# Patient Record
Sex: MALE | Race: WHITE | NOT HISPANIC OR LATINO | Employment: OTHER | URBAN - NONMETROPOLITAN AREA
[De-identification: names, ages, dates, MRNs, and addresses within clinical notes are randomized per-mention and may not be internally consistent; named-entity substitution may affect disease eponyms.]

---

## 2017-01-11 ENCOUNTER — TRANSFERRED RECORDS (OUTPATIENT)
Dept: HEALTH INFORMATION MANAGEMENT | Facility: HOSPITAL | Age: 82
End: 2017-01-11

## 2017-01-16 ENCOUNTER — OFFICE VISIT (OUTPATIENT)
Dept: FAMILY MEDICINE | Facility: OTHER | Age: 82
End: 2017-01-16
Attending: ORTHOPAEDIC SURGERY
Payer: MEDICARE

## 2017-01-16 VITALS
OXYGEN SATURATION: 98 % | TEMPERATURE: 96.8 F | SYSTOLIC BLOOD PRESSURE: 102 MMHG | HEIGHT: 69 IN | HEART RATE: 70 BPM | BODY MASS INDEX: 25.92 KG/M2 | DIASTOLIC BLOOD PRESSURE: 54 MMHG | WEIGHT: 175 LBS

## 2017-01-16 DIAGNOSIS — E78.00 HYPERCHOLESTEREMIA: ICD-10-CM

## 2017-01-16 DIAGNOSIS — I95.1 ORTHOSTATIC HYPOTENSION: Primary | ICD-10-CM

## 2017-01-16 LAB
ANION GAP SERPL CALCULATED.3IONS-SCNC: 7 MMOL/L (ref 3–14)
AST SERPL W P-5'-P-CCNC: 17 U/L (ref 0–45)
BUN SERPL-MCNC: 18 MG/DL (ref 7–30)
CALCIUM SERPL-MCNC: 8.5 MG/DL (ref 8.5–10.1)
CHLORIDE SERPL-SCNC: 109 MMOL/L (ref 94–109)
CHOLEST SERPL-MCNC: 156 MG/DL
CO2 SERPL-SCNC: 26 MMOL/L (ref 20–32)
CREAT SERPL-MCNC: 1.24 MG/DL (ref 0.66–1.25)
GFR SERPL CREATININE-BSD FRML MDRD: 56 ML/MIN/1.7M2
GLUCOSE SERPL-MCNC: 109 MG/DL (ref 70–99)
HDLC SERPL-MCNC: 70 MG/DL
LDLC SERPL CALC-MCNC: 75 MG/DL
NONHDLC SERPL-MCNC: 86 MG/DL
POTASSIUM SERPL-SCNC: 4.1 MMOL/L (ref 3.4–5.3)
SODIUM SERPL-SCNC: 142 MMOL/L (ref 133–144)
TRIGL SERPL-MCNC: 56 MG/DL

## 2017-01-16 PROCEDURE — 99214 OFFICE O/P EST MOD 30 MIN: CPT | Performed by: FAMILY MEDICINE

## 2017-01-16 PROCEDURE — 36415 COLL VENOUS BLD VENIPUNCTURE: CPT | Performed by: FAMILY MEDICINE

## 2017-01-16 PROCEDURE — 84450 TRANSFERASE (AST) (SGOT): CPT | Performed by: FAMILY MEDICINE

## 2017-01-16 PROCEDURE — 80061 LIPID PANEL: CPT | Performed by: FAMILY MEDICINE

## 2017-01-16 PROCEDURE — 99212 OFFICE O/P EST SF 10 MIN: CPT

## 2017-01-16 PROCEDURE — 80048 BASIC METABOLIC PNL TOTAL CA: CPT | Performed by: FAMILY MEDICINE

## 2017-01-16 RX ORDER — MIDODRINE HYDROCHLORIDE 5 MG/1
5 TABLET ORAL 3 TIMES DAILY
Qty: 90 TABLET | Refills: 5 | Status: SHIPPED | OUTPATIENT
Start: 2017-01-16 | End: 2017-04-03

## 2017-01-16 ASSESSMENT — ANXIETY QUESTIONNAIRES
2. NOT BEING ABLE TO STOP OR CONTROL WORRYING: NOT AT ALL
3. WORRYING TOO MUCH ABOUT DIFFERENT THINGS: NOT AT ALL
7. FEELING AFRAID AS IF SOMETHING AWFUL MIGHT HAPPEN: NOT AT ALL
5. BEING SO RESTLESS THAT IT IS HARD TO SIT STILL: NOT AT ALL
6. BECOMING EASILY ANNOYED OR IRRITABLE: NOT AT ALL
GAD7 TOTAL SCORE: 0
1. FEELING NERVOUS, ANXIOUS, OR ON EDGE: NOT AT ALL

## 2017-01-16 ASSESSMENT — PATIENT HEALTH QUESTIONNAIRE - PHQ9: 5. POOR APPETITE OR OVEREATING: NOT AT ALL

## 2017-01-16 ASSESSMENT — PAIN SCALES - GENERAL: PAINLEVEL: NO PAIN (0)

## 2017-01-16 NOTE — NURSING NOTE
"Chief Complaint   Patient presents with     Other     3 month follow up Hypotension. medication recheck/refill request        Initial /54 mmHg  Pulse 70  Temp(Src) 96.8  F (36  C) (Tympanic)  Ht 5' 9\" (1.753 m)  Wt 175 lb (79.379 kg)  BMI 25.83 kg/m2  SpO2 98% Estimated body mass index is 25.83 kg/(m^2) as calculated from the following:    Height as of this encounter: 5' 9\" (1.753 m).    Weight as of this encounter: 175 lb (79.379 kg).  BP completed using cuff size: zak ORDONEZ      "

## 2017-01-16 NOTE — PROGRESS NOTES
[facility] [place]    Jf Ortiz, 83 year old, male presents with   Chief Complaint   Patient presents with     Other     3 month follow up Hypotension. medication recheck/refill request. His friend was wondering about the dose change with the medication and controlling blood pressure.  He's had no chest pain or shortness of breath no abdominal pain       PAST MEDICAL HISTORY:  Past Medical History   Diagnosis Date     Diaphragmatic hernia without mention of obstruction or gangrene 1/1/2011     Other and unspecified hyperlipidemia 1/1/2011     Osteoarthrosis, unspecified whether generalized or localized, unspecified site 1/1/2011     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     Coronary artery disease      Pacemaker      Stented coronary artery      Seizure disorder (H)      Parkinson's disease (H)      Dementia      probable Lewy Body Disease       PAST SURGICAL HISTORY:  Past Surgical History   Procedure Laterality Date     ------------other-------------  1955     ulnar and radial fx - repair ulnar and radial fx x4     Colonoscopy with polypectomy  3/13/2009     history of polyps - repeat 3 yrs     Pacemaker placement  2000     heart block     Bypass graft artery coronary       coronary artery disease x 5     Cataract extraction and lens implantation  2011     cataracts     Cataract extraction and lens implantation       cataracts     Stent placement to lad  2008     Colonoscopy with polypectomy  2006     Pacemaker placement  2011     dual-chamber     Colonoscopy with polypectomy  2005     Ventilation tube  5/24/2012     right in office     ------------other-------------  6/14/2011     cataract extraction     Colonoscopy  2012     Esophagoscopy, gastroscopy, duodenoscopy (egd), combined  1/22/2014     Procedure: COMBINED ESOPHAGOSCOPY, GASTROSCOPY, DUODENOSCOPY (EGD);  UPPER ENDOSCOPY(JAYDEN) W/ BIOPSIES;  Surgeon: Patricia Pena MD;  Location: [facility]     Remove tube, myringotomy, combined  1/22/2014      Procedure: COMBINED REMOVE TUBE, MYRINGOTOMY;  MICRODIRECT LARYNGOSCOPY WITH BIOPSY AND FROZEN SECTIONS removal of right ear tube and myringoplasty;  Surgeon: Chayo Duke MD;  Location: [facility]     Laryngoscopy with microscope  1/22/2014     Procedure: LARYNGOSCOPY WITH MICROSCOPE;;  Surgeon: Chayo Duke MD;  Location[facility]     Blepharoplasty bilateral  5/6/2014     Procedure: BLEPHAROPLASTY BILATERAL;  Surgeon: Andrew Queen MD;  Location: [facility]     Endoscopy upper, colonoscopy, combined N/A 10/31/2014     Procedure: COMBINED ENDOSCOPY UPPER, COLONOSCOPY;  Surgeon: Bassam Aguilar MD;  Location[facility]     Combined colonoscopy with argon plasma coagulator (apc) N/A 10/31/2014     Procedure: COMBINED COLONOSCOPY WITH ARGON PLASMA COAGULATOR (APC);  Surgeon: Bassam Aguilar MD;  [facility]     Biopsy  08/2015     skin biopsy     Endoscopy upper, colonoscopy, combined N/A 11/13/2015     Procedure: COMBINED ENDOSCOPY UPPER, COLONOSCOPY;  Surgeon: Bassam Aguilar MD;  [facility]     Combined colonoscopy with argon plasma coagulator (apc) N/A 11/13/2015     Procedure: COMBINED COLONOSCOPY WITH ARGON PLASMA COAGULATOR (APC);  Surgeon: Bassam Aguilar MD;  Location[facility]       MEDICATIONS:  Prior to Admission medications    Medication Sig Start Date End Date Taking? Authorizing Provider   midodrine (PROAMATINE) 5 MG tablet Take 1 tablet (5 mg) by mouth 3 times daily 1/16/17  Yes FRANCES Ray MD   PANTOPRAZOLE SODIUM PO Take 40 mg by mouth every morning (before breakfast)   Yes Reported, Patient   atorvastatin (LIPITOR) 20 MG tablet Take 1 tablet (20 mg) by mouth daily 4/8/16  Yes FRANCES Ray MD   Omega-3 Fatty Acids (OMEGA-3 FISH OIL PO)    Yes Reported, Patient   vitamin  B complex with vitamin C (VITAMIN  B COMPLEX) TABS Take 1 tablet by mouth daily   Yes Reported, Patient   MELATONIN PO Take 5 mg by mouth   Yes Reported, Patient   UNABLE TO FIND MEDICATION NAME: prosta ida   Yes Reported,  "Patient   Multiple Vitamins-Minerals (ONE-A-DAY 50 PLUS PO) Take by mouth daily   Yes Reported, Patient   Cholecalciferol (VITAMIN D-3 PO) Take 1,000 Int'l Units by mouth daily    Yes Reported, Patient       ALLERGIES:     Allergies   Allergen Reactions     Cats Other (See Comments)     Lamotrigine      Skin lesions       ROS:  Constitutional, neuro, ENT, endocrine, pulmonary, cardiac, gastrointestinal, genitourinary, musculoskeletal, integument and psychiatric systems are negative, except as otherwise noted.      EXAM:  /54 mmHg  Pulse 70  Temp(Src) 96.8  F (36  C) (Tympanic)  Ht 5' 9\" (1.753 m)  Wt 175 lb (79.379 kg)  BMI 25.83 kg/m2  SpO2 98% Body mass index is 25.83 kg/(m^2).   GENERAL APPEARANCE: healthy, alert and no distress  EYES: Eyes grossly normal to inspection, PERRL and conjunctivae and sclerae normal  HENT:  nose and mouth without ulcers or lesions  NECK: no adenopathy, no asymmetry, masses, or scars and thyroid normal to palpation  RESP: lungs clear to auscultation - no rales, rhonchi or wheezes  CV: regular rates and rhythm, normal S1 S2, no S3 or S4 and no murmur, click or rub  ABDOMEN: soft, nontender, without hepatosplenomegaly or masses and bowel sounds normal  NEURO: Normal strength and tone, mentation intact and speech normal, it will get up out of the chair go to the door turn around and come back without any difficulty.  No cogwheeling.  PSYCH: mentation appears normal and affect normal  Lab/ X-ray  No results found for this or any previous visit (from the past 24 hour(s)).    ASSESSMENT/PLAN:    ICD-10-CM    1. Orthostatic hypotension I95.1 midodrine (PROAMATINE) 5 MG tablet. Change dose from 2.5 mg 3 times a day to 5 mg 3 times a day.  Will check BMP for follow up of hypertension.  Discussed with his friend that I think some of the neurological changes that were diagnosed as dementia and Parkinson's earlier are probably related more to the previous low blood pressure.  He seems " to be doing fine on this medicine  We'll see him in 3 months for follow up.     Basic metabolic panel   2. Hypercholesteremia E78.00 Lipid Profile, AST drawn and will call with results     AST         IRENE Ray MD  January 16, 2017

## 2017-01-16 NOTE — MR AVS SNAPSHOT
"              After Visit Summary   1/16/2017    Jf Ortiz    MRN: [id number]           Patient Information     Date Of Birth          10/5/1933        Visit Information        Provider Department      1/16/2017 10:15 AM FRANCES Ray MD [facility]        Today's Diagnoses     Orthostatic hypotension    -  1     Hypercholesteremia           Care Instructions    We will call with the labs.          Follow-ups after your visit        Your next 10 appointments already scheduled     Jan 20, 2017  1:15 PM   (Arrive by 1:00 PM)   Return Visit with Geeta Alegria PA-C   [facility] ([facility])    [address] [facility] [place] [address]   [phone]            Nov 15, 2017 11:00 AM   Hearing Eval with WENDY Meyers   [facility] ([facility])    [address] [facility] [place] [address]   [phone]              Who to contact     If you have questions or need follow up information about today's clinic visit or your schedule please contact [facility] directly at [phone].  Normal or non-critical lab and imaging results will be communicated to you by MyChart, letter or phone within 4 business days after the clinic has received the results. If you do not hear from us within 7 days, please contact the clinic through [employer] or phone. If you have a critical or abnormal lab result, we will notify you by phone as soon as possible.  Submit refill requests through [employer] or call your pharmacy and they will forward the refill request to us. Please allow 3 business days for your refill to be completed.          Additional Information About Your Visit        MyChart Information     [employer] lets you send messages to your doctor, view your test results, renew your prescriptions, schedule appointments and more. To sign up, go to [email or web address] . Click on \"Log in\" on the left side of the screen, which will take you to the Welcome page. Then click on " "\"Sign up Now\" on the right side of the page.     You will be asked to enter the access code listed below, as well as some personal information. Please follow the directions to create your username and password.     Your access code is: [email or web address]  Expires: 2017 10:17 AM     Your access code will  in 90 days. If you need help or a new code, please call your [facility] or [phone].        Care EveryWhere ID     This is your Care EveryWhere ID. This could be used by other organizations to access your [place] medical records  [id number]        Your Vitals Were     Pulse Temperature Height BMI (Body Mass Index) Pulse Oximetry       70 96.8  F (36  C) (Tympanic) 5' 9\" (1.753 m) 25.83 kg/m2 98%        Blood Pressure from Last 3 Encounters:   17 102/54   10/14/16 114/70   16 122/68    Weight from Last 3 Encounters:   17 175 lb (79.379 kg)   10/14/16 170 lb (77.111 kg)   16 172 lb (78.019 kg)              We Performed the Following     AST     Basic metabolic panel     Lipid Profile          Today's Medication Changes          These changes are accurate as of: 17 10:17 AM.  If you have any questions, ask your nurse or doctor.               These medicines have changed or have updated prescriptions.        Dose/Directions    midodrine 5 MG tablet   Commonly known as:  PROAMATINE   This may have changed:    - medication strength  - how much to take   Used for:  Orthostatic hypotension   Changed by:  FRANCES Ray MD        Dose:  5 mg   Take 1 tablet (5 mg) by mouth 3 times daily   Quantity:  90 tablet   Refills:  5            Where to get your medicines      These medications were sent to [id number] - [place] - [address]  [address] [place] [address]     Phone:  [phone]    - midodrine 5 MG tablet             Primary Care Provider Office Phone # Fax #    FRANCES Ray [phone]       [facility] [address] [facility] [address]        Thank you!     Thank you for choosing [facility]  for your care. Our goal is always to provide you with excellent care. Hearing back from our patients is one way we can continue to improve our services. Please take a few minutes to complete the written survey that you may receive in the mail after your visit with us. Thank you!             Your Updated Medication List - Protect others around you: Learn how to safely use, store and throw away your medicines at www.disposemymeds.org.          This list is accurate as of: 1/16/17 10:17 AM.  Always use your most recent med list.                   Brand Name Dispense Instructions for use    LIPITOR 20 MG tablet   Generic drug:  atorvastatin     90 tablet    Take 1 tablet (20 mg) by mouth daily       MELATONIN PO      Take 5 mg by mouth       midodrine 5 MG tablet    PROAMATINE    90 tablet    Take 1 tablet (5 mg) by mouth 3 times daily       OMEGA-3 FISH OIL PO          ONE-A-DAY 50 PLUS PO      Take by mouth daily       PANTOPRAZOLE SODIUM PO      Take 40 mg by mouth every morning (before breakfast)       UNABLE TO FIND      MEDICATION NAME: prosta ida       vitamin B complex with vitamin C Tabs tablet      Take 1 tablet by mouth daily       VITAMIN D-3 PO      Take 1,000 Int'l Units by mouth daily

## 2017-01-17 ASSESSMENT — PATIENT HEALTH QUESTIONNAIRE - PHQ9: SUM OF ALL RESPONSES TO PHQ QUESTIONS 1-9: 0

## 2017-01-17 ASSESSMENT — ANXIETY QUESTIONNAIRES: GAD7 TOTAL SCORE: 0

## 2017-02-08 ENCOUNTER — OFFICE VISIT (OUTPATIENT)
Dept: OTOLARYNGOLOGY | Facility: OTHER | Age: 82
End: 2017-02-08
Attending: PHYSICIAN ASSISTANT
Payer: MEDICARE

## 2017-02-08 VITALS
SYSTOLIC BLOOD PRESSURE: 104 MMHG | OXYGEN SATURATION: 97 % | DIASTOLIC BLOOD PRESSURE: 60 MMHG | TEMPERATURE: 96.9 F | WEIGHT: 172 LBS | HEART RATE: 100 BPM | BODY MASS INDEX: 25.48 KG/M2 | HEIGHT: 69 IN

## 2017-02-08 DIAGNOSIS — H90.3 ASNHL (ASYMMETRICAL SENSORINEURAL HEARING LOSS): Primary | ICD-10-CM

## 2017-02-08 DIAGNOSIS — H61.23 IMPACTED CERUMEN, BILATERAL: ICD-10-CM

## 2017-02-08 DIAGNOSIS — H69.91 DYSFUNCTION OF EUSTACHIAN TUBE, RIGHT: ICD-10-CM

## 2017-02-08 DIAGNOSIS — Z96.22 S/P MYRINGOTOMY WITH INSERTION OF TUBE: ICD-10-CM

## 2017-02-08 PROCEDURE — 92504 EAR MICROSCOPY EXAMINATION: CPT | Performed by: PHYSICIAN ASSISTANT

## 2017-02-08 PROCEDURE — 99213 OFFICE O/P EST LOW 20 MIN: CPT | Mod: 25 | Performed by: PHYSICIAN ASSISTANT

## 2017-02-08 PROCEDURE — 92504 EAR MICROSCOPY EXAMINATION: CPT

## 2017-02-08 PROCEDURE — 99214 OFFICE O/P EST MOD 30 MIN: CPT

## 2017-02-08 ASSESSMENT — PAIN SCALES - GENERAL: PAINLEVEL: NO PAIN (0)

## 2017-02-08 NOTE — MR AVS SNAPSHOT
After Visit Summary   2/8/2017    Jf Ortiz    MRN: [id number]           Patient Information     Date Of Birth          10/5/1933        Visit Information        Provider Department      2/8/2017 11:30 AM Geeta Alegria PA-C [facility]        Today's Diagnoses     ASNHL (asymmetrical sensorineural hearing loss)    -  1     Impacted cerumen, bilateral         Dysfunction of eustachian tube, right         S/P myringotomy with insertion of tube           Care Instructions    Ears were cleaned.   Follow up in 6 months  Tube is in good position and open      If there are concerns or questions, Call [phone] and ask for nurse        Follow-ups after your visit        Your next 10 appointments already scheduled     Apr 17, 2017 10:15 AM   (Arrive by 10:00 AM)   Office Visit with FRANCES Ray MD   [facility] ([facility] [place] [facility])    [address]  [place] [id number] [phone]           Bring a current list of meds and any records pertaining to this visit.  For Physicals, please bring immunization records and any forms needing to be filled out.    Please arrive 15 minutes early to complete paperwork and register.            Nov 15, 2017 11:00 AM   Hearing Eval with WENDY Meyers   [facility] [place] ([facility] [place] [facility])    [address]  [place] [id number] [phone]              Who to contact     If you have questions or need follow up information about today's clinic visit or your schedule please contact [facility] directly at [phone].  Normal or non-critical lab and imaging results will be communicated to you by MyChart, letter or phone within 4 business days after the clinic has received the results. If you do not hear from us within 7 days, please contact the clinic through [employer] or phone. If you have a critical or abnormal lab result, we will notify you by phone as soon as possible.  Submit refill requests through [employer] or  "call your pharmacy and they will forward the refill request to us. Please allow 3 business days for your refill to be completed.          Additional Information About Your Visit        MyChart Information     [employer] lets you send messages to your doctor, view your test results, renew your prescriptions, schedule appointments and more. To sign up, go to [email or web address] . Click on \"Log in\" on the left side of the screen, which will take you to the Welcome page. Then click on \"Sign up Now\" on the right side of the page.     You will be asked to enter the access code listed below, as well as some personal information. Please follow the directions to create your username and password.     Your access code is: [email or web address]  Expires: 2017 10:17 AM     Your access code will  in 90 days. If you need help or a new code, please call your [place] clinic or [phone].        Care EveryWhere ID     This is your [facility] EveryWhere ID. This could be used by other organizations to access your [place] medical records  [id number]        Your Vitals Were     Pulse Temperature Height BMI (Body Mass Index) Pulse Oximetry       100 96.9  F (36.1  C) (Tympanic) 5' 9\" (1.753 m) 25.39 kg/m2 97%        Blood Pressure from Last 3 Encounters:   17 104/60   17 102/54   10/14/16 114/70    Weight from Last 3 Encounters:   17 172 lb (78.019 kg)   17 175 lb (79.379 kg)   10/14/16 170 lb (77.111 kg)              Today, you had the following     No orders found for display       Primary Care Provider Office Phone # Fax #    R Venancio Ray [phone]       [place] [address]        Thank you!     Thank you for choosing [facility]  for your care. Our goal is always to provide you with excellent care. Hearing back from our patients is one way we can continue to improve our services. Please take a few minutes to complete the written " survey that you may receive in the mail after your visit with us. Thank you!             Your Updated Medication List - Protect others around you: Learn how to safely use, store and throw away your medicines at www.disposemymeds.org.          This list is accurate as of: 2/8/17 11:50 AM.  Always use your most recent med list.                   Brand Name Dispense Instructions for use    LIPITOR 20 MG tablet   Generic drug:  atorvastatin     90 tablet    Take 1 tablet (20 mg) by mouth daily       MELATONIN PO      Take 5 mg by mouth       midodrine 5 MG tablet    PROAMATINE    90 tablet    Take 1 tablet (5 mg) by mouth 3 times daily       OMEGA-3 FISH OIL PO          ONE-A-DAY 50 PLUS PO      Take by mouth daily       PANTOPRAZOLE SODIUM PO      Take 40 mg by mouth every morning (before breakfast)       UNABLE TO FIND      MEDICATION NAME: prosta ida       vitamin B complex with vitamin C Tabs tablet      Take 1 tablet by mouth daily       VITAMIN D-3 PO      Take 1,000 Int'l Units by mouth daily

## 2017-02-08 NOTE — PATIENT INSTRUCTIONS
Ears were cleaned.   Follow up in 6 months  Tube is in good position and open      If there are concerns or questions, Call [phone] and ask for nurse

## 2017-02-08 NOTE — PROGRESS NOTES
"Chief Complaint   Patient presents with     Cerumen Impaction     ear cleaning   Jf Ortiz is a 83 year old male who presents today for ear cleaning. Last had ears cleaned 5 months ago. Has concerns with hearing, both ears.  He has bilateral hearing aids that he wears.  Right aid is out for repair at this time. Has no tinnitus, vertigo.  He has a history of ASNHL, right>left with right PE tube.  He has declined MRI IAC, is following with serial Audiograms.      Past Medical History   Diagnosis Date     Diaphragmatic hernia without mention of obstruction or gangrene 1/1/2011     Other and unspecified hyperlipidemia 1/1/2011     Osteoarthrosis, unspecified whether generalized or localized, unspecified site 1/1/2011     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     Coronary artery disease      Pacemaker      Stented coronary artery      Seizure disorder (H)      Parkinson's disease (H)      Dementia      probable Lewy Body Disease        Allergies   Allergen Reactions     Cats Other (See Comments)     Lamotrigine      Skin lesions     Current Outpatient Prescriptions   Medication     midodrine (PROAMATINE) 5 MG tablet     PANTOPRAZOLE SODIUM PO     atorvastatin (LIPITOR) 20 MG tablet     Omega-3 Fatty Acids (OMEGA-3 FISH OIL PO)     vitamin  B complex with vitamin C (VITAMIN  B COMPLEX) TABS     MELATONIN PO     UNABLE TO FIND     Multiple Vitamins-Minerals (ONE-A-DAY 50 PLUS PO)     Cholecalciferol (VITAMIN D-3 PO)     No current facility-administered medications for this visit.      ROS: 10 point ROS neg other than the symptoms noted above in the HPI.  /60 mmHg  Pulse 100  Temp(Src) 96.9  F (36.1  C) (Tympanic)  Ht 5' 9\" (1.753 m)  Wt 172 lb (78.019 kg)  BMI 25.39 kg/m2  SpO2 97%  General - The patient is well nourished and well developed, and appears to have good nutritional status.  Alert and oriented to person and place, answers questions and cooperates with examination appropriately.   Head and Face - " Normocephalic and atraumatic, with no gross asymmetry noted.  The facial nerve is intact, with strong symmetric movements.  Voice and Breathing - The patient was breathing comfortably without the use of accessory muscles. There was no wheezing, stridor, or stertor.  The patients voice was clear and strong, and had appropriate pitch and quality.  Ears - Ears were examined with otoscope and under Microscopy.  The bilateral external auditory canals are cerumen impacted.  Right canal cleaned with cerumen loop and cupped forceps.  The tympanic membrane is intact without effusion, retraction or mass.  T-tube is patent and in good position.  Small amount of cerumen remains around the base of the tube.  Bony landmarks are intact.     Left canal cleaned with #7, #5 sucker. The tympanic membrane is intact without effusion, retraction or mass.  Bony landmarks are intact.    Eyes - Extraocular movements intact.  Sclera were not icteric or injected, conjunctiva are not injected.  Mouth - Examination of the oral cavity showed pink, healthy oral mucosa. No lesions or ulcerations noted.  The tongue was mobile and midline, and the dentition were in good condition.     Throat - The walls of the oropharynx were smooth, pink, moist, symmetric, and had no lesions or ulcerations.  The tonsillar pillars and soft palate were symmetric.  The uvula was midline on elevation.     Neck -  Palpation of the occipital, submental, submandibular, internal jugular chain, and supraclavicular nodes did not demonstrate any abnormal lymph nodes or masses.  Palpation of the thyroid was soft and smooth, with no nodules or goiter appreciated.  The trachea was mobile and midline.  Nose - External contour is symmetric, no gross deflection or scars.  Nasal mucosa is pink and moist with no abnormal mucus.  The septum was intact, turbinates of normal size and position.  No polyps, masses, or purulence noted on examination.      ASSESSMENT:    ICD-10-CM    1.  ASNHL (asymmetrical sensorineural hearing loss) H90.5    2. Impacted cerumen, bilateral H61.23    3. Dysfunction of eustachian tube, right H69.81    4. S/P myringotomy with insertion of tube Z96.22      Right tube is open and in good position  Ears are cleaned and both look good  Follow up in 4-6 months for ear cleaning and tube check  Follow up Audiogram is scheduled today    Geeta Alegria PA-C  ENT  [facility], [place]  [phone]

## 2017-02-08 NOTE — NURSING NOTE
"Chief Complaint   Patient presents with     Cerumen Impaction     ear cleaning       Initial /60 mmHg  Pulse 100  Temp(Src) 96.9  F (36.1  C) (Tympanic)  Ht 5' 9\" (1.753 m)  Wt 172 lb (78.019 kg)  BMI 25.39 kg/m2  SpO2 97% Estimated body mass index is 25.39 kg/(m^2) as calculated from the following:    Height as of this encounter: 5' 9\" (1.753 m).    Weight as of this encounter: 172 lb (78.019 kg).  Medication Reconciliation: complete       Екатерина Bae LPN      "

## 2017-03-10 ENCOUNTER — TRANSFERRED RECORDS (OUTPATIENT)
Dept: HEALTH INFORMATION MANAGEMENT | Facility: HOSPITAL | Age: 82
End: 2017-03-10

## 2017-04-03 DIAGNOSIS — I95.1 ORTHOSTATIC HYPOTENSION: ICD-10-CM

## 2017-04-05 NOTE — TELEPHONE ENCOUNTER
Midodrine      Last Written Prescription Date: 02/02/2017  Last Fill Quantity: 90, # refills: 0  Last Office Visit with G, P or [facility] prescribing provider: 01/16/2017  Next 5 appointments (look out 90 days)     Apr 17, 2017 10:15 AM CDT   (Arrive by 10:00 AM)   Office Visit with FRANCES Ray MD   [facility] [place] ([facility])    [address] [place] [address]   [phone]                   Potassium   Date Value Ref Range Status   01/16/2017 4.1 3.4 - 5.3 mmol/L Final     Creatinine   Date Value Ref Range Status   01/16/2017 1.24 0.66 - 1.25 mg/dL Final     BP Readings from Last 3 Encounters:   02/08/17 104/60   01/16/17 102/54   10/14/16 114/70

## 2017-04-06 RX ORDER — MIDODRINE HYDROCHLORIDE 5 MG/1
TABLET ORAL
Qty: 90 TABLET | Refills: 0 | Status: SHIPPED | OUTPATIENT
Start: 2017-04-06 | End: 2017-04-19

## 2017-04-19 DIAGNOSIS — I95.1 ORTHOSTATIC HYPOTENSION: ICD-10-CM

## 2017-04-19 NOTE — TELEPHONE ENCOUNTER
Kaitlynn        Last Written Prescription Date: 4/6/17  Last Fill Quantity: 90,  # refills: 0   Last Office Visit with G, P or [facility] prescribing provider: 1/16/17

## 2017-04-20 RX ORDER — MIDODRINE HYDROCHLORIDE 5 MG/1
TABLET ORAL
Qty: 90 TABLET | Refills: 3 | Status: SHIPPED | OUTPATIENT
Start: 2017-04-20 | End: 2018-02-02

## 2017-06-13 ENCOUNTER — TELEPHONE (OUTPATIENT)
Dept: FAMILY MEDICINE | Facility: OTHER | Age: 82
End: 2017-06-13

## 2017-06-13 ENCOUNTER — TRANSFERRED RECORDS (OUTPATIENT)
Dept: HEALTH INFORMATION MANAGEMENT | Facility: HOSPITAL | Age: 82
End: 2017-06-13

## 2017-06-13 DIAGNOSIS — I95.0 IDIOPATHIC HYPOTENSION: Primary | ICD-10-CM

## 2017-06-13 DIAGNOSIS — M25.60 DECREASED RANGE OF MOTION: ICD-10-CM

## 2017-06-13 DIAGNOSIS — M54.50 LUMBAGO: Primary | ICD-10-CM

## 2017-06-13 NOTE — TELEPHONE ENCOUNTER
10:13 AM    Reason for Call: OVERBOOK    Patient is having the following symptoms: Dizzy spells  For  1 month - thinks it might blood pressure   His blood pressure was 92/62 on medication    The patient is requesting an appointment for  Today  with  Venancio Ray    Was an appointment offered for this call?   No    Preferred method for responding to this message: [phone]    If we cannot reach you directly, may we leave a detailed response at the number you provided?   Yes        Magi Victoria

## 2017-06-13 NOTE — TELEPHONE ENCOUNTER
Spoke with patient denies falls or chest pain. Dr. Venancio Ray would like patient to see the cardiology, patient will be able to get in next Wednesday. Huc will call today about appointment   SHASHI ORDONEZ

## 2017-06-14 ENCOUNTER — HOSPITAL ENCOUNTER (OUTPATIENT)
Dept: CT IMAGING | Facility: HOSPITAL | Age: 82
Discharge: HOME | End: 2017-06-14
Attending: ORTHOPAEDIC SURGERY | Admitting: ORTHOPAEDIC SURGERY
Payer: MEDICARE

## 2017-06-14 PROCEDURE — 72131 CT LUMBAR SPINE W/O DYE: CPT | Mod: TC

## 2017-06-20 ENCOUNTER — OFFICE VISIT (OUTPATIENT)
Dept: AUDIOLOGY | Facility: OTHER | Age: 82
End: 2017-06-20
Attending: AUDIOLOGIST
Payer: MEDICARE

## 2017-06-20 DIAGNOSIS — H90.3 SENSORINEURAL HEARING LOSS, BILATERAL: Primary | ICD-10-CM

## 2017-06-20 PROCEDURE — V5299 HEARING SERVICE: HCPCS | Performed by: AUDIOLOGIST

## 2017-06-20 NOTE — PROGRESS NOTES
Background: Patient walk in for right hearing aid check reporting it is dead.  Results: No battery in aid and the tubing was removed and inserted in the wrong end of earmold. Once fixed no concerns. Concern with patient confusion and memory. Patient went to wrong door to leave office.   Recommendations: Follow up per scheduled or as needed. Provider contacted.  Kirstie Zaman M.S., [facility]  Audiologist #[id number]

## 2017-06-20 NOTE — MR AVS SNAPSHOT
After Visit Summary   6/20/2017    Jf Ortiz    MRN: [id number]           Patient Information     Date Of Birth          10/5/1933        Visit Information        Provider Department      6/20/2017 10:30 AM [facility]        Today's Diagnoses     Sensorineural hearing loss, bilateral    -  1       Follow-ups after your visit        Your next 10 appointments already scheduled     Jun 21, 2017 10:30 AM CDT   (Arrive by 10:15 AM)   New Visit with Herman Rivers DO   [facility] [place] ([facility] [place] )    [address]  [place] [address]   [phone]            Aug 04, 2017 10:45 AM CDT   (Arrive by 10:30 AM)   PROCEDURE with Geeta Alegria PA-C   [facility] [place] ([facility] [place] )    [address]  [place] [address]   [phone]            Nov 15, 2017 11:00 AM CST   Hearing Eval with Kenneth Meyers   [facility] [place] ([facility] [place] )    [address]  [place] [address]   [phone]              Who to contact     If you have questions or need follow up information about today's clinic visit or your schedule please contact [facility] directly at [phone].  Normal or non-critical lab and imaging results will be communicated to you by [employer], letter or phone within 4 business days after the clinic has received the results. If you do not hear from us within 7 days, please contact the clinic through [employer] or phone. If you have a critical or abnormal lab result, we will notify you by phone as soon as possible.  Submit refill requests through [employer] or call your pharmacy and they will forward the refill request to us. Please allow 3 business days for your refill to be completed.          Additional Information About Your Visit        [employer] Information     [employer] lets you send messages to your doctor, view your test results, renew your prescriptions,  "schedule appointments and more. To sign up, go to [email or web address] . Click on \"Log in\" on the left side of the screen, which will take you to the Welcome page. Then click on \"Sign up Now\" on the right side of the page.     You will be asked to enter the access code listed below, as well as some personal information. Please follow the directions to create your username and password.     Your access code is: [email or web address]  Expires: 2017 10:40 AM     Your access code will  in 90 days. If you need help or a new code, please call your [facility] clinic or [phone].        Care EveryWhere ID     This is your Care EveryWhere ID. This could be used by other organizations to access your [facility] medical records  [id number]         Blood Pressure from Last 3 Encounters:   17 104/60   17 102/54   10/14/16 114/70    Weight from Last 3 Encounters:   17 172 lb (78 kg)   17 175 lb (79.4 kg)   10/14/16 170 lb (77.1 kg)              Today, you had the following     No orders found for display       Primary Care Provider Office Phone # Fax #    R Venancio Ray [phone]       [facility] [place] [address] [place] [address]        Thank you!     Thank you for choosing [facility] [place]  for your care. Our goal is always to provide you with excellent care. Hearing back from our patients is one way we can continue to improve our services. Please take a few minutes to complete the written survey that you may receive in the mail after your visit with us. Thank you!             Your Updated Medication List - Protect others around you: Learn how to safely use, store and throw away your medicines at www.disposemymeds.org.          This list is accurate as of: 17 10:40 AM.  Always use your most recent med list.                   Brand Name Dispense Instructions for use    LIPITOR 20 MG tablet   Generic drug:  atorvastatin     90 tablet    Take 1 tablet " (20 mg) by mouth daily       MELATONIN PO      Take 5 mg by mouth       midodrine 5 MG tablet    PROAMATINE    90 tablet    TAKE ONE TABLET THREE TIMES A DAY BY MOUTH       OMEGA-3 FISH OIL PO          ONE-A-DAY 50 PLUS PO      Take by mouth daily       PANTOPRAZOLE SODIUM PO      Take 40 mg by mouth every morning (before breakfast)       UNABLE TO FIND      MEDICATION NAME: hosea ida       vitamin B complex with vitamin C Tabs tablet      Take 1 tablet by mouth daily       VITAMIN D-3 PO      Take 1,000 Int'l Units by mouth daily

## 2017-06-21 ENCOUNTER — TELEPHONE (OUTPATIENT)
Dept: CARDIOLOGY | Facility: OTHER | Age: 82
End: 2017-06-21

## 2017-06-21 ENCOUNTER — OFFICE VISIT (OUTPATIENT)
Dept: CARDIOLOGY | Facility: OTHER | Age: 82
End: 2017-06-21
Attending: INTERNAL MEDICINE
Payer: MEDICARE

## 2017-06-21 VITALS
TEMPERATURE: 97.3 F | SYSTOLIC BLOOD PRESSURE: 73 MMHG | HEIGHT: 69 IN | HEART RATE: 75 BPM | BODY MASS INDEX: 25.92 KG/M2 | WEIGHT: 175 LBS | RESPIRATION RATE: 20 BRPM | DIASTOLIC BLOOD PRESSURE: 55 MMHG | OXYGEN SATURATION: 97 %

## 2017-06-21 DIAGNOSIS — G20.A1 PARKINSON'S DISEASE (H): Primary | ICD-10-CM

## 2017-06-21 DIAGNOSIS — F02.818 LEWY BODY DEMENTIA WITH BEHAVIORAL DISTURBANCE (H): ICD-10-CM

## 2017-06-21 DIAGNOSIS — Z95.5 STENTED CORONARY ARTERY: ICD-10-CM

## 2017-06-21 DIAGNOSIS — R55 SYNCOPE AND COLLAPSE: ICD-10-CM

## 2017-06-21 DIAGNOSIS — G31.83 LEWY BODY DEMENTIA WITH BEHAVIORAL DISTURBANCE (H): ICD-10-CM

## 2017-06-21 DIAGNOSIS — Z95.0 PACEMAKER: ICD-10-CM

## 2017-06-21 DIAGNOSIS — E78.00 HYPERCHOLESTEREMIA: ICD-10-CM

## 2017-06-21 PROCEDURE — 99204 OFFICE O/P NEW MOD 45 MIN: CPT | Performed by: INTERNAL MEDICINE

## 2017-06-21 PROCEDURE — 93005 ELECTROCARDIOGRAM TRACING: CPT

## 2017-06-21 PROCEDURE — 93010 ELECTROCARDIOGRAM REPORT: CPT | Performed by: INTERNAL MEDICINE

## 2017-06-21 PROCEDURE — 99212 OFFICE O/P EST SF 10 MIN: CPT

## 2017-06-21 RX ORDER — MIDODRINE HYDROCHLORIDE 5 MG/1
10 TABLET ORAL 3 TIMES DAILY
Qty: 90 TABLET | Refills: 3 | Status: SHIPPED | OUTPATIENT
Start: 2017-06-21 | End: 2017-08-18

## 2017-06-21 RX ORDER — SODIUM CHLORIDE 1 G/1
1 TABLET ORAL 2 TIMES DAILY
Qty: 60 TABLET | Refills: 0 | Status: SHIPPED | OUTPATIENT
Start: 2017-06-21 | End: 2017-08-05

## 2017-06-21 RX ORDER — FLUDROCORTISONE ACETATE 0.1 MG/1
0.1 TABLET ORAL DAILY
Qty: 30 TABLET | Refills: 3 | Status: SHIPPED | OUTPATIENT
Start: 2017-06-21 | End: 2017-08-30

## 2017-06-21 ASSESSMENT — PAIN SCALES - GENERAL: PAINLEVEL: NO PAIN (0)

## 2017-06-21 NOTE — NURSING NOTE
"Chief Complaint   Patient presents with     Consult     Referral Dr. MALIA Ray for hypotension and dizziness, wife states that \"his symptoms are similar to before he started the Midodrine HCL but that symptoms are not as severe\".       Initial /65 (BP Location: Left arm, Cuff Size: Adult Large)  Pulse 70  Temp 97.3  F (36.3  C) (Tympanic)  Resp 20  Ht 1.753 m (5' 9\")  Wt 79.4 kg (175 lb)  SpO2 97%  BMI 25.84 kg/m2 Estimated body mass index is 25.84 kg/(m^2) as calculated from the following:    Height as of this encounter: 1.753 m (5' 9\").    Weight as of this encounter: 79.4 kg (175 lb).  Medication Reconciliation: complete   Rekha Quispe      "

## 2017-06-21 NOTE — PATIENT INSTRUCTIONS
You were seen by Dr. Rivers on 6/21/2017.  .   1. Increase the amount of water you drink each day to maintain hydration.  Try to drink 2 to 3 liters of water daily.  Do the best that you can.        2. Increase the amount of salt in the diet to 10 grams per day.  Start taking salt tablets.  We will check with pharmacy and send over an order for the tablets.          3. Increase Midodrine to 10 mg three times per day instead of 5 mg three times per day.        4. Start taking Florinef 0.1 mg once daily.  This medication helps the body retain fluids.      5. Consider wearing compression stockings to help constrict the vessels in the legs.  This helps decrease fluid from staying in he legs and with better circulation back to the heart.      6. Raise the head of the bed or use a wedge at an approximate angle of 30 degrees.  Make slow position changes, especially from a lying down position to a sitting position and from a sitting position to a standing position.  Before standing up, cross your legs to increase blood flow.      7.  It is recommended that you follow up with Neurology.  We will send a referral for Neurology at [facility].        You will follow up with Dr. Rivers in a couple of months.       Please call the cardiology office with problems, questions, or concerns at [phone].    If you experience chest pain, chest pressure, chest tightness, shortness of breath, fainting, lightheadedness, nausea, vomiting, or other concerning symptoms, please report to the Emergency Department or call 911. These symptoms may be emergent, and best treated in the Emergency Department.       Elaine LUTZ RN-BSN  Cardiology   [facility]  [phone]

## 2017-06-21 NOTE — MR AVS SNAPSHOT
After Visit Summary   6/21/2017    Jf Ortiz    MRN: [id number]           Patient Information     Date Of Birth          10/5/1933        Visit Information        Provider Department      6/21/2017 10:30 AM Herman Rivers, DO [facility]        Care Instructions    You were seen by Dr. Rivers on 6/21/2017.  .   1. Increase the amount of water you drink each day to maintain hydration.  Try to drink 2 to 3 liters of water daily.  Do the best that you can.        2. Increase the amount of salt in the diet to 10 grams per day.  Start taking salt tablets.  We will check with pharmacy and send over an order for the tablets.          3. Increase Midodrine to 10 mg three times per day instead of 5 mg three times per day.        4. Start taking Florinef 0.1 mg once daily.  This medication helps the body retain fluids.      5. Consider wearing compression stockings to help constrict the vessels in the legs.  This helps decrease fluid from staying in he legs and with better circulation back to the heart.      6. Raise the head of the bed or use a wedge at an approximate angle of 30 degrees.  Make slow position changes, especially from a lying down position to a sitting position and from a sitting position to a standing position.  Before standing up, cross your legs to increase blood flow.      7.  It is recommended that you follow up with Neurology.  We will send a referral for Neurology at [facility].        You will follow up with Dr. Rivers in a couple of months.       Please call the cardiology office with problems, questions, or concerns at [phone].    If you experience chest pain, chest pressure, chest tightness, shortness of breath, fainting, lightheadedness, nausea, vomiting, or other concerning symptoms, please report to the Emergency Department or call 911. These symptoms may be emergent, and best treated in the Emergency Department.       Elaine LUTZ RN-BSN  Cardiology  "  [facility]  [phone]            Follow-ups after your visit        Your next 10 appointments already scheduled     Aug 04, 2017 10:45 AM CDT   (Arrive by 10:30 AM)   PROCEDURE with Geeta Alegria PA-C   [facility] [place] ([facility] [place] )    [address]  [place] [address]   [phone]            Nov 15, 2017 11:00 AM CST   Hearing Eval with Kenneth Meyers   [facility] [place] ([facility] [place] )    [address]  [place] [address]   [phone]              Who to contact     If you have questions or need follow up information about today's clinic visit or your schedule please contact [facility] directly at [phone].  Normal or non-critical lab and imaging results will be communicated to you by MyChart, letter or phone within 4 business days after the clinic has received the results. If you do not hear from us within 7 days, please contact the clinic through MyChart or phone. If you have a critical or abnormal lab result, we will notify you by phone as soon as possible.  Submit refill requests through [employer] or call your pharmacy and they will forward the refill request to us. Please allow 3 business days for your refill to be completed.          Additional Information About Your Visit        [employer] Information     [employer] lets you send messages to your doctor, view your test results, renew your prescriptions, schedule appointments and more. To sign up, go to [email or web address] . Click on \"Log in\" on the left side of the screen, which will take you to the Welcome page. Then click on \"Sign up Now\" on the right side of the page.     You will be asked to enter the access code listed below, as well as some personal information. Please follow the directions to create your username and password.     Your access code is: [email or web address]  Expires: 2017 10:40 AM     Your access code will  in 90 days. If you need " "help or a new code, please call your [facility] clinic or [phone].        Care EveryWhere ID     This is your Care EveryWhere ID. This could be used by other organizations to access your [facility] medical records  [id number]        Your Vitals Were     Pulse Temperature Respirations Height Pulse Oximetry BMI (Body Mass Index)    70 97.3  F (36.3  C) (Tympanic) 20 1.753 m (5' 9\") 97% 25.84 kg/m2       Blood Pressure from Last 3 Encounters:   06/21/17 124/65   02/08/17 104/60   01/16/17 102/54    Weight from Last 3 Encounters:   06/21/17 79.4 kg (175 lb)   02/08/17 78 kg (172 lb)   01/16/17 79.4 kg (175 lb)              Today, you had the following     No orders found for display       Primary Care Provider Office Phone # Fax #    R Venancio Ray [phone]       [place] [id number]        Equal Access to Services     [facility]: Hadii job saucedo hadasho Soomaali, waaxda luqadaha, qaybta kaalmada adeegjimmy, hayley pollard . So [facility] [phone].    ATENCIÓN: Si habla español, tiene a tucker disposición servicios gratuitos de asistencia lingüística. Llame al [phone].    We comply with applicable federal civil rights laws and [place] laws. We do not discriminate on the basis of race, color, national origin, age, disability sex, sexual orientation or gender identity.            Thank you!     Thank you for choosing [facility]  for your care. Our goal is always to provide you with excellent care. Hearing back from our patients is one way we can continue to improve our services. Please take a few minutes to complete the written survey that you may receive in the mail after your visit with us. Thank you!             Your Updated Medication List - Protect others around you: Learn how to safely use, store and throw away your medicines at www.disposemymeds.org.          This list is accurate as of: 6/21/17 11:42 AM.  " Always use your most recent med list.                   Brand Name Dispense Instructions for use Diagnosis    LIPITOR 20 MG tablet   Generic drug:  atorvastatin     90 tablet    Take 1 tablet (20 mg) by mouth daily    Hypercholesterolemia       MELATONIN PO      Take 5 mg by mouth        midodrine 5 MG tablet    PROAMATINE    90 tablet    TAKE ONE TABLET THREE TIMES A DAY BY MOUTH    Orthostatic hypotension       OMEGA-3 FISH OIL PO           ONE-A-DAY 50 PLUS PO      Take by mouth daily        PANTOPRAZOLE SODIUM PO      Take 40 mg by mouth every morning (before breakfast)        * UNABLE TO FIND      MEDICATION NAME: amandeepa ida        * UNABLE TO FIND      MEDICATION NAME: OTC vitamin for bladder control one tablet twice daily        vitamin B complex with vitamin C Tabs tablet      Take 1 tablet by mouth daily        VITAMIN D-3 PO      Take 1,000 Int'l Units by mouth daily        * Notice:  This list has 2 medication(s) that are the same as other medications prescribed for you. Read the directions carefully, and ask your doctor or other care provider to review them with you.

## 2017-06-21 NOTE — PROGRESS NOTES
"[facility] HEART CARE   CARDIOLOGY CONSULT     Jf Ortiz     FRANCES Ray     Chief Complaint   Patient presents with     Consult     Referral Dr. MALIA Ray for hypotension and dizziness, wife states that \"his symptoms are similar to before he started the Midodrine HCL but that symptoms are not as severe\".  Patient's wife states he has a history of open heart surgery in 11/2006 and with Dr. Bella at [facility] [place] and patient has had a pacemaker x2.        HPI:   Mr. Ortiz is a 83-year-old gentleman who is being seen by cardiology for concerns of orthostatic hypotension, hypotension, history of Parkinson's disease, history of Lewy body dementia, coronary artery disease with history of pacemaker, and hyperlipidemia.    His female  in the room describes months of dizziness particularly with standing.  He has had at least 2 episodes if not more in which he has passed out.  He had presented to the ER with one these episodes and was started on Midodrine 5 mg 3 times a day with significant improvement of his symptoms. He has not passed out since being started on this medication. However, he continues to have dizziness/lightheadedness with standing.  He has chronically low blood pressure and he is not on any antihypertensives. She describes him being diagnosed with Lewy body dementia but was somewhat surprised when she was told that per his record he was given the diagnosis of Parkinson's disease. She feels that this diagnosis has not been officially diagnosed.  In asking her of the potential symptoms of Parkinson's disease she answered yes and is now with more understanding of this diagnosis.    She describes him having difficulties with swallowing in which he has seen GI with potential Botox injections.  He has increased frequency with urination particularly at night.  He has a shuffling gait with cogwheel rigidity, and flat affect.  They had seen neurology in the distant past but nothing more recent. He is not on " anything for Parkinson's.    His blood pressure today was assessed and found originally to be 124/65 with a heart rate of 70. Orthostatics were obtained and with laying down his blood pressure is 131/84, sitting 118/68, and standing 73/55.  The patient and his significant other were told of these findings.  He is severely positive for orthostatics likely secondary to Parkinson's disease.    She reports he drinks limited fluids.  She has been trying to get him to drink more water  But he has been refusing.  He is described as being rather sedentary.  She is trying to get him to exercise more.    He does have a history of coronary artery disease.  He denies symptoms of chest pain, chest tightness. He does not have lower extremity edema.      IMAGING RESULTS:   DUPLEX CAROTID ULTRASOUND 5/13/15     TECHNIQUE:  Two-dimensional ultrasound, pulse Doppler, and color  Doppler evaluation of the extracranial carotid arteries are completed,  as well as the vertebral arteries.     FINDINGS:  At 2-D imaging, atherosclerotic plaque is seen within both  carotid systems.  The Doppler evaluation on the right demonstrates  normal blood flow velocities in the common and internal carotid  arteries.  The peak systolic velocity in the right external carotid  artery is elevated to 1.5 meters per second.  The right internal  carotid/common carotid ratio is normal at 1.1.     The Doppler evaluation on the left side demonstrates normal blood flow  velocities throughout.  The left internal carotid/common carotid ratio  is normal at 0.5.     Incidentally noted is antegrade vertebral artery flow bilaterally.     IMPRESSION:  THERE IS FELT TO BE APPROXIMATELY 50% NARROWING OF THE  RIGHT EXTERNAL CAROTID ARTERY.  THE ATHEROSCLEROTIC PLAQUE IN EACH  INTERNAL CAROTID ARTERY CAUSES NARROWING LESS THAN 50%.   THE  VERTEBRAL ARTERY FLOW IS NORMAL BILATERALLY.    CARDIOLITE LEXISCAN STUDY-INTERNAL MEDICATION DICTATION  DICTATING PHYSICIAN:  Rashi  DO Giuseppe  ORDERING PHYSICIAN:  Kaya Coombs MD  INDICATIONS:   Coronary artery disease and dizziness.  The patient is an 80-year-old male who was evaluated with  Sestamibi/Lexiscan testing. 5 mL Lexiscan (0.4 mg regadenoson) was  administered by rapid intravenous injection; followed immediately by  saline flush and radiopharmaceutical.  The patient's resting heart  rate was 60 and blood pressure was 136/86. The maximum response in  heart rate and blood pressure after Lexiscan injection was increase in  heart rate to 79 and drop in blood pressure to 110/70.  Symptoms during the procedure included:  Abdominal pain.  If symptoms were present, did they resolve post-test?  Yes  Electrocardiogram monitoring demonstrates:  At baseline, borderline  sinus bradycardia with left bundle branch and first degree AV block  pattern.  Stress portion of this test showed continuation of left  bundle branch block pattern with no other obvious changes in ST  segments.  Arrhythmias:  None were noted.  Summary:  Patient did experience some abdominal pain during this  examination and it is unknown or inconclusive as to whether or not  this was related to the examination itself.  Nevertheless, abdominal  pain improved and resolved during the recovery phase.  Electrocardiogram was noted as above, with continued left bundle  branch block pattern throughout the stress portion.   Patient now  moves forward to imaging.  Continued on Page 2    CARDIOLITE IV LEXISCAN AND GATED SPECT-RADIOLOGIST DICTATION  DICTATING PHYSICIAN:  Eileen Cox MD  CLINICAL HISTORY:  Coronary artery disease, dizziness.  COMPARISON:  Today's study is compared to a prior examination which is  dated November 9, 2009.  LEXISCAN DOSE: 5 mL (0.4 mg regadenoson) by rapid intravenous  injection.  SESTAMIBI DOSE: 10 mCi 99mTc, 1 mL MIBI   Injection Time:  0740  SESTAMIBI DOSE: 30 mCi 99mTc, 1 mL MIBI   Injection Time:  0941  ANGIOCATH SIZE:  22  INJECTION SITE:  Right  AC  INITIALS:  SE  IV DISCONTINUE TIME:  0947  SITE CONDITION: No infiltrate  TIP INTACT:  Yes  The patient is an 80-year-old  male who was evaluated with  Sestamibi/Lexiscan testing. 5 mL Lexiscan (0.4 mg regadenoson) was  administered by rapid intravenous injection; followed immediately by  saline flush and radiopharmaceutical.  The patient's resting heart  rate was 60 and blood pressure was 136/86. The maximum response in  heart rate and blood pressure after Lexiscan injection was 79 and  110/70.  Symptoms during the procedure included:  Abdominal pain  If symptoms were present, did they resolve post-test?  Yes  Electrocardiogram monitoring demonstrates:  Baseline:  Bradycardia  with left bundle branch block and 1st degree AV block  Arrhythmias:  None.  Summary:  Above.  Continued on Page 3  VIEWS OBTAINED:  Short axis, horizontal long axis, vertical long axis  at rest and stress.  FINDINGS:  There is a normal distribution of radiotracer in the left  ventricle.  No reversible perfusion defects are seen.  No significant  wall motion abnormalities are seen.  Ejection fraction is 77%.  IMPRESSION:  No evidence of stress induced ischemia.    Exam Date: May 28, 2014 09:44:11 AM  Author: JENIFER LAMB  This report is final and signed      PAST MEDICAL HISTORY:   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     Seizure disorder (H)      Stented coronary artery           FAMILY HISTORY:   Family History   Problem Relation Age of Onset     DIABETES Mother           PAST SURGICAL HISTORY:   Past Surgical History:   Procedure Laterality Date      ------------OTHER-------------  1955    ulnar and radial fx - repair ulnar and radial fx x4     ------------OTHER-------------  6/14/2011    cataract extraction     BIOPSY  08/2015    skin biopsy     BLEPHAROPLASTY BILATERAL  5/6/2014    Procedure: BLEPHAROPLASTY BILATERAL;  Surgeon: Andrew Queen MD;  Location: [facility] OR     BYPASS GRAFT ARTERY CORONARY  11/2006    coronary artery disease x 5, [facility]     cataract extraction and lens implantation  2011    cataracts     cataract extraction and lens implantation      cataracts     COLONOSCOPY  2012     colonoscopy with polypectomy  3/13/2009    history of polyps - repeat 3 yrs     colonoscopy with polypectomy  2006     colonoscopy with polypectomy  2005     COMBINED COLONOSCOPY WITH ARGON PLASMA COAGULATOR (APC) N/A 10/31/2014    Procedure: COMBINED COLONOSCOPY WITH ARGON PLASMA COAGULATOR (APC);  Surgeon: Bassam Aguilar MD;  Location: [facility]     COMBINED COLONOSCOPY WITH ARGON PLASMA COAGULATOR (APC) N/A 11/13/2015    Procedure: COMBINED COLONOSCOPY WITH ARGON PLASMA COAGULATOR (APC);  Surgeon: Bassam Aguilar MD;  Location: [facility]     ENDOSCOPY UPPER, COLONOSCOPY, COMBINED N/A 10/31/2014    Procedure: COMBINED ENDOSCOPY UPPER, COLONOSCOPY;  Surgeon: Bassam Aguilar MD;  Location: [facility]     ENDOSCOPY UPPER, COLONOSCOPY, COMBINED N/A 11/13/2015    Procedure: COMBINED ENDOSCOPY UPPER, COLONOSCOPY;  Surgeon: Bassam Aguilar MD;  Location: [facility]     ESOPHAGOSCOPY, GASTROSCOPY, DUODENOSCOPY (EGD), COMBINED  1/22/2014    Procedure: COMBINED ESOPHAGOSCOPY, GASTROSCOPY, DUODENOSCOPY (EGD);  UPPER ENDOSCOPY(PENA) W/ BIOPSIES;  Surgeon: Patricia Pena MD;  Location: [facility]     LARYNGOSCOPY WITH MICROSCOPE  1/22/2014    Procedure: LARYNGOSCOPY WITH MICROSCOPE;;  Surgeon: Chayo Duke MD;  Location: [facility]     pacemaker placement  2000    heart block     pacemaker placement  2011    dual-chamber     REMOVE TUBE, MYRINGOTOMY, COMBINED  1/22/2014    Procedure: COMBINED  REMOVE TUBE, MYRINGOTOMY;  MICRODIRECT LARYNGOSCOPY WITH BIOPSY AND FROZEN SECTIONS removal of right ear tube and myringoplasty;  Surgeon: Chayo Duke MD;  Location: [facility]     stent placement to LAD  2008     ventilation tube  5/24/2012    right in office          SOCIAL HISTORY:   Social History     Social History     Marital status: Single     Spouse name: N/A     Number of children: N/A     Years of education: N/A     Occupational History     retired      Social History Main Topics     Smoking status: Former Smoker     Packs/day: 1.00     Years: 5.00     Types: Cigarettes     Smokeless tobacco: Never Used      Comment: quit in 1985     Alcohol use Yes      Comment: wine daily     Drug use: No     Sexual activity: Not Asked     Other Topics Concern     Blood Transfusions Yes     Caffeine Concern Yes     1 cup coffee daily     Parent/Sibling W/ Cabg, Mi Or Angioplasty Before 65f 55m? No     Social History Narrative          CURRENT MEDICATIONS:   Current Outpatient Prescriptions   Medication     UNABLE TO FIND     midodrine (PROAMATINE) 5 MG tablet     fludrocortisone (FLORINEF) 0.1 MG tablet     COMPRESSION STOCKINGS     sodium chloride 1 GM tablet     midodrine (PROAMATINE) 5 MG tablet     PANTOPRAZOLE SODIUM PO     atorvastatin (LIPITOR) 20 MG tablet     Omega-3 Fatty Acids (OMEGA-3 FISH OIL PO)     vitamin  B complex with vitamin C (VITAMIN  B COMPLEX) TABS     MELATONIN PO     UNABLE TO FIND     Multiple Vitamins-Minerals (ONE-A-DAY 50 PLUS PO)     Cholecalciferol (VITAMIN D-3 PO)     No current facility-administered medications for this visit.           ALLERGIES:   Allergies   Allergen Reactions     Cats Other (See Comments)     Lamotrigine      Skin lesions          ROS:   CONSTITUTIONAL: No weight loss, fever, chills, but he has weakness but no fatigue.   HEENT: Eyes: No visual changes. Ears, Nose, Throat: No hearing loss, congestion but admits to difficulty swallowing.   CARDIOVASCULAR: No chest  pain, chest pressure or chest discomfort. No palpitations or lower extremity edema.   RESPIRATORY: No shortness of breath, dyspnea upon exertion, cough or sputum production.   GASTROINTESTINAL: No abdominal pain. No anorexia, nausea, vomiting or diarrhea.   NEUROLOGICAL: No headache, lightheadedness, dizziness, syncope, ataxia or weakness.   HEMATOLOGIC: No anemia, bleeding or bruising.   PSYCHIATRIC: No history of depression or anxiety.   ENDOCRINOLOGIC: No reports of sweating, cold or heat intolerance. He has polyuria but no polydipsia.   SKIN: No abnormal rashes or itching.       PHYSICAL EXAM:     GENERAL: The patient is a well-developed, well-nourished, in no apparent distress. Alert and oriented x3. Flat affect.  HEENT: Head is normocephalic and atraumatic. Eyes are symmetrical with normal visual tracking. Nares appeared normal without nasal drainage. Mucous membranes are moist.   NECK: Supple.  HEART: Regular rate and rhythm, S1S2 present without murmur, rub or gallop.   LUNGS: Respirations regular and unlabored. Clear to auscultation.   GI: Abdomen is soft and nondistended. Normoactive bowel sounds throughout.   EXTREMITIES: No peripheral edema present. Dorsalis pedis and posterior tibialis pulses present.   MUSCULOSKELETAL: No joint swelling.   NEUROLOGIC: Alert and oriented X3. No focal neurologic deficits.   SKIN: No jaundice. No rashes or visible skin lesions present.         LAB RESULTS:   Office Visit on 01/16/2017   Component Date Value Ref Range Status     Sodium 01/16/2017 142  133 - 144 mmol/L Final     Potassium 01/16/2017 4.1  3.4 - 5.3 mmol/L Final     Chloride 01/16/2017 109  94 - 109 mmol/L Final     Carbon Dioxide 01/16/2017 26  20 - 32 mmol/L Final     Anion Gap 01/16/2017 7  3 - 14 mmol/L Final     Glucose 01/16/2017 109* 70 - 99 mg/dL Final     Urea Nitrogen 01/16/2017 18  7 - 30 mg/dL Final     Creatinine 01/16/2017 1.24  0.66 - 1.25 mg/dL Final     GFR Estimate 01/16/2017 56* >60  mL/min/1.7m2 Final     GFR Estimate If Black 01/16/2017 67  >60 mL/min/1.7m2 Final     Calcium 01/16/2017 8.5  8.5 - 10.1 mg/dL Final     Cholesterol 01/16/2017 156  <200 mg/dL Final     Triglycerides 01/16/2017 56  <150 mg/dL Final     HDL Cholesterol 01/16/2017 70  >39 mg/dL Final     LDL Cholesterol Calculated 01/16/2017 75  <100 mg/dL Final     Non HDL Cholesterol 01/16/2017 86  <130 mg/dL Final     AST 01/16/2017 17  0 - 45 U/L Final          ASSESSMENT:   Mr. Ortiz is a 83-year-old gentleman who is being seen by cardiology for concerns of orthostatic hypotension, hypotension, history of Parkinson's disease, history of Lewy body dementia, coronary artery disease with history of pacemaker, and hyperlipidemia.    Impression:  1.  Severely positive for orthostatic hypotension secondary to Parkinson.  2.  Parkinson's disease.  3.  Lewy body dementia.  4.  Coronary artery disease.  5.  Pacemaker placement.  6.  Hyperlipidemia.      PLAN:   1.  He will increase Midodrine from 5 mg 3 times a day to 10 mg 3 times a day.  2.  He'll be started on florinef 0.1 mg daily.  3.  He was encouraged to increase his fluid intake to 2-3 L a day.  4.  He is to increase his salt intake with a goal of 10 g a day.  5.  He will be prescribed salt tablets at 1 g to be taken twice a day with potential increase to 3 times a day.  6.  It is recommended he get compression stockings to reduce fluid shift to his lower extremities when standing, this is not for lower extremity edema.  7.  It is recommended to raise the head of the bed to 30  as they have a bed that can do this  in conjunction with pillows and a wedge that she has from past usage.  8.  He was encouraged to increase his exercise on a daily basis as he is rather sedentary.  9.  He is to follow-up with neurology for Parkinson's and Lewy body dementia.  He has requested that this be done through . [facility].  10.  He will be seen in 2 months time for follow up with these  changes.      Thank you for allowing me to participate in the care of your patient. Please do not hesitate to contact me if you have any questions.     Herman Rivers

## 2017-06-21 NOTE — TELEPHONE ENCOUNTER
Call placed to patient to inform him that Rx for Compression stockings were sent to [facility].  Patient states understanding and will  compression stockings there.

## 2017-06-27 ENCOUNTER — TRANSFERRED RECORDS (OUTPATIENT)
Dept: HEALTH INFORMATION MANAGEMENT | Facility: HOSPITAL | Age: 82
End: 2017-06-27

## 2017-06-28 DIAGNOSIS — M54.50 LUMBAGO: ICD-10-CM

## 2017-06-28 DIAGNOSIS — M51.26 LUMBAR HERNIATED DISC: Primary | ICD-10-CM

## 2017-07-10 ENCOUNTER — HOSPITAL ENCOUNTER (OUTPATIENT)
Dept: GENERAL RADIOLOGY | Facility: HOSPITAL | Age: 82
Discharge: HOME | End: 2017-07-10
Attending: ORTHOPAEDIC SURGERY | Admitting: ORTHOPAEDIC SURGERY
Payer: MEDICARE

## 2017-07-10 PROCEDURE — 99212 OFFICE O/P EST SF 10 MIN: CPT | Mod: TC

## 2017-07-10 NOTE — PROGRESS NOTES
"Pain Management Referral Consult    PATIENT HISTORICAL:    Patient Anatomy/region of concern:  Lumbar spine     When and how did your pain begin?   Date  1 year    Event  No event  Location of the pain?  To the right of the lower spine, does not go down leg    Describe what pain feels like: ache, sore muscles  Does it interfere with daily activities?  yes    What makes your pain better?   Resting, stretching     Focal tenderness at one point along the length of a taut band (\"knotted muscle\")?  Yes    Restricted range of motion of the involved muscle or joint?  No    Did you have a previous injection series where you found relief of at least 3 months?  No  Have you had surgery in the area of pain?   Yes  If yes, when was the surgery?    What treatments have you already had for your pain?   Add length of time in weeks for all that apply.    Treatment Tried it--helped Tried it-- nohelp Made it worse   Pain clinic      Physical therapy  Couple years ago    Chiropractic care      Spine injections       Other nerve blocks      Surgery      Exercise  X    Others (list):                Injection Documentation of Provider History    PER-PROVIDER HISTORY, Dr. OROZCO  Is this for treatment of acute herpes zoster, post herpetic neuralgia, post-decompressive radiculitis, or post-surgical scarring?   No  Does the patient have radiculopathy or sciatica?  No  How long has the patient had any physical therapy, home therapy or chiropractor care?  6+ weeks  A couple of years ago, did not help  How long has the patient taken NSaids or muscle relaxants?  6+ weeks.  Is there any history of systemic/local infection or unstable medical conditions?  No    "

## 2017-07-18 DIAGNOSIS — M79.10 MYALGIA: Primary | ICD-10-CM

## 2017-07-25 ENCOUNTER — OFFICE VISIT (OUTPATIENT)
Dept: OTOLARYNGOLOGY | Facility: OTHER | Age: 82
End: 2017-07-25
Attending: PHYSICIAN ASSISTANT
Payer: MEDICARE

## 2017-07-25 VITALS
DIASTOLIC BLOOD PRESSURE: 64 MMHG | TEMPERATURE: 97.6 F | WEIGHT: 175 LBS | BODY MASS INDEX: 25.92 KG/M2 | HEIGHT: 69 IN | SYSTOLIC BLOOD PRESSURE: 106 MMHG | HEART RATE: 70 BPM

## 2017-07-25 DIAGNOSIS — H61.23 IMPACTED CERUMEN, BILATERAL: ICD-10-CM

## 2017-07-25 DIAGNOSIS — H90.3 ASNHL (ASYMMETRICAL SENSORINEURAL HEARING LOSS): Primary | ICD-10-CM

## 2017-07-25 DIAGNOSIS — H69.91 DYSFUNCTION OF EUSTACHIAN TUBE, RIGHT: ICD-10-CM

## 2017-07-25 DIAGNOSIS — Z96.22 S/P MYRINGOTOMY WITH INSERTION OF TUBE: ICD-10-CM

## 2017-07-25 PROCEDURE — 69210 REMOVE IMPACTED EAR WAX UNI: CPT | Performed by: PHYSICIAN ASSISTANT

## 2017-07-25 PROCEDURE — 99213 OFFICE O/P EST LOW 20 MIN: CPT | Mod: 25 | Performed by: PHYSICIAN ASSISTANT

## 2017-07-25 PROCEDURE — 99212 OFFICE O/P EST SF 10 MIN: CPT | Performed by: PHYSICIAN ASSISTANT

## 2017-07-25 PROCEDURE — 92504 EAR MICROSCOPY EXAMINATION: CPT | Performed by: PHYSICIAN ASSISTANT

## 2017-07-25 ASSESSMENT — PAIN SCALES - GENERAL: PAINLEVEL: NO PAIN (0)

## 2017-07-25 NOTE — PATIENT INSTRUCTIONS
Ears were cleaned  Right tube is in good position and working  Follow up for hearing test in November    If there are concerns or questions, Call [phone] and ask for nurse

## 2017-07-25 NOTE — NURSING NOTE
"Chief Complaint   Patient presents with     Cerumen (impacted)     Ear Cleaning       Initial /64 (Cuff Size: Adult Regular)  Pulse 70  Temp 97.6  F (36.4  C) (Tympanic)  Ht 5' 9\" (1.753 m)  Wt 175 lb (79.4 kg)  BMI 25.84 kg/m2 Estimated body mass index is 25.84 kg/(m^2) as calculated from the following:    Height as of this encounter: 5' 9\" (1.753 m).    Weight as of this encounter: 175 lb (79.4 kg).  Medication Reconciliation: complete   Marilu Cha      "

## 2017-07-25 NOTE — PROGRESS NOTES
"Chief Complaint   Patient presents with     Cerumen (impacted)     Ear Cleaning     Jf presents for concerns of ear cleaning.   He has a decrease in his hearing, bilateral.   He has right t-tube, placed by Dr. 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     Seizure disorder (H)      Stented coronary artery         Allergies   Allergen Reactions     Cats Other (See Comments)     Lamotrigine      Skin lesions     Current Outpatient Prescriptions   Medication     UNABLE TO FIND     midodrine (PROAMATINE) 5 MG tablet     fludrocortisone (FLORINEF) 0.1 MG tablet     COMPRESSION STOCKINGS     sodium chloride 1 GM tablet     midodrine (PROAMATINE) 5 MG tablet     PANTOPRAZOLE SODIUM PO     atorvastatin (LIPITOR) 20 MG tablet     Omega-3 Fatty Acids (OMEGA-3 FISH OIL PO)     vitamin  B complex with vitamin C (VITAMIN  B COMPLEX) TABS     MELATONIN PO     UNABLE TO FIND     Multiple Vitamins-Minerals (ONE-A-DAY 50 PLUS PO)     Cholecalciferol (VITAMIN D-3 PO)     No current facility-administered medications for this visit.       ROS: 10 point ROS neg other than the symptoms noted above in the HPI.  /64 (Cuff Size: Adult Regular)  Pulse 70  Temp 97.6  F (36.4  C) (Tympanic)  Ht 5' 9\" (1.753 m)  Wt 175 lb (79.4 kg)  BMI 25.84 kg/m2  General - The patient is well nourished and well developed, and appears to have good nutritional status.  Alert and oriented to person and place, answers questions and cooperates with examination appropriately.   Head and Face - Normocephalic and atraumatic, with no gross " asymmetry noted.  The facial nerve is intact, with strong symmetric movements.  Voice and Breathing - The patient was breathing comfortably without the use of accessory muscles. There was no wheezing, stridor, or stertor.  The patients voice was clear and strong, and had appropriate pitch and quality.  Ears - Ears were examined with otoscope and under Microscopy.  The bilateral external auditory canals are cerumen impacted.  Right canal cleaned with cerumen loop and cupped forceps.  The tympanic membrane is intact without effusion, retraction or mass.  T-tube is patent and in good position.  Small amount of cerumen remains around the base of the tube.  Bony landmarks are intact.     Left canal cleaned with #7, #5 sucker. The tympanic membrane is intact without effusion, retraction or mass.  Bony landmarks are intact.    Eyes - Extraocular movements intact.  Sclera were not icteric or injected, conjunctiva are not injected.  Mouth - Examination of the oral cavity showed pink, healthy oral mucosa. No lesions or ulcerations noted.  The tongue was mobile and midline, and the dentition were in good condition.     Throat - The walls of the oropharynx were smooth, pink, moist, symmetric, and had no lesions or ulcerations.  The tonsillar pillars and soft palate were symmetric.  The uvula was midline on elevation.     Neck -  Palpation of the occipital, submental, submandibular, internal jugular chain, and supraclavicular nodes did not demonstrate any abnormal lymph nodes or masses.  Palpation of the thyroid was soft and smooth, with no nodules or goiter appreciated.  The trachea was mobile and midline.  Nose - External contour is symmetric, no gross deflection or scars.  Nasal mucosa is pink and moist with no abnormal mucus.  The septum was intact, turbinates of normal size and position.  No polyps, masses, or purulence noted on examination.       ASSESSMENT:    ICD-10-CM    1. ASNHL (asymmetrical sensorineural hearing  loss) H90.5    2. Impacted cerumen, bilateral H61.23    3. Dysfunction of eustachian tube, right H69.81    4. S/P myringotomy with insertion of tube Z96.22      He is happy following cleaning. Hearing improved.   Due for audiogram in November.   He may require further cleaning in 6 months  Tube is patent and in good position      Geeta Alegria PA-C  ENT  [facility], [place]  [phone]

## 2017-07-25 NOTE — MR AVS SNAPSHOT
After Visit Summary   7/25/2017    Jf Ortiz    MRN: [id number]           Patient Information     Date Of Birth          10/5/1933        Visit Information        Provider Department      7/25/2017 10:45 AM Geeta Alegria PA-C [facility]        Today's Diagnoses     ASNHL (asymmetrical sensorineural hearing loss)    -  1    Impacted cerumen, bilateral        Dysfunction of eustachian tube, right        S/P myringotomy with insertion of tube          Care Instructions    Ears were cleaned  Right tube is in good position and working  Follow up for hearing test in November    If there are concerns or questions, Call [phone] and ask for nurse          Follow-ups after your visit        Your next 10 appointments already scheduled     Jul 25, 2017 10:45 AM CDT   (Arrive by 10:30 AM)   PROCEDURE with Geeta Alegria PA-C   [facility] [place] ([facility] [place] )    [address]  [place] [address]   [phone]            Jul 26, 2017  8:30 AM CDT   Radiology with [facility] Interventional Radiologist, [facility] INTERVENTIONAL ROOM   [facility] Inter[facility]iology ([facility] )    [address] [place], [place] [address] [address]   [phone]            Aug 21, 2017 10:30 AM CDT   (Arrive by 10:15 AM)   Return Visit with Herman Rivers,    [facility] [place] ([facility] [place] )    [address]  [place] [address]   [phone]            Nov 15, 2017 11:00 AM CST   Hearing Eval with Kenneth Meyers   [facility] ([facility] [place] )    [address]  [place] [address]   [phone]              Who to contact     If you have questions or need follow up information about today's clinic visit or your schedule please contact [facility] directly at [phone].  Normal or non-critical lab and imaging results will be communicated to you by MyChart, letter or phone within 4 business days after the clinic has  "received the results. If you do not hear from us within 7 days, please contact the clinic through Virtualtwo or phone. If you have a critical or abnormal lab result, we will notify you by phone as soon as possible.  Submit refill requests through Virtualtwo or call your pharmacy and they will forward the refill request to us. Please allow 3 business days for your refill to be completed.          Additional Information About Your Visit        Virtualtwo Information     Virtualtwo lets you send messages to your doctor, view your test results, renew your prescriptions, schedule appointments and more. To sign up, go to [email or web address] . Click on \"Log in\" on the left side of the screen, which will take you to the Welcome page. Then click on \"Sign up Now\" on the right side of the page.     You will be asked to enter the access code listed below, as well as some personal information. Please follow the directions to create your username and password.     Your access code is: [email or web address]  Expires: 2017 10:40 AM     Your access code will  in 90 days. If you need help or a new code, please call your [place] clinic or [phone].        Care EveryWhere ID     This is your Care EveryWhere ID. This could be used by other organizations to access your [place] medical records  [id number]        Your Vitals Were     Pulse Temperature Height BMI (Body Mass Index)          70 97.6  F (36.4  C) (Tympanic) 5' 9\" (1.753 m) 25.84 kg/m2         Blood Pressure from Last 3 Encounters:   17 106/64   17 (!) 73/55   17 104/60    Weight from Last 3 Encounters:   17 175 lb (79.4 kg)   17 175 lb (79.4 kg)   17 172 lb (78 kg)              Today, you had the following     No orders found for display       Primary Care Provider Office Phone # Fax #    R Venancio Ray [phone]       [facility] [place] [address] [place] [address]        Equal Access to Services     " [facility]: Hadii aad ku ivana Arrington, waaxda luqadaha, qaybta kaalmada cyndi, hayley anil fabiolajean gamezdebbiemaryanne pollard . So [facility] [phone].    ATENCIÓN: Si habla osmany, tiene a tucker disposición servicios gratuitos de asistencia lingüística. Llame al [phone].    We comply with applicable federal civil rights laws and [place] laws. We do not discriminate on the basis of race, color, national origin, age, disability sex, sexual orientation or gender identity.            Thank you!     Thank you for choosing [facility]  for your [facility]. Our goal is always to provide you with excellent care. Hearing back from our patients is one way we can continue to improve our services. Please take a few minutes to complete the written survey that you may receive in the mail after your visit with us. Thank you!             Your Updated Medication List - Protect others around you: Learn how to safely use, store and throw away your medicines at www.disposemymeds.org.          This list is accurate as of: 7/25/17 10:25 AM.  Always use your most recent med list.                   Brand Name Dispense Instructions for use Diagnosis    COMPRESSION STOCKINGS     1 each    1 each daily    Parkinson's disease (H)       fludrocortisone 0.1 MG tablet    FLORINEF    30 tablet    Take 1 tablet (0.1 mg) by mouth daily    Parkinson's disease (H)       LIPITOR 20 MG tablet   Generic drug:  atorvastatin     90 tablet    Take 1 tablet (20 mg) by mouth daily    Hypercholesterolemia       MELATONIN PO      Take 5 mg by mouth        * midodrine 5 MG tablet    PROAMATINE    90 tablet    TAKE ONE TABLET THREE TIMES A DAY BY MOUTH    Orthostatic hypotension       * midodrine 5 MG tablet    PROAMATINE    90 tablet    Take 2 tablets (10 mg) by mouth 3 times daily    Parkinson's disease (H)       OMEGA-3 FISH OIL PO           ONE-A-DAY 50 PLUS PO      Take by mouth daily        PANTOPRAZOLE SODIUM PO      Take 40 mg by mouth  every morning (before breakfast)        sodium chloride 1 GM tablet     60 tablet    Take 1 tablet (1 g) by mouth 2 times daily    Parkinson's disease (H)       * UNABLE TO FIND      MEDICATION NAME: amandeepa ida        * UNABLE TO FIND      MEDICATION NAME: OTC vitamin for bladder control one tablet twice daily        vitamin B complex with vitamin C Tabs tablet      Take 1 tablet by mouth daily        VITAMIN D-3 PO      Take 1,000 Int'l Units by mouth daily        * Notice:  This list has 4 medication(s) that are the same as other medications prescribed for you. Read the directions carefully, and ask your doctor or other care provider to review them with you.

## 2017-07-26 ENCOUNTER — HOSPITAL ENCOUNTER (OUTPATIENT)
Dept: INTERVENTIONAL RADIOLOGY/VASCULAR | Facility: HOSPITAL | Age: 82
Discharge: HOME | End: 2017-07-26
Attending: ORTHOPAEDIC SURGERY | Admitting: ORTHOPAEDIC SURGERY
Payer: MEDICARE

## 2017-07-26 PROCEDURE — 25000125 ZZHC RX 250: Performed by: RADIOLOGY

## 2017-07-26 PROCEDURE — S0020 INJECTION, BUPIVICAINE HYDRO: HCPCS | Performed by: RADIOLOGY

## 2017-07-26 PROCEDURE — 77002 NEEDLE LOCALIZATION BY XRAY: CPT | Mod: TC

## 2017-07-26 PROCEDURE — 25000128 H RX IP 250 OP 636: Performed by: RADIOLOGY

## 2017-07-26 PROCEDURE — 20552 NJX 1/MLT TRIGGER POINT 1/2: CPT | Mod: TC

## 2017-07-26 PROCEDURE — 20552 NJX 1/MLT TRIGGER POINT 1/2: CPT | Mod: TC | Performed by: RADIOLOGY

## 2017-07-26 PROCEDURE — 77002 NEEDLE LOCALIZATION BY XRAY: CPT | Mod: TC | Performed by: RADIOLOGY

## 2017-07-26 RX ORDER — TRIAMCINOLONE ACETONIDE 40 MG/ML
80 INJECTION, SUSPENSION INTRA-ARTICULAR; INTRAMUSCULAR ONCE
Status: COMPLETED | OUTPATIENT
Start: 2017-07-26 | End: 2017-07-26

## 2017-07-26 RX ORDER — METHYLPREDNISOLONE ACETATE 80 MG/ML
INJECTION, SUSPENSION INTRA-ARTICULAR; INTRALESIONAL; INTRAMUSCULAR; SOFT TISSUE
Status: DISPENSED
Start: 2017-07-26 | End: 2017-07-26

## 2017-07-26 RX ORDER — BUPIVACAINE HYDROCHLORIDE 5 MG/ML
INJECTION, SOLUTION EPIDURAL; INTRACAUDAL
Status: DISPENSED
Start: 2017-07-26 | End: 2017-07-26

## 2017-07-26 RX ORDER — LIDOCAINE HYDROCHLORIDE 10 MG/ML
INJECTION, SOLUTION EPIDURAL; INFILTRATION; INTRACAUDAL; PERINEURAL
Status: DISPENSED
Start: 2017-07-26 | End: 2017-07-26

## 2017-07-26 RX ORDER — METHYLPREDNISOLONE ACETATE 80 MG/ML
80 INJECTION, SUSPENSION INTRA-ARTICULAR; INTRALESIONAL; INTRAMUSCULAR; SOFT TISSUE ONCE
Status: DISCONTINUED | OUTPATIENT
Start: 2017-07-26 | End: 2017-07-26 | Stop reason: CLARIF

## 2017-07-26 RX ORDER — TRIAMCINOLONE ACETONIDE 40 MG/ML
INJECTION, SUSPENSION INTRA-ARTICULAR; INTRAMUSCULAR
Status: DISPENSED
Start: 2017-07-26 | End: 2017-07-26

## 2017-07-26 RX ORDER — BUPIVACAINE HYDROCHLORIDE 5 MG/ML
10 INJECTION, SOLUTION EPIDURAL; INTRACAUDAL ONCE
Status: COMPLETED | OUTPATIENT
Start: 2017-07-26 | End: 2017-07-26

## 2017-07-26 RX ADMIN — BUPIVACAINE HYDROCHLORIDE 5 ML: 5 INJECTION, SOLUTION EPIDURAL; INTRACAUDAL at 09:17

## 2017-07-26 RX ADMIN — TRIAMCINOLONE ACETONIDE 80 MG: 40 INJECTION, SUSPENSION INTRA-ARTICULAR; INTRAMUSCULAR at 09:18

## 2017-07-26 RX ADMIN — LIDOCAINE HYDROCHLORIDE 10 ML: 10 INJECTION, SOLUTION EPIDURAL; INFILTRATION; INTRACAUDAL; PERINEURAL at 09:18

## 2017-07-26 NOTE — DISCHARGE INSTRUCTIONS
Home number on file [phone] (home)  Is it ok to leave a message at this number(s)? Yes    Dr Lim completed your right side trigger point injection procedure on 7/26/2017.    Current Pain Level (0-10 Scale): 5/10  Post Pain Level (0-10):  0/10    Radiology Discharge instructions for Steroid Injection    Activity Level:     Do not do any heavy activity or exercise for 24 hours.   Do not drive for 4 hours after your injection.  Diet:   Return to your normal diet.  Medications:   If you have stopped taking your Aspirin, Coumadin/Warfarin, Ibuprofen, or any   other blood thinner for this procedure you may resume in the morning unless   your primary care provider has given you other instructions.    Diabetics may see an increase in blood sugar after steroid injections. If you are concerned about your blood sugar, please contact your family doctor.    Site Care:  Remove the bandage and bathe or shower the morning after the procedure.      Please allow two weeks to experience improvement in your pain.  If you have any further issues, please contact your provider.    Call your Primary Care Provider if you have the following (if your primary care provider is not available please seek emergency care):   Nausea with vomiting   Severe headache   Drowsiness or confusion   Redness or drainage at the injection or puncture site   Temperature over 101 degrees F   Other concerns   Worsening back pain   Stiff neck

## 2017-07-26 NOTE — PROGRESS NOTES
Procedure: right side trigger point     Radiologist:Dr Lim    There were no complicationsand patient has no symptoms..      Sedation:None. Local Anesthestic used  No sedation    Complications: None    Condition: Stable    Post-procedure pain score as of today is: 0    See dictated procedure note for full details and results.    Yue Segal

## 2017-07-26 NOTE — IP AVS SNAPSHOT
[facility] Interventional Radiology    [address] [place] [address]    Phone:  [phone]    Fax:  [phone]                                       After Visit Summary   7/26/2017    Jf Ortiz    MRN: [id number]           After Visit Summary Signature Page     I have received my discharge instructions, and my questions have been answered. I have discussed any challenges I see with this plan with the nurse or doctor.    ..........................................................................................................................................  Patient/Patient Representative Signature      ..........................................................................................................................................  Patient Representative Print Name and Relationship to Patient    ..................................................               ................................................  Date                                            Time    ..........................................................................................................................................  Reviewed by Signature/Title    ...................................................              ..............................................  Date                                                            Time

## 2017-07-26 NOTE — PROGRESS NOTES
Fluoro time for this case was 01 seconds, less than 5 min  Was patient held? NO  If yes, by whom? NA

## 2017-07-26 NOTE — IP AVS SNAPSHOT
MRN:[id number]                      After Visit Summary   7/26/2017    Jf Ortiz    MRN: [id number]           Visit Information        Provider Department      7/26/2017  8:30 AM Radiologist, Need Interventional; [facility] INTERVENTIONAL ROOM [facility] Interventional Radiology           Review of your medicines      UNREVIEWED medicines. Ask your doctor about these medicines        Dose / Directions    fludrocortisone 0.1 MG tablet   Commonly known as:  FLORINEF   Used for:  Parkinson's disease (H)        Dose:  0.1 mg   Take 1 tablet (0.1 mg) by mouth daily   Quantity:  30 tablet   Refills:  3       LIPITOR 20 MG tablet   Used for:  Hypercholesterolemia   Generic drug:  atorvastatin        Dose:  20 mg   Take 1 tablet (20 mg) by mouth daily   Quantity:  90 tablet   Refills:  3       MELATONIN PO        Dose:  5 mg   Take 5 mg by mouth   Refills:  0       * midodrine 5 MG tablet   Commonly known as:  PROAMATINE   Used for:  Orthostatic hypotension        TAKE ONE TABLET THREE TIMES A DAY BY MOUTH   Quantity:  90 tablet   Refills:  3       * midodrine 5 MG tablet   Commonly known as:  PROAMATINE   Used for:  Parkinson's disease (H)        Dose:  10 mg   Take 2 tablets (10 mg) by mouth 3 times daily   Quantity:  90 tablet   Refills:  3       OMEGA-3 FISH OIL PO        Refills:  0       ONE-A-DAY 50 PLUS PO        Take by mouth daily   Refills:  0       PANTOPRAZOLE SODIUM PO        Dose:  40 mg   Take 40 mg by mouth every morning (before breakfast)   Refills:  0       sodium chloride 1 GM tablet   Used for:  Parkinson's disease (H)        Dose:  1 g   Take 1 tablet (1 g) by mouth 2 times daily   Quantity:  60 tablet   Refills:  0       * UNABLE TO FIND        MEDICATION NAME: prosta ida   Refills:  0       * UNABLE TO FIND        MEDICATION NAME: OTC vitamin for bladder control one tablet twice daily   Refills:  0       vitamin B complex with vitamin C Tabs tablet        Dose:  1 tablet   Take 1 tablet by  mouth daily   Refills:  0       VITAMIN D-3 PO        Dose:  1000 Int'l Units   Take 1,000 Int'l Units by mouth daily   Refills:  0       * Notice:  This list has 4 medication(s) that are the same as other medications prescribed for you. Read the directions carefully, and ask your doctor or other care provider to review them with you.      CONTINUE these medicines which have NOT CHANGED        Dose / Directions    COMPRESSION STOCKINGS   Used for:  Parkinson's disease (H)        Dose:  1 each   1 each daily   Quantity:  1 each   Refills:  0                Protect others around you: Learn how to safely use, store and throw away your medicines at www.disposemymeds.org.         Follow-ups after your visit        Your next 10 appointments already scheduled     Aug 21, 2017 10:30 AM CDT   (Arrive by 10:15 AM)   Return Visit with Herman Rivers, DO   [facility] [place] ([facility] [place] )    [address]  [place] [address]   [phone]            Nov 15, 2017 11:00 AM CST   Hearing Eval with Kenneth Meyers   [facility] [place] ([facility] [place] )    [address]  [place] [address]   [phone]               Care Instructions        Further instructions from your care team       Home number on file [phone] (home)  Is it ok to leave a message at this number(s)? Yes    Dr Lim completed your right side trigger point injection procedure on 7/26/2017.    Current Pain Level (0-10 Scale): 5/10  Post Pain Level (0-10):  0/10    Radiology Discharge instructions for Steroid Injection    Activity Level:     Do not do any heavy activity or exercise for 24 hours.   Do not drive for 4 hours after your injection.  Diet:   Return to your normal diet.  Medications:   If you have stopped taking your Aspirin, Coumadin/Warfarin, Ibuprofen, or any   other blood thinner for this procedure you may resume in the morning unless   your primary care provider has given you  "other instructions.    Diabetics may see an increase in blood sugar after steroid injections. If you are concerned about your blood sugar, please contact your family doctor.    Site Care:  Remove the bandage and bathe or shower the morning after the procedure.      Please allow two weeks to experience improvement in your pain.  If you have any further issues, please contact your provider.    Call your Primary Care Provider if you have the following (if your primary care provider is not available please seek emergency care):   Nausea with vomiting   Severe headache   Drowsiness or confusion   Redness or drainage at the injection or puncture site   Temperature over 101 degrees F   Other concerns   Worsening back pain   Stiff neck       Additional Information About Your Visit        [employer] Information     My[employer] lets you send messages to your doctor, view your test results, renew your prescriptions, schedule appointments and more. To sign up, go to [email or web address] . Click on \"Log in\" on the left side of the screen, which will take you to the Welcome page. Then click on \"Sign up Now\" on the right side of the page.     You will be asked to enter the access code listed below, as well as some personal information. Please follow the directions to create your username and password.     Your access code is: [email or web address]  Expires: 2017 10:40 AM     Your access code will  in 90 days. If you need help or a new code, please call your [place] clinic or [phone].        Care EveryWhere ID     This is your Care EveryWhere ID. This could be used by other organizations to access your [place] medical records  [id number]         Primary Care Provider Office Phone # Fax #    R Venancio Ray [phone]      Equal Access to Services     TONY VÁZQUEZ : Candice Arrington, samantha griffin, hayley judge. So [facility] " [phone].    ATENCIÓN: Si yash ceron, tiene a tucker disposición servicios gratuitos de asistencia lingüística. Fredy casarez [phone].    We comply with applicable federal civil rights laws and [place] laws. We do not discriminate on the basis of race, color, national origin, age, disability sex, sexual orientation or gender identity.            Thank you!     Thank you for choosing [facility] for your care. Our goal is always to provide you with excellent care. Hearing back from our patients is one way we can continue to improve our services. Please take a few minutes to complete the written survey that you may receive in the mail after you visit with us. Thank you!             Medication List: This is a list of all your medications and when to take them. Check marks below indicate your daily home schedule. Keep this list as a reference.      Medications           Morning Afternoon Evening Bedtime As Needed    COMPRESSION STOCKINGS   1 each daily                                fludrocortisone 0.1 MG tablet   Commonly known as:  FLORINEF   Take 1 tablet (0.1 mg) by mouth daily                                LIPITOR 20 MG tablet   Take 1 tablet (20 mg) by mouth daily   Generic drug:  atorvastatin                                MELATONIN PO   Take 5 mg by mouth                                * midodrine 5 MG tablet   Commonly known as:  PROAMATINE   TAKE ONE TABLET THREE TIMES A DAY BY MOUTH                                * midodrine 5 MG tablet   Commonly known as:  PROAMATINE   Take 2 tablets (10 mg) by mouth 3 times daily                                OMEGA-3 FISH OIL PO                                ONE-A-DAY 50 PLUS PO   Take by mouth daily                                PANTOPRAZOLE SODIUM PO   Take 40 mg by mouth every morning (before breakfast)                                sodium chloride 1 GM tablet   Take 1 tablet (1 g) by mouth 2 times daily                                * UNABLE TO FIND    MEDICATION NAME: hosea prieto                                * UNABLE TO FIND   MEDICATION NAME: OTC vitamin for bladder control one tablet twice daily                                vitamin B complex with vitamin C Tabs tablet   Take 1 tablet by mouth daily                                VITAMIN D-3 PO   Take 1,000 Int'l Units by mouth daily                                * Notice:  This list has 4 medication(s) that are the same as other medications prescribed for you. Read the directions carefully, and ask your doctor or other care provider to review them with you.

## 2017-08-02 ENCOUNTER — TELEPHONE (OUTPATIENT)
Dept: CARDIOLOGY | Facility: OTHER | Age: 82
End: 2017-08-02

## 2017-08-02 NOTE — TELEPHONE ENCOUNTER
Message received from Des Catherine that patient is unable to make appointment with Dr. Rivers on 8/21/2017 at 10am due to he has another appointment in [place] that day with neurology.  Des is requesting a call back at [phone] to reschedule appointment with Dr. Rivers.  Sent to SHERINE Garner to call to reschedule.

## 2017-08-05 DIAGNOSIS — G20.A1 PARKINSON'S DISEASE (H): ICD-10-CM

## 2017-08-08 RX ORDER — SODIUM CHLORIDE 1 G/1
TABLET ORAL
Qty: 60 TABLET | Refills: 0 | Status: SHIPPED | OUTPATIENT
Start: 2017-08-08 | End: 2017-10-06

## 2017-08-08 NOTE — TELEPHONE ENCOUNTER
Sodium Clorider      Last Written Prescription Date: 6/21/2017  Last Fill Quantity: 60,  # refills: 0   Last Office Visit with [facility], [facility] or [facility] prescribing provider: 1/16/2017                                         Next 5 appointments (look out 90 days)     Aug 30, 2017 11:00 AM CDT   (Arrive by 10:45 AM)   Return Visit with Herman Rivers DO   [facility] [place] ([facility] [place] [facility]    [facility] [place] [address]   [phone]

## 2017-08-10 ENCOUNTER — TELEPHONE (OUTPATIENT)
Dept: INTERVENTIONAL RADIOLOGY/VASCULAR | Facility: HOSPITAL | Age: 82
End: 2017-08-10

## 2017-08-10 NOTE — TELEPHONE ENCOUNTER
[unfilled]  POST CALL    Procedure: Trigger point  Radiologist(s): Dr. Harmeet Lim  Date of Procedure: 07/26/2017  I responded to the patient's questions/concerns.    Pre-procedure pain score was: 5 (See pre-procedure score)  Post-procedure pain score as of today is: 7(Ask in call)   Pain has increased. (Calculate from patients' post procedure response--do not expect the patient to calculate)     Where is the pain? Back pain comes and goes pending on activity level  Is the pain radiating? No  Is this new pain? No    Patient would like to pursue another injection if recommended.  Patient will contact provider, FRANCES Ray if there are any issues and for the results.  ANDREW Wagner

## 2017-08-18 ENCOUNTER — HOSPITAL ENCOUNTER (EMERGENCY)
Facility: HOSPITAL | Age: 82
Discharge: HOME | End: 2017-08-18
Payer: MEDICARE

## 2017-08-18 VITALS
TEMPERATURE: 98.2 F | DIASTOLIC BLOOD PRESSURE: 87 MMHG | SYSTOLIC BLOOD PRESSURE: 153 MMHG | OXYGEN SATURATION: 97 % | RESPIRATION RATE: 20 BRPM

## 2017-08-18 DIAGNOSIS — I95.1 ORTHOSTATIC HYPOTENSION: ICD-10-CM

## 2017-08-18 DIAGNOSIS — R55 POSTURAL DIZZINESS WITH NEAR SYNCOPE: ICD-10-CM

## 2017-08-18 DIAGNOSIS — R42 POSTURAL DIZZINESS WITH NEAR SYNCOPE: ICD-10-CM

## 2017-08-18 LAB
ALBUMIN SERPL-MCNC: 3.3 G/DL (ref 3.4–5)
ALP SERPL-CCNC: 35 U/L (ref 40–150)
ALT SERPL W P-5'-P-CCNC: 24 U/L (ref 0–70)
ANION GAP SERPL CALCULATED.3IONS-SCNC: 7 MMOL/L (ref 3–14)
AST SERPL W P-5'-P-CCNC: 11 U/L (ref 0–45)
BASOPHILS # BLD AUTO: 0 10E9/L (ref 0–0.2)
BASOPHILS NFR BLD AUTO: 0.4 %
BILIRUB SERPL-MCNC: 0.9 MG/DL (ref 0.2–1.3)
BUN SERPL-MCNC: 20 MG/DL (ref 7–30)
CALCIUM SERPL-MCNC: 7.9 MG/DL (ref 8.5–10.1)
CHLORIDE SERPL-SCNC: 110 MMOL/L (ref 94–109)
CO2 SERPL-SCNC: 26 MMOL/L (ref 20–32)
CREAT SERPL-MCNC: 1.13 MG/DL (ref 0.66–1.25)
DIFFERENTIAL METHOD BLD: ABNORMAL
EOSINOPHIL # BLD AUTO: 0.1 10E9/L (ref 0–0.7)
EOSINOPHIL NFR BLD AUTO: 1.3 %
ERYTHROCYTE [DISTWIDTH] IN BLOOD BY AUTOMATED COUNT: 12.5 % (ref 10–15)
GFR SERPL CREATININE-BSD FRML MDRD: 62 ML/MIN/1.7M2
GLUCOSE SERPL-MCNC: 114 MG/DL (ref 70–99)
HCT VFR BLD AUTO: 38.5 % (ref 40–53)
HGB BLD-MCNC: 13.3 G/DL (ref 13.3–17.7)
IMM GRANULOCYTES # BLD: 0 10E9/L (ref 0–0.4)
IMM GRANULOCYTES NFR BLD: 0.2 %
LYMPHOCYTES # BLD AUTO: 1.2 10E9/L (ref 0.8–5.3)
LYMPHOCYTES NFR BLD AUTO: 23.2 %
MAGNESIUM SERPL-MCNC: 2.3 MG/DL (ref 1.6–2.3)
MCH RBC QN AUTO: 32.8 PG (ref 26.5–33)
MCHC RBC AUTO-ENTMCNC: 34.5 G/DL (ref 31.5–36.5)
MCV RBC AUTO: 95 FL (ref 78–100)
MONOCYTES # BLD AUTO: 0.5 10E9/L (ref 0–1.3)
MONOCYTES NFR BLD AUTO: 9.6 %
NEUTROPHILS # BLD AUTO: 3.5 10E9/L (ref 1.6–8.3)
NEUTROPHILS NFR BLD AUTO: 65.3 %
NRBC # BLD AUTO: 0 10*3/UL
NRBC BLD AUTO-RTO: 0 /100
PLATELET # BLD AUTO: 167 10E9/L (ref 150–450)
POTASSIUM SERPL-SCNC: 3.6 MMOL/L (ref 3.4–5.3)
PROT SERPL-MCNC: 6.4 G/DL (ref 6.8–8.8)
RBC # BLD AUTO: 4.06 10E12/L (ref 4.4–5.9)
SODIUM SERPL-SCNC: 143 MMOL/L (ref 133–144)
TROPONIN I SERPL-MCNC: <0.015 UG/L (ref 0–0.04)
TROPONIN I SERPL-MCNC: <0.015 UG/L (ref 0–0.04)
TSH SERPL DL<=0.005 MIU/L-ACNC: 2.65 MU/L (ref 0.4–4)
WBC # BLD AUTO: 5.3 10E9/L (ref 4–11)

## 2017-08-18 PROCEDURE — 80053 COMPREHEN METABOLIC PANEL: CPT

## 2017-08-18 PROCEDURE — 84443 ASSAY THYROID STIM HORMONE: CPT

## 2017-08-18 PROCEDURE — 93005 ELECTROCARDIOGRAM TRACING: CPT

## 2017-08-18 PROCEDURE — 83735 ASSAY OF MAGNESIUM: CPT

## 2017-08-18 PROCEDURE — 25000128 H RX IP 250 OP 636

## 2017-08-18 PROCEDURE — 85025 COMPLETE CBC W/AUTO DIFF WBC: CPT

## 2017-08-18 PROCEDURE — 96361 HYDRATE IV INFUSION ADD-ON: CPT

## 2017-08-18 PROCEDURE — 93010 ELECTROCARDIOGRAM REPORT: CPT | Performed by: INTERNAL MEDICINE

## 2017-08-18 PROCEDURE — 84484 ASSAY OF TROPONIN QUANT: CPT | Mod: 91

## 2017-08-18 PROCEDURE — 99285 EMERGENCY DEPT VISIT HI MDM: CPT

## 2017-08-18 PROCEDURE — 99284 EMERGENCY DEPT VISIT MOD MDM: CPT | Mod: 25

## 2017-08-18 PROCEDURE — 96360 HYDRATION IV INFUSION INIT: CPT

## 2017-08-18 PROCEDURE — 36415 COLL VENOUS BLD VENIPUNCTURE: CPT

## 2017-08-18 RX ORDER — SODIUM CHLORIDE 9 MG/ML
INJECTION, SOLUTION INTRAVENOUS CONTINUOUS
Status: DISCONTINUED | OUTPATIENT
Start: 2017-08-18 | End: 2017-08-18 | Stop reason: HOSPADM

## 2017-08-18 RX ADMIN — SODIUM CHLORIDE 1000 ML: 9 INJECTION, SOLUTION INTRAVENOUS at 13:37

## 2017-08-18 RX ADMIN — SODIUM CHLORIDE: 9 INJECTION, SOLUTION INTRAVENOUS at 12:35

## 2017-08-18 ASSESSMENT — ENCOUNTER SYMPTOMS
DIFFICULTY URINATING: 0
FEVER: 0
SHORTNESS OF BREATH: 0
ABDOMINAL PAIN: 0
ARTHRALGIAS: 0
FATIGUE: 1
WEAKNESS: 1
EYE REDNESS: 0
HEADACHES: 0
CONFUSION: 0
APPETITE CHANGE: 1
COLOR CHANGE: 0
NECK STIFFNESS: 0
ACTIVITY CHANGE: 1
DIZZINESS: 1
LIGHT-HEADEDNESS: 1

## 2017-08-18 NOTE — ED NOTES
Pt advised on second troponin draw.  Pt remains alert and oriented, family with pt. No discomfort.

## 2017-08-18 NOTE — PROGRESS NOTES
Met with Jf and his significant other, Des for CM/SS assessment.  See flow sheet for completed assessment.      Jf lives independently at home with his significant other, Des and their eleven year old dog.  Jf is independent with ADL's, mobility, medications and driving.  He denies issues with falling.  Des does the cooking and cleaning.  They have a  to help with the yard work.  He is a [employer]  but is not [facility].  He and Des shared that they had difficulty in working with the [facility] when they attempted to get Jf's hearing aides.  Jf identifies Des as his support system.  He denies alcohol and tobacco use.  He has a health care directive completed which identifies Des and his daughter, Magi as his health care agents.      Jf sees Dr. Venancio Ray for primary care and was last seen about five months ago.  He sees Dr. Banks for dental care and was last seen a couple months ago.  He goes to [facility] for eye care and was seen recently.  He has glasses that he utilizes.  He also sees Dr. Rivers for cardiology.  Des shared that he also has an appointment with neurologist in [place] on Monday.  He utilizes [facility] for his medications.      Jf and Des deny questions or concerns.  Updated Maribeth Mtz NP.

## 2017-08-18 NOTE — ED AVS SNAPSHOT
[facility]    [address]    [facility] [place] [address]    Phone:  [phone]                                       Jf Ortiz   MRN: [id number]    Department:  [facility]   Date of Visit:  8/18/2017           Patient Information     Date Of Birth          10/5/1933        Your diagnoses for this visit were:     Postural dizziness with near syncope     Orthostatic hypotension        You were seen by Maribeth Mtz APRN FNP.      Follow-up Information     Follow up with Herman Rivers DO In 1 week.    Specialty:  Cardiology    Why:  recheck, as scheduled    Contact information:    [address]  [place] [address] [phone]          Follow up with FRANCES Ray MD In 1 week.    Specialty:  Family Practice    Why:  recheck    Contact information:    [facility] [place]  [address] [address] [phone]          Follow up with [facility].    Specialty:  EMERGENCY MEDICINE    Why:  As needed, If symptoms worsen    Contact information:    [address] [place] [address] [phone]    Additional information:    From [place]: Take [address]. Turn left at [address]. Turn left at the first stoplight on [address]. At the first stop sign, take a right onto [address]. Take a left into the parking lot and continue through until you reach the North enterance of the building.       From [place]: Take [address]. Take the [address] ramp towards [place]. Turn left onto [address]. Take a slight right onto [address]. Turn left at the first stoplight on [address]. At the first stop sign, take a right onto [address]. Take a left into the parking lot and continue through until you reach the North enterance of the building.       From [place]: Take [address]. Take a right at [address]. At the first stop sign, take a right onto [address]. Take a left into the parking lot and continue through until you reach the North enterance of the building.         Discharge Instructions           See attached for home care  Rest today, slowly return to normal activities as tolerated  Continue on current treatment plan  F/u with PCP, cardiologist, neurologist as scheduled  Return to ED with worsening sx   Dizziness (Vertigo) and Balance Problems: Staying Safe     Replace burned-out lightbulbs to keep your home safe and well lit.   Falls or accidents can lead to pain, broken bones, and fear of future falls. Protect yourself and others by preparing for episodes. Simple steps can help you stay safe at home and wherever you go.  Lighting  Keep all areas well lit. This helps your eyes send the right signals to the brain. It also makes you less likely to trip and fall. If bright lights make symptoms worse, dim the lights or lie in a dark room until the dizziness passes. Then turn the lights back to their normal level.  Tips:    Keep a flashlight by the bed.    Place nightlights in bathrooms and hallways.    Replace burned-out bulbs, or have someone replace them for you.  Preventing falls  To reduce your risk of falling:    Get out of bed or up from a chair slowly.    Wear low-heeled shoes that fit properly and have slip-resistant soles.    Remove throw rugs. Clear clutter from walkways.    Use handrails on stairs. Have handrails installed or adjusted if needed.    Install grab bars in the bathroom. Don't use towel racks for balance.    Use a shower stool. Also put adhesive strips in the shower or on the tub floor.  Going out  With a little time and preparation, you can get around safely.  Tips:    Bring a cane or walking aid if needed.    Give yourself plenty of time in case you start to get dizzy.    Ask your healthcare provider what type of exercise is safe for your condition.    Be patient. If an activity such as walking through a crowded shop causes you  stress, you may not be ready for it yet.  Driving  If you become dizzy or disoriented while driving, you could hurt yourself and others. That's why it's best to not drive until symptoms have gone away. In some cases, your license may be temporarily held until it's safe for you to drive again.  For safety:    Ask a friend to drive for you.    Take public transportation.    Walk to stores and other places when you can.  Asking for help  Don't be afraid to ask for help running errands, cooking meals, and doing exercise. Whether it's a friend, loved one, neighbor, or stranger on the street, a little help can make a world of difference.   Date Last Reviewed: 11/1/2016 2000-2017 [employer] [address] [place] [address], [place] [address]. All rights reserved. This information is not intended as a substitute for professional medical care. Always follow your healthcare professional's instructions.          Orthostatic Low Blood Pressure (Hypotension)  A blood pressure reading is made up of 2 numbers There is a top number over a bottom number. The top number is the systolic pressure. The bottom number is the diastolic pressure. A normal blood pressure is a systolic pressure less than 120 over a diastolic pressure less than 80. Low blood pressure (hypotension) is a blood pressure that is less than what is normal for you.  Orthostatic hypotension is a type of low blood pressure that occurs only when you change position from lying to standing. It can cause dizziness, lightheadedness, or fainting.  Some medicines can cause orthostatic hypotension. These include:    High blood pressure medicines    Water pills (diuretics)    Some heart medicines    Some antidepressants    Pain, anxiety, sedative, and sleeping medicines  Other causes include:    Dehydration from vomiting, diarrhea, or not getting enough fluids    Severe infection    High fever    Blood loss, such as bleeding from the stomach or  intestines    Neurological diseases that impair the autonomic nervous system  Treatment will depend on what is causing your low blood pressure.  Home care  Follow these guidelines when caring for yourself at home:    Rest until your symptoms get better.    Change positions slowly from lying to standing. When getting out of bed, sit on the side of the bed with your legs down for at least 30 seconds before standing. This gives your body time to adjust to the position change.    Follow the treatment plan described by your healthcare provider.  Follow-up care  Follow up with your healthcare provider, or as advised.  When to seek medical advice  Call your healthcare provider right away if any of these occur:    Dizziness, lightheadedness, or fainting    Black or red color in your stools or vomit    Diarrhea or vomiting that doesn t go away    You aren t able to eat or drink    Fever of 100.4 F (38 C) or higher, or as directed by your healthcare provider    Burning when you urinate    Foul-smelling urine  Date Last Reviewed: 12/1/2016 2000-2017 [employer]. [address] [place] [address]. All rights reserved. This information is not intended as a substitute for professional medical care. Always follow your healthcare professional's instructions.            Future Appointments        Provider Department Dept Phone Center    8/30/2017 11:00 AM Herman Rivers DO [facility] [phone] Elida Valdez    11/15/2017 11:00 AM Kenneth Morfin [facility] [phone] [place]         Review of your medicines      Our records show that you are taking the medicines listed below. If these are incorrect, please call your family doctor or clinic.        Dose / Directions Last dose taken    COMPRESSION STOCKINGS   Dose:  1 each   Quantity:  1 each        1 each daily   Refills:  0        fludrocortisone 0.1 MG tablet   Commonly known as:  FLORINEF   Dose:  0.1 mg    Quantity:  30 tablet        Take 1 tablet (0.1 mg) by mouth daily   Refills:  3        MELATONIN PO   Dose:  10 mg        Take 10 mg by mouth At Bedtime   Refills:  0        midodrine 5 MG tablet   Commonly known as:  PROAMATINE   Quantity:  90 tablet        TAKE ONE TABLET THREE TIMES A DAY BY MOUTH   Refills:  3        PANTOPRAZOLE SODIUM PO   Dose:  40 mg        Take 40 mg by mouth every morning (before breakfast)   Refills:  0        sodium chloride 1 GM tablet   Quantity:  60 tablet        TAKE 1 TABLET BY MOUTH TWICE A DAY   Refills:  0        * UNABLE TO FIND        MEDICATION NAME: OTC vitamin for bladder control one tablet twice daily   Refills:  0        * UNABLE TO FIND        MEDICATION NAME: keralex brain formula   Refills:  0        vitamin B complex with vitamin C Tabs tablet   Dose:  1 tablet        Take 1 tablet by mouth daily   Refills:  0        VITAMIN D-3 PO   Dose:  1000 Int'l Units        Take 1,000 Int'l Units by mouth daily   Refills:  0        * Notice:  This list has 2 medication(s) that are the same as other medications prescribed for you. Read the directions carefully, and ask your doctor or other care provider to review them with you.            Procedures and tests performed during your visit     Procedure/Test Number of Times Performed    CBC with platelets differential 1    Comprehensive metabolic panel 1    EKG 12-lead, tracing only 1    Magnesium 1    TSH 1    Troponin I 2      Orders Needing Specimen Collection     Ordered          08/18/17 1217  UA reflex to Microscopic and Culture - STAT, Prio: STAT, Needs to be Collected     Scheduled Task Status   08/18/17 1217 Collect UA reflex to Microscopic and Culture Open   Order Class:  PCU Collect                  Pending Results     No orders found from 8/16/2017 to 8/19/2017.            Pending Culture Results     No orders found from 8/16/2017 to 8/19/2017.            Thank you for choosing [facility]       Thank you for choosing  "[facility] for your care. Our goal is always to provide you with excellent care. Hearing back from our patients is one way we can continue to improve our services. Please take a few minutes to complete the written survey that you may receive in the mail after you visit with us. Thank you!        [employer] Information     [employer] lets you send messages to your doctor, view your test results, renew your prescriptions, schedule appointments and more. To sign up, go to [email or web address] . Click on \"Log in\" on the left side of the screen, which will take you to the Welcome page. Then click on \"Sign up Now\" on the right side of the page.     You will be asked to enter the access code listed below, as well as some personal information. Please follow the directions to create your username and password.     Your access code is: [email or web address]  Expires: 2017 10:40 AM     Your access code will  in 90 days. If you need help or a new code, please call your [facility] clinic or [phone].        Care EveryWhere ID     This is your Care EveryWhere ID. This could be used by other organizations to access your [facility] medical records  [id number]        Equal Access to Services     TONY VÁZQUEZ : Candice Arrington, samantha griffin, qahiwot hanna, hayley mauro. [facility] [phone].    ATENCIÓN: Si habla español, tiene a tucker disposición servicios gratuitos de asistencia lingüística. Llame al [phone].    We comply with applicable federal civil rights laws and [place] laws. We do not discriminate on the basis of race, color, national origin, age, disability sex, sexual orientation or gender identity.            After Visit Summary       This is your record. Keep this with you and show to your community pharmacist(s) and doctor(s) at your next visit.                  "

## 2017-08-18 NOTE — ED NOTES
Pt looks and feels better. Standing at bedside for last BP and to get dressed. No dizziness or weakness.   Pt able to walk out well.

## 2017-08-18 NOTE — ED PROVIDER NOTES
"  History     Chief Complaint   Patient presents with     Generalized Weakness     c/o weak and dizzy for \"years\", notes f/u scheduled with neurology on monday     The history is provided by the patient and a caregiver. No  was used.     Jf Ortiz is a 83 year old male who, lives at home with friend, hx of CAD, s/p CABG, pacemaker, orthostatic hypotension, presents to ED with friend via private car for evaluation on recurring, worsening dizziness, weakness. Onset of sx at least a couple years ago, seen here several times for similar sx in the past, last by Dr. Burgos 1 year ago, please refer to her notes. At that time he was placed of midodrine, increased to 3 x daily by cardiologist. Since then, he did have improved sx for a while. Now worse over the past week, almost fainted while at breakfast with friends. Sx worse when rising to standing position. Denies fever, no cough or cold sx, no cp or sob. No change in bowel or bladder.     Allergies   Allergen Reactions     Cats Other (See Comments)     Lamotrigine      Skin lesions       No current facility-administered medications on file prior to encounter.   Current Outpatient Prescriptions on File Prior to Encounter:  sodium chloride 1 GM tablet TAKE 1 TABLET BY MOUTH TWICE A DAY   UNABLE TO FIND MEDICATION NAME: OTC vitamin for bladder control one tablet twice daily   fludrocortisone (FLORINEF) 0.1 MG tablet Take 1 tablet (0.1 mg) by mouth daily   midodrine (PROAMATINE) 5 MG tablet TAKE ONE TABLET THREE TIMES A DAY BY MOUTH   PANTOPRAZOLE SODIUM PO Take 40 mg by mouth every morning (before breakfast)   vitamin  B complex with vitamin C (VITAMIN  B COMPLEX) TABS Take 1 tablet by mouth daily   MELATONIN PO Take 10 mg by mouth At Bedtime    Cholecalciferol (VITAMIN D-3 PO) Take 1,000 Int'l Units by mouth daily    COMPRESSION STOCKINGS 1 each daily       Patient Active Problem List   Diagnosis     Hypercholesteremia     REM sleep behavior disorder "     Advanced care planning/counseling discussion     Osteoarthritis     Diaphragmatic hernia     Stented coronary artery     Pacemaker     Coronary artery disease     Syncope and collapse     Volume depletion     Hypertrophy of prostate with urinary obstruction     Parkinson's disease (H)     Seizure disorder (H)     Dementia with behavioral disturbance     Hypocalcemia     Idiopathic hypotension       Past Surgical History:   Procedure Laterality Date     ------------OTHER-------------  1955    ulnar and radial fx - repair ulnar and radial fx x4     ------------OTHER-------------  6/14/2011    cataract extraction     BIOPSY  08/2015    skin biopsy     BLEPHAROPLASTY BILATERAL  5/6/2014    Procedure: BLEPHAROPLASTY BILATERAL;  Surgeon: Andrew Queen MD;  Location: [facility]     BYPASS GRAFT ARTERY CORONARY  11/2006    coronary artery disease x 5, [facility]     cataract extraction and lens implantation  2011    cataracts     cataract extraction and lens implantation      cataracts     COLONOSCOPY  2012     colonoscopy with polypectomy  3/13/2009    history of polyps - repeat 3 yrs     colonoscopy with polypectomy  2006     colonoscopy with polypectomy  2005     COMBINED COLONOSCOPY WITH ARGON PLASMA COAGULATOR (APC) N/A 10/31/2014    Procedure: COMBINED COLONOSCOPY WITH ARGON PLASMA COAGULATOR (APC);  Surgeon: Bassam Aguilar MD;  Location: [facility]     COMBINED COLONOSCOPY WITH ARGON PLASMA COAGULATOR (APC) N/A 11/13/2015    Procedure: COMBINED COLONOSCOPY WITH ARGON PLASMA COAGULATOR (APC);  Surgeon: Bassam Aguilar MD;  Location: [facility]     ENDOSCOPY UPPER, COLONOSCOPY, COMBINED N/A 10/31/2014    Procedure: COMBINED ENDOSCOPY UPPER, COLONOSCOPY;  Surgeon: Bassam Aguilar MD;  Location: [facility]     ENDOSCOPY UPPER, COLONOSCOPY, COMBINED N/A 11/13/2015    Procedure: COMBINED ENDOSCOPY UPPER, COLONOSCOPY;  Surgeon: Bassam Aguilar MD;  Location: [facility]     ESOPHAGOSCOPY, GASTROSCOPY, DUODENOSCOPY (EGD), COMBINED  1/22/2014     "Procedure: COMBINED ESOPHAGOSCOPY, GASTROSCOPY, DUODENOSCOPY (EGD);  UPPER ENDOSCOPY(JAYDEN) W/ BIOPSIES;  Surgeon: Patricia Pena MD;  Location: [facility]     LARYNGOSCOPY WITH MICROSCOPE  1/22/2014    Procedure: LARYNGOSCOPY WITH MICROSCOPE;;  Surgeon: Chayo Duke MD;  Location: [facility]     pacemaker placement  2000    heart block     pacemaker placement  2011    dual-chamber     REMOVE TUBE, MYRINGOTOMY, COMBINED  1/22/2014    Procedure: COMBINED REMOVE TUBE, MYRINGOTOMY;  MICRODIRECT LARYNGOSCOPY WITH BIOPSY AND FROZEN SECTIONS removal of right ear tube and myringoplasty;  Surgeon: Chayo Duke MD;  Location: [facility]     stent placement to LAD  2008     ventilation tube  5/24/2012    right in office       Social History   Substance Use Topics     Smoking status: Former Smoker     Packs/day: 1.00     Years: 5.00     Types: Cigarettes     Smokeless tobacco: Never Used      Comment: quit in 1985     Alcohol use Yes      Comment: wine daily       Most Recent Immunizations   Administered Date(s) Administered     Influenza (IIV3) 11/14/2006       BMI: Estimated body mass index is 25.84 kg/(m^2) as calculated from the following:    Height as of 7/25/17: 1.753 m (5' 9\").    Weight as of 7/25/17: 79.4 kg (175 lb).    Review of Systems   Constitutional: Positive for activity change, appetite change and fatigue. Negative for fever.   HENT: Negative for congestion.    Eyes: Negative for redness.   Respiratory: Negative for shortness of breath.    Cardiovascular: Negative for chest pain.   Gastrointestinal: Negative for abdominal pain.   Genitourinary: Negative for difficulty urinating.   Musculoskeletal: Negative for arthralgias and neck stiffness.   Skin: Negative for color change.   Neurological: Positive for dizziness, syncope, weakness and light-headedness. Negative for headaches.   Psychiatric/Behavioral: Negative for confusion.       Physical Exam      Physical Exam   Constitutional: He is oriented to " person, place, and time. No distress.   HENT:   Head: Atraumatic.   Mouth/Throat: Oropharynx is clear and moist.   Bilateral hearing aids   Eyes: Conjunctivae and EOM are normal. Pupils are equal, round, and reactive to light.   Neck: Normal range of motion. Neck supple.   Cardiovascular: Normal rate, regular rhythm and intact distal pulses.    No murmur heard.  Pulmonary/Chest: Effort normal and breath sounds normal. No respiratory distress.   Abdominal: Soft. Bowel sounds are normal. He exhibits no distension. There is no tenderness.   Musculoskeletal: Normal range of motion.   Neurological: He is alert and oriented to person, place, and time.   Skin: Skin is warm and dry. He is not diaphoretic.   Vitals reviewed.    ED Course     Procedures             EKG Interpretation:      Interpreted by Maribeth Mtz  Time reviewed: 1230  Symptoms at time of EKG: weakness, dizziness   Rhythm: paced  Rate: 72  Axis: Left Axis Deviation  Ectopy: bigeminy  Conduction: left bundle branch block (complete)  ST Segments/ T Waves: unable to evaluate  Comparison to prior: Unchanged    Clinical Impression: left bundle branch block and paced rhythm    Labs Ordered and Resulted from Time of ED Arrival Up to the Time of Departure from the ED   CBC WITH PLATELETS DIFFERENTIAL - Abnormal; Notable for the following:        Result Value    RBC Count 4.06 (*)     Hematocrit 38.5 (*)     All other components within normal limits   COMPREHENSIVE METABOLIC PANEL - Abnormal; Notable for the following:     Chloride 110 (*)     Glucose 114 (*)     Calcium 7.9 (*)     Albumin 3.3 (*)     Protein Total 6.4 (*)     Alkaline Phosphatase 35 (*)     All other components within normal limits   TROPONIN I   TSH   MAGNESIUM   TROPONIN I   ORTHOSTATIC BLOOD PRESSURE AND PULSE     Assessments & Plan (with Medical Decision Making)   Pt presents with acute on chronic recurring dizziness, weakness with near syncopal episode at breakfast this morning.  Orthostatic hypotension noted, see nn.   IV fluids administered, labs, ecg obtained and noted as above.   Findings consistent with recurring orthostasis, pt observed in the ED for 3+ hours, 2 l ns infused. Pt sx improved. Will d/c to home with recommendations to f/u with cardiologist as scheduled and return to ED with worsening sx. Pt verbalizes understanding and agrees with plan.  Epic discharge instructions given. Pt discharged in stable condition.     I have reviewed the nursing notes.    I have reviewed the findings, diagnosis, plan and need for follow up with the patient.    Discharge Medication List as of 8/18/2017  4:54 PM          Final diagnoses:   Postural dizziness with near syncope   Orthostatic hypotension     See attached for home care  Rest today, slowly return to normal activities as tolerated  Continue on current treatment plan  F/u with PCP, cardiologist, neurologist as scheduled  Return to ED with worsening sx     8/18/2017   [facility]     Maribeth Mtz APRN P  08/22/17 4770

## 2017-08-18 NOTE — ED AVS SNAPSHOT
[facility]    [address] [address]    Phone:  [phone]                                       Jf Ortiz   MRN: [id number]    Department:  [facility]   Date of Visit:  8/18/2017           After Visit Summary Signature Page     I have received my discharge instructions, and my questions have been answered. I have discussed any challenges I see with this plan with the nurse or doctor.    ..........................................................................................................................................  Patient/Patient Representative Signature      ..........................................................................................................................................  Patient Representative Print Name and Relationship to Patient    ..................................................               ................................................  Date                                            Time    ..........................................................................................................................................  Reviewed by Signature/Title    ...................................................              ..............................................  Date                                                            Time

## 2017-08-18 NOTE — ED NOTES
Pt with  Weakness on and off for years especially with position changes.  Pt alert and oriented with clear speech. Hx of Low BP.

## 2017-08-18 NOTE — DISCHARGE INSTRUCTIONS
See attached for home care  Rest today, slowly return to normal activities as tolerated  Continue on current treatment plan  F/u with PCP, cardiologist, neurologist as scheduled  Return to ED with worsening sx   Dizziness (Vertigo) and Balance Problems: Staying Safe     Replace burned-out lightbulbs to keep your home safe and well lit.   Falls or accidents can lead to pain, broken bones, and fear of future falls. Protect yourself and others by preparing for episodes. Simple steps can help you stay safe at home and wherever you go.  Lighting  Keep all areas well lit. This helps your eyes send the right signals to the brain. It also makes you less likely to trip and fall. If bright lights make symptoms worse, dim the lights or lie in a dark room until the dizziness passes. Then turn the lights back to their normal level.  Tips:    Keep a flashlight by the bed.    Place nightlights in bathrooms and hallways.    Replace burned-out bulbs, or have someone replace them for you.  Preventing falls  To reduce your risk of falling:    Get out of bed or up from a chair slowly.    Wear low-heeled shoes that fit properly and have slip-resistant soles.    Remove throw rugs. Clear clutter from walkways.    Use handrails on stairs. Have handrails installed or adjusted if needed.    Install grab bars in the bathroom. Don't use towel racks for balance.    Use a shower stool. Also put adhesive strips in the shower or on the tub floor.  Going out  With a little time and preparation, you can get around safely.  Tips:    Bring a cane or walking aid if needed.    Give yourself plenty of time in case you start to get dizzy.    Ask your healthcare provider what type of exercise is safe for your condition.    Be patient. If an activity such as walking through a crowded shop causes you stress, you may not be ready for it yet.  Driving  If you become dizzy or disoriented while driving, you could hurt yourself and others. That's why it's  best to not drive until symptoms have gone away. In some cases, your license may be temporarily held until it's safe for you to drive again.  For safety:    Ask a friend to drive for you.    Take public transportation.    Walk to stores and other places when you can.  Asking for help  Don't be afraid to ask for help running errands, cooking meals, and doing exercise. Whether it's a friend, loved one, neighbor, or stranger on the street, a little help can make a world of difference.   Date Last Reviewed: 11/1/2016 2000-2017 [employer]. [address] [place] [address]. All rights reserved. This information is not intended as a substitute for professional medical care. Always follow your healthcare professional's instructions.          Orthostatic Low Blood Pressure (Hypotension)  A blood pressure reading is made up of 2 numbers There is a top number over a bottom number. The top number is the systolic pressure. The bottom number is the diastolic pressure. A normal blood pressure is a systolic pressure less than 120 over a diastolic pressure less than 80. Low blood pressure (hypotension) is a blood pressure that is less than what is normal for you.  Orthostatic hypotension is a type of low blood pressure that occurs only when you change position from lying to standing. It can cause dizziness, lightheadedness, or fainting.  Some medicines can cause orthostatic hypotension. These include:    High blood pressure medicines    Water pills (diuretics)    Some heart medicines    Some antidepressants    Pain, anxiety, sedative, and sleeping medicines  Other causes include:    Dehydration from vomiting, diarrhea, or not getting enough fluids    Severe infection    High fever    Blood loss, such as bleeding from the stomach or intestines    Neurological diseases that impair the autonomic nervous system  Treatment will depend on what is causing your low blood pressure.  Home care  Follow these  guidelines when caring for yourself at home:    Rest until your symptoms get better.    Change positions slowly from lying to standing. When getting out of bed, sit on the side of the bed with your legs down for at least 30 seconds before standing. This gives your body time to adjust to the position change.    Follow the treatment plan described by your healthcare provider.  Follow-up care  Follow up with your healthcare provider, or as advised.  When to seek medical advice  Call your healthcare provider right away if any of these occur:    Dizziness, lightheadedness, or fainting    Black or red color in your stools or vomit    Diarrhea or vomiting that doesn t go away    You aren t able to eat or drink    Fever of 100.4 F (38 C) or higher, or as directed by your healthcare provider    Burning when you urinate    Foul-smelling urine  Date Last Reviewed: 12/1/2016 2000-2017 [employer]. [address] [place] [address], [place] [address]. All rights reserved. This information is not intended as a substitute for professional medical care. Always follow your healthcare professional's instructions.

## 2017-08-21 ENCOUNTER — TRANSFERRED RECORDS (OUTPATIENT)
Dept: HEALTH INFORMATION MANAGEMENT | Facility: HOSPITAL | Age: 82
End: 2017-08-21

## 2017-08-22 DIAGNOSIS — R10.9 RIGHT FLANK PAIN: ICD-10-CM

## 2017-08-22 DIAGNOSIS — M47.894 THORACIC FACET SYNDROME: ICD-10-CM

## 2017-08-22 DIAGNOSIS — M54.50 LOWER BACK PAIN: ICD-10-CM

## 2017-08-22 DIAGNOSIS — M79.10 MYALGIA: Primary | ICD-10-CM

## 2017-08-30 ENCOUNTER — OFFICE VISIT (OUTPATIENT)
Dept: CARDIOLOGY | Facility: OTHER | Age: 82
End: 2017-08-30
Attending: INTERNAL MEDICINE
Payer: MEDICARE

## 2017-08-30 VITALS
WEIGHT: 165 LBS | RESPIRATION RATE: 12 BRPM | BODY MASS INDEX: 24.44 KG/M2 | OXYGEN SATURATION: 97 % | DIASTOLIC BLOOD PRESSURE: 56 MMHG | TEMPERATURE: 98.5 F | HEIGHT: 69 IN | HEART RATE: 70 BPM | SYSTOLIC BLOOD PRESSURE: 103 MMHG

## 2017-08-30 DIAGNOSIS — Z95.0 PACEMAKER: ICD-10-CM

## 2017-08-30 DIAGNOSIS — Z95.5 STENTED CORONARY ARTERY: ICD-10-CM

## 2017-08-30 DIAGNOSIS — I95.1 ORTHOSTATIC HYPOTENSION: Primary | ICD-10-CM

## 2017-08-30 DIAGNOSIS — E78.00 HYPERCHOLESTEREMIA: ICD-10-CM

## 2017-08-30 DIAGNOSIS — F03.91 DEMENTIA WITH BEHAVIORAL DISTURBANCE, UNSPECIFIED DEMENTIA TYPE: ICD-10-CM

## 2017-08-30 DIAGNOSIS — G20.A1 PARKINSON'S DISEASE (H): ICD-10-CM

## 2017-08-30 PROCEDURE — 99211 OFF/OP EST MAY X REQ PHY/QHP: CPT

## 2017-08-30 PROCEDURE — 99214 OFFICE O/P EST MOD 30 MIN: CPT | Performed by: INTERNAL MEDICINE

## 2017-08-30 RX ORDER — FLUDROCORTISONE ACETATE 0.1 MG/1
0.2 TABLET ORAL DAILY
Qty: 30 TABLET | Refills: 3 | Status: SHIPPED | OUTPATIENT
Start: 2017-08-30 | End: 2017-10-06

## 2017-08-30 ASSESSMENT — PAIN SCALES - GENERAL: PAINLEVEL: NO PAIN (0)

## 2017-08-30 NOTE — PATIENT INSTRUCTIONS
You were seen by Dr. Rivers, 8/30/2017.     1.  Continue to increase water intake to maintain hydration status.      2. You should be taking 10 grams of salt per day, this will help in maintain your blood pressure.     3. Continue to take Midodrine 10 mg three times daily.      4. Increase the dose of Florinef 0.1 mg two tablets daily, you will take florinef 0.2 mg's daily. .     5. Wearing the support stockings will help to maintain the the blood pressure.     6. You may increase the sodium chloride 1 gram to three times daily if tolerated.     7. If you begin to have chest pain, racing heart, missing beats, or increased shortness of breath report these symptoms to your cardiologist.     8. Please use caution with position changes.  Changing positions slowly will help to decrease the symptoms of dizziness and light headedness.      You will follow up with Dr. Rivers in 2 months.       Please call the cardiology office with problems, questions, or concerns at [phone].    If you experience chest pain, chest pressure, chest tightness, shortness of breath, fainting, lightheadedness, nausea, vomiting, or other concerning symptoms, please report to the Emergency Department or call 911. These symptoms may be emergent, and best treated in the Emergency Department.       Elaine LUTZ RN-BSN  Cardiology   [facility]  [phone]

## 2017-08-30 NOTE — PROGRESS NOTES
Cardiology Progress Note     Assessment & Plan   Mr. Ortiz is a 83-year-old gentleman who is being seen by cardiology for concerns of orthostatic hypotension, hypotension, history of Parkinson's disease, history of Lewy body dementia, coronary artery disease with history of pacemaker, and hyperlipidemia.     Impression:  1.  Severely positive for orthostatic hypotension secondary to Parkinson.  2.  Parkinson's disease.  3.  Lewy body dementia.  4.  Coronary artery disease.  5.  Pacemaker placement.  6.  Hyperlipidemia.    PLAN:   1.  He will cont Midodrine 10 mg 3 times a day.  2.  He'll will increase the florinef from 0.1 mg daily to 0.2 mg daily.  3.  He was encouraged, again, to take in around 2-3 L a day.  4.  He was encouraged, again, to have a goal of 10 g of salt a day.  5.  He will cont salt tablets at 1 g to be taken twice a day with potential increase to 3 times a day. They did not want to increase the salt tabs frequency today.  6. She is going to get compression stockings with a zipper to reduce fluid shift to his lower extremities when standing as she is not able to put on traditional compression stockings.   7.  It is recommended to raise the head of the bed to 30  as they have a bed that can do this  in conjunction with pillows and a wedge that she has from past usage.  8.  He was encouraged to increase his exercise on a daily basis as he is rather sedentary.  9.  He will be seen in 2 months time for follow up with these changes.      Herman Rivers    Interval History   Mr. Ortiz is a 83-year-old gentleman who is being seen by cardiology for concerns of orthostatic hypotension, hypotension, history of Parkinson's disease, history of Lewy body dementia, coronary artery disease with history of pacemaker, and hyperlipidemia. He was last seen on 6/21/17.    His female  in the room describes months of dizziness particularly with standing.  He has had at least 2 episodes if not more in which  he has passed out.  He had presented to the ER with one these episodes and was started on Midodrine 5 mg 3 times a day with significant improvement of his symptoms. He has not passed out since being started on this medication. However, he continues to have dizziness/lightheadedness with standing.  He has chronically low blood pressure and he is not on any antihypertensives. She describes him being diagnosed with Lewy body dementia but was somewhat surprised when she was told that per his record he was given the diagnosis of Parkinson's disease.     Since last being seen, he was seen in the ER on 8/18 for chronic recurrent dizziness and near syncope.  He was given IV fluids which did make him feel considerably better.    He had seen neurology through [facility] with confirmation of Lewy body dementia and Parkinson's disease.  He has been recommended  a new medication but has not sure of the cost.    He has been started on midodrine 10 mg, 3 times a day, Florinef 0.1 mg daily, salt tablets twice a day, encouraged to increase his daily salt intake, they've attempted stockings but are too difficult to put on, and increase his activity. He continues to be lightheaded but is reported to be improved with the treatment but nowhere to were at a point that she is satisfied with his symptoms. They've canceled 2 trips, one being a cruise in October.  He has no cardiac symptoms.    Physical Exam   Temp: 98.5  F (36.9  C) Temp src: Tympanic BP: 103/56 Pulse: 70   Resp: 12 SpO2: 97 %      Vitals:    08/30/17 1050   Weight: 74.8 kg (165 lb)     Vital Signs with Ranges  Temp:  [98.5  F (36.9  C)] 98.5  F (36.9  C)  Pulse:  [70] 70  Resp:  [12] 12  BP: (103)/(56) 103/56  SpO2:  [97 %] 97 %  ROS negative except that which is noted in the HPI.         Constitutional: awake, alert, cooperative, no apparent distress, and appears stated age  Eyes: Lids and lashes normal, pupils equal, sclera clear, conjunctiva normal  ENT: Normocephalic,  without obvious abnormality, atraumatic.  Respiratory: No increased work of breathing, good air exchange, clear to auscultation bilaterally, no crackles or wheezing  Cardiovascular: Normal apical impulse, regular rate and rhythm, normal S1 and S2, no S3 or S4, and no murmur noted  GI: No scars, normal bowel sounds, soft, non-distended, non-tender  Musculoskeletal: no lower extremity pitting edema present  Neurologic: Awake, alert, oriented to name, place and time.  Cranial nerves II-XII are grossly intact.    Neuropsychiatric: General: normal, calm and normal eye contact    Medications         Data   No results found for this or any previous visit (from the past 24 hour(s)).

## 2017-08-30 NOTE — MR AVS SNAPSHOT
After Visit Summary   8/30/2017    Jf Ortiz    MRN: [id number]           Patient Information     Date Of Birth          10/5/1933        Visit Information        Provider Department      8/30/2017 11:00 AM Herman Rivers, DO [facility]        Today's Diagnoses     Parkinson's disease (H)          Care Instructions    You were seen by Dr. Rivers, 8/30/2017.     1.  Continue to increase water intake to maintain hydration status.      2. You should be taking 10 grams of salt per day, this will help in maintain your blood pressure.     3. Continue to take Midodrine 10 mg three times daily.      4. Increase the dose of Florinef 0.1 mg two tablets daily, you will take florinef 0.2 mg's daily. .     5. Wearing the support stockings will help to maintain the the blood pressure.     6. You may increase the sodium chloride 1 gram to three times daily if tolerated.     7. If you begin to have chest pain, racing heart, missing beats, or increased shortness of breath report these symptoms to your cardiologist.     8. Please use caution with position changes.  Changing positions slowly will help to decrease the symptoms of dizziness and light headedness.      You will follow up with Dr. Rivers in 2 months.       Please call the cardiology office with problems, questions, or concerns at [phone].    If you experience chest pain, chest pressure, chest tightness, shortness of breath, fainting, lightheadedness, nausea, vomiting, or other concerning symptoms, please report to the Emergency Department or call 911. These symptoms may be emergent, and best treated in the Emergency Department.       Elaine LUTZ RN-BSN  Cardiology   [facility]  [phone]              Follow-ups after your visit        Your next 10 appointments already scheduled     Aug 31, 2017 11:00 AM CDT   (Arrive by 10:45 AM)   Office Visit with FRANCES Ray MD   [facility] ([facility]  "[place] )    [address]  [place] [address]   [phone]           Bring a current list of meds and any records pertaining to this visit.  For Physicals, please bring immunization records and any forms needing to be filled out.  Please arrive 15 minutes early to complete paperwork and register.            Sep 20, 2017  2:30 PM CDT   Radiology with Need Interventional Radiologist, [place] INTERVENTIONAL ROOM   [facility] ([facility] )    [address] [address]   [phone]            Sep 21, 2017 10:30 AM CDT   Radiology with Need Interventional Radiologist, [place] INTERVENTIONAL ROOM   [place] Inter[facility] ([facility] )    [address] [place] [address]   [phone]            Nov 15, 2017 11:00 AM CST   Hearing Eval with Kenneth Meyers   [facility] ([facility] )    [address]  [place] [address]   [phone]              Who to contact     If you have questions or need follow up information about today's clinic visit or your schedule please contact [facility] directly at [phone].  Normal or non-critical lab and imaging results will be communicated to you by MyChart, letter or phone within 4 business days after the clinic has received the results. If you do not hear from us within 7 days, please contact the clinic through [employer] or phone. If you have a critical or abnormal lab result, we will notify you by phone as soon as possible.  Submit refill requests through [employer] or call your pharmacy and they will forward the refill request to us. Please allow 3 business days for your refill to be completed.          Additional Information About Your Visit        MyChart Information     [employer] lets you send messages to your doctor, view your test results, renew your prescriptions, schedule appointments and more. To sign up, go to [email or web address] . Click on \"Log in\" on the " "left side of the screen, which will take you to the Welcome page. Then click on \"Sign up Now\" on the right side of the page.     You will be asked to enter the access code listed below, as well as some personal information. Please follow the directions to create your username and password.     Your access code is: [email or web address]  Expires: 2017 10:40 AM     Your access code will  in 90 days. If you need help or a new code, please call your [facility] or [phone].        Care EveryWhere ID     This is your Care EveryWhere ID. This could be used by other organizations to access your [facility] medical records  [id number]        Your Vitals Were     Pulse Temperature Respirations Height Pulse Oximetry BMI (Body Mass Index)    70 98.5  F (36.9  C) (Tympanic) 12 1.753 m (5' 9\") 97% 24.37 kg/m2       Blood Pressure from Last 3 Encounters:   17 103/56   17 153/87   17 106/64    Weight from Last 3 Encounters:   17 74.8 kg (165 lb)   17 79.4 kg (175 lb)   17 79.4 kg (175 lb)              Today, you had the following     No orders found for display         Today's Medication Changes          These changes are accurate as of: 17 11:39 AM.  If you have any questions, ask your nurse or doctor.               These medicines have changed or have updated prescriptions.        Dose/Directions    midodrine 5 MG tablet   Commonly known as:  PROAMATINE   This may have changed:  See the new instructions.   Used for:  Orthostatic hypotension        TAKE ONE TABLET THREE TIMES A DAY BY MOUTH   Quantity:  90 tablet   Refills:  3                Primary Care Provider Office Phone # Fax #    R Venancio Ray [phone]       [address] [address]        Equal Access to Services     TONY VÁZQUEZ AH: Candice Arrington, waaxda luqadaha, qaybta kaalmahayley montoya. So [facility] " [phone].    ATENCIÓN: Si yash ceron, tiene a tucker disposición servicios gratuitos de asistencia lingüística. Fredy casarez [phone].    We comply with applicable federal civil rights laws and [place] laws. We do not discriminate on the basis of race, color, national origin, age, disability sex, sexual orientation or gender identity.            Thank you!     Thank you for choosing [facility]  for your care. Our goal is always to provide you with excellent care. Hearing back from our patients is one way we can continue to improve our services. Please take a few minutes to complete the written survey that you may receive in the mail after your visit with us. Thank you!             Your Updated Medication List - Protect others around you: Learn how to safely use, store and throw away your medicines at www.disposemymeds.org.          This list is accurate as of: 8/30/17 11:39 AM.  Always use your most recent med list.                   Brand Name Dispense Instructions for use Diagnosis    COMPRESSION STOCKINGS     1 each    1 each daily    Parkinson's disease (H)       fludrocortisone 0.1 MG tablet    FLORINEF    30 tablet    Take 1 tablet (0.1 mg) by mouth daily    Parkinson's disease (H)       MELATONIN PO      Take 10 mg by mouth At Bedtime        midodrine 5 MG tablet    PROAMATINE    90 tablet    TAKE ONE TABLET THREE TIMES A DAY BY MOUTH    Orthostatic hypotension       PANTOPRAZOLE SODIUM PO      Take 40 mg by mouth every morning (before breakfast)        sodium chloride 1 GM tablet     60 tablet    TAKE 1 TABLET BY MOUTH TWICE A DAY    Parkinson's disease (H)       * UNABLE TO FIND      MEDICATION NAME: OTC vitamin for bladder control one tablet twice daily        * UNABLE TO FIND      MEDICATION NAME: keralex brain formula        * UNABLE TO FIND      Take 500 mg by mouth daily MEDICATION NAME: Tumeric        vitamin B complex with vitamin C Tabs tablet      Take 1 tablet by mouth daily         VITAMIN D-3 PO      Take 1,000 Int'l Units by mouth daily        * Notice:  This list has 3 medication(s) that are the same as other medications prescribed for you. Read the directions carefully, and ask your doctor or other care provider to review them with you.

## 2017-08-31 ENCOUNTER — OFFICE VISIT (OUTPATIENT)
Dept: FAMILY MEDICINE | Facility: OTHER | Age: 82
End: 2017-08-31
Attending: FAMILY MEDICINE
Payer: MEDICARE

## 2017-08-31 VITALS
OXYGEN SATURATION: 96 % | DIASTOLIC BLOOD PRESSURE: 72 MMHG | SYSTOLIC BLOOD PRESSURE: 114 MMHG | HEART RATE: 70 BPM | WEIGHT: 168 LBS | TEMPERATURE: 97.5 F | HEIGHT: 69 IN | BODY MASS INDEX: 24.88 KG/M2

## 2017-08-31 DIAGNOSIS — G31.83 LEWY BODY DEMENTIA WITHOUT BEHAVIORAL DISTURBANCE (H): Primary | ICD-10-CM

## 2017-08-31 DIAGNOSIS — F02.80 LEWY BODY DEMENTIA WITHOUT BEHAVIORAL DISTURBANCE (H): Primary | ICD-10-CM

## 2017-08-31 PROBLEM — R53.83 FATIGUE: Status: ACTIVE | Noted: 2017-08-31

## 2017-08-31 PROBLEM — R32 URINARY INCONTINENCE: Status: ACTIVE | Noted: 2017-08-31

## 2017-08-31 PROCEDURE — 99212 OFFICE O/P EST SF 10 MIN: CPT

## 2017-08-31 PROCEDURE — 99213 OFFICE O/P EST LOW 20 MIN: CPT | Performed by: FAMILY MEDICINE

## 2017-08-31 ASSESSMENT — PAIN SCALES - GENERAL: PAINLEVEL: NO PAIN (0)

## 2017-08-31 NOTE — NURSING NOTE
"Chief Complaint   Patient presents with     Memory Loss       Initial /72 (BP Location: Left arm, Patient Position: Chair, Cuff Size: Adult Regular)  Pulse 70  Temp 97.5  F (36.4  C) (Tympanic)  Ht 5' 9\" (1.753 m)  Wt 168 lb (76.2 kg)  SpO2 96%  BMI 24.81 kg/m2 Estimated body mass index is 24.81 kg/(m^2) as calculated from the following:    Height as of this encounter: 5' 9\" (1.753 m).    Weight as of this encounter: 168 lb (76.2 kg).  Medication Reconciliation: complete     SHASHI ORDONEZ      "

## 2017-08-31 NOTE — PROGRESS NOTES
SUBJECTIVE:   Jf Ortiz is a 83 year old male who presents to clinic today for the following health issues:      Memory Loss      Duration: Follow up     Description (location/character/radiation): possible memory loss, patient med list has no prescribed medication    Intensity:  mild    Accompanying signs and symptoms: none    History (similar episodes/previous evaluation): yes    Precipitating or alleviating factors: None    Therapies tried and outcome: Karalex brain formula   Wife states that he just saw Dr. Bashir last week a neurologist at [facility].   They talked about Droxidopa but I told the wife that that's for hypotension and not for memory.          Problem list and histories reviewed & adjusted, as indicated.  Additional history: as documented    Patient Active Problem List   Diagnosis     Hypercholesterolemia     REM sleep behavior disorder     Advanced care planning/counseling discussion     Osteoarthritis     Diaphragmatic hernia     Stented coronary artery     Cardiac pacemaker in situ     Coronary artery disease     Syncope and collapse     Volume depletion     Hypertrophy of prostate with urinary obstruction     Parkinson disease (H)     Seizure disorder (H)     Dementia with behavioral disturbance     Hypocalcemia     Idiopathic hypotension     Lewy body dementia without behavioral disturbance     Dysphagia     Essential hypertension     Fatigue     Urinary incontinence     Postsurgical aortocoronary bypass status     Past Surgical History:   Procedure Laterality Date     ------------OTHER-------------  1955    ulnar and radial fx - repair ulnar and radial fx x4     ------------OTHER-------------  6/14/2011    cataract extraction     BIOPSY  08/2015    skin biopsy     BLEPHAROPLASTY BILATERAL  5/6/2014    Procedure: BLEPHAROPLASTY BILATERAL;  Surgeon: Andrew Queen MD;  Location: [facility]     BYPASS GRAFT ARTERY CORONARY  11/2006    coronary artery disease x 5, [facility]      cataract extraction and lens implantation  2011    cataracts     cataract extraction and lens implantation      cataracts     COLONOSCOPY  2012     colonoscopy with polypectomy  3/13/2009    history of polyps - repeat 3 yrs     colonoscopy with polypectomy  2006     colonoscopy with polypectomy  2005     COMBINED COLONOSCOPY WITH ARGON PLASMA COAGULATOR (APC) N/A 10/31/2014    Procedure: COMBINED COLONOSCOPY WITH ARGON PLASMA COAGULATOR (APC);  Surgeon: Bassam Aguilar MD;  Location: [facility]     COMBINED COLONOSCOPY WITH ARGON PLASMA COAGULATOR (APC) N/A 11/13/2015    Procedure: COMBINED COLONOSCOPY WITH ARGON PLASMA COAGULATOR (APC);  Surgeon: Bassam Aguilar MD;  Location: [facility]     ENDOSCOPY UPPER, COLONOSCOPY, COMBINED N/A 10/31/2014    Procedure: COMBINED ENDOSCOPY UPPER, COLONOSCOPY;  Surgeon: Bassam Aguilar MD;  [facility]: [facility]     ENDOSCOPY UPPER, COLONOSCOPY, COMBINED N/A 11/13/2015    Procedure: COMBINED ENDOSCOPY UPPER, COLONOSCOPY;  Surgeon: Bassam Aguilar MD;  [facility]: [facility]     ESOPHAGOSCOPY, GASTROSCOPY, DUODENOSCOPY (EGD), COMBINED  1/22/2014    Procedure: COMBINED ESOPHAGOSCOPY, GASTROSCOPY, DUODENOSCOPY (EGD);  UPPER ENDOSCOPY(CARPENTER) W/ BIOPSIES;  Surgeon: Patricia Carpenter MD;  Location: [facility]     LARYNGOSCOPY WITH MICROSCOPE  1/22/2014    Procedure: LARYNGOSCOPY WITH MICROSCOPE;;  Surgeon: Chayo Duke MD;  Location: [facility]     pacemaker placement  2000    heart block     pacemaker placement  2011    dual-chamber     REMOVE TUBE, MYRINGOTOMY, COMBINED  1/22/2014    Procedure: COMBINED REMOVE TUBE, MYRINGOTOMY;  MICRODIRECT LARYNGOSCOPY WITH BIOPSY AND FROZEN SECTIONS removal of right ear tube and myringoplasty;  Surgeon: Chayo Duke MD;  Location: [facility]     stent placement to LAD  2008     ventilation tube  5/24/2012    right in office       Social History   Substance Use Topics     Smoking status: Former Smoker     Packs/day: 1.00     Years: 5.00     Types: Cigarettes     Smokeless  "tobacco: Never Used      Comment: quit in 1985     Alcohol use Yes      Comment: wine daily     Family History   Problem Relation Age of Onset     DIABETES Mother          Current Outpatient Prescriptions   Medication Sig Dispense Refill     UNABLE TO FIND Take 500 mg by mouth daily MEDICATION NAME: Tumeric       fludrocortisone (FLORINEF) 0.1 MG tablet Take 2 tablets (0.2 mg) by mouth daily 30 tablet 3     UNABLE TO FIND MEDICATION NAME: keralex brain formula       sodium chloride 1 GM tablet TAKE 1 TABLET BY MOUTH TWICE A DAY 60 tablet 0     UNABLE TO FIND MEDICATION NAME: OTC vitamin for bladder control one tablet twice daily       COMPRESSION STOCKINGS 1 each daily 1 each 0     midodrine (PROAMATINE) 5 MG tablet TAKE ONE TABLET THREE TIMES A DAY BY MOUTH (Patient taking differently: TAKE two tables three times daily) 90 tablet 3     PANTOPRAZOLE SODIUM PO Take 40 mg by mouth every morning (before breakfast)       vitamin  B complex with vitamin C (VITAMIN  B COMPLEX) TABS Take 1 tablet by mouth daily       MELATONIN PO Take 10 mg by mouth At Bedtime        Cholecalciferol (VITAMIN D-3 PO) Take 1,000 Int'l Units by mouth daily        Allergies   Allergen Reactions     Cats Other (See Comments)     Lamotrigine      Skin lesions         Reviewed and updated as needed this visit by clinical staff     Reviewed and updated as needed this visit by Provider         ROS:  Constitutional, HEENT, cardiovascular, pulmonary, gi and gu systems are negative, except as otherwise noted.      OBJECTIVE:   /72 (BP Location: Left arm, Patient Position: Chair, Cuff Size: Adult Regular)  Pulse 70  Temp 97.5  F (36.4  C) (Tympanic)  Ht 5' 9\" (1.753 m)  Wt 168 lb (76.2 kg)  SpO2 96%  BMI 24.81 kg/m2  Body mass index is 24.81 kg/(m^2).  GENERAL: healthy, alert and no distress  NECK: no adenopathy, no asymmetry, masses, or scars and thyroid normal to palpation  RESP: breathing comfortably  MS: no gross musculoskeletal defects " noted, no edema    Diagnostic Test Results:  none     ASSESSMENT/PLAN:               ICD-10-CM    1. Lewy body dementia without behavioral disturbance G31.83 Patient's wife will contact their neurologist regarding his memory  issues and discuss with her starting a medication such as Aricept.  I also requested that when patient calls Dr. Bashir to have their office send the visit notes.    F02.80            FRANCES Ray MD  [facility]

## 2017-08-31 NOTE — MR AVS SNAPSHOT
After Visit Summary   8/31/2017    Jf Ortiz    MRN: [id number]           Patient Information     Date Of Birth          10/5/1933        Visit Information        Provider Department      8/31/2017 11:00 AM FRANCES Ray MD [facility]        Care Instructions    Call Dr. Bashir about the memory.          Follow-ups after your visit        Your next 10 appointments already scheduled     Sep 20, 2017  2:30 PM CDT   Radiology with Need Interventional Radiologist, [facility] INTERVENTIONAL ROOM   [facility] ([facility] )    [address] [place] [address]   [phone]            Sep 21, 2017 10:30 AM CDT   Radiology with Need Interventional Radiologist, [facility] INTERVENTIONAL RO[facility]   [facility] ([facility] )    [address] [place] [address]   [phone]            Oct 30, 2017 10:30 AM CDT   (Arrive by 10:15 AM)   Return Visit with Herman Rivers,    [facility] [place] [facility] [place] [facility]    [address]  [place] [address]   [phone]            Nov 15, 2017 11:00 AM CST   Hearing Eval with Kenneth Meyers   [facility] ([facility] [place] [facility]    [address]  [place] [address]   [phone]              Who to contact     If you have questions or need follow up information about today's clinic visit or your schedule please contact [facility] directly at [phone].  Normal or non-critical lab and imaging results will be communicated to you by MyChart, letter or phone within 4 business days after the clinic has received the results. If you do not hear from us within 7 days, please contact the clinic through MyChart or phone. If you have a critical or abnormal lab result, we will notify you by phone as soon as possible.  Submit refill requests through [employer] or call your pharmacy and they will forward the refill request to us. Please allow  "3 business days for your refill to be completed.          Additional Information About Your Visit        MyChart Information     "[employer]t lets you send messages to your doctor, view your test results, renew your prescriptions, schedule appointments and more. To sign up, go to [email or web address] . Click on \"Log in\" on the left side of the screen, which will take you to the Welcome page. Then click on \"Sign up Now\" on the right side of the page.     You will be asked to enter the access code listed below, as well as some personal information. Please follow the directions to create your username and password.     Your access code is: [email or web address]  Expires: 2017 10:40 AM     Your access code will  in 90 days. If you need help or a new code, please call your [facility] clinic or [phone].        Care EveryWhere ID     This is your Care EveryWhere ID. This could be used by other organizations to access your [facility] medical records  [id number]        Your Vitals Were     Pulse Temperature Height Pulse Oximetry BMI (Body Mass Index)       70 97.5  F (36.4  C) (Tympanic) 5' 9\" (1.753 m) 96% 24.81 kg/m2        Blood Pressure from Last 3 Encounters:   17 114/72   17 103/56   17 153/87    Weight from Last 3 Encounters:   17 168 lb (76.2 kg)   17 165 lb (74.8 kg)   17 175 lb (79.4 kg)              Today, you had the following     No orders found for display         Today's Medication Changes          These changes are accurate as of: 17 11:23 AM.  If you have any questions, ask your nurse or doctor.               These medicines have changed or have updated prescriptions.        Dose/Directions    midodrine 5 MG tablet   Commonly known as:  PROAMATINE   This may have changed:  See the new instructions.   Used for:  Orthostatic hypotension        TAKE ONE TABLET THREE TIMES A DAY BY MOUTH   Quantity:  90 tablet   Refills:  3                Primary Care Provider Office " Phone # Fax #    R Venancio Ray [phone]       [facility] [place] [address] [place] [id number]        Equal Access to Services     TONY VÁZQUEZ : Candice saucedo iriso Sotre, waaxda luqadaha, qaybta kaalmada adebrandy, hayley haddad jayjaneen higueramaryanne mauro. So [facility] [phone].    ATENCIÓN: Si habla español, tiene a tucker disposición servicios gratuitos de asistencia lingüística. Llame al [phone].    We comply with applicable federal civil rights laws and [place] laws. We do not discriminate on the basis of race, color, national origin, age, disability sex, sexual orientation or gender identity.            Thank you!     Thank you for choosing [facility]  for your care. Our goal is always to provide you with excellent care. Hearing back from our patients is one way we can continue to improve our services. Please take a few minutes to complete the written survey that you may receive in the mail after your visit with us. Thank you!             Your Updated Medication List - Protect others around you: Learn how to safely use, store and throw away your medicines at www.disposemymeds.org.          This list is accurate as of: 8/31/17 11:23 AM.  Always use your most recent med list.                   Brand Name Dispense Instructions for use Diagnosis    COMPRESSION STOCKINGS     1 each    1 each daily    Parkinson's disease (H)       fludrocortisone 0.1 MG tablet    FLORINEF    30 tablet    Take 2 tablets (0.2 mg) by mouth daily    Parkinson's disease (H), Orthostatic hypotension       MELATONIN PO      Take 10 mg by mouth At Bedtime        midodrine 5 MG tablet    PROAMATINE    90 tablet    TAKE ONE TABLET THREE TIMES A DAY BY MOUTH    Orthostatic hypotension       PANTOPRAZOLE SODIUM PO      Take 40 mg by mouth every morning (before breakfast)        sodium chloride 1 GM tablet     60 tablet    TAKE 1 TABLET BY MOUTH TWICE A DAY    Parkinson's disease (H)        * UNABLE TO FIND      MEDICATION NAME: OTC vitamin for bladder control one tablet twice daily        * UNABLE TO FIND      MEDICATION NAME: keralex brain formula        * UNABLE TO FIND      Take 500 mg by mouth daily MEDICATION NAME: Tumeric        vitamin B complex with vitamin C Tabs tablet      Take 1 tablet by mouth daily        VITAMIN D-3 PO      Take 1,000 Int'l Units by mouth daily        * Notice:  This list has 3 medication(s) that are the same as other medications prescribed for you. Read the directions carefully, and ask your doctor or other care provider to review them with you.

## 2017-09-20 ENCOUNTER — HOSPITAL ENCOUNTER (OUTPATIENT)
Dept: INTERVENTIONAL RADIOLOGY/VASCULAR | Facility: HOSPITAL | Age: 82
Discharge: HOME | End: 2017-09-20
Attending: ORTHOPAEDIC SURGERY | Admitting: ORTHOPAEDIC SURGERY
Payer: MEDICARE

## 2017-09-20 PROCEDURE — 25000125 ZZHC RX 250: Performed by: RADIOLOGY

## 2017-09-20 PROCEDURE — 64490 INJ PARAVERT F JNT C/T 1 LEV: CPT | Mod: TC,RT

## 2017-09-20 PROCEDURE — 64491 INJ PARAVERT F JNT C/T 2 LEV: CPT | Mod: TC,RT

## 2017-09-20 PROCEDURE — 64492 INJ PARAVERT F JNT C/T 3 LEV: CPT | Mod: TC,RT

## 2017-09-20 PROCEDURE — 25000128 H RX IP 250 OP 636: Performed by: RADIOLOGY

## 2017-09-20 RX ORDER — LIDOCAINE HYDROCHLORIDE 10 MG/ML
INJECTION, SOLUTION EPIDURAL; INFILTRATION; INTRACAUDAL; PERINEURAL
Status: DISCONTINUED
Start: 2017-09-20 | End: 2017-09-21 | Stop reason: HOSPADM

## 2017-09-20 RX ORDER — LIDOCAINE HYDROCHLORIDE 10 MG/ML
5 INJECTION, SOLUTION INFILTRATION; PERINEURAL ONCE
Status: COMPLETED | OUTPATIENT
Start: 2017-09-20 | End: 2017-09-20

## 2017-09-20 RX ORDER — ROPIVACAINE HYDROCHLORIDE 10 MG/ML
15 INJECTION EPIDURAL; INFILTRATION; PERINEURAL ONCE
Status: COMPLETED | OUTPATIENT
Start: 2017-09-20 | End: 2017-09-20

## 2017-09-20 RX ORDER — ROPIVACAINE HYDROCHLORIDE 10 MG/ML
INJECTION EPIDURAL; INFILTRATION; PERINEURAL
Status: DISCONTINUED
Start: 2017-09-20 | End: 2017-09-21 | Stop reason: HOSPADM

## 2017-09-20 RX ADMIN — ROPIVACAINE HYDROCHLORIDE 2 ML: 10 INJECTION, SOLUTION EPIDURAL at 13:10

## 2017-09-20 RX ADMIN — LIDOCAINE HYDROCHLORIDE 4 ML: 10 INJECTION, SOLUTION EPIDURAL; INFILTRATION; INTRACAUDAL; PERINEURAL at 13:09

## 2017-09-20 NOTE — PROGRESS NOTES
Procedure: Spine: thoracic     Radiologist:Dr. Molina    There were no complicationsand patient has no symptoms..      Sedation:None. Local Anesthestic used  No sedation    Complications: None    Condition: Stable    Post-procedure pain score as of today is: 0    See dictated procedure note for full details and results.    Teri Gaston

## 2017-09-20 NOTE — PROGRESS NOTES
Fluoro time for this case was 13 seconds, less than 5 min  Was patient held? NO  If yes, by whom?

## 2017-09-26 DIAGNOSIS — G20.A1 PARKINSON'S DISEASE (H): ICD-10-CM

## 2017-09-26 NOTE — TELEPHONE ENCOUNTER
Needs the prescription refill by tomorrow he has one pill for tomorrow and the pharmacy told Uda to call to make sure this gets refilled. Des would like a phone call after Noon today stating that this prescription is sent.

## 2017-09-27 RX ORDER — MIDODRINE HYDROCHLORIDE 5 MG/1
TABLET ORAL
Qty: 180 TABLET | Refills: 3 | Status: SHIPPED | OUTPATIENT
Start: 2017-09-27 | End: 2017-10-03

## 2017-09-27 NOTE — TELEPHONE ENCOUNTER
Midodrine for orthostatic hypotension  Last cardio visit: 8.30.17  Last refill: 4.20.17 #90 / 3 refills

## 2017-10-03 ENCOUNTER — HOSPITAL ENCOUNTER (EMERGENCY)
Facility: HOSPITAL | Age: 82
Discharge: HOME | End: 2017-10-03
Attending: FAMILY MEDICINE | Admitting: FAMILY MEDICINE
Payer: MEDICARE

## 2017-10-03 ENCOUNTER — TELEPHONE (OUTPATIENT)
Dept: CARDIOLOGY | Facility: OTHER | Age: 82
End: 2017-10-03

## 2017-10-03 VITALS
OXYGEN SATURATION: 99 % | BODY MASS INDEX: 25.1 KG/M2 | TEMPERATURE: 96.8 F | HEART RATE: 97 BPM | DIASTOLIC BLOOD PRESSURE: 96 MMHG | RESPIRATION RATE: 8 BRPM | WEIGHT: 170 LBS | SYSTOLIC BLOOD PRESSURE: 134 MMHG

## 2017-10-03 DIAGNOSIS — I95.1 ORTHOSTATIC HYPOTENSION: ICD-10-CM

## 2017-10-03 LAB
ALBUMIN SERPL-MCNC: 3.3 G/DL (ref 3.4–5)
ALBUMIN UR-MCNC: NEGATIVE MG/DL
ALP SERPL-CCNC: 42 U/L (ref 40–150)
ALT SERPL W P-5'-P-CCNC: 24 U/L (ref 0–70)
ANION GAP SERPL CALCULATED.3IONS-SCNC: 7 MMOL/L (ref 3–14)
APPEARANCE UR: CLEAR
AST SERPL W P-5'-P-CCNC: 17 U/L (ref 0–45)
BACTERIA #/AREA URNS HPF: ABNORMAL /HPF
BASOPHILS # BLD AUTO: 0 10E9/L (ref 0–0.2)
BASOPHILS NFR BLD AUTO: 0.7 %
BILIRUB SERPL-MCNC: 0.7 MG/DL (ref 0.2–1.3)
BILIRUB UR QL STRIP: NEGATIVE
BUN SERPL-MCNC: 16 MG/DL (ref 7–30)
CALCIUM SERPL-MCNC: 8.5 MG/DL (ref 8.5–10.1)
CHLORIDE SERPL-SCNC: 109 MMOL/L (ref 94–109)
CO2 SERPL-SCNC: 22 MMOL/L (ref 20–32)
COLOR UR AUTO: YELLOW
CREAT SERPL-MCNC: 1.28 MG/DL (ref 0.66–1.25)
CRP SERPL-MCNC: <2.9 MG/L (ref 0–8)
DIFFERENTIAL METHOD BLD: ABNORMAL
EOSINOPHIL # BLD AUTO: 0.2 10E9/L (ref 0–0.7)
EOSINOPHIL NFR BLD AUTO: 5 %
ERYTHROCYTE [DISTWIDTH] IN BLOOD BY AUTOMATED COUNT: 13.1 % (ref 10–15)
GFR SERPL CREATININE-BSD FRML MDRD: 54 ML/MIN/1.7M2
GLUCOSE SERPL-MCNC: 114 MG/DL (ref 70–99)
GLUCOSE UR STRIP-MCNC: NEGATIVE MG/DL
HCT VFR BLD AUTO: 43.6 % (ref 40–53)
HGB BLD-MCNC: 14.3 G/DL (ref 13.3–17.7)
HGB UR QL STRIP: NEGATIVE
IMM GRANULOCYTES # BLD: 0 10E9/L (ref 0–0.4)
IMM GRANULOCYTES NFR BLD: 0 %
KETONES UR STRIP-MCNC: NEGATIVE MG/DL
LEUKOCYTE ESTERASE UR QL STRIP: NEGATIVE
LYMPHOCYTES # BLD AUTO: 1.7 10E9/L (ref 0.8–5.3)
LYMPHOCYTES NFR BLD AUTO: 37.8 %
MCH RBC QN AUTO: 33 PG (ref 26.5–33)
MCHC RBC AUTO-ENTMCNC: 32.8 G/DL (ref 31.5–36.5)
MCV RBC AUTO: 101 FL (ref 78–100)
MONOCYTES # BLD AUTO: 0.5 10E9/L (ref 0–1.3)
MONOCYTES NFR BLD AUTO: 10.3 %
NEUTROPHILS # BLD AUTO: 2 10E9/L (ref 1.6–8.3)
NEUTROPHILS NFR BLD AUTO: 46.2 %
NITRATE UR QL: NEGATIVE
NRBC # BLD AUTO: 0 10*3/UL
NRBC BLD AUTO-RTO: 0 /100
PH UR STRIP: 7 PH (ref 4.7–8)
PLATELET # BLD AUTO: 132 10E9/L (ref 150–450)
POTASSIUM SERPL-SCNC: 3.8 MMOL/L (ref 3.4–5.3)
PROT SERPL-MCNC: 6.7 G/DL (ref 6.8–8.8)
RBC # BLD AUTO: 4.33 10E12/L (ref 4.4–5.9)
RBC #/AREA URNS AUTO: <1 /HPF (ref 0–2)
SODIUM SERPL-SCNC: 138 MMOL/L (ref 133–144)
SOURCE: ABNORMAL
SP GR UR STRIP: 1.01 (ref 1–1.03)
TROPONIN I SERPL-MCNC: <0.015 UG/L (ref 0–0.04)
UROBILINOGEN UR STRIP-MCNC: NORMAL MG/DL (ref 0–2)
WBC # BLD AUTO: 4.4 10E9/L (ref 4–11)
WBC #/AREA URNS AUTO: <1 /HPF (ref 0–2)

## 2017-10-03 PROCEDURE — 81001 URINALYSIS AUTO W/SCOPE: CPT | Performed by: FAMILY MEDICINE

## 2017-10-03 PROCEDURE — 80053 COMPREHEN METABOLIC PANEL: CPT | Performed by: FAMILY MEDICINE

## 2017-10-03 PROCEDURE — 25000128 H RX IP 250 OP 636: Performed by: FAMILY MEDICINE

## 2017-10-03 PROCEDURE — 99284 EMERGENCY DEPT VISIT MOD MDM: CPT | Mod: 25

## 2017-10-03 PROCEDURE — 93005 ELECTROCARDIOGRAM TRACING: CPT

## 2017-10-03 PROCEDURE — 93010 ELECTROCARDIOGRAM REPORT: CPT | Performed by: INTERNAL MEDICINE

## 2017-10-03 PROCEDURE — 85025 COMPLETE CBC W/AUTO DIFF WBC: CPT | Performed by: FAMILY MEDICINE

## 2017-10-03 PROCEDURE — 86140 C-REACTIVE PROTEIN: CPT | Performed by: FAMILY MEDICINE

## 2017-10-03 PROCEDURE — 36415 COLL VENOUS BLD VENIPUNCTURE: CPT | Performed by: FAMILY MEDICINE

## 2017-10-03 PROCEDURE — 96360 HYDRATION IV INFUSION INIT: CPT

## 2017-10-03 PROCEDURE — 99284 EMERGENCY DEPT VISIT MOD MDM: CPT | Performed by: FAMILY MEDICINE

## 2017-10-03 PROCEDURE — 96361 HYDRATE IV INFUSION ADD-ON: CPT

## 2017-10-03 PROCEDURE — 84484 ASSAY OF TROPONIN QUANT: CPT | Performed by: FAMILY MEDICINE

## 2017-10-03 PROCEDURE — A9270 NON-COVERED ITEM OR SERVICE: HCPCS | Mod: GY | Performed by: FAMILY MEDICINE

## 2017-10-03 PROCEDURE — 25000132 ZZH RX MED GY IP 250 OP 250 PS 637: Mod: GY | Performed by: FAMILY MEDICINE

## 2017-10-03 RX ORDER — FLUDROCORTISONE ACETATE 0.1 MG/1
100 TABLET ORAL DAILY
Status: DISCONTINUED | OUTPATIENT
Start: 2017-10-03 | End: 2017-10-03 | Stop reason: HOSPADM

## 2017-10-03 RX ORDER — MIDODRINE HYDROCHLORIDE 5 MG/1
5 TABLET ORAL
Status: DISCONTINUED | OUTPATIENT
Start: 2017-10-03 | End: 2017-10-03 | Stop reason: HOSPADM

## 2017-10-03 RX ORDER — LIDOCAINE 40 MG/G
CREAM TOPICAL
Status: DISCONTINUED | OUTPATIENT
Start: 2017-10-03 | End: 2017-10-03

## 2017-10-03 RX ORDER — SODIUM CHLORIDE 9 MG/ML
1000 INJECTION, SOLUTION INTRAVENOUS CONTINUOUS
Status: DISCONTINUED | OUTPATIENT
Start: 2017-10-03 | End: 2017-10-03 | Stop reason: HOSPADM

## 2017-10-03 RX ADMIN — SODIUM CHLORIDE 1000 ML: 9 INJECTION, SOLUTION INTRAVENOUS at 10:20

## 2017-10-03 RX ADMIN — SODIUM CHLORIDE 1000 ML: 9 INJECTION, SOLUTION INTRAVENOUS at 09:06

## 2017-10-03 RX ADMIN — MIDODRINE HYDROCHLORIDE 5 MG: 5 TABLET ORAL at 10:54

## 2017-10-03 RX ADMIN — FLUDROCORTISONE ACETATE 100 MCG: 0.1 TABLET ORAL at 10:54

## 2017-10-03 ASSESSMENT — ENCOUNTER SYMPTOMS
FATIGUE: 1
CONFUSION: 1
PALPITATIONS: 0
DIARRHEA: 0
SHORTNESS OF BREATH: 0
NAUSEA: 0
DIAPHORESIS: 0
MUSCULOSKELETAL NEGATIVE: 1
LIGHT-HEADEDNESS: 1
CONSTIPATION: 0
DIZZINESS: 1
ABDOMINAL PAIN: 0
ACTIVITY CHANGE: 1
VOMITING: 0

## 2017-10-03 NOTE — ED AVS SNAPSHOT
[facility]    [address] [address]    Phone:  [phone]                                       Jf Ortiz   MRN: [id number]    Department:  [facility]   Date of Visit:  10/3/2017           After Visit Summary Signature Page     I have received my discharge instructions, and my questions have been answered. I have discussed any challenges I see with this plan with the nurse or doctor.    ..........................................................................................................................................  Patient/Patient Representative Signature      ..........................................................................................................................................  Patient Representative Print Name and Relationship to Patient    ..................................................               ................................................  Date                                            Time    ..........................................................................................................................................  Reviewed by Signature/Title    ...................................................              ..............................................  Date                                                            Time

## 2017-10-03 NOTE — ED PROVIDER NOTES
History     Chief Complaint   Patient presents with     Dizziness     went to his knees after getting off the chair last night d/t dizzy. pt has raman body dementia     HPI  Jf Ortiz is a 83 year old male who went to his knees with near syncope last night when rising from a seated position.  He has Parkinson's disease as well as Lewy body dementia, has been here several times for syncope and orthostasis in the past.    I have reviewed the Medications, Allergies, Past Medical and Surgical History, and Social History in the Epic system.    Allergies:   Allergies   Allergen Reactions     Cats Other (See Comments)     Lamotrigine      Skin lesions         No current facility-administered medications on file prior to encounter.   Current Outpatient Prescriptions on File Prior to Encounter:  UNABLE TO FIND Take 500 mg by mouth daily MEDICATION NAME: Tumeric   fludrocortisone (FLORINEF) 0.1 MG tablet Take 2 tablets (0.2 mg) by mouth daily (Patient taking differently: Take 0.1 mg by mouth daily )   UNABLE TO FIND MEDICATION NAME: keralex brain formula   sodium chloride 1 GM tablet TAKE 1 TABLET BY MOUTH TWICE A DAY   UNABLE TO FIND MEDICATION NAME: OTC vitamin for bladder control one tablet twice daily   midodrine (PROAMATINE) 5 MG tablet TAKE ONE TABLET THREE TIMES A DAY BY MOUTH (Patient taking differently: TAKE two tables three times daily)   PANTOPRAZOLE SODIUM PO Take 40 mg by mouth every morning (before breakfast)   vitamin  B complex with vitamin C (VITAMIN  B COMPLEX) TABS Take 1 tablet by mouth daily   MELATONIN PO Take 10 mg by mouth At Bedtime    Cholecalciferol (VITAMIN D-3 PO) Take 1,000 Int'l Units by mouth daily    COMPRESSION STOCKINGS 1 each daily       Patient Active Problem List   Diagnosis     Hypercholesterolemia     REM sleep behavior disorder     Advanced care planning/counseling discussion     Osteoarthritis     Diaphragmatic hernia     Stented coronary artery     Cardiac pacemaker in situ      Coronary artery disease     Syncope and collapse     Volume depletion     Hypertrophy of prostate with urinary obstruction     Parkinson disease (H)     Seizure disorder (H)     Dementia with behavioral disturbance     Hypocalcemia     Idiopathic hypotension     Lewy body dementia without behavioral disturbance     Dysphagia     Essential hypertension     Fatigue     Urinary incontinence     Postsurgical aortocoronary bypass status       Past Surgical History:   Procedure Laterality Date     ------------OTHER-------------  1955    ulnar and radial fx - repair ulnar and radial fx x4     ------------OTHER-------------  6/14/2011    cataract extraction     BIOPSY  08/2015    skin biopsy     BLEPHAROPLASTY BILATERAL  5/6/2014    Procedure: BLEPHAROPLASTY BILATERAL;  Surgeon: Andrew Queen MD;  Location: [facility]     BYPASS GRAFT ARTERY CORONARY  11/2006    coronary artery disease x 5, [facility]     cataract extraction and lens implantation  2011    cataracts     cataract extraction and lens implantation      cataracts     COLONOSCOPY  2012     colonoscopy with polypectomy  3/13/2009    history of polyps - repeat 3 yrs     colonoscopy with polypectomy  2006     colonoscopy with polypectomy  2005     COMBINED COLONOSCOPY WITH ARGON PLASMA COAGULATOR (APC) N/A 10/31/2014    Procedure: COMBINED COLONOSCOPY WITH ARGON PLASMA COAGULATOR (APC);  Surgeon: Bassam Aguilar MD;  Location: [facility]     COMBINED COLONOSCOPY WITH ARGON PLASMA COAGULATOR (APC) N/A 11/13/2015    Procedure: COMBINED COLONOSCOPY WITH ARGON PLASMA COAGULATOR (APC);  Surgeon: Bassam Aguilar MD;  Location: [facility]     ENDOSCOPY UPPER, COLONOSCOPY, COMBINED N/A 10/31/2014    Procedure: COMBINED ENDOSCOPY UPPER, COLONOSCOPY;  Surgeon: Bassam Aguilar MD;  [facility]: [facility]     ENDOSCOPY UPPER, COLONOSCOPY, COMBINED N/A 11/13/2015    Procedure: COMBINED ENDOSCOPY UPPER, COLONOSCOPY;  Surgeon: Bassam Aguilar MD;  [facility]     ESOPHAGOSCOPY, GASTROSCOPY,  "DUODENOSCOPY (EGD), COMBINED  1/22/2014    Procedure: COMBINED ESOPHAGOSCOPY, GASTROSCOPY, DUODENOSCOPY (EGD);  UPPER ENDOSCOPY(JAYDEN) W/ BIOPSIES;  Surgeon: Patricia Pena MD;  Location: [facility]     LARYNGOSCOPY WITH MICROSCOPE  1/22/2014    Procedure: LARYNGOSCOPY WITH MICROSCOPE;;  Surgeon: Chayo Duke MD;  Location: [facility]     pacemaker placement  2000    heart block     pacemaker placement  2011    dual-chamber     REMOVE TUBE, MYRINGOTOMY, COMBINED  1/22/2014    Procedure: COMBINED REMOVE TUBE, MYRINGOTOMY;  MICRODIRECT LARYNGOSCOPY WITH BIOPSY AND FROZEN SECTIONS removal of right ear tube and myringoplasty;  Surgeon: Chayo Duke MD;  Location: [facility]     stent placement to LAD  2008     ventilation tube  5/24/2012    right in office       Social History   Substance Use Topics     Smoking status: Former Smoker     Packs/day: 1.00     Years: 5.00     Types: Cigarettes     Smokeless tobacco: Never Used      Comment: quit in 1985     Alcohol use Yes      Comment: every other day       Most Recent Immunizations   Administered Date(s) Administered     Influenza (IIV3) 11/14/2006       BMI: Estimated body mass index is 25.1 kg/(m^2) as calculated from the following:    Height as of 8/31/17: 1.753 m (5' 9\").    Weight as of this encounter: 77.1 kg (170 lb).      Review of Systems   Constitutional: Positive for activity change and fatigue. Negative for diaphoresis.   HENT: Negative for ear discharge and ear pain.    Eyes:        \"Eyes feel tired\"   Respiratory: Negative for shortness of breath.    Cardiovascular: Negative for chest pain, palpitations and leg swelling.   Gastrointestinal: Negative for abdominal pain, constipation, diarrhea, nausea and vomiting.   Genitourinary: Negative.    Musculoskeletal: Negative.    Skin: Negative.    Neurological: Positive for dizziness, syncope and light-headedness.        Near syncope last night, felt like he was going to fall forward. "   Psychiatric/Behavioral: Positive for confusion.       Physical Exam   BP: 156/80  Pulse: 97  Temp: 96.8  F (36  C)  Resp: 18  Weight: 77.1 kg (170 lb)  SpO2: 97 %  Physical Exam   Constitutional: He is oriented to person, place, and time. He appears well-developed and well-nourished. No distress.   HENT:   Head: Normocephalic and atraumatic.   Did not hit head or fall entirely to the floor.   Eyes: EOM are normal. Pupils are equal, round, and reactive to light.   Pupils 1.5 bilaterally and reactive   Neck: Normal range of motion. Neck supple.   Cardiovascular: Normal rate and regular rhythm.    Murmur heard.  Pulmonary/Chest: Effort normal and breath sounds normal. No respiratory distress.   Abdominal: Soft. Bowel sounds are normal. He exhibits no distension. There is no tenderness.   Musculoskeletal: Normal range of motion. He exhibits no edema.   Neurological: He is alert and oriented to person, place, and time. No cranial nerve deficit.   Orthostatic.   Skin: Skin is warm and dry.   Psychiatric: He has a normal mood and affect.   Nursing note and vitals reviewed.      ED Course     ED Course     Procedures         % paced.    Labs Ordered and Resulted from Time of ED Arrival Up to the Time of Departure from the ED   CBC WITH PLATELETS DIFFERENTIAL - Abnormal; Notable for the following:        Result Value    RBC Count 4.33 (*)      (*)     Platelet Count 132 (*)     All other components within normal limits   COMPREHENSIVE METABOLIC PANEL - Abnormal; Notable for the following:     Glucose 114 (*)     Creatinine 1.28 (*)     GFR Estimate 54 (*)     Albumin 3.3 (*)     Protein Total 6.7 (*)     All other components within normal limits   ROUTINE UA WITH MICROSCOPIC - Abnormal; Notable for the following:     Bacteria Urine None (*)     All other components within normal limits   TROPONIN I   CRP INFLAMMATION   PERIPHERAL IV CATHETER   CARDIAC CONTINUOUS MONITORING   ORTHOSTATIC BLOOD PRESSURE AND  PULSE   ORTHOSTATIC BLOOD PRESSURE AND PULSE       Assessments & Plan (with Medical Decision Making)   No evidence of infection, does have elevated creatinine, but BUN is normal.  No evidence of UTI. Definitely orthostatic, BP dropped into 90s from 120 from sitting to standing.  Consistent with previous visits.  SO states he is taking all his medications, but has not had them today yet, does not drink enough fluid despite her encouraging him to do so. Given 2 liters of NS, patient less orthostatic, able to get up without dizziness.  Spoke with Dr. Ray, he suggested appointment with Dr. Rivers (cards) on Friday, appointment made for Friday at 1PM.  Testing for adrenal insufficiency not done as these tests are timed tests that would not be possible in the ED.    I have reviewed the nursing notes.    I have reviewed the findings, diagnosis, plan and need for follow up with the patient.      New Prescriptions    No medications on file       Final diagnoses:   Orthostatic hypotension       10/3/2017   [facility]     Dee Gagnon MD  10/03/17 1120

## 2017-10-03 NOTE — ED NOTES
DC instructions reviewed with patient and caregiver; both verbalize understanding.  No further questions at this time.  Home to rest.

## 2017-10-03 NOTE — ED NOTES
"Patient presents to the emergency room with his wife.  Patient and wife concerned about some dizziness that started last night.  Patient states he still feels a little dizzy today but describes it as \"my eyes feel tired\"  Patient has a history of dementia but is answering most questions appropriately.  Patient denies any pain, no sob, no N/V/D and no other complaints.  Gown on, cardiac monitor on, EKG done, IV was placed with labs drawn, call light within reach and wife at bedside.    "

## 2017-10-03 NOTE — TELEPHONE ENCOUNTER
Please schedule patient to follow up with Dr Rivers per Dr. Ray from 10/3/2017 ED visit.  Patient given a date and time of 10/6/17 arrival of 12:45 for a 1:00 appointment. Patient has been made aware of this in the ED visit.   Thanks, Elaine.

## 2017-10-03 NOTE — DISCHARGE INSTRUCTIONS
Orthostatic Low Blood Pressure (Hypotension)  A blood pressure reading is made up of 2 numbers There is a top number over a bottom number. The top number is the systolic pressure. The bottom number is the diastolic pressure. A normal blood pressure is a systolic pressure less than 120 over a diastolic pressure less than 80. Low blood pressure (hypotension) is a blood pressure that is less than what is normal for you.  Orthostatic hypotension is a type of low blood pressure that occurs only when you change position from lying to standing. It can cause dizziness, lightheadedness, or fainting.  Some medicines can cause orthostatic hypotension. These include:    High blood pressure medicines    Water pills (diuretics)    Some heart medicines    Some antidepressants    Pain, anxiety, sedative, and sleeping medicines  Other causes include:    Dehydration from vomiting, diarrhea, or not getting enough fluids    Severe infection    High fever    Blood loss, such as bleeding from the stomach or intestines    Neurological diseases that impair the autonomic nervous system  Treatment will depend on what is causing your low blood pressure.  Home care  Follow these guidelines when caring for yourself at home:    Rest until your symptoms get better.    Change positions slowly from lying to standing. When getting out of bed, sit on the side of the bed with your legs down for at least 30 seconds before standing. This gives your body time to adjust to the position change.    Follow the treatment plan described by your healthcare provider.  Follow-up care  Follow up with your healthcare provider, or as advised.  When to seek medical advice  Call your healthcare provider right away if any of these occur:    Dizziness, lightheadedness, or fainting    Black or red color in your stools or vomit    Diarrhea or vomiting that doesn t go away    You aren t able to eat or drink    Fever of 100.4 F (38 C) or higher, or as directed by your  healthcare provider    Burning when you urinate    Foul-smelling urine  Date Last Reviewed: 12/1/2016 2000-2017 [employer]. [address] [place] [address], [place] [address]. All rights reserved. This information is not intended as a substitute for professional medical care. Always follow your healthcare professional's instructions.

## 2017-10-03 NOTE — ED AVS SNAPSHOT
[facility]    [address] [address]    Phone:  [phone]                                       Jf Ortiz   MRN: [id number]    Department:  [facility]   Date of Visit:  10/3/2017           Patient Information     Date Of Birth          10/5/1933        Your diagnoses for this visit were:     Orthostatic hypotension        You were seen by Dee Gagnon MD.      Follow-up Information     Follow up with Herman Rivers DO.    Specialty:  Cardiology    Why:  1PM 10/6/2017    Contact information:    [address] [place] [id number]  [phone]          Discharge Instructions         Orthostatic Low Blood Pressure (Hypotension)  A blood pressure reading is made up of 2 numbers There is a top number over a bottom number. The top number is the systolic pressure. The bottom number is the diastolic pressure. A normal blood pressure is a systolic pressure less than 120 over a diastolic pressure less than 80. Low blood pressure (hypotension) is a blood pressure that is less than what is normal for you.  Orthostatic hypotension is a type of low blood pressure that occurs only when you change position from lying to standing. It can cause dizziness, lightheadedness, or fainting.  Some medicines can cause orthostatic hypotension. These include:    High blood pressure medicines    Water pills (diuretics)    Some heart medicines    Some antidepressants    Pain, anxiety, sedative, and sleeping medicines  Other causes include:    Dehydration from vomiting, diarrhea, or not getting enough fluids    Severe infection    High fever    Blood loss, such as bleeding from the stomach or intestines    Neurological diseases that impair the autonomic nervous system  Treatment will depend on what is causing your low blood pressure.  Home care  Follow these guidelines when caring for yourself at home:    Rest until your symptoms get better.    Change positions slowly from  lying to standing. When getting out of bed, sit on the side of the bed with your legs down for at least 30 seconds before standing. This gives your body time to adjust to the position change.    Follow the treatment plan described by your healthcare provider.  Follow-up care  Follow up with your healthcare provider, or as advised.  When to seek medical advice  Call your healthcare provider right away if any of these occur:    Dizziness, lightheadedness, or fainting    Black or red color in your stools or vomit    Diarrhea or vomiting that doesn t go away    You aren t able to eat or drink    Fever of 100.4 F (38 C) or higher, or as directed by your healthcare provider    Burning when you urinate    Foul-smelling urine  Date Last Reviewed: 12/1/2016 2000-2017 [employer]. [address] [place] [address], [place] [address]. All rights reserved. This information is not intended as a substitute for professional medical care. Always follow your healthcare professional's instructions.          Future Appointments        Provider Department Dept Phone Center    10/6/2017 1:00 PM Herman Rivers DO [facility] [phone] [place]    10/30/2017 10:30 AM Herman Rivers DO [facility] [phone] [place]    11/15/2017 11:00 AM Kenneth Morfin [facility] [phone] [place]         Review of your medicines      Our records show that you are taking the medicines listed below. If these are incorrect, please call your family doctor or clinic.        Dose / Directions Last dose taken    COMPRESSION STOCKINGS   Dose:  1 each   Quantity:  1 each        1 each daily   Refills:  0        fludrocortisone 0.1 MG tablet   Commonly known as:  FLORINEF   Dose:  0.2 mg   Quantity:  30 tablet        Take 2 tablets (0.2 mg) by mouth daily   Refills:  3        MELATONIN PO   Dose:  10 mg        Take 10 mg by mouth At Bedtime   Refills:  0        midodrine 5 MG tablet    Commonly known as:  PROAMATINE   Quantity:  90 tablet        TAKE ONE TABLET THREE TIMES A DAY BY MOUTH   Refills:  3        PANTOPRAZOLE SODIUM PO   Dose:  40 mg        Take 40 mg by mouth every morning (before breakfast)   Refills:  0        sodium chloride 1 GM tablet   Quantity:  60 tablet        TAKE 1 TABLET BY MOUTH TWICE A DAY   Refills:  0        * UNABLE TO FIND        MEDICATION NAME: OTC vitamin for bladder control one tablet twice daily   Refills:  0        * UNABLE TO FIND        MEDICATION NAME: keralex brain formula   Refills:  0        * UNABLE TO FIND   Dose:  500 mg        Take 500 mg by mouth daily MEDICATION NAME: Tumeric   Refills:  0        vitamin B complex with vitamin C Tabs tablet   Dose:  1 tablet        Take 1 tablet by mouth daily   Refills:  0        VITAMIN D-3 PO   Dose:  1000 Int'l Units        Take 1,000 Int'l Units by mouth daily   Refills:  0        * Notice:  This list has 3 medication(s) that are the same as other medications prescribed for you. Read the directions carefully, and ask your doctor or other care provider to review them with you.            Procedures and tests performed during your visit     CBC with platelets differential    CRP inflammation    Cardiac Continuous Monitoring    Comprehensive metabolic panel    EKG 12 lead    Orthostatic blood pressure    Orthostatic blood pressure and pulse    Peripheral IV: Standard    Routine UA with microscopic    Troponin I      Orders Needing Specimen Collection     None      Pending Results     No orders found from 10/1/2017 to 10/4/2017.            Pending Culture Results     No orders found from 10/1/2017 to 10/4/2017.            Thank you for choosing [facility]       Thank you for choosing [facility] for your care. Our goal is always to provide you with excellent care. Hearing back from our patients is one way we can continue to improve our services. Please take a few minutes to complete the written survey that you may  "receive in the mail after you visit with us. Thank you!        [facility] Information     [facility] lets you send messages to your doctor, view your test results, renew your prescriptions, schedule appointments and more. To sign up, go to [email or web address] . Click on \"Log in\" on the left side of the screen, which will take you to the Welcome page. Then click on \"Sign up Now\" on the right side of the page.     You will be asked to enter the access code listed below, as well as some personal information. Please follow the directions to create your username and password.     Your access code is: [email or web address]  Expires: 2018 11:24 AM     Your access code will  in 90 days. If you need help or a new code, please call your [facility] clinic or [phone].        Care EveryWhere ID     This is your Care EveryWhere ID. This could be used by other organizations to access your [facility] medical records  [id number]        Equal Access to Services     MARCIAL VÁZQUEZ : Hadii job simmonso Sotre, waaxda luqadaha, qaybta kaalmada adebrandy, hayley pollard . So [facility] [phone].    ATENCIÓN: Si habla español, tiene a tucker disposición servicios gratuitos de asistencia lingüística. Llame al [phone].    We comply with applicable federal civil rights laws and [place] laws. We do not discriminate on the basis of race, color, national origin, age, disability, sex, sexual orientation, or gender identity.            After Visit Summary       This is your record. Keep this with you and show to your community pharmacist(s) and doctor(s) at your next visit.                  "

## 2017-10-03 NOTE — PROGRESS NOTES
Brief visit with patient.  He is residing with his lady friend.  He is a  and is receiving no support from this program.  He is still driving.  He sees Dr Rivers and has not seen Dr Ray much lately.  I will wait until Uda returns to re visit.

## 2017-10-03 NOTE — ED NOTES
Patient ready for DC.  Patient's caregiver not at bedside at this time.  Will wait to review DC instructions when she returns.

## 2017-10-06 ENCOUNTER — OFFICE VISIT (OUTPATIENT)
Dept: CARDIOLOGY | Facility: OTHER | Age: 82
End: 2017-10-06
Attending: FAMILY MEDICINE
Payer: MEDICARE

## 2017-10-06 VITALS
OXYGEN SATURATION: 96 % | BODY MASS INDEX: 26.07 KG/M2 | TEMPERATURE: 98 F | HEART RATE: 71 BPM | DIASTOLIC BLOOD PRESSURE: 63 MMHG | RESPIRATION RATE: 18 BRPM | SYSTOLIC BLOOD PRESSURE: 118 MMHG | HEIGHT: 69 IN | WEIGHT: 176 LBS

## 2017-10-06 DIAGNOSIS — F02.80 LEWY BODY DEMENTIA WITHOUT BEHAVIORAL DISTURBANCE (H): ICD-10-CM

## 2017-10-06 DIAGNOSIS — G20.A1 PARKINSON'S DISEASE (H): ICD-10-CM

## 2017-10-06 DIAGNOSIS — G31.83 LEWY BODY DEMENTIA WITHOUT BEHAVIORAL DISTURBANCE (H): ICD-10-CM

## 2017-10-06 DIAGNOSIS — E78.00 HYPERCHOLESTEROLEMIA: ICD-10-CM

## 2017-10-06 DIAGNOSIS — Z95.5 STENTED CORONARY ARTERY: ICD-10-CM

## 2017-10-06 DIAGNOSIS — Z95.1 POSTSURGICAL AORTOCORONARY BYPASS STATUS: ICD-10-CM

## 2017-10-06 DIAGNOSIS — I95.1 ORTHOSTATIC HYPOTENSION: Primary | ICD-10-CM

## 2017-10-06 DIAGNOSIS — Z95.0 PACEMAKER: ICD-10-CM

## 2017-10-06 DIAGNOSIS — E86.9 VOLUME DEPLETION: ICD-10-CM

## 2017-10-06 PROCEDURE — 99214 OFFICE O/P EST MOD 30 MIN: CPT | Performed by: INTERNAL MEDICINE

## 2017-10-06 PROCEDURE — 99212 OFFICE O/P EST SF 10 MIN: CPT | Performed by: INTERNAL MEDICINE

## 2017-10-06 RX ORDER — SODIUM CHLORIDE 1 G/1
TABLET ORAL
Qty: 60 TABLET | Refills: 11 | Status: SHIPPED | OUTPATIENT
Start: 2017-10-06 | End: 2018-10-30

## 2017-10-06 RX ORDER — FLUDROCORTISONE ACETATE 0.1 MG/1
0.3 TABLET ORAL DAILY
Qty: 90 TABLET | Refills: 11 | Status: SHIPPED | OUTPATIENT
Start: 2017-10-06 | End: 2018-10-07

## 2017-10-06 ASSESSMENT — PAIN SCALES - GENERAL: PAINLEVEL: NO PAIN (0)

## 2017-10-06 NOTE — NURSING NOTE
"Chief Complaint   Patient presents with     RECHECK     F/U.  Patient denies SOB, chest pain, and lightheadedness.  Patient states he does get dizzy spells periodically.       Initial /63 (BP Location: Left arm, Patient Position: Sitting, Cuff Size: Adult Regular)  Pulse 71  Temp 98  F (36.7  C) (Tympanic)  Resp 18  Ht 1.753 m (5' 9\")  Wt 79.8 kg (176 lb)  SpO2 96%  BMI 25.99 kg/m2 Estimated body mass index is 25.99 kg/(m^2) as calculated from the following:    Height as of this encounter: 1.753 m (5' 9\").    Weight as of this encounter: 79.8 kg (176 lb).  Medication Reconciliation: complete     Rachana Deleon      "

## 2017-10-06 NOTE — MR AVS SNAPSHOT
After Visit Summary   10/6/2017    Jf Ortiz    MRN: [id number]           Patient Information     Date Of Birth          10/5/1933        Visit Information        Provider Department      10/6/2017 1:00 PM Herman Rivers,  [facility] [place]        Today's Diagnoses     Parkinson's disease (H)        Orthostatic hypotension          Care Instructions    You were seen by Dr. Rivers, 10/6/2017.     1.  It is important to drink 1 to 2 liters of water daily.         2. Increase Florinef to 0.3 mg daily.  A new prescription will be sent to your pharmacy.    3. Continue with sodium tablets daily.  A refill has been sent to the pharmacy.    4. Stop by the clinic lab early Monday morning to have lab work done.  We have placed an order to check your Cortisol levels.  We will notify you of the results.     5. We will get you scheduled to be seen by our pacemaker nurse here at the clinic.      You will follow up with Dr. Rivers in 3 months .       Please call the cardiology office with problems, questions, or concerns at [phone].    If you experience chest pain, chest pressure, chest tightness, shortness of breath, fainting, lightheadedness, nausea, vomiting, or other concerning symptoms, please report to the Emergency Department or call 911. These symptoms may be emergent, and best treated in the Emergency Department.     Rachana Deleon RN  Cardiology   [facility]  [phone]            Follow-ups after your visit        Your next 10 appointments already scheduled     Oct 30, 2017 10:30 AM CDT   (Arrive by 10:15 AM)   Return Visit with Herman Rivers,    [facility] [place] ([facility] [place] )    [address] [place] [address]   [phone]            Nov 15, 2017 11:00 AM CST   Hearing Eval with Kenneth Meyers   [facility] [place] ([facility] [place] )    [address]  [place] [address]   [phone]             "  Who to contact     If you have questions or need follow up information about today's clinic visit or your schedule please contact [facility] directly at [phone].  Normal or non-critical lab and imaging results will be communicated to you by MyChart, letter or phone within 4 business days after the clinic has received the results. If you do not hear from us within 7 days, please contact the clinic through MyChart or phone. If you have a critical or abnormal lab result, we will notify you by phone as soon as possible.  Submit refill requests through PayPal or call your pharmacy and they will forward the refill request to us. Please allow 3 business days for your refill to be completed.          Additional Information About Your Visit        [employer] Information     [employer] lets you send messages to your doctor, view your test results, renew your prescriptions, schedule appointments and more. To sign up, go to [email or web address] . Click on \"Log in\" on the left side of the screen, which will take you to the Welcome page. Then click on \"Sign up Now\" on the right side of the page.     You will be asked to enter the access code listed below, as well as some personal information. Please follow the directions to create your username and password.     Your access code is: [email or web address]  Expires: 2018 11:24 AM     Your access code will  in 90 days. If you need help or a new code, please call your [place] clinic or [phone].        Care EveryWhere ID     This is your Care EveryWhere ID. This could be used by other organizations to access your [facility] [place] medical records  [id number]        Your Vitals Were     Pulse Temperature Respirations Height Pulse Oximetry BMI (Body Mass Index)    71 98  F (36.7  C) (Tympanic) 18 1.753 m (5' 9\") 96% 25.99 kg/m2       Blood Pressure from Last 3 Encounters:   10/06/17 118/63   10/03/17 134/96   17 114/72    Weight from Last 3 Encounters: "   10/06/17 79.8 kg (176 lb)   10/03/17 77.1 kg (170 lb)   08/31/17 76.2 kg (168 lb)              Today, you had the following     No orders found for display         Today's Medication Changes          These changes are accurate as of: 10/6/17  1:53 PM.  If you have any questions, ask your nurse or doctor.               These medicines have changed or have updated prescriptions.        Dose/Directions    fludrocortisone 0.1 MG tablet   Commonly known as:  FLORINEF   This may have changed:  how much to take   Used for:  Parkinson's disease (H), Orthostatic hypotension        Dose:  0.2 mg   Take 2 tablets (0.2 mg) by mouth daily   Quantity:  30 tablet   Refills:  3       midodrine 5 MG tablet   Commonly known as:  PROAMATINE   This may have changed:  See the new instructions.   Used for:  Orthostatic hypotension        TAKE ONE TABLET THREE TIMES A DAY BY MOUTH   Quantity:  90 tablet   Refills:  3                Primary Care Provider Office Phone # Fax #    R Venancio Ray [phone]       [address] [address]        Equal Access to Services     [facility] KATHIE AH: Hadii job heath Sotre, waaxda luqadaha, qaybta kaalmada cyndi, hayley pollard . So [facility] [phone].    ATENCIÓN: Si habla español, tiene a tucker disposición servicios gratuitos de asistencia lingüística. [facility] [phone].    We comply with applicable federal civil rights laws and [place] laws. We do not discriminate on the basis of race, color, national origin, age, disability, sex, sexual orientation, or gender identity.            Thank you!     Thank you for choosing [facility]  for your care. Our goal is always to provide you with excellent care. Hearing back from our patients is one way we can continue to improve our services. Please take a few minutes to complete the written survey that you may receive in the mail after your visit with  us. Thank you!             Your Updated Medication List - Protect others around you: Learn how to safely use, store and throw away your medicines at www.disposemymeds.org.          This list is accurate as of: 10/6/17  1:53 PM.  Always use your most recent med list.                   Brand Name Dispense Instructions for use Diagnosis    COMPRESSION STOCKINGS     1 each    1 each daily    Parkinson's disease (H)       fludrocortisone 0.1 MG tablet    FLORINEF    30 tablet    Take 2 tablets (0.2 mg) by mouth daily    Parkinson's disease (H), Orthostatic hypotension       MELATONIN PO      Take 10 mg by mouth At Bedtime        midodrine 5 MG tablet    PROAMATINE    90 tablet    TAKE ONE TABLET THREE TIMES A DAY BY MOUTH    Orthostatic hypotension       PANTOPRAZOLE SODIUM PO      Take 40 mg by mouth every morning (before breakfast)        * UNABLE TO FIND      MEDICATION NAME: OTC vitamin for bladder control one tablet twice daily        * UNABLE TO FIND      MEDICATION NAME: keralex brain formula        * UNABLE TO FIND      Take 500 mg by mouth daily MEDICATION NAME: Tumeric        vitamin B complex with vitamin C Tabs tablet      Take 1 tablet by mouth daily        VITAMIN D-3 PO      Take 1,000 Int'l Units by mouth daily        * Notice:  This list has 3 medication(s) that are the same as other medications prescribed for you. Read the directions carefully, and ask your doctor or other care provider to review them with you.

## 2017-10-06 NOTE — PROGRESS NOTES
Cardiology Progress Note     Assessment & Plan   Mr. Ortiz is a 83-year-old gentleman who is being seen by cardiology for concerns of orthostatic hypotension, hypotension, history of Parkinson's disease, history of Lewy body dementia, coronary artery disease with history of pacemaker, and hyperlipidemia.      Impression:  1.  Orthostatic hypotension.  2.  Parkinson's disease.  3.  Lewy body dementia.  4.  Coronary artery disease.  5.  Pacemaker placement.  6.  Hyperlipidemia.     PLAN:   1.  He will cont Midodrine 10 mg 3 times a day.  2.  He'll will increase the florinef from 0.2 mg daily to 0.3 mg daily. Little benefit is seen when increases greater than 0.3 mg daily.   3.  He was encouraged to take in around 1-2 L a day.  4.  He was encouraged to have a goal of 10 g of salt a day.  5.  He will cont salt tablets at 1 g to be taken twice a day with potential increase to 3 times a day. They did not want to increase the salt tabs frequency today. These will be refilled.   6.  He will have an AM cortisol level completed to look for adrenal insuffiencey.   7.  He wishes to have his pacer check here in the future. This was done previously through [facility] but they have not heard from them in a long time.   8. He will be seen in 3 months follow-up.     Herman Rivers    Interval History   He is here in follow up to an ER visit on 10/3/17. He was having vision changes, weakness, dizzy, and concerns for lightheadedness. They had rushed out that day to take there animal in. He had not taken his medication. He struggles with fluid intake. He is described as only taking in 25 ounces of fluid a day after being instructed to take in 2 liters a day. He struggles with increasing his salt as his wife is on a low salt diet, making it difficult when she cooks for both of them. They have not been successful with putting compression stockings on as they are very difficult to get in place and she has not been able to find  compression stockings with zippers. She was also concerned with salt tabs but after discussion has decided to continue taking these. He remains dizzy with standing but improved with treatment. His adrenal gland has not been checked. Overall, he is improved but has significant ongoing orthostasis with standing.      Physical Exam   Temp: 98  F (36.7  C) Temp src: Tympanic BP: 118/63 Pulse: 71   Resp: 18 SpO2: 96 %      Vitals:    10/06/17 1259   Weight: 79.8 kg (176 lb)     Vital Signs with Ranges  Temp:  [98  F (36.7  C)] 98  F (36.7  C)  Pulse:  [71] 71  Resp:  [18] 18  BP: (118)/(63) 118/63  SpO2:  [96 %] 96 %  ROS negative except that which is noted in the HPI.         Constitutional: awake, alert, cooperative, no apparent distress, and appears stated age  Eyes: Lids and lashes normal, pupils equal, sclera clear, conjunctiva normal  ENT: Normocephalic, without obvious abnormality, atraumatic.  Respiratory: No increased work of breathing, good air exchange, clear to auscultation bilaterally, no crackles or wheezing  Cardiovascular: Normal apical impulse, regular rate and rhythm, normal S1 and S2, no S3 or S4, and no murmur noted  GI: No scars, normal bowel sounds, soft, non-distended, non-tender  Musculoskeletal: no lower extremity pitting edema present  Neurologic: Awake, alert, oriented to name, place and time.  Cranial nerves II-XII are grossly intact.    Neuropsychiatric: General: normal, calm and normal eye contact    Medications         Data   No results found for this or any previous visit (from the past 24 hour(s)).

## 2017-10-06 NOTE — PATIENT INSTRUCTIONS
You were seen by Dr. Rivers, 10/6/2017.     1.  It is important to drink 1 to 2 liters of water daily.         2. Increase Florinef to 0.3 mg daily.  A new prescription will be sent to your pharmacy.    3. Continue with sodium tablets daily.  A refill has been sent to the pharmacy.    4. Stop by the clinic lab early Monday morning to have lab work done.  We have placed an order to check your Cortisol levels.  We will notify you of the results.     5. We will get you scheduled to be seen by our pacemaker nurse here at the clinic.      You will follow up with Dr. Rivers in 3 months .       Please call the cardiology office with problems, questions, or concerns at [phone].    If you experience chest pain, chest pressure, chest tightness, shortness of breath, fainting, lightheadedness, nausea, vomiting, or other concerning symptoms, please report to the Emergency Department or call 911. These symptoms may be emergent, and best treated in the Emergency Department.     Rachana Deleon RN  Cardiology   [facility]  [phone]

## 2017-10-13 DIAGNOSIS — H91.93 DECREASED HEARING OF BOTH EARS: Primary | ICD-10-CM

## 2017-10-19 ENCOUNTER — TELEPHONE (OUTPATIENT)
Dept: CARDIOLOGY | Facility: OTHER | Age: 82
End: 2017-10-19

## 2017-10-19 NOTE — TELEPHONE ENCOUNTER
Called patient to remind them to come to the clinic for labs that are pending. No answer or voicemail to leave message.   Elaine Mohan RN-BSN

## 2017-10-26 ENCOUNTER — TELEPHONE (OUTPATIENT)
Dept: CARDIOLOGY | Facility: OTHER | Age: 82
End: 2017-10-26

## 2017-10-27 DIAGNOSIS — I95.1 ORTHOSTATIC HYPOTENSION: ICD-10-CM

## 2017-10-27 LAB — CORTIS SERPL-MCNC: 17.8 UG/DL (ref 4–22)

## 2017-10-27 PROCEDURE — 82533 TOTAL CORTISOL: CPT | Mod: ZL | Performed by: INTERNAL MEDICINE

## 2017-10-27 PROCEDURE — 36415 COLL VENOUS BLD VENIPUNCTURE: CPT | Mod: ZL | Performed by: INTERNAL MEDICINE

## 2017-11-07 ENCOUNTER — TRANSFERRED RECORDS (OUTPATIENT)
Dept: HEALTH INFORMATION MANAGEMENT | Facility: HOSPITAL | Age: 82
End: 2017-11-07

## 2017-11-07 ENCOUNTER — MEDICAL CORRESPONDENCE (OUTPATIENT)
Dept: HEALTH INFORMATION MANAGEMENT | Facility: CLINIC | Age: 82
End: 2017-11-07

## 2017-11-15 ENCOUNTER — OFFICE VISIT (OUTPATIENT)
Dept: AUDIOLOGY | Facility: OTHER | Age: 82
End: 2017-11-15
Attending: AUDIOLOGIST
Payer: MEDICARE

## 2017-11-15 DIAGNOSIS — H90.3 SENSORINEURAL HEARING LOSS, BILATERAL: Primary | ICD-10-CM

## 2017-11-15 PROCEDURE — V5299 HEARING SERVICE: HCPCS | Performed by: AUDIOLOGIST

## 2017-11-15 PROCEDURE — 92552 PURE TONE AUDIOMETRY AIR: CPT | Performed by: AUDIOLOGIST

## 2017-11-15 PROCEDURE — 92556 SPEECH AUDIOMETRY COMPLETE: CPT | Performed by: AUDIOLOGIST

## 2017-11-15 NOTE — MR AVS SNAPSHOT
"              After Visit Summary   11/15/2017    Jf Ortiz    MRN: [id number]           Patient Information     Date Of Birth          10/5/1933        Visit Information        Provider Department      11/15/2017 11:00 AM Kirstie Zaman [facility]        Today's Diagnoses     Sensorineural hearing loss, bilateral    -  1       Follow-ups after your visit        Your next 10 appointments already scheduled     Nov 21, 2017 11:00 AM CST   Evaluation with Wisam Banda, SLP   Speech Therapy ([facility] )    [address] [address]   [phone]            Jan 05, 2018 11:00 AM CST   (Arrive by 10:45 AM)   Return Visit with Herman Rivers,    [facility] ([facility] )    [address] [place] [address] [facility] [address]   [phone]              Who to contact     If you have questions or need follow up information about today's clinic visit or your schedule please contact [facility] directly at [phone].  Normal or non-critical lab and imaging results will be communicated to you by MyChart, letter or phone within 4 business days after the clinic has received the results. If you do not hear from us within 7 days, please contact the clinic through MyChart or phone. If you have a critical or abnormal lab result, we will notify you by phone as soon as possible.  Submit refill requests through [employer] or call your pharmacy and they will forward the refill request to us. Please allow 3 business days for your refill to be completed.          Additional Information About Your Visit        MyChart Information     [employer] lets you send messages to your doctor, view your test results, renew your prescriptions, schedule appointments and more. To sign up, go to [email or web address] . Click on \"Log in\" on the left side of the screen, which will take you to the Welcome page. Then click on \"Sign up Now\" on the right side of the " page.     You will be asked to enter the access code listed below, as well as some personal information. Please follow the directions to create your username and password.     Your access code is: [email or web address]  Expires: 2018 10:24 AM     Your access code will  in 90 days. If you need help or a new code, please call your [place] clinic or [phone].        Care EveryWhere ID     This is your Care EveryWhere ID. This could be used by other organizations to access your [place] medical records  [id number]         Blood Pressure from Last 3 Encounters:   10/06/17 118/63   10/03/17 134/96   17 114/72    Weight from Last 3 Encounters:   10/06/17 176 lb (79.8 kg)   10/03/17 170 lb (77.1 kg)   17 168 lb (76.2 kg)              We Performed the Following     AUDIOGRAM/TYMPANOGRAM - INTERFACE          Today's Medication Changes          These changes are accurate as of: 11/15/17 12:04 PM.  If you have any questions, ask your nurse or doctor.               These medicines have changed or have updated prescriptions.        Dose/Directions    midodrine 5 MG tablet   Commonly known as:  PROAMATINE   This may have changed:  See the new instructions.   Used for:  Orthostatic hypotension        TAKE ONE TABLET THREE TIMES A DAY BY MOUTH   Quantity:  90 tablet   Refills:  3                Primary Care Provider Office Phone # Fax #    R Venancio Ray [phone]       [place] [id number]        Equal Access to Services     [facility]: Hadii aad ku hadasho Soomaali, waaxda luqadaha, qaybta kaalmada adeegyada, hayley mauro. So [facility] [phone].    ATENCIÓN: Si habla español, tiene a tucker disposición servicios gratuitos de asistencia lingüística. Llame al [phone].    We comply with applicable federal civil rights laws and [place] laws. We do not discriminate on the basis of race, color, national origin, age,  disability, sex, sexual orientation, or gender identity.            Thank you!     [facility]  [facility]. Our goal is always to provide you with excellent care. Hearing back from our patients is one way we can continue to improve our services. Please take a few minutes to complete the written survey that you may receive in the mail after your visit with us. Thank you!             Your Updated Medication List - Protect others around you: Learn how to safely use, store and throw away your medicines at www.disposemymeds.org.          This list is accurate as of: 11/15/17 12:04 PM.  Always use your most recent med list.                   Brand Name Dispense Instructions for use Diagnosis    COMPRESSION STOCKINGS     1 each    1 each daily    Parkinson's disease (H)       fludrocortisone 0.1 MG tablet    FLORINEF    90 tablet    Take 3 tablets (0.3 mg) by mouth daily    Parkinson's disease (H), Orthostatic hypotension       MELATONIN PO      Take 10 mg by mouth At Bedtime        midodrine 5 MG tablet    PROAMATINE    90 tablet    TAKE ONE TABLET THREE TIMES A DAY BY MOUTH    Orthostatic hypotension       PANTOPRAZOLE SODIUM PO      Take 40 mg by mouth every morning (before breakfast)        sodium chloride 1 GM tablet     60 tablet    TAKE 1 TABLET BY MOUTH TWICE A DAY    Parkinson's disease (H), Orthostatic hypotension       * UNABLE TO FIND      MEDICATION NAME: OTC vitamin for bladder control one tablet twice daily        * UNABLE TO FIND      MEDICATION NAME: keralex brain formula        * UNABLE TO FIND      Take 500 mg by mouth daily MEDICATION NAME: Tumeric        vitamin B complex with vitamin C Tabs tablet      Take 1 tablet by mouth daily        VITAMIN D-3 PO      Take 1,000 Int'l Units by mouth daily        * Notice:  This list has 3 medication(s) that are the same as other medications prescribed for you. Read the directions carefully, and ask your doctor or other care  provider to review them with you.

## 2017-11-15 NOTE — PROGRESS NOTES
Audiology Evaluation Completed. Please refer SCANNED AUDIOGRAM and/or TYMPANOGRAM for BACKGROUND, RESULTS, RECOMMENDATIONS.    Kirstie Zaman M.S., [facility]  Audiologist #[id number]

## 2017-11-21 ENCOUNTER — HOSPITAL ENCOUNTER (OUTPATIENT)
Dept: SPEECH THERAPY | Facility: HOSPITAL | Age: 82
Setting detail: THERAPIES SERIES
End: 2017-11-21
Attending: PSYCHIATRY & NEUROLOGY
Payer: MEDICARE

## 2017-11-21 PROCEDURE — G8997 SWALLOW GOAL STATUS: HCPCS | Mod: GN,CI

## 2017-11-21 PROCEDURE — 92610 EVALUATE SWALLOWING FUNCTION: CPT | Mod: GN

## 2017-11-21 PROCEDURE — 40000211 ZZHC STATISTIC SLP  DEPARTMENT VISIT

## 2017-11-21 PROCEDURE — G8996 SWALLOW CURRENT STATUS: HCPCS | Mod: GN,CJ

## 2017-11-21 NOTE — PROGRESS NOTES
11/21/17 1100   General Information   Type Of Visit Initial   Start Of Care Date 11/21/17   Referring Physician Dr. Mckay DANIELS   Orders Evaluate And Treat   Orders Comment Swallow Evaluation   Medical Diagnosis Lewy Body Dementia; Dysphagia   Onset Of Illness/injury Or Date Of Surgery 11/21/16   Precautions/limitations No Known Precautions/limitations   Hearing Hearing aids; pt reports they work well   Pertinent History of Current Problem/OT: Additional Occupational Profile Info Patient has Lewy Body Dementia; demonstrating difficulty with swallowing; pt reports he has most diffiuclty with stringy items, and 5% of the time food will get backed up and he will need to regurgitate it into the toilet. Patient reports most difficulty with beef and stringy material   Respiratory Status Room air   Patient Role/employment History Retired   Living Environment House/[facility]   General Observations Patient was pleasant, poor historian, pt reports memory impariments   Patient/family Goals Patient would like help with his swallowing   General Information Comments Patient had difficulty with memory and was unable to recall why he had been to the Nuerologist 2 weeks prior at [facility]; given a cue, pt agreed he had gone for memory issues for Lewy Body Parkisonism.   Fall Risk Screen   Fall screen completed by SLP   Have you fallen 2 or more times in the past year? No   Have you fallen and had an injury in the past year? No   Is patient a fall risk? No   Clinical Swallow Evaluation   Oral Musculature anomalies present   Structural Abnormalities none present   Dentition present and adequate   Mucosal Quality good   Mandibular Strength and Mobility intact   Oral Labial Strength and Mobility WFL   Lingual Strength and Mobility impaired anterior elevation   Velar Elevation intact   Buccal Strength and Mobility intact   Laryngeal Function Throat clear;Cough;Swallow;Voicing initiated;Dry swallow palpated   Oral Musculature Comments  Appropraite ROM and strength noted for swallowing   Additional Documentation Yes   Clinical Swallow Eval: Thin Liquid Texture Trial   Mode of Presentation, Thin Liquids cup;self-fed   Volume of Liquid or Food Presented 5 oz   Oral Phase of Swallow WFL   Pharyngeal Phase of Swallow repeated swallows;throat clearing   Successful Strategies Trialed During Procedure other (see comments)  (multiple swallows)   Diagnostic Statement Patient demonstrated delayed initiation of pharyngeal phase; adequate layngeal excursion; multiple swallows with throat clearing; clear vocal quality post swallow/throat clear   Clinical Swallow Eval: Puree Solid Texture Trial   Mode of Presentation, Puree spoon;self-fed   Volume of Puree Presented 2 tsp   Oral Phase, Puree WFL   Pharyngeal Phase, Puree reduction in laryngeal movement;other (see comments);throat clearing;repeated swallows   Clinical Swallow Eval: Semisolid Texture Trial   Mode of Presentation, Semisolid self-fed   Volume of Semisolid Food Presented 1 bite of bread, 1 chunk of melon   Oral Phase, Semisolid WFL   Pharyngeal Phase, Semisolid feeling of something stuck in throat;responsive to feedback;throat clearing   Successful Strategies Trialed During Procedure, Semisolid other (see comments)  (mutiple swallows)   Clinical Swallow Eval: Solid Food Texture Trial   Mode of Presentation, Solid self-fed   Volume of Solid Food Presented 1 grape, 1 bite of dry cookie   Pharyngeal Phase, Solid feeling of something stuck in throat;throat clearing   Successful Strategies Trialed During Procedure, Solid other (see comments)  (multiple swallows)   Swallow Compensations   Swallow Compensations Pacing;Reduce amounts;Multiple swallow   Results No difficulties noted   Educational Assessment   Barriers to Learning Cognitive;Other  (memory)   Esophageal Phase of Swallow   Patient reports or presents with symptoms of esophageal dysphagia Yes   Esophageal comments Patient has hx of esophageal  strictures; pt stated the most recent dilation was 6 months ago; pt reported he needs to regurgitate food, as food gets 'backed up'; this happens about 5% of time, most recent was a few weeks ago   General Therapy Interventions   Planned Therapy Interventions Dysphagia Treatment   Dysphagia treatment Oropharyngeal exercise training;Modified diet education;Instruction of safe swallow strategies;Compensatory strategies for swallowing   Swallow Eval: Clinical Impressions   Skilled Criteria for Therapy Intervention Skilled criteria met.  Treatment indicated.   Functional Assessment Scale (FAS) 5   Dysphagia Outcome Severity Scale (LON) Level 5 - LON   Treatment Diagnosis Oropharyngeal Dysphagia   Diet texture recommendations Dysphagia diet level 3;Thin liquids   Recommended Feeding/Eating Techniques alternate between small bites and sips of food/liquid;hard swallow w/ each bite or sip;maintain upright posture during/after eating for 30 mins;small sips/bites;tuck chin during every swallow   Rehab Potential good, to achieve stated therapy goals   Demonstrates Need for Referral to Another Service other (see comments)  (gastroentrologist)   Therapy Frequency other (see comments)  (1 time/wk)   Predicted Duration of Therapy Intervention (days/wks) 6 weeks   Anticipated Discharge Disposition home   Risks and Benefits of Treatment have been explained. Yes   Patient, family and/or staff in agreement with Plan of Care Yes   Clinical Impression Comments Patient demonstrated with delayed initiation of pharyngeal phase; pt complains of sticking in throat and observed to throat clear; pt educated on diet modifications, compensatory strategies, and pharyngeal exercises. Patient is also reporting s/sx of esophageal dysfunction(pt stated he will take 4-5 bites and food backs up, he needs to regurgitate bolus); pt has hx of esophageal strictures and dilations, per pt report pt has appt for upcoming dilation. Initial 30 minutes spent  on evlaluation; final 45 minutes spend on education; pt will need re-education. Patient also demonstrated with a quiet voice and memory impariments which would benefit from speech and voice therapy; this was not addressed with patient during today's session due to time constraindt; pt needed additional and re-iteration of educational material to ensure understanding of materials presented   Swallow Goals   SLP Swallow Goals 1;2;3   Swallow Goal 1   Goal Identifier LTG 1   Goal Description Patient will tolerate least restricted diet without overt s/sx of asp/penx and use of compensatory strategies   Target Date 01/02/18   Swallow Goal 2   Goal Identifier STG 1   Goal Description Patient will complete home program of oropharyngeal exercises with 90% compliance per pt report.   Target Date 01/02/18   Swallow Goal 3   Goal Identifier STG 2   Goal Description Patient will complete safe swallow strategies during trials with clinician independently and 90% success.   Target Date 12/19/17   Total Session Time   Total Session Time 85   Total Evaluation Time 85   Therapy Certification   Certification date from 11/21/17   Certification date to 01/02/18   Medical Diagnosis Oropharyngeal Dysphagia   Certification I certify the need for these services furnished under this plan of treatment and while under my care.  (Physician co-signature of this document indicates review and certification of the therapy plan).   SLP Medicare Only G-code   G-code Swallowing   Swallowing   Swallowing:  Current Status , Goal , Discharge -Iuxt Only-Modifier the same for all G-codes CJ: 20-39% impairment   Swallowing: Current  & Discharge Modifier Rationale-Eval Only Per RAYMOND guidelines and clinician swallow study         I certify the need for these services furnished under this plan of treatment and while under my care. (Physician co-signature of this document indicates review and certification of the therapy  plan).      _____________________________     __________________________    ____________  Physician's Signature                 Date               Time

## 2017-11-28 ENCOUNTER — OFFICE VISIT (OUTPATIENT)
Dept: UROLOGY | Facility: OTHER | Age: 82
End: 2017-11-28
Attending: NURSE PRACTITIONER
Payer: MEDICARE

## 2017-11-28 ENCOUNTER — HOSPITAL ENCOUNTER (OUTPATIENT)
Dept: SPEECH THERAPY | Facility: HOSPITAL | Age: 82
Setting detail: THERAPIES SERIES
End: 2017-11-28
Attending: PSYCHIATRY & NEUROLOGY
Payer: MEDICARE

## 2017-11-28 VITALS
TEMPERATURE: 98.1 F | SYSTOLIC BLOOD PRESSURE: 138 MMHG | DIASTOLIC BLOOD PRESSURE: 72 MMHG | HEIGHT: 69 IN | BODY MASS INDEX: 25.18 KG/M2 | WEIGHT: 170 LBS | OXYGEN SATURATION: 94 % | HEART RATE: 71 BPM

## 2017-11-28 DIAGNOSIS — R35.1 BENIGN PROSTATIC HYPERPLASIA WITH NOCTURIA: Primary | ICD-10-CM

## 2017-11-28 DIAGNOSIS — N40.1 BENIGN PROSTATIC HYPERPLASIA WITH NOCTURIA: Primary | ICD-10-CM

## 2017-11-28 LAB
ALBUMIN UR-MCNC: 10 MG/DL
APPEARANCE UR: CLEAR
BACTERIA #/AREA URNS HPF: ABNORMAL /HPF
BILIRUB UR QL STRIP: NEGATIVE
COLOR UR AUTO: YELLOW
GLUCOSE UR STRIP-MCNC: 30 MG/DL
HGB UR QL STRIP: NEGATIVE
KETONES UR STRIP-MCNC: NEGATIVE MG/DL
LEUKOCYTE ESTERASE UR QL STRIP: NEGATIVE
MUCOUS THREADS #/AREA URNS LPF: PRESENT /LPF
NITRATE UR QL: NEGATIVE
PH UR STRIP: 7 PH (ref 4.7–8)
RBC #/AREA URNS AUTO: 2 /HPF (ref 0–2)
SOURCE: ABNORMAL
SP GR UR STRIP: 1.02 (ref 1–1.03)
UROBILINOGEN UR STRIP-MCNC: 2 MG/DL (ref 0–2)
WBC #/AREA URNS AUTO: 1 /HPF (ref 0–2)

## 2017-11-28 PROCEDURE — 40000211 ZZHC STATISTIC SLP  DEPARTMENT VISIT

## 2017-11-28 PROCEDURE — 99213 OFFICE O/P EST LOW 20 MIN: CPT

## 2017-11-28 PROCEDURE — 92526 ORAL FUNCTION THERAPY: CPT | Mod: GN

## 2017-11-28 PROCEDURE — G8997 SWALLOW GOAL STATUS: HCPCS | Mod: GN,CI

## 2017-11-28 PROCEDURE — 51798 US URINE CAPACITY MEASURE: CPT

## 2017-11-28 PROCEDURE — G8996 SWALLOW CURRENT STATUS: HCPCS | Mod: GN,CI

## 2017-11-28 PROCEDURE — 99213 OFFICE O/P EST LOW 20 MIN: CPT | Performed by: NURSE PRACTITIONER

## 2017-11-28 PROCEDURE — 81001 URINALYSIS AUTO W/SCOPE: CPT | Mod: ZL | Performed by: NURSE PRACTITIONER

## 2017-11-28 PROCEDURE — G8998 SWALLOW D/C STATUS: HCPCS | Mod: GN,CI

## 2017-11-28 RX ORDER — TAMSULOSIN HYDROCHLORIDE 0.4 MG/1
0.4 CAPSULE ORAL DAILY
Qty: 30 CAPSULE | Refills: 2 | Status: SHIPPED | OUTPATIENT
Start: 2017-11-28 | End: 2018-01-30

## 2017-11-28 ASSESSMENT — PAIN SCALES - GENERAL: PAINLEVEL: NO PAIN (0)

## 2017-11-28 NOTE — PATIENT INSTRUCTIONS
I am starting you again on flomax (tamsulosin), take one capsule a day.  See me again in two months please.  If the flomax makes you dizzy, let me know.  I am running a urine test today, we will call you with the results.

## 2017-11-28 NOTE — Clinical Note
Eduar Rhoades has not seen me for quite some time and came back with urinary complaints.  I called his pharmacy and he has not taken flomax for one year and tells me he has had more urinary symptoms for the last 6 months to one year.  I restarted it, do you have any concerns about this?  He can't remember why he isn't on it anymore. Thanks, Jin

## 2017-11-28 NOTE — PROGRESS NOTES
CC: lower urinary tract symptoms .    HPI: Mr. Jf Ortiz is a very pleasant 84 year old male seen today in the urology clinic for lower urinary tract symptoms.  He has noticed that for the past 6 months his urinary symptoms have increased.   I last saw him on 4/10/15 for his problem.  He was taking flomax at that time.  He no longer has it on his med list and does not remember why he is not taking it.  He has a hand written med list in his wallet that includes Azo 2X a day.  I spoke to his pharmacy and the last time his tamsulosin was filled was in August of 2016.     Nocturia: 3-4 times per night.  Frequency: 8-9 times per day.  Urgency: yes.  Urge Incontinence: no.  Force of stream: Subjectively pretty good.  Gross Hematuria: no.  Dysuria: no.  Straining: no.    Crede maneuver: no.  Sensation of incomplete bladder emptying: no.  Post-void dribbling: no.  Hesitancy: no.  Intermittency: no.    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     Seizure disorder (H)      Stented coronary artery      Past Surgical History:   Procedure Laterality Date     ------------OTHER-------------  1955    ulnar and radial fx - repair ulnar and radial fx x4     ------------OTHER-------------  6/14/2011    cataract extraction     BIOPSY  08/2015    skin biopsy     BLEPHAROPLASTY BILATERAL  5/6/2014    Procedure: BLEPHAROPLASTY BILATERAL;  Surgeon: Andrew Queen MD;  Location: [facility]     BYPASS GRAFT ARTERY CORONARY  11/2006    coronary artery disease x 5, [facility]     cataract extraction and lens implantation  2011    cataracts     cataract extraction and  lens implantation      cataracts     COLONOSCOPY  2012     colonoscopy with polypectomy  3/13/2009    history of polyps - repeat 3 yrs     colonoscopy with polypectomy  2006     colonoscopy with polypectomy  2005     COMBINED COLONOSCOPY WITH ARGON PLASMA COAGULATOR (APC) N/A 10/31/2014    Procedure: COMBINED COLONOSCOPY WITH ARGON PLASMA COAGULATOR (APC);  Surgeon: Bassam Aguilar MD;  Location: [facility]     COMBINED COLONOSCOPY WITH ARGON PLASMA COAGULATOR (APC) N/A 11/13/2015    Procedure: COMBINED COLONOSCOPY WITH ARGON PLASMA COAGULATOR (APC);  Surgeon: Bassam Aguilar MD;  Location: [facility]     ENDOSCOPY UPPER, COLONOSCOPY, COMBINED N/A 10/31/2014    Procedure: COMBINED ENDOSCOPY UPPER, COLONOSCOPY;  Surgeon: Bassam Aguilar MD;  Location: [facility]     ENDOSCOPY UPPER, COLONOSCOPY, COMBINED N/A 11/13/2015    Procedure: COMBINED ENDOSCOPY UPPER, COLONOSCOPY;  Surgeon: Bassma Aguilar MD;  Location: [facility]     ESOPHAGOSCOPY, GASTROSCOPY, DUODENOSCOPY (EGD), COMBINED  1/22/2014    Procedure: COMBINED ESOPHAGOSCOPY, GASTROSCOPY, DUODENOSCOPY (EGD);  UPPER ENDOSCOPY(CARPENTER) W/ BIOPSIES;  Surgeon: Patricia Carpenter MD;  Location: [facility]     LARYNGOSCOPY WITH MICROSCOPE  1/22/2014    Procedure: LARYNGOSCOPY WITH MICROSCOPE;;  Surgeon: Chayo Duke MD;  Location: [facility]     pacemaker placement  2000    heart block     pacemaker placement  2011    dual-chamber     REMOVE TUBE, MYRINGOTOMY, COMBINED  1/22/2014    Procedure: COMBINED REMOVE TUBE, MYRINGOTOMY;  MICRODIRECT LARYNGOSCOPY WITH BIOPSY AND FROZEN SECTIONS removal of right ear tube and myringoplasty;  Surgeon: Chayo Duke MD;  Location: [facility]     stent placement to LAD  2008     ventilation tube  5/24/2012    right in office     Current Outpatient Prescriptions   Medication     fludrocortisone (FLORINEF) 0.1 MG tablet     sodium chloride 1 GM tablet     UNABLE TO FIND     UNABLE TO FIND     UNABLE TO FIND     COMPRESSION STOCKINGS     midodrine (PROAMATINE) 5  "MG tablet     PANTOPRAZOLE SODIUM PO     vitamin  B complex with vitamin C (VITAMIN  B COMPLEX) TABS     MELATONIN PO     Cholecalciferol (VITAMIN D-3 PO)     No current facility-administered medications for this visit.      Allergies   Allergen Reactions     Cats Other (See Comments)     Lamotrigine      Skin lesions     Family History: There is no family h/o  malignancy.  There is no family h/o urolithiasis.     Social History: The patient does not smoke cigarettes, rare ETOH and no illicit drug use.  He is .    Review Of Systems  Skin: negative  Eyes: glasses  Ears/Nose/Throat: hearing loss  Respiratory: No shortness of breath, dyspnea on exertion, cough, or hemoptysis  Cardiovascular: negative  Gastrointestinal: constipation  Genitourinary: as above  Musculoskeletal: negative  Neurologic: negative  Psychiatric: negative  Hematologic/Lymphatic/Immunologic: negative  Endocrine: negative    PHYSICAL EXAM:   Vitals:    11/28/17 1044   BP: 138/72   BP Location: Right arm   Cuff Size: Adult Regular   Pulse: 71   Temp: 98.1  F (36.7  C)   TempSrc: Tympanic   SpO2: 94%   Weight: 77.1 kg (170 lb)   Height: 1.753 m (5' 9\")     GENERAL: Well groomed, well developed, well nourished male in no acute distress.  SKIN: Warm to touch, dry.  No visible rashes or lesions on examined areas.  RESP: No increased respiratory effort. Lungs clear to ausculation bilateral.  CV: Rate and rhythm regular, murmur noted  LYMPH: No lower extremity edema.  ABD: Soft, non tender, non distended, no palpable masses.  Normoactive bowel sounds.  No CVA tenderness bilaterally.  MS: Full range of motion  in extremities.  JOSE:     Good rectal tone, no amount of stool in the rectal vault.     Enlarged sized prostate, no tenderness, mass or asymmetry.   NEURO: Alert and oriented x 3.  PSYCH: Normal mood and affect, pleasant and agreeable during interview and exam.    PVR: Postvoid residual urine by ultrasound was 0 ml.      Orders Only on " 10/27/2017   Component Date Value Ref Range Status     Cortisol Serum 10/27/2017 17.8  4 - 22 ug/dL Final    Comment: 8 AM Cortisol Reference Range = 4-22 ug/dL  4 PM Cortisol Reference Range = 3-17 ug/dL       ASSESSMENT/PLAN:  Mr. Jf Ortiz is a very pleasant 84 year old male with a history of nocturia and other lower urinary tract symptoms.  He is willing to try flomax again.  He will monitor for any s/sx of dizzyness and call me if this is a problem.  He should stop the Azo, otc medication.  He will see me again in two months for a repeat post void residual and evaluation.    I have enjoyed participating in the medical care of this very pleasant patient.  Using layterms, and diagrams when needed, I explained the pathophysiology, usual course, potential complications, recommended monitoring, preventive modalities, treatment rationale, risks, and benefits to Mr. Jf Ortiz. The patient appeared to understand and all questions were satisfactorily answered.      Jennifer Ling  CNP, CWOCN  Urology and Wound Care  November 28, 2017

## 2017-11-28 NOTE — PROGRESS NOTES
"Outpatient Speech Language Pathology Discharge Note     Patient: Jf Ortiz  : 10/5/1933    Beginning/End Dates of Reporting Period:  17 to 2017    Referring Provider: Dr. FRANCES Bashir    Therapy Diagnosis: Oropharyngeal dysphagia    Client Self Report: Patient was seen for a skilled [facility] language session from 0930-10:15     Objective Measurements:   Objective Measures: Objective Measure 1, Objective Measure 2, Objective Measure 3  Objective Measure: Oropharyngeal exercises  Details: indpendently 90% success  Objective Measure: Oral bolus trials  Details: No s/sx asp/penx noted; pt stated his biggest conern is his \"First bite syndrome\" and when he feels food sticking in his chest, he needs to expel, he stated he has a dilation scheduled in theneat future.  Objective Measure: Home program  Details: Clinician wrote exercises in an easy to read format to use as a reminder                      Goals:  Goal Identifier LTG 1   Goal Description Patient will tolerate least restricted diet without overt s/sx of asp/penx and use of compensatory strategies   Target Date 18   Date Met  17   Progress:     Goal Identifier STG 1   Goal Description Patient will complete home program of oropharyngeal exercises with 90% compliance per pt report.   Target Date 18   Date Met  17   Progress:     Goal Identifier STG 2   Goal Description Patient will complete safe swallow strategies during trials with clinician independently and 90% success.   Target Date 17   Date Met  17   Progress:       Progress Toward Goals:   Progress this reporting period: Patient has met goals; pt demonstrated appropriate completion of oropharyngeal exercises to complete a home program to maintain swallow function; education and completion of safe swallow strategie completed and reviewed with patient. Patient reported concern with bolus getting 'backed up' in esophagus, causing pt to expel bolus. Due to hx of " esophageal strictures per pt report, recommend referral to gastroenterologist.        Plan:  Discharge from therapy.    Discharge:    Reason for Discharge: Patient has met all goals.    Equipment Issued: none    Discharge Plan: Patient to continue home program.

## 2017-11-28 NOTE — MR AVS SNAPSHOT
After Visit Summary   11/28/2017    Jf Ortiz    MRN: [id number]           Patient Information     Date Of Birth          10/5/1933        Visit Information        Provider Department      11/28/2017 11:00 AM Jennifer Ling NP [facility]        Today's Diagnoses     Benign prostatic hyperplasia with nocturia    -  1      Care Instructions    I am starting you again on flomax (tamsulosin), take one capsule a day.  See me again in two months please.  If the flomax makes you dizzy, let me know.  I am running a urine test today, we will call you with the results.           Follow-ups after your visit        Your next 10 appointments already scheduled     Jan 05, 2018 11:00 AM CST   (Arrive by 10:45 AM)   Return Visit with Herman Rivers,    [facility] ([facility] [place] )    [address] [place] [address]   [phone]            Jan 09, 2018 10:30 AM CST   (Arrive by 10:15 AM)   Return Visit with  RERE   [facility] ([facility] [place] )    [address]  [place] [address]   [phone]            Nov 16, 2018 11:15 AM CST   (Arrive by 11:00 AM)   Hearing Eval with Kenneth Meyers   [facility] ([facility] [place] )    [address]  [place] [address]   [phone]              Who to contact     If you have questions or need follow up information about today's clinic visit or your schedule please contact [facility] directly at [phone].  Normal or non-critical lab and imaging results will be communicated to you by MyChart, letter or phone within 4 business days after the clinic has received the results. If you do not hear from us within 7 days, please contact the clinic through MyChart or phone. If you have a critical or abnormal lab result, we will notify you by phone as soon as possible.  Submit refill requests through [employer] or call your pharmacy and  "they will forward the refill request to us. Please allow 3 business days for your refill to be completed.          Additional Information About Your Visit        [employer] Information     [employer] lets you send messages to your doctor, view your test results, renew your prescriptions, schedule appointments and more. To sign up, go to www.[facility] HospitalRavel Law.[employer] . Click on \"Log in\" on the left side of the screen, which will take you to the Welcome page. Then click on \"Sign up Now\" on the right side of the page.     You will be asked to enter the access code listed below, as well as some personal information. Please follow the directions to create your username and password.     Your access code is: [email or web address]  Expires: 2018 10:24 AM     Your access code will  in 90 days. If you need help or a new code, please call your [place] clinic or [phone].        Care EveryWhere ID     This is your [facility] EveryWhere ID. This could be used by other organizations to access your [place] medical records  [id number]        Your Vitals Were     Pulse Temperature Height Pulse Oximetry BMI (Body Mass Index)       71 98.1  F (36.7  C) (Tympanic) 1.753 m (5' 9\") 94% 25.1 kg/m2        Blood Pressure from Last 3 Encounters:   17 138/72   10/06/17 118/63   10/03/17 134/96    Weight from Last 3 Encounters:   17 77.1 kg (170 lb)   10/06/17 79.8 kg (176 lb)   10/03/17 77.1 kg (170 lb)              We Performed the Following     UA reflex to Microscopic and Culture          Today's Medication Changes          These changes are accurate as of: 17 11:32 AM.  If you have any questions, ask your nurse or doctor.               Start taking these medicines.        Dose/Directions    tamsulosin 0.4 MG capsule   Commonly known as:  FLOMAX   Used for:  Benign prostatic hyperplasia with nocturia   Started by:  Jennifer Ling NP        Dose:  0.4 mg   Take 1 capsule (0.4 mg) by mouth daily   Quantity:  30 capsule   Refills: "  2         These medicines have changed or have updated prescriptions.        Dose/Directions    midodrine 5 MG tablet   Commonly known as:  PROAMATINE   This may have changed:  See the new instructions.   Used for:  Orthostatic hypotension        TAKE ONE TABLET THREE TIMES A DAY BY MOUTH   Quantity:  90 tablet   Refills:  3            Where to get your medicines      These medications were sent to [id number] - [place], [place] - [address]  [address], [place] [address]     Phone:  [phone]     tamsulosin 0.4 MG capsule                Primary Care Provider Office Phone # Fax #    R Venancio Ray [phone]       [facility] [address] [facility] [address]        Equal Access to Services     TONY VÁZQUEZ : Hadii aad ku hadasho Soomaali, waaxda luqadaha, qaybta kaalmada adeegyada, hayley pollard . So [facility] [phone].    ATENCIÓN: Si habla español, tiene a tucker disposición servicios gratuitos de asistencia lingüística. [facility] [phone].    We comply with applicable federal civil rights laws and [place] laws. We do not discriminate on the basis of race, color, national origin, age, disability, sex, sexual orientation, or gender identity.            Thank you!     Thank you for choosing [facility]  for your care. Our goal is always to provide you with excellent care. Hearing back from our patients is one way we can continue to improve our services. Please take a few minutes to complete the written survey that you may receive in the mail after your visit with us. Thank you!             Your Updated Medication List - Protect others around you: Learn how to safely use, store and throw away your medicines at www.disposemymeds.org.          This list is accurate as of: 11/28/17 11:32 AM.  Always use your most recent med list.                   Brand Name Dispense Instructions for use Diagnosis    COMPRESSION  STOCKINGS     1 each    1 each daily    Parkinson's disease (H)       fludrocortisone 0.1 MG tablet    FLORINEF    90 tablet    Take 3 tablets (0.3 mg) by mouth daily    Parkinson's disease (H), Orthostatic hypotension       MELATONIN PO      Take 10 mg by mouth At Bedtime        midodrine 5 MG tablet    PROAMATINE    90 tablet    TAKE ONE TABLET THREE TIMES A DAY BY MOUTH    Orthostatic hypotension       PANTOPRAZOLE SODIUM PO      Take 40 mg by mouth every morning (before breakfast)        sodium chloride 1 GM tablet     60 tablet    TAKE 1 TABLET BY MOUTH TWICE A DAY    Parkinson's disease (H), Orthostatic hypotension       tamsulosin 0.4 MG capsule    FLOMAX    30 capsule    Take 1 capsule (0.4 mg) by mouth daily    Benign prostatic hyperplasia with nocturia       * UNABLE TO FIND      MEDICATION NAME: OTC vitamin for bladder control one tablet twice daily        * UNABLE TO FIND      MEDICATION NAME: keralex brain formula        * UNABLE TO FIND      Take 500 mg by mouth daily MEDICATION NAME: Tumeric        vitamin B complex with vitamin C Tabs tablet      Take 1 tablet by mouth daily        VITAMIN D-3 PO      Take 1,000 Int'l Units by mouth daily        * Notice:  This list has 3 medication(s) that are the same as other medications prescribed for you. Read the directions carefully, and ask your doctor or other care provider to review them with you.

## 2017-11-28 NOTE — NURSING NOTE
"Chief Complaint   Patient presents with     Incontinence       Initial /72 (BP Location: Right arm, Cuff Size: Adult Regular)  Pulse 71  Temp 98.1  F (36.7  C) (Tympanic)  Ht 1.753 m (5' 9\")  Wt 77.1 kg (170 lb)  SpO2 94%  BMI 25.1 kg/m2 Estimated body mass index is 25.1 kg/(m^2) as calculated from the following:    Height as of this encounter: 1.753 m (5' 9\").    Weight as of this encounter: 77.1 kg (170 lb).  Medication Reconciliation: complete   Alessia Robledo LPN      "

## 2018-01-05 ENCOUNTER — OFFICE VISIT (OUTPATIENT)
Dept: CARDIOLOGY | Facility: OTHER | Age: 83
End: 2018-01-05
Attending: INTERNAL MEDICINE
Payer: MEDICARE

## 2018-01-05 VITALS
HEIGHT: 69 IN | DIASTOLIC BLOOD PRESSURE: 74 MMHG | OXYGEN SATURATION: 99 % | WEIGHT: 169 LBS | HEART RATE: 70 BPM | TEMPERATURE: 97.6 F | RESPIRATION RATE: 16 BRPM | SYSTOLIC BLOOD PRESSURE: 152 MMHG | BODY MASS INDEX: 25.03 KG/M2

## 2018-01-05 DIAGNOSIS — G20.A1 PARKINSON DISEASE (H): ICD-10-CM

## 2018-01-05 DIAGNOSIS — E78.00 HYPERCHOLESTEROLEMIA: ICD-10-CM

## 2018-01-05 DIAGNOSIS — I95.1 AUTONOMIC ORTHOSTATIC HYPOTENSION: Primary | ICD-10-CM

## 2018-01-05 DIAGNOSIS — R55 SYNCOPE AND COLLAPSE: ICD-10-CM

## 2018-01-05 DIAGNOSIS — F02.80 LEWY BODY DEMENTIA WITHOUT BEHAVIORAL DISTURBANCE (H): ICD-10-CM

## 2018-01-05 DIAGNOSIS — G31.83 LEWY BODY DEMENTIA WITHOUT BEHAVIORAL DISTURBANCE (H): ICD-10-CM

## 2018-01-05 DIAGNOSIS — Z95.5 STENTED CORONARY ARTERY: ICD-10-CM

## 2018-01-05 PROCEDURE — G0463 HOSPITAL OUTPT CLINIC VISIT: HCPCS

## 2018-01-05 PROCEDURE — 99214 OFFICE O/P EST MOD 30 MIN: CPT | Performed by: INTERNAL MEDICINE

## 2018-01-05 RX ORDER — ATORVASTATIN CALCIUM 20 MG/1
20 TABLET, FILM COATED ORAL EVERY EVENING
Qty: 90 TABLET | Refills: 3 | Status: SHIPPED | OUTPATIENT
Start: 2018-01-05 | End: 2019-01-24

## 2018-01-05 ASSESSMENT — PAIN SCALES - GENERAL: PAINLEVEL: NO PAIN (0)

## 2018-01-05 NOTE — MR AVS SNAPSHOT
After Visit Summary   1/5/2018    Jf Ortiz    MRN: [id number]           Patient Information     Date Of Birth          10/5/1933        Visit Information        Provider Department      1/5/2018 11:00 AM Herman Rivers[facility]        Care Instructions    You were seen by Dr. Rivers, 1/5/2018.     1.  Please increase your intake of fluids. You should drink 6-8, 8 OZ glasses of water per day.      2. Please continue all medications as they have been prescribed.     3. The prescription refill for Lipitor has been sent to your pharmacy.     4. You will have your pacemaker check on 1/9/18 at 10:15, please register at the .       You will follow up with Dr. Rivers in 6 months.       Please call the cardiology office with problems, questions, or concerns at [phone].    If you experience chest pain, chest pressure, chest tightness, shortness of breath, fainting, lightheadedness, nausea, vomiting, or other concerning symptoms, please report to the Emergency Department or call 911. These symptoms may be emergent, and best treated in the Emergency Department.       Elaine LUTZ RN-BSN  Cardiology   [facility]  [phone]              Follow-ups after your visit        Your next 10 appointments already scheduled     Jan 09, 2018 10:30 AM CST   (Arrive by 10:15 AM)   Return Visit with HC PACEMAKER   [facility] [place] [facility]    [address]  [place] [address]   [phone]            Jan 30, 2018 11:00 AM CST   (Arrive by 10:45 AM)   Return Visit with Jennifer Ling NP   [facility] [place] ([facility] [place] )    [address]  [place] [address]   [phone]            Nov 16, 2018 11:15 AM CST   (Arrive by 11:00 AM)   Hearing Eval with Kenneth Meyers   [facility] [place] [facility]    [address]  [place] [address]   [phone]              Who to  "contact     If you have questions or need follow up information about today's clinic visit or your schedule please contact [facility] directly at [phone].  Normal or non-critical lab and imaging results will be communicated to you by MyChart, letter or phone within 4 business days after the clinic has received the results. If you do not hear from us within 7 days, please contact the clinic through MyChart or phone. If you have a critical or abnormal lab result, we will notify you by phone as soon as possible.  Submit refill requests through [email or web address] or call your pharmacy and they will forward the refill request to us. Please allow 3 business days for your refill to be completed.          Additional Information About Your Visit        [employer] Information     [employer] lets you send messages to your doctor, view your test results, renew your prescriptions, schedule appointments and more. To sign up, go to [email or web address] . Click on \"Log in\" on the left side of the screen, which will take you to the Welcome page. Then click on \"Sign up Now\" on the right side of the page.     You will be asked to enter the access code listed below, as well as some personal information. Please follow the directions to create your username and password.     Your access code is: [email or web address]  Expires: 2018 11:39 AM     Your access code will  in 90 days. If you need help or a new code, please call your [place] clinic or [phone].        Care EveryWhere ID     This is your Care EveryWhere ID. This could be used by other organizations to access your [place] medical records  [id number]        Your Vitals Were     Pulse Temperature Respirations Height Pulse Oximetry BMI (Body Mass Index)    70 97.6  F (36.4  C) (Tympanic) 16 1.753 m (5' 9\") 99% 24.96 kg/m2       Blood Pressure from Last 3 Encounters:   18 154/77   17 138/72   10/06/17 118/63    Weight from Last 3 Encounters:   18 76.7 " kg (169 lb)   11/28/17 77.1 kg (170 lb)   10/06/17 79.8 kg (176 lb)              Today, you had the following     No orders found for display         Today's Medication Changes          These changes are accurate as of: 1/5/18 11:39 AM.  If you have any questions, ask your nurse or doctor.               These medicines have changed or have updated prescriptions.        Dose/Directions    fludrocortisone 0.1 MG tablet   Commonly known as:  FLORINEF   This may have changed:    - how much to take  - when to take this   Used for:  Parkinson's disease (H), Orthostatic hypotension        Dose:  0.3 mg   Take 3 tablets (0.3 mg) by mouth daily   Quantity:  90 tablet   Refills:  11       midodrine 5 MG tablet   Commonly known as:  PROAMATINE   This may have changed:  See the new instructions.   Used for:  Orthostatic hypotension        TAKE ONE TABLET THREE TIMES A DAY BY MOUTH   Quantity:  90 tablet   Refills:  3       * UNABLE TO FIND   This may have changed:  Another medication with the same name was removed. Continue taking this medication, and follow the directions you see here.        MEDICATION NAME: keralex brain formula   Refills:  0       * UNABLE TO FIND   This may have changed:  Another medication with the same name was removed. Continue taking this medication, and follow the directions you see here.   Changed by:  Herman Rivers,         Dose:  500 mg   Take 500 mg by mouth daily MEDICATION NAME: Tumeric   Refills:  0       * Notice:  This list has 2 medication(s) that are the same as other medications prescribed for you. Read the directions carefully, and ask your doctor or other care provider to review them with you.             Primary Care Provider Office Phone # Fax #    R Venancio Ray [phone]       [facility] [place] [address] [place] [address] [address]        Equal Access to Services     MARCIAL VÁZQUEZ AH: Hadii samantha Magallanes qaybta kaalmada  hayley hannajaneen francoesperanza lopez'aan ah. So [facility] [phone].    ATENCIÓN: Si habla osmany, tiene a tucker disposición servicios gratuitos de asistencia lingüística. Fredy al [phone].    We comply with applicable federal civil rights laws and [place] laws. We do not discriminate on the basis of race, color, national origin, age, disability, sex, sexual orientation, or gender identity.            Thank you!     [facility]  [facility]. Our goal is always to provide you with excellent care. Hearing back from our patients is one way we can continue to improve our services. Please take a few minutes to complete the written survey that you may receive in the mail after your visit with us. Thank you!             Your Updated Medication List - Protect others around you: Learn how to safely use, store and throw away your medicines at www.disposemymeds.org.          This list is accurate as of: 1/5/18 11:39 AM.  Always use your most recent med list.                   Brand Name Dispense Instructions for use Diagnosis    5-HTP PO      Take by mouth daily        COMPRESSION STOCKINGS     1 each    1 each daily    Parkinson's disease (H)       fludrocortisone 0.1 MG tablet    FLORINEF    90 tablet    Take 3 tablets (0.3 mg) by mouth daily    Parkinson's disease (H), Orthostatic hypotension       LIPITOR PO      Take 20 mg by mouth daily        MELATONIN PO      Take 5 mg by mouth At Bedtime        midodrine 5 MG tablet    PROAMATINE    90 tablet    TAKE ONE TABLET THREE TIMES A DAY BY MOUTH    Orthostatic hypotension       PANTOPRAZOLE SODIUM PO      Take 40 mg by mouth 2 times daily (before meals)        sodium chloride 1 GM tablet     60 tablet    TAKE 1 TABLET BY MOUTH TWICE A DAY    Parkinson's disease (H), Orthostatic hypotension       tamsulosin 0.4 MG capsule    FLOMAX    30 capsule    Take 1 capsule (0.4 mg) by mouth daily    Benign prostatic hyperplasia with  nocturia       * UNABLE TO FIND      MEDICATION NAME: keralex brain formula        * UNABLE TO FIND      Take 500 mg by mouth daily MEDICATION NAME: Tumeric        vitamin B complex with vitamin C Tabs tablet      Take 1 tablet by mouth daily        VITAMIN D-3 PO      Take 1,000 Int'l Units by mouth daily        * Notice:  This list has 2 medication(s) that are the same as other medications prescribed for you. Read the directions carefully, and ask your doctor or other care provider to review them with you.

## 2018-01-05 NOTE — PROGRESS NOTES
Cardiology Progress Note     Assessment & Plan   Mr. Ortiz is a 83-year-old gentleman who is being seen by cardiology for concerns of orthostatic hypotension, hypotension, history of Parkinson's disease, history of Lewy body dementia, coronary artery disease with history of pacemaker, and hyperlipidemia.      Impression:  1. Autonomic orthostatic hypotension.  2.  Parkinson's disease.  3.  Lewy body dementia.  4.  Coronary artery disease with H/o stent placement.  5.  Pacemaker placement.  6.  Hyperlipidemia.      PLAN:   No changes were made as he is stable. He is being treated as follows:  1.  He will cont Midodrine 10 mg 3 times a day.  2.  He'll will cont florinef 0.3 mg daily. Little benefit is seen when increases greater than 0.3 mg daily.   3.  He was encouraged to maintain a high fluid intake with a goal of 1-2 L a day.  4.  He was encouraged to have a goal of 10 g of salt a day.  5.  He will cont salt tablets at 1 g to be taken twice a day with potential increase to 3 times a day.   6.  AM cortisol level completed and was normal.   7.  He wishes to have his pacer check here in the future. [facility] but they have not heard from them in a long time. He is to have this done on 1/9/18  8. He will be seen in 6 months follow-up.       Herman Rivers    Interval History   Jf was seen today in follow up to a visit from 10/6/17. He has been diagnosed with autonomic orthostatic hypotension secondary to Parkinson's. He is currently on Florinef, Midodrine, salt tablets, recommended high daily salt intake, and recommended high fluid intake daily.    He is being treated by neurology for Parkinson's disease and the Lewy body dementia.  When he was initially seen, he was extremely orthostatic. Since initiation of treatment and when he was last seen, he has had no more problems with dizziness, lightheadedness, near syncope, or syncope. They are pleased as he has stabilized without  recurrent problems. He does struggle with taking in sufficient amounts of fluids. Gatorade was discussed today and was encouraged to maintain a high salt diet. He was recently seen by neurology who echoed these requests.    His blood pressure today was 159/95 with a heart rate of 67. They're pleased with his improvement.  The were also wondering about continuing the statin.  He is concerned with the amount of pills is on.  It was decided he would continue the statin with a history of coronary artery disease.  They have no additional concerns.    Physical Exam   Temp: 97.6  F (36.4  C) Temp src: Tympanic BP: 154/77 Pulse: 70   Resp: 16 SpO2: 99 %      Vitals:    01/05/18 1105   Weight: 76.7 kg (169 lb)     Vital Signs with Ranges  Temp:  [97.6  F (36.4  C)] 97.6  F (36.4  C)  Pulse:  [70] 70  Resp:  [16] 16  BP: (154)/(77) 154/77  SpO2:  [99 %] 99 %  ROS negative except that which is noted in the HPI.      Active LDA's             Constitutional: awake, alert, cooperative, no apparent distress, and appears stated age  Eyes: Lids and lashes normal, pupils equal, sclera clear, conjunctiva normal  ENT: Normocephalic, without obvious abnormality, atraumatic.  Respiratory: No increased work of breathing, good air exchange, clear to auscultation bilaterally, no crackles or wheezing  Cardiovascular: Normal apical impulse, regular rate and rhythm, normal S1 and S2, no S3 or S4, and no murmur noted  GI: No scars, normal bowel sounds, soft, non-distended, non-tender  Musculoskeletal: no lower extremity pitting edema present  Neurologic: Awake, alert.      Neuropsychiatric: General: normal, calm and normal eye contact      Medications         Data   No results found for this or any previous visit (from the past 24 hour(s)).

## 2018-01-05 NOTE — PATIENT INSTRUCTIONS
You were seen by Dr. Rivers, 1/5/2018.     1.  Please increase your intake of fluids. You should drink 6-8, 8 OZ glasses of water per day.      2. Please continue all medications as they have been prescribed.     3. The prescription refill for Lipitor has been sent to your pharmacy.     4. You will have your pacemaker check on 1/9/18 at 10:15, please register at the .       You will follow up with Dr. Rivers in 6 months.       Please call the cardiology office with problems, questions, or concerns at [phone].    If you experience chest pain, chest pressure, chest tightness, shortness of breath, fainting, lightheadedness, nausea, vomiting, or other concerning symptoms, please report to the Emergency Department or call 911. These symptoms may be emergent, and best treated in the Emergency Department.       Elaine LUTZ RN-BSN  Cardiology   [facility]  [phone]

## 2018-01-05 NOTE — NURSING NOTE
"Chief Complaint   Patient presents with     RECHECK     3 month follow-up       Initial /77 (BP Location: Left arm, Patient Position: Chair, Cuff Size: Adult Large)  Pulse 70  Temp 97.6  F (36.4  C) (Tympanic)  Resp 16  Ht 1.753 m (5' 9\")  Wt 76.7 kg (169 lb)  SpO2 99%  BMI 24.96 kg/m2 Estimated body mass index is 24.96 kg/(m^2) as calculated from the following:    Height as of this encounter: 1.753 m (5' 9\").    Weight as of this encounter: 76.7 kg (169 lb).  Medication Reconciliation: complete   Rekha Quispe      "

## 2018-01-09 ENCOUNTER — OFFICE VISIT (OUTPATIENT)
Dept: CARDIOLOGY | Facility: OTHER | Age: 83
End: 2018-01-09
Attending: INTERNAL MEDICINE
Payer: MEDICARE

## 2018-01-09 DIAGNOSIS — I44.2 ATRIOVENTRICULAR BLOCK, COMPLETE (H): Primary | ICD-10-CM

## 2018-01-09 PROCEDURE — 93280 PM DEVICE PROGR EVAL DUAL: CPT | Mod: 26 | Performed by: INTERNAL MEDICINE

## 2018-01-09 PROCEDURE — 93280 PM DEVICE PROGR EVAL DUAL: CPT | Mod: TC

## 2018-01-09 NOTE — MR AVS SNAPSHOT
After Visit Summary   1/9/2018    Jf Ortiz    MRN: [id number]           Patient Information     Date Of Birth          10/5/1933        Visit Information        Provider Department      1/9/2018 1:00 PM HC PACEMAKER [facility]        Today's Diagnoses     Atrioventricular block, complete (H)    -  1      Care Instructions    It was a pleasure to see you in clinic today.  Please do not hesitate to call with any questions or concerns.  We look forward to seeing you in clinic at your next device check in 3 months.    Brandi Hernandez, RN, MS, CCRN  Electrophysiology Nurse Clinician  [facility]    During Business Hours Please Call:  [phone]  After Hours Please Call:  [phone] - select option #4 and ask for job code [phone]                  Follow-ups after your visit        Follow-up notes from your care team     Return in about 3 months (around 4/9/2018) for Pacemaker Check.      Your next 10 appointments already scheduled     Jan 30, 2018 11:00 AM CST   (Arrive by 10:45 AM)   Return Visit with Jennifer Ling NP   [facility] ([facility]    [address]  [place] [address]   [phone]            Apr 10, 2018  1:30 PM CDT   (Arrive by 1:15 PM)   Return Visit with HC PACEMAKER   [facility] ([facility]    [address]  [place] [address]   [phone]            Jul 09, 2018  2:00 PM CDT   (Arrive by 1:45 PM)   Return Visit with Herman Rivers,    [facility] ([facility]    [address]  [place] [address]   [phone]            Nov 16, 2018 11:15 AM CST   (Arrive by 11:00 AM)   Hearing Eval with Kenneth Meyers   [facility] ([facility]    [address]  [place] [address]   [phone]              Who to contact     If you have questions or need follow up information about today's  "clinic visit or your schedule please contact [facility] directly at [phone].  Normal or non-critical lab and imaging results will be communicated to you by MyChart, letter or phone within 4 business days after the clinic has received the results. If you do not hear from us within 7 days, please contact the clinic through MyChart or phone. If you have a critical or abnormal lab result, we will notify you by phone as soon as possible.  Submit refill requests through [employer] or call your pharmacy and they will forward the refill request to us. Please allow 3 business days for your refill to be completed.          Additional Information About Your Visit        MyChart Information     [employer] lets you send messages to your doctor, view your test results, renew your prescriptions, schedule appointments and more. To sign up, go to [email or web address] . Click on \"Log in\" on the left side of the screen, which will take you to the Welcome page. Then click on \"Sign up Now\" on the right side of the page.     You will be asked to enter the access code listed below, as well as some personal information. Please follow the directions to create your username and password.     Your access code is: [email or web address]  Expires: 2018 11:39 AM     Your access code will  in 90 days. If you need help or a new code, please call your [place] clinic or [phone].        Care EveryWhere ID     This is your Care EveryWhere ID. This could be used by other organizations to access your [place] medical records  [id number]         Blood Pressure from Last 3 Encounters:   18 152/74   17 138/72   10/06/17 118/63    Weight from Last 3 Encounters:   18 76.7 kg (169 lb)   17 77.1 kg (170 lb)   10/06/17 79.8 kg (176 lb)              We Performed the Following     PM DEVICE PROGRAMMING EVAL, DUAL LEAD PACER          Today's Medication Changes          These changes are accurate as of: 18 11:59 " PM.  If you have any questions, ask your nurse or doctor.               These medicines have changed or have updated prescriptions.        Dose/Directions    fludrocortisone 0.1 MG tablet   Commonly known as:  FLORINEF   This may have changed:    - how much to take  - when to take this   Used for:  Parkinson's disease (H), Orthostatic hypotension        Dose:  0.3 mg   Take 3 tablets (0.3 mg) by mouth daily   Quantity:  90 tablet   Refills:  11       midodrine 5 MG tablet   Commonly known as:  PROAMATINE   This may have changed:  See the new instructions.   Used for:  Orthostatic hypotension        TAKE ONE TABLET THREE TIMES A DAY BY MOUTH   Quantity:  90 tablet   Refills:  3                Primary Care Provider Office Phone # Fax #    R Venancio Ray [phone]       [address] [address]        Equal Access to Services     TONY VÁZQUEZ : Candice heath Sotre, waaxda luqadaha, qaybta kaalmada cyndi, hayley pollard . So [facility] [phone].    ATENCIÓN: Si habla español, tiene a tucker disposición servicios gratuitos de asistencia lingüística. [facility] [phone].    We comply with applicable federal civil rights laws and [place] laws. We do not discriminate on the basis of race, color, national origin, age, disability, sex, sexual orientation, or gender identity.            Thank you!     Thank you for choosing [facility]  for your care. Our goal is always to provide you with excellent care. Hearing back from our patients is one way we can continue to improve our services. Please take a few minutes to complete the written survey that you may receive in the mail after your visit with us. Thank you!             Your Updated Medication List - Protect others around you: Learn how to safely use, store and throw away your medicines at www.disposemymeds.org.          This list is accurate as of: 1/9/18 11:59 PM.   Always use your most recent med list.                   Brand Name Dispense Instructions for use Diagnosis    5-HTP PO      Take by mouth daily        atorvastatin 20 MG tablet    LIPITOR    90 tablet    Take 1 tablet (20 mg) by mouth every evening    Hypercholesterolemia       COMPRESSION STOCKINGS     1 each    1 each daily    Parkinson's disease (H)       fludrocortisone 0.1 MG tablet    FLORINEF    90 tablet    Take 3 tablets (0.3 mg) by mouth daily    Parkinson's disease (H), Orthostatic hypotension       MELATONIN PO      Take 5 mg by mouth At Bedtime        midodrine 5 MG tablet    PROAMATINE    90 tablet    TAKE ONE TABLET THREE TIMES A DAY BY MOUTH    Orthostatic hypotension       PANTOPRAZOLE SODIUM PO      Take 40 mg by mouth 2 times daily (before meals)        sodium chloride 1 GM tablet     60 tablet    TAKE 1 TABLET BY MOUTH TWICE A DAY    Parkinson's disease (H), Orthostatic hypotension       tamsulosin 0.4 MG capsule    FLOMAX    30 capsule    Take 1 capsule (0.4 mg) by mouth daily    Benign prostatic hyperplasia with nocturia       * UNABLE TO FIND      MEDICATION NAME: keralex brain formula        * UNABLE TO FIND      Take 500 mg by mouth daily MEDICATION NAME: Tumeric        vitamin B complex with vitamin C Tabs tablet      Take 1 tablet by mouth daily        VITAMIN D-3 PO      Take 1,000 Int'l Units by mouth daily        * Notice:  This list has 2 medication(s) that are the same as other medications prescribed for you. Read the directions carefully, and ask your doctor or other care provider to review them with you.

## 2018-01-10 PROBLEM — Z95.0 PACEMAKER: Status: ACTIVE | Noted: 2017-10-06

## 2018-01-10 NOTE — PATIENT INSTRUCTIONS
It was a pleasure to see you in clinic today.  Please do not hesitate to call with any questions or concerns.  We look forward to seeing you in clinic at your next device check in 3 months.    Brandi Hernandez, RN, MS, CCRN  Electrophysiology Nurse Clinician  [facility]    During Business Hours Please Call:  [phone]  After Hours Please Call:  [phone] - select option #4 and ask for job code [phone]

## 2018-01-10 NOTE — PROGRESS NOTES
Patient seen in clinic for evaluation and iterative programming of his [employer] dual lead pacemaker per MD orders.  Patient is new to the [place] device clinic today.  Normal pacemaker function.  2500 ATR episodes recorded that appear to be intermittent noise on the atrial lead - 5-11 seconds in duration.  Unable to reproduce noise on the atrial lead with ROM, isometric exercise and pocket manipulation.  Intrinsic rhythm = NSR with  @ 30 bpm.  AP = 82%.   = 100%.  Estimated battery longevity to MAYA = 1 year.  Patient reports that he is feeling well and denies specific complaints.  Plan for patient to return to clinic in 3 months to monitor battery status.    Dual lead pacemaker

## 2018-01-11 DIAGNOSIS — G20.A1 PARKINSON'S DISEASE (H): ICD-10-CM

## 2018-01-15 RX ORDER — MIDODRINE HYDROCHLORIDE 5 MG/1
TABLET ORAL
Qty: 180 TABLET | Refills: 3 | Status: SHIPPED | OUTPATIENT
Start: 2018-01-15 | End: 2018-05-11

## 2018-01-30 ENCOUNTER — OFFICE VISIT (OUTPATIENT)
Dept: UROLOGY | Facility: OTHER | Age: 83
End: 2018-01-30
Attending: NURSE PRACTITIONER
Payer: MEDICARE

## 2018-01-30 VITALS
HEART RATE: 70 BPM | DIASTOLIC BLOOD PRESSURE: 82 MMHG | WEIGHT: 169 LBS | SYSTOLIC BLOOD PRESSURE: 134 MMHG | TEMPERATURE: 97.9 F | HEIGHT: 69 IN | OXYGEN SATURATION: 98 % | BODY MASS INDEX: 25.03 KG/M2

## 2018-01-30 DIAGNOSIS — N40.1 BENIGN PROSTATIC HYPERPLASIA WITH NOCTURIA: Primary | ICD-10-CM

## 2018-01-30 DIAGNOSIS — N32.81 OAB (OVERACTIVE BLADDER): ICD-10-CM

## 2018-01-30 DIAGNOSIS — R35.1 BENIGN PROSTATIC HYPERPLASIA WITH NOCTURIA: Primary | ICD-10-CM

## 2018-01-30 PROCEDURE — 99213 OFFICE O/P EST LOW 20 MIN: CPT | Performed by: NURSE PRACTITIONER

## 2018-01-30 PROCEDURE — G0463 HOSPITAL OUTPT CLINIC VISIT: HCPCS

## 2018-01-30 RX ORDER — OXYBUTYNIN CHLORIDE 10 MG/1
10 TABLET, EXTENDED RELEASE ORAL DAILY
Qty: 30 TABLET | Refills: 2 | Status: SHIPPED | OUTPATIENT
Start: 2018-01-30 | End: 2019-02-21

## 2018-01-30 RX ORDER — TAMSULOSIN HYDROCHLORIDE 0.4 MG/1
0.4 CAPSULE ORAL DAILY
Qty: 30 CAPSULE | Refills: 11 | Status: SHIPPED | OUTPATIENT
Start: 2018-01-30 | End: 2018-07-11

## 2018-01-30 ASSESSMENT — PAIN SCALES - GENERAL: PAINLEVEL: NO PAIN (0)

## 2018-01-30 NOTE — MR AVS SNAPSHOT
After Visit Summary   1/30/2018    Jf Ortiz    MRN: [id number]           Patient Information     Date Of Birth          10/5/1933        Visit Information        Provider Department      1/30/2018 11:00 AM Jennifer Ling NP [facility]        Today's Diagnoses     Benign prostatic hyperplasia with nocturia    -  1    OAB (overactive bladder)          Care Instructions    I am ordering you a new medication called oxybutynin.  Please take one tablet daily in the morning.    For now I would like you to continue the tamsulosin too.    I would like to see you again in two months.  If you are doing better, we will try to get you off the tamsulosin.          Follow-ups after your visit        Your next 10 appointments already scheduled     Apr 10, 2018  1:30 PM CDT   (Arrive by 1:15 PM)   Return Visit with  RERE   [facility] ([facility] [place] )    [address] [place] [address]   [phone]            Jul 09, 2018  2:00 PM CDT   (Arrive by 1:45 PM)   Return Visit with Herman Rivers,    [facility] [place] ([facility] [place] )    [address]  [place] [address]   [phone]            Nov 16, 2018 11:15 AM CST   (Arrive by 11:00 AM)   Hearing Eval with Kenneth Meyers   [facility] ([facility] [place] )    [address]  [place] [address]   [phone]              Who to contact     If you have questions or need follow up information about today's clinic visit or your schedule please contact [facility] directly at [phone].  Normal or non-critical lab and imaging results will be communicated to you by MyChart, letter or phone within 4 business days after the clinic has received the results. If you do not hear from us within 7 days, please contact the clinic through MyChart or phone. If you have a critical or abnormal lab result, we will notify you by phone as  "soon as possible.  Submit refill requests through [employer] or call your pharmacy and they will forward the refill request to us. Please allow 3 business days for your refill to be completed.          Additional Information About Your Visit        [employer] Information     [employer] lets you send messages to your doctor, view your test results, renew your prescriptions, schedule appointments and more. To sign up, go to [email or web address] . Click on \"Log in\" on the left side of the screen, which will take you to the Welcome page. Then click on \"Sign up Now\" on the right side of the page.     You will be asked to enter the access code listed below, as well as some personal information. Please follow the directions to create your username and password.     Your access code is: [email or web address]  Expires: 2018 11:39 AM     Your access code will  in 90 days. If you need help or a new code, please call your [place] clinic or [phone].        Care EveryWhere ID     This is your Care EveryWhere ID. This could be used by other organizations to access your [place] medical records  [id number]        Your Vitals Were     Pulse Temperature Height Pulse Oximetry BMI (Body Mass Index)       70 97.9  F (36.6  C) (Tympanic) 1.753 m (5' 9\") 98% 24.96 kg/m2        Blood Pressure from Last 3 Encounters:   18 134/82   18 152/74   17 138/72    Weight from Last 3 Encounters:   18 76.7 kg (169 lb)   18 76.7 kg (169 lb)   17 77.1 kg (170 lb)              Today, you had the following     No orders found for display         Today's Medication Changes          These changes are accurate as of 18 11:47 AM.  If you have any questions, ask your nurse or doctor.               Start taking these medicines.        Dose/Directions    oxybutynin 10 MG 24 hr tablet   Commonly known as:  DITROPAN-XL   Used for:  OAB (overactive bladder)   Started by:  Jennifer Ling NP        Dose:  10 mg   Take 1 " tablet (10 mg) by mouth daily   Quantity:  30 tablet   Refills:  2         These medicines have changed or have updated prescriptions.        Dose/Directions    fludrocortisone 0.1 MG tablet   Commonly known as:  FLORINEF   This may have changed:    - how much to take  - when to take this   Used for:  Parkinson's disease (H), Orthostatic hypotension        Dose:  0.3 mg   Take 3 tablets (0.3 mg) by mouth daily   Quantity:  90 tablet   Refills:  11       * midodrine 5 MG tablet   Commonly known as:  PROAMATINE   This may have changed:  See the new instructions.   Used for:  Orthostatic hypotension        TAKE ONE TABLET THREE TIMES A DAY BY MOUTH   Quantity:  90 tablet   Refills:  3       * midodrine 5 MG tablet   Commonly known as:  PROAMATINE   This may have changed:  Another medication with the same name was changed. Make sure you understand how and when to take each.   Used for:  Parkinson's disease (H)        TAKE 2 TABLETS (10MG) BY MOUTH THREE TIMES DAILY   Quantity:  180 tablet   Refills:  3       * Notice:  This list has 2 medication(s) that are the same as other medications prescribed for you. Read the directions carefully, and ask your doctor or other care provider to review them with you.         Where to get your medicines      These medications were sent to [id number] - [place], [place] - [address]  [facility] [address]     Phone:  [phone]     oxybutynin 10 MG 24 hr tablet                Primary Care Provider Office Phone # Fax #    R Venancio Ray [phone]       [facility] [place] [address] [place] [address]        Equal Access to Services     [facility] CenterFRANC[facility] AH: Hadii job ku hadasho Soomaali, waaxda luqadaha, qaybta kaalmada adeegyada, hayley mauro. So [facility] [phone].    ATENCIÓN: Si habla español, tiene a tucker disposición servicios gratuitos de asistencia lingüística. Llame al [phone].    We comply  with applicable federal civil rights laws and [place] laws. We do not discriminate on the basis of race, color, national origin, age, disability, sex, sexual orientation, or gender identity.            Thank you!     Thank you for choosing [facility]  for your care. Our goal is always to provide you with excellent care. Hearing back from our patients is one way we can continue to improve our services. Please take a few minutes to complete the written survey that you may receive in the mail after your visit with us. Thank you!             Your Updated Medication List - Protect others around you: Learn how to safely use, store and throw away your medicines at www.disposemymeds.org.          This list is accurate as of 1/30/18 11:47 AM.  Always use your most recent med list.                   Brand Name Dispense Instructions for use Diagnosis    5-HTP PO      Take by mouth daily        atorvastatin 20 MG tablet    LIPITOR    90 tablet    Take 1 tablet (20 mg) by mouth every evening    Hypercholesterolemia       COMPRESSION STOCKINGS     1 each    1 each daily    Parkinson's disease (H)       fludrocortisone 0.1 MG tablet    FLORINEF    90 tablet    Take 3 tablets (0.3 mg) by mouth daily    Parkinson's disease (H), Orthostatic hypotension       MELATONIN PO      Take 5 mg by mouth At Bedtime        * midodrine 5 MG tablet    PROAMATINE    90 tablet    TAKE ONE TABLET THREE TIMES A DAY BY MOUTH    Orthostatic hypotension       * midodrine 5 MG tablet    PROAMATINE    180 tablet    TAKE 2 TABLETS (10MG) BY MOUTH THREE TIMES DAILY    Parkinson's disease (H)       oxybutynin 10 MG 24 hr tablet    DITROPAN-XL    30 tablet    Take 1 tablet (10 mg) by mouth daily    OAB (overactive bladder)       PANTOPRAZOLE SODIUM PO      Take 40 mg by mouth 2 times daily (before meals)        sodium chloride 1 GM tablet     60 tablet    TAKE 1 TABLET BY MOUTH TWICE A DAY    Parkinson's disease (H), Orthostatic hypotension        tamsulosin 0.4 MG capsule    FLOMAX    30 capsule    Take 1 capsule (0.4 mg) by mouth daily    Benign prostatic hyperplasia with nocturia       * UNABLE TO FIND      MEDICATION NAME: keralex brain formula        * UNABLE TO FIND      Take 500 mg by mouth daily MEDICATION NAME: Tumeric        vitamin B complex with vitamin C Tabs tablet      Take 1 tablet by mouth daily        VITAMIN D-3 PO      Take 1,000 Int'l Units by mouth daily        * Notice:  This list has 4 medication(s) that are the same as other medications prescribed for you. Read the directions carefully, and ask your doctor or other care provider to review them with you.

## 2018-01-30 NOTE — NURSING NOTE
"Chief Complaint   Patient presents with     Follow Up For     2 month follow up BPH       Initial /82 (BP Location: Right arm, Patient Position: Sitting, Cuff Size: Adult Large)  Pulse 70  Temp 97.9  F (36.6  C) (Tympanic)  Ht 1.753 m (5' 9\")  Wt 76.7 kg (169 lb)  SpO2 98%  BMI 24.96 kg/m2 Estimated body mass index is 24.96 kg/(m^2) as calculated from the following:    Height as of this encounter: 1.753 m (5' 9\").    Weight as of this encounter: 76.7 kg (169 lb).  Medication Reconciliation: complete   Jackeline Oconnor LPN      "

## 2018-01-30 NOTE — PROGRESS NOTES
CC: Lower urinary tract symptoms.    HPI: Mr. Jf Ortiz is a very pleasant 84 year old male seen today in the urology clinic for lower urinary tract symptoms.  I last saw him on 11/28/17 for this problem.  He started taking flomax again at that time.  He is not sure if his symptoms are better.       Nocturia: 2-3 times per night.  Frequency: 12-15 times per day.  Urgency: yes.  Urge Incontinence: yes  Force of stream: Subjectively pretty good.  Gross Hematuria: no.  Dysuria: no.  Straining: no.    Crede maneuver: no.  Sensation of incomplete bladder emptying: no.  Post-void dribbling: no.  Hesitancy: no.  Intermittency: no.    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     Seizure disorder (H)      Stented coronary artery      Past Surgical History:   Procedure Laterality Date     ------------OTHER-------------  1955    ulnar and radial fx - repair ulnar and radial fx x4     ------------OTHER-------------  6/14/2011    cataract extraction     BIOPSY  08/2015    skin biopsy     BLEPHAROPLASTY BILATERAL  5/6/2014    Procedure: BLEPHAROPLASTY BILATERAL;  Surgeon: Andrew Queen MD;  Location: [facility]     BYPASS GRAFT ARTERY CORONARY  11/2006    coronary artery disease x 5, [facility]     cataract extraction and lens implantation  2011    cataracts     cataract extraction and lens implantation      cataracts     COLONOSCOPY  2012     colonoscopy with polypectomy  3/13/2009    history of polyps - repeat 3 yrs     colonoscopy with polypectomy  2006     colonoscopy with polypectomy  2005     COMBINED COLONOSCOPY WITH ARGON PLASMA COAGULATOR (APC) N/A 10/31/2014    Procedure: COMBINED COLONOSCOPY WITH ARGON PLASMA  COAGULATOR (APC);  Surgeon: Bassam Aguilar MD;  Location: [facility]     COMBINED COLONOSCOPY WITH ARGON PLASMA COAGULATOR (APC) N/A 11/13/2015    Procedure: COMBINED COLONOSCOPY WITH ARGON PLASMA COAGULATOR (APC);  Surgeon: Bassam Aguilar MD;  [facility]: [facility]     ENDOSCOPY UPPER, COLONOSCOPY, COMBINED N/A 10/31/2014    Procedure: COMBINED ENDOSCOPY UPPER, COLONOSCOPY;  Surgeon: Bassam Aguilar MD;  [facility]     ENDOSCOPY UPPER, COLONOSCOPY, COMBINED N/A 11/13/2015    Procedure: COMBINED ENDOSCOPY UPPER, COLONOSCOPY;  Surgeon: aBssam Aguilar MD;  [facility]: [facility]     ESOPHAGOSCOPY, GASTROSCOPY, DUODENOSCOPY (EGD), COMBINED  1/22/2014    Procedure: COMBINED ESOPHAGOSCOPY, GASTROSCOPY, DUODENOSCOPY (EGD);  UPPER ENDOSCOPY(CARPENTER) W/ BIOPSIES;  Surgeon: Patricia Carpenter MD;  [facility]     LARYNGOSCOPY WITH MICROSCOPE  1/22/2014    Procedure: LARYNGOSCOPY WITH MICROSCOPE;;  Surgeon: Chayo Duke MD;  Location: [facility]     pacemaker placement  2000    heart block     pacemaker placement  2011    dual-chamber     REMOVE TUBE, MYRINGOTOMY, COMBINED  1/22/2014    Procedure: COMBINED REMOVE TUBE, MYRINGOTOMY;  MICRODIRECT LARYNGOSCOPY WITH BIOPSY AND FROZEN SECTIONS removal of right ear tube and myringoplasty;  Surgeon: Chayo Duke MD;  Location: [facility]     stent placement to LAD  2008     ventilation tube  5/24/2012    right in office     Current Outpatient Prescriptions   Medication     midodrine (PROAMATINE) 5 MG tablet     5-Hydroxytryptophan (5-HTP PO)     atorvastatin (LIPITOR) 20 MG tablet     tamsulosin (FLOMAX) 0.4 MG capsule     fludrocortisone (FLORINEF) 0.1 MG tablet     sodium chloride 1 GM tablet     UNABLE TO FIND     UNABLE TO FIND     COMPRESSION STOCKINGS     midodrine (PROAMATINE) 5 MG tablet     PANTOPRAZOLE SODIUM PO     vitamin  B complex with vitamin C (VITAMIN  B COMPLEX) TABS     MELATONIN PO     Cholecalciferol (VITAMIN D-3 PO)     No current facility-administered medications for  "this visit.      Allergies   Allergen Reactions     Cats Other (See Comments)     Lamotrigine      Skin lesions     Family History: There is no family h/o  malignancy.  There is no family h/o urolithiasis.     Social History: The patient does not smoke cigarettes, rare ETOH and no illicit drug use.  He is .    Review Of Systems  Skin: negative  Eyes: glasses  Ears/Nose/Throat: hearing loss  Respiratory: No shortness of breath, dyspnea on exertion, cough, or hemoptysis  Cardiovascular: negative  Gastrointestinal: constipation  Genitourinary: as above  Musculoskeletal: negative  Neurologic: negative  Psychiatric: negative  Hematologic/Lymphatic/Immunologic: negative  Endocrine: negative    PHYSICAL EXAM:   Vitals:    01/30/18 1101   BP: 134/82   BP Location: Right arm   Patient Position: Sitting   Cuff Size: Adult Large   Pulse: 70   Temp: 97.9  F (36.6  C)   TempSrc: Tympanic   SpO2: 98%   Weight: 76.7 kg (169 lb)   Height: 1.753 m (5' 9\")     GENERAL: Well groomed, well developed, well nourished male in no acute distress.  SKIN: Warm to touch, dry.  No visible rashes or lesions on examined areas.  RESP: No increased respiratory effort. Lungs clear to ausculation bilateral.  CV: Rate and rhythm regular, murmur noted  LYMPH: No lower extremity edema.  ABD: Soft, non tender, non distended, no palpable masses.  Normoactive bowel sounds.  No CVA tenderness bilaterally.  MS: Full range of motion  in extremities.  JOSE: Deferred, done at last visit  NEURO: Alert and oriented x 3.  PSYCH: Normal mood and affect, pleasant and agreeable during interview and exam.    PVR: Postvoid residual urine by ultrasound was 0 ml.      Orders Only on 10/27/2017   Component Date Value Ref [place] Status     Cortisol Serum 10/27/2017 17.8  4 - 22 ug/dL Final    Comment: 8 AM Cortisol Reference [place] = 4-22 ug/dL  4 PM Cortisol Reference [place] = 3-17 ug/dL       ASSESSMENT/PLAN:  Mr. Jf Ortiz is a very pleasant 84 year old male " with a history of nocturia and other lower urinary tract symptoms.  He has no post void residual today and some of his symptoms are still consistent with benign prostatic hypertrophy, so we will continue the flomax for now.  He will start on oxbutynin ER and see if this helps his symptoms.  If it does perhaps he can d/c the flomax.  He will see me again in two months for a repeat post void residual and evaluation.    I have enjoyed participating in the medical care of this very pleasant patient.  Using layterms, and diagrams when needed, I explained the pathophysiology, usual course, potential complications, recommended monitoring, preventive modalities, treatment rationale, risks, and benefits to . Jf Ortiz. The patient appeared to understand and all questions were satisfactorily answered.      Jennifer Ling  CNP, CWOCN  Urology and Wound Care  January 30, 2018

## 2018-01-30 NOTE — PATIENT INSTRUCTIONS
I am ordering you a new medication called oxybutynin.  Please take one tablet daily in the morning.    For now I would like you to continue the tamsulosin too.    I would like to see you again in two months.  If you are doing better, we will try to get you off the tamsulosin.

## 2018-01-31 ENCOUNTER — HOSPITAL ENCOUNTER (EMERGENCY)
Facility: HOSPITAL | Age: 83
Discharge: HOME | End: 2018-01-31
Attending: PHYSICIAN ASSISTANT | Admitting: PHYSICIAN ASSISTANT
Payer: MEDICARE

## 2018-01-31 ENCOUNTER — APPOINTMENT (OUTPATIENT)
Dept: CT IMAGING | Facility: HOSPITAL | Age: 83
End: 2018-01-31
Attending: PHYSICIAN ASSISTANT
Payer: MEDICARE

## 2018-01-31 VITALS
DIASTOLIC BLOOD PRESSURE: 92 MMHG | RESPIRATION RATE: 16 BRPM | SYSTOLIC BLOOD PRESSURE: 170 MMHG | HEART RATE: 80 BPM | TEMPERATURE: 98 F | OXYGEN SATURATION: 97 % | HEIGHT: 69 IN

## 2018-01-31 DIAGNOSIS — E87.6 HYPOKALEMIA: ICD-10-CM

## 2018-01-31 DIAGNOSIS — R10.31 INGUINAL PAIN, RIGHT: ICD-10-CM

## 2018-01-31 LAB
ALBUMIN SERPL-MCNC: 3.4 G/DL (ref 3.4–5)
ALBUMIN UR-MCNC: NEGATIVE MG/DL
ALP SERPL-CCNC: 57 U/L (ref 40–150)
ALT SERPL W P-5'-P-CCNC: 24 U/L (ref 0–70)
ANION GAP SERPL CALCULATED.3IONS-SCNC: 9 MMOL/L (ref 3–14)
APPEARANCE UR: CLEAR
AST SERPL W P-5'-P-CCNC: 15 U/L (ref 0–45)
BASOPHILS # BLD AUTO: 0 10E9/L (ref 0–0.2)
BASOPHILS NFR BLD AUTO: 0.6 %
BILIRUB SERPL-MCNC: 0.5 MG/DL (ref 0.2–1.3)
BILIRUB UR QL STRIP: NEGATIVE
BUN SERPL-MCNC: 15 MG/DL (ref 7–30)
CALCIUM SERPL-MCNC: 8.6 MG/DL (ref 8.5–10.1)
CHLORIDE SERPL-SCNC: 105 MMOL/L (ref 94–109)
CO2 SERPL-SCNC: 31 MMOL/L (ref 20–32)
COLOR UR AUTO: NORMAL
CREAT SERPL-MCNC: 0.96 MG/DL (ref 0.66–1.25)
CRP SERPL-MCNC: <2.9 MG/L (ref 0–8)
DIFFERENTIAL METHOD BLD: NORMAL
EOSINOPHIL # BLD AUTO: 0.3 10E9/L (ref 0–0.7)
EOSINOPHIL NFR BLD AUTO: 5.2 %
ERYTHROCYTE [DISTWIDTH] IN BLOOD BY AUTOMATED COUNT: 12.9 % (ref 10–15)
GFR SERPL CREATININE-BSD FRML MDRD: 74 ML/MIN/1.7M2
GLUCOSE SERPL-MCNC: 118 MG/DL (ref 70–99)
GLUCOSE UR STRIP-MCNC: NEGATIVE MG/DL
HCT VFR BLD AUTO: 40.5 % (ref 40–53)
HGB BLD-MCNC: 13.8 G/DL (ref 13.3–17.7)
HGB UR QL STRIP: NEGATIVE
IMM GRANULOCYTES # BLD: 0 10E9/L (ref 0–0.4)
IMM GRANULOCYTES NFR BLD: 0.2 %
KETONES UR STRIP-MCNC: NEGATIVE MG/DL
LACTATE SERPL-SCNC: 1.8 MMOL/L (ref 0.4–2)
LEUKOCYTE ESTERASE UR QL STRIP: NEGATIVE
LYMPHOCYTES # BLD AUTO: 1.6 10E9/L (ref 0.8–5.3)
LYMPHOCYTES NFR BLD AUTO: 32.8 %
MCH RBC QN AUTO: 31 PG (ref 26.5–33)
MCHC RBC AUTO-ENTMCNC: 34.1 G/DL (ref 31.5–36.5)
MCV RBC AUTO: 91 FL (ref 78–100)
MONOCYTES # BLD AUTO: 0.5 10E9/L (ref 0–1.3)
MONOCYTES NFR BLD AUTO: 11.3 %
NEUTROPHILS # BLD AUTO: 2.4 10E9/L (ref 1.6–8.3)
NEUTROPHILS NFR BLD AUTO: 49.9 %
NITRATE UR QL: NEGATIVE
NRBC # BLD AUTO: 0 10*3/UL
NRBC BLD AUTO-RTO: 0 /100
PH UR STRIP: 7.5 PH (ref 4.7–8)
PLATELET # BLD AUTO: 178 10E9/L (ref 150–450)
POTASSIUM SERPL-SCNC: 2.8 MMOL/L (ref 3.4–5.3)
PROT SERPL-MCNC: 6.8 G/DL (ref 6.8–8.8)
RBC # BLD AUTO: 4.45 10E12/L (ref 4.4–5.9)
SODIUM SERPL-SCNC: 145 MMOL/L (ref 133–144)
SOURCE: NORMAL
SP GR UR STRIP: 1.02 (ref 1–1.03)
UROBILINOGEN UR STRIP-MCNC: NORMAL MG/DL (ref 0–2)
WBC # BLD AUTO: 4.8 10E9/L (ref 4–11)

## 2018-01-31 PROCEDURE — 83605 ASSAY OF LACTIC ACID: CPT | Performed by: PHYSICIAN ASSISTANT

## 2018-01-31 PROCEDURE — 85025 COMPLETE CBC W/AUTO DIFF WBC: CPT | Performed by: PHYSICIAN ASSISTANT

## 2018-01-31 PROCEDURE — A9270 NON-COVERED ITEM OR SERVICE: HCPCS | Mod: GY | Performed by: PHYSICIAN ASSISTANT

## 2018-01-31 PROCEDURE — 99285 EMERGENCY DEPT VISIT HI MDM: CPT | Mod: 25

## 2018-01-31 PROCEDURE — 99284 EMERGENCY DEPT VISIT MOD MDM: CPT | Performed by: PHYSICIAN ASSISTANT

## 2018-01-31 PROCEDURE — 81003 URINALYSIS AUTO W/O SCOPE: CPT | Performed by: PHYSICIAN ASSISTANT

## 2018-01-31 PROCEDURE — 80053 COMPREHEN METABOLIC PANEL: CPT | Performed by: PHYSICIAN ASSISTANT

## 2018-01-31 PROCEDURE — 36415 COLL VENOUS BLD VENIPUNCTURE: CPT | Performed by: PHYSICIAN ASSISTANT

## 2018-01-31 PROCEDURE — 25000132 ZZH RX MED GY IP 250 OP 250 PS 637: Mod: GY | Performed by: PHYSICIAN ASSISTANT

## 2018-01-31 PROCEDURE — 74177 CT ABD & PELVIS W/CONTRAST: CPT | Mod: TC

## 2018-01-31 PROCEDURE — 25000128 H RX IP 250 OP 636: Performed by: PHYSICIAN ASSISTANT

## 2018-01-31 PROCEDURE — 86140 C-REACTIVE PROTEIN: CPT | Performed by: PHYSICIAN ASSISTANT

## 2018-01-31 RX ORDER — POTASSIUM CHLORIDE 1.5 G/1.58G
20 POWDER, FOR SOLUTION ORAL 2 TIMES DAILY
Qty: 14 PACKET | Refills: 0 | Status: SHIPPED | OUTPATIENT
Start: 2018-01-31 | End: 2018-02-06

## 2018-01-31 RX ORDER — IOPAMIDOL 612 MG/ML
100 INJECTION, SOLUTION INTRAVASCULAR ONCE
Status: COMPLETED | OUTPATIENT
Start: 2018-01-31 | End: 2018-01-31

## 2018-01-31 RX ORDER — POTASSIUM CHLORIDE 1500 MG/1
40 TABLET, EXTENDED RELEASE ORAL ONCE
Status: COMPLETED | OUTPATIENT
Start: 2018-01-31 | End: 2018-01-31

## 2018-01-31 RX ADMIN — IOPAMIDOL 100 ML: 612 INJECTION, SOLUTION INTRAVENOUS at 20:06

## 2018-01-31 RX ADMIN — POTASSIUM CHLORIDE 40 MEQ: 20 TABLET, EXTENDED RELEASE ORAL at 19:11

## 2018-01-31 RX ADMIN — DIATRIZOATE MEGLUMINE AND DIATRIZOATE SODIUM 30 ML: 660; 100 SOLUTION ORAL; RECTAL at 20:07

## 2018-01-31 ASSESSMENT — ENCOUNTER SYMPTOMS
ABDOMINAL PAIN: 1
SHORTNESS OF BREATH: 0
FREQUENCY: 0
CHILLS: 0
NAUSEA: 0
FEVER: 0
VOMITING: 0
APPETITE CHANGE: 0
ACTIVITY CHANGE: 0

## 2018-01-31 NOTE — ED NOTES
Pt holding right lower quadrant when he walks. Denies any recent illness or nausea. Denies any heavy lifting or noticeable swelling in abdomen or testicles.

## 2018-01-31 NOTE — ED AVS SNAPSHOT
[facility]    [address]    [place] [address]    Phone:  [phone]                                       Jf Ortiz   MRN: [id number]    Department:  [facility]   Date of Visit:  1/31/2018           Patient Information     Date Of Birth          10/5/1933        Your diagnoses for this visit were:     Inguinal pain, right     Hypokalemia        You were seen by Mignon Wayne PA-C.        Discharge Instructions       What to expect when you have contrast    During your exam, we will inject  contrast  into your vein or artery. (Contrast is a clear liquid with iodine in it. It shows up on X-rays.)    You may feel warm or hot. You may have a metal taste in your mouth and a slight upset stomach. You may also feel pressure near the kidneys and bladder. These effects will last about 1 to 3 minutes.    Please tell us if you have:    Sneezing     Itching    Hives     Swelling in the face    A hoarse voice    Breathing problems    Other new symptoms    Serious problems are rare.  They may include:    Irregular heartbeat     Seizures    Kidney failure              Tissue damage    Shock      Death    If you have any problems during the exam, we  will treat them right away.    When you get home    Call your hospital if you have any new symptoms in the next 2 days, like hives or swelling. (Phone numbers are at the bottom of this page.) Or call your family doctor.     If you have wheezing or trouble breathing, call 911.    Self-care  -Drink at least 4 extra glasses of water today.   This reduces the stress on your kidneys.  -Keep taking your regular medicines.    The contrast will pass out of your body in your  Urine(pee). This will happen in the next 24 hours. You  will not feel this. Your urine will not  change color.    If you have kidney problems or take metformin    Drink 4 to 8 large glasses of water for the next  2 days, if you are not on a fluid restriction.    ?If you take  metformin (Glucophage or Glucovance) for diabetes, keep taking it.      ?Your kidney function tests are abnormal.  If you take Metformin, do not take it for 48 hours. Please go to your clinic for a blood test within 3 days after your exam before the restarting this medicine.     (Note to provider:please give patient prescription for lab tests.)    ?Special instructions:     I have read and understand the above information.    Patient Sign Here:______________________________________Date:________Time:______    Staff Sign Here:________________________________________Date:_______Time:______      Radiology Departments:     ?[facility]: [phone] ?[facility]: [phone]     ?[place]: [phone] ?[facility]:[phone]      ?[place]: [phone]  ?[facility]: [phone]  ?[facility]:[phone]    ?[facility]:[phone]  ?[facility]:[phone]    Your work-up today has been unrevealing for an emergent cause of your pain.     You have a small area of swelling along your inguinal canal.  This seems to be what is causing your pain.     Please follow-up in the clinic for recheck.      I will have you see our surgery department to see if there is something they can do for this.     Please return here for ANY worsening pain, fevers, or other concerns.     Please begin taking potassium.     Increase potassium rich foods.     Discharge References/Attachments     HYPOKALEMIA, DISCHARGE INSTRUCTIONS (ENGLISH)      Future Appointments        Provider Department Dept Phone Center    4/3/2018 10:00 AM Jennifer Santana NP [facility] [phone] [place]    4/10/2018 1:30 PM [facility] [place] [phone] [place]    7/9/2018 2:00 PM Herman Rivers, DO [facility] [phone] [place]    11/16/2018 11:15 AM Kenneth Morfin [facility] [phone] [place]      ED Discharge Orders     GENERAL SURG ADULT REFERRAL       Your provider has  referred you to: [facility]: [facility] [phone]   [email or web address]    Please be aware that coverage of these services is subject to the terms and limitations of your health insurance plan.  Call member services at your health plan with any benefit or coverage questions.      Please bring the following with you to your appointment:    (1) Any X-Rays, CTs or MRIs which have been performed.  Contact the facility where they were done to arrange for  prior to your scheduled appointment.   (2) List of current medications   (3) This referral request   (4) Any documents/labs given to you for this referral                     Review of your medicines      START taking        Dose / Directions Last dose taken    potassium chloride 20 MEQ Packet   Commonly known as:  KLOR-CON   Dose:  20 mEq   Quantity:  14 packet        Take 20 mEq by mouth 2 times daily for 7 days   Refills:  0          CONTINUE these medicines which may have CHANGED, or have new prescriptions. If we are uncertain of the size of tablets/capsules you have at home, strength may be listed as something that might have changed.        Dose / Directions Last dose taken    fludrocortisone 0.1 MG tablet   Commonly known as:  FLORINEF   Dose:  0.3 mg   What changed:    - how much to take  - when to take this   Quantity:  90 tablet        Take 3 tablets (0.3 mg) by mouth daily   Refills:  11        * midodrine 5 MG tablet   Commonly known as:  PROAMATINE   What changed:  See the new instructions.   Quantity:  90 tablet        TAKE ONE TABLET THREE TIMES A DAY BY MOUTH   Refills:  3        * midodrine 5 MG tablet   Commonly known as:  PROAMATINE   What changed:  Another medication with the same name was changed. Make sure you understand how and when to take each.   Quantity:  180 tablet        TAKE 2 TABLETS (10MG) BY MOUTH THREE TIMES DAILY   Refills:  3        * Notice:  This list has  2 medication(s) that are the same as other medications prescribed for you. Read the directions carefully, and ask your doctor or other care provider to review them with you.      Our records show that you are taking the medicines listed below. If these are incorrect, please call your family doctor or clinic.        Dose / Directions Last dose taken    5-HTP PO        Take by mouth daily   Refills:  0        atorvastatin 20 MG tablet   Commonly known as:  LIPITOR   Dose:  20 mg   Quantity:  90 tablet        Take 1 tablet (20 mg) by mouth every evening   Refills:  3        COMPRESSION STOCKINGS   Dose:  1 each   Quantity:  1 each        1 each daily   Refills:  0        MELATONIN PO   Dose:  5 mg        Take 5 mg by mouth At Bedtime   Refills:  0        oxybutynin 10 MG 24 hr tablet   Commonly known as:  DITROPAN-XL   Dose:  10 mg   Quantity:  30 tablet        Take 1 tablet (10 mg) by mouth daily   Refills:  2        PANTOPRAZOLE SODIUM PO   Dose:  40 mg        Take 40 mg by mouth 2 times daily (before meals)   Refills:  0        sodium chloride 1 GM tablet   Quantity:  60 tablet        TAKE 1 TABLET BY MOUTH TWICE A DAY   Refills:  11        tamsulosin 0.4 MG capsule   Commonly known as:  FLOMAX   Dose:  0.4 mg   Quantity:  30 capsule        Take 1 capsule (0.4 mg) by mouth daily   Refills:  11        * UNABLE TO FIND        MEDICATION NAME: keralex brain formula   Refills:  0        * UNABLE TO FIND   Dose:  500 mg        Take 500 mg by mouth daily MEDICATION NAME: Tumeric   Refills:  0        vitamin B complex with vitamin C Tabs tablet   Dose:  1 tablet        Take 1 tablet by mouth daily   Refills:  0        VITAMIN D-3 PO   Dose:  1000 Int'l Units        Take 1,000 Int'l Units by mouth daily   Refills:  0        * Notice:  This list has 2 medication(s) that are the same as other medications prescribed for you. Read the directions carefully, and ask your doctor or other care provider to review them with you.     "        Prescriptions were sent or printed at these locations (1 Prescription)                   [id number] - [place], [place] - [address]   [address] [place] [address]    Telephone:  [phone]   Fax:  [phone]   Hours:                  E-Prescribed (1 of 1)         potassium chloride (KLOR-CON) 20 MEQ Packet                Procedures and tests performed during your visit     CBC with platelets differential    CRP inflammation    CT Abdomen Pelvis w Contrast    Comprehensive metabolic panel    Lactic acid    Peripheral IV catheter      Orders Needing Specimen Collection     None      Pending Results     No orders found from 2018 to 2018.            Pending Culture Results     No orders found from 2018 to 2018.            Thank you for choosing [facility]       Thank you for choosing [facility] for your care. Our goal is always to provide you with excellent care. Hearing back from our patients is one way we can continue to improve our services. Please take a few minutes to complete the written survey that you may receive in the mail after you visit with us. Thank you!        fitmob Information     [employer]mob lets you send messages to your doctor, view your test results, renew your prescriptions, schedule appointments and more. To sign up, go to [email or web address] . Click on \"Log in\" on the left side of the screen, which will take you to the Welcome page. Then click on \"Sign up Now\" on the right side of the page.     You will be asked to enter the access code listed below, as well as some personal information. Please follow the directions to create your username and password.     Your access code is: [email or web address]  Expires: 2018 11:39 AM     Your access code will  in 90 days. If you need help or a new code, please call your [facility] clinic or [phone].        Care EveryWhere ID     This is your Care EveryWhere ID. This could be used by other organizations to " access your [facility] medical records  [id number]        Equal Access to Services     TONY VÁZQUEZ : Candice Arrington, samantha griffin, hayley judge. [facility] [phone].    ATENCIÓN: Si habla español, tiene a tucker disposición servicios gratuitos de asistencia lingüística. Llame al [phone].    We comply with applicable federal civil rights laws and [place] laws. We do not discriminate on the basis of race, color, national origin, age, disability, sex, sexual orientation, or gender identity.            After Visit Summary       This is your record. Keep this with you and show to your community pharmacist(s) and doctor(s) at your next visit.

## 2018-01-31 NOTE — ED AVS SNAPSHOT
[facility]    [address] [address]    Phone:  [phone]                                       Jf Ortiz   MRN: [id number]    Department:  [facility]   Date of Visit:  1/31/2018           After Visit Summary Signature Page     I have received my discharge instructions, and my questions have been answered. I have discussed any challenges I see with this plan with the nurse or doctor.    ..........................................................................................................................................  Patient/Patient Representative Signature      ..........................................................................................................................................  Patient Representative Print Name and Relationship to Patient    ..................................................               ................................................  Date                                            Time    ..........................................................................................................................................  Reviewed by Signature/Title    ...................................................              ..............................................  Date                                                            Time

## 2018-02-01 NOTE — ED NOTES
Patient arrives by self with complaint of abdominal/groin pain. On exam, patient is quite tender to right groin, reporting 8/10 pain. The pain started 2 days ago and is intermittent in nature. Denies any fevers. Denies nausea. Denies changes in bowel/bladder habits. Call light within reach.

## 2018-02-01 NOTE — ED PROVIDER NOTES
"  History     Chief Complaint   Patient presents with     Abdominal Pain     Started 2-3 days ago \"off and on\", \"today the pain won't go away\". .     The history is provided by the patient.     Jf Ortiz is a 84 year old male who presents to the emergency department ambulatory for evaluation of abdominal pain.  The pain is in the right lower quadrant.  Has been present for approximately 3 days.  Colicky in nature.  Sharp.  No vomiting or diarrhea.  Pain began abruptly again today and has been persistent.  No fevers.  Denies any lower urinary tract symptoms.    Problem List:    Patient Active Problem List    Diagnosis Date Noted     Pacemaker, [employer], Dual Chamber - Dependent 10/06/2017     Priority: Medium     Lewy body dementia without behavioral disturbance 08/31/2017     Priority: Medium     Fatigue 08/31/2017     Priority: Medium     Urinary incontinence 08/31/2017     Priority: Medium     Autonomic orthostatic hypotension 10/14/2016     Priority: Medium     Dementia without behavioral disturbance 07/28/2015     Priority: Medium     Diagnosis updated by automated process. Provider to review and confirm.       Hypocalcemia 07/28/2015     Priority: Medium     Parkinson disease (H)      Priority: Medium     Seizure disorder (H)      Priority: Medium     Hypertrophy of prostate with urinary obstruction 04/10/2015     Priority: Medium     Problem list name updated by automated process. Provider to review       Volume depletion 08/02/2014     Priority: Medium     Syncope and collapse 08/01/2014     Priority: Medium     Stented coronary artery      Priority: Medium     Cardiac pacemaker in situ      Priority: Medium     Overview:   [employer] Altrua [id number] DREL,  Serial #[id number]  5-13-11  Atrial lead [employer] [id number] Serial #[id number]  8-11-00  Ventriculer lead [employer] [id number]  Serial #[id number]  5-13-11  2nd Degree AV Block   Dr. Campbell  Intrinsic:  Pt. is Pacemaker Dependant, remains paced at " temp. rate of 30 bpm (6-13-14)       Coronary artery disease      Priority: Medium     Hypercholesterolemia 04/23/2013     Priority: Medium     Advanced care planning/counseling discussion 10/30/2012     Priority: Medium     REM sleep behavior disorder 01/01/2011     Priority: Medium     Osteoarthritis 01/01/2011     Priority: Medium     Problem list name updated by automated process. Provider to review       Diaphragmatic hernia 01/01/2011     Priority: Medium     Problem list name updated by automated process. 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     Priority: Medium     Overview:   IMO Update 10/11       Essential hypertension 09/05/2006     Priority: Medium     Overview:   IMO Update          Past Medical History:    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     Seizure disorder (H)      Stented coronary artery        Past Surgical History:    Past Surgical History:   Procedure Laterality Date     ------------OTHER-------------  1955    ulnar and radial fx - repair ulnar and radial fx x4     ------------OTHER-------------  6/14/2011    cataract extraction     BIOPSY  08/2015    skin biopsy     BLEPHAROPLASTY BILATERAL  5/6/2014    Procedure: BLEPHAROPLASTY BILATERAL;  Surgeon: Andrew Queen MD;  Location: [facility]     BYPASS GRAFT ARTERY CORONARY  11/2006    coronary artery disease x 5, [facility]     cataract extraction and lens implantation  2011    cataracts     cataract extraction and lens implantation      cataracts     COLONOSCOPY  2012     colonoscopy with polypectomy  3/13/2009    history  of polyps - repeat 3 yrs     colonoscopy with polypectomy  2006     colonoscopy with polypectomy  2005     COMBINED COLONOSCOPY WITH ARGON PLASMA COAGULATOR (APC) N/A 10/31/2014    Procedure: COMBINED COLONOSCOPY WITH ARGON PLASMA COAGULATOR (APC);  Surgeon: Bassam Aguilar MD;  [facility]: [facility]     COMBINED COLONOSCOPY WITH ARGON PLASMA COAGULATOR (APC) N/A 11/13/2015    Procedure: COMBINED COLONOSCOPY WITH ARGON PLASMA COAGULATOR (APC);  Surgeon: Bassam Aguilar MD;  [facility]: [facility]     ENDOSCOPY UPPER, COLONOSCOPY, COMBINED N/A 10/31/2014    Procedure: COMBINED ENDOSCOPY UPPER, COLONOSCOPY;  Surgeon: Bassam Aguilar MD;  [facility]: [facility]     ENDOSCOPY UPPER, COLONOSCOPY, COMBINED N/A 11/13/2015    Procedure: COMBINED ENDOSCOPY UPPER, COLONOSCOPY;  Surgeon: Bassam Aguilar MD;  [facility]: [facility]     ESOPHAGOSCOPY, GASTROSCOPY, DUODENOSCOPY (EGD), COMBINED  1/22/2014    Procedure: COMBINED ESOPHAGOSCOPY, GASTROSCOPY, DUODENOSCOPY (EGD);  UPPER ENDOSCOPY(CARPENTER) W/ BIOPSIES;  Surgeon: Patricia Carpenter MD;  [facility]: [facility]     LARYNGOSCOPY WITH MICROSCOPE  1/22/2014    Procedure: LARYNGOSCOPY WITH MICROSCOPE;;  Surgeon: Chayo Duke MD;  [facility]: [facility]     pacemaker placement  2000    heart block     pacemaker placement  2011    dual-chamber     REMOVE TUBE, MYRINGOTOMY, COMBINED  1/22/2014    Procedure: COMBINED REMOVE TUBE, MYRINGOTOMY;  MICRODIRECT LARYNGOSCOPY WITH BIOPSY AND FROZEN SECTIONS removal of right ear tube and myringoplasty;  Surgeon: Chayo Duke MD;  [facility]: [facility]     stent placement to LAD  2008     ventilation tube  5/24/2012    right in office       Family History:    Family History   Problem Relation Age of Onset     DIABETES Mother        Social History:  Marital Status:  Single [1]  Social History   Substance Use Topics     Smoking status: Former Smoker     Packs/day: 1.00     Years: 5.00     Types: Cigarettes     Smokeless tobacco: Never Used      Comment: quit in 1985     Alcohol  "use Yes      Comment: every other day        Medications:      potassium chloride (KLOR-CON) 20 MEQ Packet   midodrine (PROAMATINE) 5 MG tablet   5-Hydroxytryptophan (5-HTP PO)   atorvastatin (LIPITOR) 20 MG tablet   fludrocortisone (FLORINEF) 0.1 MG tablet   UNABLE TO FIND   midodrine (PROAMATINE) 5 MG tablet   PANTOPRAZOLE SODIUM PO   vitamin  B complex with vitamin C (VITAMIN  B COMPLEX) TABS   MELATONIN PO   Cholecalciferol (VITAMIN D-3 PO)   oxybutynin (DITROPAN-XL) 10 MG 24 hr tablet   tamsulosin (FLOMAX) 0.4 MG capsule   sodium chloride 1 GM tablet   UNABLE TO FIND   COMPRESSION STOCKINGS         Review of Systems   Constitutional: Negative for activity change, appetite change, chills and fever.   Respiratory: Negative for shortness of breath.    Gastrointestinal: Positive for abdominal pain. Negative for nausea and vomiting.   Genitourinary: Negative for frequency, penile pain, scrotal swelling and testicular pain.   Skin: Negative.        Physical Exam   BP: 172/77  Heart Rate: 71  Temp: 97.9  F (36.6  C)  Resp: 16  Height: 175.3 cm (5' 9\")  SpO2: 99 %      Physical Exam   Constitutional: He is oriented to person, place, and time. He appears well-developed and well-nourished. No distress.   Cardiovascular: Normal rate and regular rhythm.    Pulmonary/Chest: Effort normal.   Abdominal: Soft. There is no tenderness.   Mostly pain in the right pelvis and over the right inguinal area.  Small mass in the canal was palpated.  Could not be reduced    Neurological: He is alert and oriented to person, place, and time.   Skin: Skin is warm and dry.   Psychiatric: He has a normal mood and affect.   Nursing note and vitals reviewed.      ED Course     ED Course     Procedures          Medications   diatrizoate meglumine-sodium (GASTROGRAFIN; GASTROVIEW) 66-10 % solution 30 mL (not administered)   diatrizoate meglumine-sodium (GASTROGRAFIN; GASTROVIEW) 66-10 % solution 30 mL (30 mLs Oral Given 1/31/18 2007)   sodium " chloride (PF) 0.9% PF flush 60 mL (60 mLs Intravenous Given 1/31/18 2006)   iopamidol (ISOVUE-300) IV solution 61% 100 mL (100 mLs Intravenous Given 1/31/18 2006)   potassium chloride SA (K-DUR/KLOR-CON M) CR tablet 40 mEq (40 mEq Oral Given 1/31/18 1911)     Results for orders placed or performed during the hospital encounter of 01/31/18   CT Abdomen Pelvis w Contrast    Narrative    EXAMINATION: CT ABDOMEN PELVIS W CONTRAST, 1/31/2018 8:20 PM    TECHNIQUE:  Helical CT images from the lung bases through the  symphysis pubis were obtained  with IV contrast. Contrast dose: Isovue  300, 100ml    COMPARISON: none    HISTORY: right lower quadrant pain;     FINDINGS:    There is dependent atelectasis at the lung bases.    The liver is free of masses or biliary ductal enlargement. No  calcified gallstones are seen.    The the spleen and pancreas appear normal.    The adrenal glands are normal.    The right and left kidneys are free of masses or hydronephrosis.    The periaortic lymph nodes are normal in caliber.    No intraperitoneal masses or inflammatory changes are noted. The  appendix was not seen    In the pelvis the bladder and rectum appear normal.    The regional skeleton is intact      Impression    IMPRESSION: No intraperitoneal masses or inflammatory changes are seen      JACKY JAMES MD   CBC with platelets differential   Result Value Ref [place]    WBC 4.8 4.0 - 11.0 10e9/L    RBC Count 4.45 4.4 - 5.9 10e12/L    Hemoglobin 13.8 13.3 - 17.7 g/dL    Hematocrit 40.5 40.0 - 53.0 %    MCV 91 78 - 100 fl    MCH 31.0 26.5 - 33.0 pg    MCHC 34.1 31.5 - 36.5 g/dL    RDW 12.9 10.0 - 15.0 %    Platelet Count 178 150 - 450 10e9/L    Diff Method Automated Method     % Neutrophils 49.9 %    % Lymphocytes 32.8 %    % Monocytes 11.3 %    % Eosinophils 5.2 %    % Basophils 0.6 %    % Immature Granulocytes 0.2 %    Nucleated RBCs 0 0 /100    Absolute Neutrophil 2.4 1.6 - 8.3 10e9/L    Absolute Lymphocytes 1.6 0.8 - 5.3  10e9/L    Absolute Monocytes 0.5 0.0 - 1.3 10e9/L    Absolute Eosinophils 0.3 0.0 - 0.7 10e9/L    Absolute Basophils 0.0 0.0 - 0.2 10e9/L    Abs Immature Granulocytes 0.0 0 - 0.4 10e9/L    Absolute Nucleated RBC 0.0    Comprehensive metabolic panel   Result Value Ref [place]    Sodium 145 (H) 133 - 144 mmol/L    Potassium 2.8 (LL) 3.4 - 5.3 mmol/L    Chloride 105 94 - 109 mmol/L    Carbon Dioxide 31 20 - 32 mmol/L    Anion Gap 9 3 - 14 mmol/L    Glucose 118 (H) 70 - 99 mg/dL    Urea Nitrogen 15 7 - 30 mg/dL    Creatinine 0.96 0.66 - 1.25 mg/dL    GFR Estimate 74 >60 mL/min/1.7m2    GFR Estimate If Black >90 >60 mL/min/1.7m2    Calcium 8.6 8.5 - 10.1 mg/dL    Bilirubin Total 0.5 0.2 - 1.3 mg/dL    Albumin 3.4 3.4 - 5.0 g/dL    Protein Total 6.8 6.8 - 8.8 g/dL    Alkaline Phosphatase 57 40 - 150 U/L    ALT 24 0 - 70 U/L    AST 15 0 - 45 U/L   CRP inflammation   Result Value Ref [place]    CRP Inflammation <2.9 0.0 - 8.0 mg/L   Lactic acid   Result Value Ref [place]    Lactic Acid 1.8 0.4 - 2.0 mmol/L        Critical Care time:  none               Labs Ordered and Resulted from Time of ED Arrival Up to the Time of Departure from the ED   COMPREHENSIVE METABOLIC PANEL - Abnormal; Notable for the following:        Result Value    Sodium 145 (*)     Potassium 2.8 (*)     Glucose 118 (*)     All other components within normal limits   CBC WITH PLATELETS DIFFERENTIAL   CRP INFLAMMATION   LACTIC ACID   URINE MACROSCOPIC WITH REFLEX TO MICRO   PERIPHERAL IV CATHETER       Assessments & Plan (with Medical Decision Making)   Discussed with radiology.  No hernia.  Might be a small lipoma.  No indication for further work-up tonight.  States that he still has his appendix but this was not visualized on CT.  Not consistent with appendicitis.  No leukocytosis or CRP.  Pain over the inguinal canal. Abdomen is benign. 40meq of oral potassium provided here.  Home with one week.  Needs recheck of pain and potassium this week. Stressed  returning here for ANY worsening pain, fevers, pain that changes in quality, or location, or for ANY other concerns.       I have reviewed the nursing notes.    I have reviewed the findings, diagnosis, plan and need for follow up with the patient.       New Prescriptions    POTASSIUM CHLORIDE (KLOR-CON) 20 MEQ PACKET    Take 20 mEq by mouth 2 times daily for 7 days       Final diagnoses:   Inguinal pain, right   Hypokalemia       1/31/2018   [facility]     Mignon Wayne PA-C  01/31/18 [id number]

## 2018-02-01 NOTE — ED NOTES
Discharge instructions completed with patient and significant other with no questions or concerns. Vital signs stable. Aware of prescription to be picked up. Aware of surgery consult.

## 2018-02-01 NOTE — DISCHARGE INSTRUCTIONS
What to expect when you have contrast    During your exam, we will inject  contrast  into your vein or artery. (Contrast is a clear liquid with iodine in it. It shows up on X-rays.)    You may feel warm or hot. You may have a metal taste in your mouth and a slight upset stomach. You may also feel pressure near the kidneys and bladder. These effects will last about 1 to 3 minutes.    Please tell us if you have:    Sneezing     Itching    Hives     Swelling in the face    A hoarse voice    Breathing problems    Other new symptoms    Serious problems are rare.  They may include:    Irregular heartbeat     Seizures    Kidney failure              Tissue damage    Shock      Death    If you have any problems during the exam, we  will treat them right away.    When you get home    Call your hospital if you have any new symptoms in the next 2 days, like hives or swelling. (Phone numbers are at the bottom of this page.) Or call your family doctor.     If you have wheezing or trouble breathing, call 911.    Self-care  -Drink at least 4 extra glasses of water today.   This reduces the stress on your kidneys.  -Keep taking your regular medicines.    The contrast will pass out of your body in your  Urine(pee). This will happen in the next 24 hours. You  will not feel this. Your urine will not  change color.    If you have kidney problems or take metformin    Drink 4 to 8 large glasses of water for the next  2 days, if you are not on a fluid restriction.    ?If you take metformin (Glucophage or Glucovance) for diabetes, keep taking it.      ?Your kidney function tests are abnormal.  If you take Metformin, do not take it for 48 hours. Please go to your clinic for a blood test within 3 days after your exam before the restarting this medicine.     (Note to provider:please give patient prescription for lab tests.)    ?Special instructions:     I have read and understand the above information.    Patient Sign  Here:______________________________________Date:________Time:______    Staff Sign Here:________________________________________Date:_______Time:______      Radiology Departments:     ?[facility] ([facility]): [phone] ?[place]: [phone]     ?[place]: [phone] ?[facility]:[phone]      ?[place]: [phone]  ?[place]: [phone]  ?[place]:[phone]    ?[facility]:[phone]  ?[facility]:[phone]    Your work-up today has been unrevealing for an emergent cause of your pain.     You have a small area of swelling along your inguinal canal.  This seems to be what is causing your pain.     Please follow-up in the clinic for recheck of your pain and potassium this week.     I will have you see our surgery department to see if there is something they can do for this.     Please return here for ANY worsening pain, fevers, or other concerns.     Please begin taking potassium.     Increase potassium rich foods.

## 2018-02-02 ENCOUNTER — OFFICE VISIT (OUTPATIENT)
Dept: FAMILY MEDICINE | Facility: OTHER | Age: 83
End: 2018-02-02
Attending: FAMILY MEDICINE
Payer: MEDICARE

## 2018-02-02 ENCOUNTER — TELEPHONE (OUTPATIENT)
Dept: FAMILY MEDICINE | Facility: OTHER | Age: 83
End: 2018-02-02

## 2018-02-02 VITALS
HEIGHT: 65 IN | SYSTOLIC BLOOD PRESSURE: 96 MMHG | BODY MASS INDEX: 28.16 KG/M2 | DIASTOLIC BLOOD PRESSURE: 58 MMHG | OXYGEN SATURATION: 97 % | TEMPERATURE: 97.4 F | HEART RATE: 66 BPM | WEIGHT: 169 LBS

## 2018-02-02 DIAGNOSIS — L98.9 FACE LESION: ICD-10-CM

## 2018-02-02 DIAGNOSIS — I95.1 ORTHOSTATIC HYPOTENSION: ICD-10-CM

## 2018-02-02 DIAGNOSIS — E87.6 HYPOKALEMIA: Primary | ICD-10-CM

## 2018-02-02 DIAGNOSIS — G31.83 LEWY BODY DEMENTIA WITHOUT BEHAVIORAL DISTURBANCE (H): ICD-10-CM

## 2018-02-02 DIAGNOSIS — F02.80 LEWY BODY DEMENTIA WITHOUT BEHAVIORAL DISTURBANCE (H): ICD-10-CM

## 2018-02-02 LAB
ANION GAP SERPL CALCULATED.3IONS-SCNC: 11 MMOL/L (ref 3–14)
BUN SERPL-MCNC: 13 MG/DL (ref 7–30)
CALCIUM SERPL-MCNC: 8.6 MG/DL (ref 8.5–10.1)
CHLORIDE SERPL-SCNC: 105 MMOL/L (ref 94–109)
CO2 SERPL-SCNC: 28 MMOL/L (ref 20–32)
CREAT SERPL-MCNC: 1.16 MG/DL (ref 0.66–1.25)
GFR SERPL CREATININE-BSD FRML MDRD: 60 ML/MIN/1.7M2
GLUCOSE SERPL-MCNC: 124 MG/DL (ref 70–99)
POTASSIUM SERPL-SCNC: 3 MMOL/L (ref 3.4–5.3)
SODIUM SERPL-SCNC: 144 MMOL/L (ref 133–144)

## 2018-02-02 PROCEDURE — 99214 OFFICE O/P EST MOD 30 MIN: CPT | Performed by: FAMILY MEDICINE

## 2018-02-02 PROCEDURE — 80048 BASIC METABOLIC PNL TOTAL CA: CPT | Mod: ZL | Performed by: FAMILY MEDICINE

## 2018-02-02 PROCEDURE — 36415 COLL VENOUS BLD VENIPUNCTURE: CPT | Mod: ZL | Performed by: FAMILY MEDICINE

## 2018-02-02 PROCEDURE — G0463 HOSPITAL OUTPT CLINIC VISIT: HCPCS

## 2018-02-02 RX ORDER — MULTIPLE VITAMINS W/ MINERALS TAB 9MG-400MCG
1 TAB ORAL DAILY
Status: ON HOLD | COMMUNITY
End: 2020-12-18

## 2018-02-02 RX ORDER — POTASSIUM CHLORIDE 1500 MG/1
20 TABLET, EXTENDED RELEASE ORAL 2 TIMES DAILY
Qty: 14 TABLET | Refills: 0 | Status: SHIPPED | OUTPATIENT
Start: 2018-02-02 | End: 2018-02-09

## 2018-02-02 RX ORDER — GUAIFENESIN/EPHEDRINE HCL 200-12.5MG
1 TABLET ORAL AT BEDTIME
COMMUNITY
End: 2019-02-26

## 2018-02-02 RX ORDER — TURMERIC ROOT EXTRACT 500 MG
1000 TABLET ORAL DAILY
COMMUNITY
End: 2021-06-22

## 2018-02-02 ASSESSMENT — ANXIETY QUESTIONNAIRES
6. BECOMING EASILY ANNOYED OR IRRITABLE: NOT AT ALL
2. NOT BEING ABLE TO STOP OR CONTROL WORRYING: NOT AT ALL
GAD7 TOTAL SCORE: 0
7. FEELING AFRAID AS IF SOMETHING AWFUL MIGHT HAPPEN: NOT AT ALL
1. FEELING NERVOUS, ANXIOUS, OR ON EDGE: NOT AT ALL
3. WORRYING TOO MUCH ABOUT DIFFERENT THINGS: NOT AT ALL
5. BEING SO RESTLESS THAT IT IS HARD TO SIT STILL: NOT AT ALL

## 2018-02-02 ASSESSMENT — PAIN SCALES - GENERAL: PAINLEVEL: NO PAIN (0)

## 2018-02-02 ASSESSMENT — PATIENT HEALTH QUESTIONNAIRE - PHQ9: 5. POOR APPETITE OR OVEREATING: NOT AT ALL

## 2018-02-02 NOTE — NURSING NOTE
"Chief Complaint   Patient presents with     ER F/U       Initial BP 96/58 (BP Location: Left arm, Patient Position: Chair, Cuff Size: Adult Regular)  Pulse 66  Temp 97.4  F (36.3  C) (Tympanic)  Ht 5' 5\" (1.651 m)  Wt 169 lb (76.7 kg)  SpO2 97%  BMI 28.12 kg/m2 Estimated body mass index is 28.12 kg/(m^2) as calculated from the following:    Height as of this encounter: 5' 5\" (1.651 m).    Weight as of this encounter: 169 lb (76.7 kg).  Medication Reconciliation: complete   Ayanna Velazquez   Medical Assistant      "

## 2018-02-02 NOTE — PROGRESS NOTES
SUBJECTIVE:   Jf Ortiz is a 84 year old male who presents to clinic today for the following health issues:      ED/UC Followup:    [place] [facility] [place]  Date of visit: 1/31/18  Reason for visit: ingunial right pain,Hypokalemia  Current Status: [facility]    Jf Ortiz, 84 year old, male presents with   Chief Complaint   Patient presents with     ER F/U       PAST MEDICAL HISTORY:  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     Seizure disorder (H)      Stented coronary artery        PAST SURGICAL HISTORY:  Past Surgical History:   Procedure Laterality Date     ------------OTHER-------------  1955    ulnar and radial fx - repair ulnar and radial fx x4     ------------OTHER-------------  6/14/2011    cataract extraction     BIOPSY  08/2015    skin biopsy     BLEPHAROPLASTY BILATERAL  5/6/2014    Procedure: BLEPHAROPLASTY BILATERAL;  Surgeon: Andrew Queen MD;  Location: [facility]     BYPASS GRAFT ARTERY CORONARY  11/2006    coronary artery disease x 5, [facility]     cataract extraction and lens implantation  2011    cataracts     cataract extraction and lens implantation      cataracts     COLONOSCOPY  2012     colonoscopy with polypectomy  3/13/2009    history of polyps - repeat 3 yrs     colonoscopy with polypectomy  2006     colonoscopy with polypectomy  2005     COMBINED COLONOSCOPY WITH ARGON PLASMA COAGULATOR (APC) N/A 10/31/2014    Procedure: COMBINED COLONOSCOPY WITH ARGON PLASMA COAGULATOR (APC);  Surgeon: Bassam Aguilar MD;  [facility]: [facility]     COMBINED COLONOSCOPY WITH ARGON PLASMA COAGULATOR (APC) N/A 11/13/2015    Procedure: COMBINED COLONOSCOPY WITH ARGON PLASMA COAGULATOR  (APC);  Surgeon: Bassam Aguilar MD;  [facility]     ENDOSCOPY UPPER, COLONOSCOPY, COMBINED N/A 10/31/2014    Procedure: COMBINED ENDOSCOPY UPPER, COLONOSCOPY;  Surgeon: Bassam Aguilar MD;  [facility]     ENDOSCOPY UPPER, COLONOSCOPY, COMBINED N/A 11/13/2015    Procedure: COMBINED ENDOSCOPY UPPER, COLONOSCOPY;  Surgeon: Bassam Aguilar MD;  [facility]     ESOPHAGOSCOPY, GASTROSCOPY, DUODENOSCOPY (EGD), COMBINED  1/22/2014    Procedure: COMBINED ESOPHAGOSCOPY, GASTROSCOPY, DUODENOSCOPY (EGD);  UPPER ENDOSCOPY(PENA) W/ BIOPSIES;  Surgeon: Patricia Pena MD;  [facility]     LARYNGOSCOPY WITH MICROSCOPE  1/22/2014    Procedure: LARYNGOSCOPY WITH MICROSCOPE;;  Surgeon: Chayo Duke MD;  [facility]     pacemaker placement  2000    heart block     pacemaker placement  2011    dual-chamber     REMOVE TUBE, MYRINGOTOMY, COMBINED  1/22/2014    Procedure: COMBINED REMOVE TUBE, MYRINGOTOMY;  MICRODIRECT LARYNGOSCOPY WITH BIOPSY AND FROZEN SECTIONS removal of right ear tube and myringoplasty;  Surgeon: Chayo Duke MD;  [facility]     stent placement to LAD  2008     ventilation tube  5/24/2012    right in office       MEDICATIONS:  Prior to Admission medications    Medication Sig Start Date End Date Taking? Authorizing Provider   multivitamin, therapeutic with minerals (MULTI-VITAMIN) TABS tablet Take 1 tablet by mouth daily   Yes Reported, Patient   Misc Natural Products (MENS PROSTATE HEALTH FORMULA PO) Take 1 tablet by mouth 2 times daily   Yes Reported, Patient   Turmeric 500 MG TABS Take 1 tablet by mouth daily   Yes Reported, Patient   5-Hydroxytryptophan (5-HTP) 100 MG CAPS Take 1 tablet by mouth At Bedtime   Yes Reported, Patient   Cyanocobalamin (VITAMIN B-12 PO) Take 1 tablet by mouth daily   Yes Reported, Patient   potassium chloride (KLOR-CON) 20 MEQ Packet Take 20 mEq by mouth 2 times daily for 7 days 1/31/18 2/7/18 Yes Mignon Wayne PA-C   oxybutynin (DITROPAN-XL)  "10 MG 24 hr tablet Take 1 tablet (10 mg) by mouth daily 1/30/18 4/30/18 Yes Jennifer Ling NP   tamsulosin (FLOMAX) 0.4 MG capsule Take 1 capsule (0.4 mg) by mouth daily 1/30/18 1/30/19 Yes Jennifer Ling NP   midodrine (PROAMATINE) 5 MG tablet TAKE 2 TABLETS (10MG) BY MOUTH THREE TIMES DAILY 1/15/18  Yes FRANCES Ray MD   5-Hydroxytryptophan (5-HTP PO) Take by mouth daily   Yes Reported, Patient   atorvastatin (LIPITOR) 20 MG tablet Take 1 tablet (20 mg) by mouth every evening 1/5/18  Yes Herman Rivers, DO   fludrocortisone (FLORINEF) 0.1 MG tablet Take 3 tablets (0.3 mg) by mouth daily  Patient taking differently: Take 0.1 mg by mouth 3 times daily  10/6/17  Yes Herman Rivers, DO   sodium chloride 1 GM tablet TAKE 1 TABLET BY MOUTH TWICE A DAY 10/6/17  Yes Herman Rivers, DO   PANTOPRAZOLE SODIUM PO Take 40 mg by mouth 2 times daily (before meals)    Yes Reported, Patient   vitamin  B complex with vitamin C (VITAMIN  B COMPLEX) TABS Take 1 tablet by mouth daily   Yes Reported, Patient   MELATONIN PO Take 5 mg by mouth At Bedtime    Yes Reported, Patient   Cholecalciferol (VITAMIN D-3 PO) Take 1,000 Int'l Units by mouth daily    Yes Reported, Patient       ALLERGIES:     Allergies   Allergen Reactions     Cats Other (See Comments)     Lamotrigine      Skin lesions       ROS:  Constitutional, HEENT, cardiovascular, pulmonary, gi and gu systems are negative, except as otherwise noted.      EXAM:  BP 96/58 (BP Location: Left arm, Patient Position: Chair, Cuff Size: Adult Regular)  Pulse 66  Temp 97.4  F (36.3  C) (Tympanic)  Ht 5' 5\" (1.651 m)  Wt 169 lb (76.7 kg)  SpO2 97%  BMI 28.12 kg/m2 Body mass index is 28.12 kg/(m^2).   GENERAL APPEARANCE: alert and no distress  EYES: Eyes grossly normal to inspection, PERRL and conjunctivae and sclerae normal  RESP: lungs clear to auscultation - no rales, rhonchi or wheezes  CV: regular rates and rhythm, normal S1 S2, no S3 or S4 and no murmur, click " or rub  SKIN: no suspicious lesions or rashes, he has a couple faint red raised areas on his forehead and on the side of his face.  They would like to see a dermatologist they have seen one in [place]  NEURO: Gait is normal.  He does have his dementia where he is pleasant and talks few but obviously has some memory issues.  Lab/ X-ray  Pending    ASSESSMENT/PLAN:    ICD-10-CM    1. Hypokalemia E87.6 Basic metabolic panel will check.  He was in the ER and potassium was 2.8.  Was given potassium supplement they did not pick it up.  We will plan to see him in a week to recheck.   2. Face lesion L98.9 DERMATOLOGY REFERRAL Derm referral made    3. Orthostatic hypotension I95.1  continue with Midodrine and the Florinef   4. Lewy body dementia without behavioral disturbance G31.83  chronic and stable.  Here with his female friend    F02.80          IRENE Ray MD  February 2, 2018

## 2018-02-02 NOTE — MR AVS SNAPSHOT
After Visit Summary   2/2/2018    Jf Ortiz    MRN: [id number]           Patient Information     Date Of Birth          10/5/1933        Visit Information        Provider Department      2/2/2018 2:15 PM FRANCES Ray MD [facility]        Today's Diagnoses     Hypokalemia    -  1    Face lesion          Care Instructions     the potassium tablets          Follow-ups after your visit        Additional Services     DERMATOLOGY REFERRAL       Your provider has referred you to: Patricia at [facility]    Please be aware that coverage of these services is subject to the terms and limitations of your health insurance plan.  Call member services at your health plan with any benefit or coverage questions.      Please bring the following to your appointment:  Any x-rays, CTs or MRIs which have been performed.  Contact the facility where they were done to arrange for  prior to your scheduled appointment.  Any new CT, MRI or other procedures ordered by your specialist must be performed at a [facility] facility or coordinated by your clinic's referral office.    List of current medications   This referral request   Any documents/labs given to you for this referral                  Your next 10 appointments already scheduled     Feb 09, 2018 10:15 AM CST   (Arrive by 10:00 AM)   Office Visit with FRANCES Ray MD   [facility] ([facility] [place] ([facility] )    [address] [place] [address]   [phone]           Bring a current list of meds and any records pertaining to this visit.  For Physicals, please bring immunization records and any forms needing to be filled out.  Please arrive 15 minutes early to complete paperwork and register.            Apr 03, 2018 10:00 AM CDT   (Arrive by 9:45 AM)   Return Visit with Jennifer Ling NP   [facility] ([facility]    [address]  [place] [address]   [phone]     "        Apr 10, 2018  1:30 PM CDT   (Arrive by 1:15 PM)   Return Visit with HC PACEMAKER   [facility] [place] ([facility] [place] )    [address]  [place] [address]   [phone]            Jul 09, 2018  2:00 PM CDT   (Arrive by 1:45 PM)   Return Visit with Herman Rivers,    [facility] [place] ([facility] [place] )    [address]  [place] [address]   [phone]            Nov 16, 2018 11:15 AM CST   (Arrive by 11:00 AM)   Hearing Eval with Kenneth Meyers   [facility] [place] ([facility] [place] )    [address]  [place] [address]   [phone]              Who to contact     If you have questions or need follow up information about today's clinic visit or your schedule please contact [facility] directly at [phone].  Normal or non-critical lab and imaging results will be communicated to you by CYPHERhart, letter or phone within 4 business days after the clinic has received the results. If you do not hear from us within 7 days, please contact the clinic through CYPHERhart or phone. If you have a critical or abnormal lab result, we will notify you by phone as soon as possible.  Submit refill requests through CoderBuddy or call your pharmacy and they will forward the refill request to us. Please allow 3 business days for your refill to be completed.          Additional Information About Your Visit        CYPHERharDirecta Plus Information     CoderBuddy lets you send messages to your doctor, view your test results, renew your prescriptions, schedule appointments and more. To sign up, go to [email or web address] . Click on \"Log in\" on the left side of the screen, which will take you to the Welcome page. Then click on \"Sign up Now\" on the right side of the page.     You will be asked to enter the access code listed below, as well as some personal information. Please follow the directions to create your username and password.     Your " "access code is: [email or web address]  Expires: 2018 11:39 AM     Your access code will  in 90 days. If you need help or a new code, please call your [facility] or [phone].        Care EveryWhere ID     This is your Care EveryWhere ID. This could be used by other organizations to access your [facility] medical records  [id number]        Your Vitals Were     Pulse Temperature Height Pulse Oximetry BMI (Body Mass Index)       66 97.4  F (36.3  C) (Tympanic) 5' 5\" (1.651 m) 97% 28.12 kg/m2        Blood Pressure from Last 3 Encounters:   18 96/58   18 170/92   18 134/82    Weight from Last 3 Encounters:   18 169 lb (76.7 kg)   18 169 lb (76.7 kg)   18 169 lb (76.7 kg)              We Performed the Following     Basic metabolic panel     DERMATOLOGY REFERRAL          Today's Medication Changes          These changes are accurate as of 18  2:59 PM.  If you have any questions, ask your nurse or doctor.               These medicines have changed or have updated prescriptions.        Dose/Directions    fludrocortisone 0.1 MG tablet   Commonly known as:  FLORINEF   This may have changed:    - how much to take  - when to take this   Used for:  Parkinson's disease (H), Orthostatic hypotension        Dose:  0.3 mg   Take 3 tablets (0.3 mg) by mouth daily   Quantity:  90 tablet   Refills:  11         Stop taking these medicines if you haven't already. Please contact your care team if you have questions.     UNABLE TO FIND   Stopped by:  FRANCES Ray MD                    Primary Care Provider Office Phone # Fax #    FRANCES Ray [phone]       [facility] [place] [address] [place] [address]        Equal Access to Services     [facility] KATHIE AH: Candice heath Sotre, waaxda luqadaha, qaybta kaalmada fionayaradha, hayley mauro. So [facility] [place] [phone].    ATENCIÓN: Si yash ceron, tiene a tucker disposición " servicios gratuitos de asistencia lingüística. Fredy casarez [phone].    We comply with applicable federal civil rights laws and [place] laws. We do not discriminate on the basis of race, color, national origin, age, disability, sex, sexual orientation, or gender identity.            Thank you!     Thank you for choosing [facility]  for your care. Our goal is always to provide you with excellent care. Hearing back from our patients is one way we can continue to improve our services. Please take a few minutes to complete the written survey that you may receive in the mail after your visit with us. Thank you!             Your Updated Medication List - Protect others around you: Learn how to safely use, store and throw away your medicines at www.disposemymeds.org.          This list is accurate as of 2/2/18  2:59 PM.  Always use your most recent med list.                   Brand Name Dispense Instructions for use Diagnosis    5- MG Caps      Take 1 tablet by mouth At Bedtime        5-HTP PO      Take by mouth daily        atorvastatin 20 MG tablet    LIPITOR    90 tablet    Take 1 tablet (20 mg) by mouth every evening    Hypercholesterolemia       fludrocortisone 0.1 MG tablet    FLORINEF    90 tablet    Take 3 tablets (0.3 mg) by mouth daily    Parkinson's disease (H), Orthostatic hypotension       MELATONIN PO      Take 5 mg by mouth At Bedtime        MENS PROSTATE HEALTH FORMULA PO      Take 1 tablet by mouth 2 times daily        midodrine 5 MG tablet    PROAMATINE    180 tablet    TAKE 2 TABLETS (10MG) BY MOUTH THREE TIMES DAILY    Parkinson's disease (H)       Multi-vitamin Tabs tablet      Take 1 tablet by mouth daily        oxybutynin 10 MG 24 hr tablet    DITROPAN-XL    30 tablet    Take 1 tablet (10 mg) by mouth daily    OAB (overactive bladder)       PANTOPRAZOLE SODIUM PO      Take 40 mg by mouth 2 times daily (before meals)        potassium chloride 20 MEQ Packet    KLOR-CON    14  packet    Take 20 mEq by mouth 2 times daily for 7 days        sodium chloride 1 GM tablet     60 tablet    TAKE 1 TABLET BY MOUTH TWICE A DAY    Parkinson's disease (H), Orthostatic hypotension       tamsulosin 0.4 MG capsule    FLOMAX    30 capsule    Take 1 capsule (0.4 mg) by mouth daily    Benign prostatic hyperplasia with nocturia       Turmeric 500 MG Tabs      Take 1 tablet by mouth daily        vitamin B complex with vitamin C Tabs tablet      Take 1 tablet by mouth daily        VITAMIN B-12 PO      Take 1 tablet by mouth daily        VITAMIN D-3 PO      Take 1,000 Int'l Units by mouth daily

## 2018-02-03 ASSESSMENT — ANXIETY QUESTIONNAIRES: GAD7 TOTAL SCORE: 0

## 2018-02-03 ASSESSMENT — PATIENT HEALTH QUESTIONNAIRE - PHQ9: SUM OF ALL RESPONSES TO PHQ QUESTIONS 1-9: 0

## 2018-02-06 ENCOUNTER — OFFICE VISIT (OUTPATIENT)
Dept: SURGERY | Facility: OTHER | Age: 83
End: 2018-02-06
Attending: PHYSICIAN ASSISTANT
Payer: MEDICARE

## 2018-02-06 VITALS
DIASTOLIC BLOOD PRESSURE: 68 MMHG | SYSTOLIC BLOOD PRESSURE: 128 MMHG | TEMPERATURE: 97.3 F | HEIGHT: 65 IN | BODY MASS INDEX: 28.16 KG/M2 | OXYGEN SATURATION: 98 % | HEART RATE: 72 BPM | WEIGHT: 169 LBS

## 2018-02-06 DIAGNOSIS — Z95.0 CARDIAC PACEMAKER IN SITU: ICD-10-CM

## 2018-02-06 DIAGNOSIS — I25.10 CORONARY ARTERY DISEASE INVOLVING NATIVE CORONARY ARTERY OF NATIVE HEART WITHOUT ANGINA PECTORIS: ICD-10-CM

## 2018-02-06 DIAGNOSIS — K40.90 RIGHT INGUINAL HERNIA: Primary | ICD-10-CM

## 2018-02-06 DIAGNOSIS — R10.31 INGUINAL PAIN, RIGHT: ICD-10-CM

## 2018-02-06 PROCEDURE — 99203 OFFICE O/P NEW LOW 30 MIN: CPT | Performed by: SURGERY

## 2018-02-06 PROCEDURE — G0463 HOSPITAL OUTPT CLINIC VISIT: HCPCS

## 2018-02-06 ASSESSMENT — PAIN SCALES - GENERAL: PAINLEVEL: EXTREME PAIN (9)

## 2018-02-06 NOTE — NURSING NOTE
"Chief Complaint   Patient presents with     Consult     right inguinal hernia, referred by Karla       Initial /84  Pulse 72  Temp 97.3  F (36.3  C) (Tympanic)  Ht 5' 5\" (1.651 m)  Wt 169 lb (76.7 kg)  SpO2 98%  BMI 28.12 kg/m2 Estimated body mass index is 28.12 kg/(m^2) as calculated from the following:    Height as of this encounter: 5' 5\" (1.651 m).    Weight as of this encounter: 169 lb (76.7 kg).  Medication Reconciliation: complete     Mita Flores      "

## 2018-02-06 NOTE — PATIENT INSTRUCTIONS
Thank you for allowing Dr. Zaragoza and our surgical team to participate in your care.  If you have a scheduling or an appointment question please contact July our Health Unit Coordinator at her direct line [phone].   ALL nursing questions or concerns can be directed to Mita at: [phone]     You are scheduled for a:open right inguinal hernia repair  Your procedure date is: 2/14/18  Your post-op appointment is scheduled on: 2 weeks post operatively     HOW TO PREPARE-      You need to have a scheduled Pre-Op with your primary care physician within 30 days of your scheduled surgery. We will have this scheduled for you when you leave today.      You need a friend or family member available to drive you home AND stay with you for 24 hours after you leave the hospital. You will not be allowed to drive yourself. IF you need to take a taxi or the bus you MUST have a responsible person to ride with you. YOUR PROCEDURE WILL BE CANCELLED IF YOU DO NOT HAVE A RESPONSIBLE ADULT TO DRIVE YOU HOME.       You CANNOT have anything to eat or drink after midnight the night before your surgery, ncluding water and coffee. Your stomach needs to be completely empty. Do NOT chew gum, suck on hard candy, or smoke. You can brush your teeth the morning of surgery.       You need to call our Surgery Education Nurses 1-2 weeks prior to your surgery date at  [phone] or toll free [phone]. Please have you medication and allergy lists ready.      Stop your aspirin or other NSAIDs(Ibuprofen, Motrin, Aleve, Celebrex, Naproxen, etc...) 7 days before your surgery.      Hospital admitting will call you the day before your surgery with your arrival time. If you are scheduled on a Monday admitting will call you the Friday before.      Please call your primary care physician if you should become ill within 24 hours of scheduled surgery. (ex.vomiting, diarrhea, fever)          You will need to wash the night before AND the morning of  you procedure with the supplied Hibiclens. Wash your Surgical area with your bare hands, apply friction and rinse. KEEP IT AWAY FROM YOUR EYES, EARS, NOSE AND MOUTH.     Surgery Education will contact you the day before your procedure between the hours of noon and 5 pm with the time you need to register in admitting at the hospital. Call Mita with any questions [phone]    Hold all medications morning of surgery, once you arrive home you may resume them all like normal.

## 2018-02-06 NOTE — PROGRESS NOTES
Surgery Consult Clinic Note      RE: Jf Ortiz  : 10/5/1933    Chief Complaint:  Right groin pain    History of Present Illness:  Mr. Ortiz is a very pleasant 84 year old male who I am seeing at the request of Mignon Wayne PA-C for evaluation of groin pain and to make further recommendations.  Was seen in the ER a week ago, CT abdomen/pelvis with IV contrast was non diagnostic.  Intermittent 9/10 sharp daily pain that lasts from minutes to hours.  Can't say what brings it on or what makes better.  Usually betting in the morning and gets worse as the day progresses.  Denies nausea, vomiting, constipation or unexplained bloating.     Medical history:  Past Medical History:   Diagnosis Date     Coronary artery disease      Diaphragmatic hernia without mention of obstruction or gangrene 2011     Osteoarthrosis, unspecified whether generalized or localized, unspecified site 2011     Other and unspecified hyperlipidemia 2011     Pacemaker      REM sleep behavior disorder 2011     Seizure disorder (H)      Stented coronary artery        Surgical history:  Past Surgical History:   Procedure Laterality Date     ------------OTHER-------------      ulnar and radial fx - repair ulnar and radial fx x4     ------------OTHER-------------  2011    cataract extraction     BIOPSY  2015    skin biopsy     BLEPHAROPLASTY BILATERAL  2014    Procedure: BLEPHAROPLASTY BILATERAL;  Surgeon: Andrew Queen MD;  Location: [facility]     BYPASS GRAFT ARTERY CORONARY  2006    coronary artery disease x 5, [facility]     cataract extraction and lens implantation      cataracts     cataract extraction and lens implantation      cataracts     COLONOSCOPY  2012     colonoscopy with polypectomy  3/13/2009    history of polyps - repeat 3 yrs     colonoscopy with polypectomy       colonoscopy with polypectomy       COMBINED COLONOSCOPY WITH ARGON PLASMA COAGULATOR (APC) N/A 10/31/2014     Procedure: COMBINED COLONOSCOPY WITH ARGON PLASMA COAGULATOR (APC);  Surgeon: Bassam Aguilar MD;  Location: [facility]     COMBINED COLONOSCOPY WITH ARGON PLASMA COAGULATOR (APC) N/A 11/13/2015    Procedure: COMBINED COLONOSCOPY WITH ARGON PLASMA COAGULATOR (APC);  Surgeon: Bassam Aguilar MD;  Location: [facility]     ENDOSCOPY UPPER, COLONOSCOPY, COMBINED N/A 10/31/2014    Procedure: COMBINED ENDOSCOPY UPPER, COLONOSCOPY;  Surgeon: Bassam Aguilar MD;  Location: [facility]     ENDOSCOPY UPPER, COLONOSCOPY, COMBINED N/A 11/13/2015    Procedure: COMBINED ENDOSCOPY UPPER, COLONOSCOPY;  Surgeon: Bassam Aguilar MD;  Location: [facility]     ESOPHAGOSCOPY, GASTROSCOPY, DUODENOSCOPY (EGD), COMBINED  1/22/2014    Procedure: COMBINED ESOPHAGOSCOPY, GASTROSCOPY, DUODENOSCOPY (EGD);  UPPER ENDOSCOPY(CARPENTER) W/ BIOPSIES;  Surgeon: Patricia Carpenter MD;  Location: [facility]     LARYNGOSCOPY WITH MICROSCOPE  1/22/2014    Procedure: LARYNGOSCOPY WITH MICROSCOPE;;  Surgeon: Chayo Duke MD;  Location: [facility]     pacemaker placement  2000    heart block     pacemaker placement  2011    dual-chamber     REMOVE TUBE, MYRINGOTOMY, COMBINED  1/22/2014    Procedure: COMBINED REMOVE TUBE, MYRINGOTOMY;  MICRODIRECT LARYNGOSCOPY WITH BIOPSY AND FROZEN SECTIONS removal of right ear tube and myringoplasty;  Surgeon: Chayo Duke MD;  Location: [facility]     stent placement to LAD  2008     ventilation tube  5/24/2012    right in office       Family history:  Family History   Problem Relation Age of Onset     DIABETES Mother        Medications:  Current Outpatient Prescriptions   Medication Sig Dispense Refill     multivitamin, therapeutic with minerals (MULTI-VITAMIN) TABS tablet Take 1 tablet by mouth daily       Misc Natural Products (MENS PROSTATE HEALTH FORMULA PO) Take 1 tablet by mouth 2 times daily       Turmeric 500 MG TABS Take 1 tablet by mouth daily       5-Hydroxytryptophan (5-HTP) 100 MG CAPS Take 1 tablet by mouth At Bedtime        Cyanocobalamin (VITAMIN B-12 PO) Take 1 tablet by mouth daily       potassium chloride SA (KLOR-CON) 20 MEQ CR tablet Take 1 tablet (20 mEq) by mouth 2 times daily 14 tablet 0     oxybutynin (DITROPAN-XL) 10 MG 24 hr tablet Take 1 tablet (10 mg) by mouth daily 30 tablet 2     tamsulosin (FLOMAX) 0.4 MG capsule Take 1 capsule (0.4 mg) by mouth daily 30 capsule 11     midodrine (PROAMATINE) 5 MG tablet TAKE 2 TABLETS (10MG) BY MOUTH THREE TIMES DAILY 180 tablet 3     atorvastatin (LIPITOR) 20 MG tablet Take 1 tablet (20 mg) by mouth every evening 90 tablet 3     fludrocortisone (FLORINEF) 0.1 MG tablet Take 3 tablets (0.3 mg) by mouth daily (Patient taking differently: Take 0.1 mg by mouth 3 times daily ) 90 tablet 11     sodium chloride 1 GM tablet TAKE 1 TABLET BY MOUTH TWICE A DAY 60 tablet 11     PANTOPRAZOLE SODIUM PO Take 40 mg by mouth 2 times daily (before meals)        vitamin  B complex with vitamin C (VITAMIN  B COMPLEX) TABS Take 1 tablet by mouth daily       MELATONIN PO Take 5 mg by mouth At Bedtime        Cholecalciferol (VITAMIN D-3 PO) Take 1,000 Int'l Units by mouth daily          Allergies:  The patientis allergic to cats and lamotrigine.  .  Social history:  Social History   Substance Use Topics     Smoking status: Former Smoker     Packs/day: 1.00     Years: 5.00     Types: Cigarettes     Smokeless tobacco: Never Used      Comment: quit in 1985     Alcohol use Yes      Comment: every other day     Marital status: single.    Review of Systems:    Constitutional: Negative for fever, chills and weight loss.   HENT: Negative for ear pain, nosebleeds, congestion, sore throat, tinnitus and ear discharge.    Eyes: Negative for blurred vision, double vision, photophobia and pain.   Respiratory: Negative for cough, hemoptysis, shortness of breath, wheezing and stridor.    Cardiovascular: Negative for chest pain, palpitations and orthopnea.   Gastrointestinal: Per HPI  Genitourinary: Negative for urgency,  "frequency and hematuria.   Musculoskeletal: Negative for myalgias, back pain and joint pain.   Neurological: Negative for tingling, speech change and headaches.   Endo/Heme/Allergies: Does not bruise/bleed easily.   Psychiatric/Behavioral: Negative for depression, suicidal ideas and hallucinations. The patient is not nervous/anxious.    Physical Examination:  /84  Pulse 72  Temp 97.3  F (36.3  C) (Tympanic)  Ht 5' 5\" (1.651 m)  Wt 169 lb (76.7 kg)  SpO2 98%  BMI 28.12 kg/m2  General: AAOx4, NAD, WN/WD, ambulating without assistance  HEENT:NCAT, EOMI, PERRL Sclerae anicteric; Trachea mideline, no JVD  Chest:   Clear to auscultation bilaterally.  Cardiac: S1S2 , regular rate and rhythm without additional sounds  Abdomen: Soft, ND/NT no rebound, no guarding, easily reducible gurgling mass right groin, left groin free of masses, well healed left groin incision  Extremities: Cursory exam unremarkable.  Skin: Warm, dry, < 2 sec cap refill  Neuro: CN 2-12 grossly intact, no focal deficit, GCS 15  Psych: happy, calm, asks appropriate questions      Assessment/Plan:  #1 Symptomatic right inguinal hernia  #2 Pacemaker  #3 CAD    Thank you for the consult.  Mr. Ortiz, in the presence of his wife, and I had a long and grace discussion about the pathophysiology of hernias and their progression from asymptomatic, to symptomatic to incarceration and strangulation.  The patient has been educated on the signs and symptoms of incarceration and strangulation and instructed to seek medical attention immediately.  The risk, benefits and alternatives have been fully discussed.  These include, but aren't limited to, the risk of bleeding, infection, recurrence, injury to adjacent structures and chronic pain.  Despite all this, the patient wishes to proceed with surgery, which I think is reasonable give how symptomatic he is.      Dr Zaragoza  [facility]  [address], [address]  [place]    [address]    Referring Provider:  Mignon Wayne PA-C  [facility]  [address] [place] [address]     Primary Care Provider:  FRANCES Ray

## 2018-02-06 NOTE — MR AVS SNAPSHOT
After Visit Summary   2/6/2018    Jf Ortiz    MRN: [id number]           Patient Information     Date Of Birth          10/5/1933        Visit Information        Provider Department      2/6/2018 2:00 PM Virgilio Zaragoza, DO [facility] [place]        Today's Diagnoses     Right inguinal hernia    -  1    Inguinal pain, right        Cardiac pacemaker in situ        Coronary artery disease involving native coronary artery of native heart without angina pectoris          Care Instructions    Thank you for allowing Dr. Zaragoza and our surgical team to participate in your care.  If you have a scheduling or an appointment question please contact July [facility] Unit Coordinator at her direct line [phone].   ALL nursing questions or concerns can be directed to Mita at: [phone]     You are scheduled for a:open right inguinal hernia repair  Your procedure date is: 2/14/18  Your post-op appointment is scheduled on: 2 weeks post operatively     HOW TO PREPARE-      You need to have a scheduled Pre-Op with your primary care physician within 30 days of your scheduled surgery. We will have this scheduled for you when you leave today.      You need a friend or family member available to drive you home AND stay with you for 24 hours after you leave the hospital. You will not be allowed to drive yourself. IF you need to take a taxi or the bus you MUST have a responsible person to ride with you. YOUR PROCEDURE WILL BE CANCELLED IF YOU DO NOT HAVE A RESPONSIBLE ADULT TO DRIVE YOU HOME.       You CANNOT have anything to eat or drink after midnight the night before your surgery, ncluding water and coffee. Your stomach needs to be completely empty. Do NOT chew gum, suck on hard candy, or smoke. You can brush your teeth the morning of surgery.       You need to call our Surgery Education Nurses 1-2 weeks prior to your surgery date at  [phone] or toll free [phone]. Please have you  medication and allergy lists ready.      Stop your aspirin or other NSAIDs(Ibuprofen, Motrin, Aleve, Celebrex, Naproxen, etc...) 7 days before your surgery.      Hospital admitting will call you the day before your surgery with your arrival time. If you are scheduled on a Monday admitting will call you the Friday before.      Please call your primary care physician if you should become ill within 24 hours of scheduled surgery. (ex.vomiting, diarrhea, fever)          You will need to wash the night before AND the morning of you procedure with the supplied Hibiclens. Wash your Surgical area with your bare hands, apply friction and rinse. KEEP IT AWAY FROM YOUR EYES, EARS, NOSE AND MOUTH.     Surgery Education will contact you the day before your procedure between the hours of noon and 5 pm with the time you need to register in admitting at the hospital. Call Mita with any questions [phone]    Hold all medications morning of surgery, once you arrive home you may resume them all like normal.             Follow-ups after your visit        Your next 10 appointments already scheduled     Feb 09, 2018 10:15 AM CST   (Arrive by 10:00 AM)   Office Visit with FRANCES Ray MD   [facility] )    [address] [place] [address]   [phone]           Bring a current list of meds and any records pertaining to this visit.  For Physicals, please bring immunization records and any forms needing to be filled out.  Please arrive 15 minutes early to complete paperwork and register.            Apr 03, 2018 10:00 AM CDT   (Arrive by 9:45 AM)   Return Visit with Jennifer Ling NP   [facility] ([facility] )    [address] [place] [address]   [phone]            Apr 10, 2018  1:30 PM CDT   (Arrive by 1:15 PM)   Return Visit with HALLIE JERNIGAN   [facility] ([facility] )    [address]  "[place] [address]   [phone]            2018  2:00 PM CDT   (Arrive by 1:45 PM)   Return Visit with Herman Rivers,    [facility] [place] ([facility] [place] )    [address]  [place] [address]   [phone]            2018 11:15 AM CST   (Arrive by 11:00 AM)   Hearing Eval with Kenneth Meyers   [facility] [place] ([facility] [place] )    [address]  [place] [address]   [phone]              Who to contact     If you have questions or need follow up information about today's clinic visit or your schedule please contact [facility] directly at [phone].  Normal or non-critical lab and imaging results will be communicated to you by MyChart, letter or phone within 4 business days after the clinic has received the results. If you do not hear from us within 7 days, please contact the clinic through MyChart or phone. If you have a critical or abnormal lab result, we will notify you by phone as soon as possible.  Submit refill requests through [employer] or call your pharmacy and they will forward the refill request to us. Please allow 3 business days for your refill to be completed.          Additional Information About Your Visit        MyChart Information     [employer]ar[employer] lets you send messages to your doctor, view your test results, renew your prescriptions, schedule appointments and more. To sign up, go to [email or web address] . Click on \"Log in\" on the left side of the screen, which will take you to the Welcome page. Then click on \"Sign up Now\" on the right side of the page.     You will be asked to enter the access code listed below, as well as some personal information. Please follow the directions to create your username and password.     Your access code is: [email or web address]  Expires: 2018 11:39 AM     Your access code will  in 90 days. If you need help or a new code, please call your [facility] clinic or [phone].   " "     Care EveryWhere ID     This is your Care EveryWhere ID. This could be used by other organizations to access your [facility] medical records  [id number]        Your Vitals Were     Pulse Temperature Height Pulse Oximetry BMI (Body Mass Index)       72 97.3  F (36.3  C) (Tympanic) 5' 5\" (1.651 m) 98% 28.12 kg/m2        Blood Pressure from Last 3 Encounters:   02/06/18 158/84   02/02/18 96/58   01/31/18 170/92    Weight from Last 3 Encounters:   02/06/18 169 lb (76.7 kg)   02/02/18 169 lb (76.7 kg)   01/30/18 169 lb (76.7 kg)              Today, you had the following     No orders found for display         Today's Medication Changes          These changes are accurate as of 2/6/18  2:37 PM.  If you have any questions, ask your nurse or doctor.               These medicines have changed or have updated prescriptions.        Dose/Directions    fludrocortisone 0.1 MG tablet   Commonly known as:  FLORINEF   This may have changed:    - how much to take  - when to take this   Used for:  Parkinson's disease (H), Orthostatic hypotension        Dose:  0.3 mg   Take 3 tablets (0.3 mg) by mouth daily   Quantity:  90 tablet   Refills:  11         Stop taking these medicines if you haven't already. Please contact your care team if you have questions.     5-HTP PO           potassium chloride 20 MEQ Packet   Commonly known as:  KLOR-CON                    Primary Care Provider Office Phone # Fax #    R Venancio Ray [phone]       [address] [address]        Equal Access to Services     [facility] AH: Hadii job saucedo hadasho Soomaali, waaxda luqadaha, qaybta kaalmada jayegjimmy, hayley mauro. So [facility] [phone].    ATENCIÓN: Si habla español, tiene a tucker disposición servicios gratuitos de asistencia lingüística. Llame al [phone].    We comply with applicable federal civil rights laws and [place] laws. We do not discriminate on the " basis of race, color, national origin, age, disability, sex, sexual orientation, or gender identity.            Thank you!     Thank you for [facility]  [facility]. Our goal is always to provide you with excellent care. Hearing back from our patients is one way we can continue to improve our services. Please take a few minutes to complete the written survey that you may receive in the mail after your visit with us. Thank you!             Your Updated Medication List - Protect others around you: Learn how to safely use, store and throw away your medicines at www.disposemymeds.org.          This list is accurate as of 2/6/18  2:37 PM.  Always use your most recent med list.                   Brand Name Dispense Instructions for use Diagnosis    5- MG Caps      Take 1 tablet by mouth At Bedtime        atorvastatin 20 MG tablet    LIPITOR    90 tablet    Take 1 tablet (20 mg) by mouth every evening    Hypercholesterolemia       fludrocortisone 0.1 MG tablet    FLORINEF    90 tablet    Take 3 tablets (0.3 mg) by mouth daily    Parkinson's disease (H), Orthostatic hypotension       MELATONIN PO      Take 5 mg by mouth At Bedtime        MENS PROSTATE HEALTH FORMULA PO      Take 1 tablet by mouth 2 times daily        midodrine 5 MG tablet    PROAMATINE    180 tablet    TAKE 2 TABLETS (10MG) BY MOUTH THREE TIMES DAILY    Parkinson's disease (H)       Multi-vitamin Tabs tablet      Take 1 tablet by mouth daily        oxybutynin 10 MG 24 hr tablet    DITROPAN-XL    30 tablet    Take 1 tablet (10 mg) by mouth daily    OAB (overactive bladder)       PANTOPRAZOLE SODIUM PO      Take 40 mg by mouth 2 times daily (before meals)        potassium chloride SA 20 MEQ CR tablet    KLOR-CON    14 tablet    Take 1 tablet (20 mEq) by mouth 2 times daily    Hypokalemia       sodium chloride 1 GM tablet     60 tablet    TAKE 1 TABLET BY MOUTH TWICE A DAY    Parkinson's disease (H), Orthostatic hypotension        tamsulosin 0.4 MG capsule    FLOMAX    30 capsule    Take 1 capsule (0.4 mg) by mouth daily    Benign prostatic hyperplasia with nocturia       Turmeric 500 MG Tabs      Take 1 tablet by mouth daily        vitamin B complex with vitamin C Tabs tablet      Take 1 tablet by mouth daily        VITAMIN B-12 PO      Take 1 tablet by mouth daily        VITAMIN D-3 PO      Take 1,000 Int'l Units by mouth daily

## 2018-02-07 NOTE — H&P (VIEW-ONLY)
Surgery Consult Clinic Note      RE: Jf Ortiz  : 10/5/1933    Chief Complaint:  Right groin pain    History of Present Illness:  Mr. Ortiz is a very pleasant 84 year old male who I am seeing at the request of Mignon Wayne PA-C for evaluation of groin pain and to make further recommendations.  Was seen in the ER a week ago, CT abdomen/pelvis with IV contrast was non diagnostic.  Intermittent 9/10 sharp daily pain that lasts from minutes to hours.  Can't say what brings it on or what makes better.  Usually betting in the morning and gets worse as the day progresses.  Denies nausea, vomiting, constipation or unexplained bloating.     Medical history:  Past Medical History:   Diagnosis Date     Coronary artery disease      Diaphragmatic hernia without mention of obstruction or gangrene 2011     Osteoarthrosis, unspecified whether generalized or localized, unspecified site 2011     Other and unspecified hyperlipidemia 2011     Pacemaker      REM sleep behavior disorder 2011     Seizure disorder (H)      Stented coronary artery        Surgical history:  Past Surgical History:   Procedure Laterality Date     ------------OTHER-------------      ulnar and radial fx - repair ulnar and radial fx x4     ------------OTHER-------------  2011    cataract extraction     BIOPSY  2015    skin biopsy     BLEPHAROPLASTY BILATERAL  2014    Procedure: BLEPHAROPLASTY BILATERAL;  Surgeon: Andrew Queen MD;  Location: [facility]     BYPASS GRAFT ARTERY CORONARY  2006    coronary artery disease x 5, [facility]     cataract extraction and lens implantation      cataracts     cataract extraction and lens implantation      cataracts     COLONOSCOPY  2012     colonoscopy with polypectomy  3/13/2009    history of polyps - repeat 3 yrs     colonoscopy with polypectomy       colonoscopy with polypectomy       COMBINED COLONOSCOPY WITH ARGON PLASMA COAGULATOR (APC) N/A 10/31/2014     Procedure: COMBINED COLONOSCOPY WITH ARGON PLASMA COAGULATOR (APC);  Surgeon: Bassam Aguilar MD;  Location: [facility]     COMBINED COLONOSCOPY WITH ARGON PLASMA COAGULATOR (APC) N/A 11/13/2015    Procedure: COMBINED COLONOSCOPY WITH ARGON PLASMA COAGULATOR (APC);  Surgeon: Bassam Aguilar MD;  Location: [facility]     ENDOSCOPY UPPER, COLONOSCOPY, COMBINED N/A 10/31/2014    Procedure: COMBINED ENDOSCOPY UPPER, COLONOSCOPY;  Surgeon: Bassam Aguilar MD;  Location: [facility]     ENDOSCOPY UPPER, COLONOSCOPY, COMBINED N/A 11/13/2015    Procedure: COMBINED ENDOSCOPY UPPER, COLONOSCOPY;  Surgeon: Bassam Aguilar MD;  Location: [facility]     ESOPHAGOSCOPY, GASTROSCOPY, DUODENOSCOPY (EGD), COMBINED  1/22/2014    Procedure: COMBINED ESOPHAGOSCOPY, GASTROSCOPY, DUODENOSCOPY (EGD);  UPPER ENDOSCOPY(CARPENTER) W/ BIOPSIES;  Surgeon: Patricia Carpenter MD;  Location: [facility]     LARYNGOSCOPY WITH MICROSCOPE  1/22/2014    Procedure: LARYNGOSCOPY WITH MICROSCOPE;;  Surgeon: Chayo Duke MD;  Location: [facility]     pacemaker placement  2000    heart block     pacemaker placement  2011    dual-chamber     REMOVE TUBE, MYRINGOTOMY, COMBINED  1/22/2014    Procedure: COMBINED REMOVE TUBE, MYRINGOTOMY;  MICRODIRECT LARYNGOSCOPY WITH BIOPSY AND FROZEN SECTIONS removal of right ear tube and myringoplasty;  Surgeon: Chayo Duke MD;  Location: [facility]     stent placement to LAD  2008     ventilation tube  5/24/2012    right in office       Family history:  Family History   Problem Relation Age of Onset     DIABETES Mother        Medications:  Current Outpatient Prescriptions   Medication Sig Dispense Refill     multivitamin, therapeutic with minerals (MULTI-VITAMIN) TABS tablet Take 1 tablet by mouth daily       Misc Natural Products (MENS PROSTATE HEALTH FORMULA PO) Take 1 tablet by mouth 2 times daily       Turmeric 500 MG TABS Take 1 tablet by mouth daily       5-Hydroxytryptophan (5-HTP) 100 MG CAPS Take 1 tablet by mouth At Bedtime        Cyanocobalamin (VITAMIN B-12 PO) Take 1 tablet by mouth daily       potassium chloride SA (KLOR-CON) 20 MEQ CR tablet Take 1 tablet (20 mEq) by mouth 2 times daily 14 tablet 0     oxybutynin (DITROPAN-XL) 10 MG 24 hr tablet Take 1 tablet (10 mg) by mouth daily 30 tablet 2     tamsulosin (FLOMAX) 0.4 MG capsule Take 1 capsule (0.4 mg) by mouth daily 30 capsule 11     midodrine (PROAMATINE) 5 MG tablet TAKE 2 TABLETS (10MG) BY MOUTH THREE TIMES DAILY 180 tablet 3     atorvastatin (LIPITOR) 20 MG tablet Take 1 tablet (20 mg) by mouth every evening 90 tablet 3     fludrocortisone (FLORINEF) 0.1 MG tablet Take 3 tablets (0.3 mg) by mouth daily (Patient taking differently: Take 0.1 mg by mouth 3 times daily ) 90 tablet 11     sodium chloride 1 GM tablet TAKE 1 TABLET BY MOUTH TWICE A DAY 60 tablet 11     PANTOPRAZOLE SODIUM PO Take 40 mg by mouth 2 times daily (before meals)        vitamin  B complex with vitamin C (VITAMIN  B COMPLEX) TABS Take 1 tablet by mouth daily       MELATONIN PO Take 5 mg by mouth At Bedtime        Cholecalciferol (VITAMIN D-3 PO) Take 1,000 Int'l Units by mouth daily          Allergies:  The patientis allergic to cats and lamotrigine.  .  Social history:  Social History   Substance Use Topics     Smoking status: Former Smoker     Packs/day: 1.00     Years: 5.00     Types: Cigarettes     Smokeless tobacco: Never Used      Comment: quit in 1985     Alcohol use Yes      Comment: every other day     Marital status: single.    Review of Systems:    Constitutional: Negative for fever, chills and weight loss.   HENT: Negative for ear pain, nosebleeds, congestion, sore throat, tinnitus and ear discharge.    Eyes: Negative for blurred vision, double vision, photophobia and pain.   Respiratory: Negative for cough, hemoptysis, shortness of breath, wheezing and stridor.    Cardiovascular: Negative for chest pain, palpitations and orthopnea.   Gastrointestinal: Per HPI  Genitourinary: Negative for urgency,  "frequency and hematuria.   Musculoskeletal: Negative for myalgias, back pain and joint pain.   Neurological: Negative for tingling, speech change and headaches.   Endo/Heme/Allergies: Does not bruise/bleed easily.   Psychiatric/Behavioral: Negative for depression, suicidal ideas and hallucinations. The patient is not nervous/anxious.    Physical Examination:  /84  Pulse 72  Temp 97.3  F (36.3  C) (Tympanic)  Ht 5' 5\" (1.651 m)  Wt 169 lb (76.7 kg)  SpO2 98%  BMI 28.12 kg/m2  General: AAOx4, NAD, WN/WD, ambulating without assistance  HEENT:NCAT, EOMI, PERRL Sclerae anicteric; Trachea mideline, no JVD  Chest:   Clear to auscultation bilaterally.  Cardiac: S1S2 , regular rate and rhythm without additional sounds  Abdomen: Soft, ND/NT no rebound, no guarding, easily reducible gurgling mass right groin, left groin free of masses, well healed left groin incision  Extremities: Cursory exam unremarkable.  Skin: Warm, dry, < 2 sec cap refill  Neuro: CN 2-12 grossly intact, no focal deficit, GCS 15  Psych: happy, calm, asks appropriate questions      Assessment/Plan:  #1 Symptomatic right inguinal hernia  #2 Pacemaker  #3 CAD    Thank you for the consult.  Mr. Ortiz, in the presence of his wife, and I had a long and grace discussion about the pathophysiology of hernias and their progression from asymptomatic, to symptomatic to incarceration and strangulation.  The patient has been educated on the signs and symptoms of incarceration and strangulation and instructed to seek medical attention immediately.  The risk, benefits and alternatives have been fully discussed.  These include, but aren't limited to, the risk of bleeding, infection, recurrence, injury to adjacent structures and chronic pain.  Despite all this, the patient wishes to proceed with surgery, which I think is reasonable give how symptomatic he is.      Dr Zaragoza  [facility]  [address]  [place]    [address]    Referring Provider:  Mignon Wayne PA-C  [facility]  [address] [place] [address]     Primary Care Provider:  FRANCES Ray

## 2018-02-09 ENCOUNTER — TELEPHONE (OUTPATIENT)
Dept: FAMILY MEDICINE | Facility: OTHER | Age: 83
End: 2018-02-09

## 2018-02-09 ENCOUNTER — DOCUMENTATION ONLY (OUTPATIENT)
Dept: FAMILY MEDICINE | Facility: OTHER | Age: 83
End: 2018-02-09

## 2018-02-09 ENCOUNTER — OFFICE VISIT (OUTPATIENT)
Dept: FAMILY MEDICINE | Facility: OTHER | Age: 83
End: 2018-02-09
Attending: FAMILY MEDICINE
Payer: MEDICARE

## 2018-02-09 VITALS
OXYGEN SATURATION: 98 % | TEMPERATURE: 97 F | BODY MASS INDEX: 28.29 KG/M2 | SYSTOLIC BLOOD PRESSURE: 150 MMHG | DIASTOLIC BLOOD PRESSURE: 80 MMHG | WEIGHT: 170 LBS | HEART RATE: 77 BPM

## 2018-02-09 DIAGNOSIS — Z01.818 PREOP GENERAL PHYSICAL EXAM: Primary | ICD-10-CM

## 2018-02-09 DIAGNOSIS — E87.6 HYPOKALEMIA: ICD-10-CM

## 2018-02-09 LAB
ANION GAP SERPL CALCULATED.3IONS-SCNC: 8 MMOL/L (ref 3–14)
BASOPHILS # BLD AUTO: 0 10E9/L (ref 0–0.2)
BASOPHILS NFR BLD AUTO: 0.7 %
BUN SERPL-MCNC: 19 MG/DL (ref 7–30)
CALCIUM SERPL-MCNC: 8.8 MG/DL (ref 8.5–10.1)
CHLORIDE SERPL-SCNC: 109 MMOL/L (ref 94–109)
CO2 SERPL-SCNC: 26 MMOL/L (ref 20–32)
CREAT SERPL-MCNC: 1.03 MG/DL (ref 0.66–1.25)
DIFFERENTIAL METHOD BLD: NORMAL
EOSINOPHIL # BLD AUTO: 0.3 10E9/L (ref 0–0.7)
EOSINOPHIL NFR BLD AUTO: 4.7 %
ERYTHROCYTE [DISTWIDTH] IN BLOOD BY AUTOMATED COUNT: 12.7 % (ref 10–15)
GFR SERPL CREATININE-BSD FRML MDRD: 69 ML/MIN/1.7M2
GLUCOSE SERPL-MCNC: 105 MG/DL (ref 70–99)
HCT VFR BLD AUTO: 42.2 % (ref 40–53)
HGB BLD-MCNC: 14.3 G/DL (ref 13.3–17.7)
IMM GRANULOCYTES # BLD: 0 10E9/L (ref 0–0.4)
IMM GRANULOCYTES NFR BLD: 0.2 %
LYMPHOCYTES # BLD AUTO: 1.4 10E9/L (ref 0.8–5.3)
LYMPHOCYTES NFR BLD AUTO: 25.6 %
MCH RBC QN AUTO: 31.4 PG (ref 26.5–33)
MCHC RBC AUTO-ENTMCNC: 33.9 G/DL (ref 31.5–36.5)
MCV RBC AUTO: 93 FL (ref 78–100)
MONOCYTES # BLD AUTO: 0.5 10E9/L (ref 0–1.3)
MONOCYTES NFR BLD AUTO: 9.1 %
NEUTROPHILS # BLD AUTO: 3.3 10E9/L (ref 1.6–8.3)
NEUTROPHILS NFR BLD AUTO: 59.7 %
NRBC # BLD AUTO: 0 10*3/UL
NRBC BLD AUTO-RTO: 0 /100
PLATELET # BLD AUTO: 198 10E9/L (ref 150–450)
POTASSIUM SERPL-SCNC: 3.9 MMOL/L (ref 3.4–5.3)
RBC # BLD AUTO: 4.56 10E12/L (ref 4.4–5.9)
SODIUM SERPL-SCNC: 143 MMOL/L (ref 133–144)
WBC # BLD AUTO: 5.5 10E9/L (ref 4–11)

## 2018-02-09 PROCEDURE — G0463 HOSPITAL OUTPT CLINIC VISIT: HCPCS

## 2018-02-09 PROCEDURE — 80048 BASIC METABOLIC PNL TOTAL CA: CPT | Mod: ZL | Performed by: FAMILY MEDICINE

## 2018-02-09 PROCEDURE — 85025 COMPLETE CBC W/AUTO DIFF WBC: CPT | Mod: ZL | Performed by: FAMILY MEDICINE

## 2018-02-09 PROCEDURE — 36415 COLL VENOUS BLD VENIPUNCTURE: CPT | Mod: ZL | Performed by: FAMILY MEDICINE

## 2018-02-09 PROCEDURE — 99214 OFFICE O/P EST MOD 30 MIN: CPT | Performed by: FAMILY MEDICINE

## 2018-02-09 RX ORDER — POTASSIUM CHLORIDE 1500 MG/1
20 TABLET, EXTENDED RELEASE ORAL DAILY
Qty: 30 TABLET | Refills: 3 | Status: SHIPPED | OUTPATIENT
Start: 2018-02-09 | End: 2018-03-05

## 2018-02-09 ASSESSMENT — PAIN SCALES - GENERAL: PAINLEVEL: EXTREME PAIN (8)

## 2018-02-09 NOTE — NURSING NOTE
"Chief Complaint   Patient presents with     Pre-Op Exam       Initial BP (!) 182/96  Pulse 77  Temp 97  F (36.1  C) (Tympanic)  Wt 170 lb (77.1 kg)  SpO2 98%  BMI 28.29 kg/m2 Estimated body mass index is 28.29 kg/(m^2) as calculated from the following:    Height as of 2/6/18: 5' 5\" (1.651 m).    Weight as of this encounter: 170 lb (77.1 kg).  Medication Reconciliation: complete     Jessica Handley    "

## 2018-02-09 NOTE — TELEPHONE ENCOUNTER
I called Des the pt's significant other with the pt's test results. Please send in a new Rx for the changed dosing of Potassium.

## 2018-02-09 NOTE — PROGRESS NOTES
[facility] [place]  [address]  [place] [address]  [phone]  Dept: [phone]    PRE-OP EVALUATION:  Today's date: 2018    Jf Ortiz (: 10/5/1933) presents for pre-operative evaluation assessment as requested by Dr. weir.  He requires evaluation and anesthesia risk assessment prior to undergoing surgery/procedure for treatment of hernia .  Proposed procedure: inguinal hernia repair.    Date of Surgery/ Procedure: 18  Time of Surgery/ Procedure: [facility]  Hospital/Surgical Facility: [facility] ([facility]  Fax number for surgical facility: unknown  Primary Physician: FRANCES Ray  Type of Anesthesia Anticipated: to be determined    Patient has a Health Care Directive or Living Will:  NO    1. YES - DO YOU HAVE A HISTORY OF HEART ATTACK, STROKE, STENT, BYPASS OR SURGERY ON AN ARTERY IN THE HEAD, NECK, HEART OR LEG? Has a pacemaker  2. NO - Do you ever have any pain or discomfort in your chest?  3. NO - Do you have a history of  Heart Failure?  4. NO - Are you troubled by shortness of breath when: walking on the level, up a slight hill or at night?  5. NO - Do you currently have a cold, bronchitis or other respiratory infection?  6. NO - Do you have a cough, shortness of breath or wheezing?  7. NO - Do you sometimes get pains in the calves of your legs when you walk?  8. NO - Do you or anyone in your family have previous history of blood clots?  9. NO - Do you or does anyone in your family have a serious bleeding problem such as prolonged bleeding following surgeries or cuts?  10. NO - Have you ever had problems with anemia or been told to take iron pills?  11. NO - Have you had any abnormal blood loss such as black, tarry or bloody stools, or abnormal vaginal bleeding?  12. NO - Have you ever had a blood transfusion?  13. NO - Have you or any of your relatives ever had problems with anesthesia?  14. NO - Do you have sleep apnea, excessive snoring or daytime drowsiness?  15. NO - Do you have any  prosthetic heart valves?  16. NO - Do you have prosthetic joints?  17. NO - Is there any chance that you may be pregnant?      HPI:                                                      Brief HPI related to upcoming procedure: Patient has a right inguinal hernia      See problem list for active medical problems.  Problems all longstanding and stable, except as noted/documented.  See ROS for pertinent symptoms related to these conditions.                                                                                                  .    MEDICAL HISTORY:                                                      Patient Active Problem List    Diagnosis Date Noted     Pacemaker, [employer], Dual Chamber - Dependent 10/06/2017     Priority: Medium     Lewy body dementia without behavioral disturbance 08/31/2017     Priority: Medium     Fatigue 08/31/2017     Priority: Medium     Urinary incontinence 08/31/2017     Priority: Medium     Autonomic orthostatic hypotension 10/14/2016     Priority: Medium     Dementia without behavioral disturbance 07/28/2015     Priority: Medium     Diagnosis updated by automated process. Provider to review and confirm.       Hypocalcemia 07/28/2015     Priority: Medium     Parkinson disease (H)      Priority: Medium     Seizure disorder (H)      Priority: Medium     Hypertrophy of prostate with urinary obstruction 04/10/2015     Priority: Medium     Problem list name updated by automated process. Provider to review       Volume depletion 08/02/2014     Priority: Medium     Syncope and collapse 08/01/2014     Priority: Medium     Stented coronary artery      Priority: Medium     Cardiac pacemaker in situ      Priority: Medium     Overview:   [employer] S606 DREL,  Serial #[id number]  5-13-11  Atrial lead [employer] 4054 Serial #[id number]  8-11-00  Ventriculer lead [employer] [id number]  Serial #[id number]  5-13-11  2nd Degree AV Block   Dr. Campbell  Intrinsic:  Pt. is Pacemaker  Dependant, remains paced at temp. rate of 30 bpm (6-13-14)       Coronary artery disease      Priority: Medium     Hypercholesterolemia 04/23/2013     Priority: Medium     Advanced care planning/counseling discussion 10/30/2012     Priority: Medium     REM sleep behavior disorder 01/01/2011     Priority: Medium     Osteoarthritis 01/01/2011     Priority: Medium     Problem list name updated by automated process. Provider to review       Diaphragmatic hernia 01/01/2011     Priority: Medium     Problem list name updated by automated process. 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     Priority: Medium     Overview:   IMO Update 10/11       Essential hypertension 09/05/2006     Priority: Medium     Overview:   IMO Update        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     Seizure disorder (H)      Stented coronary artery      Past Surgical History:   Procedure Laterality Date     ------------OTHER-------------  1955    ulnar and radial fx - repair ulnar and radial fx x4     ------------OTHER-------------  6/14/2011    cataract extraction     BIOPSY  08/2015    skin biopsy     BLEPHAROPLASTY BILATERAL  5/6/2014    Procedure: BLEPHAROPLASTY BILATERAL;  Surgeon: Andrew Qeuen MD;  Location: [facility]     BYPASS GRAFT ARTERY CORONARY  11/2006    coronary artery disease x 5, [facility]     cataract extraction and lens implantation  2011    cataracts     cataract extraction and lens implantation      cataracts     COLONOSCOPY  2012     colonoscopy with polypectomy  3/13/2009    history of polyps - repeat 3 yrs      colonoscopy with polypectomy  2006     colonoscopy with polypectomy  2005     COMBINED COLONOSCOPY WITH ARGON PLASMA COAGULATOR (APC) N/A 10/31/2014    Procedure: COMBINED COLONOSCOPY WITH ARGON PLASMA COAGULATOR (APC);  Surgeon: Bassam Aguilar MD;  Location: [facility]     COMBINED COLONOSCOPY WITH ARGON PLASMA COAGULATOR (APC) N/A 11/13/2015    Procedure: COMBINED COLONOSCOPY WITH ARGON PLASMA COAGULATOR (APC);  Surgeon: Bassam Aguilar MD;  Location: [facility]     ENDOSCOPY UPPER, COLONOSCOPY, COMBINED N/A 10/31/2014    Procedure: COMBINED ENDOSCOPY UPPER, COLONOSCOPY;  Surgeon: Bassam Aguilar MD;  Location: [facility]     ENDOSCOPY UPPER, COLONOSCOPY, COMBINED N/A 11/13/2015    Procedure: COMBINED ENDOSCOPY UPPER, COLONOSCOPY;  Surgeon: Bassam Aguilar MD;  Location: [facility]     ESOPHAGOSCOPY, GASTROSCOPY, DUODENOSCOPY (EGD), COMBINED  1/22/2014    Procedure: COMBINED ESOPHAGOSCOPY, GASTROSCOPY, DUODENOSCOPY (EGD);  UPPER ENDOSCOPY(CARPENTER) W/ BIOPSIES;  Surgeon: Patricia Carpenter MD;  Location: [facility]     LARYNGOSCOPY WITH MICROSCOPE  1/22/2014    Procedure: LARYNGOSCOPY WITH MICROSCOPE;;  Surgeon: Chayo Duke MD;  Location: [facility]     pacemaker placement  2000    heart block     pacemaker placement  2011    dual-chamber     REMOVE TUBE, MYRINGOTOMY, COMBINED  1/22/2014    Procedure: COMBINED REMOVE TUBE, MYRINGOTOMY;  MICRODIRECT LARYNGOSCOPY WITH BIOPSY AND FROZEN SECTIONS removal of right ear tube and myringoplasty;  Surgeon: Chayo Duke MD;  Location: [facility]     stent placement to LAD  2008     ventilation tube  5/24/2012    right in office     Current Outpatient Prescriptions   Medication Sig Dispense Refill     multivitamin, therapeutic with minerals (MULTI-VITAMIN) TABS tablet Take 1 tablet by mouth daily       Misc Natural Products (MENS PROSTATE HEALTH FORMULA PO) Take 1 tablet by mouth 2 times daily       Turmeric 500 MG TABS Take 1 tablet by mouth daily       5-Hydroxytryptophan (5-HTP) 100 MG CAPS Take 1  tablet by mouth At Bedtime       Cyanocobalamin (VITAMIN B-12 PO) Take 1 tablet by mouth daily       potassium chloride SA (KLOR-CON) 20 MEQ CR tablet Take 1 tablet (20 mEq) by mouth 2 times daily 14 tablet 0     oxybutynin (DITROPAN-XL) 10 MG 24 hr tablet Take 1 tablet (10 mg) by mouth daily 30 tablet 2     tamsulosin (FLOMAX) 0.4 MG capsule Take 1 capsule (0.4 mg) by mouth daily 30 capsule 11     midodrine (PROAMATINE) 5 MG tablet TAKE 2 TABLETS (10MG) BY MOUTH THREE TIMES DAILY 180 tablet 3     atorvastatin (LIPITOR) 20 MG tablet Take 1 tablet (20 mg) by mouth every evening 90 tablet 3     fludrocortisone (FLORINEF) 0.1 MG tablet Take 3 tablets (0.3 mg) by mouth daily (Patient taking differently: Take 0.1 mg by mouth 3 times daily ) 90 tablet 11     sodium chloride 1 GM tablet TAKE 1 TABLET BY MOUTH TWICE A DAY 60 tablet 11     PANTOPRAZOLE SODIUM PO Take 40 mg by mouth 2 times daily (before meals)        vitamin  B complex with vitamin C (VITAMIN  B COMPLEX) TABS Take 1 tablet by mouth daily       MELATONIN PO Take 5 mg by mouth At Bedtime        Cholecalciferol (VITAMIN D-3 PO) Take 1,000 Int'l Units by mouth daily        OTC products: None, except as noted above    Allergies   Allergen Reactions     Cats Other (See Comments)     Lamotrigine      Skin lesions      Latex Allergy: NO    Social History   Substance Use Topics     Smoking status: Former Smoker     Packs/day: 1.00     Years: 5.00     Types: Cigarettes     Smokeless tobacco: Never Used      Comment: quit in 1985     Alcohol use Yes      Comment: every other day     History   Drug Use No       REVIEW OF SYSTEMS:                                                    C: NEGATIVE for fever, chills, change in weight  I: NEGATIVE for worrisome rashes, moles or lesions  E: NEGATIVE for vision changes or irritation  E/M: NEGATIVE for ear, mouth and throat problems  R: NEGATIVE for significant cough or SOB  B: NEGATIVE for masses, tenderness or discharge  CV:  NEGATIVE for chest pain, palpitations or peripheral edema  GI: NEGATIVE for nausea, abdominal pain, heartburn, or change in bowel habits  : NEGATIVE for frequency, dysuria, or hematuria  M: NEGATIVE for significant arthralgias or myalgia  N: NEGATIVE for weakness, dizziness or paresthesias  E: NEGATIVE for temperature intolerance, skin/hair changes  H: NEGATIVE for bleeding problems  P: NEGATIVE for changes in mood or affect    EXAM:                                                    BP (!) 182/96  Pulse 77  Temp 97  F (36.1  C) (Tympanic)  Wt 170 lb (77.1 kg)  SpO2 98%  BMI 28.29 kg/m2    GENERAL APPEARANCE: healthy, alert and no distress     EYES: EOMI,  PERRL     HENT: ear canals and TM's normal after hearing aids removed and nose and mouth without ulcers or lesions     NECK: no adenopathy, no asymmetry, masses, or scars and thyroid normal to palpation     RESP: lungs clear to auscultation - no rales, rhonchi or wheezes     CV: regular rates and rhythm, normal S1 S2, no S3 or S4 and no murmur, click or rub     ABDOMEN:  soft, nontender, no HSM or masses and bowel sounds normal     : Has a right inguinal hernia     MS: extremities normal- no gross deformities noted, no evidence of inflammation in joints, FROM in all extremities.     SKIN: no suspicious lesions or rashes     NEURO: Normal strength and tone, sensory exam grossly normal, mentation is at his baseline, normal speech     PSYCH: mentation appears normal. and affect normal     LYMPHATICS: No axillary, cervical, or supraclavicular nodes    DIAGNOSTICS:                                                      Labs Resulted Today:   Results for orders placed or performed in visit on 02/09/18   Basic metabolic panel   Result Value Ref Range    Sodium 143 133 - 144 mmol/L    Potassium 3.9 3.4 - 5.3 mmol/L    Chloride 109 94 - 109 mmol/L    Carbon Dioxide 26 20 - 32 mmol/L    Anion Gap 8 3 - 14 mmol/L    Glucose 105 (H) 70 - 99 mg/dL    Urea Nitrogen 19 7  - 30 mg/dL    Creatinine 1.03 0.66 - 1.25 mg/dL    GFR Estimate 69 >60 mL/min/1.7m2    GFR Estimate If Black 83 >60 mL/min/1.7m2    Calcium 8.8 8.5 - 10.1 mg/dL   CBC with platelets differential   Result Value Ref Range    WBC 5.5 4.0 - 11.0 10e9/L    RBC Count 4.56 4.4 - 5.9 10e12/L    Hemoglobin 14.3 13.3 - 17.7 g/dL    Hematocrit 42.2 40.0 - 53.0 %    MCV 93 78 - 100 fl    MCH 31.4 26.5 - 33.0 pg    MCHC 33.9 31.5 - 36.5 g/dL    RDW 12.7 10.0 - 15.0 %    Platelet Count 198 150 - 450 10e9/L    Diff Method Automated Method     % Neutrophils 59.7 %    % Lymphocytes 25.6 %    % Monocytes 9.1 %    % Eosinophils 4.7 %    % Basophils 0.7 %    % Immature Granulocytes 0.2 %    Nucleated RBCs 0 0 /100    Absolute Neutrophil 3.3 1.6 - 8.3 10e9/L    Absolute Lymphocytes 1.4 0.8 - 5.3 10e9/L    Absolute Monocytes 0.5 0.0 - 1.3 10e9/L    Absolute Eosinophils 0.3 0.0 - 0.7 10e9/L    Absolute Basophils 0.0 0.0 - 0.2 10e9/L    Abs Immature Granulocytes 0.0 0 - 0.4 10e9/L    Absolute Nucleated RBC 0.0        Recent Labs   Lab Test  02/02/18   1431  01/31/18   1835  10/03/17   0834   12/11/14   1300   01/10/14   1050   HGB   --   13.8  14.3   < >  14.6   < >  14.6   PLT   --   178  132*   < >  233   < >  197   INR   --    --    --    --   1.11   --   1.16   NA  144  145*  138   < >  139   < >   --    POTASSIUM  3.0*  2.8*  3.8   < >  4.0   < >   --    CR  1.16  0.96  1.28*   < >  1.15   < >   --     < > = values in this interval not displayed.        IMPRESSION:                                                    Reason for surgery/procedure: Right inguinal hernia repair  Diagnosis/reason for consult: Cardiopulmonary clearance    The proposed surgical procedure is considered LOW risk.    REVISED CARDIAC RISK INDEX  The patient has the following serious cardiovascular risks for perioperative complications such as (MI, PE, VFib and 3  AV Block):  No serious cardiac risks  INTERPRETATION: 0 risks: Class I (very low risk - 0.4%  complication rate)    The patient has the following additional risks for perioperative complications:  No identified additional risks        RECOMMENDATIONS:                                                              APPROVAL GIVEN to proceed with proposed procedure, without further diagnostic evaluation.  Patient can do 4 METS of activity without problem.  He will be n.p.o. after midnight but the morning of the procedure will take his Flomax fludrocortisone and Midrin.  He needs these because has autonomic orthostatic hypotension from his dementia.  Also has a pacemaker in.  Also his potassium is therapeutic so we will have him go to 1 tablet daily instead of the previous 2 tablets daily       Signed Electronically by: FRANCES Ray MD    Copy of this evaluation report is provided to requesting physician.    [place] Preop Guidelines

## 2018-02-09 NOTE — PATIENT INSTRUCTIONS
Before Your Surgery      Call your surgeon if there is any change in your health. This includes signs of a cold or flu (such as a sore throat, runny nose, cough, rash or fever).    Do not smoke, drink alcohol or take over the counter medicine (unless your surgeon or primary care doctor tells you to) for the 24 hours before and after surgery.    If you take prescribed drugs: Follow your doctor s orders about which medicines to take and which to stop until after surgery.    Eating and drinking prior to surgery: follow the instructions from your surgeon    Take a shower or bath the night before surgery. Use the soap your surgeon gave you to gently clean your skin. If you do not have soap from your surgeon, use your regular soap. Do not shave or scrub the surgery site.  Wear clean pajamas and have clean sheets on your bed.     Nothing to eat after midnight.  The morning of the procedure you can take your Flomax Midodrine and fludrocortisone.

## 2018-02-09 NOTE — MR AVS SNAPSHOT
After Visit Summary   2/9/2018    Jf Ortiz    MRN: [id number]           Patient Information     Date Of Birth          10/5/1933        Visit Information        Provider Department      2/9/2018 10:15 AM FRANCES Ray MD [facility]        Today's Diagnoses     Preop general physical exam    -  1      Care Instructions      Before Your Surgery      Call your surgeon if there is any change in your health. This includes signs of a cold or flu (such as a sore throat, runny nose, cough, rash or fever).    Do not smoke, drink alcohol or take over the counter medicine (unless your surgeon or primary care doctor tells you to) for the 24 hours before and after surgery.    If you take prescribed drugs: Follow your doctor s orders about which medicines to take and which to stop until after surgery.    Eating and drinking prior to surgery: follow the instructions from your surgeon    Take a shower or bath the night before surgery. Use the soap your surgeon gave you to gently clean your skin. If you do not have soap from your surgeon, use your regular soap. Do not shave or scrub the surgery site.  Wear clean pajamas and have clean sheets on your bed.     Nothing to eat after midnight.  The morning of the procedure you can take your Flomax Midodrine and fludrocortisone.          Follow-ups after your visit        Your next 10 appointments already scheduled     Feb 14, 2018   Procedure with Virgilio Zaragoza DO   [facility] ([address] [address]   [phone]            Feb 27, 2018  9:00 AM CST   (Arrive by 8:45 AM)   [facility]    [address] [place] [address]   [phone]            Apr 03, 2018 10:00 AM CDT   (Arrive by 9:45 AM)   Return Visit with Jennifer Ling NP   [facility] )    [address] [place] [address]   [phone]            Apr 10, 2018  1:30 PM CDT   (Arrive by 1:15 PM)   Return Visit with HC PACEMAKER   [facility] [place] ([facility] [place] )    [address]  [place] [address]   [phone]            Jul 09, 2018  2:00 PM CDT   (Arrive by 1:45 PM)   Return Visit with Herman Rivers,    [facility] [place] ([facility] [place] )    [address]  [place] [address]   [phone]            Nov 16, 2018 11:15 AM CST   (Arrive by 11:00 AM)   Hearing Eval with Kenneth Meyers   [facility] [place] ([facility] [place] )    [address]  [place] [address]   [phone]              Who to contact     If you have questions or need follow up information about today's clinic visit or your schedule please contact [facility] directly at [phone].  Normal or non-critical lab and imaging results will be communicated to you by [facility], letter or phone within 4 business days after the clinic has received the results. If you do not hear from us within 7 days, please contact the clinic through [facility] [employer] or phone. If you have a critical or abnormal lab result, we will notify you by phone as soon as possible.  Submit refill requests through [employer] or call your pharmacy and they will forward the refill request to us. Please allow 3 business days for your refill to be completed.          Additional Information About Your Visit        [facility] Information     [employer] lets you send messages to your doctor, view your test results, renew your prescriptions, schedule appointments and more. To sign up, go to [email or web address] . Click on \"Log in\" on the left side of the screen, which will take you to the Welcome page. Then click on \"Sign up Now\" on the right side of the page.     You will be asked to enter the access code listed below, as well as some personal information. Please follow the directions " to create your username and password.     Your access code is: [email or web address]  Expires: 2018 11:39 AM     Your access code will  in 90 days. If you need help or a new code, please call your [facility] or [phone].        Care EveryWhere ID     This is your Care EveryWhere ID. This could be used by other organizations to access your [facility] medical records  [id number]        Your Vitals Were     Pulse Temperature Pulse Oximetry BMI (Body Mass Index)          77 97  F (36.1  C) (Tympanic) 98% 28.29 kg/m2         Blood Pressure from Last 3 Encounters:   18 150/80   18 128/68   18 96/58    Weight from Last 3 Encounters:   18 170 lb (77.1 kg)   18 169 lb (76.7 kg)   18 169 lb (76.7 kg)              We Performed the Following     Basic metabolic panel     CBC with platelets differential          Today's Medication Changes          These changes are accurate as of 18 11:00 AM.  If you have any questions, ask your nurse or doctor.               These medicines have changed or have updated prescriptions.        Dose/Directions    fludrocortisone 0.1 MG tablet   Commonly known as:  FLORINEF   This may have changed:    - how much to take  - when to take this   Used for:  Parkinson's disease (H), Orthostatic hypotension        Dose:  0.3 mg   Take 3 tablets (0.3 mg) by mouth daily   Quantity:  90 tablet   Refills:  11                Primary Care Provider Office Phone # Fax #    R Venancio Ray [phone]       [facility] [place] [address] [place] [id number]        Equal Access to Services     [facility] KATHIE AH: Hadii job Arrington, samantha griffni, qaybta hayley kaur. So [facility] [phone].    ATENCIÓN: Si habla español, tiene a tucker disposición servicios gratuitos de asistencia lingüística. Llame al [phone].    We comply with applicable federal civil rights laws and [place]  laws. We do not discriminate on the basis of race, color, national origin, age, disability, sex, sexual orientation, or gender identity.            Thank you!     Thank you for choosing [facility]  for your care. Our goal is always to provide you with excellent care. Hearing back from our patients is one way we can continue to improve our services. Please take a few minutes to complete the written survey that you may receive in the mail after your visit with us. Thank you!             Your Updated Medication List - Protect others around you: Learn how to safely use, store and throw away your medicines at www.disposemymeds.org.          This list is accurate as of 2/9/18 11:00 AM.  Always use your most recent med list.                   Brand Name Dispense Instructions for use Diagnosis    5- MG Caps      Take 1 tablet by mouth At Bedtime        atorvastatin 20 MG tablet    LIPITOR    90 tablet    Take 1 tablet (20 mg) by mouth every evening    Hypercholesterolemia       fludrocortisone 0.1 MG tablet    FLORINEF    90 tablet    Take 3 tablets (0.3 mg) by mouth daily    Parkinson's disease (H), Orthostatic hypotension       MELATONIN PO      Take 5 mg by mouth At Bedtime        MENS PROSTATE HEALTH FORMULA PO      Take 1 tablet by mouth 2 times daily        midodrine 5 MG tablet    PROAMATINE    180 tablet    TAKE 2 TABLETS (10MG) BY MOUTH THREE TIMES DAILY    Parkinson's disease (H)       Multi-vitamin Tabs tablet      Take 1 tablet by mouth daily        oxybutynin 10 MG 24 hr tablet    DITROPAN-XL    30 tablet    Take 1 tablet (10 mg) by mouth daily    OAB (overactive bladder)       PANTOPRAZOLE SODIUM PO      Take 40 mg by mouth 2 times daily (before meals)        potassium chloride SA 20 MEQ CR tablet    KLOR-CON    14 tablet    Take 1 tablet (20 mEq) by mouth 2 times daily    Hypokalemia       sodium chloride 1 GM tablet     60 tablet    TAKE 1 TABLET BY MOUTH TWICE A DAY    Parkinson's  disease (H), Orthostatic hypotension       tamsulosin 0.4 MG capsule    FLOMAX    30 capsule    Take 1 capsule (0.4 mg) by mouth daily    Benign prostatic hyperplasia with nocturia       Turmeric 500 MG Tabs      Take 1 tablet by mouth daily        vitamin B complex with vitamin C Tabs tablet      Take 1 tablet by mouth daily        VITAMIN B-12 PO      Take 1 tablet by mouth daily        VITAMIN D-3 PO      Take 1,000 Int'l Units by mouth daily

## 2018-02-12 NOTE — H&P (VIEW-ONLY)
[facility] [place]  [address]  [place] [address]  [phone]  Dept: [phone]    PRE-OP EVALUATION:  Today's date: 2018    Jf Ortiz (: 10/5/1933) presents for pre-operative evaluation assessment as requested by Dr. weir.  He requires evaluation and anesthesia risk assessment prior to undergoing surgery/procedure for treatment of hernia .  Proposed procedure: inguinal hernia repair.    Date of Surgery/ Procedure: 18  Time of Surgery/ Procedure: [facility]  Hospital/Surgical Facility: [facility]  Fax number for surgical facility: unknown  Primary Physician: FRANCES Ray  Type of Anesthesia Anticipated: to be determined    Patient has a Health Care Directive or Living Will:  NO    1. YES - DO YOU HAVE A HISTORY OF HEART ATTACK, STROKE, STENT, BYPASS OR SURGERY ON AN ARTERY IN THE HEAD, NECK, HEART OR LEG? Has a pacemaker  2. NO - Do you ever have any pain or discomfort in your chest?  3. NO - Do you have a history of  Heart Failure?  4. NO - Are you troubled by shortness of breath when: walking on the level, up a slight hill or at night?  5. NO - Do you currently have a cold, bronchitis or other respiratory infection?  6. NO - Do you have a cough, shortness of breath or wheezing?  7. NO - Do you sometimes get pains in the calves of your legs when you walk?  8. NO - Do you or anyone in your family have previous history of blood clots?  9. NO - Do you or does anyone in your family have a serious bleeding problem such as prolonged bleeding following surgeries or cuts?  10. NO - Have you ever had problems with anemia or been told to take iron pills?  11. NO - Have you had any abnormal blood loss such as black, tarry or bloody stools, or abnormal vaginal bleeding?  12. NO - Have you ever had a blood transfusion?  13. NO - Have you or any of your relatives ever had problems with anesthesia?  14. NO - Do you have sleep apnea, excessive snoring or daytime drowsiness?  15. NO - Do you have any  prosthetic heart valves?  16. NO - Do you have prosthetic joints?  17. NO - Is there any chance that you may be pregnant?      HPI:                                                      Brief HPI related to upcoming procedure: Patient has a right inguinal hernia      See problem list for active medical problems.  Problems all longstanding and stable, except as noted/documented.  See ROS for pertinent symptoms related to these conditions.                                                                                                  .    MEDICAL HISTORY:                                                      Patient Active Problem List    Diagnosis Date Noted     Pacemaker, [employer], Dual Chamber - Dependent 10/06/2017     Priority: Medium     Lewy body dementia without behavioral disturbance 08/31/2017     Priority: Medium     Fatigue 08/31/2017     Priority: Medium     Urinary incontinence 08/31/2017     Priority: Medium     Autonomic orthostatic hypotension 10/14/2016     Priority: Medium     Dementia without behavioral disturbance 07/28/2015     Priority: Medium     Diagnosis updated by automated process. Provider to review and confirm.       Hypocalcemia 07/28/2015     Priority: Medium     Parkinson disease (H)      Priority: Medium     Seizure disorder (H)      Priority: Medium     Hypertrophy of prostate with urinary obstruction 04/10/2015     Priority: Medium     Problem list name updated by automated process. Provider to review       Volume depletion 08/02/2014     Priority: Medium     Syncope and collapse 08/01/2014     Priority: Medium     Stented coronary artery      Priority: Medium     Cardiac pacemaker in situ      Priority: Medium     Overview:   [employer] S606 DREL,  Serial #[id number]  5-13-11  Atrial lead [employer] 4054 Serial #[id number]  8-11-00  Ventriculer lead [employer] 4137  Serial #[id number]  5-13-11  2nd Degree AV Block   Dr. Campbell  Intrinsic:  Pt. is Pacemaker  Dependant, remains paced at temp. rate of 30 bpm (6-13-14)       Coronary artery disease      Priority: Medium     Hypercholesterolemia 04/23/2013     Priority: Medium     Advanced care planning/counseling discussion 10/30/2012     Priority: Medium     REM sleep behavior disorder 01/01/2011     Priority: Medium     Osteoarthritis 01/01/2011     Priority: Medium     Problem list name updated by automated process. Provider to review       Diaphragmatic hernia 01/01/2011     Priority: Medium     Problem list name updated by automated process. 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     Priority: Medium     Overview:   IMO Update 10/11       Essential hypertension 09/05/2006     Priority: Medium     Overview:   IMO Update        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     Seizure disorder (H)      Stented coronary artery      Past Surgical History:   Procedure Laterality Date     ------------OTHER-------------  1955    ulnar and radial fx - repair ulnar and radial fx x4     ------------OTHER-------------  6/14/2011    cataract extraction     BIOPSY  08/2015    skin biopsy     BLEPHAROPLASTY BILATERAL  5/6/2014    Procedure: BLEPHAROPLASTY BILATERAL;  Surgeon: Andrew Queen MD;  Location: [facility]     BYPASS GRAFT ARTERY CORONARY  11/2006    coronary artery disease x 5, [facility]     cataract extraction and lens implantation  2011    cataracts     cataract extraction and lens implantation      cataracts     COLONOSCOPY  2012     colonoscopy with polypectomy  3/13/2009    history of polyps - repeat 3 yrs      colonoscopy with polypectomy  2006     colonoscopy with polypectomy  2005     COMBINED COLONOSCOPY WITH ARGON PLASMA COAGULATOR (APC) N/A 10/31/2014    Procedure: COMBINED COLONOSCOPY WITH ARGON PLASMA COAGULATOR (APC);  Surgeon: Bassam Aguilar MD;  Location: [facility]     COMBINED COLONOSCOPY WITH ARGON PLASMA COAGULATOR (APC) N/A 11/13/2015    Procedure: COMBINED COLONOSCOPY WITH ARGON PLASMA COAGULATOR (APC);  Surgeon: Bassam Aguilar MD;  Location: [facility]     ENDOSCOPY UPPER, COLONOSCOPY, COMBINED N/A 10/31/2014    Procedure: COMBINED ENDOSCOPY UPPER, COLONOSCOPY;  Surgeon: Bassam Aguilar MD;  Location: [facility]     ENDOSCOPY UPPER, COLONOSCOPY, COMBINED N/A 11/13/2015    Procedure: COMBINED ENDOSCOPY UPPER, COLONOSCOPY;  Surgeon: Bassam Aguilar MD;  Location: [facility]     ESOPHAGOSCOPY, GASTROSCOPY, DUODENOSCOPY (EGD), COMBINED  1/22/2014    Procedure: COMBINED ESOPHAGOSCOPY, GASTROSCOPY, DUODENOSCOPY (EGD);  UPPER ENDOSCOPY(CARPENTER) W/ BIOPSIES;  Surgeon: Patricia Carpenter MD;  Location: [facility]     LARYNGOSCOPY WITH MICROSCOPE  1/22/2014    Procedure: LARYNGOSCOPY WITH MICROSCOPE;;  Surgeon: Chayo Duke MD;  Location: [facility]     pacemaker placement  2000    heart block     pacemaker placement  2011    dual-chamber     REMOVE TUBE, MYRINGOTOMY, COMBINED  1/22/2014    Procedure: COMBINED REMOVE TUBE, MYRINGOTOMY;  MICRODIRECT LARYNGOSCOPY WITH BIOPSY AND FROZEN SECTIONS removal of right ear tube and myringoplasty;  Surgeon: Chayo Duke MD;  Location: [facility]     stent placement to LAD  2008     ventilation tube  5/24/2012    right in office     Current Outpatient Prescriptions   Medication Sig Dispense Refill     multivitamin, therapeutic with minerals (MULTI-VITAMIN) TABS tablet Take 1 tablet by mouth daily       Misc Natural Products (MENS PROSTATE HEALTH FORMULA PO) Take 1 tablet by mouth 2 times daily       Turmeric 500 MG TABS Take 1 tablet by mouth daily       5-Hydroxytryptophan (5-HTP) 100 MG CAPS Take 1  tablet by mouth At Bedtime       Cyanocobalamin (VITAMIN B-12 PO) Take 1 tablet by mouth daily       potassium chloride SA (KLOR-CON) 20 MEQ CR tablet Take 1 tablet (20 mEq) by mouth 2 times daily 14 tablet 0     oxybutynin (DITROPAN-XL) 10 MG 24 hr tablet Take 1 tablet (10 mg) by mouth daily 30 tablet 2     tamsulosin (FLOMAX) 0.4 MG capsule Take 1 capsule (0.4 mg) by mouth daily 30 capsule 11     midodrine (PROAMATINE) 5 MG tablet TAKE 2 TABLETS (10MG) BY MOUTH THREE TIMES DAILY 180 tablet 3     atorvastatin (LIPITOR) 20 MG tablet Take 1 tablet (20 mg) by mouth every evening 90 tablet 3     fludrocortisone (FLORINEF) 0.1 MG tablet Take 3 tablets (0.3 mg) by mouth daily (Patient taking differently: Take 0.1 mg by mouth 3 times daily ) 90 tablet 11     sodium chloride 1 GM tablet TAKE 1 TABLET BY MOUTH TWICE A DAY 60 tablet 11     PANTOPRAZOLE SODIUM PO Take 40 mg by mouth 2 times daily (before meals)        vitamin  B complex with vitamin C (VITAMIN  B COMPLEX) TABS Take 1 tablet by mouth daily       MELATONIN PO Take 5 mg by mouth At Bedtime        Cholecalciferol (VITAMIN D-3 PO) Take 1,000 Int'l Units by mouth daily        OTC products: None, except as noted above    Allergies   Allergen Reactions     Cats Other (See Comments)     Lamotrigine      Skin lesions      Latex Allergy: NO    Social History   Substance Use Topics     Smoking status: Former Smoker     Packs/day: 1.00     Years: 5.00     Types: Cigarettes     Smokeless tobacco: Never Used      Comment: quit in 1985     Alcohol use Yes      Comment: every other day     History   Drug Use No       REVIEW OF SYSTEMS:                                                    C: NEGATIVE for fever, chills, change in weight  I: NEGATIVE for worrisome rashes, moles or lesions  E: NEGATIVE for vision changes or irritation  E/M: NEGATIVE for ear, mouth and throat problems  R: NEGATIVE for significant cough or SOB  B: NEGATIVE for masses, tenderness or discharge  CV:  NEGATIVE for chest pain, palpitations or peripheral edema  GI: NEGATIVE for nausea, abdominal pain, heartburn, or change in bowel habits  : NEGATIVE for frequency, dysuria, or hematuria  M: NEGATIVE for significant arthralgias or myalgia  N: NEGATIVE for weakness, dizziness or paresthesias  E: NEGATIVE for temperature intolerance, skin/hair changes  H: NEGATIVE for bleeding problems  P: NEGATIVE for changes in mood or affect    EXAM:                                                    BP (!) 182/96  Pulse 77  Temp 97  F (36.1  C) (Tympanic)  Wt 170 lb (77.1 kg)  SpO2 98%  BMI 28.29 kg/m2    GENERAL APPEARANCE: healthy, alert and no distress     EYES: EOMI,  PERRL     HENT: ear canals and TM's normal after hearing aids removed and nose and mouth without ulcers or lesions     NECK: no adenopathy, no asymmetry, masses, or scars and thyroid normal to palpation     RESP: lungs clear to auscultation - no rales, rhonchi or wheezes     CV: regular rates and rhythm, normal S1 S2, no S3 or S4 and no murmur, click or rub     ABDOMEN:  soft, nontender, no HSM or masses and bowel sounds normal     : Has a right inguinal hernia     MS: extremities normal- no gross deformities noted, no evidence of inflammation in joints, FROM in all extremities.     SKIN: no suspicious lesions or rashes     NEURO: Normal strength and tone, sensory exam grossly normal, mentation is at his baseline, normal speech     PSYCH: mentation appears normal. and affect normal     LYMPHATICS: No axillary, cervical, or supraclavicular nodes    DIAGNOSTICS:                                                      Labs Resulted Today:   Results for orders placed or performed in visit on 02/09/18   Basic metabolic panel   Result Value Ref Range    Sodium 143 133 - 144 mmol/L    Potassium 3.9 3.4 - 5.3 mmol/L    Chloride 109 94 - 109 mmol/L    Carbon Dioxide 26 20 - 32 mmol/L    Anion Gap 8 3 - 14 mmol/L    Glucose 105 (H) 70 - 99 mg/dL    Urea Nitrogen 19 7  - 30 mg/dL    Creatinine 1.03 0.66 - 1.25 mg/dL    GFR Estimate 69 >60 mL/min/1.7m2    GFR Estimate If Black 83 >60 mL/min/1.7m2    Calcium 8.8 8.5 - 10.1 mg/dL   CBC with platelets differential   Result Value Ref Range    WBC 5.5 4.0 - 11.0 10e9/L    RBC Count 4.56 4.4 - 5.9 10e12/L    Hemoglobin 14.3 13.3 - 17.7 g/dL    Hematocrit 42.2 40.0 - 53.0 %    MCV 93 78 - 100 fl    MCH 31.4 26.5 - 33.0 pg    MCHC 33.9 31.5 - 36.5 g/dL    RDW 12.7 10.0 - 15.0 %    Platelet Count 198 150 - 450 10e9/L    Diff Method Automated Method     % Neutrophils 59.7 %    % Lymphocytes 25.6 %    % Monocytes 9.1 %    % Eosinophils 4.7 %    % Basophils 0.7 %    % Immature Granulocytes 0.2 %    Nucleated RBCs 0 0 /100    Absolute Neutrophil 3.3 1.6 - 8.3 10e9/L    Absolute Lymphocytes 1.4 0.8 - 5.3 10e9/L    Absolute Monocytes 0.5 0.0 - 1.3 10e9/L    Absolute Eosinophils 0.3 0.0 - 0.7 10e9/L    Absolute Basophils 0.0 0.0 - 0.2 10e9/L    Abs Immature Granulocytes 0.0 0 - 0.4 10e9/L    Absolute Nucleated RBC 0.0        Recent Labs   Lab Test  02/02/18   1431  01/31/18   1835  10/03/17   0834   12/11/14   1300   01/10/14   1050   HGB   --   13.8  14.3   < >  14.6   < >  14.6   PLT   --   178  132*   < >  233   < >  197   INR   --    --    --    --   1.11   --   1.16   NA  144  145*  138   < >  139   < >   --    POTASSIUM  3.0*  2.8*  3.8   < >  4.0   < >   --    CR  1.16  0.96  1.28*   < >  1.15   < >   --     < > = values in this interval not displayed.        IMPRESSION:                                                    Reason for surgery/procedure: Right inguinal hernia repair  Diagnosis/reason for consult: Cardiopulmonary clearance    The proposed surgical procedure is considered LOW risk.    REVISED CARDIAC RISK INDEX  The patient has the following serious cardiovascular risks for perioperative complications such as (MI, PE, VFib and 3  AV Block):  No serious cardiac risks  INTERPRETATION: 0 risks: Class I (very low risk - 0.4%  complication rate)    The patient has the following additional risks for perioperative complications:  No identified additional risks        RECOMMENDATIONS:                                                              APPROVAL GIVEN to proceed with proposed procedure, without further diagnostic evaluation.  Patient can do 4 METS of activity without problem.  He will be n.p.o. after midnight but the morning of the procedure will take his Flomax fludrocortisone and Midrin.  He needs these because has autonomic orthostatic hypotension from his dementia.  Also has a pacemaker in.  Also his potassium is therapeutic so we will have him go to 1 tablet daily instead of the previous 2 tablets daily       Signed Electronically by: FRANCES Ray MD    Copy of this evaluation report is provided to requesting physician.    [place] Preop Guidelines

## 2018-02-14 ENCOUNTER — HOSPITAL ENCOUNTER (OUTPATIENT)
Facility: HOSPITAL | Age: 83
Discharge: HOME | End: 2018-02-14
Attending: SURGERY | Admitting: SURGERY
Payer: MEDICARE

## 2018-02-14 ENCOUNTER — ANESTHESIA EVENT (OUTPATIENT)
Dept: SURGERY | Facility: HOSPITAL | Age: 83
End: 2018-02-14
Payer: MEDICARE

## 2018-02-14 ENCOUNTER — ANESTHESIA (OUTPATIENT)
Dept: SURGERY | Facility: HOSPITAL | Age: 83
End: 2018-02-14
Payer: MEDICARE

## 2018-02-14 VITALS
WEIGHT: 170.5 LBS | HEART RATE: 74 BPM | BODY MASS INDEX: 28.37 KG/M2 | SYSTOLIC BLOOD PRESSURE: 128 MMHG | RESPIRATION RATE: 20 BRPM | DIASTOLIC BLOOD PRESSURE: 70 MMHG | OXYGEN SATURATION: 93 % | TEMPERATURE: 97.8 F

## 2018-02-14 DIAGNOSIS — Z87.19 S/P INGUINAL HERNIA REPAIR: Primary | ICD-10-CM

## 2018-02-14 DIAGNOSIS — Z98.890 S/P INGUINAL HERNIA REPAIR: Primary | ICD-10-CM

## 2018-02-14 PROCEDURE — 36000058 ZZH SURGERY LEVEL 3 EA 15 ADDTL MIN: Performed by: SURGERY

## 2018-02-14 PROCEDURE — 36000056 ZZH SURGERY LEVEL 3 1ST 30 MIN: Performed by: SURGERY

## 2018-02-14 PROCEDURE — 37000008 ZZH ANESTHESIA TECHNICAL FEE, 1ST 30 MIN: Performed by: SURGERY

## 2018-02-14 PROCEDURE — 25000128 H RX IP 250 OP 636: Performed by: NURSE ANESTHETIST, CERTIFIED REGISTERED

## 2018-02-14 PROCEDURE — 40000305 ZZH STATISTIC PRE PROC ASSESS I: Performed by: SURGERY

## 2018-02-14 PROCEDURE — 37000009 ZZH ANESTHESIA TECHNICAL FEE, EACH ADDTL 15 MIN: Performed by: SURGERY

## 2018-02-14 PROCEDURE — 99100 ANES PT EXTEME AGE<1 YR&>70: CPT | Performed by: NURSE ANESTHETIST, CERTIFIED REGISTERED

## 2018-02-14 PROCEDURE — 25000128 H RX IP 250 OP 636: Performed by: SURGERY

## 2018-02-14 PROCEDURE — 25000128 H RX IP 250 OP 636: Performed by: ANESTHESIOLOGY

## 2018-02-14 PROCEDURE — 49505 PRP I/HERN INIT REDUC >5 YR: CPT | Performed by: NURSE ANESTHETIST, CERTIFIED REGISTERED

## 2018-02-14 PROCEDURE — 27210794 ZZH OR GENERAL SUPPLY STERILE: Performed by: SURGERY

## 2018-02-14 PROCEDURE — 27110028 ZZH OR GENERAL SUPPLY NON-STERILE: Performed by: SURGERY

## 2018-02-14 PROCEDURE — 25000125 ZZHC RX 250: Performed by: NURSE ANESTHETIST, CERTIFIED REGISTERED

## 2018-02-14 PROCEDURE — 49505 PRP I/HERN INIT REDUC >5 YR: CPT | Performed by: SURGERY

## 2018-02-14 PROCEDURE — 25000125 ZZHC RX 250: Performed by: SURGERY

## 2018-02-14 PROCEDURE — 71000027 ZZH RECOVERY PHASE 2 EACH 15 MINS: Performed by: SURGERY

## 2018-02-14 DEVICE — MESH-PERFIX PLUG XL: Type: IMPLANTABLE DEVICE | Site: GROIN | Status: FUNCTIONAL

## 2018-02-14 RX ORDER — NALOXONE HYDROCHLORIDE 0.4 MG/ML
.1-.4 INJECTION, SOLUTION INTRAMUSCULAR; INTRAVENOUS; SUBCUTANEOUS
Status: DISCONTINUED | OUTPATIENT
Start: 2018-02-14 | End: 2018-02-14 | Stop reason: HOSPADM

## 2018-02-14 RX ORDER — ONDANSETRON 2 MG/ML
4 INJECTION INTRAMUSCULAR; INTRAVENOUS EVERY 30 MIN PRN
Status: DISCONTINUED | OUTPATIENT
Start: 2018-02-14 | End: 2018-02-14 | Stop reason: HOSPADM

## 2018-02-14 RX ORDER — HYDROCODONE BITARTRATE AND ACETAMINOPHEN 5; 325 MG/1; MG/1
1-2 TABLET ORAL EVERY 6 HOURS PRN
Qty: 30 TABLET | Refills: 0 | Status: SHIPPED | OUTPATIENT
Start: 2018-02-14 | End: 2018-03-05

## 2018-02-14 RX ORDER — ONDANSETRON 4 MG/1
4 TABLET, ORALLY DISINTEGRATING ORAL EVERY 30 MIN PRN
Status: DISCONTINUED | OUTPATIENT
Start: 2018-02-14 | End: 2018-02-14 | Stop reason: HOSPADM

## 2018-02-14 RX ORDER — LABETALOL HYDROCHLORIDE 5 MG/ML
10 INJECTION, SOLUTION INTRAVENOUS
Status: DISCONTINUED | OUTPATIENT
Start: 2018-02-14 | End: 2018-02-14 | Stop reason: HOSPADM

## 2018-02-14 RX ORDER — DEXAMETHASONE SODIUM PHOSPHATE 4 MG/ML
4 INJECTION, SOLUTION INTRA-ARTICULAR; INTRALESIONAL; INTRAMUSCULAR; INTRAVENOUS; SOFT TISSUE EVERY 10 MIN PRN
Status: DISCONTINUED | OUTPATIENT
Start: 2018-02-14 | End: 2018-02-14 | Stop reason: HOSPADM

## 2018-02-14 RX ORDER — FENTANYL CITRATE 50 UG/ML
25-50 INJECTION, SOLUTION INTRAMUSCULAR; INTRAVENOUS
Status: DISCONTINUED | OUTPATIENT
Start: 2018-02-14 | End: 2018-02-14 | Stop reason: HOSPADM

## 2018-02-14 RX ORDER — MEPERIDINE HYDROCHLORIDE 25 MG/ML
12.5 INJECTION INTRAMUSCULAR; INTRAVENOUS; SUBCUTANEOUS
Status: DISCONTINUED | OUTPATIENT
Start: 2018-02-14 | End: 2018-02-14 | Stop reason: HOSPADM

## 2018-02-14 RX ORDER — SODIUM CHLORIDE, SODIUM LACTATE, POTASSIUM CHLORIDE, CALCIUM CHLORIDE 600; 310; 30; 20 MG/100ML; MG/100ML; MG/100ML; MG/100ML
INJECTION, SOLUTION INTRAVENOUS CONTINUOUS
Status: DISCONTINUED | OUTPATIENT
Start: 2018-02-14 | End: 2018-02-14 | Stop reason: HOSPADM

## 2018-02-14 RX ORDER — HYDRALAZINE HYDROCHLORIDE 20 MG/ML
2.5-5 INJECTION INTRAMUSCULAR; INTRAVENOUS EVERY 10 MIN PRN
Status: DISCONTINUED | OUTPATIENT
Start: 2018-02-14 | End: 2018-02-14 | Stop reason: HOSPADM

## 2018-02-14 RX ORDER — HYDROCODONE BITARTRATE AND ACETAMINOPHEN 5; 325 MG/1; MG/1
1 TABLET ORAL
Status: DISCONTINUED | OUTPATIENT
Start: 2018-02-14 | End: 2018-02-14 | Stop reason: HOSPADM

## 2018-02-14 RX ORDER — ALBUTEROL SULFATE 0.83 MG/ML
2.5 SOLUTION RESPIRATORY (INHALATION) EVERY 4 HOURS PRN
Status: DISCONTINUED | OUTPATIENT
Start: 2018-02-14 | End: 2018-02-14 | Stop reason: HOSPADM

## 2018-02-14 RX ORDER — FENTANYL CITRATE 50 UG/ML
INJECTION, SOLUTION INTRAMUSCULAR; INTRAVENOUS PRN
Status: DISCONTINUED | OUTPATIENT
Start: 2018-02-14 | End: 2018-02-15

## 2018-02-14 RX ORDER — OXYCODONE HYDROCHLORIDE 5 MG/1
5 TABLET ORAL EVERY 4 HOURS PRN
Status: DISCONTINUED | OUTPATIENT
Start: 2018-02-14 | End: 2018-02-14 | Stop reason: HOSPADM

## 2018-02-14 RX ORDER — AMOXICILLIN 250 MG
1-2 CAPSULE ORAL 2 TIMES DAILY
Qty: 30 TABLET | Refills: 0 | Status: SHIPPED | OUTPATIENT
Start: 2018-02-14 | End: 2018-10-30

## 2018-02-14 RX ORDER — LABETALOL HYDROCHLORIDE 5 MG/ML
INJECTION, SOLUTION INTRAVENOUS PRN
Status: DISCONTINUED | OUTPATIENT
Start: 2018-02-14 | End: 2018-02-15

## 2018-02-14 RX ORDER — KETAMINE HYDROCHLORIDE 10 MG/ML
INJECTION, SOLUTION INTRAMUSCULAR; INTRAVENOUS PRN
Status: DISCONTINUED | OUTPATIENT
Start: 2018-02-14 | End: 2018-02-15

## 2018-02-14 RX ORDER — CEFAZOLIN SODIUM 2 G/100ML
2 INJECTION, SOLUTION INTRAVENOUS
Status: COMPLETED | OUTPATIENT
Start: 2018-02-14 | End: 2018-02-14

## 2018-02-14 RX ORDER — BUPIVACAINE HYDROCHLORIDE 2.5 MG/ML
INJECTION, SOLUTION INFILTRATION; PERINEURAL PRN
Status: DISCONTINUED | OUTPATIENT
Start: 2018-02-14 | End: 2018-02-14 | Stop reason: HOSPADM

## 2018-02-14 RX ORDER — PROPOFOL 10 MG/ML
INJECTION, EMULSION INTRAVENOUS PRN
Status: DISCONTINUED | OUTPATIENT
Start: 2018-02-14 | End: 2018-02-15

## 2018-02-14 RX ADMIN — PROPOFOL 30 MG: 10 INJECTION, EMULSION INTRAVENOUS at 11:17

## 2018-02-14 RX ADMIN — KETAMINE HYDROCHLORIDE 25 MG: 10 INJECTION, SOLUTION INTRAMUSCULAR; INTRAVENOUS at 10:41

## 2018-02-14 RX ADMIN — CEFAZOLIN SODIUM 2 G: 2 INJECTION, SOLUTION INTRAVENOUS at 09:16

## 2018-02-14 RX ADMIN — PROPOFOL 30 MG: 10 INJECTION, EMULSION INTRAVENOUS at 10:15

## 2018-02-14 RX ADMIN — PROPOFOL 30 MG: 10 INJECTION, EMULSION INTRAVENOUS at 10:20

## 2018-02-14 RX ADMIN — PROPOFOL 20 MG: 10 INJECTION, EMULSION INTRAVENOUS at 09:37

## 2018-02-14 RX ADMIN — LABETALOL HYDROCHLORIDE 10 MG: 5 INJECTION, SOLUTION INTRAVENOUS at 10:35

## 2018-02-14 RX ADMIN — PROPOFOL 30 MG: 10 INJECTION, EMULSION INTRAVENOUS at 09:22

## 2018-02-14 RX ADMIN — PROPOFOL 40 MG: 10 INJECTION, EMULSION INTRAVENOUS at 09:49

## 2018-02-14 RX ADMIN — PROPOFOL 20 MG: 10 INJECTION, EMULSION INTRAVENOUS at 11:05

## 2018-02-14 RX ADMIN — PROPOFOL 20 MG: 10 INJECTION, EMULSION INTRAVENOUS at 10:33

## 2018-02-14 RX ADMIN — PROPOFOL 30 MG: 10 INJECTION, EMULSION INTRAVENOUS at 10:10

## 2018-02-14 RX ADMIN — FENTANYL CITRATE 25 MCG: 50 INJECTION, SOLUTION INTRAMUSCULAR; INTRAVENOUS at 09:39

## 2018-02-14 RX ADMIN — PROPOFOL 40 MG: 10 INJECTION, EMULSION INTRAVENOUS at 10:28

## 2018-02-14 RX ADMIN — MIDAZOLAM 0.5 MG: 1 INJECTION INTRAMUSCULAR; INTRAVENOUS at 09:58

## 2018-02-14 RX ADMIN — MIDAZOLAM 0.5 MG: 1 INJECTION INTRAMUSCULAR; INTRAVENOUS at 09:29

## 2018-02-14 RX ADMIN — FENTANYL CITRATE 25 MCG: 50 INJECTION, SOLUTION INTRAMUSCULAR; INTRAVENOUS at 09:11

## 2018-02-14 RX ADMIN — SODIUM CHLORIDE, POTASSIUM CHLORIDE, SODIUM LACTATE AND CALCIUM CHLORIDE: 600; 310; 30; 20 INJECTION, SOLUTION INTRAVENOUS at 10:36

## 2018-02-14 RX ADMIN — SODIUM CHLORIDE, POTASSIUM CHLORIDE, SODIUM LACTATE AND CALCIUM CHLORIDE 1000 ML: 600; 310; 30; 20 INJECTION, SOLUTION INTRAVENOUS at 08:52

## 2018-02-14 RX ADMIN — PROPOFOL 20 MG: 10 INJECTION, EMULSION INTRAVENOUS at 10:39

## 2018-02-14 RX ADMIN — PROPOFOL 30 MG: 10 INJECTION, EMULSION INTRAVENOUS at 09:56

## 2018-02-14 RX ADMIN — FENTANYL CITRATE 25 MCG: 50 INJECTION, SOLUTION INTRAMUSCULAR; INTRAVENOUS at 09:25

## 2018-02-14 RX ADMIN — FENTANYL CITRATE 25 MCG: 50 INJECTION, SOLUTION INTRAMUSCULAR; INTRAVENOUS at 10:01

## 2018-02-14 RX ADMIN — PROPOFOL 20 MG: 10 INJECTION, EMULSION INTRAVENOUS at 09:38

## 2018-02-14 RX ADMIN — MIDAZOLAM 1 MG: 1 INJECTION INTRAMUSCULAR; INTRAVENOUS at 09:11

## 2018-02-14 RX ADMIN — PROPOFOL 20 MG: 10 INJECTION, EMULSION INTRAVENOUS at 09:30

## 2018-02-14 RX ADMIN — PROPOFOL 20 MG: 10 INJECTION, EMULSION INTRAVENOUS at 09:42

## 2018-02-14 RX ADMIN — PROPOFOL 30 MG: 10 INJECTION, EMULSION INTRAVENOUS at 10:07

## 2018-02-14 RX ADMIN — PROPOFOL 30 MG: 10 INJECTION, EMULSION INTRAVENOUS at 11:28

## 2018-02-14 RX ADMIN — KETAMINE HYDROCHLORIDE 25 MG: 10 INJECTION, SOLUTION INTRAMUSCULAR; INTRAVENOUS at 10:57

## 2018-02-14 RX ADMIN — PROPOFOL 30 MG: 10 INJECTION, EMULSION INTRAVENOUS at 11:07

## 2018-02-14 RX ADMIN — PROPOFOL 20 MG: 10 INJECTION, EMULSION INTRAVENOUS at 10:48

## 2018-02-14 RX ADMIN — PROPOFOL 40 MG: 10 INJECTION, EMULSION INTRAVENOUS at 10:57

## 2018-02-14 RX ADMIN — PROPOFOL 20 MG: 10 INJECTION, EMULSION INTRAVENOUS at 11:01

## 2018-02-14 RX ADMIN — LABETALOL HYDROCHLORIDE 5 MG: 5 INJECTION, SOLUTION INTRAVENOUS at 10:49

## 2018-02-14 RX ADMIN — FENTANYL CITRATE 50 MCG: 50 INJECTION INTRAMUSCULAR; INTRAVENOUS at 12:00

## 2018-02-14 RX ADMIN — PROPOFOL 30 MG: 10 INJECTION, EMULSION INTRAVENOUS at 09:26

## 2018-02-14 ASSESSMENT — ENCOUNTER SYMPTOMS
ORTHOPNEA: 1
DYSRHYTHMIAS: 1
SEIZURES: 1

## 2018-02-14 ASSESSMENT — LIFESTYLE VARIABLES: TOBACCO_USE: 1

## 2018-02-14 NOTE — IP AVS SNAPSHOT
[facility]    [address] [address]    Phone:  [phone]                                       After Visit Summary   2/14/2018    Jf Ortiz    MRN: [id number]           After Visit Summary Signature Page     I have received my discharge instructions, and my questions have been answered. I have discussed any challenges I see with this plan with the nurse or doctor.    ..........................................................................................................................................  Patient/Patient Representative Signature      ..........................................................................................................................................  Patient Representative Print Name and Relationship to Patient    ..................................................               ................................................  Date                                            Time    ..........................................................................................................................................  Reviewed by Signature/Title    ...................................................              ..............................................  Date                                                            Time

## 2018-02-14 NOTE — IP AVS SNAPSHOT
MRN:[id number]                      After Visit Summary   2/14/2018    Jf Ortiz    MRN: [id number]           Thank you!     Thank you for choosing [facility] for your care. Our goal is always to provide you with excellent care. Hearing back from our patients is one way we can continue to improve our services. Please take a few minutes to complete the written survey that you may receive in the mail after you visit with us. Thank you!        Patient Information     Date Of Birth          10/5/1933        About your hospital stay     You were admitted on:  February 14, 2018 You last received care in the:  [facility] Preop/Phase II    You were discharged on:  February 14, 2018       Who to Call     For medical emergencies, please call 911.  For non-urgent questions about your medical care, please call your primary care provider or clinic, [phone]  For questions related to your surgery, please call your surgery clinic        Attending Provider     Provider Specialty    Virgilio Zaragoza, DO Surgery       Primary Care Provider Office Phone # Fax #    R Venancio Ray [phone]      After Care Instructions     Diet Instructions       Resume pre-procedure diet            Discharge Instructions       Follow up appointment with Dr. Zaragoza in 2 weeks for wound check.  Please don't hesitate to call my office with any questions or concerns.  Things to watch for are fevers greater than 101.3, pain uncontrolled by pain narcotics, deep red skin around the incision and puss coming out of the incision.            Do not - immerse incision in water until sutures removed       Do not immerse incision in water for two weeks.  No baths, hot tubs, pools, rivers, lakes, oceans, etc.            No driving or operating machinery        While taking pain narcotics.  Must be off pain narcotics for 24 hours and not be limited by pain before driving a vehicle or operating heavy equipment.            No  lifting        No lifting over 10 lbs and no strenuous physical activity for 6 weeks            Shower       No shower for 48 hours post procedure. May shower Postoperative Day (POD)  #2.  At that time, the bandages may come off.  There are steri-stirps over the wounds.  Its okay to shower with these on, do not scrub.  Just let the soapy water run over the incisions and pat dry.  The steri-strips will fall off on their own in approximately 2 weeks.                  Your next 10 appointments already scheduled     Feb 27, 2018  9:00 AM CST   (Arrive by 8:45 AM)   Post Op with Virgilio Zaragoza,    [facility] [place] ([facility] [place] )    [address]  [place] [address]   [phone]            Apr 03, 2018 10:00 AM CDT   (Arrive by 9:45 AM)   Return Visit with Jennifer Ling NP   [facility] [place] ([facility] [place] )    [address]  [place] [address]   [phone]            Apr 10, 2018  1:30 PM CDT   (Arrive by 1:15 PM)   Return Visit with HALLIE JERNIGAN   [facility] [place] ([facility] [place] )    [address]  [place] [address]   [phone]            Jul 09, 2018  2:00 PM CDT   (Arrive by 1:45 PM)   Return Visit with Herman Rivers,    [facility] [place] ([facility] [place] )    [address]  [place] [address]   [phone]            Nov 16, 2018 11:15 AM CST   (Arrive by 11:00 AM)   Hearing Eval with Kenneth Meyers   [facility] [place] ([facility] [place] )    [address]  [place] [address]   [phone]              Further instructions from your care team           Post-Anesthesia Patient Instructions    IMMEDIATELY FOLLOWING SURGERY:  Do not drive or operate machinery for the first twenty four hours after surgery.  Do not make any important decisions for twenty four hours after surgery or while taking narcotic pain medications or sedatives.  If you develop  "intractable nausea and vomiting or a severe headache please notify your doctor immediately.    FOLLOW-UP:  Please make an appointment with your surgeon as instructed. You do not need to follow up with anesthesia unless specifically instructed to do so.    WOUND CARE INSTRUCTIONS (if applicable):  Keep a dry clean dressing on the anesthesia/puncture wound site if there is drainage.  Once the wound has quit draining you may leave it open to air.  Generally you should leave the bandage intact for twenty four hours unless there is drainage.  If the epidural site drains for more than 36-48 hours please call the anesthesia department.    QUESTIONS?:  Please feel free to call your physician or the hospital  if you have any questions, and they will be happy to assist you.       Pending Results     No orders found from 2018 to 2/15/2018.            Admission Information     Date & Time Provider Department Dept. Phone    2018 Virgilio Zaragoza,  [facility] Preop/Phase [phone]      Your Vitals Were     Blood Pressure Pulse Temperature Respirations Weight Pulse Oximetry    144/75 74 97.8  F (36.6  C) (Oral) 20 77.3 kg (170 lb 8 oz) 93%    BMI (Body Mass Index)                   28.37 kg/m2           [employer] Information     [employer] lets you send messages to your doctor, view your test results, renew your prescriptions, schedule appointments and more. To sign up, go to [email or web address] . Click on \"Log in\" on the left side of the screen, which will take you to the Welcome page. Then click on \"Sign up Now\" on the right side of the page.     You will be asked to enter the access code listed below, as well as some personal information. Please follow the directions to create your username and password.     Your access code is: [email or web address]  Expires: 2018 11:39 AM     Your access code will  in 90 days. If you need help or a new code, please call your [facility] clinic or [phone].        Care " EveryWhere ID     This is your Care EveryWhere ID. This could be used by other organizations to access your [place] medical records  [id number]        Equal Access to Services     TONY VÁZQUEZ : Candice Arrington, samantha griffin, nganano stantontherese hanna, hayley haddad jayjaneen carrillo laroxyjean mauro. So [facility] [phone].    ATENCIÓN: Si habla español, tiene a tucker disposición servicios gratuitos de asistencia lingüística. Llame al [phone].    We comply with applicable federal civil rights laws and [place] laws. We do not discriminate on the basis of race, color, national origin, age, disability, sex, sexual orientation, or gender identity.               Review of your medicines      START taking        Dose / Directions    HYDROcodone-acetaminophen 5-325 MG per tablet   Commonly known as:  NORCO   Used for:  S/P inguinal hernia repair        Dose:  1-2 tablet   Take 1-2 tablets by mouth every 6 hours as needed for other (Moderate to Severe Pain)   Quantity:  30 tablet   Refills:  0       senna-docusate 8.6-50 MG per tablet   Commonly known as:  SENOKOT-S;PERICOLACE   Used for:  S/P inguinal hernia repair        Dose:  1-2 tablet   Take 1-2 tablets by mouth 2 times daily Take while on oral narcotics to prevent or treat constipation.   Quantity:  30 tablet   Refills:  0         CONTINUE these medicines which may have CHANGED, or have new prescriptions. If we are uncertain of the size of tablets/capsules you have at home, strength may be listed as something that might have changed.        Dose / Directions    fludrocortisone 0.1 MG tablet   Commonly known as:  FLORINEF   This may have changed:    - how much to take  - when to take this   Used for:  Parkinson's disease (H), Orthostatic hypotension        Dose:  0.3 mg   Take 3 tablets (0.3 mg) by mouth daily   Quantity:  90 tablet   Refills:  11         CONTINUE these medicines which have NOT CHANGED        Dose / Directions    5- MG Caps         Dose:  1 tablet   Take 1 tablet by mouth At Bedtime   Refills:  0       atorvastatin 20 MG tablet   Commonly known as:  LIPITOR   Used for:  Hypercholesterolemia        Dose:  20 mg   Take 1 tablet (20 mg) by mouth every evening   Quantity:  90 tablet   Refills:  3       MELATONIN PO        Dose:  5 mg   Take 5 mg by mouth At Bedtime   Refills:  0       MENS PROSTATE HEALTH FORMULA PO        Dose:  1 tablet   Take 1 tablet by mouth 2 times daily   Refills:  0       midodrine 5 MG tablet   Commonly known as:  PROAMATINE   Used for:  Parkinson's disease (H)        TAKE 2 TABLETS (10MG) BY MOUTH THREE TIMES DAILY   Quantity:  180 tablet   Refills:  3       Multi-vitamin Tabs tablet        Dose:  1 tablet   Take 1 tablet by mouth daily   Refills:  0       oxybutynin 10 MG 24 hr tablet   Commonly known as:  DITROPAN-XL   Used for:  OAB (overactive bladder)        Dose:  10 mg   Take 1 tablet (10 mg) by mouth daily   Quantity:  30 tablet   Refills:  2       PANTOPRAZOLE SODIUM PO        Dose:  40 mg   Take 40 mg by mouth 2 times daily (before meals)   Refills:  0       potassium chloride SA 20 MEQ CR tablet   Commonly known as:  KLOR-CON   Used for:  Hypokalemia        Dose:  20 mEq   Take 1 tablet (20 mEq) by mouth daily   Quantity:  30 tablet   Refills:  3       sodium chloride 1 GM tablet   Used for:  Parkinson's disease (H), Orthostatic hypotension        TAKE 1 TABLET BY MOUTH TWICE A DAY   Quantity:  60 tablet   Refills:  11       tamsulosin 0.4 MG capsule   Commonly known as:  FLOMAX   Used for:  Benign prostatic hyperplasia with nocturia        Dose:  0.4 mg   Take 1 capsule (0.4 mg) by mouth daily   Quantity:  30 capsule   Refills:  11       Turmeric 500 MG Tabs        Dose:  1 tablet   Take 1 tablet by mouth daily   Refills:  0       vitamin B complex with vitamin C Tabs tablet        Dose:  1 tablet   Take 1 tablet by mouth daily   Refills:  0       VITAMIN B-12 PO        Dose:  1 tablet   Take 1 tablet by  mouth daily   Refills:  0       VITAMIN D-3 PO        Dose:  1000 Int'l Units   Take 1,000 Int'l Units by mouth daily   Refills:  0            Where to get your medicines      These medications were sent to [facility] - [address]  [address] [place] [address]     Phone:  [phone]     senna-docusate 8.6-50 MG per tablet         Some of these will need a paper prescription and others can be bought over the counter. Ask your nurse if you have questions.     Bring a paper prescription for each of these medications     HYDROcodone-acetaminophen 5-325 MG per tablet                Protect others around you: Learn how to safely use, store and throw away your medicines at www.disposemymeds.org.        Information about OPIOIDS     PRESCRIPTION OPIOIDS: WHAT YOU NEED TO KNOW    Prescription opioids can be used to help relieve moderate to severe pain and are often prescribed following a surgery or injury, or for certain health conditions. These medications can be an important part of treatment but also come with serious risks. It is important to work with your health care provider to make sure you are getting the safest, most effective care.    WHAT ARE THE RISKS AND SIDE EFFECTS OF OPIOID USE?  Prescription opioids carry serious risks of addiction and overdose, especially with prolonged use. An opioid overdose, often marked by slowed breathing can cause sudden death. The use of prescription opioids can have a number of side effects as well, even when taken as directed:      Tolerance - meaning you might need to take more of a medication for the same pain relief    Physical dependence - meaning you have symptoms of withdrawal when a medication is stopped    Increased sensitivity to pain    Constipation    Nausea, vomiting, and dry mouth    Sleepiness and dizziness    Confusion    Depression    Low levels of testosterone that can result in lower sex drive, energy, and  strength    Itching and sweating    RISKS ARE GREATER WITH:    History of drug misuse, substance use disorder, or overdose    Mental health conditions (such as depression or anxiety)    Sleep apnea    Older age (65 years or older)    Pregnancy    Avoid alcohol while taking prescription opioids.   Also, unless specifically advised by your health care provider, medications to avoid include:    Benzodiazepines (such as Xanax or Valium)    Muscle relaxants (such as Soma or Flexeril)    Hypnotics (such as Ambien or Lunesta)    Other prescription opioids    KNOW YOUR OPTIONS:  Talk to your health care provider about ways to manage your pain that do not involve prescription opioids. Some of these options may actually work better and have fewer risks and side effects:    Pain relievers such as acetaminophen, ibuprofen, and naproxen    Some medications that are also used for depression or seizures    Physical therapy and exercise    Cognitive behavioral therapy, a psychological, goal-directed approach, in which patients learn how to modify physical, behavioral, and emotional triggers of pain and stress    IF YOU ARE PRESCRIBED OPIOIDS FOR PAIN:    Never take opioids in greater amounts or more often than prescribed    Follow up with your primary health care provider and work together to create a plan on how to manage your pain.    Talk about ways to help manage your pain that do not involve prescription opioids    Talk about all concerns and side effects    Help prevent misuse and abuse    Never sell or share prescription opioids    Never use another person's prescription opioids    Store prescription opioids in a secure place and out of reach of others (this may include visitors, children, friends, and family)    Visit www.cdc.gov/drugoverdose to learn about risks of opioid abuse and overdose    If you believe you may be struggling with addiction, tell your health care provider and ask for guidance or call [facility]'s National  Helpline at 1-123-110-HELP    LEARN MORE / www.cdc.gov/drugoverdose/prescribing/guideline.html    Safely dispose of unused prescription opioids: Find your local drug take-back programs and more information about the importance of safe disposal at www.doseofreality.[place].gov             Medication List: This is a list of all your medications and when to take them. Check marks below indicate your daily home schedule. Keep this list as a reference.      Medications           Morning Afternoon Evening Bedtime As Needed    5- MG Caps   Take 1 tablet by mouth At Bedtime                                atorvastatin 20 MG tablet   Commonly known as:  LIPITOR   Take 1 tablet (20 mg) by mouth every evening                                fludrocortisone 0.1 MG tablet   Commonly known as:  FLORINEF   Take 3 tablets (0.3 mg) by mouth daily                                HYDROcodone-acetaminophen 5-325 MG per tablet   Commonly known as:  NORCO   Take 1-2 tablets by mouth every 6 hours as needed for other (Moderate to Severe Pain)                                MELATONIN PO   Take 5 mg by mouth At Bedtime                                MENS PROSTATE HEALTH FORMULA PO   Take 1 tablet by mouth 2 times daily                                midodrine 5 MG tablet   Commonly known as:  PROAMATINE   TAKE 2 TABLETS (10MG) BY MOUTH THREE TIMES DAILY                                Multi-vitamin Tabs tablet   Take 1 tablet by mouth daily                                oxybutynin 10 MG 24 hr tablet   Commonly known as:  DITROPAN-XL   Take 1 tablet (10 mg) by mouth daily                                PANTOPRAZOLE SODIUM PO   Take 40 mg by mouth 2 times daily (before meals)                                potassium chloride SA 20 MEQ CR tablet   Commonly known as:  KLOR-CON   Take 1 tablet (20 mEq) by mouth daily                                senna-docusate 8.6-50 MG per tablet   Commonly known as:  SENOKOT-S;PERICOLACE   Take 1-2 tablets by  mouth 2 times daily Take while on oral narcotics to prevent or treat constipation.                                sodium chloride 1 GM tablet   TAKE 1 TABLET BY MOUTH TWICE A DAY                                tamsulosin 0.4 MG capsule   Commonly known as:  FLOMAX   Take 1 capsule (0.4 mg) by mouth daily                                Turmeric 500 MG Tabs   Take 1 tablet by mouth daily                                vitamin B complex with vitamin C Tabs tablet   Take 1 tablet by mouth daily                                VITAMIN B-12 PO   Take 1 tablet by mouth daily                                VITAMIN D-3 PO   Take 1,000 Int'l Units by mouth daily

## 2018-02-14 NOTE — ANESTHESIA POSTPROCEDURE EVALUATION
Patient: Jf Ortiz    Procedure(s):  OPEN RIGHT INGUINAL HERNIA REPAIR with Mesh - Wound Class: I-Clean    Diagnosis:RIGHT INGUINAL HERNIA  Diagnosis Additional Information: No value filed.    Anesthesia Type:  MAC    Note:  Anesthesia Post Evaluation    Patient location during evaluation: Phase 2  Patient participation: Able to fully participate in evaluation  Level of consciousness: awake and alert  Pain management: adequate  Airway patency: patent  Cardiovascular status: acceptable  Respiratory status: acceptable  Hydration status: acceptable  PONV: none             Last vitals:  Vitals:    02/14/18 1255 02/14/18 1300 02/14/18 1305   BP: 121/71 113/71 128/70   Pulse:      Resp:      Temp:      SpO2: 94% 92% 93%         Electronically Signed By: MEGHA Jane CRNA  February 14, 2018  2:30 PM

## 2018-02-14 NOTE — OP NOTE
Procedure Date: 02/14/2018      PREOPERATIVE DIAGNOSIS:  Symptomatic right inguinal hernia.      POSTOPERATIVE DIAGNOSIS:  Indirect and direct inguinal hernia.      PROCEDURE PERFORMED:  Open right inguinal hernia repair with mesh.      INDICATION:  An 84-year-old gentleman of 1-month history of groin pain, 9/10, sharp, daily pain.  Exam consistent with right inguinal hernia, here for surgical definitive care.      SURGEON:  Virgilio Zaragoza DO      WOUND TYPE:  1,  clean.      DRAINS:  Zero.      DESCRIPTION OF PROCEDURE:  The patient was brought into the operating room and placed supine on the operating table.  After monitored attended anesthesia was administered, the abdomen and groin were prepped and draped in the usual sterile fashion.  After preprocedural pause identifying the patient and procedure, position and antibiotics, local anesthetic was infiltrated in an oblique fashion in the right groin as well as an ilioinguinal nerve block.  Once the anesthetic had taken effect, a shallow oblique incision was made in the right groin and carried down to the external oblique fascia using electrocautery and blunt dissection.  The superficial inferior epigastric vessels were ligated and divided in continuity with 3-0 silk ties.  Local anesthetic was infiltrated in the inguinal canal.  The superficial ring was identified,  a small incision was made in the external oblique fascia lateral to the superficial ring and Metzenbaum scissors were introduced underneath the fascia, advanced down to the superficial ring, dissecting the cord structures from the fascia.  The fascia then was divided, the canal structures then were bluntly dissected free from the walls of the inguinal canal.  A Penrose drain then was wrapped around the canal structures.  The sac then was dissected free from the spermatic cord.  This took a little bit of time as there was a direct and indirect component to the hernia sacs.  Once these 2 separate sacs  had been completely dissected free from the cord, they were reduced into the abdominal cavity.  The vas deferens was identified and protected throughout the entire case as well was the ilioinguinal nerve.  An extra large plug was placed in the deep ring.  This was sutured into place with 2-0 Prolene sutures.  Mesh then was affixed to the pubic symphysis with 0 Prolene.  This was then run on the shelving edge of the external oblique.  The superior edge then was sutured to the transversalis fascia with interrupted 2-0 Prolenes as an under lay.  The tails of the mesh then were crossed, recreating the internal ring, which was just big enough to accept the tip of my finger.  This was affixed with a 2-0 Prolene.  The wound was irrigated, aspirated dry, hemostasis was excellent.  The external oblique then was reapproximated with a running 3-0 Vicryl.  This was done to recreate the superficial ring which was not too tight on the cord. The wound was irrigated, aspirated again, more local anesthetic was infiltrated in the inguinal canal.  Richard fascia was closed with a running 3-0 Vicryl, then skin was closed with a running 4-0 Monocryl.  Steri-Strips were applied.  All counts were correct.  The patient tolerated the procedure well and was taken to postanesthesia care unit.         LAWRENCE MORA DO             D: 2018   T: 2018   MT: [id number]      Name:     AKILA WALLER   MRN:      [id number]        Account:        [id number]   :      10/05/1933           Procedure Date: 2018      Document: [id number]

## 2018-02-14 NOTE — BRIEF OP NOTE
[facility] - Brief Operative Note    Pre-operative diagnosis: Symptomatic right inguinal henria   Post-operative diagnosis Indirect and direct inguinal hernia   Procedure: Open right inguinal hernia repair   Surgeon: Virgilio Zaragoza DO   Anesthesia: Monitor Anesthesia Care    Estimated blood loss: 10 mL   Blood transfusion: No transfusion was given during surgery   Drains: 0   Specimens: None   Findings: Pantaloon inguinal hernia   Complications: None   Condition: Stable   Comments: Details included in dictated operative note.

## 2018-02-15 NOTE — ADDENDUM NOTE
Addendum  created 02/15/18 0932 by Yung Washington APRN CRNA    Anesthesia Event edited, Procedure Event Log accessed, Sign clinical note

## 2018-02-15 NOTE — ANESTHESIA CARE TRANSFER NOTE
Patient: Jf Ortiz    Procedure(s):  OPEN RIGHT INGUINAL HERNIA REPAIR with Mesh - Wound Class: I-Clean    Diagnosis: RIGHT INGUINAL HERNIA  Diagnosis Additional Information: No value filed.    Anesthesia Type:   MAC     Note:  Airway :Nasal Cannula  Patient transferred to:Phase II  Handoff Report: Identifed the Patient, Identified the Reponsible Provider, Reviewed the pertinent medical history, Discussed the surgical course, Reviewed Intra-OP anesthesia mangement and issues during anesthesia, Set expectations for post-procedure period and Allowed opportunity for questions and acknowledgement of understanding      Vitals: (Last set prior to Anesthesia Care Transfer)    CRNA VITALS  2/14/2018 1108 - 2/14/2018 1208      2/14/2018             Resp Rate (set): 8                Electronically Signed By: MEGHA Pizano CRNA  February 15, 2018  9:31 AM

## 2018-02-16 ENCOUNTER — TELEPHONE (OUTPATIENT)
Dept: SURGERY | Facility: OTHER | Age: 83
End: 2018-02-16

## 2018-02-16 NOTE — TELEPHONE ENCOUNTER
Patient's wife, Des, contacted regarding her question about the pain medication and confusion.  Her question was discussed with Dr Mejia, whom is on call, and it was suggested that the patient avoid taking the Norco as this could be contributing to his confusion.  Patient's wife stated that she wanted to ask since the weekend is coming up.  Patient's wife was relayed the message, and stated that she will try weaning her  down to half a tablet every six hours to see if that will work.  She stated that the patient is doing fine post operatively and that he is not experiencing any post operative issues.   Patient's wife was told that he could use tylenol in place of the Norco if a half a tablet of norco is still causing confusion.  Patient's wife agreed to this plan.  She was also told that if the patient experiences any post operative problems over the weekend, that they should present to the urgent care department.  No further questions from the patient's wife at this time.

## 2018-02-16 NOTE — TELEPHONE ENCOUNTER
1:14 PM    Reason for Call: Phone Call    Description:  Uda, patient's emergency contact, states that patient is becoming confused, possibly from the [place]? Patient had surgery on 2-14-18. Des is aware that Dr Zaragoza is out of office, if covering provider could advise?    Was an appointment offered for this call? No  If yes : Appointment type              Date    Preferred method for responding to this message: Telephone Call  What is your phone number ? [phone], Des    If we cannot reach you directly, may we leave a detailed response at the number you provided? Yes    Can this message wait until your PCP/provider returns, if available today? Covering provider, please advise    Yelena Montes

## 2018-02-27 ENCOUNTER — OFFICE VISIT (OUTPATIENT)
Dept: SURGERY | Facility: OTHER | Age: 83
End: 2018-02-27
Attending: SURGERY
Payer: MEDICARE

## 2018-02-27 VITALS
HEART RATE: 72 BPM | TEMPERATURE: 98 F | WEIGHT: 170 LBS | BODY MASS INDEX: 28.32 KG/M2 | HEIGHT: 65 IN | OXYGEN SATURATION: 97 % | SYSTOLIC BLOOD PRESSURE: 128 MMHG | DIASTOLIC BLOOD PRESSURE: 73 MMHG | RESPIRATION RATE: 18 BRPM

## 2018-02-27 DIAGNOSIS — Z09 S/P INGUINAL HERNIA REPAIR, FOLLOW-UP EXAM: Primary | ICD-10-CM

## 2018-02-27 PROCEDURE — 99024 POSTOP FOLLOW-UP VISIT: CPT | Performed by: SURGERY

## 2018-02-27 PROCEDURE — G0463 HOSPITAL OUTPT CLINIC VISIT: HCPCS

## 2018-02-27 ASSESSMENT — PAIN SCALES - GENERAL: PAINLEVEL: NO PAIN (0)

## 2018-02-27 NOTE — MR AVS SNAPSHOT
After Visit Summary   2/27/2018    Jf Ortiz    MRN: [id number]           Patient Information     Date Of Birth          10/5/1933        Visit Information        Provider Department      2/27/2018 9:00 AM Virgilio Zaragoza, DO [facility]        Today's Diagnoses     S/P inguinal hernia repair, follow-up exam    -  1      Care Instructions    Thank you for allowing Dr. Zaragoza and our surgical team to participate in your care. Please call with any scheduling questions or concerns to July at [phone] or for nursing questions Mita [phone].            Follow-ups after your visit        Your next 10 appointments already scheduled     Apr 03, 2018 10:00 AM CDT   (Arrive by 9:45 AM)   Return Visit with Jennifer Ling NP   [facility] [place] ([facility] [place] [facility]    [address]  [place] [address]   [phone]            Apr 03, 2018  2:30 PM CDT   (Arrive by 2:15 PM)   Return Visit with HALLIE JERNIGAN   [facility] [place] ([facility] [place] [facility]    [address]  [place] [address]   [phone]            Jul 09, 2018  2:00 PM CDT   (Arrive by 1:45 PM)   Return Visit with Herman Rivers,    [facility] [place] ([facility] [place] [facility]    [address]  [place] [address]   [phone]            Nov 16, 2018 11:15 AM CST   (Arrive by 11:00 AM)   Hearing Eval with Kenneth Meyers   [facility] [place] ([facility] [place] [facility]    [address]  [place] [address]   [phone]              Who to contact     If you have questions or need follow up information about today's clinic visit or your schedule please contact [facility] directly at [phone].  Normal or non-critical lab and imaging results will be communicated to you by MyChart, letter or phone within 4 business days after the clinic has received the results. If you do not hear from us within 7 days, please  "contact the clinic through [employer] or phone. If you have a critical or abnormal lab result, we will notify you by phone as soon as possible.  Submit refill requests through [employer] or call your pharmacy and they will forward the refill request to us. Please allow 3 business days for your refill to be completed.          Additional Information About Your Visit        [employer] Information     [employer] lets you send messages to your doctor, view your test results, renew your prescriptions, schedule appointments and more. To sign up, go to [email or web address] . Click on \"Log in\" on the left side of the screen, which will take you to the Welcome page. Then click on \"Sign up Now\" on the right side of the page.     You will be asked to enter the access code listed below, as well as some personal information. Please follow the directions to create your username and password.     Your access code is: [email or web address]  Expires: 2018 11:39 AM     Your access code will  in 90 days. If you need help or a new code, please call your [place] clinic or [phone].        Care EveryWhere ID     This is your Care EveryWhere ID. This could be used by other organizations to access your [place] medical records  [id number]        Your Vitals Were     Pulse Temperature Respirations Height Pulse Oximetry BMI (Body Mass Index)    72 98  F (36.7  C) (Tympanic) 18 5' 5\" (1.651 m) 97% 28.29 kg/m2       Blood Pressure from Last 3 Encounters:   18 128/73   18 128/70   18 150/80    Weight from Last 3 Encounters:   18 170 lb (77.1 kg)   18 170 lb 8 oz (77.3 kg)   18 170 lb (77.1 kg)              Today, you had the following     No orders found for display         Today's Medication Changes          These changes are accurate as of 18 10:51 AM.  If you have any questions, ask your nurse or doctor.               These medicines have changed or have updated prescriptions.        Dose/Directions    " fludrocortisone 0.1 MG tablet   Commonly known as:  FLORINEF   This may have changed:    - how much to take  - when to take this   Used for:  Parkinson's disease (H), Orthostatic hypotension        Dose:  0.3 mg   Take 3 tablets (0.3 mg) by mouth daily   Quantity:  90 tablet   Refills:  11                Primary Care Provider Office Phone # Fax #    R Venancio Ray [phone] [address] [phone]        Equal Access to Services     TONY VÁZQUEZ : Hadii job saucedo hadasho Soomaali, waaxda luqadaha, qaybta kaalmada adeegyada, hayley pollard . So [facility] [phone].    ATENCIÓN: Si habla español, tiene a tucker disposición servicios gratuitos de asistencia lingüística. [facility] [phone].    We comply with applicable federal civil rights laws and [place] laws. We do not discriminate on the basis of race, color, national origin, age, disability, sex, sexual orientation, or gender identity.            Thank you!     Thank you for choosing [facility]  for your care. Our goal is always to provide you with excellent care. Hearing back from our patients is one way we can continue to improve our services. Please take a few minutes to complete the written survey that you may receive in the mail after your visit with us. Thank you!             Your Updated Medication List - Protect others around you: Learn how to safely use, store and throw away your medicines at www.disposemymeds.org.          This list is accurate as of 2/27/18 10:51 AM.  Always use your most recent med list.                   Brand Name Dispense Instructions for use Diagnosis    5- MG Caps      Take 1 tablet by mouth At Bedtime        atorvastatin 20 MG tablet    LIPITOR    90 tablet    Take 1 tablet (20 mg) by mouth every evening    Hypercholesterolemia       fludrocortisone 0.1 MG tablet    FLORINEF    90 tablet    Take 3 tablets (0.3 mg) by mouth daily    Parkinson's  disease (H), Orthostatic hypotension       HYDROcodone-acetaminophen 5-325 MG per tablet    NORCO    30 tablet    Take 1-2 tablets by mouth every 6 hours as needed for other (Moderate to Severe Pain)    S/P inguinal hernia repair       MELATONIN PO      Take 5 mg by mouth At Bedtime        MENS PROSTATE HEALTH FORMULA PO      Take 1 tablet by mouth 2 times daily        midodrine 5 MG tablet    PROAMATINE    180 tablet    TAKE 2 TABLETS (10MG) BY MOUTH THREE TIMES DAILY    Parkinson's disease (H)       Multi-vitamin Tabs tablet      Take 1 tablet by mouth daily        oxybutynin 10 MG 24 hr tablet    DITROPAN-XL    30 tablet    Take 1 tablet (10 mg) by mouth daily    OAB (overactive bladder)       PANTOPRAZOLE SODIUM PO      Take 40 mg by mouth 2 times daily (before meals)        potassium chloride SA 20 MEQ CR tablet    KLOR-CON    30 tablet    Take 1 tablet (20 mEq) by mouth daily    Hypokalemia       senna-docusate 8.6-50 MG per tablet    SENOKOT-S;PERICOLACE    30 tablet    Take 1-2 tablets by mouth 2 times daily Take while on oral narcotics to prevent or treat constipation.    S/P inguinal hernia repair       sodium chloride 1 GM tablet     60 tablet    TAKE 1 TABLET BY MOUTH TWICE A DAY    Parkinson's disease (H), Orthostatic hypotension       tamsulosin 0.4 MG capsule    FLOMAX    30 capsule    Take 1 capsule (0.4 mg) by mouth daily    Benign prostatic hyperplasia with nocturia       Turmeric 500 MG Tabs      Take 1 tablet by mouth daily        vitamin B complex with vitamin C Tabs tablet      Take 1 tablet by mouth daily        VITAMIN B-12 PO      Take 1 tablet by mouth daily        VITAMIN D-3 PO      Take 1,000 Int'l Units by mouth daily

## 2018-02-27 NOTE — PROGRESS NOTES
[facility] SURGERY POSTOP  Medical Student Note   February 27, 2018      Subjective:   Jf Ortiz is a 84 year old male s/p 12 days from right pantaloon inguinal hernia repair. He reports minimal pain and use of the pain medications for 3 days post op. He has not had fevers, chills, nausea or vomiting. He has no difficulty urinating. Has been mildly constipated, as first bowel movement was yesterday. No tenderness over incision, erythema, or swelling of the groin. Overall the patient and his wife are happy with the procedure      ROS: Negative for those stated above, including no fevers, chills, nausea, vomiting, shortness of breath, palpitations or chest pain.     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     Seizure disorder (H)      Stented coronary artery      Current Outpatient Prescriptions   Medication     HYDROcodone-acetaminophen (NORCO) 5-325 MG per tablet     senna-docusate (SENOKOT-S;PERICOLACE) 8.6-50 MG per tablet     potassium chloride SA (KLOR-CON) 20 MEQ CR tablet     multivitamin, therapeutic with minerals (MULTI-VITAMIN) TABS tablet     Misc Natural Products (MENS PROSTATE HEALTH FORMULA PO)     Turmeric 500 MG TABS     5-Hydroxytryptophan (5-HTP) 100 MG CAPS     Cyanocobalamin (VITAMIN B-12 PO)     oxybutynin (DITROPAN-XL) 10 MG 24 hr tablet     tamsulosin (FLOMAX) 0.4 MG capsule     midodrine (PROAMATINE) 5 MG tablet     atorvastatin (LIPITOR) 20 MG tablet     fludrocortisone (FLORINEF) 0.1 MG tablet     sodium chloride 1 GM tablet     PANTOPRAZOLE SODIUM PO     vitamin  B complex with vitamin C (VITAMIN  B COMPLEX) TABS     MELATONIN PO     Cholecalciferol (VITAMIN D-3 PO)     No current  "facility-administered medications for this visit.      Social History     Social History     Marital status: Single     Spouse name: N/A     Number of children: N/A     Years of education: N/A     Occupational History     retired      Social History Main Topics     Smoking status: Former Smoker     Packs/day: 1.00     Years: 5.00     Types: Cigarettes     Smokeless tobacco: Never Used      Comment: quit in 1985     Alcohol use Yes      Comment: every other day     Drug use: No     Sexual activity: Not on file     Other Topics Concern     Blood Transfusions Yes     Caffeine Concern Yes     1 cup coffee daily     Parent/Sibling W/ Cabg, Mi Or Angioplasty Before 65f 55m? No     Social History Narrative     Family History   Problem Relation Age of Onset     DIABETES Mother        Objective:   /73  Pulse 72  Temp 98  F (36.7  C) (Tympanic)  Resp 18  Ht 5' 5\" (1.651 m)  Wt 170 lb (77.1 kg)  SpO2 97%  BMI 28.29 kg/m2    GEN: Alert, conversant, no acute distress.   HEENT: Atraumatic, normocephalic. EOM intact. Sclera anicteric.   NEURO: CN 2-12 grossly intact, no focal deficit, GCS 15.   SKIN: Warm and dry. Capillary refill <2 seconds. Wound incision in right groin clean, dry and intact. Does have some swelling around the suture line. Appearance of a fold medially that does move on hernia exam.   PSYCH: Affect normal. Happy and pleasant.     Assessment and Plan:   #1 s/p right pantaloon inguinal hernia repair     The pt is 12 days postop with no concerns, however, there is concern for possible folding of the mesh or recurrence of the hernia on exam. Will watch closely and reassess once the swelling goes down. Instructed to avoid lifting more than 10lbs for the next four weeks. No f/u needed unless concerns.     The patient was seen and examined with Dr. Zaragoza, who has also submitted his own note.       Rachel Ridley, MS3      "

## 2018-02-27 NOTE — PATIENT INSTRUCTIONS
Thank you for allowing Dr. Zaragoza and our surgical team to participate in your care. Please call with any scheduling questions or concerns to July at [phone] or for nursing questions Mita [phone].

## 2018-02-27 NOTE — PROGRESS NOTES
"S: Mr. Ortiz returns two weeks after open right inguinal hernia for right indirect and direct inguinal hernia.  Doing well post operatively, tolerating a general diet, having regular bowel movements, passing flatus, no diarrhea, doesn't take pain narcotics regularly.  Specifically denies fevers, chills, nausea, vomiting, shortness of breath.    O:  /73  Pulse 72  Temp 98  F (36.7  C) (Tympanic)  Resp 18  Ht 1.651 m (5' 5\")  Wt 77.1 kg (170 lb)  SpO2 97%  BMI 28.29 kg/m2  Gen: AAOx4, NAD WN/WD ambulating without assistance, smiling, joking  Abd: Soft, ND/NT no rebound, no guarding  Wound: clean, dry, intact, no erythema, necrosis or drainage worrisome for infection    A/P  #1 S/P Open right inguinal hernia    Mr. Ortiz looks wonderful.  He's made a satisfactory recovery from surgery.  I do not need to see him again unless he has any issues in regards to this surgery.  I've provided my card with office phone number and told him not to hesitate to call with any questions, comments or concerns.  I'd like a call if he has any fevers over 101.3, nausea/vomiting, pain out of control.  He's been instructed to refrain from lifting greater than 10 lbs (or a gallon of milk) for another four weeks and to avoid strenuous activity as well.  "

## 2018-03-05 ENCOUNTER — APPOINTMENT (OUTPATIENT)
Dept: GENERAL RADIOLOGY | Facility: HOSPITAL | Age: 83
End: 2018-03-05
Attending: PHYSICIAN ASSISTANT
Payer: MEDICARE

## 2018-03-05 ENCOUNTER — HOSPITAL ENCOUNTER (EMERGENCY)
Facility: HOSPITAL | Age: 83
Discharge: HOME | End: 2018-03-05
Attending: PHYSICIAN ASSISTANT | Admitting: PHYSICIAN ASSISTANT
Payer: MEDICARE

## 2018-03-05 VITALS
TEMPERATURE: 98.2 F | HEART RATE: 85 BPM | SYSTOLIC BLOOD PRESSURE: 136 MMHG | OXYGEN SATURATION: 96 % | WEIGHT: 171 LBS | DIASTOLIC BLOOD PRESSURE: 84 MMHG | RESPIRATION RATE: 20 BRPM | BODY MASS INDEX: 28.46 KG/M2

## 2018-03-05 DIAGNOSIS — M62.81 GENERALIZED MUSCLE WEAKNESS: ICD-10-CM

## 2018-03-05 DIAGNOSIS — E86.0 MILD DEHYDRATION: ICD-10-CM

## 2018-03-05 LAB
ALBUMIN SERPL-MCNC: 3.4 G/DL (ref 3.4–5)
ALBUMIN UR-MCNC: NEGATIVE MG/DL
ALP SERPL-CCNC: 57 U/L (ref 40–150)
ALT SERPL W P-5'-P-CCNC: 20 U/L (ref 0–70)
ANION GAP SERPL CALCULATED.3IONS-SCNC: 6 MMOL/L (ref 3–14)
APPEARANCE UR: CLEAR
AST SERPL W P-5'-P-CCNC: 17 U/L (ref 0–45)
BASOPHILS # BLD AUTO: 0 10E9/L (ref 0–0.2)
BASOPHILS NFR BLD AUTO: 0.6 %
BILIRUB SERPL-MCNC: 0.8 MG/DL (ref 0.2–1.3)
BILIRUB UR QL STRIP: NEGATIVE
BUN SERPL-MCNC: 13 MG/DL (ref 7–30)
CALCIUM SERPL-MCNC: 8.4 MG/DL (ref 8.5–10.1)
CHLORIDE SERPL-SCNC: 104 MMOL/L (ref 94–109)
CO2 SERPL-SCNC: 27 MMOL/L (ref 20–32)
COLOR UR AUTO: YELLOW
CREAT SERPL-MCNC: 0.93 MG/DL (ref 0.66–1.25)
DIFFERENTIAL METHOD BLD: NORMAL
EOSINOPHIL # BLD AUTO: 0.2 10E9/L (ref 0–0.7)
EOSINOPHIL NFR BLD AUTO: 4.6 %
ERYTHROCYTE [DISTWIDTH] IN BLOOD BY AUTOMATED COUNT: 12.4 % (ref 10–15)
GFR SERPL CREATININE-BSD FRML MDRD: 77 ML/MIN/1.7M2
GLUCOSE SERPL-MCNC: 133 MG/DL (ref 70–99)
GLUCOSE UR STRIP-MCNC: NEGATIVE MG/DL
HCT VFR BLD AUTO: 41.6 % (ref 40–53)
HGB BLD-MCNC: 14.2 G/DL (ref 13.3–17.7)
HGB UR QL STRIP: NEGATIVE
IMM GRANULOCYTES # BLD: 0 10E9/L (ref 0–0.4)
IMM GRANULOCYTES NFR BLD: 0.2 %
KETONES UR STRIP-MCNC: NEGATIVE MG/DL
LEUKOCYTE ESTERASE UR QL STRIP: NEGATIVE
LYMPHOCYTES # BLD AUTO: 1.3 10E9/L (ref 0.8–5.3)
LYMPHOCYTES NFR BLD AUTO: 25 %
MCH RBC QN AUTO: 31.1 PG (ref 26.5–33)
MCHC RBC AUTO-ENTMCNC: 34.1 G/DL (ref 31.5–36.5)
MCV RBC AUTO: 91 FL (ref 78–100)
MONOCYTES # BLD AUTO: 0.4 10E9/L (ref 0–1.3)
MONOCYTES NFR BLD AUTO: 7.3 %
NEUTROPHILS # BLD AUTO: 3.3 10E9/L (ref 1.6–8.3)
NEUTROPHILS NFR BLD AUTO: 62.3 %
NITRATE UR QL: NEGATIVE
NRBC # BLD AUTO: 0 10*3/UL
NRBC BLD AUTO-RTO: 0 /100
PH UR STRIP: 7.5 PH (ref 4.7–8)
PLATELET # BLD AUTO: 239 10E9/L (ref 150–450)
POTASSIUM SERPL-SCNC: 4.2 MMOL/L (ref 3.4–5.3)
PROT SERPL-MCNC: 7.1 G/DL (ref 6.8–8.8)
RBC # BLD AUTO: 4.56 10E12/L (ref 4.4–5.9)
SODIUM SERPL-SCNC: 137 MMOL/L (ref 133–144)
SOURCE: NORMAL
SP GR UR STRIP: 1 (ref 1–1.03)
TROPONIN I SERPL-MCNC: <0.015 UG/L (ref 0–0.04)
UROBILINOGEN UR STRIP-MCNC: NORMAL MG/DL (ref 0–2)
WBC # BLD AUTO: 5.2 10E9/L (ref 4–11)

## 2018-03-05 PROCEDURE — 80053 COMPREHEN METABOLIC PANEL: CPT | Performed by: PHYSICIAN ASSISTANT

## 2018-03-05 PROCEDURE — 81003 URINALYSIS AUTO W/O SCOPE: CPT | Performed by: PHYSICIAN ASSISTANT

## 2018-03-05 PROCEDURE — 96360 HYDRATION IV INFUSION INIT: CPT

## 2018-03-05 PROCEDURE — 71046 X-RAY EXAM CHEST 2 VIEWS: CPT | Mod: TC

## 2018-03-05 PROCEDURE — 99285 EMERGENCY DEPT VISIT HI MDM: CPT | Mod: 25

## 2018-03-05 PROCEDURE — 84484 ASSAY OF TROPONIN QUANT: CPT | Performed by: PHYSICIAN ASSISTANT

## 2018-03-05 PROCEDURE — 93005 ELECTROCARDIOGRAM TRACING: CPT

## 2018-03-05 PROCEDURE — 99285 EMERGENCY DEPT VISIT HI MDM: CPT | Performed by: PHYSICIAN ASSISTANT

## 2018-03-05 PROCEDURE — 96361 HYDRATE IV INFUSION ADD-ON: CPT

## 2018-03-05 PROCEDURE — 93010 ELECTROCARDIOGRAM REPORT: CPT | Performed by: INTERNAL MEDICINE

## 2018-03-05 PROCEDURE — 25000128 H RX IP 250 OP 636: Performed by: PHYSICIAN ASSISTANT

## 2018-03-05 PROCEDURE — 36415 COLL VENOUS BLD VENIPUNCTURE: CPT | Performed by: PHYSICIAN ASSISTANT

## 2018-03-05 PROCEDURE — 85025 COMPLETE CBC W/AUTO DIFF WBC: CPT | Performed by: PHYSICIAN ASSISTANT

## 2018-03-05 RX ADMIN — SODIUM CHLORIDE 1000 ML: 9 INJECTION, SOLUTION INTRAVENOUS at 10:49

## 2018-03-05 ASSESSMENT — ENCOUNTER SYMPTOMS
LIGHT-HEADEDNESS: 0
PHOTOPHOBIA: 0
CHILLS: 0
NUMBNESS: 0
HEADACHES: 0
WEAKNESS: 1
FEVER: 0
FACIAL ASYMMETRY: 0
SHORTNESS OF BREATH: 0
EYES NEGATIVE: 1
PALPITATIONS: 0
NECK PAIN: 0
NECK STIFFNESS: 0
DIZZINESS: 0
PSYCHIATRIC NEGATIVE: 1
SORE THROAT: 0
APPETITE CHANGE: 1
SPEECH DIFFICULTY: 0
TREMORS: 0
SEIZURES: 0

## 2018-03-05 NOTE — ED NOTES
Reviewed discharge instructions with pt and spouse, verbalized understanding, no questions asked. Did encourage to increase fluid intake and drink at least 8 glasses of water daily.

## 2018-03-05 NOTE — ED NOTES
"Negative fast exam. Fell twice this morning when getting up to the bathroom. Dizziness with standing. \"I'm weak.\"   Denies black or bloody stools. Denies cough. Awake and alert. Wife present. Denies diarrhea or vomiting.   "

## 2018-03-05 NOTE — ED NOTES
"Presents to ER with c/o \"shortness of breath for past 2 days and increased weakness. Reports feeling\"wuzzy\" with activity. Denies pain or other symptoms.Wife reports decreased intake over the past couple days. Reports having recent hernia repair on Feb 14. See assessments. Call light placed within reach.   "

## 2018-03-05 NOTE — ED AVS SNAPSHOT
[facility]    [address]    [place] [address]    Phone:  [phone]                                       Jf Ortiz   MRN: [id number]    Department:  [facility]   Date of Visit:  3/5/2018           Patient Information     Date Of Birth          10/5/1933        Your diagnoses for this visit were:     Mild dehydration     Generalized muscle weakness        You were seen by Talita Calloway PA-C.        Discharge Instructions       Be sure to drink at least 8 glasses of water per day to stay hydrated. Please return here with any new or worsening symptoms.         Dehydration (Adult)  Dehydration occurs when your body loses too much fluid. This may be the result of prolonged vomiting or diarrhea, excessive sweating, or a high fever. It may also happen if you don t drink enough fluid when you re sick or out in the heat. Misuse of diuretics (water pills) can also be a cause.  Symptoms include thirst and decreased urine output. You may also feel dizzy, weak, fatigued, or very drowsy. The diet described below is usually enough to treat dehydration. In some cases, you may need medicine.  Home care    Drink at least 12 8-ounce glasses of fluid every day to resolve the dehydration. Fluid may include water; orange juice; lemonade; apple, grape, or cranberry juice; clear fruit drinks; electrolyte replacement and sports drinks; and teas and coffee without caffeine. Don't drink alcohol. If you have been diagnosed with a kidney disease, ask your doctor how much and what types of fluids you should drink to prevent dehydration. If you have kidney disease, fluid can build up in the body. This can be dangerous to your health.    If you have a fever, muscle aches, or a headache as a result of a cold or flu, you may take acetaminophen or ibuprofen, unless another medicine was prescribed. If you have chronic liver or kidney disease, or have ever had a stomach ulcer or gastrointestinal  bleeding, talk with your healthcare provider before using these medicines. Don't take aspirin if you are younger than 18 and have a fever. Aspirin raises the chance for severe liver injury.  Follow-up care  Follow up with your healthcare provider, or as advised.  When to seek medical advice  Call your healthcare provider right away if any of these occur:    Continued vomiting    Frequent diarrhea (more than 5 times a day); blood (red or black color) or mucus in diarrhea    Blood in vomit or stool    Swollen abdomen or increasing abdominal pain    Weakness, dizziness, or fainting    Unusual drowsiness or confusion    Reduced urine output or extreme thirst    Fever of 100.4 F (38 C) or higher  Date Last Reviewed: 5/1/2017 2000-2017 [employer]. [address] [place] [address], [place] [address]. All rights reserved. This information is not intended as a substitute for professional medical care. Always follow your healthcare professional's instructions.          Future Appointments        Provider Department Dept Phone Center    4/3/2018 2:30 PM  PACEMAKER [facility] [phone] [place]    4/11/2018 10:30 AM Gabriel Dupree MD [facility] [phone] [place]    7/9/2018 2:00 PM Herman Rivers DO [facility] [phone] [place]    11/16/2018 11:15 AM Kenneth Morfin [facility] [phone] [facility]         Review of your medicines      Our records show that you are taking the medicines listed below. If these are incorrect, please call your family doctor or clinic.        Dose / Directions Last dose taken    5- MG Caps   Dose:  1 tablet        Take 1 tablet by mouth At Bedtime   Refills:  0        atorvastatin 20 MG tablet   Commonly known as:  LIPITOR   Dose:  20 mg   Quantity:  90 tablet        Take 1 tablet (20 mg) by mouth every evening   Refills:  3        DONEPEZIL HCL PO   Dose:  5 mg        Take 5 mg by mouth    Refills:  0        fludrocortisone 0.1 MG tablet   Commonly known as:  FLORINEF   Dose:  0.3 mg   Quantity:  90 tablet        Take 3 tablets (0.3 mg) by mouth daily   Refills:  11        MELATONIN PO   Dose:  5 mg        Take 5 mg by mouth At Bedtime   Refills:  0        MENS PROSTATE HEALTH FORMULA PO   Dose:  1 tablet        Take 1 tablet by mouth 2 times daily   Refills:  0        midodrine 5 MG tablet   Commonly known as:  PROAMATINE   Quantity:  180 tablet        TAKE 2 TABLETS (10MG) BY MOUTH THREE TIMES DAILY   Refills:  3        Multi-vitamin Tabs tablet   Dose:  1 tablet        Take 1 tablet by mouth daily   Refills:  0        oxybutynin 10 MG 24 hr tablet   Commonly known as:  DITROPAN-XL   Dose:  10 mg   Quantity:  30 tablet        Take 1 tablet (10 mg) by mouth daily   Refills:  2        PANTOPRAZOLE SODIUM PO   Dose:  40 mg        Take 40 mg by mouth 2 times daily (before meals)   Refills:  0        senna-docusate 8.6-50 MG per tablet   Commonly known as:  SENOKOT-S;PERICOLACE   Dose:  1-2 tablet   Quantity:  30 tablet        Take 1-2 tablets by mouth 2 times daily Take while on oral narcotics to prevent or treat constipation.   Refills:  0        sodium chloride 1 GM tablet   Quantity:  60 tablet        TAKE 1 TABLET BY MOUTH TWICE A DAY   Refills:  11        tamsulosin 0.4 MG capsule   Commonly known as:  FLOMAX   Dose:  0.4 mg   Quantity:  30 capsule        Take 1 capsule (0.4 mg) by mouth daily   Refills:  11        Turmeric 500 MG Tabs   Dose:  1 tablet        Take 1 tablet by mouth daily   Refills:  0        vitamin B complex with vitamin C Tabs tablet   Dose:  1 tablet        Take 1 tablet by mouth daily   Refills:  0        VITAMIN B-12 PO   Dose:  1 tablet        Take 1 tablet by mouth daily   Refills:  0        VITAMIN D-3 PO   Dose:  1000 Int'l Units        Take 1,000 Int'l Units by mouth daily   Refills:  0                Procedures and tests performed during your visit     CBC with  "platelets differential    Cardiac Continuous Monitoring    Chest XR,  PA & LAT    Comprehensive metabolic panel    EKG 12 lead    Peripheral IV catheter    Troponin I    UA reflex to Microscopic and Culture      Orders Needing Specimen Collection     None      Pending Results     No orders found from 3/3/2018 to 3/6/2018.            Pending Culture Results     No orders found from 3/3/2018 to 3/6/2018.            Thank you for choosing [facility]       Thank you for choosing [facility] for your care. Our goal is always to provide you with excellent care. Hearing back from our patients is one way we can continue to improve our services. Please take a few minutes to complete the written survey that you may receive in the mail after you visit with us. Thank you!        [employer] Information     [employer] lets you send messages to your doctor, view your test results, renew your prescriptions, schedule appointments and more. To sign up, go to [email or web address] . Click on \"Log in\" on the left side of the screen, which will take you to the Welcome page. Then click on \"Sign up Now\" on the right side of the page.     You will be asked to enter the access code listed below, as well as some personal information. Please follow the directions to create your username and password.     Your access code is: [email or web address]  Expires: 2018 11:39 AM     Your access code will  in 90 days. If you need help or a new code, please call your [facility] clinic or [phone].        Care EveryWhere ID     This is your Care EveryWhere ID. This could be used by other organizations to access your [facility] medical records  [id number]        Equal Access to Services     [facility] KATHIE : Hadnurys [place], waaxda luqadaha, qaybta kaalmada hayley hanna. So [facility] [phone].    ATENCIÓN: Si habla español, tiene a tucker disposición servicios gratuitos de asistencia lingüística. Llame al " [phone].    We comply with applicable federal civil rights laws and [place] laws. We do not discriminate on the basis of race, color, national origin, age, disability, sex, sexual orientation, or gender identity.            After Visit Summary       This is your record. Keep this with you and show to your community pharmacist(s) and doctor(s) at your next visit.

## 2018-03-05 NOTE — ED PROVIDER NOTES
"  History     Chief Complaint   Patient presents with     Dizziness     fell x 2 today. denies hitting his head.      Generalized Weakness     x 2 days. spent all day in bed yesterday.      HPI  Jf Ortiz is a 84 year old male who presents with generalized weakness and slight dypsnea x 2 days and two falls in the bathroom this morning. He denies hitting his head or sustaining any injuries with the falls today. His wife states she heard him fell in the bathroom and by the time she got in there, he had already gotten himself back up. The second time he fell, she was able to help him up off the floor. She states he has not been eating or drinking much for the last week and \"never drinks enough water.\" He has h/o right inguinal hernia repair on 2/14/18. Denies abdominal pain, fevers/chills, or n/v/d. Incision site has healed well. Denies unilateral weakness, numbness, or trouble with speech. Denies dizziness.     Problem List:    Patient Active Problem List    Diagnosis Date Noted     Pacemaker, [employer], Dual Chamber - Dependent 10/06/2017     Priority: Medium     Lewy body dementia without behavioral disturbance 08/31/2017     Priority: Medium     Fatigue 08/31/2017     Priority: Medium     Urinary incontinence 08/31/2017     Priority: Medium     Autonomic orthostatic hypotension 10/14/2016     Priority: Medium     Dementia without behavioral disturbance 07/28/2015     Priority: Medium     Diagnosis updated by automated process. Provider to review and confirm.       Hypocalcemia 07/28/2015     Priority: Medium     Parkinson disease (H)      Priority: Medium     Seizure disorder (H)      Priority: Medium     Hypertrophy of prostate with urinary obstruction 04/10/2015     Priority: Medium     Problem list name updated by automated process. Provider to review       Volume depletion 08/02/2014     Priority: Medium     Syncope and collapse 08/01/2014     Priority: Medium     Stented coronary artery      " Priority: Medium     Cardiac pacemaker in situ      Priority: Medium     Overview:   [employer] S606 DREL,  Serial #[id number]  5-13-11  Atrial lead [employer] 4054 Serial #[id number]  8-11-00  Ventriculer lead [employer] [id number]  Serial #[id number]  5-13-11  2nd Degree AV Block   Dr. Campbell  Intrinsic:  Pt. is Pacemaker Dependant, remains paced at temp. rate of 30 bpm (6-13-14)       Coronary atherosclerosis of native coronary artery      Priority: Medium     Overview:   IMO Update 10/11       Hypercholesterolemia 04/23/2013     Priority: Medium     Advanced care planning/counseling discussion 10/30/2012     Priority: Medium     REM sleep behavior disorder 01/01/2011     Priority: Medium     Osteoarthritis 01/01/2011     Priority: Medium     Problem list name updated by automated process. Provider to review       Diaphragmatic hernia 01/01/2011     Priority: Medium     Problem list name updated by automated process. 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     Priority: Medium     Overview:   IMO Update 10/11       Essential hypertension 09/05/2006     Priority: Medium     Overview:   IMO Update          Past Medical History:    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     Seizure disorder (H)      Stented coronary artery        Past Surgical History:    Past Surgical History:   Procedure Laterality Date     ------------OTHER-------------  1955    ulnar and radial fx - repair ulnar and radial fx x4     ------------OTHER-------------  6/14/2011    cataract extraction     BIOPSY  08/2015    skin  biopsy     BLEPHAROPLASTY BILATERAL  5/6/2014    Procedure: BLEPHAROPLASTY BILATERAL;  Surgeon: Andrew Queen MD;  Location: [facility]     BYPASS GRAFT ARTERY CORONARY  11/2006    coronary artery disease x 5, [facility] [place]     cataract extraction and lens implantation  2011    cataracts     cataract extraction and lens implantation      cataracts     COLONOSCOPY  2012     colonoscopy with polypectomy  3/13/2009    history of polyps - repeat 3 yrs     colonoscopy with polypectomy  2006     colonoscopy with polypectomy  2005     COMBINED COLONOSCOPY WITH ARGON PLASMA COAGULATOR (APC) N/A 10/31/2014    Procedure: COMBINED COLONOSCOPY WITH ARGON PLASMA COAGULATOR (APC);  Surgeon: Bassam Aguilar MD;  Location: [facility]     COMBINED COLONOSCOPY WITH ARGON PLASMA COAGULATOR (APC) N/A 11/13/2015    Procedure: COMBINED COLONOSCOPY WITH ARGON PLASMA COAGULATOR (APC);  Surgeon: Bassam Aguilar MD;  Location: [facility]     ENDOSCOPY UPPER, COLONOSCOPY, COMBINED N/A 10/31/2014    Procedure: COMBINED ENDOSCOPY UPPER, COLONOSCOPY;  Surgeon: Bassam Aguilar MD;  [facility]     ENDOSCOPY UPPER, COLONOSCOPY, COMBINED N/A 11/13/2015    Procedure: COMBINED ENDOSCOPY UPPER, COLONOSCOPY;  Surgeon: Bassam Aguilar MD;  [facility]     ESOPHAGOSCOPY, GASTROSCOPY, DUODENOSCOPY (EGD), COMBINED  1/22/2014    Procedure: COMBINED ESOPHAGOSCOPY, GASTROSCOPY, DUODENOSCOPY (EGD);  UPPER ENDOSCOPY(PENA) W/ BIOPSIES;  Surgeon: Patricia Pena MD;  Location: [facility]     HERNIORRHAPHY INGUINAL Right 2/14/2018    Procedure: HERNIORRHAPHY INGUINAL;  OPEN RIGHT INGUINAL HERNIA REPAIR with Mesh;  Surgeon: Virgilio Zaragoza DO;  Location[facility]     LARYNGOSCOPY WITH MICROSCOPE  1/22/2014    Procedure: LARYNGOSCOPY WITH MICROSCOPE;;  Surgeon: Chayo Duke MD;  Location: [facility]     pacemaker placement  2000    heart block     pacemaker placement  2011    dual-chamber     REMOVE TUBE, MYRINGOTOMY, COMBINED  1/22/2014    Procedure: COMBINED REMOVE  TUBE, MYRINGOTOMY;  MICRODIRECT LARYNGOSCOPY WITH BIOPSY AND FROZEN SECTIONS removal of right ear tube and myringoplasty;  Surgeon: Chayo Duke MD;  Location: [facility]     stent placement to LAD  2008     ventilation tube  5/24/2012    right in office       Family History:    Family History   Problem Relation Age of Onset     DIABETES Mother        Social History:  Marital Status:  Single [1]  Social History   Substance Use Topics     Smoking status: Former Smoker     Packs/day: 1.00     Years: 5.00     Types: Cigarettes     Smokeless tobacco: Never Used      Comment: quit in 1985     Alcohol use Yes      Comment: every other day        Medications:      DONEPEZIL HCL PO   multivitamin, therapeutic with minerals (MULTI-VITAMIN) TABS tablet   Misc Natural Products (MENS PROSTATE HEALTH FORMULA PO)   Turmeric 500 MG TABS   5-Hydroxytryptophan (5-HTP) 100 MG CAPS   Cyanocobalamin (VITAMIN B-12 PO)   oxybutynin (DITROPAN-XL) 10 MG 24 hr tablet   tamsulosin (FLOMAX) 0.4 MG capsule   midodrine (PROAMATINE) 5 MG tablet   atorvastatin (LIPITOR) 20 MG tablet   fludrocortisone (FLORINEF) 0.1 MG tablet   sodium chloride 1 GM tablet   PANTOPRAZOLE SODIUM PO   vitamin  B complex with vitamin C (VITAMIN  B COMPLEX) TABS   MELATONIN PO   Cholecalciferol (VITAMIN D-3 PO)   senna-docusate (SENOKOT-S;PERICOLACE) 8.6-50 MG per tablet         Review of Systems   Constitutional: Positive for appetite change. Negative for chills and fever.   HENT: Negative for sore throat.    Eyes: Negative.  Negative for photophobia and visual disturbance.   Respiratory: Negative for shortness of breath.    Cardiovascular: Negative for chest pain and palpitations.   Musculoskeletal: Negative for neck pain and neck stiffness.   Skin: Negative for rash.   Neurological: Positive for weakness. Negative for dizziness, tremors, seizures, syncope, facial asymmetry, speech difficulty, light-headedness, numbness and headaches.   Psychiatric/Behavioral:  Negative.    All other systems reviewed and are negative.      Physical Exam   BP: 97/53  Pulse: 85  Heart Rate: 70 (Simultaneous filing. User may not have seen previous data.)  Temp: 96.7  F (35.9  C)  Resp: 20  Weight: 77.6 kg (171 lb)  SpO2: 98 %      Physical Exam   Constitutional: He is oriented to person, place, and time. He appears well-developed and well-nourished.  Non-toxic appearance. He does not have a sickly appearance. He does not appear ill. No distress.   HENT:   Head: Normocephalic and atraumatic.   Right Ear: Hearing, tympanic membrane, external ear and ear canal normal.   Left Ear: Hearing, tympanic membrane, external ear and ear canal normal.   Nose: Nose normal. Right sinus exhibits no maxillary sinus tenderness and no frontal sinus tenderness. Left sinus exhibits no maxillary sinus tenderness and no frontal sinus tenderness.   Mouth/Throat: Uvula is midline, oropharynx is clear and moist and mucous membranes are normal. No oropharyngeal exudate.   Eyes: Conjunctivae and EOM are normal. Pupils are equal, round, and reactive to light. Right eye exhibits no discharge. Left eye exhibits no discharge. No scleral icterus.   Fundoscopic exam:       The right eye shows no papilledema.        The left eye shows no papilledema.   Neck: Trachea normal, normal range of motion and full passive range of motion without pain. Neck supple. No JVD present. No Brudzinski's sign and no Kernig's sign noted.   Cardiovascular: Normal rate, regular rhythm, normal heart sounds and intact distal pulses.  Exam reveals no gallop and no friction rub.    No murmur heard.  Pulmonary/Chest: Effort normal and breath sounds normal. No respiratory distress. He has no wheezes. He has no rales.   Abdominal: Soft. Bowel sounds are normal. There is no tenderness.       Musculoskeletal: Normal range of motion. He exhibits no edema.   Lymphadenopathy:     He has no cervical adenopathy.   Neurological: He is alert and oriented to  person, place, and time. He has normal strength and normal reflexes. He displays no atrophy and no tremor. No cranial nerve deficit or sensory deficit. He exhibits normal muscle tone. He displays a negative Romberg sign. He displays no seizure activity. Coordination and gait normal. GCS eye subscore is 4. GCS verbal subscore is 5. GCS motor subscore is 6.   Skin: Skin is warm and dry. No rash noted. He is not diaphoretic.   Psychiatric: He has a normal mood and affect. His behavior is normal. Judgment and thought content normal.   Nursing note and vitals reviewed.      ED Course     ED Course     Procedures      EKG: Paced rhythm    Labs Ordered and Resulted from Time of ED Arrival Up to the Time of Departure from the ED   COMPREHENSIVE METABOLIC PANEL - Abnormal; Notable for the following:        Result Value    Glucose 133 (*)     Calcium 8.4 (*)     All other components within normal limits   CBC WITH PLATELETS DIFFERENTIAL   UA MACROSCOPIC WITH REFLEX TO MICRO AND CULTURE   TROPONIN I   PERIPHERAL IV CATHETER   CARDIAC CONTINUOUS MONITORING     Results for orders placed or performed during the hospital encounter of 03/05/18   Chest XR,  PA & LAT    Narrative    XR CHEST 2 VW    HISTORY: 84 years Male weakness;     COMPARISON: 8/29/2016    TECHNIQUE: 2 views of the chest were obtained.    FINDINGS: Two views of the chest were obtained. Heart size and  pulmonary vascularity are within normal limits, lungs are clear both  views. No consolidating air space opacities are present.    Again seen are changes of median sternotomy and cardiac pacemaker  placement.      Impression    IMPRESSION: Clear chest.    VIMAL PÉREZ MD         Assessments & Plan (with Medical Decision Making)   Findings consistent with mild dehydration. Jf was given a 1 liter bolus of NS and is feeling back to baseline following. He was able to ambulance down the hallway and back without weakness or difficulties. General work up normal, no  evidence of infection. Cardiac work up negative as well. He was encouraged to increased fluids at home. He was discharged home with his wife following. Yarelis, our , also met with pt, please see her notes.     I have reviewed the nursing notes.    I have reviewed the findings, diagnosis, plan and need for follow up with the patient.    Discharge Medication List as of 3/5/2018 12:46 PM          Final diagnoses:   Mild dehydration   Generalized muscle weakness       3/5/2018   [facility]     Talita Calloway PA-C  03/05/18 [id number]

## 2018-03-05 NOTE — ED AVS SNAPSHOT
[facility]    [address] [address]    Phone:  [phone]                                       Jf Ortiz   MRN: [id number]    Department:  [facility]   Date of Visit:  3/5/2018           After Visit Summary Signature Page     I have received my discharge instructions, and my questions have been answered. I have discussed any challenges I see with this plan with the nurse or doctor.    ..........................................................................................................................................  Patient/Patient Representative Signature      ..........................................................................................................................................  Patient Representative Print Name and Relationship to Patient    ..................................................               ................................................  Date                                            Time    ..........................................................................................................................................  Reviewed by Signature/Title    ...................................................              ..............................................  Date                                                            Time

## 2018-03-05 NOTE — DISCHARGE INSTRUCTIONS
Be sure to drink at least 8 glasses of water per day to stay hydrated. Please return here with any new or worsening symptoms.         Dehydration (Adult)  Dehydration occurs when your body loses too much fluid. This may be the result of prolonged vomiting or diarrhea, excessive sweating, or a high fever. It may also happen if you don t drink enough fluid when you re sick or out in the heat. Misuse of diuretics (water pills) can also be a cause.  Symptoms include thirst and decreased urine output. You may also feel dizzy, weak, fatigued, or very drowsy. The diet described below is usually enough to treat dehydration. In some cases, you may need medicine.  Home care    Drink at least 12 8-ounce glasses of fluid every day to resolve the dehydration. Fluid may include water; orange juice; lemonade; apple, grape, or cranberry juice; clear fruit drinks; electrolyte replacement and sports drinks; and teas and coffee without caffeine. Don't drink alcohol. If you have been diagnosed with a kidney disease, ask your doctor how much and what types of fluids you should drink to prevent dehydration. If you have kidney disease, fluid can build up in the body. This can be dangerous to your health.    If you have a fever, muscle aches, or a headache as a result of a cold or flu, you may take acetaminophen or ibuprofen, unless another medicine was prescribed. If you have chronic liver or kidney disease, or have ever had a stomach ulcer or gastrointestinal bleeding, talk with your healthcare provider before using these medicines. Don't take aspirin if you are younger than 18 and have a fever. Aspirin raises the chance for severe liver injury.  Follow-up care  Follow up with your healthcare provider, or as advised.  When to seek medical advice  Call your healthcare provider right away if any of these occur:    Continued vomiting    Frequent diarrhea (more than 5 times a day); blood (red or black color) or mucus in diarrhea    Blood in  vomit or stool    Swollen abdomen or increasing abdominal pain    Weakness, dizziness, or fainting    Unusual drowsiness or confusion    Reduced urine output or extreme thirst    Fever of 100.4 F (38 C) or higher  Date Last Reviewed: 5/1/2017 2000-2017 [employer]. [address] [place] [id number]. All rights reserved. This information is not intended as a substitute for professional medical care. Always follow your healthcare professional's instructions.

## 2018-03-06 DIAGNOSIS — N40.1 BENIGN PROSTATIC HYPERPLASIA WITH NOCTURIA: ICD-10-CM

## 2018-03-06 DIAGNOSIS — R35.1 BENIGN PROSTATIC HYPERPLASIA WITH NOCTURIA: ICD-10-CM

## 2018-03-08 RX ORDER — TAMSULOSIN HYDROCHLORIDE 0.4 MG/1
CAPSULE ORAL
Qty: 30 CAPSULE | OUTPATIENT
Start: 2018-03-08

## 2018-03-08 NOTE — PROGRESS NOTES
This is a late entry admission from patient's ED visit.  I did talk with both he and his significant information on care coordination.    Patient was discharged.cant person.  They are a couple who are out and about quite often.    I did speak with them about support services for him for either care coordination or palliative care.  I did provide a document sharing information regarding care coordination.    I did receive a call after they had discharged.  Des and Jf have opted after another visit to accept no services for home.

## 2018-04-03 ENCOUNTER — OFFICE VISIT (OUTPATIENT)
Dept: CARDIOLOGY | Facility: OTHER | Age: 83
End: 2018-04-03
Attending: FAMILY MEDICINE
Payer: MEDICARE

## 2018-04-03 DIAGNOSIS — I25.10 CORONARY ATHEROSCLEROSIS OF NATIVE CORONARY ARTERY: Primary | ICD-10-CM

## 2018-04-03 PROCEDURE — 93280 PM DEVICE PROGR EVAL DUAL: CPT | Mod: 26 | Performed by: INTERNAL MEDICINE

## 2018-04-03 PROCEDURE — 93280 PM DEVICE PROGR EVAL DUAL: CPT | Mod: TC

## 2018-04-03 NOTE — PATIENT INSTRUCTIONS
It was a pleasure to see you in clinic today. Please do not hesitate to call with any questions or concerns. You are scheduled for a pacemaker transtelephonic check on Tuesday May 15, 2018 at 11:00 am. Lila will call you. We look forward to seeing you in clinic at your next device check in 3 months.    Latanya Albarado, RN  Electrophysiology Nurse Clinician  [facility]  During business hours call:  [phone]  After business hours please call: [phone]- select option #4 and ask for job code [phone].

## 2018-04-03 NOTE — MR AVS SNAPSHOT
After Visit Summary   4/3/2018    Jf Ortiz    MRN: [id number]           Patient Information     Date Of Birth          10/5/1933        Visit Information        Provider Department      4/3/2018 2:30 PM HC PACEMAKER [facility] [place]        Today's Diagnoses     Coronary atherosclerosis of native coronary artery    -  1      Care Instructions    It was a pleasure to see you in clinic today. Please do not hesitate to call with any questions or concerns. You are scheduled for a pacemaker transtelephonic check on Tuesday May 15, 2018 at 11:00 am. Lila will call you. We look forward to seeing you in clinic at your next device check in 3 months.    Latanya Albarado, RN  Electrophysiology Nurse Clinician  [facility]  During business hours call:  [phone]  After business hours please call: [phone]- select option #4 and ask for job code [phone].            Follow-ups after your visit        Your next 10 appointments already scheduled     Apr 11, 2018 10:30 AM CDT   (Arrive by 10:15 AM)   New Visit with Gabriel Dupree MD   [facility] [place] ([facility] [place] )    [address]  [place] [address]   [phone]            Jul 09, 2018  2:00 PM CDT   (Arrive by 1:45 PM)   Return Visit with Herman Rivers,    [facility] [place] ([facility] [place] )    [address]  [place] [address]   [phone]            Sep 11, 2018  3:00 PM CDT   (Arrive by 2:45 PM)   Return Visit with Juan Walter MD   [facility] [place] ([facility] [place] )    [address]  [place] [address]   [phone]            Sep 11, 2018  3:30 PM CDT   (Arrive by 3:15 PM)   Return Visit with HC PACEMAKER   [facility] [place] ([facility] [place] )    [address]  [place] [address]   [phone]            Nov 16, 2018 11:15 AM CST   (Arrive by 11:00 AM)   Hearing Eval with Kirstie CASAREZ  "Junie, Kenneth   [facility] [place] ([facility] [place] )    [place] [address]   [phone]              Who to contact     If you have questions or need follow up information about today's clinic visit or your schedule please contact [facility] directly at [phone].  Normal or non-critical lab and imaging results will be communicated to you by MyChart, letter or phone within 4 business days after the clinic has received the results. If you do not hear from us within 7 days, please contact the clinic through MyChart or phone. If you have a critical or abnormal lab result, we will notify you by phone as soon as possible.  Submit refill requests through [employer] or call your pharmacy and they will forward the refill request to us. Please allow 3 business days for your refill to be completed.          Additional Information About Your Visit        [employer] Information     [employer] lets you send messages to your doctor, view your test results, renew your prescriptions, schedule appointments and more. To sign up, go to [email or web address] . Click on \"Log in\" on the left side of the screen, which will take you to the Welcome page. Then click on \"Sign up Now\" on the right side of the page.     You will be asked to enter the access code listed below, as well as some personal information. Please follow the directions to create your username and password.     Your access code is: [email or web address]  Expires: 2018 12:39 PM     Your access code will  in 90 days. If you need help or a new code, please call your [place] clinic or [phone].        Care EveryWhere ID     This is your Care EveryWhere ID. This could be used by other organizations to access your [place] medical records  [id number]         Blood Pressure from Last 3 Encounters:   18 136/84   18 128/73   18 128/70    Weight from Last 3 Encounters:   18 171 lb (77.6 kg) "   02/27/18 170 lb (77.1 kg)   02/14/18 170 lb 8 oz (77.3 kg)              We Performed the Following     PM DEVICE PROGRAMMING EVAL, DUAL LEAD PACER          Today's Medication Changes          These changes are accurate as of 4/3/18  3:01 PM.  If you have any questions, ask your nurse or doctor.               These medicines have changed or have updated prescriptions.        Dose/Directions    fludrocortisone 0.1 MG tablet   Commonly known as:  FLORINEF   This may have changed:    - how much to take  - when to take this   Used for:  Parkinson's disease (H), Orthostatic hypotension        Dose:  0.3 mg   Take 3 tablets (0.3 mg) by mouth daily   Quantity:  90 tablet   Refills:  11                Primary Care Provider Office Phone # Fax #    R Venancio Ray [phone] [address] [address]        Equal Access to Services     [facility] Hospital[facility]ANCES : Hadii job heath Sotre, waaxradha luqgildardo, qaybta kaalmada cyndi, hayley pollard . So [facility] [phone].    ATENCIÓN: Si habla español, tiene a tucker disposición servicios gratuitos de asistencia lingüística. Llame al [phone].    We comply with applicable federal civil rights laws and [place] laws. We do not discriminate on the basis of race, color, national origin, age, disability, sex, sexual orientation, or gender identity.            Thank you!     Thank you for choosing [facility]  for your care. Our goal is always to provide you with excellent care. Hearing back from our patients is one way we can continue to improve our services. Please take a few minutes to complete the written survey that you may receive in the mail after your visit with us. Thank you!             Your Updated Medication List - Protect others around you: Learn how to safely use, store and throw away your medicines at www.disposemymeds.org.          This list is accurate as of 4/3/18  3:01 PM.  Always use your most  recent med list.                   Brand Name Dispense Instructions for use Diagnosis    5- MG Caps      Take 1 tablet by mouth At Bedtime        atorvastatin 20 MG tablet    LIPITOR    90 tablet    Take 1 tablet (20 mg) by mouth every evening    Hypercholesterolemia       DONEPEZIL HCL PO      Take 5 mg by mouth        fludrocortisone 0.1 MG tablet    FLORINEF    90 tablet    Take 3 tablets (0.3 mg) by mouth daily    Parkinson's disease (H), Orthostatic hypotension       MELATONIN PO      Take 5 mg by mouth At Bedtime        MENS PROSTATE HEALTH FORMULA PO      Take 1 tablet by mouth 2 times daily        midodrine 5 MG tablet    PROAMATINE    180 tablet    TAKE 2 TABLETS (10MG) BY MOUTH THREE TIMES DAILY    Parkinson's disease (H)       Multi-vitamin Tabs tablet      Take 1 tablet by mouth daily        oxybutynin 10 MG 24 hr tablet    DITROPAN-XL    30 tablet    Take 1 tablet (10 mg) by mouth daily    OAB (overactive bladder)       PANTOPRAZOLE SODIUM PO      Take 40 mg by mouth 2 times daily (before meals)        senna-docusate 8.6-50 MG per tablet    SENOKOT-S;PERICOLACE    30 tablet    Take 1-2 tablets by mouth 2 times daily Take while on oral narcotics to prevent or treat constipation.    S/P inguinal hernia repair       sodium chloride 1 GM tablet     60 tablet    TAKE 1 TABLET BY MOUTH TWICE A DAY    Parkinson's disease (H), Orthostatic hypotension       tamsulosin 0.4 MG capsule    FLOMAX    30 capsule    Take 1 capsule (0.4 mg) by mouth daily    Benign prostatic hyperplasia with nocturia       Turmeric 500 MG Tabs      Take 1 tablet by mouth daily        vitamin B complex with vitamin C Tabs tablet      Take 1 tablet by mouth daily        VITAMIN B-12 PO      Take 1 tablet by mouth daily        VITAMIN D-3 PO      Take 1,000 Int'l Units by mouth daily

## 2018-04-03 NOTE — PROGRESS NOTES
Preliminary Device Interrogation Results.  Final physician signed paceart report to be scanned and attached.    Pt seen in the [facility] for evaluation and iterative programming of a [employer], dual lead pacemaker, per MD orders. Today his intrinsic rhythm is Manpreet 44 with CHB- ventricular rate is <30 bpm. Normal pacemaker function. 1 ATR episode recorded over the last 3 months. The EGM shows 4 AT beats. Lead trends appear stable. AP= 92% and = 100%. Pt reports he is feeling well. Battery estimates <0.5 years to MAYA. Plan for pt to do TTM on 5/15/18 and 6/26/18 returning to clinic in 4 months to establish care with Dr. Walter and have device checked.  Dual lead pacemaker

## 2018-04-11 ENCOUNTER — OFFICE VISIT (OUTPATIENT)
Dept: UROLOGY | Facility: OTHER | Age: 83
End: 2018-04-11
Attending: UROLOGY
Payer: MEDICARE

## 2018-04-11 VITALS
TEMPERATURE: 98.4 F | SYSTOLIC BLOOD PRESSURE: 104 MMHG | WEIGHT: 171 LBS | DIASTOLIC BLOOD PRESSURE: 72 MMHG | HEART RATE: 71 BPM | RESPIRATION RATE: 16 BRPM | OXYGEN SATURATION: 98 % | HEIGHT: 65 IN | BODY MASS INDEX: 28.49 KG/M2

## 2018-04-11 DIAGNOSIS — N32.81 OAB (OVERACTIVE BLADDER): Primary | ICD-10-CM

## 2018-04-11 DIAGNOSIS — K59.00 CONSTIPATION, UNSPECIFIED CONSTIPATION TYPE: ICD-10-CM

## 2018-04-11 PROCEDURE — 51798 US URINE CAPACITY MEASURE: CPT

## 2018-04-11 PROCEDURE — G0463 HOSPITAL OUTPT CLINIC VISIT: HCPCS | Mod: 25

## 2018-04-11 PROCEDURE — 99214 OFFICE O/P EST MOD 30 MIN: CPT | Performed by: UROLOGY

## 2018-04-11 ASSESSMENT — PAIN SCALES - GENERAL: PAINLEVEL: NO PAIN (0)

## 2018-04-11 NOTE — NURSING NOTE
"Chief Complaint   Patient presents with     Consult     Sushil patient follow up OAB, BPH. Has not started the flomax due to concerns about low blood pressure. Azo OTC is being taken.       Initial /72  Pulse 71  Temp 98.4  F (36.9  C) (Tympanic)  Resp 16  Ht 1.651 m (5' 5\")  Wt 77.6 kg (171 lb)  SpO2 98%  BMI 28.46 kg/m2 Estimated body mass index is 28.46 kg/(m^2) as calculated from the following:    Height as of this encounter: 1.651 m (5' 5\").    Weight as of this encounter: 77.6 kg (171 lb).  Medication Reconciliation: complete   Review of Systems:    Weight loss:    No     Recent fever/chills:  No   Night sweats:   No  Current skin rash:  No   Recent hair loss:  No  Heat intolerance:  No   Cold intolerance:  No  Chest pain:   No   Palpitations:   No  Shortness of breath:  No   Wheezing:   No  Constipation:    yes   Diarrhea:   No   Nausea:   No   Vomiting:   No   Kidney/side pain:  No   Back pain:   No  Frequent headaches:  No   Dizziness:     yes  Leg swelling:   Yes                                                  Calf pain:    No    Parents, brothers or sisters with history of kidney cancer?   No  Parents, brothers or sisters with history of bladder cancer: No      "

## 2018-04-11 NOTE — PROGRESS NOTES
Type of Visit  NPV    Chief Complaint  Urgency and urge incontinence   constipation    HPI  Mr. Ortiz is a 84 year old male w/h/o Lewy body dementia who presents with urgency and urge incontinence.  His urinary complaints started about 5 years ago.  He has tried oxybutynin and Detrol in the past 2 years with no benefit.  He has not had previous prostate procedures.  He has not had catheters in the past due to urinary retention.  Recently prescribed Flomax but has low BP issues.    Constipation   Yes   He takes OTC stool softener with minimal benefit.  Started 20 years ago.  Stool is firm.  Problem is worsening.      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  Patient Active Problem List   Diagnosis     Hypercholesterolemia     REM sleep behavior disorder     Advanced care planning/counseling discussion     Osteoarthritis     Diaphragmatic hernia     Stented coronary artery     Coronary atherosclerosis of native coronary artery     Syncope and collapse     Volume depletion     Hypertrophy of prostate with urinary obstruction     Parkinson disease (H)     Seizure disorder (H)     Dementia without behavioral disturbance     Hypocalcemia     Autonomic orthostatic hypotension     Lewy body dementia without behavioral disturbance     Dysphagia     Essential hypertension     Fatigue     Urinary incontinence     Postsurgical aortocoronary bypass status     Pacemaker, [employer], Dual Chamber - Dependent       Past Surgical History  He  has a past surgical history that includes ------------other------------- (1955); colonoscopy with polypectomy (3/13/2009); pacemaker  placement (2000); cataract extraction and lens implantation (2011); cataract extraction and lens implantation; stent placement to LAD (2008); colonoscopy with polypectomy (2006); pacemaker placement (2011); colonoscopy with polypectomy (2005); ventilation tube (5/24/2012); ------------other------------- (6/14/2011); colonoscopy (2012); Esophagoscopy, gastroscopy, duodenoscopy (EGD), combined (1/22/2014); Remove tube, myringotomy, combined (1/22/2014); Laryngoscopy with microscope (1/22/2014); Blepharoplasty bilateral (5/6/2014); Endoscopy upper, colonoscopy, combined (N/A, 10/31/2014); Combined Colonoscopy With Argon Plasma Coagulator (Apc) (N/A, 10/31/2014); biopsy (08/2015); Endoscopy upper, colonoscopy, combined (N/A, 11/13/2015); Combined Colonoscopy With Argon Plasma Coagulator (Apc) (N/A, 11/13/2015); Bypass graft artery coronary (11/2006); and Herniorrhaphy inguinal (Right, 2/14/2018).    Medications  He has a current medication list which includes the following prescription(s): phenazopyridine hcl, donepezil hcl, senna-docusate, multivitamin, therapeutic with minerals, misc natural products, turmeric, 5-htp, cyanocobalamin, oxybutynin, midodrine, atorvastatin, fludrocortisone, sodium chloride, pantoprazole sodium, vitamin b complex with vitamin c, melatonin, cholecalciferol, and tamsulosin.    Allergies  Allergies   Allergen Reactions     Cats Other (See Comments)     Lamotrigine      Skin lesions       Social History  He  reports that he has quit smoking. His smoking use included Cigarettes. He has a 5.00 pack-year smoking history. He has never used smokeless tobacco. He reports that he drinks alcohol. He reports that he does not use illicit drugs.  No drug abuse.    Family History  Family History   Problem Relation Age of Onset     DIABETES Mother        Review of Systems  I personally reviewed the ROS with the patient.    Nursing Notes:   Char Urbina LPN  4/11/2018 10:29 AM  Unsigned  Chief  "Complaint   Patient presents with     Consult     Sushil patient follow up OAB, BPH. Has not started the flomax due to concerns about low blood pressure. Azo OTC is being taken.       Initial /72  Pulse 71  Temp 98.4  F (36.9  C) (Tympanic)  Resp 16  Ht 1.651 m (5' 5\")  Wt 77.6 kg (171 lb)  SpO2 98%  BMI 28.46 kg/m2 Estimated body mass index is 28.46 kg/(m^2) as calculated from the following:    Height as of this encounter: 1.651 m (5' 5\").    Weight as of this encounter: 77.6 kg (171 lb).  Medication Reconciliation: complete   Review of Systems:    Weight loss:    No     Recent fever/chills:  No   Night sweats:   No  Current skin rash:  No   Recent hair loss:  No  Heat intolerance:  No   Cold intolerance:  No  Chest pain:   No   Palpitations:   No  Shortness of breath:  No   Wheezing:   No  Constipation:    yes   Diarrhea:   No   Nausea:   No   Vomiting:   No   Kidney/side pain:  No   Back pain:   No  Frequent headaches:  No   Dizziness:     yes  Leg swelling:   Yes                                                  Calf pain:    No    Parents, brothers or sisters with history of kidney cancer?   No  Parents, brothers or sisters with history of bladder cancer: No        Physical Exam  Vitals:    04/11/18 1021   BP: 104/72   Pulse: 71   Resp: 16   Temp: 98.4  F (36.9  C)   TempSrc: Tympanic   SpO2: 98%   Weight: 77.6 kg (171 lb)   Height: 1.651 m (5' 5\")     Constitutional: No acute distress.  Alert and cooperative   Head: NCAT  Eyes: Conjunctivae normal  Cardiovascular: Regular rate.  Pulmonary/Chest: Respirations are even and non-labored bilaterally, no audible wheezing  Abdominal: Soft. No distension, tenderness, masses or guarding.   Neurological: A + O x 3.  Cranial Nerves II-XII grossly intact.  Extremities: OG x 4, Warm. No clubbing.  No cyanosis.    Skin: Pink, warm and dry.  No visible rashes noted.  Psychiatric:  Normal mood and affect  Back:  No left CVA tenderness.  No right CVA " tenderness.  Genitourinary:  Nonpalpable bladder    Labs  Results for orders placed or performed during the hospital encounter of 03/05/18   Chest XR,  PA & LAT    Narrative    XR CHEST 2 VW    HISTORY: 84 years Male weakness;     COMPARISON: 8/29/2016    TECHNIQUE: 2 views of the chest were obtained.    FINDINGS: Two views of the chest were obtained. Heart size and  pulmonary vascularity are within normal limits, lungs are clear both  views. No consolidating air space opacities are present.    Again seen are changes of median sternotomy and cardiac pacemaker  placement.      Impression    IMPRESSION: Clear chest.    VIMAL PÉREZ MD   CBC with platelets differential   Result Value Ref Range    WBC 5.2 4.0 - 11.0 10e9/L    RBC Count 4.56 4.4 - 5.9 10e12/L    Hemoglobin 14.2 13.3 - 17.7 g/dL    Hematocrit 41.6 40.0 - 53.0 %    MCV 91 78 - 100 fl    MCH 31.1 26.5 - 33.0 pg    MCHC 34.1 31.5 - 36.5 g/dL    RDW 12.4 10.0 - 15.0 %    Platelet Count 239 150 - 450 10e9/L    Diff Method Automated Method     % Neutrophils 62.3 %    % Lymphocytes 25.0 %    % Monocytes 7.3 %    % Eosinophils 4.6 %    % Basophils 0.6 %    % Immature Granulocytes 0.2 %    Nucleated RBCs 0 0 /100    Absolute Neutrophil 3.3 1.6 - 8.3 10e9/L    Absolute Lymphocytes 1.3 0.8 - 5.3 10e9/L    Absolute Monocytes 0.4 0.0 - 1.3 10e9/L    Absolute Eosinophils 0.2 0.0 - 0.7 10e9/L    Absolute Basophils 0.0 0.0 - 0.2 10e9/L    Abs Immature Granulocytes 0.0 0 - 0.4 10e9/L    Absolute Nucleated RBC 0.0    Comprehensive metabolic panel   Result Value Ref Range    Sodium 137 133 - 144 mmol/L    Potassium 4.2 3.4 - 5.3 mmol/L    Chloride 104 94 - 109 mmol/L    Carbon Dioxide 27 20 - 32 mmol/L    Anion Gap 6 3 - 14 mmol/L    Glucose 133 (H) 70 - 99 mg/dL    Urea Nitrogen 13 7 - 30 mg/dL    Creatinine 0.93 0.66 - 1.25 mg/dL    GFR Estimate 77 >60 mL/min/1.7m2    GFR Estimate If Black >90 >60 mL/min/1.7m2    Calcium 8.4 (L) 8.5 - 10.1 mg/dL    Bilirubin Total  0.8 0.2 - 1.3 mg/dL    Albumin 3.4 3.4 - 5.0 g/dL    Protein Total 7.1 6.8 - 8.8 g/dL    Alkaline Phosphatase 57 40 - 150 U/L    ALT 20 0 - 70 U/L    AST 17 0 - 45 U/L   UA reflex to Microscopic and Culture   Result Value Ref Range    Color Urine Yellow     Appearance Urine Clear     Glucose Urine Negative NEG^Negative mg/dL    Bilirubin Urine Negative NEG^Negative    Ketones Urine Negative NEG^Negative mg/dL    Specific Gravity Urine 1.004 1.003 - 1.035    Blood Urine Negative NEG^Negative    pH Urine 7.5 4.7 - 8.0 pH    Protein Albumin Urine Negative NEG^Negative mg/dL    Urobilinogen mg/dL Normal 0.0 - 2.0 mg/dL    Nitrite Urine Negative NEG^Negative    Leukocyte Esterase Urine Negative NEG^Negative    Source Midstream Urine    Troponin I   Result Value Ref Range    Troponin I ES <0.015 0.000 - 0.045 ug/L       Post-Void Residual  A post-void residual was measured by ultrasonic bladder scanner.  15 mL    Assessment  Mr. Ortiz is a 84 year old male w/h/o Lewy body dementia who presents with urgency and urge incontinence.  Explained how constipation is contributing to his urinary symptoms.  He has failed anticholinergics.  Interstim is unreasonable.  Botox is contraindicated  PTNS would lead to too much travel.    Plan  D/C Flomax  Start Miralax for constipation  Follow up with me prn

## 2018-04-11 NOTE — MR AVS SNAPSHOT
After Visit Summary   4/11/2018    Jf Ortiz    MRN: [id number]           Patient Information     Date Of Birth          10/5/1933        Visit Information        Provider Department      4/11/2018 10:30 AM Gabriel Dupree MD [facility]        Today's Diagnoses     OAB (overactive bladder)    -  1    Constipation, unspecified constipation type           Follow-ups after your visit        Your next 10 appointments already scheduled     Jul 09, 2018  2:00 PM CDT   (Arrive by 1:45 PM)   Return Visit with Herman Rivers DO   [facility] [place] ([facility] [place] [facility]    [address]  [place] [address]   [phone]            Sep 11, 2018  3:00 PM CDT   (Arrive by 2:45 PM)   Return Visit with Juan Walter MD   [facility] [place] ([facility] [place] [facility]    [address]  [place] [address]   [phone]            Sep 11, 2018  3:30 PM CDT   (Arrive by 3:15 PM)   Return Visit with  PACEMAKER   [facility] [place] ([facility] [place] [facility]    [address]  [place] [address]   [phone]            Nov 16, 2018 11:15 AM CST   (Arrive by 11:00 AM)   Hearing Eval with Kenneth Meyers   [facility] [place] ([facility] [place] )    [address]  [place] [address]   [phone]              Future tests that were ordered for you today     Open Future Orders        Priority Expected Expires Ordered    UA reflex to Microscopic and Culture - [place] Routine  4/11/2019 4/11/2018            Who to contact     If you have questions or need follow up information about today's clinic visit or your schedule please contact [facility] directly at [phone].  Normal or non-critical lab and imaging results will be communicated to you by MyChart, letter or phone within 4 business days after the clinic has received the results. If you do not hear from us within 7 days, please contact the  "clinic through BI-SAM [employer] or phone. If you have a critical or abnormal lab result, we will notify you by phone as soon as possible.  Submit refill requests through DB3 Mobile or call your pharmacy and they will forward the refill request to us. Please allow 3 business days for your refill to be completed.          Additional Information About Your Visit        [employer] Information     [employer] Mobile lets you send messages to your doctor, view your test results, renew your prescriptions, schedule appointments and more. To sign up, go to [email or web address] . Click on \"Log in\" on the left side of the screen, which will take you to the Welcome page. Then click on \"Sign up Now\" on the right side of the page.     You will be asked to enter the access code listed below, as well as some personal information. Please follow the directions to create your username and password.     Your access code is: [email or web address]  Expires: 7/10/2018 10:53 AM     Your access code will  in 90 days. If you need help or a new code, please call your [place] clinic or [phone].        Care EveryWhere ID     This is your Care EveryWhere ID. This could be used by other organizations to access your [place] medical records  [id number]        Your Vitals Were     Pulse Temperature Respirations Height Pulse Oximetry BMI (Body Mass Index)    71 98.4  F (36.9  C) (Tympanic) 16 1.651 m (5' 5\") 98% 28.46 kg/m2       Blood Pressure from Last 3 Encounters:   18 104/72   18 136/84   18 128/73    Weight from Last 3 Encounters:   18 77.6 kg (171 lb)   18 77.6 kg (171 lb)   18 77.1 kg (170 lb)                 Today's Medication Changes          These changes are accurate as of 18  1:11 PM.  If you have any questions, ask your nurse or doctor.               These medicines have changed or have updated prescriptions.        Dose/Directions    fludrocortisone 0.1 MG tablet   Commonly known as:  FLORINEF   This may have " changed:    - how much to take  - when to take this   Used for:  Parkinson's disease (H), Orthostatic hypotension        Dose:  0.3 mg   Take 3 tablets (0.3 mg) by mouth daily   Quantity:  90 tablet   Refills:  11                Primary Care Provider Office Phone # Fax #    R Venancio Ray [phone] [address] [address]        Equal Access to Services     TONY VÁZQUEZ : Hadii job ku hadasho Soomaali, waaxda luqadaha, qaybta kaalmada adeegyada, waxay feleciain haysharan adejaneen gamezdebbiemaryanne pollard . So [facility] [phone].    ATENCIÓN: Si habla espwade, tiene a tucker disposición servicios gratuitos de asistencia lingüística. Llame al [phone].    We comply with applicable federal civil rights laws and [place] laws. We do not discriminate on the basis of race, color, national origin, age, disability, sex, sexual orientation, or gender identity.            Thank you!     Thank you for choosing [facility]  for your care. Our goal is always to provide you with excellent care. Hearing back from our patients is one way we can continue to improve our services. Please take a few minutes to complete the written survey that you may receive in the mail after your visit with us. Thank you!             Your Updated Medication List - Protect others around you: Learn how to safely use, store and throw away your medicines at www.disposemymeds.org.          This list is accurate as of 4/11/18  1:11 PM.  Always use your most recent med list.                   Brand Name Dispense Instructions for use Diagnosis    5- MG Caps      Take 1 tablet by mouth At Bedtime        atorvastatin 20 MG tablet    LIPITOR    90 tablet    Take 1 tablet (20 mg) by mouth every evening    Hypercholesterolemia       AZO TABS PO      Take 1 capsule by mouth At Bedtime        DONEPEZIL HCL PO      Take 5 mg by mouth        fludrocortisone 0.1 MG tablet    FLORINEF    90 tablet    Take 3 tablets (0.3 mg) by  mouth daily    Parkinson's disease (H), Orthostatic hypotension       MELATONIN PO      Take 5 mg by mouth At Bedtime        MENS PROSTATE HEALTH FORMULA PO      Take 1 tablet by mouth 2 times daily        midodrine 5 MG tablet    PROAMATINE    180 tablet    TAKE 2 TABLETS (10MG) BY MOUTH THREE TIMES DAILY    Parkinson's disease (H)       Multi-vitamin Tabs tablet      Take 1 tablet by mouth daily        oxybutynin 10 MG 24 hr tablet    DITROPAN-XL    30 tablet    Take 1 tablet (10 mg) by mouth daily    OAB (overactive bladder)       PANTOPRAZOLE SODIUM PO      Take 40 mg by mouth 2 times daily (before meals)        senna-docusate 8.6-50 MG per tablet    SENOKOT-S;PERICOLACE    30 tablet    Take 1-2 tablets by mouth 2 times daily Take while on oral narcotics to prevent or treat constipation.    S/P inguinal hernia repair       sodium chloride 1 GM tablet     60 tablet    TAKE 1 TABLET BY MOUTH TWICE A DAY    Parkinson's disease (H), Orthostatic hypotension       tamsulosin 0.4 MG capsule    FLOMAX    30 capsule    Take 1 capsule (0.4 mg) by mouth daily    Benign prostatic hyperplasia with nocturia       Turmeric 500 MG Tabs      Take 1 tablet by mouth daily        vitamin B complex with vitamin C Tabs tablet      Take 1 tablet by mouth daily        VITAMIN B-12 PO      Take 1 tablet by mouth daily        VITAMIN D-3 PO      Take 1,000 Int'l Units by mouth daily

## 2018-05-11 DIAGNOSIS — G20.A1 PARKINSON'S DISEASE (H): ICD-10-CM

## 2018-05-14 ENCOUNTER — OFFICE VISIT (OUTPATIENT)
Dept: AUDIOLOGY | Facility: OTHER | Age: 83
End: 2018-05-14
Attending: AUDIOLOGIST
Payer: MEDICARE

## 2018-05-14 DIAGNOSIS — H90.3 SENSORINEURAL HEARING LOSS, BILATERAL: Primary | ICD-10-CM

## 2018-05-14 PROCEDURE — V5299 HEARING SERVICE: HCPCS | Performed by: AUDIOLOGIST

## 2018-05-14 RX ORDER — MIDODRINE HYDROCHLORIDE 5 MG/1
TABLET ORAL
Qty: 180 TABLET | Refills: 3 | Status: SHIPPED | OUTPATIENT
Start: 2018-05-14 | End: 2018-07-11

## 2018-05-14 NOTE — MR AVS SNAPSHOT
After Visit Summary   5/14/2018    Jf Ortiz    MRN: [id number]           Patient Information     Date Of Birth          10/5/1933        Visit Information        Provider Department      5/14/2018 2:00 PM Kirstie Zaman AuD [facility] [place]        Today's Diagnoses     Sensorineural hearing loss, bilateral    -  1       Follow-ups after your visit        Your next 10 appointments already scheduled     May 16, 2018 10:45 AM CDT   (Arrive by 10:30 AM)   New Visit with MEGHA Huber CNP   [facility] [place] ([facility] [place] )    [address]  [place] [address]   [phone]            Jul 09, 2018  2:00 PM CDT   (Arrive by 1:45 PM)   Return Visit with Herman Rivers DO   [facility] [place] ([facility] [place] )    [address]  [place] [address]   [phone]            Sep 11, 2018  3:00 PM CDT   (Arrive by 2:45 PM)   Return Visit with Juan Walter MD   [facility] [place] ([facility] [place] )    [address]  [place] [address]   [phone]            Sep 11, 2018  3:30 PM CDT   (Arrive by 3:15 PM)   Return Visit with HALLIE JERNIGAN   [facility] [place] ([facility] [place] )    [address]  [place] [address]   [phone]            Nov 16, 2018 11:15 AM CST   (Arrive by 11:00 AM)   Hearing Eval with Kenneth Meyers   [facility] [place] ([facility] [place] )    [address]  [place] [address]   [phone]              Who to contact     If you have questions or need follow up information about today's clinic visit or your schedule please contact [facility] directly at [phone].  Normal or non-critical lab and imaging results will be communicated to you by MyChart, letter or phone within 4 business days after the clinic has received the results. If you do not hear from us within 7 days, please contact the clinic through  "GupShuphart or phone. If you have a critical or abnormal lab result, we will notify you by phone as soon as possible.  Submit refill requests through Eyelation or call your pharmacy and they will forward the refill request to us. Please allow 3 business days for your refill to be completed.          Additional Information About Your Visit        [employer] Information     Eyelation lets you send messages to your doctor, view your test results, renew your prescriptions, schedule appointments and more. To sign up, go to [email or web address] . Click on \"Log in\" on the left side of the screen, which will take you to the Welcome page. Then click on \"Sign up Now\" on the right side of the page.     You will be asked to enter the access code listed below, as well as some personal information. Please follow the directions to create your username and password.     Your access code is: [email or web address]  Expires: 7/10/2018 10:53 AM     Your access code will  in 90 days. If you need help or a new code, please call your [place] clinic or [phone].        Care EveryWhere ID     This is your Care EveryWhere ID. This could be used by other organizations to access your [place] medical records  [id number]         Blood Pressure from Last 3 Encounters:   18 104/72   18 136/84   18 128/73    Weight from Last 3 Encounters:   18 171 lb (77.6 kg)   18 171 lb (77.6 kg)   18 170 lb (77.1 kg)              Today, you had the following     No orders found for display         Today's Medication Changes          These changes are accurate as of 18  2:33 PM.  If you have any questions, ask your nurse or doctor.               These medicines have changed or have updated prescriptions.        Dose/Directions    fludrocortisone 0.1 MG tablet   Commonly known as:  FLORINEF   This may have changed:    - how much to take  - when to take this   Used for:  Parkinson's disease (H), Orthostatic hypotension        " Dose:  0.3 mg   Take 3 tablets (0.3 mg) by mouth daily   Quantity:  90 tablet   Refills:  11                Primary Care Provider Office Phone # Fax #    R Venancio Ray [phone] [address] [facility] [address]        Equal Access to Services     [facility] KATHIE : Hadii job ku hadasho Soomaali, waaxda luqadaha, qaybta kaalmada adeegyada, waxparag ma fabiolajean gamezdebbiemaryanne mauro. So [facility] [phone].    ATENCIÓN: Si habla español, tiene a tucker disposición servicios gratuitos de asistencia lingüística. [facility] [phone].    We comply with applicable federal civil rights laws and [place] laws. We do not discriminate on the basis of race, color, national origin, age, disability, sex, sexual orientation, or gender identity.            Thank you!     Thank you for choosing [facility]  for your care. Our goal is always to provide you with excellent care. Hearing back from our patients is one way we can continue to improve our services. Please take a few minutes to complete the written survey that you may receive in the mail after your visit with us. Thank you!             Your Updated Medication List - Protect others around you: Learn how to safely use, store and throw away your medicines at www.disposemymeds.org.          This list is accurate as of 5/14/18  2:33 PM.  Always use your most recent med list.                   Brand Name Dispense Instructions for use Diagnosis    5- MG Caps      Take 1 tablet by mouth At Bedtime        atorvastatin 20 MG tablet    LIPITOR    90 tablet    Take 1 tablet (20 mg) by mouth every evening    Hypercholesterolemia       AZO TABS PO      Take 1 capsule by mouth At Bedtime        DONEPEZIL HCL PO      Take 5 mg by mouth        fludrocortisone 0.1 MG tablet    FLORINEF    90 tablet    Take 3 tablets (0.3 mg) by mouth daily    Parkinson's disease (H), Orthostatic hypotension       MELATONIN PO      Take 5 mg by mouth At Bedtime         MENS PROSTATE HEALTH FORMULA PO      Take 1 tablet by mouth 2 times daily        midodrine 5 MG tablet    PROAMATINE    180 tablet    TAKE 2 TABLETS (10MG) BY MOUTH THREE TIMES DAILY    Parkinson's disease (H)       Multi-vitamin Tabs tablet      Take 1 tablet by mouth daily        PANTOPRAZOLE SODIUM PO      Take 40 mg by mouth 2 times daily (before meals)        senna-docusate 8.6-50 MG per tablet    SENOKOT-S;PERICOLACE    30 tablet    Take 1-2 tablets by mouth 2 times daily Take while on oral narcotics to prevent or treat constipation.    S/P inguinal hernia repair       sodium chloride 1 GM tablet     60 tablet    TAKE 1 TABLET BY MOUTH TWICE A DAY    Parkinson's disease (H), Orthostatic hypotension       tamsulosin 0.4 MG capsule    FLOMAX    30 capsule    Take 1 capsule (0.4 mg) by mouth daily    Benign prostatic hyperplasia with nocturia       Turmeric 500 MG Tabs      Take 1 tablet by mouth daily        vitamin B complex with vitamin C Tabs tablet      Take 1 tablet by mouth daily        VITAMIN B-12 PO      Take 1 tablet by mouth daily        VITAMIN D-3 PO      Take 1,000 Int'l Units by mouth daily

## 2018-05-14 NOTE — TELEPHONE ENCOUNTER
.Midodrine  Last Office Visit: 2/9/18  Last Refill Date:1/15/18  # 180          Refills  3      Thank you!

## 2018-05-16 ENCOUNTER — OFFICE VISIT (OUTPATIENT)
Dept: OTOLARYNGOLOGY | Facility: OTHER | Age: 83
End: 2018-05-16
Attending: NURSE PRACTITIONER
Payer: MEDICARE

## 2018-05-16 VITALS
SYSTOLIC BLOOD PRESSURE: 160 MMHG | WEIGHT: 171 LBS | HEIGHT: 65 IN | BODY MASS INDEX: 28.49 KG/M2 | OXYGEN SATURATION: 96 % | HEART RATE: 70 BPM | TEMPERATURE: 98 F | DIASTOLIC BLOOD PRESSURE: 80 MMHG

## 2018-05-16 DIAGNOSIS — H90.3 ASNHL (ASYMMETRICAL SENSORINEURAL HEARING LOSS): Primary | ICD-10-CM

## 2018-05-16 DIAGNOSIS — H61.23 IMPACTED CERUMEN, BILATERAL: ICD-10-CM

## 2018-05-16 DIAGNOSIS — Z96.22 S/P MYRINGOTOMY WITH INSERTION OF TUBE: ICD-10-CM

## 2018-05-16 PROCEDURE — 92504 EAR MICROSCOPY EXAMINATION: CPT | Performed by: NURSE PRACTITIONER

## 2018-05-16 PROCEDURE — 99213 OFFICE O/P EST LOW 20 MIN: CPT | Mod: 25 | Performed by: NURSE PRACTITIONER

## 2018-05-16 PROCEDURE — G0463 HOSPITAL OUTPT CLINIC VISIT: HCPCS

## 2018-05-16 ASSESSMENT — PAIN SCALES - GENERAL: PAINLEVEL: NO PAIN (0)

## 2018-05-16 NOTE — PATIENT INSTRUCTIONS
Ears cleaned today. Ears look good.    Follow up in 6 months, ear cleaning first, then audiogram    Follow up sooner as needed.      Thank you for allowing Marybeth Talbert CNP and our ENT team to participate in your care.  If your medications are too expensive, please give the nurse a call.  We can possibly change this medication.  If you have a scheduling or an appointment question please contact [facility] Unit Coordinator at their direct line [phone].   ALL nursing questions or concerns can be directed to your ENT nurse at: [phone]- Jhzc

## 2018-05-16 NOTE — NURSING NOTE
"Chief Complaint   Patient presents with     Cerumen Impaction     ear cleaning        Initial /80 (BP Location: Right arm, Patient Position: Chair, Cuff Size: Adult Regular)  Pulse 70  Temp 98  F (36.7  C) (Tympanic)  Ht 5' 5\" (1.651 m)  Wt 171 lb (77.6 kg)  SpO2 96%  BMI 28.46 kg/m2 Estimated body mass index is 28.46 kg/(m^2) as calculated from the following:    Height as of this encounter: 5' 5\" (1.651 m).    Weight as of this encounter: 171 lb (77.6 kg).  Medication Reconciliation: complete    Екатерина Bae LPN    "

## 2018-05-16 NOTE — MR AVS SNAPSHOT
After Visit Summary   5/16/2018    Jf Ortiz    MRN: [id number]           Patient Information     Date Of Birth          10/5/1933        Visit Information        Provider Department      5/16/2018 10:45 AM Marybeth Talbert APRN CNP [facility] [place]        Care Instructions    Ears cleaned today. Ears look good.    Follow up in 6 months, ear cleaning first, then audiogram    Follow up sooner as needed.      Thank you for allowing Marybeth Talbert CNP and our ENT team to participate in your care.  If your medications are too expensive, please give the nurse a call.  We can possibly change this medication.  If you have a scheduling or an appointment question please contact [facility] Unit Coordinator at their direct line [phone].   ALL nursing questions or concerns can be directed to your ENT nurse at: [phone]- Dioni            Follow-ups after your visit        Follow-up notes from your care team     Return in about 6 months (around 11/16/2018) for ear cleaning first, then audiogram .      Your next 10 appointments already scheduled     Jul 09, 2018  2:00 PM CDT   (Arrive by 1:45 PM)   Return Visit with Herman Rivers,    [facility] [place] ([facility] [place] [facility]    [address]  [place] [address]   [phone]            Sep 11, 2018  3:00 PM CDT   (Arrive by 2:45 PM)   Return Visit with Juan Walter MD   [facility] [place] ([facility] [place] )    [address]  [place] [address]   [phone]            Sep 11, 2018  3:30 PM CDT   (Arrive by 3:15 PM)   Return Visit with  PACEMAKER   [facility] [place] ([facility] [place] [facility]    [address]  [place] [address]   [phone]            Nov 16, 2018 11:15 AM CST   (Arrive by 11:00 AM)   Hearing Eval with Kenneth Meyers   [facility] [place] ([facility] [place] )    [address]  [place] [address]   [phone]  "             Who to contact     If you have questions or need follow up information about today's clinic visit or your schedule please contact [facility] directly at [phone].  Normal or non-critical lab and imaging results will be communicated to you by MyChart, letter or phone within 4 business days after the clinic has received the results. If you do not hear from us within 7 days, please contact the clinic through MyChart or phone. If you have a critical or abnormal lab result, we will notify you by phone as soon as possible.  Submit refill requests through ZEturf or call your pharmacy and they will forward the refill request to us. Please allow 3 business days for your refill to be completed.          Additional Information About Your Visit        ZE[employer]f Information     ZE[employer]f lets you send messages to your doctor, view your test results, renew your prescriptions, schedule appointments and more. To sign up, go to [email or web address] . Click on \"Log in\" on the left side of the screen, which will take you to the Welcome page. Then click on \"Sign up Now\" on the right side of the page.     You will be asked to enter the access code listed below, as well as some personal information. Please follow the directions to create your username and password.     Your access code is: [email or web address]  Expires: 7/10/2018 10:53 AM     Your access code will  in 90 days. If you need help or a new code, please call your [place] clinic or [phone].        Care EveryWhere ID     This is your Care EveryWhere ID. This could be used by other organizations to access your [place] medical records  [id number]        Your Vitals Were     Pulse Temperature Height Pulse Oximetry BMI (Body Mass Index)       70 98  F (36.7  C) (Tympanic) 5' 5\" (1.651 m) 96% 28.46 kg/m2        Blood Pressure from Last 3 Encounters:   18 160/80   18 104/72   18 136/84    Weight from Last 3 Encounters: "   05/16/18 171 lb (77.6 kg)   04/11/18 171 lb (77.6 kg)   03/05/18 171 lb (77.6 kg)              Today, you had the following     No orders found for display         Today's Medication Changes          These changes are accurate as of 5/16/18 11:10 AM.  If you have any questions, ask your nurse or doctor.               These medicines have changed or have updated prescriptions.        Dose/Directions    fludrocortisone 0.1 MG tablet   Commonly known as:  FLORINEF   This may have changed:    - how much to take  - when to take this   Used for:  Parkinson's disease (H), Orthostatic hypotension        Dose:  0.3 mg   Take 3 tablets (0.3 mg) by mouth daily   Quantity:  90 tablet   Refills:  11                Primary Care Provider Office Phone # Fax #    R Venancio Ray [phone] [address] [address]        Equal Access to Services     TONY VÁZQUEZ AH: Hadii job simmonso Sotre, waaxda luqadaha, qaybta kaalmada adeegyaradha, hayley pollard . So [facility] [phone].    ATENCIÓN: Si habla español, tiene a tucker disposición servicios gratuitos de asistencia lingüística. [facility] [phone].    We comply with applicable federal civil rights laws and [place] laws. We do not discriminate on the basis of race, color, national origin, age, disability, sex, sexual orientation, or gender identity.            Thank you!     Thank you for choosing [facility]  for your care. Our goal is always to provide you with excellent care. Hearing back from our patients is one way we can continue to improve our services. Please take a few minutes to complete the written survey that you may receive in the mail after your visit with us. Thank you!             Your Updated Medication List - Protect others around you: Learn how to safely use, store and throw away your medicines at www.disposemymeds.org.          This list is accurate as of 5/16/18 11:10 AM.  Always use  your most recent med list.                   Brand Name Dispense Instructions for use Diagnosis    5- MG Caps      Take 1 tablet by mouth At Bedtime        atorvastatin 20 MG tablet    LIPITOR    90 tablet    Take 1 tablet (20 mg) by mouth every evening    Hypercholesterolemia       AZO TABS PO      Take 1 capsule by mouth At Bedtime        DONEPEZIL HCL PO      Take 5 mg by mouth        fludrocortisone 0.1 MG tablet    FLORINEF    90 tablet    Take 3 tablets (0.3 mg) by mouth daily    Parkinson's disease (H), Orthostatic hypotension       MELATONIN PO      Take 5 mg by mouth At Bedtime        MENS PROSTATE HEALTH FORMULA PO      Take 1 tablet by mouth 2 times daily        midodrine 5 MG tablet    PROAMATINE    180 tablet    TAKE 2 TABLETS (10MG) BY MOUTH THREE TIMES DAILY    Parkinson's disease (H)       Multi-vitamin Tabs tablet      Take 1 tablet by mouth daily        PANTOPRAZOLE SODIUM PO      Take 40 mg by mouth 2 times daily (before meals)        senna-docusate 8.6-50 MG per tablet    SENOKOT-S;PERICOLACE    30 tablet    Take 1-2 tablets by mouth 2 times daily Take while on oral narcotics to prevent or treat constipation.    S/P inguinal hernia repair       sodium chloride 1 GM tablet     60 tablet    TAKE 1 TABLET BY MOUTH TWICE A DAY    Parkinson's disease (H), Orthostatic hypotension       tamsulosin 0.4 MG capsule    FLOMAX    30 capsule    Take 1 capsule (0.4 mg) by mouth daily    Benign prostatic hyperplasia with nocturia       Turmeric 500 MG Tabs      Take 1 tablet by mouth daily        vitamin B complex with vitamin C Tabs tablet      Take 1 tablet by mouth daily        VITAMIN B-12 PO      Take 1 tablet by mouth daily        VITAMIN D-3 PO      Take 1,000 Int'l Units by mouth daily

## 2018-05-16 NOTE — LETTER
"    5/16/2018         RE: Jf Ortiz  [address]  [place] [address]        Dear Colleague,    Thank you for referring your patient, Jf Ortiz, to the [facility] [place]. Please see a copy of my visit note below.    Otolaryngology Progress Note           Chief Complaint:     Patient presents with:  Cerumen Impaction: ear cleaning          History of Present Illness:     Jf Ortiz is a 84 year old male here for an ear cleaning. Last seen in ENT 2/8/17. Right tube placement 7/2015.     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  He has declined MRI of the IAC in the past. He reports no new hearing changes.   No fluctuating hearing loss.  No otalgia, otorrhea or aura fullness.  Vertigo: no  Tinnitus: no  There is no facial weakness, facial numbness or dysphagia.    Audiogram 11/15/17: Large Type B Tympanogram right and Type A left. Thresholds show mild to profound ASNHL.          Review of Systems:     See HPI         Physical Exam:     /80 (BP Location: Right arm, Patient Position: Chair, Cuff Size: Adult Regular)  Pulse 70  Temp 98  F (36.7  C) (Tympanic)  Ht 5' 5\" (1.651 m)  Wt 171 lb (77.6 kg)  SpO2 96%  BMI 28.46 kg/m2  General - The patient is well nourished and well developed, and appears to have good nutritional status.  Alert and oriented to person and place, interactive.  Head and Face - Normocephalic and atraumatic, with no gross asymmetry noted of the contour of the facial features.  The facial nerve is intact, with strong symmetric movements.  Neck-no palpable lymphadenopathy or thyroid mass.  Trachea is midline.  Eyes - Extraocular movements intact.   Ears- External ears normal. Ears examined under microscope. Bilateral complete cerumen impaction. Debrided with #5 and #7 suction and cerumen loop.Right PE tube in place and appears open. Some hard cerumen around base of tube, remaining TM intact. Left TM intact. .   Left hearing aide in place, right hearing aide out for repair.   Nose - " Nasal mucosa is pink and moist with no abnormal mucus.  The septum was grossly midline and non-obstructive, turbinates of normal size and position.  No polyps, masses, or purulence noted on examination.  Mouth - Examination of the oral cavity shows pink, healthy, moist mucosa. Dentition in good condition.  No lesions or ulceration noted. The tongue is mobile and midline.    Throat - The walls of the oropharynx were smooth, pink, moist, symmetric, and had no lesions or ulcerations.  The tonsillar pillars and soft palate were symmetric.  The uvula was midline on elevation.           Assessment and Plan:       ICD-10-CM    1. ASNHL (asymmetrical sensorineural hearing loss) H90.5    2. Impacted cerumen, bilateral H61.23    3. S/P myringotomy with insertion of tube Z96.22     Right tube 7/2015     The possible etiologies for this were discussed.  They include medication, infection, trauma or neoplasm.  Infrequently a sensorineural hearing loss will occur during or soon after a viral upper respiratory infection. I  discussed the indication for MRI of the Brain and Internal Auditory Canals.  The possibility of acoustic neuroma causing asymmetrical nerve hearing loss was discussed.  The patient was told acoustic neuromas are very rare, benign, and generally treated by observation only.   Continues to defer MRI of the IAC. No significant change in subjective hearing or audiograms.    The ears were cleaned today. Aural hygiene for the ear canals was discussed.  Avoidance of Q-tips was highly encouraged. The patient was told to avoid flushing the ear canal as there is a risk of perforating the ear drum.    Right tube in place and appears open.    Follow up in 6 months for routine ear cleaning first, then audiogram after. Audiogram should be done sooner with any acute hearing changes.    Marybeth Talbert NP  ENT  [facility], [place]  [phone]      Again, thank you for allowing me to participate in the care of your  patient.        Sincerely,        MEGHA Maxwell CNP

## 2018-05-16 NOTE — PROGRESS NOTES
"Otolaryngology Progress Note           Chief Complaint:     Patient presents with:  Cerumen Impaction: ear cleaning          History of Present Illness:     Jf Ortiz is a 84 year old male here for an ear cleaning. Last seen in ENT 2/8/17. Right tube placement 7/2015.     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  He has declined MRI of the IAC in the past. He reports no new hearing changes.   No fluctuating hearing loss.  No otalgia, otorrhea or aura fullness.  Vertigo: no  Tinnitus: no  There is no facial weakness, facial numbness or dysphagia.    Audiogram 11/15/17: Large Type B Tympanogram right and Type A left. Thresholds show mild to profound ASNHL.          Review of Systems:     See HPI         Physical Exam:     /80 (BP Location: Right arm, Patient Position: Chair, Cuff Size: Adult Regular)  Pulse 70  Temp 98  F (36.7  C) (Tympanic)  Ht 5' 5\" (1.651 m)  Wt 171 lb (77.6 kg)  SpO2 96%  BMI 28.46 kg/m2  General - The patient is well nourished and well developed, and appears to have good nutritional status.  Alert and oriented to person and place, interactive.  Head and Face - Normocephalic and atraumatic, with no gross asymmetry noted of the contour of the facial features.  The facial nerve is intact, with strong symmetric movements.  Neck-no palpable lymphadenopathy or thyroid mass.  Trachea is midline.  Eyes - Extraocular movements intact.   Ears- External ears normal. Ears examined under microscope. Bilateral complete cerumen impaction. Debrided with #5 and #7 suction and cerumen loop.Right PE tube in place and appears open. Some hard cerumen around base of tube, remaining TM intact. Left TM intact. .   Left hearing aide in place, right hearing aide out for repair.   Nose - Nasal mucosa is pink and moist with no abnormal mucus.  The septum was grossly midline and non-obstructive, turbinates of normal size and position.  No polyps, masses, or purulence noted on examination.  Mouth - Examination of the " oral cavity shows pink, healthy, moist mucosa. Dentition in good condition.  No lesions or ulceration noted. The tongue is mobile and midline.    Throat - The walls of the oropharynx were smooth, pink, moist, symmetric, and had no lesions or ulcerations.  The tonsillar pillars and soft palate were symmetric.  The uvula was midline on elevation.           Assessment and Plan:       ICD-10-CM    1. ASNHL (asymmetrical sensorineural hearing loss) H90.5    2. Impacted cerumen, bilateral H61.23    3. S/P myringotomy with insertion of tube Z96.22     Right tube 7/2015     The possible etiologies for this were discussed.  They include medication, infection, trauma or neoplasm.  Infrequently a sensorineural hearing loss will occur during or soon after a viral upper respiratory infection. I  discussed the indication for MRI of the Brain and Internal Auditory Canals.  The possibility of acoustic neuroma causing asymmetrical nerve hearing loss was discussed.  The patient was told acoustic neuromas are very rare, benign, and generally treated by observation only.   Continues to defer MRI of the IAC. No significant change in subjective hearing or audiograms.    The ears were cleaned today. Aural hygiene for the ear canals was discussed.  Avoidance of Q-tips was highly encouraged. The patient was told to avoid flushing the ear canal as there is a risk of perforating the ear drum.    Right tube in place and appears open.    Follow up in 6 months for routine ear cleaning first, then audiogram after. Audiogram should be done sooner with any acute hearing changes.    Marybeth Talbert NP  ENT  [facility], [place]  [phone]

## 2018-05-21 ENCOUNTER — OFFICE VISIT (OUTPATIENT)
Dept: CARDIOLOGY | Facility: CLINIC | Age: 83
End: 2018-05-21
Attending: INTERNAL MEDICINE
Payer: MEDICARE

## 2018-05-21 DIAGNOSIS — R55 SYNCOPE AND COLLAPSE: Primary | ICD-10-CM

## 2018-05-21 PROCEDURE — 93293 PM PHONE R-STRIP DEVICE EVAL: CPT | Mod: ZF

## 2018-05-21 PROCEDURE — 93294 REM INTERROG EVL PM/LDLS PM: CPT | Mod: ZP | Performed by: INTERNAL MEDICINE

## 2018-05-21 NOTE — MR AVS SNAPSHOT
After Visit Summary   5/21/2018    Jf Ortiz    MRN: [id number]           Patient Information     Date Of Birth          10/5/1933        Visit Information        Provider Department      5/21/2018 6:00 AM [facility]        Today's Diagnoses     Syncope and collapse    -  1       Follow-ups after your visit        Your next 10 appointments already scheduled     Jul 09, 2018  2:00 PM CDT   (Arrive by 1:45 PM)   Return Visit with Herman Rivers DO   [facility] [place] ([facility] [place] [facility]    [address]  [place] [address]   [phone]            Jul 10, 2018 12:00 PM CDT   (Arrive by 11:45 AM)   Return Visit with HC PACEMAKER   [facility] [place] ([facility] [place] )    [address]  [place] [address]   [phone]            Sep 11, 2018  3:00 PM CDT   (Arrive by 2:45 PM)   Return Visit with Juan Walter MD   [facility] [place] ([facility] [place] )    [address]  [place] [address]   [phone]            Sep 11, 2018  3:30 PM CDT   (Arrive by 3:15 PM)   Return Visit with HC PACEMAKER   [facility] [place] ([facility] [place] )    [address]  [place] [address]   [phone]            Nov 16, 2018 11:15 AM CST   (Arrive by 11:00 AM)   Hearing Eval with Kenneth Meyers   [facility] [place] ([facility] [place] )    [address]  [place] [address]   [phone]            Nov 19, 2018 10:45 AM CST   (Arrive by 10:30 AM)   PROCEDURE with MEGHA Huber CNP   [facility] [place] ([facility] [place] )    [address]  [place] [address]   [phone]            Nov 19, 2018 11:15 AM CST   (Arrive by 11:00 AM)   Hearing Eval with Kenneth Meyers   [facility] [place] ([facility] [place] )    [address]  [place] [address]   [phone]              Who to contact     If you  "have questions or need follow up information about today's clinic visit or your schedule please contact [facility] directly at [phone].  Normal or non-critical lab and imaging results will be communicated to you by [employer], letter or phone within 4 business days after the clinic has received the results. If you do not hear from us within 7 days, please contact the clinic through [employer] or phone. If you have a critical or abnormal lab result, we will notify you by phone as soon as possible.  Submit refill requests through [employer] or call your pharmacy and they will forward the refill request to us. Please allow 3 business days for your refill to be completed.          Additional Information About Your Visit        [employer] Information     Iceotope lets you send messages to your doctor, view your test results, renew your prescriptions, schedule appointments and more. To sign up, go to [email or web address] . Click on \"Log in\" on the left side of the screen, which will take you to the Welcome page. Then click on \"Sign up Now\" on the right side of the page.     You will be asked to enter the access code listed below, as well as some personal information. Please follow the directions to create your username and password.     Your access code is: [email or web address]  Expires: 7/10/2018 10:53 AM     Your access code will  in 90 days. If you need help or a new code, please call your [facility] clinic or [phone].        Care EveryWhere ID     This is your Care EveryWhere ID. This could be used by other organizations to access your [facility] medical records  [id number]         Blood Pressure from Last 3 Encounters:   18 160/80   18 104/72   18 136/84    Weight from Last 3 Encounters:   18 77.6 kg (171 lb)   18 77.6 kg (171 lb)   18 77.6 kg (171 lb)              We Performed the Following     PM PHONE R STRIP EVAL UP TO 90 DAYS          Today's Medication Changes        "   These changes are accurate as of 5/21/18 11:59 PM.  If you have any questions, ask your nurse or doctor.               These medicines have changed or have updated prescriptions.        Dose/Directions    fludrocortisone 0.1 MG tablet   Commonly known as:  FLORINEF   This may have changed:    - how much to take  - when to take this   Used for:  Parkinson's disease (H), Orthostatic hypotension        Dose:  0.3 mg   Take 3 tablets (0.3 mg) by mouth daily   Quantity:  90 tablet   Refills:  11                Primary Care Provider Office Phone # Fax #    R Venancio Ray [phone]       [facility] [address]        Equal Access to Services     MARCIAL VÁZQUEZ : Candice Arrington, samantha griffin, benjamin hanan, hayley mauro. So [facility] [phone].    ATENCIÓN: Si habla español, tiene a tucker disposición servicios gratuitos de asistencia lingüística. Llame al [phone].    We comply with applicable federal civil rights laws and [place] laws. We do not discriminate on the basis of race, color, national origin, age, disability, sex, sexual orientation, or gender identity.            Thank you!     Thank you for choosing [facility]  for your care. Our goal is always to provide you with excellent care. Hearing back from our patients is one way we can continue to improve our services. Please take a few minutes to complete the written survey that you may receive in the mail after your visit with us. Thank you!             Your Updated Medication List - Protect others around you: Learn how to safely use, store and throw away your medicines at www.disposemymeds.org.          This list is accurate as of 5/21/18 11:59 PM.  Always use your most recent med list.                   Brand Name Dispense Instructions for use Diagnosis    5- MG Caps      Take 1 tablet by mouth At Bedtime        atorvastatin 20 MG tablet    LIPITOR    90  tablet    Take 1 tablet (20 mg) by mouth every evening    Hypercholesterolemia       AZO TABS PO      Take 1 capsule by mouth At Bedtime        DONEPEZIL HCL PO      Take 5 mg by mouth        fludrocortisone 0.1 MG tablet    FLORINEF    90 tablet    Take 3 tablets (0.3 mg) by mouth daily    Parkinson's disease (H), Orthostatic hypotension       MELATONIN PO      Take 5 mg by mouth At Bedtime        MENS PROSTATE HEALTH FORMULA PO      Take 1 tablet by mouth 2 times daily        midodrine 5 MG tablet    PROAMATINE    180 tablet    TAKE 2 TABLETS (10MG) BY MOUTH THREE TIMES DAILY    Parkinson's disease (H)       Multi-vitamin Tabs tablet      Take 1 tablet by mouth daily        PANTOPRAZOLE SODIUM PO      Take 40 mg by mouth 2 times daily (before meals)        senna-docusate 8.6-50 MG per tablet    SENOKOT-S;PERICOLACE    30 tablet    Take 1-2 tablets by mouth 2 times daily Take while on oral narcotics to prevent or treat constipation.    S/P inguinal hernia repair       sodium chloride 1 GM tablet     60 tablet    TAKE 1 TABLET BY MOUTH TWICE A DAY    Parkinson's disease (H), Orthostatic hypotension       tamsulosin 0.4 MG capsule    FLOMAX    30 capsule    Take 1 capsule (0.4 mg) by mouth daily    Benign prostatic hyperplasia with nocturia       Turmeric 500 MG Tabs      Take 1 tablet by mouth daily        vitamin B complex with vitamin C Tabs tablet      Take 1 tablet by mouth daily        VITAMIN B-12 PO      Take 1 tablet by mouth daily        VITAMIN D-3 PO      Take 1,000 Int'l Units by mouth daily

## 2018-05-21 NOTE — LETTER
5/21/2018      RE: Jf Ortiz  [address] [place] [address]       Dear Colleague,    Thank you for the opportunity to participate in the care of your patient, Jf Ortiz, at the [facility]. Please see a copy of my visit note below.    Preliminary Device Interrogation Results.  Final physician signed paceart report to be scanned and attached.    Scheduled transtelephonic pacemaker check done per MD orders.  Normal pacemaker function.  Presenting EGM =  @ 80 bpm.  30 second presenting, magnet and post-magnet EGM's saved.  Magnet response has reached recommended replacement time.  Magnet rate = 70 bpm.  Normal pacemaker function.  Patient's wife notified of interrogation results.  Plan for patient to return to clinic 5/22/2018 as scheduled per Brandi BONILLA    transtelephonic pacemaker check    Please do not hesitate to contact me if you have any questions/concerns.     Sincerely,     ICD Remote

## 2018-05-22 ENCOUNTER — OFFICE VISIT (OUTPATIENT)
Dept: CARDIOLOGY | Facility: OTHER | Age: 83
End: 2018-05-22
Attending: INTERNAL MEDICINE
Payer: MEDICARE

## 2018-05-22 DIAGNOSIS — I44.2 ATRIOVENTRICULAR BLOCK, COMPLETE (H): Primary | ICD-10-CM

## 2018-05-22 PROCEDURE — 93280 PM DEVICE PROGR EVAL DUAL: CPT | Mod: TC

## 2018-05-22 PROCEDURE — 93280 PM DEVICE PROGR EVAL DUAL: CPT | Mod: 26 | Performed by: INTERNAL MEDICINE

## 2018-05-22 NOTE — MR AVS SNAPSHOT
After Visit Summary   5/22/2018    Jf Ortiz    MRN: [id number]           Patient Information     Date Of Birth          10/5/1933        Visit Information        Provider Department      5/22/2018 8:30 AM HC PACEMAKER [facility] [place]        Today's Diagnoses     Atrioventricular block, complete (H)    -  1      Care Instructions    It was a pleasure to see you in clinic today.  Please do not hesitate to call with any questions or concerns.  We look forward to seeing you in clinic at your next device check in 2 months.    Brandi Hernandez, RN, MS, CCRN  Electrophysiology Nurse Clinician  [facility]    During Business Hours Please Call:  [phone]  After Hours Please Call:  [phone] - select option #4 and ask for job code [phone]                    Follow-ups after your visit        Your next 10 appointments already scheduled     Jul 09, 2018  2:00 PM CDT   (Arrive by 1:45 PM)   Return Visit with Herman Rivers,    [facility] [place] ([facility] [place] )    [address]  [place] [address]   [phone]            Jul 10, 2018 12:00 PM CDT   (Arrive by 11:45 AM)   Return Visit with HC PACEMAKER   [facility] [place] ([facility] [place] )    [address]  [place] [address]   [phone]            Sep 11, 2018  3:00 PM CDT   (Arrive by 2:45 PM)   Return Visit with Juan Walter MD   [facility] [place] ([facility] [place] )    [address]  [place] [address]   [phone]            Sep 11, 2018  3:30 PM CDT   (Arrive by 3:15 PM)   Return Visit with HC PACEMAKER   [facility] [place] ([facility] [place] )    [address]  [place] [address]   [phone]            Nov 16, 2018 11:15 AM CST   (Arrive by 11:00 AM)   Hearing Eval with Kenneth Meyers   [facility] [place] ([facility] [place] )    [address]  [place] [address]  "  [phone]            2018 10:45 AM CST   (Arrive by 10:30 AM)   PROCEDURE with MEGHA Huber CNP   [facility] [place] ([facility] [place] )    [address]  [place] [address]   [phone]            2018 11:15 AM CST   (Arrive by 11:00 AM)   Hearing Eval with Kenneth Meyers   [facility] [place] ([facility] [place] )    [address]  [place] [address]   [phone]              Who to contact     If you have questions or need follow up information about today's clinic visit or your schedule please contact [facility] directly at [phone].  Normal or non-critical lab and imaging results will be communicated to you by MyChart, letter or phone within 4 business days after the clinic has received the results. If you do not hear from us within 7 days, please contact the clinic through MyChart or phone. If you have a critical or abnormal lab result, we will notify you by phone as soon as possible.  Submit refill requests through Tk20 or call your pharmacy and they will forward the refill request to us. Please allow 3 business days for your refill to be completed.          Additional Information About Your Visit        [facility] Information     [email or web address] lets you send messages to your doctor, view your test results, renew your prescriptions, schedule appointments and more. To sign up, go to [email or web address] . Click on \"Log in\" on the left side of the screen, which will take you to the Welcome page. Then click on \"Sign up Now\" on the right side of the page.     You will be asked to enter the access code listed below, as well as some personal information. Please follow the directions to create your username and password.     Your access code is: [email or web address]  Expires: 7/10/2018 10:53 AM     Your access code will  in 90 days. If you need help or a new code, please call your [place] clinic or [phone].      "   Care EveryWhere ID     This is your Care EveryWhere ID. This could be used by other organizations to access your [facility] medical records  [id number]         Blood Pressure from Last 3 Encounters:   05/16/18 160/80   04/11/18 104/72   03/05/18 136/84    Weight from Last 3 Encounters:   05/16/18 77.6 kg (171 lb)   04/11/18 77.6 kg (171 lb)   03/05/18 77.6 kg (171 lb)              We Performed the Following     PM DEVICE PROGRAMMING EVAL, DUAL LEAD PACER          Today's Medication Changes          These changes are accurate as of 5/22/18 11:59 PM.  If you have any questions, ask your nurse or doctor.               These medicines have changed or have updated prescriptions.        Dose/Directions    fludrocortisone 0.1 MG tablet   Commonly known as:  FLORINEF   This may have changed:    - how much to take  - when to take this   Used for:  Parkinson's disease (H), Orthostatic hypotension        Dose:  0.3 mg   Take 3 tablets (0.3 mg) by mouth daily   Quantity:  90 tablet   Refills:  11                Primary Care Provider Office Phone # Fax #    R Venancio Ray [phone]       [address] [place] [address]        Equal Access to Services     TONY VÁZQUEZ AH: Candice simmonso Sotre, waaxda luqadaha, qaybta kaalmada adeegyada, hayley mauro. So [facility] [phone].    ATENCIÓN: Si habla español, tiene a tucker disposición servicios gratuitos de asistencia lingüística. Llame al [phone].    We comply with applicable federal civil rights laws and [place] laws. We do not discriminate on the basis of race, color, national origin, age, disability, sex, sexual orientation, or gender identity.            Thank you!     Thank you for choosing [facility]  for your care. Our goal is always to provide you with excellent care. Hearing back from our patients is one way we can continue to improve our services. Please take a few minutes to complete the  written survey that you may receive in the mail after your visit with us. Thank you!             Your Updated Medication List - Protect others around you: Learn how to safely use, store and throw away your medicines at www.disposemymeds.org.          This list is accurate as of 5/22/18 11:59 PM.  Always use your most recent med list.                   Brand Name Dispense Instructions for use Diagnosis    5- MG Caps      Take 1 tablet by mouth At Bedtime        atorvastatin 20 MG tablet    LIPITOR    90 tablet    Take 1 tablet (20 mg) by mouth every evening    Hypercholesterolemia       AZO TABS PO      Take 1 capsule by mouth At Bedtime        DONEPEZIL HCL PO      Take 5 mg by mouth        fludrocortisone 0.1 MG tablet    FLORINEF    90 tablet    Take 3 tablets (0.3 mg) by mouth daily    Parkinson's disease (H), Orthostatic hypotension       MELATONIN PO      Take 5 mg by mouth At Bedtime        MENS PROSTATE HEALTH FORMULA PO      Take 1 tablet by mouth 2 times daily        midodrine 5 MG tablet    PROAMATINE    180 tablet    TAKE 2 TABLETS (10MG) BY MOUTH THREE TIMES DAILY    Parkinson's disease (H)       Multi-vitamin Tabs tablet      Take 1 tablet by mouth daily        PANTOPRAZOLE SODIUM PO      Take 40 mg by mouth 2 times daily (before meals)        senna-docusate 8.6-50 MG per tablet    SENOKOT-S;PERICOLACE    30 tablet    Take 1-2 tablets by mouth 2 times daily Take while on oral narcotics to prevent or treat constipation.    S/P inguinal hernia repair       sodium chloride 1 GM tablet     60 tablet    TAKE 1 TABLET BY MOUTH TWICE A DAY    Parkinson's disease (H), Orthostatic hypotension       tamsulosin 0.4 MG capsule    FLOMAX    30 capsule    Take 1 capsule (0.4 mg) by mouth daily    Benign prostatic hyperplasia with nocturia       Turmeric 500 MG Tabs      Take 1 tablet by mouth daily        vitamin B complex with vitamin C Tabs tablet      Take 1 tablet by mouth daily        VITAMIN B-12 PO       Take 1 tablet by mouth daily        VITAMIN D-3 PO      Take 1,000 Int'l Units by mouth daily

## 2018-05-23 NOTE — PROGRESS NOTES
Preliminary Device Interrogation Results.  Final physician signed paceart report to be scanned and attached.    Patient seen at the [facility] device clinic for evaluation and iterative programming of his [employer] dual lead pacemaker per MD orders.  Patient seen in clinic to assess pacemaker battery after TTM reading revealed magnet rate indicative of MAYA.  Normal pacemaker function.  1 ATR episode recorded - 5 seconds in duration.  Intrinsic rhythm = SB with  @ 30 bpm.  AP = 98%.   = 100%.  Estimated battery longevity to MAYA = <0.5 years.  Atrial amplitude decreased from 3.2 to 2.6 V.  RV amplitude decreased from 2.5 to 2.4 V. Patient reports that he is feeling well and denies specific complaints.  Plan for patient to return to clinic in 2 months.    Dual lead pacemaker

## 2018-05-23 NOTE — PATIENT INSTRUCTIONS
It was a pleasure to see you in clinic today.  Please do not hesitate to call with any questions or concerns.  We look forward to seeing you in clinic at your next device check in 2 months.    Brandi Hernandez, RN, MS, CCRN  Electrophysiology Nurse Clinician  [facility]    During Business Hours Please Call:  [phone]  After Hours Please Call:  [phone] - select option #4 and ask for job code [phone]

## 2018-05-30 NOTE — PROGRESS NOTES
Preliminary Device Interrogation Results.  Final physician signed paceart report to be scanned and attached.    Scheduled transtelephonic pacemaker check done per MD orders.  Normal pacemaker function.  Presenting EGM =  @ 80 bpm.  30 second presenting, magnet and post-magnet EGM's saved.  Magnet response has reached recommended replacement time.  Magnet rate = 70 bpm.  Normal pacemaker function.  Patient's wife notified of interrogation results.  Plan for patient to return to clinic 5/22/2018 as scheduled per Brandi BONILLA    transtelephonic pacemaker check

## 2018-06-22 ENCOUNTER — TELEPHONE (OUTPATIENT)
Dept: CARDIOLOGY | Facility: OTHER | Age: 83
End: 2018-06-22

## 2018-06-22 NOTE — TELEPHONE ENCOUNTER
12:14 PM    Reason for Call: Phone Call    Description: Des (significant other) called and stated pt was suppose to have a pacemaker check over the phone on 06/26/18 at 10:30 am. She would like to cancel due to a conflicting appt. Please call her back at [phone] or cell phone of [phone]    Was an appointment offered for this call? No  If yes : Appointment type              Date    Preferred method for responding to this message: Telephone Call  What is your phone number ?    If we cannot reach you directly, may we leave a detailed response at the number you provided? Yes    Can this message wait until your PCP/provider returns, if available today? No, provider not in today and needs call back ESTELITA Cowan

## 2018-06-25 NOTE — TELEPHONE ENCOUNTER
Brandi,   When could the patient send remote transmission.   Please see note below.   Thanks, Elaine

## 2018-06-25 NOTE — TELEPHONE ENCOUNTER
Spoke to Des, informed her that I have forwarded the questions to the device clinic nurse.    Elaine Mohan RN-BSN

## 2018-06-26 NOTE — TELEPHONE ENCOUNTER
Brandi Hernandez, Lila Mckenzie Just now (3:50 PM)                 Can you please call patient to reschedule TTM date/time.  Thank you! (Routing comment)

## 2018-06-26 NOTE — TELEPHONE ENCOUNTER
Patient notified that the [facility] will call to reschedule this patient per the request of Brandi Hernandez device nurse.   Elaine Mohan RN-BSN

## 2018-07-03 DIAGNOSIS — E87.6 HYPOKALEMIA: ICD-10-CM

## 2018-07-05 RX ORDER — POTASSIUM CHLORIDE 1500 MG/1
TABLET, EXTENDED RELEASE ORAL
Qty: 30 TABLET | Refills: 5 | Status: SHIPPED | OUTPATIENT
Start: 2018-07-05 | End: 2019-01-08

## 2018-07-05 NOTE — TELEPHONE ENCOUNTER
Please advise on Potassium Chloride.  Not on current medication list.  Last signed: 2/9/18 #30, 3 R  Medication discontinued on 3/5/18 for reason none.  Last Potassium lab completed 3/5/18 4.2  Please advise.  Thank you.

## 2018-07-11 ENCOUNTER — OFFICE VISIT (OUTPATIENT)
Dept: CARDIOLOGY | Facility: OTHER | Age: 83
End: 2018-07-11
Attending: INTERNAL MEDICINE
Payer: MEDICARE

## 2018-07-11 VITALS
SYSTOLIC BLOOD PRESSURE: 130 MMHG | HEIGHT: 65 IN | WEIGHT: 170 LBS | RESPIRATION RATE: 16 BRPM | BODY MASS INDEX: 28.32 KG/M2 | HEART RATE: 70 BPM | DIASTOLIC BLOOD PRESSURE: 54 MMHG | OXYGEN SATURATION: 98 %

## 2018-07-11 DIAGNOSIS — Z95.5 STENTED CORONARY ARTERY: ICD-10-CM

## 2018-07-11 DIAGNOSIS — R55 SYNCOPE AND COLLAPSE: ICD-10-CM

## 2018-07-11 DIAGNOSIS — Z45.010 PACEMAKER GENERATOR END OF LIFE: ICD-10-CM

## 2018-07-11 DIAGNOSIS — I10 ESSENTIAL HYPERTENSION: ICD-10-CM

## 2018-07-11 DIAGNOSIS — E83.51 HYPOCALCEMIA: ICD-10-CM

## 2018-07-11 DIAGNOSIS — E86.9 VOLUME DEPLETION: ICD-10-CM

## 2018-07-11 DIAGNOSIS — I95.1 AUTONOMIC ORTHOSTATIC HYPOTENSION: Primary | ICD-10-CM

## 2018-07-11 DIAGNOSIS — Z95.0 PACEMAKER: ICD-10-CM

## 2018-07-11 DIAGNOSIS — E87.6 HYPOKALEMIA: ICD-10-CM

## 2018-07-11 DIAGNOSIS — E78.00 HYPERCHOLESTEROLEMIA: ICD-10-CM

## 2018-07-11 DIAGNOSIS — R73.9 HYPERGLYCEMIA: ICD-10-CM

## 2018-07-11 LAB
ANION GAP SERPL CALCULATED.3IONS-SCNC: 7 MMOL/L (ref 3–14)
BUN SERPL-MCNC: 18 MG/DL (ref 7–30)
CALCIUM SERPL-MCNC: 8.9 MG/DL (ref 8.5–10.1)
CHLORIDE SERPL-SCNC: 107 MMOL/L (ref 94–109)
CO2 SERPL-SCNC: 29 MMOL/L (ref 20–32)
CREAT SERPL-MCNC: 1.1 MG/DL (ref 0.66–1.25)
EST. AVERAGE GLUCOSE BLD GHB EST-MCNC: 126 MG/DL
GFR SERPL CREATININE-BSD FRML MDRD: 64 ML/MIN/1.7M2
GLUCOSE SERPL-MCNC: 171 MG/DL (ref 70–99)
HBA1C MFR BLD: 6 % (ref 0–5.6)
POTASSIUM SERPL-SCNC: 3.3 MMOL/L (ref 3.4–5.3)
SODIUM SERPL-SCNC: 143 MMOL/L (ref 133–144)

## 2018-07-11 PROCEDURE — 40000788 ZZHCL STATISTIC ESTIMATED AVERAGE GLUCOSE: Mod: ZL | Performed by: INTERNAL MEDICINE

## 2018-07-11 PROCEDURE — 80048 BASIC METABOLIC PNL TOTAL CA: CPT | Mod: ZL | Performed by: INTERNAL MEDICINE

## 2018-07-11 PROCEDURE — 99214 OFFICE O/P EST MOD 30 MIN: CPT | Performed by: INTERNAL MEDICINE

## 2018-07-11 PROCEDURE — 36415 COLL VENOUS BLD VENIPUNCTURE: CPT | Mod: ZL | Performed by: INTERNAL MEDICINE

## 2018-07-11 PROCEDURE — 83036 HEMOGLOBIN GLYCOSYLATED A1C: CPT | Mod: ZL | Performed by: INTERNAL MEDICINE

## 2018-07-11 PROCEDURE — G0463 HOSPITAL OUTPT CLINIC VISIT: HCPCS

## 2018-07-11 RX ORDER — MIDODRINE HYDROCHLORIDE 5 MG/1
TABLET ORAL
Qty: 180 TABLET | Refills: 3 | Status: SHIPPED | OUTPATIENT
Start: 2018-07-11 | End: 2018-10-30

## 2018-07-11 ASSESSMENT — PAIN SCALES - GENERAL: PAINLEVEL: NO PAIN (0)

## 2018-07-11 NOTE — NURSING NOTE
"Chief Complaint   Patient presents with     RECHECK     6 month follow-up       Initial /54 (BP Location: Left arm, Patient Position: Chair, Cuff Size: Adult Regular)  Pulse 70  Resp 16  Ht 1.651 m (5' 5\")  Wt 77.1 kg (170 lb)  SpO2 98%  BMI 28.29 kg/m2 Estimated body mass index is 28.29 kg/(m^2) as calculated from the following:    Height as of this encounter: 1.651 m (5' 5\").    Weight as of this encounter: 77.1 kg (170 lb).  Medication Reconciliation: complete    Rekha Quispe LPN    "

## 2018-07-11 NOTE — PATIENT INSTRUCTIONS
You were seen by Dr. Rivers, 7/11/2018.     1.  You will be scheduled for a pacemaker generator change.  You will be contacted by [facility] [place] [facility] in [place], [place].       2. You will have a lab test referred to as a basic metabolic panel.  This is a blood test that measures your sugar (glucose) level, electrolyte and fluid balance, and kidney function.     3. Please increase the amount of fluid in the diet.  This will help to maintain your blood pressure, decreasing dizziness or fainting.     4. Please continue all other medications as they have been prescribed.     5. You will have a blood test called an A1C, this test reflects a 3 month average of you blood sugar levels.  You will be notified when these results become available.     6. Please take the Midodrine three times daily to maintain blood pressure.     You will be scheduled for follow up in 1-2 weeks with Dr. Rivers and the device clinic within one month.       Please call the cardiology office with problems, questions, or concerns at [phone].    If you experience chest pain, chest pressure, chest tightness, shortness of breath, fainting, lightheadedness, nausea, vomiting, or other concerning symptoms, please report to the Emergency Department or call 911. These symptoms may be emergent, and best treated in the Emergency Department.       Elaine LUTZ RN-BSN  Cardiology   [facility]  [phone]

## 2018-07-11 NOTE — PROGRESS NOTES
Cardiology Progress Note     Assessment & Plan   Jf Ortiz is a 84 year old male who is being seen in follow-up to visit from 1/5/18.  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  He is here for six-month follow-up.  He recently had his pacemaker checked on 5/22/18 and was noted to have battery life at less than 0.5 years.  He needs to have his pacemaker generator changed and this was discussed today.  Otherwise, he is doing remarkably well and has had no more episodes of dizziness, lightheadedness, near syncope, or syncope.    Impression:  1. Autonomic orthostatic hypotension.  2.  Parkinson's disease.  3.  Lewy body dementia.  4.  Coronary artery disease with H/O stent placement.  5.  Pacemaker placement now at end-of-life.  6.  Hyperlipidemia.  7.  Hypertension.  8.  History of syncope.    Plan:  1.  He is stable from an autonomic orthostatic hypotension standpoint.  He no longer has symptoms.  He will continue to increase his fluid intake, maintain a high sodium diet with salt tablets, Florinef 0.3 mg daily, midodrine 10 mg 3 times a day, compression stockings, and rising slowly when he gets up.  2.  His pacemaker appears to be at end of life.  He was given the option of having his generator change in [facility], or in the [facility].  They have chosen to have this done in [place].  This will be scheduled in the near future.  3.  He will be seen in approximate 1-2 weeks after his generator change is complete.  4.  He should have his pacemaker interrogated after his generator is changed preferably in a few weeks up to 4 weeks after.  5.  They are concerned with his salt intake but his low fluid intake and its potential effects on his kidneys.  It was suggested he have labs today which will include a basic metabolic panel and hemoglobin A1c.   "They will be contacted for the results.  6.  He will be seen in follow-up when his generator change has been completed.      Herman Rivers    Interval History   Jf is being seen for six-month follow-up.  Previously, he had been evaluated for autonomic orthostatic hypotension secondary to Parkinson's disease.  He was frequently dizzy, having near syncope, and had episodes of syncope.  He was found to be severely orthostatic.  We did discuss treatment and is now on midodrine, Florinef, salt tablets, maintaining a high sodium diet, and is attempting to increase his fluid intake while having DONNA stockings in place.  He is doing all these well with the exception of poor fluid intake.  At best, he is described at taking in 12-15 ounces of fluid a day.  His wife encourages him to drink but he \"forgets\".  He has not had any more concerns lightheadedness, dizziness, near-syncope, or syncope.    Also, recently he had his device interrogated.  It showed he had approximately 0.5 years left on his battery.  His intrinsic rate was 30 bpm.  He needs to have a generator change.  He would like to have this done in [place].  This will be set up and he will follow-up in approximately 2 weeks after his generator change has been completed.  Also, he needs to have his device interrogated after the generator has been changed.  Otherwise, he has no additional concerns or complaints.    Physical Exam       BP: 130/54 Pulse: 70   Resp: 16 SpO2: 98 %      Vitals:    07/11/18 1040   Weight: 77.1 kg (170 lb)     Vital Signs with Ranges  Pulse:  [70] 70  Resp:  [16] 16  BP: (130)/(54) 130/54  SpO2:  [98 %] 98 %  ROS is negative except that which was noted in the HPI.     Incision/Surgical Site 02/14/18 Groin (Active)   Number of days:147       Constitutional: awake, alert, cooperative, no apparent distress, and appears stated age  Eyes: Lids and lashes normal, pupils equal,  sclera clear, conjunctiva normal  ENT: Normocephalic, without " obvious abnormality, atraumatic.  Respiratory: No increased work of breathing, good air exchange, clear to auscultation bilaterally, no crackles or wheezing  Cardiovascular: Normal apical impulse, regular rate and rhythm, normal S1 and S2, no S3 or S4, and no murmur noted  GI: No scars, normal bowel sounds, soft,  Musculoskeletal: No lower extremity edema.  Neurologic: Awake, alert, oriented to name, place and time.    Neuropsychiatric: General: normal, calm and normal eye contact    Medications         Data   No results found for this or any previous visit (from the past 24 hour(s)).

## 2018-07-11 NOTE — MR AVS SNAPSHOT
After Visit Summary   7/11/2018    Jf Ortiz    MRN: [id number]           Patient Information     Date Of Birth          10/5/1933        Visit Information        Provider Department      7/11/2018 10:30 AM Herman Rivers, DO [facility] [place]        Today's Diagnoses     Parkinson's disease (H)          Care Instructions    You were seen by Dr. Rivers, 7/11/2018.     1.  You will be scheduled for a pacemaker generator change.  You will be contacted by [facility] and [facility] in [place], [place].       2. You will have a lab test referred to as a basic metabolic panel.  This is a blood test that measures your sugar (glucose) level, electrolyte and fluid balance, and kidney function.     3. Please increase the amount of fluid in the diet.  This will help to maintain your blood pressure, decreasing dizziness or fainting.     4. Please continue all other medications as they have been prescribed.     5. You will have a blood test called an A1C, this test reflects a 3 month average of you blood sugar levels.  You will be notified when these results become available.     6. Please take the Midodrine three times daily to maintain blood pressure.     You will be scheduled for follow up in 1-2 weeks with Dr. Rivers and the device clinic within one month.       Please call the cardiology office with problems, questions, or concerns at [phone].    If you experience chest pain, chest pressure, chest tightness, shortness of breath, fainting, lightheadedness, nausea, vomiting, or other concerning symptoms, please report to the Emergency Department or call 911. These symptoms may be emergent, and best treated in the Emergency Department.       Elaine LUTZ RN-BSN  Cardiology   [facility]  [phone]              Follow-ups after your visit        Your next 10 appointments already scheduled     Aug 28, 2018 10:00 AM CDT   (Arrive by 9:45 AM)   Return Visit with  PACEMAKER    [facility] [place] ([facility] [place] [facility]    [address]  [place] [address]   [phone]            Sep 11, 2018  3:00 PM CDT   (Arrive by 2:45 PM)   Return Visit with Juan Walter MD   [facility] [place] ([facility] [place] [facility]    [address]  [place] [address]   [phone]            Sep 11, 2018  3:30 PM CDT   (Arrive by 3:15 PM)   Return Visit with HC PACEMAKER   [facility] [place] ([facility] [place] [facility]    [address]  [place] [address]   [phone]            Nov 16, 2018 11:15 AM CST   (Arrive by 11:00 AM)   Hearing Eval with Kenneth Meyers   [facility] [place] ([facility] [place] [facility]    [address]  [place] [address]   [phone]            Nov 19, 2018 10:45 AM CST   (Arrive by 10:30 AM)   PROCEDURE with MEGHA Huber CNP   [facility] [place] ([facility] [place] [facility]    [address]  [place] [address]   [phone]            Nov 19, 2018 11:15 AM CST   (Arrive by 11:00 AM)   Hearing Eval with Kenneth Meyers   [facility] [place] ([facility] [place] [facility]    [address]  [place] [address]   [phone]              Who to contact     If you have questions or need follow up information about today's clinic visit or your schedule please contact [facility] directly at [phone].  Normal or non-critical lab and imaging results will be communicated to you by MyChart, letter or phone within 4 business days after the clinic has received the results. If you do not hear from us within 7 days, please contact the clinic through MyChart or phone. If you have a critical or abnormal lab result, we will notify you by phone as soon as possible.  Submit refill requests through [employer] or call your pharmacy and they will forward the refill request to us. Please allow 3 business days for your refill to be completed.          Additional  "Information About Your Visit        Care EveryWhere ID     This is your Care EveryWhere ID. This could be used by other organizations to access your [facility] medical records  [id number]        Your Vitals Were     Pulse Respirations Height Pulse Oximetry BMI (Body Mass Index)       70 16 1.651 m (5' 5\") 98% 28.29 kg/m2        Blood Pressure from Last 3 Encounters:   07/11/18 130/54   05/16/18 160/80   04/11/18 104/72    Weight from Last 3 Encounters:   07/11/18 77.1 kg (170 lb)   05/16/18 77.6 kg (171 lb)   04/11/18 77.6 kg (171 lb)              Today, you had the following     No orders found for display         Today's Medication Changes          These changes are accurate as of 7/11/18 11:17 AM.  If you have any questions, ask your nurse or doctor.               These medicines have changed or have updated prescriptions.        Dose/Directions    fludrocortisone 0.1 MG tablet   Commonly known as:  FLORINEF   This may have changed:    - how much to take  - when to take this   Used for:  Parkinson's disease (H), Orthostatic hypotension        Dose:  0.3 mg   Take 3 tablets (0.3 mg) by mouth daily   Quantity:  90 tablet   Refills:  11       midodrine 5 MG tablet   Commonly known as:  PROAMATINE   This may have changed:  See the new instructions.   Used for:  Parkinson's disease (H)        TAKE 2 TABLETS (10MG) BY MOUTH THREE TIMES DAILY   Quantity:  180 tablet   Refills:  3         Stop taking these medicines if you haven't already. Please contact your care team if you have questions.     AZO TABS PO   Stopped by:  Herman Rivers, DO                    Primary Care Provider Office Phone # Fax #    R Venancio Ray [phone]       [address] [place] [address]        Equal Access to Services     MARCIAL VÁZQUEZ : Candice Arrington, wagita griffin, qaybta kaalhayley bryant. [facility] [phone].    ATENCIÓN: Si alphonso crowell " a tucker disposición servicios gratuitos de asistencia lingüística. Fredy casarez [phone].    We comply with applicable federal civil rights laws and [place] laws. We do not discriminate on the basis of race, color, national origin, age, disability, sex, sexual orientation, or gender identity.            Thank you!     Thank you for choosing [facility]  for your care. Our goal is always to provide you with excellent care. Hearing back from our patients is one way we can continue to improve our services. Please take a few minutes to complete the written survey that you may receive in the mail after your visit with us. Thank you!             Your Updated Medication List - Protect others around you: Learn how to safely use, store and throw away your medicines at www.disposemymeds.org.          This list is accurate as of 7/11/18 11:17 AM.  Always use your most recent med list.                   Brand Name Dispense Instructions for use Diagnosis    5- MG Caps      Take 1 tablet by mouth At Bedtime        atorvastatin 20 MG tablet    LIPITOR    90 tablet    Take 1 tablet (20 mg) by mouth every evening    Hypercholesterolemia       DONEPEZIL HCL PO      Take 5 mg by mouth 2 times daily        fludrocortisone 0.1 MG tablet    FLORINEF    90 tablet    Take 3 tablets (0.3 mg) by mouth daily    Parkinson's disease (H), Orthostatic hypotension       MELATONIN PO      Take 5 mg by mouth At Bedtime        MENS PROSTATE HEALTH FORMULA PO      Take 1 tablet by mouth 2 times daily        midodrine 5 MG tablet    PROAMATINE    180 tablet    TAKE 2 TABLETS (10MG) BY MOUTH THREE TIMES DAILY    Parkinson's disease (H)       Multi-vitamin Tabs tablet      Take 1 tablet by mouth daily        PANTOPRAZOLE SODIUM PO      Take 40 mg by mouth 2 times daily (before meals)        Potassium Chloride ER 20 MEQ Tbcr     30 tablet    TAKE 1 TABLET BY MOUTH ONCE DAILY    Hypokalemia       senna-docusate 8.6-50 MG per tablet     SENOKOT-S;PERICOLACE    30 tablet    Take 1-2 tablets by mouth 2 times daily Take while on oral narcotics to prevent or treat constipation.    S/P inguinal hernia repair       sodium chloride 1 GM tablet     60 tablet    TAKE 1 TABLET BY MOUTH TWICE A DAY    Parkinson's disease (H), Orthostatic hypotension       Turmeric 500 MG Tabs      Take 1 tablet by mouth daily        vitamin B complex with vitamin C Tabs tablet      Take 1 tablet by mouth daily        VITAMIN B-12 PO      Take 1 tablet by mouth daily        VITAMIN D-3 PO      Take 1,000 Int'l Units by mouth daily

## 2018-07-13 ENCOUNTER — TELEPHONE (OUTPATIENT)
Dept: CARDIOLOGY | Facility: OTHER | Age: 83
End: 2018-07-13

## 2018-07-13 NOTE — TELEPHONE ENCOUNTER
Patient needs to be scheduled for a pacemaker generator change with Alma Kaplan, or Miguelangel Fraser/general surgery.  Can you help to facilitate this, do they need a gen surg referral?  Once I have a date I will schedule follow up with Dr. Rivers and device clinic here in [place].   Thanks, Elaine.

## 2018-07-13 NOTE — TELEPHONE ENCOUNTER
Will forward this request to Dr. Kaplan and her nurse will be in contact with the patient.   Jocelyn Kerr RN on 7/13/2018 at 10:15 AM

## 2018-07-16 ENCOUNTER — TELEPHONE (OUTPATIENT)
Dept: FAMILY MEDICINE | Facility: OTHER | Age: 83
End: 2018-07-16

## 2018-07-16 NOTE — TELEPHONE ENCOUNTER
"Patient was in on 07/11 to do labs and his A1C came back pre diabetic. Patient's significant other a is calling to see if patient could have his A1C re tested and stated, \"he wasn't fasting that morning and he would like to come in to re do the test when he his fasting.\" please call Uda back at [phone] or [phone] and it is ok to leave message.  "

## 2018-07-20 ENCOUNTER — TELEPHONE (OUTPATIENT)
Dept: CARDIOLOGY | Facility: OTHER | Age: 83
End: 2018-07-20

## 2018-07-20 NOTE — TELEPHONE ENCOUNTER
Patsy  Patient is scheduled for pacemaker generator change on 8/8/18.  Please schedule patient to follow up with Dr. Rivers or Marycarmen Ibarra for surgical site evaluation 2 weeks from the surgery date.    Thank you, Elaine Mohan RN-BSN

## 2018-08-07 ENCOUNTER — ANESTHESIA EVENT (OUTPATIENT)
Dept: SURGERY | Facility: OTHER | Age: 83
End: 2018-08-07
Payer: MEDICARE

## 2018-08-08 ENCOUNTER — SURGERY (OUTPATIENT)
Age: 83
End: 2018-08-08

## 2018-08-08 ENCOUNTER — HOSPITAL ENCOUNTER (OUTPATIENT)
Facility: OTHER | Age: 83
Discharge: HOME | End: 2018-08-08
Attending: SURGERY | Admitting: SURGERY
Payer: MEDICARE

## 2018-08-08 ENCOUNTER — ANESTHESIA (OUTPATIENT)
Dept: SURGERY | Facility: OTHER | Age: 83
End: 2018-08-08
Payer: MEDICARE

## 2018-08-08 VITALS
SYSTOLIC BLOOD PRESSURE: 143 MMHG | BODY MASS INDEX: 22.68 KG/M2 | DIASTOLIC BLOOD PRESSURE: 74 MMHG | OXYGEN SATURATION: 97 % | WEIGHT: 158.4 LBS | RESPIRATION RATE: 16 BRPM | TEMPERATURE: 97.7 F | HEIGHT: 70 IN

## 2018-08-08 DIAGNOSIS — Z45.010 PACEMAKER GENERATOR END OF LIFE: Primary | ICD-10-CM

## 2018-08-08 PROCEDURE — 40000306 ZZH STATISTIC PRE PROC ASSESS II: Performed by: SURGERY

## 2018-08-08 PROCEDURE — 36000058 ZZH SURGERY LEVEL 3 EA 15 ADDTL MIN: Performed by: SURGERY

## 2018-08-08 PROCEDURE — 25000125 ZZHC RX 250: Performed by: NURSE ANESTHETIST, CERTIFIED REGISTERED

## 2018-08-08 PROCEDURE — 71000027 ZZH RECOVERY PHASE 2 EACH 15 MINS: Performed by: SURGERY

## 2018-08-08 PROCEDURE — 25000128 H RX IP 250 OP 636: Performed by: NURSE ANESTHETIST, CERTIFIED REGISTERED

## 2018-08-08 PROCEDURE — 37000008 ZZH ANESTHESIA TECHNICAL FEE, 1ST 30 MIN: Performed by: SURGERY

## 2018-08-08 PROCEDURE — 25000128 H RX IP 250 OP 636: Performed by: SURGERY

## 2018-08-08 PROCEDURE — 27210794 ZZH OR GENERAL SUPPLY STERILE: Performed by: SURGERY

## 2018-08-08 PROCEDURE — 37000009 ZZH ANESTHESIA TECHNICAL FEE, EACH ADDTL 15 MIN: Performed by: SURGERY

## 2018-08-08 PROCEDURE — 33228 REMV&REPLC PM GEN DUAL LEAD: CPT | Performed by: NURSE ANESTHETIST, CERTIFIED REGISTERED

## 2018-08-08 PROCEDURE — C1785 PMKR, DUAL, RATE-RESP: HCPCS | Performed by: SURGERY

## 2018-08-08 PROCEDURE — 25000125 ZZHC RX 250: Performed by: SURGERY

## 2018-08-08 PROCEDURE — 33228 REMV&REPLC PM GEN DUAL LEAD: CPT | Performed by: SURGERY

## 2018-08-08 PROCEDURE — 99100 ANES PT EXTEME AGE<1 YR&>70: CPT | Performed by: NURSE ANESTHETIST, CERTIFIED REGISTERED

## 2018-08-08 PROCEDURE — 36000056 ZZH SURGERY LEVEL 3 1ST 30 MIN: Performed by: SURGERY

## 2018-08-08 DEVICE — IMPLANTABLE DEVICE: Type: IMPLANTABLE DEVICE | Site: CHEST  WALL | Status: FUNCTIONAL

## 2018-08-08 RX ORDER — FENTANYL CITRATE 50 UG/ML
25-50 INJECTION, SOLUTION INTRAMUSCULAR; INTRAVENOUS
Status: DISCONTINUED | OUTPATIENT
Start: 2018-08-08 | End: 2018-08-08 | Stop reason: HOSPADM

## 2018-08-08 RX ORDER — SODIUM CHLORIDE, SODIUM LACTATE, POTASSIUM CHLORIDE, CALCIUM CHLORIDE 600; 310; 30; 20 MG/100ML; MG/100ML; MG/100ML; MG/100ML
INJECTION, SOLUTION INTRAVENOUS CONTINUOUS
Status: DISCONTINUED | OUTPATIENT
Start: 2018-08-08 | End: 2018-08-08 | Stop reason: HOSPADM

## 2018-08-08 RX ORDER — NALOXONE HYDROCHLORIDE 0.4 MG/ML
.1-.4 INJECTION, SOLUTION INTRAMUSCULAR; INTRAVENOUS; SUBCUTANEOUS
Status: DISCONTINUED | OUTPATIENT
Start: 2018-08-08 | End: 2018-08-08 | Stop reason: HOSPADM

## 2018-08-08 RX ORDER — BUPIVACAINE HYDROCHLORIDE AND EPINEPHRINE 5; 5 MG/ML; UG/ML
INJECTION, SOLUTION EPIDURAL; INTRACAUDAL; PERINEURAL PRN
Status: DISCONTINUED | OUTPATIENT
Start: 2018-08-08 | End: 2018-08-08 | Stop reason: HOSPADM

## 2018-08-08 RX ORDER — ACETAMINOPHEN 325 MG/1
650 TABLET ORAL
Status: DISCONTINUED | OUTPATIENT
Start: 2018-08-08 | End: 2018-08-08 | Stop reason: HOSPADM

## 2018-08-08 RX ORDER — CEFAZOLIN SODIUM 1 G/50ML
1 INJECTION, SOLUTION INTRAVENOUS SEE ADMIN INSTRUCTIONS
Status: DISCONTINUED | OUTPATIENT
Start: 2018-08-08 | End: 2018-08-08 | Stop reason: HOSPADM

## 2018-08-08 RX ORDER — LIDOCAINE 40 MG/G
CREAM TOPICAL
Status: DISCONTINUED | OUTPATIENT
Start: 2018-08-08 | End: 2018-08-08 | Stop reason: HOSPADM

## 2018-08-08 RX ORDER — ONDANSETRON 2 MG/ML
4 INJECTION INTRAMUSCULAR; INTRAVENOUS EVERY 30 MIN PRN
Status: DISCONTINUED | OUTPATIENT
Start: 2018-08-08 | End: 2018-08-08 | Stop reason: HOSPADM

## 2018-08-08 RX ORDER — LIDOCAINE HYDROCHLORIDE 20 MG/ML
INJECTION, SOLUTION INFILTRATION; PERINEURAL PRN
Status: DISCONTINUED | OUTPATIENT
Start: 2018-08-08 | End: 2018-08-08

## 2018-08-08 RX ORDER — MEPERIDINE HYDROCHLORIDE 50 MG/ML
12.5 INJECTION INTRAMUSCULAR; INTRAVENOUS; SUBCUTANEOUS
Status: DISCONTINUED | OUTPATIENT
Start: 2018-08-08 | End: 2018-08-08 | Stop reason: HOSPADM

## 2018-08-08 RX ORDER — FENTANYL CITRATE 50 UG/ML
INJECTION, SOLUTION INTRAMUSCULAR; INTRAVENOUS PRN
Status: DISCONTINUED | OUTPATIENT
Start: 2018-08-08 | End: 2018-08-08

## 2018-08-08 RX ORDER — PROPOFOL 10 MG/ML
INJECTION, EMULSION INTRAVENOUS PRN
Status: DISCONTINUED | OUTPATIENT
Start: 2018-08-08 | End: 2018-08-08

## 2018-08-08 RX ORDER — CEFAZOLIN SODIUM 2 G/100ML
2 INJECTION, SOLUTION INTRAVENOUS
Status: COMPLETED | OUTPATIENT
Start: 2018-08-08 | End: 2018-08-08

## 2018-08-08 RX ORDER — PROPOFOL 10 MG/ML
INJECTION, EMULSION INTRAVENOUS CONTINUOUS PRN
Status: DISCONTINUED | OUTPATIENT
Start: 2018-08-08 | End: 2018-08-08

## 2018-08-08 RX ORDER — ACETAMINOPHEN 325 MG/1
650 TABLET ORAL EVERY 4 HOURS PRN
Qty: 100 TABLET | Refills: 0 | COMMUNITY
Start: 2018-08-08 | End: 2018-11-06

## 2018-08-08 RX ORDER — ONDANSETRON 4 MG/1
4 TABLET, ORALLY DISINTEGRATING ORAL EVERY 30 MIN PRN
Status: DISCONTINUED | OUTPATIENT
Start: 2018-08-08 | End: 2018-08-08 | Stop reason: HOSPADM

## 2018-08-08 RX ADMIN — PROPOFOL 30 MG: 10 INJECTION, EMULSION INTRAVENOUS at 09:19

## 2018-08-08 RX ADMIN — LIDOCAINE HYDROCHLORIDE 60 MG: 20 INJECTION, SOLUTION INFILTRATION; PERINEURAL at 09:04

## 2018-08-08 RX ADMIN — CEFAZOLIN SODIUM 2 G: 2 INJECTION, SOLUTION INTRAVENOUS at 09:04

## 2018-08-08 RX ADMIN — FENTANYL CITRATE 50 MCG: 50 INJECTION, SOLUTION INTRAMUSCULAR; INTRAVENOUS at 09:04

## 2018-08-08 RX ADMIN — BUPIVACAINE HYDROCHLORIDE AND EPINEPHRINE BITARTRATE 3 ML: 5; .005 INJECTION, SOLUTION EPIDURAL; INTRACAUDAL; PERINEURAL at 09:29

## 2018-08-08 RX ADMIN — PROPOFOL 75 MCG/KG/MIN: 10 INJECTION, EMULSION INTRAVENOUS at 09:04

## 2018-08-08 RX ADMIN — SODIUM CHLORIDE, SODIUM LACTATE, POTASSIUM CHLORIDE, AND CALCIUM CHLORIDE: 600; 310; 30; 20 INJECTION, SOLUTION INTRAVENOUS at 08:38

## 2018-08-08 RX ADMIN — FENTANYL CITRATE 50 MCG: 50 INJECTION, SOLUTION INTRAMUSCULAR; INTRAVENOUS at 09:20

## 2018-08-08 RX ADMIN — PROPOFOL 50 MG: 10 INJECTION, EMULSION INTRAVENOUS at 09:04

## 2018-08-08 NOTE — ANESTHESIA POSTPROCEDURE EVALUATION
Patient: Jf Ortiz    Procedure(s):  Pacemaker generator change - Wound Class: I-Clean    Diagnosis:pacemaker generator end of life  Diagnosis Additional Information: No value filed.    Anesthesia Type:  MAC    Note:  Anesthesia Post Evaluation    Patient location during evaluation: Phase 2  Patient participation: Able to fully participate in evaluation  Level of consciousness: awake and alert  Pain management: adequate  Airway patency: patent  Cardiovascular status: acceptable  Respiratory status: acceptable  Hydration status: acceptable  PONV: none             Last vitals:  Vitals:    08/08/18 1000 08/08/18 1015 08/08/18 1030   BP: 143/78 148/80 151/82   Resp:      Temp:      SpO2:            Electronically Signed By: MEGHA SCHRADER CRNA  August 8, 2018  10:43 AM

## 2018-08-08 NOTE — H&P (VIEW-ONLY)
Cardiology Progress Note     Assessment & Plan   Jf Ortiz is a 84 year old male who is being seen in follow-up to visit from 1/5/18.  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  He is here for six-month follow-up.  He recently had his pacemaker checked on 5/22/18 and was noted to have battery life at less than 0.5 years.  He needs to have his pacemaker generator changed and this was discussed today.  Otherwise, he is doing remarkably well and has had no more episodes of dizziness, lightheadedness, near syncope, or syncope.    Impression:  1. Autonomic orthostatic hypotension.  2.  Parkinson's disease.  3.  Lewy body dementia.  4.  Coronary artery disease with H/O stent placement.  5.  Pacemaker placement now at end-of-life.  6.  Hyperlipidemia.  7.  Hypertension.  8.  History of syncope.    Plan:  1.  He is stable from an autonomic orthostatic hypotension standpoint.  He no longer has symptoms.  He will continue to increase his fluid intake, maintain a high sodium diet with salt tablets, Florinef 0.3 mg daily, midodrine 10 mg 3 times a day, compression stockings, and rising slowly when he gets up.  2.  His pacemaker appears to be at end of life.  He was given the option of having his generator change in [facility], or in the [facility].  They have chosen to have this done in [place].  This will be scheduled in the near future.  3.  He will be seen in approximate 1-2 weeks after his generator change is complete.  4.  He should have his pacemaker interrogated after his generator is changed preferably in a few weeks up to 4 weeks after.  5.  They are concerned with his salt intake but his low fluid intake and its potential effects on his kidneys.  It was suggested he have labs today which will include a basic metabolic panel and hemoglobin A1c.   "They will be contacted for the results.  6.  He will be seen in follow-up when his generator change has been completed.      Herman Rivers    Interval History   Jf is being seen for six-month follow-up.  Previously, he had been evaluated for autonomic orthostatic hypotension secondary to Parkinson's disease.  He was frequently dizzy, having near syncope, and had episodes of syncope.  He was found to be severely orthostatic.  We did discuss treatment and is now on midodrine, Florinef, salt tablets, maintaining a high sodium diet, and is attempting to increase his fluid intake while having DONNA stockings in place.  He is doing all these well with the exception of poor fluid intake.  At best, he is described at taking in 12-15 ounces of fluid a day.  His wife encourages him to drink but he \"forgets\".  He has not had any more concerns lightheadedness, dizziness, near-syncope, or syncope.    Also, recently he had his device interrogated.  It showed he had approximately 0.5 years left on his battery.  His intrinsic rate was 30 bpm.  He needs to have a generator change.  He would like to have this done in [place].  This will be set up and he will follow-up in approximately 2 weeks after his generator change has been completed.  Also, he needs to have his device interrogated after the generator has been changed.  Otherwise, he has no additional concerns or complaints.    Physical Exam       BP: 130/54 Pulse: 70   Resp: 16 SpO2: 98 %      Vitals:    07/11/18 1040   Weight: 77.1 kg (170 lb)     Vital Signs with Ranges  Pulse:  [70] 70  Resp:  [16] 16  BP: (130)/(54) 130/54  SpO2:  [98 %] 98 %  ROS is negative except that which was noted in the HPI.     Incision/Surgical Site 02/14/18 Groin (Active)   Number of days:147       Constitutional: awake, alert, cooperative, no apparent distress, and appears stated age  Eyes: Lids and lashes normal, pupils equal,  sclera clear, conjunctiva normal  ENT: Normocephalic, without " obvious abnormality, atraumatic.  Respiratory: No increased work of breathing, good air exchange, clear to auscultation bilaterally, no crackles or wheezing  Cardiovascular: Normal apical impulse, regular rate and rhythm, normal S1 and S2, no S3 or S4, and no murmur noted  GI: No scars, normal bowel sounds, soft,  Musculoskeletal: No lower extremity edema.  Neurologic: Awake, alert, oriented to name, place and time.    Neuropsychiatric: General: normal, calm and normal eye contact    Medications         Data   No results found for this or any previous visit (from the past 24 hour(s)).

## 2018-08-08 NOTE — ANESTHESIA CARE TRANSFER NOTE
Patient: Jf Ortiz    Procedure(s):  Pacemaker generator change - Wound Class: I-Clean    Diagnosis: pacemaker generator end of life  Diagnosis Additional Information: No value filed.    Anesthesia Type:   MAC     Note:  Airway :Room Air  Patient transferred to:Phase II  Handoff Report: Identifed the Patient, Identified the Reponsible Provider, Reviewed the pertinent medical history, Discussed the surgical course, Reviewed Intra-OP anesthesia mangement and issues during anesthesia, Set expectations for post-procedure period and Allowed opportunity for questions and acknowledgement of understanding      Vitals: (Last set prior to Anesthesia Care Transfer)    CRNA VITALS  8/8/2018 0906 - 8/8/2018 0941      8/8/2018             Resp Rate (set): 10                Electronically Signed By: MEGHA Telles CRNA  August 8, 2018  9:41 AM

## 2018-08-08 NOTE — INTERVAL H&P NOTE
History and Physical Update    The history and physical has been reviewed and the patient has been examined.  There are no changes to the patient's history or physical condition.  I discussed pacemaker generator replacement with the patient and his wife. Informed consent paperwork was completed.

## 2018-08-08 NOTE — IP AVS SNAPSHOT
[facility]    [address]    [place] [address]    Phone:  [phone]    Fax:  [phone]                                       After Visit Summary   8/8/2018    Jf Ortiz    MRN: [id number]           After Visit Summary Signature Page     I have received my discharge instructions, and my questions have been answered. I have discussed any challenges I see with this plan with the nurse or doctor.    ..........................................................................................................................................  Patient/Patient Representative Signature      ..........................................................................................................................................  Patient Representative Print Name and Relationship to Patient    ..................................................               ................................................  Date                                            Time    ..........................................................................................................................................  Reviewed by Signature/Title    ...................................................              ..............................................  Date                                                            Time

## 2018-08-08 NOTE — OR NURSING
Pacemaker explant from left chest wall per Dr. Kaplan  [place] [employer] Model # [id number] [id number] EL Serial # [id number]

## 2018-08-08 NOTE — IP AVS SNAPSHOT
MRN:[id number]                      After Visit Summary   8/8/2018    Jf Ortiz    MRN: [id number]           Thank you!     Thank you for choosing [facility] for your care. Our goal is always to provide you with excellent care. Hearing back from our patients is one way we can continue to improve our services. Please take a few minutes to complete the written survey that you may receive in the mail after you visit with us. Thank you!        Patient Information     Date Of Birth          10/5/1933        Designated Caregiver       Most Recent Value    Caregiver    Will someone help with your care after discharge? yes      About your hospital stay     You were admitted on:  August 8, 2018 You last received care in the:  [facility]    You were discharged on:  August 8, 2018       Who to Call     For medical emergencies, please call 911.  For non-urgent questions about your medical care, please call your primary care provider or clinic, [phone]  For questions related to your surgery, please call your surgery clinic        Attending Provider     Provider Specialty    Alma Kaplan MD Surgery       Primary Care Provider Office Phone # Fax #    R Venancio Ray [phone]      After Care Instructions     Diet Instructions       Resume pre-procedure diet            Discharge Instructions       Follow up appointment as scheduled.            Ice to affected area       Ice to operative site PRN            No Alcohol       For 24 hours post procedure            No driving or operating machinery        until the day after procedure            Shower       No shower for 24 hours post procedure. May shower Postoperative Day (POD)  2                  Your next 10 appointments already scheduled     Aug 21, 2018 11:45 AM CDT   (Arrive by 11:30 AM)   Return Visit with MEGHA [facility] [place] ([facility] [place] )    [address]  [place] [address]   [phone]            Aug 28, 2018 10:00 AM CDT   (Arrive by 9:45 AM)   Return Visit with HC PACEMAKER   [facility] [place] ([facility] [place] )    [address]  [place] [address]   [phone]            Sep 11, 2018  3:00 PM CDT   (Arrive by 2:45 PM)   Return Visit with Juan Walter MD   [facility] [place] ([facility] [place] )    [address]  [place] [address]   [phone]            Sep 11, 2018  3:30 PM CDT   (Arrive by 3:15 PM)   Return Visit with HC PACEMAKER   [facility] [place] ([facility] [place] )    [address]  [place] [address]   [phone]            Nov 16, 2018 11:15 AM CST   (Arrive by 11:00 AM)   Hearing Eval with Kenneth Meyers   [facility] [place] ([facility] [place] )    [address]  [place] [address]   [phone]            Nov 19, 2018 10:45 AM CST   (Arrive by 10:30 AM)   PROCEDURE with MEGHA Huber CNP   [facility] [place] ([facility] [place] )    [address]  [place] [address]   [phone]            Nov 19, 2018 11:15 AM CST   (Arrive by 11:00 AM)   Hearing Eval with Kenneth Meyres   [facility] [place] ([facility] [place] )    [address]  [place] [address]   [phone]              Further instructions from your care team       Procedure you had done: pacemaker change  Your health care provider is:  FRANCES Ray  Your surgeon is Dr. Alma Kaplan.   Please call your health care provider or surgeon at [phone] if:    - you feel you are getting worse or having an increase in problems    - fever greater than 101 degrees  - increasing shortness of breath or chest pain  - any signs of infection (increasing redness, swelling, tenderness, warmth, change in appearance, or  increased drainage)  - nausea (upset stomach) and vomiting and/or diarrhea that will not stop  - severe  pain that is not relieved by medicine, rest or ice    Home care :  You will be discharged when you are safe to go home. Anesthesia can change judgment, reaction time and coordination for several hours after you seem back to normal. Therefore, do not operate any motorized vehicles or power tools for 24 hours after discharge.     Activity:  You should rest or do quiet activities for the rest of the day. The day after surgery you may be as active as you feel able. You may find that you require more rest than usual the first 3-4 days as your body heals.    Diet:  Eat small amounts frequently after arriving home. Avoid foods that are hard to digest such as heavy, sweet, spicy, or fried foods until you are feeling well.   Vomiting can occur after general anesthesia. If vomiting lasts more than 5-7 times after arriving home, or if you have other difficulties, call your doctor.     Other:  1. Problems urinating can happen after surgery.  Please call your physician if you have any problems.  2. Some people have constipation after surgery-you can use over the counter stool softener or laxative as needed.  3. You can use ice on the area of the incision to help with pain and swelling. Apply an ice pack wrapped in a towel to the area for 10-15 minutes once an hour.   Dressing:  Your incision was covered with Steri-strips and a bandage. The bandage can be removed and you can shower 24 hours or more after the surgery. After you shower dry your incision gently. You may apply a clean bandage if you want to. The Steri-strips will stay on for 5-7 days.   No soaking in a bath, hot tub, pool or lake for 5 days.      Drainage:   Bleeding or drainage should be minimal.  1. If bleeding soaks the dressings, cover with another sterile dressing.  2. If bleeding continues, apply gentle steady pressure over the incision for 5 minutes.  3. If bleeding persists or there is an increased swelling of the area, call your surgeon or go to the  "Emergency Room.        Pending Results     No orders found from 8/6/2018 to 8/9/2018.            Admission Information     Date & Time Provider Department Dept. Phone    8/8/2018 Alma Kaplan MD [facility] [phone]      Your Vitals Were     Blood Pressure Temperature Respirations Height Weight Pulse Oximetry    142/81 97.7  F (36.5  C) (Temporal) 16 1.778 m (5' 10\") 71.8 kg (158 lb 6.4 oz) 98%    BMI (Body Mass Index)                   22.73 kg/m2           Care EveryWhere ID     This is your Care EveryWhere ID. This could be used by other organizations to access your [facility] medical records  [id number]        Equal Access to Services     TONY VÁZQUEZ : Candice Arrington, samantha griffin, qahiwot kaalmaradha hanna, hayley mauro. So [facility] [phone].    ATENCIÓN: Si habla español, tiene a tucker disposición servicios gratuitos de asistencia lingüística. Llame al [phone].    We comply with applicable federal civil rights laws and [place] laws. We do not discriminate on the basis of race, color, national origin, age, disability, sex, sexual orientation, or gender identity.               Review of your medicines      START taking        Dose / Directions    acetaminophen 325 MG tablet   Commonly known as:  TYLENOL   Used for:  Pacemaker generator end of life        Dose:  650 mg   Take 2 tablets (650 mg) by mouth every 4 hours as needed for other (mild pain)   Quantity:  100 tablet   Refills:  0         CONTINUE these medicines which may have CHANGED, or have new prescriptions. If we are uncertain of the size of tablets/capsules you have at home, strength may be listed as something that might have changed.        Dose / Directions    fludrocortisone 0.1 MG tablet   Commonly known as:  FLORINEF   This may have changed:    - how much to take  - when to take this   Used for:  Parkinson's disease (H), Orthostatic hypotension        Dose:  0.3 mg "   Take 3 tablets (0.3 mg) by mouth daily   Quantity:  90 tablet   Refills:  11         CONTINUE these medicines which have NOT CHANGED        Dose / Directions    5- MG Caps        Dose:  1 tablet   Take 1 tablet by mouth At Bedtime   Refills:  0       atorvastatin 20 MG tablet   Commonly known as:  LIPITOR   Used for:  Hypercholesterolemia        Dose:  20 mg   Take 1 tablet (20 mg) by mouth every evening   Quantity:  90 tablet   Refills:  3       DONEPEZIL HCL PO        Dose:  5 mg   Take 5 mg by mouth 2 times daily   Refills:  0       MELATONIN PO        Dose:  5 mg   Take 5 mg by mouth At Bedtime   Refills:  0       MENS PROSTATE HEALTH FORMULA PO        Dose:  1 tablet   Take 1 tablet by mouth 2 times daily   Refills:  0       midodrine 5 MG tablet   Commonly known as:  PROAMATINE   Used for:  Autonomic orthostatic hypotension        TAKE 2 TABLETS (10MG) BY MOUTH THREE TIMES DAILY   Quantity:  180 tablet   Refills:  3       Multi-vitamin Tabs tablet        Dose:  1 tablet   Take 1 tablet by mouth daily   Refills:  0       PANTOPRAZOLE SODIUM PO        Dose:  40 mg   Take 40 mg by mouth 2 times daily (before meals)   Refills:  0       Potassium Chloride ER 20 MEQ Tbcr   Used for:  Hypokalemia        TAKE 1 TABLET BY MOUTH ONCE DAILY   Quantity:  30 tablet   Refills:  5       senna-docusate 8.6-50 MG per tablet   Commonly known as:  SENOKOT-S;PERICOLACE   Used for:  S/P inguinal hernia repair        Dose:  1-2 tablet   Take 1-2 tablets by mouth 2 times daily Take while on oral narcotics to prevent or treat constipation.   Quantity:  30 tablet   Refills:  0       sodium chloride 1 GM tablet   Used for:  Parkinson's disease (H), Orthostatic hypotension        TAKE 1 TABLET BY MOUTH TWICE A DAY   Quantity:  60 tablet   Refills:  11       Turmeric 500 MG Tabs        Dose:  1 tablet   Take 1 tablet by mouth daily   Refills:  0       vitamin B complex with vitamin C Tabs tablet        Dose:  1 tablet   Take 1  tablet by mouth daily   Refills:  0       VITAMIN B-12 PO        Dose:  1 tablet   Take 1 tablet by mouth daily   Refills:  0       VITAMIN D-3 PO        Dose:  1000 Int'l Units   Take 1,000 Int'l Units by mouth daily   Refills:  0            Where to get your medicines      Some of these will need a paper prescription and others can be bought over the counter. Ask your nurse if you have questions.     You don't need a prescription for these medications     acetaminophen 325 MG tablet                Protect others around you: Learn how to safely use, store and throw away your medicines at www.disposemymeds.org.             Medication List: This is a list of all your medications and when to take them. Check marks below indicate your daily home schedule. Keep this list as a reference.      Medications           Morning Afternoon Evening Bedtime As Needed    5- MG Caps   Take 1 tablet by mouth At Bedtime                                acetaminophen 325 MG tablet   Commonly known as:  TYLENOL   Take 2 tablets (650 mg) by mouth every 4 hours as needed for other (mild pain)                                atorvastatin 20 MG tablet   Commonly known as:  LIPITOR   Take 1 tablet (20 mg) by mouth every evening                                DONEPEZIL HCL PO   Take 5 mg by mouth 2 times daily                                fludrocortisone 0.1 MG tablet   Commonly known as:  FLORINEF   Take 3 tablets (0.3 mg) by mouth daily                                MELATONIN PO   Take 5 mg by mouth At Bedtime                                MENS PROSTATE HEALTH FORMULA PO   Take 1 tablet by mouth 2 times daily                                midodrine 5 MG tablet   Commonly known as:  PROAMATINE   TAKE 2 TABLETS (10MG) BY MOUTH THREE TIMES DAILY                                Multi-vitamin Tabs tablet   Take 1 tablet by mouth daily                                PANTOPRAZOLE SODIUM PO   Take 40 mg by mouth 2 times daily (before meals)                                 Potassium Chloride ER 20 MEQ Tbcr   TAKE 1 TABLET BY MOUTH ONCE DAILY                                senna-docusate 8.6-50 MG per tablet   Commonly known as:  SENOKOT-S;PERICOLACE   Take 1-2 tablets by mouth 2 times daily Take while on oral narcotics to prevent or treat constipation.                                sodium chloride 1 GM tablet   TAKE 1 TABLET BY MOUTH TWICE A DAY                                Turmeric 500 MG Tabs   Take 1 tablet by mouth daily                                vitamin B complex with vitamin C Tabs tablet   Take 1 tablet by mouth daily                                VITAMIN B-12 PO   Take 1 tablet by mouth daily                                VITAMIN D-3 PO   Take 1,000 Int'l Units by mouth daily

## 2018-08-08 NOTE — ANESTHESIA PREPROCEDURE EVALUATION
Anesthesia Evaluation     . Pt has had prior anesthetic.     No history of anesthetic complications          ROS/MED HX    ENT/Pulmonary:  - neg pulmonary ROS     Neurologic: Comment: Lewey body dementia    (+)dementia,     Cardiovascular: Comment: Autonomic orthostatic hypotension.      (+) hypertension--CAD, --stent,. : . . . 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 .       METS/Exercise Tolerance:  4 - Raking leaves, gardening   Hematologic:  - neg hematologic  ROS       Musculoskeletal:  - neg musculoskeletal ROS       GI/Hepatic:     (+) GERD       Renal/Genitourinary:  - ROS Renal section negative       Endo:  - neg endo ROS       Psychiatric:  - neg psychiatric ROS       Infectious Disease:  - neg infectious disease ROS       Malignancy:      - no malignancy   Other:    - neg other ROS                 Physical Exam  Normal systems: pulmonary and dental    Airway   Mallampati: I  TM distance: >3 FB  Neck ROM: full    Dental     Cardiovascular   Rhythm and rate: regular and normal  (+) murmur       Pulmonary    breath sounds clear to auscultation                    Anesthesia Plan      History & Physical Review      ASA Status:  3 .    NPO Status:  > 6 hours    Plan for MAC          Postoperative Care      Consents  Anesthetic plan, risks, benefits and alternatives discussed with:  Patient..                          .

## 2018-08-08 NOTE — DISCHARGE INSTRUCTIONS
Procedure you had done: pacemaker change  Your health care provider is:  FRANCES Ray  Your surgeon is Dr. Alma Kaplan.   Please call your health care provider or surgeon at [phone] if:    - you feel you are getting worse or having an increase in problems    - fever greater than 101 degrees  - increasing shortness of breath or chest pain  - any signs of infection (increasing redness, swelling, tenderness, warmth, change in appearance, or  increased drainage)  - nausea (upset stomach) and vomiting and/or diarrhea that will not stop  - severe pain that is not relieved by medicine, rest or ice    Home care :  You will be discharged when you are safe to go home. Anesthesia can change judgment, reaction time and coordination for several hours after you seem back to normal. Therefore, do not operate any motorized vehicles or power tools for 24 hours after discharge.     Activity:  You should rest or do quiet activities for the rest of the day. The day after surgery you may be as active as you feel able. You may find that you require more rest than usual the first 3-4 days as your body heals.    Diet:  Eat small amounts frequently after arriving home. Avoid foods that are hard to digest such as heavy, sweet, spicy, or fried foods until you are feeling well.   Vomiting can occur after general anesthesia. If vomiting lasts more than 5-7 times after arriving home, or if you have other difficulties, call your doctor.     Other:  1. Problems urinating can happen after surgery.  Please call your physician if you have any problems.  2. Some people have constipation after surgery-you can use over the counter stool softener or laxative as needed.  3. You can use ice on the area of the incision to help with pain and swelling. Apply an ice pack wrapped in a towel to the area for 10-15 minutes once an hour.   Dressing:  Your incision was covered with Steri-strips and a bandage. The bandage can be removed and you can shower 24  hours or more after the surgery. After you shower dry your incision gently. You may apply a clean bandage if you want to. The Steri-strips will stay on for 5-7 days.   No soaking in a bath, hot tub, pool or lake for 5 days.      Drainage:   Bleeding or drainage should be minimal.  1. If bleeding soaks the dressings, cover with another sterile dressing.  2. If bleeding continues, apply gentle steady pressure over the incision for 5 minutes.  3. If bleeding persists or there is an increased swelling of the area, call your surgeon or go to the Emergency Room.

## 2018-08-08 NOTE — OP NOTE
Surgeon: Alma Kaplan MD   Circulator: Cherrie Alvarez RN  Scrub Person: Cathleen Zamudio  2nd Scrub: Cesar Carballo  Pre-Op Nurse: Soheila Mejia RN  Post-Op Nurse: Veda Oconnell RN    Family Phys.: FRANCES Ray     PREOP DIAGNOSIS: Pacemaker battery depletion.     POSTOPDIAGNOSIS: The same.     PROCEDURE PERFORMED: Replacement of pacemaker generator. Number of leads: 2    ANESTHESIA: MAC with local.     SPECIMENS: None.     BLOOD LOSS: Minimal.     COMPLICATIONS:None immediately apparent.     INDICATIONS: The patient is a 84 year old male with a previous pacemaker for bradycardia. The pacemaker battery is showing depletion. The risks, benefits and alternatives to replacement of pacemaker generator were discussed with the patient and his wife. We specifically discussed the risks of infection, bleeding, hematoma, fluid collections and the possible need for further pacemaker generator replacements as well as the possibilityof lead damage to the leads requiring lead replacement. The patient expressed understanding. Informed consent paperwork was completed.     PROCEDURE: The patient was taken to the operating room. Appropriate monitors were attached. The patient received IV antibiotics preoperatively. The patient was placed in the supine position on the operating room table. The patient had preoperative pacemaker interrogation showing complete dependence. The patient received sedation for comfort. The patient's chest and neck were prepped and draped in standard fashion. Time out was performed confirming the patient s identity and procedure to be performed.  The left chest wall pacemaker was easily palpable and the scar from placement was easily visible. The skin and subcutaneous tissue and the area of the scar were infiltrated with local anesthetic. The incision was then opened sharply and carried down to the subcutaneous tissue. Minimal electrocautery was used. The pacemaker generator was easily  palpated and theleads were palpated and avoided. The capsule around the generator was entered. The pacemaker was freed from any adhesions. The pacemaker was ex-planted without difficulty. The atrial lead was disconnected from the generator and secured in the new generator. The right ventricular lead was then detached from the generator and secured in the new generator. The capsule was enlarged slightly. The generator was then positioned in the pocket. This was inspected and found to have excellent hemostasis. Deep tissues were approximated using interrupted Monocryl sutures. Skin edges were approximated using running Monocryl in a subcuticular layer. Sterile Steri-strips were applied. Sterile dressing was applied.   Interrogation of the pacemaker was performed. Please see the pacemaker implant paperwork for further information.  The patient tolerated the procedure with no immediately apparent complications. All needle, sponge and instrument counts were reported as correct at the completion of the case.

## 2018-08-15 ENCOUNTER — TELEPHONE (OUTPATIENT)
Dept: CARDIOLOGY | Facility: OTHER | Age: 83
End: 2018-08-15

## 2018-08-15 NOTE — TELEPHONE ENCOUNTER
"Call placed to patient and he denies any problems with his pacemaker and requested that I talk to Gaviotajyoti Dorene.    Gaviotajyoti states that \"Jf received, in the mail,  the box for his pacemaker from [employer] yesterday and they are confused what to do with the box\".  Gaviotajyoti states patient \"is not having any problems with his pacemaker and that he feels good since his recent pacemaker generator change in [place]\".  Des denies patient is having any chest pain or any problems.  She states that she is just \"confused what to do with this box that she received and that they are not good with technology since they are both in their 80's\".  Advised Des to call the device clinic at the    of  at [phone] with any questions or concerns to talk with Brandi or one of the other RN device nurses.  Advised Des to return a call to Dr. Rivers's office if her question is not answered or if she is unable to get ahold of any device nurses at this number.  Des states understanding and will call device clinic now and return a call to Dr. Rivers's office PRN.  "

## 2018-08-15 NOTE — TELEPHONE ENCOUNTER
Outreach telephone call to Des who states that she has not called the device clinic in the [facility] yet because she has been so busy today.  Again she states that Jf is not having any problems and that she is just confused about the box that he received from [employer] because she is not good with technology and that she has limited outlets in her bedroom.  Des states she will return a call to Dr. Rivers's office PRN and that she will be calling the device clinic later today or tomorrow to discuss this with the device nurse.

## 2018-08-15 NOTE — TELEPHONE ENCOUNTER
Herman Rivers, DO   You 2 minutes ago (1:12 PM)                 Great, thanks!     DB (Routing comment)

## 2018-08-15 NOTE — TELEPHONE ENCOUNTER
11:11 AM    Reason for Call: Phone Call    Description: having trouble with pacemaker.  Needs help.  Call back.     Was an appointment offered for this call? No  If yes : Appointment type              Date    Preferred method for responding to this message: Telephone Call  What is your phone number ? [phone] or [phone]     If we cannot reach you directly, may we leave a detailed response at the number you provided? Yes    Can this message wait until your PCP/provider returns, if available today? Not applicable,     Viviana Copeland

## 2018-08-17 NOTE — TELEPHONE ENCOUNTER
Outreach telephone call to Gaviotajyoti and she states she [facility] with [facility] and got all of her questions answered and she states the [facility] told Jf that it's all working well.  Des has no further questions or concerns at this time and states Jf has a follow-up next week with Marycarmen Ibarra CNP and pacemaker at the end of the month.

## 2018-08-17 NOTE — TELEPHONE ENCOUNTER
Outreach telephone call to find out if Des had called the [facility] to get more information on the box that Jf had received from [employer].  Jf states he does not know and that Des is not home right now but that we could try calling back later to ask her.

## 2018-08-21 ENCOUNTER — OFFICE VISIT (OUTPATIENT)
Dept: CARDIOLOGY | Facility: OTHER | Age: 83
End: 2018-08-21
Attending: NURSE PRACTITIONER
Payer: MEDICARE

## 2018-08-21 VITALS
SYSTOLIC BLOOD PRESSURE: 102 MMHG | TEMPERATURE: 97 F | OXYGEN SATURATION: 94 % | HEART RATE: 87 BPM | WEIGHT: 166 LBS | DIASTOLIC BLOOD PRESSURE: 64 MMHG | RESPIRATION RATE: 18 BRPM | BODY MASS INDEX: 23.82 KG/M2

## 2018-08-21 DIAGNOSIS — I10 ESSENTIAL HYPERTENSION: ICD-10-CM

## 2018-08-21 DIAGNOSIS — I95.1 AUTONOMIC ORTHOSTATIC HYPOTENSION: Primary | ICD-10-CM

## 2018-08-21 DIAGNOSIS — Z95.0 PACEMAKER: ICD-10-CM

## 2018-08-21 DIAGNOSIS — E87.6 HYPOKALEMIA: ICD-10-CM

## 2018-08-21 PROBLEM — Z45.010 PACEMAKER GENERATOR END OF LIFE: Status: RESOLVED | Noted: 2018-07-11 | Resolved: 2018-08-21

## 2018-08-21 LAB
ANION GAP SERPL CALCULATED.3IONS-SCNC: 4 MMOL/L (ref 3–14)
BUN SERPL-MCNC: 14 MG/DL (ref 7–30)
CALCIUM SERPL-MCNC: 8.7 MG/DL (ref 8.5–10.1)
CHLORIDE SERPL-SCNC: 108 MMOL/L (ref 94–109)
CO2 SERPL-SCNC: 30 MMOL/L (ref 20–32)
CREAT SERPL-MCNC: 1.12 MG/DL (ref 0.66–1.25)
GFR SERPL CREATININE-BSD FRML MDRD: 62 ML/MIN/1.7M2
GLUCOSE SERPL-MCNC: 117 MG/DL (ref 70–99)
POTASSIUM SERPL-SCNC: 3.7 MMOL/L (ref 3.4–5.3)
SODIUM SERPL-SCNC: 142 MMOL/L (ref 133–144)

## 2018-08-21 PROCEDURE — 80048 BASIC METABOLIC PNL TOTAL CA: CPT | Mod: ZL | Performed by: NURSE PRACTITIONER

## 2018-08-21 PROCEDURE — 36415 COLL VENOUS BLD VENIPUNCTURE: CPT | Mod: ZL | Performed by: NURSE PRACTITIONER

## 2018-08-21 PROCEDURE — G0463 HOSPITAL OUTPT CLINIC VISIT: HCPCS

## 2018-08-21 PROCEDURE — 99213 OFFICE O/P EST LOW 20 MIN: CPT | Performed by: NURSE PRACTITIONER

## 2018-08-21 ASSESSMENT — ANXIETY QUESTIONNAIRES
4. TROUBLE RELAXING: NOT AT ALL
3. WORRYING TOO MUCH ABOUT DIFFERENT THINGS: NOT AT ALL
7. FEELING AFRAID AS IF SOMETHING AWFUL MIGHT HAPPEN: NOT AT ALL
5. BEING SO RESTLESS THAT IT IS HARD TO SIT STILL: NOT AT ALL
2. NOT BEING ABLE TO STOP OR CONTROL WORRYING: NOT AT ALL
6. BECOMING EASILY ANNOYED OR IRRITABLE: NOT AT ALL
GAD7 TOTAL SCORE: 0
1. FEELING NERVOUS, ANXIOUS, OR ON EDGE: NOT AT ALL

## 2018-08-21 ASSESSMENT — PAIN SCALES - GENERAL: PAINLEVEL: NO PAIN (0)

## 2018-08-21 NOTE — NURSING NOTE
"Chief Complaint   Patient presents with     Follow Up For     surgical site evaluation       Initial /64 (BP Location: Right arm, Patient Position: Sitting, Cuff Size: Adult Regular)  Pulse 87  Temp 97  F (36.1  C) (Tympanic)  Resp 18  Wt 75.3 kg (166 lb)  SpO2 94%  BMI 23.82 kg/m2 Estimated body mass index is 23.82 kg/(m^2) as calculated from the following:    Height as of 8/8/18: 1.778 m (5' 10\").    Weight as of this encounter: 75.3 kg (166 lb).  Medication Reconciliation: complete    Amber Peraza LPN    "

## 2018-08-21 NOTE — PATIENT INSTRUCTIONS
You were seen by Marycarmen Ibarra NP, 8/21/2018.     1.  Please continue all medication as prescribed.      2.  If you develop new or worsening symptoms please call the cardiology office as you will need to be seen sooner.     3.  You will have lab today, you will be called with these results when they become available.           Please call the cardiology office with problems, questions, or concerns at [phone].    If you experience chest pain, chest pressure, chest tightness, shortness of breath, fainting, lightheadedness, nausea, vomiting, or other concerning symptoms, please report to the Emergency Department or call 911. These symptoms may be emergent, and best treated in the Emergency Department.       Elaine LUTZ RN-BSN  Cardiology   [facility]  [phone]

## 2018-08-21 NOTE — MR AVS SNAPSHOT
After Visit Summary   8/21/2018    Jf Ortiz    MRN: [id number]           Patient Information     Date Of Birth          10/5/1933        Visit Information        Provider Department      8/21/2018 11:45 AM Marycarmen Ibarra APRN CNP [facility]        Today's Diagnoses     Autonomic orthostatic hypotension    -  1    Essential hypertension        Pacemaker, [employer], Dual Chamber - Dependent        Hypokalemia          Care Instructions    You were seen by Marycarmen Ibarra NP, 8/21/2018.     1.  Please continue all medication as prescribed.      2.  If you develop new or worsening symptoms please call the cardiology office as you will need to be seen sooner.     3.  You will have lab today, you will be called with these results when they become available.           Please call the cardiology office with problems, questions, or concerns at [phone].    If you experience chest pain, chest pressure, chest tightness, shortness of breath, fainting, lightheadedness, nausea, vomiting, or other concerning symptoms, please report to the Emergency Department or call 911. These symptoms may be emergent, and best treated in the Emergency Department.       Elaine LUTZ RN-BSN  Cardiology   [facility]  [phone]              Follow-ups after your visit        Your next 10 appointments already scheduled     Aug 28, 2018 10:00 AM CDT   (Arrive by 9:45 AM)   Return Visit with HC PACEMAKER   [facility] ([facility] [place] )    [address]  [place] [address]   [phone]            Sep 11, 2018  3:00 PM CDT   (Arrive by 2:45 PM)   Return Visit with Juan Walter MD   [facility] ([facility] [place] )    [address]  [place] [address]   [phone]            Sep 11, 2018  3:30 PM CDT   (Arrive by 3:15 PM)   Return Visit with HC PACEMAKER   [facility] ([facility] )     [address]  [place] [address]   [phone]            Nov 16, 2018 11:15 AM CST   (Arrive by 11:00 AM)   Hearing Eval with Kenneth Meyers   [facility] [place] ([facility] [place] )    [address]  [place] [address]   [phone]            Nov 19, 2018 10:45 AM CST   (Arrive by 10:30 AM)   PROCEDURE with MEGHA Huber CNP   [facility] [place] ([facility] [place] )    [address]  [place] [address]   [phone]            Nov 19, 2018 11:15 AM CST   (Arrive by 11:00 AM)   Hearing Eval with Kenneth Meyers   [facility] [place] ([facility] [place] )    [address]  [place] [address]   [phone]              Who to contact     If you have questions or need follow up information about today's clinic visit or your schedule please contact [facility] directly at [phone].  Normal or non-critical lab and imaging results will be communicated to you by MyChart, letter or phone within 4 business days after the clinic has received the results. If you do not hear from us within 7 days, please contact the clinic through MyChart or phone. If you have a critical or abnormal lab result, we will notify you by phone as soon as possible.  Submit refill requests through [employer] or call your pharmacy and they will forward the refill request to us. Please allow 3 business days for your refill to be completed.          Additional Information About Your Visit        Care EveryWhere ID     This is your Care EveryWhere ID. This could be used by other organizations to access your [facility] medical records  [id number]        Your Vitals Were     Pulse Temperature Respirations Pulse Oximetry BMI (Body Mass Index)       87 97  F (36.1  C) (Tympanic) 18 94% 23.82 kg/m2        Blood Pressure from Last 3 Encounters:   08/21/18 102/64   08/08/18 143/74   07/11/18 130/54    Weight from Last 3 Encounters:   08/21/18 75.3 kg  (166 lb)   08/08/18 71.8 kg (158 lb 6.4 oz)   07/11/18 77.1 kg (170 lb)              We Performed the Following     Basic metabolic panel          Today's Medication Changes          These changes are accurate as of 8/21/18 12:11 PM.  If you have any questions, ask your nurse or doctor.               These medicines have changed or have updated prescriptions.        Dose/Directions    fludrocortisone 0.1 MG tablet   Commonly known as:  FLORINEF   This may have changed:    - how much to take  - when to take this   Used for:  Parkinson's disease (H), Orthostatic hypotension        Dose:  0.3 mg   Take 3 tablets (0.3 mg) by mouth daily   Quantity:  90 tablet   Refills:  11                Primary Care Provider Office Phone # Fax #    R Venancio Ray [phone] [address] [address]        Equal Access to Services     TONY VÁZQUEZ : Candice Arrington, samantha griffin, benjamin kaalmaradha hanna, hayley pollard . So [facility] [phone].    ATENCIÓN: Si habla español, tiene a tucker disposición servicios gratuitos de asistencia lingüística. Llame al [phone].    We comply with applicable federal civil rights laws and [place] laws. We do not discriminate on the basis of race, color, national origin, age, disability, sex, sexual orientation, or gender identity.            Thank you!     Thank you for choosing [facility]  for your care. Our goal is always to provide you with excellent care. Hearing back from our patients is one way we can continue to improve our services. Please take a few minutes to complete the written survey that you may receive in the mail after your visit with us. Thank you!             Your Updated Medication List - Protect others around you: Learn how to safely use, store and throw away your medicines at www.disposemymeds.org.          This list is accurate as of 8/21/18 12:11 PM.  Always use your most recent  med list.                   Brand Name Dispense Instructions for use Diagnosis    5- MG Caps      Take 1 tablet by mouth At Bedtime        acetaminophen 325 MG tablet    TYLENOL    100 tablet    Take 2 tablets (650 mg) by mouth every 4 hours as needed for other (mild pain)    Pacemaker generator end of life       atorvastatin 20 MG tablet    LIPITOR    90 tablet    Take 1 tablet (20 mg) by mouth every evening    Hypercholesterolemia       DONEPEZIL HCL PO      Take 5 mg by mouth 2 times daily        fludrocortisone 0.1 MG tablet    FLORINEF    90 tablet    Take 3 tablets (0.3 mg) by mouth daily    Parkinson's disease (H), Orthostatic hypotension       MELATONIN PO      Take 5 mg by mouth At Bedtime        MENS PROSTATE HEALTH FORMULA PO      Take 1 tablet by mouth 2 times daily        midodrine 5 MG tablet    PROAMATINE    180 tablet    TAKE 2 TABLETS (10MG) BY MOUTH THREE TIMES DAILY    Autonomic orthostatic hypotension       Multi-vitamin Tabs tablet      Take 1 tablet by mouth daily        PANTOPRAZOLE SODIUM PO      Take 40 mg by mouth 2 times daily (before meals)        Potassium Chloride ER 20 MEQ Tbcr     30 tablet    TAKE 1 TABLET BY MOUTH ONCE DAILY    Hypokalemia       senna-docusate 8.6-50 MG per tablet    SENOKOT-S;PERICOLACE    30 tablet    Take 1-2 tablets by mouth 2 times daily Take while on oral narcotics to prevent or treat constipation.    S/P inguinal hernia repair       sodium chloride 1 GM tablet     60 tablet    TAKE 1 TABLET BY MOUTH TWICE A DAY    Parkinson's disease (H), Orthostatic hypotension       Turmeric 500 MG Tabs      Take 1 tablet by mouth daily        vitamin B complex with vitamin C Tabs tablet      Take 1 tablet by mouth daily        VITAMIN B-12 PO      Take 1 tablet by mouth daily        VITAMIN D-3 PO      Take 1,000 Int'l Units by mouth daily

## 2018-08-21 NOTE — PROGRESS NOTES
[facility] HEART CARE   CARDIOLOGY PROGRESS NOTE    Jf Ortiz   [address]  [place] [address]      FRANCES Ray     Chief Complaint   Patient presents with     Follow Up For     surgical site evaluation        HPI:   Mr. Ortiz is an 84 year old male who presents for follow-up s/p generator change for his [employer] dual lead pacer. Last device interrogation on 5/22/2018.  He had normal pacer function 1 ATR episode recorded, 5 seconds in duration.  And strategic rhythm was sinus bradycardia with  at 30 bpm.  AP 98%,  100%.  Estimated battery longevity to MAYA was less than 0.5 years at that time.  Patient went on to have his generator changed on 8/8/2018 without complication, remote transmission following with normal pacer function.  He is scheduled for his next device interrogation on 9/11/2018.    He is known to have autonomic orthostatic hypotension secondary to Lewy body dementia with Parkinson's disease, coronary artery disease, history of pacemaker placement secondary to AV block. Today admits that he is doing very well and has had no episodes of dizziness, lightheadedness, near syncope, or syncope. It has been recommended that he increase his fluid intake, maintain a high sodium diet with salt tablets, Florinef 0.3 mg daily, midodrine 10 mg 3 times a day, compression stockings, and rising slowly when he gets up. He has no concerns today.        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     Seizure disorder (H)      Stented coronary artery           FAMILY HISTORY:   Family History   Problem Relation Age of Onset     Diabetes Mother           PAST SURGICAL HISTORY:   Past  Surgical History:   Procedure Laterality Date     ------------OTHER-------------  1955    ulnar and radial fx - repair ulnar and radial fx x4     ------------OTHER-------------  6/14/2011    cataract extraction     BIOPSY  08/2015    skin biopsy     BLEPHAROPLASTY BILATERAL  5/6/2014    Procedure: BLEPHAROPLASTY BILATERAL;  Surgeon: Andrew Queen MD;  Location: [facility] OR     BYPASS GRAFT ARTERY CORONARY  11/2006    coronary artery disease x 5, [facility]     cataract extraction and lens implantation  2011    cataracts     cataract extraction and lens implantation      cataracts     COLONOSCOPY  2012     colonoscopy with polypectomy  3/13/2009    history of polyps - repeat 3 yrs     colonoscopy with polypectomy  2006     colonoscopy with polypectomy  2005     COMBINED COLONOSCOPY WITH ARGON PLASMA COAGULATOR (APC) N/A 10/31/2014    Procedure: COMBINED COLONOSCOPY WITH ARGON PLASMA COAGULATOR (APC);  Surgeon: Bassam Aguilar MD;  Location: [facility] OR     COMBINED COLONOSCOPY WITH ARGON PLASMA COAGULATOR (APC) N/A 11/13/2015    Procedure: COMBINED COLONOSCOPY WITH ARGON PLASMA COAGULATOR (APC);  Surgeon: Bassam Aguilar MD;  Location: [facility]     ENDOSCOPY UPPER, COLONOSCOPY, COMBINED N/A 10/31/2014    Procedure: COMBINED ENDOSCOPY UPPER, COLONOSCOPY;  Surgeon: Bassam Aguilar MD;  Location: [facility]     ENDOSCOPY UPPER, COLONOSCOPY, COMBINED N/A 11/13/2015    Procedure: COMBINED ENDOSCOPY UPPER, COLONOSCOPY;  Surgeon: Bassam Aguilar MD;  Location: [facility]     ESOPHAGOSCOPY, GASTROSCOPY, DUODENOSCOPY (EGD), COMBINED  1/22/2014    Procedure: COMBINED ESOPHAGOSCOPY, GASTROSCOPY, DUODENOSCOPY (EGD);  UPPER ENDOSCOPY(PENA) W/ BIOPSIES;  Surgeon: Patricia Pena MD;  Location: [facility]     HERNIORRHAPHY INGUINAL Right 2/14/2018    Procedure: HERNIORRHAPHY INGUINAL;  OPEN RIGHT INGUINAL HERNIA REPAIR with Mesh;  Surgeon: Virgilio Zaragoza DO;  Location: [facility]     LARYNGOSCOPY WITH MICROSCOPE  1/22/2014    Procedure: LARYNGOSCOPY WITH  MICROSCOPE;;  Surgeon: Chayo Duke MD;  Location: [facility]     pacemaker placement  2000    heart block     pacemaker placement  2011    dual-chamber     REMOVE TUBE, MYRINGOTOMY, COMBINED  1/22/2014    Procedure: COMBINED REMOVE TUBE, MYRINGOTOMY;  MICRODIRECT LARYNGOSCOPY WITH BIOPSY AND FROZEN SECTIONS removal of right ear tube and myringoplasty;  Surgeon: Chayo Duke MD;  Location: [facility]     REPLACE PACEMAKER GENERATOR N/A 8/8/2018    Procedure: REPLACE PACEMAKER GENERATOR;  Pacemaker generator change;  Surgeon: Alma Kaplan MD;  Location: [facility] OR     stent placement to LAD  2008     ventilation tube  5/24/2012    right in office          SOCIAL HISTORY:   Social History     Social History     Marital status: Single     Spouse name: N/A     Number of children: N/A     Years of education: N/A     Occupational History     retired      Social History Main Topics     Smoking status: Former Smoker     Packs/day: 1.00     Years: 5.00     Types: Cigarettes     Smokeless tobacco: Never Used      Comment: quit in 1985     Alcohol use Yes      Comment: every other day     Drug use: No     Sexual activity: Not Asked     Other Topics Concern     Blood Transfusions Yes     Caffeine Concern Yes     1 cup coffee daily     Parent/Sibling W/ Cabg, Mi Or Angioplasty Before 65f 55m? No     Social History Narrative          CURRENT MEDICATIONS:   Prior to Admission medications    Medication Sig Start Date End Date Taking? Authorizing Provider   5-Hydroxytryptophan (5-HTP) 100 MG CAPS Take 1 tablet by mouth At Bedtime    Reported, Patient   acetaminophen (TYLENOL) 325 MG tablet Take 2 tablets (650 mg) by mouth every 4 hours as needed for other (mild pain) 8/8/18   Alma Kaplan MD   atorvastatin (LIPITOR) 20 MG tablet Take 1 tablet (20 mg) by mouth every evening 1/5/18   Herman Rivers,    Cholecalciferol (VITAMIN D-3 PO) Take 1,000 Int'l Units by mouth daily     Reported, Patient   Cyanocobalamin (VITAMIN B-12  PO) Take 1 tablet by mouth daily    Reported, Patient   DONEPEZIL HCL PO Take 5 mg by mouth 2 times daily     Reported, Patient   fludrocortisone (FLORINEF) 0.1 MG tablet Take 3 tablets (0.3 mg) by mouth daily  Patient taking differently: Take 0.1 mg by mouth 2 times daily  10/6/17   Herman Rivers, DO   MELATONIN PO Take 5 mg by mouth At Bedtime     Reported, Patient   midodrine (PROAMATINE) 5 MG tablet TAKE 2 TABLETS (10MG) BY MOUTH THREE TIMES DAILY 7/11/18   Herman Rivers, DO   Misc Natural Products (MENS PROSTATE HEALTH FORMULA PO) Take 1 tablet by mouth 2 times daily    Reported, Patient   multivitamin, therapeutic with minerals (MULTI-VITAMIN) TABS tablet Take 1 tablet by mouth daily    Reported, Patient   PANTOPRAZOLE SODIUM PO Take 40 mg by mouth 2 times daily (before meals)     Reported, Patient   Potassium Chloride ER 20 MEQ TBCR TAKE 1 TABLET BY MOUTH ONCE DAILY 7/5/18   FRANCES Ray MD   senna-docusate (SENOKOT-S;PERICOLACE) 8.6-50 MG per tablet Take 1-2 tablets by mouth 2 times daily Take while on oral narcotics to prevent or treat constipation. 2/14/18   Virgilio Zaragoza, DO   sodium chloride 1 GM tablet TAKE 1 TABLET BY MOUTH TWICE A DAY 10/6/17   Herman Rivers, DO   Turmeric 500 MG TABS Take 1 tablet by mouth daily    Reported, Patient   vitamin  B complex with vitamin C (VITAMIN  B COMPLEX) TABS Take 1 tablet by mouth daily    Reported, Patient          ALLERGIES:   Allergies   Allergen Reactions     Cats Unknown     Lamotrigine      Skin lesions          ROS:   CONSTITUTIONAL: No reported fever or chills. No changes in weight.  CARDIOVASCULAR: No chest pain, chest pressure or chest discomfort. No palpitations or lower extremity edema.   RESPIRATORY: No increased shortness of breath, dyspnea upon exertion, cough, wheezing or hemoptysis.   GI: No reported abdominal pain.  : No reported hematuria or dysuria.   NEUROLOGICAL: No recurrence of lightheadedness, dizziness, syncope,  ataxia, paresthesias or weakness.   HEMATOLOGIC: No history of anemia. No bleeding or excessive bruising. No history of blood clots.   MUSCULOSKELETAL: No joint pain or swelling, no muscle pain.  ENDOCRINOLOGIC: No temperature intolerance. No hair or skin changes.  SKIN: No abnormal rashes or sores, no unusual itching. No redness or drainage from device site with recent generator change.   PSYCHIATRIC: No history of depression or anxiety. No changes in mood, feeling down or anxious. No changes in sleep.      PHYSICAL EXAM:   /64 (BP Location: Right arm, Patient Position: Sitting, Cuff Size: Adult Regular)  Pulse 87  Temp 97  F (36.1  C) (Tympanic)  Resp 18  Wt 75.3 kg (166 lb)  SpO2 94%  BMI 23.82 kg/m2  GENERAL: The patient is a well-developed, well-nourished, in no apparent distress.  HEENT: Head is normocephalic and atraumatic. Eyes are symmetrical with normal visual tracking. No icterus, no xanthelasmas. Nares appeared normal without nasal drainage. Mucous membranes are moist, no cyanosis.  NECK: Supple. No adenopathy, asymmetry or masses.   CHEST/ LUNGS: Lungs clear to auscultation, no rales, rhonchi or wheezes, no use of accessory muscles, no retractions, respirations unlabored and normal respiratory rate.   CARDIO: Regular rate and rhythm normal with S1 and S2, no S3 or S4 and no murmur, click or rub.   ABD: Abdomen is nondistended.   EXTREMITIES: No peripheral edema present.  VASC: Radial, femoral, dorsalis pedis and posterior tibialis pulses normal and symmetric with no vascular bruits heard.  MUSCULOSKELETAL: No joint swelling.   NEUROLOGIC: Alert and oriented X3. Normal speech, gait and affect. No focal neurologic deficits.   SKIN: No jaundice. No rashes or visible skin lesions present. No ecchymosis. Incision site from recent generator change with no redness or drainage. Incision well approximated with no tenderness. Device well seated.     LAB RESULTS:   Office Visit on 07/11/2018    Component Date Value Ref Range Status     Sodium 07/11/2018 143  133 - 144 mmol/L Final     Potassium 07/11/2018 3.3* 3.4 - 5.3 mmol/L Final     Chloride 07/11/2018 107  94 - 109 mmol/L Final     Carbon Dioxide 07/11/2018 29  20 - 32 mmol/L Final     Anion Gap 07/11/2018 7  3 - 14 mmol/L Final     Glucose 07/11/2018 171* 70 - 99 mg/dL Final     Urea Nitrogen 07/11/2018 18  7 - 30 mg/dL Final     Creatinine 07/11/2018 1.10  0.66 - 1.25 mg/dL Final     GFR Estimate 07/11/2018 64  >60 mL/min/1.7m2 Final     GFR Estimate If Black 07/11/2018 77  >60 mL/min/1.7m2 Final     Calcium 07/11/2018 8.9  8.5 - 10.1 mg/dL Final     Hemoglobin A1C 07/11/2018 6.0* 0 - 5.6 % Final     Estimated Average Glucose 07/11/2018 126  mg/dL Final          ASSESSMENT:   Jf Ortiz presents for  follow-up s/p generator change for his [employer] dual lead pacer.       PLAN:   1. Autonomic orthostatic hypotension  He is doing very well, he will continue with  increased fluid intake, maintaining a high sodium diet, Florinef 0.3 mg daily, midodrine 10 mg 3 times a day, compression stockings, and rising slowly when he gets up.   Labs stable today.    - Basic metabolic panel    2. Essential hypertension  BP stable at 102/64 today, not symptomatic    - Basic metabolic panel    3. Pacemaker, [employer], Dual Chamber - Dependent  See above, recent generator change without complication. Remote device interrogation following with normal functioning device.   He is scheduled for next device interrogation in [place] on 9/11/18.    4. Hypokalemia  Labs stable today, he will continue with dietary replacement.   - Basic metabolic panel        Thank you for allowing me to participate in the care of your patient. Please do not hesitate to contact me if you have any questions.     Marycarmen Ibarra

## 2018-08-22 ASSESSMENT — PATIENT HEALTH QUESTIONNAIRE - PHQ9: SUM OF ALL RESPONSES TO PHQ QUESTIONS 1-9: 2

## 2018-08-22 ASSESSMENT — ANXIETY QUESTIONNAIRES: GAD7 TOTAL SCORE: 0

## 2018-08-28 ENCOUNTER — OFFICE VISIT (OUTPATIENT)
Dept: CARDIOLOGY | Facility: OTHER | Age: 83
End: 2018-08-28
Attending: INTERNAL MEDICINE
Payer: MEDICARE

## 2018-08-28 DIAGNOSIS — I44.2 ATRIOVENTRICULAR BLOCK, COMPLETE (H): Primary | ICD-10-CM

## 2018-08-28 PROCEDURE — 93280 PM DEVICE PROGR EVAL DUAL: CPT | Mod: 26 | Performed by: INTERNAL MEDICINE

## 2018-08-28 PROCEDURE — 93280 PM DEVICE PROGR EVAL DUAL: CPT | Mod: TC

## 2018-08-28 NOTE — MR AVS SNAPSHOT
After Visit Summary   8/28/2018    Jf Ortiz    MRN: [id number]           Patient Information     Date Of Birth          10/5/1933        Visit Information        Provider Department      8/28/2018 10:00 AM HC PACEMAKER [facility]] [place]        Today's Diagnoses     Atrioventricular block, complete (H)    -  1      Care Instructions    It was a pleasure to see you in clinic today.  Please do not hesitate to call with any questions or concerns.  We look forward to seeing you in clinic at your next device check in 3 months.    Brandi Hernandez, RN, MS, CCRN  Electrophysiology Nurse Clinician  [facility]    During Business Hours Please Call:  [phone]  After Hours Please Call:  [phone] - select option #4 and ask for job code [phone]                    Follow-ups after your visit        Your next 10 appointments already scheduled     Sep 06, 2018  1:15 PM CDT   (Arrive by 1:00 PM)   PROCEDURE with Geeta Alegria [facility]-C   [facility] [place] [facility]    [address]  [place] [address]   [phone]            Nov 06, 2018  3:00 PM CST   (Arrive by 2:45 PM)   Return Visit with Juan Walter MD   [facility] [place] ([facility] [place] )    [address]  [place] [address]   [phone]            Nov 06, 2018  3:30 PM CST   (Arrive by 3:15 PM)   Return Visit with  PACEMAKER   [facility] ([facility] [place] )    [address]  [place] [address]   [phone]              Who to contact     If you have questions or need follow up information about today's clinic visit or your schedule please contact [facility] directly at [phone].  Normal or non-critical lab and imaging results will be communicated to you by MyChart, letter or phone within 4 business days after the clinic has received the results. If you do not hear from us within 7 days, please contact the  clinic through [email or web address] or phone. If you have a critical or abnormal lab result, we will notify you by phone as soon as possible.  Submit refill requests through [employer] or call your pharmacy and they will forward the refill request to us. Please allow 3 business days for your refill to be completed.          Additional Information About Your Visit        Care EveryWhere ID     This is your Care EveryWhere ID. This could be used by other organizations to access your [place] medical records  [id number]         Blood Pressure from Last 3 Encounters:   08/21/18 102/64   08/08/18 143/74   07/11/18 130/54    Weight from Last 3 Encounters:   08/21/18 75.3 kg (166 lb)   08/08/18 71.8 kg (158 lb 6.4 oz)   07/11/18 77.1 kg (170 lb)              We Performed the Following     PM DEVICE PROGRAMMING EVAL, DUAL LEAD PACER          Today's Medication Changes          These changes are accurate as of 8/28/18 11:59 PM.  If you have any questions, ask your nurse or doctor.               These medicines have changed or have updated prescriptions.        Dose/Directions    fludrocortisone 0.1 MG tablet   Commonly known as:  FLORINEF   This may have changed:    - how much to take  - when to take this   Used for:  Parkinson's disease (H), Orthostatic hypotension        Dose:  0.3 mg   Take 3 tablets (0.3 mg) by mouth daily   Quantity:  90 tablet   Refills:  11                Primary Care Provider Office Phone # Fax #    R Venancio Ray [phone]       [address] [place] [address]        Equal Access to Services     [facility] Hospital[facility]ANCES AH: Hadii job saucedo hadasho Soomaali, waaxda luqadaha, qaybta kaalmada adeegyada, hayley mauro. So [facility] [phone].    ATENCIÓN: Si habla español, tiene a tucker disposición servicios gratuitos de asistencia lingüística. Llame al [phone].    We comply with applicable federal civil rights laws and [place] laws. We do not discriminate on the basis of race,  color, national origin, age, disability, sex, sexual orientation, or gender identity.            Thank you!     Thank you for choosing [facility]  [facility] your [facility]. Our goal is always to provide you with excellent care. Hearing back from our patients is one way we can continue to improve our services. Please take a few minutes to complete the written survey that you may receive in the mail after your visit with us. Thank you!             Your Updated Medication List - Protect others around you: Learn how to safely use, store and throw away your medicines at www.disposemymeds.org.          This list is accurate as of 8/28/18 11:59 PM.  Always use your most recent med list.                   Brand Name Dispense Instructions for use Diagnosis    5- MG Caps      Take 1 tablet by mouth At Bedtime        acetaminophen 325 MG tablet    TYLENOL    100 tablet    Take 2 tablets (650 mg) by mouth every 4 hours as needed for other (mild pain)    Pacemaker generator end of life       atorvastatin 20 MG tablet    LIPITOR    90 tablet    Take 1 tablet (20 mg) by mouth every evening    Hypercholesterolemia       DONEPEZIL HCL PO      Take 5 mg by mouth 2 times daily        fludrocortisone 0.1 MG tablet    FLORINEF    90 tablet    Take 3 tablets (0.3 mg) by mouth daily    Parkinson's disease (H), Orthostatic hypotension       MELATONIN PO      Take 5 mg by mouth At Bedtime        MENS PROSTATE HEALTH FORMULA PO      Take 1 tablet by mouth 2 times daily        midodrine 5 MG tablet    PROAMATINE    180 tablet    TAKE 2 TABLETS (10MG) BY MOUTH THREE TIMES DAILY    Autonomic orthostatic hypotension       Multi-vitamin Tabs tablet      Take 1 tablet by mouth daily        PANTOPRAZOLE SODIUM PO      Take 40 mg by mouth 2 times daily (before meals)        Potassium Chloride ER 20 MEQ Tbcr     30 tablet    TAKE 1 TABLET BY MOUTH ONCE DAILY    Hypokalemia       senna-docusate 8.6-50 MG per tablet    SENOKOT-S;PERICOLACE     30 tablet    Take 1-2 tablets by mouth 2 times daily Take while on oral narcotics to prevent or treat constipation.    S/P inguinal hernia repair       sodium chloride 1 GM tablet     60 tablet    TAKE 1 TABLET BY MOUTH TWICE A DAY    Parkinson's disease (H), Orthostatic hypotension       Turmeric 500 MG Tabs      Take 1 tablet by mouth daily        vitamin B complex with vitamin C Tabs tablet      Take 1 tablet by mouth daily        VITAMIN B-12 PO      Take 1 tablet by mouth daily        VITAMIN D-3 PO      Take 1,000 Int'l Units by mouth daily

## 2018-08-28 NOTE — PATIENT INSTRUCTIONS
It was a pleasure to see you in clinic today.  Please do not hesitate to call with any questions or concerns.  We look forward to seeing you in clinic at your next device check in 3 months.    Brandi Hernandez, RN, MS, CCRN  Electrophysiology Nurse Clinician  [facility]    During Business Hours Please Call:  [phone]  After Hours Please Call:  [phone] - select option #[phone] and ask for job code [phone]

## 2018-08-28 NOTE — PROGRESS NOTES
Preliminary Device Interrogation Results.  Final physician signed paceart report to be scanned and attached.    Patient seen in clinic for evaluation and iterative programming of his Medtronic dual lead pacemaker per MD orders.  Patient is s/p pacemaker generator change at [facility] on 8/8/18.  Incision is well approximated without redness, drainage or edema noted.  Normal pacemaker function.  38 episodes recorded as AT/AF - 16 sec - 3 min 4 sec in duration.  AF burden = 0.3%.  Stored EGM's reveal what appears to be noise on the atrial lead which is not a new finding.  EGM's reviewed with Dr. Walter.  Intrinsic rhythm = SB with  @ 30 bpm.  AP = 98.7%.   = 100%.  Estimated battery longevity to MAYA = 6.2 years.  Patient reports that he is feeling well and denies specific complaints.  Plan for patient to return to clinic in 3 months.    Dual lead pacemaker

## 2018-09-06 ENCOUNTER — OFFICE VISIT (OUTPATIENT)
Dept: OTOLARYNGOLOGY | Facility: OTHER | Age: 83
End: 2018-09-06
Attending: PHYSICIAN ASSISTANT
Payer: MEDICARE

## 2018-09-06 VITALS
BODY MASS INDEX: 23.77 KG/M2 | TEMPERATURE: 97.5 F | SYSTOLIC BLOOD PRESSURE: 110 MMHG | WEIGHT: 166 LBS | OXYGEN SATURATION: 99 % | HEIGHT: 70 IN | DIASTOLIC BLOOD PRESSURE: 66 MMHG | HEART RATE: 76 BPM

## 2018-09-06 DIAGNOSIS — H61.23 IMPACTED CERUMEN, BILATERAL: Primary | ICD-10-CM

## 2018-09-06 DIAGNOSIS — Z96.22 S/P MYRINGOTOMY WITH INSERTION OF TUBE: ICD-10-CM

## 2018-09-06 DIAGNOSIS — H90.3 ASNHL (ASYMMETRICAL SENSORINEURAL HEARING LOSS): ICD-10-CM

## 2018-09-06 PROCEDURE — G0463 HOSPITAL OUTPT CLINIC VISIT: HCPCS

## 2018-09-06 PROCEDURE — 92504 EAR MICROSCOPY EXAMINATION: CPT | Performed by: PHYSICIAN ASSISTANT

## 2018-09-06 PROCEDURE — 99213 OFFICE O/P EST LOW 20 MIN: CPT | Mod: 25 | Performed by: PHYSICIAN ASSISTANT

## 2018-09-06 ASSESSMENT — PAIN SCALES - GENERAL: PAINLEVEL: NO PAIN (0)

## 2018-09-06 NOTE — MR AVS SNAPSHOT
After Visit Summary   9/6/2018    Jf Ortiz    MRN: [id number]           Patient Information     Date Of Birth          10/5/1933        Visit Information        Provider Department      9/6/2018 1:15 PM Geeta Alegria PA-C [facility] [place]        Care Instructions    Ears were cleaned.   Return in 3 months for ear cleaning.   Ears look well. Tube is still in good position.     Thank you for allowing [facility] and our ENT team to participate in your care.  If your medications are too expensive, please give the nurse a call.  We can possibly change this medication.  If you have a scheduling or an appointment question please contact [facility] Unit Coordinator at their direct line [phone].   ALL nursing questions or concerns can be directed to your ENT nurse at: [phone] [facility]              Follow-ups after your visit        Your next 10 appointments already scheduled     Nov 06, 2018  3:00 PM CST   (Arrive by 2:45 PM)   [facility] Juan [facility] MD   [facility] [place] [facility]    [address] [place] [address]   [phone]            Nov 06, 2018  3:30 PM CST   (Arrive by 3:15 PM)   [facility]    [address] [place] [address]   [phone]              Who to contact     If you have questions or need follow up information about today's clinic visit or your schedule please contact [facility] directly at [phone].  Normal or non-critical lab and imaging results will be communicated to you by MyChart, letter or phone within 4 business days after the clinic has received the results. If you do not hear from us within 7 days, please contact the clinic through MyChart or phone. If you have a critical or abnormal lab result, we will notify you by phone as soon as possible.  Submit refill requests through [employer] or call your  "pharmacy and they will forward the refill request to us. Please allow 3 business days for your refill to be completed.          Additional Information About Your Visit        Care EveryWhere ID     This is your Care EveryWhere ID. This could be used by other organizations to access your [place] medical records  [id number]        Your Vitals Were     Pulse Temperature Height Pulse Oximetry BMI (Body Mass Index)       76 97.5  F (36.4  C) (Tympanic) 5' 10\" (1.778 m) 99% 23.82 kg/m2        Blood Pressure from Last 3 Encounters:   09/06/18 110/66   08/21/18 102/64   08/08/18 143/74    Weight from Last 3 Encounters:   09/06/18 166 lb (75.3 kg)   08/21/18 166 lb (75.3 kg)   08/08/18 158 lb 6.4 oz (71.8 kg)              Today, you had the following     No orders found for display         Today's Medication Changes          These changes are accurate as of 9/6/18  1:33 PM.  If you have any questions, ask your nurse or doctor.               These medicines have changed or have updated prescriptions.        Dose/Directions    fludrocortisone 0.1 MG tablet   Commonly known as:  FLORINEF   This may have changed:    - how much to take  - when to take this   Used for:  Parkinson's disease (H), Orthostatic hypotension        Dose:  0.3 mg   Take 3 tablets (0.3 mg) by mouth daily   Quantity:  90 tablet   Refills:  11                Primary Care Provider Office Phone # Fax #    R Venancio Ray [phone]       [address] [place] [address]        Equal Access to Services     TONY VÁZQUEZ AH: Hadii job Arrington, wagricelda luqgildardo, qaybta kaalhayley bryant. So [facility] [phone].    ATENCIÓN: Si habla español, tiene a tucker disposición servicios gratuitos de asistencia lingüística. Llame al [phone].    We comply with applicable federal civil rights laws and [place] laws. We do not discriminate on the basis of race, color, national origin, age, " disability, sex, sexual orientation, or gender identity.            Thank you!     [facility]  for your care. Our goal is always to provide you with excellent care. Hearing back from our patients is one way we can continue to improve our services. Please take a few minutes to complete the written survey that you may receive in the mail after your visit with us. Thank you!             Your Updated Medication List - Protect others around you: Learn how to safely use, store and throw away your medicines at www.disposemymeds.org.          This list is accurate as of 9/6/18  1:33 PM.  Always use your most recent med list.                   Brand Name Dispense Instructions for use Diagnosis    5- MG Caps      Take 1 tablet by mouth At Bedtime        acetaminophen 325 MG tablet    TYLENOL    100 tablet    Take 2 tablets (650 mg) by mouth every 4 hours as needed for other (mild pain)    Pacemaker generator end of life       atorvastatin 20 MG tablet    LIPITOR    90 tablet    Take 1 tablet (20 mg) by mouth every evening    Hypercholesterolemia       DONEPEZIL HCL PO      Take 5 mg by mouth 2 times daily        fludrocortisone 0.1 MG tablet    FLORINEF    90 tablet    Take 3 tablets (0.3 mg) by mouth daily    Parkinson's disease (H), Orthostatic hypotension       MELATONIN PO      Take 5 mg by mouth At Bedtime        MENS PROSTATE HEALTH FORMULA PO      Take 1 tablet by mouth 2 times daily        midodrine 5 MG tablet    PROAMATINE    180 tablet    TAKE 2 TABLETS (10MG) BY MOUTH THREE TIMES DAILY    Autonomic orthostatic hypotension       Multi-vitamin Tabs tablet      Take 1 tablet by mouth daily        PANTOPRAZOLE SODIUM PO      Take 40 mg by mouth 2 times daily (before meals)        Potassium Chloride ER 20 MEQ Tbcr     30 tablet    TAKE 1 TABLET BY MOUTH ONCE DAILY    Hypokalemia       senna-docusate 8.6-50 MG per tablet    SENOKOT-S;PERICOLACE    30 tablet    Take 1-2 tablets  by mouth 2 times daily Take while on oral narcotics to prevent or treat constipation.    S/P inguinal hernia repair       sodium chloride 1 GM tablet     60 tablet    TAKE 1 TABLET BY MOUTH TWICE A DAY    Parkinson's disease (H), Orthostatic hypotension       Turmeric 500 MG Tabs      Take 1 tablet by mouth daily        vitamin B complex with vitamin C Tabs tablet      Take 1 tablet by mouth daily        VITAMIN B-12 PO      Take 1 tablet by mouth daily        VITAMIN D-3 PO      Take 1,000 Int'l Units by mouth daily

## 2018-09-06 NOTE — PROGRESS NOTES
Chief Complaint   Patient presents with     Cerumen Impaction     ear cleaning     Jf presents for ear cleaning. He has noticed concerns with his ear having wax build up.   He did obtain hearing aids from [employer] this summer and he has noted improvement with results.       Denies otalgia, otorrhea.   He has no concerns with vertigo. He does feel he has dizziness/ lightheadedness.   He did have a new battery in pacemaker placed with full time monitoring. He has seen in cardiology for syncope multiple times.     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     Seizure disorder (H)      Stented coronary artery         Allergies   Allergen Reactions     Cats Unknown     Lamotrigine      Skin lesions     Current Outpatient Prescriptions   Medication     5-Hydroxytryptophan (5-HTP) 100 MG CAPS     acetaminophen (TYLENOL) 325 MG tablet     atorvastatin (LIPITOR) 20 MG tablet     Cyanocobalamin (VITAMIN B-12 PO)     DONEPEZIL HCL PO     fludrocortisone (FLORINEF) 0.1 MG tablet     MELATONIN PO     midodrine (PROAMATINE) 5 MG tablet     Misc Natural Products (MENS PROSTATE HEALTH FORMULA PO)     multivitamin, therapeutic with minerals (MULTI-VITAMIN) TABS tablet     PANTOPRAZOLE SODIUM PO     Potassium Chloride ER 20 MEQ TBCR     senna-docusate (SENOKOT-S;PERICOLACE) 8.6-50 MG per tablet     sodium chloride 1 GM tablet     Turmeric 500 MG TABS     vitamin  B complex with vitamin C (VITAMIN  B COMPLEX) TABS     Cholecalciferol (VITAMIN D-3 PO)     No current facility-administered medications for this visit.       ROS: 10 point ROS neg other than the symptoms noted above in the HPI.  /66 (BP Location: Right arm, Patient  "Position: Chair, Cuff Size: Adult Regular)  Pulse 76  Temp 97.5  F (36.4  C) (Tympanic)  Ht 5' 10\" (1.778 m)  Wt 166 lb (75.3 kg)  SpO2 99%  BMI 23.82 kg/m2  General - The patient is well nourished and well developed, and appears to have good nutritional status.  Alert and oriented to person and place, answers questions and cooperates with examination appropriately.   Head and Face - Normocephalic and atraumatic, with no gross asymmetry noted.  The facial nerve is intact, with strong symmetric movements.  Voice and Breathing - The patient was breathing comfortably without the use of accessory muscles. There was no wheezing, stridor, or stertor.  The patients voice was clear and strong, and had appropriate pitch and quality.  Ears -The external auditory canals are impacted.    Eyes - Extraocular movements intact, and the pupils were reactive to light.  Sclera were not icteric or injected, conjunctiva were pink and moist.  Mouth - Examination of the oral cavity showed pink, healthy oral mucosa. No lesions or ulcerations noted.  The tongue was mobile and midline, and the dentition were in good condition.    Throat - The walls of the oropharynx were smooth, pink, moist, symmetric, and had no lesions or ulcerations.  The tonsillar pillars and soft palate were symmetric.  The uvula was midline on elevation.    Neck - Normal midline excursion of the laryngotracheal complex during swallowing.  Full range of motion on passive movement.  Palpation of the occipital, submental, submandibular, internal jugular chain, and supraclavicular nodes did not demonstrate any abnormal lymph nodes or masses.  Palpation of the thyroid was soft and smooth, with no nodules or goiter appreciated.  The trachea was mobile and midline.  Nose - External contour is symmetric, no gross deflection or scars.  Nasal mucosa is pink and moist with no abnormal mucus.  The septum and turbinates were evaluated: No polyps, masses, or purulence noted on " examination.    The ear canals were examined underneath the operating microscope and with an otologic speculum.  The canals were cleaned of all debris with gently suctioning #7,#5. There is no granulation tissue or sign of cholesteatoma.  The patient tolerates this well, brief dizziness/ lightheadedness after cleaning. Symptoms resolved after sitting for a few minutes. He did drink water as well.  Patient right tube. the tympanic membranes are intact without effusion, retraction or mass.  Bony landmarks are intact.    ASSESSMENT:    ICD-10-CM    1. Impacted cerumen, bilateral H61.23    2. S/P myringotomy with insertion of tube RIGHT Z96.22    3. ASNHL (asymmetrical sensorineural hearing loss) H90.5      Ears were cleaned. They wish to return in 3 months for ear cleaning.     He will be due for audiogram in November.   Patient has ASNHL, declines any imagining of his ears at this time.     Geeta Alegria PA-C  ENT  [facility], [place]  [phone]

## 2018-09-06 NOTE — NURSING NOTE
"Chief Complaint   Patient presents with     Cerumen Impaction     ear cleaning       Initial /66 (BP Location: Right arm, Patient Position: Chair, Cuff Size: Adult Regular)  Pulse 76  Temp 97.5  F (36.4  C) (Tympanic)  Ht 5' 10\" (1.778 m)  Wt 166 lb (75.3 kg)  SpO2 99%  BMI 23.82 kg/m2 Estimated body mass index is 23.82 kg/(m^2) as calculated from the following:    Height as of this encounter: 5' 10\" (1.778 m).    Weight as of this encounter: 166 lb (75.3 kg).  Medication Reconciliation: complete    Екатерина Bae LPN    "

## 2018-09-06 NOTE — LETTER
9/6/2018         RE: Jf Ortiz  [address]  [place] [address]        Dear Colleague,    Thank you for referring your patient, Jf Ortiz, to the [facility] [place]. Please see a copy of my visit note below.    Chief Complaint   Patient presents with     Cerumen Impaction     ear cleaning     Jf presents for ear cleaning. He has noticed concerns with his ear having wax build up.   He did obtain hearing aids from [facility] this summer and he has noted improvement with results.       Denies otalgia, otorrhea.   He has no concerns with vertigo. He does feel he has dizziness/ lightheadedness.   He did have a new battery in pacemaker placed with full time monitoring. He has seen in cardiology for syncope multiple times.     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     Seizure disorder (H)      Stented coronary artery         Allergies   Allergen Reactions     Cats Unknown     Lamotrigine      Skin lesions     Current Outpatient Prescriptions   Medication     5-Hydroxytryptophan (5-HTP) 100 MG CAPS     acetaminophen (TYLENOL) 325 MG tablet     atorvastatin (LIPITOR) 20 MG tablet     Cyanocobalamin (VITAMIN B-12 PO)     DONEPEZIL HCL PO     fludrocortisone (FLORINEF) 0.1 MG tablet     MELATONIN PO     midodrine (PROAMATINE) 5 MG tablet     Misc Natural Products (MENS PROSTATE HEALTH FORMULA PO)     multivitamin, therapeutic with minerals (MULTI-VITAMIN) TABS tablet     PANTOPRAZOLE SODIUM PO     Potassium Chloride ER 20 MEQ TBCR     senna-docusate (SENOKOT-S;PERICOLACE) 8.6-50 MG per tablet     sodium chloride 1 GM tablet     Turmeric 500 MG TABS     vitamin  B complex with vitamin C (VITAMIN  B COMPLEX)  "TABS     Cholecalciferol (VITAMIN D-3 PO)     No current facility-administered medications for this visit.       ROS: 10 point ROS neg other than the symptoms noted above in the HPI.  /66 (BP Location: Right arm, Patient Position: Chair, Cuff Size: Adult Regular)  Pulse 76  Temp 97.5  F (36.4  C) (Tympanic)  Ht 5' 10\" (1.778 m)  Wt 166 lb (75.3 kg)  SpO2 99%  BMI 23.82 kg/m2  General - The patient is well nourished and well developed, and appears to have good nutritional status.  Alert and oriented to person and place, answers questions and cooperates with examination appropriately.   Head and Face - Normocephalic and atraumatic, with no gross asymmetry noted.  The facial nerve is intact, with strong symmetric movements.  Voice and Breathing - The patient was breathing comfortably without the use of accessory muscles. There was no wheezing, stridor, or stertor.  The patients voice was clear and strong, and had appropriate pitch and quality.  Ears -The external auditory canals are impacted.    Eyes - Extraocular movements intact, and the pupils were reactive to light.  Sclera were not icteric or injected, conjunctiva were pink and moist.  Mouth - Examination of the oral cavity showed pink, healthy oral mucosa. No lesions or ulcerations noted.  The tongue was mobile and midline, and the dentition were in good condition.    Throat - The walls of the oropharynx were smooth, pink, moist, symmetric, and had no lesions or ulcerations.  The tonsillar pillars and soft palate were symmetric.  The uvula was midline on elevation.    Neck - Normal midline excursion of the laryngotracheal complex during swallowing.  Full range of motion on passive movement.  Palpation of the occipital, submental, submandibular, internal jugular chain, and supraclavicular nodes did not demonstrate any abnormal lymph nodes or masses.  Palpation of the thyroid was soft and smooth, with no nodules or goiter appreciated.  The trachea was " mobile and midline.  Nose - External contour is symmetric, no gross deflection or scars.  Nasal mucosa is pink and moist with no abnormal mucus.  The septum and turbinates were evaluated: No polyps, masses, or purulence noted on examination.    The ear canals were examined underneath the operating microscope and with an otologic speculum.  The canals were cleaned of all debris with gently suctioning #7,#5. There is no granulation tissue or sign of cholesteatoma.  The patient tolerates this well, brief dizziness/ lightheadedness after cleaning. Symptoms resolved after sitting for a few minutes. He did drink water as well.  Patient right tube. the tympanic membranes are intact without effusion, retraction or mass.  Bony landmarks are intact.    ASSESSMENT:    ICD-10-CM    1. Impacted cerumen, bilateral H61.23    2. S/P myringotomy with insertion of tube RIGHT Z96.22    3. ASNHL (asymmetrical sensorineural hearing loss) H90.5      Ears were cleaned. They wish to return in 3 months for ear cleaning.     He will be due for audiogram in November.   Patient has ASNHL, declines any imagining of his ears at this time.     Geeta Alegria PA-C  ENT  [facility], [place]  [phone]      Again, thank you for allowing me to participate in the care of your patient.        Sincerely,        Geeta Alegria PA-C

## 2018-09-06 NOTE — PATIENT INSTRUCTIONS
Ears were cleaned.   Return in 3 months for ear cleaning.   Ears look well. Tube is still in good position.     Thank you for allowing [facility] and our ENT team to participate in your care.  If your medications are too expensive, please give the nurse a call.  We can possibly change this medication.  If you have a scheduling or an appointment question please contact Jos[facility] Unit Coordinator at their direct line [phone].   ALL nursing questions or concerns can be directed to your ENT nurse at: [phone] Rehana

## 2018-09-27 ENCOUNTER — TRANSFERRED RECORDS (OUTPATIENT)
Dept: HEALTH INFORMATION MANAGEMENT | Facility: CLINIC | Age: 83
End: 2018-09-27

## 2018-10-07 DIAGNOSIS — I95.1 AUTONOMIC ORTHOSTATIC HYPOTENSION: Primary | ICD-10-CM

## 2018-10-07 DIAGNOSIS — G20.A1 PARKINSON'S DISEASE (H): ICD-10-CM

## 2018-10-07 DIAGNOSIS — I95.1 ORTHOSTATIC HYPOTENSION: ICD-10-CM

## 2018-10-08 RX ORDER — FLUDROCORTISONE ACETATE 0.1 MG/1
TABLET ORAL
Qty: 90 TABLET | Refills: 3 | Status: SHIPPED | OUTPATIENT
Start: 2018-10-08 | End: 2019-05-30

## 2018-10-08 NOTE — TELEPHONE ENCOUNTER
"Refill request for: Fludrocortisone 0.1mg tabs  From: [facility]  Rx written date: 10/06/2017 #90 with 11 refills  LOV: 08/21/2018 with ANAYELI  Next appt: 11/06/2018 with Dr. Walter  Protocol: none available     Per office visit note on 10/06/2017 with Herman Rivers, DO, \"He'll will increase the florinef from 0.2 mg daily to 0.3 mg daily. Little benefit is seen when increases greater than 0.3 mg daily\".    Per LOV with MEGHA Perry CNP, \"Autonomic orthostatic hypotension  He is doing very well, he will continue with  increased fluid intake, maintaining a high sodium diet, Florinef 0.3 mg daily, midodrine 10 mg 3 times a day, compression stockings, and rising slowly when he gets up.   Labs stable today.\"    As there is no protocol available, will route to provider for review and consideration. Jocelyn Kerr RN on 10/8/2018 at 8:43 AM   "

## 2018-10-17 ENCOUNTER — TRANSFERRED RECORDS (OUTPATIENT)
Dept: HEALTH INFORMATION MANAGEMENT | Facility: CLINIC | Age: 83
End: 2018-10-17

## 2018-10-23 ENCOUNTER — HOSPITAL ENCOUNTER (EMERGENCY)
Facility: HOSPITAL | Age: 83
Discharge: HOME | End: 2018-10-23
Attending: FAMILY MEDICINE | Admitting: FAMILY MEDICINE
Payer: MEDICARE

## 2018-10-23 ENCOUNTER — APPOINTMENT (OUTPATIENT)
Dept: GENERAL RADIOLOGY | Facility: HOSPITAL | Age: 83
End: 2018-10-23
Attending: FAMILY MEDICINE
Payer: MEDICARE

## 2018-10-23 ENCOUNTER — TELEPHONE (OUTPATIENT)
Dept: FAMILY MEDICINE | Facility: OTHER | Age: 83
End: 2018-10-23

## 2018-10-23 VITALS
TEMPERATURE: 98.1 F | RESPIRATION RATE: 18 BRPM | HEART RATE: 73 BPM | OXYGEN SATURATION: 98 % | DIASTOLIC BLOOD PRESSURE: 92 MMHG | BODY MASS INDEX: 23.68 KG/M2 | WEIGHT: 165 LBS | SYSTOLIC BLOOD PRESSURE: 155 MMHG

## 2018-10-23 DIAGNOSIS — I10 BENIGN ESSENTIAL HYPERTENSION: ICD-10-CM

## 2018-10-23 LAB
ALBUMIN SERPL-MCNC: 3.1 G/DL (ref 3.4–5)
ALBUMIN UR-MCNC: NEGATIVE MG/DL
ALP SERPL-CCNC: 55 U/L (ref 40–150)
ALT SERPL W P-5'-P-CCNC: 21 U/L (ref 0–70)
ANION GAP SERPL CALCULATED.3IONS-SCNC: 6 MMOL/L (ref 3–14)
APPEARANCE UR: CLEAR
AST SERPL W P-5'-P-CCNC: 15 U/L (ref 0–45)
BACTERIA #/AREA URNS HPF: ABNORMAL /HPF
BASOPHILS # BLD AUTO: 0 10E9/L (ref 0–0.2)
BASOPHILS NFR BLD AUTO: 0.3 %
BILIRUB SERPL-MCNC: 0.4 MG/DL (ref 0.2–1.3)
BILIRUB UR QL STRIP: NEGATIVE
BUN SERPL-MCNC: 16 MG/DL (ref 7–30)
CALCIUM SERPL-MCNC: 8.6 MG/DL (ref 8.5–10.1)
CHLORIDE SERPL-SCNC: 111 MMOL/L (ref 94–109)
CO2 SERPL-SCNC: 27 MMOL/L (ref 20–32)
COLOR UR AUTO: YELLOW
CREAT SERPL-MCNC: 0.97 MG/DL (ref 0.66–1.25)
DIFFERENTIAL METHOD BLD: ABNORMAL
EOSINOPHIL # BLD AUTO: 0.2 10E9/L (ref 0–0.7)
EOSINOPHIL NFR BLD AUTO: 2.6 %
ERYTHROCYTE [DISTWIDTH] IN BLOOD BY AUTOMATED COUNT: 12.5 % (ref 10–15)
GFR SERPL CREATININE-BSD FRML MDRD: 74 ML/MIN/1.7M2
GLUCOSE SERPL-MCNC: 105 MG/DL (ref 70–99)
GLUCOSE UR STRIP-MCNC: NEGATIVE MG/DL
HCT VFR BLD AUTO: 38.2 % (ref 40–53)
HGB BLD-MCNC: 12.8 G/DL (ref 13.3–17.7)
HGB UR QL STRIP: NEGATIVE
IMM GRANULOCYTES # BLD: 0 10E9/L (ref 0–0.4)
IMM GRANULOCYTES NFR BLD: 0.2 %
KETONES UR STRIP-MCNC: NEGATIVE MG/DL
LEUKOCYTE ESTERASE UR QL STRIP: NEGATIVE
LYMPHOCYTES # BLD AUTO: 1.3 10E9/L (ref 0.8–5.3)
LYMPHOCYTES NFR BLD AUTO: 23.3 %
MCH RBC QN AUTO: 30.8 PG (ref 26.5–33)
MCHC RBC AUTO-ENTMCNC: 33.5 G/DL (ref 31.5–36.5)
MCV RBC AUTO: 92 FL (ref 78–100)
MONOCYTES # BLD AUTO: 0.6 10E9/L (ref 0–1.3)
MONOCYTES NFR BLD AUTO: 11.1 %
NEUTROPHILS # BLD AUTO: 3.6 10E9/L (ref 1.6–8.3)
NEUTROPHILS NFR BLD AUTO: 62.5 %
NITRATE UR QL: NEGATIVE
NRBC # BLD AUTO: 0 10*3/UL
NRBC BLD AUTO-RTO: 0 /100
PH UR STRIP: 7 PH (ref 4.7–8)
PLATELET # BLD AUTO: 198 10E9/L (ref 150–450)
POTASSIUM SERPL-SCNC: 3.8 MMOL/L (ref 3.4–5.3)
PROT SERPL-MCNC: 6.6 G/DL (ref 6.8–8.8)
RBC # BLD AUTO: 4.16 10E12/L (ref 4.4–5.9)
RBC #/AREA URNS AUTO: 3 /HPF (ref 0–2)
SODIUM SERPL-SCNC: 144 MMOL/L (ref 133–144)
SOURCE: ABNORMAL
SP GR UR STRIP: 1.01 (ref 1–1.03)
TROPONIN I SERPL-MCNC: <0.015 UG/L (ref 0–0.04)
UROBILINOGEN UR STRIP-MCNC: NORMAL MG/DL (ref 0–2)
WBC # BLD AUTO: 5.8 10E9/L (ref 4–11)
WBC #/AREA URNS AUTO: <1 /HPF (ref 0–5)

## 2018-10-23 PROCEDURE — 36415 COLL VENOUS BLD VENIPUNCTURE: CPT | Performed by: FAMILY MEDICINE

## 2018-10-23 PROCEDURE — 85025 COMPLETE CBC W/AUTO DIFF WBC: CPT | Performed by: FAMILY MEDICINE

## 2018-10-23 PROCEDURE — 96361 HYDRATE IV INFUSION ADD-ON: CPT

## 2018-10-23 PROCEDURE — 71045 X-RAY EXAM CHEST 1 VIEW: CPT | Mod: TC

## 2018-10-23 PROCEDURE — 96374 THER/PROPH/DIAG INJ IV PUSH: CPT

## 2018-10-23 PROCEDURE — 80053 COMPREHEN METABOLIC PANEL: CPT | Performed by: FAMILY MEDICINE

## 2018-10-23 PROCEDURE — 25000132 ZZH RX MED GY IP 250 OP 250 PS 637: Mod: GY | Performed by: FAMILY MEDICINE

## 2018-10-23 PROCEDURE — 99285 EMERGENCY DEPT VISIT HI MDM: CPT | Mod: Z6 | Performed by: FAMILY MEDICINE

## 2018-10-23 PROCEDURE — 84484 ASSAY OF TROPONIN QUANT: CPT | Performed by: FAMILY MEDICINE

## 2018-10-23 PROCEDURE — 93005 ELECTROCARDIOGRAM TRACING: CPT

## 2018-10-23 PROCEDURE — 99285 EMERGENCY DEPT VISIT HI MDM: CPT | Mod: 25

## 2018-10-23 PROCEDURE — 25000125 ZZHC RX 250: Performed by: FAMILY MEDICINE

## 2018-10-23 PROCEDURE — 25000128 H RX IP 250 OP 636: Performed by: FAMILY MEDICINE

## 2018-10-23 PROCEDURE — A9270 NON-COVERED ITEM OR SERVICE: HCPCS | Mod: GY | Performed by: FAMILY MEDICINE

## 2018-10-23 PROCEDURE — 81001 URINALYSIS AUTO W/SCOPE: CPT | Performed by: FAMILY MEDICINE

## 2018-10-23 RX ORDER — METOPROLOL TARTRATE 50 MG
50 TABLET ORAL ONCE
Status: COMPLETED | OUTPATIENT
Start: 2018-10-23 | End: 2018-10-23

## 2018-10-23 RX ORDER — METOPROLOL SUCCINATE 50 MG/1
50 TABLET, EXTENDED RELEASE ORAL DAILY
Qty: 30 TABLET | Refills: 1 | Status: SHIPPED | OUTPATIENT
Start: 2018-10-23 | End: 2018-11-05

## 2018-10-23 RX ORDER — CLONIDINE HYDROCHLORIDE 0.1 MG/1
0.1 TABLET ORAL ONCE
Status: COMPLETED | OUTPATIENT
Start: 2018-10-23 | End: 2018-10-23

## 2018-10-23 RX ORDER — SODIUM CHLORIDE 9 MG/ML
1000 INJECTION, SOLUTION INTRAVENOUS CONTINUOUS
Status: DISCONTINUED | OUTPATIENT
Start: 2018-10-23 | End: 2018-10-23 | Stop reason: HOSPADM

## 2018-10-23 RX ORDER — METOPROLOL TARTRATE 1 MG/ML
5 INJECTION, SOLUTION INTRAVENOUS ONCE
Status: COMPLETED | OUTPATIENT
Start: 2018-10-23 | End: 2018-10-23

## 2018-10-23 RX ADMIN — CLONIDINE HYDROCHLORIDE 0.1 MG: 0.1 TABLET ORAL at 12:44

## 2018-10-23 RX ADMIN — SODIUM CHLORIDE 1000 ML: 9 INJECTION, SOLUTION INTRAVENOUS at 11:41

## 2018-10-23 RX ADMIN — METOPROLOL TARTRATE 50 MG: 50 TABLET, FILM COATED ORAL at 13:36

## 2018-10-23 RX ADMIN — METOPROLOL TARTRATE 5 MG: 1 INJECTION, SOLUTION INTRAVENOUS at 13:34

## 2018-10-23 NOTE — TELEPHONE ENCOUNTER
Spoke with wife Des, she states patient woke up this morning not feeling well. Symptoms include lightheadedness. Patient was brought to PT and Bp was checked. Stated by wife 150/90. Wife was called to  patient and PT was cancelled due to patient symptoms. Notified Dr. Venancio Ray. Advised wife to bring patient to the ER/UC to be evaluated.

## 2018-10-23 NOTE — DISCHARGE INSTRUCTIONS
Jf,    Start taking metoprolol 50 mg a day.  See Dr. Ray later this week or next.  Return to the ER as needed.

## 2018-10-23 NOTE — ED AVS SNAPSHOT
[facility]    [address] [address]    Phone:  [phone]                                       Jf Ortiz   MRN: [id number]    Department:  [facility]   Date of Visit:  10/23/2018           After Visit Summary Signature Page     I have received my discharge instructions, and my questions have been answered. I have discussed any challenges I see with this plan with the nurse or doctor.    ..........................................................................................................................................  Patient/Patient Representative Signature      ..........................................................................................................................................  Patient Representative Print Name and Relationship to Patient    ..................................................               ................................................  Date                                   Time    ..........................................................................................................................................  Reviewed by Signature/Title    ...................................................              ..............................................  Date                                               Time          22EPIC Rev 08/18

## 2018-10-23 NOTE — ED NOTES
Pt resting on cart with monitors on, ekg was done and pt denies pain or discomfort. Wife at bedside

## 2018-10-23 NOTE — TELEPHONE ENCOUNTER
10:12 AM    Reason for Call: Phone Call    Description: Patient was at physical therapy this morning & PT got a hold of the patients (SO) & Physical Therapy told Uda to contact Dr. MAMADOU Ray & ask Dr. Ray to call Uda back to discuss patients high blood pressure.    Was an appointment offered for this call? No  If yes : Appointment type              Date    Preferred method for responding to this message: Telephone Call  What is your phone number ?[phone]    If we cannot reach you directly, may we leave a detailed response at the number you provided? Yes    Can this message wait until your PCP/provider returns, if available today? Not applicable, PCP is in today    Muna Fraser

## 2018-10-23 NOTE — ED TRIAGE NOTES
Pt brought in by spouse for evaluation of dizziness, fatigue/weakness and HTN. Did not got to PT due to BP.

## 2018-10-23 NOTE — ED AVS SNAPSHOT
[facility]    [address]    [place] [address]    Phone:  [phone]                                       Jf Ortiz   MRN: [id number]    Department:  [facility]   Date of Visit:  10/23/2018           Patient Information     Date Of Birth          10/5/1933        Your diagnoses for this visit were:     Benign essential hypertension        You were seen by Latanya Burgos MD.        Discharge Instructions       Jf,    Start taking metoprolol 50 mg a day.  See Dr. Ray later this week or next.  Return to the ER as needed.    Your next 10 appointments already scheduled     Nov 06, 2018  3:00 PM CST   (Arrive by 2:45 PM)   Return Visit with Juan Walter MD   [facility] [place] [facility] [place] [facility]    [address]  [place] [address]   [phone]            Nov 06, 2018  3:30 PM CST   (Arrive by 3:15 PM)   Return Visit with  RERE   [facility] [place] ([facility] [place] [facility]    [address]  [place] [address]   [phone]            Dec 07, 2018  9:45 AM CST   (Arrive by 9:30 AM)   PROCEDURE with Geeta Alegria PA-C   [facility] [place] ([facility] [place] [facility]    [address]  [place] [address]   [phone]                 Review of your medicines      START taking        Dose / Directions Last dose taken    metoprolol succinate 50 MG 24 hr tablet   Commonly known as:  TOPROL-XL   Dose:  50 mg   Quantity:  30 tablet        Take 1 tablet (50 mg) by mouth daily   Refills:  1          Our records show that you are taking the medicines listed below. If these are incorrect, please call your family doctor or clinic.        Dose / Directions Last dose taken    5- MG Caps   Dose:  1 tablet        Take 1 tablet by mouth At Bedtime   Refills:  0        acetaminophen 325 MG tablet   Commonly known as:  TYLENOL   Dose:  650 mg   Quantity:  100 tablet        Take 2  tablets (650 mg) by mouth every 4 hours as needed for other (mild pain)   Refills:  0        atorvastatin 20 MG tablet   Commonly known as:  LIPITOR   Dose:  20 mg   Quantity:  90 tablet        Take 1 tablet (20 mg) by mouth every evening   Refills:  3        DONEPEZIL HCL PO   Dose:  5 mg        Take 5 mg by mouth 2 times daily   Refills:  0        fludrocortisone 0.1 MG tablet   Commonly known as:  FLORINEF   Quantity:  90 tablet        Take 2-3 tabs daily.   Refills:  3        MELATONIN PO   Dose:  5 mg        Take 5 mg by mouth At Bedtime   Refills:  0        MENS PROSTATE HEALTH FORMULA PO   Dose:  1 tablet        Take 1 tablet by mouth 2 times daily   Refills:  0        midodrine 5 MG tablet   Commonly known as:  PROAMATINE   Quantity:  180 tablet        TAKE 2 TABLETS (10MG) BY MOUTH THREE TIMES DAILY   Refills:  3        Multi-vitamin Tabs tablet   Dose:  1 tablet        Take 1 tablet by mouth daily   Refills:  0        PANTOPRAZOLE SODIUM PO   Dose:  40 mg        Take 40 mg by mouth 2 times daily (before meals)   Refills:  0        Potassium Chloride ER 20 MEQ Tbcr   Quantity:  30 tablet        TAKE 1 TABLET BY MOUTH ONCE DAILY   Refills:  5        senna-docusate 8.6-50 MG per tablet   Commonly known as:  SENOKOT-S;PERICOLACE   Dose:  1-2 tablet   Quantity:  30 tablet        Take 1-2 tablets by mouth 2 times daily Take while on oral narcotics to prevent or treat constipation.   Refills:  0        sodium chloride 1 GM tablet   Quantity:  60 tablet        TAKE 1 TABLET BY MOUTH TWICE A DAY   Refills:  11        Turmeric 500 MG Tabs   Dose:  1 tablet        Take 1 tablet by mouth daily   Refills:  0        vitamin B complex with vitamin C Tabs tablet   Dose:  1 tablet        Take 1 tablet by mouth daily   Refills:  0        VITAMIN B-12 PO   Dose:  1 tablet        Take 1 tablet by mouth daily   Refills:  0        VITAMIN D-3 PO   Dose:  1000 Int'l Units        Take 1,000 Int'l Units by mouth daily   Refills:   "0                Prescriptions were sent or printed at these locations (1 Prescription)                   [id number] - [place], [place] - [address]   [address] [place] [address]    Telephone:  [phone]   Fax:  [phone]   Hours:                  E-Prescribed (1 of 1)         metoprolol succinate (TOPROL-XL) 50 MG 24 hr tablet                Procedures and tests performed during your visit     CBC with platelets differential    Comprehensive metabolic panel    Troponin I    UA with Microscopic    XR Chest Port 1 View      Orders Needing Specimen Collection     None      Pending Results     No orders found from 10/21/2018 to 10/24/2018.            Pending Culture Results     No orders found from 10/21/2018 to 10/24/2018.            Thank you for choosing [facility]       Thank you for choosing [facility] for your care. Our goal is always to provide you with excellent care. Hearing back from our patients is one way we can continue to improve our services. Please take a few minutes to complete the written survey that you may receive in the mail after you visit with us. Thank you!        [employer] Information     [email or web address] lets you send messages to your doctor, view your test results, renew your prescriptions, schedule appointments and more. To sign up, go to [email or web address] . Click on \"Log in\" on the left side of the screen, which will take you to the Welcome page. Then click on \"Sign up Now\" on the right side of the page.     You will be asked to enter the access code listed below, as well as some personal information. Please follow the directions to create your username and password.     Your access code is: [email or web address]  Expires: 2019  2:36 PM     Your access code will  in 90 days. If you need help or a new code, please call your [facility] clinic or [phone].        Care EveryWhere ID     This is your Care EveryWhere ID. This could be used by other organizations to access " your [place] medical records  [id number]        Equal Access to Services     TONY VÁZQUEZ : Candice Arrington, samantha griffin, benjamin hanna, hayley mauro. So [facility] [phone].    ATENCIÓN: Si habla español, tiene a tucker disposición servicios gratuitos de asistencia lingüística. Llame al [phone].    We comply with applicable federal civil rights laws and [place] laws. We do not discriminate on the basis of race, color, national origin, age, disability, sex, sexual orientation, or gender identity.            After Visit Summary       This is your record. Keep this with you and show to your community pharmacist(s) and doctor(s) at your next visit.

## 2018-10-23 NOTE — ED PROVIDER NOTES
eMERGENCY dEPARTMENT eNCOUnter        CHIEF COMPLAINT    Chief Complaint   Patient presents with     Dizziness     with weakness per pt.      Hypertension     150/90 today       HPI    Jf Ortiz is a 85 year old male who presents with not feeling well, dizziness and high blood pressure when he went for PT this morning.  Jf has some dementia, he was having really horrible problems with orthostatic hypotension and has been much better on Medrin. His wife dropped him off for PT, then they called her to come back and bring him to the ER.  He is not taking any blood pressure medications.  No fevers, chest pain or short of breath.     REVIEW OF SYSTEMS    Cardiac: no Chest Pain, No syncope  Respiratory: No cough or hemoptysis  GI: No Vomiting or Diarrhea  : No Dysuria or Hematuria  General: No Fever or Chills  All other systems reviewed and are negative.    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     Seizure disorder (H)      Stented coronary artery      Past Surgical History:   Procedure Laterality Date     ------------OTHER-------------  1955    ulnar and radial fx - repair ulnar and radial fx x4     ------------OTHER-------------  6/14/2011    cataract extraction     BIOPSY  08/2015    skin biopsy     BLEPHAROPLASTY BILATERAL  5/6/2014    Procedure: BLEPHAROPLASTY BILATERAL;  Surgeon: Andrew Queen MD;  [facility]: [facility]     BYPASS GRAFT ARTERY CORONARY  11/2006    coronary artery disease x 5, [facility]     cataract extraction and lens implantation  2011    cataracts     cataract extraction and lens implantation      cataracts     COLONOSCOPY  2012     colonoscopy with  polypectomy  3/13/2009    history of polyps - repeat 3 yrs     colonoscopy with polypectomy  2006     colonoscopy with polypectomy  2005     COMBINED COLONOSCOPY WITH ARGON PLASMA COAGULATOR (APC) N/A 10/31/2014    Procedure: COMBINED COLONOSCOPY WITH ARGON PLASMA COAGULATOR (APC);  Surgeon: Bassam Aguilar MD;  [facility]     COMBINED COLONOSCOPY WITH ARGON PLASMA COAGULATOR (APC) N/A 11/13/2015    Procedure: COMBINED COLONOSCOPY WITH ARGON PLASMA COAGULATOR (APC);  Surgeon: Bassam Aguilar MD;  [facility]: [facility]     ENDOSCOPY UPPER, COLONOSCOPY, COMBINED N/A 10/31/2014    Procedure: COMBINED ENDOSCOPY UPPER, COLONOSCOPY;  Surgeon: Bassam Aguilar MD;  [facility]     ENDOSCOPY UPPER, COLONOSCOPY, COMBINED N/A 11/13/2015    Procedure: COMBINED ENDOSCOPY UPPER, COLONOSCOPY;  Surgeon: Bassam Aguilar MD;  [facility]     ESOPHAGOSCOPY, GASTROSCOPY, DUODENOSCOPY (EGD), COMBINED  1/22/2014    Procedure: COMBINED ESOPHAGOSCOPY, GASTROSCOPY, DUODENOSCOPY (EGD);  UPPER ENDOSCOPY(PENA) W/ BIOPSIES;  Surgeon: Patricia Pena MD;  [facility]     HERNIORRHAPHY INGUINAL Right 2/14/2018    Procedure: HERNIORRHAPHY INGUINAL;  OPEN RIGHT INGUINAL HERNIA REPAIR with Mesh;  Surgeon: Virgilio Zaragoza DO;  [facility]     LARYNGOSCOPY WITH MICROSCOPE  1/22/2014    Procedure: LARYNGOSCOPY WITH MICROSCOPE;;  Surgeon: Chayo Duke MD;  [facility]: [facility]     pacemaker placement  2000    heart block     pacemaker placement  2011    dual-chamber     REMOVE TUBE, MYRINGOTOMY, COMBINED  1/22/2014    Procedure: COMBINED REMOVE TUBE, MYRINGOTOMY;  MICRODIRECT LARYNGOSCOPY WITH BIOPSY AND FROZEN SECTIONS removal of right ear tube and myringoplasty;  Surgeon: Chayo Duke MD;  [facility]     REPLACE PACEMAKER GENERATOR N/A 8/8/2018    Procedure: REPLACE PACEMAKER GENERATOR;  Pacemaker generator change;  Surgeon: Alma Kaplan MD;  [facility]: [facility]     stent placement to LAD  2008     ventilation tube   5/24/2012    right in office       CURRENT MEDICATIONS    Current Outpatient Rx   Medication Sig Dispense Refill     5-Hydroxytryptophan (5-HTP) 100 MG CAPS Take 1 tablet by mouth At Bedtime       atorvastatin (LIPITOR) 20 MG tablet Take 1 tablet (20 mg) by mouth every evening 90 tablet 3     Cholecalciferol (VITAMIN D-3 PO) Take 1,000 Int'l Units by mouth daily        Cyanocobalamin (VITAMIN B-12 PO) Take 1 tablet by mouth daily       DONEPEZIL HCL PO Take 5 mg by mouth 2 times daily        fludrocortisone (FLORINEF) 0.1 MG tablet Take 2-3 tabs daily. 90 tablet 3     MELATONIN PO Take 5 mg by mouth At Bedtime        metoprolol succinate (TOPROL-XL) 50 MG 24 hr tablet Take 1 tablet (50 mg) by mouth daily 30 tablet 1     midodrine (PROAMATINE) 5 MG tablet TAKE 2 TABLETS (10MG) BY MOUTH THREE TIMES DAILY 180 tablet 3     multivitamin, therapeutic with minerals (MULTI-VITAMIN) TABS tablet Take 1 tablet by mouth daily       PANTOPRAZOLE SODIUM PO Take 40 mg by mouth 2 times daily (before meals)        Potassium Chloride ER 20 MEQ TBCR TAKE 1 TABLET BY MOUTH ONCE DAILY 30 tablet 5     sodium chloride 1 GM tablet TAKE 1 TABLET BY MOUTH TWICE A DAY 60 tablet 11     acetaminophen (TYLENOL) 325 MG tablet Take 2 tablets (650 mg) by mouth every 4 hours as needed for other (mild pain) 100 tablet 0     Misc Natural Products (MENS PROSTATE HEALTH FORMULA PO) Take 1 tablet by mouth 2 times daily       senna-docusate (SENOKOT-S;PERICOLACE) 8.6-50 MG per tablet Take 1-2 tablets by mouth 2 times daily Take while on oral narcotics to prevent or treat constipation. 30 tablet 0     Turmeric 500 MG TABS Take 1 tablet by mouth daily       vitamin  B complex with vitamin C (VITAMIN  B COMPLEX) TABS Take 1 tablet by mouth daily         ALLERGIES    Allergies   Allergen Reactions     Cats Unknown     Lamotrigine      Skin lesions       SOCIAL & FAMILY HISTORY    Social History     Social History     Marital status: Single     Spouse name:  N/A     Number of children: N/A     Years of education: N/A     Occupational History     retired      Social History Main Topics     Smoking status: Former Smoker     Packs/day: 1.00     Years: 5.00     Types: Cigarettes     Smokeless tobacco: Never Used      Comment: quit in 1985     Alcohol use Yes      Comment: every other day     Drug use: No     Sexual activity: Not Asked     Other Topics Concern     Blood Transfusions Yes     Caffeine Concern Yes     1 cup coffee daily     Parent/Sibling W/ Cabg, Mi Or Angioplasty Before 65f 55m? No     Social History Narrative     Family History   Problem Relation Age of Onset     Diabetes Mother        PHYSICAL EXAM    VITAL SIGNS: /92  Pulse 73  Temp 98.1  F (36.7  C) (Oral)  Resp 18  Wt 74.8 kg (165 lb)  SpO2 98%  BMI 23.68 kg/m2   Constitutional:  Well developed, well nourished, no acute distress   HENT:  Atraumatic, moist mucus membranes, no nystagmus  Neck: supple, no JVD   Respiratory:  Lungs Clear, no retractions   Cardiovascular:  regular rate, no murmurs  Vascular: Radial pulses 2+ equal bilaterally  GI:  Soft, nontender, normal bowel sounds  Musculoskeletal:  No edema, no acute deformities  Integument:  Skin warm and dry, no petechiae   Neurologic:  Alert & oriented, no slurred speech  Psych: Pleasant affect, no hallucinations    EKG    Interpreted by emergency department physician:  LBBB, unchanged from 3/18    RADIOLOGY/PROCEDURES    CXR:   Results for orders placed or performed during the hospital encounter of 10/23/18   XR Chest Port 1 View    Narrative    PROCEDURE:  XR CHEST PORT 1 VW    HISTORY:  weak; .     COMPARISON:  March 5, 2018    FINDINGS:   The heart is normal size. Transvenous pacemaker is in place.  Postoperative changes are seen in the mediastinum. The pulmonary  vasculature is normal.  The lungs are clear. No pleural effusion or  pneumothorax.      Impression    IMPRESSION:  No change in the appearance the chest from March 5, 2018        JACKY JAMES MD       ED COURSE & MEDICAL DECISION MAKING    Pertinent Labs & Imaging studies reviewed and   See chart for details of medications given during the ED stay.    Vitals:    10/23/18 1345 10/23/18 1400 10/23/18 1415 10/23/18 1430   BP: 169/96 143/84 131/83 155/92   Pulse:       Resp:    18   Temp:       TempSrc:       SpO2: 98% 95% 97% 98%   Weight:           FINAL IMPRESSION    1. Benign essential hypertension        Plan: his blood pressure remained high here, 170-190.  He was givne catapres, which really did nothing.  Spoke with Dr. Ray, will start metoprolol 50 mg a day.  He was given one here as well as 5 mg lopressor and pressure came down nicely.  Appointment next week with Dr. Ray        (Please note that this note was completed with a voice recognition program.  Every attempt was made to edit the dictations, but inevitably there remain words that are mis-transcribed.)       Latanya Burgos MD  10/23/18 7953

## 2018-10-25 NOTE — PROGRESS NOTES
SUBJECTIVE:   Jf Ortiz is a 85 year old male who presents to clinic today for the following health issues:      Hypertension Follow-up      Outpatient blood pressures are being checked at home.  Results are running in the 140's.    Low Salt Diet: 1 gram sodium      Amount of exercise or physical activity: 2-3 days/week for an average of 45-60 minutes    Problems taking medications regularly: No    Medication side effects: none    Diet: regular (no restrictions)      Patient has been on midodrine and Florinef and salt supplement for autonomic dysfunction.  Recently was in the emergency room with elevated blood pressures and was started on Toprol.  He does have a pacemaker.  Denies any chest pain here for follow-up.  Here with his wife.  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     Seizure disorder (H)      Stented coronary artery        PAST SURGICAL HISTORY:  Past Surgical History:   Procedure Laterality Date     ------------OTHER-------------  1955    ulnar and radial fx - repair ulnar and radial fx x4     ------------OTHER-------------  6/14/2011    cataract extraction     BIOPSY  08/2015    skin biopsy     BLEPHAROPLASTY BILATERAL  5/6/2014    Procedure: BLEPHAROPLASTY BILATERAL;  Surgeon: Andrew Queen MD;  [facility]     BYPASS GRAFT ARTERY CORONARY  11/2006    coronary artery disease x 5, [facility]     cataract extraction and lens implantation  2011    cataracts     cataract extraction and lens implantation      cataracts     COLONOSCOPY  2012     colonoscopy with polypectomy  3/13/2009    history of polyps - repeat 3 yrs      colonoscopy with polypectomy  2006     colonoscopy with polypectomy  2005     COMBINED COLONOSCOPY WITH ARGON PLASMA COAGULATOR (APC) N/A 10/31/2014    Procedure: COMBINED COLONOSCOPY WITH ARGON PLASMA COAGULATOR (APC);  Surgeon: Bassam Aguilar MD;  [facility]     COMBINED COLONOSCOPY WITH ARGON PLASMA COAGULATOR (APC) N/A 11/13/2015    Procedure: COMBINED COLONOSCOPY WITH ARGON PLASMA COAGULATOR (APC);  Surgeon: Bassam Aguilar MD;  [facility]     ENDOSCOPY UPPER, COLONOSCOPY, COMBINED N/A 10/31/2014    Procedure: COMBINED ENDOSCOPY UPPER, COLONOSCOPY;  Surgeon: Bassam Aguilar MD;  [facility]     ENDOSCOPY UPPER, COLONOSCOPY, COMBINED N/A 11/13/2015    Procedure: COMBINED ENDOSCOPY UPPER, COLONOSCOPY;  Surgeon: Bassam Aguilar MD;  [facility]     ESOPHAGOSCOPY, GASTROSCOPY, DUODENOSCOPY (EGD), COMBINED  1/22/2014    Procedure: COMBINED ESOPHAGOSCOPY, GASTROSCOPY, DUODENOSCOPY (EGD);  UPPER ENDOSCOPY(PENA) W/ BIOPSIES;  Surgeon: Patricia Pena MD;  [facility]     HERNIORRHAPHY INGUINAL Right 2/14/2018    Procedure: HERNIORRHAPHY INGUINAL;  OPEN RIGHT INGUINAL HERNIA REPAIR with Mesh;  Surgeon: Virgilio Zaragoza DO;  [facility]     LARYNGOSCOPY WITH MICROSCOPE  1/22/2014    Procedure: LARYNGOSCOPY WITH MICROSCOPE;;  Surgeon: Chayo Duke MD;  [facility]     pacemaker placement  2000    heart block     pacemaker placement  2011    dual-chamber     REMOVE TUBE, MYRINGOTOMY, COMBINED  1/22/2014    Procedure: COMBINED REMOVE TUBE, MYRINGOTOMY;  MICRODIRECT LARYNGOSCOPY WITH BIOPSY AND FROZEN SECTIONS removal of right ear tube and myringoplasty;  Surgeon: Chayo Duke MD;  [facility]     REPLACE PACEMAKER GENERATOR N/A 8/8/2018    Procedure: REPLACE PACEMAKER GENERATOR;  Pacemaker generator change;  Surgeon: Alma Kaplan MD;  [facility]     stent placement to LAD  2008     ventilation tube  5/24/2012    right in office       MEDICATIONS:  Prior to Admission  medications    Medication Sig Start Date End Date Taking? Authorizing Provider   5-Hydroxytryptophan (5-HTP) 100 MG CAPS Take 1 tablet by mouth At Bedtime   Yes Reported, Patient   atorvastatin (LIPITOR) 20 MG tablet Take 1 tablet (20 mg) by mouth every evening 1/5/18  Yes Herman Rivers DO   Cholecalciferol (VITAMIN D-3 PO) Take 1,000 Int'l Units by mouth daily    Yes Reported, Patient   Cyanocobalamin (VITAMIN B-12 PO) Take 1 tablet by mouth daily   Yes Reported, Patient   DONEPEZIL HCL PO Take 5 mg by mouth 2 times daily    Yes Reported, Patient   fludrocortisone (FLORINEF) 0.1 MG tablet Take 2-3 tabs daily. 10/8/18  Yes Marycarmen Ibarra APRN CNP   MELATONIN PO Take 5 mg by mouth At Bedtime    Yes Reported, Patient   metoprolol succinate (TOPROL-XL) 50 MG 24 hr tablet Take 1 tablet (50 mg) by mouth daily 10/23/18  Yes Latanya Burgos MD   midodrine (PROAMATINE) 5 MG tablet TAKE 2 TABLETS (10MG) BY MOUTH twice a day 10/30/18  Yes FRANCES Ray MD   Misc Natural Products (MENS PROSTATE HEALTH FORMULA PO) Take 1 tablet by mouth 2 times daily   Yes Reported, Patient   multivitamin, therapeutic with minerals (MULTI-VITAMIN) TABS tablet Take 1 tablet by mouth daily   Yes Reported, Patient   PANTOPRAZOLE SODIUM PO Take 40 mg by mouth 2 times daily (before meals)    Yes Reported, Patient   Potassium Chloride ER 20 MEQ TBCR TAKE 1 TABLET BY MOUTH ONCE DAILY 7/5/18  Yes FRANCES Ray MD   Turmeric 500 MG TABS Take 1 tablet by mouth daily   Yes Reported, Patient   vitamin  B complex with vitamin C (VITAMIN  B COMPLEX) TABS Take 1 tablet by mouth daily   Yes Reported, Patient   acetaminophen (TYLENOL) 325 MG tablet Take 2 tablets (650 mg) by mouth every 4 hours as needed for other (mild pain)  Patient not taking: Reported on 10/30/2018 8/8/18   Alma Kaplan MD       ALLERGIES:     Allergies   Allergen Reactions     Cats Unknown     Lamotrigine      Skin lesions       ROS:  Constitutional, neuro, ENT, endocrine,  pulmonary, cardiac, gastrointestinal, genitourinary, musculoskeletal, integument and psychiatric systems are negative, except as otherwise noted.      EXAM:  /84  Pulse 76  Temp 97.5  F (36.4  C) (Tympanic)  Wt 168 lb (76.2 kg)  SpO2 98%  BMI 24.11 kg/m2 Body mass index is 24.11 kg/(m^2).   GENERAL APPEARANCE: healthy, alert and no distress  EYES: Eyes grossly normal to inspection, PERRL and conjunctivae and sclerae normal  NECK: no adenopathy, no asymmetry, masses, or scars and thyroid normal to palpation  RESP: lungs clear to auscultation - no rales, rhonchi or wheezes  CV: regular rates and rhythm and normal S1 S2, no S3 or S4, he does have a 2 out of 6 to 3 out of 6 systolic murmur that has not been noted in the chart.  ABDOMEN: soft, nontender, without hepatosplenomegaly or masses and bowel sounds normal  NEURO: Normal strength and tone, mentation intact and speech normal  Lab/ X-ray  No results found for this or any previous visit (from the past 24 hour(s)).    ASSESSMENT/PLAN:    ICD-10-CM    1. Essential hypertension I10    2. Autonomic orthostatic hypotension I95.1 midodrine (PROAMATINE) 5 MG tablet   3. Systolic murmur R01.1 Echocardiogram   For now we will have him stop the salt tablet the 1 g supplement daily.  He is taking the Florinef 0.1 mg twice a day will keep with the Toprol XL 50 mg a day and will change the mid a drain from 3 times a day to 2 times a day.  Will get a cardiac echo because of this new systolic murmur.  He has an appointment with Cardiology on November 6.      IRENE Ray MD  October 30, 2018

## 2018-10-30 ENCOUNTER — OFFICE VISIT (OUTPATIENT)
Dept: FAMILY MEDICINE | Facility: OTHER | Age: 83
End: 2018-10-30
Attending: FAMILY MEDICINE
Payer: MEDICARE

## 2018-10-30 VITALS
SYSTOLIC BLOOD PRESSURE: 154 MMHG | TEMPERATURE: 97.5 F | OXYGEN SATURATION: 98 % | HEART RATE: 76 BPM | DIASTOLIC BLOOD PRESSURE: 84 MMHG | BODY MASS INDEX: 24.11 KG/M2 | WEIGHT: 168 LBS

## 2018-10-30 DIAGNOSIS — R01.1 SYSTOLIC MURMUR: ICD-10-CM

## 2018-10-30 DIAGNOSIS — I95.1 AUTONOMIC ORTHOSTATIC HYPOTENSION: ICD-10-CM

## 2018-10-30 DIAGNOSIS — I10 ESSENTIAL HYPERTENSION: Primary | ICD-10-CM

## 2018-10-30 PROCEDURE — G0463 HOSPITAL OUTPT CLINIC VISIT: HCPCS

## 2018-10-30 PROCEDURE — 99214 OFFICE O/P EST MOD 30 MIN: CPT | Performed by: FAMILY MEDICINE

## 2018-10-30 RX ORDER — MIDODRINE HYDROCHLORIDE 5 MG/1
10 TABLET ORAL 2 TIMES DAILY
Qty: 180 TABLET | Refills: 3 | COMMUNITY
Start: 2018-10-30 | End: 2019-05-30

## 2018-10-30 ASSESSMENT — ANXIETY QUESTIONNAIRES
7. FEELING AFRAID AS IF SOMETHING AWFUL MIGHT HAPPEN: NOT AT ALL
GAD7 TOTAL SCORE: 0
5. BEING SO RESTLESS THAT IT IS HARD TO SIT STILL: NOT AT ALL
6. BECOMING EASILY ANNOYED OR IRRITABLE: NOT AT ALL
4. TROUBLE RELAXING: NOT AT ALL
1. FEELING NERVOUS, ANXIOUS, OR ON EDGE: NOT AT ALL
2. NOT BEING ABLE TO STOP OR CONTROL WORRYING: NOT AT ALL
3. WORRYING TOO MUCH ABOUT DIFFERENT THINGS: NOT AT ALL

## 2018-10-30 ASSESSMENT — PAIN SCALES - GENERAL: PAINLEVEL: NO PAIN (0)

## 2018-10-30 ASSESSMENT — PATIENT HEALTH QUESTIONNAIRE - PHQ9: SUM OF ALL RESPONSES TO PHQ QUESTIONS 1-9: 1

## 2018-10-30 NOTE — MR AVS SNAPSHOT
After Visit Summary   10/30/2018    Jf Ortiz    MRN: [id number]           Patient Information     Date Of Birth          10/5/1933        Visit Information        Provider Department      10/30/2018 1:00 PM FRANCES Ray MD [facility]        Today's Diagnoses     Essential hypertension    -  1    Autonomic orthostatic hypotension        Systolic murmur           Follow-ups after your visit        Your next 10 appointments already scheduled     Nov 02, 2018  9:00 AM CDT   Ech Complete with HIECH1   [facility] ([facility] )    [address]  [facility] [address]   [phone]           1.  Please bring or wear a comfortable two-piece outfit. 2.  You may eat, drink and take your normal medicines. 3.  For any questions that cannot be answered, please contact the ordering physician 4.  Please do not wear perfumes or scented lotions on the day of your exam.            Nov 06, 2018  3:00 PM CST   (Arrive by 2:45 PM)   [facility]    [address]  [place] [address]   [phone]            Nov 06, 2018  3:30 PM CST   (Arrive by 3:15 PM)   Return Visit with HC PACEMAKER   [facility] ([facility] [place] )    [address]  [place] [address]   [phone]            Dec 07, 2018  9:45 AM CST   (Arrive by 9:30 AM)   PROCEDURE with Geeta Alegria PA-C   [facility] ([facility] [place] )    [address]  [place] [address]   [phone]              Future tests that were ordered for you today     Open Future Orders        Priority Expected Expires Ordered    Echocardiogram Routine 10/30/2018 10/30/2019 10/30/2018            Who to contact     If you have questions or need follow up information about today's clinic visit or your schedule please contact [facility] directly  "at [phone].  Normal or non-critical lab and imaging results will be communicated to you by MyChart, letter or phone within 4 business days after the clinic has received the results. If you do not hear from us within 7 days, please contact the clinic through [facility] or phone. If you have a critical or abnormal lab result, we will notify you by phone as soon as possible.  Submit refill requests through [facility] or call your pharmacy and they will forward the refill request to us. Please allow 3 business days for your refill to be completed.          Additional Information About Your Visit        [facility] Information     [facility]ocket lets you send messages to your doctor, view your test results, renew your prescriptions, schedule appointments and more. To sign up, go to [email or web address] . Click on \"Log in\" on the left side of the screen, which will take you to the Welcome page. Then click on \"Sign up Now\" on the right side of the page.     You will be asked to enter the access code listed below, as well as some personal information. Please follow the directions to create your username and password.     Your access code is: [email or web address]  Expires: 2019  2:36 PM     Your access code will  in 90 days. If you need help or a new code, please call your [facility] clinic or [phone].        Care EveryWhere ID     This is your Care EveryWhere ID. This could be used by other organizations to access your [facility] medical records  [id number]        Your Vitals Were     Pulse Temperature Pulse Oximetry BMI (Body Mass Index)          76 97.5  F (36.4  C) (Tympanic) 98% 24.11 kg/m2         Blood Pressure from Last 3 Encounters:   10/30/18 154/84   10/23/18 155/92   18 110/66    Weight from Last 3 Encounters:   10/30/18 168 lb (76.2 kg)   10/23/18 165 lb (74.8 kg)   18 166 lb (75.3 kg)                 Today's Medication Changes          These changes are accurate as of 10/30/18  1:18 PM.  If you have " any questions, ask your nurse or doctor.               These medicines have changed or have updated prescriptions.        Dose/Directions    midodrine 5 MG tablet   Commonly known as:  PROAMATINE   This may have changed:  additional instructions   Used for:  Autonomic orthostatic hypotension   Changed by:  FRANCES Ray MD        TAKE 2 TABLETS (10MG) BY MOUTH twice a day   Quantity:  180 tablet   Refills:  3                Primary Care Provider Office Phone # Fax #    FRANCES Ray [phone] [address] [address]        Equal Access to Services     [facility]: Hadii aad ku hadasho Soomaali, waaxda luqadaha, qaybta kaalmada adeegyada, waxay feleciain hayanna pollard . So [facility] [phone].    ATENCIÓN: Si habla español, tiene a tucker disposición servicios gratuitos de asistencia lingüística. [facility] [phone].    We comply with applicable federal civil rights laws and [place] laws. We do not discriminate on the basis of race, color, national origin, age, disability, sex, sexual orientation, or gender identity.            Thank you!     Thank you for choosing [facility]  for your care. Our goal is always to provide you with excellent care. Hearing back from our patients is one way we can continue to improve our services. Please take a few minutes to complete the written survey that you may receive in the mail after your visit with us. Thank you!             Your Updated Medication List - Protect others around you: Learn how to safely use, store and throw away your medicines at www.disposemymeds.org.          This list is accurate as of 10/30/18  1:18 PM.  Always use your most recent med list.                   Brand Name Dispense Instructions for use Diagnosis    5- MG Caps      Take 1 tablet by mouth At Bedtime        acetaminophen 325 MG tablet    TYLENOL    100 tablet    Take 2 tablets (650 mg) by mouth every 4 hours as  needed for other (mild pain)    Pacemaker generator end of life       atorvastatin 20 MG tablet    LIPITOR    90 tablet    Take 1 tablet (20 mg) by mouth every evening    Hypercholesterolemia       DONEPEZIL HCL PO      Take 5 mg by mouth 2 times daily        fludrocortisone 0.1 MG tablet    FLORINEF    90 tablet    Take 2-3 tabs daily.    Parkinson's disease (H), Orthostatic hypotension       MELATONIN PO      Take 5 mg by mouth At Bedtime        MENS PROSTATE HEALTH FORMULA PO      Take 1 tablet by mouth 2 times daily        metoprolol succinate 50 MG 24 hr tablet    TOPROL-XL    30 tablet    Take 1 tablet (50 mg) by mouth daily        midodrine 5 MG tablet    PROAMATINE    180 tablet    TAKE 2 TABLETS (10MG) BY MOUTH twice a day    Autonomic orthostatic hypotension       Multi-vitamin Tabs tablet      Take 1 tablet by mouth daily        PANTOPRAZOLE SODIUM PO      Take 40 mg by mouth 2 times daily (before meals)        Potassium Chloride ER 20 MEQ Tbcr     30 tablet    TAKE 1 TABLET BY MOUTH ONCE DAILY    Hypokalemia       Turmeric 500 MG Tabs      Take 1 tablet by mouth daily        vitamin B complex with vitamin C Tabs tablet      Take 1 tablet by mouth daily        VITAMIN B-12 PO      Take 1 tablet by mouth daily        VITAMIN D-3 PO      Take 1,000 Int'l Units by mouth daily

## 2018-10-30 NOTE — NURSING NOTE
"Chief Complaint   Patient presents with     Hypertension       Initial /84  Pulse 76  Temp 97.5  F (36.4  C) (Tympanic)  Wt 168 lb (76.2 kg)  SpO2 98%  BMI 24.11 kg/m2 Estimated body mass index is 24.11 kg/(m^2) as calculated from the following:    Height as of 9/6/18: 5' 10\" (1.778 m).    Weight as of this encounter: 168 lb (76.2 kg).  Medication Reconciliation: complete    Jessica Handley MA    "

## 2018-10-31 ASSESSMENT — ANXIETY QUESTIONNAIRES: GAD7 TOTAL SCORE: 0

## 2018-11-02 ENCOUNTER — HOSPITAL ENCOUNTER (OUTPATIENT)
Dept: CARDIOLOGY | Facility: HOSPITAL | Age: 83
Discharge: HOME | End: 2018-11-02
Attending: FAMILY MEDICINE | Admitting: FAMILY MEDICINE
Payer: MEDICARE

## 2018-11-02 DIAGNOSIS — R01.1 SYSTOLIC MURMUR: ICD-10-CM

## 2018-11-02 PROCEDURE — 93306 TTE W/DOPPLER COMPLETE: CPT | Mod: TC

## 2018-11-02 PROCEDURE — 93306 TTE W/DOPPLER COMPLETE: CPT | Mod: 26 | Performed by: INTERNAL MEDICINE

## 2018-11-05 DIAGNOSIS — I95.1 AUTONOMIC ORTHOSTATIC HYPOTENSION: ICD-10-CM

## 2018-11-05 DIAGNOSIS — I10 BENIGN ESSENTIAL HYPERTENSION: Primary | ICD-10-CM

## 2018-11-06 ENCOUNTER — OFFICE VISIT (OUTPATIENT)
Dept: CARDIOLOGY | Facility: OTHER | Age: 83
End: 2018-11-06
Attending: INTERNAL MEDICINE
Payer: MEDICARE

## 2018-11-06 VITALS
OXYGEN SATURATION: 97 % | HEART RATE: 73 BPM | SYSTOLIC BLOOD PRESSURE: 145 MMHG | HEIGHT: 69 IN | RESPIRATION RATE: 16 BRPM | WEIGHT: 163 LBS | DIASTOLIC BLOOD PRESSURE: 77 MMHG | BODY MASS INDEX: 24.14 KG/M2

## 2018-11-06 DIAGNOSIS — G31.83 LEWY BODY DEMENTIA WITHOUT BEHAVIORAL DISTURBANCE (H): Primary | ICD-10-CM

## 2018-11-06 DIAGNOSIS — I10 ESSENTIAL HYPERTENSION: ICD-10-CM

## 2018-11-06 DIAGNOSIS — I44.2 ATRIOVENTRICULAR BLOCK, COMPLETE (H): Primary | ICD-10-CM

## 2018-11-06 DIAGNOSIS — I95.1 AUTONOMIC ORTHOSTATIC HYPOTENSION: ICD-10-CM

## 2018-11-06 DIAGNOSIS — F02.80 LEWY BODY DEMENTIA WITHOUT BEHAVIORAL DISTURBANCE (H): Primary | ICD-10-CM

## 2018-11-06 PROCEDURE — 93280 PM DEVICE PROGR EVAL DUAL: CPT | Mod: 26 | Performed by: INTERNAL MEDICINE

## 2018-11-06 PROCEDURE — G0463 HOSPITAL OUTPT CLINIC VISIT: HCPCS | Mod: 25

## 2018-11-06 PROCEDURE — 99204 OFFICE O/P NEW MOD 45 MIN: CPT | Mod: 25 | Performed by: INTERNAL MEDICINE

## 2018-11-06 PROCEDURE — 93280 PM DEVICE PROGR EVAL DUAL: CPT | Mod: TC

## 2018-11-06 PROCEDURE — G0463 HOSPITAL OUTPT CLINIC VISIT: HCPCS

## 2018-11-06 RX ORDER — MIDODRINE HYDROCHLORIDE 5 MG/1
TABLET ORAL
Qty: 540 TABLET | Refills: 0 | Status: SHIPPED | OUTPATIENT
Start: 2018-11-06 | End: 2018-11-06 | Stop reason: DRUGHIGH

## 2018-11-06 RX ORDER — METOPROLOL SUCCINATE 50 MG/1
TABLET, EXTENDED RELEASE ORAL
Qty: 90 TABLET | Refills: 0 | Status: SHIPPED | OUTPATIENT
Start: 2018-11-06 | End: 2019-01-22

## 2018-11-06 ASSESSMENT — PAIN SCALES - GENERAL: PAINLEVEL: NO PAIN (0)

## 2018-11-06 NOTE — PATIENT INSTRUCTIONS
"You were seen by Dr. Walter, 11/6/18.     1.  Take your first dose of Midodrine in the morning and then take your second dose when you awake from your afternoon nap.  Do not take 4 hours prior to bedtime.    2. Drink plenty of fluids.      3. Sleep with your head a little higher than the level of your heart.  Raise the head of the bed 3-4 inches.    4. During the day, you can wear \"biker\" shorts/compression shorts to help with lightheadedness.    5. Monitor your blood pressures at home.    You will follow up with Dr. Walter in 3 months, sooner if you have concerns.       Please call the cardiology office with problems, questions, or concerns at [phone].    If you experience chest pain, chest pressure, chest tightness, shortness of breath, fainting, lightheadedness, nausea, vomiting, or other concerning symptoms, please report to the Emergency Department or call 911. These symptoms may be emergent, and best treated in the Emergency Department.         Cardiology Nurse  [facility]  [phone]    "

## 2018-11-06 NOTE — TELEPHONE ENCOUNTER
Midodrine not on protocol MD to advise. Dr. Ray pharmacy wants 90 day supply please advise, amount changed.           Metoprolol last ordered by Dr. Burgos      Last Written Prescription Date:  10/23/18  Last Fill Quantity: 30,   # refills: 1  Last Office Visit: 10/30/18  Future Office visit:    Next 5 appointments (look out 90 days)     Nov 06, 2018  3:00 PM CST   (Arrive by 2:45 PM)   [facility]    [address] [address]   [phone]            Nov 06, 2018  3:30 PM CST   (Arrive by 3:15 PM)   Return Visit with  PACEMAKER   [facility]    [address] [address]   [phone]                   Routing refill request to provider for review/approval because:  Per Dr. Craft's last night will keep with the Toprol XL 50 mg a day  Will sign for 90 days as pharmacy requested.

## 2018-11-06 NOTE — LETTER
11/6/2018      RE: Jf Ortiz  [address]  [place] [address]       Dear Colleague,    Thank you for the opportunity to participate in the care of your patient, Jf Ortiz, at the [facility]. Please see a copy of my visit note below.          Clinical Cardiac Electrophysiology    Chief Complaint: pacemaker, neurogenic orthostatic hypotension    HPI: I was happy to see Mr. Contreras in the EP clinic.  He has a history of pacemaker placement with a recent generator change.  The device is a [employer] dual lead pacemaker.  He scheduled for device check later today.    He also has a history of orthostatic hypotension secondary to Lewy body dementia.  This is been managed with midodrine and fludrocortisone.  On this regimen he and his wife reports his orthostatic hypotension has been much better.    He was seen in the emergency department on October 23 complaining of dizziness and was noted to be hypertensive with systolic blood pressures in the 180s-190 range.  He was given clonidine as well as IV metoprolol.  He was also started on oral metoprolol 50 mg daily.  He has not been troubled by high blood pressure in the past.    Current Outpatient Prescriptions   Medication Sig Dispense Refill     5-Hydroxytryptophan (5-HTP) 100 MG CAPS Take 1 tablet by mouth At Bedtime       atorvastatin (LIPITOR) 20 MG tablet Take 1 tablet (20 mg) by mouth every evening 90 tablet 3     Cholecalciferol (VITAMIN D-3 PO) Take 1,000 Int'l Units by mouth daily        Coenzyme Q10 (CO Q 10 PO) 200 mg one in the morning       Cyanocobalamin (VITAMIN B-12 PO) Take 1 tablet by mouth daily       DONEPEZIL HCL PO Take 5 mg by mouth At Bedtime        fludrocortisone (FLORINEF) 0.1 MG tablet Take 2-3 tabs daily. 90 tablet 3     MELATONIN PO Take 10 mg by mouth At Bedtime        metoprolol succinate (TOPROL-XL) 50 MG 24 hr tablet TAKE 1 TABLET (50 MG) BY MOUTH DAILY 90  tablet 0     midodrine (PROAMATINE) 5 MG tablet TAKE 2 TABLETS (10MG) BY MOUTH twice a day 180 tablet 3     multivitamin, therapeutic with minerals (MULTI-VITAMIN) TABS tablet Take 1 tablet by mouth daily       PANTOPRAZOLE SODIUM PO Take 40 mg by mouth 2 times daily (before meals)        Phenazopyridine HCl (AZO TABS PO) Take by mouth At Bedtime       Turmeric 500 MG TABS Take 1 tablet by mouth daily       vitamin  B complex with vitamin C (VITAMIN  B COMPLEX) TABS Take 1 tablet by mouth daily       Potassium Chloride ER 20 MEQ TBCR TAKE 1 TABLET BY MOUTH ONCE DAILY (Patient not taking: Reported on 11/6/2018) 30 tablet 5       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     Seizure disorder (H)      Stented coronary artery        Past Surgical History:   Procedure Laterality Date     ------------OTHER-------------  1955    ulnar and radial fx - repair ulnar and radial fx x4     ------------OTHER-------------  6/14/2011    cataract extraction     BIOPSY  08/2015    skin biopsy     BLEPHAROPLASTY BILATERAL  5/6/2014    Procedure: BLEPHAROPLASTY BILATERAL;  Surgeon: Andrew Queen MD;  Location: [facility]     BYPASS GRAFT ARTERY CORONARY  11/2006    coronary artery disease x 5, [facility]     cataract extraction and lens implantation  2011    cataracts     cataract extraction and lens implantation      cataracts     COLONOSCOPY  2012     colonoscopy with polypectomy  3/13/2009    history of polyps - repeat 3 yrs     colonoscopy with polypectomy  2006     colonoscopy with polypectomy  2005     COMBINED COLONOSCOPY WITH ARGON PLASMA COAGULATOR (APC) N/A 10/31/2014    Procedure: COMBINED COLONOSCOPY WITH ARGON PLASMA  COAGULATOR (APC);  Surgeon: Bassam Aguilar MD;  Location: [facility]     COMBINED COLONOSCOPY WITH ARGON PLASMA COAGULATOR (APC) N/A 11/13/2015    Procedure: COMBINED COLONOSCOPY WITH ARGON PLASMA COAGULATOR (APC);  Surgeon: Bassam Aguilar MD;  Location: [facility]     ENDOSCOPY UPPER, COLONOSCOPY, COMBINED N/A 10/31/2014    Procedure: COMBINED ENDOSCOPY UPPER, COLONOSCOPY;  Surgeon: Bassam Aguilar MD;  Location: [facility]     ENDOSCOPY UPPER, COLONOSCOPY, COMBINED N/A 11/13/2015    Procedure: COMBINED ENDOSCOPY UPPER, COLONOSCOPY;  Surgeon: Bassam Aguilar MD;  [facility]: [facility]     ESOPHAGOSCOPY, GASTROSCOPY, DUODENOSCOPY (EGD), COMBINED  1/22/2014    Procedure: COMBINED ESOPHAGOSCOPY, GASTROSCOPY, DUODENOSCOPY (EGD);  UPPER ENDOSCOPY(PENA) W/ BIOPSIES;  Surgeon: Patricia Pena MD;  Location: [facility]     HERNIORRHAPHY INGUINAL Right 2/14/2018    Procedure: HERNIORRHAPHY INGUINAL;  OPEN RIGHT INGUINAL HERNIA REPAIR with Mesh;  Surgeon: Virgilio Zaragoza DO;  Location: [facility]     LARYNGOSCOPY WITH MICROSCOPE  1/22/2014    Procedure: LARYNGOSCOPY WITH MICROSCOPE;;  Surgeon: Chayo Duke MD;  Location: [facility]     pacemaker placement  2000    heart block     pacemaker placement  2011    dual-chamber     REMOVE TUBE, MYRINGOTOMY, COMBINED  1/22/2014    Procedure: COMBINED REMOVE TUBE, MYRINGOTOMY;  MICRODIRECT LARYNGOSCOPY WITH BIOPSY AND FROZEN SECTIONS removal of right ear tube and myringoplasty;  Surgeon: Chayo Duke MD;  Location: [facility]     REPLACE PACEMAKER GENERATOR N/A 8/8/2018    Procedure: REPLACE PACEMAKER GENERATOR;  Pacemaker generator change;  Surgeon: Alma Kaplan MD;  Location: [facility]     stent placement to LAD  2008     ventilation tube  5/24/2012    right in office       Family History   Problem Relation Age of Onset     Diabetes Mother        Social History   Substance Use Topics     Smoking status: Former Smoker     Packs/day: 1.00     Years: 5.00     Types: Cigarettes     Smokeless tobacco: Never  "Used      Comment: quit in 1985     Alcohol use Yes      Comment: every other day       Allergies   Allergen Reactions     Cats Unknown     Lamotrigine      Skin lesions         ROS: memory, dementia and movement symptoms without recent changes, otherwise comprehensive review is negative unless otherwise noted in the HPI.   November 6, 2018/woa      Physical Examination:  Vitals: /77 (BP Location: Left arm, Patient Position: Chair, Cuff Size: Adult Regular)  Pulse 73  Resp 16  Ht 1.753 m (5' 9\")  Wt 73.9 kg (163 lb)  SpO2 97%  BMI 24.07 kg/m2  BMI= Body mass index is 24.07 kg/(m^2).      GENERAL APPEARANCE: frail, alert and no distress  HEENT: no icterus, no xanthelasmas, normal pupil size, mucosa moist, no cyanosis.  NECK: no adenopathy, no asymmetry, masses, or scars, carotid upstrokes are normal bilaterally, no cervical bruits, JVP is not visible  CHEST: lungs clear to auscultation - no rales, rhonchi or wheezes, no use of accessory muscles, no retractions, respirations are unlabored, normal respiratory rate, no kyphosis, no scoliosis  CARDIOVASCULAR: regular rhythm, normal S1S2, no S3 or S4 and no murmur, click or rub, precordium quiet  ABDOMEN: soft, non tender, without hepatosplenomegaly, no masses palpable, bowel sounds normal, aorta not palpable, no abdominal bruits  EXTREMITIES: no clubbing, cyanosis or edema  NEURO: alert and oriented to person/place/time, normal speech, gait and affect  VASC: Radial and pedal pulses palpable bilaterally. No vascular bruits heard.  SKIN: no ecchymoses, no rashes      Laboratory and diagnostic data reviewed November 6, 2018:        Results for AKILA WALLER (MRN [id number]) as of 11/6/2018 15:13   Ref. Range 10/23/2018 11:41   Troponin I ES Latest Ref Range: 0.000 - 0.045 ug/L <0.015       Results for AKIAL WALLER (MRN [id number]) as of 11/6/2018 15:13   Ref. Range 10/23/2018 11:41   Sodium Latest Ref Range: 133 - 144 mmol/L 144   Potassium Latest Ref Range: " 3.4 - 5.3 mmol/L 3.8   Chloride Latest Ref Range: 94 - 109 mmol/L 111 (H)   Carbon Dioxide Latest Ref Range: 20 - 32 mmol/L 27   Urea Nitrogen Latest Ref Range: 7 - 30 mg/dL 16   Creatinine Latest Ref Range: 0.66 - 1.25 mg/dL 0.97   GFR Estimate Latest Ref Range: >60 mL/min/1.7m2 74   Results for AKILA WALLER (MRN [id number]) as of 11/6/2018 15:13   Ref. Range 10/23/2018 11:41   WBC Latest Ref Range: 4.0 - 11.0 10e9/L 5.8   Hemoglobin Latest Ref Range: 13.3 - 17.7 g/dL 12.8 (L)   Hematocrit Latest Ref Range: 40.0 - 53.0 % 38.2 (L)   Platelet Count Latest Ref Range: 150 - 450 10e9/L 198       Assessment and recommendations:    1) Neurogenic orthostatic hypotension    2) Hypertension     Had a long discussion with the patient and his wife regarding the above.  It is not uncommon to have both hypertension as well as significant orthostatic hypotension in situations such as this.  Sometimes if the hypertension is becoming a more significant problem we can back off of the fludrocortisone or the midodrine.  At other times while upright orthostatic hypotension remains a major concern and the hypertension is only noted when they are supine.  In those cases sometimes will use a short acting medication at night and the midodrine during the day.    Patient's wife has been giving him the midodrine on a traditional 3 times daily schedule.  I discussed with her that it is a short acting medication and only works for 3-4 hours.  It should not be taken within 4-6 hours of going to bed.  I would recommend she give it first thing in the morning as he is getting up.  If he takes an afternoon nap I would not give it until he is up from his nap.  And I would give it again within 4 hours of going to bed.  It is possible he will only need it twice a day.    If we can manage his orthostatic hypotension with the midodrine I would try to get rid of the fludrocortisone.  It is obviously a very long-acting medication and will exacerbate his  supine hypertension.    For the next few weeks until I see him back in clinic she will take a morning and afternoon blood pressures.  We will review these when she returns to see whether we need to focus more on the hypotension for the hypertension or possibly both at the same time.    3) Sinus node dysfunction, s/p permanent pacemaker - normal function      I appreciate the chance to help with Mr. Ortiz's care.    Portions of this note were dictated using speech recognition software. The note has been proofread but errors in the text may have been overlooked. Please contact me if there are any concerns regarding the accuracy of the dictation.         Please do not hesitate to contact me if you have any questions/concerns.     Sincerely,     Juan Walter MD

## 2018-11-06 NOTE — MR AVS SNAPSHOT
"              After Visit Summary   11/6/2018    Jf Ortiz    MRN: [id number]           Patient Information     Date Of Birth          10/5/1933        Visit Information        Provider Department      11/6/2018 3:00 PM Juan Walter MD [facility]        Care Instructions    You were seen by Dr. Walter, 11/6/18.     1.  Take your first dose of Midodrine in the morning and then take your second dose when you awake from your afternoon nap.  Do not take 4 hours prior to bedtime.    2. Drink plenty of fluids.      3. Sleep with your head a little higher than the level of your heart.  Raise the head of the bed 3-4 inches.    4. During the day, you can wear \"biker\" shorts/compression shorts to help with lightheadedness.    5. Monitor your blood pressures at home.    You will follow up with Dr. Walter in 3 months, sooner if you have concerns.       Please call the cardiology office with problems, questions, or concerns at [phone].    If you experience chest pain, chest pressure, chest tightness, shortness of breath, fainting, lightheadedness, nausea, vomiting, or other concerning symptoms, please report to the Emergency Department or call 911. These symptoms may be emergent, and best treated in the Emergency Department.         Cardiology Nurse  [facility]  [phone]            Follow-ups after your visit        Your next 10 appointments already scheduled     Dec 07, 2018  9:45 AM CST   (Arrive by 9:30 AM)   PROCEDURE with Geeta Alegria PA-C   [facility] ([facility] )    [address] [place] [address]   [phone]            Feb 12, 2019  2:30 PM CST   (Arrive by 2:15 PM)   Return Visit with Juan Walter MD   [facility] ([facility] )    [address] [place] [address]   [phone]              Who to contact     If you have questions or need follow up " "information about today's clinic visit or your schedule please contact [facility] directly at [phone].  Normal or non-critical lab and imaging results will be communicated to you by MyChart, letter or phone within 4 business days after the clinic has received the results. If you do not hear from us within 7 days, please contact the clinic through [employer] or phone. If you have a critical or abnormal lab result, we will notify you by phone as soon as possible.  Submit refill requests through [employer] or call your pharmacy and they will forward the refill request to us. Please allow 3 business days for your refill to be completed.          Additional Information About Your Visit        [employer] Information     [employer] lets you send messages to your doctor, view your test results, renew your prescriptions, schedule appointments and more. To sign up, go to [email or web address] . Click on \"Log in\" on the left side of the screen, which will take you to the Welcome page. Then click on \"Sign up Now\" on the right side of the page.     You will be asked to enter the access code listed below, as well as some personal information. Please follow the directions to create your username and password.     Your access code is: [email or web address]  Expires: 2019  1:36 PM     Your access code will  in 90 days. If you need help or a new code, please call your [facility] clinic or [phone].        Care EveryWhere ID     This is your Care EveryWhere ID. This could be used by other organizations to access your [facility] medical records  [id number]        Your Vitals Were     Pulse Respirations Height Pulse Oximetry BMI (Body Mass Index)       73 16 1.753 m (5' 9\") 97% 24.07 kg/m2        Blood Pressure from Last 3 Encounters:   18 145/77   10/30/18 154/84   10/23/18 155/92    Weight from Last 3 Encounters:   18 73.9 kg (163 lb)   10/30/18 76.2 kg (168 lb)   10/23/18 74.8 kg (165 lb)            "   Today, you had the following     No orders found for display         Today's Medication Changes          These changes are accurate as of 11/6/18  3:45 PM.  If you have any questions, ask your nurse or doctor.               These medicines have changed or have updated prescriptions.        Dose/Directions    midodrine 5 MG tablet   Commonly known as:  PROAMATINE   This may have changed:  Another medication with the same name was removed. Continue taking this medication, and follow the directions you see here.   Used for:  Autonomic orthostatic hypotension   Changed by:  Juan Walter MD        TAKE 2 TABLETS (10MG) BY MOUTH twice a day   Quantity:  180 tablet   Refills:  3         Stop taking these medicines if you haven't already. Please contact your care team if you have questions.     [facility] FORMULA PO   Stopped by:  Juan Walter MD                    Primary Care Provider Office Phone # Fax #    R Venancio Ray [phone]       [facility] [address]        Equal Access to Services     [facility]: Hadii job ku hadasho Soomaali, waaxda luqadaha, qaybta kaalmada adeegyada, waxay feleciain hayanna pollard . So [facility] [phone].    ATENCIÓN: Si habla español, tiene a tucker disposición servicios gratuitos de asistencia lingüística. Llame al [phone].    We comply with applicable federal civil rights laws and [place] laws. We do not discriminate on the basis of race, color, national origin, age, disability, sex, sexual orientation, or gender identity.            Thank you!     Thank you for choosing [facility]  for your care. Our goal is always to provide you with excellent care. Hearing back from our patients is one way we can continue to improve our services. Please take a few minutes to complete the written survey that you may receive in the mail after your visit with us. Thank you!             Your Updated  Medication List - Protect others around you: Learn how to safely use, store and throw away your medicines at www.disposemymeds.org.          This list is accurate as of 11/6/18  3:45 PM.  Always use your most recent med list.                   Brand Name Dispense Instructions for use Diagnosis    5- MG Caps      Take 1 tablet by mouth At Bedtime        atorvastatin 20 MG tablet    LIPITOR    90 tablet    Take 1 tablet (20 mg) by mouth every evening    Hypercholesterolemia       AZO TABS PO      Take by mouth At Bedtime        CO Q 10 PO      200 mg one in the morning        DONEPEZIL HCL PO      Take 5 mg by mouth At Bedtime        fludrocortisone 0.1 MG tablet    FLORINEF    90 tablet    Take 2-3 tabs daily.    Parkinson's disease (H), Orthostatic hypotension       MELATONIN PO      Take 10 mg by mouth At Bedtime        metoprolol succinate 50 MG 24 hr tablet    TOPROL-XL    90 tablet    TAKE 1 TABLET (50 MG) BY MOUTH DAILY    Benign essential hypertension       midodrine 5 MG tablet    PROAMATINE    180 tablet    TAKE 2 TABLETS (10MG) BY MOUTH twice a day    Autonomic orthostatic hypotension       Multi-vitamin Tabs tablet      Take 1 tablet by mouth daily        PANTOPRAZOLE SODIUM PO      Take 40 mg by mouth 2 times daily (before meals)        Potassium Chloride ER 20 MEQ Tbcr     30 tablet    TAKE 1 TABLET BY MOUTH ONCE DAILY    Hypokalemia       Turmeric 500 MG Tabs      Take 1 tablet by mouth daily        vitamin B complex with vitamin C Tabs tablet      Take 1 tablet by mouth daily        VITAMIN B-12 PO      Take 1 tablet by mouth daily        VITAMIN D-3 PO      Take 1,000 Int'l Units by mouth daily

## 2018-11-06 NOTE — PROGRESS NOTES
Clinical Cardiac Electrophysiology    Chief Complaint: pacemaker, neurogenic orthostatic hypotension    HPI: I was happy to see Mr. Contreras in the EP clinic.  He has a history of pacemaker placement with a recent generator change.  The device is a [employer] dual lead pacemaker.  He scheduled for device check later today.    He also has a history of orthostatic hypotension secondary to Lewy body dementia.  This is been managed with midodrine and fludrocortisone.  On this regimen he and his wife reports his orthostatic hypotension has been much better.    He was seen in the emergency department on October 23 complaining of dizziness and was noted to be hypertensive with systolic blood pressures in the 180s-190 range.  He was given clonidine as well as IV metoprolol.  He was also started on oral metoprolol 50 mg daily.  He has not been troubled by high blood pressure in the past.    Current Outpatient Prescriptions   Medication Sig Dispense Refill     5-Hydroxytryptophan (5-HTP) 100 MG CAPS Take 1 tablet by mouth At Bedtime       atorvastatin (LIPITOR) 20 MG tablet Take 1 tablet (20 mg) by mouth every evening 90 tablet 3     Cholecalciferol (VITAMIN D-3 PO) Take 1,000 Int'l Units by mouth daily        Coenzyme Q10 (CO Q 10 PO) 200 mg one in the morning       Cyanocobalamin (VITAMIN B-12 PO) Take 1 tablet by mouth daily       DONEPEZIL HCL PO Take 5 mg by mouth At Bedtime        fludrocortisone (FLORINEF) 0.1 MG tablet Take 2-3 tabs daily. 90 tablet 3     MELATONIN PO Take 10 mg by mouth At Bedtime        metoprolol succinate (TOPROL-XL) 50 MG 24 hr tablet TAKE 1 TABLET (50 MG) BY MOUTH DAILY 90 tablet 0     midodrine (PROAMATINE) 5 MG tablet TAKE 2 TABLETS (10MG) BY MOUTH twice a day 180 tablet 3     multivitamin, therapeutic with minerals (MULTI-VITAMIN) TABS tablet Take 1 tablet by mouth daily       PANTOPRAZOLE SODIUM PO Take 40 mg by mouth 2 times daily (before meals)        Phenazopyridine HCl (AZO  TABS PO) Take by mouth At Bedtime       Turmeric 500 MG TABS Take 1 tablet by mouth daily       vitamin  B complex with vitamin C (VITAMIN  B COMPLEX) TABS Take 1 tablet by mouth daily       Potassium Chloride ER 20 MEQ TBCR TAKE 1 TABLET BY MOUTH ONCE DAILY (Patient not taking: Reported on 11/6/2018) 30 tablet 5       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     Seizure disorder (H)      Stented coronary artery        Past Surgical History:   Procedure Laterality Date     ------------OTHER-------------  1955    ulnar and radial fx - repair ulnar and radial fx x4     ------------OTHER-------------  6/14/2011    cataract extraction     BIOPSY  08/2015    skin biopsy     BLEPHAROPLASTY BILATERAL  5/6/2014    Procedure: BLEPHAROPLASTY BILATERAL;  Surgeon: Andrew Queen MD;  Location: [facility]     BYPASS GRAFT ARTERY CORONARY  11/2006    coronary artery disease x 5, [facility]     cataract extraction and lens implantation  2011    cataracts     cataract extraction and lens implantation      cataracts     COLONOSCOPY  2012     colonoscopy with polypectomy  3/13/2009    history of polyps - repeat 3 yrs     colonoscopy with polypectomy  2006     colonoscopy with polypectomy  2005     COMBINED COLONOSCOPY WITH ARGON PLASMA COAGULATOR (APC) N/A 10/31/2014    Procedure: COMBINED COLONOSCOPY WITH ARGON PLASMA COAGULATOR (APC);  Surgeon: Bassam Aguilar MD;  Location: [facility]     COMBINED COLONOSCOPY WITH ARGON PLASMA COAGULATOR (APC) N/A 11/13/2015    Procedure: COMBINED COLONOSCOPY WITH ARGON PLASMA COAGULATOR (APC);  Surgeon: Bassam Aguilar MD;  Location: [facility]     ENDOSCOPY UPPER, COLONOSCOPY, COMBINED N/A 10/31/2014     Procedure: COMBINED ENDOSCOPY UPPER, COLONOSCOPY;  Surgeon: Bassam Aguilar MD;  Location: [facility]     ENDOSCOPY UPPER, COLONOSCOPY, COMBINED N/A 11/13/2015    Procedure: COMBINED ENDOSCOPY UPPER, COLONOSCOPY;  Surgeon: Bassam Aguilar MD;  Location: [facility]     ESOPHAGOSCOPY, GASTROSCOPY, DUODENOSCOPY (EGD), COMBINED  1/22/2014    Procedure: COMBINED ESOPHAGOSCOPY, GASTROSCOPY, DUODENOSCOPY (EGD);  UPPER ENDOSCOPY(PENA) W/ BIOPSIES;  Surgeon: Patricia Pena MD;  Location: [facility]     HERNIORRHAPHY INGUINAL Right 2/14/2018    Procedure: HERNIORRHAPHY INGUINAL;  OPEN RIGHT INGUINAL HERNIA REPAIR with Mesh;  Surgeon: Virgilio Zaragoza DO;  Location: [facility]     LARYNGOSCOPY WITH MICROSCOPE  1/22/2014    Procedure: LARYNGOSCOPY WITH MICROSCOPE;;  Surgeon: Chayo Duke MD;  Location: [facility]     pacemaker placement  2000    heart block     pacemaker placement  2011    dual-chamber     REMOVE TUBE, MYRINGOTOMY, COMBINED  1/22/2014    Procedure: COMBINED REMOVE TUBE, MYRINGOTOMY;  MICRODIRECT LARYNGOSCOPY WITH BIOPSY AND FROZEN SECTIONS removal of right ear tube and myringoplasty;  Surgeon: Chayo Duke MD;  Location: [facility]     REPLACE PACEMAKER GENERATOR N/A 8/8/2018    Procedure: REPLACE PACEMAKER GENERATOR;  Pacemaker generator change;  Surgeon: Alma Kaplan MD;  Location: [facility]     stent placement to LAD  2008     ventilation tube  5/24/2012    right in office       Family History   Problem Relation Age of Onset     Diabetes Mother        Social History   Substance Use Topics     Smoking status: Former Smoker     Packs/day: 1.00     Years: 5.00     Types: Cigarettes     Smokeless tobacco: Never Used      Comment: quit in 1985     Alcohol use Yes      Comment: every other day       Allergies   Allergen Reactions     Cats Unknown     Lamotrigine      Skin lesions         ROS: memory, dementia and movement symptoms without recent changes, otherwise comprehensive review is negative unless otherwise noted in  "the HPI.   November 6, 2018/woa      Physical Examination:  Vitals: /77 (BP Location: Left arm, Patient Position: Chair, Cuff Size: Adult Regular)  Pulse 73  Resp 16  Ht 1.753 m (5' 9\")  Wt 73.9 kg (163 lb)  SpO2 97%  BMI 24.07 kg/m2  BMI= Body mass index is 24.07 kg/(m^2).      GENERAL APPEARANCE: frail, alert and no distress  HEENT: no icterus, no xanthelasmas, normal pupil size, mucosa moist, no cyanosis.  NECK: no adenopathy, no asymmetry, masses, or scars, carotid upstrokes are normal bilaterally, no cervical bruits, JVP is not visible  CHEST: lungs clear to auscultation - no rales, rhonchi or wheezes, no use of accessory muscles, no retractions, respirations are unlabored, normal respiratory rate, no kyphosis, no scoliosis  CARDIOVASCULAR: regular rhythm, normal S1S2, no S3 or S4 and no murmur, click or rub, precordium quiet  ABDOMEN: soft, non tender, without hepatosplenomegaly, no masses palpable, bowel sounds normal, aorta not palpable, no abdominal bruits  EXTREMITIES: no clubbing, cyanosis or edema  NEURO: alert and oriented to person/place/time, normal speech, gait and affect  VASC: Radial and pedal pulses palpable bilaterally. No vascular bruits heard.  SKIN: no ecchymoses, no rashes      Laboratory and diagnostic data reviewed November 6, 2018:        Results for AKILA WALLER (MRN [id number]) as of 11/6/2018 15:13   Ref. Range 10/23/2018 11:41   Troponin I ES Latest Ref Range: 0.000 - 0.045 ug/L <0.015       Results for AKILA WALLER (MRN [id number]) as of 11/6/2018 15:13   Ref. Range 10/23/2018 11:41   Sodium Latest Ref Range: 133 - 144 mmol/L 144   Potassium Latest Ref Range: 3.4 - 5.3 mmol/L 3.8   Chloride Latest Ref Range: 94 - 109 mmol/L 111 (H)   Carbon Dioxide Latest Ref Range: 20 - 32 mmol/L 27   Urea Nitrogen Latest Ref Range: 7 - 30 mg/dL 16   Creatinine Latest Ref Range: 0.66 - 1.25 mg/dL 0.97   GFR Estimate Latest Ref Range: >60 mL/min/1.7m2 74   Results for AKILA WALLER (MRN " [id number]) as of 11/6/2018 15:13   Ref. Range 10/23/2018 11:41   WBC Latest Ref Range: 4.0 - 11.0 10e9/L 5.8   Hemoglobin Latest Ref Range: 13.3 - 17.7 g/dL 12.8 (L)   Hematocrit Latest Ref Range: 40.0 - 53.0 % 38.2 (L)   Platelet Count Latest Ref Range: 150 - 450 10e9/L 198       Assessment and recommendations:    1) Neurogenic orthostatic hypotension    2) Hypertension     Had a long discussion with the patient and his wife regarding the above.  It is not uncommon to have both hypertension as well as significant orthostatic hypotension in situations such as this.  Sometimes if the hypertension is becoming a more significant problem we can back off of the fludrocortisone or the midodrine.  At other times while upright orthostatic hypotension remains a major concern and the hypertension is only noted when they are supine.  In those cases sometimes will use a short acting medication at night and the midodrine during the day.    Patient's wife has been giving him the midodrine on a traditional 3 times daily schedule.  I discussed with her that it is a short acting medication and only works for 3-4 hours.  It should not be taken within 4-6 hours of going to bed.  I would recommend she give it first thing in the morning as he is getting up.  If he takes an afternoon nap I would not give it until he is up from his nap.  And I would give it again within 4 hours of going to bed.  It is possible he will only need it twice a day.    If we can manage his orthostatic hypotension with the midodrine I would try to get rid of the fludrocortisone.  It is obviously a very long-acting medication and will exacerbate his supine hypertension.    For the next few weeks until I see him back in clinic she will take a morning and afternoon blood pressures.  We will review these when she returns to see whether we need to focus more on the hypotension for the hypertension or possibly both at the same time.    3) Sinus node dysfunction, s/p  permanent pacemaker - normal function      I appreciate the chance to help with Mr. Ortiz's care.    Portions of this note were dictated using speech recognition software. The note has been proofread but errors in the text may have been overlooked. Please contact me if there are any concerns regarding the accuracy of the dictation.

## 2018-11-06 NOTE — MR AVS SNAPSHOT
After Visit Summary   11/6/2018    Jf Ortiz    MRN: [id number]           Patient Information     Date Of Birth          10/5/1933        Visit Information        Provider Department      11/6/2018 3:30 PM HC PACEMAKER [facility] [place]        Today's Diagnoses     Atrioventricular block, complete (H)    -  1      Care Instructions    It was a pleasure to see you in clinic today.  Please do not hesitate to call with any questions or concerns.  We look forward to seeing you in clinic at your next device check in 3 months.    Brandi Hernandez, RN, MS, CCRN  Electrophysiology Nurse Clinician  [facility]    During Business Hours Please Call:  [phone]  After Hours Please Call:  [phone] - select option #4 and ask for job code [phone]                    Follow-ups after your visit        Your next 10 appointments already scheduled     Dec 07, 2018  9:45 AM CST   (Arrive by 9:30 AM)   PROCEDURE with Geeta Alegria PA-C   [facility] [place] ([facility] [place] [facility]    [address]  [place] [address]   [phone]            Feb 12, 2019  2:00 PM CST   (Arrive by 1:45 PM)   [facility] PAC[facility]AK[facility] [place] ([facility] [place] )    [address]  [place] [address]   [phone]            Feb 12, 2019  2:30 PM CST   (Arrive by 2:15 PM)   Return Visit with Juan Walter MD   [facility] [place] ([facility] [place] )    [address]  [place] [address]   [phone]              Who to contact     If you have questions or need follow up information about today's clinic visit or your schedule please contact [facility] directly at [phone].  Normal or non-critical lab and imaging results will be communicated to you by MyChart, letter or phone within 4 business days after the clinic has received the results. If you do not hear  "from us within 7 days, please contact the clinic through [employer] or phone. If you have a critical or abnormal lab result, we will notify you by phone as soon as possible.  Submit refill requests through [employer] or call your pharmacy and they will forward the refill request to us. Please allow 3 business days for your refill to be completed.          Additional Information About Your Visit        [employer] Information     [employer] lets you send messages to your doctor, view your test results, renew your prescriptions, schedule appointments and more. To sign up, go to [email or web address] . Click on \"Log in\" on the left side of the screen, which will take you to the Welcome page. Then click on \"Sign up Now\" on the right side of the page.     You will be asked to enter the access code listed below, as well as some personal information. Please follow the directions to create your username and password.     Your access code is: [email or web address]  Expires: 2019  1:36 PM     Your access code will  in 90 days. If you need help or a new code, please call your [facility] clinic or [phone].        Care EveryWhere ID     This is your Care EveryWhere ID. This could be used by other organizations to access your [facility] medical records  [id number]         Blood Pressure from Last 3 Encounters:   18 145/77   10/30/18 154/84   10/23/18 155/92    Weight from Last 3 Encounters:   18 73.9 kg (163 lb)   10/30/18 76.2 kg (168 lb)   10/23/18 74.8 kg (165 lb)              We Performed the Following     PM DEVICE PROGRAMMING EVAL, DUAL LEAD PACER          Today's Medication Changes          These changes are accurate as of 18 11:59 PM.  If you have any questions, ask your nurse or doctor.               These medicines have changed or have updated prescriptions.        Dose/Directions    midodrine 5 MG tablet   Commonly known as:  PROAMATINE   This may have changed:  Another medication with the same name was " removed. Continue taking this medication, and follow the directions you see here.   Used for:  Autonomic orthostatic hypotension   Changed by:  Juan Walter MD        TAKE 2 TABLETS (10MG) BY MOUTH twice a day   Quantity:  180 tablet   Refills:  3         Stop taking these medicines if you haven't already. Please contact your care team if you have questions.     MENS PROSTATE HEALTH FORMULA PO   Stopped by:  Juan Walter MD                    Primary Care Provider Office Phone # Fax #    R Venancio Ray [phone] [address] [address]        Equal Access to Services     [facility]: Hadii aad ku hadasho Soomaali, waaxda luqadaha, qaybta kaalmada adeegyada, waxay idiin hayaan adejaneen pollard . So [facility] [phone].    ATENCIÓN: Si habla español, tiene a tucker disposición servicios gratuitos de asistencia lingüística. Ll[facility] [phone].    We comply with applicable federal civil rights laws and [place] laws. We do not discriminate on the basis of race, color, national origin, age, disability, sex, sexual orientation, or gender identity.            Thank you!     Thank you for choosing [facility]  for your care. Our goal is always to provide you with excellent care. Hearing back from our patients is one way we can continue to improve our services. Please take a few minutes to complete the written survey that you may receive in the mail after your visit with us. Thank you!             Your Updated Medication List - Protect others around you: Learn how to safely use, store and throw away your medicines at www.disposemymeds.org.          This list is accurate as of 11/6/18 11:59 PM.  Always use your most recent med list.                   Brand Name Dispense Instructions for use Diagnosis    5- MG Caps      Take 1 tablet by mouth At Bedtime        atorvastatin 20 MG tablet    LIPITOR    90 tablet    Take 1 tablet (20 mg)  by mouth every evening    Hypercholesterolemia       AZO TABS PO      Take by mouth At Bedtime        CO Q 10 PO      200 mg one in the morning        DONEPEZIL HCL PO      Take 5 mg by mouth At Bedtime        fludrocortisone 0.1 MG tablet    FLORINEF    90 tablet    Take 2-3 tabs daily.    Parkinson's disease (H), Orthostatic hypotension       MELATONIN PO      Take 10 mg by mouth At Bedtime        metoprolol succinate 50 MG 24 hr tablet    TOPROL-XL    90 tablet    TAKE 1 TABLET (50 MG) BY MOUTH DAILY    Benign essential hypertension       midodrine 5 MG tablet    PROAMATINE    180 tablet    TAKE 2 TABLETS (10MG) BY MOUTH twice a day    Autonomic orthostatic hypotension       Multi-vitamin Tabs tablet      Take 1 tablet by mouth daily        PANTOPRAZOLE SODIUM PO      Take 40 mg by mouth 2 times daily (before meals)        Potassium Chloride ER 20 MEQ Tbcr     30 tablet    TAKE 1 TABLET BY MOUTH ONCE DAILY    Hypokalemia       Turmeric 500 MG Tabs      Take 1 tablet by mouth daily        vitamin B complex with vitamin C Tabs tablet      Take 1 tablet by mouth daily        VITAMIN B-12 PO      Take 1 tablet by mouth daily        VITAMIN D-3 PO      Take 1,000 Int'l Units by mouth daily

## 2018-11-06 NOTE — NURSING NOTE
"Chief Complaint   Patient presents with     Consult     Patient of Marycarmen Ibarra CNP and Dr. Rivers;  Pacemaker generator change on 8/8/2018 in [place] with Dr. Kaplan;  Patient states he has \"been lightheaded feeling today\" and that he and his wife \"have questions about his medications\".        Initial /77 (BP Location: Left arm, Patient Position: Chair, Cuff Size: Adult Regular)  Pulse 73  Resp 16  Ht 1.753 m (5' 9\")  Wt 73.9 kg (163 lb)  SpO2 97%  BMI 24.07 kg/m2 Estimated body mass index is 24.07 kg/(m^2) as calculated from the following:    Height as of this encounter: 1.753 m (5' 9\").    Weight as of this encounter: 73.9 kg (163 lb).  Medication Reconciliation: complete    Rekha Quispe LPN    "

## 2018-11-13 NOTE — PROGRESS NOTES
Preliminary Device Interrogation Results.  Final physician signed paceart report to be scanned and attached.    Patient seen in clinic for evaluation and iterative programming of his [employer] dual lead pacemaker per MD orders.  Patient is scheduled to see Dr. Walter today.  Normal pacemaker function.  646 AT/AF episodes recorded - 7 sec - 2 min 48 sec in duration.  Stored EGM's reveal what appears to be noise on that atrial lead which is not a new finding.  AF burden = 1.7%. Intrinsic rhythm = SB with CHB.  AP = 96.1%.   = 100%.   Estimated battery longevity to MAYA = 9.6 years.  Patient reports that he is feeling well and denies specific complaints.  Plan for patient to return to clinic in 3 months.  Dr. Walter notified of interrogation results.    Dual lead pacemaker

## 2018-11-13 NOTE — PATIENT INSTRUCTIONS
It was a pleasure to see you in clinic today.  Please do not hesitate to call with any questions or concerns.  We look forward to seeing you in clinic at your next device check in 3 months.    Brandi Hernandez, RN, MS, CCRN  Electrophysiology Nurse Clinician  [facility]    During Business Hours Please Call:  [phone]  After Hours Please Call:  [phone] - select option #4 and ask for job code [phone]

## 2018-11-29 ENCOUNTER — TRANSFERRED RECORDS (OUTPATIENT)
Dept: HEALTH INFORMATION MANAGEMENT | Facility: CLINIC | Age: 83
End: 2018-11-29

## 2018-12-05 DIAGNOSIS — I95.1 ORTHOSTATIC HYPOTENSION: ICD-10-CM

## 2018-12-05 DIAGNOSIS — G20.A1 PARKINSON'S DISEASE (H): ICD-10-CM

## 2018-12-06 RX ORDER — SODIUM CHLORIDE 1 G/1
TABLET ORAL
Qty: 180 TABLET | Refills: 3 | Status: SHIPPED | OUTPATIENT
Start: 2018-12-06 | End: 2020-01-16

## 2018-12-06 NOTE — TELEPHONE ENCOUNTER
sodium chloride 1 GM tablet       Last Written Prescription Date:  10/6/17  Last Fill Quantity: 60,   # refills: 11  Last Office Visit: 11/6/18 with Jose Angel  Future Office visit:    Next 5 appointments (look out 90 days)     Feb 12, 2019  2:30 PM CST   (Arrive by 2:15 PM)   [facility] with Juan Walter MD   [facility]    [address] [place] [id number]   [phone]                   Routing refill request to provider for review/approval because:  Drug not active on patient's medication list. Med discontinued on 10/30/18 by Dr MALIA Ray for reason- therapy completed. Per Dr Ray's note- For now we will have him stop the salt tablet the 1 g supplement daily.      Please advise. Thank you!

## 2018-12-07 ENCOUNTER — OFFICE VISIT (OUTPATIENT)
Dept: OTOLARYNGOLOGY | Facility: OTHER | Age: 83
End: 2018-12-07
Attending: PHYSICIAN ASSISTANT
Payer: MEDICARE

## 2018-12-07 VITALS
OXYGEN SATURATION: 98 % | BODY MASS INDEX: 25.18 KG/M2 | WEIGHT: 170 LBS | TEMPERATURE: 96.9 F | SYSTOLIC BLOOD PRESSURE: 140 MMHG | DIASTOLIC BLOOD PRESSURE: 80 MMHG | HEART RATE: 76 BPM | HEIGHT: 69 IN

## 2018-12-07 DIAGNOSIS — H61.23 IMPACTED CERUMEN, BILATERAL: Primary | ICD-10-CM

## 2018-12-07 DIAGNOSIS — Z96.22 S/P MYRINGOTOMY WITH INSERTION OF TUBE: ICD-10-CM

## 2018-12-07 DIAGNOSIS — H90.3 ASNHL (ASYMMETRICAL SENSORINEURAL HEARING LOSS): ICD-10-CM

## 2018-12-07 PROCEDURE — 92504 EAR MICROSCOPY EXAMINATION: CPT | Performed by: PHYSICIAN ASSISTANT

## 2018-12-07 PROCEDURE — 99213 OFFICE O/P EST LOW 20 MIN: CPT | Mod: 25 | Performed by: PHYSICIAN ASSISTANT

## 2018-12-07 PROCEDURE — G0463 HOSPITAL OUTPT CLINIC VISIT: HCPCS

## 2018-12-07 ASSESSMENT — PAIN SCALES - GENERAL: PAINLEVEL: NO PAIN (0)

## 2018-12-07 NOTE — PROGRESS NOTES
"Chief Complaint   Patient presents with     Cerumen Impaction     Ear Cleaning.   Jf presents for ear cleaning. He has noticed concerns with his ear having wax build up.   He did obtain hearing aids from [facility] this summer and he has noted improvement with results.         Denies otalgia, otorrhea.   He has no concerns with vertigo. He does feel he has dizziness/ lightheadedness.       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     Seizure disorder (H)      Stented coronary artery         Allergies   Allergen Reactions     Cats Unknown     Lamotrigine      Skin lesions     Current Outpatient Prescriptions   Medication     5-Hydroxytryptophan (5-HTP) 100 MG CAPS     atorvastatin (LIPITOR) 20 MG tablet     Cholecalciferol (VITAMIN D-3 PO)     Coenzyme Q10 (CO Q 10 PO)     Cyanocobalamin (VITAMIN B-12 PO)     DONEPEZIL HCL PO     fludrocortisone (FLORINEF) 0.1 MG tablet     MELATONIN PO     metoprolol succinate (TOPROL-XL) 50 MG 24 hr tablet     midodrine (PROAMATINE) 5 MG tablet     multivitamin, therapeutic with minerals (MULTI-VITAMIN) TABS tablet     PANTOPRAZOLE SODIUM PO     Phenazopyridine HCl (AZO TABS PO)     Potassium Chloride ER 20 MEQ TBCR     sodium chloride 1 GM tablet     Turmeric 500 MG TABS     vitamin  B complex with vitamin C (VITAMIN  B COMPLEX) TABS     No current facility-administered medications for this visit.       ROS: 10 point ROS neg other than the symptoms noted above in the HPI.  /80 (BP Location: Right arm, Patient Position: Chair, Cuff Size: Adult Regular)  Pulse 76  Temp 96.9  F (36.1  C) (Tympanic)  Ht 1.753 m (5' 9\")  Wt 77.1 kg (170 lb)  SpO2 98%  BMI 25.1 kg/m2  General - " The patient is well nourished and well developed, and appears to have good nutritional status.  Alert and oriented to person and place, answers questions and cooperates with examination appropriately.   Head and Face - Normocephalic and atraumatic, with no gross asymmetry noted.  The facial nerve is intact, with strong symmetric movements.  Voice and Breathing - The patient was breathing comfortably without the use of accessory muscles. There was no wheezing, stridor, or stertor.  The patients voice was clear and strong, and had appropriate pitch and quality.  Ears -The external auditory canals are impacted.    Eyes - Extraocular movements intact, and the pupils were reactive to light.  Sclera were not icteric or injected, conjunctiva were pink and moist.  Mouth - Examination of the oral cavity showed pink, healthy oral mucosa. No lesions or ulcerations noted.  The tongue was mobile and midline, and the dentition were in good condition.    Throat - The walls of the oropharynx were smooth, pink, moist, symmetric, and had no lesions or ulcerations.  The tonsillar pillars and soft palate were symmetric.  The uvula was midline on elevation.    Neck - Normal midline excursion of the laryngotracheal complex during swallowing.  Full range of motion on passive movement.  Palpation of the occipital, submental, submandibular, internal jugular chain, and supraclavicular nodes did not demonstrate any abnormal lymph nodes or masses.  Palpation of the thyroid was soft and smooth, with no nodules or goiter appreciated.  The trachea was mobile and midline.  Nose - External contour is symmetric, no gross deflection or scars.  Nasal mucosa is pink and moist with no abnormal mucus.  The septum and turbinates were evaluated: No polyps, masses, or purulence noted on examination.     The ear canals were examined underneath the operating microscope and with an otologic speculum.  The canals were cleaned of all debris with cupped forceps  "and cerumen loop. There is no granulation tissue or sign of cholesteatoma.  The patient tolerates this wellSymptoms resolved after sitting for a few minutes. He did drink water as well.  Patient right tube. the tympanic membranes are intact without effusion, retraction or mass.  Bony landmarks are intact.       ASSESSMENT:    ICD-10-CM    1. Impacted cerumen, bilateral H61.23    2. S/P myringotomy with insertion of tube RIGHT Z96.22    3. ASNHL (asymmetrical sensorineural hearing loss) H90.5      Ears were cleaned  He noted he felt improvement with his ears. \"  Tube is in place, no otorrhea  Return in 3-4 months per his request for ear check      Geeta Alegria PA-C  ENT  [facility], [place]  [phone]    "

## 2018-12-07 NOTE — PATIENT INSTRUCTIONS
Ears were cleaned.   Tube is in good position and open.   Return in 3-4 months for cleaning    Thank you for allowing [facility] and our ENT team to participate in your care.  If your medications are too expensive, please give the nurse a call.  We can possibly change this medication.  If you have a scheduling or an appointment question please contact our Health Unit Coordinator at their direct line [phone].   ALL nursing questions or concerns can be directed to your ENT nurse at: [phone] Rehana

## 2018-12-07 NOTE — MR AVS SNAPSHOT
After Visit Summary   12/7/2018    Jf Ortiz    MRN: [id number]           Patient Information     Date Of Birth          10/5/1933        Visit Information        Provider Department      12/7/2018 9:45 AM Geeta Algeria PA-C [facility]        Today's Diagnoses     Impacted cerumen, bilateral    -  1      Care Instructions    Ears were cleaned.   Tube is in good position and open.   Return in 3-4 months for cleaning    Thank you for allowing [facility] and our ENT team to participate in your care.  If your medications are too expensive, please give the nurse a call.  We can possibly change this medication.  If you have a scheduling or an appointment question please contact our Health Unit Coordinator at their direct line [phone].   ALL nursing questions or concerns can be directed to your ENT nurse at: [phone] Rehana              Follow-ups after your visit        Your next 10 appointments already scheduled     Feb 12, 2019  2:00 PM CST   (Arrive by 1:45 PM)   CARDIAC DEVICE CHECK - [facility] CLINIC with [facility]    [address] [facility] [id number] [phone]            Feb 12, 2019  2:30 PM CST   (Arrive by 2:15 PM)   [facility] with [facility]   [facility]    [address]  [place] [address]   [phone]            May 14, 2019 12:00 AM CDT   CARDIAC DEVICE CHECK - REMOTE with  ICD REMOTE   [facility]    [address] [place] [address]  [address] [place] [address] [phone]              Who to contact     If you have questions or need follow up information about today's clinic visit or your schedule please contact [facility] directly at [phone].  Normal or non-critical lab and imaging results will be communicated to you by Viet  "letter or phone within 4 business days after the clinic has received the results. If you do not hear from us within 7 days, please contact the clinic through [employer] or phone. If you have a critical or abnormal lab result, we will notify you by phone as soon as possible.  Submit refill requests through [employer] or call your pharmacy and they will forward the refill request to us. Please allow 3 business days for your refill to be completed.          Additional Information About Your Visit        Care EveryWhere ID     This is your Care EveryWhere ID. This could be used by other organizations to access your [place] medical records  [id number]        Your Vitals Were     Pulse Temperature Height Pulse Oximetry BMI (Body Mass Index)       76 96.9  F (36.1  C) (Tympanic) 1.753 m (5' 9\") 98% 25.1 kg/m2        Blood Pressure from Last 3 Encounters:   12/07/18 140/80   11/06/18 145/77   10/30/18 154/84    Weight from Last 3 Encounters:   12/07/18 77.1 kg (170 lb)   11/06/18 73.9 kg (163 lb)   10/30/18 76.2 kg (168 lb)              Today, you had the following     No orders found for display       Primary Care Provider Office Phone # Fax #    R Venancio Ray [phone] [address] [address]        Equal Access to Services     [facility] : Hadii job ku hadasho Soomaali, waaxda luqadaha, qaybta kaalmada adeegyada, hayley mauro. So [facility] [phone].    ATENCIÓN: Si habla español, tiene a tucker disposición servicios gratuitos de asistencia lingüística. Llame al [phone].    We comply with applicable federal civil rights laws and [place] laws. We do not discriminate on the basis of race, color, national origin, age, disability, sex, sexual orientation, or gender identity.            Thank you!     Thank you for choosing [facility]  for your care. Our goal is always to provide you with excellent care. Hearing back from our " patients is one way we can continue to improve our services. Please take a few minutes to complete the written survey that you may receive in the mail after your visit with us. Thank you!             Your Updated Medication List - Protect others around you: Learn how to safely use, store and throw away your medicines at www.disposemymeds.org.          This list is accurate as of 12/7/18 10:02 AM.  Always use your most recent med list.                   Brand Name Dispense Instructions for use Diagnosis    5- MG Caps      Take 1 tablet by mouth At Bedtime        atorvastatin 20 MG tablet    LIPITOR    90 tablet    Take 1 tablet (20 mg) by mouth every evening    Hypercholesterolemia       AZO TABS PO      Take by mouth At Bedtime        CO Q 10 PO      200 mg one in the morning        DONEPEZIL HCL PO      Take 5 mg by mouth At Bedtime        fludrocortisone 0.1 MG tablet    FLORINEF    90 tablet    Take 2-3 tabs daily.    Parkinson's disease (H), Orthostatic hypotension       MELATONIN PO      Take 10 mg by mouth At Bedtime        metoprolol succinate ER 50 MG 24 hr tablet    TOPROL-XL    90 tablet    TAKE 1 TABLET (50 MG) BY MOUTH DAILY    Benign essential hypertension       midodrine 5 MG tablet    PROAMATINE    180 tablet    TAKE 2 TABLETS (10MG) BY MOUTH twice a day    Autonomic orthostatic hypotension       Multi-vitamin tablet      Take 1 tablet by mouth daily        PANTOPRAZOLE SODIUM PO      Take 40 mg by mouth 2 times daily (before meals)        potassium chloride ER 20 MEQ CR tablet    K-TAB    30 tablet    TAKE 1 TABLET BY MOUTH ONCE DAILY    Hypokalemia       sodium chloride 1 GM tablet     180 tablet    TAKE 1 TABLET BY MOUTH TWICE A DAY    Parkinson's disease (H), Orthostatic hypotension       Turmeric 500 MG Tabs      Take 1 tablet by mouth daily        vitamin B complex with vitamin C tablet      Take 1 tablet by mouth daily        VITAMIN B-12 PO      Take 1 tablet by mouth daily         VITAMIN D-3 PO      Take 1,000 Int'l Units by mouth daily

## 2018-12-07 NOTE — NURSING NOTE
"Chief Complaint   Patient presents with     Cerumen Impaction     Ear Cleaning.       Initial /80 (BP Location: Right arm, Patient Position: Chair, Cuff Size: Adult Regular)  Pulse 76  Temp 96.9  F (36.1  C) (Tympanic)  Ht 1.753 m (5' 9\")  Wt 77.1 kg (170 lb)  SpO2 98%  BMI 25.1 kg/m2 Estimated body mass index is 25.1 kg/(m^2) as calculated from the following:    Height as of this encounter: 1.753 m (5' 9\").    Weight as of this encounter: 77.1 kg (170 lb).  Medication Reconciliation: complete    LUZMA OSEGUERA LPN    "

## 2018-12-07 NOTE — LETTER
12/7/2018         RE: Jf Ortiz  [address]  [place] [address]        Dear Colleague,    Thank you for referring your patient, Jf Ortiz, to the [facility] [place]. Please see a copy of my visit note below.    Chief Complaint   Patient presents with     Cerumen Impaction     Ear Cleaning.   Jf presents for ear cleaning. He has noticed concerns with his ear having wax build up.   He did obtain hearing aids from [facility] this summer and he has noted improvement with results.         Denies otalgia, otorrhea.   He has no concerns with vertigo. He does feel he has dizziness/ lightheadedness.       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     Seizure disorder (H)      Stented coronary artery         Allergies   Allergen Reactions     Cats Unknown     Lamotrigine      Skin lesions     Current Outpatient Prescriptions   Medication     5-Hydroxytryptophan (5-HTP) 100 MG CAPS     atorvastatin (LIPITOR) 20 MG tablet     Cholecalciferol (VITAMIN D-3 PO)     Coenzyme Q10 (CO Q 10 PO)     Cyanocobalamin (VITAMIN B-12 PO)     DONEPEZIL HCL PO     fludrocortisone (FLORINEF) 0.1 MG tablet     MELATONIN PO     metoprolol succinate (TOPROL-XL) 50 MG 24 hr tablet     midodrine (PROAMATINE) 5 MG tablet     multivitamin, therapeutic with minerals (MULTI-VITAMIN) TABS tablet     PANTOPRAZOLE SODIUM PO     Phenazopyridine HCl (AZO TABS PO)     Potassium Chloride ER 20 MEQ TBCR     sodium chloride 1 GM tablet     Turmeric 500 MG TABS     vitamin  B complex with vitamin C (VITAMIN  B COMPLEX) TABS     No current facility-administered medications for this visit.       ROS: 10 point ROS neg other than the symptoms  "noted above in the HPI.  /80 (BP Location: Right arm, Patient Position: Chair, Cuff Size: Adult Regular)  Pulse 76  Temp 96.9  F (36.1  C) (Tympanic)  Ht 1.753 m (5' 9\")  Wt 77.1 kg (170 lb)  SpO2 98%  BMI 25.1 kg/m2  General - The patient is well nourished and well developed, and appears to have good nutritional status.  Alert and oriented to person and place, answers questions and cooperates with examination appropriately.   Head and Face - Normocephalic and atraumatic, with no gross asymmetry noted.  The facial nerve is intact, with strong symmetric movements.  Voice and Breathing - The patient was breathing comfortably without the use of accessory muscles. There was no wheezing, stridor, or stertor.  The patients voice was clear and strong, and had appropriate pitch and quality.  Ears -The external auditory canals are impacted.    Eyes - Extraocular movements intact, and the pupils were reactive to light.  Sclera were not icteric or injected, conjunctiva were pink and moist.  Mouth - Examination of the oral cavity showed pink, healthy oral mucosa. No lesions or ulcerations noted.  The tongue was mobile and midline, and the dentition were in good condition.    Throat - The walls of the oropharynx were smooth, pink, moist, symmetric, and had no lesions or ulcerations.  The tonsillar pillars and soft palate were symmetric.  The uvula was midline on elevation.    Neck - Normal midline excursion of the laryngotracheal complex during swallowing.  Full range of motion on passive movement.  Palpation of the occipital, submental, submandibular, internal jugular chain, and supraclavicular nodes did not demonstrate any abnormal lymph nodes or masses.  Palpation of the thyroid was soft and smooth, with no nodules or goiter appreciated.  The trachea was mobile and midline.  Nose - External contour is symmetric, no gross deflection or scars.  Nasal mucosa is pink and moist with no abnormal mucus.  The septum and " "turbinates were evaluated: No polyps, masses, or purulence noted on examination.     The ear canals were examined underneath the operating microscope and with an otologic speculum.  The canals were cleaned of all debris with cupped forceps and cerumen loop. There is no granulation tissue or sign of cholesteatoma.  The patient tolerates this wellSymptoms resolved after sitting for a few minutes. He did drink water as well.  Patient right tube. the tympanic membranes are intact without effusion, retraction or mass.  Bony landmarks are intact.       ASSESSMENT:    ICD-10-CM    1. Impacted cerumen, bilateral H61.23    2. S/P myringotomy with insertion of tube RIGHT Z96.22    3. ASNHL (asymmetrical sensorineural hearing loss) H90.5      Ears were cleaned  He noted he felt improvement with his ears. \"  Tube is in place, no otorrhea  Return in 3-4 months per his request for ear check      Geeta Alegria PA-C  ENT  [facility]  [phone]      Again, thank you for allowing me to participate in the care of your patient.        Sincerely,        Geeta Alegria PA-C    "

## 2019-01-22 DIAGNOSIS — I10 BENIGN ESSENTIAL HYPERTENSION: ICD-10-CM

## 2019-01-22 RX ORDER — METOPROLOL SUCCINATE 50 MG/1
TABLET, EXTENDED RELEASE ORAL
Qty: 90 TABLET | Refills: 1 | Status: SHIPPED | OUTPATIENT
Start: 2019-01-22 | End: 2019-04-09

## 2019-01-24 DIAGNOSIS — E78.00 HYPERCHOLESTEROLEMIA: ICD-10-CM

## 2019-01-24 RX ORDER — ATORVASTATIN CALCIUM 20 MG/1
TABLET, FILM COATED ORAL
Qty: 90 TABLET | Refills: 0 | Status: SHIPPED | OUTPATIENT
Start: 2019-01-24 | End: 2019-04-28

## 2019-02-05 NOTE — ANESTHESIA PREPROCEDURE EVALUATION
Anesthesia Evaluation     . Pt has had prior anesthetic.     No history of anesthetic complications          ROS/MED HX    ENT/Pulmonary:     (+)snores loudly, hypertension, allergic rhinitis, tobacco use, Past use quit 1985 packs/day  , . .    Neurologic:     (+)dementia (Lewy body dementia ), seizures Parkinson's disease     Cardiovascular:     (+) Dyslipidemia, hypertension-range: not on beta blocker , -CAD, -CABG-date: x5 vessel 11/2006, stent,2008  1 . : . . orthopnea (Autonomic orthostatic hypotension), fainting (syncope). pacemaker Reason placed: 3rd Degree AVB:. dysrhythmias 3rd Deg Heart Block, Irregular Heartbeat/Palpitations, . Previous cardiac testing date:results:date: results:ECG reviewed date:10/3/2017 results:Demand Pacemaker @101 w/ widened QRS date: results:          METS/Exercise Tolerance:  3 - Able to walk 1-2 blocks without stopping   Hematologic:  - neg hematologic  ROS       Musculoskeletal:   (+) arthritis, , , -       GI/Hepatic:     (+) GERD Asymptomatic on medication, hiatal hernia, Other GI/Hepatic  RIGHT INGUINAL HERNIA      Renal/Genitourinary:     (+) BPH, Other Renal/ Genitourinary, Urinary incontinence, urinary obstruction secondary to BPH       Endo:  - neg endo ROS       Psychiatric:  - neg psychiatric ROS       Infectious Disease:  - neg infectious disease ROS       Malignancy:      - no malignancy   Other:    - neg other ROS                 Physical Exam      Airway   Mallampati: II  TM distance: >3 FB  Neck ROM: full    Dental   (+) caps    Cardiovascular   Rhythm and rate: regular and normal  (+) murmur (systolic)       Pulmonary    breath sounds clear to auscultation                    Anesthesia Plan      History & Physical Review  History and physical reviewed and following examination; no interval change.    ASA Status:  4 .    NPO Status:  > 8 hours    Plan for MAC with Intravenous and Propofol induction. Maintenance will be TIVA.  Reason for MAC:  Chronic   Carmen with [employer] / [phone] Ext 2  Pt admitted to  and therapist did a visit on 02/02/19. Pt requesting rolling walker. Please send orders with authorization to [employer] insurance and call Carmen with status.      cardiopulmonary disease (G9) and Deep or markedly invasive procedure (G8)  PONV prophylaxis:  Ondansetron (or other 5HT-3) and Dexamethasone or Solumedrol  Risks and benefits of MAC anesthetic discussed including dental damage, aspiration, loss of airway, conversion to general anesthetic, CV complications, MI, stroke, death. Pt wishes to proceed.         Postoperative Care  Postoperative pain management:  IV analgesics and Oral pain medications.      Consents  Anesthetic plan, risks, benefits and alternatives discussed with:  Patient.  Use of blood products discussed: No .   .                          .

## 2019-02-21 ENCOUNTER — HOSPITAL ENCOUNTER (EMERGENCY)
Facility: HOSPITAL | Age: 84
Discharge: HOME | End: 2019-02-21
Attending: FAMILY MEDICINE | Admitting: FAMILY MEDICINE
Payer: MEDICARE

## 2019-02-21 ENCOUNTER — APPOINTMENT (OUTPATIENT)
Dept: CT IMAGING | Facility: HOSPITAL | Age: 84
End: 2019-02-21
Attending: FAMILY MEDICINE
Payer: MEDICARE

## 2019-02-21 VITALS
SYSTOLIC BLOOD PRESSURE: 162 MMHG | OXYGEN SATURATION: 99 % | DIASTOLIC BLOOD PRESSURE: 106 MMHG | TEMPERATURE: 97.8 F | RESPIRATION RATE: 16 BRPM

## 2019-02-21 DIAGNOSIS — M62.81 GENERALIZED MUSCLE WEAKNESS: ICD-10-CM

## 2019-02-21 LAB
ALBUMIN SERPL-MCNC: 3.6 G/DL (ref 3.4–5)
ALBUMIN UR-MCNC: NEGATIVE MG/DL
ALP SERPL-CCNC: 63 U/L (ref 40–150)
ALT SERPL W P-5'-P-CCNC: 28 U/L (ref 0–70)
ANION GAP SERPL CALCULATED.3IONS-SCNC: 7 MMOL/L (ref 3–14)
APPEARANCE UR: CLEAR
AST SERPL W P-5'-P-CCNC: 15 U/L (ref 0–45)
BACTERIA #/AREA URNS HPF: ABNORMAL /HPF
BASOPHILS # BLD AUTO: 0 10E9/L (ref 0–0.2)
BASOPHILS NFR BLD AUTO: 0.8 %
BILIRUB SERPL-MCNC: 0.8 MG/DL (ref 0.2–1.3)
BILIRUB UR QL STRIP: NEGATIVE
BUN SERPL-MCNC: 23 MG/DL (ref 7–30)
CALCIUM SERPL-MCNC: 8.3 MG/DL (ref 8.5–10.1)
CHLORIDE SERPL-SCNC: 105 MMOL/L (ref 94–109)
CO2 SERPL-SCNC: 27 MMOL/L (ref 20–32)
COLOR UR AUTO: ABNORMAL
CREAT SERPL-MCNC: 1.29 MG/DL (ref 0.66–1.25)
DIFFERENTIAL METHOD BLD: NORMAL
EOSINOPHIL # BLD AUTO: 0.1 10E9/L (ref 0–0.7)
EOSINOPHIL NFR BLD AUTO: 2.1 %
ERYTHROCYTE [DISTWIDTH] IN BLOOD BY AUTOMATED COUNT: 12.5 % (ref 10–15)
GFR SERPL CREATININE-BSD FRML MDRD: 50 ML/MIN/{1.73_M2}
GLUCOSE SERPL-MCNC: 141 MG/DL (ref 70–99)
GLUCOSE UR STRIP-MCNC: NEGATIVE MG/DL
HCT VFR BLD AUTO: 40.1 % (ref 40–53)
HGB BLD-MCNC: 13.5 G/DL (ref 13.3–17.7)
HGB UR QL STRIP: NEGATIVE
IMM GRANULOCYTES # BLD: 0 10E9/L (ref 0–0.4)
IMM GRANULOCYTES NFR BLD: 0 %
KETONES UR STRIP-MCNC: NEGATIVE MG/DL
LEUKOCYTE ESTERASE UR QL STRIP: NEGATIVE
LYMPHOCYTES # BLD AUTO: 1.3 10E9/L (ref 0.8–5.3)
LYMPHOCYTES NFR BLD AUTO: 24.1 %
MCH RBC QN AUTO: 30.5 PG (ref 26.5–33)
MCHC RBC AUTO-ENTMCNC: 33.7 G/DL (ref 31.5–36.5)
MCV RBC AUTO: 91 FL (ref 78–100)
MONOCYTES # BLD AUTO: 0.5 10E9/L (ref 0–1.3)
MONOCYTES NFR BLD AUTO: 8.6 %
NEUTROPHILS # BLD AUTO: 3.4 10E9/L (ref 1.6–8.3)
NEUTROPHILS NFR BLD AUTO: 64.4 %
NITRATE UR QL: NEGATIVE
NRBC # BLD AUTO: 0 10*3/UL
NRBC BLD AUTO-RTO: 0 /100
PH UR STRIP: 7 PH (ref 4.7–8)
PLATELET # BLD AUTO: 205 10E9/L (ref 150–450)
POTASSIUM SERPL-SCNC: 4.5 MMOL/L (ref 3.4–5.3)
PROT SERPL-MCNC: 7 G/DL (ref 6.8–8.8)
RBC # BLD AUTO: 4.42 10E12/L (ref 4.4–5.9)
RBC #/AREA URNS AUTO: 1 /HPF (ref 0–2)
SODIUM SERPL-SCNC: 139 MMOL/L (ref 133–144)
SOURCE: ABNORMAL
SP GR UR STRIP: 1.01 (ref 1–1.03)
TROPONIN I SERPL-MCNC: <0.015 UG/L (ref 0–0.04)
UROBILINOGEN UR STRIP-MCNC: NORMAL MG/DL (ref 0–2)
WBC # BLD AUTO: 5.3 10E9/L (ref 4–11)
WBC #/AREA URNS AUTO: <1 /HPF (ref 0–5)

## 2019-02-21 PROCEDURE — 96360 HYDRATION IV INFUSION INIT: CPT

## 2019-02-21 PROCEDURE — 84484 ASSAY OF TROPONIN QUANT: CPT | Performed by: FAMILY MEDICINE

## 2019-02-21 PROCEDURE — 36415 COLL VENOUS BLD VENIPUNCTURE: CPT | Performed by: FAMILY MEDICINE

## 2019-02-21 PROCEDURE — 80053 COMPREHEN METABOLIC PANEL: CPT | Performed by: FAMILY MEDICINE

## 2019-02-21 PROCEDURE — 25800030 ZZH RX IP 258 OP 636: Performed by: FAMILY MEDICINE

## 2019-02-21 PROCEDURE — 99284 EMERGENCY DEPT VISIT MOD MDM: CPT | Mod: Z6 | Performed by: FAMILY MEDICINE

## 2019-02-21 PROCEDURE — 85025 COMPLETE CBC W/AUTO DIFF WBC: CPT | Performed by: FAMILY MEDICINE

## 2019-02-21 PROCEDURE — 99285 EMERGENCY DEPT VISIT HI MDM: CPT | Mod: 25

## 2019-02-21 PROCEDURE — 81001 URINALYSIS AUTO W/SCOPE: CPT | Performed by: FAMILY MEDICINE

## 2019-02-21 PROCEDURE — 70450 CT HEAD/BRAIN W/O DYE: CPT | Mod: TC

## 2019-02-21 PROCEDURE — 93005 ELECTROCARDIOGRAM TRACING: CPT

## 2019-02-21 PROCEDURE — 93010 ELECTROCARDIOGRAM REPORT: CPT | Performed by: INTERNAL MEDICINE

## 2019-02-21 PROCEDURE — 25000128 H RX IP 250 OP 636: Performed by: FAMILY MEDICINE

## 2019-02-21 RX ORDER — SODIUM CHLORIDE 9 MG/ML
1000 INJECTION, SOLUTION INTRAVENOUS CONTINUOUS
Status: DISCONTINUED | OUTPATIENT
Start: 2019-02-21 | End: 2019-02-21 | Stop reason: HOSPADM

## 2019-02-21 RX ADMIN — SODIUM CHLORIDE 1000 ML: 9 INJECTION, SOLUTION INTRAVENOUS at 16:51

## 2019-02-21 RX ADMIN — SODIUM CHLORIDE 1000 ML: 9 INJECTION, SOLUTION INTRAVENOUS at 15:51

## 2019-02-21 NOTE — ED NOTES
Pt to ED with spouse.  Pt reported that he has been weak and been getting weaker the past 4 days. Pt also reports falling more often, he reported he fell last night during the night and had brief episode of LOC.  Pt also report that he fell on his butt yesterday and having increasing lower back pains.  Denies any fever.  Pt is alert and ornt. Denies chest pains. Pt has pacemaker which was placed in 2002 and had open heart surgery 2006

## 2019-02-21 NOTE — ED NOTES
Discharge instructions reviewed with patient.  No new meds have been ordered.  Pt has no questions or concerns.  Encouraged to return with new or worsening symptoms. Copy of AVS given.

## 2019-02-21 NOTE — PROGRESS NOTES
Emergency Department Assessment  Reason for Referral: Frail Elderly, falls  PCP: FRANCES Ray  Specialists or  providers: na  Pharmacy: [facility]  Medication routine/concerns sig other Uda sets meds up  Cognitive Behavioral Status: awake and alert    Affect and Mood Status: pleasant and cooperative, calm    Problems sleeping: no    Mental Health History: no  Recent Stressors: no    Case Manger/: no    Support System: Uda, and her family are supportive  Transportation: [employer]    Current Living Situation:    Home Setting: Multi-level   Living Situation:Significant Other and Significant Other and Children   Function Status/Assistive Device: wheeled walker and single end cane    Employment/Financial/Insurance Concerns:retired     No Insurance issues identified     Patient's insurance coverage: [unfilled]     [facility] not established at [facility]  Health Care Directive: yes on file  Previous Services at Home or in the Community:no  Falls at home?: yes    ADL's:independent  Equipment at home?: walker and cane  O2: no  Smoker: no  ETOH: social  THC/Drugs: no    Any Violence in the home?: no  Do you feel safe at home?: yes    Smoke alarm/Carbon monoxide detectors in home: yes :     Plan: home    Met with patient and his sig other.  Pt has dx of Parkinson's with Lewy body dementia.  He also has been at [facility] as he has gait disturbances with his parkinsons.  He does not use his cane or walker.  Sig other does not want home care or home therapy.  She states so far they manage fine and have excellent support with her children and grand children.  I did give her my card and a senior guide.  Discussed reducing fall risks by taking up throw rugs and also encouraging patient to use an assistive device.  No further needs at this time.    Patricia Landa, [facility]   Care Transitions

## 2019-02-21 NOTE — ED AVS SNAPSHOT
[facility]  [address] [address]  Phone:  [phone]                                    Jf Ortiz   MRN: [id number]    Department:  [facility]   Date of Visit:  2/21/2019           After Visit Summary Signature Page    I have received my discharge instructions, and my questions have been answered. I have discussed any challenges I see with this plan with the nurse or doctor.    ..........................................................................................................................................  Patient/Patient Representative Signature      ..........................................................................................................................................  Patient Representative Print Name and Relationship to Patient    ..................................................               ................................................  Date                                   Time    ..........................................................................................................................................  Reviewed by Signature/Title    ...................................................              ..............................................  Date                                               Time          22EPIC Rev 08/18

## 2019-02-21 NOTE — ED PROVIDER NOTES
"eMERGENCY dEPARTMENT eNCOUnter        CHIEF COMPLAINT    Chief Complaint   Patient presents with     Generalized Weakness     c/o got dizzy when getting up the br at 0200 this am and fell on the floor. denies hitting head, no neck pain. notes muscular back pain. notes dizziness off and on for \"years\"       HPI    Jf Ortiz is a 85 year old male who presents with fall last night 0200.  He got up to BR, on his way back he passed out next to the bed.  His wife was asleep and didn't wake up.  He told her about it this morning. They feel he has been getting weaker over the last 2 weeks.   Jf has a history of Parkinsons and Lewy Body dementia.  He is cared for at home by his wife.  She is very healthy and has a lot of social support.  She says she has trouble getting him  to drink enough water. Seems to be eating ok. No chest pain.  He states he gets dizzy, says that has been going on for years.     REVIEW OF SYSTEMS    Neurologic: unknown LOC, No hearing loss  Cardiac: No Chest Pain, unknown syncope  Respiratory: No cough or difficulty breathing  GI: No Bloody Stool or Diarrhea  : No Dysuria or Hematuria  General: No Fever  All other systems reviewed and are negative.    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     Seizure disorder (H)      Stented coronary artery      Past Surgical History:   Procedure Laterality Date     ------------OTHER-------------  1955    ulnar and radial fx - repair ulnar and radial fx x4     ------------OTHER-------------  6/14/2011    cataract extraction     BIOPSY  08/2015    skin biopsy     BLEPHAROPLASTY BILATERAL  5/6/2014    Procedure: BLEPHAROPLASTY " BILATERAL;  Surgeon: Andrew Queen MD;  Location: [facility]     BYPASS GRAFT ARTERY CORONARY  11/2006    coronary artery disease x 5, [facility] [place]     cataract extraction and lens implantation  2011    cataracts     cataract extraction and lens implantation      cataracts     COLONOSCOPY  2012     colonoscopy with polypectomy  3/13/2009    history of polyps - repeat 3 yrs     colonoscopy with polypectomy  2006     colonoscopy with polypectomy  2005     COMBINED COLONOSCOPY WITH ARGON PLASMA COAGULATOR (APC) N/A 10/31/2014    Procedure: COMBINED COLONOSCOPY WITH ARGON PLASMA COAGULATOR (APC);  Surgeon: Bassam Aguilar MD;  [facility]: [facility]     COMBINED COLONOSCOPY WITH ARGON PLASMA COAGULATOR (APC) N/A 11/13/2015    Procedure: COMBINED COLONOSCOPY WITH ARGON PLASMA COAGULATOR (APC);  Surgeon: Bassam Aguilar MD;  [facility]: [facility]     ENDOSCOPY UPPER, COLONOSCOPY, COMBINED N/A 10/31/2014    Procedure: COMBINED ENDOSCOPY UPPER, COLONOSCOPY;  Surgeon: Bassam Aguilar MD;  [facility]     ENDOSCOPY UPPER, COLONOSCOPY, COMBINED N/A 11/13/2015    Procedure: COMBINED ENDOSCOPY UPPER, COLONOSCOPY;  Surgeon: Bassam Aguilar MD;  [facility]     ESOPHAGOSCOPY, GASTROSCOPY, DUODENOSCOPY (EGD), COMBINED  1/22/2014    Procedure: COMBINED ESOPHAGOSCOPY, GASTROSCOPY, DUODENOSCOPY (EGD);  UPPER ENDOSCOPY(PENA) W/ BIOPSIES;  Surgeon: Patricia Pena MD;  [facility]: [facility]     HERNIORRHAPHY INGUINAL Right 2/14/2018    Procedure: HERNIORRHAPHY INGUINAL;  OPEN RIGHT INGUINAL HERNIA REPAIR with Mesh;  Surgeon: Virgilio Zaragoza DO;  [facility]: [facility]     LARYNGOSCOPY WITH MICROSCOPE  1/22/2014    Procedure: LARYNGOSCOPY WITH MICROSCOPE;;  Surgeon: Chayo Duke MD;  [facility]: [facility]     pacemaker placement  2000    heart block     pacemaker placement  2011    dual-chamber     REMOVE TUBE, MYRINGOTOMY, COMBINED  1/22/2014    Procedure: COMBINED REMOVE TUBE, MYRINGOTOMY;  MICRODIRECT LARYNGOSCOPY WITH BIOPSY AND FROZEN SECTIONS  removal of right ear tube and myringoplasty;  Surgeon: Chayo Duke MD;  [facility]     REPLACE PACEMAKER GENERATOR N/A 8/8/2018    Procedure: REPLACE PACEMAKER GENERATOR;  Pacemaker generator change;  Surgeon: Alma aKplan MD;  Location: [facility]     stent placement to LAD  2008     ventilation tube  5/24/2012    right in office       CURRENT MEDICATIONS    Current Outpatient Rx   Medication Sig Dispense Refill     5-Hydroxytryptophan (5-HTP) 100 MG CAPS Take 1 tablet by mouth At Bedtime       atorvastatin (LIPITOR) 20 MG tablet TAKE 1 TABLET BY MOUTH EVERY EVENING - GENERIC FOR LIPITOR 90 tablet 0     Cholecalciferol (VITAMIN D-3 PO) Take 1,000 Int'l Units by mouth daily        Coenzyme Q10 (CO Q 10 PO) 200 mg one in the morning       Cyanocobalamin (VITAMIN B-12 PO) Take 1 tablet by mouth daily       fludrocortisone (FLORINEF) 0.1 MG tablet Take 2-3 tabs daily. 90 tablet 3     MELATONIN PO Take 10 mg by mouth At Bedtime        metoprolol succinate ER (TOPROL-XL) 50 MG 24 hr tablet TAKE 1 TABLET (50 MG) BY MOUTH DAILY 90 tablet 1     midodrine (PROAMATINE) 5 MG tablet TAKE 2 TABLETS (10MG) BY MOUTH twice a day 180 tablet 3     multivitamin, therapeutic with minerals (MULTI-VITAMIN) TABS tablet Take 1 tablet by mouth daily       PANTOPRAZOLE SODIUM PO Take 40 mg by mouth 2 times daily (before meals)        potassium chloride ER (K-DUR/KLOR-CON M) 20 MEQ CR tablet TAKE 1 TABLET BY MOUTH ONCE DAILY 30 tablet 5     Turmeric 500 MG TABS Take 1 tablet by mouth daily       vitamin  B complex with vitamin C (VITAMIN  B COMPLEX) TABS Take 1 tablet by mouth daily       sodium chloride 1 GM tablet TAKE 1 TABLET BY MOUTH TWICE A  tablet 3       ALLERGIES    Allergies   Allergen Reactions     Cats Unknown     Lamotrigine      Skin lesions       SOCIAL & FAMILY HISTORY    Social History     Socioeconomic History     Marital status: Single     Spouse name: None     Number of children: None     Years of  education: None     Highest education level: None   Occupational History     Occupation: retired   Social Needs     Financial resource strain: None     Food insecurity:     Worry: None     Inability: None     Transportation needs:     Medical: None     Non-medical: None   Tobacco Use     Smoking status: Former Smoker     Packs/day: 1.00     Years: 5.00     Pack years: 5.00     Types: Cigarettes     Smokeless tobacco: Never Used     Tobacco comment: quit in 1985   Substance and Sexual Activity     Alcohol use: Yes     Comment: social     Drug use: No     Sexual activity: None   Lifestyle     Physical activity:     Days per week: None     Minutes per session: None     Stress: None   Relationships     Social connections:     Talks on phone: None     Gets together: None     Attends Hindu service: None     Active member of club or organization: None     Attends meetings of clubs or organizations: None     Relationship status: None     Intimate partner violence:     Fear of current or ex partner: None     Emotionally abused: None     Physically abused: None     Forced sexual activity: None   Other Topics Concern      Service Not Asked     Blood Transfusions Yes     Caffeine Concern Yes     Comment: 1 cup coffee daily     Occupational Exposure Not Asked     Hobby Hazards Not Asked     Sleep Concern Not Asked     Stress Concern Not Asked     Weight Concern Not Asked     Special Diet Not Asked     Back Care Not Asked     Exercise Not Asked     Bike Helmet Not Asked     Seat Belt Not Asked     Self-Exams Not Asked     Parent/sibling w/ CABG, MI or angioplasty before 65F 55M? No   Social History Narrative     None     Family History   Problem Relation Age of Onset     Diabetes Mother        PHYSICAL EXAM    VITAL SIGNS: BP (!) 162/106   Temp 97.8  F (36.6  C) (Oral)   Resp 16   SpO2 99%    Constitutional: he is pleasant, alert.  Answers when questionned but doesn't volunteer much.   Eyes: Pupils equally round  and react to light, sclera nonicteric  HENT:  Atraumatic, no trismus  Neck: supple, no JVD, no posterior neck tenderness  Respiratory:  Lungs Clear, no retractions   Cardiovascular:  regular rate, no murmurs  GI:  Soft, nontender, normal bowel sounds  Musculoskeletal:  No edema, no bruising  Vascular:  All  pulses are 2+ equal bilaterally  Integument:  Well hydrated, no petechiae   Neurologic:  Alert, he seems to be oriented, knows he is at the hospital.  he recognizes me.  Psych: Pleasant affect, no hallucinations    EKG    Sinus with LBBB, rate 73, on EKG does not appear paced but room telemetry shows pacer spikes    RADIOLOGY  (read by the radiologist)  Results for orders placed or performed during the hospital encounter of 02/21/19   CT Head w/o Contrast    Narrative    PROCEDURE: CT HEAD W/O CONTRAST     HISTORY: Altered level of consciousness (LOC), unexplained.    COMPARISON: 2016    TECHNIQUE:  Helical images of the head from the foramen magnum to the  vertex were obtained without contrast.    FINDINGS: The ventricular system and cortical sulci are enlarged  consistent with atrophy this is unchanged from 2016. There is white  matter low-density in both hemispheres consistent with small vessel  disease. There are no masses or ventricular shifts. The brainstem and  cerebellum appear normal. Cranial vault is intact.  The visualized  paranasal sinuses are clear.      Impression    IMPRESSION: Atrophy small vessel changes. The brain is stable as  compared to 2016      JACKY JAMES MD         ED COURSE & MEDICAL DECISION MAKING    Pertinent Labs & Imaging studies reviewed and interpreted. (See chart for details)    See chart for details of medications given during the ED stay.    Vitals:    02/21/19 1615 02/21/19 1630 02/21/19 1645 02/21/19 1721   BP: 156/81   (!) 162/106   Resp: 15 (!) 10 (!) 9 16   Temp:    97.8  F (36.6  C)   TempSrc:    Oral   SpO2: 97% 97% 99%            FINAL IMPRESSION    1. Generalized  muscle weakness        PLAN  He is slightly dry with elevation of creatinine.  He was given a liter of fluids.  Social work met with his wife, she feels they do not need any additional services at this time. Lots of support from children.  I also touched base with Dr. Ray, certainly some of this due to natural progression of his dementia.  His wife thinks he tripped over a small rug next to the bed. Follow up with Dr. Ray.         Latanya Burgos MD  02/21/19 2026

## 2019-02-26 ENCOUNTER — OFFICE VISIT (OUTPATIENT)
Dept: CARDIOLOGY | Facility: OTHER | Age: 84
End: 2019-02-26
Attending: FAMILY MEDICINE
Payer: MEDICARE

## 2019-02-26 ENCOUNTER — ANCILLARY PROCEDURE (OUTPATIENT)
Dept: CARDIOLOGY | Facility: OTHER | Age: 84
End: 2019-02-26
Attending: INTERNAL MEDICINE
Payer: MEDICARE

## 2019-02-26 VITALS
SYSTOLIC BLOOD PRESSURE: 121 MMHG | TEMPERATURE: 97.5 F | BODY MASS INDEX: 24.73 KG/M2 | WEIGHT: 167 LBS | HEIGHT: 69 IN | OXYGEN SATURATION: 98 % | RESPIRATION RATE: 20 BRPM | HEART RATE: 73 BPM | DIASTOLIC BLOOD PRESSURE: 71 MMHG

## 2019-02-26 DIAGNOSIS — I45.9 HEART BLOCK: ICD-10-CM

## 2019-02-26 DIAGNOSIS — I10 ESSENTIAL HYPERTENSION: ICD-10-CM

## 2019-02-26 DIAGNOSIS — E86.9 VOLUME DEPLETION: Primary | ICD-10-CM

## 2019-02-26 DIAGNOSIS — I95.1 AUTONOMIC ORTHOSTATIC HYPOTENSION: ICD-10-CM

## 2019-02-26 PROCEDURE — G0463 HOSPITAL OUTPT CLINIC VISIT: HCPCS | Mod: 25

## 2019-02-26 PROCEDURE — G0463 HOSPITAL OUTPT CLINIC VISIT: HCPCS

## 2019-02-26 PROCEDURE — 93280 PM DEVICE PROGR EVAL DUAL: CPT | Mod: TC | Performed by: INTERNAL MEDICINE

## 2019-02-26 PROCEDURE — 99214 OFFICE O/P EST MOD 30 MIN: CPT | Performed by: INTERNAL MEDICINE

## 2019-02-26 RX ORDER — DONEPEZIL HYDROCHLORIDE 10 MG/1
10 TABLET, FILM COATED ORAL AT BEDTIME
Refills: 9 | COMMUNITY
Start: 2019-02-01 | End: 2020-01-30

## 2019-02-26 ASSESSMENT — PAIN SCALES - GENERAL: PAINLEVEL: NO PAIN (0)

## 2019-02-26 ASSESSMENT — MIFFLIN-ST. JEOR: SCORE: 1432.89

## 2019-02-26 NOTE — PATIENT INSTRUCTIONS
It was a pleasure to see you in clinic today.  Please do not hesitate to call with any questions or concerns.  You are scheduled for a remote transmission on 5/29/19.  We look forward to seeing you in clinic at your next device check in 6 months.    Brandi Hernandez, RN, MS, CCRN  Electrophysiology Nurse Clinician  [facility]    During Business Hours Please Call:  [phone]  After Hours Please Call:  [phone] - select option #[phone] and ask for job code [phone]

## 2019-02-26 NOTE — PATIENT INSTRUCTIONS
You were seen by Dr. Walter, 2/26/2019.     1.  Please increase hydration. Increasing fluid intake will help to maintain your blood pressure and reduce the episodes of lightheadedness and fainting.      2.  Please continue Midodrine as prescribed. This medication will help to reduce fainting and lightheadedness when combined with increasing fluids.       3.  You will be scheduled for an appointment to have IV fluids weekly.  You will be called to schedule this appointment. If you notice he has increased swelling in the extremities or difficulty breathing please call the clinic to be seen.     4.  If you develop new or worsening symptoms please call the cardiology office to be seen sooner.       You will follow up with Dr. Walter in 6-8 weeks.       Please call the cardiology office with problems, questions, or concerns at [phone].    If you experience chest pain, chest pressure, chest tightness, shortness of breath, fainting, lightheadedness, nausea, vomiting, or other concerning symptoms, please report to the Emergency Department or call 911. These symptoms may be emergent, and best treated in the Emergency Department.       Elaine LUTZ RN-BSN  Cardiology   [facility]  [phone]

## 2019-02-26 NOTE — NURSING NOTE
"Chief Complaint   Patient presents with     RECHECK     3 month cardiology follow-up;  Patient's wife states that he has had \"brief fainting spells, weakness and dizziness\".         Initial /71 (BP Location: Right arm, Patient Position: Chair, Cuff Size: Adult Regular)   Pulse 73   Temp 97.5  F (36.4  C) (Tympanic)   Resp 20   Ht 1.753 m (5' 9\")   Wt 75.8 kg (167 lb)   SpO2 98%   BMI 24.66 kg/m   Estimated body mass index is 24.66 kg/m  as calculated from the following:    Height as of this encounter: 1.753 m (5' 9\").    Weight as of this encounter: 75.8 kg (167 lb).  Medication Reconciliation: complete    Rekha Quispe LPN    "

## 2019-02-26 NOTE — PROGRESS NOTES
"      Clinical Cardiac Electrophysiology    Chief Complaint: pacemaker, neurogenic orthostatic hypotension    HPI: I was happy to see Mr. Contreras in the EP clinic.  He has a history of pacemaker placement with a recent generator change.  The device is a [employer] dual lead pacemaker.  He scheduled for device check later today.    He also has a history of orthostatic hypotension secondary to Lewy body dementia.  This is been managed with midodrine and fludrocortisone.  On this regimen he and his wife reports his orthostatic hypotension has been much better.    He was seen in the emergency department on October 23 complaining of dizziness and was noted to be hypertensive with systolic blood pressures in the 180s-190 range.  He was given clonidine as well as IV metoprolol.  He was also started on oral metoprolol 50 mg daily.  He has not been troubled by high blood pressure in the past.    February 26, 2019 Interval history: Frequently has to sit back down after standing. Recent fall - not witness, loss of consciousness is not clear. He is oral fluid intake is poor - \"maybe 30 ounces\" if pushed. His  reports his memory is getting worse. BP at home, while up, are \"normal\" but remain elevated when checked in supine position, such as in the ED recently. His  reports he does better for a day or two after IV fluids.    Current Outpatient Medications   Medication Sig Dispense Refill     atorvastatin (LIPITOR) 20 MG tablet TAKE 1 TABLET BY MOUTH EVERY EVENING - GENERIC FOR LIPITOR 90 tablet 0     Cholecalciferol (VITAMIN D-3 PO) Take 1,000 Int'l Units by mouth daily        Coenzyme Q10 (CO Q 10 PO) 200 mg one in the morning       Cyanocobalamin (VITAMIN B-12 PO) Take 1 tablet by mouth daily       donepezil (ARICEPT) 10 MG tablet Take 10 mg by mouth At Bedtime  9     fludrocortisone (FLORINEF) 0.1 MG tablet Take 2-3 tabs daily. 90 tablet 3     Magnesium 400 MG CAPS Take by mouth 2 times daily       " MELATONIN PO Take 5 mg by mouth At Bedtime        metoprolol succinate ER (TOPROL-XL) 50 MG 24 hr tablet TAKE 1 TABLET (50 MG) BY MOUTH DAILY 90 tablet 1     midodrine (PROAMATINE) 5 MG tablet TAKE 2 TABLETS (10MG) BY MOUTH twice a day 180 tablet 3     multivitamin, therapeutic with minerals (MULTI-VITAMIN) TABS tablet Take 1 tablet by mouth daily       Omega-3 Fatty Acids (FISH OIL) 500 MG CAPS Take by mouth daily       PANTOPRAZOLE SODIUM PO Take 40 mg by mouth 2 times daily (before meals)        potassium chloride ER (K-DUR/KLOR-CON M) 20 MEQ CR tablet TAKE 1 TABLET BY MOUTH ONCE DAILY 30 tablet 5     sodium chloride 1 GM tablet TAKE 1 TABLET BY MOUTH TWICE A  tablet 3     Turmeric 500 MG TABS Take 1 tablet by mouth daily         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     Seizure disorder (H)      Stented coronary artery        Past Surgical History:   Procedure Laterality Date     ------------OTHER-------------  1955    ulnar and radial fx - repair ulnar and radial fx x4     ------------OTHER-------------  6/14/2011    cataract extraction     BIOPSY  08/2015    skin biopsy     BLEPHAROPLASTY BILATERAL  5/6/2014    Procedure: BLEPHAROPLASTY BILATERAL;  Surgeon: Andrew Queen MD;  Location: [facility]     BYPASS GRAFT ARTERY CORONARY  11/2006    coronary artery disease x 5, [facility]     cataract extraction and lens implantation  2011    cataracts     cataract extraction and lens implantation      cataracts     COLONOSCOPY  2012     colonoscopy with polypectomy  3/13/2009    history of polyps - repeat 3 yrs     colonoscopy with polypectomy  2006     colonoscopy with polypectomy  2005     COMBINED  COLONOSCOPY WITH ARGON PLASMA COAGULATOR (APC) N/A 10/31/2014    Procedure: COMBINED COLONOSCOPY WITH ARGON PLASMA COAGULATOR (APC);  Surgeon: Bassam Aguilar MD;  [facility]: [facility]     COMBINED COLONOSCOPY WITH ARGON PLASMA COAGULATOR (APC) N/A 11/13/2015    Procedure: COMBINED COLONOSCOPY WITH ARGON PLASMA COAGULATOR (APC);  Surgeon: Bassam Aguliar MD;  [facility]     ENDOSCOPY UPPER, COLONOSCOPY, COMBINED N/A 10/31/2014    Procedure: COMBINED ENDOSCOPY UPPER, COLONOSCOPY;  Surgeon: Bassam Aguilar MD;  [facility]     ENDOSCOPY UPPER, COLONOSCOPY, COMBINED N/A 11/13/2015    Procedure: COMBINED ENDOSCOPY UPPER, COLONOSCOPY;  Surgeon: Bassam Aguilar MD;  [facility]     ESOPHAGOSCOPY, GASTROSCOPY, DUODENOSCOPY (EGD), COMBINED  1/22/2014    Procedure: COMBINED ESOPHAGOSCOPY, GASTROSCOPY, DUODENOSCOPY (EGD);  UPPER ENDOSCOPY(PENA) W/ BIOPSIES;  Surgeon: Patricia Pena MD;  [facility]: [facility]     HERNIORRHAPHY INGUINAL Right 2/14/2018    Procedure: HERNIORRHAPHY INGUINAL;  OPEN RIGHT INGUINAL HERNIA REPAIR with Mesh;  Surgeon: Virgilio Zaragoza DO;  [facility]     LARYNGOSCOPY WITH MICROSCOPE  1/22/2014    Procedure: LARYNGOSCOPY WITH MICROSCOPE;;  Surgeon: Chayo Duke MD;  [facility]     pacemaker placement  2000    heart block     pacemaker placement  2011    dual-chamber     REMOVE TUBE, MYRINGOTOMY, COMBINED  1/22/2014    Procedure: COMBINED REMOVE TUBE, MYRINGOTOMY;  MICRODIRECT LARYNGOSCOPY WITH BIOPSY AND FROZEN SECTIONS removal of right ear tube and myringoplasty;  Surgeon: Chayo Duke MD;  [facility]: [facility]     REPLACE PACEMAKER GENERATOR N/A 8/8/2018    Procedure: REPLACE PACEMAKER GENERATOR;  Pacemaker generator change;  Surgeon: Alma Kaplan MD;  Location: [facility]     stent placement to LAD  2008     ventilation tube  5/24/2012    right in office       Family History   Problem Relation Age of Onset     Diabetes Mother        Social History     Tobacco Use     Smoking  "status: Former Smoker     Packs/day: 1.00     Years: 5.00     Pack years: 5.00     Types: Cigarettes     Smokeless tobacco: Never Used     Tobacco comment: quit in 1985   Substance Use Topics     Alcohol use: Yes     Comment: social       Allergies   Allergen Reactions     Cats Unknown     Lamotrigine      Skin lesions         ROS: memory, dementia and movement symptoms without recent changes, otherwise comprehensive review is negative unless otherwise noted in the HPI.   February 26, 2019/woa      Physical Examination:  Vitals: /71 (BP Location: Right arm, Patient Position: Chair, Cuff Size: Adult Regular)   Pulse 73   Temp 97.5  F (36.4  C) (Tympanic)   Resp 20   Ht 1.753 m (5' 9\")   Wt 75.8 kg (167 lb)   SpO2 98%   BMI 24.66 kg/m    BMI= Body mass index is 24.66 kg/m .      GENERAL APPEARANCE: frail, alert and no distress  HEENT:  mucosa dry, no cyanosis.  NECK: JVP is not visible  CHEST: lungs clear to auscultation  CARDIOVASCULAR: regular rhythm, normal S1S2, soft early systolic murmur, no gallop, precordium quiet  ABDOMEN: soft, non tender, bowel sounds normal, no abdominal bruits  EXTREMITIES: no clubbing, cyanosis or edema  VASC: Warm  SKIN: skin turgor not bad    Laboratory and diagnostic data reviewed February 26, 2019:    Results for AKILA WALLER (MRN [id number]) as of 2/26/2019 12:56   Ref. Range 2/21/2019 14:29   Sodium Latest Ref Range: 133 - 144 mmol/L 139   Potassium Latest Ref Range: 3.4 - 5.3 mmol/L 4.5   Chloride Latest Ref Range: 94 - 109 mmol/L 105   Carbon Dioxide Latest Ref Range: 20 - 32 mmol/L 27   Urea Nitrogen Latest Ref Range: 7 - 30 mg/dL 23   Creatinine Latest Ref Range: 0.66 - 1.25 mg/dL 1.29 (H)   GFR Estimate Latest Ref Range: >60 mL/min/1.73_m2 50 (L)       Previous studies reviewed February 26, 2019:       Assessment and recommendations:    1) Neurogenic orthostatic hypotension    2) Supine hypertension     He has not been able to keep up on oral fluid intake despite " encouragement.     - 1 liter NS twice a week   - continue midodrine    Until near syncope under better control it is difficult to prescribe short acting antihypertensive at bedtime, especially in light of recent early am fall/syncope in bathroom.    3) Sinus node dysfunction, s/p permanent pacemaker - normal function      I appreciate the chance to help with Mr. Ortiz's care.    Portions of this note were dictated using speech recognition software. The note has been proofread but errors in the text may have been overlooked. Please contact me if there are any concerns regarding the accuracy of the dictation.

## 2019-02-27 NOTE — PROGRESS NOTES
Orders received per Dr. Walter for patient to have IVF, one liter NS over one hour 2x/week on Tues and Fri. [facility] updated to call patient to schedule. Orders faxed to Dr. Venancio Ray for co-signature.

## 2019-02-28 ENCOUNTER — MEDICAL CORRESPONDENCE (OUTPATIENT)
Dept: HEALTH INFORMATION MANAGEMENT | Facility: CLINIC | Age: 84
End: 2019-02-28

## 2019-02-28 LAB
MDC_IDC_EPISODE_DTM: NORMAL
MDC_IDC_EPISODE_DURATION: 108 S
MDC_IDC_EPISODE_DURATION: 115 S
MDC_IDC_EPISODE_DURATION: 14 S
MDC_IDC_EPISODE_DURATION: 14 S
MDC_IDC_EPISODE_DURATION: 16 S
MDC_IDC_EPISODE_DURATION: 160 S
MDC_IDC_EPISODE_DURATION: 17 S
MDC_IDC_EPISODE_DURATION: 19 S
MDC_IDC_EPISODE_DURATION: 19 S
MDC_IDC_EPISODE_DURATION: 20 S
MDC_IDC_EPISODE_DURATION: 20 S
MDC_IDC_EPISODE_DURATION: 22 S
MDC_IDC_EPISODE_DURATION: 28 S
MDC_IDC_EPISODE_DURATION: 28 S
MDC_IDC_EPISODE_DURATION: 29 S
MDC_IDC_EPISODE_DURATION: 30 S
MDC_IDC_EPISODE_DURATION: 31 S
MDC_IDC_EPISODE_DURATION: 35 S
MDC_IDC_EPISODE_DURATION: 35 S
MDC_IDC_EPISODE_DURATION: 36 S
MDC_IDC_EPISODE_DURATION: 36 S
MDC_IDC_EPISODE_DURATION: 38 S
MDC_IDC_EPISODE_DURATION: 39 S
MDC_IDC_EPISODE_DURATION: 4 S
MDC_IDC_EPISODE_DURATION: 42 S
MDC_IDC_EPISODE_DURATION: 42 S
MDC_IDC_EPISODE_DURATION: 43 S
MDC_IDC_EPISODE_DURATION: 44 S
MDC_IDC_EPISODE_DURATION: 44 S
MDC_IDC_EPISODE_DURATION: 45 S
MDC_IDC_EPISODE_DURATION: 46 S
MDC_IDC_EPISODE_DURATION: 48 S
MDC_IDC_EPISODE_DURATION: 56 S
MDC_IDC_EPISODE_DURATION: 62 S
MDC_IDC_EPISODE_DURATION: 63 S
MDC_IDC_EPISODE_DURATION: 68 S
MDC_IDC_EPISODE_DURATION: 69 S
MDC_IDC_EPISODE_DURATION: 70 S
MDC_IDC_EPISODE_DURATION: 8 S
MDC_IDC_EPISODE_DURATION: 9 S
MDC_IDC_EPISODE_ID: 783
MDC_IDC_EPISODE_ID: 784
MDC_IDC_EPISODE_ID: 785
MDC_IDC_EPISODE_ID: 786
MDC_IDC_EPISODE_ID: 787
MDC_IDC_EPISODE_ID: 788
MDC_IDC_EPISODE_ID: 789
MDC_IDC_EPISODE_ID: 790
MDC_IDC_EPISODE_ID: 791
MDC_IDC_EPISODE_ID: 792
MDC_IDC_EPISODE_ID: 793
MDC_IDC_EPISODE_ID: 794
MDC_IDC_EPISODE_ID: 795
MDC_IDC_EPISODE_ID: 796
MDC_IDC_EPISODE_ID: 797
MDC_IDC_EPISODE_ID: 798
MDC_IDC_EPISODE_ID: 799
MDC_IDC_EPISODE_ID: 800
MDC_IDC_EPISODE_ID: 801
MDC_IDC_EPISODE_ID: 802
MDC_IDC_EPISODE_ID: 803
MDC_IDC_EPISODE_ID: 804
MDC_IDC_EPISODE_ID: 805
MDC_IDC_EPISODE_ID: 806
MDC_IDC_EPISODE_ID: 807
MDC_IDC_EPISODE_ID: 808
MDC_IDC_EPISODE_ID: 809
MDC_IDC_EPISODE_ID: 810
MDC_IDC_EPISODE_ID: 811
MDC_IDC_EPISODE_ID: 812
MDC_IDC_EPISODE_ID: 813
MDC_IDC_EPISODE_ID: 814
MDC_IDC_EPISODE_ID: 815
MDC_IDC_EPISODE_ID: 816
MDC_IDC_EPISODE_ID: 817
MDC_IDC_EPISODE_ID: 818
MDC_IDC_EPISODE_ID: 819
MDC_IDC_EPISODE_ID: 820
MDC_IDC_EPISODE_ID: 821
MDC_IDC_EPISODE_ID: 822
MDC_IDC_EPISODE_ID: 823
MDC_IDC_EPISODE_ID: 824
MDC_IDC_EPISODE_ID: 825
MDC_IDC_EPISODE_ID: 826
MDC_IDC_EPISODE_ID: 827
MDC_IDC_EPISODE_ID: 828
MDC_IDC_EPISODE_ID: 829
MDC_IDC_EPISODE_ID: 830
MDC_IDC_EPISODE_ID: 831
MDC_IDC_EPISODE_ID: 832
MDC_IDC_EPISODE_TYPE: NORMAL
MDC_IDC_LEAD_IMPLANT_DT: NORMAL
MDC_IDC_LEAD_IMPLANT_DT: NORMAL
MDC_IDC_LEAD_LOCATION: NORMAL
MDC_IDC_LEAD_LOCATION: NORMAL
MDC_IDC_LEAD_LOCATION_DETAIL_1: NORMAL
MDC_IDC_LEAD_LOCATION_DETAIL_1: NORMAL
MDC_IDC_LEAD_MFG: NORMAL
MDC_IDC_LEAD_MFG: NORMAL
MDC_IDC_LEAD_MODEL: NORMAL
MDC_IDC_LEAD_MODEL: NORMAL
MDC_IDC_LEAD_POLARITY_TYPE: NORMAL
MDC_IDC_LEAD_POLARITY_TYPE: NORMAL
MDC_IDC_LEAD_SERIAL: NORMAL
MDC_IDC_LEAD_SERIAL: NORMAL
MDC_IDC_MSMT_BATTERY_DTM: NORMAL
MDC_IDC_MSMT_BATTERY_REMAINING_LONGEVITY: 117 MO
MDC_IDC_MSMT_BATTERY_RRT_TRIGGER: 2.62
MDC_IDC_MSMT_BATTERY_STATUS: NORMAL
MDC_IDC_MSMT_BATTERY_VOLTAGE: 3.07 V
MDC_IDC_MSMT_LEADCHNL_RA_IMPEDANCE_VALUE: 475 OHM
MDC_IDC_MSMT_LEADCHNL_RA_IMPEDANCE_VALUE: 608 OHM
MDC_IDC_MSMT_LEADCHNL_RA_PACING_THRESHOLD_AMPLITUDE: 1.25 V
MDC_IDC_MSMT_LEADCHNL_RA_PACING_THRESHOLD_PULSEWIDTH: 0.4 MS
MDC_IDC_MSMT_LEADCHNL_RA_SENSING_INTR_AMPL: 1.75 MV
MDC_IDC_MSMT_LEADCHNL_RA_SENSING_INTR_AMPL: 2.25 MV
MDC_IDC_MSMT_LEADCHNL_RV_IMPEDANCE_VALUE: 418 OHM
MDC_IDC_MSMT_LEADCHNL_RV_IMPEDANCE_VALUE: 627 OHM
MDC_IDC_MSMT_LEADCHNL_RV_PACING_THRESHOLD_AMPLITUDE: 0.75 V
MDC_IDC_MSMT_LEADCHNL_RV_PACING_THRESHOLD_PULSEWIDTH: 0.4 MS
MDC_IDC_PG_IMPLANT_DTM: NORMAL
MDC_IDC_PG_MFG: NORMAL
MDC_IDC_PG_MODEL: NORMAL
MDC_IDC_PG_SERIAL: NORMAL
MDC_IDC_PG_TYPE: NORMAL
MDC_IDC_SESS_CLINIC_NAME: NORMAL
MDC_IDC_SESS_DTM: NORMAL
MDC_IDC_SESS_TYPE: NORMAL
MDC_IDC_SET_BRADY_AT_MODE_SWITCH_RATE: 171 {BEATS}/MIN
MDC_IDC_SET_BRADY_HYSTRATE: NORMAL
MDC_IDC_SET_BRADY_LOWRATE: 70 {BEATS}/MIN
MDC_IDC_SET_BRADY_MAX_SENSOR_RATE: 130 {BEATS}/MIN
MDC_IDC_SET_BRADY_MAX_TRACKING_RATE: 130 {BEATS}/MIN
MDC_IDC_SET_BRADY_MODE: NORMAL
MDC_IDC_SET_BRADY_PAV_DELAY_LOW: 180 MS
MDC_IDC_SET_BRADY_SAV_DELAY_LOW: 150 MS
MDC_IDC_SET_LEADCHNL_RA_PACING_AMPLITUDE: 2.75 V
MDC_IDC_SET_LEADCHNL_RA_PACING_ANODE_ELECTRODE_1: NORMAL
MDC_IDC_SET_LEADCHNL_RA_PACING_ANODE_LOCATION_1: NORMAL
MDC_IDC_SET_LEADCHNL_RA_PACING_CAPTURE_MODE: NORMAL
MDC_IDC_SET_LEADCHNL_RA_PACING_CATHODE_ELECTRODE_1: NORMAL
MDC_IDC_SET_LEADCHNL_RA_PACING_CATHODE_LOCATION_1: NORMAL
MDC_IDC_SET_LEADCHNL_RA_PACING_POLARITY: NORMAL
MDC_IDC_SET_LEADCHNL_RA_PACING_PULSEWIDTH: 0.4 MS
MDC_IDC_SET_LEADCHNL_RA_SENSING_ANODE_ELECTRODE_1: NORMAL
MDC_IDC_SET_LEADCHNL_RA_SENSING_ANODE_LOCATION_1: NORMAL
MDC_IDC_SET_LEADCHNL_RA_SENSING_CATHODE_ELECTRODE_1: NORMAL
MDC_IDC_SET_LEADCHNL_RA_SENSING_CATHODE_LOCATION_1: NORMAL
MDC_IDC_SET_LEADCHNL_RA_SENSING_POLARITY: NORMAL
MDC_IDC_SET_LEADCHNL_RA_SENSING_SENSITIVITY: 0.45 MV
MDC_IDC_SET_LEADCHNL_RV_PACING_AMPLITUDE: 2 V
MDC_IDC_SET_LEADCHNL_RV_PACING_ANODE_ELECTRODE_1: NORMAL
MDC_IDC_SET_LEADCHNL_RV_PACING_ANODE_LOCATION_1: NORMAL
MDC_IDC_SET_LEADCHNL_RV_PACING_CAPTURE_MODE: NORMAL
MDC_IDC_SET_LEADCHNL_RV_PACING_CATHODE_ELECTRODE_1: NORMAL
MDC_IDC_SET_LEADCHNL_RV_PACING_CATHODE_LOCATION_1: NORMAL
MDC_IDC_SET_LEADCHNL_RV_PACING_POLARITY: NORMAL
MDC_IDC_SET_LEADCHNL_RV_PACING_PULSEWIDTH: 0.4 MS
MDC_IDC_SET_LEADCHNL_RV_SENSING_ANODE_ELECTRODE_1: NORMAL
MDC_IDC_SET_LEADCHNL_RV_SENSING_ANODE_LOCATION_1: NORMAL
MDC_IDC_SET_LEADCHNL_RV_SENSING_CATHODE_ELECTRODE_1: NORMAL
MDC_IDC_SET_LEADCHNL_RV_SENSING_CATHODE_LOCATION_1: NORMAL
MDC_IDC_SET_LEADCHNL_RV_SENSING_POLARITY: NORMAL
MDC_IDC_SET_LEADCHNL_RV_SENSING_SENSITIVITY: 0.9 MV
MDC_IDC_SET_ZONE_DETECTION_INTERVAL: 350 MS
MDC_IDC_SET_ZONE_DETECTION_INTERVAL: 400 MS
MDC_IDC_SET_ZONE_TYPE: NORMAL
MDC_IDC_STAT_AT_BURDEN_PERCENT: 0.3 %
MDC_IDC_STAT_BRADY_AP_VP_PERCENT: 97.65 %
MDC_IDC_STAT_BRADY_AP_VS_PERCENT: 0 %
MDC_IDC_STAT_BRADY_AS_VP_PERCENT: 2.33 %
MDC_IDC_STAT_BRADY_AS_VS_PERCENT: 0.01 %
MDC_IDC_STAT_BRADY_DTM_END: NORMAL
MDC_IDC_STAT_BRADY_DTM_START: NORMAL
MDC_IDC_STAT_BRADY_RA_PERCENT_PACED: 97.45 %
MDC_IDC_STAT_BRADY_RV_PERCENT_PACED: 99.98 %
MDC_IDC_STAT_EPISODE_RECENT_COUNT: 0
MDC_IDC_STAT_EPISODE_RECENT_COUNT: 0
MDC_IDC_STAT_EPISODE_RECENT_COUNT: 148
MDC_IDC_STAT_EPISODE_RECENT_COUNT_DTM_END: NORMAL
MDC_IDC_STAT_EPISODE_RECENT_COUNT_DTM_START: NORMAL
MDC_IDC_STAT_EPISODE_TOTAL_COUNT: 0
MDC_IDC_STAT_EPISODE_TOTAL_COUNT: 0
MDC_IDC_STAT_EPISODE_TOTAL_COUNT: 832
MDC_IDC_STAT_EPISODE_TOTAL_COUNT_DTM_END: NORMAL
MDC_IDC_STAT_EPISODE_TOTAL_COUNT_DTM_START: NORMAL
MDC_IDC_STAT_EPISODE_TYPE: NORMAL

## 2019-03-05 ENCOUNTER — INFUSION THERAPY VISIT (OUTPATIENT)
Dept: INFUSION THERAPY | Facility: OTHER | Age: 84
End: 2019-03-05
Attending: FAMILY MEDICINE
Payer: MEDICARE

## 2019-03-05 VITALS
SYSTOLIC BLOOD PRESSURE: 144 MMHG | WEIGHT: 168.6 LBS | HEIGHT: 69 IN | HEART RATE: 72 BPM | BODY MASS INDEX: 24.97 KG/M2 | TEMPERATURE: 97.4 F | OXYGEN SATURATION: 98 % | RESPIRATION RATE: 18 BRPM | DIASTOLIC BLOOD PRESSURE: 66 MMHG

## 2019-03-05 DIAGNOSIS — E86.9 VOLUME DEPLETION: Primary | ICD-10-CM

## 2019-03-05 PROCEDURE — 25000128 H RX IP 250 OP 636: Performed by: FAMILY MEDICINE

## 2019-03-05 PROCEDURE — 96360 HYDRATION IV INFUSION INIT: CPT

## 2019-03-05 PROCEDURE — 96365 THER/PROPH/DIAG IV INF INIT: CPT

## 2019-03-05 RX ADMIN — SODIUM CHLORIDE 1000 ML: 9 INJECTION, SOLUTION INTRAVENOUS at 13:22

## 2019-03-05 ASSESSMENT — MIFFLIN-ST. JEOR: SCORE: 1440.14

## 2019-03-05 NOTE — PROGRESS NOTES
Patient is a 86 y/o male here accompanied by SO today for infusion of IVF per order of Dr. Venancio Ray.   Patient meets parameters for today's infusion. Patient identified with two identifiers, order verified, and verbal consent for today's infusion obtained from patient.  +++Written consent for treatment is on file and valid.+++         22 gauge angio cath inserted into right lower forearm after 1 unsuccessful attempt.  Immediate blood return noted.  IV secured with sterile, transparent dressing and tape.  Patient tolerated well, denies pain or discomfort at this time.  Flushes easily without resistance, no signs or symptoms of infiltration or infection.   Patient denies questions or concerns regarding infusion and/or medication(s) being administered.         1322 IV pump verified with IVF dose, drug, and rate of administration.  Infusion administered per protocol.  Patient tolerated infusion well, no signs or symptoms of adverse reaction noted.  Patient denies pain nor discomfort.     IV removed, catheter intact.  Site clean, dry and intact.  No signs or symptoms of infiltration or infection.  Covered with a sterile bandage, slight pressure applied for 30 seconds.  Pt instructed to leave bandage intact for a minimum of one hour, and to call with questions or concerns.  Copy of appointments, discharge instructions, and after visit summary (AVS) provided to patient.  Patient states understanding, discharged ambulatory.

## 2019-03-07 NOTE — PROGRESS NOTES
SUBJECTIVE:   Jf Ortiz is a 85 year old male who presents to clinic today for the following health issues:      Dizziness      Duration: ongoing for years but 03/06/2019 was the worst the wife has noticed    Description   Feeling faint:  YES  Feeling like the surroundings are moving: no   Loss of consciousness or falls: no     Intensity:  Severe per wife     Accompanying signs and symptoms:   Nausea/vomitting: no   Palpitations: no   Weakness in arms or legs: YES  Vision or speech changes: no   Ringing in ears (Tinnitus): no   Hearing loss related to dizziness: no   Other (fevers/chills/sweating/dyspnea): YES- weak , listlessness, tired    History (similar episodes/head trauma/previous evaluation/recent bleeding): yes but not this severe    Precipitating or alleviating factors (new meds/chemicals): sitting and drank water helped  Worse with activity/head movement: YES    Therapies tried and outcome: None           [facility]    Jf Ortiz, 85 year old, male presents with   Chief Complaint   Patient presents with     Dizziness       PAST MEDICAL HISTORY:  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     Seizure disorder (H)      Stented coronary artery        PAST SURGICAL HISTORY:  Past Surgical History:   Procedure Laterality Date     ------------OTHER-------------  1955    ulnar and radial fx - repair ulnar and radial fx x4     ------------OTHER-------------  6/14/2011    cataract extraction     BIOPSY  08/2015    skin biopsy     BLEPHAROPLASTY BILATERAL  5/6/2014    Procedure: BLEPHAROPLASTY BILATERAL;  Surgeon: Andrew Queen MD;  Location: [facility]     BYPASS GRAFT ARTERY CORONARY  11/2006     coronary artery disease x 5, [facility]     cataract extraction and lens implantation  2011    cataracts     cataract extraction and lens implantation      cataracts     COLONOSCOPY  2012     colonoscopy with polypectomy  3/13/2009    history of polyps - repeat 3 yrs     colonoscopy with polypectomy  2006     colonoscopy with polypectomy  2005     COMBINED COLONOSCOPY WITH ARGON PLASMA COAGULATOR (APC) N/A 10/31/2014    Procedure: COMBINED COLONOSCOPY WITH ARGON PLASMA COAGULATOR (APC);  Surgeon: Bassam Aguilar MD;  Location: [facility]     COMBINED COLONOSCOPY WITH ARGON PLASMA COAGULATOR (APC) N/A 11/13/2015    Procedure: COMBINED COLONOSCOPY WITH ARGON PLASMA COAGULATOR (APC);  Surgeon: Bassam Aguilar MD;  [facility]: [facility]     ENDOSCOPY UPPER, COLONOSCOPY, COMBINED N/A 10/31/2014    Procedure: COMBINED ENDOSCOPY UPPER, COLONOSCOPY;  Surgeon: Bassam Aguilar MD;  [facility]: [facility]     ENDOSCOPY UPPER, COLONOSCOPY, COMBINED N/A 11/13/2015    Procedure: COMBINED ENDOSCOPY UPPER, COLONOSCOPY;  Surgeon: Bassam Aguilar MD;  [facility]: [facility]     ESOPHAGOSCOPY, GASTROSCOPY, DUODENOSCOPY (EGD), COMBINED  1/22/2014    Procedure: COMBINED ESOPHAGOSCOPY, GASTROSCOPY, DUODENOSCOPY (EGD);  UPPER ENDOSCOPY(PENA) W/ BIOPSIES;  Surgeon: Patricia Pena MD;  Location: [facility]     HERNIORRHAPHY INGUINAL Right 2/14/2018    Procedure: HERNIORRHAPHY INGUINAL;  OPEN RIGHT INGUINAL HERNIA REPAIR with Mesh;  Surgeon: Virgilio Zaragoza DO;  [facility]: [facility]     LARYNGOSCOPY WITH MICROSCOPE  1/22/2014    Procedure: LARYNGOSCOPY WITH MICROSCOPE;;  Surgeon: Chayo Duke MD;  [facility]: [facility]     pacemaker placement  2000    heart block     pacemaker placement  2011    dual-chamber     REMOVE TUBE, MYRINGOTOMY, COMBINED  1/22/2014    Procedure: COMBINED REMOVE TUBE, MYRINGOTOMY;  MICRODIRECT LARYNGOSCOPY WITH BIOPSY AND FROZEN SECTIONS removal of right ear tube and myringoplasty;  Surgeon: Chayo Duke MD;  [facility]      REPLACE PACEMAKER GENERATOR N/A 8/8/2018    Procedure: REPLACE PACEMAKER GENERATOR;  Pacemaker generator change;  Surgeon: Alma Kaplan MD;  [facility]: [facility]     stent placement to LAD  2008     ventilation tube  5/24/2012    right in office       MEDICATIONS:  Prior to Admission medications    Medication Sig Start Date End Date Taking? Authorizing Provider   atorvastatin (LIPITOR) 20 MG tablet TAKE 1 TABLET BY MOUTH EVERY EVENING - GENERIC FOR LIPITOR 1/24/19  Yes Herman Rivers, DO   Cholecalciferol (VITAMIN D-3 PO) Take 1,000 Int'l Units by mouth daily    Yes Reported, Patient   Coenzyme Q10 (CO Q 10 PO) 200 mg one in the morning   Yes Reported, Patient   Cyanocobalamin (VITAMIN B-12 PO) Take 1 tablet by mouth daily   Yes Reported, Patient   donepezil (ARICEPT) 10 MG tablet Take 10 mg by mouth At Bedtime 2/1/19  Yes Reported, Patient   fludrocortisone (FLORINEF) 0.1 MG tablet Take 2-3 tabs daily. 10/8/18  Yes Marycarmen Ibarra APRN CNP   Magnesium 400 MG CAPS Take by mouth 2 times daily   Yes Reported, Patient   MELATONIN PO Take 5 mg by mouth At Bedtime    Yes Reported, Patient   metoprolol succinate ER (TOPROL-XL) 50 MG 24 hr tablet TAKE 1 TABLET (50 MG) BY MOUTH DAILY 1/22/19  Yes Herman Rivers, DO   midodrine (PROAMATINE) 5 MG tablet TAKE 2 TABLETS (10MG) BY MOUTH twice a day 10/30/18  Yes FRANCES Ray MD   multivitamin, therapeutic with minerals (MULTI-VITAMIN) TABS tablet Take 1 tablet by mouth daily   Yes Reported, Patient   Omega-3 Fatty Acids (FISH OIL) 500 MG CAPS Take by mouth daily   Yes Reported, Patient   PANTOPRAZOLE SODIUM PO Take 40 mg by mouth 2 times daily (before meals)    Yes Reported, Patient   potassium chloride ER (K-DUR/KLOR-CON M) 20 MEQ CR tablet TAKE 1 TABLET BY MOUTH ONCE DAILY 1/9/19  Yes FRANCES Ray MD   sodium chloride 1 GM tablet TAKE 1 TABLET BY MOUTH TWICE A DAY 12/6/18  Yes Herman Rivers, DO   Turmeric 500 MG TABS Take 1 tablet by mouth daily   Yes  Reported, Patient       ALLERGIES:     Allergies   Allergen Reactions     Cats Unknown     Lamotrigine      Skin lesions       ROS:  Constitutional, HEENT, cardiovascular, pulmonary, gi and gu systems are negative, except as otherwise noted.      EXAM:  /64 (BP Location: Left arm, Patient Position: Sitting, Cuff Size: Adult Regular)   Pulse 71   Temp 97.5  F (36.4  C) (Tympanic)   Wt 75.2 kg (165 lb 12.8 oz)   SpO2 99%   BMI 24.47 kg/m   Body mass index is 24.47 kg/m .   GENERAL APPEARANCE: healthy, alert and no distress  EYES: Eyes grossly normal to inspection, PERRL and conjunctivae and sclerae normal  NECK: no adenopathy, no asymmetry, masses, or scars and thyroid normal to palpation  RESP: lungs clear to auscultation - no rales, rhonchi or wheezes  CV: regular rates and rhythm, normal S1 S2, no S3 or S4 and no murmur, click or rub  NEURO: Normal strength and tone and speech normal, mentation is at his baseline he does have dementia he is cooperative no sense of agitation.  Lab/ X-ray  No results found for this or any previous visit (from the past 24 hour(s)).    ASSESSMENT/PLAN:    ICD-10-CM    1. Lewy body dementia without behavioral disturbance G31.83     F02.80    2. Autonomic orthostatic hypotension I95.1      Has had both of these issues.  Has been followed by cardiology for the orthostatic hypotension.  Is on Midodrine and Florinef.  We discussed that Midodrine needs water in his system to be effective.  She needs to strongly encourages him to drink 3-4 glasses of water in the morning.   We  offered a wheeled walker but  he declines it.  He does have an appointment to see cardiology April 23.  Also cardiology has ordered twice weekly IV infusions and his wife feels it really helps but we discussed long-term that we would have to place a port but then he would run the risk of infection.    IRENE Ray MD  March 12, 2019

## 2019-03-08 ENCOUNTER — INFUSION THERAPY VISIT (OUTPATIENT)
Dept: INFUSION THERAPY | Facility: OTHER | Age: 84
End: 2019-03-08
Attending: FAMILY MEDICINE
Payer: MEDICARE

## 2019-03-08 VITALS
HEIGHT: 69 IN | OXYGEN SATURATION: 99 % | DIASTOLIC BLOOD PRESSURE: 74 MMHG | BODY MASS INDEX: 24.89 KG/M2 | SYSTOLIC BLOOD PRESSURE: 160 MMHG | RESPIRATION RATE: 18 BRPM | HEART RATE: 74 BPM | TEMPERATURE: 97.6 F

## 2019-03-08 DIAGNOSIS — E86.9 VOLUME DEPLETION: Primary | ICD-10-CM

## 2019-03-08 PROCEDURE — 96365 THER/PROPH/DIAG IV INF INIT: CPT

## 2019-03-08 PROCEDURE — 25000128 H RX IP 250 OP 636: Performed by: FAMILY MEDICINE

## 2019-03-08 PROCEDURE — 96360 HYDRATION IV INFUSION INIT: CPT

## 2019-03-08 RX ADMIN — SODIUM CHLORIDE 1000 ML: 9 INJECTION, SOLUTION INTRAVENOUS at 11:11

## 2019-03-08 NOTE — PROGRESS NOTES
Patient tolerated infusion well, no signs or symptoms of adverse reaction noted.  Patient denies pain nor discomfort.     IV removed, catheter intact.  Site clean, dry and intact.  No signs or symptoms of infiltration or infection.  Covered with a sterile bandage, slight pressure applied for 30 seconds.  Pt instructed to leave bandage intact for a minimum of one hour, and to call with questions or concerns.  Copy of appointments, discharge instructions, and after visit summary (AVS) provided to patient.  Patient states understanding, discharged ambulatory.

## 2019-03-08 NOTE — PROGRESS NOTES
Patient is a 86 y/o here accompanied by SO today for infusion of IVF per order of Dr. Venancio Ray.  Patient meets parameters for today's infusion. Patient identified with two identifiers, order verified, and verbal consent for today's infusion obtained from patient.       22 gauge angio cath inserted into right lower forarm.  Immediate blood return noted.  IV secured with sterile, transparent dressing and tape.  Patient tolerated well, denies pain or discomfort at this time.  Flushes easily without resistance, no signs or symptoms of infiltration or infection.   Patient denies questions or concerns regarding infusion and/or medication(s) being administered.          1111 IV pump verified with dose, drug, and rate of administration.  Infusion administered per protocol.

## 2019-03-12 ENCOUNTER — INFUSION THERAPY VISIT (OUTPATIENT)
Dept: INFUSION THERAPY | Facility: OTHER | Age: 84
End: 2019-03-12
Attending: FAMILY MEDICINE
Payer: MEDICARE

## 2019-03-12 ENCOUNTER — OFFICE VISIT (OUTPATIENT)
Dept: FAMILY MEDICINE | Facility: OTHER | Age: 84
End: 2019-03-12
Attending: FAMILY MEDICINE
Payer: MEDICARE

## 2019-03-12 VITALS
DIASTOLIC BLOOD PRESSURE: 64 MMHG | BODY MASS INDEX: 24.47 KG/M2 | SYSTOLIC BLOOD PRESSURE: 115 MMHG | WEIGHT: 165.8 LBS | TEMPERATURE: 97.5 F | HEART RATE: 71 BPM | OXYGEN SATURATION: 99 %

## 2019-03-12 VITALS
OXYGEN SATURATION: 98 % | RESPIRATION RATE: 16 BRPM | HEIGHT: 69 IN | DIASTOLIC BLOOD PRESSURE: 78 MMHG | TEMPERATURE: 97.8 F | BODY MASS INDEX: 24.57 KG/M2 | SYSTOLIC BLOOD PRESSURE: 154 MMHG | WEIGHT: 165.9 LBS | HEART RATE: 76 BPM

## 2019-03-12 DIAGNOSIS — G31.83 LEWY BODY DEMENTIA WITHOUT BEHAVIORAL DISTURBANCE (H): Primary | ICD-10-CM

## 2019-03-12 DIAGNOSIS — I95.1 AUTONOMIC ORTHOSTATIC HYPOTENSION: ICD-10-CM

## 2019-03-12 DIAGNOSIS — E86.9 VOLUME DEPLETION: Primary | ICD-10-CM

## 2019-03-12 DIAGNOSIS — F02.80 LEWY BODY DEMENTIA WITHOUT BEHAVIORAL DISTURBANCE (H): Primary | ICD-10-CM

## 2019-03-12 PROCEDURE — 99213 OFFICE O/P EST LOW 20 MIN: CPT | Performed by: FAMILY MEDICINE

## 2019-03-12 PROCEDURE — G0463 HOSPITAL OUTPT CLINIC VISIT: HCPCS | Mod: 25

## 2019-03-12 PROCEDURE — 25000128 H RX IP 250 OP 636: Performed by: FAMILY MEDICINE

## 2019-03-12 PROCEDURE — 96365 THER/PROPH/DIAG IV INF INIT: CPT

## 2019-03-12 PROCEDURE — 96360 HYDRATION IV INFUSION INIT: CPT

## 2019-03-12 PROCEDURE — G0463 HOSPITAL OUTPT CLINIC VISIT: HCPCS

## 2019-03-12 RX ADMIN — SODIUM CHLORIDE 1000 ML: 9 INJECTION, SOLUTION INTRAVENOUS at 14:03

## 2019-03-12 ASSESSMENT — MIFFLIN-ST. JEOR: SCORE: 1427.9

## 2019-03-12 ASSESSMENT — PAIN SCALES - GENERAL: PAINLEVEL: NO PAIN (0)

## 2019-03-12 NOTE — PROGRESS NOTES
Patient is a 86 y/o male here accompanied by wife today for infusion of IVF per order of Dr. Venancio Ray.  Patient meets parameters for today's infusion. Patient identified with two identifiers, order verified, and verbal consent for today's infusion obtained from patient.      22 gauge angio cath inserted into right hand after 3 failed attempts by this writer and 2 failed attempts by Callie Chaparro. Immediate blood return noted. IV secured with sterile, transparent dressing and tape. Patient tolerated well, denies pain or discomfort at this time. Flushes easily without resistance, no signs or symptoms of infiltration or infection. Patient denies questions or concerns regarding infusion and/or medication(s) being administered.    1403: IV pump verified with dose, drug, and rate of administration with MAR. Infusion administered per protocol.  Patient tolerated infusion well, no signs or symptoms of adverse reaction noted. Patient denies pain nor discomfort.     IV removed, catheter intact. Site clean, dry and intact.  No signs or symptoms of infiltration or infection.  Covered with a sterile bandage, slight pressure applied for 30 seconds. Pt instructed to leave bandage intact for a minimum of one hour, and to call with questions or concerns. Copy of appointments, discharge instructions, and after visit summary (AVS) provided to patient.  Patient states understanding, discharged ambulatory.

## 2019-03-15 ENCOUNTER — TELEPHONE (OUTPATIENT)
Dept: CARDIOLOGY | Facility: CLINIC | Age: 84
End: 2019-03-15

## 2019-03-15 ENCOUNTER — INFUSION THERAPY VISIT (OUTPATIENT)
Dept: INFUSION THERAPY | Facility: OTHER | Age: 84
End: 2019-03-15
Attending: FAMILY MEDICINE
Payer: MEDICARE

## 2019-03-15 VITALS
HEART RATE: 72 BPM | SYSTOLIC BLOOD PRESSURE: 186 MMHG | DIASTOLIC BLOOD PRESSURE: 79 MMHG | RESPIRATION RATE: 16 BRPM | OXYGEN SATURATION: 98 % | TEMPERATURE: 98 F

## 2019-03-15 DIAGNOSIS — E86.9 VOLUME DEPLETION: Primary | ICD-10-CM

## 2019-03-15 PROCEDURE — 96360 HYDRATION IV INFUSION INIT: CPT

## 2019-03-15 PROCEDURE — 96365 THER/PROPH/DIAG IV INF INIT: CPT

## 2019-03-15 PROCEDURE — 25000128 H RX IP 250 OP 636: Performed by: FAMILY MEDICINE

## 2019-03-15 RX ADMIN — SODIUM CHLORIDE 1000 ML: 9 INJECTION, SOLUTION INTRAVENOUS at 14:23

## 2019-03-15 NOTE — PROGRESS NOTES
Patient is a 85 year old male here accompanied by significant other today for infusion of IVF per order of Dr MALIA Ray under the supervision of Dr Walter.  Patient meets parameters for today's infusion. Patient identified with two identifiers, order verified, and verbal consent for today's infusion obtained from patient.  +++Written consent for treatment is on file and valid.+++       24 gauge angio cath inserted into right posterior lower forearm.  Immediate blood return noted.  IV secured with sterile, transparent dressing and tape.  Patient tolerated well, denies pain or discomfort at this time.  Flushes easily without resistance, no signs or symptoms of infiltration or infection.   Patient denies questions or concerns regarding infusion and/or medication(s) being administered.      1423: IV pump verified with dose, drug, and rate of administration.  Infusion administered per protocol.

## 2019-03-15 NOTE — TELEPHONE ENCOUNTER
[facility] Call Center    Phone Message    May a detailed message be left on voicemail: yes    Reason for Call: Other: Re from [facility], Ph # [phone] - Re requests call back to confirm when they are stopping the fluids? or if this does not have an end date? They are giving Pt his Saline fluids twice a week via IV - said Dr Walter ordered this - they are requesting an update on these fluids - Thanks     Action Taken: Message routed to:  [facility] ([facility] Cardiology Clinic

## 2019-03-15 NOTE — PROGRESS NOTES
Patient tolerated infusion well, no signs or symptoms of adverse reaction noted.  Patient denies pain nor discomfort.     IV removed, catheter intact.  Site clean, dry and intact.  No signs or symptoms of infiltration or infection.  Covered with a sterile bandage, slight pressure applied for 30 seconds.  Pt instructed to leave bandage intact for a minimum of one hour, and to call with questions or concerns.  Patient states understanding, discharged ambulatory.

## 2019-03-15 NOTE — PROGRESS NOTES
Call out to Dr Walter asking for return call to clarify how long we are to continue infusions, or if they are indefinite.

## 2019-03-18 NOTE — TELEPHONE ENCOUNTER
Alma Chavarria APRN CNP Liesmaki, Christina M, RN; Juan Walter MD 2 days ago      He should be continuing on fluids until follow-up in April. We can then assess his orthostatic VS and see how he has progressed with the IVF.   Thanks,   LVW    Routing Comment

## 2019-03-18 NOTE — TELEPHONE ENCOUNTER
Call placed to Re in infusion and advised her that I would forward this message from the [facility] to her for review.

## 2019-03-21 ENCOUNTER — HOSPITAL ENCOUNTER (EMERGENCY)
Facility: HOSPITAL | Age: 84
Discharge: HOME | End: 2019-03-21
Attending: EMERGENCY MEDICINE | Admitting: EMERGENCY MEDICINE
Payer: MEDICARE

## 2019-03-21 ENCOUNTER — APPOINTMENT (OUTPATIENT)
Dept: GENERAL RADIOLOGY | Facility: HOSPITAL | Age: 84
End: 2019-03-21
Attending: EMERGENCY MEDICINE
Payer: MEDICARE

## 2019-03-21 VITALS
DIASTOLIC BLOOD PRESSURE: 99 MMHG | RESPIRATION RATE: 17 BRPM | HEART RATE: 70 BPM | TEMPERATURE: 97.6 F | OXYGEN SATURATION: 96 % | BODY MASS INDEX: 23.63 KG/M2 | WEIGHT: 160 LBS | SYSTOLIC BLOOD PRESSURE: 167 MMHG

## 2019-03-21 DIAGNOSIS — M62.81 GENERALIZED MUSCLE WEAKNESS: ICD-10-CM

## 2019-03-21 DIAGNOSIS — S32.020A CLOSED COMPRESSION FRACTURE OF SECOND LUMBAR VERTEBRA, INITIAL ENCOUNTER: Primary | ICD-10-CM

## 2019-03-21 DIAGNOSIS — M54.50 ACUTE RIGHT-SIDED LOW BACK PAIN WITHOUT SCIATICA: ICD-10-CM

## 2019-03-21 PROCEDURE — 99283 EMERGENCY DEPT VISIT LOW MDM: CPT

## 2019-03-21 PROCEDURE — A9270 NON-COVERED ITEM OR SERVICE: HCPCS | Mod: GY | Performed by: EMERGENCY MEDICINE

## 2019-03-21 PROCEDURE — 72100 X-RAY EXAM L-S SPINE 2/3 VWS: CPT | Mod: TC

## 2019-03-21 PROCEDURE — 99284 EMERGENCY DEPT VISIT MOD MDM: CPT | Mod: Z6 | Performed by: EMERGENCY MEDICINE

## 2019-03-21 PROCEDURE — 25000132 ZZH RX MED GY IP 250 OP 250 PS 637: Mod: GY | Performed by: EMERGENCY MEDICINE

## 2019-03-21 RX ORDER — LIDOCAINE 50 MG/G
1 PATCH TOPICAL EVERY 24 HOURS
Qty: 10 PATCH | Refills: 0 | Status: SHIPPED | OUTPATIENT
Start: 2019-03-21 | End: 2019-04-09

## 2019-03-21 RX ORDER — HYDROCODONE BITARTRATE AND ACETAMINOPHEN 5; 325 MG/1; MG/1
1 TABLET ORAL ONCE
Status: COMPLETED | OUTPATIENT
Start: 2019-03-21 | End: 2019-03-21

## 2019-03-21 RX ORDER — HYDROCODONE BITARTRATE AND ACETAMINOPHEN 5; 325 MG/1; MG/1
1 TABLET ORAL EVERY 6 HOURS PRN
Qty: 18 TABLET | Refills: 0 | Status: SHIPPED | OUTPATIENT
Start: 2019-03-21 | End: 2019-04-09

## 2019-03-21 RX ADMIN — HYDROCODONE BITARTRATE AND ACETAMINOPHEN 1 TABLET: 5; 325 TABLET ORAL at 14:50

## 2019-03-21 ASSESSMENT — ENCOUNTER SYMPTOMS
FEVER: 0
ABDOMINAL PAIN: 0
SHORTNESS OF BREATH: 0
BACK PAIN: 1

## 2019-03-21 NOTE — ED PROVIDER NOTES
History     Chief Complaint   Patient presents with     Loss of Consciousness     Dizziness     HPI  Jf Ortiz is a 85 year old male who presents to the emergency department with complaints of low back pain.  Patient fell onto a hard surface 4 days ago.  He has been applying salon pus and heating pad to the area with minimal improvement.  Denies any fever, loss of sphincter control, perineal numbness.  And has no history of chronic back issues.  He is still able to get up and walk although it takes him longer than usual because of the pain.    Allergies:  Allergies   Allergen Reactions     Cats Unknown     Lamotrigine      Skin lesions       Problem List:    Patient Active Problem List    Diagnosis Date Noted     Constipation, unspecified constipation type 04/11/2018     Priority: Medium     Pacemaker, [employer], Dual Chamber - Dependent 10/06/2017     Priority: Medium     Lewy body dementia without behavioral disturbance 08/31/2017     Priority: Medium     Fatigue 08/31/2017     Priority: Medium     Urinary incontinence 08/31/2017     Priority: Medium     Autonomic orthostatic hypotension 10/14/2016     Priority: Medium     Dementia without behavioral disturbance 07/28/2015     Priority: Medium     Diagnosis updated by automated process. Provider to review and confirm.       Hypocalcemia 07/28/2015     Priority: Medium     Parkinson disease (H)      Priority: Medium     Seizure disorder (H)      Priority: Medium     Volume depletion 08/02/2014     Priority: Medium     Syncope and collapse 08/01/2014     Priority: Medium     Stented coronary artery      Priority: Medium     Coronary atherosclerosis of native coronary artery      Priority: Medium     Overview:   IMO Update 10/11       Hypercholesterolemia 04/23/2013     Priority: Medium     Advanced care planning/counseling discussion 10/30/2012     Priority: Medium     REM sleep behavior disorder 01/01/2011     Priority: Medium     Osteoarthritis  01/01/2011     Priority: Medium     Problem list name updated by automated process. Provider to review       Diaphragmatic hernia 01/01/2011     Priority: Medium     Problem list name updated by automated process. 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     Priority: Medium     Overview:   IMO Update 10/11       Essential hypertension 09/05/2006     Priority: Medium     Overview:   IMO Update          Past Medical History:    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     Seizure disorder (H)      Stented coronary artery        Past Surgical History:    Past Surgical History:   Procedure Laterality Date     ------------OTHER-------------  1955    ulnar and radial fx - repair ulnar and radial fx x4     ------------OTHER-------------  6/14/2011    cataract extraction     BIOPSY  08/2015    skin biopsy     BLEPHAROPLASTY BILATERAL  5/6/2014    Procedure: BLEPHAROPLASTY BILATERAL;  Surgeon: Andrew Queen MD;  Location: [facility]     BYPASS GRAFT ARTERY CORONARY  11/2006    coronary artery disease x 5, [facility]     cataract extraction and lens implantation  2011    cataracts     cataract extraction and lens implantation      cataracts     COLONOSCOPY  2012     colonoscopy with polypectomy  3/13/2009    history of polyps - repeat 3 yrs     colonoscopy with polypectomy  2006     colonoscopy with polypectomy  2005     COMBINED COLONOSCOPY WITH ARGON PLASMA COAGULATOR (APC) N/A 10/31/2014    Procedure: COMBINED COLONOSCOPY WITH ARGON PLASMA COAGULATOR (APC);  Surgeon: Bassam Aguilar MD;  [facility]: [facility]      COMBINED COLONOSCOPY WITH ARGON PLASMA COAGULATOR (APC) N/A 11/13/2015    Procedure: COMBINED COLONOSCOPY WITH ARGON PLASMA COAGULATOR (APC);  Surgeon: Bassam Aguilar MD;  [facility]     ENDOSCOPY UPPER, COLONOSCOPY, COMBINED N/A 10/31/2014    Procedure: COMBINED ENDOSCOPY UPPER, COLONOSCOPY;  Surgeon: Bassam Aguilar MD;  [facility]     ENDOSCOPY UPPER, COLONOSCOPY, COMBINED N/A 11/13/2015    Procedure: COMBINED ENDOSCOPY UPPER, COLONOSCOPY;  Surgeon: Bassam Aguilar MD;  [facility]     ESOPHAGOSCOPY, GASTROSCOPY, DUODENOSCOPY (EGD), COMBINED  1/22/2014    Procedure: COMBINED ESOPHAGOSCOPY, GASTROSCOPY, DUODENOSCOPY (EGD);  UPPER ENDOSCOPY(PENA) W/ BIOPSIES;  Surgeon: Patricia Pena MD;  Location: [facility]     HERNIORRHAPHY INGUINAL Right 2/14/2018    Procedure: HERNIORRHAPHY INGUINAL;  OPEN RIGHT INGUINAL HERNIA REPAIR with Mesh;  Surgeon: Virgilio Zaragoza DO;  [facility]     LARYNGOSCOPY WITH MICROSCOPE  1/22/2014    Procedure: LARYNGOSCOPY WITH MICROSCOPE;;  Surgeon: Chayo Duke MD;  Location: [facility]     pacemaker placement  2000    heart block     pacemaker placement  2011    dual-chamber     REMOVE TUBE, MYRINGOTOMY, COMBINED  1/22/2014    Procedure: COMBINED REMOVE TUBE, MYRINGOTOMY;  MICRODIRECT LARYNGOSCOPY WITH BIOPSY AND FROZEN SECTIONS removal of right ear tube and myringoplasty;  Surgeon: Chayo Duke MD;  [facility]     REPLACE PACEMAKER GENERATOR N/A 8/8/2018    Procedure: REPLACE PACEMAKER GENERATOR;  Pacemaker generator change;  Surgeon: Alma Kaplan MD;  [facility]     stent placement to LAD  2008     ventilation tube  5/24/2012    right in office       Family History:    Family History   Problem Relation Age of Onset     Diabetes Mother        Social History:  Marital Status:  Single [1]  Social History     Tobacco Use     Smoking status: Former Smoker     Packs/day: 1.00     Years: 5.00     Pack years: 5.00     Types: Cigarettes     Last attempt to  quit: 1985     Years since quittin.2     Smokeless tobacco: Never Used     Tobacco comment: quit in    Substance Use Topics     Alcohol use: Yes     Comment: social     Drug use: No        Medications:      atorvastatin (LIPITOR) 20 MG tablet   Cholecalciferol (VITAMIN D-3 PO)   Coenzyme Q10 (CO Q 10 PO)   Cyanocobalamin (VITAMIN B-12 PO)   donepezil (ARICEPT) 10 MG tablet   fludrocortisone (FLORINEF) 0.1 MG tablet   HYDROcodone-acetaminophen (NORCO) 5-325 MG tablet   lidocaine (LIDODERM) 5 % patch   Magnesium 400 MG CAPS   MELATONIN PO   metoprolol succinate ER (TOPROL-XL) 50 MG 24 hr tablet   midodrine (PROAMATINE) 5 MG tablet   multivitamin, therapeutic with minerals (MULTI-VITAMIN) TABS tablet   Omega-3 Fatty Acids (FISH OIL) 500 MG CAPS   PANTOPRAZOLE SODIUM PO   potassium chloride ER (K-DUR/KLOR-CON M) 20 MEQ CR tablet   sodium chloride 1 GM tablet   Turmeric 500 MG TABS         Review of Systems   Constitutional: Negative for fever.   Respiratory: Negative for shortness of breath.    Cardiovascular: Negative for chest pain.   Gastrointestinal: Negative for abdominal pain.   Musculoskeletal: Positive for back pain.   All other systems reviewed and are negative.      Physical Exam   BP: 152/85  Heart Rate: 73  Temp: 97.6  F (36.4  C)  Resp: 16  Weight: 72.6 kg (160 lb)  SpO2: 99 %      Physical Exam   Constitutional: He is oriented to person, place, and time. He appears well-developed and well-nourished. No distress.   HENT:   Head: Normocephalic and atraumatic.   Mouth/Throat: Oropharynx is clear and moist.   Eyes: EOM are normal. Pupils are equal, round, and reactive to light.   Cardiovascular: Normal rate, regular rhythm, normal heart sounds and intact distal pulses.   Pulmonary/Chest: Effort normal and breath sounds normal. No stridor. No respiratory distress.   Abdominal: Bowel sounds are normal. He exhibits no distension. There is no tenderness.   Musculoskeletal: He exhibits tenderness. He  exhibits no edema or deformity.        Back:    Neurological: He is alert and oriented to person, place, and time. No cranial nerve deficit.   Skin: He is not diaphoretic.   Nursing note and vitals reviewed.      ED Course   Evaluated and lumbar x-rays ordered.  Norco given for pain control.    Procedures      Results for orders placed or performed during the hospital encounter of 03/21/19 (from the past 24 hour(s))   XR Lumbar Spine 2/3 Views    Narrative    XR LUMBAR SPINE 2-3 VIEWS    HISTORY: 85 years Male low back pain post fall    COMPARISON: CT lumbar spine dated 6/14/2017    TECHNIQUE: Lumbar spine 3 views    FINDINGS: There is now a mild compression fracture of L2. There is  mild anterior wedging. There is slight compression of the inferior and  anterior endplates. There is loss of 25% or less of anterior vertebral  height. Posterior vertebral height is normal.    There is a very mild anterior wedging of T11-T12 and L1, likely  physiologic.  There is degenerative disc space narrowing of mild severity at L4-L5  and L5-S1.    There is atherosclerotic disease of the abdominal aorta without  evidence of aneurysm..      Impression    IMPRESSION: Interval development of mild anterior compression fracture  of L2.    VIMAL PÉREZ MD       Medications   HYDROcodone-acetaminophen (NORCO) 5-325 MG per tablet 1 tablet (1 tablet Oral Given 3/21/19 1450)       Assessments & Plan (with Medical Decision Making)   Close compression fracture of L2: Started on Lidoderm patches during the day and Norco in the evening.  If these are not able to control the pain then patient will discuss this with primary care physician.    Generalized weakness: Patient declines referral for physical therapy.  He has been feeling weak for a long time and has declined physical therapy referral by other providers per the spouse.  He has also declined a walker in the past but is willing to try a walker at this time around.  Prescription for a  walker was given.  Follow-up with PCP next week.    I have reviewed the nursing notes.    I have reviewed the findings, diagnosis, plan and need for follow up with the patient.       Medication List      Started    HYDROcodone-acetaminophen 5-325 MG tablet  Commonly known as:  NORCO  1 tablet, Oral, EVERY 6 HOURS PRN     lidocaine 5 % patch  Commonly known as:  LIDODERM  1 patch, Transdermal, EVERY 24 HOURS            Final diagnoses:   Acute right-sided low back pain without sciatica   Closed compression fracture of second lumbar vertebra, initial encounter (H)   Generalized muscle weakness       3/21/2019   [facility]     Frank Venegas MD  03/21/19 [id number]

## 2019-03-21 NOTE — ED NOTES
"Pt to rm 1 in er via wheelchair with wife, had fallen on way to bathroom early Tuesday am, wife states she heard a \"thud\" and when she got to him pt was awake and on floor, pt states he just \"passed out\" usually has a fluid infusion on Tuesday and Friday, but missed it this Tuesday due to having fallen at home. Orthostats done and charted here. Pt denies dizziness or spinning, has pain low back since fall, has salon pas pain patch on, no bruising noted. Wife at bedside and Dr Venegas has been in to see pt.   "

## 2019-03-21 NOTE — ED TRIAGE NOTES
Pt fell early am on Tuesday. Reports no injury. States he fell because he has been dizzy. States he gets infusions to help with low BP and dizziness. States he missed this weeks infusion.

## 2019-03-22 ENCOUNTER — INFUSION THERAPY VISIT (OUTPATIENT)
Dept: INFUSION THERAPY | Facility: OTHER | Age: 84
End: 2019-03-22
Attending: FAMILY MEDICINE
Payer: MEDICARE

## 2019-03-22 ENCOUNTER — TELEPHONE (OUTPATIENT)
Dept: FAMILY MEDICINE | Facility: OTHER | Age: 84
End: 2019-03-22

## 2019-03-22 ENCOUNTER — PATIENT OUTREACH (OUTPATIENT)
Dept: CARE COORDINATION | Facility: OTHER | Age: 84
End: 2019-03-22

## 2019-03-22 ENCOUNTER — OFFICE VISIT (OUTPATIENT)
Dept: FAMILY MEDICINE | Facility: OTHER | Age: 84
End: 2019-03-22
Attending: FAMILY MEDICINE
Payer: MEDICARE

## 2019-03-22 VITALS
TEMPERATURE: 96.3 F | SYSTOLIC BLOOD PRESSURE: 102 MMHG | OXYGEN SATURATION: 97 % | DIASTOLIC BLOOD PRESSURE: 56 MMHG | HEART RATE: 71 BPM

## 2019-03-22 DIAGNOSIS — S32.010D CLOSED COMPRESSION FRACTURE OF L1 LUMBAR VERTEBRA WITH ROUTINE HEALING, SUBSEQUENT ENCOUNTER: Primary | ICD-10-CM

## 2019-03-22 DIAGNOSIS — G31.83 LEWY BODY DEMENTIA WITHOUT BEHAVIORAL DISTURBANCE (H): ICD-10-CM

## 2019-03-22 DIAGNOSIS — F02.80 LEWY BODY DEMENTIA WITHOUT BEHAVIORAL DISTURBANCE (H): ICD-10-CM

## 2019-03-22 DIAGNOSIS — E78.00 HYPERCHOLESTEROLEMIA: ICD-10-CM

## 2019-03-22 DIAGNOSIS — G20.A1 PARKINSON DISEASE (H): ICD-10-CM

## 2019-03-22 DIAGNOSIS — E86.9 VOLUME DEPLETION: Primary | ICD-10-CM

## 2019-03-22 PROCEDURE — G0463 HOSPITAL OUTPT CLINIC VISIT: HCPCS | Mod: 25

## 2019-03-22 PROCEDURE — 25000128 H RX IP 250 OP 636: Performed by: FAMILY MEDICINE

## 2019-03-22 PROCEDURE — 96365 THER/PROPH/DIAG IV INF INIT: CPT

## 2019-03-22 PROCEDURE — 96360 HYDRATION IV INFUSION INIT: CPT

## 2019-03-22 PROCEDURE — G0463 HOSPITAL OUTPT CLINIC VISIT: HCPCS

## 2019-03-22 PROCEDURE — 99214 OFFICE O/P EST MOD 30 MIN: CPT | Performed by: FAMILY MEDICINE

## 2019-03-22 RX ORDER — CALCITONIN SALMON 200 [IU]/.09ML
1 SPRAY, METERED NASAL DAILY
Qty: 1 BOTTLE | Refills: 3 | Status: SHIPPED | OUTPATIENT
Start: 2019-03-22 | End: 2019-08-27

## 2019-03-22 RX ADMIN — SODIUM CHLORIDE 1000 ML: 9 INJECTION, SOLUTION INTRAVENOUS at 11:14

## 2019-03-22 ASSESSMENT — PAIN SCALES - GENERAL: PAINLEVEL: MODERATE PAIN (5)

## 2019-03-22 NOTE — PATIENT INSTRUCTIONS

## 2019-03-22 NOTE — TELEPHONE ENCOUNTER
10:41 AM    Reason for call: Phone call    Description: Received call from Mc, patient's son.  He has concerns regarding patient being at home with injury and Uda taking care of patient.  Spoke with .  Social work is currently speaking with patient.  Spoke with Uda who advised she would give Mc a call after appointment.  Advised Mc she would call to update following appointment.  Mc was agreeable to plan.  Advised if he had any other question or concerns he could call back.

## 2019-03-22 NOTE — PROGRESS NOTES
Clinic Care Coordination Contact  Care Team Conversations    Was asked to meet with pt for assistance with short-term rehab.  Pt recently fractured his back, is needing PT, and pt's wife feels unable to care for him safely at home.  Called insurance to verify whether pt needed 3 night stay for short-term rehab which they denied.    Met with pt to go over choice of vendor.  , the choice of vendor list has been provided to the patient/family for a skilled nursing facility.    First Choice : Skilled Nursing Facility [facility]: [facility]     [phone]    Second Choice: Skilled Nursing Facility [facility]: [facility]    [phone]    Third Choice: Skilled Nursing Facility [place]: [facility]         [phone]    Advised that writer would confirm insurance coverage.  Advised if insurance did not cover, it would require a $5,000 down deposit.  Pt stated they would not be able to afford this.  They wanted to pursue placement to determine if insurance would cover.  If not, they would pursue home care/ home PT.    Confirmed when they would like placement if possible; they stated they would like to look at placement on Mon.    Patsy Bae, [facility]  Outpatient   [phone]

## 2019-03-22 NOTE — PROGRESS NOTES
Patient is a 85 year old male here accompanied by self today for infusion of IVF per order of Dr MALIA Ray under the supervision of Dr Walter.  Patient identified with two identifiers, order verified, and verbal consent for today's infusion obtained from patient. tient meets order parameters for today's treatment.     24 gauge angio cath inserted into right posterior mid forearm.  Immediate blood return noted.  IV secured with sterile, transparent dressing and tape.  Patient tolerated well, denies pain or discomfort at this time.  Flushes easily without resistance, no signs or symptoms of infiltration or infection.   Patient denies questions or concerns regarding infusion and/or medication(s) being administered.      1114: IV pump verified with dose, drug, and rate of administration.  Infusion administered per protocol.  Patient tolerated infusion well, no signs or symptoms of adverse reaction noted.  Patient denies pain nor discomfort.     IV removed, catheter intact.  Site clean, dry and intact.  No signs or symptoms of infiltration or infection.  Covered with a sterile bandage, slight pressure applied for 30 seconds.  Pt instructed to leave bandage intact for a minimum of one hour, and to call with questions or concerns.  Copy of appointments, discharge instructions, and after visit summary (AVS) provided to patient.  Patient states understanding, discharged ambulatory.

## 2019-03-22 NOTE — PROGRESS NOTES
Sent referrals to [facility].  [facility] to [facility]. Received return call from [facility] that pt can be accepted on Monday after orders are signed.  There insurance will cover rehab without a 3 night stay.  They will fax to PCP.  Updated PCP's nurse to expect incoming paperwork.  Called and updated pt that facility/writer will be in contact to determine time of placement on Mon.  Advised they may have a small out-of-pocket expense, but the majority would be covered.    Patsy Bae, [facility]  Outpatient   [phone]

## 2019-03-22 NOTE — PROGRESS NOTES
SUBJECTIVE:   Jf Ortiz is a 85 year old male who presents to clinic today for the following health issues:      Musculoskeletal problem/pain      Duration: Follow up from ER seen 3/21/2019    Description    Location: lower back- lumbar compression fracture. Would like to discuss going to rehab facility - Face to Face    Intensity:  moderate    Accompanying signs and symptoms: pain    History  Previous similar problem: YES  Previous evaluation:  x-ray    Precipitating or alleviating factors:  Trauma or overuse: YES- due to fall  Aggravating factors include: sitting, standing and walking    Therapies tried and outcome: Norco, hydrocodone, cane    [facility]    Jf Ortiz, 85 year old, male presents with   Chief Complaint   Patient presents with     Musculoskeletal Problem       PAST MEDICAL HISTORY:  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     Seizure disorder (H)      Stented coronary artery        PAST SURGICAL HISTORY:  Past Surgical History:   Procedure Laterality Date     ------------OTHER-------------  1955    ulnar and radial fx - repair ulnar and radial fx x4     ------------OTHER-------------  6/14/2011    cataract extraction     BIOPSY  08/2015    skin biopsy     BLEPHAROPLASTY BILATERAL  5/6/2014    Procedure: BLEPHAROPLASTY BILATERAL;  Surgeon: Andrew Queen MD;  Location: [facility] OR     BYPASS GRAFT ARTERY CORONARY  11/2006    coronary artery disease x 5, [facility]     cataract extraction and lens implantation  2011    cataracts     cataract extraction and lens implantation      cataracts     COLONOSCOPY  2012     colonoscopy with polypectomy  3/13/2009    history of polyps -  repeat 3 yrs     colonoscopy with polypectomy  2006     colonoscopy with polypectomy  2005     COMBINED COLONOSCOPY WITH ARGON PLASMA COAGULATOR (APC) N/A 10/31/2014    Procedure: COMBINED COLONOSCOPY WITH ARGON PLASMA COAGULATOR (APC);  Surgeon: Bassam Aguilar MD;  Location: [facility] OR     COMBINED COLONOSCOPY WITH ARGON PLASMA COAGULATOR (APC) N/A 11/13/2015    Procedure: COMBINED COLONOSCOPY WITH ARGON PLASMA COAGULATOR (APC);  Surgeon: Bassam Aguilar MD;  Location: [facility]     ENDOSCOPY UPPER, COLONOSCOPY, COMBINED N/A 10/31/2014    Procedure: COMBINED ENDOSCOPY UPPER, COLONOSCOPY;  Surgeon: Bassam Aguilar MD;  Location: [facility]     ENDOSCOPY UPPER, COLONOSCOPY, COMBINED N/A 11/13/2015    Procedure: COMBINED ENDOSCOPY UPPER, COLONOSCOPY;  Surgeon: Bassam Aguilar MD;  Location: [facility]     ESOPHAGOSCOPY, GASTROSCOPY, DUODENOSCOPY (EGD), COMBINED  1/22/2014    Procedure: COMBINED ESOPHAGOSCOPY, GASTROSCOPY, DUODENOSCOPY (EGD);  UPPER ENDOSCOPY(PENA) W/ BIOPSIES;  Surgeon: Patricia Pena MD;  Location: [facility] OR     HERNIORRHAPHY INGUINAL Right 2/14/2018    Procedure: HERNIORRHAPHY INGUINAL;  OPEN RIGHT INGUINAL HERNIA REPAIR with Mesh;  Surgeon: Virgilio Zaragoza DO;  Location: [facility]     LARYNGOSCOPY WITH MICROSCOPE  1/22/2014    Procedure: LARYNGOSCOPY WITH MICROSCOPE;;  Surgeon: Chayo Duke MD;  Location: [facility]     pacemaker placement  2000    heart block     pacemaker placement  2011    dual-chamber     REMOVE TUBE, MYRINGOTOMY, COMBINED  1/22/2014    Procedure: COMBINED REMOVE TUBE, MYRINGOTOMY;  MICRODIRECT LARYNGOSCOPY WITH BIOPSY AND FROZEN SECTIONS removal of right ear tube and myringoplasty;  Surgeon: Chayo Duke MD;  Location: [facility]     REPLACE PACEMAKER GENERATOR N/A 8/8/2018    Procedure: REPLACE PACEMAKER GENERATOR;  Pacemaker generator change;  Surgeon: Alma Kaplan MD;  Location: [facility] OR     stent placement to LAD  2008     ventilation tube  5/24/2012    right in office        MEDICATIONS:  Prior to Admission medications    Medication Sig Start Date End Date Taking? Authorizing Provider   atorvastatin (LIPITOR) 20 MG tablet TAKE 1 TABLET BY MOUTH EVERY EVENING - GENERIC FOR LIPITOR  Patient taking differently: TAKE 1 TABLET BY MOUTH EVERY EVENING 1/24/19  Yes Herman Rivers, DO   calcitonin, salmon, (MIACALCIN) 200 UNIT/ACT nasal spray Spray 1 spray into one nostril alternating nostrils daily Alternate nostril each day. 3/22/19  Yes FRANCES Ray MD   Cholecalciferol (VITAMIN D-3 PO) Take 1,000 Units by mouth daily    Yes Reported, Patient   Coenzyme Q10 (CO Q 10 PO) Take 200 mg by mouth every morning    Yes Reported, Patient   Cyanocobalamin (VITAMIN B-12 PO) Take 1 tablet by mouth daily   Yes Reported, Patient   donepezil (ARICEPT) 10 MG tablet Take 10 mg by mouth At Bedtime 2/1/19  Yes Reported, Patient   fludrocortisone (FLORINEF) 0.1 MG tablet Take 2-3 tabs daily.  Patient taking differently: Take 2-3 tablets by mouth daily. 10/8/18  Yes Marycarmen Ibarra APRN CNP   HYDROcodone-acetaminophen (NORCO) 5-325 MG tablet Take 1 tablet by mouth every 6 hours as needed for pain 3/21/19 3/24/19 Yes Frank Venegas MD   lidocaine (LIDODERM) 5 % patch Place 1 patch onto the skin every 24 hours for 10 days 3/21/19 3/31/19 Yes Frank Venegas MD   Magnesium 400 MG CAPS Take 400 mg by mouth 2 times daily    Yes Reported, Patient   MELATONIN PO Take 5 mg by mouth At Bedtime    Yes Reported, Patient   metoprolol succinate ER (TOPROL-XL) 50 MG 24 hr tablet TAKE 1 TABLET (50 MG) BY MOUTH DAILY 1/22/19  Yes Herman Rivers,    midodrine (PROAMATINE) 5 MG tablet Take 10 mg by mouth 2 times daily  10/30/18  Yes FRANCES Ray MD   multivitamin, therapeutic with minerals (MULTI-VITAMIN) TABS tablet Take 1 tablet by mouth daily   Yes Reported, Patient   Omega-3 Fatty Acids (FISH OIL) 500 MG CAPS Take 1 capsule by mouth daily    Yes Reported, Patient   PANTOPRAZOLE SODIUM PO Take 40  mg by mouth 2 times daily (before meals)    Yes Reported, Patient   potassium chloride ER (K-DUR/KLOR-CON M) 20 MEQ CR tablet TAKE 1 TABLET BY MOUTH ONCE DAILY 1/9/19  Yes FRANCES Ray MD   sodium chloride 1 GM tablet TAKE 1 TABLET BY MOUTH TWICE A DAY 12/6/18  Yes Herman Rivers, DO   Turmeric 500 MG TABS Take 1 tablet by mouth daily   Yes Reported, Patient       ALLERGIES:     Allergies   Allergen Reactions     Cats Unknown     Lamotrigine      Skin lesions       ROS:  Constitutional, neuro, ENT, endocrine, pulmonary, cardiac, gastrointestinal, genitourinary, musculoskeletal, integument and psychiatric systems are negative, except as otherwise noted.      EXAM:  /56 (BP Location: Right arm, Patient Position: Chair, Cuff Size: Adult Regular)   Pulse 71   Temp 96.3  F (35.7  C) (Tympanic)   SpO2 97%  There is no height or weight on file to calculate BMI.   GENERAL APPEARANCE: healthy, alert and no distress  EYES: Eyes grossly normal to inspection, PERRL and conjunctivae and sclerae normal  HENT: nose and mouth without ulcers or lesions  NECK: no adenopathy, no asymmetry, masses, or scars and thyroid normal to palpation  RESP: lungs clear to auscultation - no rales, rhonchi or wheezes  CV: regular rates and rhythm, normal S1 S2, no S3 or S4 and no murmur, click or rub  ABDOMEN: soft, nontender, without hepatosplenomegaly or masses and bowel sounds normal   (male): Not examined  MS: Has lumbar midline tenderness  NEURO: Has his dementia he is cooperative does not appear agitated no focal weakness but with certain positions has discomfort from the compression fracture  PSYCH: Affect is a little flat he is at his baseline for mentation he does have Lewy body dementia  Lab/ X-ray  Results for orders placed or performed during the hospital encounter of 03/21/19 (from the past 24 hour(s))   XR Lumbar Spine 2/3 Views    Narrative    XR LUMBAR SPINE 2-3 VIEWS    HISTORY: 85 years Male low back pain post  fall    COMPARISON: CT lumbar spine dated 6/14/2017    TECHNIQUE: Lumbar spine 3 views    FINDINGS: There is now a mild compression fracture of L2. There is  mild anterior wedging. There is slight compression of the inferior and  anterior endplates. There is loss of 25% or less of anterior vertebral  height. Posterior vertebral height is normal.    There is a very mild anterior wedging of T11-T12 and L1, likely  physiologic.  There is degenerative disc space narrowing of mild severity at L4-L5  and L5-S1.    There is atherosclerotic disease of the abdominal aorta without  evidence of aneurysm..      Impression    IMPRESSION: Interval development of mild anterior compression fracture  of L2.    VIMAL PÉREZ MD       ASSESSMENT/PLAN:    ICD-10-CM    1. Closed compression fracture of L1 lumbar vertebra with routine healing, subsequent encounter S32.010D calcitonin, salmon, (MIACALCIN) 200 UNIT/ACT nasal spray   2. Lewy body dementia without behavioral disturbance G31.83     F02.80    3. Parkinson disease (H) G20    4. Hypercholesterolemia E78.00      Patient has autonomic orthostatic hypotension secondary to Lewy body dementia with Parkinson's disease has known coronary artery disease he has had a pacemaker placed secondary to AV block.  He recently fell and suffered a lumbar compression fracture of L2 and has increased pain.  We will treat with Miacalcin nasal spray.  Unfortunately his wife is needing help caring for him and are asking for temporary placement at a nursing home so patient can get physical therapy and help him recover from this compression fracture.  Our  have  been contacted and she will contact various facilities in the area and hopefully we can get him transferred today to one  of these facilities.  I feel this was would be the best choice for him.    IRENE Ray MD  March 22, 2019

## 2019-03-22 NOTE — LETTER
3/22/2019         RE: Jf Ortiz  [address] [place] [address]        Dear Colleague,    [facility] you [facility] referring your patient, [facility] Ortiz[facility] Please see a copy of my visit note below.      SUBJECTIVE:   Jf Ortiz is a 85 year old male who presents to clinic today for the following health issues:      Musculoskeletal problem/pain      Duration: Follow up from ER seen 3/21/2019    Description    Location: lower back- lumbar compression fracture. Would like to discuss going to rehab facility - Face to Face    Intensity:  moderate    Accompanying signs and symptoms: pain    History  Previous similar problem: YES  Previous evaluation:  x-ray    Precipitating or alleviating factors:  Trauma or overuse: YES- due to fall  Aggravating factors include: sitting, standing and walking    Therapies tried and outcome: Norco, hydrocodone, cane    [facility]    Jf Ortiz, 85 year old, male presents with   Chief Complaint   Patient presents with     Musculoskeletal Problem       PAST MEDICAL HISTORY:  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     Seizure disorder (H)      Stented coronary artery        PAST SURGICAL HISTORY:  Past Surgical History:   Procedure Laterality Date     ------------OTHER-------------  1955    ulnar and radial fx - repair ulnar and radial fx x4     ------------OTHER-------------  6/14/2011    cataract extraction     BIOPSY  08/2015    skin biopsy     BLEPHAROPLASTY BILATERAL  5/6/2014    Procedure: BLEPHAROPLASTY BILATERAL;  Surgeon: Andrew Queen MD;  Location: [facility]     BYPASS GRAFT ARTERY CORONARY  11/2006    coronary artery disease  x 5, [facility]     cataract extraction and lens implantation  2011    cataracts     cataract extraction and lens implantation      cataracts     COLONOSCOPY  2012     colonoscopy with polypectomy  3/13/2009    history of polyps - repeat 3 yrs     colonoscopy with polypectomy  2006     colonoscopy with polypectomy  2005     COMBINED COLONOSCOPY WITH ARGON PLASMA COAGULATOR (APC) N/A 10/31/2014    Procedure: COMBINED COLONOSCOPY WITH ARGON PLASMA COAGULATOR (APC);  Surgeon: Bassam Aguilar MD;  Location: [facility]     COMBINED COLONOSCOPY WITH ARGON PLASMA COAGULATOR (APC) N/A 11/13/2015    Procedure: COMBINED COLONOSCOPY WITH ARGON PLASMA COAGULATOR (APC);  Surgeon: Bassam Aguilar MD;  Location: [facility]     ENDOSCOPY UPPER, COLONOSCOPY, COMBINED N/A 10/31/2014    Procedure: COMBINED ENDOSCOPY UPPER, COLONOSCOPY;  Surgeon: Bassam Aguilar MD;  [facility]: [facility]     ENDOSCOPY UPPER, COLONOSCOPY, COMBINED N/A 11/13/2015    Procedure: COMBINED ENDOSCOPY UPPER, COLONOSCOPY;  Surgeon: Bassam Aguilar MD;  Location: [facility]     ESOPHAGOSCOPY, GASTROSCOPY, DUODENOSCOPY (EGD), COMBINED  1/22/2014    Procedure: COMBINED ESOPHAGOSCOPY, GASTROSCOPY, DUODENOSCOPY (EGD);  UPPER ENDOSCOPY(PENA) W/ BIOPSIES;  Surgeon: Patricia Pena MD;  Location: [facility]     HERNIORRHAPHY INGUINAL Right 2/14/2018    Procedure: HERNIORRHAPHY INGUINAL;  OPEN RIGHT INGUINAL HERNIA REPAIR with Mesh;  Surgeon: Virgilio Zaragoza DO;  Location: [facility]     LARYNGOSCOPY WITH MICROSCOPE  1/22/2014    Procedure: LARYNGOSCOPY WITH MICROSCOPE;;  Surgeon: Chayo Duke MD;  Location: [facility]     pacemaker placement  2000    heart block     pacemaker placement  2011    dual-chamber     REMOVE TUBE, MYRINGOTOMY, COMBINED  1/22/2014    Procedure: COMBINED REMOVE TUBE, MYRINGOTOMY;  MICRODIRECT LARYNGOSCOPY WITH BIOPSY AND FROZEN SECTIONS removal of right ear tube and myringoplasty;  Surgeon: Chayo Duke MD;  Location: [facility]     REPLACE PACEMAKER  GENERATOR N/A 8/8/2018    Procedure: REPLACE PACEMAKER GENERATOR;  Pacemaker generator change;  Surgeon: Alma Kaplan MD;  Location: [facility]     stent placement to LAD  2008     ventilation tube  5/24/2012    right in office       MEDICATIONS:  Prior to Admission medications    Medication Sig Start Date End Date Taking? Authorizing Provider   atorvastatin (LIPITOR) 20 MG tablet TAKE 1 TABLET BY MOUTH EVERY EVENING - GENERIC FOR LIPITOR  Patient taking differently: TAKE 1 TABLET BY MOUTH EVERY EVENING 1/24/19  Yes Herman Rivers,    calcitonin, salmon, (MIACALCIN) 200 UNIT/ACT nasal spray Spray 1 spray into one nostril alternating nostrils daily Alternate nostril each day. 3/22/19  Yes FRANCES Ray MD   Cholecalciferol (VITAMIN D-3 PO) Take 1,000 Units by mouth daily    Yes Reported, Patient   Coenzyme Q10 (CO Q 10 PO) Take 200 mg by mouth every morning    Yes Reported, Patient   Cyanocobalamin (VITAMIN B-12 PO) Take 1 tablet by mouth daily   Yes Reported, Patient   donepezil (ARICEPT) 10 MG tablet Take 10 mg by mouth At Bedtime 2/1/19  Yes Reported, Patient   fludrocortisone (FLORINEF) 0.1 MG tablet Take 2-3 tabs daily.  Patient taking differently: Take 2-3 tablets by mouth daily. 10/8/18  Yes Marycarmen Ibarra APRN CNP   HYDROcodone-acetaminophen (NORCO) 5-325 MG tablet Take 1 tablet by mouth every 6 hours as needed for pain 3/21/19 3/24/19 Yes Frank Venegas MD   lidocaine (LIDODERM) 5 % patch Place 1 patch onto the skin every 24 hours for 10 days 3/21/19 3/31/19 Yes Frank Venegas MD   Magnesium 400 MG CAPS Take 400 mg by mouth 2 times daily    Yes Reported, Patient   MELATONIN PO Take 5 mg by mouth At Bedtime    Yes Reported, Patient   metoprolol succinate ER (TOPROL-XL) 50 MG 24 hr tablet TAKE 1 TABLET (50 MG) BY MOUTH DAILY 1/22/19  Yes Herman Rivers, DO   midodrine (PROAMATINE) 5 MG tablet Take 10 mg by mouth 2 times daily  10/30/18  Yes FRANCES Ray MD   multivitamin, therapeutic with  minerals (MULTI-VITAMIN) TABS tablet Take 1 tablet by mouth daily   Yes Reported, Patient   Omega-3 Fatty Acids (FISH OIL) 500 MG CAPS Take 1 capsule by mouth daily    Yes Reported, Patient   PANTOPRAZOLE SODIUM PO Take 40 mg by mouth 2 times daily (before meals)    Yes Reported, Patient   potassium chloride ER (K-DUR/KLOR-CON M) 20 MEQ CR tablet TAKE 1 TABLET BY MOUTH ONCE DAILY 1/9/19  Yes FRANCES Ray MD   sodium chloride 1 GM tablet TAKE 1 TABLET BY MOUTH TWICE A DAY 12/6/18  Yes Herman Rivers, DO   Turmeric 500 MG TABS Take 1 tablet by mouth daily   Yes Reported, Patient       ALLERGIES:     Allergies   Allergen Reactions     Cats Unknown     Lamotrigine      Skin lesions       ROS:  Constitutional, neuro, ENT, endocrine, pulmonary, cardiac, gastrointestinal, genitourinary, musculoskeletal, integument and psychiatric systems are negative, except as otherwise noted.      EXAM:  /56 (BP Location: Right arm, Patient Position: Chair, Cuff Size: Adult Regular)   Pulse 71   Temp 96.3  F (35.7  C) (Tympanic)   SpO2 97%  There is no height or weight on file to calculate BMI.   GENERAL APPEARANCE: healthy, alert and no distress  EYES: Eyes grossly normal to inspection, PERRL and conjunctivae and sclerae normal  HENT: nose and mouth without ulcers or lesions  NECK: no adenopathy, no asymmetry, masses, or scars and thyroid normal to palpation  RESP: lungs clear to auscultation - no rales, rhonchi or wheezes  CV: regular rates and rhythm, normal S1 S2, no S3 or S4 and no murmur, click or rub  ABDOMEN: soft, nontender, without hepatosplenomegaly or masses and bowel sounds normal   (male): Not examined  MS: Has lumbar midline tenderness  NEURO: Has his dementia he is cooperative does not appear agitated no focal weakness but with certain positions has discomfort from the compression fracture  PSYCH: Affect is a little flat he is at his baseline for mentation he does have Lewy body dementia  Lab/  X-ray  Results for orders placed or performed during the hospital encounter of 03/21/19 (from the past 24 hour(s))   XR Lumbar Spine 2/3 Views    Narrative    XR LUMBAR SPINE 2-3 VIEWS    HISTORY: 85 years Male low back pain post fall    COMPARISON: CT lumbar spine dated 6/14/2017    TECHNIQUE: Lumbar spine 3 views    FINDINGS: There is now a mild compression fracture of L2. There is  mild anterior wedging. There is slight compression of the inferior and  anterior endplates. There is loss of 25% or less of anterior vertebral  height. Posterior vertebral height is normal.    There is a very mild anterior wedging of T11-T12 and L1, likely  physiologic.  There is degenerative disc space narrowing of mild severity at L4-L5  and L5-S1.    There is atherosclerotic disease of the abdominal aorta without  evidence of aneurysm..      Impression    IMPRESSION: Interval development of mild anterior compression fracture  of L2.    VIMAL PÉREZ MD       ASSESSMENT/PLAN:    ICD-10-CM    1. Closed compression fracture of L1 lumbar vertebra with routine healing, subsequent encounter S32.010D calcitonin, salmon, (MIACALCIN) 200 UNIT/ACT nasal spray   2. Lewy body dementia without behavioral disturbance G31.83     F02.80    3. Parkinson disease (H) G20    4. Hypercholesterolemia E78.00      Patient has autonomic orthostatic hypotension secondary to Lewy body dementia with Parkinson's disease has known coronary artery disease he has had a pacemaker placed secondary to AV block.  He recently fell and suffered a lumbar compression fracture of L2 and has increased pain.  We will treat with Miacalcin nasal spray.  Unfortunately his wife is needing help caring for him and are asking for temporary placement at a nursing home so patient can get physical therapy and help him recover from this compression fracture.  Our  have  been contacted and she will contact various facilities in the area and hopefully we can get him  transferred today to one  of these facilities.  I feel this was would be the best choice for him.    IRENE Ray MD  March 22, 2019              Again, thank you for allowing me to participate in the care of your patient.        Sincerely,        FRANCES Ray MD

## 2019-03-22 NOTE — PROGRESS NOTES
Called [facility] and confirmed they will contact pt.  Sapna stated she will have Janice reach out to pt when she returns Mon to set up admission for that day.    Patsy Bae, [facility]  Outpatient   [phone]

## 2019-03-22 NOTE — NURSING NOTE
"Chief Complaint   Patient presents with     Musculoskeletal Problem       Initial /56 (BP Location: Right arm, Patient Position: Chair, Cuff Size: Adult Regular)   Pulse 71   Temp 96.3  F (35.7  C) (Tympanic)   SpO2 97%  Estimated body mass index is 23.63 kg/m  as calculated from the following:    Height as of 3/12/19: 1.753 m (5' 9\").    Weight as of 3/21/19: 72.6 kg (160 lb).  Medication Reconciliation: complete    SHASHI ORDONEZ LPN  "

## 2019-03-22 NOTE — TELEPHONE ENCOUNTER
Des called regarding patient, he has a lumbar compression fracture. It is very difficult to take care of patient. She is requesting a referral to rehab. Patient will come in today at 10am for a face to face visit also notified Radha- , she will help with this process and give resources.   SHASHI ORDONEZ

## 2019-03-25 ENCOUNTER — INFUSION THERAPY VISIT (OUTPATIENT)
Dept: INFUSION THERAPY | Facility: OTHER | Age: 84
End: 2019-03-25
Attending: FAMILY MEDICINE
Payer: MEDICARE

## 2019-03-25 VITALS
RESPIRATION RATE: 18 BRPM | HEART RATE: 69 BPM | TEMPERATURE: 97.4 F | SYSTOLIC BLOOD PRESSURE: 160 MMHG | BODY MASS INDEX: 23.62 KG/M2 | OXYGEN SATURATION: 97 % | HEIGHT: 69 IN | DIASTOLIC BLOOD PRESSURE: 84 MMHG

## 2019-03-25 DIAGNOSIS — E86.9 VOLUME DEPLETION: Primary | ICD-10-CM

## 2019-03-25 PROCEDURE — 96365 THER/PROPH/DIAG IV INF INIT: CPT

## 2019-03-25 PROCEDURE — 96360 HYDRATION IV INFUSION INIT: CPT

## 2019-03-25 PROCEDURE — 25000128 H RX IP 250 OP 636: Performed by: FAMILY MEDICINE

## 2019-03-25 RX ADMIN — SODIUM CHLORIDE 1000 ML: 9 INJECTION, SOLUTION INTRAVENOUS at 10:40

## 2019-03-25 NOTE — PROGRESS NOTES
Patient is a 86y/o male here accompanied by spouse today for infusion of IVF per order of Dr Venancio Ray.  Patient meets parameters for today's infusion. Patient identified with two identifiers, order verified, and verbal consent for today's infusion obtained from patient.       24 gauge angio cath inserted into right hand.  Immediate blood return noted.  IV secured with sterile, transparent dressing and tape.  Patient tolerated well, denies pain or discomfort at this time.  Flushes easily without resistance, no signs or symptoms of infiltration or infection.   Patient denies questions or concerns regarding infusion and/or medication(s) being administered.        1040 IV pump verified with dose, drug, and rate of administration.  Infusion administered per protocol.  Patient tolerated infusion well, no signs or symptoms of adverse reaction noted.  Patient denies pain nor discomfort.     1155 IV removed, catheter intact.  Site clean, dry and intact.  No signs or symptoms of infiltration or infection.  Covered with a sterile bandage, slight pressure applied for 30 seconds.  Pt instructed to leave bandage intact for a minimum of one hour, and to call with questions or concerns.  Copy of appointments, discharge instructions, and after visit summary (AVS) provided to patient.  Patient states understanding, discharged ambulatory.

## 2019-03-25 NOTE — PATIENT INSTRUCTIONS

## 2019-03-26 NOTE — PROGRESS NOTES
Clinic Care Coordination Contact  Care Team Conversations    Received call from pt wife's son.  They were planning on moving in to [facility] today, but received a call this am that his insurance will not cover the stay.  He was very frustrated as they did all the necessary preparations.  Advised writer would call Monica to inquire.    Called Sapna at [facility] and she confirmed that as of last Friday when we last spoke,patient was accepted and it was understood his insurance would pay.  However, as they were processing admission paperwork, it came to light insurance will not pay as Medicare is primary and [facility] is secondary.  Asked Sapna to call son to explain.      Received additional call from pt's wife.  Also clarified with her the reason for denial.  She was wondering if any additional Medicare plans would assist; advised that writer was not sure where open enrollment was at, but gave her number for senior linkage line.       Offered to look into homecare for pt, but wife denied.  She stated they would like to go to [facility].  Advised writer will contact PCP.    Pt's wife will call [facility] to cancel admission.  She will also call Senior Linkage line regarding supplemental insurance.  Writer sent message to patient's PCP regarding [facility] referral.    Patsy Bae, [facility]  Outpatient   [phone]

## 2019-03-27 ENCOUNTER — TELEPHONE (OUTPATIENT)
Dept: FAMILY MEDICINE | Facility: OTHER | Age: 84
End: 2019-03-27

## 2019-03-27 NOTE — TELEPHONE ENCOUNTER
Spoke with our [facility], it was sent and documented on referral  SHASHI ORDONEZ  Wife notified  SHASHI ORDONEZ

## 2019-03-27 NOTE — TELEPHONE ENCOUNTER
11:03 AM    Reason for Call: Phone Call    Description: Des Catherine called on behalf of patient stating that he is supposed to start PT.  No order for PT has been sent to [facility] yet.  Please advise.  Thank you.    Was an appointment offered for this call? No  If yes : Appointment type              Date    Preferred method for responding to this message: Telephone Call  What is your phone number ?    If we cannot reach you directly, may we leave a detailed response at the number you provided? No    Can this message wait until your PCP/provider returns, if available today? YES,     Char Morales LPN

## 2019-03-29 ENCOUNTER — TELEPHONE (OUTPATIENT)
Dept: INFUSION THERAPY | Facility: OTHER | Age: 84
End: 2019-03-29

## 2019-03-29 ENCOUNTER — INFUSION THERAPY VISIT (OUTPATIENT)
Dept: INFUSION THERAPY | Facility: OTHER | Age: 84
End: 2019-03-29
Attending: FAMILY MEDICINE
Payer: MEDICARE

## 2019-03-29 VITALS
WEIGHT: 164.8 LBS | DIASTOLIC BLOOD PRESSURE: 85 MMHG | BODY MASS INDEX: 24.33 KG/M2 | HEART RATE: 83 BPM | SYSTOLIC BLOOD PRESSURE: 146 MMHG

## 2019-03-29 DIAGNOSIS — E86.9 VOLUME DEPLETION: Primary | ICD-10-CM

## 2019-03-29 PROCEDURE — 96360 HYDRATION IV INFUSION INIT: CPT

## 2019-03-29 PROCEDURE — 25000128 H RX IP 250 OP 636: Performed by: FAMILY MEDICINE

## 2019-03-29 RX ADMIN — SODIUM CHLORIDE 1000 ML: 9 INJECTION, SOLUTION INTRAVENOUS at 11:02

## 2019-03-29 NOTE — TELEPHONE ENCOUNTER
Patient is here for 1 liter of IVF. He comes twice weekly. At time of encounter Pt BP is 84/53, and states he feels dizzy, and light headed. He took his toprol xl 50 mg this morning. They have a wrist BP cuff. Advised to take BP prior to giving toprol xl. Please advise of parameter to hold medication in the future.

## 2019-03-29 NOTE — PATIENT INSTRUCTIONS
Check blood pressure prior to taking Toprol-XL, If systolic (top number) is less than 90 hold toprol-XL, and recheck prior to next dose.

## 2019-03-29 NOTE — PROGRESS NOTES
Patient is a 84 y/o male here accompanied by SO today for infusion of IVF per order of Dr. Venancio Ray.  Patient meets parameters for today's infusion. Patient identified with two identifiers, order verified, and verbal consent for today's infusion obtained from patient.         24 gauge angio cath inserted into right wrist.  Immediate blood return noted.  IV secured with sterile, transparent dressing and tape.  Patient tolerated well, denies pain or discomfort at this time.  Flushes easily without resistance, no signs or symptoms of infiltration or infection.   Patient denies questions or concerns regarding infusion and/or medication(s) being administered.        1102 IV pump verified with IVF dose, drug, and rate of administration.  Infusion administered per protocol.

## 2019-04-02 ENCOUNTER — INFUSION THERAPY VISIT (OUTPATIENT)
Dept: INFUSION THERAPY | Facility: OTHER | Age: 84
End: 2019-04-02
Attending: FAMILY MEDICINE
Payer: MEDICARE

## 2019-04-02 VITALS
HEIGHT: 69 IN | RESPIRATION RATE: 16 BRPM | WEIGHT: 164.79 LBS | SYSTOLIC BLOOD PRESSURE: 152 MMHG | DIASTOLIC BLOOD PRESSURE: 72 MMHG | BODY MASS INDEX: 24.41 KG/M2 | HEART RATE: 72 BPM | OXYGEN SATURATION: 98 % | TEMPERATURE: 97.4 F

## 2019-04-02 DIAGNOSIS — E86.9 VOLUME DEPLETION: Primary | ICD-10-CM

## 2019-04-02 PROCEDURE — 25000128 H RX IP 250 OP 636: Performed by: FAMILY MEDICINE

## 2019-04-02 PROCEDURE — 96360 HYDRATION IV INFUSION INIT: CPT

## 2019-04-02 RX ADMIN — SODIUM CHLORIDE 1000 ML: 9 INJECTION, SOLUTION INTRAVENOUS at 14:39

## 2019-04-02 ASSESSMENT — MIFFLIN-ST. JEOR: SCORE: 1423.13

## 2019-04-02 NOTE — PATIENT INSTRUCTIONS

## 2019-04-02 NOTE — PROGRESS NOTES
Patient is a 86 y/o male here accompanied by spouse today for infusion of IVF per order of Dr Venancio Ray.  Patient meets parameters for today's infusion. Patient identified with two identifiers, order verified, and verbal consent for today's infusion obtained from patient.       Patient meets order parameters for today's treatment.     24 gauge angio cath inserted into right hand.  Immediate blood return noted.  IV secured with sterile, transparent dressing and tape.  Patient tolerated well, denies pain or discomfort at this time.  Flushes easily without resistance, no signs or symptoms of infiltration or infection.   Patient denies questions or concerns regarding infusion and/or medication(s) being administered.      1439 IV pump verified with dose, drug, and rate of administration.  Infusion administered per protocol.  Patient tolerated infusion well, no signs or symptoms of adverse reaction noted.  Patient denies pain nor discomfort.     1553 IV removed, catheter intact.  Site clean, dry and intact.  No signs or symptoms of infiltration or infection.  Covered with a sterile bandage, slight pressure applied for 30 seconds.  Pt instructed to leave bandage intact for a minimum of one hour, and to call with questions or concerns.  Copy of appointments, discharge instructions, and after visit summary (AVS) provided to patient.  Patient states understanding, discharged ambulatory.

## 2019-04-03 ENCOUNTER — TRANSFERRED RECORDS (OUTPATIENT)
Dept: HEALTH INFORMATION MANAGEMENT | Facility: CLINIC | Age: 84
End: 2019-04-03

## 2019-04-03 NOTE — PROGRESS NOTES
SUBJECTIVE:   Jf Ortiz is a 85 year old male who presents to clinic today for the following health issues:        Musculoskeletal problem/pain      Duration: Follow up    Description  Location: Lower back    Intensity:  moderate    Accompanying signs and symptoms: none    History  Previous similar problem: no   Previous evaluation:  x-ray    Precipitating or alleviating factors:  Trauma or overuse: YES  Aggravating factors include: sitting, standing, walking, lifting, exercise and overuse    Therapies tried and outcome: nothing and physical therapy  Wife is held his Toprol because his pressures have not been elevated.  Was having alternating hypertension and hypotension.  Also request a handicap parking sticker           PAST MEDICAL HISTORY:  Past Medical History:   Diagnosis Date     Autonomic orthostatic hypotension 10/14/2016     Coronary artery disease      Dementia without behavioral disturbance 7/28/2015    Diagnosis updated by automated process. Provider to review and confirm.     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     cataract extraction and lens implantation  2011    cataracts     cataract  extraction and lens implantation      cataracts     COLONOSCOPY  2012     colonoscopy with polypectomy  3/13/2009    history of polyps - repeat 3 yrs     colonoscopy with polypectomy  2006     colonoscopy with polypectomy  2005     COMBINED COLONOSCOPY WITH ARGON PLASMA COAGULATOR (APC) N/A 10/31/2014    Procedure: COMBINED COLONOSCOPY WITH ARGON PLASMA COAGULATOR (APC);  Surgeon: Bassam Aguilar MD;  Location: [facility]     COMBINED COLONOSCOPY WITH ARGON PLASMA COAGULATOR (APC) N/A 11/13/2015    Procedure: COMBINED COLONOSCOPY WITH ARGON PLASMA COAGULATOR (APC);  Surgeon: Bassam Aguilar MD;  Location: [facility]     ENDOSCOPY UPPER, COLONOSCOPY, COMBINED N/A 10/31/2014    Procedure: COMBINED ENDOSCOPY UPPER, COLONOSCOPY;  Surgeon: Bassam Aguilar MD;  [facility]: [facility]     ENDOSCOPY UPPER, COLONOSCOPY, COMBINED N/A 11/13/2015    Procedure: COMBINED ENDOSCOPY UPPER, COLONOSCOPY;  Surgeon: Bassam Aguilar MD;  [facility]     ESOPHAGOSCOPY, GASTROSCOPY, DUODENOSCOPY (EGD), COMBINED  1/22/2014    Procedure: COMBINED ESOPHAGOSCOPY, GASTROSCOPY, DUODENOSCOPY (EGD);  UPPER ENDOSCOPY(PENA) W/ BIOPSIES;  Surgeon: Patricia Pena MD;  Location: [facility]     HERNIORRHAPHY INGUINAL Right 2/14/2018    Procedure: HERNIORRHAPHY INGUINAL;  OPEN RIGHT INGUINAL HERNIA REPAIR with Mesh;  Surgeon: Virgilio Zaragoza DO;  Location: [facility]     LARYNGOSCOPY WITH MICROSCOPE  1/22/2014    Procedure: LARYNGOSCOPY WITH MICROSCOPE;;  Surgeon: Chayo Duek MD;  Location: [facility]     pacemaker placement  2000    heart block     pacemaker placement  2011    dual-chamber     REMOVE TUBE, MYRINGOTOMY, COMBINED  1/22/2014    Procedure: COMBINED REMOVE TUBE, MYRINGOTOMY;  MICRODIRECT LARYNGOSCOPY WITH BIOPSY AND FROZEN SECTIONS removal of right ear tube and myringoplasty;  Surgeon: Chayo Duke MD;  Location: [facility]     REPLACE PACEMAKER GENERATOR N/A 8/8/2018    Procedure: REPLACE PACEMAKER GENERATOR;  Pacemaker generator change;  Surgeon:  Alma Kaplan MD;  Location: [facility]     stent placement to LAD  2008     ventilation tube  5/24/2012    right in office       MEDICATIONS:  Prior to Admission medications    Medication Sig Start Date End Date Taking? Authorizing Provider   atorvastatin (LIPITOR) 20 MG tablet TAKE 1 TABLET BY MOUTH EVERY EVENING - GENERIC FOR LIPITOR  Patient taking differently: TAKE 1 TABLET BY MOUTH EVERY EVENING 1/24/19  Yes Herman Rivers,    calcitonin, salmon, (MIACALCIN) 200 UNIT/ACT nasal spray Spray 1 spray into one nostril alternating nostrils daily Alternate nostril each day. 3/22/19  Yes FRANCES Ray MD   Cholecalciferol (VITAMIN D-3 PO) Take 1,000 Units by mouth daily    Yes Reported, Patient   ciprofloxacin (CIPRO) 250 MG tablet Take 1 tablet (250 mg) by mouth 2 times daily 4/5/19  Yes FRANCES Ray MD   Coenzyme Q10 (CO Q 10 PO) Take 200 mg by mouth every morning    Yes Reported, Patient   Cyanocobalamin (VITAMIN B-12 PO) Take 1 tablet by mouth daily   Yes Reported, Patient   donepezil (ARICEPT) 10 MG tablet Take 10 mg by mouth At Bedtime 2/1/19  Yes Reported, Patient   fludrocortisone (FLORINEF) 0.1 MG tablet Take 2-3 tabs daily.  Patient taking differently: Take 2-3 tablets by mouth daily. 10/8/18  Yes Marycarmen Ibarra APRN CNP   Magnesium 400 MG CAPS Take 400 mg by mouth 2 times daily    Yes Reported, Patient   MELATONIN PO Take 5 mg by mouth At Bedtime    Yes Reported, Patient   midodrine (PROAMATINE) 5 MG tablet Take 10 mg by mouth 2 times daily  10/30/18  Yes FRANCES Ray MD   multivitamin, therapeutic with minerals (MULTI-VITAMIN) TABS tablet Take 1 tablet by mouth daily   Yes Reported, Patient   Omega-3 Fatty Acids (FISH OIL) 500 MG CAPS Take 1 capsule by mouth daily    Yes Reported, Patient   PANTOPRAZOLE SODIUM PO Take 40 mg by mouth 2 times daily (before meals)    Yes Reported, Patient   potassium chloride ER (K-DUR/KLOR-CON M) 20 MEQ CR tablet TAKE 1 TABLET BY MOUTH ONCE DAILY 1/9/19  Yes  "FRANCES Ray MD   sodium chloride 1 GM tablet TAKE 1 TABLET BY MOUTH TWICE A DAY 12/6/18  Yes Herman Rivers, DO   Turmeric 500 MG TABS Take 1 tablet by mouth daily   Yes Reported, Patient       ALLERGIES:     Allergies   Allergen Reactions     Cats Unknown     Lamotrigine      Skin lesions       ROS:  Constitutional, HEENT, cardiovascular, pulmonary, gi and gu systems are negative, except as otherwise noted.      EXAM:  /58 (BP Location: Left arm, Patient Position: Sitting, Cuff Size: Adult Regular)   Pulse 76   Temp 98.4  F (36.9  C) (Tympanic)   Ht 1.753 m (5' 9\")   Wt 73.5 kg (162 lb)   SpO2 98%   BMI 23.92 kg/m   Body mass index is 23.92 kg/m .   GENERAL APPEARANCE: healthy, alert and no distress  EYES: Eyes grossly normal to inspection, PERRL and conjunctivae and sclerae normal  RESP: lungs clear to auscultation - no rales, rhonchi or wheezes  CV: regular rates and rhythm, normal S1 S2, no S3 or S4 and no murmur, click or rub  Lab/ X-ray  No results found for this or any previous visit (from the past 24 hour(s)).    ASSESSMENT/PLAN:    ICD-10-CM    1. Lewy body dementia without behavioral disturbance G31.83     F02.80    2. Autonomic orthostatic hypotension I95.1    His blood pressure is is stable wife is held his Toprol for now we will stop the Toprol.  I explained again that when he is lying down he can get hypertensive but when he stands up will get hypotensive from the autonomic dysfunction.  We will keep with the 2 medications for the hypotension but stopped the Toprol.  We will see him in a month.  Handicap parking form completed 6-year      IRENE Ray MD  April 9, 2019          "

## 2019-04-05 ENCOUNTER — TELEPHONE (OUTPATIENT)
Dept: FAMILY MEDICINE | Facility: OTHER | Age: 84
End: 2019-04-05

## 2019-04-05 ENCOUNTER — INFUSION THERAPY VISIT (OUTPATIENT)
Dept: INFUSION THERAPY | Facility: OTHER | Age: 84
End: 2019-04-05
Attending: FAMILY MEDICINE
Payer: MEDICARE

## 2019-04-05 VITALS
HEIGHT: 69 IN | HEART RATE: 76 BPM | BODY MASS INDEX: 24.41 KG/M2 | RESPIRATION RATE: 18 BRPM | OXYGEN SATURATION: 98 % | TEMPERATURE: 98.2 F | SYSTOLIC BLOOD PRESSURE: 180 MMHG | WEIGHT: 164.79 LBS | DIASTOLIC BLOOD PRESSURE: 88 MMHG

## 2019-04-05 DIAGNOSIS — N39.0 UTI (URINARY TRACT INFECTION): Primary | ICD-10-CM

## 2019-04-05 DIAGNOSIS — E86.9 VOLUME DEPLETION: Primary | ICD-10-CM

## 2019-04-05 DIAGNOSIS — N30.00 ACUTE CYSTITIS WITHOUT HEMATURIA: ICD-10-CM

## 2019-04-05 LAB
ALBUMIN UR-MCNC: NEGATIVE MG/DL
APPEARANCE UR: CLEAR
BACTERIA #/AREA URNS HPF: ABNORMAL /HPF
BILIRUB UR QL STRIP: NEGATIVE
COLOR UR AUTO: ABNORMAL
GLUCOSE UR STRIP-MCNC: NEGATIVE MG/DL
HGB UR QL STRIP: NEGATIVE
KETONES UR STRIP-MCNC: NEGATIVE MG/DL
LEUKOCYTE ESTERASE UR QL STRIP: NEGATIVE
MUCOUS THREADS #/AREA URNS LPF: PRESENT /LPF
NITRATE UR QL: NEGATIVE
PH UR STRIP: 6.5 PH (ref 4.7–8)
RBC #/AREA URNS AUTO: <1 /HPF (ref 0–2)
SOURCE: ABNORMAL
SP GR UR STRIP: 1 (ref 1–1.03)
UROBILINOGEN UR STRIP-MCNC: NORMAL MG/DL (ref 0–2)
WBC #/AREA URNS AUTO: <1 /HPF (ref 0–5)

## 2019-04-05 PROCEDURE — 96360 HYDRATION IV INFUSION INIT: CPT

## 2019-04-05 PROCEDURE — 81001 URINALYSIS AUTO W/SCOPE: CPT | Mod: ZL | Performed by: FAMILY MEDICINE

## 2019-04-05 PROCEDURE — 25000128 H RX IP 250 OP 636: Performed by: FAMILY MEDICINE

## 2019-04-05 RX ORDER — CIPROFLOXACIN 250 MG/1
250 TABLET, FILM COATED ORAL 2 TIMES DAILY
Qty: 14 TABLET | Refills: 0 | Status: SHIPPED | OUTPATIENT
Start: 2019-04-05 | End: 2019-05-09

## 2019-04-05 RX ADMIN — SODIUM CHLORIDE 1000 ML: 9 INJECTION, SOLUTION INTRAVENOUS at 14:05

## 2019-04-05 ASSESSMENT — MIFFLIN-ST. JEOR: SCORE: 1423.13

## 2019-04-05 NOTE — PROGRESS NOTES
Patient is a 84 y/o male here accompanied by spouse today for infusion of Dr Venancio Ray.  Patient meets parameters for today's infusion. Patient identified with two identifiers, order verified, and verbal consent for today's infusion obtained from patient.       Patient meets order parameters for today's treatment.     24 gauge angio cath inserted into right hand. Immediate blood return noted.  IV secured with sterile, transparent dressing and tape.  Patient tolerated well, denies pain or discomfort at this time.  Flushes easily without resistance, no signs or symptoms of infiltration or infection.   Patient denies questions or concerns regarding infusion and/or medication(s) being administered.    IV pump verified with dose, drug, and rate of administration.  Infusion administered per protocol.  Patient tolerated infusion well, no signs or symptoms of adverse reaction noted.  Patient denies pain nor discomfort.     Pt up to bathroom to void 4 times with urgency, Dr Venancio Ray nurse notified. Telephone order received for UA.    Pt and wife instructed that if frequency and/or burning continue to occur with voiding or any other sign or symptoms of infection, to have Jf go to urgent care or emergency room as indicated per symptoms.    IV removed, catheter intact.  Site clean, dry and intact.  No signs or symptoms of infiltration or infection.  Covered with a sterile bandage, slight pressure applied for 30 seconds.  Pt instructed to leave bandage intact for a minimum of one hour, and to call with questions or concerns.  Copy of appointments, discharge instructions, and after visit summary (AVS) provided to patient.  Patient states understanding, discharged ambulatory.

## 2019-04-05 NOTE — PATIENT INSTRUCTIONS

## 2019-04-09 ENCOUNTER — OFFICE VISIT (OUTPATIENT)
Dept: FAMILY MEDICINE | Facility: OTHER | Age: 84
End: 2019-04-09
Attending: FAMILY MEDICINE
Payer: MEDICARE

## 2019-04-09 ENCOUNTER — INFUSION THERAPY VISIT (OUTPATIENT)
Dept: INFUSION THERAPY | Facility: OTHER | Age: 84
End: 2019-04-09
Attending: FAMILY MEDICINE
Payer: MEDICARE

## 2019-04-09 VITALS
TEMPERATURE: 98.4 F | HEART RATE: 76 BPM | HEIGHT: 69 IN | DIASTOLIC BLOOD PRESSURE: 58 MMHG | SYSTOLIC BLOOD PRESSURE: 118 MMHG | BODY MASS INDEX: 23.99 KG/M2 | OXYGEN SATURATION: 98 % | WEIGHT: 162 LBS

## 2019-04-09 VITALS
OXYGEN SATURATION: 97 % | DIASTOLIC BLOOD PRESSURE: 82 MMHG | HEART RATE: 72 BPM | TEMPERATURE: 98.4 F | SYSTOLIC BLOOD PRESSURE: 154 MMHG

## 2019-04-09 DIAGNOSIS — E86.9 VOLUME DEPLETION: Primary | ICD-10-CM

## 2019-04-09 DIAGNOSIS — I95.1 AUTONOMIC ORTHOSTATIC HYPOTENSION: ICD-10-CM

## 2019-04-09 DIAGNOSIS — G31.83 LEWY BODY DEMENTIA WITHOUT BEHAVIORAL DISTURBANCE (H): Primary | ICD-10-CM

## 2019-04-09 DIAGNOSIS — F02.80 LEWY BODY DEMENTIA WITHOUT BEHAVIORAL DISTURBANCE (H): Primary | ICD-10-CM

## 2019-04-09 PROCEDURE — G0463 HOSPITAL OUTPT CLINIC VISIT: HCPCS | Mod: 25

## 2019-04-09 PROCEDURE — 99213 OFFICE O/P EST LOW 20 MIN: CPT | Performed by: FAMILY MEDICINE

## 2019-04-09 PROCEDURE — 96365 THER/PROPH/DIAG IV INF INIT: CPT

## 2019-04-09 PROCEDURE — 25000128 H RX IP 250 OP 636: Performed by: FAMILY MEDICINE

## 2019-04-09 PROCEDURE — 96360 HYDRATION IV INFUSION INIT: CPT

## 2019-04-09 RX ADMIN — SODIUM CHLORIDE 1000 ML: 9 INJECTION, SOLUTION INTRAVENOUS at 13:52

## 2019-04-09 ASSESSMENT — PAIN SCALES - GENERAL: PAINLEVEL: EXTREME PAIN (8)

## 2019-04-09 ASSESSMENT — MIFFLIN-ST. JEOR: SCORE: 1410.21

## 2019-04-09 NOTE — NURSING NOTE
"Chief Complaint   Patient presents with     Musculoskeletal Problem       Initial /58 (BP Location: Left arm, Patient Position: Sitting, Cuff Size: Adult Regular)   Pulse 76   Temp 98.4  F (36.9  C) (Tympanic)   Ht 1.753 m (5' 9\")   Wt 73.5 kg (162 lb)   SpO2 98%   BMI 23.92 kg/m   Estimated body mass index is 23.92 kg/m  as calculated from the following:    Height as of this encounter: 1.753 m (5' 9\").    Weight as of this encounter: 73.5 kg (162 lb).  Medication Reconciliation: complete    Sachi Townsend LPN  "

## 2019-04-09 NOTE — PROGRESS NOTES
Patient is a 85 year old male here accompanied by significant other today for infusion of IVF per order of Dr Venancio Ray. Patient identified with two identifiers, order verified, and verbal consent for today's infusion obtained from patient.

## 2019-04-09 NOTE — PROGRESS NOTES
24 gauge angio cath inserted into left hand  Immediate blood return noted.  IV secured with sterile, transparent dressing and tape.  Patient tolerated well, denies pain or discomfort at this time.  Flushes easily without resistance, no signs or symptoms of infiltration or infection.   Patient denies questions or concerns regarding infusion and/or medication(s) being administered.      1352 IV pump verified with dose, drug, and rate of administration. Infusion administered per protocol.  Patient tolerated infusion well, no signs or symptoms of adverse reaction noted.  Patient denies pain nor discomfort.     1503 IV removed, catheter intact.  Site clean, dry and intact.  No signs or symptoms of infiltration or infection.  Covered with a sterile bandage, slight pressure applied for 30 seconds.  Pt instructed to leave bandage intact for a minimum of one hour, and to call with questions or concerns.  Copy of appointments, discharge instructions, and after visit summary (AVS) provided to patient.  Patient states understanding, discharged ambulatory.

## 2019-04-12 ENCOUNTER — INFUSION THERAPY VISIT (OUTPATIENT)
Dept: INFUSION THERAPY | Facility: OTHER | Age: 84
End: 2019-04-12
Attending: FAMILY MEDICINE
Payer: MEDICARE

## 2019-04-12 VITALS
RESPIRATION RATE: 16 BRPM | DIASTOLIC BLOOD PRESSURE: 91 MMHG | BODY MASS INDEX: 24.23 KG/M2 | OXYGEN SATURATION: 96 % | WEIGHT: 164.1 LBS | HEART RATE: 72 BPM | SYSTOLIC BLOOD PRESSURE: 162 MMHG

## 2019-04-12 DIAGNOSIS — E86.9 VOLUME DEPLETION: Primary | ICD-10-CM

## 2019-04-12 PROCEDURE — 96365 THER/PROPH/DIAG IV INF INIT: CPT

## 2019-04-12 PROCEDURE — 96360 HYDRATION IV INFUSION INIT: CPT

## 2019-04-12 PROCEDURE — 25000128 H RX IP 250 OP 636: Performed by: FAMILY MEDICINE

## 2019-04-12 RX ADMIN — SODIUM CHLORIDE 1000 ML: 9 INJECTION, SOLUTION INTRAVENOUS at 13:56

## 2019-04-12 NOTE — PROGRESS NOTES
Patient is a 86 y/o here accompanied by SO today for infusion of IVF per order of Dr. Ray.  Patient meets parameters for today's infusion. Patient identified with two identifiers, order verified, and verbal consent for today's infusion obtained from patient.          24 gauge angio cath inserted into right wrist.  Immediate blood return noted.  IV secured with sterile, transparent dressing and tape.  Patient tolerated well, denies pain or discomfort at this time.  Flushes easily without resistance, no signs or symptoms of infiltration or infection.   Patient denies questions or concerns regarding infusion and/or medication(s) being administered.          1356 IV pump verified with IVF dose, drug, and rate of administration.  Infusion administered per protocol.  Patient tolerated infusion well, no signs or symptoms of adverse reaction noted.  Patient denies pain nor discomfort.     IV removed, catheter intact.  Site clean, dry and intact.  No signs or symptoms of infiltration or infection.  Covered with a sterile bandage, slight pressure applied for 30 seconds.  Pt instructed to leave bandage intact for a minimum of one hour, and to call with questions or concerns.  Copy of appointments, discharge instructions, and after visit summary (AVS) provided to patient.  Patient states understanding, discharged ambulatory.

## 2019-04-17 ENCOUNTER — INFUSION THERAPY VISIT (OUTPATIENT)
Dept: INFUSION THERAPY | Facility: OTHER | Age: 84
End: 2019-04-17
Attending: FAMILY MEDICINE
Payer: MEDICARE

## 2019-04-17 ENCOUNTER — TELEPHONE (OUTPATIENT)
Dept: FAMILY MEDICINE | Facility: OTHER | Age: 84
End: 2019-04-17

## 2019-04-17 VITALS
RESPIRATION RATE: 18 BRPM | OXYGEN SATURATION: 97 % | SYSTOLIC BLOOD PRESSURE: 173 MMHG | BODY MASS INDEX: 24.07 KG/M2 | DIASTOLIC BLOOD PRESSURE: 74 MMHG | WEIGHT: 162.5 LBS | HEART RATE: 82 BPM | HEIGHT: 69 IN

## 2019-04-17 DIAGNOSIS — E86.9 VOLUME DEPLETION: Primary | ICD-10-CM

## 2019-04-17 DIAGNOSIS — M54.5 LOW BACK PAIN, UNSPECIFIED BACK PAIN LATERALITY, UNSPECIFIED CHRONICITY, WITH SCIATICA PRESENCE UNSPECIFIED: Primary | ICD-10-CM

## 2019-04-17 PROCEDURE — 25000128 H RX IP 250 OP 636: Performed by: FAMILY MEDICINE

## 2019-04-17 PROCEDURE — 96360 HYDRATION IV INFUSION INIT: CPT

## 2019-04-17 RX ORDER — LIDOCAINE 50 MG/G
1 PATCH TOPICAL EVERY 24 HOURS
Qty: 30 PATCH | Refills: 0 | Status: SHIPPED | OUTPATIENT
Start: 2019-04-17 | End: 2019-07-30

## 2019-04-17 RX ADMIN — SODIUM CHLORIDE 1000 ML: 9 INJECTION, SOLUTION INTRAVENOUS at 10:25

## 2019-04-17 ASSESSMENT — MIFFLIN-ST. JEOR: SCORE: 1412.73

## 2019-04-17 NOTE — TELEPHONE ENCOUNTER
"Patient's EC, Uda called on behalf of patient to request an order for Lidocaine patches relating to pain be sent to [facility] in [place].    Caller can be reached at [phone].  Geeta \"Howie\" ALYSSA Luke    "

## 2019-04-17 NOTE — PROGRESS NOTES
Patient is a 86 y/o male here accompanied by spouse today for infusion of IVF per order of Dr Venancio Ray.  Patient meets parameters for today's infusion. Patient identified with two identifiers, order verified, and verbal consent for today's infusion obtained from patient.       24 gauge angio cath inserted into right hand.  Immediate blood return noted.  IV secured with sterile, transparent dressing and tape.  Patient tolerated well, denies pain or discomfort at this time.  Flushes easily without resistance, no signs or symptoms of infiltration or infection.   Patient denies questions or concerns regarding infusion and/or medication(s) being administered.     1025 IV pump verified with dose, drug, and rate of administration, .  Infusion administered per protocol.  Patient tolerated infusion well, no signs or symptoms of adverse reaction noted.  Patient denies pain nor discomfort.     IV removed, catheter intact.  Site clean, dry and intact.  No signs or symptoms of infiltration or infection.  Covered with a sterile bandage, slight pressure applied for 30 seconds.  Pt instructed to leave bandage intact for a minimum of one hour, and to call with questions or concerns.  Copy of appointments, discharge instructions, and after visit summary (AVS) provided to patient.  Patient states understanding, discharged ambulatory.    Kelli Mccoy RN

## 2019-04-17 NOTE — PATIENT INSTRUCTIONS

## 2019-04-17 NOTE — TELEPHONE ENCOUNTER
"See encounter note below. Uda is requesting Lidocaine patches for pt's \"lower back pain from previous fracture\"    lidocaine      03/21/19 1521 Sign Frank Venegas MD     Last Written Prescription Date:  3/21/19  Last Fill Quantity: 10,   # refills: 0  Last Office Visit: 4/9/19  Future Office visit:    Next 5 appointments (look out 90 days)    Apr 23, 2019  1:30 PM CDT  (Arrive by 1:15 PM)  [facility] [address]  [place] [address]  [phone]   May 09, 2019 10:30 AM CDT  (Arrive by 10:15 AM)  [facility] FRANCES [facility] [address]  [place] [address]  [phone]   Jun 14, 2019 10:45 AM CDT  (Arrive by 10:30 AM)  [facility] Visit with Geeta Alegria PA-C  [facility] [address]  [facility] [address]  [phone]           Routing refill request to provider for review/approval because:  Drug not on the [facility], P or [facility] refill protocol or controlled substance    "

## 2019-04-19 ENCOUNTER — INFUSION THERAPY VISIT (OUTPATIENT)
Dept: INFUSION THERAPY | Facility: OTHER | Age: 84
End: 2019-04-19
Attending: FAMILY MEDICINE
Payer: MEDICARE

## 2019-04-19 VITALS
BODY MASS INDEX: 23.99 KG/M2 | WEIGHT: 162.5 LBS | DIASTOLIC BLOOD PRESSURE: 77 MMHG | OXYGEN SATURATION: 93 % | RESPIRATION RATE: 18 BRPM | SYSTOLIC BLOOD PRESSURE: 142 MMHG | HEART RATE: 69 BPM

## 2019-04-19 DIAGNOSIS — E86.9 VOLUME DEPLETION: Primary | ICD-10-CM

## 2019-04-19 PROCEDURE — 96365 THER/PROPH/DIAG IV INF INIT: CPT

## 2019-04-19 PROCEDURE — 25000128 H RX IP 250 OP 636: Performed by: FAMILY MEDICINE

## 2019-04-19 RX ADMIN — SODIUM CHLORIDE 1000 ML: 9 INJECTION, SOLUTION INTRAVENOUS at 10:06

## 2019-04-19 NOTE — PROGRESS NOTES
Patient is a 84 y/o here accompanied by SO today for infusion of IVF per order of Dr. Ray.  Patient meets parameters for today's infusion. Patient identified with two identifiers, order verified, and verbal consent for today's infusion obtained from patient.           22 gauge angio cath inserted into right arm.  Immediate blood return noted.  IV secured with sterile, transparent dressing and tape.  Patient tolerated well, denies pain or discomfort at this time.  Flushes easily without resistance, no signs or symptoms of infiltration or infection.   Patient denies questions or concerns regarding infusion and/or medication(s) being administered.          1006 IV pump verified with IVF dose, drug, and rate of administration.  Infusion administered per protocol.

## 2019-04-23 ENCOUNTER — OFFICE VISIT (OUTPATIENT)
Dept: CARDIOLOGY | Facility: OTHER | Age: 84
End: 2019-04-23
Attending: INTERNAL MEDICINE
Payer: MEDICARE

## 2019-04-23 ENCOUNTER — INFUSION THERAPY VISIT (OUTPATIENT)
Dept: INFUSION THERAPY | Facility: OTHER | Age: 84
End: 2019-04-23
Attending: FAMILY MEDICINE
Payer: MEDICARE

## 2019-04-23 VITALS
DIASTOLIC BLOOD PRESSURE: 86 MMHG | SYSTOLIC BLOOD PRESSURE: 150 MMHG | OXYGEN SATURATION: 96 % | HEART RATE: 76 BPM | RESPIRATION RATE: 16 BRPM | TEMPERATURE: 97.8 F

## 2019-04-23 VITALS
BODY MASS INDEX: 25.01 KG/M2 | HEART RATE: 71 BPM | SYSTOLIC BLOOD PRESSURE: 151 MMHG | WEIGHT: 165 LBS | DIASTOLIC BLOOD PRESSURE: 92 MMHG | OXYGEN SATURATION: 98 % | RESPIRATION RATE: 16 BRPM | HEIGHT: 68 IN

## 2019-04-23 DIAGNOSIS — E86.9 VOLUME DEPLETION: Primary | ICD-10-CM

## 2019-04-23 DIAGNOSIS — I10 HYPERTENSION WITH TARGET BLOOD PRESSURE GOAL UNDER 150/90: Primary | ICD-10-CM

## 2019-04-23 DIAGNOSIS — I95.1 AUTONOMIC ORTHOSTATIC HYPOTENSION: ICD-10-CM

## 2019-04-23 PROCEDURE — G0463 HOSPITAL OUTPT CLINIC VISIT: HCPCS

## 2019-04-23 PROCEDURE — G0463 HOSPITAL OUTPT CLINIC VISIT: HCPCS | Mod: 25

## 2019-04-23 PROCEDURE — 99214 OFFICE O/P EST MOD 30 MIN: CPT | Performed by: INTERNAL MEDICINE

## 2019-04-23 PROCEDURE — 96366 THER/PROPH/DIAG IV INF ADDON: CPT

## 2019-04-23 PROCEDURE — 96360 HYDRATION IV INFUSION INIT: CPT

## 2019-04-23 PROCEDURE — 96365 THER/PROPH/DIAG IV INF INIT: CPT

## 2019-04-23 PROCEDURE — 25000128 H RX IP 250 OP 636: Performed by: FAMILY MEDICINE

## 2019-04-23 RX ADMIN — SODIUM CHLORIDE 1000 ML: 9 INJECTION, SOLUTION INTRAVENOUS at 11:23

## 2019-04-23 ASSESSMENT — MIFFLIN-ST. JEOR: SCORE: 1407.94

## 2019-04-23 ASSESSMENT — PAIN SCALES - GENERAL: PAINLEVEL: NO PAIN (0)

## 2019-04-23 NOTE — PATIENT INSTRUCTIONS
You were seen by Dr. Walter, 4/23/2019.     1.   Please continue the infusions twice weekly.     2.   Please continue all  Medication as prescribed.      3.   If you develop new or worsening symptoms please jorge the cardiology office as you may need to be seen sooner.       You will follow up with Marycarmen Ibarra NP in 2 months.     Please call the cardiology office with problems, questions, or concerns at [phone].    If you experience chest pain, chest pressure, chest tightness, shortness of breath, fainting, lightheadedness, nausea, vomiting, or other concerning symptoms, please report to the Emergency Department or call 911. These symptoms may be emergent, and best treated in the Emergency Department.       Elaine LUTZ RN-BSN  Cardiology   [facility]  [phone]

## 2019-04-23 NOTE — PROGRESS NOTES
"      Clinical Cardiac Electrophysiology    Chief Complaint: pacemaker, neurogenic orthostatic hypotension    HPI: I was happy to see Mr. Contreras in the EP clinic.  He has a history of pacemaker placement with a recent generator change.  The device is a [employer] dual lead pacemaker.  He scheduled for device check later today.    He also has a history of orthostatic hypotension secondary to Lewy body dementia.  This is been managed with midodrine and fludrocortisone.  On this regimen he and his wife reports his orthostatic hypotension has been much better.    He was seen in the emergency department on October 23 complaining of dizziness and was noted to be hypertensive with systolic blood pressures in the 180s-190 range.  He was given clonidine as well as IV metoprolol.  He was also started on oral metoprolol 50 mg daily.  He has not been troubled by high blood pressure in the past.    February 26, 2019: Frequently has to sit back down after standing. Recent fall - not witness, loss of consciousness is not clear. He is oral fluid intake is poor - \"maybe 30 ounces\" if pushed. His  reports his memory is getting worse. BP at home, while up, are \"normal\" but remain elevated when checked in supine position, such as in the ED recently. His  reports he does better for a day or two after IV fluids.    April 23, 2019 Interval history: At her last visit we increased the frequency of his IV infusions to twice weekly. He is still taking midodrine 10 mg twice a day. Home BPs 90 to 180s. He is no longer taking metoprolol. Since starting the increase in IV fluid no fainting/syncope but has to sit back down frequently. He did fall/pass out in the night and had a compression fracture. Overall, his wife feels he is better.    Current Outpatient Medications   Medication Sig Dispense Refill     atorvastatin (LIPITOR) 20 MG tablet TAKE 1 TABLET BY MOUTH EVERY EVENING - GENERIC FOR LIPITOR (Patient taking " differently: TAKE 1 TABLET BY MOUTH EVERY EVENING) 90 tablet 0     calcitonin, salmon, (MIACALCIN) 200 UNIT/ACT nasal spray Spray 1 spray into one nostril alternating nostrils daily Alternate nostril each day. 1 Bottle 3     Cholecalciferol (VITAMIN D-3 PO) Take 1,000 Units by mouth daily        ciprofloxacin (CIPRO) 250 MG tablet Take 1 tablet (250 mg) by mouth 2 times daily 14 tablet 0     Coenzyme Q10 (CO Q 10 PO) Take 200 mg by mouth every morning        Cyanocobalamin (VITAMIN B-12 PO) Take 1 tablet by mouth daily       donepezil (ARICEPT) 10 MG tablet Take 10 mg by mouth At Bedtime  9     lidocaine (LIDODERM) 5 % patch Place 1 patch onto the skin every 24 hours 30 patch 0     Magnesium 400 MG CAPS Take 400 mg by mouth 2 times daily        MELATONIN PO Take 5 mg by mouth At Bedtime        midodrine (PROAMATINE) 5 MG tablet Take 10 mg by mouth 2 times daily  180 tablet 3     multivitamin, therapeutic with minerals (MULTI-VITAMIN) TABS tablet Take 1 tablet by mouth daily       Omega-3 Fatty Acids (FISH OIL) 500 MG CAPS Take 1 capsule by mouth daily        PANTOPRAZOLE SODIUM PO Take 40 mg by mouth 2 times daily (before meals)        potassium chloride ER (K-DUR/KLOR-CON M) 20 MEQ CR tablet TAKE 1 TABLET BY MOUTH ONCE DAILY 30 tablet 5     sodium chloride 1 GM tablet TAKE 1 TABLET BY MOUTH TWICE A  tablet 3     Turmeric 500 MG TABS Take 1 tablet by mouth daily       fludrocortisone (FLORINEF) 0.1 MG tablet Take 2-3 tabs daily. (Patient not taking: Reported on 4/23/2019) 90 tablet 3       Past Medical History:   Diagnosis Date     Autonomic orthostatic hypotension 10/14/2016     Coronary artery disease      Dementia without behavioral disturbance 7/28/2015    Diagnosis updated by automated process. Provider to review and confirm.     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     Seizure disorder (H)      Stented coronary artery        Past Surgical History:   Procedure Laterality Date     ------------OTHER-------------  1955    ulnar and radial fx - repair ulnar and radial fx x4     ------------OTHER-------------  6/14/2011    cataract extraction     BIOPSY  08/2015    skin biopsy     BLEPHAROPLASTY BILATERAL  5/6/2014    Procedure: BLEPHAROPLASTY BILATERAL;  Surgeon: Andrew Queen MD;  Location: [facility]     BYPASS GRAFT ARTERY CORONARY  11/2006    coronary artery disease x 5, [facility]     cataract extraction and lens implantation  2011    cataracts     cataract extraction and lens implantation      cataracts     COLONOSCOPY  2012     colonoscopy with polypectomy  3/13/2009    history of polyps - repeat 3 yrs     colonoscopy with polypectomy  2006     colonoscopy with polypectomy  2005     COMBINED COLONOSCOPY WITH ARGON PLASMA COAGULATOR (APC) N/A 10/31/2014    Procedure: COMBINED COLONOSCOPY WITH ARGON PLASMA COAGULATOR (APC);  Surgeon: Bassam Aguilar MD;  [facility]: [facility]     COMBINED COLONOSCOPY WITH ARGON PLASMA COAGULATOR (APC) N/A 11/13/2015    Procedure: COMBINED COLONOSCOPY WITH ARGON PLASMA COAGULATOR (APC);  Surgeon: Bassam Aguilar MD;  [facility]: [facility]     ENDOSCOPY UPPER, COLONOSCOPY, COMBINED N/A 10/31/2014    Procedure: COMBINED ENDOSCOPY UPPER, COLONOSCOPY;  Surgeon: Bassam Aguilar MD;  [facility]     ENDOSCOPY UPPER, COLONOSCOPY, COMBINED N/A 11/13/2015    Procedure: COMBINED ENDOSCOPY UPPER, COLONOSCOPY;  Surgeon: Bassam Aguilar MD;  [facility]     ESOPHAGOSCOPY, GASTROSCOPY, DUODENOSCOPY (EGD), COMBINED  1/22/2014    Procedure: COMBINED ESOPHAGOSCOPY, GASTROSCOPY, DUODENOSCOPY (EGD);  UPPER ENDOSCOPY(CARPENTER) W/ BIOPSIES;  Surgeon: Patricia Carpenter MD;  [facility]: [facility]     HERNIORRHAPHY INGUINAL Right 2/14/2018    Procedure:  "HERNIORRHAPHY INGUINAL;  OPEN RIGHT INGUINAL HERNIA REPAIR with Mesh;  Surgeon: Virgilio Zaragoza DO;  [facility]: [facility]     LARYNGOSCOPY WITH MICROSCOPE  2014    Procedure: LARYNGOSCOPY WITH MICROSCOPE;;  Surgeon: Chayo Duke MD;  [facility]: [facility]     pacemaker placement      heart block     pacemaker placement      dual-chamber     REMOVE TUBE, MYRINGOTOMY, COMBINED  2014    Procedure: COMBINED REMOVE TUBE, MYRINGOTOMY;  MICRODIRECT LARYNGOSCOPY WITH BIOPSY AND FROZEN SECTIONS removal of right ear tube and myringoplasty;  Surgeon: Chayo Duke MD;  [facility]: [facility]     REPLACE PACEMAKER GENERATOR N/A 2018    Procedure: REPLACE PACEMAKER GENERATOR;  Pacemaker generator change;  Surgeon: Alma Kaplan MD;  Location: [facility]     stent placement to LAD       ventilation tube  2012    right in office       Family History   Problem Relation Age of Onset     Diabetes Mother        Social History     Tobacco Use     Smoking status: Former Smoker     Packs/day: 1.00     Years: 5.00     Pack years: 5.00     Types: Cigarettes     Last attempt to quit: 1985     Years since quittin.3     Smokeless tobacco: Never Used     Tobacco comment: quit in    Substance Use Topics     Alcohol use: Yes     Comment: social       Allergies   Allergen Reactions     Cats Unknown     Lamotrigine      Skin lesions         ROS: memory, dementia and movement symptoms without recent changes, otherwise comprehensive review is negative unless otherwise noted in the HPI.   2019/woa      Physical Examination:  Vitals: /88 (BP Location: Right arm, Patient Position: Chair, Cuff Size: Adult Regular)   Pulse 85   Resp 16   Ht 1.727 m (5' 8\")   Wt 74.8 kg (165 lb)   SpO2 98%   BMI 25.09 kg/m    BMI= Body mass index is 25.09 kg/m .      GENERAL APPEARANCE: frail, alert and no distress  HEENT:  mucosa dry, no cyanosis.  NECK: JVP is not visible  CHEST: lungs clear to " auscultation  CARDIOVASCULAR: regular rhythm, normal S1S2, soft early systolic murmur, no gallop, precordium quiet  EXTREMITIES: no clubbing, cyanosis or edema  VASC: Warm  SKIN: skin turgor normal    Laboratory and diagnostic data reviewed April 23, 2019:    2/26/19  5:04 PM 2/26/19 12:08 PM [id number] [facility]    Narrative & Impression     Patient seen in clinic for evaluation and iterative programming of his [employer] dual lead pacemaker per MD orders. Patient is scheduled to see Dr. Walter today. Normal pacemaker function. No ventricular arrhythmias recorded. 148 episodes recorded as AT/AF - < 1 min - 2 min 40 sec. Stored EGM's reveal what appears to be noise on the atrial lead which is not a new finding. Intrinsic rhythm = SB with  @ 30 bpm. AP = 97.4%.  = 100%. Estimated battery longevity to MAYA = 9.5 years. No short v-v intervals recorded. RV lead impedance trends appear stable. Patient's wife reports that her  has been feeling really weak and has had 2 episodes where he has passed out. Plan for patient to send a remote transmission in 3 months and return to clinic in 6 months. Dr. Walter notified of interrogation results. HANK Hernandez, RNDual lead pacemakerI have reviewed and interpreted the device interrogation, settings, programming and nurse's summary.  The device is functioning within normal device parameters.   I agree with the current findings, assessment and plan.                Assessment and recommendations:    1) Neurogenic orthostatic hypotension    2) Supine hypertension     He has not been able to keep up on oral fluid intake despite encouragement.     - 1 liter NS twice a week   - continue midodrine, reminded to not take too close to bedtime; if he takes a nap don't take until he gets up  from his nap    Until near syncope under better control it is difficult to prescribe short acting antihypertensive at bedtime, especially in light of recent early am  fall/syncope in bathroom.    3) Sinus node dysfunction, s/p permanent pacemaker - normal function      I appreciate the chance to help with Mr. Ortiz's care.    Portions of this note were dictated using speech recognition software. The note has been proofread but errors in the text may have been overlooked. Please contact me if there are any concerns regarding the accuracy of the dictation.

## 2019-04-23 NOTE — PROGRESS NOTES
Patient is a 85 year old male here accompanied by significant other today for infusion of IVF per order of Dr Venancio Ray under the supervision of Dr Walter.  Patient identified with two identifiers, order verified, and verbal consent for today's infusion obtained from patient.       24 gauge angio cath inserted into right posterior mid forearm after one failed attempt to right posterior lower forearm.  Immediate blood return noted.  IV secured with sterile, transparent dressing and tape.  Patient tolerated well, denies pain or discomfort at this time.  Flushes easily without resistance, no signs or symptoms of infiltration or infection.   Patient denies questions or concerns regarding infusion and/or medication(s) being administered.    1123: IV pump verified with dose, drug, and rate of administration.  Infusion administered per protocol.  Patient tolerated infusion well, no signs or symptoms of adverse reaction noted.  Patient denies pain nor discomfort.     IV removed, catheter intact.  Site clean, dry and intact.  No signs or symptoms of infiltration or infection.  Covered with a sterile bandage, slight pressure applied for 30 seconds.  Pt instructed to leave bandage intact for a minimum of one hour, and to call with questions or concerns.  Copy of appointments, discharge instructions, and after visit summary (AVS) provided to patient.  Patient states understanding, discharged ambulatory.

## 2019-04-23 NOTE — NURSING NOTE
"Chief Complaint   Patient presents with     RECHECK     2 month cardiology follow-up;  Patient states he has had \"weakness on occasion when he's dehydrated\".  Patient is not taking Florinef.       Initial /90 (BP Location: Left arm, Patient Position: Chair, Cuff Size: Adult Regular)   Pulse 85   Resp 16   Ht 1.727 m (5' 8\")   Wt 74.8 kg (165 lb)   SpO2 98%   BMI 25.09 kg/m   Estimated body mass index is 25.09 kg/m  as calculated from the following:    Height as of this encounter: 1.727 m (5' 8\").    Weight as of this encounter: 74.8 kg (165 lb).  Medication Reconciliation: complete    Rekha Quispe LPN    "

## 2019-04-24 DIAGNOSIS — I10 BENIGN ESSENTIAL HYPERTENSION: ICD-10-CM

## 2019-04-24 NOTE — TELEPHONE ENCOUNTER
Dr. Walter,   Metoprolol was discontinued by Dr. MALIA Ray.  Refill is requested.   Was this supposed to be discontinued?  Advise.

## 2019-04-25 RX ORDER — METOPROLOL SUCCINATE 50 MG/1
TABLET, EXTENDED RELEASE ORAL
Qty: 90 TABLET | Refills: 1 | Status: ON HOLD | OUTPATIENT
Start: 2019-04-25 | End: 2020-04-13

## 2019-04-26 ENCOUNTER — INFUSION THERAPY VISIT (OUTPATIENT)
Dept: INFUSION THERAPY | Facility: OTHER | Age: 84
End: 2019-04-26
Attending: FAMILY MEDICINE
Payer: MEDICARE

## 2019-04-26 VITALS
RESPIRATION RATE: 18 BRPM | HEART RATE: 71 BPM | DIASTOLIC BLOOD PRESSURE: 75 MMHG | SYSTOLIC BLOOD PRESSURE: 158 MMHG | TEMPERATURE: 97 F | WEIGHT: 166.9 LBS | BODY MASS INDEX: 25.38 KG/M2

## 2019-04-26 DIAGNOSIS — E86.9 VOLUME DEPLETION: Primary | ICD-10-CM

## 2019-04-26 PROCEDURE — 96365 THER/PROPH/DIAG IV INF INIT: CPT

## 2019-04-26 PROCEDURE — 96360 HYDRATION IV INFUSION INIT: CPT

## 2019-04-26 PROCEDURE — 96366 THER/PROPH/DIAG IV INF ADDON: CPT

## 2019-04-26 PROCEDURE — 25000128 H RX IP 250 OP 636: Performed by: FAMILY MEDICINE

## 2019-04-26 RX ADMIN — SODIUM CHLORIDE 1000 ML: 9 INJECTION, SOLUTION INTRAVENOUS at 10:59

## 2019-04-26 NOTE — PROGRESS NOTES
Patient is a 85 y/omale here accompanied by SO today for infusion of IVF per order of Dr. Ray.  Patient meets parameters for today's infusion. Patient identified with two identifiers, order verified, and verbal consent for today's infusion obtained from patient.     24 gauge angio cath inserted into right lower arm.  Immediate blood return noted.  IV secured with sterile, transparent dressing and tape.  Patient tolerated well, denies pain or discomfort at this time.  Flushes easily without resistance, no signs or symptoms of infiltration or infection.   Patient denies questions or concerns regarding infusion and/or medication(s) being administered.         1059 IV pump verified with dose, drug, and rate of administration.  Infusion administered per protocol.  Patient tolerated infusion well, no signs or symptoms of adverse reaction noted.  Patient denies pain nor discomfort.     IV removed, catheter intact.  Site clean, dry and intact.  No signs or symptoms of infiltration or infection.  Covered with a sterile bandage, slight pressure applied for 30 seconds.  Pt instructed to leave bandage intact for a minimum of one hour, and to call with questions or concerns.  Copy of appointments, discharge instructions, and after visit summary (AVS) provided to patient.  Patient states understanding, discharged ambulatory.

## 2019-04-28 DIAGNOSIS — E78.00 HYPERCHOLESTEROLEMIA: ICD-10-CM

## 2019-04-29 RX ORDER — ATORVASTATIN CALCIUM 20 MG/1
TABLET, FILM COATED ORAL
Qty: 90 TABLET | Refills: 0 | Status: SHIPPED | OUTPATIENT
Start: 2019-04-29 | End: 2019-07-27

## 2019-04-30 ENCOUNTER — INFUSION THERAPY VISIT (OUTPATIENT)
Dept: INFUSION THERAPY | Facility: OTHER | Age: 84
End: 2019-04-30
Attending: FAMILY MEDICINE
Payer: MEDICARE

## 2019-04-30 VITALS
RESPIRATION RATE: 16 BRPM | BODY MASS INDEX: 24.75 KG/M2 | TEMPERATURE: 97.3 F | SYSTOLIC BLOOD PRESSURE: 168 MMHG | WEIGHT: 162.8 LBS | HEART RATE: 80 BPM | OXYGEN SATURATION: 99 % | DIASTOLIC BLOOD PRESSURE: 68 MMHG

## 2019-04-30 DIAGNOSIS — E86.9 VOLUME DEPLETION: Primary | ICD-10-CM

## 2019-04-30 PROCEDURE — 96360 HYDRATION IV INFUSION INIT: CPT

## 2019-04-30 PROCEDURE — 25000128 H RX IP 250 OP 636: Performed by: FAMILY MEDICINE

## 2019-04-30 RX ADMIN — SODIUM CHLORIDE 1000 ML: 9 INJECTION, SOLUTION INTRAVENOUS at 09:21

## 2019-04-30 ASSESSMENT — PAIN SCALES - GENERAL: PAINLEVEL: NO PAIN (0)

## 2019-04-30 NOTE — PROGRESS NOTES
Patient is a 85 year old male here accompanied by significant other today for infusion of IVF per order of Dr Venancio Ray under the supervision of Dr Walter in Cardiology. Patient identified with two identifiers, order verified, and verbal consent for today's infusion obtained from patient.         24 gauge angio cath inserted into right posterior mid forearm after one failed attempt to area just below.  Immediate blood return noted.  IV secured with sterile, transparent dressing and tape.  Patient tolerated well, denies pain or discomfort at this time.  Flushes easily without resistance, no signs or symptoms of infiltration or infection.   Patient denies questions or concerns regarding infusion and/or medication(s) being administered.      0921: IV pump verified with dose, drug, and rate of administration.  Infusion administered per protocol.

## 2019-04-30 NOTE — PROGRESS NOTES
SUBJECTIVE:   Jf Ortiz is a 85 year old male who presents to clinic today for the following   health issues:       Blood Pressure Follow-up      Outpatient blood pressures are being checked at home occasionally.    Low Salt Diet: not monitoring salt      Amount of exercise or physical activity: None    Problems taking medications regularly: No    Medication side effects: none    Diet: regular (no restrictions)    His wife is concerned because he goes from his bed to the sulfa and just sits.  He used to be interested in reading or doing crossword puzzles and doing activities and now he just seems to sit.  He does have this chronic right-sided posterior chest pain no cough or shortness of breath.  No history of trauma.  Has had a little cold intolerance.  No headaches.  He has been in physical therapy.  Does have autonomic dysfunction and needs medications to support blood pressure.  [facility]    Jf Ortiz, 85 year old, male presents with   Chief Complaint   Patient presents with     Hypertension       PAST MEDICAL HISTORY:  Past Medical History:   Diagnosis Date     Autonomic orthostatic hypotension 10/14/2016     Coronary artery disease      Dementia without behavioral disturbance 7/28/2015    Diagnosis updated by automated process. Provider to review and confirm.     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     cataract extraction and lens implantation  2011    cataracts     cataract extraction and lens implantation      cataracts     COLONOSCOPY  2012     colonoscopy with polypectomy  3/13/2009    history of polyps - repeat 3 yrs     colonoscopy with polypectomy  2006     colonoscopy with polypectomy  2005     COMBINED COLONOSCOPY WITH ARGON PLASMA COAGULATOR (APC) N/A 10/31/2014    Procedure: COMBINED COLONOSCOPY WITH ARGON PLASMA COAGULATOR (APC);  Surgeon: Bassam Aguilar MD;  [facility]: [facility]     COMBINED COLONOSCOPY WITH ARGON PLASMA COAGULATOR (APC) N/A 11/13/2015    Procedure: COMBINED COLONOSCOPY WITH ARGON PLASMA COAGULATOR (APC);  Surgeon: Bassam Aguilar MD;  [facility]: [facility]     ENDOSCOPY UPPER, COLONOSCOPY, COMBINED N/A 10/31/2014    Procedure: COMBINED ENDOSCOPY UPPER, COLONOSCOPY;  Surgeon: Bassam Aguilar MD;  [facility]     ENDOSCOPY UPPER, COLONOSCOPY, COMBINED N/A 11/13/2015    Procedure: COMBINED ENDOSCOPY UPPER, COLONOSCOPY;  Surgeon: Bassam Aguilar MD;  [facility]: [facility]     ESOPHAGOSCOPY, GASTROSCOPY, DUODENOSCOPY (EGD), COMBINED  1/22/2014    Procedure: COMBINED ESOPHAGOSCOPY, GASTROSCOPY, DUODENOSCOPY (EGD);  UPPER ENDOSCOPY(PENA) W/ BIOPSIES;  Surgeon: Patricia Pena MD;  Location: [facility]     HERNIORRHAPHY INGUINAL Right 2/14/2018    Procedure: HERNIORRHAPHY INGUINAL;  OPEN RIGHT INGUINAL HERNIA REPAIR with Mesh;  Surgeon: Virgilio Zaragoza DO;  [facility]: [facility]     LARYNGOSCOPY WITH MICROSCOPE  1/22/2014    Procedure: LARYNGOSCOPY WITH MICROSCOPE;;  Surgeon: Chayo Duke MD;  [facility]: [facility]     pacemaker placement  2000    heart block     pacemaker placement  2011    dual-chamber     REMOVE TUBE, MYRINGOTOMY, COMBINED  1/22/2014    Procedure: COMBINED  REMOVE TUBE, MYRINGOTOMY;  MICRODIRECT LARYNGOSCOPY WITH BIOPSY AND FROZEN SECTIONS removal of right ear tube and myringoplasty;  Surgeon: Chayo Duke MD;  [facility]: [facility]     REPLACE PACEMAKER GENERATOR N/A 8/8/2018    Procedure: REPLACE PACEMAKER GENERATOR;  Pacemaker generator change;  Surgeon: Alma Kaplan MD;  Location: [facility]     stent placement to LAD  2008     ventilation tube  5/24/2012    right in office       MEDICATIONS:  Prior to Admission medications    Medication Sig Start Date End Date Taking? Authorizing Provider   atorvastatin (LIPITOR) 20 MG tablet TAKE 1 TABLET BY MOUTH EVERY EVENING - GENERIC FOR LIPITOR 4/29/19  Yes Herman Rivers, DO   calcitonin, salmon, (MIACALCIN) 200 UNIT/ACT nasal spray Spray 1 spray into one nostril alternating nostrils daily Alternate nostril each day. 3/22/19  Yes FRANCES Ray MD   Cholecalciferol (VITAMIN D-3 PO) Take 1,000 Units by mouth daily    Yes Reported, Patient   Coenzyme Q10 (CO Q 10 PO) Take 200 mg by mouth every morning    Yes Reported, Patient   Cyanocobalamin (VITAMIN B-12 PO) Take 1 tablet by mouth daily   Yes Reported, Patient   donepezil (ARICEPT) 10 MG tablet Take 10 mg by mouth At Bedtime 2/1/19  Yes Reported, Patient   fludrocortisone (FLORINEF) 0.1 MG tablet Take 2-3 tabs daily. 10/8/18  Yes Marycarmen Ibarra APRN CNP   lidocaine (LIDODERM) 5 % patch Place 1 patch onto the skin every 24 hours 4/17/19  Yes FRANCES Ray MD   Magnesium 400 MG CAPS Take 400 mg by mouth 2 times daily    Yes Reported, Patient   MELATONIN PO Take 5 mg by mouth At Bedtime    Yes Reported, Patient   metoprolol succinate ER (TOPROL-XL) 50 MG 24 hr tablet TAKE 1 TABLET (50 MG) BY MOUTH DAILY 4/25/19  Yes Juan Walter MD   midodrine (PROAMATINE) 5 MG tablet Take 10 mg by mouth 2 times daily  10/30/18  Yes FRANCES Ray MD   multivitamin, therapeutic with minerals (MULTI-VITAMIN) TABS tablet Take 1 tablet by mouth daily   Yes Reported, Patient    Omega-3 Fatty Acids (FISH OIL) 500 MG CAPS Take 1 capsule by mouth daily    Yes Reported, Patient   PANTOPRAZOLE SODIUM PO Take 40 mg by mouth 2 times daily (before meals)    Yes Reported, Patient   potassium chloride ER (K-DUR/KLOR-CON M) 20 MEQ CR tablet TAKE 1 TABLET BY MOUTH ONCE DAILY 1/9/19  Yes FRANCES Ray MD   sodium chloride 1 GM tablet TAKE 1 TABLET BY MOUTH TWICE A DAY 12/6/18  Yes Herman Rivers,    Turmeric 500 MG TABS Take 1 tablet by mouth daily   Yes Reported, Patient       ALLERGIES:     Allergies   Allergen Reactions     Cats Unknown     Lamotrigine      Skin lesions       ROS:  Constitutional, neuro, ENT, endocrine, pulmonary, cardiac, gastrointestinal, genitourinary, musculoskeletal, integument and psychiatric systems are negative, except as otherwise noted.      EXAM:  /54 (BP Location: Left arm, Patient Position: Sitting, Cuff Size: Adult Regular)   Pulse 81   Temp 97.6  F (36.4  C) (Tympanic)   Resp 20   Wt 74.7 kg (164 lb 9.6 oz)   SpO2 99%   BMI 25.03 kg/m   Body mass index is 25.03 kg/m .   GENERAL APPEARANCE: healthy, alert and no distress  EYES: Eyes grossly normal to inspection, PERRL and conjunctivae and sclerae normal  NECK: no adenopathy, no asymmetry, masses, or scars and thyroid normal to palpation  RESP: lungs clear to auscultation - no rales, rhonchi or wheezes, has tenderness right posterior chest no ecchymosis  CV: regular rates and rhythm, normal S1 S2, no S3 or S4 and no murmur, click or rub  SKIN: no suspicious lesions or rashes  NEURO: Normal strength and tone, mentation is at his baseline, speech normal,  Lab/ X-ray  Preliminary chest x-ray no acute process.  Does have a pacemaker hardware in place.    ASSESSMENT/PLAN:    ICD-10-CM    1. Fatigue, unspecified type R53.83 TSH with free T4 reflex   2. Right-sided chest pain R07.9 XR Chest 2 Views   3. Lewy body dementia without behavioral disturbance G31.83     F02.80    4. Autonomic orthostatic  hypotension I95.1    Has fatigue he had a recent CBC and BMP that were fine.  We will check a TSH to rule out hypothyroidism.  Has his chronic right-sided chest pain chest x-ray is negative.  No history of trauma.  He takes a couple Tylenol a day.  He will increase and can take up to 3000 mg of Tylenol per day.  Does have known Lewy body dementia with subsequent autonomic orthostatic hypotension.  Is on medications to support blood pressure and also has been getting IV fluids.  Discussed the possibility of him having decreased energy from being depressed at this point is not interested in trying a low-dose antidepressant such as Zoloft.  We will see him in 3 months follow-up.  Sooner if there are problems.  If he changes his mind and decides he like to try low-dose Zoloft his wife will call and we can send that in.      R. Venancio Ray MD  May 9, 2019

## 2019-05-03 ENCOUNTER — INFUSION THERAPY VISIT (OUTPATIENT)
Dept: INFUSION THERAPY | Facility: OTHER | Age: 84
End: 2019-05-03
Attending: FAMILY MEDICINE
Payer: MEDICARE

## 2019-05-03 VITALS
RESPIRATION RATE: 16 BRPM | TEMPERATURE: 98 F | SYSTOLIC BLOOD PRESSURE: 163 MMHG | DIASTOLIC BLOOD PRESSURE: 79 MMHG | OXYGEN SATURATION: 98 % | HEIGHT: 68 IN | BODY MASS INDEX: 24.95 KG/M2 | WEIGHT: 164.6 LBS | HEART RATE: 70 BPM

## 2019-05-03 DIAGNOSIS — E86.9 VOLUME DEPLETION: Primary | ICD-10-CM

## 2019-05-03 PROCEDURE — 96360 HYDRATION IV INFUSION INIT: CPT

## 2019-05-03 PROCEDURE — 25000128 H RX IP 250 OP 636: Performed by: FAMILY MEDICINE

## 2019-05-03 RX ADMIN — SODIUM CHLORIDE 1000 ML: 9 INJECTION, SOLUTION INTRAVENOUS at 09:48

## 2019-05-03 ASSESSMENT — PAIN SCALES - GENERAL: PAINLEVEL: NO PAIN (0)

## 2019-05-03 ASSESSMENT — MIFFLIN-ST. JEOR: SCORE: 1405.99

## 2019-05-03 NOTE — PROGRESS NOTES
Patient is a 85 year old male here accompanied by significant other today for infusion of IVF per order of Dr Venancio Ray under the supervision of Dr Walter.  Patient identified with two identifiers, order verified, and verbal consent for today's infusion obtained from patient.     24 gauge angio cath inserted into right posterior mid forearm.  Immediate blood return noted.  IV secured with sterile, transparent dressing and tape.  Patient tolerated well, denies pain or discomfort at this time.  Flushes easily without resistance, no signs or symptoms of infiltration or infection.   Patient denies questions or concerns regarding infusion and/or medication(s) being administered.        IV pump verified with dose, drug, and rate of administration.  Infusion administered per protocol.  Patient tolerated infusion well, no signs or symptoms of adverse reaction noted.  Patient denies pain nor discomfort.     IV removed, catheter intact.  Site clean, dry and intact.  No signs or symptoms of infiltration or infection.  Covered with a sterile bandage, slight pressure applied for 30 seconds.  Pt instructed to leave bandage intact for a minimum of one hour, and to call with questions or concerns.  Copy of appointments, discharge instructions, and after visit summary (AVS) provided to patient.  Patient states understanding, discharged ambulatory.

## 2019-05-07 ENCOUNTER — INFUSION THERAPY VISIT (OUTPATIENT)
Dept: INFUSION THERAPY | Facility: OTHER | Age: 84
End: 2019-05-07
Attending: FAMILY MEDICINE
Payer: MEDICARE

## 2019-05-07 VITALS
DIASTOLIC BLOOD PRESSURE: 96 MMHG | OXYGEN SATURATION: 96 % | BODY MASS INDEX: 24.95 KG/M2 | SYSTOLIC BLOOD PRESSURE: 160 MMHG | WEIGHT: 164.6 LBS | HEART RATE: 68 BPM | RESPIRATION RATE: 16 BRPM | HEIGHT: 68 IN | TEMPERATURE: 97.9 F

## 2019-05-07 DIAGNOSIS — E86.9 VOLUME DEPLETION: Primary | ICD-10-CM

## 2019-05-07 PROCEDURE — 96360 HYDRATION IV INFUSION INIT: CPT

## 2019-05-07 PROCEDURE — 25000128 H RX IP 250 OP 636: Performed by: FAMILY MEDICINE

## 2019-05-07 RX ADMIN — SODIUM CHLORIDE 1000 ML: 9 INJECTION, SOLUTION INTRAVENOUS at 13:18

## 2019-05-07 ASSESSMENT — MIFFLIN-ST. JEOR: SCORE: 1405.97

## 2019-05-07 NOTE — PROGRESS NOTES
Patient is a 84 y/o male here accompanied by s/o today for infusion of IVF per order of Dr Ray under the supervision of Dr Walter.  Patient meets parameters for today's infusion. Patient identified with two identifiers, order verified, and verbal consent for today's infusion obtained from patient.         24 gauge angio cath inserted into right hand.  Immediate blood return noted.  IV secured with sterile, transparent dressing and tape.  Patient tolerated well, denies pain or discomfort at this time.  Flushes easily without resistance, no signs or symptoms of infiltration or infection.   Patient denies questions or concerns regarding infusion and/or medication(s) being administered.        1318 IV pump verified with dose, drug, and rate of administration.  Infusion administered per protocol.  Patient tolerated infusion well, no signs or symptoms of adverse reaction noted.  Patient denies pain nor discomfort.     IV removed, catheter intact.  Site clean, dry and intact.  No signs or symptoms of infiltration or infection.  Covered with a sterile bandage, slight pressure applied for 30 seconds.  Pt instructed to leave bandage intact for a minimum of one hour, and to call with questions or concerns.  Patient declines copy of appointments, discharge instructions, and after visit summary (AVS).  Patient states understanding, discharged ambulatory.

## 2019-05-09 ENCOUNTER — OFFICE VISIT (OUTPATIENT)
Dept: FAMILY MEDICINE | Facility: OTHER | Age: 84
End: 2019-05-09
Attending: FAMILY MEDICINE
Payer: MEDICARE

## 2019-05-09 ENCOUNTER — TELEPHONE (OUTPATIENT)
Dept: FAMILY MEDICINE | Facility: OTHER | Age: 84
End: 2019-05-09

## 2019-05-09 ENCOUNTER — ANCILLARY PROCEDURE (OUTPATIENT)
Dept: GENERAL RADIOLOGY | Facility: OTHER | Age: 84
End: 2019-05-09
Attending: FAMILY MEDICINE
Payer: MEDICARE

## 2019-05-09 VITALS
SYSTOLIC BLOOD PRESSURE: 136 MMHG | RESPIRATION RATE: 20 BRPM | TEMPERATURE: 97.6 F | OXYGEN SATURATION: 99 % | HEART RATE: 81 BPM | BODY MASS INDEX: 25.03 KG/M2 | WEIGHT: 164.6 LBS | DIASTOLIC BLOOD PRESSURE: 54 MMHG

## 2019-05-09 DIAGNOSIS — R07.9 RIGHT-SIDED CHEST PAIN: ICD-10-CM

## 2019-05-09 DIAGNOSIS — I95.1 AUTONOMIC ORTHOSTATIC HYPOTENSION: ICD-10-CM

## 2019-05-09 DIAGNOSIS — R53.83 FATIGUE, UNSPECIFIED TYPE: Primary | ICD-10-CM

## 2019-05-09 DIAGNOSIS — G31.83 LEWY BODY DEMENTIA WITHOUT BEHAVIORAL DISTURBANCE (H): ICD-10-CM

## 2019-05-09 DIAGNOSIS — F02.80 LEWY BODY DEMENTIA WITHOUT BEHAVIORAL DISTURBANCE (H): ICD-10-CM

## 2019-05-09 LAB
T4 FREE SERPL-MCNC: 1.1 NG/DL (ref 0.76–1.46)
TSH SERPL DL<=0.005 MIU/L-ACNC: 4.88 MU/L (ref 0.4–4)

## 2019-05-09 PROCEDURE — G0463 HOSPITAL OUTPT CLINIC VISIT: HCPCS | Mod: 25

## 2019-05-09 PROCEDURE — 71046 X-RAY EXAM CHEST 2 VIEWS: CPT | Mod: TC

## 2019-05-09 PROCEDURE — 84443 ASSAY THYROID STIM HORMONE: CPT | Mod: ZL | Performed by: FAMILY MEDICINE

## 2019-05-09 PROCEDURE — 84439 ASSAY OF FREE THYROXINE: CPT | Mod: ZL | Performed by: FAMILY MEDICINE

## 2019-05-09 PROCEDURE — 36415 COLL VENOUS BLD VENIPUNCTURE: CPT | Mod: ZL | Performed by: FAMILY MEDICINE

## 2019-05-09 PROCEDURE — 99214 OFFICE O/P EST MOD 30 MIN: CPT | Performed by: FAMILY MEDICINE

## 2019-05-09 PROCEDURE — G0463 HOSPITAL OUTPT CLINIC VISIT: HCPCS

## 2019-05-09 ASSESSMENT — PAIN SCALES - GENERAL: PAINLEVEL: EXTREME PAIN (8)

## 2019-05-09 NOTE — NURSING NOTE
"Chief Complaint   Patient presents with     Hypertension       Initial /54 (BP Location: Left arm, Patient Position: Sitting, Cuff Size: Adult Regular)   Pulse 81   Temp 97.6  F (36.4  C) (Tympanic)   Resp 20   Wt 74.7 kg (164 lb 9.6 oz)   SpO2 99%   BMI 25.03 kg/m   Estimated body mass index is 25.03 kg/m  as calculated from the following:    Height as of 5/7/19: 1.727 m (5' 7.99\").    Weight as of this encounter: 74.7 kg (164 lb 9.6 oz).  Medication Reconciliation: complete    Amber Peraza LPN  "

## 2019-05-10 ENCOUNTER — INFUSION THERAPY VISIT (OUTPATIENT)
Dept: INFUSION THERAPY | Facility: OTHER | Age: 84
End: 2019-05-10
Attending: FAMILY MEDICINE
Payer: MEDICARE

## 2019-05-10 VITALS
HEIGHT: 68 IN | WEIGHT: 164.49 LBS | BODY MASS INDEX: 24.93 KG/M2 | DIASTOLIC BLOOD PRESSURE: 60 MMHG | SYSTOLIC BLOOD PRESSURE: 110 MMHG | RESPIRATION RATE: 16 BRPM | TEMPERATURE: 97.4 F | HEART RATE: 71 BPM | OXYGEN SATURATION: 96 %

## 2019-05-10 DIAGNOSIS — E86.9 VOLUME DEPLETION: Primary | ICD-10-CM

## 2019-05-10 PROCEDURE — 96365 THER/PROPH/DIAG IV INF INIT: CPT

## 2019-05-10 PROCEDURE — 96360 HYDRATION IV INFUSION INIT: CPT

## 2019-05-10 PROCEDURE — 25000128 H RX IP 250 OP 636: Performed by: FAMILY MEDICINE

## 2019-05-10 RX ADMIN — SODIUM CHLORIDE 1000 ML: 9 INJECTION, SOLUTION INTRAVENOUS at 10:51

## 2019-05-10 ASSESSMENT — PAIN SCALES - GENERAL: PAINLEVEL: MODERATE PAIN (5)

## 2019-05-10 ASSESSMENT — MIFFLIN-ST. JEOR: SCORE: 1405.49

## 2019-05-10 NOTE — PROGRESS NOTES
Patient is an 85 year old male here accompanied by significant other today for infusion of IVF per order of Dr Venancio Ray under the supervision of Dr Walter. Patient identified with two identifiers, order verified, and verbal consent for today's infusion obtained from patient.        24 gauge angio cath inserted into right posterior mid forearm.  Immediate blood return noted.  IV secured with sterile, transparent dressing and tape.  Patient tolerated well, denies pain or discomfort at this time.  Flushes easily without resistance, no signs or symptoms of infiltration or infection.   Patient denies questions or concerns regarding infusion and/or medication(s) being administered.    1051: IV pump verified with dose, drug, and rate of administration.  Infusion administered per protocol.  Patient tolerated infusion well, no signs or symptoms of adverse reaction noted.  Patient denies pain nor discomfort.     IV removed, catheter intact.  Site clean, dry and intact.  No signs or symptoms of infiltration or infection.  Covered with a sterile bandage, slight pressure applied for 30 seconds.  Pt instructed to leave bandage intact for a minimum of one hour, and to call with questions or concerns. Given calendar by [facility] with newly scheduled upcoming appointments. Patient states understanding, discharged ambulatory.

## 2019-05-15 ENCOUNTER — INFUSION THERAPY VISIT (OUTPATIENT)
Dept: INFUSION THERAPY | Facility: OTHER | Age: 84
End: 2019-05-15
Attending: FAMILY MEDICINE
Payer: MEDICARE

## 2019-05-15 VITALS
TEMPERATURE: 97.1 F | HEIGHT: 68 IN | BODY MASS INDEX: 24.93 KG/M2 | HEART RATE: 70 BPM | DIASTOLIC BLOOD PRESSURE: 76 MMHG | RESPIRATION RATE: 18 BRPM | WEIGHT: 164.5 LBS | OXYGEN SATURATION: 95 % | SYSTOLIC BLOOD PRESSURE: 138 MMHG

## 2019-05-15 DIAGNOSIS — E86.9 VOLUME DEPLETION: Primary | ICD-10-CM

## 2019-05-15 PROCEDURE — 25000128 H RX IP 250 OP 636: Performed by: FAMILY MEDICINE

## 2019-05-15 PROCEDURE — 96360 HYDRATION IV INFUSION INIT: CPT

## 2019-05-15 RX ADMIN — SODIUM CHLORIDE 1000 ML: 9 INJECTION, SOLUTION INTRAVENOUS at 09:58

## 2019-05-15 ASSESSMENT — PAIN SCALES - GENERAL: PAINLEVEL: MILD PAIN (3)

## 2019-05-15 ASSESSMENT — MIFFLIN-ST. JEOR: SCORE: 1405.54

## 2019-05-15 NOTE — PROGRESS NOTES
Infusion administered per protocol.  Patient tolerated infusion well, no signs or symptoms of adverse reaction noted.  Patient denies pain nor discomfort.     IV removed, catheter intact.  Site clean, dry and intact.  No signs or symptoms of infiltration or infection.  Covered with a sterile bandage, slight pressure applied for 30 seconds.  Pt instructed to leave bandage intact for a minimum of one hour, and to call with questions or concerns.  Copy of appointments, discharge instructions, and after visit summary (AVS) provided to patient.  Patient states understanding, discharged ambulatory.

## 2019-05-15 NOTE — PROGRESS NOTES
Patient is a 85 year old male here accompanied by significant other today for infusion of IVF per order of Dr Venancio Ray under the supervision of Dr Walter.  Patient meets parameters for today's infusion. Patient identified with two identifiers, order verified, and verbal consent for today's infusion obtained from patient.       24 gauge angio cath inserted into right posterior mid forearm.  Immediate blood return noted.  IV secured with sterile, transparent dressing and tape.  Patient tolerated well, denies pain or discomfort at this time.  Flushes easily without resistance, no signs or symptoms of infiltration or infection.   Patient denies questions or concerns regarding infusion and/or medication(s) being administered.      0958: IV pump verified with dose, drug, and rate of administration.  Infusion administered per protocol.

## 2019-05-17 ENCOUNTER — INFUSION THERAPY VISIT (OUTPATIENT)
Dept: INFUSION THERAPY | Facility: OTHER | Age: 84
End: 2019-05-17
Attending: FAMILY MEDICINE
Payer: MEDICARE

## 2019-05-17 VITALS
SYSTOLIC BLOOD PRESSURE: 149 MMHG | DIASTOLIC BLOOD PRESSURE: 84 MMHG | RESPIRATION RATE: 18 BRPM | HEIGHT: 68 IN | BODY MASS INDEX: 25.67 KG/M2 | OXYGEN SATURATION: 98 % | HEART RATE: 70 BPM | WEIGHT: 169.4 LBS

## 2019-05-17 DIAGNOSIS — E86.9 VOLUME DEPLETION: Primary | ICD-10-CM

## 2019-05-17 PROCEDURE — 25000128 H RX IP 250 OP 636: Performed by: FAMILY MEDICINE

## 2019-05-17 PROCEDURE — 96360 HYDRATION IV INFUSION INIT: CPT

## 2019-05-17 PROCEDURE — 96365 THER/PROPH/DIAG IV INF INIT: CPT

## 2019-05-17 RX ADMIN — SODIUM CHLORIDE 1000 ML: 9 INJECTION, SOLUTION INTRAVENOUS at 09:49

## 2019-05-17 ASSESSMENT — MIFFLIN-ST. JEOR: SCORE: 1427.76

## 2019-05-17 NOTE — PROGRESS NOTES
Patient is a 86 y/o male here accompanied by self today for infusion of IVF per order of Dr. Ray.  Patient meets parameters for today's infusion. Patient identified with two identifiers, order verified, and verbal consent for today's infusion obtained from patient.       24 gauge angio cath inserted into right forearm after 1 unsuccessful attempt.  Immediate blood return noted.  IV secured with sterile, transparent dressing and tape.  Patient tolerated well, denies pain or discomfort at this time.  Flushes easily without resistance, no signs or symptoms of infiltration or infection.   Patient denies questions or concerns regarding infusion and/or medication(s) being administered.          0949 IV pump verified with  IVF dose, drug, and rate of administration.  Infusion administered per protocol.  Patient tolerated infusion well, no signs or symptoms of adverse reaction noted.  Patient denies pain nor discomfort.     IV removed, catheter intact.  Site clean, dry and intact.  No signs or symptoms of infiltration or infection.  Covered with a sterile bandage, slight pressure applied for 30 seconds.  Pt instructed to leave bandage intact for a minimum of one hour, and to call with questions or concerns.  Copy of appointments, discharge instructions, and after visit summary (AVS) provided to patient.  Patient states understanding, discharged ambulatory.

## 2019-05-21 ENCOUNTER — INFUSION THERAPY VISIT (OUTPATIENT)
Dept: INFUSION THERAPY | Facility: OTHER | Age: 84
End: 2019-05-21
Attending: FAMILY MEDICINE
Payer: MEDICARE

## 2019-05-21 ENCOUNTER — CARE COORDINATION (OUTPATIENT)
Dept: CARDIOLOGY | Facility: CLINIC | Age: 84
End: 2019-05-21

## 2019-05-21 VITALS
DIASTOLIC BLOOD PRESSURE: 85 MMHG | HEIGHT: 68 IN | RESPIRATION RATE: 18 BRPM | SYSTOLIC BLOOD PRESSURE: 171 MMHG | HEART RATE: 71 BPM | OXYGEN SATURATION: 96 % | BODY MASS INDEX: 24.49 KG/M2 | WEIGHT: 161.6 LBS

## 2019-05-21 DIAGNOSIS — E86.9 VOLUME DEPLETION: Primary | ICD-10-CM

## 2019-05-21 PROCEDURE — 25000128 H RX IP 250 OP 636: Performed by: FAMILY MEDICINE

## 2019-05-21 PROCEDURE — 96360 HYDRATION IV INFUSION INIT: CPT

## 2019-05-21 RX ADMIN — SODIUM CHLORIDE 1000 ML: 9 INJECTION, SOLUTION INTRAVENOUS at 11:01

## 2019-05-21 ASSESSMENT — MIFFLIN-ST. JEOR: SCORE: 1392.38

## 2019-05-21 NOTE — PROGRESS NOTES
Patient is a 86 y/o male here accompanied by SO today for infusion of Dr. Ray.  Patient meets parameters for today's infusion. Patient identified with two identifiers, order verified, and verbal consent for today's infusion obtained from patient.           24 gauge angio cath inserted into right arm.  Immediate blood return noted.  IV secured with sterile, transparent dressing and tape.  Patient tolerated well, denies pain or discomfort at this time.  Flushes easily without resistance, no signs or symptoms of infiltration or infection.   Patient denies questions or concerns regarding infusion and/or medication(s) being administered.         1101 IV pump verified with IVF dose, drug, and rate of administration.  Infusion administered per protocol.  Patient tolerated infusion well, no signs or symptoms of adverse reaction noted.  Patient denies pain nor discomfort.     IV removed, catheter intact.  Site clean, dry and intact.  No signs or symptoms of infiltration or infection.  Covered with a sterile bandage, slight pressure applied for 30 seconds.  Pt instructed to leave bandage intact for a minimum of one hour, and to call with questions or concerns.  Copy of appointments, discharge instructions, and after visit summary (AVS) provided to patient.  Patient states understanding, discharged ambulatory.

## 2019-05-21 NOTE — TELEPHONE ENCOUNTER
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 Patient notified that the FDA issued a safety communication to alert health care providers and patients about issues that may cause batteries to drain more quickly than expected without warning. Patient notified that the device clinic will continue to monitor their device's performance through remote monitoring and clinic visits. Patient instructed to keep their remote monitor plugged in at all times to ensure wireless CareAlerts occur.  Verified RRT CareAlert is programmed on. Reinforced the importance of remote monitoring and compliance of regularly scheduled device checks. Patient instructed to call 911 or go to nearest emergency room if they feel lightheaded, dizzy, chest pain or severe shortness of breath. Patient is dependent on their pacemaker. Offered an appointment with their provider to discuss next steps, but declined at this time. Patient encouraged to call device clinic with further questions or concerns. Patient verbalized understanding.

## 2019-05-23 ENCOUNTER — INFUSION THERAPY VISIT (OUTPATIENT)
Dept: INFUSION THERAPY | Facility: OTHER | Age: 84
End: 2019-05-23
Attending: FAMILY MEDICINE
Payer: MEDICARE

## 2019-05-23 VITALS
TEMPERATURE: 98.3 F | SYSTOLIC BLOOD PRESSURE: 157 MMHG | HEART RATE: 72 BPM | DIASTOLIC BLOOD PRESSURE: 81 MMHG | RESPIRATION RATE: 16 BRPM | OXYGEN SATURATION: 97 %

## 2019-05-23 DIAGNOSIS — E86.9 VOLUME DEPLETION: Primary | ICD-10-CM

## 2019-05-23 PROCEDURE — 25000128 H RX IP 250 OP 636: Performed by: FAMILY MEDICINE

## 2019-05-23 PROCEDURE — 96360 HYDRATION IV INFUSION INIT: CPT

## 2019-05-23 RX ADMIN — SODIUM CHLORIDE 1000 ML: 9 INJECTION, SOLUTION INTRAVENOUS at 14:32

## 2019-05-23 NOTE — PROGRESS NOTES
Patient tolerated infusion well, no signs or symptoms of adverse reaction noted.  Patient denies pain nor discomfort.     IV removed, catheter intact.  Site clean, dry and intact.  No signs or symptoms of infiltration or infection.  Covered with a sterile bandage, slight pressure applied for 30 seconds.  Pt instructed to leave bandage intact for a minimum of one hour, and to call with questions or concerns.  Patient declines copy of appointments, discharge instructions, and after visit summary (AVS)t.  Patient states understanding, discharged ambulatory.

## 2019-05-23 NOTE — PROGRESS NOTES
Patient is a 85 year old male here accompanied by significant other today for infusion of IVF per order of Dr MALAI Ray under the supervision of Dr Walter.  Patient identified with two identifiers, order verified, and verbal consent for today's infusion obtained from patient.      24 gauge angio cath inserted into right posterior mid forearm.  Immediate blood return noted.  IV secured with sterile, transparent dressing and tape.  Patient tolerated well, denies pain or discomfort at this time.  Flushes easily without resistance, no signs or symptoms of infiltration or infection.   Patient denies questions or concerns regarding infusion and/or medication(s) being administered.      IV pump verified with dose, drug, and rate of administration.  Infusion administered per protocol.

## 2019-05-24 DIAGNOSIS — E87.6 HYPOKALEMIA: ICD-10-CM

## 2019-05-24 RX ORDER — POTASSIUM CHLORIDE 1500 MG/1
TABLET, EXTENDED RELEASE ORAL
Qty: 30 TABLET | Refills: 3 | Status: SHIPPED | OUTPATIENT
Start: 2019-05-24 | End: 2019-10-16

## 2019-05-29 ENCOUNTER — INFUSION THERAPY VISIT (OUTPATIENT)
Dept: INFUSION THERAPY | Facility: OTHER | Age: 84
End: 2019-05-29
Attending: FAMILY MEDICINE
Payer: MEDICARE

## 2019-05-29 VITALS
DIASTOLIC BLOOD PRESSURE: 87 MMHG | SYSTOLIC BLOOD PRESSURE: 162 MMHG | TEMPERATURE: 98.6 F | HEART RATE: 72 BPM | RESPIRATION RATE: 16 BRPM | OXYGEN SATURATION: 96 %

## 2019-05-29 DIAGNOSIS — E86.9 VOLUME DEPLETION: Primary | ICD-10-CM

## 2019-05-29 PROCEDURE — 25000128 H RX IP 250 OP 636: Performed by: FAMILY MEDICINE

## 2019-05-29 PROCEDURE — 96360 HYDRATION IV INFUSION INIT: CPT

## 2019-05-29 PROCEDURE — 96365 THER/PROPH/DIAG IV INF INIT: CPT

## 2019-05-29 RX ADMIN — SODIUM CHLORIDE 1000 ML: 9 INJECTION, SOLUTION INTRAVENOUS at 10:18

## 2019-05-29 NOTE — PROGRESS NOTES
Patient is a 85 year old male here accompanied by self today for infusion of IVF per order of Dr MALIA Ray under the supervision of Dr Walter. Patient identified with two identifiers, order verified, and verbal consent for today's infusion obtained from patient.         24 gauge angio cath inserted into right mid posterior forearm.  Immediate blood return noted.  IV secured with sterile, transparent dressing and tape.  Patient tolerated well, denies pain or discomfort at this time.  Flushes easily without resistance, no signs or symptoms of infiltration or infection.   Patient denies questions or concerns regarding infusion and/or medication(s) being administered.      IV pump verified with dose, drug, and rate of administration.  Infusion administered per protocol.  Patient tolerated well, no signs or symptoms of adverse reaction noted.  Patient denies pain nor discomfort.     IV removed, catheter intact.  Site clean, dry and intact.  No signs or symptoms of infiltration or infection.  Covered with a sterile bandage, slight pressure applied for 30 seconds.  Pt instructed to leave bandage intact for a minimum of one hour, and to call with questions or concerns.  Copy of appointments, discharge instructions, and after visit summary (AVS) provided to patient.  Patient states understanding, discharged ambulatory.

## 2019-05-30 ENCOUNTER — ANCILLARY PROCEDURE (OUTPATIENT)
Dept: CARDIOLOGY | Facility: CLINIC | Age: 84
End: 2019-05-30
Attending: SURGERY
Payer: MEDICARE

## 2019-05-30 ENCOUNTER — APPOINTMENT (OUTPATIENT)
Dept: GENERAL RADIOLOGY | Facility: HOSPITAL | Age: 84
End: 2019-05-30
Attending: FAMILY MEDICINE
Payer: MEDICARE

## 2019-05-30 ENCOUNTER — HOSPITAL ENCOUNTER (EMERGENCY)
Facility: HOSPITAL | Age: 84
Discharge: HOME | End: 2019-05-30
Attending: FAMILY MEDICINE | Admitting: PHYSICIAN ASSISTANT
Payer: MEDICARE

## 2019-05-30 VITALS
HEART RATE: 71 BPM | OXYGEN SATURATION: 98 % | DIASTOLIC BLOOD PRESSURE: 89 MMHG | BODY MASS INDEX: 24.64 KG/M2 | RESPIRATION RATE: 16 BRPM | SYSTOLIC BLOOD PRESSURE: 185 MMHG | WEIGHT: 162 LBS | TEMPERATURE: 97.5 F

## 2019-05-30 DIAGNOSIS — I44.2 AV BLOCK, 3RD DEGREE (H): ICD-10-CM

## 2019-05-30 DIAGNOSIS — I95.1 ORTHOSTATIC HYPOTENSION: ICD-10-CM

## 2019-05-30 DIAGNOSIS — M62.81 GENERALIZED MUSCLE WEAKNESS: ICD-10-CM

## 2019-05-30 DIAGNOSIS — Z95.0 CARDIAC PACEMAKER IN SITU: ICD-10-CM

## 2019-05-30 DIAGNOSIS — I95.1 AUTONOMIC ORTHOSTATIC HYPOTENSION: ICD-10-CM

## 2019-05-30 LAB
ALBUMIN SERPL-MCNC: 3.3 G/DL (ref 3.4–5)
ALBUMIN UR-MCNC: NEGATIVE MG/DL
ALP SERPL-CCNC: 68 U/L (ref 40–150)
ALT SERPL W P-5'-P-CCNC: 23 U/L (ref 0–70)
ANION GAP SERPL CALCULATED.3IONS-SCNC: 9 MMOL/L (ref 3–14)
APPEARANCE UR: CLEAR
AST SERPL W P-5'-P-CCNC: 10 U/L (ref 0–45)
BACTERIA #/AREA URNS HPF: ABNORMAL /HPF
BASOPHILS # BLD AUTO: 0 10E9/L (ref 0–0.2)
BASOPHILS NFR BLD AUTO: 0.6 %
BILIRUB SERPL-MCNC: 0.6 MG/DL (ref 0.2–1.3)
BILIRUB UR QL STRIP: NEGATIVE
BUN SERPL-MCNC: 17 MG/DL (ref 7–30)
CALCIUM SERPL-MCNC: 8.4 MG/DL (ref 8.5–10.1)
CHLORIDE SERPL-SCNC: 105 MMOL/L (ref 94–109)
CO2 SERPL-SCNC: 27 MMOL/L (ref 20–32)
COLOR UR AUTO: YELLOW
CREAT SERPL-MCNC: 1.21 MG/DL (ref 0.66–1.25)
DIFFERENTIAL METHOD BLD: ABNORMAL
EOSINOPHIL # BLD AUTO: 0.1 10E9/L (ref 0–0.7)
EOSINOPHIL NFR BLD AUTO: 2 %
ERYTHROCYTE [DISTWIDTH] IN BLOOD BY AUTOMATED COUNT: 12.5 % (ref 10–15)
GFR SERPL CREATININE-BSD FRML MDRD: 54 ML/MIN/{1.73_M2}
GLUCOSE SERPL-MCNC: 117 MG/DL (ref 70–99)
GLUCOSE UR STRIP-MCNC: NEGATIVE MG/DL
HCT VFR BLD AUTO: 37.7 % (ref 40–53)
HGB BLD-MCNC: 12.8 G/DL (ref 13.3–17.7)
HGB UR QL STRIP: NEGATIVE
HYALINE CASTS #/AREA URNS LPF: 9 /LPF
IMM GRANULOCYTES # BLD: 0 10E9/L (ref 0–0.4)
IMM GRANULOCYTES NFR BLD: 0.3 %
KETONES UR STRIP-MCNC: NEGATIVE MG/DL
LACTATE BLD-SCNC: 1.1 MMOL/L (ref 0.7–2)
LEUKOCYTE ESTERASE UR QL STRIP: NEGATIVE
LYMPHOCYTES # BLD AUTO: 1.5 10E9/L (ref 0.8–5.3)
LYMPHOCYTES NFR BLD AUTO: 21.9 %
MCH RBC QN AUTO: 31.1 PG (ref 26.5–33)
MCHC RBC AUTO-ENTMCNC: 34 G/DL (ref 31.5–36.5)
MCV RBC AUTO: 92 FL (ref 78–100)
MONOCYTES # BLD AUTO: 0.7 10E9/L (ref 0–1.3)
MONOCYTES NFR BLD AUTO: 10.3 %
MUCOUS THREADS #/AREA URNS LPF: PRESENT /LPF
NEUTROPHILS # BLD AUTO: 4.5 10E9/L (ref 1.6–8.3)
NEUTROPHILS NFR BLD AUTO: 64.9 %
NITRATE UR QL: NEGATIVE
NRBC # BLD AUTO: 0 10*3/UL
NRBC BLD AUTO-RTO: 0 /100
PH UR STRIP: 7 PH (ref 4.7–8)
PLATELET # BLD AUTO: 206 10E9/L (ref 150–450)
POTASSIUM SERPL-SCNC: 4.1 MMOL/L (ref 3.4–5.3)
PROT SERPL-MCNC: 6.6 G/DL (ref 6.8–8.8)
RBC # BLD AUTO: 4.11 10E12/L (ref 4.4–5.9)
RBC #/AREA URNS AUTO: 0 /HPF (ref 0–2)
SODIUM SERPL-SCNC: 141 MMOL/L (ref 133–144)
SOURCE: ABNORMAL
SP GR UR STRIP: 1.02 (ref 1–1.03)
TROPONIN I SERPL-MCNC: <0.015 UG/L (ref 0–0.04)
UROBILINOGEN UR STRIP-MCNC: NORMAL MG/DL (ref 0–2)
WBC # BLD AUTO: 6.9 10E9/L (ref 4–11)
WBC #/AREA URNS AUTO: <1 /HPF (ref 0–5)

## 2019-05-30 PROCEDURE — 80053 COMPREHEN METABOLIC PANEL: CPT | Performed by: FAMILY MEDICINE

## 2019-05-30 PROCEDURE — 99284 EMERGENCY DEPT VISIT MOD MDM: CPT | Mod: Z6 | Performed by: PHYSICIAN ASSISTANT

## 2019-05-30 PROCEDURE — 36415 COLL VENOUS BLD VENIPUNCTURE: CPT | Performed by: FAMILY MEDICINE

## 2019-05-30 PROCEDURE — 71045 X-RAY EXAM CHEST 1 VIEW: CPT | Mod: TC

## 2019-05-30 PROCEDURE — 83605 ASSAY OF LACTIC ACID: CPT | Performed by: FAMILY MEDICINE

## 2019-05-30 PROCEDURE — 93010 ELECTROCARDIOGRAM REPORT: CPT | Performed by: INTERNAL MEDICINE

## 2019-05-30 PROCEDURE — 85025 COMPLETE CBC W/AUTO DIFF WBC: CPT | Performed by: FAMILY MEDICINE

## 2019-05-30 PROCEDURE — 93296 REM INTERROG EVL PM/IDS: CPT | Mod: ZF

## 2019-05-30 PROCEDURE — 99285 EMERGENCY DEPT VISIT HI MDM: CPT | Mod: 25

## 2019-05-30 PROCEDURE — 93005 ELECTROCARDIOGRAM TRACING: CPT

## 2019-05-30 PROCEDURE — 84484 ASSAY OF TROPONIN QUANT: CPT | Performed by: FAMILY MEDICINE

## 2019-05-30 PROCEDURE — 81001 URINALYSIS AUTO W/SCOPE: CPT | Performed by: PHYSICIAN ASSISTANT

## 2019-05-30 PROCEDURE — 93294 REM INTERROG EVL PM/LDLS PM: CPT | Performed by: INTERNAL MEDICINE

## 2019-05-30 NOTE — ED TRIAGE NOTES
"Pt states he has felt weak today. \"And now that you ask I have been having off and on chest pain since I got up at 0800 today\". Family member with pt states he was told his pacemaker may have battery problems.  "

## 2019-05-30 NOTE — ED NOTES
Patient states has chest pain intermittently- more like an occasional twinge. Alert and oriented. [place]

## 2019-05-30 NOTE — ED AVS SNAPSHOT
[facility]  [address] [address]  Phone:  [phone]                                    Jf Ortiz   MRN: [id number]    Department:  [facility]   Date of Visit:  5/30/2019           After Visit Summary Signature Page    I have received my discharge instructions, and my questions have been answered. I have discussed any challenges I see with this plan with the nurse or doctor.    ..........................................................................................................................................  Patient/Patient Representative Signature      ..........................................................................................................................................  Patient Representative Print Name and Relationship to Patient    ..................................................               ................................................  Date                                   Time    ..........................................................................................................................................  Reviewed by Signature/Title    ...................................................              ..............................................  Date                                               Time          22EPIC Rev 08/18

## 2019-05-30 NOTE — ED NOTES
[place]ia from the patient's EP clinic called back.  States in order to interrogate we need to initiate it on our end.

## 2019-05-30 NOTE — ED NOTES
Emergency Department Assessment  Reason for Referral: Frail elderly  PCP: FRANCES Ray  Specialists or  providers: na  [facility]  Medication routine/concerns no sig other Uda sets up meds  Cognitive Behavioral Status: awake, alert and oriented    Affect and Mood Status: flat affect but pleasant and cooperative    Problems sleeping: no    Mental Health History: no  Recent Stressors: no    Case Manger/: no    Support System: Udjyoti and her family are supportive  Transportation: Uda drives    Current Living Situation:    Home Setting: Two-story   Living Situation:Significant Other   Function Status/Assistive Device: wheeled walker and cane but doesn't use    Employment/Financial/Insurance Concerns:retired     No Insurance issues identified     Patient's insurance coverage: [unfilled]    [place] Status/[facility]: yes but doesn't go to [facility]  Health Care Directive: on file  Previous Services at Home or in the Community:no  Falls at home?: yes-fell in march, no injuries    ADL's:independent  Equipment at home?: walker and cane  O2: no  Smoker: no  ETOH: no  THC/Drugs: n    Any Violence in the home?: o  Do you feel safe at home?: yes    Smoke alarm/Carbon monoxide detectors in home: na :     Plan: home    Met with patient and sig other.  I met them in Feb.  No changes since he was last here except they are no longer getting [facility].  No services wanted.    MARLENI Spear   Care Transitions

## 2019-05-31 ENCOUNTER — INFUSION THERAPY VISIT (OUTPATIENT)
Dept: INFUSION THERAPY | Facility: OTHER | Age: 84
End: 2019-05-31
Attending: FAMILY MEDICINE
Payer: MEDICARE

## 2019-05-31 VITALS
BODY MASS INDEX: 24.78 KG/M2 | HEIGHT: 68 IN | OXYGEN SATURATION: 97 % | HEART RATE: 71 BPM | WEIGHT: 163.5 LBS | RESPIRATION RATE: 16 BRPM | TEMPERATURE: 97.7 F | SYSTOLIC BLOOD PRESSURE: 157 MMHG | DIASTOLIC BLOOD PRESSURE: 90 MMHG

## 2019-05-31 DIAGNOSIS — E86.9 VOLUME DEPLETION: Primary | ICD-10-CM

## 2019-05-31 PROCEDURE — 96360 HYDRATION IV INFUSION INIT: CPT

## 2019-05-31 PROCEDURE — 96365 THER/PROPH/DIAG IV INF INIT: CPT

## 2019-05-31 PROCEDURE — 25000128 H RX IP 250 OP 636: Performed by: FAMILY MEDICINE

## 2019-05-31 RX ADMIN — SODIUM CHLORIDE 1000 ML: 9 INJECTION, SOLUTION INTRAVENOUS at 10:32

## 2019-05-31 ASSESSMENT — ENCOUNTER SYMPTOMS
SHORTNESS OF BREATH: 1
COUGH: 1
LIGHT-HEADEDNESS: 1
FATIGUE: 1

## 2019-05-31 ASSESSMENT — PAIN SCALES - GENERAL: PAINLEVEL: NO PAIN (0)

## 2019-05-31 ASSESSMENT — MIFFLIN-ST. JEOR: SCORE: 1401

## 2019-05-31 NOTE — PROGRESS NOTES
Patient is a 85 year old male here accompanied by significant other today for infusion of IVF per order of Dr MALIA Ray under the supervision of Dr Walter.   Patient identified with two identifiers, order verified, and verbal consent for today's infusion obtained from patient.       24 gauge angio cath inserted into right mid posterior forearm after one failed attempt to area just below.  Immediate blood return noted.  IV secured with sterile, transparent dressing and tape.  Patient tolerated well, denies pain or discomfort at this time.  Flushes easily without resistance, no signs or symptoms of infiltration or infection.   Patient denies questions or concerns regarding infusion and/or medication(s) being administered.      1032: IV pump verified with dose, drug, and rate of administration.  Infusion administered per protocol.  Patient tolerated infusion well, no signs or symptoms of adverse reaction noted.  Patient denies pain nor discomfort.     IV removed, catheter intact.  Site clean, dry and intact.  No signs or symptoms of infiltration or infection.  Covered with a sterile bandage, slight pressure applied for 30 seconds.  Pt instructed to leave bandage intact for a minimum of one hour, and to call with questions or concerns.  Copy of appointments, discharge instructions, and after visit summary (AVS) provided to patient.  Patient states understanding, discharged ambulatory.

## 2019-05-31 NOTE — ED PROVIDER NOTES
History     Chief Complaint   Patient presents with     Generalized Weakness     HPI  Jf Ortiz is a 85 year old male who presents emergency department with his wife with complaints of sharp, intermittent chest pain that started at 8:00 this morning with associated dizziness and fatigue over the past week.  Patient is currently using [employer] pacemaker and had received a phone call recently indicating that the pacemaker had rare episodes of rapid battery depletion resulting in complications for patients.  Patient's spouse is very concerned about this.  Patient has had a mild cough but no fever.    Allergies:  Allergies   Allergen Reactions     Cats Unknown     Lamotrigine      Skin lesions       Problem List:    Patient Active Problem List    Diagnosis Date Noted     Constipation, unspecified constipation type 04/11/2018     Priority: Medium     Pacemaker, [employer], Dual Chamber - Dependent 10/06/2017     Priority: Medium     Lewy body dementia without behavioral disturbance 08/31/2017     Priority: Medium     Fatigue 08/31/2017     Priority: Medium     Urinary incontinence 08/31/2017     Priority: Medium     Autonomic orthostatic hypotension 10/14/2016     Priority: Medium     Dementia without behavioral disturbance 07/28/2015     Priority: Medium     Diagnosis updated by automated process. Provider to review and confirm.       Hypocalcemia 07/28/2015     Priority: Medium     Parkinson disease (H)      Priority: Medium     Seizure disorder (H)      Priority: Medium     Volume depletion 08/02/2014     Priority: Medium     Syncope and collapse 08/01/2014     Priority: Medium     Stented coronary artery      Priority: Medium     Coronary atherosclerosis of native coronary artery      Priority: Medium     Overview:   IMO Update 10/11       Hypercholesterolemia 04/23/2013     Priority: Medium     Advanced care planning/counseling discussion 10/30/2012     Priority: Medium     REM sleep behavior disorder  01/01/2011     Priority: Medium     Osteoarthritis 01/01/2011     Priority: Medium     Problem list name updated by automated process. Provider to review       Diaphragmatic hernia 01/01/2011     Priority: Medium     Problem list name updated by automated process. 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     Priority: Medium     Overview:   IMO Update 10/11       Hypertension with target blood pressure goal under 150/90 09/05/2006     Priority: Medium     Overview:   IMO Update          Past Medical History:    Past Medical History:   Diagnosis Date     Autonomic orthostatic hypotension 10/14/2016     Coronary artery disease      Dementia without behavioral disturbance 7/28/2015     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     Seizure disorder (H)      Stented coronary artery        Past Surgical History:    Past Surgical History:   Procedure Laterality Date     ------------OTHER-------------  1955    ulnar and radial fx - repair ulnar and radial fx x4     ------------OTHER-------------  6/14/2011    cataract extraction     BIOPSY  08/2015    skin biopsy     BLEPHAROPLASTY BILATERAL  5/6/2014    Procedure: BLEPHAROPLASTY BILATERAL;  Surgeon: Andrew Queen MD;  Location: [facility]     BYPASS GRAFT ARTERY CORONARY  11/2006    coronary artery disease x 5, [facility]     cataract extraction and lens implantation  2011    cataracts     cataract extraction and lens implantation      cataracts     COLONOSCOPY  2012     colonoscopy with polypectomy  3/13/2009    history of polyps - repeat 3 yrs     colonoscopy with polypectomy  2006     colonoscopy  with polypectomy  2005     COMBINED COLONOSCOPY WITH ARGON PLASMA COAGULATOR (APC) N/A 10/31/2014    Procedure: COMBINED COLONOSCOPY WITH ARGON PLASMA COAGULATOR (APC);  Surgeon: Bassam Aguilar MD;  Location: [facility]     COMBINED COLONOSCOPY WITH ARGON PLASMA COAGULATOR (APC) N/A 11/13/2015    Procedure: COMBINED COLONOSCOPY WITH ARGON PLASMA COAGULATOR (APC);  Surgeon: Bassam Aguilar MD;  Location: [facility]     ENDOSCOPY UPPER, COLONOSCOPY, COMBINED N/A 10/31/2014    Procedure: COMBINED ENDOSCOPY UPPER, COLONOSCOPY;  Surgeon: Bassam Aguilar MD;  Location: [facility]     ENDOSCOPY UPPER, COLONOSCOPY, COMBINED N/A 11/13/2015    Procedure: COMBINED ENDOSCOPY UPPER, COLONOSCOPY;  Surgeon: Bassam Aguilar MD;  Location: [facility]     ESOPHAGOSCOPY, GASTROSCOPY, DUODENOSCOPY (EGD), COMBINED  1/22/2014    Procedure: COMBINED ESOPHAGOSCOPY, GASTROSCOPY, DUODENOSCOPY (EGD);  UPPER ENDOSCOPY(PENA) W/ BIOPSIES;  Surgeon: Patricia Pena MD;  Location: [facility]     HERNIORRHAPHY INGUINAL Right 2/14/2018    Procedure: HERNIORRHAPHY INGUINAL;  OPEN RIGHT INGUINAL HERNIA REPAIR with Mesh;  Surgeon: Virgilio Zaragoza DO;  Location: [facility]     LARYNGOSCOPY WITH MICROSCOPE  1/22/2014    Procedure: LARYNGOSCOPY WITH MICROSCOPE;;  Surgeon: Chayo Duke MD;  Location: [facility]     pacemaker placement  2000    heart block     pacemaker placement  2011    dual-chamber     REMOVE TUBE, MYRINGOTOMY, COMBINED  1/22/2014    Procedure: COMBINED REMOVE TUBE, MYRINGOTOMY;  MICRODIRECT LARYNGOSCOPY WITH BIOPSY AND FROZEN SECTIONS removal of right ear tube and myringoplasty;  Surgeon: Chayo Duke MD;  Location: [facility]     REPLACE PACEMAKER GENERATOR N/A 8/8/2018    Procedure: REPLACE PACEMAKER GENERATOR;  Pacemaker generator change;  Surgeon: Alma Kaplan MD;  Location: [facility]     stent placement to LAD  2008     ventilation tube  5/24/2012    right in office       Family History:    Family History   Problem Relation Age of Onset     Diabetes  Mother        Social History:  Marital Status:  Single [1]  Social History     Tobacco Use     Smoking status: Former Smoker     Packs/day: 1.00     Years: 5.00     Pack years: 5.00     Types: Cigarettes     Last attempt to quit: 1985     Years since quittin.4     Smokeless tobacco: Never Used     Tobacco comment: quit in    Substance Use Topics     Alcohol use: Yes     Comment: social     Drug use: No        Medications:      atorvastatin (LIPITOR) 20 MG tablet   Cholecalciferol (VITAMIN D-3 PO)   Coenzyme Q10 (CO Q 10 PO)   Cyanocobalamin (VITAMIN B-12 PO)   donepezil (ARICEPT) 10 MG tablet   lidocaine (LIDODERM) 5 % patch   Magnesium 400 MG CAPS   MELATONIN PO   metoprolol succinate ER (TOPROL-XL) 50 MG 24 hr tablet   multivitamin, therapeutic with minerals (MULTI-VITAMIN) TABS tablet   Omega-3 Fatty Acids (FISH OIL) 500 MG CAPS   PANTOPRAZOLE SODIUM PO   potassium chloride ER (K-DUR/KLOR-CON M) 20 MEQ CR tablet   sodium chloride 1 GM tablet   Turmeric 500 MG TABS   calcitonin, salmon, (MIACALCIN) 200 UNIT/ACT nasal spray         Review of Systems   Constitutional: Positive for fatigue.   HENT: Negative.    Respiratory: Positive for cough and shortness of breath.    Cardiovascular: Positive for chest pain.   Skin: Negative.    Neurological: Positive for light-headedness.       Physical Exam   BP: 139/84  Pulse: 70  Heart Rate: 72  Temp: 97.6  F (36.4  C)  Resp: 16  Weight: 73.5 kg (162 lb)  SpO2: 99 %  Lying Orthostatic BP: 157/83  Lying Orthostatic Pulse: 70 bpm  Sitting Orthostatic BP: 147/86  Sitting Orthostatic Pulse: 76 bpm  Standing Orthostatic BP: 119/61  Standing Orthostatic Pulse: 73 bpm      Physical Exam   Constitutional: He is oriented to person, place, and time. No distress.   HENT:   Head: Atraumatic.   Mouth/Throat: Oropharynx is clear and moist.   Eyes: Pupils are equal, round, and reactive to light. Conjunctivae and EOM are normal. No scleral icterus.   Cardiovascular: Intact distal  pulses.   Murmur (2/6 systolic murmur) heard.  Pulmonary/Chest: Breath sounds normal. No respiratory distress.   Abdominal: Soft. Bowel sounds are normal. There is no tenderness.   Musculoskeletal: He exhibits no edema or tenderness.   Neurological: He is alert and oriented to person, place, and time.   Skin: Skin is warm. No rash noted. He is not diaphoretic.       ED Course        Procedures  ECG shows paced rhythm.  Troponin is negative.  No indication of acute myocardial infarction.  Chest x-ray and white blood cell count is normal with no indication of pneumonia.  Urinalysis is unremarkable.  Pacemaker was interrogated and was found to be working appropriately.  Discussed with patient and his wife that symptoms may be a result of inadequate function of the heart.  Noted significant drop in blood pressure with standing and recommended caution when moving around to prevent falls.  Recommend use of a walker as necessary.  Recommend follow-up with primary care provider and cardiologist to discuss results of today's visit and options for managing symptoms.             Results for orders placed or performed during the hospital encounter of 05/30/19 (from the past 24 hour(s))   CBC with platelets differential   Result Value Ref Range    WBC 6.9 4.0 - 11.0 10e9/L    RBC Count 4.11 (L) 4.4 - 5.9 10e12/L    Hemoglobin 12.8 (L) 13.3 - 17.7 g/dL    Hematocrit 37.7 (L) 40.0 - 53.0 %    MCV 92 78 - 100 fl    MCH 31.1 26.5 - 33.0 pg    MCHC 34.0 31.5 - 36.5 g/dL    RDW 12.5 10.0 - 15.0 %    Platelet Count 206 150 - 450 10e9/L    Diff Method Automated Method     % Neutrophils 64.9 %    % Lymphocytes 21.9 %    % Monocytes 10.3 %    % Eosinophils 2.0 %    % Basophils 0.6 %    % Immature Granulocytes 0.3 %    Nucleated RBCs 0 0 /100    Absolute Neutrophil 4.5 1.6 - 8.3 10e9/L    Absolute Lymphocytes 1.5 0.8 - 5.3 10e9/L    Absolute Monocytes 0.7 0.0 - 1.3 10e9/L    Absolute Eosinophils 0.1 0.0 - 0.7 10e9/L    Absolute Basophils  0.0 0.0 - 0.2 10e9/L    Abs Immature Granulocytes 0.0 0 - 0.4 10e9/L    Absolute Nucleated RBC 0.0    Comprehensive metabolic panel   Result Value Ref Range    Sodium 141 133 - 144 mmol/L    Potassium 4.1 3.4 - 5.3 mmol/L    Chloride 105 94 - 109 mmol/L    Carbon Dioxide 27 20 - 32 mmol/L    Anion Gap 9 3 - 14 mmol/L    Glucose 117 (H) 70 - 99 mg/dL    Urea Nitrogen 17 7 - 30 mg/dL    Creatinine 1.21 0.66 - 1.25 mg/dL    GFR Estimate 54 (L) >60 mL/min/[1.73_m2]    GFR Estimate If Black 63 >60 mL/min/[1.73_m2]    Calcium 8.4 (L) 8.5 - 10.1 mg/dL    Bilirubin Total 0.6 0.2 - 1.3 mg/dL    Albumin 3.3 (L) 3.4 - 5.0 g/dL    Protein Total 6.6 (L) 6.8 - 8.8 g/dL    Alkaline Phosphatase 68 40 - 150 U/L    ALT 23 0 - 70 U/L    AST 10 0 - 45 U/L   Lactic acid whole blood   Result Value Ref Range    Lactic Acid 1.1 0.7 - 2.0 mmol/L   Troponin I   Result Value Ref Range    Troponin I ES <0.015 0.000 - 0.045 ug/L   XR Chest Port 1 View    Narrative    XR CHEST PORT 1 VW    HISTORY: 85 yearsMale Chest pain    TECHNIQUE: A single view of the chest was performed    COMPARISON: 5/9/2019    FINDINGS: Heart size and pulmonary vascularity are within normal  limits. Lungs are clear. No consolidating airspace opacities are  present. Again seen are changes of median sternotomy and cardiac  pacemaker placement.      Impression    IMPRESSION: Clear chest    VIMAL PÉREZ MD   UA with Microscopic reflex to Culture   Result Value Ref Range    Color Urine Yellow     Appearance Urine Clear     Glucose Urine Negative NEG^Negative mg/dL    Bilirubin Urine Negative NEG^Negative    Ketones Urine Negative NEG^Negative mg/dL    Specific Gravity Urine 1.018 1.003 - 1.035    Blood Urine Negative NEG^Negative    pH Urine 7.0 4.7 - 8.0 pH    Protein Albumin Urine Negative NEG^Negative mg/dL    Urobilinogen mg/dL Normal 0.0 - 2.0 mg/dL    Nitrite Urine Negative NEG^Negative    Leukocyte Esterase Urine Negative NEG^Negative    Source Midstream Urine      WBC Urine <1 0 - 5 /HPF    RBC Urine 0 0 - 2 /HPF    Bacteria Urine None (A) NEG^Negative /HPF    Mucous Urine Present (A) NEG^Negative /LPF    Hyaline Casts 9 (A) OTO2^O - 2 /LPF   Cardiac Device Check - Remote   Result Value Ref Range    Date Time Interrogation Session [id number]     Implantable Pulse Generator  [employer]     Implantable Pulse Generator Model W1DR01 Cashion XT DR MRI     Implantable Pulse Generator Serial Number [id number]     Type Interrogation Session Remote      Clinic Name [facility]     Implantable Pulse Generator Type Pacemaker     Implantable Pulse Generator Implant Date 20180808     Implantable Lead  [employer]     Implantable Lead Model 4137 Dextrus     Implantable Lead Serial Number [id number]     Implantable Lead Implant Date 20110513     Implantable Lead Polarity Type Bipolar Lead     Implantable Lead Location Detail 1 UNKNOWN     Implantable Lead Location Right Ventricle     Implantable Lead  [employer]     Implantable Lead Model 4054 [employer] Rx     Implantable Lead Serial Number [id number]     Implantable Lead Implant Date 20000811     Implantable Lead Polarity Type Bipolar Lead     Implantable Lead Location Detail 1 UNKNOWN     Implantable Lead Location Right Atrium     Manpreet Setting Mode (NBG Code) DDDR     Manpreet Setting Lower Rate Limit 70 [beats]/min    Manpreet Setting Maximum Tracking Rate 130 [beats]/min    Manpreet Setting Maximum Sensor Rate 130 [beats]/min    Manpreet Setting Hysterisis Rate DISABLED     Manpreet Setting ANGELA Delay Low 150 ms    Manpreet Setting PAV Delay Low 180 ms    Manpreet Setting AT Mode Switch Rate 171 [beats]/min    Lead Channel Setting Sensing Polarity Bipolar     Lead Channel Setting Sensing Anode Location Right Atrium     Lead Channel Setting Sensing Anode Terminal Ring     Lead Channel Setting Sensing Cathode Location Right Atrium     Lead Channel Setting Sensing Cathode Terminal Tip      Lead Channel Setting Sensing Sensitivity 0.45 mV    Lead Channel Setting Sensing Polarity Bipolar     Lead Channel Setting Sensing Anode Location Right Ventricle     Lead Channel Setting Sensing Anode Terminal Ring     Lead Channel Setting Sensing Cathode Location Right Ventricle     Lead Channel Setting Sensing Cathode Terminal Tip     Lead Channel Setting Sensing Sensitivity 0.9 mV    Lead Channel Setting Pacing Polarity Bipolar     Lead Channel Setting Pacing Anode Location Right Atrium     Lead Channel Setting Pacing Anode Terminal Ring     Lead Channel Setting Sensing Cathode Location Right Atrium     Lead Channel Setting Sensing Cathode Terminal Tip     Lead Channel Setting Pacing Pulse Width 0.4 ms    Lead Channel Setting Pacing Amplitude 3 V    Lead Channel Setting Pacing Capture Mode Adaptive     Lead Channel Setting Pacing Polarity Bipolar     Lead Channel Setting Pacing Anode Location Right Ventricle     Lead Channel Setting Pacing Anode Terminal Ring     Lead Channel Setting Sensing Cathode Location Right Ventricle     Lead Channel Setting Sensing Cathode Terminal Tip     Lead Channel Setting Pacing Pulse Width 0.4 ms    Lead Channel Setting Pacing Amplitude 2 V    Lead Channel Setting Pacing Capture Mode Adaptive     Zone Setting Type Category VF     Zone Setting Type Category VT     Zone Setting Type Category VT     Zone Setting Type Category VT     Zone Setting Detection Interval 400 ms    Zone Setting Type Category ATRIAL_FIBRILLATION     Zone Setting Type Category AT/AF     Zone Setting Detection Interval 350 ms    Lead Channel Impedance Value 570 ohm    Lead Channel Impedance Value 437 ohm    Lead Channel Sensing Intrinsic Amplitude 0.75 mV    Lead Channel Sensing Intrinsic Amplitude 0.75 mV    Lead Channel Pacing Threshold Amplitude 1.5 V    Lead Channel Pacing Threshold Pulse Width 0.4 ms    Lead Channel Impedance Value 532 ohm    Lead Channel Impedance Value 361 ohm    Lead Channel Pacing  Threshold Amplitude 0.75 V    Lead Channel Pacing Threshold Pulse Width 0.4 ms    Battery Date Time of Measurements 83177573090923     Battery Status OK     Battery RRT Trigger 2.625     Battery Remaining Longevity 107 mo    Battery Voltage 3.03 V    Manpreet Statistic Date Time Start 20190528045517     Manpreet Statistic Date Time End 20190530203206     Manpreet Statistic RA Percent Paced 95.24 %    Manpreet Statistic RV Percent Paced 99.98 %    Manpreet Statistic AP  Percent 95.40 %    Manpreet Statistic AS  Percent 4.58 %    Manpreet Statistic AP VS Percent 0.01 %    Manpreet Statistic AS VS Percent 0.01 %    Episode Statistic Recent Count 1     Episode Statistic Type Category AT/AF     Episode Statistic Recent Count 0     Episode Statistic Type Category VT     Episode Statistic Recent Count 0     Episode Statistic Type Category VT     Episode Statistic Recent Date Time Start 01425669528831     Episode Statistic Recent Date Time End 20190530203206     Episode Statistic Recent Date Time Start 26001060508211     Episode Statistic Recent Date Time End 70124398284658     Episode Statistic Recent Date Time Start 56329283395201     Episode Statistic Recent Date Time End 20190530203206     Episode Statistic Total Count 879     Episode Statistic Type Category AT/AF     Episode Statistic Total Count 0     Episode Statistic Type Category VT     Episode Statistic Total Count 0     Episode Statistic Type Category VT     Episode Statistic Total Date Time Start 20180808145503     Episode Statistic Total Date Time End 20190530203206     Episode Statistic Total Date Time Start 20180808145503     Episode Statistic Total Date Time End 20190530203206     Episode Statistic Total Date Time Start 46272237279472     Episode Statistic Total Date Time End 20190530203206     Episode Identifier [id number]     Episode Type Category Monitor     Episode Date Time [id number]     Episode Duration 35 s       Medications - No data to display    Assessments & Plan (with  Medical Decision Making)     I have reviewed the nursing notes.    I have reviewed the findings, diagnosis, plan and need for follow up with the patient.       Medication List      There are no discharge medications for this visit.         Final diagnoses:   Generalized muscle weakness   Orthostatic hypotension   Cardiac pacemaker in situ     OPAL Bowden on 5/31/2019 at 12:49 AM   5/30/2019   [facility]     Rene Anderson PA  05/31/19 0050

## 2019-06-05 ENCOUNTER — INFUSION THERAPY VISIT (OUTPATIENT)
Dept: INFUSION THERAPY | Facility: OTHER | Age: 84
End: 2019-06-05
Attending: FAMILY MEDICINE
Payer: MEDICARE

## 2019-06-05 VITALS
RESPIRATION RATE: 16 BRPM | DIASTOLIC BLOOD PRESSURE: 76 MMHG | HEIGHT: 68 IN | WEIGHT: 165 LBS | BODY MASS INDEX: 25.01 KG/M2 | OXYGEN SATURATION: 99 % | HEART RATE: 71 BPM | TEMPERATURE: 95.9 F | SYSTOLIC BLOOD PRESSURE: 142 MMHG

## 2019-06-05 DIAGNOSIS — E86.9 VOLUME DEPLETION: Primary | ICD-10-CM

## 2019-06-05 PROCEDURE — 96360 HYDRATION IV INFUSION INIT: CPT

## 2019-06-05 PROCEDURE — 25000128 H RX IP 250 OP 636: Performed by: FAMILY MEDICINE

## 2019-06-05 PROCEDURE — 96365 THER/PROPH/DIAG IV INF INIT: CPT

## 2019-06-05 RX ADMIN — SODIUM CHLORIDE 1000 ML: 9 INJECTION, SOLUTION INTRAVENOUS at 09:53

## 2019-06-05 ASSESSMENT — MIFFLIN-ST. JEOR: SCORE: 1407.81

## 2019-06-05 NOTE — PROGRESS NOTES
Patient is a 85 year male here accompanied by slef today for infusion of IVF per order of Dr MALIA Ray under the supervision of Dr Walter.  Patient meets parameters for today's infusion. Patient identified with two identifiers, order verified, and verbal consent for today's infusion obtained from patient.      22 gauge angio cath inserted into Rt forearm.  Immediate blood return noted.  IV secured with sterile, transparent dressing and tape.  Patient tolerated well, denies pain or discomfort at this time.  Flushes easily without resistance, no signs or symptoms of infiltration or infection.   Patient denies questions or concerns regarding infusion and/or medication(s) being administered.    IV pump verified with dose, drug, and rate of administration by with MAR Infusion administered per protocol.  Patient tolerated infusion wll, no signs or symptoms of adverse reaction noted.  Patient denies pain nor discomfort.     IV removed, catheter intact.  Site clean, dry and intact.  No signs or symptoms of infiltration or infection.  Covered with a sterile bandage, slight pressure applied for 30 seconds.  Pt instructed to leave bandage intact for a minimum of one hour, and to call with questions or concerns.  Copy of appointments, discharge instructions, and after visit summary (AVS) provided to patient.  Patient states understanding, discharged ambulatory.

## 2019-06-07 ENCOUNTER — INFUSION THERAPY VISIT (OUTPATIENT)
Dept: INFUSION THERAPY | Facility: OTHER | Age: 84
End: 2019-06-07
Attending: FAMILY MEDICINE
Payer: MEDICARE

## 2019-06-07 VITALS
SYSTOLIC BLOOD PRESSURE: 149 MMHG | BODY MASS INDEX: 25.31 KG/M2 | HEIGHT: 68 IN | OXYGEN SATURATION: 97 % | DIASTOLIC BLOOD PRESSURE: 76 MMHG | WEIGHT: 167 LBS | HEART RATE: 72 BPM | TEMPERATURE: 97.4 F | RESPIRATION RATE: 16 BRPM

## 2019-06-07 DIAGNOSIS — E86.9 VOLUME DEPLETION: Primary | ICD-10-CM

## 2019-06-07 LAB
MDC_IDC_EPISODE_DTM: NORMAL
MDC_IDC_EPISODE_DURATION: 35 S
MDC_IDC_EPISODE_ID: 879
MDC_IDC_EPISODE_TYPE: NORMAL
MDC_IDC_LEAD_IMPLANT_DT: NORMAL
MDC_IDC_LEAD_IMPLANT_DT: NORMAL
MDC_IDC_LEAD_LOCATION: NORMAL
MDC_IDC_LEAD_LOCATION: NORMAL
MDC_IDC_LEAD_LOCATION_DETAIL_1: NORMAL
MDC_IDC_LEAD_LOCATION_DETAIL_1: NORMAL
MDC_IDC_LEAD_MFG: NORMAL
MDC_IDC_LEAD_MFG: NORMAL
MDC_IDC_LEAD_MODEL: NORMAL
MDC_IDC_LEAD_MODEL: NORMAL
MDC_IDC_LEAD_POLARITY_TYPE: NORMAL
MDC_IDC_LEAD_POLARITY_TYPE: NORMAL
MDC_IDC_LEAD_SERIAL: NORMAL
MDC_IDC_LEAD_SERIAL: NORMAL
MDC_IDC_MSMT_BATTERY_DTM: NORMAL
MDC_IDC_MSMT_BATTERY_REMAINING_LONGEVITY: 107 MO
MDC_IDC_MSMT_BATTERY_RRT_TRIGGER: 2.62
MDC_IDC_MSMT_BATTERY_STATUS: NORMAL
MDC_IDC_MSMT_BATTERY_VOLTAGE: 3.03 V
MDC_IDC_MSMT_LEADCHNL_RA_IMPEDANCE_VALUE: 437 OHM
MDC_IDC_MSMT_LEADCHNL_RA_IMPEDANCE_VALUE: 570 OHM
MDC_IDC_MSMT_LEADCHNL_RA_PACING_THRESHOLD_AMPLITUDE: 1.5 V
MDC_IDC_MSMT_LEADCHNL_RA_PACING_THRESHOLD_PULSEWIDTH: 0.4 MS
MDC_IDC_MSMT_LEADCHNL_RA_SENSING_INTR_AMPL: 0.75 MV
MDC_IDC_MSMT_LEADCHNL_RA_SENSING_INTR_AMPL: 0.75 MV
MDC_IDC_MSMT_LEADCHNL_RV_IMPEDANCE_VALUE: 361 OHM
MDC_IDC_MSMT_LEADCHNL_RV_IMPEDANCE_VALUE: 532 OHM
MDC_IDC_MSMT_LEADCHNL_RV_PACING_THRESHOLD_AMPLITUDE: 0.75 V
MDC_IDC_MSMT_LEADCHNL_RV_PACING_THRESHOLD_PULSEWIDTH: 0.4 MS
MDC_IDC_PG_IMPLANT_DTM: NORMAL
MDC_IDC_PG_MFG: NORMAL
MDC_IDC_PG_MODEL: NORMAL
MDC_IDC_PG_SERIAL: NORMAL
MDC_IDC_PG_TYPE: NORMAL
MDC_IDC_SESS_CLINIC_NAME: NORMAL
MDC_IDC_SESS_DTM: NORMAL
MDC_IDC_SESS_TYPE: NORMAL
MDC_IDC_SET_BRADY_AT_MODE_SWITCH_RATE: 171 {BEATS}/MIN
MDC_IDC_SET_BRADY_HYSTRATE: NORMAL
MDC_IDC_SET_BRADY_LOWRATE: 70 {BEATS}/MIN
MDC_IDC_SET_BRADY_MAX_SENSOR_RATE: 130 {BEATS}/MIN
MDC_IDC_SET_BRADY_MAX_TRACKING_RATE: 130 {BEATS}/MIN
MDC_IDC_SET_BRADY_MODE: NORMAL
MDC_IDC_SET_BRADY_PAV_DELAY_LOW: 180 MS
MDC_IDC_SET_BRADY_SAV_DELAY_LOW: 150 MS
MDC_IDC_SET_LEADCHNL_RA_PACING_AMPLITUDE: 3 V
MDC_IDC_SET_LEADCHNL_RA_PACING_ANODE_ELECTRODE_1: NORMAL
MDC_IDC_SET_LEADCHNL_RA_PACING_ANODE_LOCATION_1: NORMAL
MDC_IDC_SET_LEADCHNL_RA_PACING_CAPTURE_MODE: NORMAL
MDC_IDC_SET_LEADCHNL_RA_PACING_CATHODE_ELECTRODE_1: NORMAL
MDC_IDC_SET_LEADCHNL_RA_PACING_CATHODE_LOCATION_1: NORMAL
MDC_IDC_SET_LEADCHNL_RA_PACING_POLARITY: NORMAL
MDC_IDC_SET_LEADCHNL_RA_PACING_PULSEWIDTH: 0.4 MS
MDC_IDC_SET_LEADCHNL_RA_SENSING_ANODE_ELECTRODE_1: NORMAL
MDC_IDC_SET_LEADCHNL_RA_SENSING_ANODE_LOCATION_1: NORMAL
MDC_IDC_SET_LEADCHNL_RA_SENSING_CATHODE_ELECTRODE_1: NORMAL
MDC_IDC_SET_LEADCHNL_RA_SENSING_CATHODE_LOCATION_1: NORMAL
MDC_IDC_SET_LEADCHNL_RA_SENSING_POLARITY: NORMAL
MDC_IDC_SET_LEADCHNL_RA_SENSING_SENSITIVITY: 0.45 MV
MDC_IDC_SET_LEADCHNL_RV_PACING_AMPLITUDE: 2 V
MDC_IDC_SET_LEADCHNL_RV_PACING_ANODE_ELECTRODE_1: NORMAL
MDC_IDC_SET_LEADCHNL_RV_PACING_ANODE_LOCATION_1: NORMAL
MDC_IDC_SET_LEADCHNL_RV_PACING_CAPTURE_MODE: NORMAL
MDC_IDC_SET_LEADCHNL_RV_PACING_CATHODE_ELECTRODE_1: NORMAL
MDC_IDC_SET_LEADCHNL_RV_PACING_CATHODE_LOCATION_1: NORMAL
MDC_IDC_SET_LEADCHNL_RV_PACING_POLARITY: NORMAL
MDC_IDC_SET_LEADCHNL_RV_PACING_PULSEWIDTH: 0.4 MS
MDC_IDC_SET_LEADCHNL_RV_SENSING_ANODE_ELECTRODE_1: NORMAL
MDC_IDC_SET_LEADCHNL_RV_SENSING_ANODE_LOCATION_1: NORMAL
MDC_IDC_SET_LEADCHNL_RV_SENSING_CATHODE_ELECTRODE_1: NORMAL
MDC_IDC_SET_LEADCHNL_RV_SENSING_CATHODE_LOCATION_1: NORMAL
MDC_IDC_SET_LEADCHNL_RV_SENSING_POLARITY: NORMAL
MDC_IDC_SET_LEADCHNL_RV_SENSING_SENSITIVITY: 0.9 MV
MDC_IDC_SET_ZONE_DETECTION_INTERVAL: 350 MS
MDC_IDC_SET_ZONE_DETECTION_INTERVAL: 400 MS
MDC_IDC_SET_ZONE_TYPE: NORMAL
MDC_IDC_STAT_BRADY_AP_VP_PERCENT: 95.4 %
MDC_IDC_STAT_BRADY_AP_VS_PERCENT: 0.01 %
MDC_IDC_STAT_BRADY_AS_VP_PERCENT: 4.58 %
MDC_IDC_STAT_BRADY_AS_VS_PERCENT: 0.01 %
MDC_IDC_STAT_BRADY_DTM_END: NORMAL
MDC_IDC_STAT_BRADY_DTM_START: NORMAL
MDC_IDC_STAT_BRADY_RA_PERCENT_PACED: 95.24 %
MDC_IDC_STAT_BRADY_RV_PERCENT_PACED: 99.98 %
MDC_IDC_STAT_EPISODE_RECENT_COUNT: 0
MDC_IDC_STAT_EPISODE_RECENT_COUNT: 0
MDC_IDC_STAT_EPISODE_RECENT_COUNT: 1
MDC_IDC_STAT_EPISODE_RECENT_COUNT_DTM_END: NORMAL
MDC_IDC_STAT_EPISODE_RECENT_COUNT_DTM_START: NORMAL
MDC_IDC_STAT_EPISODE_TOTAL_COUNT: 0
MDC_IDC_STAT_EPISODE_TOTAL_COUNT: 0
MDC_IDC_STAT_EPISODE_TOTAL_COUNT: 879
MDC_IDC_STAT_EPISODE_TOTAL_COUNT_DTM_END: NORMAL
MDC_IDC_STAT_EPISODE_TOTAL_COUNT_DTM_START: NORMAL
MDC_IDC_STAT_EPISODE_TYPE: NORMAL

## 2019-06-07 PROCEDURE — 25000128 H RX IP 250 OP 636: Performed by: FAMILY MEDICINE

## 2019-06-07 PROCEDURE — 96360 HYDRATION IV INFUSION INIT: CPT

## 2019-06-07 RX ADMIN — SODIUM CHLORIDE 1000 ML: 9 INJECTION, SOLUTION INTRAVENOUS at 13:12

## 2019-06-07 ASSESSMENT — MIFFLIN-ST. JEOR: SCORE: 1416.88

## 2019-06-07 NOTE — PROGRESS NOTES
Patient is a 86 y/o male here accompanied by self today for infusion of IVF per order of Dr Ray under the supervision of Dr Walter.  Patient meets parameters for today's infusion. Patient identified with two identifiers, order verified, and verbal consent for today's infusion obtained from patient.      24 gauge angio cath inserted into Right lower forearm.  Immediate blood return noted.  IV secured with sterile, transparent dressing and tape.  Patient tolerated well, denies pain or discomfort at this time.  Flushes easily without resistance, no signs or symptoms of infiltration or infection.   Patient denies questions or concerns regarding infusion and/or medication(s) being administered.      1312 IV pump verified with dose, drug, and rate of administration.  Infusion administered per protocol.  Patient tolerated infusion well, no signs or symptoms of adverse reaction noted.  Patient denies pain nor discomfort.     IV removed, catheter intact.  Site clean, dry and intact.  No signs or symptoms of infiltration or infection.  Covered with a sterile bandage, slight pressure applied for 30 seconds.  Pt instructed to leave bandage intact for a minimum of one hour, and to call with questions or concerns.  Copy of appointments, discharge instructions, and after visit summary (AVS) provided to patient.  Patient states understanding, discharged ambulatory.

## 2019-06-12 ENCOUNTER — INFUSION THERAPY VISIT (OUTPATIENT)
Dept: INFUSION THERAPY | Facility: OTHER | Age: 84
End: 2019-06-12
Attending: FAMILY MEDICINE
Payer: MEDICARE

## 2019-06-12 ENCOUNTER — TELEPHONE (OUTPATIENT)
Dept: FAMILY MEDICINE | Facility: OTHER | Age: 84
End: 2019-06-12

## 2019-06-12 VITALS
BODY MASS INDEX: 24.96 KG/M2 | SYSTOLIC BLOOD PRESSURE: 179 MMHG | WEIGHT: 164.7 LBS | DIASTOLIC BLOOD PRESSURE: 92 MMHG | TEMPERATURE: 97.3 F | OXYGEN SATURATION: 99 % | HEIGHT: 68 IN | HEART RATE: 70 BPM | RESPIRATION RATE: 16 BRPM

## 2019-06-12 DIAGNOSIS — Z45.2 ENCOUNTER FOR INSERTION OF VENOUS ACCESS PORT: Primary | ICD-10-CM

## 2019-06-12 DIAGNOSIS — E86.9 VOLUME DEPLETION: Primary | ICD-10-CM

## 2019-06-12 PROCEDURE — 25000128 H RX IP 250 OP 636: Performed by: FAMILY MEDICINE

## 2019-06-12 PROCEDURE — 96360 HYDRATION IV INFUSION INIT: CPT

## 2019-06-12 RX ADMIN — SODIUM CHLORIDE 1000 ML: 9 INJECTION, SOLUTION INTRAVENOUS at 14:12

## 2019-06-12 ASSESSMENT — MIFFLIN-ST. JEOR: SCORE: 1406.44

## 2019-06-12 NOTE — PROGRESS NOTES
Patient is a 85 year old male here accompanied by self today for infusion of IV fluids per order of Dr Ray under the supervision of Dr Walter.  Patient meets parameters for today's infusion. Patient identified with two identifiers, order verified, and verbal consent for today's infusion obtained from patient.       22 gauge angio cath inserted into Lt forearm.  Immediate blood return noted.  IV secured with sterile, transparent dressing and tape.  Patient tolerated well, denies pain or discomfort at this time.  Flushes easily without resistance, no signs or symptoms of infiltration or infection.   Patient denies questions or concerns regarding infusion and/or medication(s) being administered.    IV pump verified with dose, drug, and rate of administration per MAR.  Infusion administered per protocol.  Patient tolerated infusion well, no signs or symptoms of adverse reaction noted.  Patient denies pain nor discomfort.     IV removed, catheter intact.  Site clean, dry and intact.  No signs or symptoms of infiltration or infection.  Covered with a sterile bandage, slight pressure applied for 30 seconds.  Pt instructed to leave bandage intact for a minimum of one hour, and to call with questions or concerns.  Copy of appointments, discharge instructions, and after visit summary (AVS) provided to patient.  Patient states understanding, discharged ambulatory.

## 2019-06-12 NOTE — TELEPHONE ENCOUNTER
Patient is receiving infusions every 5 days. He would like to know if PCP could place an order for him to have a port placed. Please advise.

## 2019-06-12 NOTE — TELEPHONE ENCOUNTER
Patient is requesting a referral for a port placement. Spoke with infusion nurses they agree with a port  Verus picc line.referral pending . Please send back and I will notify patient   SHASHI ORDONEZ

## 2019-06-14 ENCOUNTER — OFFICE VISIT (OUTPATIENT)
Dept: OTOLARYNGOLOGY | Facility: OTHER | Age: 84
End: 2019-06-14
Attending: PODIATRIST
Payer: MEDICARE

## 2019-06-14 ENCOUNTER — INFUSION THERAPY VISIT (OUTPATIENT)
Dept: INFUSION THERAPY | Facility: OTHER | Age: 84
End: 2019-06-14
Attending: FAMILY MEDICINE
Payer: MEDICARE

## 2019-06-14 VITALS
HEART RATE: 75 BPM | SYSTOLIC BLOOD PRESSURE: 108 MMHG | WEIGHT: 164 LBS | DIASTOLIC BLOOD PRESSURE: 64 MMHG | HEIGHT: 68 IN | TEMPERATURE: 97.6 F | BODY MASS INDEX: 24.86 KG/M2 | OXYGEN SATURATION: 98 %

## 2019-06-14 VITALS
HEIGHT: 68 IN | BODY MASS INDEX: 24.86 KG/M2 | SYSTOLIC BLOOD PRESSURE: 153 MMHG | WEIGHT: 164 LBS | RESPIRATION RATE: 16 BRPM | TEMPERATURE: 97.2 F | OXYGEN SATURATION: 96 % | DIASTOLIC BLOOD PRESSURE: 76 MMHG

## 2019-06-14 DIAGNOSIS — Z96.22 S/P MYRINGOTOMY WITH INSERTION OF TUBE: ICD-10-CM

## 2019-06-14 DIAGNOSIS — H90.3 ASNHL (ASYMMETRICAL SENSORINEURAL HEARING LOSS): ICD-10-CM

## 2019-06-14 DIAGNOSIS — H61.23 IMPACTED CERUMEN, BILATERAL: Primary | ICD-10-CM

## 2019-06-14 DIAGNOSIS — E86.9 VOLUME DEPLETION: Primary | ICD-10-CM

## 2019-06-14 PROCEDURE — G0463 HOSPITAL OUTPT CLINIC VISIT: HCPCS

## 2019-06-14 PROCEDURE — 92504 EAR MICROSCOPY EXAMINATION: CPT | Performed by: PHYSICIAN ASSISTANT

## 2019-06-14 PROCEDURE — 96360 HYDRATION IV INFUSION INIT: CPT

## 2019-06-14 PROCEDURE — 69210 REMOVE IMPACTED EAR WAX UNI: CPT | Performed by: PHYSICIAN ASSISTANT

## 2019-06-14 PROCEDURE — 99213 OFFICE O/P EST LOW 20 MIN: CPT | Mod: 25 | Performed by: PHYSICIAN ASSISTANT

## 2019-06-14 PROCEDURE — 25000128 H RX IP 250 OP 636: Performed by: FAMILY MEDICINE

## 2019-06-14 RX ADMIN — SODIUM CHLORIDE 1000 ML: 9 INJECTION, SOLUTION INTRAVENOUS at 12:00

## 2019-06-14 ASSESSMENT — MIFFLIN-ST. JEOR
SCORE: 1403.4
SCORE: 1403.27

## 2019-06-14 ASSESSMENT — PAIN SCALES - GENERAL: PAINLEVEL: NO PAIN (0)

## 2019-06-14 NOTE — NURSING NOTE
"Chief Complaint   Patient presents with     Ear Problem     Ear Cleaning       Initial /64   Pulse 75   Temp 97.6  F (36.4  C) (Tympanic)   Ht 1.727 m (5' 8\")   Wt 74.4 kg (164 lb)   SpO2 98%   BMI 24.94 kg/m   Estimated body mass index is 24.94 kg/m  as calculated from the following:    Height as of this encounter: 1.727 m (5' 8\").    Weight as of this encounter: 74.4 kg (164 lb).  Medication Reconciliation: complete      "

## 2019-06-14 NOTE — PROGRESS NOTES
Chief Complaint   Patient presents with     Ear Problem     Ear Cleaning       Patient returns for an ear check and cleaning. He was last seen in ENT on 12/7/18 and had an ear cleaning and right tube was in good position and patent.   He returns today for his 6 month check.     Jf presents for ear cleaning. He has noticed concerns with his ear having wax build up.   He did obtain hearing aids from [employer] this summer and he has noted improvement with results.     Denies otalgia, otorrhea.   He has no concerns with vertigo. He does feel he has dizziness/ lightheadedness.        Past Medical History:   Diagnosis Date     Autonomic orthostatic hypotension 10/14/2016     Coronary artery disease      Dementia without behavioral disturbance 7/28/2015    Diagnosis updated by automated process. Provider to review and confirm.     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     Seizure disorder (H)      Stented coronary artery         Allergies   Allergen Reactions     Cats Unknown     Lamotrigine      Skin lesions     Current Outpatient Medications   Medication     atorvastatin (LIPITOR) 20 MG tablet     calcitonin, salmon, (MIACALCIN) 200 UNIT/ACT nasal spray     Cholecalciferol (VITAMIN D-3 PO)     Coenzyme Q10 (CO Q 10 PO)     Cyanocobalamin (VITAMIN B-12 PO)     donepezil (ARICEPT) 10 MG tablet     lidocaine (LIDODERM) 5 % patch     Magnesium 400 MG CAPS     MELATONIN PO     metoprolol succinate ER (TOPROL-XL) 50 MG 24 hr tablet     multivitamin, therapeutic with minerals (MULTI-VITAMIN) TABS tablet     Omega-3 Fatty Acids (FISH OIL) 500 MG CAPS     PANTOPRAZOLE SODIUM PO     potassium chloride ER (K-DUR/KLOR-CON M) 20 MEQ CR tablet     sodium chloride 1 GM  "tablet     Turmeric 500 MG TABS     No current facility-administered medications for this visit.       ROS: 10 point ROS neg other than the symptoms noted above in the HPI.    /64   Pulse 75   Temp 97.6  F (36.4  C) (Tympanic)   Ht 1.727 m (5' 8\")   Wt 74.4 kg (164 lb)   SpO2 98%   BMI 24.94 kg/m      General - The patient is well nourished and well developed, and appears to have good nutritional status.  Alert and oriented to person and place, answers questions and cooperates with examination appropriately.   Head and Face - Normocephalic and atraumatic, with no gross asymmetry noted.  The facial nerve is intact, with strong symmetric movements.  Voice and Breathing - The patient was breathing comfortably without the use of accessory muscles. There was no wheezing, stridor, or stertor.  The patients voice was clear and strong, and had appropriate pitch and quality.  Ears -The external auditory canals are impacted.    Eyes - Extraocular movements intact, and the pupils were reactive to light.  Sclera were not icteric or injected, conjunctiva were pink and moist.  Mouth - Examination of the oral cavity showed pink, healthy oral mucosa. No lesions or ulcerations noted.  The tongue was mobile and midline, and the dentition were in good condition.    Throat - The walls of the oropharynx were smooth, pink, moist, symmetric, and had no lesions or ulcerations.  The tonsillar pillars and soft palate were symmetric.  The uvula was midline on elevation.    Neck - Normal midline excursion of the laryngotracheal complex during swallowing.  Full range of motion on passive movement.  Palpation of the occipital, submental, submandibular, internal jugular chain, and supraclavicular nodes did not demonstrate any abnormal lymph nodes or masses.  Palpation of the thyroid was soft and smooth, with no nodules or goiter appreciated.  The trachea was mobile and midline.  Nose - External contour is symmetric, no gross " deflection or scars.  Nasal mucosa is pink and moist with no abnormal mucus.  The septum and turbinates were evaluated: No polyps, masses, or purulence noted on examination.     The ear canals were examined underneath the operating microscope and with an otologic speculum.  The canals were cleaned of all debris with cupped forceps and cerumen loop. There is no granulation tissue or sign of cholesteatoma.  The patient tolerates this wellSymptoms resolved after sitting for a few minutes. He did drink water as well.  Patient right tube. the tympanic membranes are intact without effusion, retraction or mass.  Bony landmarks are intact.      ASSESSMENT:    ICD-10-CM    1. Impacted cerumen, bilateral H61.23    2. S/P myringotomy with insertion of tube RIGHT Z96.22    3. ASNHL (asymmetrical sensorineural hearing loss) H90.5        Ears were cleaned  He noted he felt improvement with his ears.   Tube is in place, no otorrhea  Return in 4 months per his request for ear check        Geeta Alegria PA-C  ENT  [facility], [place]  [phone]

## 2019-06-14 NOTE — PROGRESS NOTES
Patient is a 85 year old male here accompanied by self today for infusion of IV fluids per order of Dr Ray under the supervision of Dr Walter.  Patient meets parameters for today's infusion. Patient identified with two identifiers, order verified, and verbal consent for today's infusion obtained from patient.       24 gauge angio cath inserted into RT forearm.  Immediate blood return noted.  IV secured with sterile, transparent dressing and tape.  Patient tolerated well, denies pain or discomfort at this time.  Flushes easily without resistance, no signs or symptoms of infiltration or infection.   Patient denies questions or concerns regarding infusion and/or medication(s) being administered.      IV pump verified with dose, drug, and rate of administration per MAR.  Infusion administered per protocol.  Patient tolerated infusion well, no signs or symptoms of adverse reaction noted.  Patient denies pain nor discomfort.     IV removed, catheter intact.  Site clean, dry and intact.  No signs or symptoms of infiltration or infection.  Covered with a sterile bandage, slight pressure applied for 30 seconds.  Pt instructed to leave bandage intact for a minimum of one hour, and to call with questions or concerns.  Copy of appointments, discharge instructions, and after visit summary (AVS) provided to patient.  Patient states understanding, discharged ambulatory.

## 2019-06-14 NOTE — PATIENT INSTRUCTIONS
Ears were cleaned.   Tube is in good position and open.   Follow up in 4 months      Thank you for allowing Geeta Alegria PA-C and our ENT team to participate in your care.  If your medications are too expensive, please give the nurse a call.  We can possibly change this medication.  If you have a scheduling or an appointment question please contact our Health Unit Coordinator at their direct line [phone].   ALL nursing questions or concerns can be directed to your ENT nurse at: [phone] [place]

## 2019-06-14 NOTE — LETTER
6/14/2019         RE: Jf Ortiz  [address]  [place] [address]        Dear Colleague,    Thank you for referring your patient, Jf Ortiz, to the [facility] [place]. Please see a copy of my visit note below.    Chief Complaint   Patient presents with     Ear Problem     Ear Cleaning       Patient returns for an ear check and cleaning. He was last seen in ENT on 12/7/18 and had an ear cleaning and right tube was in good position and patent.   He returns today for his 6 month check.     Jf presents for ear cleaning. He has noticed concerns with his ear having wax build up.   He did obtain hearing aids from [employer] this summer and he has noted improvement with results.     Denies otalgia, otorrhea.   He has no concerns with vertigo. He does feel he has dizziness/ lightheadedness.        Past Medical History:   Diagnosis Date     Autonomic orthostatic hypotension 10/14/2016     Coronary artery disease      Dementia without behavioral disturbance 7/28/2015    Diagnosis updated by automated process. Provider to review and confirm.     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     Seizure disorder (H)      Stented coronary artery         Allergies   Allergen Reactions     Cats Unknown     Lamotrigine      Skin lesions     Current Outpatient Medications   Medication     atorvastatin (LIPITOR) 20 MG tablet     calcitonin, salmon, (MIACALCIN) 200 UNIT/ACT nasal spray     Cholecalciferol (VITAMIN D-3 PO)     Coenzyme Q10 (CO Q 10 PO)     Cyanocobalamin (VITAMIN B-12 PO)     donepezil (ARICEPT) 10 MG tablet     lidocaine (LIDODERM) 5 % patch     Magnesium 400 MG CAPS     MELATONIN PO     metoprolol succinate ER (TOPROL-XL) 50 MG 24 hr  "tablet     multivitamin, therapeutic with minerals (MULTI-VITAMIN) TABS tablet     Omega-3 Fatty Acids (FISH OIL) 500 MG CAPS     PANTOPRAZOLE SODIUM PO     potassium chloride ER (K-DUR/KLOR-CON M) 20 MEQ CR tablet     sodium chloride 1 GM tablet     Turmeric 500 MG TABS     No current facility-administered medications for this visit.       ROS: 10 point ROS neg other than the symptoms noted above in the HPI.    /64   Pulse 75   Temp 97.6  F (36.4  C) (Tympanic)   Ht 1.727 m (5' 8\")   Wt 74.4 kg (164 lb)   SpO2 98%   BMI 24.94 kg/m       General - The patient is well nourished and well developed, and appears to have good nutritional status.  Alert and oriented to person and place, answers questions and cooperates with examination appropriately.   Head and Face - Normocephalic and atraumatic, with no gross asymmetry noted.  The facial nerve is intact, with strong symmetric movements.  Voice and Breathing - The patient was breathing comfortably without the use of accessory muscles. There was no wheezing, stridor, or stertor.  The patients voice was clear and strong, and had appropriate pitch and quality.  Ears -The external auditory canals are impacted.    Eyes - Extraocular movements intact, and the pupils were reactive to light.  Sclera were not icteric or injected, conjunctiva were pink and moist.  Mouth - Examination of the oral cavity showed pink, healthy oral mucosa. No lesions or ulcerations noted.  The tongue was mobile and midline, and the dentition were in good condition.    Throat - The walls of the oropharynx were smooth, pink, moist, symmetric, and had no lesions or ulcerations.  The tonsillar pillars and soft palate were symmetric.  The uvula was midline on elevation.    Neck - Normal midline excursion of the laryngotracheal complex during swallowing.  Full range of motion on passive movement.  Palpation of the occipital, submental, submandibular, internal jugular chain, and supraclavicular " nodes did not demonstrate any abnormal lymph nodes or masses.  Palpation of the thyroid was soft and smooth, with no nodules or goiter appreciated.  The trachea was mobile and midline.  Nose - External contour is symmetric, no gross deflection or scars.  Nasal mucosa is pink and moist with no abnormal mucus.  The septum and turbinates were evaluated: No polyps, masses, or purulence noted on examination.     The ear canals were examined underneath the operating microscope and with an otologic speculum.  The canals were cleaned of all debris with cupped forceps and cerumen loop. There is no granulation tissue or sign of cholesteatoma.  The patient tolerates this wellSymptoms resolved after sitting for a few minutes. He did drink water as well.  Patient right tube. the tympanic membranes are intact without effusion, retraction or mass.  Bony landmarks are intact.      ASSESSMENT:    ICD-10-CM    1. Impacted cerumen, bilateral H61.23    2. S/P myringotomy with insertion of tube RIGHT Z96.22    3. ASNHL (asymmetrical sensorineural hearing loss) H90.5        Ears were cleaned  He noted he felt improvement with his ears.   Tube is in place, no otorrhea  Return in 4 months per his request for ear check        Geeta Alegria PA-C  ENT  [facility], [place]  [phone]      Again, thank you for allowing me to participate in the care of your patient.        Sincerely,        Geeta Alegria PA-C

## 2019-06-18 ENCOUNTER — INFUSION THERAPY VISIT (OUTPATIENT)
Dept: INFUSION THERAPY | Facility: OTHER | Age: 84
End: 2019-06-18
Attending: FAMILY MEDICINE
Payer: MEDICARE

## 2019-06-18 VITALS
DIASTOLIC BLOOD PRESSURE: 71 MMHG | RESPIRATION RATE: 16 BRPM | OXYGEN SATURATION: 98 % | BODY MASS INDEX: 25.25 KG/M2 | SYSTOLIC BLOOD PRESSURE: 161 MMHG | TEMPERATURE: 97 F | WEIGHT: 166 LBS

## 2019-06-18 DIAGNOSIS — E86.9 VOLUME DEPLETION: Primary | ICD-10-CM

## 2019-06-18 PROCEDURE — 96360 HYDRATION IV INFUSION INIT: CPT

## 2019-06-18 PROCEDURE — 96365 THER/PROPH/DIAG IV INF INIT: CPT

## 2019-06-18 PROCEDURE — 25000128 H RX IP 250 OP 636: Performed by: FAMILY MEDICINE

## 2019-06-18 RX ADMIN — SODIUM CHLORIDE 1000 ML: 9 INJECTION, SOLUTION INTRAVENOUS at 10:23

## 2019-06-18 NOTE — PROGRESS NOTES
Patient is a 84 y/o male here accompanied by significant other today for infusion of IVF per order of Dr Ray under the supervision of Dr Walter.  Patient meets parameters for today's infusion. Patient identified with two identifiers, order verified, and verbal consent for today's infusion obtained from patient.     24 gauge angio cath inserted into right lower forearm.  Immediate blood return noted.  IV secured with sterile, transparent dressing and tape.  Patient tolerated well, denies pain or discomfort at this time.  Flushes easily without resistance, no signs or symptoms of infiltration or infection.   Patient denies questions or concerns regarding infusion and/or medication(s) being administered.      1023 IV pump verified with dose, drug, and rate of administration.  Infusion administered per protocol.  Patient tolerated infusion well, no signs or symptoms of adverse reaction noted.  Patient denies pain nor discomfort.     IV removed, catheter intact.  Site clean, dry and intact.  No signs or symptoms of infiltration or infection.  Covered with a sterile bandage, slight pressure applied for 30 seconds.  Pt instructed to leave bandage intact for a minimum of one hour, and to call with questions or concerns.  Patient and significant other decline copy of appointments, discharge instructions, and after visit summary (AVS).  Patient states understanding, discharged ambulatory.

## 2019-06-20 ENCOUNTER — OFFICE VISIT (OUTPATIENT)
Dept: SURGERY | Facility: OTHER | Age: 84
End: 2019-06-20
Attending: FAMILY MEDICINE
Payer: MEDICARE

## 2019-06-20 ENCOUNTER — INFUSION THERAPY VISIT (OUTPATIENT)
Dept: INFUSION THERAPY | Facility: OTHER | Age: 84
End: 2019-06-20
Attending: FAMILY MEDICINE
Payer: MEDICARE

## 2019-06-20 VITALS
BODY MASS INDEX: 25.16 KG/M2 | DIASTOLIC BLOOD PRESSURE: 70 MMHG | OXYGEN SATURATION: 97 % | TEMPERATURE: 98.2 F | WEIGHT: 166 LBS | HEART RATE: 77 BPM | SYSTOLIC BLOOD PRESSURE: 128 MMHG | HEIGHT: 68 IN

## 2019-06-20 VITALS
RESPIRATION RATE: 16 BRPM | HEART RATE: 69 BPM | DIASTOLIC BLOOD PRESSURE: 95 MMHG | SYSTOLIC BLOOD PRESSURE: 182 MMHG | OXYGEN SATURATION: 95 % | TEMPERATURE: 98.2 F

## 2019-06-20 DIAGNOSIS — E86.9 VOLUME DEPLETION: Primary | ICD-10-CM

## 2019-06-20 DIAGNOSIS — Z45.2 ENCOUNTER FOR INSERTION OF VENOUS ACCESS PORT: ICD-10-CM

## 2019-06-20 DIAGNOSIS — I25.10 ATHEROSCLEROSIS OF NATIVE CORONARY ARTERY OF NATIVE HEART WITHOUT ANGINA PECTORIS: ICD-10-CM

## 2019-06-20 DIAGNOSIS — I95.1 AUTONOMIC ORTHOSTATIC HYPOTENSION: Primary | ICD-10-CM

## 2019-06-20 LAB
ANION GAP SERPL CALCULATED.3IONS-SCNC: 6 MMOL/L (ref 3–14)
BUN SERPL-MCNC: 12 MG/DL (ref 7–30)
CALCIUM SERPL-MCNC: 8.5 MG/DL (ref 8.5–10.1)
CHLORIDE SERPL-SCNC: 113 MMOL/L (ref 94–109)
CO2 SERPL-SCNC: 23 MMOL/L (ref 20–32)
CREAT SERPL-MCNC: 0.88 MG/DL (ref 0.66–1.25)
ERYTHROCYTE [DISTWIDTH] IN BLOOD BY AUTOMATED COUNT: 12.9 % (ref 10–15)
GFR SERPL CREATININE-BSD FRML MDRD: 78 ML/MIN/{1.73_M2}
GLUCOSE SERPL-MCNC: 90 MG/DL (ref 70–99)
HCT VFR BLD AUTO: 39.1 % (ref 40–53)
HGB BLD-MCNC: 13.1 G/DL (ref 13.3–17.7)
MCH RBC QN AUTO: 30.9 PG (ref 26.5–33)
MCHC RBC AUTO-ENTMCNC: 33.5 G/DL (ref 31.5–36.5)
MCV RBC AUTO: 92 FL (ref 78–100)
PLATELET # BLD AUTO: 241 10E9/L (ref 150–450)
POTASSIUM SERPL-SCNC: 4.3 MMOL/L (ref 3.4–5.3)
RBC # BLD AUTO: 4.24 10E12/L (ref 4.4–5.9)
SODIUM SERPL-SCNC: 142 MMOL/L (ref 133–144)
WBC # BLD AUTO: 6.3 10E9/L (ref 4–11)

## 2019-06-20 PROCEDURE — 36415 COLL VENOUS BLD VENIPUNCTURE: CPT | Mod: ZL | Performed by: SURGERY

## 2019-06-20 PROCEDURE — 80048 BASIC METABOLIC PNL TOTAL CA: CPT | Mod: ZL | Performed by: SURGERY

## 2019-06-20 PROCEDURE — 99213 OFFICE O/P EST LOW 20 MIN: CPT | Performed by: SURGERY

## 2019-06-20 PROCEDURE — 96360 HYDRATION IV INFUSION INIT: CPT

## 2019-06-20 PROCEDURE — 93005 ELECTROCARDIOGRAM TRACING: CPT

## 2019-06-20 PROCEDURE — G0463 HOSPITAL OUTPT CLINIC VISIT: HCPCS | Mod: 25

## 2019-06-20 PROCEDURE — 25000128 H RX IP 250 OP 636: Performed by: FAMILY MEDICINE

## 2019-06-20 PROCEDURE — 85027 COMPLETE CBC AUTOMATED: CPT | Mod: ZL | Performed by: SURGERY

## 2019-06-20 PROCEDURE — 96365 THER/PROPH/DIAG IV INF INIT: CPT

## 2019-06-20 PROCEDURE — G0463 HOSPITAL OUTPT CLINIC VISIT: HCPCS

## 2019-06-20 PROCEDURE — 93010 ELECTROCARDIOGRAM REPORT: CPT | Performed by: INTERNAL MEDICINE

## 2019-06-20 RX ADMIN — SODIUM CHLORIDE 1000 ML: 9 INJECTION, SOLUTION INTRAVENOUS at 10:56

## 2019-06-20 ASSESSMENT — PAIN SCALES - GENERAL
PAINLEVEL: NO PAIN (0)
PAINLEVEL: NO PAIN (0)

## 2019-06-20 ASSESSMENT — MIFFLIN-ST. JEOR: SCORE: 1412.47

## 2019-06-20 NOTE — PROGRESS NOTES
Patient is a 85 year old male here accompanied by self today for infusion of IVF per order of Dr MALIA Ray under the supervision of Dr Walter with Cardiology. Patient identified with two identifiers, order verified, and verbal consent for today's infusion obtained from patient.       24 gauge angio cath inserted into right posterior mid forearm.  Immediate blood return noted.  IV secured with sterile, transparent dressing and tape.  Patient tolerated well, denies pain or discomfort at this time.  Flushes easily without resistance, no signs or symptoms of infiltration or infection.   Patient denies questions or concerns regarding infusion and/or medication(s) being administered.        1056: IV pump verified with dose, drug, and rate of administration.  Infusion administered per protocol.

## 2019-06-20 NOTE — NURSING NOTE
"Chief Complaint   Patient presents with     Consult     Port consult for IV access- Dr MAMADOU Ray is referring- Getting fluids twice a week.         Initial /70 (BP Location: Right arm, Patient Position: Chair, Cuff Size: Adult Regular)   Pulse 77   Temp 98.2  F (36.8  C) (Tympanic)   Ht 1.727 m (5' 8\")   Wt 75.3 kg (166 lb)   SpO2 97%   BMI 25.24 kg/m   Estimated body mass index is 25.24 kg/m  as calculated from the following:    Height as of this encounter: 1.727 m (5' 8\").    Weight as of this encounter: 75.3 kg (166 lb).  Medication Reconciliation: complete        "

## 2019-06-20 NOTE — PATIENT INSTRUCTIONS
Thank you for allowing our surgical team to participate in your care. Please review the instructions below.   If you have questions, you may contact us at the any of the following numbers:     [facility] Health Unit Coordinator: [phone]  Clinic Surgery Nurse: [phone]  Surgery Education Nurse: [phone]    You are scheduled for: Port Placement with Dr. Ram on 7/12/19  Admit Time: Hospital Surgery will call you the day before your procedure by 5pm with your arrival time. If your surgery is on Monday, expect a call on Friday. If you are not contacted before 5PM, please call admitting at [phone]. After hours or on weekends, please call [phone] to postpone.     Call the clinic surgery nurse if you become ill within 1-2 weeks of your procedure to reschedule. This includes fever, chills, sore throat, cough, chest congestion, runny nose, or any other symptom of any other illness.     Please call the Surgery Education Nurse at [phone] 1-2 weeks prior to your procedure and have a medication list ready.     Do not take Aspirin or other NSAIDS (Ibuprofen, Motrin, Aleve, Celebrex, Naproxen, etc), vitamins, and supplements 7 days before your surgery unless you have been otherwise directed.    Shower the night before and the morning of your procedure with Chlorhexidine soap.    On The Night Before Your Surgery:  1.  Remove your jewelry. Leave these off until after surgery. Wash your hair and body with your regular soap and shampoo. Use a freshly washed washcloth. Rinse the your body and hair.  2. Wash your body from your neck to your feet using 1/2 of the 1st Hibiclens (Chlorhexidine) bottle with your bare hands. Do not use the Hibiclens on your face, hair or genital area. Leave the soap on your body for one minute and rinse.  3. Repeat step 2 with the 2nd half of the bottle.  4. Rinse off all the soap and dry with a freshly washed towel. Do not use any lotions or hair products.  5. Sleep in freshly  washed pajamas and freshly washed bedding.  On The Morning of Your Surgery:  1.Wash your hair and body with your regular soap and shampoo. Use another freshly washed washcloth. Rinse your body and hair.   2. Wash your body from your neck to your feet using 1/2 of the 2nd Hibiclens (Chlorhexidine) bottle with your bare hands. Leave the soap on your body for one minute and rinse.  3. Repeat step 2 with the 2nd half of the bottle.  4. Rinse off all the soap and dry with another freshly washed towel. Do not use any lotions or hair products.  5. Wear freshly washed clothing to the hospital.    Do not have anything to eat or drink after midnight the night before your surgery (or 8 hours prior to surgery), except clear liquids (water, clear juice, clear broth, plain coffee or tea without cream or milk) up until 2 hours prior to arrival time. Do not chew gum, suck on hard candy, or smoke. You can brush your teeth. If you are directed to take any medications, take them with a tiny sip of water. If you have an inhaler, bring it with you.    Arrive in comfortable clothing. Do not wear any jewelry, make-up, nail polish, lotions, hair products, or perfumes. [place] in hospital admitting through the [facility] entrance.    A responsible adult must be available to drive you home and stay with you for 24 hours at home. If you need to take a taxi or the bus, you must have another responsible adult to ride with you. Your procedure will be cancelled if you do not have a responsible adult .

## 2019-06-20 NOTE — PROGRESS NOTES
[facility] Surgery Consultation    CC:  Port Placement    HPI:  This 85 year old year old male is seen at the request of Venancio Ray for evaluation of port placement.  The history is obtained from the patient, and reviewing the medical record.  He is good medical historian. Mr. Ortiz has been having issues with autonomic orthostatic hypotension due to his Lewy body dementia for some time. He has been seen and evaluated with an extensive treatment plan and workup. He has been treated with increasing oral intake, Midodrine, Florinef and now twice daily IV fluid infusions. He has not been able to maintain his fluid intake adequately to eliminate the IV fluid administrations. He has been to the ER multiple times due to complications or symptoms of orthostatic hypotension including a compression fracture. He is here now to discuss port placement for administration of IV fluids.    Past Medical History:   Diagnosis Date     Autonomic orthostatic hypotension 10/14/2016     Coronary artery disease      Dementia without behavioral disturbance 7/28/2015    Diagnosis updated by automated process. Provider to review and confirm.     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     Seizure disorder (H)      Stented coronary artery        Past Surgical History:   Procedure Laterality Date     ------------OTHER-------------  1955    ulnar and radial fx - repair ulnar and radial fx x4     ------------OTHER-------------  6/14/2011    cataract extraction     BIOPSY  08/2015    skin biopsy     BLEPHAROPLASTY BILATERAL  5/6/2014    Procedure: BLEPHAROPLASTY BILATERAL;  Surgeon: Andrew Queen MD;  Location: [facility]     BYPASS GRAFT ARTERY CORONARY  11/2006    coronary artery  disease x 5, [facility]     cataract extraction and lens implantation  2011    cataracts     cataract extraction and lens implantation      cataracts     COLONOSCOPY  2012     colonoscopy with polypectomy  3/13/2009    history of polyps - repeat 3 yrs     colonoscopy with polypectomy  2006     colonoscopy with polypectomy  2005     COMBINED COLONOSCOPY WITH ARGON PLASMA COAGULATOR (APC) N/A 10/31/2014    Procedure: COMBINED COLONOSCOPY WITH ARGON PLASMA COAGULATOR (APC);  Surgeon: Bassam Aguilar MD;  Location: [facility]     COMBINED COLONOSCOPY WITH ARGON PLASMA COAGULATOR (APC) N/A 11/13/2015    Procedure: COMBINED COLONOSCOPY WITH ARGON PLASMA COAGULATOR (APC);  Surgeon: Bassam Aguilar MD;  [facility]: [facility]     ENDOSCOPY UPPER, COLONOSCOPY, COMBINED N/A 10/31/2014    Procedure: COMBINED ENDOSCOPY UPPER, COLONOSCOPY;  Surgeon: Bassam Aguilar MD;  [facility]: [facility]     ENDOSCOPY UPPER, COLONOSCOPY, COMBINED N/A 11/13/2015    Procedure: COMBINED ENDOSCOPY UPPER, COLONOSCOPY;  Surgeon: Bassam Aguilar MD;  [facility]     ESOPHAGOSCOPY, GASTROSCOPY, DUODENOSCOPY (EGD), COMBINED  1/22/2014    Procedure: COMBINED ESOPHAGOSCOPY, GASTROSCOPY, DUODENOSCOPY (EGD);  UPPER ENDOSCOPY(PENA) W/ BIOPSIES;  Surgeon: Patricia Pena MD;  Location: [facility]     HERNIORRHAPHY INGUINAL Right 2/14/2018    Procedure: HERNIORRHAPHY INGUINAL;  OPEN RIGHT INGUINAL HERNIA REPAIR with Mesh;  Surgeon: Virgilio Zaragoza DO;  [facility]: [facility]     LARYNGOSCOPY WITH MICROSCOPE  1/22/2014    Procedure: LARYNGOSCOPY WITH MICROSCOPE;;  Surgeon: Chayo Duke MD;  Location: [facility]     pacemaker placement  2000    heart block     pacemaker placement  2011    dual-chamber     REMOVE TUBE, MYRINGOTOMY, COMBINED  1/22/2014    Procedure: COMBINED REMOVE TUBE, MYRINGOTOMY;  MICRODIRECT LARYNGOSCOPY WITH BIOPSY AND FROZEN SECTIONS removal of right ear tube and myringoplasty;  Surgeon: Chayo Duke MD;  [facility]: [facility]     REPLACE PACEMAKER  GENERATOR N/A 2018    Procedure: REPLACE PACEMAKER GENERATOR;  Pacemaker generator change;  Surgeon: Alma Kaplan MD;  Location: [facility]     stent placement to LAD       ventilation tube  2012    right in office       Family History   Problem Relation Age of Onset     Diabetes Mother        Social History     Tobacco Use     Smoking status: Former Smoker     Packs/day: 1.00     Years: 5.00     Pack years: 5.00     Types: Cigarettes     Last attempt to quit: 1985     Years since quittin.4     Smokeless tobacco: Never Used     Tobacco comment: quit in    Substance Use Topics     Alcohol use: Yes     Comment: social     Drug use: No       Prior to Admission medications    Medication Sig Start Date End Date Taking? Authorizing Provider   atorvastatin (LIPITOR) 20 MG tablet TAKE 1 TABLET BY MOUTH EVERY EVENING - GENERIC FOR LIPITOR 19  Yes Herman Rivers, DO   calcitonin, salmon, (MIACALCIN) 200 UNIT/ACT nasal spray Spray 1 spray into one nostril alternating nostrils daily Alternate nostril each day. 3/22/19  Yes FRANCES Ray MD   Cholecalciferol (VITAMIN D-3 PO) Take 1,000 Units by mouth 2 times daily    Yes Reported, Patient   Coenzyme Q10 (CO Q 10 PO) Take 200 mg by mouth every morning    Yes Reported, Patient   Cyanocobalamin (VITAMIN B-12 PO) Take 100 mcg by mouth daily    Yes Reported, Patient   donepezil (ARICEPT) 10 MG tablet Take 10 mg by mouth At Bedtime 19  Yes Reported, Patient   lidocaine (LIDODERM) 5 % patch Place 1 patch onto the skin every 24 hours 19  Yes FRANCES Ray MD   Magnesium 400 MG CAPS Take 400 mg by mouth 2 times daily    Yes Reported, Patient   MELATONIN PO Take 5 mg by mouth At Bedtime    Yes Reported, Patient   metoprolol succinate ER (TOPROL-XL) 50 MG 24 hr tablet TAKE 1 TABLET (50 MG) BY MOUTH DAILY 19  Yes Juan Walter MD   multivitamin, therapeutic with minerals (MULTI-VITAMIN) TABS tablet Take 1 tablet by mouth daily   Yes  "Reported, Patient   Omega-3 Fatty Acids (FISH OIL) 500 MG CAPS Take 1 capsule by mouth daily    Yes Reported, Patient   PANTOPRAZOLE SODIUM PO Take 40 mg by mouth 2 times daily (before meals)    Yes Reported, Patient   potassium chloride ER (K-DUR/KLOR-CON M) 20 MEQ CR tablet TAKE 1 TABLET DAILY BY MOUTH 5/24/19  Yes FRANCES Ray MD   sodium chloride 1 GM tablet TAKE 1 TABLET BY MOUTH TWICE A DAY 12/6/18  Yes Herman Rivers,    Turmeric 500 MG TABS Take 1 tablet by mouth daily   Yes Reported, Patient       Pt denied problems with bleeding or anesthesia  No mood altering drug use.       Allergies   Allergen Reactions     Cats Unknown     Lamotrigine      Skin lesions       REVIEW OF SYSTEMS:  Ten point review of systems negative except those mentioned in the HPI.     The patient denies sleep apnea, latex allergies or MRSA    OBJECTIVE:    /70 (BP Location: Right arm, Patient Position: Chair, Cuff Size: Adult Regular)   Pulse 77   Temp 98.2  F (36.8  C) (Tympanic)   Ht 1.727 m (5' 8\")   Wt 75.3 kg (166 lb)   SpO2 97%   BMI 25.24 kg/m      GENERAL: Generally appears well, in no distress with appropriate affect.  HEENT:   Sclerae anicteric - No cervical, supra/infraclavicular lymphadenopathy  Respiratory:  Lungs clear to ausculation bilaterally with good air excursion  Cardiovascular:  Regular Rate and Rhythm with systolic murmur, no gallops or rubs   Psych:  Alert, oriented, affect appropriate with normal decision making ability.      IMPRESSION:  86 yo male with autonomic orthostatic hypotension    PLAN:  At this time I discussed the port placement and the rationale behind placing it. I discussed the various central venous lines and the risks associated with each. The details of port placement were outlined with the risks including, but not limited to, bleeding, infection, pneumothorax and subclavian thrombosis were discussed.  The need for internal jugular vein cutdown for inability to localize " the subclavian vein was reviewed.  The patient voices understanding and is anxious to proceed.  Informed written consent obtained and orders provided.  Will proceed first available scheduling availability. All questions and concerns were addressed with adequate time spent answering all concerns.        Thank you for allowing me to participate in the care of your patient.       Lloyd Ram MD    6/20/2019  10:31 AM    cc:  Venancio Ray

## 2019-06-25 ENCOUNTER — INFUSION THERAPY VISIT (OUTPATIENT)
Dept: INFUSION THERAPY | Facility: OTHER | Age: 84
End: 2019-06-25
Attending: FAMILY MEDICINE
Payer: MEDICARE

## 2019-06-25 DIAGNOSIS — E86.9 VOLUME DEPLETION: Primary | ICD-10-CM

## 2019-06-25 PROCEDURE — 96360 HYDRATION IV INFUSION INIT: CPT

## 2019-06-25 PROCEDURE — 96365 THER/PROPH/DIAG IV INF INIT: CPT

## 2019-06-25 PROCEDURE — 25000128 H RX IP 250 OP 636: Performed by: FAMILY MEDICINE

## 2019-06-25 RX ADMIN — SODIUM CHLORIDE 1000 ML: 9 INJECTION, SOLUTION INTRAVENOUS at 09:45

## 2019-06-25 NOTE — PROGRESS NOTES
Patient is a 85 year old male here accompanied by wife today for infusion of IV fluids per order of Dr. Ray under the supervision of Dr. Walter.  Patient identified with two identifiers, order verified, and verbal consent for today's infusion obtained from patient.       24 gauge angio cath inserted into right lower forearm.  Immediate blood return noted.  IV secured with sterile, transparent dressing and tape.  Patient tolerated well, denies pain or discomfort at this time.  Flushes easily without resistance, no signs or symptoms of infiltration or infection.   Patient denies questions or concerns regarding infusion and/or medication(s) being administered.    IV pump verified with dose, drug, and rate of administration. Infusion administered per protocol.  Patient tolerated infusion well, no signs or symptoms of adverse reaction noted.  Patient denies pain nor discomfort.     IV removed, catheter intact.  Site clean, dry and intact.  No signs or symptoms of infiltration or infection.  Covered with a sterile bandage, slight pressure applied for 30 seconds.  Pt instructed to leave bandage intact for a minimum of one hour, and to call with questions or concerns.  Copy of appointments, discharge instructions, and after visit summary (AVS) provided to patient.  Patient states understanding, discharged ambulatory.

## 2019-06-28 ENCOUNTER — INFUSION THERAPY VISIT (OUTPATIENT)
Dept: INFUSION THERAPY | Facility: OTHER | Age: 84
End: 2019-06-28
Attending: FAMILY MEDICINE
Payer: MEDICARE

## 2019-06-28 VITALS
WEIGHT: 167.1 LBS | HEART RATE: 82 BPM | SYSTOLIC BLOOD PRESSURE: 151 MMHG | BODY MASS INDEX: 25.33 KG/M2 | DIASTOLIC BLOOD PRESSURE: 83 MMHG | RESPIRATION RATE: 18 BRPM | OXYGEN SATURATION: 98 % | TEMPERATURE: 98.2 F | HEIGHT: 68 IN

## 2019-06-28 DIAGNOSIS — E86.9 VOLUME DEPLETION: Primary | ICD-10-CM

## 2019-06-28 PROCEDURE — 25000128 H RX IP 250 OP 636: Performed by: FAMILY MEDICINE

## 2019-06-28 PROCEDURE — 96360 HYDRATION IV INFUSION INIT: CPT

## 2019-06-28 RX ADMIN — SODIUM CHLORIDE 1000 ML: 9 INJECTION, SOLUTION INTRAVENOUS at 10:16

## 2019-06-28 ASSESSMENT — MIFFLIN-ST. JEOR: SCORE: 1417.33

## 2019-06-28 NOTE — PROGRESS NOTES
Patient is a 85 year male here accompanied by self today for infusion of IVF per order of DR Ray under the supervision of Dr Fatima.  Patient meets parameters for today's infusion. Patient identified with two identifiers, order verified, and verbal consent for today's infusion obtained from patient.      22 gauge angio cath inserted into Rt forearm. Immediate blood return noted.  IV secured with sterile, transparent dressing and tape.  Patient tolerated well, denies pain or discomfort at this time.  Flushes easily without resistance, no signs or symptoms of infiltration or infection.   Patient denies questions or concerns regarding infusion and/or medication(s) being administered.    IV pump verified with dose, drug, and rate .  Infusion administered per protocol.  Patient tolerated infusion well, no signs or symptoms of adverse reaction noted.  Patient denies pain nor discomfort.     IV removed, catheter intact.  Site clean, dry and intact.  No signs or symptoms of infiltration or infection.  Covered with a sterile bandage, slight pressure applied for 30 seconds.  Pt instructed to leave bandage intact for a minimum of one hour, and to call with questions or concerns.  Copy of appointments, discharge instructions, and after visit summary (AVS) provided to patient.  Patient states understanding, discharged ambulatory.

## 2019-07-02 ENCOUNTER — OFFICE VISIT (OUTPATIENT)
Dept: CARDIOLOGY | Facility: OTHER | Age: 84
End: 2019-07-02
Attending: NURSE PRACTITIONER
Payer: MEDICARE

## 2019-07-02 ENCOUNTER — INFUSION THERAPY VISIT (OUTPATIENT)
Dept: INFUSION THERAPY | Facility: OTHER | Age: 84
End: 2019-07-02
Attending: FAMILY MEDICINE
Payer: MEDICARE

## 2019-07-02 VITALS
HEIGHT: 68 IN | WEIGHT: 166 LBS | DIASTOLIC BLOOD PRESSURE: 80 MMHG | TEMPERATURE: 97.7 F | SYSTOLIC BLOOD PRESSURE: 144 MMHG | BODY MASS INDEX: 25.16 KG/M2 | OXYGEN SATURATION: 96 % | RESPIRATION RATE: 18 BRPM | HEART RATE: 70 BPM

## 2019-07-02 VITALS
DIASTOLIC BLOOD PRESSURE: 77 MMHG | BODY MASS INDEX: 24.59 KG/M2 | RESPIRATION RATE: 16 BRPM | SYSTOLIC BLOOD PRESSURE: 140 MMHG | HEIGHT: 69 IN | OXYGEN SATURATION: 98 % | TEMPERATURE: 97.5 F | HEART RATE: 74 BPM | WEIGHT: 166 LBS

## 2019-07-02 DIAGNOSIS — I95.1 AUTONOMIC ORTHOSTATIC HYPOTENSION: ICD-10-CM

## 2019-07-02 DIAGNOSIS — Z95.5 STENTED CORONARY ARTERY: ICD-10-CM

## 2019-07-02 DIAGNOSIS — E78.00 HYPERCHOLESTEROLEMIA: ICD-10-CM

## 2019-07-02 DIAGNOSIS — F02.80 LEWY BODY DEMENTIA WITHOUT BEHAVIORAL DISTURBANCE (H): ICD-10-CM

## 2019-07-02 DIAGNOSIS — E86.9 VOLUME DEPLETION: Primary | ICD-10-CM

## 2019-07-02 DIAGNOSIS — E86.9 VOLUME DEPLETION: ICD-10-CM

## 2019-07-02 DIAGNOSIS — I25.10 ATHEROSCLEROSIS OF NATIVE CORONARY ARTERY OF NATIVE HEART WITHOUT ANGINA PECTORIS: ICD-10-CM

## 2019-07-02 DIAGNOSIS — Z95.0 PACEMAKER: ICD-10-CM

## 2019-07-02 DIAGNOSIS — G31.83 LEWY BODY DEMENTIA WITHOUT BEHAVIORAL DISTURBANCE (H): ICD-10-CM

## 2019-07-02 DIAGNOSIS — I10 HYPERTENSION WITH TARGET BLOOD PRESSURE GOAL UNDER 150/90: Primary | ICD-10-CM

## 2019-07-02 DIAGNOSIS — G20.A1 PARKINSON DISEASE (H): ICD-10-CM

## 2019-07-02 PROCEDURE — G0463 HOSPITAL OUTPT CLINIC VISIT: HCPCS | Mod: 25

## 2019-07-02 PROCEDURE — 96360 HYDRATION IV INFUSION INIT: CPT

## 2019-07-02 PROCEDURE — 99214 OFFICE O/P EST MOD 30 MIN: CPT | Performed by: NURSE PRACTITIONER

## 2019-07-02 PROCEDURE — G0463 HOSPITAL OUTPT CLINIC VISIT: HCPCS

## 2019-07-02 PROCEDURE — 25000128 H RX IP 250 OP 636: Performed by: FAMILY MEDICINE

## 2019-07-02 RX ORDER — MIDODRINE HYDROCHLORIDE 5 MG/1
5 TABLET ORAL 2 TIMES DAILY
COMMUNITY
End: 2019-08-27

## 2019-07-02 RX ADMIN — SODIUM CHLORIDE 1000 ML: 9 INJECTION, SOLUTION INTRAVENOUS at 09:51

## 2019-07-02 ASSESSMENT — PAIN SCALES - GENERAL: PAINLEVEL: NO PAIN (0)

## 2019-07-02 ASSESSMENT — MIFFLIN-ST. JEOR
SCORE: 1412.34
SCORE: 1428.35

## 2019-07-02 NOTE — PROGRESS NOTES
IV removed, catheter intact.  Site clean, dry and intact.  No signs or symptoms of infiltration or infection.  Covered with a sterile bandage, slight pressure applied for 30 seconds.  Pt instructed to leave bandage intact for a minimum of one hour, and to call with questions or concerns.  Copy of appointments, discharge instructions, and after visit summary (AVS) provided to patient.  Patient states understanding, discharged ambulatory.

## 2019-07-02 NOTE — PROGRESS NOTES
Patient is a 84 y/o male here accompanied by self today for infusion of IVF per order of Dr. Ray.  Patient meets parameters for today's infusion. Patient identified with two identifiers, order verified, and verbal consent for today's infusion obtained from patient.       24 gauge angio cath inserted into right upper arm.  Immediate blood return noted.  IV secured with sterile, transparent dressing and tape.  Patient tolerated well, denies pain or discomfort at this time.  Flushes easily without resistance, no signs or symptoms of infiltration or infection.   Patient denies questions or concerns regarding infusion and/or medication(s) being administered.         0951 IV pump verified with dose, drug, and rate of administration.  Infusion administered per protocol.

## 2019-07-02 NOTE — NURSING NOTE
"Chief Complaint   Patient presents with     RECHECK     follow-up Dr Walter appointment 4/23/2019       Initial /77 (BP Location: Left arm, Patient Position: Chair, Cuff Size: Adult Regular)   Pulse 74   Temp 97.5  F (36.4  C) (Tympanic)   Resp 16   Ht 1.753 m (5' 9\")   Wt 75.3 kg (166 lb)   SpO2 98%   BMI 24.51 kg/m   Estimated body mass index is 24.51 kg/m  as calculated from the following:    Height as of this encounter: 1.753 m (5' 9\").    Weight as of this encounter: 75.3 kg (166 lb).  Medication Reconciliation: complete   Rekha Quispe      "

## 2019-07-05 ENCOUNTER — ANESTHESIA EVENT (OUTPATIENT)
Dept: SURGERY | Facility: HOSPITAL | Age: 84
End: 2019-07-05
Payer: MEDICARE

## 2019-07-05 ENCOUNTER — INFUSION THERAPY VISIT (OUTPATIENT)
Dept: INFUSION THERAPY | Facility: OTHER | Age: 84
End: 2019-07-05
Attending: FAMILY MEDICINE
Payer: MEDICARE

## 2019-07-05 VITALS
HEART RATE: 69 BPM | HEIGHT: 69 IN | WEIGHT: 166.1 LBS | SYSTOLIC BLOOD PRESSURE: 158 MMHG | BODY MASS INDEX: 24.6 KG/M2 | RESPIRATION RATE: 18 BRPM | DIASTOLIC BLOOD PRESSURE: 78 MMHG | OXYGEN SATURATION: 99 %

## 2019-07-05 DIAGNOSIS — E86.9 VOLUME DEPLETION: Primary | ICD-10-CM

## 2019-07-05 PROCEDURE — 96360 HYDRATION IV INFUSION INIT: CPT

## 2019-07-05 PROCEDURE — 25000128 H RX IP 250 OP 636: Performed by: FAMILY MEDICINE

## 2019-07-05 RX ADMIN — SODIUM CHLORIDE 1000 ML: 9 INJECTION, SOLUTION INTRAVENOUS at 09:46

## 2019-07-05 ASSESSMENT — ENCOUNTER SYMPTOMS: SEIZURES: 1

## 2019-07-05 ASSESSMENT — MIFFLIN-ST. JEOR: SCORE: 1429.05

## 2019-07-05 ASSESSMENT — LIFESTYLE VARIABLES: TOBACCO_USE: 1

## 2019-07-05 NOTE — PROGRESS NOTES
Infusion administered per protocol.  Patient tolerated infusion well, no signs or symptoms of adverse reaction noted.  Patient denies pain nor discomfort.     IV removed, catheter intact.  Site clean, dry and intact.  No signs or symptoms of infiltration or infection.  Covered with a sterile bandage, slight pressure applied for 30 seconds.  Pt instructed to leave bandage intact for a minimum of one hour, and to call with questions or concerns.  Patient declines copy of appointments, discharge instructions, and after visit summary (AVS)t.  Patient states understanding, discharged ambulatory.

## 2019-07-05 NOTE — PROGRESS NOTES
Patient is a 84 y/o male here accompanied by self today for infusion of IVF per order of Dr. Ray under the supervice.  Patient meets parameters for today's infusion. Patient identified with two identifiers, order verified, and verbal consent for today's infusion obtained from patient.  +++Written consent for treatment is on file and valid.+++         24 gauge angio cath inserted into left upper arm.  Immediate blood return noted.  IV secured with sterile, transparent dressing and tape.  Patient tolerated well, denies pain or discomfort at this time.  Flushes easily without resistance, no signs or symptoms of infiltration or infection.   Patient denies questions or concerns regarding infusion and/or medication(s) being administered.      0946 IV pump verified with dose, drug, and rate of administration.

## 2019-07-05 NOTE — ANESTHESIA PREPROCEDURE EVALUATION
Anesthesia Pre-Procedure Evaluation    Patient: Jf Ortiz   MRN: [id number] : 10/5/1933          Preoperative Diagnosis: NEED FOR INTRAVENOUS ACCESS    Procedure(s):  PORT PLACEMENT    Past Medical History:   Diagnosis Date     Autonomic orthostatic hypotension 10/14/2016     Coronary artery disease      Dementia without behavioral disturbance 2015    Diagnosis updated by automated process. Provider to review and confirm.     Diaphragmatic hernia without mention of obstruction or gangrene 2011     Hypercholesterolemia 2013     Osteoarthrosis, unspecified whether generalized or localized, unspecified site 2011     Other and unspecified hyperlipidemia 2011     Pacemaker      REM sleep behavior disorder 2011     Seizure disorder (H)      Stented coronary artery      Past Surgical History:   Procedure Laterality Date     ------------OTHER-------------      ulnar and radial fx - repair ulnar and radial fx x4     ------------OTHER-------------  2011    cataract extraction     BIOPSY  2015    skin biopsy     BLEPHAROPLASTY BILATERAL  2014    Procedure: BLEPHAROPLASTY BILATERAL;  Surgeon: Andrew Queen MD;  Location: [facility]     BYPASS GRAFT ARTERY CORONARY  2006    coronary artery disease x 5, [facility]     cataract extraction and lens implantation      cataracts     cataract extraction and lens implantation      cataracts     COLONOSCOPY       colonoscopy with polypectomy  3/13/2009    history of polyps - repeat 3 yrs     colonoscopy with polypectomy       colonoscopy with polypectomy       COMBINED COLONOSCOPY WITH ARGON PLASMA COAGULATOR (APC) N/A 10/31/2014    Procedure: COMBINED COLONOSCOPY WITH ARGON PLASMA COAGULATOR (APC);  Surgeon: Bassam Aguilar MD;  [facility]: [facility]     COMBINED COLONOSCOPY WITH ARGON PLASMA COAGULATOR (APC) N/A 2015    Procedure: COMBINED COLONOSCOPY WITH ARGON PLASMA COAGULATOR (APC);  Surgeon: Bassam Aguilar MD;   [facility]: [facility]     ENDOSCOPY UPPER, COLONOSCOPY, COMBINED N/A 10/31/2014    Procedure: COMBINED ENDOSCOPY UPPER, COLONOSCOPY;  Surgeon: Bassam Aguilar MD;  [facility]     ENDOSCOPY UPPER, COLONOSCOPY, COMBINED N/A 11/13/2015    Procedure: COMBINED ENDOSCOPY UPPER, COLONOSCOPY;  Surgeon: Bassam Aguilar MD;  [facility]     ESOPHAGOSCOPY, GASTROSCOPY, DUODENOSCOPY (EGD), COMBINED  1/22/2014    Procedure: COMBINED ESOPHAGOSCOPY, GASTROSCOPY, DUODENOSCOPY (EGD);  UPPER ENDOSCOPY(PENA) W/ BIOPSIES;  Surgeon: Patricia Pena MD;  [facility]: [facility]     HERNIORRHAPHY INGUINAL Right 2/14/2018    Procedure: HERNIORRHAPHY INGUINAL;  OPEN RIGHT INGUINAL HERNIA REPAIR with Mesh;  Surgeon: Virgilio Zaragoza DO;  [facility]     LARYNGOSCOPY WITH MICROSCOPE  1/22/2014    Procedure: LARYNGOSCOPY WITH MICROSCOPE;;  Surgeon: Chayo Duke MD;  [facility]: [facility]     pacemaker placement  2000    heart block     pacemaker placement  2011    dual-chamber     REMOVE TUBE, MYRINGOTOMY, COMBINED  1/22/2014    Procedure: COMBINED REMOVE TUBE, MYRINGOTOMY;  MICRODIRECT LARYNGOSCOPY WITH BIOPSY AND FROZEN SECTIONS removal of right ear tube and myringoplasty;  Surgeon: Chayo Duke MD;  [facility]     REPLACE PACEMAKER GENERATOR N/A 8/8/2018    Procedure: REPLACE PACEMAKER GENERATOR;  Pacemaker generator change;  Surgeon: Alma Kaplan MD;  Location: [facility]     stent placement to LAD  2008     ventilation tube  5/24/2012    right in office       Anesthesia Evaluation     .             ROS/MED HX    ENT/Pulmonary:     (+)tobacco use, Past use , . .    Neurologic: Comment: REM sleep behavioral disorder and lewy body dementia    (+)dementia, seizures Parkinson's disease     Cardiovascular: Comment: Autonomic orthostatic hypotension    (+) Dyslipidemia, hypertension--CAD, -CABG-. : . . . pacemaker :. . Previous cardiac testing Echodate:98-3-08imerzsv:     Interpretation Summary  No pericardial effusion is  present.  Global and regional left ventricular function is normal with an EF of 55-60%.  Grade I or early diastolic dysfunction.  Diastolic Doppler findings (E/E' ratio and/or other parameters) suggest left  ventricular filling pressures are increased.  The right ventricle is normal size.  Mild left atrial enlargement is present.  Mild mitral annular calcification is present.  Trace mitral insufficiency is present.  Mild aortic valve sclerosis is present.  Trace aortic insufficiency is present.  The peak aortic velocity is 2.92 m/sec.  Peak gradient AoV 34 mm Hg  The mean gradient across the aortic valve is18 mmHg.  Mild aortic stenosis is present.  Trace tricuspid insufficiency is present.  Right ventricular systolic pressure is 24mmHg above the right atrial pressure.  The pulmonic valve is normal.  The aorta root is normal.date: results:ECG reviewed date:6-20-19 results:Wide QRS nonspecific intraventricular block poss lateral infarct and inferior infarct date: results:          METS/Exercise Tolerance:     Hematologic:         Musculoskeletal:         GI/Hepatic:     (+) hiatal hernia,       Renal/Genitourinary: Comment: Urinary incontinence        Endo:         Psychiatric:         Infectious Disease:         Malignancy:         Other:                          Physical Exam  Normal systems: pulmonary and dental    Airway   Mallampati: II  TM distance: >3 FB  Neck ROM: full    Dental     Cardiovascular   Rhythm and rate: regular and normal  (+) murmur       Pulmonary    breath sounds clear to auscultation            Lab Results   Component Value Date    WBC 6.3 06/20/2019    HGB 13.1 (L) 06/20/2019    HCT 39.1 (L) 06/20/2019     06/20/2019    CRP <2.9 01/31/2018    SED 8 12/11/2014     06/20/2019    POTASSIUM 4.3 06/20/2019    CHLORIDE 113 (H) 06/20/2019    CO2 23 06/20/2019    BUN 12 06/20/2019    CR 0.88 06/20/2019    GLC 90 06/20/2019    ROSALINDA 8.5 06/20/2019    MAG 2.3 08/18/2017    ALBUMIN 3.3 (L)  "05/30/2019    PROTTOTAL 6.6 (L) 05/30/2019    ALT 23 05/30/2019    AST 10 05/30/2019    ALKPHOS 68 05/30/2019    BILITOTAL 0.6 05/30/2019    INR 1.11 12/11/2014    TSH 4.88 (H) 05/09/2019    T4 1.10 05/09/2019       Preop Vitals  BP Readings from Last 3 Encounters:   07/02/19 140/77   07/02/19 144/80   06/28/19 151/83    Pulse Readings from Last 3 Encounters:   07/02/19 74   07/02/19 70   06/28/19 82      Resp Readings from Last 3 Encounters:   07/02/19 16   07/02/19 18   06/28/19 18    SpO2 Readings from Last 3 Encounters:   07/02/19 98%   07/02/19 96%   06/28/19 98%      Temp Readings from Last 1 Encounters:   07/02/19 97.5  F (36.4  C) (Tympanic)    Ht Readings from Last 1 Encounters:   07/02/19 1.753 m (5' 9\")      Wt Readings from Last 1 Encounters:   07/02/19 75.3 kg (166 lb)    Estimated body mass index is 24.51 kg/m  as calculated from the following:    Height as of 7/2/19: 1.753 m (5' 9\").    Weight as of 7/2/19: 75.3 kg (166 lb).       Anesthesia Plan      History & Physical Review  History and physical reviewed and following examination; no interval change.    ASA Status:  4 .    NPO Status:  > 8 hours    Plan for MAC Reason for MAC:  Chronic cardiopulmonary disease (G9), Deep or markedly invasive procedure (G8) and Procedure to face, neck, head or breast  PONV prophylaxis:  Ondansetron (or other 5HT-3)       Postoperative Care  Postoperative pain management:  IV analgesics.      Consents  Anesthetic plan, risks, benefits and alternatives discussed with:  Patient.  Use of blood products discussed: Yes.   Use of blood products discussed with Patient.  Consented to blood products.  .                 MEGHA Espinal CRNA  "

## 2019-07-09 ENCOUNTER — INFUSION THERAPY VISIT (OUTPATIENT)
Dept: INFUSION THERAPY | Facility: OTHER | Age: 84
End: 2019-07-09
Attending: FAMILY MEDICINE
Payer: MEDICARE

## 2019-07-09 VITALS
BODY MASS INDEX: 24.76 KG/M2 | HEIGHT: 69 IN | DIASTOLIC BLOOD PRESSURE: 75 MMHG | TEMPERATURE: 97.3 F | OXYGEN SATURATION: 99 % | WEIGHT: 167.2 LBS | SYSTOLIC BLOOD PRESSURE: 133 MMHG | HEART RATE: 68 BPM | RESPIRATION RATE: 18 BRPM

## 2019-07-09 DIAGNOSIS — E86.9 VOLUME DEPLETION: Primary | ICD-10-CM

## 2019-07-09 PROCEDURE — 25000128 H RX IP 250 OP 636: Performed by: FAMILY MEDICINE

## 2019-07-09 PROCEDURE — 96365 THER/PROPH/DIAG IV INF INIT: CPT

## 2019-07-09 PROCEDURE — 96360 HYDRATION IV INFUSION INIT: CPT

## 2019-07-09 RX ADMIN — SODIUM CHLORIDE 1000 ML: 9 INJECTION, SOLUTION INTRAVENOUS at 10:01

## 2019-07-09 ASSESSMENT — MIFFLIN-ST. JEOR: SCORE: 1434.04

## 2019-07-09 NOTE — PROGRESS NOTES
Patient is a 85 year old male  here accompanied by self today for infusion of IVF per order of Dr Ray .  Patient meets parameters for today's infusion. Patient identified with two identifiers, order verified, and verbal consent for today's infusion obtained from patient.       24 gauge angio cath inserted into RT forearm.  Immediate blood return noted.  IV secured with sterile, transparent dressing and tape.  Patient tolerated well, denies pain or discomfort at this time.  Flushes easily without resistance, no signs or symptoms of infiltration or infection.   Patient denies questions or concerns regarding infusion and/or medication(s) being administered.    IV pump verified with dose, drug, and rate.  Infusion administered per protocol.

## 2019-07-12 ENCOUNTER — APPOINTMENT (OUTPATIENT)
Dept: GENERAL RADIOLOGY | Facility: HOSPITAL | Age: 84
End: 2019-07-12
Attending: SURGERY
Payer: MEDICARE

## 2019-07-12 ENCOUNTER — HOSPITAL ENCOUNTER (OUTPATIENT)
Facility: HOSPITAL | Age: 84
Discharge: HOME | End: 2019-07-12
Attending: SURGERY | Admitting: SURGERY
Payer: MEDICARE

## 2019-07-12 ENCOUNTER — ANESTHESIA (OUTPATIENT)
Dept: SURGERY | Facility: HOSPITAL | Age: 84
End: 2019-07-12
Payer: MEDICARE

## 2019-07-12 VITALS
OXYGEN SATURATION: 100 % | TEMPERATURE: 97.7 F | RESPIRATION RATE: 18 BRPM | DIASTOLIC BLOOD PRESSURE: 92 MMHG | WEIGHT: 163 LBS | SYSTOLIC BLOOD PRESSURE: 150 MMHG | HEIGHT: 69 IN | BODY MASS INDEX: 24.14 KG/M2

## 2019-07-12 DIAGNOSIS — I95.1 AUTONOMIC ORTHOSTATIC HYPOTENSION: Primary | ICD-10-CM

## 2019-07-12 PROCEDURE — 25000125 ZZHC RX 250: Performed by: SURGERY

## 2019-07-12 PROCEDURE — 40000985 XR CHEST PORT 1 VW: Mod: TC

## 2019-07-12 PROCEDURE — 36000054 ZZH SURGERY LEVEL 2 W FLUORO 1ST 30 MIN: Performed by: SURGERY

## 2019-07-12 PROCEDURE — 27110028 ZZH OR GENERAL SUPPLY NON-STERILE: Performed by: SURGERY

## 2019-07-12 PROCEDURE — 36561 INSERT TUNNELED CV CATH: CPT | Performed by: NURSE ANESTHETIST, CERTIFIED REGISTERED

## 2019-07-12 PROCEDURE — 25000132 ZZH RX MED GY IP 250 OP 250 PS 637: Mod: GY | Performed by: SURGERY

## 2019-07-12 PROCEDURE — 71000027 ZZH RECOVERY PHASE 2 EACH 15 MINS: Performed by: SURGERY

## 2019-07-12 PROCEDURE — 99100 ANES PT EXTEME AGE<1 YR&>70: CPT | Performed by: NURSE ANESTHETIST, CERTIFIED REGISTERED

## 2019-07-12 PROCEDURE — 40000278 XR SURGERY CARM FLUORO LESS THAN 5 MIN: Mod: TC

## 2019-07-12 PROCEDURE — 40000306 ZZH STATISTIC PRE PROC ASSESS II: Performed by: SURGERY

## 2019-07-12 PROCEDURE — 25000128 H RX IP 250 OP 636: Performed by: NURSE ANESTHETIST, CERTIFIED REGISTERED

## 2019-07-12 PROCEDURE — 27210794 ZZH OR GENERAL SUPPLY STERILE: Performed by: SURGERY

## 2019-07-12 PROCEDURE — 37000009 ZZH ANESTHESIA TECHNICAL FEE, EACH ADDTL 15 MIN: Performed by: SURGERY

## 2019-07-12 PROCEDURE — 25000128 H RX IP 250 OP 636: Performed by: SURGERY

## 2019-07-12 PROCEDURE — C1788 PORT, INDWELLING, IMP: HCPCS | Performed by: SURGERY

## 2019-07-12 PROCEDURE — 25000125 ZZHC RX 250: Performed by: NURSE ANESTHETIST, CERTIFIED REGISTERED

## 2019-07-12 PROCEDURE — 36561 INSERT TUNNELED CV CATH: CPT | Performed by: SURGERY

## 2019-07-12 PROCEDURE — 37000008 ZZH ANESTHESIA TECHNICAL FEE, 1ST 30 MIN: Performed by: SURGERY

## 2019-07-12 PROCEDURE — 36000052 ZZH SURGERY LEVEL 2 EA 15 ADDTL MIN: Performed by: SURGERY

## 2019-07-12 DEVICE — PORT-IMPLANTABLE POWER PORT: Type: IMPLANTABLE DEVICE | Site: CHEST  WALL | Status: FUNCTIONAL

## 2019-07-12 RX ORDER — PROPOFOL 10 MG/ML
INJECTION, EMULSION INTRAVENOUS CONTINUOUS PRN
Status: DISCONTINUED | OUTPATIENT
Start: 2019-07-12 | End: 2019-07-12

## 2019-07-12 RX ORDER — SODIUM CHLORIDE, SODIUM LACTATE, POTASSIUM CHLORIDE, CALCIUM CHLORIDE 600; 310; 30; 20 MG/100ML; MG/100ML; MG/100ML; MG/100ML
INJECTION, SOLUTION INTRAVENOUS CONTINUOUS
Status: DISCONTINUED | OUTPATIENT
Start: 2019-07-12 | End: 2019-07-12 | Stop reason: HOSPADM

## 2019-07-12 RX ORDER — CEFAZOLIN SODIUM 2 G/100ML
2 INJECTION, SOLUTION INTRAVENOUS
Status: COMPLETED | OUTPATIENT
Start: 2019-07-12 | End: 2019-07-12

## 2019-07-12 RX ORDER — HEPARIN SODIUM 1000 [USP'U]/ML
INJECTION, SOLUTION INTRAVENOUS; SUBCUTANEOUS PRN
Status: DISCONTINUED | OUTPATIENT
Start: 2019-07-12 | End: 2019-07-12 | Stop reason: HOSPADM

## 2019-07-12 RX ORDER — OXYCODONE HYDROCHLORIDE 5 MG/1
5 TABLET ORAL EVERY 6 HOURS PRN
Qty: 6 TABLET | Refills: 0 | Status: SHIPPED | OUTPATIENT
Start: 2019-07-12 | End: 2019-07-30

## 2019-07-12 RX ORDER — BUPIVACAINE HYDROCHLORIDE AND EPINEPHRINE 2.5; 5 MG/ML; UG/ML
INJECTION, SOLUTION INFILTRATION; PERINEURAL PRN
Status: DISCONTINUED | OUTPATIENT
Start: 2019-07-12 | End: 2019-07-12 | Stop reason: HOSPADM

## 2019-07-12 RX ORDER — LIDOCAINE 40 MG/G
CREAM TOPICAL
Status: DISCONTINUED | OUTPATIENT
Start: 2019-07-12 | End: 2019-07-12 | Stop reason: HOSPADM

## 2019-07-12 RX ORDER — ONDANSETRON 2 MG/ML
4 INJECTION INTRAMUSCULAR; INTRAVENOUS EVERY 30 MIN PRN
Status: DISCONTINUED | OUTPATIENT
Start: 2019-07-12 | End: 2019-07-12 | Stop reason: HOSPADM

## 2019-07-12 RX ORDER — ONDANSETRON 4 MG/1
4 TABLET, ORALLY DISINTEGRATING ORAL EVERY 30 MIN PRN
Status: DISCONTINUED | OUTPATIENT
Start: 2019-07-12 | End: 2019-07-12 | Stop reason: HOSPADM

## 2019-07-12 RX ORDER — NALOXONE HYDROCHLORIDE 0.4 MG/ML
.1-.4 INJECTION, SOLUTION INTRAMUSCULAR; INTRAVENOUS; SUBCUTANEOUS
Status: DISCONTINUED | OUTPATIENT
Start: 2019-07-12 | End: 2019-07-12 | Stop reason: HOSPADM

## 2019-07-12 RX ORDER — PROPOFOL 10 MG/ML
INJECTION, EMULSION INTRAVENOUS PRN
Status: DISCONTINUED | OUTPATIENT
Start: 2019-07-12 | End: 2019-07-12

## 2019-07-12 RX ORDER — LIDOCAINE HYDROCHLORIDE 20 MG/ML
INJECTION, SOLUTION INFILTRATION; PERINEURAL PRN
Status: DISCONTINUED | OUTPATIENT
Start: 2019-07-12 | End: 2019-07-12

## 2019-07-12 RX ORDER — OXYCODONE HYDROCHLORIDE 5 MG/1
5 TABLET ORAL ONCE
Status: COMPLETED | OUTPATIENT
Start: 2019-07-12 | End: 2019-07-12

## 2019-07-12 RX ORDER — CEFAZOLIN SODIUM 1 G/3ML
1 INJECTION, POWDER, FOR SOLUTION INTRAMUSCULAR; INTRAVENOUS SEE ADMIN INSTRUCTIONS
Status: DISCONTINUED | OUTPATIENT
Start: 2019-07-12 | End: 2019-07-12 | Stop reason: HOSPADM

## 2019-07-12 RX ADMIN — CEFAZOLIN SODIUM 2 G: 2 INJECTION, SOLUTION INTRAVENOUS at 07:37

## 2019-07-12 RX ADMIN — OXYCODONE HYDROCHLORIDE 5 MG: 5 TABLET ORAL at 09:01

## 2019-07-12 RX ADMIN — PROPOFOL 100 MCG/KG/MIN: 10 INJECTION, EMULSION INTRAVENOUS at 07:40

## 2019-07-12 RX ADMIN — PROPOFOL 20 MG: 10 INJECTION, EMULSION INTRAVENOUS at 07:40

## 2019-07-12 RX ADMIN — LIDOCAINE HYDROCHLORIDE 40 MG: 20 INJECTION, SOLUTION INFILTRATION; PERINEURAL at 07:39

## 2019-07-12 ASSESSMENT — MIFFLIN-ST. JEOR: SCORE: 1414.74

## 2019-07-12 NOTE — ANESTHESIA CARE TRANSFER NOTE
Patient: Jf Ortiz    Procedure(s):  PORT PLACEMENT    Diagnosis: NEED FOR INTRAVENOUS ACCESS  Diagnosis Additional Information: No value filed.    Anesthesia Type:   MAC     Note:  Airway :Face Mask  Patient transferred to:Phase II  Handoff Report: Identifed the Patient, Identified the Reponsible Provider, Reviewed the pertinent medical history, Discussed the surgical course, Reviewed Intra-OP anesthesia mangement and issues during anesthesia, Set expectations for post-procedure period and Allowed opportunity for questions and acknowledgement of understanding      Vitals: (Last set prior to Anesthesia Care Transfer)    CRNA VITALS  7/12/2019 0752 - 7/12/2019 0832      7/12/2019             Pulse:  85    SpO2:  100 %    Resp Rate (set):  8                Electronically Signed By: MEGHA Whitehead CRNA  July 12, 2019  8:32 AM

## 2019-07-12 NOTE — OP NOTE
PREOPERATIVE DIAGNOSES:   1.  NEED FOR INTRAVENOUS ACCESS      POSTOPERATIVE DIAGNOSIS:     1.  *Need for intravenous access    PROCEDURE:  Insertion Port-A-Cath via right internal jugular vein.      HISTORY:  This 85 year old male who has developed autonomic hypotension requiring IV fluid administration.       DESCRIPTION OF PROCEDURE:  With the patient in the supine position on the operating table, IV sedation was administered by the nurse anesthetist.  His correct identity and planned procedure were confirmed during the requisite timeout pause and the both sides of the chest wall, subclavicular regions, and neck were prepped and draped sterilely.  Local anesthesia was obtained with infiltration of 0.25% Lidocaine with epinephrine.   The patient was placed in steep Trendelenburg position and ultrasound was used to localize the right internal jugular vein localized percutaneously.  With placement of the needle in the right internal jugular vein the J-wire was then passed under fluoroscopy. The introducer needle was then removed. The dilator and introducer sheath was passed over the J-wire in standard Seldinger fashion. With fluoroscopy the placement of the sheath was confirmed in proper position. The subclavicular region was anesthetized with 0.25% Lidocaine with epinephrine and a transverse incision made to create a subcutaneous pocket for the port reservoir.  The incision was carried through full thickness skin and subcutaneous tissue with bleeding controlled by electrocautery.  Skin and full thickness subcutaneous fat was elevated off the pectoralis major muscle sharply to form a pocket to accommodate the Port a Cath reservoir; bleeding was again controlled by electrocautery.  The 8-Nepali Port-A-Cath catheter was tunneled from the chest pocket to the neck incision and sheath. The catheter was then advanced through the sheath to the superior vena cava/right atrial junction with its position confirmed by  fluoroscopy.  The catheter was cut to length and affixed to the port reservoir with the 's locking device.  The port was seated in the subcutaneous pocket with interrupted 2-0 Prolene sutures.  The wounds were irrigated with saline solution and additional hemostasis obtained with electrocautery. The port was accessed with the access needle, aspirated and then flushed with saline. After it was flushed with saline the port was then flushed with heparin. The subcutaneous tissues were closed in layers with interrupted 3-0 Vicryl, and the skin was reapproximated with subcuticularl 4-0 Monocryl.  The incisions were then cleaned and dried. Dermabond was applied to both incisions.  The patient was returned to day surgery in good condition, without suggestion of complication; chest xray confirmed good position and the absence of pneumothorax.  The estimated blood loss was minimal and the sponge, needle and instrument counts were reported correct on two separate occasions prior to completion.      Lloyd Ram  July 12, 2019

## 2019-07-12 NOTE — OR NURSING
Up w/o vertigo.BBS clear.Patient and responsible adult given discharge instructions with no questions regarding instructions. Maureen score 19. Pain level 2/10.  Discharged from unit via w/c. Patient discharged to home.

## 2019-07-12 NOTE — ANESTHESIA POSTPROCEDURE EVALUATION
Patient: Jf Ortiz    Procedure(s):  PORT PLACEMENT    Diagnosis:NEED FOR INTRAVENOUS ACCESS  Diagnosis Additional Information: No value filed.    Anesthesia Type:  MAC    Note:  Anesthesia Post Evaluation    Patient location during evaluation: Phase 2  Patient participation: Able to fully participate in evaluation  Level of consciousness: awake and alert  Pain management: adequate  Airway patency: patent  Cardiovascular status: acceptable  Respiratory status: acceptable  Hydration status: acceptable  PONV: none             Last vitals:  Vitals:    07/12/19 0850 07/12/19 0855 07/12/19 0900   BP: 146/91 166/77 (!) 132/101   Resp: 18 18 18   Temp:      SpO2: 100% 99% 99%         Electronically Signed By: MEGHA Whitehead CRNA  July 12, 2019  9:39 AM

## 2019-07-16 ENCOUNTER — INFUSION THERAPY VISIT (OUTPATIENT)
Dept: INFUSION THERAPY | Facility: OTHER | Age: 84
End: 2019-07-16
Attending: FAMILY MEDICINE
Payer: MEDICARE

## 2019-07-16 VITALS
RESPIRATION RATE: 18 BRPM | OXYGEN SATURATION: 97 % | BODY MASS INDEX: 24.62 KG/M2 | DIASTOLIC BLOOD PRESSURE: 80 MMHG | TEMPERATURE: 97.7 F | HEIGHT: 69 IN | WEIGHT: 166.2 LBS | HEART RATE: 79 BPM | SYSTOLIC BLOOD PRESSURE: 158 MMHG

## 2019-07-16 DIAGNOSIS — E86.9 VOLUME DEPLETION: Primary | ICD-10-CM

## 2019-07-16 PROCEDURE — 25000128 H RX IP 250 OP 636: Performed by: FAMILY MEDICINE

## 2019-07-16 PROCEDURE — 96365 THER/PROPH/DIAG IV INF INIT: CPT

## 2019-07-16 PROCEDURE — 96360 HYDRATION IV INFUSION INIT: CPT

## 2019-07-16 RX ORDER — HEPARIN SODIUM (PORCINE) LOCK FLUSH IV SOLN 100 UNIT/ML 100 UNIT/ML
5 SOLUTION INTRAVENOUS ONCE
Status: COMPLETED | OUTPATIENT
Start: 2019-07-16 | End: 2019-07-16

## 2019-07-16 RX ORDER — HEPARIN SODIUM (PORCINE) LOCK FLUSH IV SOLN 100 UNIT/ML 100 UNIT/ML
5 SOLUTION INTRAVENOUS ONCE
Status: CANCELLED
Start: 2019-07-16

## 2019-07-16 RX ADMIN — SODIUM CHLORIDE 1000 ML: 9 INJECTION, SOLUTION INTRAVENOUS at 10:23

## 2019-07-16 RX ADMIN — HEPARIN 5 ML: 100 SYRINGE at 11:34

## 2019-07-16 ASSESSMENT — MIFFLIN-ST. JEOR: SCORE: 1429.51

## 2019-07-16 NOTE — PROGRESS NOTES
"Patient is a 85 year old male here accompanied by IVF today for infusion of IVF per order of Dr. Ray.  Patient identified with two identifiers, order verified, and verbal consent for today's infusion obtained from patient.      Patients right sided port accessed using non-coring, 20 gauge, 3/4\" needle. Port accessed per facility protocol. Port flushed easily, blood return noted.  No signs and symptoms of infection or infiltration.      IV pump verified with dose, drug, and rate of administration.  Infusion administered per protocol.  Patient tolerated infusion well, no signs or symptoms of adverse reaction noted.  Patient denies pain nor discomfort.     Needle removed, tip intact.  Site clean, dry and intact.  No signs or symptoms of infiltration or infection.  Covered with a sterile bandage, slight pressure applied for 30 seconds.  Pt instructed to leave bandage intact for a minimum of one hour, and to call with questions or concerns.  Copy of appointments, discharge instructions, and after visit summary (AVS) provided to patient.  Patient states understanding, discharged ambulatory.    "

## 2019-07-19 ENCOUNTER — INFUSION THERAPY VISIT (OUTPATIENT)
Dept: INFUSION THERAPY | Facility: OTHER | Age: 84
End: 2019-07-19
Attending: FAMILY MEDICINE
Payer: MEDICARE

## 2019-07-19 VITALS
OXYGEN SATURATION: 98 % | SYSTOLIC BLOOD PRESSURE: 155 MMHG | TEMPERATURE: 97.1 F | HEART RATE: 71 BPM | BODY MASS INDEX: 24.53 KG/M2 | HEIGHT: 69 IN | RESPIRATION RATE: 16 BRPM | WEIGHT: 165.6 LBS | DIASTOLIC BLOOD PRESSURE: 73 MMHG

## 2019-07-19 DIAGNOSIS — E86.9 VOLUME DEPLETION: Primary | ICD-10-CM

## 2019-07-19 PROCEDURE — 25000128 H RX IP 250 OP 636: Performed by: FAMILY MEDICINE

## 2019-07-19 PROCEDURE — 96360 HYDRATION IV INFUSION INIT: CPT

## 2019-07-19 RX ORDER — HEPARIN SODIUM (PORCINE) LOCK FLUSH IV SOLN 100 UNIT/ML 100 UNIT/ML
5 SOLUTION INTRAVENOUS ONCE
Status: CANCELLED
Start: 2019-07-19

## 2019-07-19 RX ORDER — HEPARIN SODIUM (PORCINE) LOCK FLUSH IV SOLN 100 UNIT/ML 100 UNIT/ML
5 SOLUTION INTRAVENOUS ONCE
Status: COMPLETED | OUTPATIENT
Start: 2019-07-19 | End: 2019-07-19

## 2019-07-19 RX ADMIN — HEPARIN 5 ML: 100 SYRINGE at 11:00

## 2019-07-19 RX ADMIN — SODIUM CHLORIDE 1000 ML: 9 INJECTION, SOLUTION INTRAVENOUS at 09:45

## 2019-07-19 ASSESSMENT — MIFFLIN-ST. JEOR: SCORE: 1426.79

## 2019-07-19 ASSESSMENT — PAIN SCALES - GENERAL: PAINLEVEL: NO PAIN (0)

## 2019-07-19 NOTE — PROGRESS NOTES
[facility] HEART CARE   CARDIOLOGY PROGRESS NOTE    Jf Ortiz   [address] [place] [address]      FRANCES Ray     Chief Complaint   Patient presents with     RECHECK     follow-up Dr Walter appointment 4/23/2019        HPI:   Mr. Ortiz is an 85 year old male who presents for cardiology follow-up to visit on 4/23/19. She follows with Dr. Walter.  She has a history of pacemaker placement with recent generator change.  She has a history of neurogenic orthostatic hypotension secondary to Lewy body dementia which had been managed with Midodrine and Florinef.     At patient's last visit there was concern for some dehydration with his given symptoms.  He was recommended increased frequency of his IV infusions to twice weekly and continue taking Midodrine 10 mg twice daily.  He was no longer taking the metoprolol.  Since increasing the IV fluid infusions, patient feels that his symptoms have been much better controlled.  He has had no recent syncope.    Today patient is wife admits that he has been doing well with less lightheadedness and dizzy spells since they increase his IV infusion to 2 times weekly.  He has had no syncope.  He continues with Midodrine 10 mg twice daily.  His blood pressure seems to have improved as well.  He has no concerns today.    PAST MEDICAL HISTORY:   Past Medical History:   Diagnosis Date     Autonomic orthostatic hypotension 10/14/2016     Coronary artery disease      Dementia without behavioral disturbance 7/28/2015    Diagnosis updated by automated process. Provider to review and confirm.     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     Seizure disorder (H)      Stented coronary artery           FAMILY HISTORY:   Family  History   Problem Relation Age of Onset     Diabetes Mother           PAST SURGICAL HISTORY:   Past Surgical History:   Procedure Laterality Date     ------------OTHER-------------  1955    ulnar and radial fx - repair ulnar and radial fx x4     ------------OTHER-------------  6/14/2011    cataract extraction     BIOPSY  08/2015    skin biopsy     BLEPHAROPLASTY BILATERAL  5/6/2014    Procedure: BLEPHAROPLASTY BILATERAL;  Surgeon: Andrew Queen MD;  Location: [facility]     BYPASS GRAFT ARTERY CORONARY  11/2006    coronary artery disease x 5, [facility]     cataract extraction and lens implantation  2011    cataracts     cataract extraction and lens implantation      cataracts     COLONOSCOPY  2012     colonoscopy with polypectomy  3/13/2009    history of polyps - repeat 3 yrs     colonoscopy with polypectomy  2006     colonoscopy with polypectomy  2005     COMBINED COLONOSCOPY WITH ARGON PLASMA COAGULATOR (APC) N/A 10/31/2014    Procedure: COMBINED COLONOSCOPY WITH ARGON PLASMA COAGULATOR (APC);  Surgeon: Bassam Aguilar MD;  Location: [facility]     COMBINED COLONOSCOPY WITH ARGON PLASMA COAGULATOR (APC) N/A 11/13/2015    Procedure: COMBINED COLONOSCOPY WITH ARGON PLASMA COAGULATOR (APC);  Surgeon: Bassam Aguilar MD;  Location: [facility]     ENDOSCOPY UPPER, COLONOSCOPY, COMBINED N/A 10/31/2014    Procedure: COMBINED ENDOSCOPY UPPER, COLONOSCOPY;  Surgeon: Bassam Aguilar MD;  Location[facility]     ENDOSCOPY UPPER, COLONOSCOPY, COMBINED N/A 11/13/2015    Procedure: COMBINED ENDOSCOPY UPPER, COLONOSCOPY;  Surgeon: Bassam Aguilar MD;  [facility]     ESOPHAGOSCOPY, GASTROSCOPY, DUODENOSCOPY (EGD), COMBINED  1/22/2014    Procedure: COMBINED ESOPHAGOSCOPY, GASTROSCOPY, DUODENOSCOPY (EGD);  UPPER ENDOSCOPY(PENA) W/ BIOPSIES;  Surgeon: Patricia Pena MD;  Location: [facility]     HERNIORRHAPHY INGUINAL Right 2/14/2018    Procedure: HERNIORRHAPHY INGUINAL;  OPEN RIGHT INGUINAL HERNIA REPAIR with Mesh;  Surgeon: Virgilio Zaragoza  D, DO;  Location: [facility]     INSERT PORT VASCULAR ACCESS Right 2019    Procedure: PORT PLACEMENT;  Surgeon: Lloyd Ram MD;  Location: [facility]     LARYNGOSCOPY WITH MICROSCOPE  2014    Procedure: LARYNGOSCOPY WITH MICROSCOPE;;  Surgeon: Chayo Duke MD;  Location: [facility]     pacemaker placement      heart block     pacemaker placement      dual-chamber     REMOVE TUBE, MYRINGOTOMY, COMBINED  2014    Procedure: COMBINED REMOVE TUBE, MYRINGOTOMY;  MICRODIRECT LARYNGOSCOPY WITH BIOPSY AND FROZEN SECTIONS removal of right ear tube and myringoplasty;  Surgeon: Chayo Duke MD;  Location[facility]     REPLACE PACEMAKER GENERATOR N/A 2018    Procedure: REPLACE PACEMAKER GENERATOR;  Pacemaker generator change;  Surgeon: Alma Kaplan MD;  Location: [facility] OR     stent placement to LAD       ventilation tube  2012    right in office          SOCIAL HISTORY:   Social History     Socioeconomic History     Marital status: Single     Spouse name: None     Number of children: None     Years of education: None     Highest education level: None   Occupational History     Occupation: retired   Social Needs     Financial resource strain: None     Food insecurity:     Worry: None     Inability: None     Transportation needs:     Medical: None     Non-medical: None   Tobacco Use     Smoking status: Former Smoker     Packs/day: 1.00     Years: 5.00     Pack years: 5.00     Types: Cigarettes     Last attempt to quit: 1985     Years since quittin.5     Smokeless tobacco: Never Used     Tobacco comment: quit in    Substance and Sexual Activity     Alcohol use: Yes     Comment: social     Drug use: No     Sexual activity: Not Currently   Lifestyle     Physical activity:     Days per week: None     Minutes per session: None     Stress: None   Relationships     Social connections:     Talks on phone: None     Gets together: None     Attends Rastafari service: None     Active  member of club or organization: None     Attends meetings of clubs or organizations: None     Relationship status: None     Intimate partner violence:     Fear of current or ex partner: None     Emotionally abused: None     Physically abused: None     Forced sexual activity: None   Other Topics Concern      Service Not Asked     Blood Transfusions Yes     Caffeine Concern Yes     Comment: 1 cup coffee daily     Occupational Exposure Not Asked     Hobby Hazards Not Asked     Sleep Concern Not Asked     Stress Concern Not Asked     Weight Concern Not Asked     Special Diet Not Asked     Back Care Not Asked     Exercise Not Asked     Bike Helmet Not Asked     Seat Belt Not Asked     Self-Exams Not Asked     Parent/sibling w/ CABG, MI or angioplasty before 65F 55M? No   Social History Narrative     None          CURRENT MEDICATIONS:   Prior to Admission medications    Medication Sig Start Date End Date Taking? Authorizing Provider   atorvastatin (LIPITOR) 20 MG tablet TAKE 1 TABLET BY MOUTH EVERY EVENING - GENERIC FOR LIPITOR 4/29/19  Yes Herman Rivers, DO   Cholecalciferol (VITAMIN D-3 PO) Take 50 mcg by mouth 2 times daily    Yes Reported, Patient   Coenzyme Q10 (CO Q 10 PO) Take 200 mg by mouth every morning    Yes Reported, Patient   Cyanocobalamin (VITAMIN B-12 PO) Take 100 mcg by mouth daily    Yes Reported, Patient   donepezil (ARICEPT) 10 MG tablet Take 10 mg by mouth At Bedtime 2/1/19  Yes Reported, Patient   Magnesium 400 MG CAPS Take 400 mg by mouth 2 times daily    Yes Reported, Patient   MELATONIN PO Take 5 mg by mouth At Bedtime    Yes Reported, Patient   metoprolol succinate ER (TOPROL-XL) 50 MG 24 hr tablet TAKE 1 TABLET (50 MG) BY MOUTH DAILY 4/25/19  Yes Juan Walter MD   midodrine (PROAMATINE) 5 MG tablet Take 5 mg by mouth 2 times daily   Yes Reported, Patient   multivitamin, therapeutic with minerals (MULTI-VITAMIN) TABS tablet Take 1 tablet by mouth daily   Yes Reported,  Patient   Omega-3 Fatty Acids (FISH OIL) 500 MG CAPS Take 1 capsule by mouth daily    Yes Reported, Patient   PANTOPRAZOLE SODIUM PO Take 40 mg by mouth 2 times daily (before meals)    Yes Reported, Patient   potassium chloride ER (K-DUR/KLOR-CON M) 20 MEQ CR tablet TAKE 1 TABLET DAILY BY MOUTH 5/24/19  Yes FRANCES Ray MD   sodium chloride 1 GM tablet TAKE 1 TABLET BY MOUTH TWICE A DAY 12/6/18  Yes Herman Rivers,    Turmeric 500 MG TABS Take 1 tablet by mouth daily   Yes Reported, Patient   calcitonin, salmon, (MIACALCIN) 200 UNIT/ACT nasal spray Spray 1 spray into one nostril alternating nostrils daily Alternate nostril each day. 3/22/19   FRANCES Ray MD   lidocaine (LIDODERM) 5 % patch Place 1 patch onto the skin every 24 hours 4/17/19   FRANCES Ray MD   oxyCODONE (ROXICODONE) 5 MG tablet Take 1 tablet (5 mg) by mouth every 6 hours as needed for moderate to severe pain 7/12/19   Lloyd Ram MD          ALLERGIES:   Allergies   Allergen Reactions     Cats Unknown     Lamotrigine      Skin lesions          ROS:   CONSTITUTIONAL: No reported fever or chills. No changes in weight.  ENT: No visual disturbance, ear ache, epistaxis or sore throat.   CARDIOVASCULAR: No chest pain, chest pressure or chest discomfort. No palpitations or lower extremity edema.   RESPIRATORY: No shortness of breath, dyspnea upon exertion, cough, wheezing or hemoptysis.   GI: No reported abdominal pain.   : No reported hematuria or dysuria.   NEUROLOGICAL: Episodes of lightheadedness and dizziness improved. No syncope.  HEMATOLOGIC: No history of anemia. No bleeding or excessive bruising.    ENDOCRINOLOGIC: No temperature intolerance. No hair or skin changes.  SKIN: No abnormal rashes or sores, no unusual itching.  PSYCHIATRIC: No history of depression or anxiety.     PHYSICAL EXAM:   /77 (BP Location: Left arm, Patient Position: Chair, Cuff Size: Adult Regular)   Pulse 74   Temp 97.5  F (36.4  C)  "(Tympanic)   Resp 16   Ht 1.753 m (5' 9\")   Wt 75.3 kg (166 lb)   SpO2 98%   BMI 24.51 kg/m    GENERAL: The patient is a well-developed, well-nourished, in no apparent distress.  HEENT: Head is normocephalic and atraumatic. Eyes are symmetrical with normal visual tracking. No icterus, no xanthelasmas. Nares appeared normal without nasal drainage. Mucous membranes are moist, no cyanosis.  NECK: Supple.   CHEST/ LUNGS: Lungs clear to auscultation, no rales, rhonchi or wheezes, no use of accessory muscles, no retractions, respirations unlabored and normal respiratory rate.   CARDIO: Regular rate and rhythm normal with S1 and S2, no S3 or S4 and no murmur, click or rub.   ABD: Abdomen is nondistended.   EXTREMITIES: No edema present.  MUSCULOSKELETAL: No visible joint swelling.   NEUROLOGIC: Alert and oriented X3. Normal speech, gait and affect. No focal neurologic deficits.   SKIN: No jaundice. No rashes or visible skin lesions present. No ecchymosis.     LAB RESULTS:   Orders Only on 06/20/2019   Component Date Value Ref Range Status     Sodium 06/20/2019 142  133 - 144 mmol/L Final     Potassium 06/20/2019 4.3  3.4 - 5.3 mmol/L Final     Chloride 06/20/2019 113* 94 - 109 mmol/L Final     Carbon Dioxide 06/20/2019 23  20 - 32 mmol/L Final     Anion Gap 06/20/2019 6  3 - 14 mmol/L Final     Glucose 06/20/2019 90  70 - 99 mg/dL Final     Urea Nitrogen 06/20/2019 12  7 - 30 mg/dL Final     Creatinine 06/20/2019 0.88  0.66 - 1.25 mg/dL Final     GFR Estimate 06/20/2019 78  >60 mL/min/[1.73_m2] Final     GFR Estimate If Black 06/20/2019 >90  >60 mL/min/[1.73_m2] Final     Calcium 06/20/2019 8.5  8.5 - 10.1 mg/dL Final     WBC 06/20/2019 6.3  4.0 - 11.0 10e9/L Final     RBC Count 06/20/2019 4.24* 4.4 - 5.9 10e12/L Final     Hemoglobin 06/20/2019 13.1* 13.3 - 17.7 g/dL Final     Hematocrit 06/20/2019 39.1* 40.0 - 53.0 % Final     MCV 06/20/2019 92  78 - 100 fl Final     MCH 06/20/2019 30.9  26.5 - 33.0 pg Final     MCHC " 06/20/2019 33.5  31.5 - 36.5 g/dL Final     RDW 06/20/2019 12.9  10.0 - 15.0 % Final     Platelet Count 06/20/2019 241  150 - 450 10e9/L Final   Office Visit on 05/09/2019   Component Date Value Ref Range Status     TSH 05/09/2019 4.88* 0.40 - 4.00 mU/L Final     T4 Free 05/09/2019 1.10  0.76 - 1.46 ng/dL Final   Infusion Therapy Visit on 04/05/2019   Component Date Value Ref Range Status     Color Urine 04/05/2019 Light Yellow   Final     Appearance Urine 04/05/2019 Clear   Final     Glucose Urine 04/05/2019 Negative  NEG^Negative mg/dL Final     Bilirubin Urine 04/05/2019 Negative  NEG^Negative Final     Ketones Urine 04/05/2019 Negative  NEG^Negative mg/dL Final     Specific Gravity Urine 04/05/2019 1.003  1.003 - 1.035 Final     Blood Urine 04/05/2019 Negative  NEG^Negative Final     pH Urine 04/05/2019 6.5  4.7 - 8.0 pH Final     Protein Albumin Urine 04/05/2019 Negative  NEG^Negative mg/dL Final     Urobilinogen mg/dL 04/05/2019 Normal  0.0 - 2.0 mg/dL Final     Nitrite Urine 04/05/2019 Negative  NEG^Negative Final     Leukocyte Esterase Urine 04/05/2019 Negative  NEG^Negative Final     Source 04/05/2019 Midstream Urine   Final     WBC Urine 04/05/2019 <1  0 - 5 /HPF Final     RBC Urine 04/05/2019 <1  0 - 2 /HPF Final     Bacteria Urine 04/05/2019 Few* NEG^Negative /HPF Final     Mucous Urine 04/05/2019 Present* NEG^Negative /LPF Final          ASSESSMENT:   Jf Ortiz presents for cardiology follow-up to visit on 4/23/19. She follows with Dr. Walter.  She has a history of pacemaker placement with recent generator change.  She has a history of neurogenic orthostatic hypotension secondary to Lewy body dementia which had been managed with Midodrine and Florinef.   Today patient is wife admits that he has been doing well with less lightheadedness and dizzy spells since they increase his IV infusion to 2 times weekly.  He has had no syncope.  He continues with Midodrine 10 mg twice daily.  His blood pressure  seems to have improved as well.  He has no concerns today.    1. Hypertension with target blood pressure goal under 150/90  2. Stented coronary artery  3. Autonomic orthostatic hypotension  4. Atherosclerosis of native coronary artery of native heart without angina pectoris  5. Pacemaker, [employer], Dual Chamber - Dependent  6. Volume depletion  7. Hypercholesterolemia  8. Lewy body dementia without behavioral disturbance  9. Parkinson disease (H)    PLAN:   1. Still not doing well with oral intake of fluids and feeling much better and less symptomatic since he increased his IV fluid to twice weekly.  He will continue with this.  2.  He will continue with Midodrine 10 mg 2 times daily he is not taking too close to bedtime and is not taking prior to napping.  3.  His blood pressure remains mildly elevated although, has been better controlled.  We will not make any changes to his medications today and continue monitoring.  4.  He has a known history of CAD for which she is not experienced any anginal symptoms.  5.  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      Thank you for allowing me to participate in the care of your patient. Please do not hesitate to contact me if you have any questions.     Marycarmen Ibarra

## 2019-07-19 NOTE — PROGRESS NOTES
Patient is a 85 year old female here accompanied by self today for infusion of IVF per order of Dr MALIA Ray under the supervision of Dr Walter, Cardiology.  Patient identified with two identifiers, order verified, and verbal consent for today's infusion obtained from patient.       Patients right sided port accessed using non-coring, 22 gauge, 3/4 inch needle. Port accessed per facility protocol. Port flushed easily, blood return noted.  No signs and symptoms of infection or infiltration.      IV pump verified with dose, drug, and rate of administration.  Infusion administered per protocol.  Patient tolerated infusion well, no signs or symptoms of adverse reaction noted.  Patient denies pain nor discomfort.     IV removed, catheter intact.  Site clean, dry and intact.  No signs or symptoms of infiltration or infection.  Covered with a sterile bandage, slight pressure applied for 30 seconds.  Pt instructed to leave bandage intact for a minimum of one hour, and to call with questions or concerns.  Copy of appointments, discharge instructions, and after visit summary (AVS) provided to patient.  Patient states understanding, discharged ambulatory.

## 2019-07-23 ENCOUNTER — INFUSION THERAPY VISIT (OUTPATIENT)
Dept: INFUSION THERAPY | Facility: OTHER | Age: 84
End: 2019-07-23
Attending: FAMILY MEDICINE
Payer: MEDICARE

## 2019-07-23 VITALS
RESPIRATION RATE: 16 BRPM | HEIGHT: 69 IN | DIASTOLIC BLOOD PRESSURE: 77 MMHG | BODY MASS INDEX: 24.52 KG/M2 | SYSTOLIC BLOOD PRESSURE: 147 MMHG | OXYGEN SATURATION: 97 % | WEIGHT: 165.57 LBS | TEMPERATURE: 98 F

## 2019-07-23 DIAGNOSIS — E86.9 VOLUME DEPLETION: Primary | ICD-10-CM

## 2019-07-23 PROCEDURE — 25000128 H RX IP 250 OP 636: Performed by: FAMILY MEDICINE

## 2019-07-23 PROCEDURE — 96360 HYDRATION IV INFUSION INIT: CPT

## 2019-07-23 PROCEDURE — 96365 THER/PROPH/DIAG IV INF INIT: CPT

## 2019-07-23 RX ORDER — HEPARIN SODIUM (PORCINE) LOCK FLUSH IV SOLN 100 UNIT/ML 100 UNIT/ML
5 SOLUTION INTRAVENOUS ONCE
Status: COMPLETED | OUTPATIENT
Start: 2019-07-23 | End: 2019-07-23

## 2019-07-23 RX ORDER — HEPARIN SODIUM (PORCINE) LOCK FLUSH IV SOLN 100 UNIT/ML 100 UNIT/ML
5 SOLUTION INTRAVENOUS ONCE
Status: CANCELLED
Start: 2019-07-23

## 2019-07-23 RX ADMIN — SODIUM CHLORIDE 1000 ML: 9 INJECTION, SOLUTION INTRAVENOUS at 09:46

## 2019-07-23 RX ADMIN — Medication 5 ML: at 10:52

## 2019-07-23 ASSESSMENT — MIFFLIN-ST. JEOR: SCORE: 1426.63

## 2019-07-23 NOTE — PROGRESS NOTES
"Patient is a 85 year old male here accompanied by wife today for infusion of IVF per order of Dr. Ray.  Patient identified with two identifiers, order verified, and verbal consent for today's infusion obtained from patient.       Patients right sided port accessed using non-coring, 20 gauge, 3/4\" needle. Port accessed per facility protocol. Port flushed easily, blood return noted.  No signs and symptoms of infection or infiltration.       IV pump verified with dose, drug, and rate of administration with MAR.  Infusion administered per protocol.  Patient tolerated infusion well, no signs or symptoms of adverse reaction noted. Patient denies pain nor discomfort.      Needle removed, tip intact.  Site clean, dry and intact.  No signs or symptoms of infiltration or infection.  Covered with a sterile bandage, slight pressure applied for 30 seconds.  Pt instructed to leave bandage intact for a minimum of one hour, and to call with questions or concerns.  Copy of appointments, discharge instructions, and after visit summary (AVS) provided to patient. Patient states understanding, discharged ambulatory.    "

## 2019-07-26 ENCOUNTER — INFUSION THERAPY VISIT (OUTPATIENT)
Dept: INFUSION THERAPY | Facility: OTHER | Age: 84
End: 2019-07-26
Attending: FAMILY MEDICINE
Payer: MEDICARE

## 2019-07-26 VITALS — SYSTOLIC BLOOD PRESSURE: 140 MMHG | DIASTOLIC BLOOD PRESSURE: 80 MMHG

## 2019-07-26 DIAGNOSIS — E86.9 VOLUME DEPLETION: Primary | ICD-10-CM

## 2019-07-26 PROCEDURE — 96365 THER/PROPH/DIAG IV INF INIT: CPT

## 2019-07-26 PROCEDURE — 25000128 H RX IP 250 OP 636: Performed by: FAMILY MEDICINE

## 2019-07-26 PROCEDURE — 96360 HYDRATION IV INFUSION INIT: CPT

## 2019-07-26 RX ORDER — HEPARIN SODIUM (PORCINE) LOCK FLUSH IV SOLN 100 UNIT/ML 100 UNIT/ML
5 SOLUTION INTRAVENOUS ONCE
Status: CANCELLED
Start: 2019-07-26

## 2019-07-26 RX ORDER — HEPARIN SODIUM (PORCINE) LOCK FLUSH IV SOLN 100 UNIT/ML 100 UNIT/ML
5 SOLUTION INTRAVENOUS ONCE
Status: COMPLETED | OUTPATIENT
Start: 2019-07-26 | End: 2019-07-26

## 2019-07-26 RX ADMIN — SODIUM CHLORIDE 1000 ML: 9 INJECTION, SOLUTION INTRAVENOUS at 09:54

## 2019-07-26 RX ADMIN — HEPARIN 5 ML: 100 SYRINGE at 11:11

## 2019-07-26 NOTE — PROGRESS NOTES
Patient is a 85 year old male here accompanied by self today for infusion of IVF per order of Dr Ray. Patient meets parameters for today's infusion. Patient identified with two identifiers, order verified, and verbal consent for today's infusion obtained from patient.      Patients right sided port accessed using non-coring, 20 gauge, 3/4 needle. Port accessed per facility protocol. Port flushed easily, blood return noted.  No signs and symptoms of infection or infiltration.      IV pump verified with dose, drug, and rate of administration.  Infusion administered per protocol.

## 2019-07-27 DIAGNOSIS — E78.00 HYPERCHOLESTEROLEMIA: ICD-10-CM

## 2019-07-29 RX ORDER — ATORVASTATIN CALCIUM 20 MG/1
TABLET, FILM COATED ORAL
Qty: 90 TABLET | Refills: 0 | Status: SHIPPED | OUTPATIENT
Start: 2019-07-29 | End: 2019-11-01

## 2019-07-30 ENCOUNTER — INFUSION THERAPY VISIT (OUTPATIENT)
Dept: INFUSION THERAPY | Facility: OTHER | Age: 84
End: 2019-07-30
Attending: FAMILY MEDICINE
Payer: MEDICARE

## 2019-07-30 ENCOUNTER — OFFICE VISIT (OUTPATIENT)
Dept: FAMILY MEDICINE | Facility: OTHER | Age: 84
End: 2019-07-30
Attending: FAMILY MEDICINE
Payer: MEDICARE

## 2019-07-30 VITALS
DIASTOLIC BLOOD PRESSURE: 58 MMHG | HEIGHT: 69 IN | OXYGEN SATURATION: 97 % | HEART RATE: 92 BPM | WEIGHT: 167 LBS | TEMPERATURE: 97.7 F | SYSTOLIC BLOOD PRESSURE: 110 MMHG | BODY MASS INDEX: 24.73 KG/M2

## 2019-07-30 VITALS
SYSTOLIC BLOOD PRESSURE: 155 MMHG | HEIGHT: 69 IN | OXYGEN SATURATION: 98 % | WEIGHT: 169.3 LBS | DIASTOLIC BLOOD PRESSURE: 93 MMHG | RESPIRATION RATE: 18 BRPM | HEART RATE: 94 BPM | BODY MASS INDEX: 25.07 KG/M2 | TEMPERATURE: 95 F

## 2019-07-30 DIAGNOSIS — E86.9 VOLUME DEPLETION: Primary | ICD-10-CM

## 2019-07-30 DIAGNOSIS — F02.80 LEWY BODY DEMENTIA WITHOUT BEHAVIORAL DISTURBANCE (H): Primary | ICD-10-CM

## 2019-07-30 DIAGNOSIS — G31.83 LEWY BODY DEMENTIA WITHOUT BEHAVIORAL DISTURBANCE (H): Primary | ICD-10-CM

## 2019-07-30 DIAGNOSIS — R53.83 FATIGUE: ICD-10-CM

## 2019-07-30 DIAGNOSIS — I95.1 AUTONOMIC ORTHOSTATIC HYPOTENSION: ICD-10-CM

## 2019-07-30 DIAGNOSIS — R79.89 ELEVATED TSH: ICD-10-CM

## 2019-07-30 PROCEDURE — G0463 HOSPITAL OUTPT CLINIC VISIT: HCPCS

## 2019-07-30 PROCEDURE — G0463 HOSPITAL OUTPT CLINIC VISIT: HCPCS | Mod: 25

## 2019-07-30 PROCEDURE — 99214 OFFICE O/P EST MOD 30 MIN: CPT | Performed by: FAMILY MEDICINE

## 2019-07-30 PROCEDURE — 96365 THER/PROPH/DIAG IV INF INIT: CPT

## 2019-07-30 PROCEDURE — 96360 HYDRATION IV INFUSION INIT: CPT

## 2019-07-30 PROCEDURE — 25000128 H RX IP 250 OP 636: Performed by: FAMILY MEDICINE

## 2019-07-30 RX ORDER — HEPARIN SODIUM (PORCINE) LOCK FLUSH IV SOLN 100 UNIT/ML 100 UNIT/ML
5 SOLUTION INTRAVENOUS ONCE
Status: CANCELLED
Start: 2019-07-30

## 2019-07-30 RX ORDER — HEPARIN SODIUM (PORCINE) LOCK FLUSH IV SOLN 100 UNIT/ML 100 UNIT/ML
5 SOLUTION INTRAVENOUS ONCE
Status: COMPLETED | OUTPATIENT
Start: 2019-07-30 | End: 2019-07-30

## 2019-07-30 RX ADMIN — HEPARIN 5 ML: 100 SYRINGE at 12:26

## 2019-07-30 RX ADMIN — SODIUM CHLORIDE 1000 ML: 9 INJECTION, SOLUTION INTRAVENOUS at 11:11

## 2019-07-30 ASSESSMENT — PAIN SCALES - GENERAL: PAINLEVEL: NO PAIN (0)

## 2019-07-30 ASSESSMENT — MIFFLIN-ST. JEOR
SCORE: 1432.89
SCORE: 1443.57

## 2019-07-30 NOTE — NURSING NOTE
"Chief Complaint   Patient presents with     Hypertension       Initial /58 (BP Location: Left arm, Patient Position: Sitting, Cuff Size: Adult Regular)   Pulse 92   Temp 97.7  F (36.5  C)   Ht 1.753 m (5' 9\")   Wt 75.8 kg (167 lb)   SpO2 97%   BMI 24.66 kg/m   Estimated body mass index is 24.66 kg/m  as calculated from the following:    Height as of this encounter: 1.753 m (5' 9\").    Weight as of this encounter: 75.8 kg (167 lb).  Medication Reconciliation: complete   Henny Clemons    "

## 2019-07-30 NOTE — PROGRESS NOTES
"Patients right sided port accessed using non-coring, 20 gauge, 3/4\" needle. Port accessed per facility protocol. Port flushed easily, blood return noted.  No signs and symptoms of infection or infiltration.    "

## 2019-07-30 NOTE — PROGRESS NOTES
Patient is a 85 year old male here accompanied by self today for infusion of IVF per order of Dr Ray.  Patient meets parameters for today's infusion. Patient identified with two identifiers, order verified, and verbal consent for today's infusion obtained from patient.     IV pump verified with dose, drug, and rate of administration .  Infusion administered per protocol.  Patient tolerated infusion well, no signs or symptoms of adverse reaction noted.  Patient denies pain nor discomfort.

## 2019-08-02 ENCOUNTER — INFUSION THERAPY VISIT (OUTPATIENT)
Dept: INFUSION THERAPY | Facility: OTHER | Age: 84
End: 2019-08-02
Attending: FAMILY MEDICINE
Payer: MEDICARE

## 2019-08-02 VITALS
OXYGEN SATURATION: 96 % | SYSTOLIC BLOOD PRESSURE: 124 MMHG | DIASTOLIC BLOOD PRESSURE: 72 MMHG | TEMPERATURE: 98.4 F | HEART RATE: 76 BPM | RESPIRATION RATE: 18 BRPM

## 2019-08-02 DIAGNOSIS — I95.1 AUTONOMIC ORTHOSTATIC HYPOTENSION: ICD-10-CM

## 2019-08-02 DIAGNOSIS — E86.9 VOLUME DEPLETION: Primary | ICD-10-CM

## 2019-08-02 PROCEDURE — 25000128 H RX IP 250 OP 636: Performed by: FAMILY MEDICINE

## 2019-08-02 PROCEDURE — 96360 HYDRATION IV INFUSION INIT: CPT

## 2019-08-02 PROCEDURE — 96365 THER/PROPH/DIAG IV INF INIT: CPT

## 2019-08-02 RX ORDER — HEPARIN SODIUM (PORCINE) LOCK FLUSH IV SOLN 100 UNIT/ML 100 UNIT/ML
5 SOLUTION INTRAVENOUS ONCE
Status: CANCELLED
Start: 2019-08-02

## 2019-08-02 RX ORDER — HEPARIN SODIUM (PORCINE) LOCK FLUSH IV SOLN 100 UNIT/ML 100 UNIT/ML
5 SOLUTION INTRAVENOUS ONCE
Status: COMPLETED | OUTPATIENT
Start: 2019-08-02 | End: 2019-08-02

## 2019-08-02 RX ORDER — MIDODRINE HYDROCHLORIDE 5 MG/1
TABLET ORAL
Qty: 540 TABLET | Refills: 0 | Status: SHIPPED | OUTPATIENT
Start: 2019-08-02 | End: 2019-11-26

## 2019-08-02 RX ADMIN — Medication 5 ML: at 10:42

## 2019-08-02 RX ADMIN — SODIUM CHLORIDE 1000 ML: 9 INJECTION, SOLUTION INTRAVENOUS at 09:29

## 2019-08-02 NOTE — TELEPHONE ENCOUNTER
midodrine  Last Written Prescription Date: n/a  Last Fill Quantity: n/a # of Refills: n/a  Last Office Visit: 7/30/19

## 2019-08-02 NOTE — PROGRESS NOTES
Patient is a 84 y/o male here accompanied by s/o today for infusion of IVF per order of Dr Carol Ray.  Patient meets parameters for today's infusion. Patient identified with two identifiers, order verified, and verbal consent for today's infusion obtained from patient.       Patient meets order parameters for today's treatment.      Patient denies questions or concerns regarding infusion and/or medication(s) being administered.    Patients right port remained accessed from 8/1/19 [facility] visit in [place]. Power non-coring 20 gauge catheter in place. Dressing CD&I. Flushed with 10 mL's of normal saline.  Blood return noted.  No signs and symptoms of infection or infiltration.      0929 IV pump verified with dose, drug, and rate of administration.  Infusion administered per protocol.  Patient tolerated infusion well, no signs or symptoms of adverse reaction noted.  Patient denies pain nor discomfort.     IV removed, catheter intact.  Site clean, dry and intact.  No signs or symptoms of infiltration or infection.  Covered with a sterile bandage, slight pressure applied for 30 seconds.  Pt instructed to leave bandage intact for a minimum of one hour, and to call with questions or concerns.  Patient declines copy of appointments, discharge instructions, and after visit summary (AVS).  Patient states understanding, discharged ambulatory.

## 2019-08-05 ENCOUNTER — INFUSION THERAPY VISIT (OUTPATIENT)
Dept: INFUSION THERAPY | Facility: OTHER | Age: 84
End: 2019-08-05
Attending: FAMILY MEDICINE
Payer: MEDICARE

## 2019-08-05 VITALS
OXYGEN SATURATION: 98 % | WEIGHT: 166.2 LBS | TEMPERATURE: 96.4 F | RESPIRATION RATE: 18 BRPM | DIASTOLIC BLOOD PRESSURE: 74 MMHG | BODY MASS INDEX: 24.62 KG/M2 | HEART RATE: 82 BPM | HEIGHT: 69 IN | SYSTOLIC BLOOD PRESSURE: 136 MMHG

## 2019-08-05 DIAGNOSIS — E86.9 VOLUME DEPLETION: Primary | ICD-10-CM

## 2019-08-05 PROCEDURE — 96365 THER/PROPH/DIAG IV INF INIT: CPT

## 2019-08-05 PROCEDURE — 25000128 H RX IP 250 OP 636: Performed by: FAMILY MEDICINE

## 2019-08-05 PROCEDURE — 96360 HYDRATION IV INFUSION INIT: CPT

## 2019-08-05 RX ORDER — HEPARIN SODIUM (PORCINE) LOCK FLUSH IV SOLN 100 UNIT/ML 100 UNIT/ML
5 SOLUTION INTRAVENOUS ONCE
Status: CANCELLED
Start: 2019-08-05

## 2019-08-05 RX ORDER — HEPARIN SODIUM (PORCINE) LOCK FLUSH IV SOLN 100 UNIT/ML 100 UNIT/ML
5 SOLUTION INTRAVENOUS ONCE
Status: COMPLETED | OUTPATIENT
Start: 2019-08-05 | End: 2019-08-05

## 2019-08-05 RX ADMIN — HEPARIN 5 ML: 100 SYRINGE at 10:26

## 2019-08-05 RX ADMIN — SODIUM CHLORIDE 1000 ML: 9 INJECTION, SOLUTION INTRAVENOUS at 09:13

## 2019-08-05 ASSESSMENT — MIFFLIN-ST. JEOR: SCORE: 1429.51

## 2019-08-05 NOTE — PROGRESS NOTES
Patient is a 85 year old make here today for infusion of IVFper order of Dr Carol Ray.  Patient meets parameters for today's infusion. Patient identified with two identifiers, order verified, and verbal consent for today's infusion obtained from patient.       Patient denies questions or concerns regarding infusion and/or medication(s) being administered.    Patients right port accessed using non-coring, 22 gauge, 3/4 needle. Port accessed per facility protocol. Port flushed easily, blood return noted.  No signs and symptoms of infection or infiltration.      0913 IV pump verified with IVF dose, drug, and rate of administration.  Infusion administered per protocol.  Patient tolerated infusion well, no signs or symptoms of adverse reaction noted.  Patient denies pain nor discomfort.     IV removed, catheter intact.  Site clean, dry and intact.  No signs or symptoms of infiltration or infection.  Covered with a sterile bandage, slight pressure applied for 30 seconds.  Pt instructed to leave bandage intact for a minimum of one hour, and to call with questions or concerns.  Patient declines copy of appointments, discharge instructions, and after visit summary (AVS).  Patient states understanding, discharged ambulatory.

## 2019-08-13 ENCOUNTER — INFUSION THERAPY VISIT (OUTPATIENT)
Dept: INFUSION THERAPY | Facility: OTHER | Age: 84
End: 2019-08-13
Attending: FAMILY MEDICINE
Payer: MEDICARE

## 2019-08-13 VITALS
HEIGHT: 69 IN | BODY MASS INDEX: 24.77 KG/M2 | HEART RATE: 79 BPM | DIASTOLIC BLOOD PRESSURE: 75 MMHG | WEIGHT: 167.21 LBS | SYSTOLIC BLOOD PRESSURE: 142 MMHG | OXYGEN SATURATION: 97 % | TEMPERATURE: 97.1 F | RESPIRATION RATE: 18 BRPM

## 2019-08-13 DIAGNOSIS — E86.9 VOLUME DEPLETION: Primary | ICD-10-CM

## 2019-08-13 PROCEDURE — 25000128 H RX IP 250 OP 636: Performed by: FAMILY MEDICINE

## 2019-08-13 PROCEDURE — 96365 THER/PROPH/DIAG IV INF INIT: CPT

## 2019-08-13 RX ORDER — HEPARIN SODIUM (PORCINE) LOCK FLUSH IV SOLN 100 UNIT/ML 100 UNIT/ML
5 SOLUTION INTRAVENOUS ONCE
Status: CANCELLED
Start: 2019-08-13

## 2019-08-13 RX ORDER — HEPARIN SODIUM (PORCINE) LOCK FLUSH IV SOLN 100 UNIT/ML 100 UNIT/ML
5 SOLUTION INTRAVENOUS ONCE
Status: COMPLETED | OUTPATIENT
Start: 2019-08-13 | End: 2019-08-13

## 2019-08-13 RX ADMIN — Medication 5 ML: at 10:56

## 2019-08-13 RX ADMIN — SODIUM CHLORIDE 1000 ML: 9 INJECTION, SOLUTION INTRAVENOUS at 09:44

## 2019-08-13 ASSESSMENT — MIFFLIN-ST. JEOR: SCORE: 1434.09

## 2019-08-13 NOTE — PROGRESS NOTES
Patient is an 85 year old here today for infusion of IVF per order of Carol Ray.  Patient meets parameters for today's infusion. Patient identified with two identifiers, order verified, and verbal consent for today's infusion obtained from patient.         Patient denies questions or concerns regarding infusion and/or medication(s) being administered.    Patients right port accessed using non-coring, 22 gauge, 3/4 needle. Port accessed per facility protocol. Port flushed easily, blood return noted.  No signs and symptoms of infection or infiltration.      0944 IV pump verified with IVF dose, drug, and rate of administration.  Infusion administered per protocol.  Patient tolerated infusion well, no signs or symptoms of adverse reaction noted.  Patient denies pain nor discomfort.     IV removed, catheter intact.  Site clean, dry and intact.  No signs or symptoms of infiltration or infection.  Covered with a sterile bandage, slight pressure applied for 30 seconds.  Pt instructed to leave bandage intact for a minimum of one hour, and to call with questions or concerns.  Patient declines copy of appointments, discharge instructions, and after visit summary (AVS).  Patient states understanding, discharged ambulatory.

## 2019-08-16 ENCOUNTER — INFUSION THERAPY VISIT (OUTPATIENT)
Dept: INFUSION THERAPY | Facility: OTHER | Age: 84
End: 2019-08-16
Attending: FAMILY MEDICINE
Payer: MEDICARE

## 2019-08-16 VITALS
DIASTOLIC BLOOD PRESSURE: 75 MMHG | OXYGEN SATURATION: 96 % | WEIGHT: 168.9 LBS | SYSTOLIC BLOOD PRESSURE: 118 MMHG | BODY MASS INDEX: 25.02 KG/M2 | TEMPERATURE: 96.1 F | HEIGHT: 69 IN | HEART RATE: 77 BPM

## 2019-08-16 DIAGNOSIS — E86.9 VOLUME DEPLETION: Primary | ICD-10-CM

## 2019-08-16 PROCEDURE — 25000128 H RX IP 250 OP 636: Performed by: FAMILY MEDICINE

## 2019-08-16 PROCEDURE — 96365 THER/PROPH/DIAG IV INF INIT: CPT

## 2019-08-16 PROCEDURE — 96360 HYDRATION IV INFUSION INIT: CPT

## 2019-08-16 RX ORDER — HEPARIN SODIUM (PORCINE) LOCK FLUSH IV SOLN 100 UNIT/ML 100 UNIT/ML
5 SOLUTION INTRAVENOUS ONCE
Status: CANCELLED
Start: 2019-08-16

## 2019-08-16 RX ORDER — HEPARIN SODIUM (PORCINE) LOCK FLUSH IV SOLN 100 UNIT/ML 100 UNIT/ML
5 SOLUTION INTRAVENOUS ONCE
Status: COMPLETED | OUTPATIENT
Start: 2019-08-16 | End: 2019-08-16

## 2019-08-16 RX ADMIN — HEPARIN 5 ML: 100 SYRINGE at 11:01

## 2019-08-16 RX ADMIN — SODIUM CHLORIDE 1000 ML: 9 INJECTION, SOLUTION INTRAVENOUS at 09:43

## 2019-08-16 ASSESSMENT — MIFFLIN-ST. JEOR: SCORE: 1441.76

## 2019-08-16 NOTE — PROGRESS NOTES
Patient is a 85 year old male here accompanied by self today for infusion of IV Fluids per order of Dr. Ray.  Patient meets parameters for today's infusion. Patient identified with two identifiers, order verified, and verbal consent for today's infusion obtained from patient.      Patients right sided port accessed using non-coring, 20 gauge, 3/4 needle. Port accessed per facility protocol. Port flushed easily, blood return noted.  No signs and symptoms of infection or infiltration.      IV pump verified with dose, drug, and rate of administration.  Infusion administered per protocol.  Patient tolerated infusion well, no signs or symptoms of adverse reaction noted.  Patient denies pain nor discomfort.

## 2019-08-20 ENCOUNTER — INFUSION THERAPY VISIT (OUTPATIENT)
Dept: INFUSION THERAPY | Facility: OTHER | Age: 84
End: 2019-08-20
Attending: FAMILY MEDICINE
Payer: MEDICARE

## 2019-08-20 VITALS
HEIGHT: 69 IN | BODY MASS INDEX: 25.02 KG/M2 | HEART RATE: 92 BPM | TEMPERATURE: 95.9 F | OXYGEN SATURATION: 98 % | SYSTOLIC BLOOD PRESSURE: 141 MMHG | WEIGHT: 168.9 LBS | DIASTOLIC BLOOD PRESSURE: 80 MMHG

## 2019-08-20 DIAGNOSIS — E86.9 VOLUME DEPLETION: Primary | ICD-10-CM

## 2019-08-20 PROCEDURE — 96360 HYDRATION IV INFUSION INIT: CPT

## 2019-08-20 PROCEDURE — 25000128 H RX IP 250 OP 636: Performed by: FAMILY MEDICINE

## 2019-08-20 PROCEDURE — 96365 THER/PROPH/DIAG IV INF INIT: CPT

## 2019-08-20 RX ORDER — HEPARIN SODIUM (PORCINE) LOCK FLUSH IV SOLN 100 UNIT/ML 100 UNIT/ML
5 SOLUTION INTRAVENOUS ONCE
Status: COMPLETED | OUTPATIENT
Start: 2019-08-20 | End: 2019-08-20

## 2019-08-20 RX ORDER — HEPARIN SODIUM (PORCINE) LOCK FLUSH IV SOLN 100 UNIT/ML 100 UNIT/ML
5 SOLUTION INTRAVENOUS ONCE
Status: CANCELLED
Start: 2019-08-20

## 2019-08-20 RX ADMIN — HEPARIN 5 ML: 100 SYRINGE at 10:46

## 2019-08-20 RX ADMIN — SODIUM CHLORIDE 1000 ML: 9 INJECTION, SOLUTION INTRAVENOUS at 09:35

## 2019-08-20 ASSESSMENT — MIFFLIN-ST. JEOR: SCORE: 1441.76

## 2019-08-20 NOTE — PROGRESS NOTES
Patient is a 85 year old male here today for infusion of IVF per order of Dr Carol Ray.  Patient meets parameters for today's infusion. Patient identified with two identifiers, order verified, and verbal consent for today's infusion obtained from patient.      Patient denies questions or concerns regarding infusion and/or medication(s) being administered.    Patients right port accessed using non-coring, 20 gauge, 3/4 needle. Port accessed per facility protocol. Port flushed easily, blood return noted.  No signs and symptoms of infection or infiltration.      0935 IV pump verified with IVF 0.9% normal saline dose, drug, and rate of administration.  Infusion administered per protocol.  Patient tolerated infusion well, no signs or symptoms of adverse reaction noted.  Patient denies pain nor discomfort.     IV removed, catheter intact.  Site clean, dry and intact.  No signs or symptoms of infiltration or infection.  Covered with a sterile bandage, slight pressure applied for 30 seconds.  Pt instructed to leave bandage intact for a minimum of one hour, and to call with questions or concerns.  Patient declines copy of appointments, discharge instructions, and after visit summary (AVS).  Patient states understanding, discharged ambulatory.

## 2019-08-23 ENCOUNTER — INFUSION THERAPY VISIT (OUTPATIENT)
Dept: INFUSION THERAPY | Facility: OTHER | Age: 84
End: 2019-08-23
Attending: FAMILY MEDICINE
Payer: MEDICARE

## 2019-08-23 VITALS
BODY MASS INDEX: 25.06 KG/M2 | WEIGHT: 169.2 LBS | HEIGHT: 69 IN | SYSTOLIC BLOOD PRESSURE: 150 MMHG | TEMPERATURE: 96.6 F | HEART RATE: 76 BPM | OXYGEN SATURATION: 99 % | DIASTOLIC BLOOD PRESSURE: 75 MMHG

## 2019-08-23 DIAGNOSIS — E86.9 VOLUME DEPLETION: Primary | ICD-10-CM

## 2019-08-23 PROCEDURE — 96360 HYDRATION IV INFUSION INIT: CPT

## 2019-08-23 PROCEDURE — 25000128 H RX IP 250 OP 636: Performed by: FAMILY MEDICINE

## 2019-08-23 RX ORDER — HEPARIN SODIUM (PORCINE) LOCK FLUSH IV SOLN 100 UNIT/ML 100 UNIT/ML
5 SOLUTION INTRAVENOUS ONCE
Status: CANCELLED
Start: 2019-08-23

## 2019-08-23 RX ORDER — HEPARIN SODIUM (PORCINE) LOCK FLUSH IV SOLN 100 UNIT/ML 100 UNIT/ML
5 SOLUTION INTRAVENOUS ONCE
Status: COMPLETED | OUTPATIENT
Start: 2019-08-23 | End: 2019-08-23

## 2019-08-23 RX ADMIN — SODIUM CHLORIDE 1000 ML: 9 INJECTION, SOLUTION INTRAVENOUS at 09:53

## 2019-08-23 RX ADMIN — HEPARIN 5 ML: 100 SYRINGE at 11:09

## 2019-08-23 ASSESSMENT — MIFFLIN-ST. JEOR: SCORE: 1443.12

## 2019-08-23 NOTE — PROGRESS NOTES
Patient is a 85 year old male here today for infusion of IVF per order of Dr Carol Ray.  Patient meets parameters for today's infusion. Patient identified with two identifiers, order verified, and verbal consent for today's infusion obtained from patient.       Patient denies questions or concerns regarding infusion and/or medication(s) being administered.     Patients right port accessed using non-coring, 22 gauge, 3/4 needle. Port accessed per facility protocol. Port flushed easily, blood return noted.  No signs and symptoms of infection or infiltration.       0953 IV pump verified with IVF 0.9% normal saline dose, drug, and rate of administration.  Infusion administered per protocol.  Patient tolerated infusion well, no signs or symptoms of adverse reaction noted.  Patient denies pain nor discomfort.      IV removed, catheter intact.  Site clean, dry and intact.  No signs or symptoms of infiltration or infection.  Covered with a sterile bandage, slight pressure applied for 30 seconds.  Pt instructed to leave bandage intact for a minimum of one hour, and to call with questions or concerns.  Patient declines copy of appointments, discharge instructions, and after visit summary (AVS).  Patient states understanding, discharged ambulatory.

## 2019-08-27 ENCOUNTER — ANCILLARY PROCEDURE (OUTPATIENT)
Dept: CARDIOLOGY | Facility: OTHER | Age: 84
End: 2019-08-27
Attending: INTERNAL MEDICINE
Payer: MEDICARE

## 2019-08-27 ENCOUNTER — INFUSION THERAPY VISIT (OUTPATIENT)
Dept: INFUSION THERAPY | Facility: OTHER | Age: 84
End: 2019-08-27
Attending: FAMILY MEDICINE
Payer: MEDICARE

## 2019-08-27 VITALS
OXYGEN SATURATION: 98 % | HEIGHT: 69 IN | BODY MASS INDEX: 24.99 KG/M2 | SYSTOLIC BLOOD PRESSURE: 175 MMHG | DIASTOLIC BLOOD PRESSURE: 63 MMHG | HEART RATE: 74 BPM | TEMPERATURE: 96.3 F | WEIGHT: 168.7 LBS

## 2019-08-27 VITALS
RESPIRATION RATE: 14 BRPM | OXYGEN SATURATION: 98 % | TEMPERATURE: 97.8 F | SYSTOLIC BLOOD PRESSURE: 92 MMHG | DIASTOLIC BLOOD PRESSURE: 56 MMHG | BODY MASS INDEX: 24.99 KG/M2 | HEIGHT: 69 IN | WEIGHT: 168.7 LBS | HEART RATE: 75 BPM

## 2019-08-27 DIAGNOSIS — G90.3 NEUROGENIC ORTHOSTATIC HYPOTENSION (H): Primary | ICD-10-CM

## 2019-08-27 DIAGNOSIS — Z95.0 PACEMAKER: ICD-10-CM

## 2019-08-27 DIAGNOSIS — E86.9 VOLUME DEPLETION: Primary | ICD-10-CM

## 2019-08-27 DIAGNOSIS — R55 SYNCOPE AND COLLAPSE: ICD-10-CM

## 2019-08-27 DIAGNOSIS — I44.2 COMPLETE HEART BLOCK (H): ICD-10-CM

## 2019-08-27 LAB
ANION GAP SERPL CALCULATED.3IONS-SCNC: 3 MMOL/L (ref 3–14)
BUN SERPL-MCNC: 13 MG/DL (ref 7–30)
CALCIUM SERPL-MCNC: 8.8 MG/DL (ref 8.5–10.1)
CHLORIDE SERPL-SCNC: 112 MMOL/L (ref 94–109)
CO2 SERPL-SCNC: 26 MMOL/L (ref 20–32)
CREAT SERPL-MCNC: 1.06 MG/DL (ref 0.66–1.25)
GFR SERPL CREATININE-BSD FRML MDRD: 63 ML/MIN/{1.73_M2}
GLUCOSE SERPL-MCNC: 137 MG/DL (ref 70–99)
MAGNESIUM SERPL-MCNC: 2.3 MG/DL (ref 1.6–2.3)
POTASSIUM SERPL-SCNC: 4.3 MMOL/L (ref 3.4–5.3)
SODIUM SERPL-SCNC: 141 MMOL/L (ref 133–144)

## 2019-08-27 PROCEDURE — 93280 PM DEVICE PROGR EVAL DUAL: CPT | Mod: TC | Performed by: INTERNAL MEDICINE

## 2019-08-27 PROCEDURE — 80048 BASIC METABOLIC PNL TOTAL CA: CPT | Mod: ZL | Performed by: INTERNAL MEDICINE

## 2019-08-27 PROCEDURE — 99214 OFFICE O/P EST MOD 30 MIN: CPT | Performed by: INTERNAL MEDICINE

## 2019-08-27 PROCEDURE — 96360 HYDRATION IV INFUSION INIT: CPT

## 2019-08-27 PROCEDURE — 93284 PRGRMG EVAL IMPLANTABLE DFB: CPT | Mod: 26 | Performed by: INTERNAL MEDICINE

## 2019-08-27 PROCEDURE — 25000128 H RX IP 250 OP 636: Performed by: FAMILY MEDICINE

## 2019-08-27 PROCEDURE — G0463 HOSPITAL OUTPT CLINIC VISIT: HCPCS | Mod: 25

## 2019-08-27 PROCEDURE — 96365 THER/PROPH/DIAG IV INF INIT: CPT

## 2019-08-27 PROCEDURE — 36415 COLL VENOUS BLD VENIPUNCTURE: CPT | Mod: ZL | Performed by: INTERNAL MEDICINE

## 2019-08-27 PROCEDURE — G0463 HOSPITAL OUTPT CLINIC VISIT: HCPCS

## 2019-08-27 PROCEDURE — 83735 ASSAY OF MAGNESIUM: CPT | Mod: ZL | Performed by: INTERNAL MEDICINE

## 2019-08-27 RX ORDER — HEPARIN SODIUM (PORCINE) LOCK FLUSH IV SOLN 100 UNIT/ML 100 UNIT/ML
5 SOLUTION INTRAVENOUS ONCE
Status: CANCELLED
Start: 2019-08-27

## 2019-08-27 RX ORDER — HEPARIN SODIUM (PORCINE) LOCK FLUSH IV SOLN 100 UNIT/ML 100 UNIT/ML
5 SOLUTION INTRAVENOUS ONCE
Status: COMPLETED | OUTPATIENT
Start: 2019-08-27 | End: 2019-08-27

## 2019-08-27 RX ADMIN — SODIUM CHLORIDE 1000 ML: 9 INJECTION, SOLUTION INTRAVENOUS at 12:44

## 2019-08-27 RX ADMIN — HEPARIN 5 ML: 100 SYRINGE at 13:55

## 2019-08-27 ASSESSMENT — MIFFLIN-ST. JEOR
SCORE: 1440.6
SCORE: 1440.85

## 2019-08-27 ASSESSMENT — PAIN SCALES - GENERAL: PAINLEVEL: NO PAIN (0)

## 2019-08-27 NOTE — NURSING NOTE
"Chief Complaint   Patient presents with     RECHECK     2 month cardiology follow-up;  Patient taking midodrine two tablets in the morning.       Initial BP 92/56 (BP Location: Left arm, Patient Position: Chair, Cuff Size: Adult Regular)   Pulse 75   Temp 97.8  F (36.6  C) (Tympanic)   Resp 14   Ht 1.753 m (5' 9\")   Wt 76.5 kg (168 lb 11.2 oz)   SpO2 98%   BMI 24.91 kg/m   Estimated body mass index is 24.91 kg/m  as calculated from the following:    Height as of this encounter: 1.753 m (5' 9\").    Weight as of this encounter: 76.5 kg (168 lb 11.2 oz).  Medication Reconciliation: complete   Rekha Quispe LPN      "

## 2019-08-27 NOTE — PATIENT INSTRUCTIONS
You were seen by Dr. Walter, 8/27/2019.     1.  Please continue all medication as prescribed.      2.  If you develop new or worsening symptoms please call the cardiology office as you may need to be seen sooner.     You will follow up with Dr. Walter in 6 months.       Please call the cardiology office with problems, questions, or concerns at [phone].    If you experience chest pain, chest pressure, chest tightness, shortness of breath, fainting, lightheadedness, nausea, vomiting, or other concerning symptoms, please report to the Emergency Department or call 911. These symptoms may be emergent, and best treated in the Emergency Department.       Elaine LUTZ RN-BSN  Cardiology   [facility]  [phone]

## 2019-08-27 NOTE — PROGRESS NOTES
Pt seen in the [place] device clinic for evaluation and iterative programming of a [employer], dual lead pacemaker, per MD orders. Today his intrinsic rhythm is sinus 58 with CHB- ventricular rate is <30 bpm. Normal pacemaker function. AF episodes show noise, which is not a new finding. Some EGMs show potential underpacing due to oversensing. Today the atrial sensitivity was changed to 0.9 mV. P waves measured 2.5 mV. No short v-v intervals recorded. Lead trends appear stable. AP= 97% and = 100%. Pt reports he is feeling well. Battery estimates 8.8 years to [facility]. Plan for pt to send a remote transmission in 3 months and RTC in 6 months. [employer] [place].      Dual lead pacemaker.      I have reviewed and interpreted the device interrogation, settings, programming and nurse's summary. The device is functioning within normal device parameters. I agree with the current findings, assessment and plan.

## 2019-08-27 NOTE — PROGRESS NOTES
Patient is a 85 year old male here today for infusion of IVF per order of Dr Carol Ray.  Patient meets parameters for today's infusion. Patient identified with two identifiers, order verified, and verbal consent for today's infusion obtained from patient.       Patient denies questions or concerns regarding infusion and/or medication(s) being administered.    Patients right port accessed using non-coring, 20 gauge, 3/4 needle. Port accessed per facility protocol. Port flushed easily, blood return noted.  No signs and symptoms of infection or infiltration.      1244 IV pump verified with 1000mL 0.9 sodium chloride dose, drug, and rate of administration.  Infusion administered per protocol.

## 2019-08-27 NOTE — PROGRESS NOTES
"      Clinical Cardiac Electrophysiology    Chief Complaint: pacemaker, neurogenic orthostatic hypotension    HPI: I was happy to see Mr. Contreras in the EP clinic.  He has a history of pacemaker placement with a recent generator change.  The device is a [employer] dual lead pacemaker.  He scheduled for device check later today.    He also has a history of orthostatic hypotension secondary to Lewy body dementia.  This is been managed with midodrine and fludrocortisone.  On this regimen he and his wife reports his orthostatic hypotension has been much better.    He was seen in the emergency department on October 23 complaining of dizziness and was noted to be hypertensive with systolic blood pressures in the 180s-190 range.  He was given clonidine as well as IV metoprolol.  He was also started on oral metoprolol 50 mg daily.  He has not been troubled by high blood pressure in the past.    February 26, 2019: Frequently has to sit back down after standing. Recent fall - not witness, loss of consciousness is not clear. He is oral fluid intake is poor - \"maybe 30 ounces\" if pushed. His  reports his memory is getting worse. BP at home, while up, are \"normal\" but remain elevated when checked in supine position, such as in the ED recently. His  reports he does better for a day or two after IV fluids.    April 23, 2019: At his last visit we increased the frequency of his IV infusions to twice weekly. He is still taking midodrine 10 mg twice a day. Home BPs 90 to 180s. He is no longer taking metoprolol. Since starting the increase in IV fluid no fainting/syncope but has to sit back down frequently. He did fall/pass out in the night and had a compression fracture. Overall, his wife feels he is better.    August 27, 2019 Interval history: no falls or syncope on increased IV infusions.     Current Outpatient Medications   Medication Sig Dispense Refill     atorvastatin (LIPITOR) 20 MG tablet TAKE 1 TABLET " BY MOUTH EVERY EVENING - GENERIC FOR LIPITOR 90 tablet 0     Cholecalciferol (VITAMIN D-3 PO) Take 50 mcg by mouth 2 times daily        Coenzyme Q10 (CO Q 10 PO) Take 200 mg by mouth every morning        Cyanocobalamin (VITAMIN B-12 PO) Take 100 mcg by mouth daily        donepezil (ARICEPT) 10 MG tablet Take 10 mg by mouth At Bedtime  9     Magnesium 400 MG CAPS Take 400 mg by mouth 2 times daily        MELATONIN PO Take 5 mg by mouth At Bedtime        metoprolol succinate ER (TOPROL-XL) 50 MG 24 hr tablet TAKE 1 TABLET (50 MG) BY MOUTH DAILY 90 tablet 1     midodrine (PROAMATINE) 5 MG tablet TAKE 2 TABLETS (10MG) BY MOUTH THREE TIMES DAILY (Patient taking differently: TAKE 2 TABLETS (10MG) BY MOUTH IN THE MORNING) 540 tablet 0     multivitamin, therapeutic with minerals (MULTI-VITAMIN) TABS tablet Take 1 tablet by mouth daily       Omega-3 Fatty Acids (FISH OIL) 500 MG CAPS Take 1 capsule by mouth daily        PANTOPRAZOLE SODIUM PO Take 40 mg by mouth 2 times daily (before meals)        potassium chloride ER (K-DUR/KLOR-CON M) 20 MEQ CR tablet TAKE 1 TABLET DAILY BY MOUTH 30 tablet 3     sodium chloride 1 GM tablet TAKE 1 TABLET BY MOUTH TWICE A  tablet 3     Turmeric 500 MG TABS Take 1 tablet by mouth daily         Past Medical History:   Diagnosis Date     Autonomic orthostatic hypotension 10/14/2016     Coronary artery disease      Dementia without behavioral disturbance 7/28/2015    Diagnosis updated by automated process. Provider to review and confirm.     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     Seizure disorder (H)      Stented coronary artery        Past Surgical History:   Procedure Laterality Date     ------------OTHER-------------   1955    ulnar and radial fx - repair ulnar and radial fx x4     ------------OTHER-------------  6/14/2011    cataract extraction     BIOPSY  08/2015    skin biopsy     BLEPHAROPLASTY BILATERAL  5/6/2014    Procedure: BLEPHAROPLASTY BILATERAL;  Surgeon: Andrew Queen MD;  Location: [facility] OR     BYPASS GRAFT ARTERY CORONARY  11/2006    coronary artery disease x 5, [facility]     cataract extraction and lens implantation  2011    cataracts     cataract extraction and lens implantation      cataracts     COLONOSCOPY  2012     colonoscopy with polypectomy  3/13/2009    history of polyps - repeat 3 yrs     colonoscopy with polypectomy  2006     colonoscopy with polypectomy  2005     COMBINED COLONOSCOPY WITH ARGON PLASMA COAGULATOR (APC) N/A 10/31/2014    Procedure: COMBINED COLONOSCOPY WITH ARGON PLASMA COAGULATOR (APC);  Surgeon: Bassam Aguilar MD;  Location: [facility] OR     COMBINED COLONOSCOPY WITH ARGON PLASMA COAGULATOR (APC) N/A 11/13/2015    Procedure: COMBINED COLONOSCOPY WITH ARGON PLASMA COAGULATOR (APC);  Surgeon: Bassam Aguilar MD;  Location: [facility]     ENDOSCOPY UPPER, COLONOSCOPY, COMBINED N/A 10/31/2014    Procedure: COMBINED ENDOSCOPY UPPER, COLONOSCOPY;  Surgeon: Bassam Aguilar MD;  Location: [facility]     ENDOSCOPY UPPER, COLONOSCOPY, COMBINED N/A 11/13/2015    Procedure: COMBINED ENDOSCOPY UPPER, COLONOSCOPY;  Surgeon: Bassam Aguilar MD;  Location: [facility]     ESOPHAGOSCOPY, GASTROSCOPY, DUODENOSCOPY (EGD), COMBINED  1/22/2014    Procedure: COMBINED ESOPHAGOSCOPY, GASTROSCOPY, DUODENOSCOPY (EGD);  UPPER ENDOSCOPY(JAYDEN) W/ BIOPSIES;  Surgeon: Patricia Pena MD;  Location: [facility]     HERNIORRHAPHY INGUINAL Right 2/14/2018    Procedure: HERNIORRHAPHY INGUINAL;  OPEN RIGHT INGUINAL HERNIA REPAIR with Mesh;  Surgeon: Virgilio Zaragoza DO;  Location: [facility]     INSERT PORT VASCULAR ACCESS Right 7/12/2019    Procedure: PORT PLACEMENT;  Surgeon: Lloyd Ram MD;  Location: [facility]     LARYNGOSCOPY WITH  "MICROSCOPE  2014    Procedure: LARYNGOSCOPY WITH MICROSCOPE;;  Surgeon: Chayo Duke MD;  Location: [facility]     pacemaker placement      heart block     pacemaker placement      dual-chamber     REMOVE TUBE, MYRINGOTOMY, COMBINED  2014    Procedure: COMBINED REMOVE TUBE, MYRINGOTOMY;  MICRODIRECT LARYNGOSCOPY WITH BIOPSY AND FROZEN SECTIONS removal of right ear tube and myringoplasty;  Surgeon: Chayo Duke MD;  Location: [facility]     REPLACE PACEMAKER GENERATOR N/A 2018    Procedure: REPLACE PACEMAKER GENERATOR;  Pacemaker generator change;  Surgeon: Alma Kaplan MD;  Location: [facility] OR     stent placement to LAD       ventilation tube  2012    right in office       Family History   Problem Relation Age of Onset     Diabetes Mother      Breast Cancer Daughter        Social History     Tobacco Use     Smoking status: Former Smoker     Packs/day: 1.00     Years: 5.00     Pack years: 5.00     Types: Cigarettes     Last attempt to quit: 1985     Years since quittin.6     Smokeless tobacco: Never Used     Tobacco comment: quit in    Substance Use Topics     Alcohol use: Yes     Comment: social       Allergies   Allergen Reactions     Cats Unknown     Lamotrigine      Skin lesions         ROS: memory, dementia and movement symptoms without recent changes, otherwise comprehensive review is negative unless otherwise noted in the HPI.   2019/woa      Physical Examination:  Vitals: BP 92/56 (BP Location: Left arm, Patient Position: Chair, Cuff Size: Adult Regular)   Pulse 75   Temp 97.8  F (36.6  C) (Tympanic)   Resp 14   Ht 1.753 m (5' 9\")   Wt 76.5 kg (168 lb 11.2 oz)   SpO2 98%   BMI 24.91 kg/m    BMI= Body mass index is 24.91 kg/m .      GENERAL APPEARANCE: frail, alert and no distress  HEENT:  mucosa dry, no cyanosis.  NECK: JVP is not visible  CHEST: lungs clear to auscultation  CARDIOVASCULAR: regular rhythm, normal S1S2, soft early systolic " murmur, no gallop, precordium quiet  EXTREMITIES: no clubbing, cyanosis or edema  VASC: Warm  SKIN: skin turgor normal    Laboratory and diagnostic data reviewed August 27, 2019:    Labs ordered.    I reviewed today's pacer interrogation. Atrial lead sensitivity reprogrammed due to oversensing.       Assessment and recommendations:    1) Neurogenic orthostatic hypotension    2) Supine hypertension     No syncope since starting IV infusions     - 1 liter NS twice a week   - continue midodrine, reminded to not take too close to bedtime; if he takes a nap don't take until he gets up  from his nap   - BMP and Mg ordered    - will arrange for monthly lytes while receiving NS infusions    3) Sinus node dysfunction, s/p permanent pacemaker - normal function; atrial sensitivity reprogrammed      I appreciate the chance to help with Mr. Ortiz's care.    Portions of this note were dictated using speech recognition software. The note has been proofread but errors in the text may have been overlooked. Please contact me if there are any concerns regarding the accuracy of the dictation.

## 2019-08-29 ENCOUNTER — INFUSION THERAPY VISIT (OUTPATIENT)
Dept: INFUSION THERAPY | Facility: OTHER | Age: 84
End: 2019-08-29
Attending: FAMILY MEDICINE
Payer: MEDICARE

## 2019-08-29 VITALS
HEIGHT: 69 IN | WEIGHT: 171.1 LBS | RESPIRATION RATE: 16 BRPM | HEART RATE: 85 BPM | OXYGEN SATURATION: 97 % | DIASTOLIC BLOOD PRESSURE: 89 MMHG | BODY MASS INDEX: 25.34 KG/M2 | SYSTOLIC BLOOD PRESSURE: 157 MMHG | TEMPERATURE: 97 F

## 2019-08-29 DIAGNOSIS — E86.9 VOLUME DEPLETION: Primary | ICD-10-CM

## 2019-08-29 LAB
MDC_IDC_EPISODE_DTM: NORMAL
MDC_IDC_EPISODE_DURATION: 10 S
MDC_IDC_EPISODE_DURATION: 34 S
MDC_IDC_EPISODE_DURATION: 37 S
MDC_IDC_EPISODE_DURATION: 39 S
MDC_IDC_EPISODE_DURATION: 4 S
MDC_IDC_EPISODE_DURATION: 59 S
MDC_IDC_EPISODE_DURATION: 89 S
MDC_IDC_EPISODE_ID: 880
MDC_IDC_EPISODE_ID: 881
MDC_IDC_EPISODE_ID: 882
MDC_IDC_EPISODE_ID: 883
MDC_IDC_EPISODE_ID: 884
MDC_IDC_EPISODE_ID: 885
MDC_IDC_EPISODE_ID: 886
MDC_IDC_EPISODE_TYPE: NORMAL
MDC_IDC_LEAD_IMPLANT_DT: NORMAL
MDC_IDC_LEAD_IMPLANT_DT: NORMAL
MDC_IDC_LEAD_LOCATION: NORMAL
MDC_IDC_LEAD_LOCATION: NORMAL
MDC_IDC_LEAD_LOCATION_DETAIL_1: NORMAL
MDC_IDC_LEAD_LOCATION_DETAIL_1: NORMAL
MDC_IDC_LEAD_MFG: NORMAL
MDC_IDC_LEAD_MFG: NORMAL
MDC_IDC_LEAD_MODEL: NORMAL
MDC_IDC_LEAD_MODEL: NORMAL
MDC_IDC_LEAD_POLARITY_TYPE: NORMAL
MDC_IDC_LEAD_POLARITY_TYPE: NORMAL
MDC_IDC_LEAD_SERIAL: NORMAL
MDC_IDC_LEAD_SERIAL: NORMAL
MDC_IDC_MSMT_BATTERY_DTM: NORMAL
MDC_IDC_MSMT_BATTERY_REMAINING_LONGEVITY: 107 MO
MDC_IDC_MSMT_BATTERY_RRT_TRIGGER: 2.62
MDC_IDC_MSMT_BATTERY_STATUS: NORMAL
MDC_IDC_MSMT_BATTERY_VOLTAGE: 3.02 V
MDC_IDC_MSMT_LEADCHNL_RA_IMPEDANCE_VALUE: 475 OHM
MDC_IDC_MSMT_LEADCHNL_RA_IMPEDANCE_VALUE: 608 OHM
MDC_IDC_MSMT_LEADCHNL_RA_PACING_THRESHOLD_AMPLITUDE: 1.5 V
MDC_IDC_MSMT_LEADCHNL_RA_PACING_THRESHOLD_PULSEWIDTH: 0.4 MS
MDC_IDC_MSMT_LEADCHNL_RA_SENSING_INTR_AMPL: 0.62 MV
MDC_IDC_MSMT_LEADCHNL_RA_SENSING_INTR_AMPL: 2.5 MV
MDC_IDC_MSMT_LEADCHNL_RV_IMPEDANCE_VALUE: 399 OHM
MDC_IDC_MSMT_LEADCHNL_RV_IMPEDANCE_VALUE: 589 OHM
MDC_IDC_MSMT_LEADCHNL_RV_PACING_THRESHOLD_AMPLITUDE: 0.75 V
MDC_IDC_MSMT_LEADCHNL_RV_PACING_THRESHOLD_PULSEWIDTH: 0.4 MS
MDC_IDC_PG_IMPLANT_DTM: NORMAL
MDC_IDC_PG_MFG: NORMAL
MDC_IDC_PG_MODEL: NORMAL
MDC_IDC_PG_SERIAL: NORMAL
MDC_IDC_PG_TYPE: NORMAL
MDC_IDC_SESS_CLINIC_NAME: NORMAL
MDC_IDC_SESS_DTM: NORMAL
MDC_IDC_SESS_TYPE: NORMAL
MDC_IDC_SET_BRADY_AT_MODE_SWITCH_RATE: 171 {BEATS}/MIN
MDC_IDC_SET_BRADY_HYSTRATE: NORMAL
MDC_IDC_SET_BRADY_LOWRATE: 70 {BEATS}/MIN
MDC_IDC_SET_BRADY_MAX_SENSOR_RATE: 130 {BEATS}/MIN
MDC_IDC_SET_BRADY_MAX_TRACKING_RATE: 130 {BEATS}/MIN
MDC_IDC_SET_BRADY_MODE: NORMAL
MDC_IDC_SET_BRADY_PAV_DELAY_LOW: 180 MS
MDC_IDC_SET_BRADY_SAV_DELAY_LOW: 150 MS
MDC_IDC_SET_LEADCHNL_RA_PACING_AMPLITUDE: 3 V
MDC_IDC_SET_LEADCHNL_RA_PACING_ANODE_ELECTRODE_1: NORMAL
MDC_IDC_SET_LEADCHNL_RA_PACING_ANODE_LOCATION_1: NORMAL
MDC_IDC_SET_LEADCHNL_RA_PACING_CAPTURE_MODE: NORMAL
MDC_IDC_SET_LEADCHNL_RA_PACING_CATHODE_ELECTRODE_1: NORMAL
MDC_IDC_SET_LEADCHNL_RA_PACING_CATHODE_LOCATION_1: NORMAL
MDC_IDC_SET_LEADCHNL_RA_PACING_POLARITY: NORMAL
MDC_IDC_SET_LEADCHNL_RA_PACING_PULSEWIDTH: 0.4 MS
MDC_IDC_SET_LEADCHNL_RA_SENSING_ANODE_ELECTRODE_1: NORMAL
MDC_IDC_SET_LEADCHNL_RA_SENSING_ANODE_LOCATION_1: NORMAL
MDC_IDC_SET_LEADCHNL_RA_SENSING_CATHODE_ELECTRODE_1: NORMAL
MDC_IDC_SET_LEADCHNL_RA_SENSING_CATHODE_LOCATION_1: NORMAL
MDC_IDC_SET_LEADCHNL_RA_SENSING_POLARITY: NORMAL
MDC_IDC_SET_LEADCHNL_RA_SENSING_SENSITIVITY: 0.9 MV
MDC_IDC_SET_LEADCHNL_RV_PACING_AMPLITUDE: 2 V
MDC_IDC_SET_LEADCHNL_RV_PACING_ANODE_ELECTRODE_1: NORMAL
MDC_IDC_SET_LEADCHNL_RV_PACING_ANODE_LOCATION_1: NORMAL
MDC_IDC_SET_LEADCHNL_RV_PACING_CAPTURE_MODE: NORMAL
MDC_IDC_SET_LEADCHNL_RV_PACING_CATHODE_ELECTRODE_1: NORMAL
MDC_IDC_SET_LEADCHNL_RV_PACING_CATHODE_LOCATION_1: NORMAL
MDC_IDC_SET_LEADCHNL_RV_PACING_POLARITY: NORMAL
MDC_IDC_SET_LEADCHNL_RV_PACING_PULSEWIDTH: 0.4 MS
MDC_IDC_SET_LEADCHNL_RV_SENSING_ANODE_ELECTRODE_1: NORMAL
MDC_IDC_SET_LEADCHNL_RV_SENSING_ANODE_LOCATION_1: NORMAL
MDC_IDC_SET_LEADCHNL_RV_SENSING_CATHODE_ELECTRODE_1: NORMAL
MDC_IDC_SET_LEADCHNL_RV_SENSING_CATHODE_LOCATION_1: NORMAL
MDC_IDC_SET_LEADCHNL_RV_SENSING_POLARITY: NORMAL
MDC_IDC_SET_LEADCHNL_RV_SENSING_SENSITIVITY: 0.9 MV
MDC_IDC_SET_ZONE_DETECTION_INTERVAL: 350 MS
MDC_IDC_SET_ZONE_DETECTION_INTERVAL: 400 MS
MDC_IDC_SET_ZONE_TYPE: NORMAL
MDC_IDC_STAT_BRADY_AP_VP_PERCENT: 97.03 %
MDC_IDC_STAT_BRADY_AP_VS_PERCENT: 0.01 %
MDC_IDC_STAT_BRADY_AS_VP_PERCENT: 2.94 %
MDC_IDC_STAT_BRADY_AS_VS_PERCENT: 0.02 %
MDC_IDC_STAT_BRADY_DTM_END: NORMAL
MDC_IDC_STAT_BRADY_DTM_START: NORMAL
MDC_IDC_STAT_BRADY_RA_PERCENT_PACED: 96.92 %
MDC_IDC_STAT_BRADY_RV_PERCENT_PACED: 99.97 %
MDC_IDC_STAT_EPISODE_RECENT_COUNT: 0
MDC_IDC_STAT_EPISODE_RECENT_COUNT: 0
MDC_IDC_STAT_EPISODE_RECENT_COUNT: 7
MDC_IDC_STAT_EPISODE_RECENT_COUNT_DTM_END: NORMAL
MDC_IDC_STAT_EPISODE_RECENT_COUNT_DTM_START: NORMAL
MDC_IDC_STAT_EPISODE_TOTAL_COUNT: 0
MDC_IDC_STAT_EPISODE_TOTAL_COUNT: 0
MDC_IDC_STAT_EPISODE_TOTAL_COUNT: 886
MDC_IDC_STAT_EPISODE_TOTAL_COUNT_DTM_END: NORMAL
MDC_IDC_STAT_EPISODE_TOTAL_COUNT_DTM_START: NORMAL
MDC_IDC_STAT_EPISODE_TYPE: NORMAL

## 2019-08-29 PROCEDURE — 96360 HYDRATION IV INFUSION INIT: CPT

## 2019-08-29 PROCEDURE — 25000128 H RX IP 250 OP 636: Performed by: FAMILY MEDICINE

## 2019-08-29 PROCEDURE — 96365 THER/PROPH/DIAG IV INF INIT: CPT

## 2019-08-29 RX ORDER — HEPARIN SODIUM (PORCINE) LOCK FLUSH IV SOLN 100 UNIT/ML 100 UNIT/ML
5 SOLUTION INTRAVENOUS ONCE
Status: CANCELLED
Start: 2019-08-29

## 2019-08-29 RX ORDER — HEPARIN SODIUM (PORCINE) LOCK FLUSH IV SOLN 100 UNIT/ML 100 UNIT/ML
5 SOLUTION INTRAVENOUS ONCE
Status: COMPLETED | OUTPATIENT
Start: 2019-08-29 | End: 2019-08-29

## 2019-08-29 RX ADMIN — HEPARIN 5 ML: 100 SYRINGE at 11:51

## 2019-08-29 RX ADMIN — SODIUM CHLORIDE 1000 ML: 9 INJECTION, SOLUTION INTRAVENOUS at 10:43

## 2019-08-29 ASSESSMENT — MIFFLIN-ST. JEOR: SCORE: 1451.73

## 2019-08-29 NOTE — PROGRESS NOTES
Patient is a 85 year old male here  today for infusion of IVF per order of Dr Carol Ray.  Patient meets parameters for today's infusion. Patient identified with two identifiers, order verified, and verbal consent for today's infusion obtained from patient.      Patient denies questions or concerns regarding infusion and/or medication(s) being administered.    Patients right port accessed using non-coring, 22 gauge, 3/4 needle. Port accessed per facility protocol. Port flushed easily, blood return noted.  No signs and symptoms of infection or infiltration.      1043 IV pump verified with 1000 mLs 0.9 sodium chloride dose, drug, and rate of administration.  Infusion administered per protocol.  Patient tolerated infusion well, no signs or symptoms of adverse reaction noted.  Patient denies pain nor discomfort.     IV removed, catheter intact.  Site clean, dry and intact.  No signs or symptoms of infiltration or infection.  Covered with a sterile bandage, slight pressure applied for 30 seconds.  Pt instructed to leave bandage intact for a minimum of one hour, and to call with questions or concerns.  Patient declines copy of appointments, discharge instructions, and after visit summary (AVS).  Patient states understanding, discharged ambulatory.

## 2019-09-06 ENCOUNTER — INFUSION THERAPY VISIT (OUTPATIENT)
Dept: INFUSION THERAPY | Facility: OTHER | Age: 84
End: 2019-09-06
Attending: FAMILY MEDICINE
Payer: MEDICARE

## 2019-09-06 VITALS
HEART RATE: 69 BPM | SYSTOLIC BLOOD PRESSURE: 125 MMHG | BODY MASS INDEX: 25.26 KG/M2 | TEMPERATURE: 96.8 F | HEIGHT: 69 IN | OXYGEN SATURATION: 97 % | DIASTOLIC BLOOD PRESSURE: 76 MMHG

## 2019-09-06 DIAGNOSIS — E86.9 VOLUME DEPLETION: Primary | ICD-10-CM

## 2019-09-06 PROCEDURE — 96360 HYDRATION IV INFUSION INIT: CPT

## 2019-09-06 PROCEDURE — 25000128 H RX IP 250 OP 636: Performed by: FAMILY MEDICINE

## 2019-09-06 RX ORDER — HEPARIN SODIUM (PORCINE) LOCK FLUSH IV SOLN 100 UNIT/ML 100 UNIT/ML
5 SOLUTION INTRAVENOUS ONCE
Status: COMPLETED | OUTPATIENT
Start: 2019-09-06 | End: 2019-09-06

## 2019-09-06 RX ORDER — HEPARIN SODIUM (PORCINE) LOCK FLUSH IV SOLN 100 UNIT/ML 100 UNIT/ML
5 SOLUTION INTRAVENOUS ONCE
Status: CANCELLED
Start: 2019-09-06

## 2019-09-06 RX ADMIN — HEPARIN 5 ML: 100 SYRINGE at 13:46

## 2019-09-06 RX ADMIN — SODIUM CHLORIDE 1000 ML: 9 INJECTION, SOLUTION INTRAVENOUS at 12:44

## 2019-09-06 NOTE — PROGRESS NOTES
Patient is a 85 year old male here accompanied by self today for infusion of IVF per order of Dr Ray under the supervision of Dr Walter.  Patient meets parameters for today's infusion. Patient identified with two identifiers, order verified, and verbal consent for today's infusion obtained from patient.      IV pump verified with dose, drug, and rate of administration.  Infusion administered per protocol.     Patients right sided port accessed using non-coring, 22 gauge, 3/4 inch needle. Port accessed per facility protocol. Port flushed easily, blood return noted.  No signs and symptoms of infection or infiltration.

## 2019-09-09 ENCOUNTER — INFUSION THERAPY VISIT (OUTPATIENT)
Dept: INFUSION THERAPY | Facility: OTHER | Age: 84
End: 2019-09-09
Attending: FAMILY MEDICINE
Payer: MEDICARE

## 2019-09-09 VITALS
HEART RATE: 71 BPM | DIASTOLIC BLOOD PRESSURE: 86 MMHG | WEIGHT: 170.64 LBS | SYSTOLIC BLOOD PRESSURE: 166 MMHG | TEMPERATURE: 95.7 F | BODY MASS INDEX: 25.27 KG/M2 | HEIGHT: 69 IN | OXYGEN SATURATION: 95 % | RESPIRATION RATE: 18 BRPM

## 2019-09-09 DIAGNOSIS — E86.9 VOLUME DEPLETION: Primary | ICD-10-CM

## 2019-09-09 PROCEDURE — 25000128 H RX IP 250 OP 636: Performed by: FAMILY MEDICINE

## 2019-09-09 PROCEDURE — 96360 HYDRATION IV INFUSION INIT: CPT

## 2019-09-09 RX ORDER — HEPARIN SODIUM (PORCINE) LOCK FLUSH IV SOLN 100 UNIT/ML 100 UNIT/ML
5 SOLUTION INTRAVENOUS ONCE
Status: CANCELLED
Start: 2019-09-09

## 2019-09-09 RX ORDER — HEPARIN SODIUM (PORCINE) LOCK FLUSH IV SOLN 100 UNIT/ML 100 UNIT/ML
5 SOLUTION INTRAVENOUS ONCE
Status: COMPLETED | OUTPATIENT
Start: 2019-09-09 | End: 2019-09-09

## 2019-09-09 RX ADMIN — HEPARIN 5 ML: 100 SYRINGE at 11:27

## 2019-09-09 RX ADMIN — SODIUM CHLORIDE 1000 ML: 9 INJECTION, SOLUTION INTRAVENOUS at 10:15

## 2019-09-09 ASSESSMENT — MIFFLIN-ST. JEOR: SCORE: 1449.63

## 2019-09-09 NOTE — PROGRESS NOTES
Patient is a 85 year old male here accompanied by self today for infusion of IVF per order of Dr MALIA Ray under the supervision of Dr Walter, Cardiology.  Patient meets parameters for today's infusion. Patient identified with two identifiers, order verified, and verbal consent for today's infusion obtained from patient.      Patients right sided port accessed using non-coring, 22 gauge, 3/4 inch needle. Port accessed per facility protocol. Port flushed easily, blood return noted.  No signs and symptoms of infection or infiltration.      IV pump verified with dose, drug, and rate of administration.  Infusion administered per protocol.  Patient tolerated infusion well, no signs or symptoms of adverse reaction noted.  Patient denies pain nor discomfort.     IV removed, catheter intact.  Site clean, dry and intact.  No signs or symptoms of infiltration or infection.  Covered with a sterile bandage, slight pressure applied for 30 seconds.  Pt instructed to leave bandage intact for a minimum of one hour, and to call with questions or concerns.  Copy of appointments, discharge instructions, and after visit summary (AVS) provided to patient.  Patient states understanding, discharged ambulatory.

## 2019-09-12 ENCOUNTER — INFUSION THERAPY VISIT (OUTPATIENT)
Dept: INFUSION THERAPY | Facility: OTHER | Age: 84
End: 2019-09-12
Attending: FAMILY MEDICINE
Payer: MEDICARE

## 2019-09-12 VITALS
DIASTOLIC BLOOD PRESSURE: 77 MMHG | HEIGHT: 69 IN | HEART RATE: 73 BPM | SYSTOLIC BLOOD PRESSURE: 130 MMHG | TEMPERATURE: 95.9 F | OXYGEN SATURATION: 95 % | BODY MASS INDEX: 24.66 KG/M2 | WEIGHT: 166.5 LBS

## 2019-09-12 DIAGNOSIS — E86.9 VOLUME DEPLETION: Primary | ICD-10-CM

## 2019-09-12 PROCEDURE — 96360 HYDRATION IV INFUSION INIT: CPT

## 2019-09-12 PROCEDURE — 25000128 H RX IP 250 OP 636: Performed by: FAMILY MEDICINE

## 2019-09-12 RX ORDER — HEPARIN SODIUM (PORCINE) LOCK FLUSH IV SOLN 100 UNIT/ML 100 UNIT/ML
5 SOLUTION INTRAVENOUS ONCE
Status: COMPLETED | OUTPATIENT
Start: 2019-09-12 | End: 2019-09-12

## 2019-09-12 RX ORDER — HEPARIN SODIUM (PORCINE) LOCK FLUSH IV SOLN 100 UNIT/ML 100 UNIT/ML
5 SOLUTION INTRAVENOUS ONCE
Status: CANCELLED
Start: 2019-09-12

## 2019-09-12 RX ADMIN — HEPARIN 5 ML: 100 SYRINGE at 11:07

## 2019-09-12 RX ADMIN — SODIUM CHLORIDE 1000 ML: 9 INJECTION, SOLUTION INTRAVENOUS at 09:50

## 2019-09-12 ASSESSMENT — MIFFLIN-ST. JEOR: SCORE: 1430.87

## 2019-09-12 NOTE — PROGRESS NOTES
Patient is a 85 year old male here accompanied by self today for infusion of IVF per order of Dr Ray under the supervision of Dr Walter , Cardiology.  Patient meets parameters for today's infusion. Patient identified with two identifiers, order verified, and verbal consent for today's infusion obtained from patient.         IV pump verified with dose, drug, and rate of administration.  Infusion administered per protocol.  Patient tolerated infusion well, no signs or symptoms of adverse reaction noted.  Patient denies pain nor discomfort.

## 2019-09-16 ENCOUNTER — INFUSION THERAPY VISIT (OUTPATIENT)
Dept: INFUSION THERAPY | Facility: OTHER | Age: 84
End: 2019-09-16
Attending: FAMILY MEDICINE
Payer: MEDICARE

## 2019-09-16 VITALS
BODY MASS INDEX: 24.63 KG/M2 | OXYGEN SATURATION: 98 % | WEIGHT: 166.3 LBS | DIASTOLIC BLOOD PRESSURE: 63 MMHG | SYSTOLIC BLOOD PRESSURE: 126 MMHG | HEIGHT: 69 IN | TEMPERATURE: 95 F | HEART RATE: 71 BPM

## 2019-09-16 DIAGNOSIS — E86.9 VOLUME DEPLETION: Primary | ICD-10-CM

## 2019-09-16 PROCEDURE — 25000128 H RX IP 250 OP 636: Performed by: FAMILY MEDICINE

## 2019-09-16 PROCEDURE — 96365 THER/PROPH/DIAG IV INF INIT: CPT

## 2019-09-16 PROCEDURE — 96360 HYDRATION IV INFUSION INIT: CPT

## 2019-09-16 RX ORDER — HEPARIN SODIUM (PORCINE) LOCK FLUSH IV SOLN 100 UNIT/ML 100 UNIT/ML
5 SOLUTION INTRAVENOUS ONCE
Status: COMPLETED | OUTPATIENT
Start: 2019-09-16 | End: 2019-09-16

## 2019-09-16 RX ORDER — HEPARIN SODIUM (PORCINE) LOCK FLUSH IV SOLN 100 UNIT/ML 100 UNIT/ML
5 SOLUTION INTRAVENOUS ONCE
Status: CANCELLED
Start: 2019-09-16

## 2019-09-16 RX ADMIN — HEPARIN 5 ML: 100 SYRINGE at 11:04

## 2019-09-16 RX ADMIN — SODIUM CHLORIDE 1000 ML: 9 INJECTION, SOLUTION INTRAVENOUS at 09:43

## 2019-09-16 ASSESSMENT — MIFFLIN-ST. JEOR: SCORE: 1429.96

## 2019-09-16 NOTE — PROGRESS NOTES
Patient is a 85 year old male here accompanied by wife today for infusion of fluids per order of Dr Ray .  Patient meets parameters for today's infusion. Patient identified with two identifiers, order verified, and verbal consent for today's infusion obtained from patient.

## 2019-09-16 NOTE — PROGRESS NOTES
Patients right sided port accessed using non-coring, 22 gauge, 3/4 needle. Port accessed per facility protocol. Port flushed easily, blood return noted.  No signs and symptoms of infection or infiltration.

## 2019-09-16 NOTE — PROGRESS NOTES
IV removed, catheter intact.  Site clean, dry and intact.  No signs or symptoms of infiltration or infection.  Covered with a sterile bandage, slight pressure applied for 30 seconds.  Pt instructed to leave bandage intact for a minimum of one hour, and to call with questions or concerns.

## 2019-09-19 ENCOUNTER — INFUSION THERAPY VISIT (OUTPATIENT)
Dept: INFUSION THERAPY | Facility: OTHER | Age: 84
End: 2019-09-19
Attending: FAMILY MEDICINE
Payer: MEDICARE

## 2019-09-19 VITALS
TEMPERATURE: 97.4 F | SYSTOLIC BLOOD PRESSURE: 132 MMHG | RESPIRATION RATE: 16 BRPM | HEART RATE: 71 BPM | OXYGEN SATURATION: 98 % | DIASTOLIC BLOOD PRESSURE: 81 MMHG

## 2019-09-19 DIAGNOSIS — E86.9 VOLUME DEPLETION: Primary | ICD-10-CM

## 2019-09-19 PROCEDURE — 96360 HYDRATION IV INFUSION INIT: CPT

## 2019-09-19 PROCEDURE — 25000128 H RX IP 250 OP 636: Performed by: FAMILY MEDICINE

## 2019-09-19 RX ORDER — HEPARIN SODIUM (PORCINE) LOCK FLUSH IV SOLN 100 UNIT/ML 100 UNIT/ML
5 SOLUTION INTRAVENOUS ONCE
Status: CANCELLED
Start: 2019-09-19

## 2019-09-19 RX ORDER — HEPARIN SODIUM (PORCINE) LOCK FLUSH IV SOLN 100 UNIT/ML 100 UNIT/ML
5 SOLUTION INTRAVENOUS ONCE
Status: COMPLETED | OUTPATIENT
Start: 2019-09-19 | End: 2019-09-19

## 2019-09-19 RX ADMIN — HEPARIN 5 ML: 100 SYRINGE at 10:48

## 2019-09-19 RX ADMIN — SODIUM CHLORIDE 1000 ML: 9 INJECTION, SOLUTION INTRAVENOUS at 09:43

## 2019-09-19 NOTE — PROGRESS NOTES
Patient is a 85 year old male here accompanied by self today for infusion of IVF per order of Dr Ray under the supervision of Dr Walter, Cardiology.  Patient meets parameters for today's infusion. Patient identified with two identifiers, order verified, and verbal consent for today's infusion obtained from patient.     Patients port accessed using non-coring, 22 gauge, 3/4 inch needle. Port accessed per facility protocol. Port flushed easily, blood return noted.  No signs and symptoms of infection or infiltration.      IV pump verified with dose, drug, and rate of administration.  Infusion administered per protocol.

## 2019-09-23 ENCOUNTER — INFUSION THERAPY VISIT (OUTPATIENT)
Dept: INFUSION THERAPY | Facility: OTHER | Age: 84
End: 2019-09-23
Attending: FAMILY MEDICINE
Payer: MEDICARE

## 2019-09-23 VITALS
OXYGEN SATURATION: 98 % | HEART RATE: 75 BPM | DIASTOLIC BLOOD PRESSURE: 83 MMHG | SYSTOLIC BLOOD PRESSURE: 150 MMHG | TEMPERATURE: 95.5 F

## 2019-09-23 DIAGNOSIS — E86.9 VOLUME DEPLETION: Primary | ICD-10-CM

## 2019-09-23 PROCEDURE — 25000128 H RX IP 250 OP 636: Performed by: FAMILY MEDICINE

## 2019-09-23 PROCEDURE — 96360 HYDRATION IV INFUSION INIT: CPT

## 2019-09-23 RX ORDER — HEPARIN SODIUM (PORCINE) LOCK FLUSH IV SOLN 100 UNIT/ML 100 UNIT/ML
5 SOLUTION INTRAVENOUS ONCE
Status: CANCELLED
Start: 2019-09-23

## 2019-09-23 RX ORDER — HEPARIN SODIUM (PORCINE) LOCK FLUSH IV SOLN 100 UNIT/ML 100 UNIT/ML
5 SOLUTION INTRAVENOUS ONCE
Status: COMPLETED | OUTPATIENT
Start: 2019-09-23 | End: 2019-09-23

## 2019-09-23 RX ADMIN — SODIUM CHLORIDE 1000 ML: 9 INJECTION, SOLUTION INTRAVENOUS at 10:41

## 2019-09-23 RX ADMIN — HEPARIN 5 ML: 100 SYRINGE at 12:01

## 2019-09-23 NOTE — PROGRESS NOTES
Patients right sided port accessed using non-coring, 22 gauge, 3/4 inch needle. Port accessed per facility protocol. Port flushed easily, blood return noted.  No signs and symptoms of infection or infiltration.        IV removed, catheter intact.  Site clean, dry and intact.  No signs or symptoms of infiltration or infection.  Covered with a sterile bandage, slight pressure applied for 30 seconds.  Pt instructed to leave bandage intact for a minimum of one hour, and to call with questions or concerns.  Copy of appointments, discharge instructions, and after visit summary (AVS) provided to patient.  Patient states understanding, discharged ambulatory.

## 2019-09-26 ENCOUNTER — INFUSION THERAPY VISIT (OUTPATIENT)
Dept: INFUSION THERAPY | Facility: OTHER | Age: 84
End: 2019-09-26
Attending: FAMILY MEDICINE
Payer: MEDICARE

## 2019-09-26 VITALS — TEMPERATURE: 95.5 F | DIASTOLIC BLOOD PRESSURE: 78 MMHG | HEART RATE: 72 BPM | SYSTOLIC BLOOD PRESSURE: 164 MMHG

## 2019-09-26 DIAGNOSIS — E86.9 VOLUME DEPLETION: Primary | ICD-10-CM

## 2019-09-26 PROCEDURE — 96365 THER/PROPH/DIAG IV INF INIT: CPT

## 2019-09-26 PROCEDURE — 25000128 H RX IP 250 OP 636: Performed by: FAMILY MEDICINE

## 2019-09-26 RX ORDER — HEPARIN SODIUM (PORCINE) LOCK FLUSH IV SOLN 100 UNIT/ML 100 UNIT/ML
5 SOLUTION INTRAVENOUS ONCE
Status: COMPLETED | OUTPATIENT
Start: 2019-09-26 | End: 2019-09-26

## 2019-09-26 RX ORDER — HEPARIN SODIUM (PORCINE) LOCK FLUSH IV SOLN 100 UNIT/ML 100 UNIT/ML
5 SOLUTION INTRAVENOUS ONCE
Status: CANCELLED
Start: 2019-09-26

## 2019-09-26 RX ADMIN — HEPARIN 5 ML: 100 SYRINGE at 11:19

## 2019-09-26 RX ADMIN — SODIUM CHLORIDE 1000 ML: 9 INJECTION, SOLUTION INTRAVENOUS at 10:09

## 2019-09-26 NOTE — PROGRESS NOTES
Patient is a 86 y/o male here accompanied by self today for infusion of IVF per order of Dr. MALIA Ray.  Patient meets parameters for today's infusion. Patient identified with two identifiers, order verified, and verbal consent for today's infusion obtained from patient.           Patients right sided port accessed using non-coring, 19 gauge, 3/4 inch needle. Port accessed per facility protocol. Port flushed easily, blood return noted.  No signs and symptoms of infection or infiltration.      1009 IV pump verified with IVF dose, drug, and rate of administration.  Infusion administered per protocol.  Patient tolerated infusion well, no signs or symptoms of adverse reaction noted.  Patient denies pain nor discomfort.     IV removed, catheter intact.  Site clean, dry and intact.  No signs or symptoms of infiltration or infection.  Covered with a sterile bandage, slight pressure applied for 30 seconds.  Pt instructed to leave bandage intact for a minimum of one hour, and to call with questions or concerns.  Copy of appointments, discharge instructions, and after visit summary (AVS) provided to patient.  Patient states understanding, discharged ambulatory.

## 2019-09-30 ENCOUNTER — INFUSION THERAPY VISIT (OUTPATIENT)
Dept: INFUSION THERAPY | Facility: OTHER | Age: 84
End: 2019-09-30
Attending: FAMILY MEDICINE
Payer: MEDICARE

## 2019-09-30 VITALS
HEART RATE: 74 BPM | SYSTOLIC BLOOD PRESSURE: 100 MMHG | OXYGEN SATURATION: 98 % | TEMPERATURE: 95 F | DIASTOLIC BLOOD PRESSURE: 59 MMHG

## 2019-09-30 DIAGNOSIS — E86.9 VOLUME DEPLETION: Primary | ICD-10-CM

## 2019-09-30 PROCEDURE — 25000128 H RX IP 250 OP 636: Performed by: FAMILY MEDICINE

## 2019-09-30 PROCEDURE — 96360 HYDRATION IV INFUSION INIT: CPT

## 2019-09-30 RX ORDER — HEPARIN SODIUM (PORCINE) LOCK FLUSH IV SOLN 100 UNIT/ML 100 UNIT/ML
5 SOLUTION INTRAVENOUS ONCE
Status: CANCELLED
Start: 2019-09-30

## 2019-09-30 RX ORDER — HEPARIN SODIUM (PORCINE) LOCK FLUSH IV SOLN 100 UNIT/ML 100 UNIT/ML
5 SOLUTION INTRAVENOUS ONCE
Status: COMPLETED | OUTPATIENT
Start: 2019-09-30 | End: 2019-09-30

## 2019-09-30 RX ADMIN — SODIUM CHLORIDE 1000 ML: 9 INJECTION, SOLUTION INTRAVENOUS at 10:15

## 2019-09-30 RX ADMIN — HEPARIN 5 ML: 100 SYRINGE at 11:23

## 2019-09-30 NOTE — PROGRESS NOTES
Patient is a 85 year old male here accompanied by self today for infusion of IVF per order of Dr MALIA Ray under the supervision of Dr Walter. Patient identified with two identifiers, order verified, and verbal consent for today's infusion obtained from patient.      Patients right sided port accessed using non-coring, 22 gauge, 3/4 inch needle. Port accessed per facility protocol. Port flushed easily, blood return noted.  No signs and symptoms of infection or infiltration.      IV pump verified with dose, drug, and rate of administration.  Infusion administered per protocol.  Patient tolerated infusion well, no signs or symptoms of adverse reaction noted.  Patient denies pain nor discomfort.     IV removed, catheter intact.  Site clean, dry and intact.  No signs or symptoms of infiltration or infection.  Covered with a sterile bandage, slight pressure applied for 30 seconds.  Pt instructed to leave bandage intact for a minimum of one hour, and to call with questions or concerns.  Copy of appointments, discharge instructions, and after visit summary (AVS) provided to patient.  Patient states understanding, discharged ambulatory.

## 2019-10-03 ENCOUNTER — INFUSION THERAPY VISIT (OUTPATIENT)
Dept: INFUSION THERAPY | Facility: OTHER | Age: 84
End: 2019-10-03
Attending: FAMILY MEDICINE
Payer: MEDICARE

## 2019-10-03 VITALS
SYSTOLIC BLOOD PRESSURE: 160 MMHG | WEIGHT: 167.8 LBS | HEIGHT: 69 IN | HEART RATE: 64 BPM | TEMPERATURE: 97.7 F | BODY MASS INDEX: 24.85 KG/M2 | OXYGEN SATURATION: 95 % | DIASTOLIC BLOOD PRESSURE: 83 MMHG

## 2019-10-03 DIAGNOSIS — E86.9 VOLUME DEPLETION: Primary | ICD-10-CM

## 2019-10-03 PROCEDURE — 96365 THER/PROPH/DIAG IV INF INIT: CPT

## 2019-10-03 PROCEDURE — 96360 HYDRATION IV INFUSION INIT: CPT

## 2019-10-03 PROCEDURE — 25000128 H RX IP 250 OP 636: Performed by: FAMILY MEDICINE

## 2019-10-03 RX ORDER — HEPARIN SODIUM (PORCINE) LOCK FLUSH IV SOLN 100 UNIT/ML 100 UNIT/ML
5 SOLUTION INTRAVENOUS ONCE
Status: CANCELLED
Start: 2019-10-03

## 2019-10-03 RX ORDER — HEPARIN SODIUM (PORCINE) LOCK FLUSH IV SOLN 100 UNIT/ML 100 UNIT/ML
5 SOLUTION INTRAVENOUS ONCE
Status: COMPLETED | OUTPATIENT
Start: 2019-10-03 | End: 2019-10-03

## 2019-10-03 RX ADMIN — SODIUM CHLORIDE 1000 ML: 9 INJECTION, SOLUTION INTRAVENOUS at 10:46

## 2019-10-03 RX ADMIN — HEPARIN 5 ML: 100 SYRINGE at 12:05

## 2019-10-03 ASSESSMENT — MIFFLIN-ST. JEOR: SCORE: 1436.77

## 2019-10-03 NOTE — PROGRESS NOTES
Patient is a 85 year old male here today for infusion of IVF per order of Dr MALIA Ray under the supervision of Dr Walter.  Patient meets parameters for today's infusion. Patient identified with two identifiers, order verified, and verbal consent for today's infusion obtained from patient.       Patient denies questions or concerns regarding infusion and/or medication(s) being administered.    Patients right port accessed using non-coring, 22 gauge, 3/4 needle. Port accessed per facility protocol. Port flushed easily, blood return noted.  No signs and symptoms of infection or infiltration.      1205 IV pump verified with dose, drug, and rate of administration  Infusion administered per protocol.  Patient tolerated infusion well, no signs or symptoms of adverse reaction noted.  Patient denies pain nor discomfort.     IV removed, catheter intact.  Site clean, dry and intact.  No signs or symptoms of infiltration or infection.  Covered with a sterile bandage, slight pressure applied for 30 seconds.  Pt instructed to leave bandage intact for a minimum of one hour, and to call with questions or concerns.  Copy of appointments, discharge instructions, and after visit summary (AVS) provided to patient.  Patient states understanding, discharged ambulatory.

## 2019-10-08 ENCOUNTER — INFUSION THERAPY VISIT (OUTPATIENT)
Dept: INFUSION THERAPY | Facility: OTHER | Age: 84
End: 2019-10-08
Attending: FAMILY MEDICINE
Payer: MEDICARE

## 2019-10-08 VITALS
DIASTOLIC BLOOD PRESSURE: 84 MMHG | SYSTOLIC BLOOD PRESSURE: 167 MMHG | BODY MASS INDEX: 24.88 KG/M2 | HEART RATE: 78 BPM | HEIGHT: 69 IN | TEMPERATURE: 97.3 F | OXYGEN SATURATION: 96 % | WEIGHT: 168 LBS

## 2019-10-08 DIAGNOSIS — E86.9 VOLUME DEPLETION: Primary | ICD-10-CM

## 2019-10-08 PROCEDURE — 25000128 H RX IP 250 OP 636: Performed by: FAMILY MEDICINE

## 2019-10-08 PROCEDURE — 96361 HYDRATE IV INFUSION ADD-ON: CPT

## 2019-10-08 PROCEDURE — 96360 HYDRATION IV INFUSION INIT: CPT

## 2019-10-08 RX ORDER — HEPARIN SODIUM (PORCINE) LOCK FLUSH IV SOLN 100 UNIT/ML 100 UNIT/ML
5 SOLUTION INTRAVENOUS ONCE
Status: COMPLETED | OUTPATIENT
Start: 2019-10-08 | End: 2019-10-08

## 2019-10-08 RX ORDER — HEPARIN SODIUM (PORCINE) LOCK FLUSH IV SOLN 100 UNIT/ML 100 UNIT/ML
5 SOLUTION INTRAVENOUS ONCE
Status: CANCELLED
Start: 2019-10-08

## 2019-10-08 RX ADMIN — SODIUM CHLORIDE 1000 ML: 9 INJECTION, SOLUTION INTRAVENOUS at 09:40

## 2019-10-08 RX ADMIN — HEPARIN 5 ML: 100 SYRINGE at 10:53

## 2019-10-08 ASSESSMENT — MIFFLIN-ST. JEOR: SCORE: 1432.67

## 2019-10-08 NOTE — PROGRESS NOTES
Patient is a 86 year old male here accompanied by self today for infusion of IVF per order of Dr MALIA Ray under the supervision of Dr Walter, Cardiology.  Patient meets parameters for today's infusion. Patient identified with two identifiers, order verified, and verbal consent for today's infusion obtained from patient.      Patients right sided port accessed using non-coring, 22 gauge, 3/4 inch needle. Port accessed per facility protocol. Port flushed easily, blood return noted.  No signs and symptoms of infection or infiltration.      IV pump verified with dose, drug, and rate of administration.  Infusion administered per protocol.  Patient tolerated infusion well, no signs or symptoms of adverse reaction noted.  Patient denies pain nor discomfort.     IV removed, catheter intact.  Site clean, dry and intact.  No signs or symptoms of infiltration or infection.  Covered with a sterile bandage, slight pressure applied for 30 seconds.  Pt instructed to leave bandage intact for a minimum of one hour, and to call with questions or concerns.  Copy of appointments, discharge instructions, and after visit summary (AVS) provided to patient.  Patient states understanding, discharged ambulatory.

## 2019-10-11 ENCOUNTER — INFUSION THERAPY VISIT (OUTPATIENT)
Dept: INFUSION THERAPY | Facility: OTHER | Age: 84
End: 2019-10-11
Attending: FAMILY MEDICINE
Payer: MEDICARE

## 2019-10-11 VITALS
HEIGHT: 69 IN | WEIGHT: 168.6 LBS | HEART RATE: 76 BPM | TEMPERATURE: 97.5 F | RESPIRATION RATE: 16 BRPM | DIASTOLIC BLOOD PRESSURE: 93 MMHG | BODY MASS INDEX: 24.97 KG/M2 | SYSTOLIC BLOOD PRESSURE: 149 MMHG | OXYGEN SATURATION: 96 %

## 2019-10-11 DIAGNOSIS — E86.9 VOLUME DEPLETION: Primary | ICD-10-CM

## 2019-10-11 PROCEDURE — 96360 HYDRATION IV INFUSION INIT: CPT

## 2019-10-11 PROCEDURE — 25000128 H RX IP 250 OP 636: Performed by: FAMILY MEDICINE

## 2019-10-11 PROCEDURE — 96365 THER/PROPH/DIAG IV INF INIT: CPT

## 2019-10-11 RX ORDER — HEPARIN SODIUM (PORCINE) LOCK FLUSH IV SOLN 100 UNIT/ML 100 UNIT/ML
5 SOLUTION INTRAVENOUS ONCE
Status: CANCELLED
Start: 2019-10-11

## 2019-10-11 RX ORDER — HEPARIN SODIUM (PORCINE) LOCK FLUSH IV SOLN 100 UNIT/ML 100 UNIT/ML
5 SOLUTION INTRAVENOUS ONCE
Status: COMPLETED | OUTPATIENT
Start: 2019-10-11 | End: 2019-10-11

## 2019-10-11 RX ADMIN — HEPARIN 5 ML: 100 SYRINGE at 10:59

## 2019-10-11 RX ADMIN — SODIUM CHLORIDE 1000 ML: 9 INJECTION, SOLUTION INTRAVENOUS at 09:43

## 2019-10-11 ASSESSMENT — MIFFLIN-ST. JEOR: SCORE: 1435.39

## 2019-10-15 ENCOUNTER — INFUSION THERAPY VISIT (OUTPATIENT)
Dept: INFUSION THERAPY | Facility: OTHER | Age: 84
End: 2019-10-15
Attending: FAMILY MEDICINE
Payer: MEDICARE

## 2019-10-15 ENCOUNTER — OFFICE VISIT (OUTPATIENT)
Dept: OTOLARYNGOLOGY | Facility: OTHER | Age: 84
End: 2019-10-15
Attending: PHYSICIAN ASSISTANT
Payer: MEDICARE

## 2019-10-15 VITALS
HEART RATE: 72 BPM | OXYGEN SATURATION: 98 % | SYSTOLIC BLOOD PRESSURE: 180 MMHG | TEMPERATURE: 95.8 F | DIASTOLIC BLOOD PRESSURE: 89 MMHG | RESPIRATION RATE: 18 BRPM

## 2019-10-15 VITALS
HEIGHT: 69 IN | WEIGHT: 168 LBS | BODY MASS INDEX: 24.88 KG/M2 | OXYGEN SATURATION: 98 % | DIASTOLIC BLOOD PRESSURE: 70 MMHG | SYSTOLIC BLOOD PRESSURE: 110 MMHG | TEMPERATURE: 97.5 F | HEART RATE: 80 BPM

## 2019-10-15 DIAGNOSIS — H90.3 ASNHL (ASYMMETRICAL SENSORINEURAL HEARING LOSS): ICD-10-CM

## 2019-10-15 DIAGNOSIS — E86.9 VOLUME DEPLETION: Primary | ICD-10-CM

## 2019-10-15 DIAGNOSIS — H61.23 IMPACTED CERUMEN, BILATERAL: Primary | ICD-10-CM

## 2019-10-15 DIAGNOSIS — Z96.22 S/P MYRINGOTOMY WITH INSERTION OF TUBE: ICD-10-CM

## 2019-10-15 PROCEDURE — 92504 EAR MICROSCOPY EXAMINATION: CPT

## 2019-10-15 PROCEDURE — 25000128 H RX IP 250 OP 636: Performed by: FAMILY MEDICINE

## 2019-10-15 PROCEDURE — 96360 HYDRATION IV INFUSION INIT: CPT

## 2019-10-15 PROCEDURE — G0463 HOSPITAL OUTPT CLINIC VISIT: HCPCS | Mod: 25

## 2019-10-15 PROCEDURE — G0463 HOSPITAL OUTPT CLINIC VISIT: HCPCS

## 2019-10-15 PROCEDURE — 92504 EAR MICROSCOPY EXAMINATION: CPT | Performed by: PHYSICIAN ASSISTANT

## 2019-10-15 PROCEDURE — 99213 OFFICE O/P EST LOW 20 MIN: CPT | Mod: 25 | Performed by: PHYSICIAN ASSISTANT

## 2019-10-15 RX ORDER — HEPARIN SODIUM (PORCINE) LOCK FLUSH IV SOLN 100 UNIT/ML 100 UNIT/ML
5 SOLUTION INTRAVENOUS ONCE
Status: COMPLETED | OUTPATIENT
Start: 2019-10-15 | End: 2019-10-15

## 2019-10-15 RX ORDER — HEPARIN SODIUM (PORCINE) LOCK FLUSH IV SOLN 100 UNIT/ML 100 UNIT/ML
5 SOLUTION INTRAVENOUS ONCE
Status: CANCELLED
Start: 2019-10-15

## 2019-10-15 RX ADMIN — SODIUM CHLORIDE 1000 ML: 9 INJECTION, SOLUTION INTRAVENOUS at 12:03

## 2019-10-15 RX ADMIN — HEPARIN 5 ML: 100 SYRINGE at 13:20

## 2019-10-15 ASSESSMENT — PAIN SCALES - GENERAL: PAINLEVEL: NO PAIN (0)

## 2019-10-15 ASSESSMENT — MIFFLIN-ST. JEOR: SCORE: 1432.42

## 2019-10-15 NOTE — PROGRESS NOTES
Patient is a 86 year old male here accompanied by self today for infusion of IVF per order of Dr MALIA Ray under the supervision of Dr Walter, Cardiology.  Patient identified with two identifiers, order verified, and verbal consent for today's infusion obtained from patient.         Patients right sided port accessed using non-coring, 22 gauge, 1 inch needle. Port accessed per facility protocol. Port flushed easily, blood return noted.  No signs and symptoms of infection or infiltration.      IV pump verified with dose, drug, and rate of administration.  Infusion administered per protocol.  Patient tolerated infusion well, no signs or symptoms of adverse reaction noted.  Patient denies pain nor discomfort.     IV removed, catheter intact.  Site clean, dry and intact.  No signs or symptoms of infiltration or infection.  Covered with a sterile bandage, slight pressure applied for 30 seconds.  Pt instructed to leave bandage intact for a minimum of one hour, and to call with questions or concerns.  Copy of appointments, discharge instructions, and after visit summary (AVS) provided to patient.  Patient states understanding, discharged ambulatory.

## 2019-10-15 NOTE — LETTER
10/15/2019         RE: Jf Ortiz  [address]  [place] [address]        Dear Colleague,    Thank you for referring your patient, Jf Ortiz, to the [facility] [place]. Please see a copy of my visit note below.    Chief Complaint   Patient presents with     Cerumen Impaction     Pt is here for an ear cleaning.     Patient returns for an ear check and cleaning. He was last seen in ENT on June 2019 and had an ear cleaning and right tube was in good position and patent.   He returns today for his 6 month check.      Jf presents for ear cleaning. He has noticed concerns with his ear having wax build up.   He did obtain hearing aids from [employer] this summer and he has noted improvement with results.      Denies otalgia, otorrhea.   He has no concerns with vertigo. He does feel he has dizziness/ lightheadedness.         Past Medical History:   Diagnosis Date     Autonomic orthostatic hypotension 10/14/2016     Coronary artery disease      Dementia without behavioral disturbance (H) 7/28/2015    Diagnosis updated by automated process. Provider to review and confirm.     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     Seizure disorder (H)      Stented coronary artery         Allergies   Allergen Reactions     Cats Unknown     Lamotrigine      Skin lesions     Current Outpatient Medications   Medication     atorvastatin (LIPITOR) 20 MG tablet     Cholecalciferol (VITAMIN D-3 PO)     Coenzyme Q10 (CO Q 10 PO)     Cyanocobalamin (VITAMIN B-12 PO)     donepezil (ARICEPT) 10 MG tablet     Magnesium 400 MG CAPS     MELATONIN PO     metoprolol succinate ER (TOPROL-XL) 50 MG 24 hr tablet     midodrine (PROAMATINE) 5 MG tablet     multivitamin,  "therapeutic with minerals (MULTI-VITAMIN) TABS tablet     Omega-3 Fatty Acids (FISH OIL) 500 MG CAPS     PANTOPRAZOLE SODIUM PO     potassium chloride ER (K-DUR/KLOR-CON M) 20 MEQ CR tablet     sodium chloride 1 GM tablet     Turmeric 500 MG TABS     No current facility-administered medications for this visit.       ROS: 10 point ROS neg other than the symptoms noted above in the HPI.  /70   Pulse 80   Temp 97.5  F (36.4  C) (Tympanic)   Ht 1.753 m (5' 9\")   Wt 76.2 kg (168 lb)   SpO2 98%   BMI 24.81 kg/m     General - The patient is well nourished and well developed, and appears to have good nutritional status.  Alert and oriented to person and place, answers questions and cooperates with examination appropriately.   Head and Face - Normocephalic and atraumatic, with no gross asymmetry noted.  The facial nerve is intact, with strong symmetric movements.  Voice and Breathing - The patient was breathing comfortably without the use of accessory muscles. There was no wheezing, stridor, or stertor.  The patients voice was clear and strong, and had appropriate pitch and quality.  Ears -The external auditory canals are impacted.    Eyes - Extraocular movements intact, and the pupils were reactive to light.  Sclera were not icteric or injected, conjunctiva were pink and moist.  Mouth - Examination of the oral cavity showed pink, healthy oral mucosa. No lesions or ulcerations noted.  The tongue was mobile and midline, and the dentition were in good condition.    Throat - The walls of the oropharynx were smooth, pink, moist, symmetric, and had no lesions or ulcerations.  The tonsillar pillars and soft palate were symmetric.  The uvula was midline on elevation.    Neck - Normal midline excursion of the laryngotracheal complex during swallowing.  Full range of motion on passive movement.  Palpation of the occipital, submental, submandibular, internal jugular chain, and supraclavicular nodes did not demonstrate any " abnormal lymph nodes or masses.  Palpation of the thyroid was soft and smooth, with no nodules or goiter appreciated.  The trachea was mobile and midline.  Nose - External contour is symmetric, no gross deflection or scars.  Nasal mucosa is pink and moist with no abnormal mucus.  The septum and turbinates were evaluated: No polyps, masses, or purulence noted on examination.     The ear canals were examined underneath the operating microscope and with an otologic speculum.  The canals were cleaned of all debris with cupped forceps and cerumen loop. There is no granulation tissue or sign of cholesteatoma.  The patient tolerates this wellSymptoms resolved after sitting for a few minutes. He did drink water as well.  Patient right tube. the tympanic membranes are intact without effusion, retraction or mass.  Bony landmarks are intact.    ASSESSMENT:    ICD-10-CM    1. Impacted cerumen, bilateral H61.23    2. S/P myringotomy with insertion of tube RIGHT Z96.22    3. ASNHL (asymmetrical sensorineural hearing loss) H90.5        Ears were cleaned.   Follow up in 4 months for ear cleaning. (his request)  Right tube is in good position and open.     Geeta Alegria PA-C  ENT  [facility], [place]  [phone]         Again, thank you for allowing me to participate in the care of your patient.        Sincerely,        Geeta Alegria PA-C

## 2019-10-15 NOTE — PATIENT INSTRUCTIONS
Ears were cleaned.   Follow up in 4 months for ear cleaning.   Right tube is in good position and open.       Thank you for allowing Geeta Alegria PA-C and our ENT team to participate in your care.  If your medications are too expensive, please give the nurse a call.  We can possibly change this medication.  If you have a scheduling or an appointment question please contact our Health Unit Coordinator at their direct line [phone].   ALL nursing questions or concerns can be directed to your ENT nurse at: [phone] Rehana

## 2019-10-15 NOTE — PROGRESS NOTES
Chief Complaint   Patient presents with     Cerumen Impaction     Pt is here for an ear cleaning.     Patient returns for an ear check and cleaning. He was last seen in ENT on June 2019 and had an ear cleaning and right tube was in good position and patent.   He returns today for his 6 month check.      Jf presents for ear cleaning. He has noticed concerns with his ear having wax build up.   He did obtain hearing aids from [employer] this summer and he has noted improvement with results.      Denies otalgia, otorrhea.   He has no concerns with vertigo. He does feel he has dizziness/ lightheadedness.         Past Medical History:   Diagnosis Date     Autonomic orthostatic hypotension 10/14/2016     Coronary artery disease      Dementia without behavioral disturbance (H) 7/28/2015    Diagnosis updated by automated process. Provider to review and confirm.     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     Seizure disorder (H)      Stented coronary artery         Allergies   Allergen Reactions     Cats Unknown     Lamotrigine      Skin lesions     Current Outpatient Medications   Medication     atorvastatin (LIPITOR) 20 MG tablet     Cholecalciferol (VITAMIN D-3 PO)     Coenzyme Q10 (CO Q 10 PO)     Cyanocobalamin (VITAMIN B-12 PO)     donepezil (ARICEPT) 10 MG tablet     Magnesium 400 MG CAPS     MELATONIN PO     metoprolol succinate ER (TOPROL-XL) 50 MG 24 hr tablet     midodrine (PROAMATINE) 5 MG tablet     multivitamin, therapeutic with minerals (MULTI-VITAMIN) TABS tablet     Omega-3 Fatty Acids (FISH OIL) 500 MG CAPS     PANTOPRAZOLE SODIUM PO     potassium chloride ER (K-DUR/KLOR-CON M) 20 MEQ CR tablet     sodium chloride 1 GM tablet     Turmeric 500 MG  "TABS     No current facility-administered medications for this visit.       ROS: 10 point ROS neg other than the symptoms noted above in the HPI.  /70   Pulse 80   Temp 97.5  F (36.4  C) (Tympanic)   Ht 1.753 m (5' 9\")   Wt 76.2 kg (168 lb)   SpO2 98%   BMI 24.81 kg/m    General - The patient is well nourished and well developed, and appears to have good nutritional status.  Alert and oriented to person and place, answers questions and cooperates with examination appropriately.   Head and Face - Normocephalic and atraumatic, with no gross asymmetry noted.  The facial nerve is intact, with strong symmetric movements.  Voice and Breathing - The patient was breathing comfortably without the use of accessory muscles. There was no wheezing, stridor, or stertor.  The patients voice was clear and strong, and had appropriate pitch and quality.  Ears -The external auditory canals are impacted.    Eyes - Extraocular movements intact, and the pupils were reactive to light.  Sclera were not icteric or injected, conjunctiva were pink and moist.  Mouth - Examination of the oral cavity showed pink, healthy oral mucosa. No lesions or ulcerations noted.  The tongue was mobile and midline, and the dentition were in good condition.    Throat - The walls of the oropharynx were smooth, pink, moist, symmetric, and had no lesions or ulcerations.  The tonsillar pillars and soft palate were symmetric.  The uvula was midline on elevation.    Neck - Normal midline excursion of the laryngotracheal complex during swallowing.  Full range of motion on passive movement.  Palpation of the occipital, submental, submandibular, internal jugular chain, and supraclavicular nodes did not demonstrate any abnormal lymph nodes or masses.  Palpation of the thyroid was soft and smooth, with no nodules or goiter appreciated.  The trachea was mobile and midline.  Nose - External contour is symmetric, no gross deflection or scars.  Nasal mucosa is " pink and moist with no abnormal mucus.  The septum and turbinates were evaluated: No polyps, masses, or purulence noted on examination.     The ear canals were examined underneath the operating microscope and with an otologic speculum.  The canals were cleaned of all debris with cupped forceps and cerumen loop. There is no granulation tissue or sign of cholesteatoma.  The patient tolerates this wellSymptoms resolved after sitting for a few minutes. He did drink water as well.  Patient right tube. the tympanic membranes are intact without effusion, retraction or mass.  Bony landmarks are intact.    ASSESSMENT:    ICD-10-CM    1. Impacted cerumen, bilateral H61.23    2. S/P myringotomy with insertion of tube RIGHT Z96.22    3. ASNHL (asymmetrical sensorineural hearing loss) H90.5        Ears were cleaned.   Follow up in 4 months for ear cleaning. (his request)  Right tube is in good position and open.     Geeta Alegria PA-C  ENT  [facility]  [phone]

## 2019-10-15 NOTE — NURSING NOTE
"Chief Complaint   Patient presents with     Cerumen Impaction     Pt is here for an ear cleaning.       Initial /70   Pulse 80   Temp 97.5  F (36.4  C) (Tympanic)   Ht 1.753 m (5' 9\")   Wt 76.2 kg (168 lb)   SpO2 98%   BMI 24.81 kg/m   Estimated body mass index is 24.81 kg/m  as calculated from the following:    Height as of this encounter: 1.753 m (5' 9\").    Weight as of this encounter: 76.2 kg (168 lb).  Medication Reconciliation: complete  Lola Banda LPN  "

## 2019-10-16 DIAGNOSIS — E87.6 HYPOKALEMIA: ICD-10-CM

## 2019-10-16 RX ORDER — POTASSIUM CHLORIDE 1500 MG/1
TABLET, EXTENDED RELEASE ORAL
Qty: 30 TABLET | Refills: 3 | Status: SHIPPED | OUTPATIENT
Start: 2019-10-16 | End: 2020-02-11

## 2019-10-18 ENCOUNTER — INFUSION THERAPY VISIT (OUTPATIENT)
Dept: INFUSION THERAPY | Facility: OTHER | Age: 84
End: 2019-10-18
Attending: FAMILY MEDICINE
Payer: MEDICARE

## 2019-10-18 VITALS
HEART RATE: 72 BPM | TEMPERATURE: 97.8 F | OXYGEN SATURATION: 99 % | DIASTOLIC BLOOD PRESSURE: 80 MMHG | SYSTOLIC BLOOD PRESSURE: 154 MMHG | RESPIRATION RATE: 16 BRPM

## 2019-10-18 DIAGNOSIS — E86.9 VOLUME DEPLETION: Primary | ICD-10-CM

## 2019-10-18 PROCEDURE — 25000128 H RX IP 250 OP 636: Performed by: FAMILY MEDICINE

## 2019-10-18 PROCEDURE — 96360 HYDRATION IV INFUSION INIT: CPT

## 2019-10-18 RX ORDER — HEPARIN SODIUM (PORCINE) LOCK FLUSH IV SOLN 100 UNIT/ML 100 UNIT/ML
5 SOLUTION INTRAVENOUS ONCE
Status: COMPLETED | OUTPATIENT
Start: 2019-10-18 | End: 2019-10-18

## 2019-10-18 RX ORDER — HEPARIN SODIUM (PORCINE) LOCK FLUSH IV SOLN 100 UNIT/ML 100 UNIT/ML
5 SOLUTION INTRAVENOUS ONCE
Status: CANCELLED
Start: 2019-10-18

## 2019-10-18 RX ADMIN — HEPARIN 5 ML: 100 SYRINGE at 11:47

## 2019-10-18 RX ADMIN — SODIUM CHLORIDE 1000 ML: 9 INJECTION, SOLUTION INTRAVENOUS at 10:37

## 2019-10-18 NOTE — PROGRESS NOTES
Patient is a 86 year old male here accompanied by self today for infusion of IVF per order of Dr MALIA Ray under the supervision of Dr Walter, Cardiology.  Patient identified with two identifiers, order verified, and verbal consent for today's infusion obtained from patient.      Patients right sided port accessed using non-coring, 20 gauge, 3/4 inch needle. Port accessed per facility protocol. Port flushed easily, but initially had difficulty obtaining blood return. Repositioned patient mulitple times, finally able to obtain. Possibility of sheath forming but did get good blood return from that point forward. Note in next visit to use power needle. Education with patient on why port may not give blood return, what a sheath is and how it is managed if found. No signs and symptoms of infection or infiltration.      IV pump verified with dose, drug, and rate of administration.  Infusion administered per protocol.  Patient tolerated infusion well, no signs or symptoms of adverse reaction noted.  Patient denies pain nor discomfort.     IV removed, catheter intact.  Site clean, dry and intact.  No signs or symptoms of infiltration or infection.  Covered with a sterile bandage, slight pressure applied for 30 seconds.  Pt instructed to leave bandage intact for a minimum of one hour, and to call with questions or concerns. Patient states understanding, discharged ambulatory.

## 2019-10-21 ENCOUNTER — APPOINTMENT (OUTPATIENT)
Dept: CT IMAGING | Facility: HOSPITAL | Age: 84
End: 2019-10-21
Attending: FAMILY MEDICINE
Payer: MEDICARE

## 2019-10-21 ENCOUNTER — HOSPITAL ENCOUNTER (EMERGENCY)
Facility: HOSPITAL | Age: 84
Discharge: HOME | End: 2019-10-21
Attending: FAMILY MEDICINE | Admitting: FAMILY MEDICINE
Payer: MEDICARE

## 2019-10-21 ENCOUNTER — APPOINTMENT (OUTPATIENT)
Dept: GENERAL RADIOLOGY | Facility: HOSPITAL | Age: 84
End: 2019-10-21
Attending: FAMILY MEDICINE
Payer: MEDICARE

## 2019-10-21 VITALS
WEIGHT: 162 LBS | SYSTOLIC BLOOD PRESSURE: 179 MMHG | RESPIRATION RATE: 18 BRPM | BODY MASS INDEX: 23.99 KG/M2 | TEMPERATURE: 96.3 F | DIASTOLIC BLOOD PRESSURE: 91 MMHG | HEIGHT: 69 IN | OXYGEN SATURATION: 98 %

## 2019-10-21 DIAGNOSIS — R55 NEAR SYNCOPE: ICD-10-CM

## 2019-10-21 DIAGNOSIS — R07.89 ATYPICAL CHEST PAIN: ICD-10-CM

## 2019-10-21 LAB
ALBUMIN SERPL-MCNC: 3.3 G/DL (ref 3.4–5)
ALBUMIN UR-MCNC: NEGATIVE MG/DL
ALP SERPL-CCNC: 57 U/L (ref 40–150)
ALT SERPL W P-5'-P-CCNC: 22 U/L (ref 0–70)
ANION GAP SERPL CALCULATED.3IONS-SCNC: 4 MMOL/L (ref 3–14)
APPEARANCE UR: CLEAR
AST SERPL W P-5'-P-CCNC: 14 U/L (ref 0–45)
BACTERIA #/AREA URNS HPF: ABNORMAL /HPF
BASOPHILS # BLD AUTO: 0 10E9/L (ref 0–0.2)
BASOPHILS NFR BLD AUTO: 0.3 %
BILIRUB SERPL-MCNC: 0.6 MG/DL (ref 0.2–1.3)
BILIRUB UR QL STRIP: NEGATIVE
BUN SERPL-MCNC: 16 MG/DL (ref 7–30)
CALCIUM SERPL-MCNC: 8.5 MG/DL (ref 8.5–10.1)
CHLORIDE SERPL-SCNC: 110 MMOL/L (ref 94–109)
CO2 SERPL-SCNC: 26 MMOL/L (ref 20–32)
COLOR UR AUTO: ABNORMAL
CREAT SERPL-MCNC: 1.11 MG/DL (ref 0.66–1.25)
D DIMER PPP FEU-MCNC: 1.2 UG/ML FEU (ref 0–0.5)
DIFFERENTIAL METHOD BLD: ABNORMAL
EOSINOPHIL # BLD AUTO: 0.2 10E9/L (ref 0–0.7)
EOSINOPHIL NFR BLD AUTO: 3.5 %
ERYTHROCYTE [DISTWIDTH] IN BLOOD BY AUTOMATED COUNT: 12.8 % (ref 10–15)
GFR SERPL CREATININE-BSD FRML MDRD: 60 ML/MIN/{1.73_M2}
GLUCOSE SERPL-MCNC: 131 MG/DL (ref 70–99)
GLUCOSE UR STRIP-MCNC: NEGATIVE MG/DL
HCT VFR BLD AUTO: 39.1 % (ref 40–53)
HGB BLD-MCNC: 13 G/DL (ref 13.3–17.7)
HGB UR QL STRIP: NEGATIVE
IMM GRANULOCYTES # BLD: 0 10E9/L (ref 0–0.4)
IMM GRANULOCYTES NFR BLD: 0.3 %
INR PPP: 1.14 (ref 0.86–1.14)
KETONES UR STRIP-MCNC: NEGATIVE MG/DL
LEUKOCYTE ESTERASE UR QL STRIP: NEGATIVE
LYMPHOCYTES # BLD AUTO: 1.6 10E9/L (ref 0.8–5.3)
LYMPHOCYTES NFR BLD AUTO: 26.7 %
MAGNESIUM SERPL-MCNC: 2.3 MG/DL (ref 1.6–2.3)
MCH RBC QN AUTO: 30.3 PG (ref 26.5–33)
MCHC RBC AUTO-ENTMCNC: 33.2 G/DL (ref 31.5–36.5)
MCV RBC AUTO: 91 FL (ref 78–100)
MONOCYTES # BLD AUTO: 0.6 10E9/L (ref 0–1.3)
MONOCYTES NFR BLD AUTO: 9.5 %
MUCOUS THREADS #/AREA URNS LPF: PRESENT /LPF
NEUTROPHILS # BLD AUTO: 3.6 10E9/L (ref 1.6–8.3)
NEUTROPHILS NFR BLD AUTO: 59.7 %
NITRATE UR QL: NEGATIVE
NRBC # BLD AUTO: 0 10*3/UL
NRBC BLD AUTO-RTO: 0 /100
PH UR STRIP: 7 PH (ref 4.7–8)
PLATELET # BLD AUTO: 210 10E9/L (ref 150–450)
POTASSIUM SERPL-SCNC: 4 MMOL/L (ref 3.4–5.3)
PROT SERPL-MCNC: 6.6 G/DL (ref 6.8–8.8)
RBC # BLD AUTO: 4.29 10E12/L (ref 4.4–5.9)
RBC #/AREA URNS AUTO: 3 /HPF (ref 0–2)
SODIUM SERPL-SCNC: 140 MMOL/L (ref 133–144)
SOURCE: ABNORMAL
SP GR UR STRIP: 1.02 (ref 1–1.03)
TROPONIN I SERPL-MCNC: <0.015 UG/L (ref 0–0.04)
UROBILINOGEN UR STRIP-MCNC: NORMAL MG/DL (ref 0–2)
WBC # BLD AUTO: 6 10E9/L (ref 4–11)
WBC #/AREA URNS AUTO: 1 /HPF (ref 0–5)

## 2019-10-21 PROCEDURE — 99285 EMERGENCY DEPT VISIT HI MDM: CPT | Mod: 25

## 2019-10-21 PROCEDURE — 93005 ELECTROCARDIOGRAM TRACING: CPT

## 2019-10-21 PROCEDURE — 80053 COMPREHEN METABOLIC PANEL: CPT | Performed by: FAMILY MEDICINE

## 2019-10-21 PROCEDURE — 85379 FIBRIN DEGRADATION QUANT: CPT | Performed by: FAMILY MEDICINE

## 2019-10-21 PROCEDURE — 36415 COLL VENOUS BLD VENIPUNCTURE: CPT | Performed by: FAMILY MEDICINE

## 2019-10-21 PROCEDURE — 70450 CT HEAD/BRAIN W/O DYE: CPT | Mod: TC

## 2019-10-21 PROCEDURE — 83735 ASSAY OF MAGNESIUM: CPT | Performed by: FAMILY MEDICINE

## 2019-10-21 PROCEDURE — 85610 PROTHROMBIN TIME: CPT | Performed by: FAMILY MEDICINE

## 2019-10-21 PROCEDURE — 25000128 H RX IP 250 OP 636: Performed by: FAMILY MEDICINE

## 2019-10-21 PROCEDURE — 99285 EMERGENCY DEPT VISIT HI MDM: CPT | Mod: Z6 | Performed by: FAMILY MEDICINE

## 2019-10-21 PROCEDURE — 96360 HYDRATION IV INFUSION INIT: CPT

## 2019-10-21 PROCEDURE — 81001 URINALYSIS AUTO W/SCOPE: CPT | Performed by: FAMILY MEDICINE

## 2019-10-21 PROCEDURE — 71045 X-RAY EXAM CHEST 1 VIEW: CPT | Mod: TC

## 2019-10-21 PROCEDURE — 93010 ELECTROCARDIOGRAM REPORT: CPT | Performed by: INTERNAL MEDICINE

## 2019-10-21 PROCEDURE — 84484 ASSAY OF TROPONIN QUANT: CPT | Performed by: FAMILY MEDICINE

## 2019-10-21 PROCEDURE — 25000132 ZZH RX MED GY IP 250 OP 250 PS 637: Mod: GY | Performed by: FAMILY MEDICINE

## 2019-10-21 PROCEDURE — 85025 COMPLETE CBC W/AUTO DIFF WBC: CPT | Performed by: FAMILY MEDICINE

## 2019-10-21 RX ORDER — ASPIRIN 81 MG/1
324 TABLET, CHEWABLE ORAL ONCE
Status: COMPLETED | OUTPATIENT
Start: 2019-10-21 | End: 2019-10-21

## 2019-10-21 RX ORDER — HEPARIN SODIUM (PORCINE) LOCK FLUSH IV SOLN 100 UNIT/ML 100 UNIT/ML
5 SOLUTION INTRAVENOUS
Status: DISCONTINUED | OUTPATIENT
Start: 2019-10-21 | End: 2019-10-21 | Stop reason: HOSPADM

## 2019-10-21 RX ORDER — SODIUM CHLORIDE 9 MG/ML
1000 INJECTION, SOLUTION INTRAVENOUS CONTINUOUS
Status: DISCONTINUED | OUTPATIENT
Start: 2019-10-21 | End: 2019-10-21 | Stop reason: HOSPADM

## 2019-10-21 RX ADMIN — SODIUM CHLORIDE 1000 ML: 9 INJECTION, SOLUTION INTRAVENOUS at 13:02

## 2019-10-21 RX ADMIN — HEPARIN SODIUM (PORCINE) LOCK FLUSH IV SOLN 100 UNIT/ML 5 ML: 100 SOLUTION at 14:20

## 2019-10-21 RX ADMIN — ASPIRIN 81 MG 324 MG: 81 TABLET ORAL at 13:02

## 2019-10-21 ASSESSMENT — ENCOUNTER SYMPTOMS
HEMATOLOGIC/LYMPHATIC NEGATIVE: 1
HEADACHES: 0
ARTHRALGIAS: 0
ABDOMINAL PAIN: 0
MUSCULOSKELETAL NEGATIVE: 1
SHORTNESS OF BREATH: 0
NECK STIFFNESS: 0
PSYCHIATRIC NEGATIVE: 1
ALLERGIC/IMMUNOLOGIC NEGATIVE: 1
PALPITATIONS: 0
CONFUSION: 0
CONSTITUTIONAL NEGATIVE: 1
GASTROINTESTINAL NEGATIVE: 1
EYES NEGATIVE: 1
RESPIRATORY NEGATIVE: 1
COLOR CHANGE: 0
FEVER: 0
EYE REDNESS: 0
ENDOCRINE NEGATIVE: 1
DIFFICULTY URINATING: 0

## 2019-10-21 ASSESSMENT — MIFFLIN-ST. JEOR: SCORE: 1405.21

## 2019-10-21 NOTE — ED NOTES
"Pt has port, due to history of \"dehydration.\"  Pt has been getting dehydrated for the past 5 months.   "

## 2019-10-21 NOTE — ED PROVIDER NOTES
History     Chief Complaint   Patient presents with     Chest Pain     Loss of Consciousness     HPI  Jf Ortiz is a 86 year old male who presents to the emergency room in company of her caregiver with a complaint of chest discomfort that occurred last night.  He evidently had a syncopal episode which lasted a couple of seconds.  No current pain.  He has a Mediport in the right upper anterior chest that he receives IV infusions once or twice a week because he gets dehydrated.  Caregiver states that he has had a couple of syncopal episodes over the last 3 to 4 days.    Allergies:  Allergies   Allergen Reactions     Cats Unknown     Lamotrigine      Skin lesions       Problem List:    Patient Active Problem List    Diagnosis Date Noted     Constipation, unspecified constipation type 04/11/2018     Priority: Medium     Pacemaker, [employer], Dual Chamber - Dependent 10/06/2017     Priority: Medium     Lewy body dementia without behavioral disturbance (H) 08/31/2017     Priority: Medium     Fatigue 08/31/2017     Priority: Medium     Urinary incontinence 08/31/2017     Priority: Medium     Autonomic orthostatic hypotension 10/14/2016     Priority: Medium     Dementia without behavioral disturbance (H) 07/28/2015     Priority: Medium     Diagnosis updated by automated process. Provider to review and confirm.       Hypocalcemia 07/28/2015     Priority: Medium     Parkinson disease (H)      Priority: Medium     Seizure disorder (H)      Priority: Medium     Volume depletion 08/02/2014     Priority: Medium     Syncope and collapse 08/01/2014     Priority: Medium     Stented coronary artery      Priority: Medium     Coronary atherosclerosis of native coronary artery      Priority: Medium     Overview:   IMO Update 10/11       Hypercholesterolemia 04/23/2013     Priority: Medium     Advanced care planning/counseling discussion 10/30/2012     Priority: Medium     REM sleep behavior disorder 01/01/2011     Priority:  Medium     Osteoarthritis 01/01/2011     Priority: Medium     Problem list name updated by automated process. Provider to review       Diaphragmatic hernia 01/01/2011     Priority: Medium     Problem list name updated by automated process. 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     Priority: Medium     Overview:   IMO Update 10/11       Hypertension with target blood pressure goal under 150/90 09/05/2006     Priority: Medium     Overview:   IMO Update          Past Medical History:    Past Medical History:   Diagnosis Date     Autonomic orthostatic hypotension 10/14/2016     Coronary artery disease      Dementia without behavioral disturbance (H) 7/28/2015     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     Seizure disorder (H)      Stented coronary artery        Past Surgical History:    Past Surgical History:   Procedure Laterality Date     ------------OTHER-------------  1955    ulnar and radial fx - repair ulnar and radial fx x4     ------------OTHER-------------  6/14/2011    cataract extraction     BIOPSY  08/2015    skin biopsy     BLEPHAROPLASTY BILATERAL  5/6/2014    Procedure: BLEPHAROPLASTY BILATERAL;  Surgeon: Andrew Queen MD;  Location: [facility]     BYPASS GRAFT ARTERY CORONARY  11/2006    coronary artery disease x 5, [facility]     cataract extraction and lens implantation  2011    cataracts     cataract extraction and lens implantation      cataracts     COLONOSCOPY  2012     colonoscopy with polypectomy  3/13/2009    history of polyps - repeat 3 yrs     colonoscopy with polypectomy  2006     colonoscopy with polypectomy  2005      COMBINED COLONOSCOPY WITH ARGON PLASMA COAGULATOR (APC) N/A 10/31/2014    Procedure: COMBINED COLONOSCOPY WITH ARGON PLASMA COAGULATOR (APC);  Surgeon: Bassam Aguilar MD;  Location: [facility]     COMBINED COLONOSCOPY WITH ARGON PLASMA COAGULATOR (APC) N/A 11/13/2015    Procedure: COMBINED COLONOSCOPY WITH ARGON PLASMA COAGULATOR (APC);  Surgeon: Bassam Aguilar MD;  Location: [facility]     ENDOSCOPY UPPER, COLONOSCOPY, COMBINED N/A 10/31/2014    Procedure: COMBINED ENDOSCOPY UPPER, COLONOSCOPY;  Surgeon: Bassam Aguilar MD;  [facility]: [facility]     ENDOSCOPY UPPER, COLONOSCOPY, COMBINED N/A 11/13/2015    Procedure: COMBINED ENDOSCOPY UPPER, COLONOSCOPY;  Surgeon: Bassam Aguilar MD;  [facility]: [facility]     ESOPHAGOSCOPY, GASTROSCOPY, DUODENOSCOPY (EGD), COMBINED  1/22/2014    Procedure: COMBINED ESOPHAGOSCOPY, GASTROSCOPY, DUODENOSCOPY (EGD);  UPPER ENDOSCOPY(PENA) W/ BIOPSIES;  Surgeon: Patricia Pena MD;  Location: [facility]     HERNIORRHAPHY INGUINAL Right 2/14/2018    Procedure: HERNIORRHAPHY INGUINAL;  OPEN RIGHT INGUINAL HERNIA REPAIR with Mesh;  Surgeon: Virgilio Zaragoza DO;  Location: [facility]     INSERT PORT VASCULAR ACCESS Right 7/12/2019    Procedure: PORT PLACEMENT;  Surgeon: Lloyd Ram MD;  Location: [facility]     LARYNGOSCOPY WITH MICROSCOPE  1/22/2014    Procedure: LARYNGOSCOPY WITH MICROSCOPE;;  Surgeon: Chayo Duke MD;  Location: [facility]     pacemaker placement  2000    heart block     pacemaker placement  2011    dual-chamber     REMOVE TUBE, MYRINGOTOMY, COMBINED  1/22/2014    Procedure: COMBINED REMOVE TUBE, MYRINGOTOMY;  MICRODIRECT LARYNGOSCOPY WITH BIOPSY AND FROZEN SECTIONS removal of right ear tube and myringoplasty;  Surgeon: Chayo Duke MD;  [facility]     REPLACE PACEMAKER GENERATOR N/A 8/8/2018    Procedure: REPLACE PACEMAKER GENERATOR;  Pacemaker generator change;  Surgeon: Alma Kaplan MD;  Location: [facility]     stent placement to LAD  2008     ventilation tube  5/24/2012     right in office       Family History:    Family History   Problem Relation Age of Onset     Diabetes Mother      Breast Cancer Daughter        Social History:  Marital Status:  Single [1]  Social History     Tobacco Use     Smoking status: Former Smoker     Packs/day: 1.00     Years: 5.00     Pack years: 5.00     Types: Cigarettes     Last attempt to quit: 1985     Years since quittin.8     Smokeless tobacco: Never Used     Tobacco comment: quit in    Substance Use Topics     Alcohol use: Yes     Comment: social     Drug use: No        Medications:    atorvastatin (LIPITOR) 20 MG tablet  Cholecalciferol (VITAMIN D-3 PO)  Coenzyme Q10 (CO Q 10 PO)  Cyanocobalamin (VITAMIN B-12 PO)  donepezil (ARICEPT) 10 MG tablet  Magnesium 400 MG CAPS  MELATONIN PO  metoprolol succinate ER (TOPROL-XL) 50 MG 24 hr tablet  midodrine (PROAMATINE) 5 MG tablet  multivitamin, therapeutic with minerals (MULTI-VITAMIN) TABS tablet  Omega-3 Fatty Acids (FISH OIL) 500 MG CAPS  PANTOPRAZOLE SODIUM PO  potassium chloride ER (K-DUR/KLOR-CON M) 20 MEQ CR tablet  sodium chloride 1 GM tablet  Turmeric 500 MG TABS          Review of Systems   Constitutional: Negative.  Negative for fever.   HENT: Negative.  Negative for congestion.    Eyes: Negative.  Negative for redness.   Respiratory: Negative.  Negative for shortness of breath.    Cardiovascular: Positive for leg swelling (Mild). Negative for chest pain (Occurred last night for a few minutes) and palpitations.   Gastrointestinal: Negative.  Negative for abdominal pain.   Endocrine: Negative.    Genitourinary: Negative for difficulty urinating.   Musculoskeletal: Negative.  Negative for arthralgias and neck stiffness.   Skin: Negative.  Negative for color change.   Allergic/Immunologic: Negative.    Neurological: Positive for syncope (Near syncope). Negative for headaches.   Hematological: Negative.    Psychiatric/Behavioral: Negative.  Negative for confusion.   All other systems  "reviewed and are negative.      Physical Exam   BP: 153/86  Heart Rate: 76  Temp: 96.3  F (35.7  C)  Resp: 16  Height: 175.3 cm (5' 9\")  Weight: 73.5 kg (162 lb)  SpO2: 97 %      Physical Exam  Vitals signs and nursing note reviewed.   Constitutional:       General: He is not in acute distress.     Appearance: He is normal weight. He is not ill-appearing, toxic-appearing or diaphoretic.   HENT:      Head: Normocephalic and atraumatic.   Eyes:      Extraocular Movements: Extraocular movements intact.      Pupils: Pupils are equal, round, and reactive to light.   Neck:      Musculoskeletal: Normal range of motion and neck supple.      Thyroid: No thyromegaly.      Vascular: No hepatojugular reflux or JVD.      Trachea: No tracheal deviation.   Cardiovascular:      Rate and Rhythm: Normal rate and regular rhythm.      Heart sounds: Normal heart sounds. Heart sounds not distant. No friction rub.   Pulmonary:      Effort: Pulmonary effort is normal.      Breath sounds: Normal breath sounds.   Musculoskeletal: Normal range of motion.      Right lower leg: He exhibits no tenderness. Edema (Trace of ankle edema) present.      Left lower leg: He exhibits no tenderness. Edema (Trace of ankle edema) present.   Lymphadenopathy:      Cervical: No cervical adenopathy.   Skin:     General: Skin is warm and dry.      Capillary Refill: Capillary refill takes less than 2 seconds.      Coloration: Skin is not cyanotic or pale.      Findings: No ecchymosis, erythema or rash.      Nails: There is no clubbing.     Neurological:      General: No focal deficit present.      Mental Status: He is alert and oriented to person, place, and time.      Cranial Nerves: No cranial nerve deficit.   Psychiatric:         Mood and Affect: Mood normal. Mood is not anxious.         Behavior: Behavior normal. Behavior is not agitated.     EKG shows normal sinus rhythm with a rate of 78 and a left bundle branch block.  He does have a pacemaker    ED Course "        Procedures               Critical Care time:  none               Results for orders placed or performed during the hospital encounter of 10/21/19 (from the past 24 hour(s))   CBC with platelets differential   Result Value Ref Range    WBC 6.0 4.0 - 11.0 10e9/L    RBC Count 4.29 (L) 4.4 - 5.9 10e12/L    Hemoglobin 13.0 (L) 13.3 - 17.7 g/dL    Hematocrit 39.1 (L) 40.0 - 53.0 %    MCV 91 78 - 100 fl    MCH 30.3 26.5 - 33.0 pg    MCHC 33.2 31.5 - 36.5 g/dL    RDW 12.8 10.0 - 15.0 %    Platelet Count 210 150 - 450 10e9/L    Diff Method Automated Method     % Neutrophils 59.7 %    % Lymphocytes 26.7 %    % Monocytes 9.5 %    % Eosinophils 3.5 %    % Basophils 0.3 %    % Immature Granulocytes 0.3 %    Nucleated RBCs 0 0 /100    Absolute Neutrophil 3.6 1.6 - 8.3 10e9/L    Absolute Lymphocytes 1.6 0.8 - 5.3 10e9/L    Absolute Monocytes 0.6 0.0 - 1.3 10e9/L    Absolute Eosinophils 0.2 0.0 - 0.7 10e9/L    Absolute Basophils 0.0 0.0 - 0.2 10e9/L    Abs Immature Granulocytes 0.0 0 - 0.4 10e9/L    Absolute Nucleated RBC 0.0    Troponin I   Result Value Ref Range    Troponin I ES <0.015 0.000 - 0.045 ug/L   INR   Result Value Ref Range    INR 1.14 0.86 - 1.14   Comprehensive metabolic panel   Result Value Ref Range    Sodium 140 133 - 144 mmol/L    Potassium 4.0 3.4 - 5.3 mmol/L    Chloride 110 (H) 94 - 109 mmol/L    Carbon Dioxide 26 20 - 32 mmol/L    Anion Gap 4 3 - 14 mmol/L    Glucose 131 (H) 70 - 99 mg/dL    Urea Nitrogen 16 7 - 30 mg/dL    Creatinine 1.11 0.66 - 1.25 mg/dL    GFR Estimate 60 (L) >60 mL/min/[1.73_m2]    GFR Estimate If Black 69 >60 mL/min/[1.73_m2]    Calcium 8.5 8.5 - 10.1 mg/dL    Bilirubin Total 0.6 0.2 - 1.3 mg/dL    Albumin 3.3 (L) 3.4 - 5.0 g/dL    Protein Total 6.6 (L) 6.8 - 8.8 g/dL    Alkaline Phosphatase 57 40 - 150 U/L    ALT 22 0 - 70 U/L    AST 14 0 - 45 U/L   Magnesium   Result Value Ref Range    Magnesium 2.3 1.6 - 2.3 mg/dL   D dimer quantitative   Result Value Ref Range    D Dimer 1.2  (H) 0.0 - 0.50 ug/ml FEU   Head CT w/o contrast    Narrative    PROCEDURE: CT HEAD W/O CONTRAST   10/21/2019 1:21 PM    HISTORY:Male, age,  86 years, , , Altered level of consciousness  (LOC), unexplained    COMPARISON: Head CT 2/21/2019    TECHNIQUE: CT of the brain without contrast.    FINDINGS: Ventricles and sulci are prominent in size and shape. Gray  and white matter demonstrate scattered areas of decreased density.  Calcifications are again seen along the falx.    There is no evidence of mass, mass effect or midline shift. No  evidence of acute hemorrhage.    The bones are unremarkable. No fracture.       Impression    IMPRESSION:   No acute intracranial abnormality.  No acute fracture.     Mild generalized atrophy and white matter changes suggesting small  vessel disease are similar in appearance compared to prior study.    LAWRENCE JAIN MD   XR Chest 1 View    Narrative    Procedure:XR CHEST 1 VW    Clinical history:Male, 86 years, CHEST PAIN    Technique: Single view was obtained.    Comparison: 7/12/2019    Findings: The cardiac silhouette is normal. The pulmonary vasculature  is normal.    The lungs are clear. Bony structures are unremarkable. Left plating  transvenous pacer, right-sided subcutaneous port and postoperative  changes in the mediastinum are similar in appearance.      Impression    Impression:   No acute abnormality. No evidence of acute or active disease.    LAWRENCE JAIN MD   UA with Microscopic reflex to Culture   Result Value Ref Range    Color Urine Light Yellow     Appearance Urine Clear     Glucose Urine Negative NEG^Negative mg/dL    Bilirubin Urine Negative NEG^Negative    Ketones Urine Negative NEG^Negative mg/dL    Specific Gravity Urine 1.017 1.003 - 1.035    Blood Urine Negative NEG^Negative    pH Urine 7.0 4.7 - 8.0 pH    Protein Albumin Urine Negative NEG^Negative mg/dL    Urobilinogen mg/dL Normal 0.0 - 2.0 mg/dL    Nitrite Urine Negative NEG^Negative    Leukocyte  Esterase Urine Negative NEG^Negative    Source Midstream Urine     WBC Urine 1 0 - 5 /HPF    RBC Urine 3 (H) 0 - 2 /HPF    Bacteria Urine None (A) NEG^Negative /HPF    Mucous Urine Present (A) NEG^Negative /LPF       Medications   0.9% sodium chloride BOLUS (1,000 mLs Intravenous New Bag 10/21/19 1302)     Followed by   sodium chloride 0.9% infusion (has no administration in time range)   aspirin (ASA) chewable tablet 324 mg (324 mg Oral Given 10/21/19 1302)       Assessments & Plan (with Medical Decision Making)     I have reviewed the nursing notes.    I have reviewed the findings, diagnosis, plan and need for follow up with the patient.   1406: Patient has had no chest pain or episodes of syncope while in the emergency room.  He is being hydrated with IV normal saline.  He is currently stable.  Plan is to discharge him home.  We will make arrangements for him to follow-up at [facility] with primary care provider and cardiology.  I encouraged him to ambulate more slowly, deliberately, methodically.  He should be using a walker.  Return if necessary.    New Prescriptions    No medications on file       Final diagnoses:   Atypical chest pain   Near syncope       10/21/2019   [facility]     Rashad Alvarado,   10/21/19 1403

## 2019-10-21 NOTE — ED TRIAGE NOTES
Pt stated he had chest pains yesterday evening and then had a brief episode of LOC.  Pt stated he feels weak currently and can feel heart beat. Pt had open heart surgery 2006 and also has pacemaker

## 2019-10-21 NOTE — ED AVS SNAPSHOT
[facility]  [address] [address]  Phone:  [phone]                                    Jf Ortiz   MRN: [id number]    Department:  [facility]   Date of Visit:  10/21/2019           After Visit Summary Signature Page    I have received my discharge instructions, and my questions have been answered. I have discussed any challenges I see with this plan with the nurse or doctor.    ..........................................................................................................................................  Patient/Patient Representative Signature      ..........................................................................................................................................  Patient Representative Print Name and Relationship to Patient    ..................................................               ................................................  Date                                   Time    ..........................................................................................................................................  Reviewed by Signature/Title    ...................................................              ..............................................  Date                                               Time          22EPIC Rev 08/18

## 2019-10-25 ENCOUNTER — INFUSION THERAPY VISIT (OUTPATIENT)
Dept: INFUSION THERAPY | Facility: OTHER | Age: 84
End: 2019-10-25
Attending: FAMILY MEDICINE
Payer: MEDICARE

## 2019-10-25 VITALS
BODY MASS INDEX: 24.49 KG/M2 | HEIGHT: 69 IN | WEIGHT: 165.34 LBS | TEMPERATURE: 95.4 F | OXYGEN SATURATION: 97 % | DIASTOLIC BLOOD PRESSURE: 78 MMHG | SYSTOLIC BLOOD PRESSURE: 127 MMHG | HEART RATE: 70 BPM

## 2019-10-25 DIAGNOSIS — E86.9 VOLUME DEPLETION: Primary | ICD-10-CM

## 2019-10-25 PROCEDURE — 25000128 H RX IP 250 OP 636: Performed by: FAMILY MEDICINE

## 2019-10-25 PROCEDURE — 96365 THER/PROPH/DIAG IV INF INIT: CPT

## 2019-10-25 PROCEDURE — 96360 HYDRATION IV INFUSION INIT: CPT

## 2019-10-25 RX ORDER — HEPARIN SODIUM (PORCINE) LOCK FLUSH IV SOLN 100 UNIT/ML 100 UNIT/ML
5 SOLUTION INTRAVENOUS ONCE
Status: COMPLETED | OUTPATIENT
Start: 2019-10-25 | End: 2019-10-25

## 2019-10-25 RX ORDER — HEPARIN SODIUM (PORCINE) LOCK FLUSH IV SOLN 100 UNIT/ML 100 UNIT/ML
5 SOLUTION INTRAVENOUS ONCE
Status: CANCELLED
Start: 2019-10-25

## 2019-10-25 RX ADMIN — HEPARIN 5 ML: 100 SYRINGE at 10:45

## 2019-10-25 RX ADMIN — SODIUM CHLORIDE 1000 ML: 9 INJECTION, SOLUTION INTRAVENOUS at 09:39

## 2019-10-25 ASSESSMENT — MIFFLIN-ST. JEOR: SCORE: 1420.63

## 2019-10-25 NOTE — PROGRESS NOTES
Patient tolerated infusion well, no signs or symptoms of adverse reaction noted.  Patient denies pain nor discomfort.     IV removed, catheter intact.  Site clean, dry and intact.  No signs or symptoms of infiltration or infection.  Covered with a sterile bandage, slight pressure applied for 30 seconds.  Pt instructed to leave bandage intact for a minimum of one hour, and to call with questions or concerns.  Patient declines copy of appointments, discharge instructions, and after visit summary (AVS).  Patient states understanding, discharged ambulatory.

## 2019-10-25 NOTE — PROGRESS NOTES
"Patient is [age] 8/6 year old male here accompanied by self today for infusion of IV Fluids per order of Dr. Ray.  Patient identified with two identifiers, order verified, and verbal consent for today's infusion obtained from patient.       Patients right sided port accessed using non-coring, 19 gauge, 3/4\" power needle. Port accessed per facility protocol. Port flushed easily, blood return noted.  No signs and symptoms of infection or infiltration.      IV pump verified with dose, drug, and rate of administration.  Infusion administered per protocol.    "

## 2019-10-29 ENCOUNTER — TELEPHONE (OUTPATIENT)
Dept: INFUSION THERAPY | Facility: OTHER | Age: 84
End: 2019-10-29

## 2019-10-29 ENCOUNTER — INFUSION THERAPY VISIT (OUTPATIENT)
Dept: INFUSION THERAPY | Facility: OTHER | Age: 84
End: 2019-10-29
Attending: FAMILY MEDICINE
Payer: MEDICARE

## 2019-10-29 VITALS
DIASTOLIC BLOOD PRESSURE: 79 MMHG | SYSTOLIC BLOOD PRESSURE: 155 MMHG | HEART RATE: 71 BPM | OXYGEN SATURATION: 97 % | TEMPERATURE: 97.7 F | RESPIRATION RATE: 16 BRPM

## 2019-10-29 DIAGNOSIS — E86.9 VOLUME DEPLETION: Primary | ICD-10-CM

## 2019-10-29 PROCEDURE — 96360 HYDRATION IV INFUSION INIT: CPT

## 2019-10-29 PROCEDURE — 96365 THER/PROPH/DIAG IV INF INIT: CPT

## 2019-10-29 PROCEDURE — 25000128 H RX IP 250 OP 636: Performed by: FAMILY MEDICINE

## 2019-10-29 RX ORDER — HEPARIN SODIUM (PORCINE) LOCK FLUSH IV SOLN 100 UNIT/ML 100 UNIT/ML
5 SOLUTION INTRAVENOUS ONCE
Status: COMPLETED | OUTPATIENT
Start: 2019-10-29 | End: 2019-10-29

## 2019-10-29 RX ORDER — HEPARIN SODIUM (PORCINE) LOCK FLUSH IV SOLN 100 UNIT/ML 100 UNIT/ML
5 SOLUTION INTRAVENOUS ONCE
Status: CANCELLED
Start: 2019-10-29

## 2019-10-29 RX ADMIN — Medication 5 ML: at 10:41

## 2019-10-29 RX ADMIN — SODIUM CHLORIDE 1000 ML: 9 INJECTION, SOLUTION INTRAVENOUS at 09:17

## 2019-10-29 NOTE — PROGRESS NOTES
Patient tolerated infusion, no signs or symptoms of adverse reaction noted.  Patient denies pain nor discomfort.     However, LPN who removed IVF and did discharge vitals asked for further assessment d/t increase in BP (which is normal for patient after infusion, and has been addressed with cardiologist in past) and decreased pulse. Please see telephone encounter to PCP and cardiologist, placed at significant other's request.     IV removed, catheter intact.  Site clean, dry and intact.  No signs or symptoms of infiltration or infection.  Covered with a sterile bandage, slight pressure applied for 30 seconds.  Pt instructed to leave bandage intact for a minimum of one hour, and to call with questions or concerns.  Copy of appointments, discharge instructions, and after visit summary (AVS) provided to patient.  Patient states understanding, discharged ambulatory.

## 2019-10-29 NOTE — TELEPHONE ENCOUNTER
Spoke with pts significant other, states that Jf is doing good right now. Pt will be seen by Dr. Ray on 10/31/19

## 2019-10-29 NOTE — PROGRESS NOTES
Patient is a 86 year old  Here today for infusion of IVF per order of Dr MALIA Ray.  Patient meets parameters for today's infusion. Patient identified with two identifiers, order verified, and verbal consent for today's infusion obtained from patient.    Patient meets order parameters for today's treatment.     Patient denies questions or concerns regarding infusion and/or medication being administered.    Patients right port accessed using non-coring, 22 gauge, 3/4 needle. Port accessed per facility protocol. Port flushed easily, blood return noted.  No signs and symptoms of infection or infiltration.      0917 IV pump verified with dose, drug, and rate of administration.  Infusion administered per protocol.

## 2019-10-29 NOTE — TELEPHONE ENCOUNTER
Patient at infusion appointment for biweekly fluids today. Initially on assessment, he reported no concerns in past few days. His BP is usually on the lower end of normal when he arrives, and then often jumps to 150's-170's/70's-90's by end of infusion (1000mL NS administered over 1 hour). He typically denies any sx associated. We have addressed with Dr Walter in the past and was reported that patient was ok to continue 1 hour infusion of IVF. At end of infusion today, BP jumped, as usual, but pulse dropped, which is not normal for patient. He noted he has had dizziness in past week or so, that has been more pronounced than normal. Pulse was reassessed manually and 74, no missed beats noted in full 1min period. He denies any sx now but does note that in addition to the dizziness, he has had an episode or two in past week where his heart feels like it may jump out of his chest, and catches his breath. Notes this passes as soon as it starts. Significant other reports that hydration at home continues to be an issue. Notes his BP will drop at home, into 80's over 40's to 50's (does not have exact readings here with her) but will usually come up if she can get patient to drink more fluids. He had an episode of syncope and chest pain last week and was seen in ER where it does not appear they found any active processes at that time. Significant other notes frustration as she has not been able to get in touch with cardiologist or get patient in sooner to see him, though notes they are on a waiting list. She reports pt see's PCP, Dr MAMADOU Ray next week. She asked that an update with sx and VS today be sent. Please see flowsheets for vital signs/times taken. Education with patient and significant other to call PCP/Cardiologist office or be seen in ER if sx reoccur/persist or worsen.

## 2019-11-01 ENCOUNTER — INFUSION THERAPY VISIT (OUTPATIENT)
Dept: INFUSION THERAPY | Facility: OTHER | Age: 84
End: 2019-11-01
Attending: FAMILY MEDICINE
Payer: MEDICARE

## 2019-11-01 VITALS
BODY MASS INDEX: 24.88 KG/M2 | HEART RATE: 79 BPM | RESPIRATION RATE: 16 BRPM | TEMPERATURE: 94.2 F | OXYGEN SATURATION: 99 % | SYSTOLIC BLOOD PRESSURE: 121 MMHG | HEIGHT: 69 IN | DIASTOLIC BLOOD PRESSURE: 77 MMHG | WEIGHT: 167.99 LBS

## 2019-11-01 DIAGNOSIS — E78.00 HYPERCHOLESTEROLEMIA: ICD-10-CM

## 2019-11-01 DIAGNOSIS — E86.9 VOLUME DEPLETION: Primary | ICD-10-CM

## 2019-11-01 PROCEDURE — 25000128 H RX IP 250 OP 636: Performed by: FAMILY MEDICINE

## 2019-11-01 PROCEDURE — 25800030 ZZH RX IP 258 OP 636: Performed by: FAMILY MEDICINE

## 2019-11-01 PROCEDURE — 96360 HYDRATION IV INFUSION INIT: CPT

## 2019-11-01 PROCEDURE — 96523 IRRIG DRUG DELIVERY DEVICE: CPT

## 2019-11-01 PROCEDURE — 96365 THER/PROPH/DIAG IV INF INIT: CPT

## 2019-11-01 RX ORDER — HEPARIN SODIUM (PORCINE) LOCK FLUSH IV SOLN 100 UNIT/ML 100 UNIT/ML
5 SOLUTION INTRAVENOUS ONCE
Status: CANCELLED
Start: 2019-11-01

## 2019-11-01 RX ORDER — ATORVASTATIN CALCIUM 20 MG/1
TABLET, FILM COATED ORAL
Qty: 90 TABLET | Refills: 0 | Status: SHIPPED | OUTPATIENT
Start: 2019-11-01 | End: 2020-01-30

## 2019-11-01 RX ORDER — HEPARIN SODIUM (PORCINE) LOCK FLUSH IV SOLN 100 UNIT/ML 100 UNIT/ML
5 SOLUTION INTRAVENOUS ONCE
Status: COMPLETED | OUTPATIENT
Start: 2019-11-01 | End: 2019-11-01

## 2019-11-01 RX ADMIN — Medication 5 ML: at 10:51

## 2019-11-01 RX ADMIN — SODIUM CHLORIDE 1000 ML: 9 INJECTION, SOLUTION INTRAVENOUS at 09:37

## 2019-11-01 ASSESSMENT — MIFFLIN-ST. JEOR: SCORE: 1432.63

## 2019-11-01 NOTE — PROGRESS NOTES
Patient is a 86 year old male today for infusion of IVF per order of Dr MALIA Ray.  Patient meets parameters for today's infusion. Patient identified with two identifiers, order verified, and verbal consent for today's infusion obtained from patient.       Patient denies questions or concerns regarding infusion and/or medication(s) being administered.    Patients right port accessed using non-coring, 22 gauge, 3/4 needle. Port accessed per facility protocol. Port flushed easily, blood return noted.  No signs and symptoms of infection or infiltration.      0937 IV pump verified with IVF dose, drug, and rate of administration.  Infusion administered per protocol.  Patient tolerated infusion well, no signs or symptoms of adverse reaction noted.  Patient denies pain nor discomfort.     IV removed, catheter intact.  Site clean, dry and intact.  No signs or symptoms of infiltration or infection.  Covered with a sterile bandage, slight pressure applied for 30 seconds.  Pt instructed to leave bandage intact for a minimum of one hour, and to call with questions or concerns.  Patient declines copy of appointments, discharge instructions, and after visit summary (AVS).  Patient states understanding, discharged ambulatory.

## 2019-11-05 ENCOUNTER — INFUSION THERAPY VISIT (OUTPATIENT)
Dept: INFUSION THERAPY | Facility: OTHER | Age: 84
End: 2019-11-05
Attending: FAMILY MEDICINE
Payer: MEDICARE

## 2019-11-05 VITALS
RESPIRATION RATE: 16 BRPM | DIASTOLIC BLOOD PRESSURE: 78 MMHG | TEMPERATURE: 96.2 F | BODY MASS INDEX: 24.82 KG/M2 | WEIGHT: 167.55 LBS | HEART RATE: 76 BPM | HEIGHT: 69 IN | SYSTOLIC BLOOD PRESSURE: 139 MMHG | OXYGEN SATURATION: 99 %

## 2019-11-05 DIAGNOSIS — E86.9 VOLUME DEPLETION: Primary | ICD-10-CM

## 2019-11-05 PROCEDURE — 25000128 H RX IP 250 OP 636: Performed by: FAMILY MEDICINE

## 2019-11-05 PROCEDURE — 96360 HYDRATION IV INFUSION INIT: CPT

## 2019-11-05 PROCEDURE — 25800030 ZZH RX IP 258 OP 636: Performed by: FAMILY MEDICINE

## 2019-11-05 RX ORDER — HEPARIN SODIUM (PORCINE) LOCK FLUSH IV SOLN 100 UNIT/ML 100 UNIT/ML
5 SOLUTION INTRAVENOUS ONCE
Status: CANCELLED
Start: 2019-11-05

## 2019-11-05 RX ORDER — HEPARIN SODIUM (PORCINE) LOCK FLUSH IV SOLN 100 UNIT/ML 100 UNIT/ML
5 SOLUTION INTRAVENOUS ONCE
Status: COMPLETED | OUTPATIENT
Start: 2019-11-05 | End: 2019-11-05

## 2019-11-05 RX ADMIN — SODIUM CHLORIDE 1000 ML: 9 INJECTION, SOLUTION INTRAVENOUS at 09:37

## 2019-11-05 RX ADMIN — Medication 5 ML: at 10:49

## 2019-11-05 ASSESSMENT — MIFFLIN-ST. JEOR: SCORE: 1430.63

## 2019-11-05 ASSESSMENT — PAIN SCALES - GENERAL: PAINLEVEL: MODERATE PAIN (5)

## 2019-11-05 NOTE — PROGRESS NOTES
Patient is a 86 year old male here accompanied by self (sig other drove him here per his report) today for infusion of IVF per order of Dr MALIA Ray under the supervision of Dr Walter, Cardiology. Patient identified with two identifiers, order verified, and verbal consent for today's infusion obtained from patient.     Patients right sided port accessed using non-coring, 20 gauge, 3/4 inch needle. Port accessed per facility protocol. Port flushed easily, blood return noted.  No signs and symptoms of infection or infiltration.      IV pump verified with dose, drug, and rate of administration.  Infusion administered per protocol.  Patient tolerated infusion well, no signs or symptoms of adverse reaction noted.  Patient denies pain nor discomfort.     IV removed, catheter intact.  Site clean, dry and intact.  No signs or symptoms of infiltration or infection.  Covered with a sterile bandage, slight pressure applied for 30 seconds.  Pt instructed to leave bandage intact for a minimum of one hour, and to call with questions or concerns. Patient states understanding, discharged ambulatory.

## 2019-11-07 ENCOUNTER — OFFICE VISIT (OUTPATIENT)
Dept: FAMILY MEDICINE | Facility: OTHER | Age: 84
End: 2019-11-07
Attending: FAMILY MEDICINE
Payer: MEDICARE

## 2019-11-07 VITALS
BODY MASS INDEX: 24.73 KG/M2 | SYSTOLIC BLOOD PRESSURE: 102 MMHG | OXYGEN SATURATION: 96 % | HEART RATE: 79 BPM | DIASTOLIC BLOOD PRESSURE: 56 MMHG | HEIGHT: 69 IN | WEIGHT: 167 LBS

## 2019-11-07 DIAGNOSIS — F02.80 LEWY BODY DEMENTIA WITHOUT BEHAVIORAL DISTURBANCE (H): Primary | ICD-10-CM

## 2019-11-07 DIAGNOSIS — Z95.0 PACEMAKER: ICD-10-CM

## 2019-11-07 DIAGNOSIS — I95.1 AUTONOMIC ORTHOSTATIC HYPOTENSION: ICD-10-CM

## 2019-11-07 DIAGNOSIS — G31.83 LEWY BODY DEMENTIA WITHOUT BEHAVIORAL DISTURBANCE (H): Primary | ICD-10-CM

## 2019-11-07 PROCEDURE — 99214 OFFICE O/P EST MOD 30 MIN: CPT | Performed by: FAMILY MEDICINE

## 2019-11-07 PROCEDURE — G0463 HOSPITAL OUTPT CLINIC VISIT: HCPCS

## 2019-11-07 ASSESSMENT — PAIN SCALES - GENERAL: PAINLEVEL: NO PAIN (0)

## 2019-11-07 ASSESSMENT — MIFFLIN-ST. JEOR: SCORE: 1428.2

## 2019-11-07 NOTE — PROGRESS NOTES
Subjective     Jf Ortiz is a 86 year old male who presents to clinic today for the following health issues:    HPI   Patient has autonomic dysfunction.  Has a pacemaker placed.  Does have Parkinson's and felt to be related to that.  They are requesting to see neurology.  Their neurologist Dr. Myerson has retired and would like to see her partner Dr. Ellington.  He does come up here we will make that arrangement.  They were in the ER after another fall no injuries.  He has this sense of lightheadedness and then he feels like he has to go to the bathroom but the lightheadedness happens before he urinates.  He does have a wheeled walker at home but does not want to use it.      Do you check your blood pressure regularly outside of the clinic? Yes     Are you following a low salt diet? No    Are your blood pressures ever more than 140 on the top number (systolic) OR more   than 90 on the bottom number (diastolic), for example 140/90? No      How many servings of fruits and vegetables do you eat daily?  2-3    On average, how many sweetened beverages do you drink each day (soda, juice, sweet tea, etc)?   0    How many days per week do you miss taking your medication? 0    [facility]    Jf Ortiz, 86 year old, male presents with   Chief Complaint   Patient presents with     Hypertension       PAST MEDICAL HISTORY:  Past Medical History:   Diagnosis Date     Autonomic orthostatic hypotension 10/14/2016     Coronary artery disease      Dementia without behavioral disturbance (H) 7/28/2015    Diagnosis updated by automated process. Provider to review and confirm.     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     Seizure disorder (H)      Stented  coronary artery        PAST SURGICAL HISTORY:  Past Surgical History:   Procedure Laterality Date     ------------OTHER-------------  1955    ulnar and radial fx - repair ulnar and radial fx x4     ------------OTHER-------------  6/14/2011    cataract extraction     BIOPSY  08/2015    skin biopsy     BLEPHAROPLASTY BILATERAL  5/6/2014    Procedure: BLEPHAROPLASTY BILATERAL;  Surgeon: Andrew Queen MD;  Location: [facility]     BYPASS GRAFT ARTERY CORONARY  11/2006    coronary artery disease x 5, [facility]     cataract extraction and lens implantation  2011    cataracts     cataract extraction and lens implantation      cataracts     COLONOSCOPY  2012     colonoscopy with polypectomy  3/13/2009    history of polyps - repeat 3 yrs     colonoscopy with polypectomy  2006     colonoscopy with polypectomy  2005     COMBINED COLONOSCOPY WITH ARGON PLASMA COAGULATOR (APC) N/A 10/31/2014    Procedure: COMBINED COLONOSCOPY WITH ARGON PLASMA COAGULATOR (APC);  Surgeon: Bassam Aguilar MD;  Location: [facility]     COMBINED COLONOSCOPY WITH ARGON PLASMA COAGULATOR (APC) N/A 11/13/2015    Procedure: COMBINED COLONOSCOPY WITH ARGON PLASMA COAGULATOR (APC);  Surgeon: Bassam Aguilar MD;  Location: [facility]     ENDOSCOPY UPPER, COLONOSCOPY, COMBINED N/A 10/31/2014    Procedure: COMBINED ENDOSCOPY UPPER, COLONOSCOPY;  Surgeon: Bassam Aguilar MD;  [facility]: [facility]     ENDOSCOPY UPPER, COLONOSCOPY, COMBINED N/A 11/13/2015    Procedure: COMBINED ENDOSCOPY UPPER, COLONOSCOPY;  Surgeon: Bassam Aguilar MD;  [facility]: [facility]     ESOPHAGOSCOPY, GASTROSCOPY, DUODENOSCOPY (EGD), COMBINED  1/22/2014    Procedure: COMBINED ESOPHAGOSCOPY, GASTROSCOPY, DUODENOSCOPY (EGD);  UPPER ENDOSCOPY(PENA) W/ BIOPSIES;  Surgeon: Patricia Pena MD;  Location: [facility]     HERNIORRHAPHY INGUINAL Right 2/14/2018    Procedure: HERNIORRHAPHY INGUINAL;  OPEN RIGHT INGUINAL HERNIA REPAIR with Mesh;  Surgeon: Virgilio Zaragoza DO;  Location: [facility]     INSERT PORT VASCULAR  ACCESS Right 7/12/2019    Procedure: PORT PLACEMENT;  Surgeon: Lloyd Ram MD;  Location: [facility]     LARYNGOSCOPY WITH MICROSCOPE  1/22/2014    Procedure: LARYNGOSCOPY WITH MICROSCOPE;;  Surgeon: Chayo Duke MD;  Location: [facility]     pacemaker placement  2000    heart block     pacemaker placement  2011    dual-chamber     REMOVE TUBE, MYRINGOTOMY, COMBINED  1/22/2014    Procedure: COMBINED REMOVE TUBE, MYRINGOTOMY;  MICRODIRECT LARYNGOSCOPY WITH BIOPSY AND FROZEN SECTIONS removal of right ear tube and myringoplasty;  Surgeon: Chayo Duke MD;  Location: [facility]     REPLACE PACEMAKER GENERATOR N/A 8/8/2018    Procedure: REPLACE PACEMAKER GENERATOR;  Pacemaker generator change;  Surgeon: Alma Kaplan MD;  Location: [facility]     stent placement to LAD  2008     ventilation tube  5/24/2012    right in office       MEDICATIONS:  Prior to Admission medications    Medication Sig Start Date End Date Taking? Authorizing Provider   atorvastatin (LIPITOR) 20 MG tablet TAKE 1 TABLET BY MOUTH EVERY EVENING - GENERIC FOR LIPITOR 11/1/19  Yes Herman Rivers, DO   Cholecalciferol (VITAMIN D-3 PO) Take 50 mcg by mouth 2 times daily    Yes Reported, Patient   Coenzyme Q10 (CO Q 10 PO) Take 200 mg by mouth every morning    Yes Reported, Patient   Cyanocobalamin (VITAMIN B-12 PO) Take 100 mcg by mouth daily    Yes Reported, Patient   donepezil (ARICEPT) 10 MG tablet Take 10 mg by mouth At Bedtime 2/1/19  Yes Reported, Patient   Magnesium 400 MG CAPS Take 400 mg by mouth 2 times daily    Yes Reported, Patient   MELATONIN PO Take 5 mg by mouth At Bedtime    Yes Reported, Patient   metoprolol succinate ER (TOPROL-XL) 50 MG 24 hr tablet TAKE 1 TABLET (50 MG) BY MOUTH DAILY 4/25/19  Yes Juan Walter MD   midodrine (PROAMATINE) 5 MG tablet TAKE 2 TABLETS (10MG) BY MOUTH THREE TIMES DAILY  Patient taking differently: TAKE 2 TABLETS (10MG) BY MOUTH IN THE MORNING 8/2/19  Yes Dotty Wilson MD  "  multivitamin, therapeutic with minerals (MULTI-VITAMIN) TABS tablet Take 1 tablet by mouth daily   Yes Reported, Patient   Omega-3 Fatty Acids (FISH OIL) 500 MG CAPS Take 1 capsule by mouth daily    Yes Reported, Patient   PANTOPRAZOLE SODIUM PO Take 40 mg by mouth 2 times daily (before meals)    Yes Reported, Patient   potassium chloride ER (K-DUR/KLOR-CON M) 20 MEQ CR tablet TAKE 1 TABLET DAILY BY MOUTH 10/16/19  Yes FRANCES Ray MD   sodium chloride 1 GM tablet TAKE 1 TABLET BY MOUTH TWICE A DAY 12/6/18  Yes Herman Rivers,    Turmeric 500 MG TABS Take 1 tablet by mouth daily   Yes Reported, Patient       ALLERGIES:     Allergies   Allergen Reactions     Cats Unknown     Lamotrigine      Skin lesions       ROS:  Constitutional, HEENT, cardiovascular, pulmonary, gi and gu systems are negative, except as otherwise noted.      EXAM:  /56   Pulse 79   Ht 1.753 m (5' 9.02\")   Wt 75.8 kg (167 lb)   SpO2 96%   BMI 24.65 kg/m   Body mass index is 24.65 kg/m .   GENERAL APPEARANCE: healthy, alert and no distress  EYES: Eyes grossly normal to inspection, PERRL and conjunctivae and sclerae normal  NECK: no adenopathy, no asymmetry, masses, or scars and thyroid normal to palpation  RESP: lungs clear to auscultation - no rales, rhonchi or wheezes  CV: regular rates and rhythm, normal S1 S2, no S3 or S4 and no murmur, click or rub  NEURO: He walks with a cane.  He has no focal weakness.  His finger-to-nose is intact has negative drift negative Romberg.  When he walks and turns around has a little bit of delay with turning but has no evidence of cogwheeling.  Lab/ X-ray  No results found for this or any previous visit (from the past 24 hour(s)).    ASSESSMENT/PLAN:    ICD-10-CM    1. Lewy body dementia without behavioral disturbance (H) G31.83 NEUROLOGY ADULT REFERRAL    F02.80    2. Pacemaker, [employer], Dual Chamber - Dependent Z95.0    3. Autonomic orthostatic hypotension I95.1    Assessment " history of Lewy body dementia does have a pacemaker and has history of autonomic orthostatic hypotension.  Does have an appointment coming up with Dr. Dhillon.  He will continue with his twice weekly IV infusions.  Referral made to see Dr. Ellington when he is up here in out reach.  I can see patient in a month sooner if there are problems.  Wife was here with him today and she does a great job coordinating his care and helping Jf with his debilitating chronic condition      IRENE Ray MD  November 7, 2019

## 2019-11-07 NOTE — NURSING NOTE
"Chief Complaint   Patient presents with     Hypertension       Initial /56   Pulse 79   Ht 1.753 m (5' 9.02\")   Wt 75.8 kg (167 lb)   SpO2 96%   BMI 24.65 kg/m   Estimated body mass index is 24.65 kg/m  as calculated from the following:    Height as of this encounter: 1.753 m (5' 9.02\").    Weight as of this encounter: 75.8 kg (167 lb).  Medication Reconciliation: complete  Madhavi Yang LPN  "

## 2019-11-08 ENCOUNTER — INFUSION THERAPY VISIT (OUTPATIENT)
Dept: INFUSION THERAPY | Facility: OTHER | Age: 84
End: 2019-11-08
Attending: FAMILY MEDICINE
Payer: MEDICARE

## 2019-11-08 VITALS
SYSTOLIC BLOOD PRESSURE: 162 MMHG | DIASTOLIC BLOOD PRESSURE: 83 MMHG | OXYGEN SATURATION: 96 % | RESPIRATION RATE: 18 BRPM | TEMPERATURE: 96.9 F | HEART RATE: 71 BPM

## 2019-11-08 DIAGNOSIS — E86.9 VOLUME DEPLETION: Primary | ICD-10-CM

## 2019-11-08 PROCEDURE — 25000128 H RX IP 250 OP 636: Performed by: FAMILY MEDICINE

## 2019-11-08 PROCEDURE — 25800030 ZZH RX IP 258 OP 636: Performed by: FAMILY MEDICINE

## 2019-11-08 PROCEDURE — 96365 THER/PROPH/DIAG IV INF INIT: CPT

## 2019-11-08 PROCEDURE — 96360 HYDRATION IV INFUSION INIT: CPT

## 2019-11-08 RX ORDER — HEPARIN SODIUM (PORCINE) LOCK FLUSH IV SOLN 100 UNIT/ML 100 UNIT/ML
5 SOLUTION INTRAVENOUS ONCE
Status: COMPLETED | OUTPATIENT
Start: 2019-11-08 | End: 2019-11-08

## 2019-11-08 RX ORDER — HEPARIN SODIUM (PORCINE) LOCK FLUSH IV SOLN 100 UNIT/ML 100 UNIT/ML
5 SOLUTION INTRAVENOUS ONCE
Status: CANCELLED
Start: 2019-11-08

## 2019-11-08 RX ADMIN — SODIUM CHLORIDE 1000 ML: 9 INJECTION, SOLUTION INTRAVENOUS at 09:41

## 2019-11-08 RX ADMIN — Medication 5 ML: at 11:03

## 2019-11-08 NOTE — PROGRESS NOTES
Patient is a 86 here accompanied by self today for infusion of IVF per order of Dr. Ray.  Patient meets parameters for today's infusion. Patient identified with two identifiers, order verified, and verbal consent for today's infusion obtained from patient.       Patients right port accessed using non-coring, 20 gauge, 3/4 needle. Port accessed per facility protocol. Port flushed easily, blood return noted.  No signs and symptoms of infection or infiltration.      0941 IV pump verified with IVF dose, drug, and rate of administration.  Infusion administered per protocol.

## 2019-11-12 ENCOUNTER — INFUSION THERAPY VISIT (OUTPATIENT)
Dept: INFUSION THERAPY | Facility: OTHER | Age: 84
End: 2019-11-12
Attending: FAMILY MEDICINE
Payer: MEDICARE

## 2019-11-12 VITALS
OXYGEN SATURATION: 99 % | WEIGHT: 167.02 LBS | HEART RATE: 69 BPM | DIASTOLIC BLOOD PRESSURE: 75 MMHG | RESPIRATION RATE: 16 BRPM | TEMPERATURE: 97.1 F | HEIGHT: 69 IN | BODY MASS INDEX: 24.74 KG/M2 | SYSTOLIC BLOOD PRESSURE: 136 MMHG

## 2019-11-12 DIAGNOSIS — E86.9 VOLUME DEPLETION: Primary | ICD-10-CM

## 2019-11-12 PROCEDURE — 96365 THER/PROPH/DIAG IV INF INIT: CPT

## 2019-11-12 PROCEDURE — 25000128 H RX IP 250 OP 636: Performed by: FAMILY MEDICINE

## 2019-11-12 PROCEDURE — 96360 HYDRATION IV INFUSION INIT: CPT

## 2019-11-12 PROCEDURE — 25800030 ZZH RX IP 258 OP 636: Performed by: FAMILY MEDICINE

## 2019-11-12 RX ORDER — HEPARIN SODIUM (PORCINE) LOCK FLUSH IV SOLN 100 UNIT/ML 100 UNIT/ML
5 SOLUTION INTRAVENOUS ONCE
Status: CANCELLED
Start: 2019-11-12

## 2019-11-12 RX ORDER — HEPARIN SODIUM (PORCINE) LOCK FLUSH IV SOLN 100 UNIT/ML 100 UNIT/ML
5 SOLUTION INTRAVENOUS ONCE
Status: COMPLETED | OUTPATIENT
Start: 2019-11-12 | End: 2019-11-12

## 2019-11-12 RX ADMIN — Medication 5 ML: at 10:57

## 2019-11-12 RX ADMIN — SODIUM CHLORIDE 1000 ML: 9 INJECTION, SOLUTION INTRAVENOUS at 09:51

## 2019-11-12 ASSESSMENT — MIFFLIN-ST. JEOR: SCORE: 1428.23

## 2019-11-12 NOTE — PROGRESS NOTES
Patient is a 86 year old male here today for infusion of IVF per order of Dr Ray under the supervision of Dr Walter.  Patient meets parameters for today's infusion. Patient identified with two identifiers, order verified, and verbal consent for today's infusion obtained from patient.       Patient denies questions or concerns regarding infusion and/or medication(s) being administered.    Patients right port accessed using power non-coring, 20 gauge, 3/4 needle. Port accessed per facility protocol. Port flushed easily, blood return noted.  No signs and symptoms of infection or infiltration.      0951 IV pump verified with dose, drug, and rate of administration.  Infusion administered per protocol.

## 2019-11-15 ENCOUNTER — INFUSION THERAPY VISIT (OUTPATIENT)
Dept: INFUSION THERAPY | Facility: OTHER | Age: 84
End: 2019-11-15
Attending: FAMILY MEDICINE
Payer: MEDICARE

## 2019-11-15 VITALS
DIASTOLIC BLOOD PRESSURE: 56 MMHG | WEIGHT: 166.45 LBS | HEIGHT: 69 IN | OXYGEN SATURATION: 98 % | RESPIRATION RATE: 16 BRPM | BODY MASS INDEX: 24.65 KG/M2 | TEMPERATURE: 97.8 F | SYSTOLIC BLOOD PRESSURE: 119 MMHG | HEART RATE: 79 BPM

## 2019-11-15 DIAGNOSIS — E86.9 VOLUME DEPLETION: Primary | ICD-10-CM

## 2019-11-15 PROCEDURE — 96360 HYDRATION IV INFUSION INIT: CPT

## 2019-11-15 PROCEDURE — 25000128 H RX IP 250 OP 636: Performed by: FAMILY MEDICINE

## 2019-11-15 PROCEDURE — 25800030 ZZH RX IP 258 OP 636: Performed by: FAMILY MEDICINE

## 2019-11-15 RX ORDER — HEPARIN SODIUM (PORCINE) LOCK FLUSH IV SOLN 100 UNIT/ML 100 UNIT/ML
5 SOLUTION INTRAVENOUS ONCE
Status: COMPLETED | OUTPATIENT
Start: 2019-11-15 | End: 2019-11-15

## 2019-11-15 RX ORDER — HEPARIN SODIUM (PORCINE) LOCK FLUSH IV SOLN 100 UNIT/ML 100 UNIT/ML
5 SOLUTION INTRAVENOUS ONCE
Status: CANCELLED
Start: 2019-11-15

## 2019-11-15 RX ADMIN — SODIUM CHLORIDE 1000 ML: 9 INJECTION, SOLUTION INTRAVENOUS at 09:40

## 2019-11-15 RX ADMIN — Medication 5 ML: at 10:50

## 2019-11-15 ASSESSMENT — MIFFLIN-ST. JEOR: SCORE: 1425.63

## 2019-11-15 NOTE — PROGRESS NOTES
Patient is a 86 year old male here accompanied by self today for infusion of IVF per order of Dr MALIA Ray under the supervision of Dr Walter, Cardiology.  Patient meets parameters for today's infusion. Patient identified with two identifiers, order verified, and verbal consent for today's infusion obtained from patient.       Patients right sided port accessed using non-coring, 19 gauge, 3/4 inch needle. Port accessed per facility protocol. Port flushed easily, blood return noted.  No signs and symptoms of infection or infiltration.      IV pump verified with dose, drug, and rate of administration.  Infusion administered per protocol.  Patient tolerated infusion well, no signs or symptoms of adverse reaction noted.  Patient denies pain nor discomfort.     IV removed, catheter intact.  Site clean, dry and intact.  No signs or symptoms of infiltration or infection.  Covered with a sterile bandage, slight pressure applied for 30 seconds.  Pt instructed to leave bandage intact for a minimum of one hour, and to call with questions or concerns. Patient states understanding, discharged ambulatory.

## 2019-11-19 ENCOUNTER — INFUSION THERAPY VISIT (OUTPATIENT)
Dept: INFUSION THERAPY | Facility: OTHER | Age: 84
End: 2019-11-19
Attending: FAMILY MEDICINE
Payer: MEDICARE

## 2019-11-19 VITALS — SYSTOLIC BLOOD PRESSURE: 151 MMHG | DIASTOLIC BLOOD PRESSURE: 78 MMHG | HEART RATE: 76 BPM

## 2019-11-19 DIAGNOSIS — E86.9 VOLUME DEPLETION: Primary | ICD-10-CM

## 2019-11-19 PROCEDURE — 96360 HYDRATION IV INFUSION INIT: CPT

## 2019-11-19 PROCEDURE — 96365 THER/PROPH/DIAG IV INF INIT: CPT

## 2019-11-19 PROCEDURE — 25800030 ZZH RX IP 258 OP 636: Performed by: FAMILY MEDICINE

## 2019-11-19 PROCEDURE — 25000128 H RX IP 250 OP 636: Performed by: FAMILY MEDICINE

## 2019-11-19 RX ORDER — HEPARIN SODIUM (PORCINE) LOCK FLUSH IV SOLN 100 UNIT/ML 100 UNIT/ML
5 SOLUTION INTRAVENOUS ONCE
Status: CANCELLED
Start: 2019-11-19

## 2019-11-19 RX ORDER — HEPARIN SODIUM (PORCINE) LOCK FLUSH IV SOLN 100 UNIT/ML 100 UNIT/ML
5 SOLUTION INTRAVENOUS ONCE
Status: COMPLETED | OUTPATIENT
Start: 2019-11-19 | End: 2019-11-19

## 2019-11-19 RX ADMIN — Medication 5 ML: at 10:56

## 2019-11-19 RX ADMIN — SODIUM CHLORIDE 1000 ML: 9 INJECTION, SOLUTION INTRAVENOUS at 09:45

## 2019-11-19 NOTE — PROGRESS NOTES
Patient is a 86 year old male here accompanied by self today for infusion of IVF per order of Dr. Ray. Patient identified with two identifiers, order verified, and verbal consent for today's infusion obtained from patient.     Patients right sided port accessed using non-coring, 22 gauge, 3/4 needle. Port accessed per facility protocol. Port flushed easily, blood return noted.  No signs and symptoms of infection or infiltration.      IV pump verified with dose, drug, and rate of administration .  Infusion administered per protocol.

## 2019-11-22 ENCOUNTER — INFUSION THERAPY VISIT (OUTPATIENT)
Dept: INFUSION THERAPY | Facility: OTHER | Age: 84
End: 2019-11-22
Attending: FAMILY MEDICINE
Payer: MEDICARE

## 2019-11-22 VITALS
TEMPERATURE: 97.7 F | RESPIRATION RATE: 20 BRPM | SYSTOLIC BLOOD PRESSURE: 108 MMHG | HEART RATE: 71 BPM | DIASTOLIC BLOOD PRESSURE: 55 MMHG

## 2019-11-22 DIAGNOSIS — E86.9 VOLUME DEPLETION: Primary | ICD-10-CM

## 2019-11-22 PROCEDURE — 25000128 H RX IP 250 OP 636: Performed by: FAMILY MEDICINE

## 2019-11-22 PROCEDURE — 96360 HYDRATION IV INFUSION INIT: CPT

## 2019-11-22 PROCEDURE — 96365 THER/PROPH/DIAG IV INF INIT: CPT

## 2019-11-22 PROCEDURE — 25800030 ZZH RX IP 258 OP 636: Performed by: FAMILY MEDICINE

## 2019-11-22 RX ORDER — HEPARIN SODIUM (PORCINE) LOCK FLUSH IV SOLN 100 UNIT/ML 100 UNIT/ML
5 SOLUTION INTRAVENOUS ONCE
Status: CANCELLED
Start: 2019-11-22

## 2019-11-22 RX ORDER — HEPARIN SODIUM (PORCINE) LOCK FLUSH IV SOLN 100 UNIT/ML 100 UNIT/ML
5 SOLUTION INTRAVENOUS ONCE
Status: COMPLETED | OUTPATIENT
Start: 2019-11-22 | End: 2019-11-22

## 2019-11-22 RX ADMIN — Medication 5 ML: at 10:52

## 2019-11-22 RX ADMIN — SODIUM CHLORIDE 1000 ML: 9 INJECTION, SOLUTION INTRAVENOUS at 09:42

## 2019-11-22 NOTE — PROGRESS NOTES
Patient is a 86 year old male here accompanied by self today for infusion of IVF per order of Dr MALIA Ray under the supervision of Dr Walter, Cardiology.  Patient meets parameters for today's infusion. Patient identified with two identifiers, order verified, and verbal consent for today's infusion obtained from patient.       Patients right sided port accessed using non-coring, 22 gauge, 3/4 inch needle. Port accessed per facility protocol. Port flushed easily, blood return noted.  No signs and symptoms of infection or infiltration.       IV pump verified with dose, drug, and rate of administration.  Infusion administered per protocol.  Patient tolerated infusion well, no signs or symptoms of adverse reaction noted.  Patient denies pain nor discomfort.      IV removed, catheter intact.  Site clean, dry and intact.  No signs or symptoms of infiltration or infection.  Covered with a sterile bandage, slight pressure applied for 30 seconds.  Pt instructed to leave bandage intact for a minimum of one hour, and to call with questions or concerns. Patient states understanding, discharged ambulatory.

## 2019-11-26 ENCOUNTER — INFUSION THERAPY VISIT (OUTPATIENT)
Dept: INFUSION THERAPY | Facility: OTHER | Age: 84
End: 2019-11-26
Attending: FAMILY MEDICINE
Payer: MEDICARE

## 2019-11-26 ENCOUNTER — OFFICE VISIT (OUTPATIENT)
Dept: CARDIOLOGY | Facility: OTHER | Age: 84
End: 2019-11-26
Attending: INTERNAL MEDICINE
Payer: MEDICARE

## 2019-11-26 VITALS
BODY MASS INDEX: 24.85 KG/M2 | DIASTOLIC BLOOD PRESSURE: 72 MMHG | OXYGEN SATURATION: 97 % | HEART RATE: 74 BPM | SYSTOLIC BLOOD PRESSURE: 133 MMHG | TEMPERATURE: 95.7 F | WEIGHT: 167.77 LBS | HEIGHT: 69 IN

## 2019-11-26 VITALS
DIASTOLIC BLOOD PRESSURE: 91 MMHG | WEIGHT: 167 LBS | BODY MASS INDEX: 24.73 KG/M2 | SYSTOLIC BLOOD PRESSURE: 161 MMHG | HEART RATE: 73 BPM | RESPIRATION RATE: 16 BRPM | TEMPERATURE: 97.3 F | OXYGEN SATURATION: 99 % | HEIGHT: 69 IN

## 2019-11-26 DIAGNOSIS — I95.1 AUTONOMIC ORTHOSTATIC HYPOTENSION: ICD-10-CM

## 2019-11-26 DIAGNOSIS — E86.9 VOLUME DEPLETION: Primary | ICD-10-CM

## 2019-11-26 DIAGNOSIS — I10 HYPERTENSION WITH TARGET BLOOD PRESSURE GOAL UNDER 150/90: ICD-10-CM

## 2019-11-26 DIAGNOSIS — G90.3 NEUROGENIC ORTHOSTATIC HYPOTENSION (H): Primary | ICD-10-CM

## 2019-11-26 PROCEDURE — 96360 HYDRATION IV INFUSION INIT: CPT

## 2019-11-26 PROCEDURE — G0463 HOSPITAL OUTPT CLINIC VISIT: HCPCS | Mod: 25

## 2019-11-26 PROCEDURE — G0463 HOSPITAL OUTPT CLINIC VISIT: HCPCS

## 2019-11-26 PROCEDURE — 25800030 ZZH RX IP 258 OP 636: Performed by: FAMILY MEDICINE

## 2019-11-26 PROCEDURE — 99214 OFFICE O/P EST MOD 30 MIN: CPT | Performed by: INTERNAL MEDICINE

## 2019-11-26 PROCEDURE — 25000128 H RX IP 250 OP 636: Performed by: FAMILY MEDICINE

## 2019-11-26 RX ORDER — HEPARIN SODIUM (PORCINE) LOCK FLUSH IV SOLN 100 UNIT/ML 100 UNIT/ML
5 SOLUTION INTRAVENOUS ONCE
Status: CANCELLED
Start: 2019-11-26

## 2019-11-26 RX ORDER — HEPARIN SODIUM (PORCINE) LOCK FLUSH IV SOLN 100 UNIT/ML 100 UNIT/ML
5 SOLUTION INTRAVENOUS ONCE
Status: COMPLETED | OUTPATIENT
Start: 2019-11-26 | End: 2019-11-26

## 2019-11-26 RX ORDER — MIDODRINE HYDROCHLORIDE 5 MG/1
TABLET ORAL
Qty: 540 TABLET | Refills: 3 | Status: SHIPPED | OUTPATIENT
Start: 2019-11-26 | End: 2022-03-23

## 2019-11-26 RX ADMIN — Medication 5 ML: at 10:50

## 2019-11-26 RX ADMIN — SODIUM CHLORIDE 1000 ML: 9 INJECTION, SOLUTION INTRAVENOUS at 09:44

## 2019-11-26 ASSESSMENT — PAIN SCALES - GENERAL: PAINLEVEL: NO PAIN (0)

## 2019-11-26 ASSESSMENT — MIFFLIN-ST. JEOR
SCORE: 1427.89
SCORE: 1431.63

## 2019-11-26 NOTE — PROGRESS NOTES
Patient is a 86 year old male here accompanied by self today for infusion of IVF per order of Dr Ray under the supervision of Dr Walter, Cardiology.  Patient meets parameters for today's infusion. Patient identified with two identifiers, order verified, and verbal consent for today's infusion obtained from patient.      Patients right sided port accessed using non-coring, 19 gauge, 3/4 inch power needle. Port accessed per facility protocol. Port flushed easily, blood return noted.  No signs and symptoms of infection or infiltration.      IV pump verified with dose, drug, and rate of administration.  Infusion administered per protocol.  Patient tolerated infusion well, no signs or symptoms of adverse reaction noted.  Patient denies pain nor discomfort.     IV removed, catheter intact.  Site clean, dry and intact.  No signs or symptoms of infiltration or infection.  Covered with a sterile bandage, slight pressure applied for 30 seconds.  Pt instructed to leave bandage intact for a minimum of one hour, and to call with questions or concerns.  Copy of appointments, discharge instructions, and after visit summary (AVS) provided to patient.  Patient states understanding, discharged ambulatory.

## 2019-11-26 NOTE — PATIENT INSTRUCTIONS
You were seen by Dr. Walter, 11/26/2019.     1.  You will be provided a prescription for an abdominal binder.  Wearing an abdominal binder will help to maintain your blood pressure.      2.  You may try increasing fluids with meals.  Drinking 2 8 oz glasses of water or juice with your meal.      3.  Take your daily nap around 1:30 and then 15 to 20 minutes before getting out of bed take the Midodrine.       1 liter NS twice a week    continue midodrine, scheduled nap and midodrine prior to getting up    Abdominal binder and/or compression shorts (thighs and abdomen)    You will follow up with Dr. Walter in 6 weeks.       Please call the cardiology office with problems, questions, or concerns at [phone].    If you experience chest pain, chest pressure, chest tightness, shortness of breath, fainting, lightheadedness, nausea, vomiting, or other concerning symptoms, please report to the Emergency Department or call 911. These symptoms may be emergent, and best treated in the Emergency Department.       Elaine LUTZ RN-BSN  Cardiology   [facility]  [phone]

## 2019-11-26 NOTE — PROGRESS NOTES
"      Clinical Cardiac Electrophysiology    Chief Complaint: pacemaker, neurogenic orthostatic hypotension    HPI: I was happy to see Mr. Contreras in the EP clinic.  He has a history of pacemaker placement with a recent generator change.  The device is a [employer] dual lead pacemaker.  He scheduled for device check later today.    He also has a history of orthostatic hypotension secondary to Lewy body dementia.  This is been managed with midodrine and fludrocortisone.  On this regimen he and his wife reports his orthostatic hypotension has been much better.    He was seen in the emergency department on October 23 complaining of dizziness and was noted to be hypertensive with systolic blood pressures in the 180s-190 range.  He was given clonidine as well as IV metoprolol.  He was also started on oral metoprolol 50 mg daily.  He has not been troubled by high blood pressure in the past.    February 26, 2019: Frequently has to sit back down after standing. Recent fall - not witness, loss of consciousness is not clear. He is oral fluid intake is poor - \"maybe 30 ounces\" if pushed. His  reports his memory is getting worse. BP at home, while up, are \"normal\" but remain elevated when checked in supine position, such as in the ED recently. His  reports he does better for a day or two after IV fluids.    April 23, 2019: At his last visit we increased the frequency of his IV infusions to twice weekly. He is still taking midodrine 10 mg twice a day. Home BPs 90 to 180s. He is no longer taking metoprolol. Since starting the increase in IV fluid no fainting/syncope but has to sit back down frequently. He did fall/pass out in the night and had a compression fracture. Overall, his wife feels he is better.    August 27, 2019: no falls or syncope on increased IV infusions.     November 26, 2019 Interval history: Mr. Ortiz's wife and step-son report increased falls. This is usually worse in the afternoon/evening " but can happen in the am as well. He is not taking midodrine after the am dose due to occasional afternoon naps. He is getting his infusions twice a week.     Current Outpatient Medications   Medication Sig Dispense Refill     atorvastatin (LIPITOR) 20 MG tablet TAKE 1 TABLET BY MOUTH EVERY EVENING - GENERIC FOR LIPITOR 90 tablet 0     Cholecalciferol (VITAMIN D-3 PO) Take 50 mcg by mouth 2 times daily        Coenzyme Q10 (CO Q 10 PO) Take 200 mg by mouth every morning        Cyanocobalamin (VITAMIN B-12 PO) Take 100 mcg by mouth daily        donepezil (ARICEPT) 10 MG tablet Take 10 mg by mouth At Bedtime  9     Magnesium 400 MG CAPS Take 400 mg by mouth 2 times daily        MELATONIN PO Take 5 mg by mouth At Bedtime        metoprolol succinate ER (TOPROL-XL) 50 MG 24 hr tablet TAKE 1 TABLET (50 MG) BY MOUTH DAILY 90 tablet 1     midodrine (PROAMATINE) 5 MG tablet TAKE 2 TABLETS (10MG) BY MOUTH THREE TIMES DAILY (Patient taking differently: Takes 2 tablets in the morning) 540 tablet 0     multivitamin, therapeutic with minerals (MULTI-VITAMIN) TABS tablet Take 1 tablet by mouth daily       Omega-3 Fatty Acids (FISH OIL PO) Take 1 capsule by mouth daily 1300mg       PANTOPRAZOLE SODIUM PO Take 40 mg by mouth 2 times daily (before meals)        potassium chloride ER (K-DUR/KLOR-CON M) 20 MEQ CR tablet TAKE 1 TABLET DAILY BY MOUTH 30 tablet 3     sodium chloride 1 GM tablet TAKE 1 TABLET BY MOUTH TWICE A  tablet 3     Turmeric 500 MG TABS Take 1 tablet by mouth daily         Past Medical History:   Diagnosis Date     Autonomic orthostatic hypotension 10/14/2016     Coronary artery disease      Dementia without behavioral disturbance (H) 7/28/2015    Diagnosis updated by automated process. Provider to review and confirm.     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     Seizure disorder (H)      Stented coronary artery        Past Surgical History:   Procedure Laterality Date     ------------OTHER-------------  1955    ulnar and radial fx - repair ulnar and radial fx x4     ------------OTHER-------------  6/14/2011    cataract extraction     BIOPSY  08/2015    skin biopsy     BLEPHAROPLASTY BILATERAL  5/6/2014    Procedure: BLEPHAROPLASTY BILATERAL;  Surgeon: Andrew Queen MD;  Location: [facility]     BYPASS GRAFT ARTERY CORONARY  11/2006    coronary artery disease x 5, [facility]     cataract extraction and lens implantation  2011    cataracts     cataract extraction and lens implantation      cataracts     COLONOSCOPY  2012     colonoscopy with polypectomy  3/13/2009    history of polyps - repeat 3 yrs     colonoscopy with polypectomy  2006     colonoscopy with polypectomy  2005     COMBINED COLONOSCOPY WITH ARGON PLASMA COAGULATOR (APC) N/A 10/31/2014    Procedure: COMBINED COLONOSCOPY WITH ARGON PLASMA COAGULATOR (APC);  Surgeon: Bassam Aguilar MD;  Location: [facility]     COMBINED COLONOSCOPY WITH ARGON PLASMA COAGULATOR (APC) N/A 11/13/2015    Procedure: COMBINED COLONOSCOPY WITH ARGON PLASMA COAGULATOR (APC);  Surgeon: Bassam Aguilar MD;  Location: [facility]     ENDOSCOPY UPPER, COLONOSCOPY, COMBINED N/A 10/31/2014    Procedure: COMBINED ENDOSCOPY UPPER, COLONOSCOPY;  Surgeon: Bassam Aguilar MD;  Location: [facility]     ENDOSCOPY UPPER, COLONOSCOPY, COMBINED N/A 11/13/2015    Procedure: COMBINED ENDOSCOPY UPPER, COLONOSCOPY;  Surgeon: Bassam Aguilar MD;  Location: [facility]     ESOPHAGOSCOPY, GASTROSCOPY, DUODENOSCOPY (EGD), COMBINED  1/22/2014    Procedure: COMBINED ESOPHAGOSCOPY, GASTROSCOPY, DUODENOSCOPY (EGD);  UPPER ENDOSCOPY(CARPENTER) W/ BIOPSIES;  Surgeon: Patricia Carpenter MD;  Location: [facility]     HERNIORRHAPHY INGUINAL Right 2/14/2018    Procedure: HERNIORRHAPHY  "INGUINAL;  OPEN RIGHT INGUINAL HERNIA REPAIR with Mesh;  Surgeon: Virgilio Zaragoza DO;  Location: [facility]     INSERT PORT VASCULAR ACCESS Right 2019    Procedure: PORT PLACEMENT;  Surgeon: Lloyd Ram MD;  Location: [facility]     LARYNGOSCOPY WITH MICROSCOPE  2014    Procedure: LARYNGOSCOPY WITH MICROSCOPE;;  Surgeon: Chayo Duke MD;  Location: [facility]     pacemaker placement      heart block     pacemaker placement      dual-chamber     REMOVE TUBE, MYRINGOTOMY, COMBINED  2014    Procedure: COMBINED REMOVE TUBE, MYRINGOTOMY;  MICRODIRECT LARYNGOSCOPY WITH BIOPSY AND FROZEN SECTIONS removal of right ear tube and myringoplasty;  Surgeon: Chayo Duke MD;  Location: [facility]     REPLACE PACEMAKER GENERATOR N/A 2018    Procedure: REPLACE PACEMAKER GENERATOR;  Pacemaker generator change;  Surgeon: Alma Kaplan MD;  Location: [facility]     stent placement to LAD       ventilation tube  2012    right in office       Family History   Problem Relation Age of Onset     Diabetes Mother      Breast Cancer Daughter        Social History     Tobacco Use     Smoking status: Former Smoker     Packs/day: 1.00     Years: 5.00     Pack years: 5.00     Types: Cigarettes     Last attempt to quit: 1985     Years since quittin.9     Smokeless tobacco: Never Used     Tobacco comment: quit in    Substance Use Topics     Alcohol use: Yes     Comment: social       Allergies   Allergen Reactions     Cats Unknown     Lamotrigine      Skin lesions         ROS: memory, dementia and movement symptoms without recent changes, otherwise comprehensive review is negative unless otherwise noted in the HPI.   2019/woa      Physical Examination:  Vitals: BP (!) 161/91 (BP Location: Left arm, Patient Position: Chair, Cuff Size: Adult Regular)   Pulse 72   Temp 97.3  F (36.3  C) (Tympanic)   Resp 16   Ht 1.753 m (5' 9\")   Wt 75.8 kg (167 lb)   SpO2 99%   BMI 24.66 kg/m  "   BMI= Body mass index is 24.66 kg/m .    Repeat Blood Pressure:  BP Pulse Site Cuff Size Time Date   (!) 161/91 73 Left arm Adult Regular  8:26 AM 11/26/2019     Orthostatic Vitals  BP Pulse Position Site Cuff Size Time Date   149/88 83 Sitting Left arm Adult Regular  8:29 AM 11/26/2019   130/77 73 Standing Left arm Adult Regular  8:51 AM 11/26/2019   102/62 72 Sitting Left arm Adult Regular  8:53 AM 11/26/2019   108/68 69 Standing Left arm Adult Large  8:56 AM 11/26/2019       GENERAL APPEARANCE: frail, alert and no distress  HEENT:  mucosa dry, no cyanosis.  NECK: JVP is not visible  CHEST: lungs clear to auscultation  CARDIOVASCULAR: regular rhythm, normal S1S2, soft early systolic murmur, no gallop, precordium quiet  EXTREMITIES: no clubbing, cyanosis or edema  VASC: Warm  SKIN: skin turgor normal    Laboratory and diagnostic data reviewed November 26, 2019:    Results for AKILA WALLER (MRN [id number]) as of 11/26/2019 08:35   Ref. Range 10/21/2019 12:54   Sodium Latest Ref Range: 133 - 144 mmol/L 140   Potassium Latest Ref Range: 3.4 - 5.3 mmol/L 4.0   Chloride Latest Ref Range: 94 - 109 mmol/L 110 (H)   Carbon Dioxide Latest Ref Range: 20 - 32 mmol/L 26   Urea Nitrogen Latest Ref Range: 7 - 30 mg/dL 16   Creatinine Latest Ref Range: 0.66 - 1.25 mg/dL 1.11   GFR Estimate Latest Ref Range: >60 mL/min/1.73_m2 60 (L)     Assessment and recommendations:    1) Neurogenic orthostatic hypotension    2) Supine hypertension     No syncope since starting IV infusions but increasing near syncope.      1 liter NS twice a week    continue midodrine, scheduled nap and midodrine prior to getting up    Abdominal binder and/or compression shorts (thighs and abdomen)    3) Sinus node dysfunction, s/p permanent pacemaker - normal function; atrial sensitivity reprogrammed      I appreciate the chance to help with Mr. Waller's care.    Portions of this note were dictated using speech recognition software. The note has been  proofread but errors in the text may have been overlooked. Please contact me if there are any concerns regarding the accuracy of the dictation.

## 2019-11-26 NOTE — NURSING NOTE
"Chief Complaint   Patient presents with     RECHECK     ER follow-up;  Patient's son states that patient has had \"dizziness and borderline syncope\" and it happens \"about a dozen times a day and it happens most frequently when he's rising\".  Family states they are \"worried about patient falling\".        Initial BP (!) 161/91 (BP Location: Left arm, Patient Position: Chair, Cuff Size: Adult Regular)   Pulse 72   Temp 97.3  F (36.3  C) (Tympanic)   Resp 16   Ht 1.753 m (5' 9\")   Wt 75.8 kg (167 lb)   SpO2 99%   BMI 24.66 kg/m   Estimated body mass index is 24.66 kg/m  as calculated from the following:    Height as of this encounter: 1.753 m (5' 9\").    Weight as of this encounter: 75.8 kg (167 lb).  Medication Reconciliation: complete  Rekha Quispe LPN    "

## 2019-11-29 ENCOUNTER — INFUSION THERAPY VISIT (OUTPATIENT)
Dept: INFUSION THERAPY | Facility: OTHER | Age: 84
End: 2019-11-29
Attending: FAMILY MEDICINE
Payer: MEDICARE

## 2019-11-29 VITALS
RESPIRATION RATE: 18 BRPM | HEART RATE: 72 BPM | WEIGHT: 169.09 LBS | TEMPERATURE: 97.8 F | DIASTOLIC BLOOD PRESSURE: 81 MMHG | SYSTOLIC BLOOD PRESSURE: 155 MMHG | BODY MASS INDEX: 24.96 KG/M2 | OXYGEN SATURATION: 98 %

## 2019-11-29 DIAGNOSIS — E86.9 VOLUME DEPLETION: Primary | ICD-10-CM

## 2019-11-29 PROCEDURE — 25000128 H RX IP 250 OP 636: Performed by: FAMILY MEDICINE

## 2019-11-29 PROCEDURE — 25800030 ZZH RX IP 258 OP 636: Performed by: FAMILY MEDICINE

## 2019-11-29 PROCEDURE — 96360 HYDRATION IV INFUSION INIT: CPT

## 2019-11-29 RX ORDER — HEPARIN SODIUM (PORCINE) LOCK FLUSH IV SOLN 100 UNIT/ML 100 UNIT/ML
5 SOLUTION INTRAVENOUS ONCE
Status: CANCELLED
Start: 2019-11-29

## 2019-11-29 RX ORDER — HEPARIN SODIUM (PORCINE) LOCK FLUSH IV SOLN 100 UNIT/ML 100 UNIT/ML
5 SOLUTION INTRAVENOUS ONCE
Status: COMPLETED | OUTPATIENT
Start: 2019-11-29 | End: 2019-11-29

## 2019-11-29 RX ADMIN — SODIUM CHLORIDE 1000 ML: 9 INJECTION, SOLUTION INTRAVENOUS at 11:43

## 2019-11-29 RX ADMIN — Medication 5 ML: at 12:52

## 2019-11-29 NOTE — PROGRESS NOTES
Patient is a 86 year old male here accompanied by self today for infusion of IVF per order of Dr Ray under the supervision of Dr Walter.  Patient meets parameters for today's infusion. Patient identified with two identifiers, order verified, and verbal consent for today's infusion obtained from patient.         Patients right port accessed using non-coring, 19 gauge, 3/4 inch power needle. Port accessed per facility protocol. Port flushed easily, blood return noted.  No signs and symptoms of infection or infiltration.      IV pump verified with dose, drug, and rate of administration.  Infusion administered per protocol.  Patient tolerated infusion well, no signs or symptoms of adverse reaction noted.  Patient denies pain nor discomfort.     IV removed by Juan J Mccoy RN, catheter intact.  Site clean, dry and intact.  No signs or symptoms of infiltration or infection.  Covered with a sterile bandage, slight pressure applied for 30 seconds.  Pt instructed to leave bandage intact for a minimum of one hour, and to call with questions or concerns. Patient states understanding, discharged ambulatory.

## 2019-12-03 ENCOUNTER — ANCILLARY PROCEDURE (OUTPATIENT)
Dept: CARDIOLOGY | Facility: CLINIC | Age: 84
End: 2019-12-03
Attending: SURGERY
Payer: MEDICARE

## 2019-12-03 ENCOUNTER — INFUSION THERAPY VISIT (OUTPATIENT)
Dept: INFUSION THERAPY | Facility: OTHER | Age: 84
End: 2019-12-03
Attending: FAMILY MEDICINE
Payer: MEDICARE

## 2019-12-03 ENCOUNTER — ALLIED HEALTH/NURSE VISIT (OUTPATIENT)
Dept: CARDIOLOGY | Facility: OTHER | Age: 84
End: 2019-12-03
Attending: NURSE PRACTITIONER
Payer: MEDICARE

## 2019-12-03 VITALS — SYSTOLIC BLOOD PRESSURE: 122 MMHG | DIASTOLIC BLOOD PRESSURE: 64 MMHG | HEART RATE: 87 BPM

## 2019-12-03 DIAGNOSIS — Z95.0 PACEMAKER: Primary | ICD-10-CM

## 2019-12-03 DIAGNOSIS — E86.9 VOLUME DEPLETION: Primary | ICD-10-CM

## 2019-12-03 PROCEDURE — 93294 REM INTERROG EVL PM/LDLS PM: CPT | Performed by: INTERNAL MEDICINE

## 2019-12-03 PROCEDURE — 99207 ZZC NO CHARGE LOS: CPT | Performed by: INTERNAL MEDICINE

## 2019-12-03 PROCEDURE — 25000128 H RX IP 250 OP 636: Performed by: FAMILY MEDICINE

## 2019-12-03 PROCEDURE — 96365 THER/PROPH/DIAG IV INF INIT: CPT

## 2019-12-03 PROCEDURE — 25800030 ZZH RX IP 258 OP 636: Performed by: FAMILY MEDICINE

## 2019-12-03 PROCEDURE — 96360 HYDRATION IV INFUSION INIT: CPT

## 2019-12-03 PROCEDURE — 93296 REM INTERROG EVL PM/IDS: CPT | Mod: ZF

## 2019-12-03 RX ORDER — HEPARIN SODIUM (PORCINE) LOCK FLUSH IV SOLN 100 UNIT/ML 100 UNIT/ML
5 SOLUTION INTRAVENOUS ONCE
Status: CANCELLED
Start: 2019-12-03

## 2019-12-03 RX ORDER — HEPARIN SODIUM (PORCINE) LOCK FLUSH IV SOLN 100 UNIT/ML 100 UNIT/ML
5 SOLUTION INTRAVENOUS ONCE
Status: COMPLETED | OUTPATIENT
Start: 2019-12-03 | End: 2019-12-03

## 2019-12-03 RX ADMIN — Medication 5 ML: at 10:59

## 2019-12-03 RX ADMIN — SODIUM CHLORIDE 1000 ML: 9 INJECTION, SOLUTION INTRAVENOUS at 09:50

## 2019-12-03 NOTE — PROGRESS NOTES
Patient's significant other Uda in 12/3/2019 requesting information on how to send the pacemaker remote transmission.  I have provided the device clinic card at [facility] to call for instructions. She verbalized understanding and agrees.   Elaine Mohan RN-BSN

## 2019-12-03 NOTE — PROGRESS NOTES
Patient tolerated infusion well, no signs or symptoms of adverse reaction noted.  Patient denies pain nor discomfort.     Needle removed, catheter intact.  Site clean, dry and intact.  No signs or symptoms of infiltration or infection.  Covered with a sterile bandage, slight pressure applied for 30 seconds.  Pt instructed to leave bandage intact for a minimum of one hour, and to call with questions or concerns. Patient states understanding, discharged ambulatory.

## 2019-12-03 NOTE — PROGRESS NOTES
"Patient is a 86 year old male here accompanied by self today for infusion of IVF per order of Dr. Venancio Ray.  Patient meets parameters for today's infusion. Patient identified with two identifiers, order verified, and verbal consent for today's infusion obtained from patient.      Patients right sided port accessed using non-coring, 19 gauge, 3/4\" needle. Port accessed per facility protocol. Port flushed easily, blood return noted.  No signs and symptoms of infection or infiltration.      IV pump verified with dose, drug, and rate of administration.  Infusion administered per protocol.    "

## 2019-12-06 ENCOUNTER — INFUSION THERAPY VISIT (OUTPATIENT)
Dept: INFUSION THERAPY | Facility: OTHER | Age: 84
End: 2019-12-06
Attending: FAMILY MEDICINE
Payer: MEDICARE

## 2019-12-06 ENCOUNTER — TRANSFERRED RECORDS (OUTPATIENT)
Dept: HEALTH INFORMATION MANAGEMENT | Facility: CLINIC | Age: 84
End: 2019-12-06

## 2019-12-06 VITALS
RESPIRATION RATE: 18 BRPM | HEART RATE: 71 BPM | DIASTOLIC BLOOD PRESSURE: 72 MMHG | TEMPERATURE: 96.8 F | OXYGEN SATURATION: 99 % | SYSTOLIC BLOOD PRESSURE: 132 MMHG

## 2019-12-06 DIAGNOSIS — E86.9 VOLUME DEPLETION: Primary | ICD-10-CM

## 2019-12-06 PROCEDURE — 25800030 ZZH RX IP 258 OP 636: Performed by: FAMILY MEDICINE

## 2019-12-06 PROCEDURE — 25000128 H RX IP 250 OP 636: Performed by: FAMILY MEDICINE

## 2019-12-06 PROCEDURE — 96360 HYDRATION IV INFUSION INIT: CPT

## 2019-12-06 RX ORDER — HEPARIN SODIUM (PORCINE) LOCK FLUSH IV SOLN 100 UNIT/ML 100 UNIT/ML
5 SOLUTION INTRAVENOUS ONCE
Status: CANCELLED
Start: 2019-12-06

## 2019-12-06 RX ORDER — HEPARIN SODIUM (PORCINE) LOCK FLUSH IV SOLN 100 UNIT/ML 100 UNIT/ML
5 SOLUTION INTRAVENOUS ONCE
Status: COMPLETED | OUTPATIENT
Start: 2019-12-06 | End: 2019-12-06

## 2019-12-06 RX ADMIN — SODIUM CHLORIDE 1000 ML: 9 INJECTION, SOLUTION INTRAVENOUS at 09:47

## 2019-12-06 RX ADMIN — Medication 5 ML: at 10:56

## 2019-12-06 NOTE — PROGRESS NOTES
Patient is a 86 year old male here today for infusion of IVF per order of Dr Venancio Ray.  Patient meets parameters for today's infusion. Patient identified with two identifiers, order verified, and verbal consent for today's infusion obtained from patient.       Patient denies questions or concerns regarding infusion and/or medication(s) being administered.    Patients right port accessed using non-coring, 19 gauge, 3/4 needle. Port accessed per facility protocol. Port flushed easily, blood return noted.  No signs and symptoms of infection or infiltration.      0947 IV pump verified with dose, drug, and rate of administration.  Infusion administered per protocol.  Patient tolerated infusion well, no signs or symptoms of adverse reaction noted.  Patient denies pain nor discomfort.     IV removed, catheter intact.  Site clean, dry and intact.  No signs or symptoms of infiltration or infection.  Covered with a sterile bandage, slight pressure applied for 30 seconds.  Pt instructed to leave bandage intact for a minimum of one hour, and to call with questions or concerns.  Patient declines copy of appointments, discharge instructions, and after visit summary (AVS).  Patient states understanding, discharged ambulatory.

## 2019-12-10 ENCOUNTER — INFUSION THERAPY VISIT (OUTPATIENT)
Dept: INFUSION THERAPY | Facility: OTHER | Age: 84
End: 2019-12-10
Attending: FAMILY MEDICINE
Payer: MEDICARE

## 2019-12-10 VITALS
SYSTOLIC BLOOD PRESSURE: 140 MMHG | RESPIRATION RATE: 18 BRPM | OXYGEN SATURATION: 98 % | DIASTOLIC BLOOD PRESSURE: 71 MMHG | TEMPERATURE: 97.4 F | HEART RATE: 70 BPM

## 2019-12-10 DIAGNOSIS — E86.9 VOLUME DEPLETION: Primary | ICD-10-CM

## 2019-12-10 PROCEDURE — 25800030 ZZH RX IP 258 OP 636: Performed by: FAMILY MEDICINE

## 2019-12-10 PROCEDURE — 96360 HYDRATION IV INFUSION INIT: CPT

## 2019-12-10 PROCEDURE — 25000128 H RX IP 250 OP 636: Performed by: FAMILY MEDICINE

## 2019-12-10 RX ORDER — HEPARIN SODIUM (PORCINE) LOCK FLUSH IV SOLN 100 UNIT/ML 100 UNIT/ML
5 SOLUTION INTRAVENOUS ONCE
Status: COMPLETED | OUTPATIENT
Start: 2019-12-10 | End: 2019-12-10

## 2019-12-10 RX ORDER — HEPARIN SODIUM (PORCINE) LOCK FLUSH IV SOLN 100 UNIT/ML 100 UNIT/ML
5 SOLUTION INTRAVENOUS ONCE
Status: CANCELLED
Start: 2019-12-10

## 2019-12-10 RX ADMIN — SODIUM CHLORIDE 1000 ML: 9 INJECTION, SOLUTION INTRAVENOUS at 09:40

## 2019-12-10 RX ADMIN — Medication 5 ML: at 10:49

## 2019-12-10 ASSESSMENT — PAIN SCALES - GENERAL: PAINLEVEL: MODERATE PAIN (4)

## 2019-12-10 NOTE — PROGRESS NOTES
Patient is a 86 year old male here accompanied by self today for infusion of IVF per order of Dr MALIA Ray under the supervision of Dr Walter, Cardiology. Patient identified with two identifiers, order verified, and verbal consent for today's infusion obtained from patient.      Patients right sided port accessed using non-coring, 20 gauge, 3/4 inch power needle. Port accessed per facility protocol. Port flushed easily, blood return noted.  No signs and symptoms of infection or infiltration.      IV pump verified with dose, drug, and rate of administration.  Infusion administered per protocol.  Patient tolerated infusion well, no signs or symptoms of adverse reaction noted.  Patient denies pain nor discomfort.     IV removed, catheter intact.  Site clean, dry and intact.  No signs or symptoms of infiltration or infection.  Covered with a sterile bandage, slight pressure applied for 30 seconds.  Pt instructed to leave bandage intact for a minimum of one hour, and to call with questions or concerns. Patient states understanding, discharged ambulatory.

## 2019-12-10 NOTE — PATIENT INSTRUCTIONS

## 2019-12-13 ENCOUNTER — INFUSION THERAPY VISIT (OUTPATIENT)
Dept: INFUSION THERAPY | Facility: OTHER | Age: 84
End: 2019-12-13
Attending: FAMILY MEDICINE
Payer: MEDICARE

## 2019-12-13 VITALS
HEART RATE: 69 BPM | SYSTOLIC BLOOD PRESSURE: 157 MMHG | TEMPERATURE: 96.8 F | DIASTOLIC BLOOD PRESSURE: 64 MMHG | OXYGEN SATURATION: 99 %

## 2019-12-13 DIAGNOSIS — E86.9 VOLUME DEPLETION: Primary | ICD-10-CM

## 2019-12-13 PROCEDURE — 25800030 ZZH RX IP 258 OP 636: Performed by: FAMILY MEDICINE

## 2019-12-13 PROCEDURE — 25000128 H RX IP 250 OP 636: Performed by: FAMILY MEDICINE

## 2019-12-13 PROCEDURE — 96360 HYDRATION IV INFUSION INIT: CPT

## 2019-12-13 RX ORDER — HEPARIN SODIUM (PORCINE) LOCK FLUSH IV SOLN 100 UNIT/ML 100 UNIT/ML
5 SOLUTION INTRAVENOUS ONCE
Status: COMPLETED | OUTPATIENT
Start: 2019-12-13 | End: 2019-12-13

## 2019-12-13 RX ORDER — HEPARIN SODIUM (PORCINE) LOCK FLUSH IV SOLN 100 UNIT/ML 100 UNIT/ML
5 SOLUTION INTRAVENOUS ONCE
Status: CANCELLED
Start: 2019-12-13

## 2019-12-13 RX ADMIN — SODIUM CHLORIDE 1000 ML: 9 INJECTION, SOLUTION INTRAVENOUS at 09:47

## 2019-12-13 RX ADMIN — Medication 5 ML: at 10:55

## 2019-12-13 NOTE — PATIENT INSTRUCTIONS

## 2019-12-17 ENCOUNTER — INFUSION THERAPY VISIT (OUTPATIENT)
Dept: INFUSION THERAPY | Facility: OTHER | Age: 84
End: 2019-12-17
Attending: FAMILY MEDICINE
Payer: MEDICARE

## 2019-12-17 VITALS
HEIGHT: 69 IN | TEMPERATURE: 96.1 F | OXYGEN SATURATION: 96 % | DIASTOLIC BLOOD PRESSURE: 86 MMHG | BODY MASS INDEX: 24.73 KG/M2 | SYSTOLIC BLOOD PRESSURE: 154 MMHG | HEART RATE: 72 BPM | WEIGHT: 167 LBS

## 2019-12-17 DIAGNOSIS — E86.9 VOLUME DEPLETION: Primary | ICD-10-CM

## 2019-12-17 PROCEDURE — 96365 THER/PROPH/DIAG IV INF INIT: CPT

## 2019-12-17 PROCEDURE — 25800030 ZZH RX IP 258 OP 636: Performed by: FAMILY MEDICINE

## 2019-12-17 PROCEDURE — 96360 HYDRATION IV INFUSION INIT: CPT

## 2019-12-17 PROCEDURE — 25000128 H RX IP 250 OP 636: Performed by: FAMILY MEDICINE

## 2019-12-17 RX ORDER — HEPARIN SODIUM (PORCINE) LOCK FLUSH IV SOLN 100 UNIT/ML 100 UNIT/ML
5 SOLUTION INTRAVENOUS ONCE
Status: COMPLETED | OUTPATIENT
Start: 2019-12-17 | End: 2019-12-17

## 2019-12-17 RX ORDER — HEPARIN SODIUM (PORCINE) LOCK FLUSH IV SOLN 100 UNIT/ML 100 UNIT/ML
5 SOLUTION INTRAVENOUS ONCE
Status: CANCELLED
Start: 2019-12-17

## 2019-12-17 RX ADMIN — SODIUM CHLORIDE 1000 ML: 9 INJECTION, SOLUTION INTRAVENOUS at 09:40

## 2019-12-17 RX ADMIN — Medication 5 ML: at 10:45

## 2019-12-17 ASSESSMENT — MIFFLIN-ST. JEOR: SCORE: 1428.13

## 2019-12-17 NOTE — PROGRESS NOTES
Patient is a 87 y/o here accompanied by self today for infusion of IVF per order of Dr. Ray.  Patient meets parameters for today's infusion. Patient identified with two identifiers, order verified, and verbal consent for today's infusion obtained from patient.               Patients right sided port accessed using non-coring, 19 gauge, 3/4 needle. Port accessed per facility protocol. Port flushed easily, blood return noted.  No signs and symptoms of infection or infiltration.      IV pump verified with dose, drug, and rate of administration.  Infusion administered per protocol.

## 2019-12-18 NOTE — NURSING NOTE
"Chief Complaint   Patient presents with     Surgical Followup     right inguinal hernia repair on 2/14/18       Initial /73  Pulse 72  Temp 98  F (36.7  C) (Tympanic)  Resp 18  Ht 5' 5\" (1.651 m)  Wt 170 lb (77.1 kg)  SpO2 97%  BMI 28.29 kg/m2 Estimated body mass index is 28.29 kg/(m^2) as calculated from the following:    Height as of this encounter: 5' 5\" (1.651 m).    Weight as of this encounter: 170 lb (77.1 kg).  Medication Reconciliation: complete     Priti Garnica LPN  " no

## 2019-12-20 ENCOUNTER — INFUSION THERAPY VISIT (OUTPATIENT)
Dept: INFUSION THERAPY | Facility: OTHER | Age: 84
End: 2019-12-20
Attending: FAMILY MEDICINE
Payer: MEDICARE

## 2019-12-20 VITALS
HEART RATE: 74 BPM | SYSTOLIC BLOOD PRESSURE: 120 MMHG | OXYGEN SATURATION: 98 % | TEMPERATURE: 97.9 F | DIASTOLIC BLOOD PRESSURE: 69 MMHG | RESPIRATION RATE: 18 BRPM

## 2019-12-20 DIAGNOSIS — E86.9 VOLUME DEPLETION: Primary | ICD-10-CM

## 2019-12-20 PROCEDURE — 96365 THER/PROPH/DIAG IV INF INIT: CPT

## 2019-12-20 PROCEDURE — 25800030 ZZH RX IP 258 OP 636: Performed by: FAMILY MEDICINE

## 2019-12-20 PROCEDURE — 96360 HYDRATION IV INFUSION INIT: CPT

## 2019-12-20 PROCEDURE — 25000128 H RX IP 250 OP 636: Performed by: FAMILY MEDICINE

## 2019-12-20 RX ORDER — HEPARIN SODIUM (PORCINE) LOCK FLUSH IV SOLN 100 UNIT/ML 100 UNIT/ML
5 SOLUTION INTRAVENOUS ONCE
Status: CANCELLED
Start: 2019-12-20

## 2019-12-20 RX ORDER — HEPARIN SODIUM (PORCINE) LOCK FLUSH IV SOLN 100 UNIT/ML 100 UNIT/ML
5 SOLUTION INTRAVENOUS ONCE
Status: COMPLETED | OUTPATIENT
Start: 2019-12-20 | End: 2019-12-20

## 2019-12-20 RX ADMIN — HEPARIN 5 ML: 100 SYRINGE at 10:49

## 2019-12-20 RX ADMIN — SODIUM CHLORIDE 1000 ML: 9 INJECTION, SOLUTION INTRAVENOUS at 09:43

## 2019-12-24 ENCOUNTER — INFUSION THERAPY VISIT (OUTPATIENT)
Dept: INFUSION THERAPY | Facility: OTHER | Age: 84
End: 2019-12-24
Attending: FAMILY MEDICINE
Payer: MEDICARE

## 2019-12-24 VITALS
SYSTOLIC BLOOD PRESSURE: 135 MMHG | OXYGEN SATURATION: 98 % | DIASTOLIC BLOOD PRESSURE: 80 MMHG | BODY MASS INDEX: 25.21 KG/M2 | HEIGHT: 69 IN | HEART RATE: 87 BPM | TEMPERATURE: 97.8 F | WEIGHT: 170.19 LBS

## 2019-12-24 DIAGNOSIS — E86.9 VOLUME DEPLETION: Primary | ICD-10-CM

## 2019-12-24 PROCEDURE — 25000128 H RX IP 250 OP 636: Performed by: FAMILY MEDICINE

## 2019-12-24 PROCEDURE — 25800030 ZZH RX IP 258 OP 636: Performed by: FAMILY MEDICINE

## 2019-12-24 PROCEDURE — 96360 HYDRATION IV INFUSION INIT: CPT

## 2019-12-24 RX ORDER — HEPARIN SODIUM (PORCINE) LOCK FLUSH IV SOLN 100 UNIT/ML 100 UNIT/ML
5 SOLUTION INTRAVENOUS ONCE
Status: CANCELLED
Start: 2019-12-24

## 2019-12-24 RX ORDER — HEPARIN SODIUM (PORCINE) LOCK FLUSH IV SOLN 100 UNIT/ML 100 UNIT/ML
5 SOLUTION INTRAVENOUS ONCE
Status: COMPLETED | OUTPATIENT
Start: 2019-12-24 | End: 2019-12-24

## 2019-12-24 RX ADMIN — SODIUM CHLORIDE 1000 ML: 9 INJECTION, SOLUTION INTRAVENOUS at 09:43

## 2019-12-24 RX ADMIN — HEPARIN 5 ML: 100 SYRINGE at 10:02

## 2019-12-24 ASSESSMENT — MIFFLIN-ST. JEOR: SCORE: 1442.63

## 2019-12-24 NOTE — PROGRESS NOTES
Patient is a 86 year old male here accompanied by self today for infusion of IVF per order of Dr MALIA Ray under the supervision of Dr Walter, Cardiology.  Patient identified with two identifiers, order verified, and verbal consent for today's infusion obtained from patient.       Patient denies questions or concerns regarding infusion and/or medication(s) being administered.    Patients right sided port accessed using non-coring, 19 gauge, 3/4 inch power needle. Port accessed per facility protocol. Port flushed easily, blood return noted.  No signs and symptoms of infection or infiltration.      IV pump verified with dose, drug, and rate of administration.  Infusion administered per protocol.  Patient tolerated infusion well, no signs or symptoms of adverse reaction noted.  Patient denies pain nor discomfort.     IV removed, catheter intact.  Site clean, dry and intact.  No signs or symptoms of infiltration or infection.  Covered with a sterile bandage, slight pressure applied for 30 seconds.  Pt instructed to leave bandage intact for a minimum of one hour, and to call with questions or concerns. Patient states understanding, discharged ambulatory.

## 2019-12-24 NOTE — PATIENT INSTRUCTIONS

## 2019-12-27 ENCOUNTER — INFUSION THERAPY VISIT (OUTPATIENT)
Dept: INFUSION THERAPY | Facility: OTHER | Age: 84
End: 2019-12-27
Attending: FAMILY MEDICINE
Payer: MEDICARE

## 2019-12-27 VITALS — DIASTOLIC BLOOD PRESSURE: 68 MMHG | SYSTOLIC BLOOD PRESSURE: 150 MMHG | HEART RATE: 72 BPM

## 2019-12-27 DIAGNOSIS — E86.9 VOLUME DEPLETION: Primary | ICD-10-CM

## 2019-12-27 PROCEDURE — 96360 HYDRATION IV INFUSION INIT: CPT

## 2019-12-27 PROCEDURE — 96365 THER/PROPH/DIAG IV INF INIT: CPT

## 2019-12-27 PROCEDURE — 25000128 H RX IP 250 OP 636: Performed by: FAMILY MEDICINE

## 2019-12-27 PROCEDURE — 25800030 ZZH RX IP 258 OP 636: Performed by: FAMILY MEDICINE

## 2019-12-27 RX ORDER — HEPARIN SODIUM (PORCINE) LOCK FLUSH IV SOLN 100 UNIT/ML 100 UNIT/ML
5 SOLUTION INTRAVENOUS ONCE
Status: COMPLETED | OUTPATIENT
Start: 2019-12-27 | End: 2019-12-27

## 2019-12-27 RX ORDER — HEPARIN SODIUM (PORCINE) LOCK FLUSH IV SOLN 100 UNIT/ML 100 UNIT/ML
5 SOLUTION INTRAVENOUS ONCE
Status: CANCELLED
Start: 2019-12-27

## 2019-12-27 RX ADMIN — SODIUM CHLORIDE 1000 ML: 9 INJECTION, SOLUTION INTRAVENOUS at 09:53

## 2019-12-27 RX ADMIN — Medication 5 ML: at 11:00

## 2019-12-27 NOTE — PROGRESS NOTES
"Patient is a 86 year old male here accompanied by saelf today for infusion of IVF per order of Dr. Venancio Ray. Patient identified with two identifiers, order verified, and verbal consent for today's infusion obtained from patient.      Patients right sided port accessed using non-coring, 20 gauge, 3/4\" needle. Port accessed per facility protocol. Port flushed easily, blood return noted.  No signs and symptoms of infection or infiltration.      IV pump verified with dose, drug, and rate of administration.  Infusion administered per protocol.    "

## 2019-12-27 NOTE — PROGRESS NOTES
Patient tolerated infusion well, no signs or symptoms of adverse reaction noted.  Patient denies pain nor discomfort.     Needle removed, tip intact.  Site clean, dry and intact.  No signs or symptoms of infiltration or infection.  Covered with a sterile bandage, slight pressure applied for 30 seconds.  Pt instructed to leave bandage intact for a minimum of one hour, and to call with questions or concerns.  Copy of appointments, discharge instructions, and after visit summary (AVS) provided to patient.  Patient states understanding, discharged ambulatory.

## 2019-12-31 ENCOUNTER — INFUSION THERAPY VISIT (OUTPATIENT)
Dept: INFUSION THERAPY | Facility: OTHER | Age: 84
End: 2019-12-31
Attending: FAMILY MEDICINE
Payer: MEDICARE

## 2019-12-31 VITALS
TEMPERATURE: 94.6 F | OXYGEN SATURATION: 98 % | SYSTOLIC BLOOD PRESSURE: 115 MMHG | HEART RATE: 81 BPM | DIASTOLIC BLOOD PRESSURE: 74 MMHG

## 2019-12-31 DIAGNOSIS — E86.9 VOLUME DEPLETION: Primary | ICD-10-CM

## 2019-12-31 PROCEDURE — 25000128 H RX IP 250 OP 636: Performed by: FAMILY MEDICINE

## 2019-12-31 PROCEDURE — 96360 HYDRATION IV INFUSION INIT: CPT

## 2019-12-31 PROCEDURE — 96365 THER/PROPH/DIAG IV INF INIT: CPT

## 2019-12-31 PROCEDURE — 25800030 ZZH RX IP 258 OP 636: Performed by: FAMILY MEDICINE

## 2019-12-31 RX ORDER — HEPARIN SODIUM (PORCINE) LOCK FLUSH IV SOLN 100 UNIT/ML 100 UNIT/ML
5 SOLUTION INTRAVENOUS ONCE
Status: COMPLETED | OUTPATIENT
Start: 2019-12-31 | End: 2019-12-31

## 2019-12-31 RX ORDER — HEPARIN SODIUM (PORCINE) LOCK FLUSH IV SOLN 100 UNIT/ML 100 UNIT/ML
5 SOLUTION INTRAVENOUS ONCE
Status: CANCELLED
Start: 2019-12-31

## 2019-12-31 RX ADMIN — Medication 5 ML: at 10:44

## 2019-12-31 RX ADMIN — SODIUM CHLORIDE 1000 ML: 9 INJECTION, SOLUTION INTRAVENOUS at 09:36

## 2019-12-31 NOTE — PROGRESS NOTES
"Patient is a 86 year old male here accompanied by self today for infusion of IVf per order of Dr. Venancio Ray. Patient identified with two identifiers, order verified, and verbal consent for today's infusion obtained from patient.      Patients right sided port accessed using non-coring, 20 gauge, 3/4\" needle. Port accessed per facility protocol. Port flushed easily, blood return noted.  No signs and symptoms of infection or infiltration.      0936: IV pump verified with dose, drug, and rate of administration.  Infusion administered per protocol.    "

## 2020-01-03 ENCOUNTER — INFUSION THERAPY VISIT (OUTPATIENT)
Dept: INFUSION THERAPY | Facility: OTHER | Age: 85
End: 2020-01-03
Attending: FAMILY MEDICINE
Payer: MEDICARE

## 2020-01-03 VITALS — DIASTOLIC BLOOD PRESSURE: 68 MMHG | HEART RATE: 72 BPM | OXYGEN SATURATION: 100 % | SYSTOLIC BLOOD PRESSURE: 138 MMHG

## 2020-01-03 DIAGNOSIS — E86.9 VOLUME DEPLETION: Primary | ICD-10-CM

## 2020-01-03 PROCEDURE — 96365 THER/PROPH/DIAG IV INF INIT: CPT

## 2020-01-03 PROCEDURE — 25000128 H RX IP 250 OP 636: Performed by: FAMILY MEDICINE

## 2020-01-03 PROCEDURE — 25800030 ZZH RX IP 258 OP 636: Performed by: FAMILY MEDICINE

## 2020-01-03 PROCEDURE — 96360 HYDRATION IV INFUSION INIT: CPT

## 2020-01-03 RX ORDER — HEPARIN SODIUM (PORCINE) LOCK FLUSH IV SOLN 100 UNIT/ML 100 UNIT/ML
5 SOLUTION INTRAVENOUS ONCE
Status: COMPLETED | OUTPATIENT
Start: 2020-01-03 | End: 2020-01-03

## 2020-01-03 RX ORDER — HEPARIN SODIUM (PORCINE) LOCK FLUSH IV SOLN 100 UNIT/ML 100 UNIT/ML
5 SOLUTION INTRAVENOUS ONCE
Status: CANCELLED
Start: 2020-01-03

## 2020-01-03 RX ADMIN — SODIUM CHLORIDE 1000 ML: 9 INJECTION, SOLUTION INTRAVENOUS at 09:43

## 2020-01-03 RX ADMIN — Medication 5 ML: at 10:53

## 2020-01-07 ENCOUNTER — INFUSION THERAPY VISIT (OUTPATIENT)
Dept: INFUSION THERAPY | Facility: OTHER | Age: 85
End: 2020-01-07
Payer: MEDICARE

## 2020-01-07 VITALS
RESPIRATION RATE: 16 BRPM | SYSTOLIC BLOOD PRESSURE: 159 MMHG | TEMPERATURE: 97.7 F | OXYGEN SATURATION: 98 % | HEART RATE: 68 BPM | DIASTOLIC BLOOD PRESSURE: 82 MMHG

## 2020-01-07 DIAGNOSIS — E86.9 VOLUME DEPLETION: Primary | ICD-10-CM

## 2020-01-07 PROCEDURE — 25800030 ZZH RX IP 258 OP 636: Performed by: FAMILY MEDICINE

## 2020-01-07 PROCEDURE — 96360 HYDRATION IV INFUSION INIT: CPT

## 2020-01-07 PROCEDURE — 25000128 H RX IP 250 OP 636: Performed by: FAMILY MEDICINE

## 2020-01-07 RX ORDER — HEPARIN SODIUM (PORCINE) LOCK FLUSH IV SOLN 100 UNIT/ML 100 UNIT/ML
5 SOLUTION INTRAVENOUS ONCE
Status: COMPLETED | OUTPATIENT
Start: 2020-01-07 | End: 2020-01-07

## 2020-01-07 RX ORDER — HEPARIN SODIUM (PORCINE) LOCK FLUSH IV SOLN 100 UNIT/ML 100 UNIT/ML
5 SOLUTION INTRAVENOUS ONCE
Status: CANCELLED
Start: 2020-01-07

## 2020-01-07 RX ADMIN — SODIUM CHLORIDE 1000 ML: 9 INJECTION, SOLUTION INTRAVENOUS at 13:23

## 2020-01-07 RX ADMIN — Medication 5 ML: at 14:28

## 2020-01-07 NOTE — PATIENT INSTRUCTIONS

## 2020-01-10 ENCOUNTER — INFUSION THERAPY VISIT (OUTPATIENT)
Dept: INFUSION THERAPY | Facility: OTHER | Age: 85
End: 2020-01-10
Payer: MEDICARE

## 2020-01-10 VITALS
HEART RATE: 73 BPM | TEMPERATURE: 97.4 F | OXYGEN SATURATION: 98 % | RESPIRATION RATE: 16 BRPM | DIASTOLIC BLOOD PRESSURE: 67 MMHG | SYSTOLIC BLOOD PRESSURE: 113 MMHG

## 2020-01-10 DIAGNOSIS — I95.1 ORTHOSTATIC HYPOTENSION: ICD-10-CM

## 2020-01-10 DIAGNOSIS — E86.9 VOLUME DEPLETION: Primary | ICD-10-CM

## 2020-01-10 DIAGNOSIS — G20.A1 PARKINSON'S DISEASE (H): ICD-10-CM

## 2020-01-10 PROCEDURE — 96360 HYDRATION IV INFUSION INIT: CPT

## 2020-01-10 PROCEDURE — 25000128 H RX IP 250 OP 636: Performed by: FAMILY MEDICINE

## 2020-01-10 PROCEDURE — 25800030 ZZH RX IP 258 OP 636: Performed by: FAMILY MEDICINE

## 2020-01-10 RX ORDER — HEPARIN SODIUM (PORCINE) LOCK FLUSH IV SOLN 100 UNIT/ML 100 UNIT/ML
5 SOLUTION INTRAVENOUS ONCE
Status: CANCELLED
Start: 2020-01-10

## 2020-01-10 RX ORDER — SODIUM CHLORIDE 1 G/1
TABLET ORAL
Qty: 100 TABLET | OUTPATIENT
Start: 2020-01-10

## 2020-01-10 RX ORDER — HEPARIN SODIUM (PORCINE) LOCK FLUSH IV SOLN 100 UNIT/ML 100 UNIT/ML
5 SOLUTION INTRAVENOUS ONCE
Status: COMPLETED | OUTPATIENT
Start: 2020-01-10 | End: 2020-01-10

## 2020-01-10 RX ADMIN — Medication 5 ML: at 10:58

## 2020-01-10 RX ADMIN — SODIUM CHLORIDE 1000 ML: 9 INJECTION, SOLUTION INTRAVENOUS at 09:47

## 2020-01-10 NOTE — TELEPHONE ENCOUNTER
Linda Brannon sent Rx request for the following:      SODIUM CHLORIDE 1G TABLET  Sig: TAKE 1 TABLET BY MOUTH TWICE A DAY     Last Prescription Date:   12/6/2018  Last Fill Qty/Refills:         180, R-3    Last Office Visit:              11/7/2019     Refusing script as PCP is Dr. Ray at [facility] in [place]. Miller Sims RN, BSN  ....................  1/10/2020   4:19 PM

## 2020-01-10 NOTE — PROGRESS NOTES
Patient is a 86 year old male here today for infusion of IVF per order of Dr MALIA staples.  Patient meets parameters for today's infusion. Patient identified with two identifiers, order verified, and verbal consent for today's infusion obtained from patient.      Patient denies questions or concerns regarding infusion and/or medication(s) being administered.    Patients right port accessed using non-coring, 19 gauge, 3/4 needle. Port accessed per facility protocol. Port flushed easily, blood return noted.  No signs and symptoms of infection or infiltration.      0947 IV pump verified with IVF dose, drug, and rate of administration.  Infusion administered per protocol.  Patient tolerated infusion well, no signs or symptoms of adverse reaction noted.  Patient denies pain nor discomfort.

## 2020-01-14 ENCOUNTER — INFUSION THERAPY VISIT (OUTPATIENT)
Dept: INFUSION THERAPY | Facility: OTHER | Age: 85
End: 2020-01-14
Payer: MEDICARE

## 2020-01-14 VITALS
TEMPERATURE: 97.2 F | HEART RATE: 70 BPM | DIASTOLIC BLOOD PRESSURE: 94 MMHG | SYSTOLIC BLOOD PRESSURE: 170 MMHG | RESPIRATION RATE: 16 BRPM

## 2020-01-14 DIAGNOSIS — E86.9 VOLUME DEPLETION: Primary | ICD-10-CM

## 2020-01-14 PROCEDURE — 25000128 H RX IP 250 OP 636: Performed by: FAMILY MEDICINE

## 2020-01-14 PROCEDURE — 96360 HYDRATION IV INFUSION INIT: CPT

## 2020-01-14 PROCEDURE — 25800030 ZZH RX IP 258 OP 636: Performed by: FAMILY MEDICINE

## 2020-01-14 PROCEDURE — 96365 THER/PROPH/DIAG IV INF INIT: CPT

## 2020-01-14 RX ORDER — HEPARIN SODIUM (PORCINE) LOCK FLUSH IV SOLN 100 UNIT/ML 100 UNIT/ML
5 SOLUTION INTRAVENOUS ONCE
Status: COMPLETED | OUTPATIENT
Start: 2020-01-14 | End: 2020-01-14

## 2020-01-14 RX ORDER — HEPARIN SODIUM (PORCINE) LOCK FLUSH IV SOLN 100 UNIT/ML 100 UNIT/ML
5 SOLUTION INTRAVENOUS ONCE
Status: CANCELLED
Start: 2020-01-14

## 2020-01-14 RX ADMIN — SODIUM CHLORIDE 1000 ML: 9 INJECTION, SOLUTION INTRAVENOUS at 09:42

## 2020-01-14 RX ADMIN — Medication 5 ML: at 10:53

## 2020-01-14 NOTE — PROGRESS NOTES
Patient is a 86 year old male here accompanied by self today for infusion of IVf per order of Dr. MALIA Ray. Patient identified with two identifiers, order verified, and verbal consent for today's infusion obtained from patient.     Patients right sided port accessed using non-coring, 20 gauge, 3/4 needle. Port accessed per facility protocol. Port flushed easily, blood return noted.  No signs and symptoms of infection or infiltration.      0942: IV pump verified with dose, drug, and rate of administration.  Infusion administered per protocol.

## 2020-01-15 DIAGNOSIS — G20.A1 PARKINSON'S DISEASE (H): ICD-10-CM

## 2020-01-15 DIAGNOSIS — I95.1 ORTHOSTATIC HYPOTENSION: ICD-10-CM

## 2020-01-15 NOTE — TELEPHONE ENCOUNTER
Sodium chloride   Last Written Prescription Date:  12/6/2018  Last Fill Quantity: 180,   # refills: 3  Last Office Visit: 11/7/2019  Future Office visit:    Next 5 appointments (look out 90 days)    Jan 21, 2020 11:00 AM CST  (Arrive by 10:45 AM)  [facility] Visit with Juan Walter MD  [facility] [address]  [place] [address]  [phone]   Mar 24, 2020 10:30 AM CDT  (Arrive by 10:15 AM)  Return Visit with Juan Walter MD  [facility] ([facility] ) [address]  [place] [address]  [phone]           Routing refill request to provider for review/approval because:  Please advise on refill

## 2020-01-16 RX ORDER — SODIUM CHLORIDE 1 G/1
TABLET ORAL
Qty: 180 TABLET | Refills: 3 | Status: SHIPPED | OUTPATIENT
Start: 2020-01-16 | End: 2020-11-17

## 2020-01-17 ENCOUNTER — INFUSION THERAPY VISIT (OUTPATIENT)
Dept: INFUSION THERAPY | Facility: OTHER | Age: 85
End: 2020-01-17
Payer: MEDICARE

## 2020-01-17 VITALS
WEIGHT: 167.99 LBS | HEIGHT: 69 IN | SYSTOLIC BLOOD PRESSURE: 164 MMHG | OXYGEN SATURATION: 96 % | BODY MASS INDEX: 24.88 KG/M2 | DIASTOLIC BLOOD PRESSURE: 90 MMHG | HEART RATE: 80 BPM | TEMPERATURE: 95.2 F

## 2020-01-17 DIAGNOSIS — E86.9 VOLUME DEPLETION: Primary | ICD-10-CM

## 2020-01-17 PROCEDURE — 96360 HYDRATION IV INFUSION INIT: CPT

## 2020-01-17 PROCEDURE — 25800030 ZZH RX IP 258 OP 636: Performed by: FAMILY MEDICINE

## 2020-01-17 PROCEDURE — 96365 THER/PROPH/DIAG IV INF INIT: CPT

## 2020-01-17 PROCEDURE — 25000128 H RX IP 250 OP 636: Performed by: FAMILY MEDICINE

## 2020-01-17 RX ORDER — HEPARIN SODIUM (PORCINE) LOCK FLUSH IV SOLN 100 UNIT/ML 100 UNIT/ML
5 SOLUTION INTRAVENOUS ONCE
Status: CANCELLED
Start: 2020-01-17

## 2020-01-17 RX ORDER — HEPARIN SODIUM (PORCINE) LOCK FLUSH IV SOLN 100 UNIT/ML 100 UNIT/ML
5 SOLUTION INTRAVENOUS ONCE
Status: COMPLETED | OUTPATIENT
Start: 2020-01-17 | End: 2020-01-17

## 2020-01-17 RX ADMIN — Medication 5 ML: at 10:59

## 2020-01-17 RX ADMIN — SODIUM CHLORIDE 1000 ML: 9 INJECTION, SOLUTION INTRAVENOUS at 09:51

## 2020-01-17 ASSESSMENT — MIFFLIN-ST. JEOR: SCORE: 1432.63

## 2020-01-17 NOTE — PROGRESS NOTES
Patient is a 86 year old male here accompanied by self today for infusion of IVf per order of Dr. milton Ray. Patient identified with two identifiers, order verified, and verbal consent for today's infusion obtained from patient.     Patients right sided port accessed using non-coring, 20 gauge, 3/4 needle. Port accessed per facility protocol. Port flushed easily, blood return noted.  No signs and symptoms of infection or infiltration.      0951: IV pump verified with dose, drug, and rate of administration.  Infusion administered per protocol.  Patient tolerated infusion well, no signs or symptoms of adverse reaction noted.  Patient denies pain nor discomfort.     Needle removed, catheter intact.  Site clean, dry and intact.  No signs or symptoms of infiltration or infection.  Covered with a sterile bandage, slight pressure applied for 30 seconds.  Pt instructed to leave bandage intact for a minimum of one hour, and to call with questions or concerns.  Copy of appointments, discharge instructions, and after visit summary (AVS) provided to patient.  Patient states understanding, discharged ambulatory.

## 2020-01-21 ENCOUNTER — INFUSION THERAPY VISIT (OUTPATIENT)
Dept: INFUSION THERAPY | Facility: OTHER | Age: 85
End: 2020-01-21
Payer: MEDICARE

## 2020-01-21 ENCOUNTER — OFFICE VISIT (OUTPATIENT)
Dept: CARDIOLOGY | Facility: OTHER | Age: 85
End: 2020-01-21
Attending: INTERNAL MEDICINE
Payer: MEDICARE

## 2020-01-21 VITALS
DIASTOLIC BLOOD PRESSURE: 71 MMHG | RESPIRATION RATE: 16 BRPM | BODY MASS INDEX: 25.03 KG/M2 | OXYGEN SATURATION: 98 % | SYSTOLIC BLOOD PRESSURE: 131 MMHG | WEIGHT: 169 LBS | HEART RATE: 76 BPM | TEMPERATURE: 97 F | HEIGHT: 69 IN

## 2020-01-21 VITALS
HEART RATE: 76 BPM | WEIGHT: 169.09 LBS | BODY MASS INDEX: 25.04 KG/M2 | TEMPERATURE: 97 F | OXYGEN SATURATION: 98 % | SYSTOLIC BLOOD PRESSURE: 150 MMHG | DIASTOLIC BLOOD PRESSURE: 88 MMHG | HEIGHT: 69 IN | RESPIRATION RATE: 16 BRPM

## 2020-01-21 DIAGNOSIS — G31.83 LEWY BODY DEMENTIA WITHOUT BEHAVIORAL DISTURBANCE (H): ICD-10-CM

## 2020-01-21 DIAGNOSIS — Z95.0 PACEMAKER: ICD-10-CM

## 2020-01-21 DIAGNOSIS — E86.9 VOLUME DEPLETION: Primary | ICD-10-CM

## 2020-01-21 DIAGNOSIS — F02.80 LEWY BODY DEMENTIA WITHOUT BEHAVIORAL DISTURBANCE (H): ICD-10-CM

## 2020-01-21 DIAGNOSIS — I10 HYPERTENSION WITH TARGET BLOOD PRESSURE GOAL UNDER 150/90: ICD-10-CM

## 2020-01-21 DIAGNOSIS — G90.3 NEUROGENIC ORTHOSTATIC HYPOTENSION (H): Primary | ICD-10-CM

## 2020-01-21 PROCEDURE — 25800030 ZZH RX IP 258 OP 636: Performed by: FAMILY MEDICINE

## 2020-01-21 PROCEDURE — 99214 OFFICE O/P EST MOD 30 MIN: CPT | Performed by: INTERNAL MEDICINE

## 2020-01-21 PROCEDURE — 96360 HYDRATION IV INFUSION INIT: CPT

## 2020-01-21 PROCEDURE — G0463 HOSPITAL OUTPT CLINIC VISIT: HCPCS | Mod: 25

## 2020-01-21 PROCEDURE — 96365 THER/PROPH/DIAG IV INF INIT: CPT

## 2020-01-21 PROCEDURE — G0463 HOSPITAL OUTPT CLINIC VISIT: HCPCS

## 2020-01-21 PROCEDURE — 25000128 H RX IP 250 OP 636: Performed by: FAMILY MEDICINE

## 2020-01-21 RX ORDER — HEPARIN SODIUM (PORCINE) LOCK FLUSH IV SOLN 100 UNIT/ML 100 UNIT/ML
5 SOLUTION INTRAVENOUS ONCE
Status: CANCELLED
Start: 2020-01-21

## 2020-01-21 RX ORDER — HEPARIN SODIUM (PORCINE) LOCK FLUSH IV SOLN 100 UNIT/ML 100 UNIT/ML
5 SOLUTION INTRAVENOUS ONCE
Status: COMPLETED | OUTPATIENT
Start: 2020-01-21 | End: 2020-01-21

## 2020-01-21 RX ADMIN — SODIUM CHLORIDE 1000 ML: 9 INJECTION, SOLUTION INTRAVENOUS at 11:57

## 2020-01-21 RX ADMIN — HEPARIN 5 ML: 100 SYRINGE at 13:04

## 2020-01-21 ASSESSMENT — MIFFLIN-ST. JEOR
SCORE: 1437.38
SCORE: 1436.96

## 2020-01-21 ASSESSMENT — PAIN SCALES - GENERAL
PAINLEVEL: NO PAIN (0)
PAINLEVEL: NO PAIN (0)

## 2020-01-21 NOTE — PROGRESS NOTES
Patient is a 86 year old male here accompanied by self today for infusion of IVF per order of Dr. MALIA Ray. Patient identified with two identifiers, order verified, and verbal consent for today's infusion obtained from patient.     Patients right sided port accessed using non-coring, 19 gauge, 3/4 needle. Port accessed per facility protocol. Port flushed easily, blood return noted.  No signs and symptoms of infection or infiltration.      IV pump verified with dose, drug, and rate of administration.  Infusion administered per protocol.  Patient tolerated infusion well, no signs or symptoms of adverse reaction noted.  Patient denies pain nor discomfort.     Needle removed, tip intact.  Site clean, dry and intact.  No signs or symptoms of infiltration or infection.  Covered with a sterile bandage, slight pressure applied for 30 seconds.  Pt instructed to leave bandage intact for a minimum of one hour, and to call with questions or concerns.  Copy of appointments, discharge instructions, and after visit summary (AVS) provided to patient.  Patient states understanding, discharged ambulatory.

## 2020-01-21 NOTE — NURSING NOTE
"Chief Complaint   Patient presents with     Follow Up       Initial /71 (BP Location: Right arm, Patient Position: Sitting, Cuff Size: Adult Regular)   Pulse 76   Temp 97  F (36.1  C) (Tympanic)   Resp 16   Ht 1.753 m (5' 9\")   Wt 76.7 kg (169 lb)   SpO2 98%   BMI 24.96 kg/m   Estimated body mass index is 24.96 kg/m  as calculated from the following:    Height as of this encounter: 1.753 m (5' 9\").    Weight as of this encounter: 76.7 kg (169 lb).  Medication Reconciliation: complete  Angie Gilbert LPN    "

## 2020-01-21 NOTE — PATIENT INSTRUCTIONS
You were seen by Dr. Walter, 01/21/20.     1.  Please continue all other medications as they have been prescribed.      2. If you develop new or worsening symptoms, please call the cardiology office as you may need to be evaluated.     You will follow up with Dr. Walter in 4 months.       Please call the cardiology office with problems, questions, or concerns at [phone].    If you experience chest pain, chest pressure, chest tightness, shortness of breath, fainting, lightheadedness, nausea, vomiting, or other concerning symptoms, please report to the Emergency Department or call 911. These symptoms may be emergent, and best treated in the Emergency Department.       Myrna SINGER RN  Cardiology   [facility]  [phone]

## 2020-01-21 NOTE — PROGRESS NOTES
"      Clinical Cardiac Electrophysiology    Chief Complaint: pacemaker, neurogenic orthostatic hypotension    HPI: I was happy to see Mr. Contreras in the EP clinic.  He has a history of pacemaker placement with a recent generator change.  The device is a [employer] dual lead pacemaker.  He scheduled for device check later today.    He also has a history of orthostatic hypotension secondary to Lewy body dementia.  This is been managed with midodrine and fludrocortisone.  On this regimen he and his wife reports his orthostatic hypotension has been much better.    He was seen in the emergency department on October 23 complaining of dizziness and was noted to be hypertensive with systolic blood pressures in the 180s-190 range.  He was given clonidine as well as IV metoprolol.  He was also started on oral metoprolol 50 mg daily.  He has not been troubled by high blood pressure in the past.    February 26, 2019: Frequently has to sit back down after standing. Recent fall - not witness, loss of consciousness is not clear. He is oral fluid intake is poor - \"maybe 30 ounces\" if pushed. His  reports his memory is getting worse. BP at home, while up, are \"normal\" but remain elevated when checked in supine position, such as in the ED recently. His  reports he does better for a day or two after IV fluids.    April 23, 2019: At his last visit we increased the frequency of his IV infusions to twice weekly. He is still taking midodrine 10 mg twice a day. Home BPs 90 to 180s. He is no longer taking metoprolol. Since starting the increase in IV fluid no fainting/syncope but has to sit back down frequently. He did fall/pass out in the night and had a compression fracture. Overall, his wife feels he is better.    August 27, 2019: no falls or syncope on increased IV infusions.     November 26, 2019: Mr. Ortiz's wife and step-son report increased falls. This is usually worse in the afternoon/evening but can happen " in the am as well. He is not taking midodrine after the am dose due to occasional afternoon naps. He is getting his infusions twice a week.     January 21, 2020 Interval history: with the change in his afternoon dose of midodrine he has been feeling better. No syncope and less near syncope. He did not find the abdominal binder helpful and stop wearing it. He continues with the twice weekly infusions.    Current Outpatient Medications   Medication Sig Dispense Refill     atorvastatin (LIPITOR) 20 MG tablet TAKE 1 TABLET BY MOUTH EVERY EVENING - GENERIC FOR LIPITOR 90 tablet 0     Cholecalciferol (VITAMIN D-3 PO) Take 50 mcg by mouth 2 times daily        Coenzyme Q10 (CO Q 10 PO) Take 200 mg by mouth every morning        Cyanocobalamin (VITAMIN B-12 PO) Take 100 mcg by mouth daily        donepezil (ARICEPT) 10 MG tablet Take 10 mg by mouth At Bedtime  9     Magnesium 400 MG CAPS Take 400 mg by mouth 2 times daily        MELATONIN PO Take 5 mg by mouth At Bedtime        metoprolol succinate ER (TOPROL-XL) 50 MG 24 hr tablet TAKE 1 TABLET (50 MG) BY MOUTH DAILY 90 tablet 1     midodrine (PROAMATINE) 5 MG tablet TAKE 2 TABLETS (10MG) BY MOUTH THREE TIMES DAILY (Patient taking differently: TAKE 2 TABLETS (10MG) BY MOUTH THREE TIMES DAILY) 540 tablet 3     multivitamin, therapeutic with minerals (MULTI-VITAMIN) TABS tablet Take 1 tablet by mouth daily       Omega-3 Fatty Acids (FISH OIL PO) Take 1 capsule by mouth daily 1300mg       order for DME Equipment being ordered: Abdominal Binder 1 Units 0     PANTOPRAZOLE SODIUM PO Take 40 mg by mouth 2 times daily (before meals)        potassium chloride ER (K-DUR/KLOR-CON M) 20 MEQ CR tablet TAKE 1 TABLET DAILY BY MOUTH 30 tablet 3     sodium chloride 1 GM tablet TAKE 1 TABLET BY MOUTH TWICE A  tablet 3     Turmeric 500 MG TABS Take 1 tablet by mouth daily         Past Medical History:   Diagnosis Date     Autonomic orthostatic hypotension 10/14/2016     Coronary artery  disease      Dementia without behavioral disturbance (H) 7/28/2015    Diagnosis updated by automated process. Provider to review and confirm.     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     Seizure disorder (H)      Stented coronary artery        Past Surgical History:   Procedure Laterality Date     ------------OTHER-------------  1955    ulnar and radial fx - repair ulnar and radial fx x4     ------------OTHER-------------  6/14/2011    cataract extraction     BIOPSY  08/2015    skin biopsy     BLEPHAROPLASTY BILATERAL  5/6/2014    Procedure: BLEPHAROPLASTY BILATERAL;  Surgeon: Andrew Queen MD;  Location: [facility]     BYPASS GRAFT ARTERY CORONARY  11/2006    coronary artery disease x 5, [facility]     cataract extraction and lens implantation  2011    cataracts     cataract extraction and lens implantation      cataracts     COLONOSCOPY  2012     colonoscopy with polypectomy  3/13/2009    history of polyps - repeat 3 yrs     colonoscopy with polypectomy  2006     colonoscopy with polypectomy  2005     COMBINED COLONOSCOPY WITH ARGON PLASMA COAGULATOR (APC) N/A 10/31/2014    Procedure: COMBINED COLONOSCOPY WITH ARGON PLASMA COAGULATOR (APC);  Surgeon: Bassam Aguilar MD;  Location: [facility]     COMBINED COLONOSCOPY WITH ARGON PLASMA COAGULATOR (APC) N/A 11/13/2015    Procedure: COMBINED COLONOSCOPY WITH ARGON PLASMA COAGULATOR (APC);  Surgeon: Bassam Aguilar MD;  Location: [facility]     ENDOSCOPY UPPER, COLONOSCOPY, COMBINED N/A 10/31/2014    Procedure: COMBINED ENDOSCOPY UPPER, COLONOSCOPY;  Surgeon: Bassam Aguilar MD;  Location: [facility]     ENDOSCOPY UPPER, COLONOSCOPY, COMBINED N/A 11/13/2015    Procedure: COMBINED ENDOSCOPY UPPER, COLONOSCOPY;  Surgeon:  Bassam Aguilar MD;  Location: [facility]     ESOPHAGOSCOPY, GASTROSCOPY, DUODENOSCOPY (EGD), COMBINED  2014    Procedure: COMBINED ESOPHAGOSCOPY, GASTROSCOPY, DUODENOSCOPY (EGD);  UPPER ENDOSCOPY(PENA) W/ BIOPSIES;  Surgeon: Patricia Pena MD;  Location: [facility]     HERNIORRHAPHY INGUINAL Right 2018    Procedure: HERNIORRHAPHY INGUINAL;  OPEN RIGHT INGUINAL HERNIA REPAIR with Mesh;  Surgeon: Virgilio Zaragoza DO;  Location: [facility]     INSERT PORT VASCULAR ACCESS Right 2019    Procedure: PORT PLACEMENT;  Surgeon: Lloyd Ram MD;  Location: [facility]     LARYNGOSCOPY WITH MICROSCOPE  2014    Procedure: LARYNGOSCOPY WITH MICROSCOPE;;  Surgeon: Chayo Duke MD;  Location: [facility]     pacemaker placement      heart block     pacemaker placement      dual-chamber     REMOVE TUBE, MYRINGOTOMY, COMBINED  2014    Procedure: COMBINED REMOVE TUBE, MYRINGOTOMY;  MICRODIRECT LARYNGOSCOPY WITH BIOPSY AND FROZEN SECTIONS removal of right ear tube and myringoplasty;  Surgeon: Chayo Duke MD;  Location: [facility]     REPLACE PACEMAKER GENERATOR N/A 2018    Procedure: REPLACE PACEMAKER GENERATOR;  Pacemaker generator change;  Surgeon: Alma Kaplan MD;  Location: [facility]     stent placement to LAD  2008     ventilation tube  2012    right in office       Family History   Problem Relation Age of Onset     Diabetes Mother      Breast Cancer Daughter        Social History     Tobacco Use     Smoking status: Former Smoker     Packs/day: 1.00     Years: 5.00     Pack years: 5.00     Types: Cigarettes     Last attempt to quit: 1985     Years since quittin.0     Smokeless tobacco: Never Used     Tobacco comment: quit in    Substance Use Topics     Alcohol use: Yes     Comment: social       Allergies   Allergen Reactions     Cats Unknown     Lamotrigine      Skin lesions         ROS: memory, dementia and movement symptoms without recent changes, otherwise comprehensive  "review is negative unless otherwise noted in the HPI.   January 21, 2020/woa      Physical Examination:  Vitals: /71 (BP Location: Right arm, Patient Position: Sitting, Cuff Size: Adult Regular)   Pulse 76   Temp 97  F (36.1  C) (Tympanic)   Resp 16   Ht 1.753 m (5' 9\")   Wt 76.7 kg (169 lb)   SpO2 98%   BMI 24.96 kg/m    BMI= Body mass index is 24.96 kg/m .    GENERAL APPEARANCE: frail, alert and no distress  HEENT:  mucosa dry, no cyanosis.  NECK: JVP is not visible  CHEST: lungs clear to auscultation; infusion port site normal  CARDIOVASCULAR: regular rhythm, normal S1S2, soft early systolic murmur, no gallop, precordium quiet  EXTREMITIES: no clubbing, cyanosis or edema  VASC: Warm  SKIN: skin turgor normal    Laboratory and diagnostic data reviewed January 21, 2020:    Results for AKILA WALLER (MRN [id number]) as of 1/21/2020 10:55   Ref. Range 10/21/2019 12:54   Sodium Latest Ref Range: 133 - 144 mmol/L 140   Potassium Latest Ref Range: 3.4 - 5.3 mmol/L 4.0   Chloride Latest Ref Range: 94 - 109 mmol/L 110 (H)   Carbon Dioxide Latest Ref Range: 20 - 32 mmol/L 26   Urea Nitrogen Latest Ref Range: 7 - 30 mg/dL 16   Creatinine Latest Ref Range: 0.66 - 1.25 mg/dL 1.11   GFR Estimate Latest Ref Range: >60 mL/min/1.73_m2 60 (L)       Assessment and recommendations:    1) Neurogenic orthostatic hypotension    2) Supine hypertension     No syncope since starting IV infusions but increasing near syncope.      1 liter NS twice a week  - will arrange for labs      continue midodrine, scheduled nap and midodrine prior to getting up      Abdominal binder was not helpful     3) Sinus node dysfunction, s/p permanent pacemaker - normal function; atrial sensitivity reprogrammed     - device check at next visit    I appreciate the chance to help with Mr. Waller's care.    Portions of this note were dictated using speech recognition software. The note has been proofread but errors in the text may have been " overlooked. Please contact me if there are any concerns regarding the accuracy of the dictation.

## 2020-01-24 ENCOUNTER — INFUSION THERAPY VISIT (OUTPATIENT)
Dept: INFUSION THERAPY | Facility: OTHER | Age: 85
End: 2020-01-24
Payer: MEDICARE

## 2020-01-24 VITALS
TEMPERATURE: 95.7 F | DIASTOLIC BLOOD PRESSURE: 72 MMHG | HEART RATE: 71 BPM | HEIGHT: 69 IN | WEIGHT: 167.11 LBS | OXYGEN SATURATION: 99 % | BODY MASS INDEX: 24.75 KG/M2 | SYSTOLIC BLOOD PRESSURE: 140 MMHG

## 2020-01-24 DIAGNOSIS — E86.9 VOLUME DEPLETION: Primary | ICD-10-CM

## 2020-01-24 PROCEDURE — 96365 THER/PROPH/DIAG IV INF INIT: CPT

## 2020-01-24 PROCEDURE — 25800030 ZZH RX IP 258 OP 636: Performed by: FAMILY MEDICINE

## 2020-01-24 PROCEDURE — 25000128 H RX IP 250 OP 636: Performed by: FAMILY MEDICINE

## 2020-01-24 PROCEDURE — 96360 HYDRATION IV INFUSION INIT: CPT

## 2020-01-24 RX ORDER — HEPARIN SODIUM (PORCINE) LOCK FLUSH IV SOLN 100 UNIT/ML 100 UNIT/ML
5 SOLUTION INTRAVENOUS ONCE
Status: CANCELLED
Start: 2020-01-24

## 2020-01-24 RX ORDER — HEPARIN SODIUM (PORCINE) LOCK FLUSH IV SOLN 100 UNIT/ML 100 UNIT/ML
5 SOLUTION INTRAVENOUS ONCE
Status: COMPLETED | OUTPATIENT
Start: 2020-01-24 | End: 2020-01-24

## 2020-01-24 RX ADMIN — SODIUM CHLORIDE 1000 ML: 9 INJECTION, SOLUTION INTRAVENOUS at 09:44

## 2020-01-24 RX ADMIN — HEPARIN 5 ML: 100 SYRINGE at 10:54

## 2020-01-24 ASSESSMENT — MIFFLIN-ST. JEOR: SCORE: 1428.63

## 2020-01-24 NOTE — PROGRESS NOTES
Patient is a 86 year old male here accompanied by self  per order of dr Cerrato .  Patient meets parameters for today's infusion. Patient identified with two identifiers, order verified, and verbal consent for today's infusion obtained from patient.       IV pump verified with dose, drug, and rate of administration .  Infusion administered per protocol.  Patient tolerated infusion well, no signs or symptoms of adverse reaction noted.  Patient denies pain nor discomfort.

## 2020-01-24 NOTE — PROGRESS NOTES
Patient is a 86 year old male here accompanied by self today for infusion of IVf per order of Dr. MALIA Ray.  Patient meets parameters for today's infusion. Patient identified with two identifiers, order verified, and verbal consent for today's infusion obtained from patient.      Patients right sided port accessed using non-coring, 20 gauge, 3/4 needle. Port accessed per facility protocol. Port flushed easily, blood return noted.  No signs and symptoms of infection or infiltration.      0944: IV pump verified with dose, drug, and rate of administration.  Infusion administered per protocol.  Patient tolerated infusion WELL, no signs or symptoms of adverse reaction noted.  Patient denies pain nor discomfort.     Needle removed, tip intact.  Site clean, dry and intact.  No signs or symptoms of infiltration or infection.  Covered with a sterile bandage, slight pressure applied for 30 seconds.  Pt instructed to leave bandage intact for a minimum of one hour, and to call with questions or concerns.  Copy of appointments, discharge instructions, and after visit summary (AVS) provided to patient.  Patient states understanding, discharged ambulatory.

## 2020-01-28 ENCOUNTER — INFUSION THERAPY VISIT (OUTPATIENT)
Dept: INFUSION THERAPY | Facility: OTHER | Age: 85
End: 2020-01-28
Payer: MEDICARE

## 2020-01-28 VITALS
SYSTOLIC BLOOD PRESSURE: 124 MMHG | OXYGEN SATURATION: 98 % | DIASTOLIC BLOOD PRESSURE: 68 MMHG | HEART RATE: 78 BPM | TEMPERATURE: 97.9 F

## 2020-01-28 DIAGNOSIS — E86.9 VOLUME DEPLETION: Primary | ICD-10-CM

## 2020-01-28 PROCEDURE — 25800030 ZZH RX IP 258 OP 636: Performed by: FAMILY MEDICINE

## 2020-01-28 PROCEDURE — 96361 HYDRATE IV INFUSION ADD-ON: CPT

## 2020-01-28 PROCEDURE — 96366 THER/PROPH/DIAG IV INF ADDON: CPT

## 2020-01-28 PROCEDURE — 96360 HYDRATION IV INFUSION INIT: CPT

## 2020-01-28 PROCEDURE — 96365 THER/PROPH/DIAG IV INF INIT: CPT

## 2020-01-28 PROCEDURE — 25000128 H RX IP 250 OP 636: Performed by: FAMILY MEDICINE

## 2020-01-28 RX ORDER — HEPARIN SODIUM (PORCINE) LOCK FLUSH IV SOLN 100 UNIT/ML 100 UNIT/ML
5 SOLUTION INTRAVENOUS ONCE
Status: CANCELLED
Start: 2020-01-28

## 2020-01-28 RX ORDER — HEPARIN SODIUM (PORCINE) LOCK FLUSH IV SOLN 100 UNIT/ML 100 UNIT/ML
5 SOLUTION INTRAVENOUS ONCE
Status: COMPLETED | OUTPATIENT
Start: 2020-01-28 | End: 2020-01-28

## 2020-01-28 RX ADMIN — HEPARIN 5 ML: 100 SYRINGE at 10:54

## 2020-01-28 RX ADMIN — SODIUM CHLORIDE 1000 ML: 9 INJECTION, SOLUTION INTRAVENOUS at 09:43

## 2020-01-28 ASSESSMENT — PAIN SCALES - GENERAL: PAINLEVEL: NO PAIN (0)

## 2020-01-28 NOTE — PATIENT INSTRUCTIONS

## 2020-01-28 NOTE — PROGRESS NOTES
Patient is a 86 year old  here accompanied by self today for infusion of IVF per order of Dr MALIA Ray under the supervision of Dr Walter, Cardiology.   Patient identified with two identifiers, order verified, and verbal consent for today's infusion obtained from patient.      Patients right sided port accessed using non-coring, 20 gauge, 3/4 inch needle. Port accessed per facility protocol. Port flushed easily, blood return noted.  No signs and symptoms of infection or infiltration.      IV pump verified with dose, drug, and rate of administration.  Infusion administered per protocol.  Patient tolerated infusion well, no signs or symptoms of adverse reaction noted.  Patient denies pain nor discomfort.     IV removed, catheter intact.  Site clean, dry and intact.  No signs or symptoms of infiltration or infection.  Covered with a sterile bandage, slight pressure applied for 30 seconds.  Pt instructed to leave bandage intact for a minimum of one hour, and to call with questions or concerns. Patient states understanding, discharged ambulatory.

## 2020-01-30 ENCOUNTER — TELEPHONE (OUTPATIENT)
Dept: CARDIOLOGY | Facility: OTHER | Age: 85
End: 2020-01-30

## 2020-01-30 DIAGNOSIS — Z95.5 STENTED CORONARY ARTERY: ICD-10-CM

## 2020-01-30 DIAGNOSIS — I95.1 AUTONOMIC ORTHOSTATIC HYPOTENSION: Primary | ICD-10-CM

## 2020-01-30 DIAGNOSIS — I25.10 ATHEROSCLEROSIS OF NATIVE CORONARY ARTERY OF NATIVE HEART WITHOUT ANGINA PECTORIS: ICD-10-CM

## 2020-01-30 DIAGNOSIS — R55 SYNCOPE AND COLLAPSE: ICD-10-CM

## 2020-01-30 DIAGNOSIS — G31.83 LEWY BODY DEMENTIA WITHOUT BEHAVIORAL DISTURBANCE (H): Primary | ICD-10-CM

## 2020-01-30 DIAGNOSIS — E86.9 VOLUME DEPLETION: ICD-10-CM

## 2020-01-30 DIAGNOSIS — I10 HYPERTENSION WITH TARGET BLOOD PRESSURE GOAL UNDER 150/90: ICD-10-CM

## 2020-01-30 DIAGNOSIS — E78.2 MIXED HYPERLIPIDEMIA: ICD-10-CM

## 2020-01-30 DIAGNOSIS — E78.00 HYPERCHOLESTEROLEMIA: ICD-10-CM

## 2020-01-30 DIAGNOSIS — E83.51 HYPOCALCEMIA: ICD-10-CM

## 2020-01-30 DIAGNOSIS — Z95.0 PACEMAKER: ICD-10-CM

## 2020-01-30 DIAGNOSIS — F02.80 LEWY BODY DEMENTIA WITHOUT BEHAVIORAL DISTURBANCE (H): Primary | ICD-10-CM

## 2020-01-30 RX ORDER — ATORVASTATIN CALCIUM 20 MG/1
TABLET, FILM COATED ORAL
Qty: 90 TABLET | Refills: 3 | Status: SHIPPED | OUTPATIENT
Start: 2020-01-30 | End: 2022-01-16

## 2020-01-30 RX ORDER — DONEPEZIL HYDROCHLORIDE 10 MG/1
TABLET, FILM COATED ORAL
Qty: 90 TABLET | Refills: 3 | Status: SHIPPED | OUTPATIENT
Start: 2020-01-30 | End: 2022-01-20

## 2020-01-30 NOTE — TELEPHONE ENCOUNTER
Per routing comments-Rx refilled, but needs to come in for fasting labs.  Orders placed.  Could you let the patient know of this need for repeat labs?   Dr. Rivers     Call placed to patient. Patient napping per EC. After verification of patient's name and  informed EC of Dr. Rivers's recommendations for labs. EC verbalized understanding and intent to comply. Ttransferred to scheduling to set up lab only appointment as requested. Carol Manuel RN on 2020 at 1:01 PM

## 2020-01-30 NOTE — TELEPHONE ENCOUNTER
Patient has no PCP at [facility]. However per pharmacy Dr. Rivers filled this medication on 11/01/2019. Will route refill request for consideration.   Atorvastatin (LIPITOR) 20 MG tablet  Last Written Prescription Date:  11/01/2019 by Dr. Rivers  Last Fill Quantity: 90,   # refills: 0  Last Office Visit: 07/02/2019 with YUMIKO Ibarra  Future Office visit:    Next 5 appointments (look out 90 days)    Mar 24, 2020 10:30 AM CDT  (Arrive by 10:15 AM)  [facility] [address] [address]  [phone]           Routing refill request to provider for review/approval because:  Failed - No LDL on file in past 12 months   Recent Labs   Lab Test 01/16/17  1024   LDL 75        Unable to complete prescription refill per RNMedication Refill Policy.................... Carol Manuel RN ....................  1/30/2020   10:42 AM

## 2020-01-31 ENCOUNTER — INFUSION THERAPY VISIT (OUTPATIENT)
Dept: INFUSION THERAPY | Facility: OTHER | Age: 85
End: 2020-01-31
Payer: MEDICARE

## 2020-01-31 VITALS
TEMPERATURE: 96.6 F | HEART RATE: 70 BPM | SYSTOLIC BLOOD PRESSURE: 163 MMHG | DIASTOLIC BLOOD PRESSURE: 93 MMHG | RESPIRATION RATE: 20 BRPM

## 2020-01-31 DIAGNOSIS — E86.9 VOLUME DEPLETION: Primary | ICD-10-CM

## 2020-01-31 PROCEDURE — 25800030 ZZH RX IP 258 OP 636: Performed by: FAMILY MEDICINE

## 2020-01-31 PROCEDURE — 25000128 H RX IP 250 OP 636: Performed by: FAMILY MEDICINE

## 2020-01-31 PROCEDURE — 96360 HYDRATION IV INFUSION INIT: CPT

## 2020-01-31 RX ORDER — HEPARIN SODIUM (PORCINE) LOCK FLUSH IV SOLN 100 UNIT/ML 100 UNIT/ML
5 SOLUTION INTRAVENOUS ONCE
Status: CANCELLED
Start: 2020-01-31

## 2020-01-31 RX ORDER — HEPARIN SODIUM (PORCINE) LOCK FLUSH IV SOLN 100 UNIT/ML 100 UNIT/ML
5 SOLUTION INTRAVENOUS ONCE
Status: COMPLETED | OUTPATIENT
Start: 2020-01-31 | End: 2020-01-31

## 2020-01-31 RX ADMIN — SODIUM CHLORIDE 1000 ML: 9 INJECTION, SOLUTION INTRAVENOUS at 09:37

## 2020-01-31 RX ADMIN — HEPARIN 5 ML: 100 SYRINGE at 10:51

## 2020-01-31 NOTE — PROGRESS NOTES
Patient is a 86 here accompanied by self today for infusion of IVG per order of Dr. Venancio Ray.  Patient meets parameters for today's infusion. Patient identified with two identifiers, order verified, and verbal consent for today's infusion obtained from patient.   Patients right sided port accessed using non-coring, 20 gauge, power needle. Port accessed per facility protocol. Port flushed easily, blood return noted.  No signs and symptoms of infection or infiltration.      IV pump verified with dose, drug, and rate of administration with MAR and medication label.  Infusion administered per protocol.  Patient tolerated infusion well, no signs or symptoms of adverse reaction noted.  Patient denies pain nor discomfort.     IV removed, catheter intact.  Site clean, dry and intact.  No signs or symptoms of infiltration or infection.  Covered with a sterile bandage, slight pressure applied for 30 seconds.  Pt instructed to leave bandage intact for a minimum of one hour, and to call with questions or concerns.  Copy of appointments, discharge instructions, and after visit summary (AVS) provided to patient.  Patient states understanding, discharged ambulatory.

## 2020-02-04 ENCOUNTER — INFUSION THERAPY VISIT (OUTPATIENT)
Dept: INFUSION THERAPY | Facility: OTHER | Age: 85
End: 2020-02-04
Payer: MEDICARE

## 2020-02-04 VITALS
RESPIRATION RATE: 18 BRPM | SYSTOLIC BLOOD PRESSURE: 118 MMHG | DIASTOLIC BLOOD PRESSURE: 72 MMHG | HEART RATE: 71 BPM | OXYGEN SATURATION: 99 % | TEMPERATURE: 97.4 F

## 2020-02-04 DIAGNOSIS — E86.9 VOLUME DEPLETION: Primary | ICD-10-CM

## 2020-02-04 PROCEDURE — 96360 HYDRATION IV INFUSION INIT: CPT

## 2020-02-04 PROCEDURE — 25000128 H RX IP 250 OP 636: Performed by: FAMILY MEDICINE

## 2020-02-04 PROCEDURE — 25800030 ZZH RX IP 258 OP 636: Performed by: FAMILY MEDICINE

## 2020-02-04 RX ORDER — HEPARIN SODIUM (PORCINE) LOCK FLUSH IV SOLN 100 UNIT/ML 100 UNIT/ML
5 SOLUTION INTRAVENOUS ONCE
Status: CANCELLED
Start: 2020-02-04

## 2020-02-04 RX ORDER — HEPARIN SODIUM (PORCINE) LOCK FLUSH IV SOLN 100 UNIT/ML 100 UNIT/ML
5 SOLUTION INTRAVENOUS ONCE
Status: COMPLETED | OUTPATIENT
Start: 2020-02-04 | End: 2020-02-04

## 2020-02-04 RX ADMIN — SODIUM CHLORIDE 1000 ML: 9 INJECTION, SOLUTION INTRAVENOUS at 12:36

## 2020-02-04 RX ADMIN — HEPARIN 5 ML: 100 SYRINGE at 13:42

## 2020-02-04 NOTE — PROGRESS NOTES
Patient is a 86 year old here today for infusion of IVF per order of Dr. Ayala.  Patient meets parameters for today's infusion. Patient identified with two identifiers, order verified, and verbal consent for today's infusion obtained from patient.      Patients right port accessed using non-coring, 22 gauge, 3/4 needle. Port accessed per facility protocol. Port flushed easily, no blood return noted.  No signs and symptoms of infection or infiltration.      IV pump verified with dose, drug, and rate of administration with MAR and medication bag.  Infusion administered per protocol.  Patient tolerated infusion well, no signs or symptoms of adverse reaction noted.  Patient denies pain nor discomfort.     Needle removed, tip intact.  Site clean, dry and intact.  No signs or symptoms of infiltration or infection.  Covered with a sterile bandage, slight pressure applied for 30 seconds.  Pt instructed to leave bandage intact for a minimum of one hour, and to call with questions or concerns.  Copy of appointments, discharge instructions, and after visit summary (AVS) provided to patient.  Patient states understanding, discharged ambulatory.

## 2020-02-07 ENCOUNTER — INFUSION THERAPY VISIT (OUTPATIENT)
Dept: INFUSION THERAPY | Facility: OTHER | Age: 85
End: 2020-02-07
Payer: MEDICARE

## 2020-02-07 DIAGNOSIS — E86.9 VOLUME DEPLETION: Primary | ICD-10-CM

## 2020-02-07 PROCEDURE — 25800030 ZZH RX IP 258 OP 636: Performed by: FAMILY MEDICINE

## 2020-02-07 PROCEDURE — 96360 HYDRATION IV INFUSION INIT: CPT

## 2020-02-07 PROCEDURE — 96365 THER/PROPH/DIAG IV INF INIT: CPT

## 2020-02-07 PROCEDURE — 25000128 H RX IP 250 OP 636: Performed by: FAMILY MEDICINE

## 2020-02-07 RX ORDER — HEPARIN SODIUM (PORCINE) LOCK FLUSH IV SOLN 100 UNIT/ML 100 UNIT/ML
5 SOLUTION INTRAVENOUS ONCE
Status: COMPLETED | OUTPATIENT
Start: 2020-02-07 | End: 2020-02-07

## 2020-02-07 RX ORDER — HEPARIN SODIUM (PORCINE) LOCK FLUSH IV SOLN 100 UNIT/ML 100 UNIT/ML
5 SOLUTION INTRAVENOUS ONCE
Status: CANCELLED
Start: 2020-02-07

## 2020-02-07 RX ADMIN — SODIUM CHLORIDE 1000 ML: 9 INJECTION, SOLUTION INTRAVENOUS at 09:46

## 2020-02-07 RX ADMIN — HEPARIN 5 ML: 100 SYRINGE at 10:53

## 2020-02-07 NOTE — PROGRESS NOTES
Patient is a 86 year old male here accompanied by self today for infusion of IVF per order of Dr. MALIA Ray.  Patient identified with two identifiers, order verified, and verbal consent for today's infusion obtained from patient.     Patients right sided port accessed using non-coring, 20 gauge, 3/4 needle. Port accessed per facility protocol. Port flushed easily, blood return noted.  No signs and symptoms of infection or infiltration.      IV pump verified with dose, drug, and rate of administration.  Infusion administered per protocol.  Patient tolerated infusion well, no signs or symptoms of adverse reaction noted.  Patient denies pain nor discomfort.     IV removed, catheter intact.  Site clean, dry and intact.  No signs or symptoms of infiltration or infection.  Covered with a sterile bandage, slight pressure applied for 30 seconds.  Pt instructed to leave bandage intact for a minimum of one hour, and to call with questions or concerns.  Copy of appointments, discharge instructions, and after visit summary (AVS) provided to patient.  Patient states understanding, discharged ambulatory.

## 2020-02-08 DIAGNOSIS — E87.6 HYPOKALEMIA: ICD-10-CM

## 2020-02-11 ENCOUNTER — INFUSION THERAPY VISIT (OUTPATIENT)
Dept: INFUSION THERAPY | Facility: OTHER | Age: 85
End: 2020-02-11
Payer: MEDICARE

## 2020-02-11 VITALS
DIASTOLIC BLOOD PRESSURE: 74 MMHG | RESPIRATION RATE: 16 BRPM | OXYGEN SATURATION: 95 % | SYSTOLIC BLOOD PRESSURE: 140 MMHG | TEMPERATURE: 97.4 F | HEART RATE: 78 BPM

## 2020-02-11 DIAGNOSIS — E86.9 VOLUME DEPLETION: Primary | ICD-10-CM

## 2020-02-11 PROCEDURE — 25000128 H RX IP 250 OP 636: Performed by: FAMILY MEDICINE

## 2020-02-11 PROCEDURE — 25800030 ZZH RX IP 258 OP 636: Performed by: FAMILY MEDICINE

## 2020-02-11 PROCEDURE — 96360 HYDRATION IV INFUSION INIT: CPT

## 2020-02-11 RX ORDER — POTASSIUM CHLORIDE 1500 MG/1
TABLET, EXTENDED RELEASE ORAL
Qty: 30 TABLET | Refills: 4 | Status: SHIPPED | OUTPATIENT
Start: 2020-02-11 | End: 2020-06-15

## 2020-02-11 RX ORDER — HEPARIN SODIUM (PORCINE) LOCK FLUSH IV SOLN 100 UNIT/ML 100 UNIT/ML
5 SOLUTION INTRAVENOUS ONCE
Status: COMPLETED | OUTPATIENT
Start: 2020-02-11 | End: 2020-02-11

## 2020-02-11 RX ORDER — HEPARIN SODIUM (PORCINE) LOCK FLUSH IV SOLN 100 UNIT/ML 100 UNIT/ML
5 SOLUTION INTRAVENOUS ONCE
Status: CANCELLED
Start: 2020-02-11

## 2020-02-11 RX ADMIN — SODIUM CHLORIDE 1000 ML: 9 INJECTION, SOLUTION INTRAVENOUS at 09:37

## 2020-02-11 RX ADMIN — Medication 5 ML: at 10:46

## 2020-02-11 ASSESSMENT — PAIN SCALES - GENERAL: PAINLEVEL: NO PAIN (0)

## 2020-02-11 NOTE — PROGRESS NOTES
Patient is a *** here accompanied by *** today for infusion of *** per order of *** under the supervision of ***.  Patient meets parameters for today's infusion. Patient identified with two identifiers, order verified, and verbal consent for today's infusion obtained from patient.  +++Written consent for treatment is on file and valid.+++    Recent lab values:  WBC: ***, HGB: ***, PLT: ***, ANC: ***, AST: ***, ALT: ***, Alkaline Phosphatase: ***, Creatinine: ***.  Patient meets order parameters for today's treatment.     *** gauge angio cath inserted into ***.  Immediate blood return noted.  IV secured with sterile, transparent dressing and tape.  Patient tolerated well, denies pain or discomfort at this time.  Flushes easily without resistance, no signs or symptoms of infiltration or infection.   Patient denies questions or concerns regarding infusion and/or medication(s) being administered.    Patients *** port accessed using non-coring, *** gauge, *** needle. Port accessed per facility protocol. Port flushed easily, blood return noted.  No signs and symptoms of infection or infiltration.      IV pump verified with dose, drug, and rate of administration by second RN, ***.  Infusion administered per protocol.  Patient tolerated infusion ***, no signs or symptoms of adverse reaction noted.  Patient denies pain nor discomfort.     IV removed, catheter intact.  Site clean, dry and intact.  No signs or symptoms of infiltration or infection.  Covered with a sterile bandage, slight pressure applied for 30 seconds.  Pt instructed to leave bandage intact for a minimum of one hour, and to call with questions or concerns.  Copy of appointments, discharge instructions, and after visit summary (AVS) provided to patient.  Patient states understanding, discharged ambulatory.

## 2020-02-11 NOTE — TELEPHONE ENCOUNTER
K LIZ      Last Written Prescription Date:  10-  Last Fill Quantity: 30,   # refills: 3  Last Office Visit: 11-7-2019  Future Office visit:    Next 5 appointments (look out 90 days)    Mar 24, 2020 10:30 AM CDT  (Arrive by 10:15 AM)  Return Visit with Juan Walter MD  [facility] ([facility] ) [address]  [place] [address]  [phone]           Routing refill request to provider for review/approval because:

## 2020-02-11 NOTE — PATIENT INSTRUCTIONS

## 2020-02-14 ENCOUNTER — INFUSION THERAPY VISIT (OUTPATIENT)
Dept: INFUSION THERAPY | Facility: OTHER | Age: 85
End: 2020-02-14
Payer: MEDICARE

## 2020-02-14 VITALS
TEMPERATURE: 96.1 F | DIASTOLIC BLOOD PRESSURE: 62 MMHG | HEIGHT: 69 IN | SYSTOLIC BLOOD PRESSURE: 105 MMHG | BODY MASS INDEX: 25.04 KG/M2 | HEART RATE: 77 BPM | WEIGHT: 169.09 LBS | OXYGEN SATURATION: 96 %

## 2020-02-14 DIAGNOSIS — E86.9 VOLUME DEPLETION: Primary | ICD-10-CM

## 2020-02-14 PROCEDURE — 25000128 H RX IP 250 OP 636: Performed by: FAMILY MEDICINE

## 2020-02-14 PROCEDURE — 96360 HYDRATION IV INFUSION INIT: CPT

## 2020-02-14 PROCEDURE — 25800030 ZZH RX IP 258 OP 636: Performed by: FAMILY MEDICINE

## 2020-02-14 RX ORDER — HEPARIN SODIUM (PORCINE) LOCK FLUSH IV SOLN 100 UNIT/ML 100 UNIT/ML
5 SOLUTION INTRAVENOUS ONCE
Status: CANCELLED
Start: 2020-02-14

## 2020-02-14 RX ORDER — HEPARIN SODIUM (PORCINE) LOCK FLUSH IV SOLN 100 UNIT/ML 100 UNIT/ML
5 SOLUTION INTRAVENOUS ONCE
Status: COMPLETED | OUTPATIENT
Start: 2020-02-14 | End: 2020-02-14

## 2020-02-14 RX ADMIN — SODIUM CHLORIDE 1000 ML: 9 INJECTION, SOLUTION INTRAVENOUS at 09:43

## 2020-02-14 RX ADMIN — Medication 5 ML: at 10:49

## 2020-02-14 ASSESSMENT — MIFFLIN-ST. JEOR: SCORE: 1437.63

## 2020-02-14 NOTE — PROGRESS NOTES
Patient is a 86 year old male here accompanied by significant other Uda today for infusion of IVF per order of Dr MALIA Ray under the supervision of Dr Walter, Cardiology. Patient identified with two identifiers, order verified, and verbal consent for today's infusion obtained from patient.       Patients port accessed using non-coring, 20 gauge, 3/4 inch needle. Port accessed per facility protocol. Port flushed easily, blood return noted.  No signs and symptoms of infection or infiltration.      IV pump verified with dose, drug, and rate of administration.  Infusion administered per protocol.  Patient tolerated infusion well, no signs or symptoms of adverse reaction noted.  Patient denies pain nor discomfort.     IV removed, catheter intact.  Site clean, dry and intact.  No signs or symptoms of infiltration or infection.  Covered with a sterile bandage, slight pressure applied for 30 seconds.  Pt instructed to leave bandage intact for a minimum of one hour, and to call with questions or concerns. Patient states understanding, discharged ambulatory.

## 2020-02-18 ENCOUNTER — INFUSION THERAPY VISIT (OUTPATIENT)
Dept: INFUSION THERAPY | Facility: OTHER | Age: 85
End: 2020-02-18
Payer: MEDICARE

## 2020-02-18 ENCOUNTER — OFFICE VISIT (OUTPATIENT)
Dept: OTOLARYNGOLOGY | Facility: OTHER | Age: 85
End: 2020-02-18
Attending: PHYSICIAN ASSISTANT
Payer: MEDICARE

## 2020-02-18 VITALS
HEIGHT: 69 IN | DIASTOLIC BLOOD PRESSURE: 64 MMHG | TEMPERATURE: 97.6 F | WEIGHT: 167 LBS | HEART RATE: 81 BPM | SYSTOLIC BLOOD PRESSURE: 112 MMHG | OXYGEN SATURATION: 100 % | BODY MASS INDEX: 24.73 KG/M2

## 2020-02-18 DIAGNOSIS — H90.3 ASNHL (ASYMMETRICAL SENSORINEURAL HEARING LOSS): ICD-10-CM

## 2020-02-18 DIAGNOSIS — H61.23 IMPACTED CERUMEN, BILATERAL: Primary | ICD-10-CM

## 2020-02-18 DIAGNOSIS — Z96.22 S/P MYRINGOTOMY WITH INSERTION OF TUBE: ICD-10-CM

## 2020-02-18 DIAGNOSIS — E86.9 VOLUME DEPLETION: Primary | ICD-10-CM

## 2020-02-18 PROCEDURE — G0463 HOSPITAL OUTPT CLINIC VISIT: HCPCS

## 2020-02-18 PROCEDURE — 25000128 H RX IP 250 OP 636: Performed by: FAMILY MEDICINE

## 2020-02-18 PROCEDURE — 25800030 ZZH RX IP 258 OP 636: Performed by: FAMILY MEDICINE

## 2020-02-18 PROCEDURE — 69210 REMOVE IMPACTED EAR WAX UNI: CPT | Performed by: PHYSICIAN ASSISTANT

## 2020-02-18 PROCEDURE — 92504 EAR MICROSCOPY EXAMINATION: CPT | Performed by: PHYSICIAN ASSISTANT

## 2020-02-18 PROCEDURE — 96360 HYDRATION IV INFUSION INIT: CPT

## 2020-02-18 PROCEDURE — 99213 OFFICE O/P EST LOW 20 MIN: CPT | Mod: 25 | Performed by: PHYSICIAN ASSISTANT

## 2020-02-18 PROCEDURE — 96365 THER/PROPH/DIAG IV INF INIT: CPT

## 2020-02-18 RX ORDER — HEPARIN SODIUM (PORCINE) LOCK FLUSH IV SOLN 100 UNIT/ML 100 UNIT/ML
5 SOLUTION INTRAVENOUS ONCE
Status: COMPLETED | OUTPATIENT
Start: 2020-02-18 | End: 2020-02-18

## 2020-02-18 RX ORDER — HEPARIN SODIUM (PORCINE) LOCK FLUSH IV SOLN 100 UNIT/ML 100 UNIT/ML
5 SOLUTION INTRAVENOUS ONCE
Status: CANCELLED
Start: 2020-02-18

## 2020-02-18 RX ADMIN — HEPARIN 5 ML: 100 SYRINGE at 12:06

## 2020-02-18 RX ADMIN — SODIUM CHLORIDE 1000 ML: 9 INJECTION, SOLUTION INTRAVENOUS at 10:52

## 2020-02-18 ASSESSMENT — PAIN SCALES - GENERAL: PAINLEVEL: NO PAIN (0)

## 2020-02-18 ASSESSMENT — MIFFLIN-ST. JEOR: SCORE: 1427.89

## 2020-02-18 NOTE — LETTER
2/18/2020         RE: Jf Ortiz  [address]  [place] [address]        Dear Colleague,    Thank you for referring your patient, Jf Ortiz, to the [facility] [place]. Please see a copy of my visit note below.    Chief Complaint   Patient presents with     Cerumen Impaction     Pt is here for an ear cleaning.       Patient returns for an ear check and cleaning. He was last seen in ENT on  10/2019 and had an ear cleaning and right tube was in good position and patent.   He returns today for his 4 month check.   He had his aids checked and adjusted.   He feels hearing has been stable. Denies concerns.      Jf presents for ear cleaning. He has noticed concerns with his ear having wax build up.   He did obtain hearing aids from [employer] this summer and he has noted improvement with results.      Denies otalgia, otorrhea.   He has no concerns with vertigo. He does feel he has dizziness/ lightheadedness.    He was diagnosed with Lewy Body Dementia, and they do feel his dizziness is related to that diagnosis.       Past Medical History:   Diagnosis Date     Autonomic orthostatic hypotension 10/14/2016     Coronary artery disease      Dementia without behavioral disturbance (H) 7/28/2015    Diagnosis updated by automated process. Provider to review and confirm.     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     Seizure disorder (H)      Stented coronary artery         Allergies   Allergen Reactions     Cats Unknown     Lamotrigine      Skin lesions     Current Outpatient Medications   Medication     atorvastatin (LIPITOR) 20 MG tablet     Cholecalciferol (VITAMIN D-3 PO)     Coenzyme Q10 (CO Q 10 PO)     Cyanocobalamin (VITAMIN B-12 PO)  "    donepezil (ARICEPT) 10 MG tablet     Magnesium 400 MG CAPS     MELATONIN PO     metoprolol succinate ER (TOPROL-XL) 50 MG 24 hr tablet     midodrine (PROAMATINE) 5 MG tablet     multivitamin, therapeutic with minerals (MULTI-VITAMIN) TABS tablet     Omega-3 Fatty Acids (FISH OIL PO)     order for DME     PANTOPRAZOLE SODIUM PO     potassium chloride ER (K-DUR/KLOR-CON M) 20 MEQ CR tablet     sodium chloride 1 GM tablet     Turmeric 500 MG TABS     No current facility-administered medications for this visit.       ROS: 10 point ROS neg other than the symptoms noted above in the HPI.    /64 (BP Location: Right arm, Cuff Size: Adult Regular)   Pulse 81   Temp 97.6  F (36.4  C) (Tympanic)   Ht 1.753 m (5' 9\")   Wt 75.8 kg (167 lb)   SpO2 100%   BMI 24.66 kg/m       General - The patient is well nourished and well developed, and appears to have good nutritional status.  Alert and oriented to person and place, answers questions and cooperates with examination appropriately.   Head and Face - Normocephalic and atraumatic, with no gross asymmetry noted.  The facial nerve is intact, with strong symmetric movements.  Voice and Breathing - The patient was breathing comfortably without the use of accessory muscles. There was no wheezing, stridor, or stertor.  The patients voice was clear and strong, and had appropriate pitch and quality.  Ears -The external auditory canals are impacted.    Eyes - Extraocular movements intact, and the pupils were reactive to light.  Sclera were not icteric or injected, conjunctiva were pink and moist.  Mouth - Examination of the oral cavity showed pink, healthy oral mucosa. No lesions or ulcerations noted.  The tongue was mobile and midline, and the dentition were in good condition.    Throat - The walls of the oropharynx were smooth, pink, moist, symmetric, and had no lesions or ulcerations.  The tonsillar pillars and soft palate were symmetric.  The uvula was midline on " elevation.    Neck - Normal midline excursion of the laryngotracheal complex during swallowing.  Full range of motion on passive movement.  Palpation of the occipital, submental, submandibular, internal jugular chain, and supraclavicular nodes did not demonstrate any abnormal lymph nodes or masses.  Palpation of the thyroid was soft and smooth, with no nodules or goiter appreciated.  The trachea was mobile and midline.  Nose - External contour is symmetric, no gross deflection or scars.  Nasal mucosa is pink and moist with no abnormal mucus.  The septum and turbinates were evaluated: No polyps, masses, or purulence noted on examination.     The ear canals were examined underneath the operating microscope and with an otologic speculum.  The canals were cleaned of all debris with cupped forceps and cerumen loop. There is no granulation tissue or sign of cholesteatoma.  The patient tolerates this wellSymptoms resolved after sitting for a few minutes. He did drink water as well.  Patient right tube. the tympanic membranes are intact without effusion, retraction or mass.  Bony landmarks are intact.       ASSESSMENT:    ICD-10-CM    1. Impacted cerumen, bilateral H61.23    2. S/P myringotomy with insertion of tube RIGHT Z96.22    3. ASNHL (asymmetrical sensorineural hearing loss) H90.5          Ears were cleaned.   Follow up in 3 months for ear cleaning. (his request)  Right tube is in good position and open.       Geeta Alegria PA-C  ENT  [facility]  [phone]      Again, thank you for allowing me to participate in the care of your patient.        Sincerely,        Geeta Alegria PA-C

## 2020-02-18 NOTE — PROGRESS NOTES
Chief Complaint   Patient presents with     Cerumen Impaction     Pt is here for an ear cleaning.       Patient returns for an ear check and cleaning. He was last seen in ENT on 10/2019 and had an ear cleaning and right tube was in good position and patent.   He returns today for his 4 month check.   He had his aids checked and adjusted.   He feels hearing has been stable. Denies concerns.      Jf presents for ear cleaning. He has noticed concerns with his ear having wax build up.   He did obtain hearing aids from [employer] this summer and he has noted improvement with results.      Denies otalgia, otorrhea.   He has no concerns with vertigo. He does feel he has dizziness/ lightheadedness.    He was diagnosed with Lewy Body Dementia, and they do feel his dizziness is related to that diagnosis.       Past Medical History:   Diagnosis Date     Autonomic orthostatic hypotension 10/14/2016     Coronary artery disease      Dementia without behavioral disturbance (H) 7/28/2015    Diagnosis updated by automated process. Provider to review and confirm.     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     Seizure disorder (H)      Stented coronary artery         Allergies   Allergen Reactions     Cats Unknown     Lamotrigine      Skin lesions     Current Outpatient Medications   Medication     atorvastatin (LIPITOR) 20 MG tablet     Cholecalciferol (VITAMIN D-3 PO)     Coenzyme Q10 (CO Q 10 PO)     Cyanocobalamin (VITAMIN B-12 PO)     donepezil (ARICEPT) 10 MG tablet     Magnesium 400 MG CAPS     MELATONIN PO     metoprolol succinate ER (TOPROL-XL) 50 MG 24 hr tablet     midodrine (PROAMATINE) 5 MG tablet     multivitamin, therapeutic with minerals (MULTI-VITAMIN)  "TABS tablet     Omega-3 Fatty Acids (FISH OIL PO)     order for DME     PANTOPRAZOLE SODIUM PO     potassium chloride ER (K-DUR/KLOR-CON M) 20 MEQ CR tablet     sodium chloride 1 GM tablet     Turmeric 500 MG TABS     No current facility-administered medications for this visit.       ROS: 10 point ROS neg other than the symptoms noted above in the HPI.    /64 (BP Location: Right arm, Cuff Size: Adult Regular)   Pulse 81   Temp 97.6  F (36.4  C) (Tympanic)   Ht 1.753 m (5' 9\")   Wt 75.8 kg (167 lb)   SpO2 100%   BMI 24.66 kg/m      General - The patient is well nourished and well developed, and appears to have good nutritional status.  Alert and oriented to person and place, answers questions and cooperates with examination appropriately.   Head and Face - Normocephalic and atraumatic, with no gross asymmetry noted.  The facial nerve is intact, with strong symmetric movements.  Voice and Breathing - The patient was breathing comfortably without the use of accessory muscles. There was no wheezing, stridor, or stertor.  The patients voice was clear and strong, and had appropriate pitch and quality.  Ears -The external auditory canals are impacted.    Eyes - Extraocular movements intact, and the pupils were reactive to light.  Sclera were not icteric or injected, conjunctiva were pink and moist.  Mouth - Examination of the oral cavity showed pink, healthy oral mucosa. No lesions or ulcerations noted.  The tongue was mobile and midline, and the dentition were in good condition.    Throat - The walls of the oropharynx were smooth, pink, moist, symmetric, and had no lesions or ulcerations.  The tonsillar pillars and soft palate were symmetric.  The uvula was midline on elevation.    Neck - Normal midline excursion of the laryngotracheal complex during swallowing.  Full range of motion on passive movement.  Palpation of the occipital, submental, submandibular, internal jugular chain, and supraclavicular nodes " did not demonstrate any abnormal lymph nodes or masses.  Palpation of the thyroid was soft and smooth, with no nodules or goiter appreciated.  The trachea was mobile and midline.  Nose - External contour is symmetric, no gross deflection or scars.  Nasal mucosa is pink and moist with no abnormal mucus.  The septum and turbinates were evaluated: No polyps, masses, or purulence noted on examination.     The ear canals were examined underneath the operating microscope and with an otologic speculum.  The canals were cleaned of all debris with cupped forceps and cerumen loop. There is no granulation tissue or sign of cholesteatoma.  The patient tolerates this wellSymptoms resolved after sitting for a few minutes. He did drink water as well.  Patient right tube. the tympanic membranes are intact without effusion, retraction or mass.  Bony landmarks are intact.       ASSESSMENT:    ICD-10-CM    1. Impacted cerumen, bilateral H61.23    2. S/P myringotomy with insertion of tube RIGHT Z96.22    3. ASNHL (asymmetrical sensorineural hearing loss) H90.5          Ears were cleaned.   Follow up in 3 months for ear cleaning. (his request)  Right tube is in good position and open.       Geeta Alegria PA-C  ENT  [facility] [place]  [phone]

## 2020-02-18 NOTE — NURSING NOTE
"Chief Complaint   Patient presents with     Cerumen Impaction     Pt is here for an ear cleaning.       Initial /64 (BP Location: Right arm, Cuff Size: Adult Regular)   Pulse 81   Temp 97.6  F (36.4  C) (Tympanic)   Ht 1.753 m (5' 9\")   Wt 75.8 kg (167 lb)   SpO2 100%   BMI 24.66 kg/m   Estimated body mass index is 24.66 kg/m  as calculated from the following:    Height as of this encounter: 1.753 m (5' 9\").    Weight as of this encounter: 75.8 kg (167 lb).  Medication Reconciliation: complete  Rehana Cleveland LPN    "

## 2020-02-18 NOTE — PATIENT INSTRUCTIONS
Ears look well.   Follow up in 3-4 months for ear cleaning.     Tube remains in good position. Small amount of wax around base of tube.       Thank you for allowing Geeta Alegria PA-C and our ENT team to participate in your care.  If your medications are too expensive, please give the nurse a call.  We can possibly change this medication.  If you have a scheduling or an appointment question please contact our Health Unit Coordinator at their direct line [phone].   ALL nursing questions or concerns can be directed to your ENT nurse at: [phone] Rehana

## 2020-02-18 NOTE — PROGRESS NOTES
"Patient is a 86 year old male here accompanied by self today for infusion of IVf per order of Dr. Negro Ray. Patient identified with two identifiers, order verified, and verbal consent for today's infusion obtained from patient.      Patients right sided port accessed using non-coring, 19* gauge, 3/4\" needle. Port accessed per facility protocol. Port flushed easily, blood return noted.  No signs and symptoms of infection or infiltration.      IV pump verified with dose, drug, and rate of administration.  Infusion administered per protocol.  Patient tolerated infusion well, no signs or symptoms of adverse reaction noted.  Patient denies pain nor discomfort.     IV removed, catheter intact.  Site clean, dry and intact.  No signs or symptoms of infiltration or infection.  Covered with a sterile bandage, slight pressure applied for 30 seconds.  Pt instructed to leave bandage intact for a minimum of one hour, and to call with questions or concerns.  Copy of appointments, discharge instructions, and after visit summary (AVS) provided to patient.  Patient states understanding, discharged ambulatory.    "

## 2020-02-21 ENCOUNTER — INFUSION THERAPY VISIT (OUTPATIENT)
Dept: INFUSION THERAPY | Facility: OTHER | Age: 85
End: 2020-02-21
Payer: MEDICARE

## 2020-02-21 VITALS
BODY MASS INDEX: 25.15 KG/M2 | HEART RATE: 72 BPM | OXYGEN SATURATION: 100 % | DIASTOLIC BLOOD PRESSURE: 59 MMHG | WEIGHT: 170.3 LBS | TEMPERATURE: 97.2 F | SYSTOLIC BLOOD PRESSURE: 98 MMHG

## 2020-02-21 DIAGNOSIS — E86.9 VOLUME DEPLETION: Primary | ICD-10-CM

## 2020-02-21 PROCEDURE — 96360 HYDRATION IV INFUSION INIT: CPT

## 2020-02-21 PROCEDURE — 25800030 ZZH RX IP 258 OP 636: Performed by: FAMILY MEDICINE

## 2020-02-21 PROCEDURE — 25000128 H RX IP 250 OP 636: Performed by: FAMILY MEDICINE

## 2020-02-21 RX ORDER — HEPARIN SODIUM (PORCINE) LOCK FLUSH IV SOLN 100 UNIT/ML 100 UNIT/ML
5 SOLUTION INTRAVENOUS ONCE
Status: CANCELLED
Start: 2020-02-21

## 2020-02-21 RX ORDER — HEPARIN SODIUM (PORCINE) LOCK FLUSH IV SOLN 100 UNIT/ML 100 UNIT/ML
5 SOLUTION INTRAVENOUS ONCE
Status: COMPLETED | OUTPATIENT
Start: 2020-02-21 | End: 2020-02-21

## 2020-02-21 RX ADMIN — SODIUM CHLORIDE 1000 ML: 9 INJECTION, SOLUTION INTRAVENOUS at 09:43

## 2020-02-21 RX ADMIN — HEPARIN 5 ML: 100 SYRINGE at 11:04

## 2020-02-21 ASSESSMENT — PAIN SCALES - GENERAL: PAINLEVEL: NO PAIN (0)

## 2020-02-21 NOTE — PROGRESS NOTES
Patient is a 86 here accompanied by self  today for infusion of normal saline  per order of Venancio Ray MD.  Patient meets parameters for today's infusion. Patient identified with two identifiers, order verified, and verbal consent for today's infusion obtained from patient.      Patients right sided port accessed using non-coring, 19 gauge 1 inch power needle. Port accessed per facility protocol. Port flushed easily, blood return noted.  No signs and symptoms of infection or infiltration.   Infusion administered per protocol.  Patient tolerated infusion well, no signs or symptoms of adverse reaction noted.  Patient denies pain nor discomfort.     IV removed, catheter intact.  Site clean, dry and intact.  No signs or symptoms of infiltration or infection.  Covered with a sterile bandage, slight pressure applied for 30 seconds.  Pt instructed to leave bandage intact for a minimum of one hour, and to call with questions or concerns.  Copy of appointments, discharge instructions, and after visit summary (AVS) provided to patient.  Patient states understanding, discharged ambulatory.

## 2020-02-25 ENCOUNTER — INFUSION THERAPY VISIT (OUTPATIENT)
Dept: INFUSION THERAPY | Facility: OTHER | Age: 85
End: 2020-02-25
Payer: MEDICARE

## 2020-02-25 VITALS
TEMPERATURE: 97.5 F | DIASTOLIC BLOOD PRESSURE: 76 MMHG | SYSTOLIC BLOOD PRESSURE: 122 MMHG | WEIGHT: 173.3 LBS | OXYGEN SATURATION: 96 % | BODY MASS INDEX: 25.59 KG/M2 | HEART RATE: 75 BPM | RESPIRATION RATE: 16 BRPM

## 2020-02-25 DIAGNOSIS — E86.9 VOLUME DEPLETION: Primary | ICD-10-CM

## 2020-02-25 PROCEDURE — 25800030 ZZH RX IP 258 OP 636: Performed by: FAMILY MEDICINE

## 2020-02-25 PROCEDURE — 25000128 H RX IP 250 OP 636: Performed by: FAMILY MEDICINE

## 2020-02-25 PROCEDURE — 96360 HYDRATION IV INFUSION INIT: CPT

## 2020-02-25 RX ORDER — HEPARIN SODIUM (PORCINE) LOCK FLUSH IV SOLN 100 UNIT/ML 100 UNIT/ML
5 SOLUTION INTRAVENOUS ONCE
Status: COMPLETED | OUTPATIENT
Start: 2020-02-25 | End: 2020-02-25

## 2020-02-25 RX ORDER — HEPARIN SODIUM (PORCINE) LOCK FLUSH IV SOLN 100 UNIT/ML 100 UNIT/ML
5 SOLUTION INTRAVENOUS ONCE
Status: CANCELLED
Start: 2020-02-25

## 2020-02-25 RX ADMIN — SODIUM CHLORIDE 1000 ML: 9 INJECTION, SOLUTION INTRAVENOUS at 09:48

## 2020-02-25 RX ADMIN — HEPARIN 5 ML: 100 SYRINGE at 11:02

## 2020-02-25 ASSESSMENT — PAIN SCALES - GENERAL: PAINLEVEL: NO PAIN (0)

## 2020-02-25 NOTE — PROGRESS NOTES
Patient is a 86 male here accompanied by self  today for infusion of normal saline  per order of Dr. Ray..  Patient identified with two identifiers, order verified, and verbal consent for today's infusion obtained from patient.    Patients right sided port accessed using non-coring, 19 gauge, 1.1 inch power needle. Port accessed per facility protocol. Port flushed easily, blood return noted.  No signs and symptoms of infection or infiltration.    IV pump verified with dose, drug, and rate of administration.  Infusion administered per protocol.

## 2020-02-28 ENCOUNTER — INFUSION THERAPY VISIT (OUTPATIENT)
Dept: INFUSION THERAPY | Facility: OTHER | Age: 85
End: 2020-02-28
Payer: MEDICARE

## 2020-02-28 VITALS
HEART RATE: 62 BPM | WEIGHT: 171.08 LBS | SYSTOLIC BLOOD PRESSURE: 144 MMHG | OXYGEN SATURATION: 99 % | TEMPERATURE: 94.8 F | HEIGHT: 69 IN | RESPIRATION RATE: 18 BRPM | DIASTOLIC BLOOD PRESSURE: 78 MMHG | BODY MASS INDEX: 25.34 KG/M2

## 2020-02-28 DIAGNOSIS — E86.9 VOLUME DEPLETION: Primary | ICD-10-CM

## 2020-02-28 PROCEDURE — 96360 HYDRATION IV INFUSION INIT: CPT

## 2020-02-28 PROCEDURE — 25800030 ZZH RX IP 258 OP 636: Performed by: FAMILY MEDICINE

## 2020-02-28 PROCEDURE — 25000128 H RX IP 250 OP 636: Performed by: FAMILY MEDICINE

## 2020-02-28 PROCEDURE — 96523 IRRIG DRUG DELIVERY DEVICE: CPT

## 2020-02-28 RX ORDER — HEPARIN SODIUM (PORCINE) LOCK FLUSH IV SOLN 100 UNIT/ML 100 UNIT/ML
5 SOLUTION INTRAVENOUS ONCE
Status: CANCELLED
Start: 2020-02-28

## 2020-02-28 RX ORDER — HEPARIN SODIUM (PORCINE) LOCK FLUSH IV SOLN 100 UNIT/ML 100 UNIT/ML
5 SOLUTION INTRAVENOUS ONCE
Status: COMPLETED | OUTPATIENT
Start: 2020-02-28 | End: 2020-02-28

## 2020-02-28 RX ADMIN — HEPARIN 5 ML: 100 SYRINGE at 11:03

## 2020-02-28 RX ADMIN — SODIUM CHLORIDE 1000 ML: 9 INJECTION, SOLUTION INTRAVENOUS at 09:55

## 2020-02-28 ASSESSMENT — MIFFLIN-ST. JEOR: SCORE: 1446.63

## 2020-02-28 ASSESSMENT — PAIN SCALES - GENERAL: PAINLEVEL: NO PAIN (0)

## 2020-02-28 NOTE — PROGRESS NOTES
1005 IV pump verified with dose, drug, and rate of administration.  Infusion administered per protocol.

## 2020-02-28 NOTE — PROGRESS NOTES
Patient is a 86 year old male here accompanied by self today for infusion of IVF per order of Dr MALIA Ray under the supervision of Dr Walter.  Patient identified with two identifiers, order verified, and verbal consent for today's infusion obtained from patient.      Patients port accessed using non-coring, 20 gauge, 3/4 inch power needle. Port accessed per facility protocol. Port flushed easily, blood return noted.  No signs and symptoms of infection or infiltration.      IV pump verified with dose, drug, and rate of administration.  Infusion administered per protocol.  Patient tolerated infusion well, no signs or symptoms of adverse reaction noted.  Patient denies pain nor discomfort.     IV removed, catheter intact.  Site clean, dry and intact.  No signs or symptoms of infiltration or infection.  Covered with a sterile bandage, slight pressure applied for 30 seconds.  Pt instructed to leave bandage intact for a minimum of one hour, and to call with questions or concerns. Patient states understanding, discharged.

## 2020-03-03 ENCOUNTER — INFUSION THERAPY VISIT (OUTPATIENT)
Dept: INFUSION THERAPY | Facility: OTHER | Age: 85
End: 2020-03-03
Attending: FAMILY MEDICINE
Payer: MEDICARE

## 2020-03-03 VITALS
TEMPERATURE: 97.2 F | SYSTOLIC BLOOD PRESSURE: 120 MMHG | OXYGEN SATURATION: 99 % | HEART RATE: 71 BPM | RESPIRATION RATE: 16 BRPM | DIASTOLIC BLOOD PRESSURE: 64 MMHG

## 2020-03-03 DIAGNOSIS — E86.9 VOLUME DEPLETION: Primary | ICD-10-CM

## 2020-03-03 PROCEDURE — 96360 HYDRATION IV INFUSION INIT: CPT

## 2020-03-03 PROCEDURE — 25000128 H RX IP 250 OP 636: Performed by: FAMILY MEDICINE

## 2020-03-03 PROCEDURE — 25800030 ZZH RX IP 258 OP 636: Performed by: FAMILY MEDICINE

## 2020-03-03 PROCEDURE — 96523 IRRIG DRUG DELIVERY DEVICE: CPT

## 2020-03-03 RX ORDER — HEPARIN SODIUM (PORCINE) LOCK FLUSH IV SOLN 100 UNIT/ML 100 UNIT/ML
5 SOLUTION INTRAVENOUS ONCE
Status: CANCELLED
Start: 2020-03-03

## 2020-03-03 RX ORDER — HEPARIN SODIUM (PORCINE) LOCK FLUSH IV SOLN 100 UNIT/ML 100 UNIT/ML
5 SOLUTION INTRAVENOUS ONCE
Status: COMPLETED | OUTPATIENT
Start: 2020-03-03 | End: 2020-03-03

## 2020-03-03 RX ADMIN — HEPARIN 5 ML: 100 SYRINGE at 11:22

## 2020-03-03 RX ADMIN — SODIUM CHLORIDE 1000 ML: 9 INJECTION, SOLUTION INTRAVENOUS at 10:07

## 2020-03-03 NOTE — PROGRESS NOTES
Patient is a 86 year old male  here accompanied by self  today for infusion of IV fluids  per order of Dr. Ray.  Patient identified with two identifiers, order verified, and verbal consent for today's infusion obtained from patient.      Patients right sided port accessed using non-coring, 19 gauge 3/4 needle. Port accessed per facility protocol. Port flushed easily, blood return noted.  No signs and symptoms of infection or infiltration.    IV pump verified with dose, drug, and rate of administration.  Infusion administered per protocol.

## 2020-03-03 NOTE — PROGRESS NOTES
Patient tolerated infusion well, no signs or symptoms of adverse reaction noted.  Patient denies pain nor discomfort.     IV removed, catheter intact.  Site clean, dry and intact.  No signs or symptoms of infiltration or infection.  Covered with a sterile bandage, slight pressure applied for 30 seconds.  Pt instructed to leave bandage intact for a minimum of one hour, and to call with questions or concerns.  Patient declines copy of appointments, discharge instructions, and after visit summary (AVS).  Patient states understanding, discharged.

## 2020-03-06 ENCOUNTER — INFUSION THERAPY VISIT (OUTPATIENT)
Dept: INFUSION THERAPY | Facility: OTHER | Age: 85
End: 2020-03-06
Attending: FAMILY MEDICINE
Payer: MEDICARE

## 2020-03-06 VITALS
OXYGEN SATURATION: 98 % | SYSTOLIC BLOOD PRESSURE: 158 MMHG | WEIGHT: 169.1 LBS | RESPIRATION RATE: 16 BRPM | DIASTOLIC BLOOD PRESSURE: 54 MMHG | BODY MASS INDEX: 24.96 KG/M2

## 2020-03-06 DIAGNOSIS — E86.9 VOLUME DEPLETION: Primary | ICD-10-CM

## 2020-03-06 PROCEDURE — 96360 HYDRATION IV INFUSION INIT: CPT

## 2020-03-06 PROCEDURE — 96523 IRRIG DRUG DELIVERY DEVICE: CPT

## 2020-03-06 PROCEDURE — 25000128 H RX IP 250 OP 636: Performed by: FAMILY MEDICINE

## 2020-03-06 PROCEDURE — 25800030 ZZH RX IP 258 OP 636: Performed by: FAMILY MEDICINE

## 2020-03-06 RX ORDER — HEPARIN SODIUM (PORCINE) LOCK FLUSH IV SOLN 100 UNIT/ML 100 UNIT/ML
5 SOLUTION INTRAVENOUS ONCE
Status: COMPLETED | OUTPATIENT
Start: 2020-03-06 | End: 2020-03-06

## 2020-03-06 RX ORDER — HEPARIN SODIUM (PORCINE) LOCK FLUSH IV SOLN 100 UNIT/ML 100 UNIT/ML
5 SOLUTION INTRAVENOUS ONCE
Status: CANCELLED
Start: 2020-03-06

## 2020-03-06 RX ADMIN — HEPARIN 5 ML: 100 SYRINGE at 10:59

## 2020-03-06 RX ADMIN — SODIUM CHLORIDE 1000 ML: 9 INJECTION, SOLUTION INTRAVENOUS at 09:48

## 2020-03-06 NOTE — PROGRESS NOTES
Patient is a 86 year old male  here accompanied by self  today for infusion of IV fluids  per order of Dr. Ray Patient identified with two identifiers, order verified, and verbal consent for today's infusion obtained from patient.    Patients right sided port accessed using non-coring, 20 gauge, 3/4 needle. Port accessed per facility protocol. Port flushed easily, blood return noted.  No signs and symptoms of infection or infiltration.    IV pump verified with dose, drug, and rate of administration.  Infusion administered per protocol.  Patient tolerated infusion well, no signs or symptoms of adverse reaction noted.  Patient denies pain nor discomfort.     IV removed, catheter intact.  Site clean, dry and intact.  No signs or symptoms of infiltration or infection.  Covered with a sterile bandage, slight pressure applied for 30 seconds.  Pt instructed to leave bandage intact for a minimum of one hour, and to call with questions or concerns.  Copy of appointments, discharge instructions, and after visit summary (AVS) provided to patient.  Patient states understanding, discharged.

## 2020-03-10 ENCOUNTER — INFUSION THERAPY VISIT (OUTPATIENT)
Dept: INFUSION THERAPY | Facility: OTHER | Age: 85
End: 2020-03-10
Attending: FAMILY MEDICINE
Payer: MEDICARE

## 2020-03-10 VITALS
RESPIRATION RATE: 16 BRPM | OXYGEN SATURATION: 98 % | BODY MASS INDEX: 25.27 KG/M2 | TEMPERATURE: 97.4 F | HEIGHT: 69 IN | DIASTOLIC BLOOD PRESSURE: 74 MMHG | HEART RATE: 75 BPM | WEIGHT: 170.64 LBS | SYSTOLIC BLOOD PRESSURE: 148 MMHG

## 2020-03-10 DIAGNOSIS — E86.9 VOLUME DEPLETION: Primary | ICD-10-CM

## 2020-03-10 PROCEDURE — 25800030 ZZH RX IP 258 OP 636: Performed by: FAMILY MEDICINE

## 2020-03-10 PROCEDURE — 96360 HYDRATION IV INFUSION INIT: CPT

## 2020-03-10 PROCEDURE — 25000128 H RX IP 250 OP 636: Performed by: FAMILY MEDICINE

## 2020-03-10 PROCEDURE — 96365 THER/PROPH/DIAG IV INF INIT: CPT

## 2020-03-10 RX ORDER — HEPARIN SODIUM (PORCINE) LOCK FLUSH IV SOLN 100 UNIT/ML 100 UNIT/ML
5 SOLUTION INTRAVENOUS ONCE
Status: CANCELLED
Start: 2020-03-10

## 2020-03-10 RX ORDER — HEPARIN SODIUM (PORCINE) LOCK FLUSH IV SOLN 100 UNIT/ML 100 UNIT/ML
5 SOLUTION INTRAVENOUS ONCE
Status: COMPLETED | OUTPATIENT
Start: 2020-03-10 | End: 2020-03-10

## 2020-03-10 RX ADMIN — HEPARIN 5 ML: 100 SYRINGE at 11:01

## 2020-03-10 RX ADMIN — SODIUM CHLORIDE 1000 ML: 9 INJECTION, SOLUTION INTRAVENOUS at 09:52

## 2020-03-10 ASSESSMENT — MIFFLIN-ST. JEOR: SCORE: 1444.63

## 2020-03-10 NOTE — PROGRESS NOTES
Patient is a 86 year old male here today for infusion of IVF per order of Dr MALIA Ray under the supervision of Dr Walter.  Patient identified with two identifiers, order verified, and verbal consent for today's infusion obtained from patient.       Patient denies questions or concerns regarding infusion and/or medication(s) being administered.    Patients right port accessed using non-coring, 20 gauge, 3/4 power needle. Port accessed per facility protocol. Port flushed easily, blood return noted.  No signs and symptoms of infection or infiltration.      0952 IV pump verified with dose, drug, and rate of administration.  Infusion administered per protocol.  Patient tolerated infusion well, no signs or symptoms of adverse reaction noted.  Patient denies pain nor discomfort.     IV removed, catheter intact.  Site clean, dry and intact.  No signs or symptoms of infiltration or infection.  Covered with a sterile bandage, slight pressure applied for 30 seconds.  Pt instructed to leave bandage intact for a minimum of one hour, and to call with questions or concerns.  Copy of appointments, discharge instructions, and after visit summary (AVS) provided to patient.  Patient states understanding, discharged.

## 2020-03-10 NOTE — PATIENT INSTRUCTIONS
We will see you back as planned. If you have any questions or concerns, we can be reached Monday through Friday 8am - 430pm at [phone] ([facility] If you have concerns related to a potential reaction/side effect after hours/weekends/holiday's, please seek emergent medical care.

## 2020-03-17 ENCOUNTER — TELEPHONE (OUTPATIENT)
Dept: FAMILY MEDICINE | Facility: OTHER | Age: 85
End: 2020-03-17

## 2020-03-17 NOTE — TELEPHONE ENCOUNTER
Notified that it would be ok for pt to continue going to infusion, Dr. Ray advised that the patient wears mask while out and to limit contact with people.

## 2020-03-17 NOTE — TELEPHONE ENCOUNTER
Patient is requesting home infusions due to the possibility of infection. He has missed his last two infusions due to this.

## 2020-03-18 ENCOUNTER — ANCILLARY PROCEDURE (OUTPATIENT)
Dept: CARDIOLOGY | Facility: CLINIC | Age: 85
End: 2020-03-18
Attending: INTERNAL MEDICINE
Payer: MEDICARE

## 2020-03-18 DIAGNOSIS — I44.2 COMPLETE HEART BLOCK (H): ICD-10-CM

## 2020-03-18 PROCEDURE — 93294 REM INTERROG EVL PM/LDLS PM: CPT | Mod: ZP | Performed by: INTERNAL MEDICINE

## 2020-03-18 PROCEDURE — 93296 REM INTERROG EVL PM/IDS: CPT | Mod: ZF

## 2020-03-20 LAB
MDC_IDC_EPISODE_DTM: NORMAL
MDC_IDC_EPISODE_DURATION: 2 S
MDC_IDC_EPISODE_ID: 887
MDC_IDC_EPISODE_TYPE: NORMAL
MDC_IDC_LEAD_IMPLANT_DT: NORMAL
MDC_IDC_LEAD_IMPLANT_DT: NORMAL
MDC_IDC_LEAD_LOCATION: NORMAL
MDC_IDC_LEAD_LOCATION: NORMAL
MDC_IDC_LEAD_LOCATION_DETAIL_1: NORMAL
MDC_IDC_LEAD_LOCATION_DETAIL_1: NORMAL
MDC_IDC_LEAD_MFG: NORMAL
MDC_IDC_LEAD_MFG: NORMAL
MDC_IDC_LEAD_MODEL: NORMAL
MDC_IDC_LEAD_MODEL: NORMAL
MDC_IDC_LEAD_POLARITY_TYPE: NORMAL
MDC_IDC_LEAD_POLARITY_TYPE: NORMAL
MDC_IDC_LEAD_SERIAL: NORMAL
MDC_IDC_LEAD_SERIAL: NORMAL
MDC_IDC_MSMT_BATTERY_DTM: NORMAL
MDC_IDC_MSMT_BATTERY_REMAINING_LONGEVITY: 100 MO
MDC_IDC_MSMT_BATTERY_RRT_TRIGGER: 2.62
MDC_IDC_MSMT_BATTERY_STATUS: NORMAL
MDC_IDC_MSMT_BATTERY_VOLTAGE: 3 V
MDC_IDC_MSMT_LEADCHNL_RA_IMPEDANCE_VALUE: 437 OHM
MDC_IDC_MSMT_LEADCHNL_RA_IMPEDANCE_VALUE: 551 OHM
MDC_IDC_MSMT_LEADCHNL_RA_PACING_THRESHOLD_AMPLITUDE: 1.38 V
MDC_IDC_MSMT_LEADCHNL_RA_PACING_THRESHOLD_PULSEWIDTH: 0.4 MS
MDC_IDC_MSMT_LEADCHNL_RA_SENSING_INTR_AMPL: 1.38 MV
MDC_IDC_MSMT_LEADCHNL_RA_SENSING_INTR_AMPL: 1.38 MV
MDC_IDC_MSMT_LEADCHNL_RV_IMPEDANCE_VALUE: 380 OHM
MDC_IDC_MSMT_LEADCHNL_RV_IMPEDANCE_VALUE: 551 OHM
MDC_IDC_MSMT_LEADCHNL_RV_PACING_THRESHOLD_AMPLITUDE: 0.75 V
MDC_IDC_MSMT_LEADCHNL_RV_PACING_THRESHOLD_PULSEWIDTH: 0.4 MS
MDC_IDC_MSMT_LEADCHNL_RV_SENSING_INTR_AMPL: 4.75 MV
MDC_IDC_MSMT_LEADCHNL_RV_SENSING_INTR_AMPL: 4.75 MV
MDC_IDC_PG_IMPLANT_DTM: NORMAL
MDC_IDC_PG_MFG: NORMAL
MDC_IDC_PG_MODEL: NORMAL
MDC_IDC_PG_SERIAL: NORMAL
MDC_IDC_PG_TYPE: NORMAL
MDC_IDC_SESS_CLINIC_NAME: NORMAL
MDC_IDC_SESS_DTM: NORMAL
MDC_IDC_SESS_TYPE: NORMAL
MDC_IDC_SET_BRADY_AT_MODE_SWITCH_RATE: 171 {BEATS}/MIN
MDC_IDC_SET_BRADY_HYSTRATE: NORMAL
MDC_IDC_SET_BRADY_LOWRATE: 70 {BEATS}/MIN
MDC_IDC_SET_BRADY_MAX_SENSOR_RATE: 130 {BEATS}/MIN
MDC_IDC_SET_BRADY_MAX_TRACKING_RATE: 130 {BEATS}/MIN
MDC_IDC_SET_BRADY_MODE: NORMAL
MDC_IDC_SET_BRADY_PAV_DELAY_LOW: 180 MS
MDC_IDC_SET_BRADY_SAV_DELAY_LOW: 150 MS
MDC_IDC_SET_LEADCHNL_RA_PACING_AMPLITUDE: 2.75 V
MDC_IDC_SET_LEADCHNL_RA_PACING_ANODE_ELECTRODE_1: NORMAL
MDC_IDC_SET_LEADCHNL_RA_PACING_ANODE_LOCATION_1: NORMAL
MDC_IDC_SET_LEADCHNL_RA_PACING_CAPTURE_MODE: NORMAL
MDC_IDC_SET_LEADCHNL_RA_PACING_CATHODE_ELECTRODE_1: NORMAL
MDC_IDC_SET_LEADCHNL_RA_PACING_CATHODE_LOCATION_1: NORMAL
MDC_IDC_SET_LEADCHNL_RA_PACING_POLARITY: NORMAL
MDC_IDC_SET_LEADCHNL_RA_PACING_PULSEWIDTH: 0.4 MS
MDC_IDC_SET_LEADCHNL_RA_SENSING_ANODE_ELECTRODE_1: NORMAL
MDC_IDC_SET_LEADCHNL_RA_SENSING_ANODE_LOCATION_1: NORMAL
MDC_IDC_SET_LEADCHNL_RA_SENSING_CATHODE_ELECTRODE_1: NORMAL
MDC_IDC_SET_LEADCHNL_RA_SENSING_CATHODE_LOCATION_1: NORMAL
MDC_IDC_SET_LEADCHNL_RA_SENSING_POLARITY: NORMAL
MDC_IDC_SET_LEADCHNL_RA_SENSING_SENSITIVITY: 0.9 MV
MDC_IDC_SET_LEADCHNL_RV_PACING_AMPLITUDE: 2 V
MDC_IDC_SET_LEADCHNL_RV_PACING_ANODE_ELECTRODE_1: NORMAL
MDC_IDC_SET_LEADCHNL_RV_PACING_ANODE_LOCATION_1: NORMAL
MDC_IDC_SET_LEADCHNL_RV_PACING_CAPTURE_MODE: NORMAL
MDC_IDC_SET_LEADCHNL_RV_PACING_CATHODE_ELECTRODE_1: NORMAL
MDC_IDC_SET_LEADCHNL_RV_PACING_CATHODE_LOCATION_1: NORMAL
MDC_IDC_SET_LEADCHNL_RV_PACING_POLARITY: NORMAL
MDC_IDC_SET_LEADCHNL_RV_PACING_PULSEWIDTH: 0.4 MS
MDC_IDC_SET_LEADCHNL_RV_SENSING_ANODE_ELECTRODE_1: NORMAL
MDC_IDC_SET_LEADCHNL_RV_SENSING_ANODE_LOCATION_1: NORMAL
MDC_IDC_SET_LEADCHNL_RV_SENSING_CATHODE_ELECTRODE_1: NORMAL
MDC_IDC_SET_LEADCHNL_RV_SENSING_CATHODE_LOCATION_1: NORMAL
MDC_IDC_SET_LEADCHNL_RV_SENSING_POLARITY: NORMAL
MDC_IDC_SET_LEADCHNL_RV_SENSING_SENSITIVITY: 0.9 MV
MDC_IDC_SET_ZONE_DETECTION_INTERVAL: 350 MS
MDC_IDC_SET_ZONE_DETECTION_INTERVAL: 400 MS
MDC_IDC_SET_ZONE_TYPE: NORMAL
MDC_IDC_STAT_BRADY_AP_VP_PERCENT: 98.93 %
MDC_IDC_STAT_BRADY_AP_VS_PERCENT: 0.02 %
MDC_IDC_STAT_BRADY_AS_VP_PERCENT: 0.76 %
MDC_IDC_STAT_BRADY_AS_VS_PERCENT: 0.29 %
MDC_IDC_STAT_BRADY_DTM_END: NORMAL
MDC_IDC_STAT_BRADY_DTM_START: NORMAL
MDC_IDC_STAT_BRADY_RA_PERCENT_PACED: 99.12 %
MDC_IDC_STAT_BRADY_RV_PERCENT_PACED: 99.69 %
MDC_IDC_STAT_EPISODE_RECENT_COUNT: 0
MDC_IDC_STAT_EPISODE_RECENT_COUNT: 0
MDC_IDC_STAT_EPISODE_RECENT_COUNT: 1
MDC_IDC_STAT_EPISODE_RECENT_COUNT_DTM_END: NORMAL
MDC_IDC_STAT_EPISODE_RECENT_COUNT_DTM_START: NORMAL
MDC_IDC_STAT_EPISODE_TOTAL_COUNT: 0
MDC_IDC_STAT_EPISODE_TOTAL_COUNT: 1
MDC_IDC_STAT_EPISODE_TOTAL_COUNT: 886
MDC_IDC_STAT_EPISODE_TOTAL_COUNT_DTM_END: NORMAL
MDC_IDC_STAT_EPISODE_TOTAL_COUNT_DTM_START: NORMAL
MDC_IDC_STAT_EPISODE_TYPE: NORMAL

## 2020-03-24 ENCOUNTER — INFUSION THERAPY VISIT (OUTPATIENT)
Dept: INFUSION THERAPY | Facility: OTHER | Age: 85
End: 2020-03-24
Attending: FAMILY MEDICINE
Payer: MEDICARE

## 2020-03-24 VITALS
RESPIRATION RATE: 16 BRPM | HEART RATE: 75 BPM | OXYGEN SATURATION: 98 % | TEMPERATURE: 97.4 F | SYSTOLIC BLOOD PRESSURE: 142 MMHG | DIASTOLIC BLOOD PRESSURE: 76 MMHG

## 2020-03-24 DIAGNOSIS — E86.9 VOLUME DEPLETION: Primary | ICD-10-CM

## 2020-03-24 PROCEDURE — 25000128 H RX IP 250 OP 636: Performed by: FAMILY MEDICINE

## 2020-03-24 PROCEDURE — 96365 THER/PROPH/DIAG IV INF INIT: CPT

## 2020-03-24 PROCEDURE — 96360 HYDRATION IV INFUSION INIT: CPT

## 2020-03-24 PROCEDURE — 25800030 ZZH RX IP 258 OP 636: Performed by: FAMILY MEDICINE

## 2020-03-24 RX ORDER — HEPARIN SODIUM (PORCINE) LOCK FLUSH IV SOLN 100 UNIT/ML 100 UNIT/ML
5 SOLUTION INTRAVENOUS ONCE
Status: COMPLETED | OUTPATIENT
Start: 2020-03-24 | End: 2020-03-24

## 2020-03-24 RX ORDER — HEPARIN SODIUM (PORCINE) LOCK FLUSH IV SOLN 100 UNIT/ML 100 UNIT/ML
5 SOLUTION INTRAVENOUS ONCE
Status: CANCELLED
Start: 2020-03-24

## 2020-03-24 RX ADMIN — HEPARIN 5 ML: 100 SYRINGE at 13:01

## 2020-03-24 RX ADMIN — SODIUM CHLORIDE 1000 ML: 9 INJECTION, SOLUTION INTRAVENOUS at 11:50

## 2020-03-24 NOTE — PROGRESS NOTES
Patient is a 86 year old male here today for infusion of IVF per order of Dr MALIA Ray under the supervision of Dr Walter.  Patient identified with two identifiers, order verified, and verbal consent for today's infusion obtained from patient.      Patient denies questions or concerns regarding infusion and/or medication(s) being administered.    Patients right port accessed using non-coring, 19 gauge, 3/4 power needle. Port accessed per facility protocol. Port flushed easily, blood return noted.  No signs and symptoms of infection or infiltration.      1150 IV pump verified with dose, drug, and rate of administration.  Infusion administered per protocol.  Patient tolerated infusion well, no signs symptoms of adverse reaction noted.  Patient denies pain nor discomfort.     IV removed, catheter intact.  Site clean, dry and intact.  No signs or symptoms of infiltration or infection.  Covered with a sterile bandage, slight pressure applied for 30 seconds.  Pt instructed to leave bandage intact for a minimum of one hour, and to call with questions or concerns.  Patient declines copy of appointments, discharge instructions, and after visit summary (AVS).  Patient states understanding, discharged.

## 2020-03-27 ENCOUNTER — INFUSION THERAPY VISIT (OUTPATIENT)
Dept: INFUSION THERAPY | Facility: OTHER | Age: 85
End: 2020-03-27
Attending: FAMILY MEDICINE
Payer: MEDICARE

## 2020-03-27 VITALS
BODY MASS INDEX: 25.4 KG/M2 | OXYGEN SATURATION: 99 % | RESPIRATION RATE: 18 BRPM | HEART RATE: 72 BPM | TEMPERATURE: 97 F | DIASTOLIC BLOOD PRESSURE: 55 MMHG | HEIGHT: 69 IN | WEIGHT: 171.52 LBS | SYSTOLIC BLOOD PRESSURE: 109 MMHG

## 2020-03-27 DIAGNOSIS — E86.9 VOLUME DEPLETION: Primary | ICD-10-CM

## 2020-03-27 PROCEDURE — 96360 HYDRATION IV INFUSION INIT: CPT

## 2020-03-27 PROCEDURE — 25800030 ZZH RX IP 258 OP 636: Performed by: FAMILY MEDICINE

## 2020-03-27 PROCEDURE — 25000128 H RX IP 250 OP 636: Performed by: FAMILY MEDICINE

## 2020-03-27 PROCEDURE — 96365 THER/PROPH/DIAG IV INF INIT: CPT

## 2020-03-27 RX ORDER — HEPARIN SODIUM (PORCINE) LOCK FLUSH IV SOLN 100 UNIT/ML 100 UNIT/ML
5 SOLUTION INTRAVENOUS ONCE
Status: CANCELLED
Start: 2020-03-27

## 2020-03-27 RX ORDER — HEPARIN SODIUM (PORCINE) LOCK FLUSH IV SOLN 100 UNIT/ML 100 UNIT/ML
5 SOLUTION INTRAVENOUS ONCE
Status: COMPLETED | OUTPATIENT
Start: 2020-03-27 | End: 2020-03-27

## 2020-03-27 RX ADMIN — SODIUM CHLORIDE 1000 ML: 9 INJECTION, SOLUTION INTRAVENOUS at 09:49

## 2020-03-27 RX ADMIN — HEPARIN 5 ML: 100 SYRINGE at 10:58

## 2020-03-27 ASSESSMENT — MIFFLIN-ST. JEOR: SCORE: 1448.63

## 2020-03-27 NOTE — PROGRESS NOTES
Patient is a 86 year old male here accompanied by self today for infusion of IVF per order of Dr Venancio Ray under the supervision of Dr Walter, Cardiology.  Patient identified with two identifiers, order verified, and verbal consent for today's infusion obtained from patient.          Patient meets order parameters for today's treatment.      Patients port accessed using non-coring, 20 gauge, 3/4 inch needle. Port accessed per facility protocol. Port flushed easily, blood return noted.  No signs and symptoms of infection or infiltration.      IV pump verified with dose, drug, and rate of administration.  Infusion administered per protocol.  Patient tolerated infusion well, no signs or symptoms of adverse reaction noted.  Patient denies pain nor discomfort.     IV removed, catheter intact.  Site clean, dry and intact.  No signs or symptoms of infiltration or infection.  Covered with a sterile bandage, slight pressure applied for 30 seconds.  Pt instructed to leave bandage intact for a minimum of one hour, and to call with questions or concerns.  Copy of appointments, discharge instructions, and after visit summary (AVS) provided to patient.  Patient states understanding, discharged.

## 2020-03-31 ENCOUNTER — INFUSION THERAPY VISIT (OUTPATIENT)
Dept: INFUSION THERAPY | Facility: OTHER | Age: 85
End: 2020-03-31
Attending: FAMILY MEDICINE
Payer: MEDICARE

## 2020-03-31 VITALS
TEMPERATURE: 97.6 F | DIASTOLIC BLOOD PRESSURE: 75 MMHG | WEIGHT: 169.09 LBS | SYSTOLIC BLOOD PRESSURE: 125 MMHG | RESPIRATION RATE: 16 BRPM | BODY MASS INDEX: 24.96 KG/M2 | HEART RATE: 70 BPM

## 2020-03-31 DIAGNOSIS — E86.9 VOLUME DEPLETION: Primary | ICD-10-CM

## 2020-03-31 PROCEDURE — 96360 HYDRATION IV INFUSION INIT: CPT

## 2020-03-31 PROCEDURE — 25800030 ZZH RX IP 258 OP 636: Performed by: FAMILY MEDICINE

## 2020-03-31 PROCEDURE — 25000128 H RX IP 250 OP 636: Performed by: FAMILY MEDICINE

## 2020-03-31 RX ORDER — HEPARIN SODIUM (PORCINE) LOCK FLUSH IV SOLN 100 UNIT/ML 100 UNIT/ML
5 SOLUTION INTRAVENOUS ONCE
Status: CANCELLED
Start: 2020-03-31

## 2020-03-31 RX ORDER — HEPARIN SODIUM (PORCINE) LOCK FLUSH IV SOLN 100 UNIT/ML 100 UNIT/ML
5 SOLUTION INTRAVENOUS ONCE
Status: COMPLETED | OUTPATIENT
Start: 2020-03-31 | End: 2020-03-31

## 2020-03-31 RX ADMIN — Medication 5 ML: at 11:10

## 2020-03-31 RX ADMIN — SODIUM CHLORIDE 1000 ML: 9 INJECTION, SOLUTION INTRAVENOUS at 10:02

## 2020-03-31 NOTE — PROGRESS NOTES
Patient is a right sided port  here accompanied by self  today for infusion of IVF  per order of Negro Ray Patient identified with two identifiers, order verified, and verbal consent for today's infusion obtained from patient.      Patient meets order parameters for today's treatment.       Patients right sided  port accessed using non-coring, 20 gauge, 3/4 needle. Port accessed per facility protocol. Port flushed easily, blood return noted.  No signs and symptoms of infection or infiltration.      IV pump verified with dose, drug, and rate of administration.  Infusion administered per protocol.  Patient tolerated infusion well, no signs or symptoms of adverse reaction noted.  Patient denies pain nor discomfort.     IV removed, catheter intact.  Site clean, dry and intact.  No signs or symptoms of infiltration or infection.  Covered with a sterile bandage, slight pressure applied for 30 seconds.  Pt instructed to leave bandage intact for a minimum of one hour, and to call with questions or concerns.  Copy of appointments, discharge instructions, and after visit summary (AVS) provided to patient.  Patient states understanding, discharged.

## 2020-04-03 ENCOUNTER — INFUSION THERAPY VISIT (OUTPATIENT)
Dept: INFUSION THERAPY | Facility: OTHER | Age: 85
End: 2020-04-03
Attending: FAMILY MEDICINE
Payer: MEDICARE

## 2020-04-03 VITALS
SYSTOLIC BLOOD PRESSURE: 118 MMHG | WEIGHT: 170.86 LBS | HEART RATE: 97 BPM | DIASTOLIC BLOOD PRESSURE: 71 MMHG | RESPIRATION RATE: 18 BRPM | OXYGEN SATURATION: 97 % | BODY MASS INDEX: 25.22 KG/M2 | TEMPERATURE: 96.4 F

## 2020-04-03 DIAGNOSIS — E86.9 VOLUME DEPLETION: Primary | ICD-10-CM

## 2020-04-03 PROCEDURE — 25000128 H RX IP 250 OP 636: Performed by: FAMILY MEDICINE

## 2020-04-03 PROCEDURE — 25800030 ZZH RX IP 258 OP 636: Performed by: FAMILY MEDICINE

## 2020-04-03 PROCEDURE — 96360 HYDRATION IV INFUSION INIT: CPT

## 2020-04-03 RX ORDER — HEPARIN SODIUM (PORCINE) LOCK FLUSH IV SOLN 100 UNIT/ML 100 UNIT/ML
5 SOLUTION INTRAVENOUS ONCE
Status: CANCELLED
Start: 2020-04-03

## 2020-04-03 RX ORDER — HEPARIN SODIUM (PORCINE) LOCK FLUSH IV SOLN 100 UNIT/ML 100 UNIT/ML
5 SOLUTION INTRAVENOUS ONCE
Status: COMPLETED | OUTPATIENT
Start: 2020-04-03 | End: 2020-04-03

## 2020-04-03 RX ADMIN — SODIUM CHLORIDE 1000 ML: 9 INJECTION, SOLUTION INTRAVENOUS at 10:00

## 2020-04-03 RX ADMIN — HEPARIN 5 ML: 100 SYRINGE at 11:03

## 2020-04-03 NOTE — PROGRESS NOTES
Patient is a 86 here accompanied by self today for infusion of IV fluids  per order of Dr. Ray.  Patient identified with two identifiers, order verified, and verbal consent for today's infusion obtained from patient.     Patients right sided port accessed using non-coring, 19 gauge , 3/4 needle. Port accessed per facility protocol. Port flushed easily, blood return noted.  No signs and symptoms of infection or infiltration.      IVF 1000 pump verified with dose, drug, and rate of administration.  Infusion administered per protocol.  Patient tolerated infusion welll, no signs or symptoms of adverse reaction noted.  Patient denies pain nor discomfort.   IV removed, catheter intact.  Site clean, dry and intact.  No signs or symptoms of infiltration or infection.  Covered with a sterile bandage, slight pressure applied for 30 seconds.  Pt instructed to leave bandage intact for a minimum of one hour, and to call with questions or concerns.  Copy of appointments, discharge instructions, and after visit summary (AVS) provided to patient.  Patient states understanding, discharged.

## 2020-04-07 ENCOUNTER — INFUSION THERAPY VISIT (OUTPATIENT)
Dept: INFUSION THERAPY | Facility: OTHER | Age: 85
End: 2020-04-07
Attending: FAMILY MEDICINE
Payer: MEDICARE

## 2020-04-07 VITALS
DIASTOLIC BLOOD PRESSURE: 79 MMHG | SYSTOLIC BLOOD PRESSURE: 146 MMHG | HEIGHT: 69 IN | OXYGEN SATURATION: 96 % | BODY MASS INDEX: 25.53 KG/M2 | TEMPERATURE: 97.1 F | HEART RATE: 79 BPM | RESPIRATION RATE: 16 BRPM | WEIGHT: 172.4 LBS

## 2020-04-07 DIAGNOSIS — E86.9 VOLUME DEPLETION: Primary | ICD-10-CM

## 2020-04-07 PROCEDURE — 96365 THER/PROPH/DIAG IV INF INIT: CPT

## 2020-04-07 PROCEDURE — 25000128 H RX IP 250 OP 636: Performed by: FAMILY MEDICINE

## 2020-04-07 PROCEDURE — 96360 HYDRATION IV INFUSION INIT: CPT

## 2020-04-07 PROCEDURE — 25800030 ZZH RX IP 258 OP 636: Performed by: FAMILY MEDICINE

## 2020-04-07 RX ORDER — HEPARIN SODIUM (PORCINE) LOCK FLUSH IV SOLN 100 UNIT/ML 100 UNIT/ML
5 SOLUTION INTRAVENOUS ONCE
Status: COMPLETED | OUTPATIENT
Start: 2020-04-07 | End: 2020-04-07

## 2020-04-07 RX ORDER — HEPARIN SODIUM (PORCINE) LOCK FLUSH IV SOLN 100 UNIT/ML 100 UNIT/ML
5 SOLUTION INTRAVENOUS ONCE
Status: CANCELLED
Start: 2020-04-07

## 2020-04-07 RX ADMIN — SODIUM CHLORIDE 1000 ML: 9 INJECTION, SOLUTION INTRAVENOUS at 14:14

## 2020-04-07 RX ADMIN — HEPARIN 5 ML: 100 SYRINGE at 15:21

## 2020-04-07 ASSESSMENT — MIFFLIN-ST. JEOR: SCORE: 1452.63

## 2020-04-07 ASSESSMENT — PAIN SCALES - GENERAL: PAINLEVEL: MILD PAIN (3)

## 2020-04-07 NOTE — PROGRESS NOTES
Patient is a 86 year old male here accompanied by self today for infusion of IVF per order of Dr MALIA Ray under the supervision of Dr Walter, Cardiology.  Patient identified with two identifiers, order verified, and verbal consent for today's infusion obtained from patient.      Patient meets order parameters for today's treatment.       Patients port accessed using non-coring, 20 gauge, 3/4 inch power needle. Port accessed per facility protocol. Port flushed easily, blood return noted.  No signs and symptoms of infection or infiltration.      1414: IV pump verified with dose, drug, and rate of administration.  Infusion administered per protocol.  Patient tolerated infusion well, no signs or symptoms of adverse reaction noted.  Patient denies pain nor discomfort.     IV removed, catheter intact.  Site clean, dry and intact.  No signs or symptoms of infiltration or infection.  Covered with a sterile bandage, slight pressure applied for 30 seconds.  Pt instructed to leave bandage intact for a minimum of one hour, and to call with questions or concerns. Patient states understanding, discharged.

## 2020-04-10 ENCOUNTER — INFUSION THERAPY VISIT (OUTPATIENT)
Dept: INFUSION THERAPY | Facility: OTHER | Age: 85
End: 2020-04-10
Attending: FAMILY MEDICINE
Payer: MEDICARE

## 2020-04-10 VITALS
TEMPERATURE: 96.1 F | DIASTOLIC BLOOD PRESSURE: 81 MMHG | WEIGHT: 169.75 LBS | HEART RATE: 70 BPM | BODY MASS INDEX: 25.14 KG/M2 | SYSTOLIC BLOOD PRESSURE: 166 MMHG | OXYGEN SATURATION: 99 % | HEIGHT: 69 IN

## 2020-04-10 DIAGNOSIS — E86.9 VOLUME DEPLETION: Primary | ICD-10-CM

## 2020-04-10 PROCEDURE — 25800030 ZZH RX IP 258 OP 636: Performed by: FAMILY MEDICINE

## 2020-04-10 PROCEDURE — 25000128 H RX IP 250 OP 636: Performed by: FAMILY MEDICINE

## 2020-04-10 PROCEDURE — 96360 HYDRATION IV INFUSION INIT: CPT

## 2020-04-10 RX ORDER — HEPARIN SODIUM (PORCINE) LOCK FLUSH IV SOLN 100 UNIT/ML 100 UNIT/ML
5 SOLUTION INTRAVENOUS ONCE
Status: CANCELLED
Start: 2020-04-10

## 2020-04-10 RX ORDER — HEPARIN SODIUM (PORCINE) LOCK FLUSH IV SOLN 100 UNIT/ML 100 UNIT/ML
5 SOLUTION INTRAVENOUS ONCE
Status: COMPLETED | OUTPATIENT
Start: 2020-04-10 | End: 2020-04-10

## 2020-04-10 RX ADMIN — HEPARIN 5 ML: 100 SYRINGE at 10:56

## 2020-04-10 RX ADMIN — SODIUM CHLORIDE 1000 ML: 9 INJECTION, SOLUTION INTRAVENOUS at 09:42

## 2020-04-10 ASSESSMENT — MIFFLIN-ST. JEOR: SCORE: 1440.63

## 2020-04-10 NOTE — PROGRESS NOTES
"Patient is a 86 year old male here accompanied by self today for infusion of IVF per order of Dr. MALIA Ray.  Patient identified with two identifiers, order verified, and verbal consent for today's infusion obtained from patient.        Patients right sided port accessed using non-coring, 20 gauge, 3/4\" needle. Port accessed per facility protocol. Port flushed easily, blood return noted.  No signs and symptoms of infection or infiltration.      0942: IV pump verified with dose, drug, and rate of administration.  Infusion administered per protocol.  Patient tolerated infusion well, no signs or symptoms of adverse reaction noted.  Patient denies pain nor discomfort.     IV removed, catheter intact.  Site clean, dry and intact.  No signs or symptoms of infiltration or infection.  Covered with a sterile bandage, slight pressure applied for 30 seconds.  Pt instructed to leave bandage intact for a minimum of one hour, and to call with questions or concerns.  Copy of appointments, discharge instructions, and after visit summary (AVS) provided to patient.  Patient states understanding, discharged.    "

## 2020-04-13 ENCOUNTER — TELEPHONE (OUTPATIENT)
Dept: FAMILY MEDICINE | Facility: OTHER | Age: 85
End: 2020-04-13

## 2020-04-13 ENCOUNTER — APPOINTMENT (OUTPATIENT)
Dept: GENERAL RADIOLOGY | Facility: HOSPITAL | Age: 85
End: 2020-04-13
Attending: EMERGENCY MEDICINE
Payer: MEDICARE

## 2020-04-13 ENCOUNTER — NURSE TRIAGE (OUTPATIENT)
Dept: FAMILY MEDICINE | Facility: OTHER | Age: 85
End: 2020-04-13

## 2020-04-13 ENCOUNTER — HOSPITAL ENCOUNTER (OUTPATIENT)
Facility: HOSPITAL | Age: 85
Setting detail: OBSERVATION
Discharge: HOME | End: 2020-04-14
Attending: EMERGENCY MEDICINE | Admitting: INTERNAL MEDICINE
Payer: MEDICARE

## 2020-04-13 ENCOUNTER — APPOINTMENT (OUTPATIENT)
Dept: CT IMAGING | Facility: HOSPITAL | Age: 85
End: 2020-04-13
Attending: EMERGENCY MEDICINE
Payer: MEDICARE

## 2020-04-13 DIAGNOSIS — R55 SYNCOPE AND COLLAPSE: ICD-10-CM

## 2020-04-13 DIAGNOSIS — I48.11 LONGSTANDING PERSISTENT ATRIAL FIBRILLATION (H): ICD-10-CM

## 2020-04-13 DIAGNOSIS — G31.83 LEWY BODY DEMENTIA WITHOUT BEHAVIORAL DISTURBANCE (H): ICD-10-CM

## 2020-04-13 DIAGNOSIS — R29.6 RECURRENT FALLS: Primary | ICD-10-CM

## 2020-04-13 DIAGNOSIS — G40.909 SEIZURE DISORDER (H): ICD-10-CM

## 2020-04-13 DIAGNOSIS — I95.1 AUTONOMIC ORTHOSTATIC HYPOTENSION: ICD-10-CM

## 2020-04-13 DIAGNOSIS — G20.A1 PARKINSON DISEASE (H): ICD-10-CM

## 2020-04-13 DIAGNOSIS — F02.80 LEWY BODY DEMENTIA WITHOUT BEHAVIORAL DISTURBANCE (H): ICD-10-CM

## 2020-04-13 LAB
ALBUMIN SERPL-MCNC: 3.5 G/DL (ref 3.4–5)
ALBUMIN UR-MCNC: NEGATIVE MG/DL
ALP SERPL-CCNC: 61 U/L (ref 40–150)
ALT SERPL W P-5'-P-CCNC: 29 U/L (ref 0–70)
ANION GAP SERPL CALCULATED.3IONS-SCNC: 5 MMOL/L (ref 3–14)
APPEARANCE UR: CLEAR
AST SERPL W P-5'-P-CCNC: 18 U/L (ref 0–45)
BASOPHILS # BLD AUTO: 0 10E9/L (ref 0–0.2)
BASOPHILS NFR BLD AUTO: 0.5 %
BILIRUB SERPL-MCNC: 0.5 MG/DL (ref 0.2–1.3)
BILIRUB UR QL STRIP: NEGATIVE
BUN SERPL-MCNC: 16 MG/DL (ref 7–30)
CALCIUM SERPL-MCNC: 8.5 MG/DL (ref 8.5–10.1)
CHLORIDE SERPL-SCNC: 109 MMOL/L (ref 94–109)
CO2 SERPL-SCNC: 25 MMOL/L (ref 20–32)
COLOR UR AUTO: NORMAL
CREAT SERPL-MCNC: 1.24 MG/DL (ref 0.66–1.25)
D DIMER PPP FEU-MCNC: 0.9 UG/ML FEU (ref 0–0.5)
DIFFERENTIAL METHOD BLD: NORMAL
EOSINOPHIL # BLD AUTO: 0.3 10E9/L (ref 0–0.7)
EOSINOPHIL NFR BLD AUTO: 5.1 %
ERYTHROCYTE [DISTWIDTH] IN BLOOD BY AUTOMATED COUNT: 12.1 % (ref 10–15)
GFR SERPL CREATININE-BSD FRML MDRD: 52 ML/MIN/{1.73_M2}
GLUCOSE SERPL-MCNC: 120 MG/DL (ref 70–99)
GLUCOSE UR STRIP-MCNC: NEGATIVE MG/DL
HCT VFR BLD AUTO: 42.8 % (ref 40–53)
HGB BLD-MCNC: 14.3 G/DL (ref 13.3–17.7)
HGB UR QL STRIP: NEGATIVE
IMM GRANULOCYTES # BLD: 0 10E9/L (ref 0–0.4)
IMM GRANULOCYTES NFR BLD: 0.2 %
KETONES UR STRIP-MCNC: NEGATIVE MG/DL
LACTATE BLD-SCNC: 1.8 MMOL/L (ref 0.7–2)
LEUKOCYTE ESTERASE UR QL STRIP: NEGATIVE
LIPASE SERPL-CCNC: 175 U/L (ref 73–393)
LYMPHOCYTES # BLD AUTO: 1.9 10E9/L (ref 0.8–5.3)
LYMPHOCYTES NFR BLD AUTO: 28.8 %
MCH RBC QN AUTO: 30.9 PG (ref 26.5–33)
MCHC RBC AUTO-ENTMCNC: 33.4 G/DL (ref 31.5–36.5)
MCV RBC AUTO: 92 FL (ref 78–100)
MONOCYTES # BLD AUTO: 0.7 10E9/L (ref 0–1.3)
MONOCYTES NFR BLD AUTO: 11 %
NEUTROPHILS # BLD AUTO: 3.6 10E9/L (ref 1.6–8.3)
NEUTROPHILS NFR BLD AUTO: 54.4 %
NITRATE UR QL: NEGATIVE
NRBC # BLD AUTO: 0 10*3/UL
NRBC BLD AUTO-RTO: 0 /100
PH UR STRIP: 6.5 PH (ref 4.7–8)
PLATELET # BLD AUTO: 214 10E9/L (ref 150–450)
POTASSIUM SERPL-SCNC: 4.3 MMOL/L (ref 3.4–5.3)
PROT SERPL-MCNC: 7 G/DL (ref 6.8–8.8)
RBC # BLD AUTO: 4.63 10E12/L (ref 4.4–5.9)
SODIUM SERPL-SCNC: 139 MMOL/L (ref 133–144)
SOURCE: NORMAL
SP GR UR STRIP: 1.01 (ref 1–1.03)
TROPONIN I SERPL-MCNC: <0.015 UG/L (ref 0–0.04)
TSH SERPL DL<=0.005 MIU/L-ACNC: 3.66 MU/L (ref 0.4–4)
UROBILINOGEN UR STRIP-MCNC: NORMAL MG/DL (ref 0–2)
WBC # BLD AUTO: 6.5 10E9/L (ref 4–11)

## 2020-04-13 PROCEDURE — 85379 FIBRIN DEGRADATION QUANT: CPT | Performed by: EMERGENCY MEDICINE

## 2020-04-13 PROCEDURE — 80053 COMPREHEN METABOLIC PANEL: CPT | Performed by: EMERGENCY MEDICINE

## 2020-04-13 PROCEDURE — 40000786 ZZHCL STATISTIC ACTIVE MRSA SURVEILLANCE CULTURE: Performed by: INTERNAL MEDICINE

## 2020-04-13 PROCEDURE — 99285 EMERGENCY DEPT VISIT HI MDM: CPT | Mod: Z6 | Performed by: EMERGENCY MEDICINE

## 2020-04-13 PROCEDURE — 83605 ASSAY OF LACTIC ACID: CPT | Performed by: EMERGENCY MEDICINE

## 2020-04-13 PROCEDURE — 25800030 ZZH RX IP 258 OP 636: Performed by: INTERNAL MEDICINE

## 2020-04-13 PROCEDURE — 83690 ASSAY OF LIPASE: CPT | Performed by: EMERGENCY MEDICINE

## 2020-04-13 PROCEDURE — 93005 ELECTROCARDIOGRAM TRACING: CPT

## 2020-04-13 PROCEDURE — 85025 COMPLETE CBC W/AUTO DIFF WBC: CPT | Performed by: EMERGENCY MEDICINE

## 2020-04-13 PROCEDURE — 99285 EMERGENCY DEPT VISIT HI MDM: CPT | Mod: 25

## 2020-04-13 PROCEDURE — 84484 ASSAY OF TROPONIN QUANT: CPT | Performed by: EMERGENCY MEDICINE

## 2020-04-13 PROCEDURE — 70450 CT HEAD/BRAIN W/O DYE: CPT | Mod: TC

## 2020-04-13 PROCEDURE — 25000132 ZZH RX MED GY IP 250 OP 250 PS 637: Mod: GY | Performed by: INTERNAL MEDICINE

## 2020-04-13 PROCEDURE — G0378 HOSPITAL OBSERVATION PER HR: HCPCS

## 2020-04-13 PROCEDURE — 71046 X-RAY EXAM CHEST 2 VIEWS: CPT | Mod: TC

## 2020-04-13 PROCEDURE — 81003 URINALYSIS AUTO W/O SCOPE: CPT | Performed by: EMERGENCY MEDICINE

## 2020-04-13 PROCEDURE — 84443 ASSAY THYROID STIM HORMONE: CPT | Performed by: EMERGENCY MEDICINE

## 2020-04-13 RX ORDER — SODIUM CHLORIDE 9 MG/ML
INJECTION, SOLUTION INTRAVENOUS CONTINUOUS
Status: DISCONTINUED | OUTPATIENT
Start: 2020-04-13 | End: 2020-04-14

## 2020-04-13 RX ORDER — ONDANSETRON 2 MG/ML
4 INJECTION INTRAMUSCULAR; INTRAVENOUS EVERY 6 HOURS PRN
Status: DISCONTINUED | OUTPATIENT
Start: 2020-04-13 | End: 2020-04-14 | Stop reason: HOSPADM

## 2020-04-13 RX ORDER — LIDOCAINE 40 MG/G
CREAM TOPICAL
Status: DISCONTINUED | OUTPATIENT
Start: 2020-04-13 | End: 2020-04-14 | Stop reason: HOSPADM

## 2020-04-13 RX ORDER — NALOXONE HYDROCHLORIDE 0.4 MG/ML
.1-.4 INJECTION, SOLUTION INTRAMUSCULAR; INTRAVENOUS; SUBCUTANEOUS
Status: DISCONTINUED | OUTPATIENT
Start: 2020-04-13 | End: 2020-04-14 | Stop reason: HOSPADM

## 2020-04-13 RX ORDER — RABEPRAZOLE SODIUM 20 MG/1
20 TABLET, DELAYED RELEASE ORAL 2 TIMES DAILY
COMMUNITY

## 2020-04-13 RX ORDER — ONDANSETRON 4 MG/1
4 TABLET, ORALLY DISINTEGRATING ORAL EVERY 6 HOURS PRN
Status: DISCONTINUED | OUTPATIENT
Start: 2020-04-13 | End: 2020-04-14 | Stop reason: HOSPADM

## 2020-04-13 RX ORDER — MIDODRINE HYDROCHLORIDE 5 MG/1
10 TABLET ORAL
Status: DISCONTINUED | OUTPATIENT
Start: 2020-04-13 | End: 2020-04-14

## 2020-04-13 RX ORDER — ATORVASTATIN CALCIUM 20 MG/1
20 TABLET, FILM COATED ORAL EVERY EVENING
Status: DISCONTINUED | OUTPATIENT
Start: 2020-04-13 | End: 2020-04-14 | Stop reason: HOSPADM

## 2020-04-13 RX ADMIN — ATORVASTATIN CALCIUM 20 MG: 20 TABLET, FILM COATED ORAL at 21:27

## 2020-04-13 RX ADMIN — SODIUM CHLORIDE: 9 INJECTION, SOLUTION INTRAVENOUS at 21:24

## 2020-04-13 RX ADMIN — MIDODRINE HYDROCHLORIDE 10 MG: 5 TABLET ORAL at 21:31

## 2020-04-13 ASSESSMENT — ENCOUNTER SYMPTOMS
DIFFICULTY URINATING: 0
NECK STIFFNESS: 0
ARTHRALGIAS: 0
SHORTNESS OF BREATH: 0
FEVER: 0
ABDOMINAL PAIN: 0
HEADACHES: 0
EYE REDNESS: 0
COLOR CHANGE: 0

## 2020-04-13 NOTE — TELEPHONE ENCOUNTER
Patient is having dizzy spells and problems with his blood pressure. Gets really dizzy and fatigued walking from the bedroom to the living room. Last blood pressure reading was 75/49. This has been going on for about a week now and it is not getting better. Would like to know to do. Advised to bring to ED. States they do not want to go into the ED. Transferred to RN Triage for further advice. Ashley A. Lechevalier, LPN on 4/13/2020 at 2:18 PM.

## 2020-04-13 NOTE — TELEPHONE ENCOUNTER
Patient significant other calling and states pt is very dizzy for last week and have been having to catch him.She had to today. About 1/2 hour ago BP was 75/49 with HR of 92.He is laying down now d/t dizziness. No SOB.Increase in weakness the last week.Spoke with patient and he feels light headed and head feels light.Had partner recheck BP and it was 77/46 with HR 92. No fever. Per significant other hands and feet are cold and like he is not getting blood to his brain.She states he is alert.He feels clammy.    Advised she need to call 911 for pt to go to ED via ambulance.Pt and partner verbalized understanding.She will call 911.She is concerned and does not want to go d/t virus.Updated her that will update ED.Patient's signifcant other Uda can be reached at [phone] or home phone in pt.chart.    Called and gave report to Jaz in ED.Pt has pacemaker, dementia, and has had fluids last week.    Please note.      Hetal Pike RN

## 2020-04-13 NOTE — ED NOTES
"PT brought in by [facility], pt states he has been having syncopal episodes for pthe pat 2 weeks, States his syncopal episodes are random and \"he passes out for like 10 seconds.\" Denies pain, monitoring placed. See assessments. Call light placed within reach.  "

## 2020-04-13 NOTE — ED NOTES
"Spoke with SO, Des, who states patient has been falling 3-4x daily for last 1-2 weeks. Gaviotaa does not feel patient is safe to go home as she can \"not always catch him.\" Dr. Venegas updated.   "

## 2020-04-13 NOTE — ED PROVIDER NOTES
History     Chief Complaint   Patient presents with     Dizziness     Loss of Consciousness     yesterday     HPI  Jf Ortiz is a 86 year old male who presented to the emergency department via [facility].  Patient also history of intermittent dizziness and syncopal episodes since yesterday.  He has had at least  such episodes of syncope and collapse at least 4 times a day in the last few weeks.  He denies any loss of consciousness or hitting his head on the floor.  He is not anticoagulated and denies any neck pain.  Patient has history of recurrent syncopal episodes for several weeks to months.  They are passing out is random and lasts for around 10 seconds.  Chart review shows that patient has history of autonomic orthostatic hypotension, seizure disorder, Parkinson's disease, syncope and collapse.  Patient is currently on Aricept for dementia.  He has had atrial fibrillation for 3 years but he is not on any antiplatelets or anticoagulated.    Allergies:  Allergies   Allergen Reactions     Cats Unknown     Lamotrigine      Skin lesions       Problem List:    Patient Active Problem List    Diagnosis Date Noted     Longstanding persistent atrial fibrillation 04/13/2020     Priority: Medium     Falls frequently 04/13/2020     Priority: Medium     Frequent falls 04/13/2020     Priority: Medium     Coronary artery disease involving native coronary artery without angina pectoris 04/13/2020     Priority: Medium     Constipation, unspecified constipation type 04/11/2018     Priority: Medium     Pacemaker, [employer], Dual Chamber - Dependent 10/06/2017     Priority: Medium     Lewy body dementia without behavioral disturbance (H) 08/31/2017     Priority: Medium     Fatigue 08/31/2017     Priority: Medium     Urinary incontinence 08/31/2017     Priority: Medium     Autonomic orthostatic hypotension 10/14/2016     Priority: Medium     Dementia without behavioral disturbance (H) 07/28/2015     Priority: Medium      Diagnosis updated by automated process. Provider to review and confirm.       Hypocalcemia 07/28/2015     Priority: Medium     Parkinson disease (H)      Priority: Medium     Seizure disorder (H)      Priority: Medium     Volume depletion 08/02/2014     Priority: Medium     Syncope and collapse 08/01/2014     Priority: Medium     Stented coronary artery      Priority: Medium     Coronary atherosclerosis of native coronary artery      Priority: Medium     Overview:   IMO Update 10/11       Hypercholesterolemia 04/23/2013     Priority: Medium     Advanced care planning/counseling discussion 10/30/2012     Priority: Medium     REM sleep behavior disorder 01/01/2011     Priority: Medium     Osteoarthritis 01/01/2011     Priority: Medium     Problem list name updated by automated process. Provider to review       Diaphragmatic hernia 01/01/2011     Priority: Medium     Problem list name updated by automated process. 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     Priority: Medium     Overview:   IMO Update 10/11       Hypertension with target blood pressure goal under 150/90 09/05/2006     Priority: Medium     Overview:   IMO Update          Past Medical History:    Past Medical History:   Diagnosis Date     Autonomic orthostatic hypotension 10/14/2016     Coronary artery disease      Dementia without behavioral disturbance (H) 7/28/2015     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     Seizure disorder (H)      Stented coronary artery        Past Surgical History:    Past Surgical History:   Procedure Laterality Date     ------------OTHER-------------   1955    ulnar and radial fx - repair ulnar and radial fx x4     ------------OTHER-------------  6/14/2011    cataract extraction     BIOPSY  08/2015    skin biopsy     BLEPHAROPLASTY BILATERAL  5/6/2014    Procedure: BLEPHAROPLASTY BILATERAL;  Surgeon: Andrew Queen MD;  Location: [facility]     BYPASS GRAFT ARTERY CORONARY  11/2006    coronary artery disease x 5, [facility]     cataract extraction and lens implantation  2011    cataracts     cataract extraction and lens implantation      cataracts     COLONOSCOPY  2012     colonoscopy with polypectomy  3/13/2009    history of polyps - repeat 3 yrs     colonoscopy with polypectomy  2006     colonoscopy with polypectomy  2005     COMBINED COLONOSCOPY WITH ARGON PLASMA COAGULATOR (APC) N/A 10/31/2014    Procedure: COMBINED COLONOSCOPY WITH ARGON PLASMA COAGULATOR (APC);  Surgeon: Bassam Aguilar MD;  Location: [facility]     COMBINED COLONOSCOPY WITH ARGON PLASMA COAGULATOR (APC) N/A 11/13/2015    Procedure: COMBINED COLONOSCOPY WITH ARGON PLASMA COAGULATOR (APC);  Surgeon: Bassam Aguilar MD;  Location: [facility]     ENDOSCOPY UPPER, COLONOSCOPY, COMBINED N/A 10/31/2014    Procedure: COMBINED ENDOSCOPY UPPER, COLONOSCOPY;  Surgeon: Bassam Aguilar MD;  Location: [facility]     ENDOSCOPY UPPER, COLONOSCOPY, COMBINED N/A 11/13/2015    Procedure: COMBINED ENDOSCOPY UPPER, COLONOSCOPY;  Surgeon: Bassam Aguilar MD;  Location: [facility]     ESOPHAGOSCOPY, GASTROSCOPY, DUODENOSCOPY (EGD), COMBINED  1/22/2014    Procedure: COMBINED ESOPHAGOSCOPY, GASTROSCOPY, DUODENOSCOPY (EGD);  UPPER ENDOSCOPY(JAYDEN) W/ BIOPSIES;  Surgeon: Patricia Pena MD;  Location: [facility]     HERNIORRHAPHY INGUINAL Right 2/14/2018    Procedure: HERNIORRHAPHY INGUINAL;  OPEN RIGHT INGUINAL HERNIA REPAIR with Mesh;  Surgeon: Virgilio Zaragoza DO;  Location: [facility]     INSERT PORT VASCULAR ACCESS Right 7/12/2019    Procedure: PORT PLACEMENT;  Surgeon: Lloyd Ram MD;  Location: [facility]     LARYNGOSCOPY WITH  MICROSCOPE  2014    Procedure: LARYNGOSCOPY WITH MICROSCOPE;;  Surgeon: Chayo Duke MD;  Location: [facility]     pacemaker placement      heart block     pacemaker placement      dual-chamber     REMOVE TUBE, MYRINGOTOMY, COMBINED  2014    Procedure: COMBINED REMOVE TUBE, MYRINGOTOMY;  MICRODIRECT LARYNGOSCOPY WITH BIOPSY AND FROZEN SECTIONS removal of right ear tube and myringoplasty;  Surgeon: Chayo Duke MD;  Location: [facility]     REPLACE PACEMAKER GENERATOR N/A 2018    Procedure: REPLACE PACEMAKER GENERATOR;  Pacemaker generator change;  Surgeon: Alma Kaplan MD;  Location: [facility]     stent placement to LAD       ventilation tube  2012    right in office       Family History:    Family History   Problem Relation Age of Onset     Diabetes Mother      Breast Cancer Daughter        Social History:  Marital Status:  Single [1]  Social History     Tobacco Use     Smoking status: Former Smoker     Packs/day: 1.00     Years: 5.00     Pack years: 5.00     Types: Cigarettes     Last attempt to quit: 1985     Years since quittin.3     Smokeless tobacco: Never Used     Tobacco comment: quit in    Substance Use Topics     Alcohol use: Yes     Comment: social     Drug use: No        Medications:    No current outpatient medications on file.        Review of Systems   Constitutional: Negative for fever.   HENT: Negative for congestion.    Eyes: Negative for redness.   Respiratory: Negative for shortness of breath.    Cardiovascular: Negative for chest pain.   Gastrointestinal: Negative for abdominal pain.   Genitourinary: Negative for difficulty urinating.   Musculoskeletal: Negative for arthralgias and neck stiffness.   Skin: Negative for color change.   Neurological: Positive for syncope. Negative for headaches.   All other systems reviewed and are negative.      Physical Exam   BP: 151/84  Pulse: 81  Heart Rate: 86  Temp: 97.6  F (36.4  C)  Resp: 16  Weight: 77 kg  (169 lb 12.1 oz)  SpO2: 98 %  Lying Orthostatic BP: 162/90  Lying Orthostatic Pulse: 80 bpm  Sitting Orthostatic BP: 153/88  Sitting Orthostatic Pulse: 78 bpm  Standing Orthostatic BP: 108/65  Standing Orthostatic Pulse: 74 bpm      Physical Exam  Vitals signs and nursing note reviewed.   Constitutional:       General: He is not in acute distress.     Appearance: Normal appearance. He is well-developed. He is not diaphoretic.      Comments: Patient is hard of hearing   HENT:      Head: Normocephalic and atraumatic.   Eyes:      Pupils: Pupils are equal, round, and reactive to light.   Cardiovascular:      Rate and Rhythm: Normal rate and regular rhythm.      Heart sounds: Normal heart sounds.   Pulmonary:      Effort: Pulmonary effort is normal. No respiratory distress.      Breath sounds: Normal breath sounds. No stridor.   Abdominal:      General: Bowel sounds are normal. There is no distension.      Tenderness: There is no abdominal tenderness.   Musculoskeletal:         General: No tenderness or deformity.   Neurological:      General: No focal deficit present.      Mental Status: He is alert.         ED Course       Procedures       EKG Interpretation:      Interpreted by Frank Venegas MD  Time reviewed: 3:26 PM  Symptoms at time of EKG: Syncopal episode  Rhythm: normal sinus   Rate: normal  Axis: Left axis deviation  Ectopy: Junctional pacemaker  Conduction: Left bundle branch block  ST Segments/ T Waves: No ST-T wave changes  Q Waves: none  Comparison to prior: No old EKG available    Clinical Impression: Atrial fibrillation with left axis deviation and left bundle branch block.        Results for orders placed or performed during the hospital encounter of 04/13/20 (from the past 24 hour(s))   CBC with platelets differential   Result Value Ref Range    WBC 6.5 4.0 - 11.0 10e9/L    RBC Count 4.63 4.4 - 5.9 10e12/L    Hemoglobin 14.3 13.3 - 17.7 g/dL    Hematocrit 42.8 40.0 - 53.0 %    MCV 92 78 - 100 fl     MCH 30.9 26.5 - 33.0 pg    MCHC 33.4 31.5 - 36.5 g/dL    RDW 12.1 10.0 - 15.0 %    Platelet Count 214 150 - 450 10e9/L    Diff Method Automated Method     % Neutrophils 54.4 %    % Lymphocytes 28.8 %    % Monocytes 11.0 %    % Eosinophils 5.1 %    % Basophils 0.5 %    % Immature Granulocytes 0.2 %    Nucleated RBCs 0 0 /100    Absolute Neutrophil 3.6 1.6 - 8.3 10e9/L    Absolute Lymphocytes 1.9 0.8 - 5.3 10e9/L    Absolute Monocytes 0.7 0.0 - 1.3 10e9/L    Absolute Eosinophils 0.3 0.0 - 0.7 10e9/L    Absolute Basophils 0.0 0.0 - 0.2 10e9/L    Abs Immature Granulocytes 0.0 0 - 0.4 10e9/L    Absolute Nucleated RBC 0.0    Comprehensive metabolic panel   Result Value Ref Range    Sodium 139 133 - 144 mmol/L    Potassium 4.3 3.4 - 5.3 mmol/L    Chloride 109 94 - 109 mmol/L    Carbon Dioxide 25 20 - 32 mmol/L    Anion Gap 5 3 - 14 mmol/L    Glucose 120 (H) 70 - 99 mg/dL    Urea Nitrogen 16 7 - 30 mg/dL    Creatinine 1.24 0.66 - 1.25 mg/dL    GFR Estimate 52 (L) >60 mL/min/[1.73_m2]    GFR Estimate If Black 60 (L) >60 mL/min/[1.73_m2]    Calcium 8.5 8.5 - 10.1 mg/dL    Bilirubin Total 0.5 0.2 - 1.3 mg/dL    Albumin 3.5 3.4 - 5.0 g/dL    Protein Total 7.0 6.8 - 8.8 g/dL    Alkaline Phosphatase 61 40 - 150 U/L    ALT 29 0 - 70 U/L    AST 18 0 - 45 U/L   Lipase   Result Value Ref Range    Lipase 175 73 - 393 U/L   Lactic acid whole blood   Result Value Ref Range    Lactic Acid 1.8 0.7 - 2.0 mmol/L   Troponin I   Result Value Ref Range    Troponin I ES <0.015 0.000 - 0.045 ug/L   D dimer quantitative   Result Value Ref Range    D Dimer 0.9 (H) 0.0 - 0.50 ug/ml FEU   TSH with free T4 reflex   Result Value Ref Range    TSH 3.66 0.40 - 4.00 mU/L   XR Chest 2 Views    Narrative    XR CHEST 2 VW    HISTORY: 86 years Male syncopal episode    COMPARISON: 10/21/2019    TECHNIQUE: 2 views of the chest were obtained.    FINDINGS: Two views of the chest were obtained. Heart size and  pulmonary vascularity are within normal limits, lungs  are clear on  both views. No consolidating air space opacities are present.    Again seen are changes of median sternotomy, cardiac pacemaker and  right central line placement.      Impression    IMPRESSION: Clear chest.    VIMAL PÉREZ MD   CT Head w/o Contrast    Narrative    Exam: CT HEAD W/O CONTRAST    Clinical history:86 years Male Near syncopal episode    Comparisons: 10/21/2019    Technique: Axial CT imaging of the head was performed Without  intervenous contrast.    FINDINGS: There is generalized age-appropriate cerebral and cerebellar  volume loss.   Ventricles and sulci are symmetric. The gray-white matter  differentiation throughout the brain is well maintained. There is  moderate periventricular white matter change of chronic small vessel  ischemic disease. There is no evidence of intracranial mass or  hemorrhage. Visualized portions of the paranasal sinuses and mastoid  air cells are well aerated. There is no evidence of skull fracture.      Impression    IMPRESSION: Negative Head CT    VIMAL PÉREZ MD   UA reflex to Microscopic and Culture    Specimen: Midstream Urine   Result Value Ref Range    Color Urine Light Yellow     Appearance Urine Clear     Glucose Urine Negative NEG^Negative mg/dL    Bilirubin Urine Negative NEG^Negative    Ketones Urine Negative NEG^Negative mg/dL    Specific Gravity Urine 1.012 1.003 - 1.035    Blood Urine Negative NEG^Negative    pH Urine 6.5 4.7 - 8.0 pH    Protein Albumin Urine Negative NEG^Negative mg/dL    Urobilinogen mg/dL Normal 0.0 - 2.0 mg/dL    Nitrite Urine Negative NEG^Negative    Leukocyte Esterase Urine Negative NEG^Negative    Source Midstream Urine    Active MRSA Surveillance Culture    Specimen: Nasopharyngeal; Nares   Result Value Ref Range    Specimen Description Nares     Culture Micro Culture in progress        Medications   atorvastatin (LIPITOR) tablet 20 mg (20 mg Oral Given 4/13/20 2127)   lidocaine 1 % 0.1-1 mL (has no administration  in time range)   lidocaine (LMX4) kit (has no administration in time range)   sodium chloride (PF) 0.9% PF flush 3 mL (has no administration in time range)   sodium chloride (PF) 0.9% PF flush 3 mL (3 mLs Intracatheter Not Given 4/14/20 0349)   naloxone (NARCAN) injection 0.1-0.4 mg (has no administration in time range)   melatonin tablet 1 mg (has no administration in time range)   enoxaparin ANTICOAGULANT (LOVENOX) injection 40 mg (40 mg Subcutaneous Not Given 4/13/20 2128)   ondansetron (ZOFRAN-ODT) ODT tab 4 mg (has no administration in time range)     Or   ondansetron (ZOFRAN) injection 4 mg (has no administration in time range)   sodium chloride 0.9% infusion ( Intravenous Rate/Dose Verify 4/14/20 0600)   fludrocortisone (FLORINEF) tablet 100 mcg (has no administration in time range)   midodrine (PROAMATINE) tablet 10 mg (has no administration in time range)       Assessments & Plan (with Medical Decision Making)   Recurrent falls:  Lewy body dementia:  Autonomic orthostatic hypotension:  Atrial fibrillation:  Syncope and collapse:  Patient presented to the ED after syncope and collapse at home where he lives with a girlfriend.  He also reported that patient has had multiple episodes of falls up to 4 episodes of falls per day.  Patient evaluated at the ED and CT scan of the head was negative for any acute injury with normal urinalysis, TSH, lipase, lactic acid, troponin, CBC and CMP.  EKG showed atrial fibrillation with left axis deviation and left bundle branch block.  Atrial fibrillation: Patient is not on anticoagulants or antiplatelets even though he admits his atrial fibrillation has been present for more than 3 years.  He however has advanced dementia and not sure whether to believe his history.  An attempt was made to contact the PCP to discuss this but the office is already closed.  I will defer initiation of antiplatelets or anticoagulation to PCP in this patient who has recurrent falls.  They can  discuss with the family on weighing risks versus benefits of this treatment.  Syncope and collapse: Multifactorial probably secondary.  Atrial fibrillation with possible micro emboli with microinfarctions, will await MRI in the morning.  Other possible causes are autonomic orthostatic hypotension and Parkinson's disease.  Discussed with the patient girlfriend on the phone and the best course of action is to admit patient to a nursing home.  Unfortunately, this would not be possible from the ED at this time of the day in the evening and therefore will admit patient observation and possibly try nursing home placement from there.    I have reviewed the nursing notes.    I have reviewed the findings, diagnosis, plan and need for follow up with the patient.    Current Discharge Medication List          Final diagnoses:   Syncope and collapse   Longstanding persistent atrial fibrillation   Seizure disorder (H)   Autonomic orthostatic hypotension   Lewy body dementia without behavioral disturbance (H)   Recurrent falls       4/13/2020   [facility]     Frank Venegas MD  04/14/20 0836

## 2020-04-13 NOTE — ED NOTES
Presents via [facility] with reports of intermittent dizziness and syncopal episode yesterday. Denies LOC or hitting his head. No anticoagulants. Denies neck pain.

## 2020-04-14 ENCOUNTER — HOSPITAL ENCOUNTER (OUTPATIENT)
Dept: CARDIOLOGY | Facility: HOSPITAL | Age: 85
Setting detail: OBSERVATION
End: 2020-04-14
Attending: INTERNAL MEDICINE
Payer: MEDICARE

## 2020-04-14 ENCOUNTER — TELEPHONE (OUTPATIENT)
Dept: FAMILY MEDICINE | Facility: OTHER | Age: 85
End: 2020-04-14

## 2020-04-14 ENCOUNTER — APPOINTMENT (OUTPATIENT)
Dept: PHYSICAL THERAPY | Facility: HOSPITAL | Age: 85
End: 2020-04-14
Attending: INTERNAL MEDICINE
Payer: MEDICARE

## 2020-04-14 VITALS
DIASTOLIC BLOOD PRESSURE: 78 MMHG | WEIGHT: 163.58 LBS | HEART RATE: 80 BPM | BODY MASS INDEX: 24.15 KG/M2 | RESPIRATION RATE: 18 BRPM | SYSTOLIC BLOOD PRESSURE: 149 MMHG | OXYGEN SATURATION: 99 % | TEMPERATURE: 97.6 F

## 2020-04-14 PROCEDURE — 25000132 ZZH RX MED GY IP 250 OP 250 PS 637: Mod: GY | Performed by: NURSE PRACTITIONER

## 2020-04-14 PROCEDURE — 25800030 ZZH RX IP 258 OP 636: Performed by: INTERNAL MEDICINE

## 2020-04-14 PROCEDURE — 93306 TTE W/DOPPLER COMPLETE: CPT | Mod: 26 | Performed by: INTERNAL MEDICINE

## 2020-04-14 PROCEDURE — 99235 HOSP IP/OBS SAME DATE MOD 70: CPT | Performed by: INTERNAL MEDICINE

## 2020-04-14 PROCEDURE — 93306 TTE W/DOPPLER COMPLETE: CPT | Mod: TC

## 2020-04-14 PROCEDURE — G0378 HOSPITAL OBSERVATION PER HR: HCPCS

## 2020-04-14 PROCEDURE — 97162 PT EVAL MOD COMPLEX 30 MIN: CPT | Mod: GP | Performed by: PHYSICAL THERAPIST

## 2020-04-14 RX ORDER — FLUDROCORTISONE ACETATE 0.1 MG/1
0.1 TABLET ORAL
Qty: 30 TABLET | Refills: 0 | Status: SHIPPED | OUTPATIENT
Start: 2020-04-14 | End: 2020-04-16

## 2020-04-14 RX ORDER — FLUDROCORTISONE ACETATE 0.1 MG/1
100 TABLET ORAL DAILY
Status: DISCONTINUED | OUTPATIENT
Start: 2020-04-14 | End: 2020-04-14 | Stop reason: HOSPADM

## 2020-04-14 RX ORDER — MIDODRINE HYDROCHLORIDE 5 MG/1
10 TABLET ORAL
Status: DISCONTINUED | OUTPATIENT
Start: 2020-04-14 | End: 2020-04-14 | Stop reason: HOSPADM

## 2020-04-14 RX ADMIN — MIDODRINE HYDROCHLORIDE 10 MG: 5 TABLET ORAL at 12:10

## 2020-04-14 RX ADMIN — FLUDROCORTISONE ACETATE 100 MCG: 0.1 TABLET ORAL at 09:14

## 2020-04-14 RX ADMIN — SODIUM CHLORIDE: 9 INJECTION, SOLUTION INTRAVENOUS at 05:20

## 2020-04-14 NOTE — PLAN OF CARE
Prior to Admission Medication Reconciliation:     Medications added:   [x] None  [] As listed below:      Medications deleted:   [] None  [x] As listed below:    toprol xl 50 mg- patient hasn't picked up since May 2019    Changes made to existing medications:   [] None  [x] As listed below:    Input affiliated strengths for medications without      Last times/dates taken verified with patient:  [] Yes- completed myself  [x] Nurse completed no changes made  [] Unable to review with patient:  [] Nurse completed/changes made:       Allergy modifications:    [x]Did not review  []Patient verified NKA  []Patient verified current existing allergies: no changes made  []New allergies: listed below    Medication reconciliation sources:   []Patient  []Patient family member/emergency contact: **  [][facility]  [x]Epic Chart Review  []Care Everywhere review  []Pharmacy med list: **  []Pharmacy phone call  [x]Outside meds dispense report: see below  []Nursing home MAR:  []Other: **    Pharmacy desired at discharge: [facility]    Is patient on coumadin?  [x]No    Requests for consultation by provider or pharmacist:   [] Patient understands why all of their meds were prescribed and how to take them. No questions.   [x] Fill dates coincide with compliancy for all maintenance meds.   [] Fill dates do not coincide with compliancy with maintenance meds. See notes in PTA medlist about how patient is taking.   [] Patient has questions about the following:    Comments: double checked PTA medlist. Nurse did thorough job of getting last times taken. Compared medlist with outside medlist below and did some chart review. Could not find where metoprolol was noted to be discontinued by a doctor, but patient hasn't picked up since May of last year. No other concerns. [place] patient. Let me know if anything else needs further evaluation.       Siria Zacarias on 4/14/2020 at 8:30 AM       Discrepancies: [x] No []Not Applicable []Yes:  listed below    Time spent on medication reconciliation:   []5-20 mins  [x]20-40 mins  []> 40 mins    Issues completing PTA medication reconciliation:  [] On hold for a long time  [] Waited for a call back  [] Fax didn't come through  [] Fax took a long time  [] Other:    Notifying appropriate party of changes/additions/discrepancies:  [x]No pertinent changes made, notification not necessary.   [] Notified attending provider via text page  [] Notified attending provider in person  [] Notified pharmacy  [] Notified nurse  [] Attending provider not available, left detailed notes  [] Changes/additions made don't need provider notification because provider has not seen patient or input any orders  [] Changes/additions made don't need provider notification because changes made are to medications not ordered    Medications Prior to Admission   Medication Sig Dispense Refill Last Dose     atorvastatin (LIPITOR) 20 MG tablet TAKE 1 TABLET BY MOUTH EVERY EVENING - GENERIC FOR LIPITOR 90 tablet 3 4/12/2020 at 2100     Cholecalciferol (VITAMIN D-3) 25 MCG (1000 UT) CAPS Take 50 mcg by mouth daily    4/13/2020 at 0900     Coenzyme Q10 (CO Q 10 PO) Take 100 mg by mouth every morning    4/13/2020 at 0900     donepezil (ARICEPT) 10 MG tablet TAKE 1 TABLET BY MOUTH NIGHTLY AT BEDTIME 90 tablet 3 4/12/2020 at 2100     magnesium 500 MG TABS Take 500 mg by mouth daily    4/13/2020 at 0900     melatonin 5 MG tablet Take 5 mg by mouth nightly as needed for sleep   4/12/2020 at 2100     midodrine (PROAMATINE) 5 MG tablet TAKE 2 TABLETS (10MG) BY MOUTH THREE TIMES DAILY 540 tablet 3 4/13/2020 at 0900     multivitamin, therapeutic with minerals (MULTI-VITAMIN) TABS tablet Take 1 tablet by mouth daily   4/13/2020 at 0900     potassium chloride ER (K-DUR/KLOR-CON M) 20 MEQ CR tablet TAKE 1 TABLET BY MOUTH DAILY 30 tablet 4 4/13/2020 at 0900     RABEprazole (ACIPHEX) 20 MG EC tablet Take 20 mg by mouth 2 times daily   4/13/2020 at 0900      sodium chloride 1 GM tablet TAKE 1 TABLET BY MOUTH TWICE A  tablet 3 4/13/2020 at 0900     Turmeric 500 MG TABS Take 1,000 mg by mouth daily    4/13/2020 at 0900       Medication Dispense History (from 4/15/2019 to 4/13/2020)   Expand All  Collapse All   Amoxicillin      Dispensed  Days Supply  Quantity  Provider  Pharmacy    AMOXICILLIN 500MG CAPSULE  06/18/2019  10  30 each  [facility]          Atorvastatin Calcium [x]     Dispensed  Days Supply  Quantity  Provider  Pharmacy    ATORVASTATIN 20MG TABLET  01/30/2020  90  90  [facility]...    ATORVASTATIN 20MG TABLET  11/08/2019  90  90 each  [facility] #61 - Fa...    ATORVASTATIN 20MG TABLET  08/07/2019  90  90 each  [facility]...    ATORVASTATIN 20MG TABLET  08/02/2019  90  90 each  [facility] #61 - Fa...    ATORVASTATIN 20MG TABLET  05/05/2019  90  90 each  [facility] #61 - Fa...          Donepezil HCl [x]     Dispensed  Days Supply  Quantity  Provider  Pharmacy    DONEPEZIL 10MG TABLET  03/24/2020  30  30  [facility] #61 - Fa...    DONEPEZIL 10MG TABLET  02/24/2020  30  30  [facility] #61 - Fa...    DONEPEZIL 10MG TABLET  01/31/2020  30  30  [facility]...    DONEPEZIL 10MG TABLET  11/08/2019  90  90 each  [facility] #61 - Fa...    DONEPEZIL 10MG TABLET  08/02/2019  90  90 each  [facility] #[facility] - Fa...    DONEPEZIL 10MG TABLET  05/05/2019  90  90 each  [facility] #61 - Fa...          Lidocaine      Dispensed  Days Supply  Quantity  Provider  Pharmacy    LIDOCAINE 5% TRANSDERMAL PATCH  04/17/2019  30  30 each  [facility]...          Memantine HCl      Dispensed  Days Supply  Quantity  Provider  Pharmacy    MEMANTINE HCL 5MG TABLET  05/22/2019   30  60 each  [facility]...          Metoprolol Succinate      Dispensed  Days Supply  Quantity  Provider  Pharmacy    METOPROLOL SUCC 50MG ER TAB  05/05/2019  30  30 each  [facility] #61 - Fa...          Midodrine HCl [x]     Dispensed  Days Supply  Quantity  Provider  Pharmacy    MIDODRINE 5MG TABLET  12/05/2019  90  540 each  [facility]...    MIDODRINE 5MG TABLET  12/02/2019  90  540 each  [facility]...    MIDODRINE 5MG TABLET  11/26/2019  90  540 each  [facility]...    MIDODRINE 5MG TABLET  09/10/2019  60  360 each  [facility] #61 - Fa...    MIDODRINE 5MG TABLET  08/02/2019  30  180 each  [facility] Pharmacy...    MIDODRINE 5MG TABLET  07/03/2019  30  30 each  [facility] #61 - Fa...    MIDODRINE 5MG TABLET  06/03/2019  30  30 each  [facility] #61 - Fa...    MIDODRINE 5MG TABLET  05/05/2019  30  30 each  [facility] #61 - Fa...          Pantoprazole Sodium      Dispensed  Days Supply  Quantity  Provider  Pharmacy    PANTOPRAZOLE 40MG DR TABLET  11/08/2019  90  180 each  LAURO, MAX  [facility] #61 - Fa...    PANTOPRAZOLE 40MG DR TABLET  08/02/2019  90  180 each  LAURO, MAX  [facility] #61 - Fa...    PANTOPRAZOLE 40MG DR TABLET  05/05/2019  90  180 each  LAURO, MAX  [facility] #61 - Fa...          Potassium Chloride Shabnam ER [x]     Dispensed  Days Supply  Quantity  Provider  Pharmacy    POTASSIUM CL 20MEQ ER TABLET  04/05/2020  30  30  [facility] #61 - Fa...    POTASSIUM CL 20MEQ ER TABLET  03/14/2020  30  30  [facility] #61 - Fa...    POTASSIUM CL 20MEQ ER TABLET  02/15/2020  30  30  [facility] #61 - Fa...    POTASSIUM CL 20MEQ ER TABLET  12/13/2019  60  60  [facility] CRISTINA   [facility]...    POTASSIUM CL 20MEQ ER TABLET  12/06/2019  30  30 each  [facility] #61 - Fa...    POTASSIUM CL 20MEQ ER TABLET  11/08/2019  30  30 each  [facility] #61 - Fa...    POTASSIUM CL 20MEQ ER TABLET  09/27/2019  30  30 each  [facility] #61 - Fa...    POTASSIUM CL 20MEQ ER TABLET  08/22/2019  30  30 each  [facility] #61 - Fa...    POTASSIUM CL 20MEQ ER TABLET  07/22/2019  30  30 each  [facility] #61 - Fa...    POTASSIUM CL 20MEQ ER TABLET  06/19/2019  30  30 each  [facility] #61 - Fa...    POTASSIUM CL 20MEQ ER TABLET  05/24/2019  30  30 each  [facility] Pharmacy...    POTASSIUM CL 20MEQ ER TABLET  05/05/2019  30  30 each  [facility] #61 - Fa...          RABEprazole Sodium [x]     Dispensed  Days Supply  Quantity  Provider  Pharmacy    RABEPRAZOLE 20MG DR TABLET  04/05/2020  30  60  [facility] #61 - Fa...    RABEPRAZOLE 20MG DR TABLET  03/04/2020  30  60  [facility] #61 - Fa...    RABEPRAZOLE 20MG DR TABLET  02/18/2020  30  60  [facility]    RABEPRAZOLE 20MG DR TABLET  02/05/2020  30  60  [facility]...          Sodium Chloride [x]     Dispensed  Days Supply  Quantity  Provider  Pharmacy    SODIUM CHLORIDE 1G TABLET  01/17/2020  90  180  [facility]...    SODIUM CHLORIDE 1 GM TABLET  01/16/2020  90  180  [facility]...    SODIUM CL 1G TAB FOR SOLN  11/20/2019  50  100 each  [facility]...    SODIUM CL 1G TAB FOR SOLN  09/25/2019  50  100 each  [facility]...    SODIUM CHLORIDE 1 GM TABLET  09/24/2019  50  100 each  [facility]...    SODIUM CHLORIDE 1 GM TABLET  07/24/2019  50   100 each  [facility]...    SODIUM CHLORIDE 1 GM TABLET  05/29/2019  50  100 each  [facility]...          Sucralfate []     Dispensed  Days Supply  Quantity  Provider  Pharmacy    CARAFATE 1G/10ML SUSP  02/06/2020  30  600 mL  LAURO,MAX  [facility]...    CARAFATE 1G/10ML SUSP  02/05/2020  30  600 mL  [facility]          oxyCODONE HCl      Dispensed  Days Supply  Quantity  Provider  Pharmacy    OXYCODONE 5MG TABLET  07/12/2019  2  6 each  LINDSAY, MOSHE  [facility]...

## 2020-04-14 NOTE — PLAN OF CARE
Reason for hospital stay:  Falls   Living situation PTA: at home with long time girlfriend  Most recent vitals: /94 (BP Location: Left arm)   Pulse 77   Temp 98.7  F (37.1  C) (Temporal)   Resp 18   Wt 74.2 kg (163 lb 9.3 oz)   SpO2 99%   BMI 24.15 kg/m      Pain Management:  denies  LOC:  A&O x4 becomes forgetful of staff names at times.  Cardiac:  Afib, murmur noted pt on tele  Respiratory:  Lungs clear and equal bilaterally, denies shortness of breath  : pt ambulates to the bathroom and uses toilet but does have some dribbling  Skin Issues: Bruises and scabs through out body no open areas noted  Pt becomes dizzy with standing, remind pt to sit on the side of the bed before standing.     IVF:   mL/hr    Nutrition: Regular  ADL's:  Assist of 1  Ambulation: Assist of one with gait belt  Safety:  Bed alarm on. Pt free from falls and injury this shift. Call light within reach.      4/14/2020  3:14 AM  Siria Alvarado RN    Face to face report given with opportunity to observe patient.    Report given to Re Alvarado RN   4/14/2020  7:13 AM

## 2020-04-14 NOTE — PROGRESS NOTES
Name: Jf Ortiz    MRN#: [id number]    Reason for Hospitalization: Syncope and collapse [R55]  Seizure disorder (H) [G40.909]  Lewy body dementia without behavioral disturbance (H) [G31.83, F02.80]  Autonomic orthostatic hypotension [I95.1]  Recurrent falls [R29.6]  Longstanding persistent atrial fibrillation [I48.11]    AJITH: low risk    Discharge Date: 4/14/2020    Patient / Family response to discharge plan: agreeable     Follow-Up Appt:   Future Appointments   Date Time Provider Department Center   4/15/2020  8:30 AM Yakelin Victoria, PT [facility]   4/17/2020  9:30 AM HC [facility] RM 3300 [facility]   4/21/2020  9:00 AM HC INF RM 3308 [facility]   4/24/2020  9:30 AM HC INF RM 3300 [facility]   4/28/2020  2:00 PM HC INF RM 3304 [facility]   5/1/2020  9:30 AM HC INF RM 3300 [facility]   5/5/2020  9:30 AM HC INF RM 3310 [facility]   5/8/2020  9:30 AM HC INF RM 3312 [facility]   5/12/2020  9:30 AM HC INF RM 3316 [facility]   5/15/2020  9:30 AM HC INF RM 3310 [facility]   5/18/2020 10:00 AM Char Avila, NP [facility]   5/19/2020 10:30 AM HC INF RM 3302 [facility]   5/22/2020  9:30 AM HC INF RM 3312 [facility]   5/26/2020  9:30 AM HC INF RM 3308 [facility]   5/29/2020  9:30 AM HC INF RM 3306 [facility]   6/24/2020 12:00 AM [id number]   8/18/2020  2:15 PM Geeta Alegria PA-C [facility]       Other Providers (Care Coordinator, County Services, PCA services etc): Yes: [facility]     Discharge Disposition: home via significant other, MARLENI Ureña

## 2020-04-14 NOTE — PLAN OF CARE
Discharge Planner PT   Patient plan for discharge: home  Current status: sup>sit SBA with instruction to remain seated and work on ankle pumps, sit>stand CGA with cues needed to remain standing before starting to walk, ambulated 120' with FWW CGA, no instability or complaints of dizziness while walking.  Barriers to return to prior living situation: risk for ongoing falls   Recommendations for discharge: short term rehab vs home with assist  Rationale for recommendations: Pt admitted with falls due to orthostatic hypotension.  Pt tolerated mobility well today however needs cues to stop and pause between transitions which with his impaired cognition, this is not something that he would be able to manage independently putting him at increased risk for ongoing falls.  Pt may benefit from trial of short term rehab to improve function and safety but could also return home if wife is able to provide assistance and cues.         Entered by: Yakelin Victoria, PT 04/14/2020 12:49 PM

## 2020-04-14 NOTE — PROGRESS NOTES
Spoke with both [facility] and [facility], they are still awaiting to confirm if Jf's case would fall under the exception by CMS at this time.  Attempted to call Jf, no answer.  Spoke with significant other, Des, she verbalized appreciation of this but understands that Jf may still come home today.

## 2020-04-14 NOTE — PLAN OF CARE
[facility] Inpatient Admission Note:    Patient admitted to 3214/3214-1 at approximately 1850 via cart accompanied by transport tech from emergency room . Report received from Sabra in SBAR format at 1839 via telephone. Patient ambulated to bed via assist of 1. Patient is alert and oriented X 3, denies pain; rates at 0 on 0-10 scale.  Patient oriented to room, unit, hourly rounding, and plan of care. Explained admission packet and patient handbook with patient bill of rights brochure. Will continue to monitor and document as needed.     Inpatient Nursing criteria listed below was met:    Health care directives status obtained and documented: Yes    Care Everywhere authorization obtained No    MRSA swab completed for patient 65 years and older: Yes    Patient identifies a surrogate decision maker: Yes If yes, who: his significant other, Uda. Contact Information: see epic or [employer] board for contact number.     If initial lactic acid >2.0, repeat lactic acid drawn within one hour of arrival to unit: N/A    Vaccination assessment and education completed: Yes   Vaccinations received prior to admission: Pneumovax no  Influenza(seasonal)  NO   Vaccination(s) ordered: patient declines    Clergy visit ordered if patient requests:  declines.    Skin issues/needs documented: Yes    Isolation Patient: N/A    Fall Prevention Yes: Care plan updated, education given and documented, sticker and magnet in place: Yes    Care Plan initiated: Yes    Education Documented (including assessment): Yes    Patient has discharge needs : Yes If yes, please explain: patient has been falling more at home, significant other doesn't feel he is safe at home right now because she can't catch him all the time.    Alert and oriented x 4. Patient has denied pain this shift. He does c/o dizziness upon standing, he does wobble a little bit when he is first up but then he improves. BP initially elevated but then improved before bed. VSS  otherwise. Telemetry placed when orders obtained towards the end of the shift. Oral intake adequate. He's voided 3 times, he has declined to use urinal. No BM. Remains assist of 1 with gait belt for safety. Alarms in place. Call light within reach. IVF continues.     Face to face report given with opportunity to observe patient.    Report given to Siria ESPOSITO.    Mita Gaston RN   4/13/2020  11:24 PM

## 2020-04-14 NOTE — PROGRESS NOTES
No clear answer from either nursing home about waiving three night stay.  Des plans to pick Jf up at 3pm.  She is agreeable to the home nursing to come in.  Encouraged Des to start discussing with Jf and his family what their plan would be if things become too difficult for her to care for Jf at home.  She acknowledges that this would be a good idea.  Appreciative of writer's assistance.       Per discharge planning, the choice of vendor list has been provided to the patient/family for homecare.    [facility] : [facility]: [facility]                 [phone]-  Information sent to Hilaria they will plan to pick him up for nursing care.      Updated Re ESPOSITO.

## 2020-04-14 NOTE — DISCHARGE SUMMARY
"[facility]    Discharge Summary  Hospitalist    Date of Admission:  4/13/2020  Date of Discharge:  4/14/2020  Discharging Provider: Julia Watson CNP  Date of Service (when I saw the patient): 04/14/20    Discharge Diagnoses   Principal Problem:    Autonomic orthostatic hypotension  Active Problems:    Lewy body dementia without behavioral disturbance (H)    Pacemaker, [employer], Dual Chamber - Dependent    Longstanding persistent atrial fibrillation    Falls frequently    Frequent falls    Coronary artery disease involving native coronary artery without angina pectoris    History of Present Illness   From admission: Jf Ortiz is a 86 year old man with PMH of autonomic NS dysfunction with orthostatic hypotension, Dementia w/o behavioral disturbance, dyslipidemia, CAD, seizure disorder, PPM, who was brought to ED for evaluation of intermittent dizziness, weakness and frequent falls. He has been having syncopal episodes more frequently over the past 2 weeks. He fells when he changes position, mainly from sitting to standing. Tries to do it slowly. Denies LOC. Denies recent head injury. He takes midodrine as prescribed. Gets infusions per PCP orders. He also tried abdominal binds which are not very helpful. Wife stated that he was falling 3-4 x daily for the last 1-2 weeks and she does not feel that he is safe at home. Pt try to \"minimize\" the number of falls. He admits that he fells dizzy every time he changes position.    Hospital Course         Autonomic orthostatic hypotension with syncope and frequent falls: This has been on ongoing problem for at least the last 6 years. He has a pacemaker in place that has been interrogated in the past with know cause found. He was started on midodrine some time ago but with minimal success. Apparently DONNA hose and abdominal wrapping were also tried but patient;s ability to successfully use either at home is questionable and he did not report any " improvement in symptoms. Recently he has had to resort to frequent IVF infusions to improve his symptoms. I could not find any record of ever trying florinef. This was started this morning along with placing DONNA hose. Between the two his orthostatic drop went from 60 mmHg this morning to 30mmHg. He was able to walk with PT without symptoms, steady gait. He is not having significant supine hypertension after the florinef-in fact it is mildly improved at 150/80's. Will discharge home as he and his SO are declining placement. Will have home health follow for at least short term for educations regarding DONNA hose, new medications and timing and to check orthostatics to ensure he does not develop severe supine hypertension.       Julia Watson CNP        Pending Results     Unresulted Labs Ordered in the Past 30 Days of this Admission     Date and Time Order Name Status Description    4/13/2020 2224 Active MRSA Surveillance Culture Preliminary           Code Status   Full Code       Primary Care Physician   FRANCES Ray          Discharge Disposition   Discharged to home  Condition at discharge: Stable    Consultations This Hospital Stay   PHYSICAL THERAPY ADULT IP CONSULT    Time Spent on this Encounter   I, Julia Watson NP, personally saw the patient today and spent greater than 30 minutes discharging this patient.    Discharge Orders      Home care nursing referral      Reason for your hospital stay    Recurrent falls due to hypotension     Follow-up and recommended labs and tests     Follow up with primary care provider, FRANCES Ray, within 7 days for hospital follow- up.  No follow up labs or test are needed.     Activity    Your activity upon discharge: activity as tolerated     When to contact your care team    Call your primary doctor if you have any of the following: worsening of presenting symptoms.     MD face to face encounter    Documentation of Face to Face and Certification for Home Health  Services    I certify that patient: Jf Ortiz is under my care and that I, or a nurse practitioner or physician's assistant working with me, had a face-to-face encounter that meets the physician face-to-face encounter requirements with this patient on: 4/14/2020.    This encounter with the patient was in whole, or in part, for the following medical condition, which is the primary reason for home health care: weakness, autonomic hypotension.    I certify that, based on my findings, the following services are medically necessary home health services: Nursing.    My clinical findings support the need for the above services because: Nurse is needed: To assess orthostatic blood pressures after changes in medications or other medical regimen..    Further, I certify that my clinical findings support that this patient is homebound (i.e. absences from home require considerable and taxing effort and are for medical reasons or [employer] services or infrequently or of short duration when for other reasons) because: Leaving home is medically contraindicated for the following reason(s): frequent syncopal episodes    Based on the above findings. I certify that this patient is confined to the home and needs intermittent skilled nursing care, physical therapy and/or speech therapy.  The patient is under my care, and I have initiated the establishment of the plan of care.  This patient will be followed by a physician who will periodically review the plan of care.  Physician/Provider to provide follow up care: FRANCES Ray    Attending hospital physician (the Medicare certified [place] provider): René  Physician Signature: See electronic signature associated with these discharge orders.  Date: 4/14/2020     Discharge Instructions    Place DONNA hose on in the morning, take off in the evening. Discuss with home nurse ways to get them on and off more easily.     Full Code     Diet    Follow this diet upon discharge: Orders Placed  This Encounter      Combination Diet Regular Diet Adult     Discharge Medications   Current Discharge Medication List      START taking these medications    Details   fludrocortisone (FLORINEF) 0.1 MG tablet Take 1 tablet (0.1 mg) by mouth every morning (before breakfast)  Qty: 30 tablet, Refills: 0    Associated Diagnoses: Autonomic orthostatic hypotension         CONTINUE these medications which have NOT CHANGED    Details   atorvastatin (LIPITOR) 20 MG tablet TAKE 1 TABLET BY MOUTH EVERY EVENING - GENERIC FOR LIPITOR  Qty: 90 tablet, Refills: 3    Comments: Needs to have labs  Associated Diagnoses: Hypercholesterolemia      Cholecalciferol (VITAMIN D-3) 25 MCG (1000 UT) CAPS Take 50 mcg by mouth daily       Coenzyme Q10 (CO Q 10 PO) Take 100 mg by mouth every morning       donepezil (ARICEPT) 10 MG tablet TAKE 1 TABLET BY MOUTH NIGHTLY AT BEDTIME  Qty: 90 tablet, Refills: 3    Associated Diagnoses: Lewy body dementia without behavioral disturbance (H)      magnesium 500 MG TABS Take 500 mg by mouth daily       melatonin 5 MG tablet Take 5 mg by mouth nightly as needed for sleep      midodrine (PROAMATINE) 5 MG tablet TAKE 2 TABLETS (10MG) BY MOUTH THREE TIMES DAILY  Qty: 540 tablet, Refills: 3    Associated Diagnoses: Autonomic orthostatic hypotension      multivitamin, therapeutic with minerals (MULTI-VITAMIN) TABS tablet Take 1 tablet by mouth daily      potassium chloride ER (K-DUR/KLOR-CON M) 20 MEQ CR tablet TAKE 1 TABLET BY MOUTH DAILY  Qty: 30 tablet, Refills: 4    Comments: Patient enrolled in our Rx Med Sync service to improveadherence. We are requesting a refill authorization inadvance to ensure an active prescription is on file.  Associated Diagnoses: Hypokalemia      RABEprazole (ACIPHEX) 20 MG EC tablet Take 20 mg by mouth 2 times daily      sodium chloride 1 GM tablet TAKE 1 TABLET BY MOUTH TWICE A DAY  Qty: 180 tablet, Refills: 3    Associated Diagnoses: Parkinson's disease (H); Orthostatic  hypotension      Turmeric 500 MG TABS Take 1,000 mg by mouth daily            Allergies   Allergies   Allergen Reactions     Cats Unknown     Lamotrigine      Skin lesions     Data   Most Recent 3 CBC's:  Recent Labs   Lab Test 04/13/20  1515 10/21/19  1254 06/20/19  1225   WBC 6.5 6.0 6.3   HGB 14.3 13.0* 13.1*   MCV 92 91 92    210 241      Most Recent 3 BMP's:  Recent Labs   Lab Test 04/13/20  1515 10/21/19  1254 08/27/19  1218    140 141   POTASSIUM 4.3 4.0 4.3   CHLORIDE 109 110* 112*   CO2 25 26 26   BUN 16 16 13   CR 1.24 1.11 1.06   ANIONGAP 5 4 3   ROSALINDA 8.5 8.5 8.8   * 131* 137*     Most Recent 2 LFT's:  Recent Labs   Lab Test 04/13/20  1515 10/21/19  1254   AST 18 14   ALT 29 22   ALKPHOS 61 57   BILITOTAL 0.5 0.6     Most Recent INR's and Anticoagulation Dosing History:  Anticoagulation Dose History     Recent Dosing and Labs Latest Ref Rng & Units 1/10/2014 12/11/2014 10/21/2019    INR 0.86 - 1.14 1.16 1.11 1.14        Most Recent 3 Troponin's:  Recent Labs   Lab Test 04/13/20  1515 10/21/19  1254 05/30/19  1311   TROPI <0.015 <0.015 <0.015     Most Recent Cholesterol Panel:  Recent Labs   Lab Test 01/16/17  1024   CHOL 156   LDL 75   HDL 70   TRIG 56     Most Recent 6 Bacteria Isolates From Any Culture (See EPIC Reports for Culture Details):  Recent Labs   Lab Test 04/13/20  2225 08/02/14  0114   CULT Culture in progress No MRSA isolated     Most Recent TSH, T4 and A1c Labs:  Recent Labs   Lab Test 04/13/20  1515 05/09/19  1053 07/11/18  1130   TSH 3.66 4.88*  --    T4  --  1.10  --    A1C  --   --  6.0*     Results for orders placed or performed during the hospital encounter of 04/13/20   CT Head w/o Contrast    Narrative    Exam: CT HEAD W/O CONTRAST    Clinical history:86 years Male Near syncopal episode    Comparisons: 10/21/2019    Technique: Axial CT imaging of the head was performed Without  intervenous contrast.    FINDINGS: There is generalized age-appropriate cerebral and  cerebellar  volume loss.   Ventricles and sulci are symmetric. The gray-white matter  differentiation throughout the brain is well maintained. There is  moderate periventricular white matter change of chronic small vessel  ischemic disease. There is no evidence of intracranial mass or  hemorrhage. Visualized portions of the paranasal sinuses and mastoid  air cells are well aerated. There is no evidence of skull fracture.      Impression    IMPRESSION: Negative Head CT    VIMAL PÉREZ MD   XR Chest 2 Views    Narrative    XR CHEST 2 VW    HISTORY: 86 years Male syncopal episode    COMPARISON: 10/21/2019    TECHNIQUE: 2 views of the chest were obtained.    FINDINGS: Two views of the chest were obtained. Heart size and  pulmonary vascularity are within normal limits, lungs are clear on  both views. No consolidating air space opacities are present.    Again seen are changes of median sternotomy, cardiac pacemaker and  right central line placement.      Impression    IMPRESSION: Clear chest.    VIMAL PÉREZ MD

## 2020-04-14 NOTE — PROGRESS NOTES
Inpatient Physical Therapy Evaluation    Name: Jf Ortiz MRN# [id number]   Age: 86 year old YOB: 1933     Date of Consultation: 2020  Consultation is requested by:  Dr. Urbina  Specific Consultation Request:  Frequent falls  Primary care provider: FRANCES Ray    General Information:   Onset Date: 2020    History of Current Problem/Admission: Syncope and collapse [R55]  Seizure disorder (H) [G40.909]  Lewy body dementia without behavioral disturbance (H) [G31.83, F02.80]  Autonomic orthostatic hypotension [I95.1]  Recurrent falls [R29.6]  Longstanding persistent atrial fibrillation [I48.11]    Prior Level of Function: Pt states he is independent with ADLs.  Pt states he ambulates with SEC outside his home, has tried a FWW in his home but states it is sometimes to manage in his home.  Pt states he does not drive, wife drives.    Ambulation: 1 - Assistive Equipment   Transferrin - Independent   Toiletin - Independent    Bathin - Independent   Dressin - Independent   Eatin - Independent   Communication: 0 - Understands/communicates without difficulty  Swallowin - swallows foods/liquids without difficulty  Cognition: 1 - attention or memory deficits    Fall history within the last 6 months: reports 4 falls however chart reports there have been frequent falls at home.    Current Living Situation: Patient has 2 steps to enter home with bilateral rails.  Main living inside.  Pt lives with his wife.    Current Equipment Used at Home: SEC outside, FWW in home     Patient & Family Goals: to go home     Past Medical History:   Past Medical History:   Diagnosis Date     Autonomic orthostatic hypotension 10/14/2016     Coronary artery disease      Dementia without behavioral disturbance (H) 2015    Diagnosis updated by automated process. Provider to review and confirm.     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     Seizure disorder (H)      Stented coronary artery        Past Surgical History:  Past Surgical History:   Procedure Laterality Date     ------------OTHER-------------  1955    ulnar and radial fx - repair ulnar and radial fx x4     ------------OTHER-------------  6/14/2011    cataract extraction     BIOPSY  08/2015    skin biopsy     BLEPHAROPLASTY BILATERAL  5/6/2014    Procedure: BLEPHAROPLASTY BILATERAL;  Surgeon: Andrew Queen MD;  Location: [facility]     BYPASS GRAFT ARTERY CORONARY  11/2006    coronary artery disease x 5, [facility]     cataract extraction and lens implantation  2011    cataracts     cataract extraction and lens implantation      cataracts     COLONOSCOPY  2012     colonoscopy with polypectomy  3/13/2009    history of polyps - repeat 3 yrs     colonoscopy with polypectomy  2006     colonoscopy with polypectomy  2005     COMBINED COLONOSCOPY WITH ARGON PLASMA COAGULATOR (APC) N/A 10/31/2014    Procedure: COMBINED COLONOSCOPY WITH ARGON PLASMA COAGULATOR (APC);  Surgeon: Bassam Aguilar MD;  Location: [facility]     COMBINED COLONOSCOPY WITH ARGON PLASMA COAGULATOR (APC) N/A 11/13/2015    Procedure: COMBINED COLONOSCOPY WITH ARGON PLASMA COAGULATOR (APC);  Surgeon: Bassam Aguilar MD;  Location: [facility]     ENDOSCOPY UPPER, COLONOSCOPY, COMBINED N/A 10/31/2014    Procedure: COMBINED ENDOSCOPY UPPER, COLONOSCOPY;  Surgeon: Bassam Aguilar MD;  Location: [facility]     ENDOSCOPY UPPER, COLONOSCOPY, COMBINED N/A 11/13/2015    Procedure: COMBINED ENDOSCOPY UPPER, COLONOSCOPY;  Surgeon: Bassam Aguilar MD;  Location: [facility]     ESOPHAGOSCOPY, GASTROSCOPY, DUODENOSCOPY (EGD), COMBINED  1/22/2014    Procedure: COMBINED ESOPHAGOSCOPY, GASTROSCOPY, DUODENOSCOPY (EGD);  UPPER  ENDOSCOPY(JAYDEN) W/ BIOPSIES;  Surgeon: Patricia Pena MD;  Location: [facility]     HERNIORRHAPHY INGUINAL Right 2/14/2018    Procedure: HERNIORRHAPHY INGUINAL;  OPEN RIGHT INGUINAL HERNIA REPAIR with Mesh;  Surgeon: Virgilio Zaragoza DO;  Location: [facility]     INSERT PORT VASCULAR ACCESS Right 7/12/2019    Procedure: PORT PLACEMENT;  Surgeon: Lloyd Ram MD;  Location: [facility]     LARYNGOSCOPY WITH MICROSCOPE  1/22/2014    Procedure: LARYNGOSCOPY WITH MICROSCOPE;;  Surgeon: Chayo Duke MD;  Location: [facility]     pacemaker placement  2000    heart block     pacemaker placement  2011    dual-chamber     REMOVE TUBE, MYRINGOTOMY, COMBINED  1/22/2014    Procedure: COMBINED REMOVE TUBE, MYRINGOTOMY;  MICRODIRECT LARYNGOSCOPY WITH BIOPSY AND FROZEN SECTIONS removal of right ear tube and myringoplasty;  Surgeon: Chayo Duke MD;  Location: [facility]     REPLACE PACEMAKER GENERATOR N/A 8/8/2018    Procedure: REPLACE PACEMAKER GENERATOR;  Pacemaker generator change;  Surgeon: Alma Kaplan MD;  Location: [facility]     stent placement to LAD  2008     ventilation tube  5/24/2012    right in office       Medications:   Current Facility-Administered Medications   Medication     atorvastatin (LIPITOR) tablet 20 mg     enoxaparin ANTICOAGULANT (LOVENOX) injection 40 mg     fludrocortisone (FLORINEF) tablet 100 mcg     lidocaine (LMX4) kit     lidocaine 1 % 0.1-1 mL     melatonin tablet 1 mg     midodrine (PROAMATINE) tablet 10 mg     naloxone (NARCAN) injection 0.1-0.4 mg     ondansetron (ZOFRAN-ODT) ODT tab 4 mg    Or     ondansetron (ZOFRAN) injection 4 mg     sodium chloride (PF) 0.9% PF flush 3 mL     sodium chloride (PF) 0.9% PF flush 3 mL       Weight Bearing Status: FWB LEs     Cognitive Status Examination:  Orientation: oriented to person.  Not oriented to time or place  Level of Consciousness: alert  Follows Commands and Answers Questions: 75% of the time  Personal Safety and Judgement: Impaired and  Impulsive  Memory: Long-term memory intact  Comments:     Pain:   Currently in pain? No  Pain Location?   Pain Rating:     Physical Therapy Evaluation:   Integumentary/Edema: unremarkable  ROM: LE AROM WFL  Strength: LE strength WFL  Bed Mobility: sup>sit SBA with cues to stop and complete ankle pumps whil seated at EOB  Transfers: sit<>stand CGA with instruction to stay standing before starting to walk however pt is impulsive and starts to walk.  Pt instructed in heel raises in standing before ambulating  Gait: Ambulated 120' with FWW CGA without LOB or instability, denies dizziness  Stairs: NT  Balance: good with support from walker  Sensory: denies numbness/tingling LEs  Coordination: NT    Physical Therapy Interventions: Balance, Gait Training , Strengthening, Transfer Training, Risk Factor Education and Progressive Activity/Exercise     Clinical Impressions:  Criteria for Skilled Therapeutic Intervention Met: Yes, treatment indicated  PT Diagnosis: impaired tolerance and safety for functional mobility  Influenced by the following impairments: orthostatic hypotension, impulsivity, decreased balance  Functional limitations due to impairment: decreased safety with functional mobility due to impulsivity and orthostatic hypotension  Clinical presentation: Evolving/Changing  Clinical presentation rationale: clinical judgement  Clinical Decision making (complexity): Moderate Complexity  Frequency: 5x/week  Predicted Duration of Therapy Intervention (days/wks): 5 days  Anticipated Discharge Disposition: Home with Assist and Transitional Care Facility   Anticipated Equipment Needs at Discharge:   Risks and Benefits of therapy have been explained: Yes  Patient, Family & other staff in agreement with plan of care: Yes  Clinical Impression Comments: Pt admitted with falls due to orthostatic hypotension.  Pt tolerated mobility well today however needs cues to stop and pause between transitions which with his impaired  cognition, this is not something that he would be able to manage independently putting him at increased risk for ongoing falls.  Pt may benefit from trial of short term rehab to improve function and safety but could also return home if wife is able to provide assistance and cues.      Total Eval Time: 19

## 2020-04-14 NOTE — PLAN OF CARE
Patient discharged at 3:00 PM via wheel chair accompanied by staff. Prescriptions sent to patients preferred pharmacy. All belongings sent with patient.     Discharge instructions reviewed with Jf. Listed belongings gathered and returned to patient. yes    Patient discharged to home.       Core Measures and Vaccines  Core Measures applicable during stay: No. If yes, state diagnosis: n/a  Pneumonia and Influenza given prior to discharge, if indicated: N/A    Surgical Patient   Surgical Procedures during stay: n/a  Did patient receive discharge instruction on wound care and recognition of infection symptoms? N/A    MISC  Follow up appointment made:  Dr. Ray office will jorge to schedule appt.  Home and hospital aquired medications returned to patient: Yes  Patient reports pain was well managed at discharge: Yes

## 2020-04-14 NOTE — PROGRESS NOTES
Assessment completed see flowsheet.    LOC: alert, oriented, pleasant   Others present: Patient, alone via phone conversation     Dx: syncope and falls   Chronic Disease Management: Parkinson's, hypotension    Lives with: significant other, Des   Living at:  Home   Transportation: YES [employer] provides     Primary PCP: FRANCES aRy  Insurance:  Medicare and [employer]       Support System:  Significant other, Des   Homecare/PCA: not connected   /County Services:   Not connected   [place]: YES      How was the VA notification completed: left message with Bonnie to confirm service connection and availability of benefits    Health Care Directive: YES on file, significant other, Des and daughter, Magi listed   Guardian: no   POA: no     Pharmacy: [facility]  Meds management: YES Des manages     Adequate Resources for needs (housing, utilities, food/med): YES  Household chores: Des manages   Work/community/social activity: YES as desired     ADLs: independent   Ambulation:cane utilized outside  Falls: multiple daily for the last two weeks  Nutrition: Des does the cooking   Sleep: has some trouble     Equipment used: has a cane and walker       Oxygen supplier: no       Does the supplier have valid oxygen orders: n/a     Mental health: not addressed   Substance abuse: not addressed   Exposure to violence/abuse: denies   Stressors:     Able to Return to Prior Living Arrangements: Concern about safety at home.  Multiple falls.  Discussed with both Jf and significant other, Des in regards to short term rehab at a skilled nursing facility (nursing home) as well as admission status.  Not guarenteed that Medicare will waive 3 night stay and that he would have to pay out of pocket.  Jf reluctant to do this.  Des plans to speak with him.  Discussed depending on service connection may be able to go to a VA contracted nursing home under his VA benefits.  Jf expressed frustration in having insurance but it not  covering when he needed it to.  Writer explained that it is possible that he may receive his deposit back.   Jf verbalized understanding and expressed that he should just return home then.  He agreed for writer to speak with Des. Writer explained to Des Jf's level of admission and the possibility of having to pay of pocket.  Des questioned why Jf couldn't just stay in the hospital for two more nights.  Writer explained that that would not change his possible requirement of having to pay out of pocket d/t the Medicare requirements for both admission criteria and to waive three night stay at the nursing home.    Choice of Vendor: Pt/family was provided with the Medicare Compare list for SNF.  Discussed associated Medicare star ratings to assist with choice for referrals/discharge planning Yes    Education was given to pt/family that star ratings are updated/maintained by Medicare and can be reviewed by visiting www.medicare.gov Yes    Per discharge planning, the choice of vendor list has been provided to the patient/family for a skilled nursing facility.    First Choice : Skilled Nursing Facility [place]: [facility]     [phone]- reviewing to see if would qualify for exception to Medicare rule.  Would not be able to admit until tomorrow    Second Choice: Skilled Nursing Facility [place]: [facility]    [phone] - faxed and message left.        Barriers: willingness to pay out of pocket potentially     AJITH: low risk     Plan: nursing home vs home     1200- Received call back from significant other, Des.  She stated she spoke with Jf.  She plans to take him home but if he falls again she is having him brought back in.  Updated Julia Watson NP.

## 2020-04-14 NOTE — PROGRESS NOTES
Spoke with Janice at [facility], she is going to speak with their billing department in regards CMS guidelines and contact writer back.

## 2020-04-14 NOTE — H&P
[facility]    History and Physical  Hospitalist       Date of Admission:  4/13/2020  Seen in ED 4.12.2020    Assessment & Plan   Jf Ortiz is a 86 year old male who presents with weakness, orthostatic dizziness and frequent falls, fatigue. Admitted for observation and possible placement.     Principal Problem:    Autonomic orthostatic hypotension    Assessment: This is chronic condition and most likely related to his h/o Autonomic dysautonomia. He is currently on midodrin. Abdominal binders did not held. PM functioning w/o problems. He is getting PRN NS infusion a few times per week. This is also not very helpful. He is not volume depleted and has supine hypertension due to midodrin.     Plan: admit for observation     Ml/hr   PT evaluation in AM   Social service consult for placement    Active Problems:    Lewy body dementia without behavioral disturbance (H)    Assessment: minor.     Plan: Continue home dose of Aricept      Pacemaker, [employer], Dual Chamber - Dependent    Assessment: functioning w/o problems.     Plan: no intervention       Longstanding persistent atrial fibrillation    Assessment: at baseline. He is Dual chamber pace. No on anticoagulation and most likely due to frequent falls.     Plan: Pace rhythm.    Nothing to do, just monitor      Falls frequently    Assessment: Due orthostatic BP drop. All measures tried, little help.     Plan: PT evaluation in AM and possible placement.       Coronary artery disease involving native coronary artery without angina pectoris    Assessment: he is post CABG and no sx of ischemia    Plan: no intervention, only monitoring.     Systolic murmur  Assessment; ao area, S2 preserved. Moderate  Plan: echo in AM to assess AO valve stenosis.       DVT Prophylaxis: Enoxaparin (Lovenox) SQ  Code Status: Full Code    Disposition: Expected discharge in 1-2 days once placement found.    Radha Urbina    Primary Care Physician   FRANCES Craft  "Cory    Chief Complaint weakness and frequent falls.     Reason for Admission: frequent falls due to orthostatic hypotension due to autonomic NS dysfunction.     Admission status: observation    History is obtained from the patient, electronic health record and emergency department physician    History of Present Illness   Jf Ortiz is a 86 year old man with PMH of autonomic NS dysfunction with orthostatic hypotension, Dementia w/o behavioral disturbance, dyslipidemia, CAD, seizure disorder, PPM, who was brought to ED for evaluation of intermittent dizziness, weakness and frequent falls. He has been having syncopal episodes more frequently over the past 2 weeks. He fells when he changes position, mainly from sitting to standing. Tries to do it slowly. Denies LOC. Denies recent head injury. He takes midodrine as prescribed. Gets infusions per PCP orders. He also tried abdominal binds which are not very helpful. Wife stated that he was falling 3-4 x daily for the last 1-2 weeks and she does not feel that he is safe at home. Pt try to \"minimize\" the number of falls. He admits that he fells dizzy every time he changes position.     ED events: was dizzy, but did not fall when in ED. Orthostatic. Tried to find placement from ED, but did not succeed.     ED diagnostic workup: chemistry, hematology, CT head and XR chest    ED imaging studies and blood test results: all w/u unremarkable.     ED treatment: no treatment in Ed.     Past Medical History    I have reviewed this patient's medical history and updated it with pertinent information if needed.   Past Medical History:   Diagnosis Date     Autonomic orthostatic hypotension 10/14/2016     Coronary artery disease      Dementia without behavioral disturbance (H) 7/28/2015    Diagnosis updated by automated process. Provider to review and confirm.     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  Past Surgical History:   Procedure Laterality Date     ------------OTHER-------------  1955    ulnar and radial fx - repair ulnar and radial fx x4     ------------OTHER-------------  6/14/2011    cataract extraction     BIOPSY  08/2015    skin biopsy     BLEPHAROPLASTY BILATERAL  5/6/2014    Procedure: BLEPHAROPLASTY BILATERAL;  Surgeon: Andrew Queen MD;  Location: [facility]     BYPASS GRAFT ARTERY CORONARY  11/2006    coronary artery disease x 5, [facility]     cataract extraction and lens implantation  2011    cataracts     cataract extraction and lens implantation      cataracts     COLONOSCOPY  2012     colonoscopy with polypectomy  3/13/2009    history of polyps - repeat 3 yrs     colonoscopy with polypectomy  2006     colonoscopy with polypectomy  2005     COMBINED COLONOSCOPY WITH ARGON PLASMA COAGULATOR (APC) N/A 10/31/2014    Procedure: COMBINED COLONOSCOPY WITH ARGON PLASMA COAGULATOR (APC);  Surgeon: Bassam Aguilar MD;  Location: [facility]     COMBINED COLONOSCOPY WITH ARGON PLASMA COAGULATOR (APC) N/A 11/13/2015    Procedure: COMBINED COLONOSCOPY WITH ARGON PLASMA COAGULATOR (APC);  Surgeon: Bassam Aguilar MD;  Location: [facility]     ENDOSCOPY UPPER, COLONOSCOPY, COMBINED N/A 10/31/2014    Procedure: COMBINED ENDOSCOPY UPPER, COLONOSCOPY;  Surgeon: Bassam Aguilar MD;  [facility]: [facility]     ENDOSCOPY UPPER, COLONOSCOPY, COMBINED N/A 11/13/2015    Procedure: COMBINED ENDOSCOPY UPPER, COLONOSCOPY;  Surgeon: Bassam Aguilar MD;  [facility]: [facility]     ESOPHAGOSCOPY, GASTROSCOPY, DUODENOSCOPY (EGD), COMBINED  1/22/2014    Procedure: COMBINED  ESOPHAGOSCOPY, GASTROSCOPY, DUODENOSCOPY (EGD);  UPPER ENDOSCOPY(JAYDEN) W/ BIOPSIES;  Surgeon: Patricia Pena MD;  Location: [facility]     HERNIORRHAPHY INGUINAL Right 2/14/2018    Procedure: HERNIORRHAPHY INGUINAL;  OPEN RIGHT INGUINAL HERNIA REPAIR with Mesh;  Surgeon: Virgilio Zaragoza DO;  Location: [facility]     INSERT PORT VASCULAR ACCESS Right 7/12/2019    Procedure: PORT PLACEMENT;  Surgeon: Lloyd Ram MD;  Location: [facility]     LARYNGOSCOPY WITH MICROSCOPE  1/22/2014    Procedure: LARYNGOSCOPY WITH MICROSCOPE;;  Surgeon: Chayo Duke MD;  Location: [facility]     pacemaker placement  2000    heart block     pacemaker placement  2011    dual-chamber     REMOVE TUBE, MYRINGOTOMY, COMBINED  1/22/2014    Procedure: COMBINED REMOVE TUBE, MYRINGOTOMY;  MICRODIRECT LARYNGOSCOPY WITH BIOPSY AND FROZEN SECTIONS removal of right ear tube and myringoplasty;  Surgeon: Chayo Duke MD;  Location: [facility]     REPLACE PACEMAKER GENERATOR N/A 8/8/2018    Procedure: REPLACE PACEMAKER GENERATOR;  Pacemaker generator change;  Surgeon: Alma Kaplan MD;  Location: [facility]     stent placement to LAD  2008     ventilation tube  5/24/2012    right in office       Prior to Admission Medications   Prior to Admission Medications   Prescriptions Last Dose Informant Patient Reported? Taking?   Cholecalciferol (VITAMIN D-3 PO) 4/13/2020 at 0900 Spouse/Significant Other Yes Yes   Sig: Take 50 mcg by mouth daily    Coenzyme Q10 (CO Q 10 PO) 4/13/2020 at 0900 Spouse/Significant Other Yes Yes   Sig: Take 100 mg by mouth every morning    MELATONIN PO 4/12/2020 at 2100 Spouse/Significant Other Yes Yes   Sig: Take 5 mg by mouth At Bedtime    RABEprazole (ACIPHEX) 20 MG EC tablet 4/13/2020 at 0900  Yes Yes   Sig: Take 20 mg by mouth 2 times daily   TURMERIC PO 4/13/2020 at 0900 Spouse/Significant Other Yes Yes   Sig: Take 1 tablet by mouth daily 1,000 mg by mouth daily.   atorvastatin (LIPITOR) 20 MG tablet 4/12/2020 at 2100  No  Yes   Sig: TAKE 1 TABLET BY MOUTH EVERY EVENING - GENERIC FOR LIPITOR   donepezil (ARICEPT) 10 MG tablet 4/12/2020 at 2100  No Yes   Sig: TAKE 1 TABLET BY MOUTH NIGHTLY AT BEDTIME   magnesium 500 MG TABS 4/13/2020 at 0900 Spouse/Significant Other Yes Yes   Sig: Take 500 mg by mouth daily    midodrine (PROAMATINE) 5 MG tablet 4/13/2020 at 0900 Spouse/Significant Other No Yes   Sig: TAKE 2 TABLETS (10MG) BY MOUTH THREE TIMES DAILY   Patient taking differently: 10 mg 2 times daily TAKE 2 TABLETS (10MG) BY MOUTH THREE TIMES DAILY   multivitamin, therapeutic with minerals (MULTI-VITAMIN) TABS tablet 4/13/2020 at 0900 Spouse/Significant Other Yes Yes   Sig: Take 1 tablet by mouth daily   order for DME   No No   Sig: Equipment being ordered: Abdominal Binder   potassium chloride ER (K-DUR/KLOR-CON M) 20 MEQ CR tablet 4/13/2020 at 0900  No Yes   Sig: TAKE 1 TABLET BY MOUTH DAILY   sodium chloride 1 GM tablet 4/13/2020 at 0900  No Yes   Sig: TAKE 1 TABLET BY MOUTH TWICE A DAY      Facility-Administered Medications: None     Allergies   Allergies   Allergen Reactions     Cats Unknown     Lamotrigine      Skin lesions       Social History   I have reviewed this patient's social history and updated it with pertinent information if needed. Jf Ortiz  reports that he quit smoking about 35 years ago. His smoking use included cigarettes. He has a 5.00 pack-year smoking history. He has never used smokeless tobacco. He reports current alcohol use. He reports that he does not use drugs.    Family History   I have reviewed this patient's family history and updated it with pertinent information if needed.   Family History   Problem Relation Age of Onset     Diabetes Mother      Breast Cancer Daughter        Review of Systems   All 14 Systems were reviewed and found to be negative except what's mentioned in HPI    Physical Exam   Temp: 98.7  F (37.1  C) Temp src: Temporal BP: 178/94 Pulse: 77 Heart Rate: 95 Resp: 18 SpO2: 99 % O2  Device: None (Room air)    Vital Signs with Ranges  Temp:  [97.3  F (36.3  C)-98.7  F (37.1  C)] 98.7  F (37.1  C)  Pulse:  [69-81] 77  Heart Rate:  [69-97] 95  Resp:  [12-20] 18  BP: (108-178)/(65-97) 178/94  SpO2:  [94 %-100 %] 99 %  163 lbs 9.3 oz    Physical exam:   GENERAL: Awake, alert, in no distress. Looks comfortable.  HEENT: NC/AT. MMM. OP clear.   NECK: trachea midline, no JVD.   CARDIAC: regular, normal S1,S2, no S3. Systolic murmur 3/6, no gallops or rubs. No bruits in the neck. No peripheral edema.    PULMONARY: normal vesicular breath sounds bilaterally.  GI: abdomen not distended and not tender on deep and superficial palpation, bowel sounds present. No hepatosplenomegaly or pulsatile masses.   : CVA not tender, bladder not palpable.  MUSCULOSKELETAL: major joints without effusion, full range of motion, no sarcopenia.  NEUROLOGICAL: normal speech, no CN abnormalities,  normal muscle strength and sensory exam. DTR 2/4, no myoclonus. Slightly unsteady with ambulation (observed him going to the bathroom).    PSYCHIATRIC: mild cognitive impairment.   INTEGUMENT: no rash, no ulcerations, warm, dry.   LYMPHATIC/HEMATOLOGIC: no LAD in the neck, both axillae, and groins. No petechiae, no ecchymoses.       Goals of care were discussed with the patient and family which included but not limited to anticipated treatment course during the current hospitalization, recovery from current event, discharge planning and transitions of care responsibilities and expected outcomes. Code status was addressed on admission as well. End of life care discussion not done.    Data   Data reviewed today:  I personally reviewed EKG and blood work.   Radiologist reviewed: CT head.   See below radiology report.   Recent Labs   Lab 04/13/20  1515   WBC 6.5   HGB 14.3   MCV 92         POTASSIUM 4.3   CHLORIDE 109   CO2 25   BUN 16   CR 1.24   ANIONGAP 5   ROSALINDA 8.5   *   ALBUMIN 3.5   PROTTOTAL 7.0   BILITOTAL 0.5    ALKPHOS 61   ALT 29   AST 18   LIPASE 175   TROPI <0.015       Recent Results (from the past 24 hour(s))   XR Chest 2 Views    Narrative    XR CHEST 2 VW    HISTORY: 86 years Male syncopal episode    COMPARISON: 10/21/2019    TECHNIQUE: 2 views of the chest were obtained.    FINDINGS: Two views of the chest were obtained. Heart size and  pulmonary vascularity are within normal limits, lungs are clear on  both views. No consolidating air space opacities are present.    Again seen are changes of median sternotomy, cardiac pacemaker and  right central line placement.      Impression    IMPRESSION: Clear chest.    VIMAL PRÉEZ MD   CT Head w/o Contrast    Narrative    Exam: CT HEAD W/O CONTRAST    Clinical history:86 years Male Near syncopal episode    Comparisons: 10/21/2019    Technique: Axial CT imaging of the head was performed Without  intervenous contrast.    FINDINGS: There is generalized age-appropriate cerebral and cerebellar  volume loss.   Ventricles and sulci are symmetric. The gray-white matter  differentiation throughout the brain is well maintained. There is  moderate periventricular white matter change of chronic small vessel  ischemic disease. There is no evidence of intracranial mass or  hemorrhage. Visualized portions of the paranasal sinuses and mastoid  air cells are well aerated. There is no evidence of skull fracture.      Impression    IMPRESSION: Negative Head CT    VIMAL PÉREZ MD

## 2020-04-15 LAB
BACTERIA SPEC CULT: NORMAL
SPECIMEN SOURCE: NORMAL

## 2020-04-15 NOTE — PLAN OF CARE
Physical Therapy Discharge Summary    Reason for therapy discharge:    Discharged to home.    Progress towards therapy goal(s). See goals on Care Plan in [facility] electronic health record for goal details.  Goals not met.  Barriers to achieving goals:   discharge on same date as initial evaluation.    Therapy recommendation(s):    Pt is at continued risk for falls due to orthostatic hypotension, needs closer supervision/assist from wife with mobility and cues/reminders to pause between position changes.

## 2020-04-15 NOTE — PROGRESS NOTES
Subjective     Jf Ortiz is a 86 year old male who presents to clinic today for the following health issues:    HPI       Hospital Follow-up Visit:    Hospital/Nursing Home/IP Rehab Facility: [facility]  Date of Admission: 04-13-14  Date of Discharge: 04-14-20  Reason(s) for Admission: falls, hypotension            Problems taking medications regularly:  None       Medication changes since discharge: Fludrocortisone       Problems adhering to non-medication therapy:  None    Summary of hospitalization:  [facility] discharge summary reviewed  Diagnostic Tests/Treatments reviewed.  Follow up needed: none  Other Healthcare Providers Involved in Patient s Care:         None  Update since discharge: improved.     Post Discharge Medication Reconciliation: discharge medications reconciled, continue medications without change.  Plan of care communicated with patient     Coding guidelines for this visit:  Type of Medical   Decision Making Face-to-Face Visit       within 7 Days of discharge Face-to-Face Visit        within 14 days of discharge   Moderate Complexity [id number]   High Complexity [id number]        Overall doing much better-- much less dizzy  Pt of Dr TURK  discharge summary reviewed  On Fiorinef - seems to working better  Meds and labs Reviewed  Still gets 2/week IVF infusions  Not drink well  Has mild - moderate Lewy Body Dementia   No concerns   Wife takes good care of him          Current Outpatient Medications   Medication Sig Dispense Refill     atorvastatin (LIPITOR) 20 MG tablet TAKE 1 TABLET BY MOUTH EVERY EVENING - GENERIC FOR LIPITOR 90 tablet 3     Cholecalciferol (VITAMIN D-3) 25 MCG (1000 UT) CAPS Take 50 mcg by mouth daily        Coenzyme Q10 (CO Q 10 PO) Take 100 mg by mouth every morning        donepezil (ARICEPT) 10 MG tablet TAKE 1 TABLET BY MOUTH NIGHTLY AT BEDTIME 90 tablet 3     fludrocortisone (FLORINEF) 0.1 MG tablet Take 1 tablet (0.1 mg) by mouth every morning  (before breakfast) 30 tablet 0     magnesium 500 MG TABS Take 500 mg by mouth daily        melatonin 5 MG tablet Take 5 mg by mouth nightly as needed for sleep       midodrine (PROAMATINE) 5 MG tablet TAKE 2 TABLETS (10MG) BY MOUTH THREE TIMES DAILY 540 tablet 3     multivitamin, therapeutic with minerals (MULTI-VITAMIN) TABS tablet Take 1 tablet by mouth daily       potassium chloride ER (K-DUR/KLOR-CON M) 20 MEQ CR tablet TAKE 1 TABLET BY MOUTH DAILY 30 tablet 4     RABEprazole (ACIPHEX) 20 MG EC tablet Take 20 mg by mouth 2 times daily       sodium chloride 1 GM tablet TAKE 1 TABLET BY MOUTH TWICE A  tablet 3     Turmeric 500 MG TABS Take 1,000 mg by mouth daily        Allergies   Allergen Reactions     Cats Unknown     Lamotrigine      Skin lesions         Reviewed and updated as needed this visit by Provider         Review of Systems   ROS COMP: Constitutional, HEENT, cardiovascular, pulmonary, gi and gu systems are negative, except as otherwise noted.      Objective    Temp 96.6  F (35.9  C) (Tympanic)   Resp 18   Wt 76.2 kg (168 lb)   SpO2 98%   BMI 24.80 kg/m    See orthostatics.    Body mass index is 24.8 kg/m .  Physical Exam   GENERAL: healthy, alert and no distress- walks with cane and shuffling gait - some   NECK: no adenopathy, no asymmetry, masses, or scars and thyroid normal to palpation  RESP: lungs clear to auscultation - no rales, rhonchi or wheezes  CV: regular rate and rhythm, normal S1 S2, no S3 or S4, 2/6 holosystolic  murmur, click or rub, no peripheral edema and peripheral pulses strong  ABDOMEN: soft, nontender, no hepatosplenomegaly, no masses and bowel sounds normal  MS: no gross musculoskeletal defects noted, no edema  PSYCH: mentation appears mildly abnormal, affect flat / distant some     [facility]  [address] [place] [id number]     Name: AKILA WALLER  MRN: [id number]  : 10/05/1933  Study Date: 2020  07:21 AM  Age: 86 yrs  Gender: Male  Patient Location: \[facility] [place] [facility]\""  Reason For Study: Syncope  History: syncope  Ordering Physician: YOCASTA CADE  Referring Physician: YOCASTA CADE  Performed By: Meghan Byrne     BSA: 1.9 m2  Height: 69 in  Weight: 163 lb  _____________________________________________________________________________  __     _____________________________________________________________________________  __        Interpretation Summary  No pericardial effusion is present.  Mild concentric wall thickening consistent with left ventricular hypertrophy  is present.  The Ejection Fraction is estimated at 60-65%.  Grade I or early diastolic dysfunction.  Diastolic Doppler findings (E/E' ratio and/or other parameters) suggest left  ventricular filling pressures are increased.  Abnormal septal motion consistent with pacemaker is present.  A pacemaker lead is noted in the right ventricle.  The right ventricle is normal size.  Mild left atrial enlargement is present.  Mild mitral annular calcification is present.  Moderate calcified posterior MV annulus  Trace mitral insufficiency is present.  Moderate aortic valve calcification is present.  Trace aortic insufficiency is present.  The peak aortic velocity is 2.74 m/sec.  The mean gradient across the aortic valve is18.5 mmHg.  Peak AoV pressure Gradient 30.1 mmHg  Mild aortic stenosis is present.  Mild tricuspid insufficiency is present.  Right ventricular systolic pressure is 32.mmHg above the right atrial  pressure.  Trace pulmonic insufficiency is present.  The aorta root is normal.  _____________________________________________________________________________  __          Assessment & Plan     1. Autonomic orthostatic hypotension  Long h/o this. Doing better with current new medication of florinef. No side effects. STILL has significant drop in BP but looks to be better and less symptomatic.  Continue current medications and behavioral changes.   Push  "fluids and some extra salt in diet.   F/u with labs in 3 weeks with Dr TURK   Needs to and very much encourage walker and NOT cane.  Get up slow and wait before moving  - fludrocortisone (FLORINEF) 0.1 MG tablet; Take 1 tablet (0.1 mg) by mouth every morning (before breakfast)  Dispense: 30 tablet; Refill: 11    2. Lewy body dementia without behavioral disturbance (H)  Overall stable and probable cause for above issue - at least partly .  Does have other morbidities.     3. Volume depletion  Continue pushing orals and will stick with 2/week infusions for now.     4. Hospital discharge follow-up  Improved from hospitalization.        BMI:   Estimated body mass index is 24.8 kg/m  as calculated from the following:    Height as of 4/10/20: 1.753 m (5' 9.02\").    Weight as of this encounter: 76.2 kg (168 lb).           Aurelio Rollins MD      No follow-ups on file.    HC Provider 1  [facility] [place]      "

## 2020-04-16 ENCOUNTER — OFFICE VISIT (OUTPATIENT)
Dept: FAMILY MEDICINE | Facility: OTHER | Age: 85
End: 2020-04-16
Attending: FAMILY MEDICINE
Payer: MEDICARE

## 2020-04-16 VITALS
WEIGHT: 168 LBS | BODY MASS INDEX: 24.8 KG/M2 | TEMPERATURE: 96.6 F | OXYGEN SATURATION: 98 % | RESPIRATION RATE: 18 BRPM

## 2020-04-16 DIAGNOSIS — E86.9 VOLUME DEPLETION: ICD-10-CM

## 2020-04-16 DIAGNOSIS — I95.1 AUTONOMIC ORTHOSTATIC HYPOTENSION: ICD-10-CM

## 2020-04-16 DIAGNOSIS — F02.80 LEWY BODY DEMENTIA WITHOUT BEHAVIORAL DISTURBANCE (H): Primary | ICD-10-CM

## 2020-04-16 DIAGNOSIS — G31.83 LEWY BODY DEMENTIA WITHOUT BEHAVIORAL DISTURBANCE (H): Primary | ICD-10-CM

## 2020-04-16 DIAGNOSIS — Z09 HOSPITAL DISCHARGE FOLLOW-UP: ICD-10-CM

## 2020-04-16 PROCEDURE — 99214 OFFICE O/P EST MOD 30 MIN: CPT | Performed by: FAMILY MEDICINE

## 2020-04-16 PROCEDURE — G0463 HOSPITAL OUTPT CLINIC VISIT: HCPCS

## 2020-04-16 RX ORDER — FLUDROCORTISONE ACETATE 0.1 MG/1
0.1 TABLET ORAL
Qty: 30 TABLET | Refills: 11 | Status: SHIPPED | OUTPATIENT
Start: 2020-04-16

## 2020-04-16 ASSESSMENT — PAIN SCALES - GENERAL: PAINLEVEL: NO PAIN (0)

## 2020-04-16 NOTE — NURSING NOTE
"Chief Complaint   Patient presents with     Hospital F/U       Initial Temp 96.6  F (35.9  C) (Tympanic)   Resp 18   Wt 76.2 kg (168 lb)   SpO2 98%   BMI 24.80 kg/m   Estimated body mass index is 24.8 kg/m  as calculated from the following:    Height as of 4/10/20: 1.753 m (5' 9.02\").    Weight as of this encounter: 76.2 kg (168 lb).  Medication Reconciliation: complete  Uzma King LPN    "

## 2020-04-17 ENCOUNTER — INFUSION THERAPY VISIT (OUTPATIENT)
Dept: INFUSION THERAPY | Facility: OTHER | Age: 85
End: 2020-04-17
Attending: FAMILY MEDICINE
Payer: MEDICARE

## 2020-04-17 VITALS
BODY MASS INDEX: 25.37 KG/M2 | RESPIRATION RATE: 16 BRPM | TEMPERATURE: 97.4 F | HEIGHT: 69 IN | HEART RATE: 74 BPM | WEIGHT: 171.3 LBS | OXYGEN SATURATION: 99 % | DIASTOLIC BLOOD PRESSURE: 89 MMHG | SYSTOLIC BLOOD PRESSURE: 152 MMHG

## 2020-04-17 DIAGNOSIS — E86.9 VOLUME DEPLETION: Primary | ICD-10-CM

## 2020-04-17 PROCEDURE — 25000128 H RX IP 250 OP 636: Performed by: FAMILY MEDICINE

## 2020-04-17 PROCEDURE — 96365 THER/PROPH/DIAG IV INF INIT: CPT

## 2020-04-17 PROCEDURE — 25800030 ZZH RX IP 258 OP 636: Performed by: FAMILY MEDICINE

## 2020-04-17 PROCEDURE — 96360 HYDRATION IV INFUSION INIT: CPT

## 2020-04-17 RX ORDER — HEPARIN SODIUM (PORCINE) LOCK FLUSH IV SOLN 100 UNIT/ML 100 UNIT/ML
5 SOLUTION INTRAVENOUS ONCE
Status: CANCELLED
Start: 2020-04-17

## 2020-04-17 RX ORDER — HEPARIN SODIUM (PORCINE) LOCK FLUSH IV SOLN 100 UNIT/ML 100 UNIT/ML
5 SOLUTION INTRAVENOUS ONCE
Status: COMPLETED | OUTPATIENT
Start: 2020-04-17 | End: 2020-04-17

## 2020-04-17 RX ADMIN — HEPARIN 5 ML: 100 SYRINGE at 10:44

## 2020-04-17 RX ADMIN — SODIUM CHLORIDE 1000 ML: 9 INJECTION, SOLUTION INTRAVENOUS at 09:37

## 2020-04-17 ASSESSMENT — PAIN SCALES - GENERAL: PAINLEVEL: NO PAIN (0)

## 2020-04-17 ASSESSMENT — MIFFLIN-ST. JEOR: SCORE: 1447.38

## 2020-04-17 NOTE — PROGRESS NOTES
Patient is a 86 year old male here accompanied by self today for infusion of IVF per order of Dr. Ray.  Patient identified with two identifiers, order verified, and verbal consent for today's infusion obtained from patient.         Patients right sided port accessed using non-coring, 20 gauge, 3/4 needle. Port accessed per facility protocol. Port flushed easily, blood return noted.  No signs and symptoms of infection or infiltration.      IV pump verified with dose, drug, and rate of administration.  Infusion administered per protocol.

## 2020-04-21 ENCOUNTER — INFUSION THERAPY VISIT (OUTPATIENT)
Dept: INFUSION THERAPY | Facility: OTHER | Age: 85
End: 2020-04-21
Attending: FAMILY MEDICINE
Payer: MEDICARE

## 2020-04-21 VITALS
SYSTOLIC BLOOD PRESSURE: 136 MMHG | RESPIRATION RATE: 16 BRPM | TEMPERATURE: 95.7 F | DIASTOLIC BLOOD PRESSURE: 78 MMHG | WEIGHT: 168.43 LBS | HEART RATE: 80 BPM | BODY MASS INDEX: 24.87 KG/M2

## 2020-04-21 DIAGNOSIS — E86.9 VOLUME DEPLETION: Primary | ICD-10-CM

## 2020-04-21 PROCEDURE — 25000128 H RX IP 250 OP 636: Performed by: FAMILY MEDICINE

## 2020-04-21 PROCEDURE — 25800030 ZZH RX IP 258 OP 636: Performed by: FAMILY MEDICINE

## 2020-04-21 PROCEDURE — 96360 HYDRATION IV INFUSION INIT: CPT

## 2020-04-21 RX ORDER — HEPARIN SODIUM (PORCINE) LOCK FLUSH IV SOLN 100 UNIT/ML 100 UNIT/ML
5 SOLUTION INTRAVENOUS ONCE
Status: COMPLETED | OUTPATIENT
Start: 2020-04-21 | End: 2020-04-21

## 2020-04-21 RX ORDER — HEPARIN SODIUM (PORCINE) LOCK FLUSH IV SOLN 100 UNIT/ML 100 UNIT/ML
5 SOLUTION INTRAVENOUS ONCE
Status: CANCELLED
Start: 2020-04-21

## 2020-04-21 RX ADMIN — SODIUM CHLORIDE 1000 ML: 9 INJECTION, SOLUTION INTRAVENOUS at 09:20

## 2020-04-21 RX ADMIN — HEPARIN 5 ML: 100 SYRINGE at 10:30

## 2020-04-21 NOTE — PROGRESS NOTES
Patient is a 86 year old male  here accompanied by self  today for infusion of IVF per order of Dr. Whitman Patient identified with two identifiers, order verified, and verbal consent for today's infusion obtained from patient.   Patient meets order parameters for today's treatment.  Patients right sided port accessed using non-coring, 19  gauge, 3/4  needle. Port accessed per facility protocol. Port flushed easily, blood return noted.  No signs and symptoms of infection or infiltration.      0920IV pump verified with dose, drug, and rate of administration.  Infusion administered per protocol.

## 2020-04-21 NOTE — PROGRESS NOTES
Patient tolerated infusion well, no signs or symptoms of adverse reaction noted.  Patient denies pain nor discomfort.     IV removed, catheter intact.  Site clean, dry and intact.  No signs or symptoms of infiltration or infection.  Covered with a sterile bandage, slight pressure applied for 30 seconds.  Pt instructed to leave bandage intact for a minimum of one hour, and to call with questions or concerns. Patient states understanding, discharged.

## 2020-04-24 ENCOUNTER — INFUSION THERAPY VISIT (OUTPATIENT)
Dept: INFUSION THERAPY | Facility: OTHER | Age: 85
End: 2020-04-24
Attending: FAMILY MEDICINE
Payer: MEDICARE

## 2020-04-24 VITALS
SYSTOLIC BLOOD PRESSURE: 143 MMHG | WEIGHT: 169.53 LBS | HEART RATE: 74 BPM | TEMPERATURE: 96.5 F | BODY MASS INDEX: 25.11 KG/M2 | OXYGEN SATURATION: 99 % | HEIGHT: 69 IN | DIASTOLIC BLOOD PRESSURE: 75 MMHG

## 2020-04-24 DIAGNOSIS — E86.9 VOLUME DEPLETION: Primary | ICD-10-CM

## 2020-04-24 PROCEDURE — 25800030 ZZH RX IP 258 OP 636: Performed by: FAMILY MEDICINE

## 2020-04-24 PROCEDURE — 25000128 H RX IP 250 OP 636: Performed by: FAMILY MEDICINE

## 2020-04-24 PROCEDURE — 96360 HYDRATION IV INFUSION INIT: CPT

## 2020-04-24 RX ORDER — HEPARIN SODIUM (PORCINE) LOCK FLUSH IV SOLN 100 UNIT/ML 100 UNIT/ML
5 SOLUTION INTRAVENOUS ONCE
Status: CANCELLED
Start: 2020-04-24

## 2020-04-24 RX ORDER — HEPARIN SODIUM (PORCINE) LOCK FLUSH IV SOLN 100 UNIT/ML 100 UNIT/ML
5 SOLUTION INTRAVENOUS ONCE
Status: COMPLETED | OUTPATIENT
Start: 2020-04-24 | End: 2020-04-24

## 2020-04-24 RX ADMIN — SODIUM CHLORIDE 1000 ML: 9 INJECTION, SOLUTION INTRAVENOUS at 09:54

## 2020-04-24 RX ADMIN — HEPARIN 5 ML: 100 SYRINGE at 11:02

## 2020-04-24 ASSESSMENT — MIFFLIN-ST. JEOR: SCORE: 1439.63

## 2020-04-24 NOTE — PROGRESS NOTES
Patient is a 86 year old male here accompanied by self today for infusion of IVF per order of Dr MALIA Ray under the supervision of Dr Walter, Cardiology.  Patient identified with two identifiers, order verified, and verbal consent for today's infusion obtained from patient.      Patients port accessed using non-coring, 19 gauge, 3/4 inch needle. Port accessed per facility protocol. Port flushed easily, blood return noted.  No signs and symptoms of infection or infiltration.      0954: IV pump verified with dose, drug, and rate of administration.  Infusion administered per protocol.  Patient tolerated infusion well, no signs or symptoms of adverse reaction noted.  Patient denies pain nor discomfort.     IV removed, catheter intact.  Site clean, dry and intact.  No signs or symptoms of infiltration or infection.  Covered with a sterile bandage, slight pressure applied for 30 seconds.  Pt instructed to leave bandage intact for a minimum of one hour, and to call with questions or concerns. Patient states understanding, discharged.

## 2020-04-28 ENCOUNTER — INFUSION THERAPY VISIT (OUTPATIENT)
Dept: INFUSION THERAPY | Facility: OTHER | Age: 85
End: 2020-04-28
Attending: FAMILY MEDICINE
Payer: MEDICARE

## 2020-04-28 VITALS
HEART RATE: 79 BPM | OXYGEN SATURATION: 94 % | BODY MASS INDEX: 25.68 KG/M2 | WEIGHT: 173.94 LBS | SYSTOLIC BLOOD PRESSURE: 157 MMHG | DIASTOLIC BLOOD PRESSURE: 87 MMHG | RESPIRATION RATE: 16 BRPM | TEMPERATURE: 96.2 F

## 2020-04-28 DIAGNOSIS — E86.9 VOLUME DEPLETION: Primary | ICD-10-CM

## 2020-04-28 PROCEDURE — 25800030 ZZH RX IP 258 OP 636: Performed by: FAMILY MEDICINE

## 2020-04-28 PROCEDURE — 96360 HYDRATION IV INFUSION INIT: CPT

## 2020-04-28 PROCEDURE — 25000128 H RX IP 250 OP 636: Performed by: FAMILY MEDICINE

## 2020-04-28 RX ORDER — HEPARIN SODIUM (PORCINE) LOCK FLUSH IV SOLN 100 UNIT/ML 100 UNIT/ML
5 SOLUTION INTRAVENOUS ONCE
Status: CANCELLED
Start: 2020-04-28

## 2020-04-28 RX ORDER — HEPARIN SODIUM (PORCINE) LOCK FLUSH IV SOLN 100 UNIT/ML 100 UNIT/ML
5 SOLUTION INTRAVENOUS ONCE
Status: COMPLETED | OUTPATIENT
Start: 2020-04-28 | End: 2020-04-28

## 2020-04-28 RX ADMIN — SODIUM CHLORIDE 1000 ML: 9 INJECTION, SOLUTION INTRAVENOUS at 14:13

## 2020-04-28 RX ADMIN — HEPARIN 5 ML: 100 SYRINGE at 15:30

## 2020-04-28 NOTE — PROGRESS NOTES
Patient is a 86 year old male  here accompanied by self  today for infusion of IVF  per order of Dr. Ray under the supervision of Dr. Ray.  Patient identified with two identifiers, order verified, and verbal consent for today's infusion obtained from patient.    Patient meets order parameters for today's treatment.   Patients Right sided  port accessed using non-coring, 19  gauge, 3/4 needle. Port accessed per facility protocol. Port flushed easily, blood return noted.  No signs and symptoms of infection or infiltration.    1413  IV pump verified with dose, drug, and rate of administration.  Infusion administered per protocol.  Patient tolerated infusion well, no signs or symptoms of adverse reaction noted.  Patient denies pain nor discomfort.   IV removed, catheter intact.  Site clean, dry and intact.  No signs or symptoms of infiltration or infection.  Covered with a sterile bandage, slight pressure applied for 30 seconds.  Pt instructed to leave bandage intact for a minimum of one hour, and to call with questions or concerns.

## 2020-05-01 ENCOUNTER — INFUSION THERAPY VISIT (OUTPATIENT)
Dept: INFUSION THERAPY | Facility: OTHER | Age: 85
End: 2020-05-01
Attending: FAMILY MEDICINE
Payer: MEDICARE

## 2020-05-01 VITALS
HEIGHT: 69 IN | BODY MASS INDEX: 25.57 KG/M2 | OXYGEN SATURATION: 99 % | RESPIRATION RATE: 16 BRPM | SYSTOLIC BLOOD PRESSURE: 146 MMHG | DIASTOLIC BLOOD PRESSURE: 77 MMHG | WEIGHT: 172.62 LBS | HEART RATE: 74 BPM | TEMPERATURE: 96.7 F

## 2020-05-01 DIAGNOSIS — E86.9 VOLUME DEPLETION: Primary | ICD-10-CM

## 2020-05-01 PROCEDURE — 25800030 ZZH RX IP 258 OP 636: Performed by: FAMILY MEDICINE

## 2020-05-01 PROCEDURE — 25000128 H RX IP 250 OP 636: Performed by: FAMILY MEDICINE

## 2020-05-01 PROCEDURE — 96360 HYDRATION IV INFUSION INIT: CPT

## 2020-05-01 RX ORDER — HEPARIN SODIUM (PORCINE) LOCK FLUSH IV SOLN 100 UNIT/ML 100 UNIT/ML
5 SOLUTION INTRAVENOUS ONCE
Status: COMPLETED | OUTPATIENT
Start: 2020-05-01 | End: 2020-05-01

## 2020-05-01 RX ORDER — HEPARIN SODIUM (PORCINE) LOCK FLUSH IV SOLN 100 UNIT/ML 100 UNIT/ML
5 SOLUTION INTRAVENOUS ONCE
Status: CANCELLED
Start: 2020-05-01

## 2020-05-01 RX ADMIN — SODIUM CHLORIDE 1000 ML: 9 INJECTION, SOLUTION INTRAVENOUS at 09:46

## 2020-05-01 RX ADMIN — HEPARIN 5 ML: 100 SYRINGE at 10:58

## 2020-05-01 ASSESSMENT — MIFFLIN-ST. JEOR: SCORE: 1453.63

## 2020-05-01 NOTE — PATIENT INSTRUCTIONS
We will see you back as planned. If you have any questions or concerns, we can be reached Monday through Friday 8am - 430pm at [phone] ([facility] [place] If you have concerns related to a potential reaction/side effect after hours/weekends/holiday's, please seek emergent medical care.

## 2020-05-01 NOTE — PROGRESS NOTES
Patient is a 86 year old male here  today for infusion of IVF per order of Dr Ray.  Patient identified with two identifiers, order verified, and verbal consent for today's infusion obtained from patient.       Patient meets order parameters for today's treatment.     Patient denies questions or concerns regarding infusion and/or medication(s) being administered.    Patients right port accessed using non-coring, 19 gauge, 3/4 needle. Port accessed per facility protocol. Port flushed easily, blood return noted.  No signs and symptoms of infection or infiltration.      0946 IV pump verified with IVF dose, drug, and rate of administration.  Infusion administered per protocol.  Patient tolerated infusion well, no signs or symptoms of adverse reaction noted.  Patient denies pain nor discomfort.     IV removed, catheter intact.  Site clean, dry and intact.  No signs or symptoms of infiltration or infection.  Covered with a sterile bandage, slight pressure applied for 30 seconds.  Pt instructed to leave bandage intact for a minimum of one hour, and to call with questions or concerns.  Patient declines copy of appointments, discharge instructions, and after visit summary (AVS).  Patient states understanding, discharged.

## 2020-05-05 ENCOUNTER — INFUSION THERAPY VISIT (OUTPATIENT)
Dept: INFUSION THERAPY | Facility: OTHER | Age: 85
End: 2020-05-05
Attending: FAMILY MEDICINE
Payer: MEDICARE

## 2020-05-05 VITALS
HEIGHT: 69 IN | SYSTOLIC BLOOD PRESSURE: 126 MMHG | DIASTOLIC BLOOD PRESSURE: 65 MMHG | TEMPERATURE: 97.6 F | WEIGHT: 169.09 LBS | BODY MASS INDEX: 25.04 KG/M2 | OXYGEN SATURATION: 94 % | HEART RATE: 85 BPM

## 2020-05-05 DIAGNOSIS — E86.9 VOLUME DEPLETION: Primary | ICD-10-CM

## 2020-05-05 PROCEDURE — 25800030 ZZH RX IP 258 OP 636: Performed by: FAMILY MEDICINE

## 2020-05-05 PROCEDURE — 96360 HYDRATION IV INFUSION INIT: CPT

## 2020-05-05 PROCEDURE — 96365 THER/PROPH/DIAG IV INF INIT: CPT

## 2020-05-05 PROCEDURE — 25000128 H RX IP 250 OP 636: Performed by: FAMILY MEDICINE

## 2020-05-05 RX ORDER — HEPARIN SODIUM (PORCINE) LOCK FLUSH IV SOLN 100 UNIT/ML 100 UNIT/ML
5 SOLUTION INTRAVENOUS ONCE
Status: CANCELLED
Start: 2020-05-05

## 2020-05-05 RX ORDER — HEPARIN SODIUM (PORCINE) LOCK FLUSH IV SOLN 100 UNIT/ML 100 UNIT/ML
5 SOLUTION INTRAVENOUS ONCE
Status: COMPLETED | OUTPATIENT
Start: 2020-05-05 | End: 2020-05-05

## 2020-05-05 RX ADMIN — SODIUM CHLORIDE 1000 ML: 9 INJECTION, SOLUTION INTRAVENOUS at 09:41

## 2020-05-05 RX ADMIN — HEPARIN 5 ML: 100 SYRINGE at 10:46

## 2020-05-05 ASSESSMENT — MIFFLIN-ST. JEOR: SCORE: 1437.63

## 2020-05-05 NOTE — PROGRESS NOTES
Patient is a 86 year old male here accompanied by self today for infusion of IVF per order of Dr. MALIA Ray.  Patient identified with two identifiers, order verified, and verbal consent for today's infusion obtained from patient.      Patients right sided port accessed using non-coring, 20 gauge, 3/4 needle. Port accessed per facility protocol. Port flushed easily, blood return noted.  No signs and symptoms of infection or infiltration.      IV pump verified with dose, drug, and rate of administration.  Infusion administered per protocol.  Patient tolerated infusion well, no signs or symptoms of adverse reaction noted.  Patient denies pain nor discomfort.     IV removed, catheter intact.  Site clean, dry and intact.  No signs or symptoms of infiltration or infection.  Covered with a sterile bandage, slight pressure applied for 30 seconds.  Pt instructed to leave bandage intact for a minimum of one hour, and to call with questions or concerns.  Copy of appointments, discharge instructions, and after visit summary (AVS) provided to patient.  Patient states understanding, discharged.

## 2020-05-08 ENCOUNTER — INFUSION THERAPY VISIT (OUTPATIENT)
Dept: INFUSION THERAPY | Facility: OTHER | Age: 85
End: 2020-05-08
Attending: FAMILY MEDICINE
Payer: MEDICARE

## 2020-05-08 DIAGNOSIS — E86.9 VOLUME DEPLETION: Primary | ICD-10-CM

## 2020-05-08 PROCEDURE — 25800030 ZZH RX IP 258 OP 636: Performed by: FAMILY MEDICINE

## 2020-05-08 PROCEDURE — 96360 HYDRATION IV INFUSION INIT: CPT

## 2020-05-08 PROCEDURE — 25000128 H RX IP 250 OP 636: Performed by: FAMILY MEDICINE

## 2020-05-08 RX ORDER — HEPARIN SODIUM (PORCINE) LOCK FLUSH IV SOLN 100 UNIT/ML 100 UNIT/ML
5 SOLUTION INTRAVENOUS ONCE
Status: COMPLETED | OUTPATIENT
Start: 2020-05-08 | End: 2020-05-08

## 2020-05-08 RX ORDER — HEPARIN SODIUM (PORCINE) LOCK FLUSH IV SOLN 100 UNIT/ML 100 UNIT/ML
5 SOLUTION INTRAVENOUS ONCE
Status: CANCELLED
Start: 2020-05-08

## 2020-05-08 RX ADMIN — SODIUM CHLORIDE 1000 ML: 9 INJECTION, SOLUTION INTRAVENOUS at 09:43

## 2020-05-08 RX ADMIN — HEPARIN 5 ML: 100 SYRINGE at 10:42

## 2020-05-08 NOTE — PROGRESS NOTES
Patient is a 86 year old male here accompanied by self today for infusion of IVF per order of Dr MALIA Ray under the supervision of Dr Walter, Cardiology.  Patient identified with two identifiers, order verified, and verbal consent for today's infusion obtained from patient.      Patients port accessed using non-coring, 20 gauge, 3/4 inch power needle. Port accessed per facility protocol. Port flushed easily, blood return noted.  No signs and symptoms of infection or infiltration.      IV pump verified with dose, drug, and rate of administration.  Infusion administered per protocol.  Patient tolerated infusion well, no signs or symptoms of adverse reaction noted.  Patient denies pain nor discomfort.     IV removed, catheter intact.  Site clean, dry and intact.  No signs or symptoms of infiltration or infection.  Covered with a sterile bandage, slight pressure applied for 30 seconds.  Pt instructed to leave bandage intact for a minimum of one hour, and to call with questions or concerns.  Copy of appointments, discharge instructions, and after visit summary (AVS) provided to patient.  Patient states understanding, discharged.

## 2020-05-12 ENCOUNTER — INFUSION THERAPY VISIT (OUTPATIENT)
Dept: INFUSION THERAPY | Facility: OTHER | Age: 85
End: 2020-05-12
Attending: FAMILY MEDICINE
Payer: MEDICARE

## 2020-05-12 VITALS
HEIGHT: 69 IN | RESPIRATION RATE: 18 BRPM | HEART RATE: 81 BPM | DIASTOLIC BLOOD PRESSURE: 83 MMHG | TEMPERATURE: 97 F | WEIGHT: 168.21 LBS | BODY MASS INDEX: 24.91 KG/M2 | OXYGEN SATURATION: 97 % | SYSTOLIC BLOOD PRESSURE: 153 MMHG

## 2020-05-12 DIAGNOSIS — E86.9 VOLUME DEPLETION: Primary | ICD-10-CM

## 2020-05-12 PROCEDURE — 96360 HYDRATION IV INFUSION INIT: CPT

## 2020-05-12 PROCEDURE — 25800030 ZZH RX IP 258 OP 636: Performed by: FAMILY MEDICINE

## 2020-05-12 PROCEDURE — 25000128 H RX IP 250 OP 636: Performed by: FAMILY MEDICINE

## 2020-05-12 RX ORDER — HEPARIN SODIUM (PORCINE) LOCK FLUSH IV SOLN 100 UNIT/ML 100 UNIT/ML
5 SOLUTION INTRAVENOUS ONCE
Status: COMPLETED | OUTPATIENT
Start: 2020-05-12 | End: 2020-05-12

## 2020-05-12 RX ORDER — HEPARIN SODIUM (PORCINE) LOCK FLUSH IV SOLN 100 UNIT/ML 100 UNIT/ML
5 SOLUTION INTRAVENOUS ONCE
Status: CANCELLED
Start: 2020-05-12

## 2020-05-12 RX ADMIN — SODIUM CHLORIDE 1000 ML: 9 INJECTION, SOLUTION INTRAVENOUS at 09:40

## 2020-05-12 RX ADMIN — HEPARIN 5 ML: 100 SYRINGE at 10:39

## 2020-05-12 ASSESSMENT — PAIN SCALES - GENERAL: PAINLEVEL: MILD PAIN (2)

## 2020-05-12 ASSESSMENT — MIFFLIN-ST. JEOR: SCORE: 1433.63

## 2020-05-12 NOTE — PROGRESS NOTES
Patient is a 86 here accompanied by self today for infusion of IVF per order of Dr MALIA Ray under the supervision of Dr Walter.  Patient identified with two identifiers, order verified, and verbal consent for today's infusion obtained from patient.      Patient meets order parameters for today's treatment.     Patients port accessed using non-coring, 19 gauge, 3/4 inch needle. Port accessed per facility protocol. Port flushed easily, blood return noted.  No signs and symptoms of infection or infiltration.      IV pump verified with dose, drug, and rate of administration.  Infusion administered per protocol.  Patient tolerated infusion well, no signs or symptoms of adverse reaction noted.  Patient denies pain nor discomfort.     IV removed, catheter intact.  Site clean, dry and intact.  No signs or symptoms of infiltration or infection.  Covered with a sterile bandage, slight pressure applied for 30 seconds.  Pt instructed to leave bandage intact for a minimum of one hour, and to call with questions or concerns. Patient states understanding, discharged.

## 2020-05-15 ENCOUNTER — INFUSION THERAPY VISIT (OUTPATIENT)
Dept: INFUSION THERAPY | Facility: OTHER | Age: 85
End: 2020-05-15
Attending: FAMILY MEDICINE
Payer: MEDICARE

## 2020-05-15 DIAGNOSIS — E86.9 VOLUME DEPLETION: Primary | ICD-10-CM

## 2020-05-15 PROCEDURE — 96365 THER/PROPH/DIAG IV INF INIT: CPT

## 2020-05-15 PROCEDURE — 25000128 H RX IP 250 OP 636: Performed by: FAMILY MEDICINE

## 2020-05-15 PROCEDURE — 25800030 ZZH RX IP 258 OP 636: Performed by: FAMILY MEDICINE

## 2020-05-15 PROCEDURE — 96360 HYDRATION IV INFUSION INIT: CPT

## 2020-05-15 RX ORDER — HEPARIN SODIUM (PORCINE) LOCK FLUSH IV SOLN 100 UNIT/ML 100 UNIT/ML
5 SOLUTION INTRAVENOUS ONCE
Status: CANCELLED
Start: 2020-05-15

## 2020-05-15 RX ORDER — HEPARIN SODIUM (PORCINE) LOCK FLUSH IV SOLN 100 UNIT/ML 100 UNIT/ML
5 SOLUTION INTRAVENOUS ONCE
Status: COMPLETED | OUTPATIENT
Start: 2020-05-15 | End: 2020-05-15

## 2020-05-15 RX ADMIN — HEPARIN 5 ML: 100 SYRINGE at 10:42

## 2020-05-15 RX ADMIN — SODIUM CHLORIDE 1000 ML: 9 INJECTION, SOLUTION INTRAVENOUS at 09:33

## 2020-05-19 ENCOUNTER — INFUSION THERAPY VISIT (OUTPATIENT)
Dept: INFUSION THERAPY | Facility: OTHER | Age: 85
End: 2020-05-19
Attending: FAMILY MEDICINE
Payer: MEDICARE

## 2020-05-19 VITALS
HEIGHT: 69 IN | SYSTOLIC BLOOD PRESSURE: 146 MMHG | TEMPERATURE: 97.5 F | BODY MASS INDEX: 25.18 KG/M2 | WEIGHT: 169.97 LBS | RESPIRATION RATE: 16 BRPM | DIASTOLIC BLOOD PRESSURE: 85 MMHG | HEART RATE: 61 BPM | OXYGEN SATURATION: 97 %

## 2020-05-19 DIAGNOSIS — E86.9 VOLUME DEPLETION: Primary | ICD-10-CM

## 2020-05-19 PROCEDURE — 25000128 H RX IP 250 OP 636: Performed by: FAMILY MEDICINE

## 2020-05-19 PROCEDURE — 25800030 ZZH RX IP 258 OP 636: Performed by: FAMILY MEDICINE

## 2020-05-19 PROCEDURE — 96360 HYDRATION IV INFUSION INIT: CPT

## 2020-05-19 RX ORDER — HEPARIN SODIUM (PORCINE) LOCK FLUSH IV SOLN 100 UNIT/ML 100 UNIT/ML
5 SOLUTION INTRAVENOUS ONCE
Status: COMPLETED | OUTPATIENT
Start: 2020-05-19 | End: 2020-05-19

## 2020-05-19 RX ORDER — HEPARIN SODIUM (PORCINE) LOCK FLUSH IV SOLN 100 UNIT/ML 100 UNIT/ML
5 SOLUTION INTRAVENOUS ONCE
Status: CANCELLED
Start: 2020-05-19

## 2020-05-19 RX ADMIN — SODIUM CHLORIDE 1000 ML: 9 INJECTION, SOLUTION INTRAVENOUS at 10:41

## 2020-05-19 RX ADMIN — HEPARIN 5 ML: 100 SYRINGE at 11:40

## 2020-05-19 ASSESSMENT — MIFFLIN-ST. JEOR: SCORE: 1441.63

## 2020-05-19 ASSESSMENT — PAIN SCALES - GENERAL: PAINLEVEL: MILD PAIN (2)

## 2020-05-20 ENCOUNTER — TELEPHONE (OUTPATIENT)
Dept: FAMILY MEDICINE | Facility: OTHER | Age: 85
End: 2020-05-20

## 2020-05-20 DIAGNOSIS — M54.50 CHRONIC LOW BACK PAIN, UNSPECIFIED BACK PAIN LATERALITY, UNSPECIFIED WHETHER SCIATICA PRESENT: Primary | ICD-10-CM

## 2020-05-20 DIAGNOSIS — G89.29 CHRONIC LOW BACK PAIN, UNSPECIFIED BACK PAIN LATERALITY, UNSPECIFIED WHETHER SCIATICA PRESENT: Primary | ICD-10-CM

## 2020-05-20 RX ORDER — LIDOCAINE 50 MG/G
1 PATCH TOPICAL EVERY 24 HOURS
Qty: 30 PATCH | Refills: 3 | Status: SHIPPED | OUTPATIENT
Start: 2020-05-20 | End: 2020-12-29

## 2020-05-20 NOTE — TELEPHONE ENCOUNTER
Pt friend called and is asking for Lidocaine patches for lower back. Not on current Epic med list.    [facility] in [place].    Please advise.    Call [phone]

## 2020-05-24 ENCOUNTER — APPOINTMENT (OUTPATIENT)
Dept: CT IMAGING | Facility: HOSPITAL | Age: 85
End: 2020-05-24
Attending: PHYSICIAN ASSISTANT
Payer: MEDICARE

## 2020-05-24 ENCOUNTER — HOSPITAL ENCOUNTER (EMERGENCY)
Facility: HOSPITAL | Age: 85
Discharge: HOME | End: 2020-05-24
Attending: PHYSICIAN ASSISTANT | Admitting: PHYSICIAN ASSISTANT
Payer: MEDICARE

## 2020-05-24 VITALS
SYSTOLIC BLOOD PRESSURE: 134 MMHG | DIASTOLIC BLOOD PRESSURE: 91 MMHG | OXYGEN SATURATION: 94 % | TEMPERATURE: 97.9 F | RESPIRATION RATE: 18 BRPM | HEART RATE: 75 BPM

## 2020-05-24 DIAGNOSIS — S22.31XA CLOSED FRACTURE OF ONE RIB OF RIGHT SIDE, INITIAL ENCOUNTER: ICD-10-CM

## 2020-05-24 DIAGNOSIS — R91.8 PULMONARY NODULES: ICD-10-CM

## 2020-05-24 PROCEDURE — 25000128 H RX IP 250 OP 636: Performed by: PHYSICIAN ASSISTANT

## 2020-05-24 PROCEDURE — 99284 EMERGENCY DEPT VISIT MOD MDM: CPT | Mod: 25

## 2020-05-24 PROCEDURE — 71250 CT THORAX DX C-: CPT | Mod: TC

## 2020-05-24 PROCEDURE — 96372 THER/PROPH/DIAG INJ SC/IM: CPT | Mod: XU

## 2020-05-24 PROCEDURE — 99284 EMERGENCY DEPT VISIT MOD MDM: CPT | Mod: Z6 | Performed by: PHYSICIAN ASSISTANT

## 2020-05-24 PROCEDURE — 25000132 ZZH RX MED GY IP 250 OP 250 PS 637: Mod: GY | Performed by: PHYSICIAN ASSISTANT

## 2020-05-24 RX ORDER — CYCLOBENZAPRINE HCL 10 MG
10 TABLET ORAL 3 TIMES DAILY PRN
Qty: 20 TABLET | Refills: 0 | Status: SHIPPED | OUTPATIENT
Start: 2020-05-24 | End: 2020-06-01

## 2020-05-24 RX ORDER — KETOROLAC TROMETHAMINE 30 MG/ML
30 INJECTION, SOLUTION INTRAMUSCULAR; INTRAVENOUS ONCE
Status: COMPLETED | OUTPATIENT
Start: 2020-05-24 | End: 2020-05-24

## 2020-05-24 RX ORDER — DIAZEPAM 5 MG
5 TABLET ORAL ONCE
Status: COMPLETED | OUTPATIENT
Start: 2020-05-24 | End: 2020-05-24

## 2020-05-24 RX ADMIN — KETOROLAC TROMETHAMINE 30 MG: 30 INJECTION, SOLUTION INTRAMUSCULAR at 17:46

## 2020-05-24 RX ADMIN — DIAZEPAM 5 MG: 5 TABLET ORAL at 19:46

## 2020-05-24 ASSESSMENT — ENCOUNTER SYMPTOMS
HEMATURIA: 0
CHILLS: 0
DIZZINESS: 0
NECK STIFFNESS: 0
BRUISES/BLEEDS EASILY: 0
SHORTNESS OF BREATH: 0
HEADACHES: 0
FEVER: 0
BACK PAIN: 0
ABDOMINAL PAIN: 0
VOMITING: 0
CHEST TIGHTNESS: 0
NAUSEA: 0
NECK PAIN: 0
LIGHT-HEADEDNESS: 0

## 2020-05-24 NOTE — ED AVS SNAPSHOT
[facility]  [address] [address]  Phone:  [phone]                                    Jf Ortiz   MRN: [id number]    Department:  [facility]   Date of Visit:  5/24/2020           After Visit Summary Signature Page    I have received my discharge instructions, and my questions have been answered. I have discussed any challenges I see with this plan with the nurse or doctor.    ..........................................................................................................................................  Patient/Patient Representative Signature      ..........................................................................................................................................  Patient Representative Print Name and Relationship to Patient    ..................................................               ................................................  Date                                   Time    ..........................................................................................................................................  Reviewed by Signature/Title    ...................................................              ..............................................  Date                                               Time          22EPIC Rev 08/18

## 2020-05-24 NOTE — ED PROVIDER NOTES
History     Chief Complaint   Patient presents with     Fall     Fell on Friday, having right flank and rib pain     The history is provided by the patient.     Jf Ortiz is a 86 year old male who presented to the emergency department via EMS for evaluation of worsening right lateral chest discomfort.  The patient tells me that approximately 48 hours ago he had a mechanical fall hitting his right chest on his coffee table.  Denied any loss of consciousness.  Denied any head injury.  He takes no blood thinners.  Denies any fevers or chills.  Denies any difficulty breathing.  Denies any hematuria.    Allergies:  Allergies   Allergen Reactions     Cats Unknown     Lamotrigine      Skin lesions       Problem List:    Patient Active Problem List    Diagnosis Date Noted     Longstanding persistent atrial fibrillation 04/13/2020     Priority: Medium     Falls frequently 04/13/2020     Priority: Medium     Frequent falls 04/13/2020     Priority: Medium     Coronary artery disease involving native coronary artery without angina pectoris 04/13/2020     Priority: Medium     Constipation, unspecified constipation type 04/11/2018     Priority: Medium     Pacemaker, [employer], Dual Chamber - Dependent 10/06/2017     Priority: Medium     Lewy body dementia without behavioral disturbance (H) 08/31/2017     Priority: Medium     Fatigue 08/31/2017     Priority: Medium     Urinary incontinence 08/31/2017     Priority: Medium     Autonomic orthostatic hypotension 10/14/2016     Priority: Medium     Dementia without behavioral disturbance (H) 07/28/2015     Priority: Medium     Diagnosis updated by automated process. Provider to review and confirm.       Hypocalcemia 07/28/2015     Priority: Medium     Parkinson disease (H)      Priority: Medium     Seizure disorder (H)      Priority: Medium     Volume depletion 08/02/2014     Priority: Medium     Syncope and collapse 08/01/2014     Priority: Medium     Stented coronary  artery      Priority: Medium     Coronary atherosclerosis of native coronary artery      Priority: Medium     Overview:   IMO Update 10/11       Hypercholesterolemia 04/23/2013     Priority: Medium     Advanced care planning/counseling discussion 10/30/2012     Priority: Medium     REM sleep behavior disorder 01/01/2011     Priority: Medium     Osteoarthritis 01/01/2011     Priority: Medium     Problem list name updated by automated process. Provider to review       Diaphragmatic hernia 01/01/2011     Priority: Medium     Problem list name updated by automated process. 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     Priority: Medium     Overview:   IMO Update 10/11       Hypertension with target blood pressure goal under 150/90 09/05/2006     Priority: Medium     Overview:   IMO Update          Past Medical History:    Past Medical History:   Diagnosis Date     Autonomic orthostatic hypotension 10/14/2016     Coronary artery disease      Dementia without behavioral disturbance (H) 7/28/2015     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     Seizure disorder (H)      Stented coronary artery        Past Surgical History:    Past Surgical History:   Procedure Laterality Date     ------------OTHER-------------  1955    ulnar and radial fx - repair ulnar and radial fx x4     ------------OTHER-------------  6/14/2011    cataract extraction     BIOPSY  08/2015    skin biopsy     BLEPHAROPLASTY BILATERAL  5/6/2014    Procedure: BLEPHAROPLASTY BILATERAL;  Surgeon: Andrew Queen MD;  Location: [facility]     BYPASS GRAFT ARTERY CORONARY  11/2006    coronary artery  disease x 5, [facility]     cataract extraction and lens implantation  2011    cataracts     cataract extraction and lens implantation      cataracts     COLONOSCOPY  2012     colonoscopy with polypectomy  3/13/2009    history of polyps - repeat 3 yrs     colonoscopy with polypectomy  2006     colonoscopy with polypectomy  2005     COMBINED COLONOSCOPY WITH ARGON PLASMA COAGULATOR (APC) N/A 10/31/2014    Procedure: COMBINED COLONOSCOPY WITH ARGON PLASMA COAGULATOR (APC);  Surgeon: Bassam Aguilar MD;  Location: [facility]     COMBINED COLONOSCOPY WITH ARGON PLASMA COAGULATOR (APC) N/A 11/13/2015    Procedure: COMBINED COLONOSCOPY WITH ARGON PLASMA COAGULATOR (APC);  Surgeon: Bassam Aguilar MD;  [facility]: [facility]     ENDOSCOPY UPPER, COLONOSCOPY, COMBINED N/A 10/31/2014    Procedure: COMBINED ENDOSCOPY UPPER, COLONOSCOPY;  Surgeon: Bassam Aguilar MD;  [facility]     ENDOSCOPY UPPER, COLONOSCOPY, COMBINED N/A 11/13/2015    Procedure: COMBINED ENDOSCOPY UPPER, COLONOSCOPY;  Surgeon: Bassam Aguilar MD;  [facility]     ESOPHAGOSCOPY, GASTROSCOPY, DUODENOSCOPY (EGD), COMBINED  1/22/2014    Procedure: COMBINED ESOPHAGOSCOPY, GASTROSCOPY, DUODENOSCOPY (EGD);  UPPER ENDOSCOPY(PENA) W/ BIOPSIES;  Surgeon: Patricia Pena MD;  Location: [facility]     HERNIORRHAPHY INGUINAL Right 2/14/2018    Procedure: HERNIORRHAPHY INGUINAL;  OPEN RIGHT INGUINAL HERNIA REPAIR with Mesh;  Surgeon: Virgilio Zaragoza DO;  Location: [facility]     INSERT PORT VASCULAR ACCESS Right 7/12/2019    Procedure: PORT PLACEMENT;  Surgeon: Lloyd Ram MD;  Location: [facility]     LARYNGOSCOPY WITH MICROSCOPE  1/22/2014    Procedure: LARYNGOSCOPY WITH MICROSCOPE;;  Surgeon: Chayo Duke MD;  Location: [facility]     pacemaker placement  2000    heart block     pacemaker placement  2011    dual-chamber     REMOVE TUBE, MYRINGOTOMY, COMBINED  1/22/2014    Procedure: COMBINED REMOVE TUBE, MYRINGOTOMY;  MICRODIRECT LARYNGOSCOPY WITH BIOPSY AND FROZEN  SECTIONS removal of right ear tube and myringoplasty;  Surgeon: Chayo Duke MD;  Location[facility]     REPLACE PACEMAKER GENERATOR N/A 2018    Procedure: REPLACE PACEMAKER GENERATOR;  Pacemaker generator change;  Surgeon: Alma Kaplan MD;  Location: [facility]     stent placement to LAD       ventilation tube  2012    right in office       Family History:    Family History   Problem Relation Age of Onset     Diabetes Mother      Breast Cancer Daughter        Social History:  Marital Status:  Single [1]  Social History     Tobacco Use     Smoking status: Former Smoker     Packs/day: 1.00     Years: 5.00     Pack years: 5.00     Types: Cigarettes     Last attempt to quit: 1985     Years since quittin.4     Smokeless tobacco: Never Used     Tobacco comment: quit in    Substance Use Topics     Alcohol use: Yes     Comment: social     Drug use: No        Medications:    cyclobenzaprine (FLEXERIL) 10 MG tablet  atorvastatin (LIPITOR) 20 MG tablet  Cholecalciferol (VITAMIN D-3) 25 MCG (1000 UT) CAPS  Coenzyme Q10 (CO Q 10 PO)  donepezil (ARICEPT) 10 MG tablet  fludrocortisone (FLORINEF) 0.1 MG tablet  lidocaine (LIDODERM) 5 % patch  magnesium 500 MG TABS  melatonin 5 MG tablet  midodrine (PROAMATINE) 5 MG tablet  multivitamin, therapeutic with minerals (MULTI-VITAMIN) TABS tablet  potassium chloride ER (K-DUR/KLOR-CON M) 20 MEQ CR tablet  RABEprazole (ACIPHEX) 20 MG EC tablet  sodium chloride 1 GM tablet  Turmeric 500 MG TABS          Review of Systems   Constitutional: Negative for chills and fever.   Respiratory: Negative for chest tightness and shortness of breath.         Right-sided chest wall pain   Gastrointestinal: Negative for abdominal pain, nausea and vomiting.   Genitourinary: Negative for hematuria.   Musculoskeletal: Negative for back pain, neck pain and neck stiffness.   Skin: Negative.    Neurological: Negative for dizziness, light-headedness and headaches.   Hematological: Does  not bruise/bleed easily.       Physical Exam   BP: (!) 183/98  Pulse: 70  Heart Rate: 73  Temp: 97.7  F (36.5  C)  SpO2: 95 %      Physical Exam  Vitals signs and nursing note reviewed.   Constitutional:       Appearance: Normal appearance. He is normal weight.   Cardiovascular:      Rate and Rhythm: Normal rate and regular rhythm.   Pulmonary:      Effort: Pulmonary effort is normal.   Chest:      Chest wall: Tenderness present.          Comments: Focal tenderness upon palpation of the mid axillary area of rib #7 through 9.  No flail chest.  Chest otherwise unremarkable.  Normal breath sounds.  Abdominal:      General: There is no distension.      Palpations: Abdomen is soft.      Tenderness: There is no abdominal tenderness. There is no guarding.      Comments: Examination of the abdomen is unremarkable.   Skin:     General: Skin is warm and dry.      Capillary Refill: Capillary refill takes less than 2 seconds.   Neurological:      General: No focal deficit present.      Mental Status: He is alert and oriented to person, place, and time.   Psychiatric:         Mood and Affect: Mood normal.         ED Course        Procedures               Critical Care time:  none               Results for orders placed or performed during the hospital encounter of 05/24/20 (from the past 24 hour(s))   Chest CT w/o contrast    Narrative    CT CHEST W/O CONTRAST    HISTORY: 86 years Male right chest pain after fall    TECHNIQUE: Axial CT imaging of the chest was performed Without  intravenous contrast. Coronal and sagittal reconstructions were  obtained.    COMPARISON: None    FINDINGS:  A pacemaker is present. There is a right internal jugular subcutaneous  port infusion catheter.  There is no mediastinal or hilar or axillary lymphadenopathy.    There is a noncalcified pulmonary nodule in the right lower lobe  measuring 6 mm in diameter see series 5 image 66.    There is a 3 mm nodule in the right lower lobe series 2 image  80.    There is mild dependent atelectasis. Lungs are otherwise clear.         Patient status post median sternotomy. Alignment of the thoracic spine  is normal. There is no evidence of subluxation or fracture. No rib  fractures are seen.      Impression    IMPRESSION: No evidence of rib fracture thoracic spine fracture or  pneumothorax or hemothorax.    Two small noncalcified nodules in the right lower lobe are present.  The largest is 6 mm. There is mild interstitial prominence and mild  atelectasis. Lungs are otherwise clear.    Per the revised 2017 Fleischner Society Guidelines, for lung nodules <  6mm , patients at high risk for malignancy, an optional 12 month  follow-up chest CT is suggested. For low risk patient, no further  follow-up is necessary.    VIMAL PÉREZ MD       Medications   diazepam (VALIUM) tablet 5 mg (has no administration in time range)   oxyCODONE (ROXICODONE) 5 mg 6 tablet ED starter pack 5 mg (has no administration in time range)   ketorolac (TORADOL) injection 30 mg (30 mg Intramuscular Given 5/24/20 1746)       Assessments & Plan (with Medical Decision Making)   Focal tenderness around rib 7 through 9 on the right.  Formal reading shows no evidence of fracture, however I believe there is a small nondisplaced fracture on the lateral aspect of the rib #9 on the right, best seen on coronal images #38.  Mechanical fall 1940 8 hours ago.  I believe he can be safely discharged home with coughing and deep breathing exercises as well as pain control.  Follow-up in the clinic this week.  Return here for any other questions or concerns.    This document was prepared using a combination of typing and voice generated software.  While every attempt was made for accuracy, spelling and grammatical errors may exist.        I have reviewed the nursing notes.    I have reviewed the findings, diagnosis, plan and need for follow up with the patient.       New Prescriptions    CYCLOBENZAPRINE  (FLEXERIL) 10 MG TABLET    Take 1 tablet (10 mg) by mouth 3 times daily as needed for muscle spasms       Final diagnoses:   Closed fracture of one rib of right side, initial encounter       5/24/2020   [facility]     Mignon Wayne PA-C  05/24/20 1924

## 2020-05-25 NOTE — DISCHARGE INSTRUCTIONS
You have a single nondisplaced fracture in the right side of your chest.  Coughing and deep breathing is important.  Pain medication sparingly.  Follow-up in the clinic this week for recheck and CT discussion.  Return here for any other questions or concerns.

## 2020-05-25 NOTE — ED NOTES
Pt unable to get out of bed with staff assist. Pt urinated in urinal in bed. Valium given. Pt instructed on take home oxy and cough and deep breathing exercises. Pt will wait for valium to start working and will attempt to get out of bed again. Wife will be here to get pt as soon as he is able. PVU

## 2020-05-27 ENCOUNTER — HOSPITAL ENCOUNTER (EMERGENCY)
Facility: HOSPITAL | Age: 85
Discharge: HOME | End: 2020-05-27
Attending: EMERGENCY MEDICINE | Admitting: EMERGENCY MEDICINE
Payer: MEDICARE

## 2020-05-27 ENCOUNTER — APPOINTMENT (OUTPATIENT)
Dept: GENERAL RADIOLOGY | Facility: HOSPITAL | Age: 85
End: 2020-05-27
Attending: EMERGENCY MEDICINE
Payer: MEDICARE

## 2020-05-27 VITALS
TEMPERATURE: 98.2 F | OXYGEN SATURATION: 95 % | DIASTOLIC BLOOD PRESSURE: 73 MMHG | SYSTOLIC BLOOD PRESSURE: 165 MMHG | RESPIRATION RATE: 16 BRPM

## 2020-05-27 DIAGNOSIS — S20.211A CHEST WALL CONTUSION, RIGHT, INITIAL ENCOUNTER: Primary | ICD-10-CM

## 2020-05-27 PROCEDURE — 99283 EMERGENCY DEPT VISIT LOW MDM: CPT | Mod: Z6 | Performed by: EMERGENCY MEDICINE

## 2020-05-27 PROCEDURE — 71101 X-RAY EXAM UNILAT RIBS/CHEST: CPT | Mod: TC,RT

## 2020-05-27 PROCEDURE — 99283 EMERGENCY DEPT VISIT LOW MDM: CPT

## 2020-05-27 PROCEDURE — 25000132 ZZH RX MED GY IP 250 OP 250 PS 637: Mod: GY | Performed by: EMERGENCY MEDICINE

## 2020-05-27 RX ORDER — HYDROCODONE BITARTRATE AND ACETAMINOPHEN 5; 325 MG/1; MG/1
1 TABLET ORAL ONCE
Status: COMPLETED | OUTPATIENT
Start: 2020-05-27 | End: 2020-05-27

## 2020-05-27 RX ADMIN — HYDROCODONE BITARTRATE AND ACETAMINOPHEN 1 TABLET: 5; 325 TABLET ORAL at 20:05

## 2020-05-27 ASSESSMENT — ENCOUNTER SYMPTOMS
FEVER: 0
ABDOMINAL PAIN: 0
SHORTNESS OF BREATH: 0

## 2020-05-27 NOTE — ED AVS SNAPSHOT
[facility]  [address] [address]  Phone:  [phone]                                    Jf Ortiz   MRN: [id number]    Department:  [facility]   Date of Visit:  5/27/2020           After Visit Summary Signature Page    I have received my discharge instructions, and my questions have been answered. I have discussed any challenges I see with this plan with the nurse or doctor.    ..........................................................................................................................................  Patient/Patient Representative Signature      ..........................................................................................................................................  Patient Representative Print Name and Relationship to Patient    ..................................................               ................................................  Date                                   Time    ..........................................................................................................................................  Reviewed by Signature/Title    ...................................................              ..............................................  Date                                               Time          22EPIC Rev 08/18

## 2020-05-28 NOTE — ED NOTES
"Pt girlfriend called and doesn't want him coming home due to \"I am tired because he had me up a lot last night\". Requesting to talk to provider. Pt will not give consent for his girlfriend to talk to the provider and notes \"tell her to just come and pick me up\" . Assisted to w/c, pt voided in urinal.   "

## 2020-05-28 NOTE — ED NOTES
"Son of patient's girlfriend called ED to express that he was very upset that patient was discharged into his mother's care and \"he was just kicked out of your ED after you did the exact same x-rays that he had done 3 days ago and did nothing else.\"  Informed caller that I was unable to give him any information or assist with his complaint but the house supervisor could assist with a complaint if he would like to speak to the house supervisor. Caller stated he did want to speak to the house supervisor. Call transferred to house supervisor cell phone.   "

## 2020-05-28 NOTE — ED PROVIDER NOTES
History     Chief Complaint   Patient presents with     Fall     c/o fall, denies injury or pain from fall today. notes rt rib pain from fall last week. per report pt didn't want to come in but his wife made him come for evaluation.      HPI  Jf Ortiz is a 86 year old male who presents to the emergency department via EMS.  Patient is a poor historian.  I Patient apparently  fell down at home today and presented with complaints of right rib pain.  He also fell down last week and sustained a fracture on the right seventh and eighth ribs.  He denies shortness of breath and is not  anticoagulated.  He did not lose consciousness and did not hit the head or neck.    Allergies:  Allergies   Allergen Reactions     Cats Unknown     Lamotrigine      Skin lesions       Problem List:    Patient Active Problem List    Diagnosis Date Noted     Longstanding persistent atrial fibrillation 04/13/2020     Priority: Medium     Falls frequently 04/13/2020     Priority: Medium     Frequent falls 04/13/2020     Priority: Medium     Coronary artery disease involving native coronary artery without angina pectoris 04/13/2020     Priority: Medium     Constipation, unspecified constipation type 04/11/2018     Priority: Medium     Pacemaker, [employer], Dual Chamber - Dependent 10/06/2017     Priority: Medium     Lewy body dementia without behavioral disturbance (H) 08/31/2017     Priority: Medium     Fatigue 08/31/2017     Priority: Medium     Urinary incontinence 08/31/2017     Priority: Medium     Autonomic orthostatic hypotension 10/14/2016     Priority: Medium     Dementia without behavioral disturbance (H) 07/28/2015     Priority: Medium     Diagnosis updated by automated process. Provider to review and confirm.       Hypocalcemia 07/28/2015     Priority: Medium     Parkinson disease (H)      Priority: Medium     Seizure disorder (H)      Priority: Medium     Volume depletion 08/02/2014     Priority: Medium     Syncope and  collapse 08/01/2014     Priority: Medium     Stented coronary artery      Priority: Medium     Coronary atherosclerosis of native coronary artery      Priority: Medium     Overview:   IMO Update 10/11       Hypercholesterolemia 04/23/2013     Priority: Medium     Advanced care planning/counseling discussion 10/30/2012     Priority: Medium     REM sleep behavior disorder 01/01/2011     Priority: Medium     Osteoarthritis 01/01/2011     Priority: Medium     Problem list name updated by automated process. Provider to review       Diaphragmatic hernia 01/01/2011     Priority: Medium     Problem list name updated by automated process. 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     Priority: Medium     Overview:   IMO Update 10/11       Hypertension with target blood pressure goal under 150/90 09/05/2006     Priority: Medium     Overview:   IMO Update          Past Medical History:    Past Medical History:   Diagnosis Date     Autonomic orthostatic hypotension 10/14/2016     Coronary artery disease      Dementia without behavioral disturbance (H) 7/28/2015     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     Seizure disorder (H)      Stented coronary artery        Past Surgical History:    Past Surgical History:   Procedure Laterality Date     ------------OTHER-------------  1955    ulnar and radial fx - repair ulnar and radial fx x4     ------------OTHER-------------  6/14/2011    cataract extraction     BIOPSY  08/2015    skin biopsy     BLEPHAROPLASTY BILATERAL  5/6/2014    Procedure: BLEPHAROPLASTY BILATERAL;  Surgeon: Andrew Queen MD;  Location: [facility]  OR     BYPASS GRAFT ARTERY CORONARY  11/2006    coronary artery disease x 5, [facility]     cataract extraction and lens implantation  2011    cataracts     cataract extraction and lens implantation      cataracts     COLONOSCOPY  2012     colonoscopy with polypectomy  3/13/2009    history of polyps - repeat 3 yrs     colonoscopy with polypectomy  2006     colonoscopy with polypectomy  2005     COMBINED COLONOSCOPY WITH ARGON PLASMA COAGULATOR (APC) N/A 10/31/2014    Procedure: COMBINED COLONOSCOPY WITH ARGON PLASMA COAGULATOR (APC);  Surgeon: Bassam Aguilar MD;  Location: [facility] OR     COMBINED COLONOSCOPY WITH ARGON PLASMA COAGULATOR (APC) N/A 11/13/2015    Procedure: COMBINED COLONOSCOPY WITH ARGON PLASMA COAGULATOR (APC);  Surgeon: Bassam Aguilar MD;  [facility]: [facility] OR     ENDOSCOPY UPPER, COLONOSCOPY, COMBINED N/A 10/31/2014    Procedure: COMBINED ENDOSCOPY UPPER, COLONOSCOPY;  Surgeon: Bassam Aguilar MD;  [facility]: [facility] OR     ENDOSCOPY UPPER, COLONOSCOPY, COMBINED N/A 11/13/2015    Procedure: COMBINED ENDOSCOPY UPPER, COLONOSCOPY;  Surgeon: Bassam Aguilar MD;  [facility]: [facility]     ESOPHAGOSCOPY, GASTROSCOPY, DUODENOSCOPY (EGD), COMBINED  1/22/2014    Procedure: COMBINED ESOPHAGOSCOPY, GASTROSCOPY, DUODENOSCOPY (EGD);  UPPER ENDOSCOPY(PENA) W/ BIOPSIES;  Surgeon: Patricia Pena MD;  [facility]: [facility] OR     HERNIORRHAPHY INGUINAL Right 2/14/2018    Procedure: HERNIORRHAPHY INGUINAL;  OPEN RIGHT INGUINAL HERNIA REPAIR with Mesh;  Surgeon: Virgilio Zaragoza DO;  Location: [facility] OR     INSERT PORT VASCULAR ACCESS Right 7/12/2019    Procedure: PORT PLACEMENT;  Surgeon: Lloyd Ram MD;  [facility]: [facility] OR     LARYNGOSCOPY WITH MICROSCOPE  1/22/2014    Procedure: LARYNGOSCOPY WITH MICROSCOPE;;  Surgeon: Chayo Duke MD;  Location: [facility] OR     pacemaker placement  2000    heart block     pacemaker placement  2011    dual-chamber     REMOVE TUBE, MYRINGOTOMY, COMBINED  1/22/2014    Procedure: COMBINED REMOVE  TUBE, MYRINGOTOMY;  MICRODIRECT LARYNGOSCOPY WITH BIOPSY AND FROZEN SECTIONS removal of right ear tube and myringoplasty;  Surgeon: Chayo Duke MD;  [facility]: [facility]     REPLACE PACEMAKER GENERATOR N/A 2018    Procedure: REPLACE PACEMAKER GENERATOR;  Pacemaker generator change;  Surgeon: Alma Kaplan MD;  [facility]: [facility]     stent placement to LAD       ventilation tube  2012    right in office       Family History:    Family History   Problem Relation Age of Onset     Diabetes Mother      Breast Cancer Daughter        Social History:  Marital Status:  Single [1]  Social History     Tobacco Use     Smoking status: Former Smoker     Packs/day: 1.00     Years: 5.00     Pack years: 5.00     Types: Cigarettes     Last attempt to quit: 1985     Years since quittin.4     Smokeless tobacco: Never Used     Tobacco comment: quit in    Substance Use Topics     Alcohol use: Yes     Comment: social     Drug use: No        Medications:    atorvastatin (LIPITOR) 20 MG tablet  Cholecalciferol (VITAMIN D-3) 25 MCG (1000 UT) CAPS  Coenzyme Q10 (CO Q 10 PO)  donepezil (ARICEPT) 10 MG tablet  fludrocortisone (FLORINEF) 0.1 MG tablet  lidocaine (LIDODERM) 5 % patch  magnesium 500 MG TABS  melatonin 5 MG tablet  midodrine (PROAMATINE) 5 MG tablet  multivitamin, therapeutic with minerals (MULTI-VITAMIN) TABS tablet  potassium chloride ER (K-DUR/KLOR-CON M) 20 MEQ CR tablet  RABEprazole (ACIPHEX) 20 MG EC tablet  sodium chloride 1 GM tablet  Turmeric 500 MG TABS  cyclobenzaprine (FLEXERIL) 10 MG tablet          Review of Systems   Constitutional: Negative for fever.   Respiratory: Negative for shortness of breath.    Cardiovascular: Negative for chest pain.   Gastrointestinal: Negative for abdominal pain.   All other systems reviewed and are negative.      Physical Exam   BP: 166/82  Heart Rate: 73  Temp: 98.2  F (36.8  C)  Resp: 18  SpO2: 92 %      Physical Exam  Vitals signs and nursing note reviewed.    Constitutional:       General: He is not in acute distress.     Appearance: He is well-developed. He is not diaphoretic.   HENT:      Head: Normocephalic and atraumatic.   Eyes:      Pupils: Pupils are equal, round, and reactive to light.   Cardiovascular:      Rate and Rhythm: Normal rate and regular rhythm.      Heart sounds: Normal heart sounds.   Pulmonary:      Effort: Pulmonary effort is normal. No respiratory distress.      Breath sounds: Normal breath sounds. No stridor.   Chest:      Chest wall: Tenderness present.       Abdominal:      General: Bowel sounds are normal. There is no distension.      Tenderness: There is no abdominal tenderness.   Musculoskeletal:         General: No tenderness or deformity.   Neurological:      Mental Status: He is alert and oriented to person, place, and time.      Cranial Nerves: No cranial nerve deficit.         ED Course   Evaluated upon arrival to the ED and x-ray ordered.    Procedures      Results for orders placed or performed during the hospital encounter of 05/27/20 (from the past 24 hour(s))   Ribs XR, unilat 3 views + PA chest, right    Narrative    PROCEDURE: XR RIBS & CHEST RT 3VW 5/27/2020 7:34 PM    HISTORY: Right rib pain post fall    COMPARISONS: None.    TECHNIQUE: Chest one view, right RIBS 2 views    FINDINGS: The lungs are clear. The heart is normal in size. There is a  transvenous pacemaker in place. Postoperative changes are seen in the  mediastinum. There is an infusion port with its tip in the superior  vena cava. There is no pneumothorax or pleural effusion.    Right RIBS 2 views: There is no right rib fracture or destructive  lesion noted.         Impression    IMPRESSION: No acute rib fracture.    JACKY JAMES MD       Medications   HYDROcodone-acetaminophen (NORCO) 5-325 MG per tablet 1 tablet (has no administration in time range)   HYDROcodone-acetaminophen (NORCO) 5-325 MG 6 tab ED starter pack 1 tablet (has no administration in time  range)       Assessments & Plan (with Medical Decision Making)   Chest wall contusion: Presented to the ED with right-sided chest wall pain after fall.  X-ray did not reveal any rib fractures and no new changes in the lungs.  Started on Norco for pain control and discharged home on the same.  Follow-up with Dr. Ray next week.  Return to ED if condition worsens.    I have reviewed the nursing notes.    I have reviewed the findings, diagnosis, plan and need for follow up with the patient.    New Prescriptions    No medications on file       Final diagnoses:   Chest wall contusion, right, initial encounter       5/27/2020   [facility]     Frank Venegas MD  05/27/20 1955

## 2020-05-29 ENCOUNTER — INFUSION THERAPY VISIT (OUTPATIENT)
Dept: INFUSION THERAPY | Facility: OTHER | Age: 85
End: 2020-05-29
Attending: FAMILY MEDICINE
Payer: MEDICARE

## 2020-05-29 VITALS
HEART RATE: 78 BPM | TEMPERATURE: 97.5 F | OXYGEN SATURATION: 96 % | BODY MASS INDEX: 25.71 KG/M2 | WEIGHT: 174.16 LBS | SYSTOLIC BLOOD PRESSURE: 159 MMHG | RESPIRATION RATE: 17 BRPM | DIASTOLIC BLOOD PRESSURE: 79 MMHG

## 2020-05-29 DIAGNOSIS — E86.9 VOLUME DEPLETION: Primary | ICD-10-CM

## 2020-05-29 PROCEDURE — 96360 HYDRATION IV INFUSION INIT: CPT

## 2020-05-29 PROCEDURE — 96361 HYDRATE IV INFUSION ADD-ON: CPT

## 2020-05-29 PROCEDURE — 25000128 H RX IP 250 OP 636: Performed by: FAMILY MEDICINE

## 2020-05-29 PROCEDURE — 25800030 ZZH RX IP 258 OP 636: Performed by: FAMILY MEDICINE

## 2020-05-29 RX ORDER — HEPARIN SODIUM (PORCINE) LOCK FLUSH IV SOLN 100 UNIT/ML 100 UNIT/ML
5 SOLUTION INTRAVENOUS ONCE
Status: COMPLETED | OUTPATIENT
Start: 2020-05-29 | End: 2020-05-29

## 2020-05-29 RX ORDER — HEPARIN SODIUM (PORCINE) LOCK FLUSH IV SOLN 100 UNIT/ML 100 UNIT/ML
5 SOLUTION INTRAVENOUS ONCE
Status: CANCELLED
Start: 2020-05-29

## 2020-05-29 RX ADMIN — HEPARIN 5 ML: 100 SYRINGE at 11:12

## 2020-05-29 RX ADMIN — SODIUM CHLORIDE 1000 ML: 9 INJECTION, SOLUTION INTRAVENOUS at 09:47

## 2020-05-29 ASSESSMENT — PAIN SCALES - GENERAL: PAINLEVEL: MODERATE PAIN (5)

## 2020-05-29 NOTE — PROGRESS NOTES
Patient is a 86 year old male  here accompanied by self  today for infusion of IVF  per order of Dr. Ray.  Patient identified with two identifiers, order verified, and verbal consent for today's infusion obtained from patient.       Patients Right sided  port accessed using non-coring, 19 gauge, 3/4  needle. Port accessed per facility protocol. Port flushed easily, blood return noted.  No signs and symptoms of infection or infiltration.      0947  IV pump verified with dose, drug, and rate of administration.  Infusion administered per protocol.  Patient tolerated infusion well  no signs or symptoms of adverse reaction noted.  Patient denies pain nor discomfort.     IV removed, catheter intact.  Site clean, dry and intact.  No signs or symptoms of infiltration or infection.  Covered with a sterile bandage, slight pressure applied for 30 seconds.  Pt instructed to leave bandage intact for a minimum of one hour, and to call with questions or concerns.  Copy of appointments, discharge instructions, and after visit summary (AVS) provided to patient.  Patient states understanding, discharged.

## 2020-05-29 NOTE — PATIENT INSTRUCTIONS
We will see you back as planned. If you have any questions or concerns, we can be reached Monday through Friday 8am - 430pm at [phone] ([facility] [place]). If you have concerns related to a potential reaction/side effect after hours/weekends/holiday's, please seek emergent medical care.

## 2020-06-01 ENCOUNTER — OFFICE VISIT (OUTPATIENT)
Dept: FAMILY MEDICINE | Facility: OTHER | Age: 85
End: 2020-06-01
Attending: FAMILY MEDICINE
Payer: MEDICARE

## 2020-06-01 VITALS
WEIGHT: 157 LBS | DIASTOLIC BLOOD PRESSURE: 62 MMHG | BODY MASS INDEX: 23.25 KG/M2 | RESPIRATION RATE: 16 BRPM | TEMPERATURE: 98.2 F | HEIGHT: 69 IN | OXYGEN SATURATION: 96 % | SYSTOLIC BLOOD PRESSURE: 102 MMHG | HEART RATE: 72 BPM

## 2020-06-01 DIAGNOSIS — G31.83 LEWY BODY DEMENTIA WITHOUT BEHAVIORAL DISTURBANCE (H): ICD-10-CM

## 2020-06-01 DIAGNOSIS — F02.80 LEWY BODY DEMENTIA WITHOUT BEHAVIORAL DISTURBANCE (H): ICD-10-CM

## 2020-06-01 DIAGNOSIS — I95.1 AUTONOMIC ORTHOSTATIC HYPOTENSION: ICD-10-CM

## 2020-06-01 DIAGNOSIS — S20.211D CONTUSION OF RIGHT CHEST WALL, SUBSEQUENT ENCOUNTER: Primary | ICD-10-CM

## 2020-06-01 PROCEDURE — 99214 OFFICE O/P EST MOD 30 MIN: CPT | Performed by: FAMILY MEDICINE

## 2020-06-01 PROCEDURE — G0463 HOSPITAL OUTPT CLINIC VISIT: HCPCS

## 2020-06-01 ASSESSMENT — MIFFLIN-ST. JEOR: SCORE: 1382.53

## 2020-06-01 ASSESSMENT — ANXIETY QUESTIONNAIRES
6. BECOMING EASILY ANNOYED OR IRRITABLE: NOT AT ALL
1. FEELING NERVOUS, ANXIOUS, OR ON EDGE: NOT AT ALL
IF YOU CHECKED OFF ANY PROBLEMS ON THIS QUESTIONNAIRE, HOW DIFFICULT HAVE THESE PROBLEMS MADE IT FOR YOU TO DO YOUR WORK, TAKE CARE OF THINGS AT HOME, OR GET ALONG WITH OTHER PEOPLE: NOT DIFFICULT AT ALL
2. NOT BEING ABLE TO STOP OR CONTROL WORRYING: NOT AT ALL
3. WORRYING TOO MUCH ABOUT DIFFERENT THINGS: NOT AT ALL
7. FEELING AFRAID AS IF SOMETHING AWFUL MIGHT HAPPEN: NOT AT ALL
4. TROUBLE RELAXING: NOT AT ALL
GAD7 TOTAL SCORE: 0
5. BEING SO RESTLESS THAT IT IS HARD TO SIT STILL: NOT AT ALL

## 2020-06-01 ASSESSMENT — PATIENT HEALTH QUESTIONNAIRE - PHQ9: SUM OF ALL RESPONSES TO PHQ QUESTIONS 1-9: 2

## 2020-06-01 ASSESSMENT — PAIN SCALES - GENERAL: PAINLEVEL: SEVERE PAIN (7)

## 2020-06-01 NOTE — NURSING NOTE
"Chief Complaint   Patient presents with     ER F/U       Initial /62 (BP Location: Left arm, Patient Position: Sitting, Cuff Size: Adult Regular)   Pulse 72   Temp 98.2  F (36.8  C) (Tympanic)   Resp 16   Ht 1.753 m (5' 9\")   Wt 71.2 kg (157 lb)   SpO2 96%   BMI 23.18 kg/m   Estimated body mass index is 23.18 kg/m  as calculated from the following:    Height as of this encounter: 1.753 m (5' 9\").    Weight as of this encounter: 71.2 kg (157 lb).  Medication Reconciliation: complete  Purvi Dimas MA  "

## 2020-06-01 NOTE — PROGRESS NOTES
Subjective     Jf Ortiz is a 86 year old male who presents to clinic today for the following health issues:    HPI   ED/UC Followup:    Facility:  [facility]  Date of visit: 05/24/ and 05/27  Reason for visit: Chest wall contusion   Current Status: Pt states that he is still weak       [facility]    Jf Ortiz, 86 year old, male presents with   Chief Complaint   Patient presents with     ER F/U       PAST MEDICAL HISTORY:  Past Medical History:   Diagnosis Date     Autonomic orthostatic hypotension 10/14/2016     Coronary artery disease      Dementia without behavioral disturbance (H) 7/28/2015    Diagnosis updated by automated process. Provider to review and confirm.     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     Seizure disorder (H)      Stented coronary artery        PAST SURGICAL HISTORY:  Past Surgical History:   Procedure Laterality Date     ------------OTHER-------------  1955    ulnar and radial fx - repair ulnar and radial fx x4     ------------OTHER-------------  6/14/2011    cataract extraction     BIOPSY  08/2015    skin biopsy     BLEPHAROPLASTY BILATERAL  5/6/2014    Procedure: BLEPHAROPLASTY BILATERAL;  Surgeon: Andrew Queen MD;  Location: [facility] OR     BYPASS GRAFT ARTERY CORONARY  11/2006    coronary artery disease x 5, [facility]     cataract extraction and lens implantation  2011    cataracts     cataract extraction and lens implantation      cataracts     COLONOSCOPY  2012     colonoscopy with polypectomy  3/13/2009    history of polyps - repeat 3 yrs     colonoscopy with polypectomy  2006     colonoscopy with polypectomy  2005     COMBINED COLONOSCOPY WITH ARGON PLASMA COAGULATOR (APC) N/A 10/31/2014     Procedure: COMBINED COLONOSCOPY WITH ARGON PLASMA COAGULATOR (APC);  Surgeon: Bassam Aguilar MD;  [facility]     COMBINED COLONOSCOPY WITH ARGON PLASMA COAGULATOR (APC) N/A 11/13/2015    Procedure: COMBINED COLONOSCOPY WITH ARGON PLASMA COAGULATOR (APC);  Surgeon: Bassam Aguilar MD;  [facility]     ENDOSCOPY UPPER, COLONOSCOPY, COMBINED N/A 10/31/2014    Procedure: COMBINED ENDOSCOPY UPPER, COLONOSCOPY;  Surgeon: Bassam Aguilar MD;  [facility]     ENDOSCOPY UPPER, COLONOSCOPY, COMBINED N/A 11/13/2015    Procedure: COMBINED ENDOSCOPY UPPER, COLONOSCOPY;  Surgeon: Bassam Aguilar MD;  [facility]     ESOPHAGOSCOPY, GASTROSCOPY, DUODENOSCOPY (EGD), COMBINED  1/22/2014    Procedure: COMBINED ESOPHAGOSCOPY, GASTROSCOPY, DUODENOSCOPY (EGD);  UPPER ENDOSCOPY(PENA) W/ BIOPSIES;  Surgeon: Patricia Pena MD;  [facility]: [facility] OR     HERNIORRHAPHY INGUINAL Right 2/14/2018    Procedure: HERNIORRHAPHY INGUINAL;  OPEN RIGHT INGUINAL HERNIA REPAIR with Mesh;  Surgeon: Virgilio Zaragoza DO;  Location: [facility] OR     INSERT PORT VASCULAR ACCESS Right 7/12/2019    Procedure: PORT PLACEMENT;  Surgeon: Lloyd Ram MD;  [facility]: [facility]     LARYNGOSCOPY WITH MICROSCOPE  1/22/2014    Procedure: LARYNGOSCOPY WITH MICROSCOPE;;  Surgeon: Chayo Duke MD;  [facility]: [facility]     pacemaker placement  2000    heart block     pacemaker placement  2011    dual-chamber     REMOVE TUBE, MYRINGOTOMY, COMBINED  1/22/2014    Procedure: COMBINED REMOVE TUBE, MYRINGOTOMY;  MICRODIRECT LARYNGOSCOPY WITH BIOPSY AND FROZEN SECTIONS removal of right ear tube and myringoplasty;  Surgeon: Chayo Duke MD;  [facility]: [facility] OR     REPLACE PACEMAKER GENERATOR N/A 8/8/2018    Procedure: REPLACE PACEMAKER GENERATOR;  Pacemaker generator change;  Surgeon: Alma Kaplan MD;  [facility]: [facility] OR     stent placement to LAD  2008     ventilation tube  5/24/2012    right in office       MEDICATIONS:  Prior to Admission medications     Medication Sig Start Date End Date Taking? Authorizing Provider   atorvastatin (LIPITOR) 20 MG tablet TAKE 1 TABLET BY MOUTH EVERY EVENING - GENERIC FOR LIPITOR 1/30/20  Yes Herman Rivers,    Cholecalciferol (VITAMIN D-3) 25 MCG (1000 UT) CAPS Take 50 mcg by mouth daily    Yes Reported, Patient   Coenzyme Q10 (CO Q 10 PO) Take 100 mg by mouth every morning    Yes Reported, Patient   donepezil (ARICEPT) 10 MG tablet TAKE 1 TABLET BY MOUTH NIGHTLY AT BEDTIME 1/30/20  Yes FRANCES Ray MD   fludrocortisone (FLORINEF) 0.1 MG tablet Take 1 tablet (0.1 mg) by mouth every morning (before breakfast) 4/16/20  Yes Aurelio Rollins MD   lidocaine (LIDODERM) 5 % patch Place 1 patch onto the skin every 24 hours 5/20/20  Yes FRANCES Ray MD   magnesium 500 MG TABS Take 500 mg by mouth daily    Yes Reported, Patient   midodrine (PROAMATINE) 5 MG tablet TAKE 2 TABLETS (10MG) BY MOUTH THREE TIMES DAILY 11/26/19  Yes Juan Walter MD   multivitamin, therapeutic with minerals (MULTI-VITAMIN) TABS tablet Take 1 tablet by mouth daily   Yes Reported, Patient   potassium chloride ER (K-DUR/KLOR-CON M) 20 MEQ CR tablet TAKE 1 TABLET BY MOUTH DAILY 2/11/20  Yes FRANCES Ray MD   sodium chloride 1 GM tablet TAKE 1 TABLET BY MOUTH TWICE A DAY 1/16/20  Yes FRANCES Ray MD   Turmeric 500 MG TABS Take 1,000 mg by mouth daily    Yes Reported, Patient   melatonin 5 MG tablet Take 5 mg by mouth nightly as needed for sleep    Reported, Patient   RABEprazole (ACIPHEX) 20 MG EC tablet Take 20 mg by mouth 2 times daily    Reported, Patient       ALLERGIES:     Allergies   Allergen Reactions     Cats Unknown     Lamotrigine      Skin lesions       ROS:  Constitutional, neuro, ENT, endocrine, pulmonary, cardiac, gastrointestinal, genitourinary, musculoskeletal, integument and psychiatric systems are negative, except as otherwise noted.      EXAM:  /62 (BP Location: Left arm, Patient Position: Sitting, Cuff Size: Adult  "Regular)   Pulse 72   Temp 98.2  F (36.8  C) (Tympanic)   Resp 16   Ht 1.753 m (5' 9\")   Wt 71.2 kg (157 lb)   SpO2 96%   BMI 23.18 kg/m   Body mass index is 23.18 kg/m .   GENERAL APPEARANCE: alert and no distress  EYES: Eyes grossly normal to inspection, PERRL and conjunctivae and sclerae normal  HENT: nose and mouth without ulcers or lesions  NECK: no adenopathy, no asymmetry, masses, or scars and thyroid normal to palpation  RESP: lungs clear to auscultation - no rales, rhonchi or wheezes  CV: regular rates and rhythm, normal S1 S2, no S3 or S4 and no murmur, click or rub  NEURO: Normal strength and tone, mentation intact and speech are at his baseline, symmetric hand strength symmetric facial muscle strength when he stands he is able to stand without any support.  PSYCH: No delusional thoughts  Lab/ X-ray  No results found for this or any previous visit (from the past 24 hour(s)).    ASSESSMENT/PLAN:    ICD-10-CM    1. Contusion of right chest wall, subsequent encounter  S20.211D    2. Autonomic orthostatic hypotension  I95.1    3. Lewy body dementia without behavioral disturbance (H)  G31.83     F02.80      We reviewed both ER visits.  I showed the wife that he did have a chest CT that showed no acute fracture though there was a nodule and they recommended follow-up in 1 year.  We had a lengthy discussion and the visit was over 25 minutes and over half which was spent in counseling.  The wife is concerned because of this progressive Parkinson's with hypotension from the autonomic dysfunction that he continues to fall and she feels it is just too much for her and she states with the next fall she will need to have him be admitted to a nursing home.  We discussed that she will contact to nursing homes in the area that she would like him to be added and will meet with their social service people in their admitting people and work through issues for now he will continue to get his infusions at the hospital " but she states if he has another fall then she will admit him to the nursing home.  I will see him in a couple weeks follow-up.    IRENE Ray MD  June 1, 2020

## 2020-06-02 ENCOUNTER — TELEPHONE (OUTPATIENT)
Dept: CARE COORDINATION | Facility: OTHER | Age: 85
End: 2020-06-02

## 2020-06-02 ENCOUNTER — INFUSION THERAPY VISIT (OUTPATIENT)
Dept: INFUSION THERAPY | Facility: OTHER | Age: 85
End: 2020-06-02
Attending: FAMILY MEDICINE
Payer: MEDICARE

## 2020-06-02 VITALS
HEIGHT: 69 IN | DIASTOLIC BLOOD PRESSURE: 83 MMHG | WEIGHT: 164.9 LBS | TEMPERATURE: 97 F | OXYGEN SATURATION: 97 % | RESPIRATION RATE: 18 BRPM | HEART RATE: 78 BPM | SYSTOLIC BLOOD PRESSURE: 156 MMHG | BODY MASS INDEX: 24.42 KG/M2

## 2020-06-02 DIAGNOSIS — E86.9 VOLUME DEPLETION: Primary | ICD-10-CM

## 2020-06-02 PROCEDURE — 25000128 H RX IP 250 OP 636: Performed by: FAMILY MEDICINE

## 2020-06-02 PROCEDURE — 96360 HYDRATION IV INFUSION INIT: CPT

## 2020-06-02 PROCEDURE — 25800030 ZZH RX IP 258 OP 636: Performed by: FAMILY MEDICINE

## 2020-06-02 RX ORDER — HEPARIN SODIUM (PORCINE) LOCK FLUSH IV SOLN 100 UNIT/ML 100 UNIT/ML
5 SOLUTION INTRAVENOUS ONCE
Status: COMPLETED | OUTPATIENT
Start: 2020-06-02 | End: 2020-06-02

## 2020-06-02 RX ORDER — HEPARIN SODIUM (PORCINE) LOCK FLUSH IV SOLN 100 UNIT/ML 100 UNIT/ML
5 SOLUTION INTRAVENOUS ONCE
Status: CANCELLED
Start: 2020-06-02

## 2020-06-02 RX ADMIN — HEPARIN 5 ML: 100 SYRINGE at 10:46

## 2020-06-02 RX ADMIN — SODIUM CHLORIDE 1000 ML: 9 INJECTION, SOLUTION INTRAVENOUS at 09:46

## 2020-06-02 ASSESSMENT — ANXIETY QUESTIONNAIRES: GAD7 TOTAL SCORE: 0

## 2020-06-02 ASSESSMENT — PAIN SCALES - GENERAL: PAINLEVEL: MILD PAIN (2)

## 2020-06-02 ASSESSMENT — MIFFLIN-ST. JEOR: SCORE: 1418.63

## 2020-06-02 NOTE — PROGRESS NOTES
Patient is a 86 year old male here accompanied by self today for infusion of IVF per order of Dr MALIA Ray under the supervision of Dr Walter, Cardiology.  Patient identified with two identifiers, order verified, and verbal consent for today's infusion obtained from patient.      Patients port accessed using non-coring, 20 gauge, 3/4 inch needle. Port accessed per facility protocol. Port flushed easily, blood return noted.  No signs and symptoms of infection or infiltration.      IV pump verified with dose, drug, and rate of administration.  Infusion administered per protocol.  Patient tolerated infusion well, no signs or symptoms of adverse reaction noted.  Patient denies pain nor discomfort.     IV removed, catheter intact.  Site clean, dry and intact.  No signs or symptoms of infiltration or infection.  Covered with a sterile bandage, slight pressure applied for 30 seconds.  Pt instructed to leave bandage intact for a minimum of one hour, and to call with questions or concerns. Patient states understanding, discharged.

## 2020-06-02 NOTE — TELEPHONE ENCOUNTER
Care Transitions focused note:      Call to patient's sig other [facility].  I had talked to her earlier in the day briefly but she wasn't able to talk as she was in the grocery store.  2nd attempt no answer.  I did leave my name and contact number and offered to answer questions and assist with possible SNF placement when needed.    Will follow as needed.    MARLENI Spear

## 2020-06-03 ENCOUNTER — TELEPHONE (OUTPATIENT)
Dept: CARE COORDINATION | Facility: OTHER | Age: 85
End: 2020-06-03

## 2020-06-03 DIAGNOSIS — G31.83 LEWY BODY DEMENTIA WITHOUT BEHAVIORAL DISTURBANCE (H): ICD-10-CM

## 2020-06-03 DIAGNOSIS — I95.1 AUTONOMIC ORTHOSTATIC HYPOTENSION: ICD-10-CM

## 2020-06-03 DIAGNOSIS — F02.80 LEWY BODY DEMENTIA WITHOUT BEHAVIORAL DISTURBANCE (H): ICD-10-CM

## 2020-06-03 DIAGNOSIS — M62.81 GENERALIZED MUSCLE WEAKNESS: Primary | ICD-10-CM

## 2020-06-03 DIAGNOSIS — R29.6 FALLS FREQUENTLY: Chronic | ICD-10-CM

## 2020-06-03 NOTE — TELEPHONE ENCOUNTER
Care Transitions focused note:      Call to [facility].  She has spoken to both [facility] and [facility] but it has been a long time.  They prefer [facility] and pt's father passed away there at age 89 4 years ago.  Des states that he is getting frailer and has frequent falls     Long discussion regarding snf, assisted living, finances etc.  She is still hesitant to place him yet as Jf is wanting to be at home.  She is also suffering from caregiver stress stating that she has some depression.  Discussed self cares at length.      Provided Des with some contact names and numbers of snf's and assisted living facilities.  She also has my contact information for further questions or concerns.    Will follow as needed.    MARLENI Spear

## 2020-06-03 NOTE — TELEPHONE ENCOUNTER
Call from patient's partner with an update that she contacted [facility], spoke with Cara, Admin.    Patient not ready to go out of facility to  PT at this time, recommending Dr. MALIA Ray to order in house Physical Therapy.       Please advise.

## 2020-06-05 ENCOUNTER — INFUSION THERAPY VISIT (OUTPATIENT)
Dept: INFUSION THERAPY | Facility: OTHER | Age: 85
End: 2020-06-05
Attending: FAMILY MEDICINE
Payer: MEDICARE

## 2020-06-05 VITALS
DIASTOLIC BLOOD PRESSURE: 80 MMHG | OXYGEN SATURATION: 97 % | HEIGHT: 69 IN | WEIGHT: 169.75 LBS | HEART RATE: 70 BPM | TEMPERATURE: 97.7 F | BODY MASS INDEX: 25.14 KG/M2 | SYSTOLIC BLOOD PRESSURE: 157 MMHG | RESPIRATION RATE: 16 BRPM

## 2020-06-05 DIAGNOSIS — E86.9 VOLUME DEPLETION: Primary | ICD-10-CM

## 2020-06-05 PROCEDURE — 25000128 H RX IP 250 OP 636: Performed by: FAMILY MEDICINE

## 2020-06-05 PROCEDURE — 96360 HYDRATION IV INFUSION INIT: CPT

## 2020-06-05 PROCEDURE — 25800030 ZZH RX IP 258 OP 636: Performed by: FAMILY MEDICINE

## 2020-06-05 RX ORDER — HEPARIN SODIUM (PORCINE) LOCK FLUSH IV SOLN 100 UNIT/ML 100 UNIT/ML
5 SOLUTION INTRAVENOUS ONCE
Status: CANCELLED
Start: 2020-06-05

## 2020-06-05 RX ORDER — HEPARIN SODIUM (PORCINE) LOCK FLUSH IV SOLN 100 UNIT/ML 100 UNIT/ML
5 SOLUTION INTRAVENOUS ONCE
Status: COMPLETED | OUTPATIENT
Start: 2020-06-05 | End: 2020-06-05

## 2020-06-05 RX ADMIN — HEPARIN 5 ML: 100 SYRINGE at 10:33

## 2020-06-05 RX ADMIN — SODIUM CHLORIDE 1000 ML: 9 INJECTION, SOLUTION INTRAVENOUS at 09:35

## 2020-06-05 ASSESSMENT — PAIN SCALES - GENERAL: PAINLEVEL: MILD PAIN (2)

## 2020-06-05 ASSESSMENT — MIFFLIN-ST. JEOR: SCORE: 1440.63

## 2020-06-09 ENCOUNTER — INFUSION THERAPY VISIT (OUTPATIENT)
Dept: INFUSION THERAPY | Facility: OTHER | Age: 85
End: 2020-06-09
Attending: FAMILY MEDICINE
Payer: MEDICARE

## 2020-06-09 VITALS
HEART RATE: 78 BPM | OXYGEN SATURATION: 96 % | RESPIRATION RATE: 16 BRPM | DIASTOLIC BLOOD PRESSURE: 83 MMHG | SYSTOLIC BLOOD PRESSURE: 165 MMHG | BODY MASS INDEX: 24.73 KG/M2 | WEIGHT: 167.55 LBS | TEMPERATURE: 97.1 F

## 2020-06-09 DIAGNOSIS — E86.9 VOLUME DEPLETION: Primary | ICD-10-CM

## 2020-06-09 PROCEDURE — 96360 HYDRATION IV INFUSION INIT: CPT

## 2020-06-09 PROCEDURE — 25800030 ZZH RX IP 258 OP 636: Performed by: FAMILY MEDICINE

## 2020-06-09 PROCEDURE — 25000128 H RX IP 250 OP 636: Performed by: FAMILY MEDICINE

## 2020-06-09 RX ORDER — HEPARIN SODIUM (PORCINE) LOCK FLUSH IV SOLN 100 UNIT/ML 100 UNIT/ML
5 SOLUTION INTRAVENOUS ONCE
Status: COMPLETED | OUTPATIENT
Start: 2020-06-09 | End: 2020-06-09

## 2020-06-09 RX ORDER — HEPARIN SODIUM (PORCINE) LOCK FLUSH IV SOLN 100 UNIT/ML 100 UNIT/ML
5 SOLUTION INTRAVENOUS ONCE
Status: CANCELLED
Start: 2020-06-09

## 2020-06-09 RX ADMIN — SODIUM CHLORIDE 1000 ML: 9 INJECTION, SOLUTION INTRAVENOUS at 09:38

## 2020-06-09 RX ADMIN — HEPARIN 5 ML: 100 SYRINGE at 10:46

## 2020-06-09 NOTE — PROGRESS NOTES
Patient is a 86 year old male here accompanied by self today for infusion of IVF per order of Dr MALIA Ray under the supervision of Dr Walter cardiology.  Patient identified with two identifiers, order verified, and verbal consent for today's infusion obtained from patient.       Patient denies questions or concerns regarding infusion and/or medication(s) being administered.    Patients right port accessed using non-coring, 19 gauge, 3/4 power needle. Port accessed per facility protocol. Port flushed easily, blood return noted.  No signs and symptoms of infection or infiltration.      0938 IV pump verified with IVF dose, drug, and rate of administration.  Infusion administered per protocol.  Patient tolerated infusion well, no signs or symptoms of adverse reaction noted.  Patient denies pain nor discomfort.     IV removed, catheter intact.  Site clean, dry and intact.  No signs or symptoms of infiltration or infection.  Covered with a sterile bandage, slight pressure applied for 30 seconds.  Pt instructed to leave bandage intact for a minimum of one hour, and to call with questions or concerns.  Copy of appointments, discharge instructions, and after visit summary (AVS) provided to patient.  Patient states understanding, discharged.

## 2020-06-09 NOTE — PROGRESS NOTES
Patient tolerated infusion well, no signs or symptoms of adverse reaction noted.  Patient denies pain nor discomfort.     IV removed, catheter intact.  Site clean, dry and intact.  No signs or symptoms of infiltration or infection.  Covered with a sterile bandage, slight pressure applied for 30 seconds.  Pt instructed to leave bandage intact for a minimum of one hour, and to call with questions or concerns. Patient discharged to care of significant other.

## 2020-06-12 ENCOUNTER — INFUSION THERAPY VISIT (OUTPATIENT)
Dept: INFUSION THERAPY | Facility: OTHER | Age: 85
End: 2020-06-12
Attending: FAMILY MEDICINE
Payer: MEDICARE

## 2020-06-12 VITALS
WEIGHT: 167.99 LBS | SYSTOLIC BLOOD PRESSURE: 150 MMHG | TEMPERATURE: 96 F | BODY MASS INDEX: 24.88 KG/M2 | HEART RATE: 70 BPM | HEIGHT: 69 IN | RESPIRATION RATE: 18 BRPM | DIASTOLIC BLOOD PRESSURE: 75 MMHG | OXYGEN SATURATION: 98 %

## 2020-06-12 DIAGNOSIS — E86.9 VOLUME DEPLETION: Primary | ICD-10-CM

## 2020-06-12 PROCEDURE — 25800030 ZZH RX IP 258 OP 636: Performed by: FAMILY MEDICINE

## 2020-06-12 PROCEDURE — 96360 HYDRATION IV INFUSION INIT: CPT

## 2020-06-12 PROCEDURE — 25000128 H RX IP 250 OP 636: Performed by: FAMILY MEDICINE

## 2020-06-12 RX ORDER — HEPARIN SODIUM (PORCINE) LOCK FLUSH IV SOLN 100 UNIT/ML 100 UNIT/ML
5 SOLUTION INTRAVENOUS ONCE
Status: CANCELLED
Start: 2020-06-12

## 2020-06-12 RX ORDER — HEPARIN SODIUM (PORCINE) LOCK FLUSH IV SOLN 100 UNIT/ML 100 UNIT/ML
5 SOLUTION INTRAVENOUS ONCE
Status: COMPLETED | OUTPATIENT
Start: 2020-06-12 | End: 2020-06-12

## 2020-06-12 RX ADMIN — HEPARIN 5 ML: 100 SYRINGE at 10:40

## 2020-06-12 RX ADMIN — SODIUM CHLORIDE 1000 ML: 9 INJECTION, SOLUTION INTRAVENOUS at 09:38

## 2020-06-12 ASSESSMENT — PAIN SCALES - GENERAL: PAINLEVEL: NO PAIN (0)

## 2020-06-12 ASSESSMENT — MIFFLIN-ST. JEOR: SCORE: 1432.63

## 2020-06-12 NOTE — PROGRESS NOTES
Patient tolerated infusion well, no signs or symptoms of adverse reaction noted.  Patient denies pain nor discomfort.     IV removed, catheter intact.  Site clean, dry and intact.  No signs or symptoms of infiltration or infection.  Covered with a sterile bandage, slight pressure applied for 30 seconds.  Pt instructed to leave bandage intact for a minimum of one hour, and to call with questions or concerns.  Copy of appointments, discharge instructions, and after visit summary (AVS) provided to patient.  Patient states understanding, discharged.

## 2020-06-12 NOTE — PROGRESS NOTES
Patient is a 86 year old male here accompanied by self today for infusion of IVF per order of Dr MALIA Ray under the supervision of Dr Walter, Cardiology.  Patient identified with two identifiers, order verified, and verbal consent for today's infusion obtained from patient.        Patients port accessed using non-coring, 20 gauge, 3/4 inch needle. Port accessed per facility protocol. Port flushed easily, blood return noted.  No signs and symptoms of infection or infiltration.      IV pump verified with dose, drug, and rate of administration.  Infusion administered per protocol.

## 2020-06-14 DIAGNOSIS — E87.6 HYPOKALEMIA: ICD-10-CM

## 2020-06-15 ENCOUNTER — VIRTUAL VISIT (OUTPATIENT)
Dept: FAMILY MEDICINE | Facility: OTHER | Age: 85
End: 2020-06-15
Attending: FAMILY MEDICINE
Payer: MEDICARE

## 2020-06-15 VITALS — BODY MASS INDEX: 24.65 KG/M2 | WEIGHT: 167 LBS

## 2020-06-15 DIAGNOSIS — F02.80 LEWY BODY DEMENTIA WITHOUT BEHAVIORAL DISTURBANCE (H): ICD-10-CM

## 2020-06-15 DIAGNOSIS — I95.1 AUTONOMIC ORTHOSTATIC HYPOTENSION: ICD-10-CM

## 2020-06-15 DIAGNOSIS — G31.83 LEWY BODY DEMENTIA WITHOUT BEHAVIORAL DISTURBANCE (H): ICD-10-CM

## 2020-06-15 DIAGNOSIS — S20.211D CONTUSION OF RIGHT CHEST WALL, SUBSEQUENT ENCOUNTER: Primary | ICD-10-CM

## 2020-06-15 PROCEDURE — 99441 ZZC PHYSICIAN TELEPHONE EVALUATION 5-10 MIN: CPT | Mod: 95 | Performed by: FAMILY MEDICINE

## 2020-06-15 RX ORDER — POTASSIUM CHLORIDE 1500 MG/1
TABLET, EXTENDED RELEASE ORAL
Qty: 30 TABLET | Refills: 1 | Status: SHIPPED | OUTPATIENT
Start: 2020-06-15 | End: 2020-07-20

## 2020-06-15 RX ORDER — CYCLOBENZAPRINE HCL 10 MG
TABLET ORAL
COMMUNITY
Start: 2020-05-25 | End: 2020-09-29

## 2020-06-15 NOTE — NURSING NOTE
"Chief Complaint   Patient presents with     ER F/U       Initial Wt 75.8 kg (167 lb)   BMI 24.65 kg/m   Estimated body mass index is 24.65 kg/m  as calculated from the following:    Height as of 6/12/20: 1.753 m (5' 9.02\").    Weight as of this encounter: 75.8 kg (167 lb).  Medication Reconciliation: complete  Екатерина Bae LPN    "

## 2020-06-15 NOTE — PROGRESS NOTES
"Jf Ortiz is a 86 year old male who is being evaluated via a billable telephone visit.      The patient has been notified of following:     \"This telephone visit will be conducted via a call between you and your physician/provider. We have found that certain health care needs can be provided without the need for a physical exam.  This service lets us provide the care you need with a short phone conversation.  If a prescription is necessary we can send it directly to your pharmacy.  If lab work is needed we can place an order for that and you can then stop by our lab to have the test done at a later time.    Telephone visits are billed at different rates depending on your insurance coverage. During this emergency period, for some insurers they may be billed the same as an in-person visit.  Please reach out to your insurance provider with any questions.    If during the course of the call the physician/provider feels a telephone visit is not appropriate, you will not be charged for this service.\"    Patient has given verbal consent for Telephone visit?  Yes    What phone number would you like to be contacted at? [phone]    How would you like to obtain your AVS? Mail a copy    Subjective     Jf Ortiz is a 86 year old male who presents via phone visit today for the following health issues:    HPI  ED/UC Followup:    [place]  [facility] [place] of [place]: 5/27/20  Reason for visit: chest wall contusion  Current Status: improved, feels week                 Patient Active Problem List   Diagnosis     Hypercholesterolemia     REM sleep behavior disorder     Advanced care planning/counseling discussion     Osteoarthritis     Diaphragmatic hernia     Stented coronary artery     Coronary atherosclerosis of native coronary artery     Syncope and collapse     Volume depletion     Parkinson disease (H)     Seizure disorder (H)     Dementia without behavioral disturbance (H)     Hypocalcemia     Autonomic orthostatic " hypotension     Lewy body dementia without behavioral disturbance (H)     Dysphagia     Hypertension with target blood pressure goal under 150/90     Fatigue     Urinary incontinence     Postsurgical aortocoronary bypass status     Pacemaker, [employer], Dual Chamber - Dependent     Constipation, unspecified constipation type     Longstanding persistent atrial fibrillation (H)     Falls frequently     Frequent falls     Coronary artery disease involving native coronary artery without angina pectoris     Past Surgical History:   Procedure Laterality Date     ------------OTHER-------------  1955    ulnar and radial fx - repair ulnar and radial fx x4     ------------OTHER-------------  6/14/2011    cataract extraction     BIOPSY  08/2015    skin biopsy     BLEPHAROPLASTY BILATERAL  5/6/2014    Procedure: BLEPHAROPLASTY BILATERAL;  Surgeon: Andrew Queen MD;  Location: [facility]     BYPASS GRAFT ARTERY CORONARY  11/2006    coronary artery disease x 5, [facility]     cataract extraction and lens implantation  2011    cataracts     cataract extraction and lens implantation      cataracts     COLONOSCOPY  2012     colonoscopy with polypectomy  3/13/2009    history of polyps - repeat 3 yrs     colonoscopy with polypectomy  2006     colonoscopy with polypectomy  2005     COMBINED COLONOSCOPY WITH ARGON PLASMA COAGULATOR (APC) N/A 10/31/2014    Procedure: COMBINED COLONOSCOPY WITH ARGON PLASMA COAGULATOR (APC);  Surgeon: Bassam Aguilar MD;  Location: [facility]     COMBINED COLONOSCOPY WITH ARGON PLASMA COAGULATOR (APC) N/A 11/13/2015    Procedure: COMBINED COLONOSCOPY WITH ARGON PLASMA COAGULATOR (APC);  Surgeon: aBssam Aguilar MD;  Location: [facility]     ENDOSCOPY UPPER, COLONOSCOPY, COMBINED N/A 10/31/2014    Procedure: COMBINED ENDOSCOPY UPPER, COLONOSCOPY;  Surgeon: Bassam Aguilar MD;  [facility]: [facility]     ENDOSCOPY UPPER, COLONOSCOPY, COMBINED N/A 11/13/2015    Procedure: COMBINED ENDOSCOPY UPPER, COLONOSCOPY;  Surgeon:  Bassam Aguilar MD;  [facility]: [facility]     ESOPHAGOSCOPY, GASTROSCOPY, DUODENOSCOPY (EGD), COMBINED  2014    Procedure: COMBINED ESOPHAGOSCOPY, GASTROSCOPY, DUODENOSCOPY (EGD);  UPPER ENDOSCOPY(PENA) W/ BIOPSIES;  Surgeon: Patricia Pena MD;  Location: [facility]     HERNIORRHAPHY INGUINAL Right 2018    Procedure: HERNIORRHAPHY INGUINAL;  OPEN RIGHT INGUINAL HERNIA REPAIR with Mesh;  Surgeon: Virgilio Zaragoza DO;  Location: [facility]     INSERT PORT VASCULAR ACCESS Right 2019    Procedure: PORT PLACEMENT;  Surgeon: Lloyd Ram MD;  Location: [facility]     LARYNGOSCOPY WITH MICROSCOPE  2014    Procedure: LARYNGOSCOPY WITH MICROSCOPE;;  Surgeon: Chayo Duke MD;  Location: [facility]     pacemaker placement      heart block     pacemaker placement      dual-chamber     REMOVE TUBE, MYRINGOTOMY, COMBINED  2014    Procedure: COMBINED REMOVE TUBE, MYRINGOTOMY;  MICRODIRECT LARYNGOSCOPY WITH BIOPSY AND FROZEN SECTIONS removal of right ear tube and myringoplasty;  Surgeon: Chayo Duke MD;  Location: [facility]     REPLACE PACEMAKER GENERATOR N/A 2018    Procedure: REPLACE PACEMAKER GENERATOR;  Pacemaker generator change;  Surgeon: Alma Kaplan MD;  Location: [facility]     stent placement to LAD  2008     ventilation tube  2012    right in office       Social History     Tobacco Use     Smoking status: Former Smoker     Packs/day: 1.00     Years: 5.00     Pack years: 5.00     Types: Cigarettes     Last attempt to quit: 1985     Years since quittin.4     Smokeless tobacco: Never Used     Tobacco comment: quit in    Substance Use Topics     Alcohol use: Yes     Comment: social     Family History   Problem Relation Age of Onset     Diabetes Mother      Breast Cancer Daughter          Current Outpatient Medications   Medication Sig Dispense Refill     atorvastatin (LIPITOR) 20 MG tablet TAKE 1 TABLET BY MOUTH EVERY EVENING - GENERIC FOR LIPITOR 90 tablet 3      Cholecalciferol (VITAMIN D-3) 25 MCG (1000 UT) CAPS Take 50 mcg by mouth daily        Coenzyme Q10 (CO Q 10 PO) Take 100 mg by mouth every morning        donepezil (ARICEPT) 10 MG tablet TAKE 1 TABLET BY MOUTH NIGHTLY AT BEDTIME 90 tablet 3     fludrocortisone (FLORINEF) 0.1 MG tablet Take 1 tablet (0.1 mg) by mouth every morning (before breakfast) 30 tablet 11     lidocaine (LIDODERM) 5 % patch Place 1 patch onto the skin every 24 hours 30 patch 3     magnesium 500 MG TABS Take 500 mg by mouth daily        melatonin 5 MG tablet Take 5 mg by mouth nightly as needed for sleep       midodrine (PROAMATINE) 5 MG tablet TAKE 2 TABLETS (10MG) BY MOUTH THREE TIMES DAILY 540 tablet 3     multivitamin, therapeutic with minerals (MULTI-VITAMIN) TABS tablet Take 1 tablet by mouth daily       potassium chloride ER (K-DUR/KLOR-CON M) 20 MEQ CR tablet TAKE 1 TABLET BY MOUTH DAILY 30 tablet 4     RABEprazole (ACIPHEX) 20 MG EC tablet Take 20 mg by mouth 2 times daily       sodium chloride 1 GM tablet TAKE 1 TABLET BY MOUTH TWICE A  tablet 3     Turmeric 500 MG TABS Take 1,000 mg by mouth daily        cyclobenzaprine (FLEXERIL) 10 MG tablet TAKE 1 TABLET BY MOUTH THREE TIMES DAILY AS NEEDED FOR MUSCLE SPASM       Allergies   Allergen Reactions     Cats Unknown     Lamotrigine      Skin lesions       Reviewed and updated as needed this visit by Provider         Review of Systems   Constitutional, HEENT, cardiovascular, pulmonary, gi and gu systems are negative, except as otherwise noted.       Objective   Reported vitals:  There were no vitals taken for this visit.   Because of Jf's dementia I spoke with his significant other Uda.     Diagnostic Test Results:  none         Assessment/Plan:  1. Contusion of right chest wall, subsequent encounter   Uda states he is better    2. Autonomic orthostatic hypotension  chronic  3. Lewy body dementia without behavioral disturbance (H)    He has progressive Lewy body dementia with  autonomic orthostatic hypotension.  Had a recent fall she states his chest is doing better.  He is not complaining of any pain.  At previous visit we discussed having her reach out to local nursing homes because eventually he will need that level of care.  At this point she is content keeping him at home but if the condition gets worse then she will contact the nursing home.  I discussed the Dr. Whitman would then take over his care but I am here to help answer questions if he is not available.  She was appreciative of that.  He can still see cardiology.  Again she is well aware of the progressive nature of his disease and the reality that eventually she will not be able to care for him at home.          Phone call duration:  9 minutes    FRANCES Ray MD

## 2020-06-16 ENCOUNTER — INFUSION THERAPY VISIT (OUTPATIENT)
Dept: INFUSION THERAPY | Facility: OTHER | Age: 85
End: 2020-06-16
Attending: FAMILY MEDICINE
Payer: MEDICARE

## 2020-06-16 VITALS
WEIGHT: 165.79 LBS | HEART RATE: 72 BPM | TEMPERATURE: 97.1 F | RESPIRATION RATE: 16 BRPM | SYSTOLIC BLOOD PRESSURE: 156 MMHG | BODY MASS INDEX: 24.47 KG/M2 | OXYGEN SATURATION: 98 % | DIASTOLIC BLOOD PRESSURE: 78 MMHG

## 2020-06-16 DIAGNOSIS — E86.9 VOLUME DEPLETION: Primary | ICD-10-CM

## 2020-06-16 PROCEDURE — 25000128 H RX IP 250 OP 636: Performed by: FAMILY MEDICINE

## 2020-06-16 PROCEDURE — 96360 HYDRATION IV INFUSION INIT: CPT

## 2020-06-16 PROCEDURE — 96365 THER/PROPH/DIAG IV INF INIT: CPT

## 2020-06-16 PROCEDURE — 25800030 ZZH RX IP 258 OP 636: Performed by: FAMILY MEDICINE

## 2020-06-16 RX ORDER — HEPARIN SODIUM (PORCINE) LOCK FLUSH IV SOLN 100 UNIT/ML 100 UNIT/ML
5 SOLUTION INTRAVENOUS ONCE
Status: COMPLETED | OUTPATIENT
Start: 2020-06-16 | End: 2020-06-16

## 2020-06-16 RX ORDER — HEPARIN SODIUM (PORCINE) LOCK FLUSH IV SOLN 100 UNIT/ML 100 UNIT/ML
5 SOLUTION INTRAVENOUS ONCE
Status: CANCELLED
Start: 2020-06-16

## 2020-06-16 RX ADMIN — SODIUM CHLORIDE 1000 ML: 9 INJECTION, SOLUTION INTRAVENOUS at 09:48

## 2020-06-16 RX ADMIN — HEPARIN 5 ML: 100 SYRINGE at 10:50

## 2020-06-16 NOTE — PROGRESS NOTES
Patient is a 86 year old male here  today for infusion of IVF per order of Dr MALIA Ray under the supervision of Dr Walter.  Patient identified with two identifiers, order verified, and verbal consent for today's infusion obtained from patient.       Patient denies questions or concerns regarding infusion and/or medication(s) being administered.    Patients right port accessed using non-coring, 19 gauge, 3/4 power  needle. Port accessed per facility protocol. Port flushed easily, blood return noted.  No signs and symptoms of infection or infiltration.      0948 IV pump verified with IVF dose, drug, and rate of administration.  Infusion administered per protocol.  Patient tolerated infusion well, no signs or symptoms of adverse reaction noted.  Patient denies pain nor discomfort.     IV removed, catheter intact.  Site clean, dry and intact.  No signs or symptoms of infiltration or infection.  Covered with a sterile bandage, slight pressure applied for 30 seconds.  Pt instructed to leave bandage intact for a minimum of one hour, and to call with questions or concerns.  Patient declines copy of appointments, discharge instructions, and after visit summary (AVS).  Patient states understanding, discharged.

## 2020-06-19 ENCOUNTER — INFUSION THERAPY VISIT (OUTPATIENT)
Dept: INFUSION THERAPY | Facility: OTHER | Age: 85
End: 2020-06-19
Attending: FAMILY MEDICINE
Payer: MEDICARE

## 2020-06-19 VITALS
WEIGHT: 166.89 LBS | HEART RATE: 85 BPM | SYSTOLIC BLOOD PRESSURE: 134 MMHG | DIASTOLIC BLOOD PRESSURE: 74 MMHG | OXYGEN SATURATION: 98 % | BODY MASS INDEX: 24.63 KG/M2 | TEMPERATURE: 97.4 F

## 2020-06-19 DIAGNOSIS — E86.9 VOLUME DEPLETION: Primary | ICD-10-CM

## 2020-06-19 PROCEDURE — 25800030 ZZH RX IP 258 OP 636: Performed by: FAMILY MEDICINE

## 2020-06-19 PROCEDURE — 96360 HYDRATION IV INFUSION INIT: CPT

## 2020-06-19 PROCEDURE — 25000128 H RX IP 250 OP 636: Performed by: FAMILY MEDICINE

## 2020-06-19 RX ORDER — HEPARIN SODIUM (PORCINE) LOCK FLUSH IV SOLN 100 UNIT/ML 100 UNIT/ML
5 SOLUTION INTRAVENOUS ONCE
Status: CANCELLED
Start: 2020-06-19

## 2020-06-19 RX ORDER — HEPARIN SODIUM (PORCINE) LOCK FLUSH IV SOLN 100 UNIT/ML 100 UNIT/ML
5 SOLUTION INTRAVENOUS ONCE
Status: COMPLETED | OUTPATIENT
Start: 2020-06-19 | End: 2020-06-19

## 2020-06-19 RX ADMIN — HEPARIN 5 ML: 100 SYRINGE at 11:03

## 2020-06-19 RX ADMIN — SODIUM CHLORIDE 1000 ML: 9 INJECTION, SOLUTION INTRAVENOUS at 09:53

## 2020-06-19 NOTE — PROGRESS NOTES
Patient is 86 year old male here accompanied by self  today for infusion of IVF  per order of Dr. Ray under the supervision of Dr. Ray.  Patient identified with two identifiers, order verified, and verbal consent for today's infusion obtained from patient.      Patient meets order parameters for today's treatment.      Patients port accessed using non-coring, 19  gauge, 3/4  Power needle. Port accessed per facility protocol. Port flushed easily, blood return noted.  No signs and symptoms of infection or infiltration.      0953  IV pump verified with dose, drug, and rate of administration.  Infusion administered per protocol.  Patient tolerated infusion well no signs or symptoms of adverse reaction noted.  Patient denies pain nor discomfort.     IV removed, catheter intact.  Site clean, dry and intact.  No signs or symptoms of infiltration or infection.  Covered with a sterile bandage, slight pressure applied for 30 seconds.  Pt instructed to leave bandage intact for a minimum of one hour, and to call with questions or concerns.  Copy of appointments, discharge instructions, and after visit summary (AVS) provided to patient.  Patient states understanding, discharged.

## 2020-06-23 ENCOUNTER — INFUSION THERAPY VISIT (OUTPATIENT)
Dept: INFUSION THERAPY | Facility: OTHER | Age: 85
End: 2020-06-23
Attending: FAMILY MEDICINE
Payer: MEDICARE

## 2020-06-23 VITALS
HEIGHT: 69 IN | TEMPERATURE: 97.9 F | OXYGEN SATURATION: 97 % | WEIGHT: 166.67 LBS | HEART RATE: 74 BPM | BODY MASS INDEX: 24.69 KG/M2 | SYSTOLIC BLOOD PRESSURE: 139 MMHG | RESPIRATION RATE: 18 BRPM | DIASTOLIC BLOOD PRESSURE: 86 MMHG

## 2020-06-23 DIAGNOSIS — E86.9 VOLUME DEPLETION: Primary | ICD-10-CM

## 2020-06-23 PROCEDURE — 96360 HYDRATION IV INFUSION INIT: CPT

## 2020-06-23 PROCEDURE — 25800030 ZZH RX IP 258 OP 636: Performed by: FAMILY MEDICINE

## 2020-06-23 PROCEDURE — 25000128 H RX IP 250 OP 636: Performed by: FAMILY MEDICINE

## 2020-06-23 RX ORDER — HEPARIN SODIUM (PORCINE) LOCK FLUSH IV SOLN 100 UNIT/ML 100 UNIT/ML
5 SOLUTION INTRAVENOUS ONCE
Status: CANCELLED
Start: 2020-06-23

## 2020-06-23 RX ORDER — HEPARIN SODIUM (PORCINE) LOCK FLUSH IV SOLN 100 UNIT/ML 100 UNIT/ML
5 SOLUTION INTRAVENOUS ONCE
Status: COMPLETED | OUTPATIENT
Start: 2020-06-23 | End: 2020-06-23

## 2020-06-23 RX ADMIN — HEPARIN 5 ML: 100 SYRINGE at 10:48

## 2020-06-23 RX ADMIN — SODIUM CHLORIDE 1000 ML: 9 INJECTION, SOLUTION INTRAVENOUS at 09:42

## 2020-06-23 ASSESSMENT — MIFFLIN-ST. JEOR: SCORE: 1426.63

## 2020-06-23 ASSESSMENT — PAIN SCALES - GENERAL: PAINLEVEL: NO PAIN (0)

## 2020-06-23 NOTE — PROGRESS NOTES
Patient is a 86 year old male here accompanied by self today for infusion of IVF per order of Dr MALIA Ray under the supervision of Dr Walter, Cardiology. Unable to accurately review allergies and medications as patient is a poor historian. Patient identified with two identifiers, order verified, and verbal consent for today's infusion obtained from patient.      Patients port accessed using non-coring, 20 gauge, 3/4 inch power needle. Port accessed per facility protocol. Port flushed easily, blood return noted.  No signs and symptoms of infection or infiltration.      IV pump verified with dose, drug, and rate of administration.  Infusion administered per protocol.

## 2020-06-23 NOTE — PROGRESS NOTES
Patient tolerated infusion well, no signs or symptoms of adverse reaction noted.  Patient denies pain nor discomfort.      IV removed, catheter intact.  Site clean, dry and intact.  No signs or symptoms of infiltration or infection.  Covered with a sterile bandage, slight pressure applied for 30 seconds.  Pt instructed to leave bandage intact for a minimum of one hour, and to call with questions or concerns. Patient states understanding, discharged to care of his significant other.

## 2020-06-24 ENCOUNTER — ANCILLARY PROCEDURE (OUTPATIENT)
Dept: CARDIOLOGY | Facility: CLINIC | Age: 85
End: 2020-06-24
Attending: INTERNAL MEDICINE
Payer: MEDICARE

## 2020-06-24 DIAGNOSIS — I44.2 COMPLETE HEART BLOCK (H): ICD-10-CM

## 2020-06-24 PROCEDURE — 93294 REM INTERROG EVL PM/LDLS PM: CPT | Mod: ZP | Performed by: INTERNAL MEDICINE

## 2020-06-24 PROCEDURE — 93296 REM INTERROG EVL PM/IDS: CPT | Mod: ZF

## 2020-06-26 ENCOUNTER — INFUSION THERAPY VISIT (OUTPATIENT)
Dept: INFUSION THERAPY | Facility: OTHER | Age: 85
End: 2020-06-26
Attending: FAMILY MEDICINE
Payer: MEDICARE

## 2020-06-26 VITALS
TEMPERATURE: 97.8 F | BODY MASS INDEX: 24.62 KG/M2 | OXYGEN SATURATION: 97 % | HEART RATE: 74 BPM | HEIGHT: 69 IN | SYSTOLIC BLOOD PRESSURE: 136 MMHG | WEIGHT: 166.23 LBS | DIASTOLIC BLOOD PRESSURE: 72 MMHG

## 2020-06-26 DIAGNOSIS — E86.9 VOLUME DEPLETION: Primary | ICD-10-CM

## 2020-06-26 PROCEDURE — 25000128 H RX IP 250 OP 636: Performed by: FAMILY MEDICINE

## 2020-06-26 PROCEDURE — 96365 THER/PROPH/DIAG IV INF INIT: CPT

## 2020-06-26 PROCEDURE — 25800030 ZZH RX IP 258 OP 636: Performed by: FAMILY MEDICINE

## 2020-06-26 PROCEDURE — 96360 HYDRATION IV INFUSION INIT: CPT

## 2020-06-26 RX ORDER — HEPARIN SODIUM (PORCINE) LOCK FLUSH IV SOLN 100 UNIT/ML 100 UNIT/ML
5 SOLUTION INTRAVENOUS ONCE
Status: COMPLETED | OUTPATIENT
Start: 2020-06-26 | End: 2020-06-26

## 2020-06-26 RX ORDER — HEPARIN SODIUM (PORCINE) LOCK FLUSH IV SOLN 100 UNIT/ML 100 UNIT/ML
5 SOLUTION INTRAVENOUS ONCE
Status: CANCELLED
Start: 2020-06-26

## 2020-06-26 RX ADMIN — SODIUM CHLORIDE 1000 ML: 9 INJECTION, SOLUTION INTRAVENOUS at 09:44

## 2020-06-26 RX ADMIN — HEPARIN 5 ML: 100 SYRINGE at 10:55

## 2020-06-26 ASSESSMENT — MIFFLIN-ST. JEOR: SCORE: 1424.63

## 2020-06-26 NOTE — PROGRESS NOTES
"Patient is a 86 year old male here accompanied by self today for infusion of IVF per order of Dr. MALIA Ray.  Patient identified with two identifiers, order verified, and verbal consent for today's infusion obtained from patient.      Patients port accessed using non-coring, 20 gauge, 3/4\" needle. Port accessed per facility protocol. Port flushed easily, blood return noted.  No signs and symptoms of infection or infiltration.      IV pump verified with dose, drug, and rate of administration.  Infusion administered per protocol.    "

## 2020-06-30 ENCOUNTER — INFUSION THERAPY VISIT (OUTPATIENT)
Dept: INFUSION THERAPY | Facility: OTHER | Age: 85
End: 2020-06-30
Attending: FAMILY MEDICINE
Payer: MEDICARE

## 2020-06-30 VITALS
BODY MASS INDEX: 24.69 KG/M2 | SYSTOLIC BLOOD PRESSURE: 138 MMHG | TEMPERATURE: 96.6 F | OXYGEN SATURATION: 97 % | HEIGHT: 69 IN | HEART RATE: 88 BPM | RESPIRATION RATE: 18 BRPM | WEIGHT: 166.67 LBS | DIASTOLIC BLOOD PRESSURE: 86 MMHG

## 2020-06-30 DIAGNOSIS — E86.9 VOLUME DEPLETION: Primary | ICD-10-CM

## 2020-06-30 PROCEDURE — 25800030 ZZH RX IP 258 OP 636: Performed by: FAMILY MEDICINE

## 2020-06-30 PROCEDURE — 25000128 H RX IP 250 OP 636: Performed by: FAMILY MEDICINE

## 2020-06-30 PROCEDURE — 96360 HYDRATION IV INFUSION INIT: CPT

## 2020-06-30 RX ORDER — HEPARIN SODIUM (PORCINE) LOCK FLUSH IV SOLN 100 UNIT/ML 100 UNIT/ML
5 SOLUTION INTRAVENOUS ONCE
Status: CANCELLED
Start: 2020-06-30

## 2020-06-30 RX ORDER — HEPARIN SODIUM (PORCINE) LOCK FLUSH IV SOLN 100 UNIT/ML 100 UNIT/ML
5 SOLUTION INTRAVENOUS ONCE
Status: COMPLETED | OUTPATIENT
Start: 2020-06-30 | End: 2020-06-30

## 2020-06-30 RX ADMIN — SODIUM CHLORIDE 1000 ML: 9 INJECTION, SOLUTION INTRAVENOUS at 09:54

## 2020-06-30 RX ADMIN — HEPARIN 5 ML: 100 SYRINGE at 11:00

## 2020-06-30 ASSESSMENT — PAIN SCALES - GENERAL: PAINLEVEL: NO PAIN (0)

## 2020-06-30 ASSESSMENT — MIFFLIN-ST. JEOR: SCORE: 1426.63

## 2020-07-02 ENCOUNTER — INFUSION THERAPY VISIT (OUTPATIENT)
Dept: INFUSION THERAPY | Facility: OTHER | Age: 85
End: 2020-07-02
Attending: FAMILY MEDICINE
Payer: MEDICARE

## 2020-07-02 VITALS
SYSTOLIC BLOOD PRESSURE: 156 MMHG | HEART RATE: 85 BPM | WEIGHT: 168.87 LBS | DIASTOLIC BLOOD PRESSURE: 88 MMHG | HEIGHT: 69 IN | BODY MASS INDEX: 25.01 KG/M2 | RESPIRATION RATE: 16 BRPM | OXYGEN SATURATION: 96 % | TEMPERATURE: 97.1 F

## 2020-07-02 DIAGNOSIS — E86.9 VOLUME DEPLETION: Primary | ICD-10-CM

## 2020-07-02 PROCEDURE — 25800030 ZZH RX IP 258 OP 636: Performed by: FAMILY MEDICINE

## 2020-07-02 PROCEDURE — 25000128 H RX IP 250 OP 636: Performed by: FAMILY MEDICINE

## 2020-07-02 PROCEDURE — 96360 HYDRATION IV INFUSION INIT: CPT

## 2020-07-02 RX ORDER — HEPARIN SODIUM (PORCINE) LOCK FLUSH IV SOLN 100 UNIT/ML 100 UNIT/ML
5 SOLUTION INTRAVENOUS ONCE
Status: COMPLETED | OUTPATIENT
Start: 2020-07-02 | End: 2020-07-02

## 2020-07-02 RX ORDER — HEPARIN SODIUM (PORCINE) LOCK FLUSH IV SOLN 100 UNIT/ML 100 UNIT/ML
5 SOLUTION INTRAVENOUS ONCE
Status: CANCELLED
Start: 2020-07-02

## 2020-07-02 RX ADMIN — SODIUM CHLORIDE 1000 ML: 9 INJECTION, SOLUTION INTRAVENOUS at 09:47

## 2020-07-02 RX ADMIN — HEPARIN 5 ML: 100 SYRINGE at 11:03

## 2020-07-02 ASSESSMENT — MIFFLIN-ST. JEOR: SCORE: 1436.63

## 2020-07-02 NOTE — PROGRESS NOTES
Patient is a 86 year old male here  today for infusion of IVF per order of Dr MALIA Ray under the supervision of Dr Walter.  Patient identified with two identifiers, order verified, and verbal consent for today's infusion obtained from patient.      Patient denies questions or concerns regarding infusion and/or medication(s) being administered.    Patients right port accessed using non-coring, 19 gauge, 3/4 needle. Port accessed per facility protocol. Port flushed easily, blood return noted.  No signs and symptoms of infection or infiltration.      0947 IV pump verified with IVF dose, drug, and rate of administration.  Infusion administered per protocol.  Patient tolerated infusion well, no signs or symptoms of adverse reaction noted.  Patient denies pain nor discomfort.     IV removed, catheter intact.  Site clean, dry and intact.  No signs or symptoms of infiltration or infection.  Covered with a sterile bandage, slight pressure applied for 30 seconds.  Pt instructed to leave bandage intact for a minimum of one hour, and to call with questions or concerns.  Patient declines copy of appointments, discharge instructions, and after visit summary (AVS).  Patient states understanding, discharged.

## 2020-07-02 NOTE — PATIENT INSTRUCTIONS
We will see you back as planned. If you have any questions or concerns, we can be reached Monday through Friday 8am - 430pm at [phone] ([facility] [place] If you have concerns related to a potential reaction/side effect after hours/weekends/holiday's, please seek emergent medical care.

## 2020-07-05 LAB
MDC_IDC_LEAD_IMPLANT_DT: NORMAL
MDC_IDC_LEAD_IMPLANT_DT: NORMAL
MDC_IDC_LEAD_LOCATION: NORMAL
MDC_IDC_LEAD_LOCATION: NORMAL
MDC_IDC_LEAD_LOCATION_DETAIL_1: NORMAL
MDC_IDC_LEAD_LOCATION_DETAIL_1: NORMAL
MDC_IDC_LEAD_MFG: NORMAL
MDC_IDC_LEAD_MFG: NORMAL
MDC_IDC_LEAD_MODEL: NORMAL
MDC_IDC_LEAD_MODEL: NORMAL
MDC_IDC_LEAD_POLARITY_TYPE: NORMAL
MDC_IDC_LEAD_POLARITY_TYPE: NORMAL
MDC_IDC_LEAD_SERIAL: NORMAL
MDC_IDC_LEAD_SERIAL: NORMAL
MDC_IDC_MSMT_BATTERY_DTM: NORMAL
MDC_IDC_MSMT_BATTERY_REMAINING_LONGEVITY: 97 MO
MDC_IDC_MSMT_BATTERY_RRT_TRIGGER: 2.62
MDC_IDC_MSMT_BATTERY_STATUS: NORMAL
MDC_IDC_MSMT_BATTERY_VOLTAGE: 3 V
MDC_IDC_MSMT_LEADCHNL_RA_IMPEDANCE_VALUE: 437 OHM
MDC_IDC_MSMT_LEADCHNL_RA_IMPEDANCE_VALUE: 551 OHM
MDC_IDC_MSMT_LEADCHNL_RA_PACING_THRESHOLD_AMPLITUDE: 1.38 V
MDC_IDC_MSMT_LEADCHNL_RA_PACING_THRESHOLD_PULSEWIDTH: 0.4 MS
MDC_IDC_MSMT_LEADCHNL_RA_SENSING_INTR_AMPL: 2 MV
MDC_IDC_MSMT_LEADCHNL_RA_SENSING_INTR_AMPL: 2 MV
MDC_IDC_MSMT_LEADCHNL_RV_IMPEDANCE_VALUE: 380 OHM
MDC_IDC_MSMT_LEADCHNL_RV_IMPEDANCE_VALUE: 551 OHM
MDC_IDC_MSMT_LEADCHNL_RV_PACING_THRESHOLD_AMPLITUDE: 0.75 V
MDC_IDC_MSMT_LEADCHNL_RV_PACING_THRESHOLD_PULSEWIDTH: 0.4 MS
MDC_IDC_MSMT_LEADCHNL_RV_SENSING_INTR_AMPL: 4.75 MV
MDC_IDC_MSMT_LEADCHNL_RV_SENSING_INTR_AMPL: 4.75 MV
MDC_IDC_PG_IMPLANT_DTM: NORMAL
MDC_IDC_PG_MFG: NORMAL
MDC_IDC_PG_MODEL: NORMAL
MDC_IDC_PG_SERIAL: NORMAL
MDC_IDC_PG_TYPE: NORMAL
MDC_IDC_SESS_CLINIC_NAME: NORMAL
MDC_IDC_SESS_DTM: NORMAL
MDC_IDC_SESS_TYPE: NORMAL
MDC_IDC_SET_BRADY_AT_MODE_SWITCH_RATE: 171 {BEATS}/MIN
MDC_IDC_SET_BRADY_HYSTRATE: NORMAL
MDC_IDC_SET_BRADY_LOWRATE: 70 {BEATS}/MIN
MDC_IDC_SET_BRADY_MAX_SENSOR_RATE: 130 {BEATS}/MIN
MDC_IDC_SET_BRADY_MAX_TRACKING_RATE: 130 {BEATS}/MIN
MDC_IDC_SET_BRADY_MODE: NORMAL
MDC_IDC_SET_BRADY_PAV_DELAY_LOW: 180 MS
MDC_IDC_SET_BRADY_SAV_DELAY_LOW: 150 MS
MDC_IDC_SET_LEADCHNL_RA_PACING_AMPLITUDE: 2.75 V
MDC_IDC_SET_LEADCHNL_RA_PACING_ANODE_ELECTRODE_1: NORMAL
MDC_IDC_SET_LEADCHNL_RA_PACING_ANODE_LOCATION_1: NORMAL
MDC_IDC_SET_LEADCHNL_RA_PACING_CAPTURE_MODE: NORMAL
MDC_IDC_SET_LEADCHNL_RA_PACING_CATHODE_ELECTRODE_1: NORMAL
MDC_IDC_SET_LEADCHNL_RA_PACING_CATHODE_LOCATION_1: NORMAL
MDC_IDC_SET_LEADCHNL_RA_PACING_POLARITY: NORMAL
MDC_IDC_SET_LEADCHNL_RA_PACING_PULSEWIDTH: 0.4 MS
MDC_IDC_SET_LEADCHNL_RA_SENSING_ANODE_ELECTRODE_1: NORMAL
MDC_IDC_SET_LEADCHNL_RA_SENSING_ANODE_LOCATION_1: NORMAL
MDC_IDC_SET_LEADCHNL_RA_SENSING_CATHODE_ELECTRODE_1: NORMAL
MDC_IDC_SET_LEADCHNL_RA_SENSING_CATHODE_LOCATION_1: NORMAL
MDC_IDC_SET_LEADCHNL_RA_SENSING_POLARITY: NORMAL
MDC_IDC_SET_LEADCHNL_RA_SENSING_SENSITIVITY: 0.9 MV
MDC_IDC_SET_LEADCHNL_RV_PACING_AMPLITUDE: 2 V
MDC_IDC_SET_LEADCHNL_RV_PACING_ANODE_ELECTRODE_1: NORMAL
MDC_IDC_SET_LEADCHNL_RV_PACING_ANODE_LOCATION_1: NORMAL
MDC_IDC_SET_LEADCHNL_RV_PACING_CAPTURE_MODE: NORMAL
MDC_IDC_SET_LEADCHNL_RV_PACING_CATHODE_ELECTRODE_1: NORMAL
MDC_IDC_SET_LEADCHNL_RV_PACING_CATHODE_LOCATION_1: NORMAL
MDC_IDC_SET_LEADCHNL_RV_PACING_POLARITY: NORMAL
MDC_IDC_SET_LEADCHNL_RV_PACING_PULSEWIDTH: 0.4 MS
MDC_IDC_SET_LEADCHNL_RV_SENSING_ANODE_ELECTRODE_1: NORMAL
MDC_IDC_SET_LEADCHNL_RV_SENSING_ANODE_LOCATION_1: NORMAL
MDC_IDC_SET_LEADCHNL_RV_SENSING_CATHODE_ELECTRODE_1: NORMAL
MDC_IDC_SET_LEADCHNL_RV_SENSING_CATHODE_LOCATION_1: NORMAL
MDC_IDC_SET_LEADCHNL_RV_SENSING_POLARITY: NORMAL
MDC_IDC_SET_LEADCHNL_RV_SENSING_SENSITIVITY: 0.9 MV
MDC_IDC_SET_ZONE_DETECTION_INTERVAL: 350 MS
MDC_IDC_SET_ZONE_DETECTION_INTERVAL: 400 MS
MDC_IDC_SET_ZONE_TYPE: NORMAL
MDC_IDC_STAT_BRADY_AP_VP_PERCENT: 97.58 %
MDC_IDC_STAT_BRADY_AP_VS_PERCENT: 0.03 %
MDC_IDC_STAT_BRADY_AS_VP_PERCENT: 1.98 %
MDC_IDC_STAT_BRADY_AS_VS_PERCENT: 0.42 %
MDC_IDC_STAT_BRADY_DTM_END: NORMAL
MDC_IDC_STAT_BRADY_DTM_START: NORMAL
MDC_IDC_STAT_BRADY_RA_PERCENT_PACED: 97.82 %
MDC_IDC_STAT_BRADY_RV_PERCENT_PACED: 99.55 %
MDC_IDC_STAT_EPISODE_RECENT_COUNT: 0
MDC_IDC_STAT_EPISODE_RECENT_COUNT_DTM_END: NORMAL
MDC_IDC_STAT_EPISODE_RECENT_COUNT_DTM_START: NORMAL
MDC_IDC_STAT_EPISODE_TOTAL_COUNT: 0
MDC_IDC_STAT_EPISODE_TOTAL_COUNT: 1
MDC_IDC_STAT_EPISODE_TOTAL_COUNT: 886
MDC_IDC_STAT_EPISODE_TOTAL_COUNT_DTM_END: NORMAL
MDC_IDC_STAT_EPISODE_TOTAL_COUNT_DTM_START: NORMAL
MDC_IDC_STAT_EPISODE_TYPE: NORMAL

## 2020-07-07 ENCOUNTER — INFUSION THERAPY VISIT (OUTPATIENT)
Dept: INFUSION THERAPY | Facility: OTHER | Age: 85
End: 2020-07-07
Attending: FAMILY MEDICINE
Payer: MEDICARE

## 2020-07-07 VITALS
OXYGEN SATURATION: 98 % | DIASTOLIC BLOOD PRESSURE: 69 MMHG | SYSTOLIC BLOOD PRESSURE: 124 MMHG | WEIGHT: 166.67 LBS | TEMPERATURE: 96.5 F | BODY MASS INDEX: 24.6 KG/M2 | HEART RATE: 73 BPM

## 2020-07-07 DIAGNOSIS — E86.9 VOLUME DEPLETION: Primary | ICD-10-CM

## 2020-07-07 PROCEDURE — 96360 HYDRATION IV INFUSION INIT: CPT

## 2020-07-07 PROCEDURE — 25000128 H RX IP 250 OP 636: Performed by: FAMILY MEDICINE

## 2020-07-07 PROCEDURE — 25800030 ZZH RX IP 258 OP 636: Performed by: FAMILY MEDICINE

## 2020-07-07 PROCEDURE — 96365 THER/PROPH/DIAG IV INF INIT: CPT

## 2020-07-07 RX ORDER — HEPARIN SODIUM (PORCINE) LOCK FLUSH IV SOLN 100 UNIT/ML 100 UNIT/ML
5 SOLUTION INTRAVENOUS ONCE
Status: COMPLETED | OUTPATIENT
Start: 2020-07-07 | End: 2020-07-07

## 2020-07-07 RX ORDER — HEPARIN SODIUM (PORCINE) LOCK FLUSH IV SOLN 100 UNIT/ML 100 UNIT/ML
5 SOLUTION INTRAVENOUS ONCE
Status: CANCELLED
Start: 2020-07-07

## 2020-07-07 RX ADMIN — SODIUM CHLORIDE 1000 ML: 9 INJECTION, SOLUTION INTRAVENOUS at 09:54

## 2020-07-07 RX ADMIN — HEPARIN 5 ML: 100 SYRINGE at 11:01

## 2020-07-07 NOTE — PROGRESS NOTES
Patient is a 87 y/o mal here accompanied by self today for infusion of IVF per order of Dr. Ray.  Patient identified with two identifiers, order verified, and verbal consent for today's infusion obtained from patient.          Patient meets order parameters for today's treatment.         Patients port accessed using non-coring, 19 gauge, 3/4 needle. Port accessed per facility protocol. Port flushed easily, blood return noted.  No signs and symptoms of infection or infiltration.      IV pump verified with dose, drug, and rate of administration.  Infusion administered per protocol.  Patient tolerated infusion well, no signs or symptoms of adverse reaction noted.  Patient denies pain nor discomfort.

## 2020-07-07 NOTE — PATIENT INSTRUCTIONS

## 2020-07-07 NOTE — PROGRESS NOTES
Patient tolerated infusion well, no signs or symptoms of adverse reaction noted.  Patient denies pain nor discomfort.     IV removed, catheter intact.  Site clean, dry and intact.  No signs or symptoms of infiltration or infection.  Covered with a sterile bandage, slight pressure applied for 30 seconds.  Pt instructed to leave bandage intact for a minimum of one hour, and to call with questions or concerns.  Patient declined AVS Patient states understanding, discharged.

## 2020-07-10 ENCOUNTER — INFUSION THERAPY VISIT (OUTPATIENT)
Dept: INFUSION THERAPY | Facility: OTHER | Age: 85
End: 2020-07-10
Attending: FAMILY MEDICINE
Payer: MEDICARE

## 2020-07-10 VITALS
OXYGEN SATURATION: 97 % | RESPIRATION RATE: 17 BRPM | WEIGHT: 167.99 LBS | TEMPERATURE: 96.7 F | BODY MASS INDEX: 24.8 KG/M2

## 2020-07-10 DIAGNOSIS — E86.9 VOLUME DEPLETION: Primary | ICD-10-CM

## 2020-07-10 PROCEDURE — 25000128 H RX IP 250 OP 636: Performed by: FAMILY MEDICINE

## 2020-07-10 PROCEDURE — 96360 HYDRATION IV INFUSION INIT: CPT

## 2020-07-10 PROCEDURE — 25800030 ZZH RX IP 258 OP 636: Performed by: FAMILY MEDICINE

## 2020-07-10 RX ORDER — HEPARIN SODIUM (PORCINE) LOCK FLUSH IV SOLN 100 UNIT/ML 100 UNIT/ML
5 SOLUTION INTRAVENOUS ONCE
Status: CANCELLED
Start: 2020-07-10

## 2020-07-10 RX ORDER — HEPARIN SODIUM (PORCINE) LOCK FLUSH IV SOLN 100 UNIT/ML 100 UNIT/ML
5 SOLUTION INTRAVENOUS ONCE
Status: COMPLETED | OUTPATIENT
Start: 2020-07-10 | End: 2020-07-10

## 2020-07-10 RX ADMIN — SODIUM CHLORIDE 1000 ML: 9 INJECTION, SOLUTION INTRAVENOUS at 09:42

## 2020-07-10 RX ADMIN — HEPARIN 5 ML: 100 SYRINGE at 10:49

## 2020-07-10 ASSESSMENT — PAIN SCALES - GENERAL: PAINLEVEL: NO PAIN (0)

## 2020-07-10 NOTE — PATIENT INSTRUCTIONS
We will see you back as planned. If you have any questions or concerns, we can be reached Monday through Friday 8am - 430pm at [phone] ([facility] [place] If you have concerns related to a potential reaction/side effect after hours/weekends/holiday's, please seek emergent medical care.

## 2020-07-10 NOTE — PROGRESS NOTES
Patient is a 86 year old male  here accompanied by self today for infusion of IVF per order of Dr. Ray under the supervision of   Patient identified with two identifiers, order verified, and verbal consent for today's infusion obtained from patient.       Patient meets order parameters for today's treatment.    Patients port accessed using non-coring, 19 gauge, 3/4 needle. Port accessed per facility protocol. Port flushed easily, blood return noted.  No signs and symptoms of infection or infiltration.      0942  IV pump verified with dose, drug, and rate of administration.  Infusion administered per protocol.  Patient tolerated infusion well, no signs or symptoms of adverse reaction noted.  Patient denies pain nor discomfort.     IV removed, catheter intact.  Site clean, dry and intact.  No signs or symptoms of infiltration or infection.  Covered with a sterile bandage, slight pressure applied for 30 seconds.  Pt instructed to leave bandage intact for a minimum of one hour, and to call with questions or concerns.  Copy of appointments, discharge instructions, and after visit summary (AVS) provided to patient.  Patient states understanding, discharged.

## 2020-07-14 ENCOUNTER — INFUSION THERAPY VISIT (OUTPATIENT)
Dept: INFUSION THERAPY | Facility: OTHER | Age: 85
End: 2020-07-14
Attending: FAMILY MEDICINE
Payer: MEDICARE

## 2020-07-14 VITALS
HEART RATE: 90 BPM | WEIGHT: 165.79 LBS | OXYGEN SATURATION: 98 % | HEIGHT: 69 IN | TEMPERATURE: 97.4 F | DIASTOLIC BLOOD PRESSURE: 82 MMHG | SYSTOLIC BLOOD PRESSURE: 147 MMHG | BODY MASS INDEX: 24.56 KG/M2

## 2020-07-14 DIAGNOSIS — E86.9 VOLUME DEPLETION: Primary | ICD-10-CM

## 2020-07-14 PROCEDURE — 96365 THER/PROPH/DIAG IV INF INIT: CPT

## 2020-07-14 PROCEDURE — 96360 HYDRATION IV INFUSION INIT: CPT

## 2020-07-14 PROCEDURE — 25800030 ZZH RX IP 258 OP 636: Performed by: FAMILY MEDICINE

## 2020-07-14 PROCEDURE — 25000128 H RX IP 250 OP 636: Performed by: FAMILY MEDICINE

## 2020-07-14 RX ORDER — HEPARIN SODIUM (PORCINE) LOCK FLUSH IV SOLN 100 UNIT/ML 100 UNIT/ML
5 SOLUTION INTRAVENOUS ONCE
Status: CANCELLED
Start: 2020-07-14

## 2020-07-14 RX ORDER — HEPARIN SODIUM (PORCINE) LOCK FLUSH IV SOLN 100 UNIT/ML 100 UNIT/ML
5 SOLUTION INTRAVENOUS ONCE
Status: COMPLETED | OUTPATIENT
Start: 2020-07-14 | End: 2020-07-14

## 2020-07-14 RX ADMIN — HEPARIN 5 ML: 100 SYRINGE at 15:34

## 2020-07-14 RX ADMIN — SODIUM CHLORIDE 1000 ML: 9 INJECTION, SOLUTION INTRAVENOUS at 14:20

## 2020-07-14 ASSESSMENT — MIFFLIN-ST. JEOR: SCORE: 1422.63

## 2020-07-14 NOTE — PROGRESS NOTES
"Patient is a 86 year old male here accompanied by self today for infusion of IVF per order of Dr. Ray.  Patient identified with two identifiers, order verified, and verbal consent for today's infusion obtained from patient.      Patients port accessed using non-coring, 20 gauge, 3/4\" needle. Port accessed per facility protocol. Port flushed easily, blood return noted.  No signs and symptoms of infection or infiltration.      IV pump verified with dose, drug, and rate of administration.  Infusion administered per protocol.   "

## 2020-07-14 NOTE — PATIENT INSTRUCTIONS
We will see you back as planned. If you have any questions or concerns, we can be reached Monday through Friday 8am - 430pm at [phone] ([facility] [place] If you have concerns related to a potential reaction/side effect after hours/weekends/holiday's, please seek emergent medical care.

## 2020-07-17 ENCOUNTER — INFUSION THERAPY VISIT (OUTPATIENT)
Dept: INFUSION THERAPY | Facility: OTHER | Age: 85
End: 2020-07-17
Attending: FAMILY MEDICINE
Payer: MEDICARE

## 2020-07-17 VITALS — SYSTOLIC BLOOD PRESSURE: 131 MMHG | HEART RATE: 75 BPM | DIASTOLIC BLOOD PRESSURE: 70 MMHG

## 2020-07-17 DIAGNOSIS — E86.9 VOLUME DEPLETION: Primary | ICD-10-CM

## 2020-07-17 PROCEDURE — 25800030 ZZH RX IP 258 OP 636: Performed by: FAMILY MEDICINE

## 2020-07-17 PROCEDURE — 96360 HYDRATION IV INFUSION INIT: CPT

## 2020-07-17 PROCEDURE — 25000128 H RX IP 250 OP 636: Performed by: FAMILY MEDICINE

## 2020-07-17 RX ORDER — HEPARIN SODIUM (PORCINE) LOCK FLUSH IV SOLN 100 UNIT/ML 100 UNIT/ML
5 SOLUTION INTRAVENOUS ONCE
Status: COMPLETED | OUTPATIENT
Start: 2020-07-17 | End: 2020-07-17

## 2020-07-17 RX ORDER — HEPARIN SODIUM (PORCINE) LOCK FLUSH IV SOLN 100 UNIT/ML 100 UNIT/ML
5 SOLUTION INTRAVENOUS ONCE
Status: CANCELLED
Start: 2020-07-17

## 2020-07-17 RX ADMIN — SODIUM CHLORIDE 1000 ML: 9 INJECTION, SOLUTION INTRAVENOUS at 09:54

## 2020-07-17 RX ADMIN — HEPARIN 5 ML: 100 SYRINGE at 10:59

## 2020-07-17 NOTE — PATIENT INSTRUCTIONS
We will see you back as planned. If you have any questions or concerns, we can be reached Monday through Friday 8am - 430pm at [phone] ([facility] [place]). If you have concerns related to a potential reaction/side effect after hours/weekends/holiday's, please seek emergent medical care.

## 2020-07-17 NOTE — PROGRESS NOTES
Patient is 86 year old male  a here accompanied by self today for infusion of IVF per order of Dr. Ray   Patient identified with two identifiers, order verified, and verbal consent for today's infusion obtained from patient.       Patient meets order parameters for today's treatment.    Patients port accessed using non-coring, 19 gauge, 3/4 Power needle. Port accessed per facility protocol. Port flushed easily, blood return noted.  No signs and symptoms of infection or infiltration.      0954  IV pump verified with dose, drug, and rate of administration.  Infusion administered per protocol.

## 2020-07-19 DIAGNOSIS — E87.6 HYPOKALEMIA: ICD-10-CM

## 2020-07-20 ENCOUNTER — TELEPHONE (OUTPATIENT)
Dept: FAMILY MEDICINE | Facility: OTHER | Age: 85
End: 2020-07-20

## 2020-07-20 DIAGNOSIS — R73.09 ELEVATED GLUCOSE LEVEL: ICD-10-CM

## 2020-07-20 DIAGNOSIS — Z13.1 SCREENING FOR DIABETES MELLITUS: ICD-10-CM

## 2020-07-20 DIAGNOSIS — R73.09 ELEVATED GLUCOSE LEVEL: Primary | ICD-10-CM

## 2020-07-20 LAB
EST. AVERAGE GLUCOSE BLD GHB EST-MCNC: 120 MG/DL
HBA1C MFR BLD: 5.8 % (ref 0–5.6)

## 2020-07-20 PROCEDURE — 40000788 ZZHCL STATISTIC ESTIMATED AVERAGE GLUCOSE: Mod: ZL | Performed by: FAMILY MEDICINE

## 2020-07-20 PROCEDURE — 36415 COLL VENOUS BLD VENIPUNCTURE: CPT | Mod: ZL | Performed by: FAMILY MEDICINE

## 2020-07-20 PROCEDURE — 83036 HEMOGLOBIN GLYCOSYLATED A1C: CPT | Mod: ZL | Performed by: FAMILY MEDICINE

## 2020-07-20 RX ORDER — POTASSIUM CHLORIDE 1500 MG/1
TABLET, EXTENDED RELEASE ORAL
Qty: 30 TABLET | Refills: 1 | Status: SHIPPED | OUTPATIENT
Start: 2020-07-20 | End: 2020-09-22

## 2020-07-20 NOTE — TELEPHONE ENCOUNTER
Pts partner requesting a lab be done to check and see if he's potentially diabetic.  Lab appt made.

## 2020-07-21 ENCOUNTER — INFUSION THERAPY VISIT (OUTPATIENT)
Dept: INFUSION THERAPY | Facility: OTHER | Age: 85
End: 2020-07-21
Attending: FAMILY MEDICINE
Payer: MEDICARE

## 2020-07-21 VITALS
WEIGHT: 164.9 LBS | RESPIRATION RATE: 18 BRPM | DIASTOLIC BLOOD PRESSURE: 82 MMHG | BODY MASS INDEX: 24.42 KG/M2 | TEMPERATURE: 97.9 F | SYSTOLIC BLOOD PRESSURE: 156 MMHG | HEIGHT: 69 IN | HEART RATE: 84 BPM | OXYGEN SATURATION: 100 %

## 2020-07-21 DIAGNOSIS — E86.9 VOLUME DEPLETION: Primary | ICD-10-CM

## 2020-07-21 PROCEDURE — 25800030 ZZH RX IP 258 OP 636: Performed by: FAMILY MEDICINE

## 2020-07-21 PROCEDURE — 96360 HYDRATION IV INFUSION INIT: CPT

## 2020-07-21 PROCEDURE — 25000128 H RX IP 250 OP 636: Performed by: FAMILY MEDICINE

## 2020-07-21 RX ORDER — HEPARIN SODIUM (PORCINE) LOCK FLUSH IV SOLN 100 UNIT/ML 100 UNIT/ML
5 SOLUTION INTRAVENOUS ONCE
Status: COMPLETED | OUTPATIENT
Start: 2020-07-21 | End: 2020-07-21

## 2020-07-21 RX ORDER — HEPARIN SODIUM (PORCINE) LOCK FLUSH IV SOLN 100 UNIT/ML 100 UNIT/ML
5 SOLUTION INTRAVENOUS ONCE
Status: CANCELLED
Start: 2020-07-21

## 2020-07-21 RX ADMIN — SODIUM CHLORIDE 1000 ML: 9 INJECTION, SOLUTION INTRAVENOUS at 09:41

## 2020-07-21 RX ADMIN — HEPARIN 5 ML: 100 SYRINGE at 10:43

## 2020-07-21 ASSESSMENT — PAIN SCALES - GENERAL: PAINLEVEL: NO PAIN (0)

## 2020-07-21 ASSESSMENT — MIFFLIN-ST. JEOR: SCORE: 1418.63

## 2020-07-21 NOTE — PROGRESS NOTES
Patient is a 86 year old male here accompanied by self today for infusion of IVF per order of Dr MALIA Ray under the supervision of Dr Walter, Cardiology.  Patient identified with two identifiers, order verified, and verbal consent for today's infusion obtained from patient.      Patients port accessed using non-coring, 19 gauge, 3/4 inch needle. Port accessed per facility protocol. Port flushed easily, blood return noted.  No signs and symptoms of infection or infiltration.      IV pump verified with dose, drug, and rate of administration.  Infusion administered per protocol.  Patient tolerated infusion well, no signs or symptoms of adverse reaction noted.  Patient denies pain nor discomfort.     IV removed, catheter intact.  Site clean, dry and intact.  No signs or symptoms of infiltration or infection.  Covered with a sterile bandage, slight pressure applied for 30 seconds.  Pt instructed to leave bandage intact for a minimum of one hour, and to call with questions or concerns.  Patient states understanding, discharged.

## 2020-07-24 ENCOUNTER — INFUSION THERAPY VISIT (OUTPATIENT)
Dept: INFUSION THERAPY | Facility: OTHER | Age: 85
End: 2020-07-24
Attending: FAMILY MEDICINE
Payer: MEDICARE

## 2020-07-24 DIAGNOSIS — E86.9 VOLUME DEPLETION: Primary | ICD-10-CM

## 2020-07-24 PROCEDURE — 25000128 H RX IP 250 OP 636: Performed by: FAMILY MEDICINE

## 2020-07-24 PROCEDURE — 96365 THER/PROPH/DIAG IV INF INIT: CPT

## 2020-07-24 PROCEDURE — 96360 HYDRATION IV INFUSION INIT: CPT

## 2020-07-24 PROCEDURE — 25800030 ZZH RX IP 258 OP 636: Performed by: FAMILY MEDICINE

## 2020-07-24 RX ORDER — HEPARIN SODIUM (PORCINE) LOCK FLUSH IV SOLN 100 UNIT/ML 100 UNIT/ML
5 SOLUTION INTRAVENOUS ONCE
Status: CANCELLED
Start: 2020-07-24

## 2020-07-24 RX ORDER — HEPARIN SODIUM (PORCINE) LOCK FLUSH IV SOLN 100 UNIT/ML 100 UNIT/ML
5 SOLUTION INTRAVENOUS ONCE
Status: COMPLETED | OUTPATIENT
Start: 2020-07-24 | End: 2020-07-24

## 2020-07-24 RX ADMIN — HEPARIN 5 ML: 100 SYRINGE at 10:53

## 2020-07-24 RX ADMIN — SODIUM CHLORIDE 1000 ML: 9 INJECTION, SOLUTION INTRAVENOUS at 09:42

## 2020-07-24 NOTE — PROGRESS NOTES
Patient is a 86 year old male here accompanied by self today for infusion of IVF per order of Dr. Ray.  Patient identified with two identifiers, order verified, and verbal consent for today's infusion obtained from patient.      Patients port accessed using non-coring, 20 gauge, 3/4 needle. Port accessed per facility protocol. Port flushed easily, blood return noted.  No signs and symptoms of infection or infiltration.      0942: IV pump verified with dose, drug, and rate of administration.  Infusion administered per protocol.  Patient tolerated infusion well, no signs or symptoms of adverse reaction noted.  Patient denies pain nor discomfort.     IV removed, catheter intact.  Site clean, dry and intact.  No signs or symptoms of infiltration or infection.  Covered with a sterile bandage, slight pressure applied for 30 seconds.  Pt instructed to leave bandage intact for a minimum of one hour, and to call with questions or concerns.  Copy of appointments, discharge instructions, and after visit summary (AVS) provided to patient.  Patient states understanding, discharged.

## 2020-07-28 ENCOUNTER — INFUSION THERAPY VISIT (OUTPATIENT)
Dept: INFUSION THERAPY | Facility: OTHER | Age: 85
End: 2020-07-28
Attending: FAMILY MEDICINE
Payer: MEDICARE

## 2020-07-28 DIAGNOSIS — E86.9 VOLUME DEPLETION: Primary | ICD-10-CM

## 2020-07-28 PROCEDURE — 25000128 H RX IP 250 OP 636: Performed by: FAMILY MEDICINE

## 2020-07-28 PROCEDURE — 25800030 ZZH RX IP 258 OP 636: Performed by: FAMILY MEDICINE

## 2020-07-28 PROCEDURE — 96365 THER/PROPH/DIAG IV INF INIT: CPT

## 2020-07-28 PROCEDURE — 96360 HYDRATION IV INFUSION INIT: CPT

## 2020-07-28 RX ORDER — HEPARIN SODIUM (PORCINE) LOCK FLUSH IV SOLN 100 UNIT/ML 100 UNIT/ML
5 SOLUTION INTRAVENOUS ONCE
Status: COMPLETED | OUTPATIENT
Start: 2020-07-28 | End: 2020-07-28

## 2020-07-28 RX ORDER — HEPARIN SODIUM (PORCINE) LOCK FLUSH IV SOLN 100 UNIT/ML 100 UNIT/ML
5 SOLUTION INTRAVENOUS ONCE
Status: CANCELLED
Start: 2020-07-28

## 2020-07-28 RX ADMIN — SODIUM CHLORIDE 1000 ML: 9 INJECTION, SOLUTION INTRAVENOUS at 09:39

## 2020-07-28 RX ADMIN — HEPARIN 5 ML: 100 SYRINGE at 10:48

## 2020-07-28 NOTE — PROGRESS NOTES
"Patient is a 86 year old male here accompanied by self today for infusion of IVF per order of Dr. Craft.  Patient identified with two identifiers, order verified, and verbal consent for today's infusion obtained from patient.      Patients port accessed using non-coring, 20 gauge, 3/4\" needle. Port accessed per facility protocol. Port flushed easily, blood return noted.  No signs and symptoms of infection or infiltration.      IV pump verified with dose, drug, and rate of administration.  Infusion administered per protocol.  Patient tolerated infusion well, no signs or symptoms of adverse reaction noted.  Patient denies pain nor discomfort.     IV removed, catheter intact.  Site clean, dry and intact.  No signs or symptoms of infiltration or infection.  Covered with a sterile bandage, slight pressure applied for 30 seconds.  Pt instructed to leave bandage intact for a minimum of one hour, and to call with questions or concerns.  Copy of appointments, discharge instructions, and after visit summary (AVS) provided to patient.  Patient states understanding, discharged.      "

## 2020-07-29 ENCOUNTER — OFFICE VISIT (OUTPATIENT)
Dept: OTOLARYNGOLOGY | Facility: OTHER | Age: 85
End: 2020-07-29
Attending: NURSE PRACTITIONER
Payer: MEDICARE

## 2020-07-29 VITALS
TEMPERATURE: 98.1 F | SYSTOLIC BLOOD PRESSURE: 122 MMHG | BODY MASS INDEX: 24.21 KG/M2 | OXYGEN SATURATION: 97 % | DIASTOLIC BLOOD PRESSURE: 82 MMHG | HEART RATE: 86 BPM | WEIGHT: 164 LBS

## 2020-07-29 DIAGNOSIS — H61.23 IMPACTED CERUMEN, BILATERAL: Primary | ICD-10-CM

## 2020-07-29 DIAGNOSIS — Z96.22 S/P MYRINGOTOMY WITH INSERTION OF TUBE: ICD-10-CM

## 2020-07-29 PROCEDURE — 69210 REMOVE IMPACTED EAR WAX UNI: CPT | Mod: 50 | Performed by: NURSE PRACTITIONER

## 2020-07-29 PROCEDURE — G0463 HOSPITAL OUTPT CLINIC VISIT: HCPCS

## 2020-07-29 ASSESSMENT — PAIN SCALES - GENERAL: PAINLEVEL: NO PAIN (0)

## 2020-07-29 NOTE — LETTER
7/29/2020         RE: Jf Ortiz  [address]  [place] [address]        Dear Colleague,    Thank you for referring your patient, Jf Ortiz, to the [facility] [place]. Please see a copy of my visit note below.    Otolaryngology Note         Chief Complaint:     Patient presents with:  Cerumen Impaction: ear cleaning           History of Present Illness:     Jf Ortiz is a 86 year old male who presents today for ear cleaning.    He last had ears cleaned 5+ months ago.   No concerns with hearing.  No tinnitus, vertigo, facial numbness or weakness.      No otalgia, otorrhea.    + hx of COM or otologic surgeries. He has a right T-tube         Medications:     Current Outpatient Rx   Medication Sig Dispense Refill     atorvastatin (LIPITOR) 20 MG tablet TAKE 1 TABLET BY MOUTH EVERY EVENING - GENERIC FOR LIPITOR 90 tablet 3     Cholecalciferol (VITAMIN D-3) 25 MCG (1000 UT) CAPS Take 50 mcg by mouth daily        Coenzyme Q10 (CO Q 10 PO) Take 100 mg by mouth every morning        cyclobenzaprine (FLEXERIL) 10 MG tablet TAKE 1 TABLET BY MOUTH THREE TIMES DAILY AS NEEDED FOR MUSCLE SPASM       donepezil (ARICEPT) 10 MG tablet TAKE 1 TABLET BY MOUTH NIGHTLY AT BEDTIME 90 tablet 3     fludrocortisone (FLORINEF) 0.1 MG tablet Take 1 tablet (0.1 mg) by mouth every morning (before breakfast) 30 tablet 11     lidocaine (LIDODERM) 5 % patch Place 1 patch onto the skin every 24 hours 30 patch 3     magnesium 500 MG TABS Take 500 mg by mouth daily        melatonin 5 MG tablet Take 5 mg by mouth nightly as needed for sleep       midodrine (PROAMATINE) 5 MG tablet TAKE 2 TABLETS (10MG) BY MOUTH THREE TIMES DAILY 540 tablet 3     multivitamin, therapeutic with minerals (MULTI-VITAMIN) TABS tablet Take 1 tablet by mouth daily       potassium chloride ER (KLOR-CON M) 20 MEQ CR tablet TAKE 1 TABLET BY MOUTH EVERY DAY 30 tablet 1     RABEprazole (ACIPHEX) 20 MG EC tablet Take 20 mg by mouth 2 times daily        sodium chloride 1 GM tablet TAKE 1 TABLET BY MOUTH TWICE A  tablet 3     Turmeric 500 MG TABS Take 1,000 mg by mouth daily               Allergies:     Allergies: Cats and Lamotrigine          Past Medical History:     Past Medical History:   Diagnosis Date     Autonomic orthostatic hypotension 10/14/2016     Coronary artery disease      Dementia without behavioral disturbance (H) 7/28/2015    Diagnosis updated by automated process. Provider to review and confirm.     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     Seizure disorder (H)      Stented coronary artery             Past Surgical History:     Past Surgical History:   Procedure Laterality Date     ------------OTHER-------------  1955    ulnar and radial fx - repair ulnar and radial fx x4     ------------OTHER-------------  6/14/2011    cataract extraction     BIOPSY  08/2015    skin biopsy     BLEPHAROPLASTY BILATERAL  5/6/2014    Procedure: BLEPHAROPLASTY BILATERAL;  Surgeon: Andrew Queen MD;  Location: [facility]     BYPASS GRAFT ARTERY CORONARY  11/2006    coronary artery disease x 5, [facility]     cataract extraction and lens implantation  2011    cataracts     cataract extraction and lens implantation      cataracts     COLONOSCOPY  2012     colonoscopy with polypectomy  3/13/2009    history of polyps - repeat 3 yrs     colonoscopy with polypectomy  2006     colonoscopy with polypectomy  2005     COMBINED COLONOSCOPY WITH ARGON PLASMA COAGULATOR (APC) N/A 10/31/2014    Procedure: COMBINED COLONOSCOPY WITH ARGON PLASMA COAGULATOR (APC);  Surgeon: Bassam Aguilar MD;  [facility]: [facility]     COMBINED COLONOSCOPY WITH ARGON PLASMA COAGULATOR (APC) N/A 11/13/2015    Procedure: COMBINED  COLONOSCOPY WITH ARGON PLASMA COAGULATOR (APC);  Surgeon: Bassam Aguilar MD;  [facility]     ENDOSCOPY UPPER, COLONOSCOPY, COMBINED N/A 10/31/2014    Procedure: COMBINED ENDOSCOPY UPPER, COLONOSCOPY;  Surgeon: Bassam Aguilar MD;  [facility]     ENDOSCOPY UPPER, COLONOSCOPY, COMBINED N/A 2015    Procedure: COMBINED ENDOSCOPY UPPER, COLONOSCOPY;  Surgeon: Bassam Aguilar MD;  [facility]     ESOPHAGOSCOPY, GASTROSCOPY, DUODENOSCOPY (EGD), COMBINED  2014    Procedure: COMBINED ESOPHAGOSCOPY, GASTROSCOPY, DUODENOSCOPY (EGD);  UPPER ENDOSCOPY(PENA) W/ BIOPSIES;  Surgeon: Patricia Pena MD;  [facility]     HERNIORRHAPHY INGUINAL Right 2018    Procedure: HERNIORRHAPHY INGUINAL;  OPEN RIGHT INGUINAL HERNIA REPAIR with Mesh;  Surgeon: Virgilio Zaragoza DO;  Location: [facility]     INSERT PORT VASCULAR ACCESS Right 2019    Procedure: PORT PLACEMENT;  Surgeon: Lloyd Ram MD;  [facility]     LARYNGOSCOPY WITH MICROSCOPE  2014    Procedure: LARYNGOSCOPY WITH MICROSCOPE;;  Surgeon: Chayo Duke MD;  [facility]     pacemaker placement      heart block     pacemaker placement      dual-chamber     REMOVE TUBE, MYRINGOTOMY, COMBINED  2014    Procedure: COMBINED REMOVE TUBE, MYRINGOTOMY;  MICRODIRECT LARYNGOSCOPY WITH BIOPSY AND FROZEN SECTIONS removal of right ear tube and myringoplasty;  Surgeon: Chayo Duke MD;  [facility]     REPLACE PACEMAKER GENERATOR N/A 2018    Procedure: REPLACE PACEMAKER GENERATOR;  Pacemaker generator change;  Surgeon: Alma Kaplan MD;  [facility]: [facility]     stent placement to LAD  2008     ventilation tube  2012    right in office       ENT family history reviewed         Social History:     Social History     Tobacco Use     Smoking status: Former Smoker     Packs/day: 1.00     Years: 5.00     Pack years: 5.00     Types: Cigarettes     Last attempt to quit: 1985     Years since quittin.6      Smokeless tobacco: Never Used     Tobacco comment: quit in 1985   Substance Use Topics     Alcohol use: Yes     Comment: social     Drug use: No            Review of Systems:     ROS: See HPI         Physical Exam:     /82   Pulse 86   Temp 98.1  F (36.7  C) (Tympanic)   Wt 74.4 kg (164 lb)   SpO2 97%   BMI 24.21 kg/m      General - The patient is well nourished and well developed, and appears to have good nutritional status.  Alert and oriented to person and place, answers questions and cooperates with examination appropriately.   Head and Face - Normocephalic and atraumatic, with no gross asymmetry noted.  The facial nerve is intact, with strong symmetric movements.  Voice and Breathing - The patient was breathing comfortably without the use of accessory muscles. There was no wheezing, stridor. The patients voice was clear and strong, and had appropriate pitch and quality.  Ears - The ears were examined with binocular microscopy and with otoscope.  External ears normal. Right canal cerumen impacted.  Left canal cerumen impacted.  The right ear was cleaned with #7 sucker, cupped forceps.   Right tympanic membrane is without effusion, retraction or mass, the T-tube is in good position and patent.  The outer lumen of the t-tube has hard cerumen.  Attempted to remove but was unable to remove without concern of pulling out the t-tube.  The cerumen is not on the TM or in the lumen of the tube so I left it.   The left ear was cleaned with #7, #5 sucker.  Left tympanic membrane is intact without effusion, retraction or mass.  Eyes - Extraocular movements intact, sclera were not icteric or injected, conjunctiva were pink and moist.  Mouth - Examination of the oral cavity showed pink, healthy oral mucosa. Dentition in good condition. No lesions or ulcerations noted. The tongue was mobile and midline.   Throat - The walls of the oropharynx were smooth, pink, moist, symmetric, and had no lesions or ulcerations.   The tonsillar pillars and soft palate were symmetric. The uvula was midline on elevation.    Neck - Full range of motion on passive movement.  Palpation of the occipital, submental, submandibular, internal jugular chain, and supraclavicular nodes did not demonstrate any abnormal lymph nodes or masses.  Palpation of the thyroid was soft and smooth, with no nodules or goiter appreciated.  The trachea was mobile and midline.  Nose - External contour is symmetric, no gross deflection or scars.  Nasal mucosa is pink and moist with no abnormal mucus.  The septum and turbinates were evaluated with nasal speculum, no polyps, masses, or purulence noted on examination.         Assessment and Plan:       ICD-10-CM    1. Impacted cerumen, bilateral  H61.23    2. S/P myringotomy with insertion of tube RIGHT  Z96.22      Ears were cleaned, tolerated well    Follow up in about 4 months for repeat cleaning    The ears were cleaned today.  The patient may return here as needed or with their primary physician.  Aural hygiene was discussed.  Avoidance of Q-tips was reiterated.  Avoid flushing the ear canal if there is a possibility of a hole or perforation in the tympanic membrane.   If there are any unresolved concerns regarding hearing loss an audiogram is recommended.      Char CLARKE  [facility]NT      Again, thank you for allowing me to participate in the care of your patient.        Sincerely,        Char Avila NP

## 2020-07-29 NOTE — NURSING NOTE
"Chief Complaint   Patient presents with     Cerumen Impaction     ear cleaning       Initial /82   Pulse 86   Temp 98.1  F (36.7  C) (Tympanic)   Wt 74.4 kg (164 lb)   SpO2 97%   BMI 24.21 kg/m   Estimated body mass index is 24.21 kg/m  as calculated from the following:    Height as of 7/21/20: 1.753 m (5' 9.02\").    Weight as of this encounter: 74.4 kg (164 lb).  Medication Reconciliation: complete  Lola Banda LPN  "

## 2020-07-29 NOTE — PROGRESS NOTES
Otolaryngology Note         Chief Complaint:     Patient presents with:  Cerumen Impaction: ear cleaning           History of Present Illness:     Jf Ortiz is a 86 year old male who presents today for ear cleaning.    He last had ears cleaned 5+ months ago.   No concerns with hearing.  No tinnitus, vertigo, facial numbness or weakness.      No otalgia, otorrhea.    + hx of COM or otologic surgeries. He has a right T-tube         Medications:     Current Outpatient Rx   Medication Sig Dispense Refill     atorvastatin (LIPITOR) 20 MG tablet TAKE 1 TABLET BY MOUTH EVERY EVENING - GENERIC FOR LIPITOR 90 tablet 3     Cholecalciferol (VITAMIN D-3) 25 MCG (1000 UT) CAPS Take 50 mcg by mouth daily        Coenzyme Q10 (CO Q 10 PO) Take 100 mg by mouth every morning        cyclobenzaprine (FLEXERIL) 10 MG tablet TAKE 1 TABLET BY MOUTH THREE TIMES DAILY AS NEEDED FOR MUSCLE SPASM       donepezil (ARICEPT) 10 MG tablet TAKE 1 TABLET BY MOUTH NIGHTLY AT BEDTIME 90 tablet 3     fludrocortisone (FLORINEF) 0.1 MG tablet Take 1 tablet (0.1 mg) by mouth every morning (before breakfast) 30 tablet 11     lidocaine (LIDODERM) 5 % patch Place 1 patch onto the skin every 24 hours 30 patch 3     magnesium 500 MG TABS Take 500 mg by mouth daily        melatonin 5 MG tablet Take 5 mg by mouth nightly as needed for sleep       midodrine (PROAMATINE) 5 MG tablet TAKE 2 TABLETS (10MG) BY MOUTH THREE TIMES DAILY 540 tablet 3     multivitamin, therapeutic with minerals (MULTI-VITAMIN) TABS tablet Take 1 tablet by mouth daily       potassium chloride ER (KLOR-CON M) 20 MEQ CR tablet TAKE 1 TABLET BY MOUTH EVERY DAY 30 tablet 1     RABEprazole (ACIPHEX) 20 MG EC tablet Take 20 mg by mouth 2 times daily       sodium chloride 1 GM tablet TAKE 1 TABLET BY MOUTH TWICE A  tablet 3     Turmeric 500 MG TABS Take 1,000 mg by mouth daily               Allergies:     Allergies: Cats and Lamotrigine          Past Medical History:     Past Medical  History:   Diagnosis Date     Autonomic orthostatic hypotension 10/14/2016     Coronary artery disease      Dementia without behavioral disturbance (H) 7/28/2015    Diagnosis updated by automated process. Provider to review and confirm.     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     Seizure disorder (H)      Stented coronary artery             Past Surgical History:     Past Surgical History:   Procedure Laterality Date     ------------OTHER-------------  1955    ulnar and radial fx - repair ulnar and radial fx x4     ------------OTHER-------------  6/14/2011    cataract extraction     BIOPSY  08/2015    skin biopsy     BLEPHAROPLASTY BILATERAL  5/6/2014    Procedure: BLEPHAROPLASTY BILATERAL;  Surgeon: Andrew Queen MD;  [facility]: [facility]     BYPASS GRAFT ARTERY CORONARY  11/2006    coronary artery disease x 5, [facility]     cataract extraction and lens implantation  2011    cataracts     cataract extraction and lens implantation      cataracts     COLONOSCOPY  2012     colonoscopy with polypectomy  3/13/2009    history of polyps - repeat 3 yrs     colonoscopy with polypectomy  2006     colonoscopy with polypectomy  2005     COMBINED COLONOSCOPY WITH ARGON PLASMA COAGULATOR (APC) N/A 10/31/2014    Procedure: COMBINED COLONOSCOPY WITH ARGON PLASMA COAGULATOR (APC);  Surgeon: Bassam Aguilar MD;  [facility]     COMBINED COLONOSCOPY WITH ARGON PLASMA COAGULATOR (APC) N/A 11/13/2015    Procedure: COMBINED COLONOSCOPY WITH ARGON PLASMA COAGULATOR (APC);  Surgeon: Bassam Aguilar MD;  [facility]     ENDOSCOPY UPPER, COLONOSCOPY, COMBINED N/A 10/31/2014    Procedure: COMBINED ENDOSCOPY UPPER, COLONOSCOPY;  Surgeon: Bassam Aguilar MD;  [facility]     ENDOSCOPY UPPER, COLONOSCOPY, COMBINED N/A 2015    Procedure: COMBINED ENDOSCOPY UPPER, COLONOSCOPY;  Surgeon: Bassam Aguilar MD;  [facility]     ESOPHAGOSCOPY, GASTROSCOPY, DUODENOSCOPY (EGD), COMBINED  2014    Procedure: COMBINED ESOPHAGOSCOPY, GASTROSCOPY, DUODENOSCOPY (EGD);  UPPER ENDOSCOPY(PENA) W/ BIOPSIES;  Surgeon: Patricia Pena MD;  [facility]     HERNIORRHAPHY INGUINAL Right 2018    Procedure: HERNIORRHAPHY INGUINAL;  OPEN RIGHT INGUINAL HERNIA REPAIR with Mesh;  Surgeon: Virgilio Zaragoza DO;  [facility]     INSERT PORT VASCULAR ACCESS Right 2019    Procedure: PORT PLACEMENT;  Surgeon: Lloyd Ram MD;  [facility]     LARYNGOSCOPY WITH MICROSCOPE  2014    Procedure: LARYNGOSCOPY WITH MICROSCOPE;;  Surgeon: Chayo Duke MD;  [facility]     pacemaker placement      heart block     pacemaker placement      dual-chamber     REMOVE TUBE, MYRINGOTOMY, COMBINED  2014    Procedure: COMBINED REMOVE TUBE, MYRINGOTOMY;  MICRODIRECT LARYNGOSCOPY WITH BIOPSY AND FROZEN SECTIONS removal of right ear tube and myringoplasty;  Surgeon: Chayo Duke MD;  [facility]     REPLACE PACEMAKER GENERATOR N/A 2018    Procedure: REPLACE PACEMAKER GENERATOR;  Pacemaker generator change;  Surgeon: Alma Kaplan MD;  [facility]     stent placement to LAD  2008     ventilation tube  2012    right in office       ENT family history reviewed         Social History:     Social History     Tobacco Use     Smoking status: Former Smoker     Packs/day: 1.00     Years: 5.00     Pack years: 5.00     Types: Cigarettes     Last attempt to quit: 1985     Years since quittin.6     Smokeless tobacco: Never Used     Tobacco comment: quit in    Substance Use Topics     Alcohol use: Yes     Comment: social     Drug use: No            Review of Systems:     ROS: See HPI         Physical Exam:     /82   Pulse 86    Temp 98.1  F (36.7  C) (Tympanic)   Wt 74.4 kg (164 lb)   SpO2 97%   BMI 24.21 kg/m      General - The patient is well nourished and well developed, and appears to have good nutritional status.  Alert and oriented to person and place, answers questions and cooperates with examination appropriately.   Head and Face - Normocephalic and atraumatic, with no gross asymmetry noted.  The facial nerve is intact, with strong symmetric movements.  Voice and Breathing - The patient was breathing comfortably without the use of accessory muscles. There was no wheezing, stridor. The patients voice was clear and strong, and had appropriate pitch and quality.  Ears - The ears were examined with binocular microscopy and with otoscope.  External ears normal. Right canal cerumen impacted.  Left canal cerumen impacted.  The right ear was cleaned with #7 sucker, cupped forceps.   Right tympanic membrane is without effusion, retraction or mass, the T-tube is in good position and patent.  The outer lumen of the t-tube has hard cerumen.  Attempted to remove but was unable to remove without concern of pulling out the t-tube.  The cerumen is not on the TM or in the lumen of the tube so I left it.   The left ear was cleaned with #7, #5 sucker.  Left tympanic membrane is intact without effusion, retraction or mass.  Eyes - Extraocular movements intact, sclera were not icteric or injected, conjunctiva were pink and moist.  Mouth - Examination of the oral cavity showed pink, healthy oral mucosa. Dentition in good condition. No lesions or ulcerations noted. The tongue was mobile and midline.   Throat - The walls of the oropharynx were smooth, pink, moist, symmetric, and had no lesions or ulcerations.  The tonsillar pillars and soft palate were symmetric. The uvula was midline on elevation.    Neck - Full range of motion on passive movement.  Palpation of the occipital, submental, submandibular, internal jugular chain, and supraclavicular  nodes did not demonstrate any abnormal lymph nodes or masses.  Palpation of the thyroid was soft and smooth, with no nodules or goiter appreciated.  The trachea was mobile and midline.  Nose - External contour is symmetric, no gross deflection or scars.  Nasal mucosa is pink and moist with no abnormal mucus.  The septum and turbinates were evaluated with nasal speculum, no polyps, masses, or purulence noted on examination.         Assessment and Plan:       ICD-10-CM    1. Impacted cerumen, bilateral  H61.23    2. S/P myringotomy with insertion of tube RIGHT  Z96.22      Ears were cleaned, tolerated well    Follow up in about 4 months for repeat cleaning    The ears were cleaned today.  The patient may return here as needed or with their primary physician.  Aural hygiene was discussed.  Avoidance of Q-tips was reiterated.  Avoid flushing the ear canal if there is a possibility of a hole or perforation in the tympanic membrane.   If there are any unresolved concerns regarding hearing loss an audiogram is recommended.      Char CLARKE  [facility]NT

## 2020-07-29 NOTE — PATIENT INSTRUCTIONS
Follow up in about 4 months for repeat cleaning    Thank you for allowing Char CLARKE and our ENT team to participate in your care.  If your medications are too expensive, please call my nurse at the number listed below.  We can possibly change this medication.    If you have a scheduling or an appointment question please contact our Health Unit Coordinator at their direct line [phone].   ALL nursing questions or concerns can be directed to your ENT nurses at: [phone] or [phone].

## 2020-08-04 ENCOUNTER — INFUSION THERAPY VISIT (OUTPATIENT)
Dept: INFUSION THERAPY | Facility: OTHER | Age: 85
End: 2020-08-04
Attending: FAMILY MEDICINE
Payer: MEDICARE

## 2020-08-04 VITALS
BODY MASS INDEX: 24.56 KG/M2 | TEMPERATURE: 97.7 F | WEIGHT: 165.79 LBS | SYSTOLIC BLOOD PRESSURE: 175 MMHG | DIASTOLIC BLOOD PRESSURE: 80 MMHG | OXYGEN SATURATION: 98 % | HEIGHT: 69 IN | HEART RATE: 82 BPM

## 2020-08-04 DIAGNOSIS — E86.9 VOLUME DEPLETION: Primary | ICD-10-CM

## 2020-08-04 PROCEDURE — 96365 THER/PROPH/DIAG IV INF INIT: CPT

## 2020-08-04 PROCEDURE — 96360 HYDRATION IV INFUSION INIT: CPT

## 2020-08-04 PROCEDURE — 25800030 ZZH RX IP 258 OP 636: Performed by: FAMILY MEDICINE

## 2020-08-04 PROCEDURE — 25000128 H RX IP 250 OP 636: Performed by: FAMILY MEDICINE

## 2020-08-04 RX ORDER — HEPARIN SODIUM (PORCINE) LOCK FLUSH IV SOLN 100 UNIT/ML 100 UNIT/ML
5 SOLUTION INTRAVENOUS ONCE
Status: COMPLETED | OUTPATIENT
Start: 2020-08-04 | End: 2020-08-04

## 2020-08-04 RX ORDER — HEPARIN SODIUM (PORCINE) LOCK FLUSH IV SOLN 100 UNIT/ML 100 UNIT/ML
5 SOLUTION INTRAVENOUS ONCE
Status: CANCELLED
Start: 2020-08-04

## 2020-08-04 RX ADMIN — SODIUM CHLORIDE 1000 ML: 9 INJECTION, SOLUTION INTRAVENOUS at 11:00

## 2020-08-04 RX ADMIN — HEPARIN 5 ML: 100 SYRINGE at 12:16

## 2020-08-04 ASSESSMENT — MIFFLIN-ST. JEOR: SCORE: 1422.63

## 2020-08-04 NOTE — PROGRESS NOTES
Patient is a 85 y/o male here accompanied by self today for infusion of IVF per order of Dr. Ray.  Patient identified with two identifiers, order verified, and verbal consent for today's infusion obtained from patient.         Patient meets order parameters for today's treatment.         Patients port accessed using non-coring, 20 gauge, 3/4 needle. Port accessed per facility protocol. Port flushed easily, blood return noted.  No signs and symptoms of infection or infiltration.      11:00 IV pump verified with dose, drug, and rate of administration.  Infusion administered per protocol. hour, and to call with questions or concerns.  Copy of appointments, discharge instructions, and after visit summary (AVS) provided to patient.  Patient states understanding, discharged.

## 2020-08-07 ENCOUNTER — INFUSION THERAPY VISIT (OUTPATIENT)
Dept: INFUSION THERAPY | Facility: OTHER | Age: 85
End: 2020-08-07
Attending: FAMILY MEDICINE
Payer: MEDICARE

## 2020-08-07 VITALS
OXYGEN SATURATION: 98 % | SYSTOLIC BLOOD PRESSURE: 132 MMHG | BODY MASS INDEX: 24.63 KG/M2 | TEMPERATURE: 97.9 F | HEART RATE: 92 BPM | WEIGHT: 166.89 LBS | RESPIRATION RATE: 16 BRPM | DIASTOLIC BLOOD PRESSURE: 75 MMHG

## 2020-08-07 DIAGNOSIS — E86.9 VOLUME DEPLETION: Primary | ICD-10-CM

## 2020-08-07 PROCEDURE — 25800030 ZZH RX IP 258 OP 636: Performed by: FAMILY MEDICINE

## 2020-08-07 PROCEDURE — 25000128 H RX IP 250 OP 636: Performed by: FAMILY MEDICINE

## 2020-08-07 PROCEDURE — 96360 HYDRATION IV INFUSION INIT: CPT

## 2020-08-07 RX ORDER — HEPARIN SODIUM (PORCINE) LOCK FLUSH IV SOLN 100 UNIT/ML 100 UNIT/ML
5 SOLUTION INTRAVENOUS ONCE
Status: CANCELLED
Start: 2020-08-07

## 2020-08-07 RX ORDER — HEPARIN SODIUM (PORCINE) LOCK FLUSH IV SOLN 100 UNIT/ML 100 UNIT/ML
5 SOLUTION INTRAVENOUS ONCE
Status: COMPLETED | OUTPATIENT
Start: 2020-08-07 | End: 2020-08-07

## 2020-08-07 RX ADMIN — HEPARIN 5 ML: 100 SYRINGE at 11:03

## 2020-08-07 RX ADMIN — SODIUM CHLORIDE 1000 ML: 9 INJECTION, SOLUTION INTRAVENOUS at 09:53

## 2020-08-07 NOTE — PROGRESS NOTES
Patient is a 86 year old male here today for infusion of IVF per order of Dr Ray.  Patient identified with two identifiers, order verified, and verbal consent for today's infusion obtained from patient.      Patient denies questions or concerns regarding infusion and/or medication(s) being administered.    Patients right port accessed using non-coring, 19 gauge, 3/4 power  needle. Port accessed per facility protocol. Port flushed easily, blood return noted.  No signs and symptoms of infection or infiltration.      0953 IV pump verified with IVF dose, drug, and rate of administration.  Infusion administered per protocol.  Patient tolerated infusion well, no signs or symptoms of adverse reaction noted.  Patient denies pain nor discomfort.     IV removed, catheter intact.  Site clean, dry and intact.  No signs or symptoms of infiltration or infection.  Covered with a sterile bandage, slight pressure applied for 30 seconds.  Pt instructed to leave bandage intact for a minimum of one hour, and to call with questions or concerns.  Patient declines copy of appointments, discharge instructions, and after visit summary (AVS).  Patient states understanding, discharged.

## 2020-08-07 NOTE — PATIENT INSTRUCTIONS
We will see you back as planned. If you have any questions or concerns, we can be reached Monday through Friday 8am - 430pm at [phone] ([facility]). If you have concerns related to a potential reaction/side effect after hours/weekends/holiday's, please seek emergent medical care.

## 2020-08-11 ENCOUNTER — INFUSION THERAPY VISIT (OUTPATIENT)
Dept: INFUSION THERAPY | Facility: OTHER | Age: 85
End: 2020-08-11
Attending: FAMILY MEDICINE
Payer: MEDICARE

## 2020-08-11 VITALS
RESPIRATION RATE: 18 BRPM | BODY MASS INDEX: 24.69 KG/M2 | OXYGEN SATURATION: 97 % | WEIGHT: 166.67 LBS | HEIGHT: 69 IN | SYSTOLIC BLOOD PRESSURE: 159 MMHG | TEMPERATURE: 97.9 F | HEART RATE: 71 BPM | DIASTOLIC BLOOD PRESSURE: 80 MMHG

## 2020-08-11 DIAGNOSIS — E86.9 VOLUME DEPLETION: Primary | ICD-10-CM

## 2020-08-11 PROCEDURE — 25000128 H RX IP 250 OP 636: Performed by: FAMILY MEDICINE

## 2020-08-11 PROCEDURE — 25800030 ZZH RX IP 258 OP 636: Performed by: FAMILY MEDICINE

## 2020-08-11 PROCEDURE — 96360 HYDRATION IV INFUSION INIT: CPT

## 2020-08-11 RX ORDER — HEPARIN SODIUM (PORCINE) LOCK FLUSH IV SOLN 100 UNIT/ML 100 UNIT/ML
5 SOLUTION INTRAVENOUS ONCE
Status: CANCELLED
Start: 2020-08-11

## 2020-08-11 RX ORDER — HEPARIN SODIUM (PORCINE) LOCK FLUSH IV SOLN 100 UNIT/ML 100 UNIT/ML
5 SOLUTION INTRAVENOUS ONCE
Status: COMPLETED | OUTPATIENT
Start: 2020-08-11 | End: 2020-08-11

## 2020-08-11 RX ADMIN — SODIUM CHLORIDE 1000 ML: 9 INJECTION, SOLUTION INTRAVENOUS at 09:40

## 2020-08-11 RX ADMIN — HEPARIN 5 ML: 100 SYRINGE at 10:47

## 2020-08-11 ASSESSMENT — MIFFLIN-ST. JEOR: SCORE: 1426.63

## 2020-08-11 ASSESSMENT — PAIN SCALES - GENERAL: PAINLEVEL: MILD PAIN (3)

## 2020-08-11 NOTE — PATIENT INSTRUCTIONS

## 2020-08-11 NOTE — PROGRESS NOTES
Patient is a 86 year old male here accompanied by self today for infusion of IVF per order of Dr MALIA Ray under the supervision of Dr Walter, Cardiology.  Patient identified with two identifiers, order verified, and verbal consent for today's infusion obtained from patient.      Patients port accessed using non-coring, 20 gauge, 3/4 inch needle. Port accessed per facility protocol. Port flushed easily, blood return noted.  No signs and symptoms of infection or infiltration.      IV pump verified with dose, drug, and rate of administration.  Infusion administered per protocol.      Catia FINLEY RN, discharged patient for this nurse. Per Catia, patient tolerated infusion well, no signs or symptoms of adverse reaction noted.  Patient denies pain nor discomfort.     IV removed, catheter intact.  Site clean, dry and intact.  No signs or symptoms of infiltration or infection.  Covered with a sterile bandage, slight pressure applied for 30 seconds.  Pt instructed to leave bandage intact for a minimum of one hour, and to call with questions or concerns. Patient states understanding, discharged.

## 2020-08-14 ENCOUNTER — INFUSION THERAPY VISIT (OUTPATIENT)
Dept: INFUSION THERAPY | Facility: OTHER | Age: 85
End: 2020-08-14
Attending: FAMILY MEDICINE
Payer: MEDICARE

## 2020-08-14 DIAGNOSIS — E86.9 VOLUME DEPLETION: Primary | ICD-10-CM

## 2020-08-14 PROCEDURE — 96360 HYDRATION IV INFUSION INIT: CPT

## 2020-08-14 PROCEDURE — 96365 THER/PROPH/DIAG IV INF INIT: CPT

## 2020-08-14 PROCEDURE — 25800030 ZZH RX IP 258 OP 636: Performed by: FAMILY MEDICINE

## 2020-08-14 PROCEDURE — 25000128 H RX IP 250 OP 636: Performed by: FAMILY MEDICINE

## 2020-08-14 RX ORDER — HEPARIN SODIUM (PORCINE) LOCK FLUSH IV SOLN 100 UNIT/ML 100 UNIT/ML
5 SOLUTION INTRAVENOUS ONCE
Status: COMPLETED | OUTPATIENT
Start: 2020-08-14 | End: 2020-08-14

## 2020-08-14 RX ORDER — HEPARIN SODIUM (PORCINE) LOCK FLUSH IV SOLN 100 UNIT/ML 100 UNIT/ML
5 SOLUTION INTRAVENOUS ONCE
Status: CANCELLED
Start: 2020-08-14

## 2020-08-14 RX ADMIN — SODIUM CHLORIDE 1000 ML: 9 INJECTION, SOLUTION INTRAVENOUS at 09:50

## 2020-08-14 RX ADMIN — HEPARIN 5 ML: 100 SYRINGE at 11:01

## 2020-08-14 NOTE — PROGRESS NOTES
"Patient is a 86 year old male here accompanied by [facility] today for infusion of IVF per order of Dr Venancio Ray. Patient identified with two identifiers, order verified, and verbal consent for today's infusion obtained from patient.      Patients port accessed using non-coring, 20 gauge, 3/4\" needle. Port accessed per facility protocol. Port flushed easily, blood return noted.  No signs and symptoms of infection or infiltration.      IV pump verified with dose, drug, and rate of administration.  Infusion administered per protocol.  Patient tolerated infusion well, no signs or symptoms of adverse reaction noted.  Patient denies pain nor discomfort.     IV removed, catheter intact.  Site clean, dry and intact.  No signs or symptoms of infiltration or infection.  Covered with a sterile bandage, slight pressure applied for 30 seconds.  Pt instructed to leave bandage intact for a minimum of one hour, and to call with questions or concerns.  Copy of appointments, discharge instructions, and after visit summary (AVS) provided to patient.  Patient states understanding, discharged.    "

## 2020-08-18 ENCOUNTER — INFUSION THERAPY VISIT (OUTPATIENT)
Dept: INFUSION THERAPY | Facility: OTHER | Age: 85
End: 2020-08-18
Attending: FAMILY MEDICINE
Payer: MEDICARE

## 2020-08-18 VITALS
BODY MASS INDEX: 24.29 KG/M2 | HEIGHT: 69 IN | HEART RATE: 76 BPM | SYSTOLIC BLOOD PRESSURE: 152 MMHG | TEMPERATURE: 97.8 F | RESPIRATION RATE: 18 BRPM | WEIGHT: 164.02 LBS | DIASTOLIC BLOOD PRESSURE: 75 MMHG | OXYGEN SATURATION: 98 %

## 2020-08-18 DIAGNOSIS — E86.9 VOLUME DEPLETION: Primary | ICD-10-CM

## 2020-08-18 PROCEDURE — 25800030 ZZH RX IP 258 OP 636: Performed by: FAMILY MEDICINE

## 2020-08-18 PROCEDURE — 25000128 H RX IP 250 OP 636: Performed by: FAMILY MEDICINE

## 2020-08-18 PROCEDURE — 96360 HYDRATION IV INFUSION INIT: CPT

## 2020-08-18 RX ORDER — HEPARIN SODIUM (PORCINE) LOCK FLUSH IV SOLN 100 UNIT/ML 100 UNIT/ML
5 SOLUTION INTRAVENOUS ONCE
Status: COMPLETED | OUTPATIENT
Start: 2020-08-18 | End: 2020-08-18

## 2020-08-18 RX ORDER — HEPARIN SODIUM (PORCINE) LOCK FLUSH IV SOLN 100 UNIT/ML 100 UNIT/ML
5 SOLUTION INTRAVENOUS ONCE
Status: CANCELLED
Start: 2020-08-18

## 2020-08-18 RX ADMIN — HEPARIN 5 ML: 100 SYRINGE at 10:47

## 2020-08-18 RX ADMIN — SODIUM CHLORIDE 1000 ML: 9 INJECTION, SOLUTION INTRAVENOUS at 09:37

## 2020-08-18 ASSESSMENT — PAIN SCALES - GENERAL: PAINLEVEL: MILD PAIN (3)

## 2020-08-18 ASSESSMENT — MIFFLIN-ST. JEOR: SCORE: 1414.63

## 2020-08-18 NOTE — PROGRESS NOTES
Patient is a 86 year old male here accompanied by self today for infusion of IVF per order of Dr MALIA Ray under the supervision of Dr Walter.  Patient identified with two identifiers, order verified, and verbal consent for today's infusion obtained from patient.     Patient poor historian, unable to verify meds.     Patients port accessed using non-coring, 20 gauge, 3/4 inch power needle. Port accessed per facility protocol. Port flushed easily, blood return noted.  No signs and symptoms of infection or infiltration.      IV pump verified with dose, drug, and rate of administration.  Infusion administered per protocol.

## 2020-08-18 NOTE — PATIENT INSTRUCTIONS

## 2020-08-21 ENCOUNTER — INFUSION THERAPY VISIT (OUTPATIENT)
Dept: INFUSION THERAPY | Facility: OTHER | Age: 85
End: 2020-08-21
Attending: FAMILY MEDICINE
Payer: MEDICARE

## 2020-08-21 VITALS
WEIGHT: 163.58 LBS | HEIGHT: 69 IN | TEMPERATURE: 97.7 F | RESPIRATION RATE: 18 BRPM | SYSTOLIC BLOOD PRESSURE: 146 MMHG | BODY MASS INDEX: 24.23 KG/M2 | DIASTOLIC BLOOD PRESSURE: 73 MMHG | HEART RATE: 78 BPM | OXYGEN SATURATION: 94 %

## 2020-08-21 DIAGNOSIS — E86.9 VOLUME DEPLETION: Primary | ICD-10-CM

## 2020-08-21 PROCEDURE — 96360 HYDRATION IV INFUSION INIT: CPT

## 2020-08-21 PROCEDURE — 25800030 ZZH RX IP 258 OP 636: Performed by: FAMILY MEDICINE

## 2020-08-21 PROCEDURE — 25000128 H RX IP 250 OP 636: Performed by: FAMILY MEDICINE

## 2020-08-21 RX ORDER — HEPARIN SODIUM (PORCINE) LOCK FLUSH IV SOLN 100 UNIT/ML 100 UNIT/ML
5 SOLUTION INTRAVENOUS ONCE
Status: CANCELLED
Start: 2020-08-21

## 2020-08-21 RX ORDER — HEPARIN SODIUM (PORCINE) LOCK FLUSH IV SOLN 100 UNIT/ML 100 UNIT/ML
5 SOLUTION INTRAVENOUS ONCE
Status: COMPLETED | OUTPATIENT
Start: 2020-08-21 | End: 2020-08-21

## 2020-08-21 RX ADMIN — SODIUM CHLORIDE 1000 ML: 9 INJECTION, SOLUTION INTRAVENOUS at 09:43

## 2020-08-21 RX ADMIN — HEPARIN 5 ML: 100 SYRINGE at 10:47

## 2020-08-21 ASSESSMENT — PAIN SCALES - GENERAL: PAINLEVEL: MILD PAIN (3)

## 2020-08-21 ASSESSMENT — MIFFLIN-ST. JEOR: SCORE: 1412.63

## 2020-08-21 NOTE — PATIENT INSTRUCTIONS

## 2020-08-25 ENCOUNTER — INFUSION THERAPY VISIT (OUTPATIENT)
Dept: INFUSION THERAPY | Facility: OTHER | Age: 85
End: 2020-08-25
Attending: FAMILY MEDICINE
Payer: MEDICARE

## 2020-08-25 DIAGNOSIS — E86.9 VOLUME DEPLETION: Primary | ICD-10-CM

## 2020-08-25 PROCEDURE — 25000128 H RX IP 250 OP 636: Performed by: FAMILY MEDICINE

## 2020-08-25 PROCEDURE — 96360 HYDRATION IV INFUSION INIT: CPT

## 2020-08-25 PROCEDURE — 25800030 ZZH RX IP 258 OP 636: Performed by: FAMILY MEDICINE

## 2020-08-25 PROCEDURE — 96365 THER/PROPH/DIAG IV INF INIT: CPT

## 2020-08-25 RX ORDER — HEPARIN SODIUM (PORCINE) LOCK FLUSH IV SOLN 100 UNIT/ML 100 UNIT/ML
5 SOLUTION INTRAVENOUS ONCE
Status: COMPLETED | OUTPATIENT
Start: 2020-08-25 | End: 2020-08-25

## 2020-08-25 RX ORDER — HEPARIN SODIUM (PORCINE) LOCK FLUSH IV SOLN 100 UNIT/ML 100 UNIT/ML
5 SOLUTION INTRAVENOUS ONCE
Status: CANCELLED
Start: 2020-08-25

## 2020-08-25 RX ADMIN — HEPARIN 5 ML: 100 SYRINGE at 10:57

## 2020-08-25 RX ADMIN — SODIUM CHLORIDE 1000 ML: 9 INJECTION, SOLUTION INTRAVENOUS at 09:42

## 2020-08-25 NOTE — PROGRESS NOTES
"Patient is a 86 year old male here accompanied by self today for infusion of IVF per order of Dr. Ray.  Patient identified with two identifiers, order verified, and verbal consent for today's infusion obtained from patient.      Patients port accessed using non-coring, 20 gauge, 3/4\" needle. Port accessed per facility protocol. Port flushed easily, blood return noted.  No signs and symptoms of infection or infiltration.      0942: IV pump verified with dose, drug, and rate of administration.  Infusion administered per protocol.  Patient tolerated infusion well, no signs or symptoms of adverse reaction noted.  Patient denies pain nor discomfort.     IV removed, catheter intact.  Site clean, dry and intact.  No signs or symptoms of infiltration or infection.  Covered with a sterile bandage, slight pressure applied for 30 seconds.  Pt instructed to leave bandage intact for a minimum of one hour, and to call with questions or concerns.  Copy of appointments, discharge instructions, and after visit summary (AVS) provided to patient.  Patient states understanding, discharged.    "

## 2020-08-28 ENCOUNTER — INFUSION THERAPY VISIT (OUTPATIENT)
Dept: INFUSION THERAPY | Facility: OTHER | Age: 85
End: 2020-08-28
Attending: FAMILY MEDICINE
Payer: MEDICARE

## 2020-08-28 VITALS
WEIGHT: 167.33 LBS | OXYGEN SATURATION: 97 % | BODY MASS INDEX: 24.78 KG/M2 | TEMPERATURE: 97.6 F | SYSTOLIC BLOOD PRESSURE: 147 MMHG | HEIGHT: 69 IN | HEART RATE: 98 BPM | DIASTOLIC BLOOD PRESSURE: 83 MMHG

## 2020-08-28 DIAGNOSIS — E86.9 VOLUME DEPLETION: Primary | ICD-10-CM

## 2020-08-28 PROCEDURE — 96360 HYDRATION IV INFUSION INIT: CPT

## 2020-08-28 PROCEDURE — 25000128 H RX IP 250 OP 636: Performed by: FAMILY MEDICINE

## 2020-08-28 PROCEDURE — 25800030 ZZH RX IP 258 OP 636: Performed by: FAMILY MEDICINE

## 2020-08-28 RX ORDER — HEPARIN SODIUM (PORCINE) LOCK FLUSH IV SOLN 100 UNIT/ML 100 UNIT/ML
5 SOLUTION INTRAVENOUS ONCE
Status: CANCELLED
Start: 2020-08-28

## 2020-08-28 RX ORDER — HEPARIN SODIUM (PORCINE) LOCK FLUSH IV SOLN 100 UNIT/ML 100 UNIT/ML
5 SOLUTION INTRAVENOUS ONCE
Status: COMPLETED | OUTPATIENT
Start: 2020-08-28 | End: 2020-08-28

## 2020-08-28 RX ADMIN — SODIUM CHLORIDE 1000 ML: 9 INJECTION, SOLUTION INTRAVENOUS at 09:43

## 2020-08-28 RX ADMIN — HEPARIN 5 ML: 100 SYRINGE at 10:54

## 2020-08-28 ASSESSMENT — MIFFLIN-ST. JEOR: SCORE: 1429.63

## 2020-08-28 NOTE — PATIENT INSTRUCTIONS
We will see you back as planned. If you have any questions or concerns, we can be reached Monday through Friday 8am - 430pm at [phone] ([facility] [place]). If you have concerns related to a potential reaction/side effect after hours/weekends/holiday's, please seek emergent medical care.

## 2020-08-28 NOTE — PROGRESS NOTES
Patient is a 86 year old male here today for infusion of IVF per order of Dr Ray under the supervision of Dr Walter.  Patient identified with two identifiers, order verified, and verbal consent for today's infusion obtained from patient.      Patient denies questions or concerns regarding infusion and/or medication(s) being administered.    Patients right port accessed using non-coring, 19 gauge, 3/4 power needle. Port accessed per facility protocol. Port flushed easily, blood return noted.  No signs and symptoms of infection or infiltration.      0943 IV pump verified with IVF dose, drug, and rate of administration.  Infusion administered per protocol.  Patient tolerated infusion well, no signs or symptoms of adverse reaction noted.  Patient denies pain nor discomfort.     IV removed, catheter intact.  Site clean, dry and intact.  No signs or symptoms of infiltration or infection.  Covered with a sterile bandage, slight pressure applied for 30 seconds.  Pt instructed to leave bandage intact for a minimum of one hour, and to call with questions or concerns.  Patient declines copy of appointments, discharge instructions, and after visit summary (AVS).  Patient states understanding, discharged.

## 2020-09-01 ENCOUNTER — INFUSION THERAPY VISIT (OUTPATIENT)
Dept: INFUSION THERAPY | Facility: OTHER | Age: 85
End: 2020-09-01
Attending: FAMILY MEDICINE
Payer: MEDICARE

## 2020-09-01 VITALS
SYSTOLIC BLOOD PRESSURE: 169 MMHG | RESPIRATION RATE: 18 BRPM | BODY MASS INDEX: 24.59 KG/M2 | OXYGEN SATURATION: 98 % | HEIGHT: 69 IN | TEMPERATURE: 97.8 F | DIASTOLIC BLOOD PRESSURE: 82 MMHG | WEIGHT: 166.01 LBS | HEART RATE: 75 BPM

## 2020-09-01 DIAGNOSIS — E86.9 VOLUME DEPLETION: Primary | ICD-10-CM

## 2020-09-01 PROCEDURE — 96360 HYDRATION IV INFUSION INIT: CPT

## 2020-09-01 PROCEDURE — 25000128 H RX IP 250 OP 636: Performed by: FAMILY MEDICINE

## 2020-09-01 PROCEDURE — 25800030 ZZH RX IP 258 OP 636: Performed by: FAMILY MEDICINE

## 2020-09-01 RX ORDER — HEPARIN SODIUM (PORCINE) LOCK FLUSH IV SOLN 100 UNIT/ML 100 UNIT/ML
5 SOLUTION INTRAVENOUS ONCE
Status: CANCELLED
Start: 2020-09-01

## 2020-09-01 RX ORDER — HEPARIN SODIUM (PORCINE) LOCK FLUSH IV SOLN 100 UNIT/ML 100 UNIT/ML
5 SOLUTION INTRAVENOUS ONCE
Status: COMPLETED | OUTPATIENT
Start: 2020-09-01 | End: 2020-09-01

## 2020-09-01 RX ADMIN — SODIUM CHLORIDE 1000 ML: 9 INJECTION, SOLUTION INTRAVENOUS at 09:45

## 2020-09-01 RX ADMIN — HEPARIN 5 ML: 100 SYRINGE at 10:55

## 2020-09-01 ASSESSMENT — MIFFLIN-ST. JEOR: SCORE: 1423.63

## 2020-09-01 ASSESSMENT — PAIN SCALES - GENERAL: PAINLEVEL: MILD PAIN (3)

## 2020-09-01 NOTE — PROGRESS NOTES
Patient is a 86 year old male here accompanied by self today for infusion of IVF per order of Dr MALIA Ray under the supervision of Dr Walter, Cardiology.  Patient identified with two identifiers, order verified, and verbal consent for today's infusion obtained from patient.      Patients port accessed by Catia FINLEY RN using non-coring, 19 gauge, 3/4 inch needle. Per Catia, port accessed per facility protocol. Port flushed easily, but no blood return noted. This nurse attempted to obtain blood return and after multiple attempts at repositioning, able to obtain. No signs and symptoms of infection or infiltration.      IV pump verified with dose, drug, and rate of administration.  Infusion administered per protocol.

## 2020-09-01 NOTE — PATIENT INSTRUCTIONS

## 2020-09-01 NOTE — PROGRESS NOTES
Patient is a *** here accompanied by *** today for infusion of *** per order of *** under the supervision of ***.  Patient identified with two identifiers, order verified, and verbal consent for today's infusion obtained from patient.      *** lab values:  WBC: ***, HGB: ***, PLT: ***, ANC: ***, AST: ***, ALT: ***, Alkaline Phosphatase: ***, Creatinine: ***.      Patient meets order parameters for today's treatment.     *** gauge angio cath inserted into ***.  Immediate blood return noted.  IV secured with sterile, transparent dressing and tape.  Patient tolerated well, denies pain or discomfort at this time.  Flushes easily without resistance, no signs or symptoms of infiltration or infection.   Patient denies questions or concerns regarding infusion and/or medication(s) being administered.    Patients port accessed using non-coring, *** gauge, *** needle. Port accessed per facility protocol. Port flushed easily, blood return noted.  No signs and symptoms of infection or infiltration.      IV pump verified with dose, drug, and rate of administration.  Infusion administered per protocol.  Patient tolerated infusion ***, no signs or symptoms of adverse reaction noted.  Patient denies pain nor discomfort.     IV removed, catheter intact.  Site clean, dry and intact.  No signs or symptoms of infiltration or infection.  Covered with a sterile bandage, slight pressure applied for 30 seconds.  Pt instructed to leave bandage intact for a minimum of one hour, and to call with questions or concerns.  Copy of appointments, discharge instructions, and after visit summary (AVS) provided to patient.  Patient states understanding, discharged.

## 2020-09-04 ENCOUNTER — INFUSION THERAPY VISIT (OUTPATIENT)
Dept: INFUSION THERAPY | Facility: OTHER | Age: 85
End: 2020-09-04
Attending: FAMILY MEDICINE
Payer: MEDICARE

## 2020-09-04 VITALS
DIASTOLIC BLOOD PRESSURE: 57 MMHG | BODY MASS INDEX: 24.18 KG/M2 | SYSTOLIC BLOOD PRESSURE: 103 MMHG | HEART RATE: 69 BPM | WEIGHT: 163.8 LBS | TEMPERATURE: 97.3 F

## 2020-09-04 DIAGNOSIS — E86.9 VOLUME DEPLETION: Primary | ICD-10-CM

## 2020-09-04 PROCEDURE — 25000128 H RX IP 250 OP 636: Performed by: FAMILY MEDICINE

## 2020-09-04 PROCEDURE — 96360 HYDRATION IV INFUSION INIT: CPT

## 2020-09-04 PROCEDURE — 25800030 ZZH RX IP 258 OP 636: Performed by: FAMILY MEDICINE

## 2020-09-04 RX ORDER — HEPARIN SODIUM (PORCINE) LOCK FLUSH IV SOLN 100 UNIT/ML 100 UNIT/ML
5 SOLUTION INTRAVENOUS ONCE
Status: COMPLETED | OUTPATIENT
Start: 2020-09-04 | End: 2020-09-04

## 2020-09-04 RX ORDER — HEPARIN SODIUM (PORCINE) LOCK FLUSH IV SOLN 100 UNIT/ML 100 UNIT/ML
5 SOLUTION INTRAVENOUS ONCE
Status: CANCELLED
Start: 2020-09-04

## 2020-09-04 RX ADMIN — HEPARIN 5 ML: 100 SYRINGE at 11:28

## 2020-09-04 RX ADMIN — SODIUM CHLORIDE 1000 ML: 9 INJECTION, SOLUTION INTRAVENOUS at 10:14

## 2020-09-04 ASSESSMENT — PAIN SCALES - GENERAL: PAINLEVEL: NO PAIN (0)

## 2020-09-04 NOTE — PROGRESS NOTES
Patient is a 86 here accompanied by self today for infusion of IVF per order of Dr. Whitman.   Patient identified with two identifiers, order verified, and verbal consent for today's infusion obtained from patient.      Labs are not required for today's infusion      Patient meets order parameters for today's treatment.    Patients port accessed using non-coring, 19 gauge, 3/4 Power needle. Port accessed per facility protocol. Port flushed easily, blood return noted.  No signs and symptoms of infection or infiltration.      1014 IV pump verified with dose, drug, and rate of administration.  Infusion administered per protocol.  Patient tolerated infusion well, no signs or symptoms of adverse reaction noted.  Patient denies pain nor discomfort.     IV removed, catheter intact.  Site clean, dry and intact.  No signs or symptoms of infiltration or infection.  Covered with a sterile bandage, slight pressure applied for 30 seconds.  Pt instructed to leave bandage intact for a minimum of one hour, and to call with questions or concerns.  Copy of appointments, discharge instructions, and after visit summary (AVS) provided to patient.  Patient states understanding, discharged.

## 2020-09-04 NOTE — PATIENT INSTRUCTIONS
We will see you back as planned. If you have any questions or concerns, we can be reached Monday through Friday 8am - 430pm at [phone] [place] [place] If you have concerns related to a potential reaction/side effect after hours/weekends/holiday's, please seek emergent medical care.

## 2020-09-08 ENCOUNTER — INFUSION THERAPY VISIT (OUTPATIENT)
Dept: INFUSION THERAPY | Facility: OTHER | Age: 85
End: 2020-09-08
Attending: FAMILY MEDICINE
Payer: MEDICARE

## 2020-09-08 ENCOUNTER — TELEPHONE (OUTPATIENT)
Dept: INFUSION THERAPY | Facility: OTHER | Age: 85
End: 2020-09-08

## 2020-09-08 VITALS
WEIGHT: 164.46 LBS | RESPIRATION RATE: 18 BRPM | DIASTOLIC BLOOD PRESSURE: 74 MMHG | SYSTOLIC BLOOD PRESSURE: 150 MMHG | HEIGHT: 69 IN | TEMPERATURE: 97.6 F | OXYGEN SATURATION: 97 % | HEART RATE: 75 BPM | BODY MASS INDEX: 24.36 KG/M2

## 2020-09-08 DIAGNOSIS — E86.9 VOLUME DEPLETION: Primary | ICD-10-CM

## 2020-09-08 PROCEDURE — 96360 HYDRATION IV INFUSION INIT: CPT

## 2020-09-08 PROCEDURE — 25000128 H RX IP 250 OP 636: Performed by: FAMILY MEDICINE

## 2020-09-08 PROCEDURE — 25800030 ZZH RX IP 258 OP 636: Performed by: FAMILY MEDICINE

## 2020-09-08 RX ORDER — HEPARIN SODIUM (PORCINE) LOCK FLUSH IV SOLN 100 UNIT/ML 100 UNIT/ML
5 SOLUTION INTRAVENOUS ONCE
Status: CANCELLED
Start: 2020-09-08

## 2020-09-08 RX ORDER — HEPARIN SODIUM (PORCINE) LOCK FLUSH IV SOLN 100 UNIT/ML 100 UNIT/ML
5 SOLUTION INTRAVENOUS ONCE
Status: COMPLETED | OUTPATIENT
Start: 2020-09-08 | End: 2020-09-08

## 2020-09-08 RX ADMIN — HEPARIN 5 ML: 100 SYRINGE at 14:52

## 2020-09-08 RX ADMIN — SODIUM CHLORIDE 1000 ML: 9 INJECTION, SOLUTION INTRAVENOUS at 13:36

## 2020-09-08 ASSESSMENT — PAIN SCALES - GENERAL: PAINLEVEL: MILD PAIN (3)

## 2020-09-08 ASSESSMENT — MIFFLIN-ST. JEOR: SCORE: 1416.63

## 2020-09-08 NOTE — TELEPHONE ENCOUNTER
Patient comes here twice weekly for IVF through his port. We have been having more and more difficulty obtaining blood return over the past few weeks. We question if he is developing a sheath.     Can we have the ok for standing orders for Fluroscopy once PRN no or very poor blood return, and if radiology notes a sheath, ok for cathflo 2mg IVP up to 2 doses PRN?

## 2020-09-08 NOTE — PATIENT INSTRUCTIONS

## 2020-09-08 NOTE — PROGRESS NOTES
Patient is a 86 year old male here accompanied by self today for infusion of IVF per order of Dr MALIA Ray under the supervision of Dr Walter, Cardiology.  Patient identified with two identifiers, order verified, and verbal consent for today's infusion obtained from patient.      Unable to review meds/allergies and patient is poor historian.    Patients port accessed using non-coring, 19 gauge, 3/4 inch power needle. Port accessed per facility protocol. Port flushed easily, blood return noted but with much repositioning. No signs and symptoms of infection or infiltration. With continued work to obtain blood return over past weeks, telephone encounter routed to PCP asking for standing orders for Fluroscopy and cath jessica prn.       IV pump verified with dose, drug, and rate of administration.  Infusion administered per protocol.  Patient tolerated infusion well, no signs or symptoms of adverse reaction noted.  Patient denies pain nor discomfort.     IV removed, catheter intact.  Site clean, dry and intact.  No signs or symptoms of infiltration or infection.  Covered with a sterile bandage, slight pressure applied for 30 seconds.  Pt instructed to leave bandage intact for a minimum of one hour, and to call with questions or concerns.  Copy of appointments, discharge instructions, and after visit summary (AVS) provided to patient.  Patient states understanding, discharged.

## 2020-09-11 ENCOUNTER — INFUSION THERAPY VISIT (OUTPATIENT)
Dept: INFUSION THERAPY | Facility: OTHER | Age: 85
End: 2020-09-11
Attending: FAMILY MEDICINE
Payer: MEDICARE

## 2020-09-11 VITALS
HEIGHT: 69 IN | RESPIRATION RATE: 20 BRPM | SYSTOLIC BLOOD PRESSURE: 154 MMHG | OXYGEN SATURATION: 99 % | WEIGHT: 166.67 LBS | DIASTOLIC BLOOD PRESSURE: 74 MMHG | BODY MASS INDEX: 24.69 KG/M2 | HEART RATE: 70 BPM | TEMPERATURE: 97.7 F

## 2020-09-11 DIAGNOSIS — E86.9 VOLUME DEPLETION: Primary | ICD-10-CM

## 2020-09-11 PROCEDURE — 25000128 H RX IP 250 OP 636: Performed by: FAMILY MEDICINE

## 2020-09-11 PROCEDURE — 25800030 ZZH RX IP 258 OP 636: Performed by: FAMILY MEDICINE

## 2020-09-11 PROCEDURE — 96360 HYDRATION IV INFUSION INIT: CPT

## 2020-09-11 RX ORDER — HEPARIN SODIUM (PORCINE) LOCK FLUSH IV SOLN 100 UNIT/ML 100 UNIT/ML
5 SOLUTION INTRAVENOUS ONCE
Status: CANCELLED
Start: 2020-09-11

## 2020-09-11 RX ORDER — HEPARIN SODIUM (PORCINE) LOCK FLUSH IV SOLN 100 UNIT/ML 100 UNIT/ML
5 SOLUTION INTRAVENOUS ONCE
Status: COMPLETED | OUTPATIENT
Start: 2020-09-11 | End: 2020-09-11

## 2020-09-11 RX ADMIN — SODIUM CHLORIDE 1000 ML: 9 INJECTION, SOLUTION INTRAVENOUS at 09:43

## 2020-09-11 RX ADMIN — HEPARIN 5 ML: 100 SYRINGE at 10:53

## 2020-09-11 ASSESSMENT — PAIN SCALES - GENERAL: PAINLEVEL: SEVERE PAIN (6)

## 2020-09-11 ASSESSMENT — MIFFLIN-ST. JEOR: SCORE: 1426.63

## 2020-09-11 NOTE — PATIENT INSTRUCTIONS

## 2020-09-11 NOTE — PROGRESS NOTES
Patient is a 86 year old male here accompanied by self today for infusion of IVF per order of Dr MALIA Ray under the supervision of Dr Walter, Cardiology.  Patient identified with two identifiers, order verified, and verbal consent for today's infusion obtained from patient.      Unable to assess allergies and meds as patient is a poor historian.    Patients port accessed using non-coring, 19 gauge, 3/4 inch power needle. Port accessed per facility protocol. Port flushed easily, blood return noted.  No signs and symptoms of infection or infiltration.      IV pump verified with dose, drug, and rate of administration.  Infusion administered per protocol.  Patient tolerated infusion well, no signs or symptoms of adverse reaction noted.  Patient denies pain nor discomfort.     IV removed, catheter intact.  Site clean, dry and intact.  No signs or symptoms of infiltration or infection.  Covered with a sterile bandage, slight pressure applied for 30 seconds.  Pt instructed to leave bandage intact for a minimum of one hour, and to call with questions or concerns.  Copy of appointments, discharge instructions, and after visit summary (AVS) provided to patient.  Patient states understanding, discharged.

## 2020-09-15 ENCOUNTER — INFUSION THERAPY VISIT (OUTPATIENT)
Dept: INFUSION THERAPY | Facility: OTHER | Age: 85
End: 2020-09-15
Attending: FAMILY MEDICINE
Payer: MEDICARE

## 2020-09-15 VITALS
RESPIRATION RATE: 18 BRPM | BODY MASS INDEX: 24.02 KG/M2 | SYSTOLIC BLOOD PRESSURE: 150 MMHG | TEMPERATURE: 98 F | HEART RATE: 70 BPM | DIASTOLIC BLOOD PRESSURE: 70 MMHG | OXYGEN SATURATION: 97 % | WEIGHT: 162.7 LBS

## 2020-09-15 DIAGNOSIS — E86.9 VOLUME DEPLETION: Primary | ICD-10-CM

## 2020-09-15 PROCEDURE — 25800030 ZZH RX IP 258 OP 636: Performed by: FAMILY MEDICINE

## 2020-09-15 PROCEDURE — 96360 HYDRATION IV INFUSION INIT: CPT

## 2020-09-15 PROCEDURE — 25000128 H RX IP 250 OP 636: Performed by: FAMILY MEDICINE

## 2020-09-15 RX ORDER — HEPARIN SODIUM (PORCINE) LOCK FLUSH IV SOLN 100 UNIT/ML 100 UNIT/ML
5 SOLUTION INTRAVENOUS ONCE
Status: CANCELLED
Start: 2020-09-15

## 2020-09-15 RX ORDER — HEPARIN SODIUM (PORCINE) LOCK FLUSH IV SOLN 100 UNIT/ML 100 UNIT/ML
5 SOLUTION INTRAVENOUS ONCE
Status: COMPLETED | OUTPATIENT
Start: 2020-09-15 | End: 2020-09-15

## 2020-09-15 RX ADMIN — SODIUM CHLORIDE 1000 ML: 9 INJECTION, SOLUTION INTRAVENOUS at 09:40

## 2020-09-15 RX ADMIN — HEPARIN 5 ML: 100 SYRINGE at 10:48

## 2020-09-15 NOTE — PROGRESS NOTES
Patient is a 86 year old male here today for infusion of IVF per order of Dr Ray under the supervision of Dr Walter.  Patient identified with two identifiers, order verified, and verbal consent for today's infusion obtained from patient.      Patient denies questions or concerns regarding infusion and/or medication(s) being administered.    Patients right port accessed using non-coring, 19 gauge, 3/4 power needle. Port accessed per facility protocol. Port flushed easily, blood return noted.  No signs and symptoms of infection or infiltration.      0940 IV pump verified with dose, drug, and rate of administration. Infusion administered per protocol.

## 2020-09-15 NOTE — PATIENT INSTRUCTIONS
We will see you back as planned. If you have any questions or concerns, we can be reached Monday through Friday 8am - 430pm at [phone] [place] [place] [facility] [place] If you have concerns related to a potential reaction/side effect after hours/weekends/holiday's, please seek emergent medical care.

## 2020-09-22 ENCOUNTER — INFUSION THERAPY VISIT (OUTPATIENT)
Dept: INFUSION THERAPY | Facility: OTHER | Age: 85
End: 2020-09-22
Attending: FAMILY MEDICINE
Payer: MEDICARE

## 2020-09-22 VITALS
SYSTOLIC BLOOD PRESSURE: 138 MMHG | WEIGHT: 165.12 LBS | DIASTOLIC BLOOD PRESSURE: 74 MMHG | HEART RATE: 70 BPM | RESPIRATION RATE: 18 BRPM | BODY MASS INDEX: 24.46 KG/M2 | OXYGEN SATURATION: 98 % | TEMPERATURE: 97.4 F | HEIGHT: 69 IN

## 2020-09-22 DIAGNOSIS — E86.9 VOLUME DEPLETION: Primary | ICD-10-CM

## 2020-09-22 DIAGNOSIS — E87.6 HYPOKALEMIA: ICD-10-CM

## 2020-09-22 PROCEDURE — 25000128 H RX IP 250 OP 636: Performed by: FAMILY MEDICINE

## 2020-09-22 PROCEDURE — 96360 HYDRATION IV INFUSION INIT: CPT

## 2020-09-22 PROCEDURE — 96365 THER/PROPH/DIAG IV INF INIT: CPT

## 2020-09-22 PROCEDURE — 25800030 ZZH RX IP 258 OP 636: Performed by: FAMILY MEDICINE

## 2020-09-22 RX ORDER — HEPARIN SODIUM (PORCINE) LOCK FLUSH IV SOLN 100 UNIT/ML 100 UNIT/ML
5 SOLUTION INTRAVENOUS ONCE
Status: CANCELLED
Start: 2020-09-22

## 2020-09-22 RX ORDER — POTASSIUM CHLORIDE 1500 MG/1
TABLET, EXTENDED RELEASE ORAL
Qty: 30 TABLET | Refills: 1 | Status: SHIPPED | OUTPATIENT
Start: 2020-09-22 | End: 2020-11-23

## 2020-09-22 RX ORDER — HEPARIN SODIUM (PORCINE) LOCK FLUSH IV SOLN 100 UNIT/ML 100 UNIT/ML
5 SOLUTION INTRAVENOUS ONCE
Status: COMPLETED | OUTPATIENT
Start: 2020-09-22 | End: 2020-09-22

## 2020-09-22 RX ADMIN — SODIUM CHLORIDE 1000 ML: 9 INJECTION, SOLUTION INTRAVENOUS at 09:40

## 2020-09-22 RX ADMIN — HEPARIN 5 ML: 100 SYRINGE at 10:50

## 2020-09-22 ASSESSMENT — MIFFLIN-ST. JEOR: SCORE: 1419.63

## 2020-09-22 NOTE — PATIENT INSTRUCTIONS
We will see you back as planned. If you have any questions or concerns, we can be reached Monday through Friday 8am - 430pm at [phone] [place] [place] If you have concerns related to a potential reaction/side effect after hours/weekends/holiday's, please seek emergent medical care.

## 2020-09-22 NOTE — PROGRESS NOTES
Patient is a 86 year old male here  today for infusion of IVF per order of Dr Ray under the supervision of Dr Walter.  Patient identified with two identifiers, order verified, and verbal consent for today's infusion obtained from patient.      Patient denies questions or concerns regarding infusion and/or medication(s) being administered.    Patients right port accessed using non-coring, 20 gauge, 3/4 power needle. Port accessed per facility protocol. Port flushed easily, blood return noted.  No signs and symptoms of infection or infiltration.      0940 IV pump verified with IVF dose, drug, and rate of administration.  Infusion administered per protocol.

## 2020-09-24 ENCOUNTER — ANCILLARY PROCEDURE (OUTPATIENT)
Dept: CARDIOLOGY | Facility: CLINIC | Age: 85
End: 2020-09-24
Attending: INTERNAL MEDICINE
Payer: MEDICARE

## 2020-09-24 DIAGNOSIS — I44.2 COMPLETE HEART BLOCK (H): ICD-10-CM

## 2020-09-24 PROCEDURE — 93294 REM INTERROG EVL PM/LDLS PM: CPT | Mod: ZP | Performed by: INTERNAL MEDICINE

## 2020-09-24 PROCEDURE — 93296 REM INTERROG EVL PM/IDS: CPT | Mod: ZF

## 2020-09-25 ENCOUNTER — INFUSION THERAPY VISIT (OUTPATIENT)
Dept: INFUSION THERAPY | Facility: OTHER | Age: 85
End: 2020-09-25
Attending: FAMILY MEDICINE
Payer: MEDICARE

## 2020-09-25 VITALS
SYSTOLIC BLOOD PRESSURE: 129 MMHG | WEIGHT: 164.02 LBS | BODY MASS INDEX: 24.29 KG/M2 | DIASTOLIC BLOOD PRESSURE: 66 MMHG | OXYGEN SATURATION: 99 % | TEMPERATURE: 97.2 F | HEIGHT: 69 IN | RESPIRATION RATE: 18 BRPM | HEART RATE: 71 BPM

## 2020-09-25 DIAGNOSIS — E86.9 VOLUME DEPLETION: Primary | ICD-10-CM

## 2020-09-25 PROCEDURE — 25800030 ZZH RX IP 258 OP 636: Performed by: FAMILY MEDICINE

## 2020-09-25 PROCEDURE — 96360 HYDRATION IV INFUSION INIT: CPT

## 2020-09-25 PROCEDURE — 25000128 H RX IP 250 OP 636: Performed by: FAMILY MEDICINE

## 2020-09-25 RX ORDER — HEPARIN SODIUM (PORCINE) LOCK FLUSH IV SOLN 100 UNIT/ML 100 UNIT/ML
5 SOLUTION INTRAVENOUS ONCE
Status: CANCELLED
Start: 2020-09-25

## 2020-09-25 RX ORDER — HEPARIN SODIUM (PORCINE) LOCK FLUSH IV SOLN 100 UNIT/ML 100 UNIT/ML
5 SOLUTION INTRAVENOUS ONCE
Status: COMPLETED | OUTPATIENT
Start: 2020-09-25 | End: 2020-09-25

## 2020-09-25 RX ADMIN — SODIUM CHLORIDE 1000 ML: 9 INJECTION, SOLUTION INTRAVENOUS at 09:42

## 2020-09-25 RX ADMIN — HEPARIN 5 ML: 100 SYRINGE at 10:42

## 2020-09-25 ASSESSMENT — PAIN SCALES - GENERAL: PAINLEVEL: EXTREME PAIN (8)

## 2020-09-25 ASSESSMENT — MIFFLIN-ST. JEOR: SCORE: 1414.63

## 2020-09-25 NOTE — PATIENT INSTRUCTIONS

## 2020-09-25 NOTE — PROGRESS NOTES
Patient is a 86 year old male here accompanied by self today for infusion of IVF per order of Dr MALIA Ray under the supervision of Dr Walter, Cardiology.  Patient identified with two identifiers, order verified, and verbal consent for today's infusion obtained from patient.      Patient a poor historian, unable to do update on meds/allergies.     Patients port accessed using non-coring, 19 gauge, 3/4 inch needle. Port accessed per facility protocol. Port flushed easily, blood return noted.  No signs and symptoms of infection or infiltration.      IV pump verified with dose, drug, and rate of administration.  Infusion administered per protocol.  Patient tolerated infusion well, no signs or symptoms of adverse reaction noted.  Patient denies pain nor discomfort.     IV removed, catheter intact.  Site clean, dry and intact.  No signs or symptoms of infiltration or infection.  Covered with a sterile bandage, slight pressure applied for 30 seconds.  Pt instructed to leave bandage intact for a minimum of one hour, and to call with questions or concerns.  Patient states understanding, discharged.

## 2020-09-29 ENCOUNTER — TELEPHONE (OUTPATIENT)
Dept: FAMILY MEDICINE | Facility: OTHER | Age: 85
End: 2020-09-29

## 2020-09-29 DIAGNOSIS — M54.50 CHRONIC LOW BACK PAIN, UNSPECIFIED BACK PAIN LATERALITY, UNSPECIFIED WHETHER SCIATICA PRESENT: Primary | ICD-10-CM

## 2020-09-29 DIAGNOSIS — G89.29 CHRONIC LOW BACK PAIN, UNSPECIFIED BACK PAIN LATERALITY, UNSPECIFIED WHETHER SCIATICA PRESENT: Primary | ICD-10-CM

## 2020-09-29 RX ORDER — CYCLOBENZAPRINE HCL 10 MG
10 TABLET ORAL 3 TIMES DAILY PRN
Qty: 30 TABLET | Refills: 0 | Status: SHIPPED | OUTPATIENT
Start: 2020-09-29 | End: 2020-10-01

## 2020-09-29 NOTE — TELEPHONE ENCOUNTER
Des [facility] called and reported patient has increased backpain. She would like a call from Dr. Venancio Wolfe nurse.

## 2020-10-01 ENCOUNTER — ANCILLARY PROCEDURE (OUTPATIENT)
Dept: GENERAL RADIOLOGY | Facility: OTHER | Age: 85
End: 2020-10-01
Attending: FAMILY MEDICINE
Payer: MEDICARE

## 2020-10-01 ENCOUNTER — OFFICE VISIT (OUTPATIENT)
Dept: FAMILY MEDICINE | Facility: OTHER | Age: 85
End: 2020-10-01
Attending: FAMILY MEDICINE
Payer: MEDICARE

## 2020-10-01 VITALS
DIASTOLIC BLOOD PRESSURE: 72 MMHG | WEIGHT: 164 LBS | SYSTOLIC BLOOD PRESSURE: 118 MMHG | BODY MASS INDEX: 24.21 KG/M2 | OXYGEN SATURATION: 98 % | HEART RATE: 92 BPM

## 2020-10-01 DIAGNOSIS — M54.6 ACUTE MIDLINE THORACIC BACK PAIN: ICD-10-CM

## 2020-10-01 DIAGNOSIS — M54.6 ACUTE MIDLINE THORACIC BACK PAIN: Primary | ICD-10-CM

## 2020-10-01 PROCEDURE — 72070 X-RAY EXAM THORAC SPINE 2VWS: CPT | Mod: TC

## 2020-10-01 PROCEDURE — G0463 HOSPITAL OUTPT CLINIC VISIT: HCPCS | Mod: 25

## 2020-10-01 PROCEDURE — 99213 OFFICE O/P EST LOW 20 MIN: CPT | Performed by: FAMILY MEDICINE

## 2020-10-01 PROCEDURE — 71046 X-RAY EXAM CHEST 2 VIEWS: CPT | Mod: TC

## 2020-10-01 ASSESSMENT — PAIN SCALES - GENERAL: PAINLEVEL: EXTREME PAIN (9)

## 2020-10-01 NOTE — NURSING NOTE
"Chief Complaint   Patient presents with     Back Pain       Initial /72   Pulse 92   Wt 74.4 kg (164 lb)   SpO2 98%   BMI 24.21 kg/m   Estimated body mass index is 24.21 kg/m  as calculated from the following:    Height as of 9/25/20: 1.753 m (5' 9.02\").    Weight as of this encounter: 74.4 kg (164 lb).  Medication Reconciliation: complete  Madhavi Yang LPN  "

## 2020-10-01 NOTE — PROGRESS NOTES
Subjective     Jf Ortiz is a 86 year old male who presents to clinic today for the following health issues:    HPI         Chronic/Recurring Back Pain Follow Up      Where is your back pain located? (Select all that apply) low back bilateral, middle of back bilateral and upper back bilateral    How would you describe your back pain?  dull ache and sharp    Where does your back pain spread? nowhere    Since your last clinic visit for back pain, how has your pain changed? gradually worsening    Does your back pain interfere with your job? Not applicable    Since your last visit, have you tried any new treatment? No           Review of Systems   Constitutional, HEENT, cardiovascular, pulmonary, gi and gu systems are negative, except as otherwise noted.      Objective    /72   Pulse 92   Wt 74.4 kg (164 lb)   SpO2 98%   BMI 24.21 kg/m    Body mass index is 24.21 kg/m .  Physical Exam   Pleasant male breathing comfortably in no acute distress is kyphotic uses a wheeled walker.  Cardiac is regular lungs are clear he is tender over the thoracic spine and right posterior thoracic region no crepitus or ecchymotic areas.  Abdomen is soft nontender he walks with a wheeled walker leg strength is strong enough to walk him down to x-ray and back.  Xray -he is quite kyphotic no apparent fracture seen on his thoracic spine.  Chest x-ray notes his pacemaker but no other acute processes        Assessment & Plan     Acute midline thoracic back pain    - XR Chest 2 Views  - XR Thoracic Spine 2 Views      Wife felt the cyclobenzaprine was not helping so we will discontinue that.  They are trying the Lidoderm patches will stay with that he can take Tylenol up to 3000 mg a day.  They can alternate with hot and cold packs.  He cannot get an MRI because of the pacemaker.  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  She is doing a heroic job trying to take care of him at home throughout this  pandemic.            R Venancio Ray MD  [facility] - [place]

## 2020-10-02 ENCOUNTER — INFUSION THERAPY VISIT (OUTPATIENT)
Dept: INFUSION THERAPY | Facility: OTHER | Age: 85
End: 2020-10-02
Attending: FAMILY MEDICINE
Payer: MEDICARE

## 2020-10-02 VITALS
WEIGHT: 163.14 LBS | BODY MASS INDEX: 24.16 KG/M2 | TEMPERATURE: 97.3 F | DIASTOLIC BLOOD PRESSURE: 84 MMHG | HEART RATE: 79 BPM | SYSTOLIC BLOOD PRESSURE: 132 MMHG | HEIGHT: 69 IN | OXYGEN SATURATION: 97 %

## 2020-10-02 DIAGNOSIS — E86.9 VOLUME DEPLETION: Primary | ICD-10-CM

## 2020-10-02 PROCEDURE — 258N000003 HC RX IP 258 OP 636: Performed by: FAMILY MEDICINE

## 2020-10-02 PROCEDURE — 250N000011 HC RX IP 250 OP 636: Performed by: FAMILY MEDICINE

## 2020-10-02 PROCEDURE — 96360 HYDRATION IV INFUSION INIT: CPT

## 2020-10-02 RX ORDER — HEPARIN SODIUM (PORCINE) LOCK FLUSH IV SOLN 100 UNIT/ML 100 UNIT/ML
5 SOLUTION INTRAVENOUS ONCE
Status: COMPLETED | OUTPATIENT
Start: 2020-10-02 | End: 2020-10-02

## 2020-10-02 RX ORDER — HEPARIN SODIUM (PORCINE) LOCK FLUSH IV SOLN 100 UNIT/ML 100 UNIT/ML
5 SOLUTION INTRAVENOUS ONCE
Status: CANCELLED
Start: 2020-10-02 | End: 2020-10-02

## 2020-10-02 RX ADMIN — SODIUM CHLORIDE 1000 ML: 9 INJECTION, SOLUTION INTRAVENOUS at 09:43

## 2020-10-02 RX ADMIN — HEPARIN 5 ML: 100 SYRINGE at 10:54

## 2020-10-02 ASSESSMENT — MIFFLIN-ST. JEOR: SCORE: 1410.63

## 2020-10-02 NOTE — PROGRESS NOTES
Patient is a 86 year old male  here accompanied by self  today for infusion of IVF per order of Dr. Ray under the supervision of   Patient identified with two identifiers, order verified, and verbal consent for today's infusion obtained from patient.    No Labs required for today's infusion      Patient meets order parameters for today's treatment.     Patients port accessed using non-coring, 19 gauge, 3/4 Power needle. Port accessed per facility protocol. Port flushed easily, blood return noted.  No signs and symptoms of infection or infiltration.      0943  IV pump verified with dose, drug, and rate of administration.  Infusion administered per protocol.  Patient tolerated infusion well, no signs or symptoms of adverse reaction noted.  Patient denies pain nor discomfort.     IV removed, catheter intact.  Site clean, dry and intact.  No signs or symptoms of infiltration or infection.  Covered with a sterile bandage, slight pressure applied for 30 seconds.  Pt instructed to leave bandage intact for a minimum of one hour, and to call with questions or concerns.  Copy of appointments, discharge instructions, and after visit summary (AVS) provided to patient.  Patient states understanding, discharged.

## 2020-10-02 NOTE — PATIENT INSTRUCTIONS
We will see you back as planned. If you have any questions or concerns, we can be reached Monday through Friday 8am - 430pm at [phone] ([facility]). If you have concerns related to a potential reaction/side effect after hours/weekends/holiday's, please seek emergent medical care.

## 2020-10-06 ENCOUNTER — INFUSION THERAPY VISIT (OUTPATIENT)
Dept: INFUSION THERAPY | Facility: OTHER | Age: 85
End: 2020-10-06
Attending: FAMILY MEDICINE
Payer: MEDICARE

## 2020-10-06 VITALS
HEIGHT: 69 IN | SYSTOLIC BLOOD PRESSURE: 122 MMHG | BODY MASS INDEX: 24.23 KG/M2 | HEART RATE: 68 BPM | OXYGEN SATURATION: 98 % | TEMPERATURE: 97.4 F | WEIGHT: 163.58 LBS | DIASTOLIC BLOOD PRESSURE: 71 MMHG

## 2020-10-06 DIAGNOSIS — E86.9 VOLUME DEPLETION: Primary | ICD-10-CM

## 2020-10-06 PROCEDURE — 258N000003 HC RX IP 258 OP 636: Performed by: FAMILY MEDICINE

## 2020-10-06 PROCEDURE — 96360 HYDRATION IV INFUSION INIT: CPT

## 2020-10-06 PROCEDURE — 99207 PR NO CHARGE LOS: CPT

## 2020-10-06 PROCEDURE — 250N000011 HC RX IP 250 OP 636: Performed by: FAMILY MEDICINE

## 2020-10-06 RX ORDER — HEPARIN SODIUM (PORCINE) LOCK FLUSH IV SOLN 100 UNIT/ML 100 UNIT/ML
5 SOLUTION INTRAVENOUS ONCE
Status: COMPLETED | OUTPATIENT
Start: 2020-10-06 | End: 2020-10-06

## 2020-10-06 RX ORDER — HEPARIN SODIUM (PORCINE) LOCK FLUSH IV SOLN 100 UNIT/ML 100 UNIT/ML
5 SOLUTION INTRAVENOUS ONCE
Status: CANCELLED
Start: 2020-10-06 | End: 2020-10-06

## 2020-10-06 RX ADMIN — SODIUM CHLORIDE 1000 ML: 9 INJECTION, SOLUTION INTRAVENOUS at 09:54

## 2020-10-06 RX ADMIN — HEPARIN 5 ML: 100 SYRINGE at 10:48

## 2020-10-06 ASSESSMENT — MIFFLIN-ST. JEOR: SCORE: 1407.63

## 2020-10-06 NOTE — PROGRESS NOTES
Patient is a 87 year old  here accompanied by self  today for infusion of IVF  per order of DR. Ray.  Patient identified with two identifiers, order verified, and verbal consent for today's infusion obtained from patient.      Patient meets order parameters for today's treatment.      Patients port accessed using non-coring, 19 gauge, 3/4 power needle. Port accessed per facility protocol. Port flushed easily, blood return noted.  No signs and symptoms of infection or infiltration.      1047  IV pump verified with dose, drug, and rate of administration.  Infusion administered per protocol.

## 2020-10-06 NOTE — PATIENT INSTRUCTIONS
We will see you back as planned. If you have any questions or concerns, we can be reached Monday through Friday 8am - 430pm at [phone] ([facility] [place]). If you have concerns related to a potential reaction/side effect after hours/weekends/holiday's, please seek emergent medical care.

## 2020-10-09 ENCOUNTER — INFUSION THERAPY VISIT (OUTPATIENT)
Dept: INFUSION THERAPY | Facility: OTHER | Age: 85
End: 2020-10-09
Attending: FAMILY MEDICINE
Payer: MEDICARE

## 2020-10-09 VITALS
TEMPERATURE: 97.9 F | DIASTOLIC BLOOD PRESSURE: 74 MMHG | HEIGHT: 69 IN | BODY MASS INDEX: 24.26 KG/M2 | OXYGEN SATURATION: 95 % | WEIGHT: 163.8 LBS | SYSTOLIC BLOOD PRESSURE: 130 MMHG | HEART RATE: 74 BPM | RESPIRATION RATE: 18 BRPM

## 2020-10-09 DIAGNOSIS — E86.9 VOLUME DEPLETION: Primary | ICD-10-CM

## 2020-10-09 PROCEDURE — 96360 HYDRATION IV INFUSION INIT: CPT

## 2020-10-09 PROCEDURE — 99207 PR NO CHARGE LOS: CPT

## 2020-10-09 PROCEDURE — 258N000003 HC RX IP 258 OP 636: Performed by: FAMILY MEDICINE

## 2020-10-09 PROCEDURE — 250N000011 HC RX IP 250 OP 636: Performed by: FAMILY MEDICINE

## 2020-10-09 RX ORDER — HEPARIN SODIUM (PORCINE) LOCK FLUSH IV SOLN 100 UNIT/ML 100 UNIT/ML
5 SOLUTION INTRAVENOUS ONCE
Status: COMPLETED | OUTPATIENT
Start: 2020-10-09 | End: 2020-10-09

## 2020-10-09 RX ORDER — HEPARIN SODIUM (PORCINE) LOCK FLUSH IV SOLN 100 UNIT/ML 100 UNIT/ML
5 SOLUTION INTRAVENOUS ONCE
Status: CANCELLED
Start: 2020-10-09 | End: 2020-10-09

## 2020-10-09 RX ADMIN — HEPARIN 5 ML: 100 SYRINGE at 10:49

## 2020-10-09 RX ADMIN — SODIUM CHLORIDE 1000 ML: 9 INJECTION, SOLUTION INTRAVENOUS at 09:36

## 2020-10-09 ASSESSMENT — MIFFLIN-ST. JEOR: SCORE: 1408.63

## 2020-10-09 ASSESSMENT — PAIN SCALES - GENERAL: PAINLEVEL: SEVERE PAIN (6)

## 2020-10-09 NOTE — PROGRESS NOTES
Patient is a 87 year old male here accompanied by self today for infusion of IVF per order of Dr MALIA Ray.  Patient identified with two identifiers, order verified, and verbal consent for today's infusion obtained from patient.      Patients port accessed using non-coring, 19 gauge, 3/4 power needle. Port accessed per facility protocol. Port flushed easily, blood return noted.  No signs and symptoms of infection or infiltration.      IV pump verified with dose, drug, and rate of administration.  Infusion administered per protocol.  Patient tolerated infusion well, no signs or symptoms of adverse reaction noted.  Patient denies pain nor discomfort.     IV removed, catheter intact.  Site clean, dry and intact.  No signs or symptoms of infiltration or infection.  Covered with a sterile bandage, slight pressure applied for 30 seconds.  Pt instructed to leave bandage intact for a minimum of one hour, and to call with questions or concerns.  Copy of appointments, discharge instructions, and after visit summary (AVS) provided to patient.  Patient states understanding, discharged.

## 2020-10-13 ENCOUNTER — INFUSION THERAPY VISIT (OUTPATIENT)
Dept: INFUSION THERAPY | Facility: OTHER | Age: 85
End: 2020-10-13
Attending: FAMILY MEDICINE
Payer: MEDICARE

## 2020-10-13 VITALS
RESPIRATION RATE: 18 BRPM | WEIGHT: 161.38 LBS | HEIGHT: 69 IN | DIASTOLIC BLOOD PRESSURE: 70 MMHG | SYSTOLIC BLOOD PRESSURE: 124 MMHG | BODY MASS INDEX: 23.9 KG/M2 | TEMPERATURE: 97.2 F | HEART RATE: 70 BPM | OXYGEN SATURATION: 95 %

## 2020-10-13 DIAGNOSIS — E86.9 VOLUME DEPLETION: Primary | ICD-10-CM

## 2020-10-13 PROCEDURE — 96360 HYDRATION IV INFUSION INIT: CPT

## 2020-10-13 PROCEDURE — 99207 PR NO CHARGE LOS: CPT

## 2020-10-13 PROCEDURE — 258N000003 HC RX IP 258 OP 636: Performed by: FAMILY MEDICINE

## 2020-10-13 PROCEDURE — 250N000011 HC RX IP 250 OP 636: Performed by: FAMILY MEDICINE

## 2020-10-13 RX ORDER — HEPARIN SODIUM (PORCINE) LOCK FLUSH IV SOLN 100 UNIT/ML 100 UNIT/ML
5 SOLUTION INTRAVENOUS ONCE
Status: CANCELLED
Start: 2020-10-13 | End: 2020-10-13

## 2020-10-13 RX ORDER — HEPARIN SODIUM (PORCINE) LOCK FLUSH IV SOLN 100 UNIT/ML 100 UNIT/ML
5 SOLUTION INTRAVENOUS ONCE
Status: COMPLETED | OUTPATIENT
Start: 2020-10-13 | End: 2020-10-13

## 2020-10-13 RX ADMIN — SODIUM CHLORIDE 1000 ML: 9 INJECTION, SOLUTION INTRAVENOUS at 09:37

## 2020-10-13 RX ADMIN — HEPARIN 5 ML: 100 SYRINGE at 10:50

## 2020-10-13 ASSESSMENT — MIFFLIN-ST. JEOR: SCORE: 1397.63

## 2020-10-13 ASSESSMENT — PAIN SCALES - GENERAL: PAINLEVEL: SEVERE PAIN (7)

## 2020-10-13 NOTE — PROGRESS NOTES
Patient is a 87 year old male here accompanied by self today for infusion of IVF per order of Dr MALIA Ray under the supervision of Dr Walter, Cardiology.  Patient identified with two identifiers, order verified, and verbal consent for today's infusion obtained from patient.      Unable to review meds and allergies as patient is a poor historian.       Patients port accessed using non-coring, 19 gauge, 3/4 inch power needle. Port accessed per facility protocol. Port flushed easily, blood return noted.  No signs and symptoms of infection or infiltration.      IV pump verified with dose, drug, and rate of administration.  Infusion administered per protocol.  Patient tolerated infusion well, no signs or symptoms of adverse reaction noted.  Patient denies pain nor discomfort.     IV removed, catheter intact.  Site clean, dry and intact.  No signs or symptoms of infiltration or infection.  Covered with a sterile bandage, slight pressure applied for 30 seconds.  Pt instructed to leave bandage intact for a minimum of one hour, and to call with questions or concerns.  Patient states understanding, discharged.

## 2020-10-14 DIAGNOSIS — G89.29 CHRONIC LOW BACK PAIN, UNSPECIFIED BACK PAIN LATERALITY, UNSPECIFIED WHETHER SCIATICA PRESENT: ICD-10-CM

## 2020-10-14 DIAGNOSIS — M54.50 CHRONIC LOW BACK PAIN, UNSPECIFIED BACK PAIN LATERALITY, UNSPECIFIED WHETHER SCIATICA PRESENT: ICD-10-CM

## 2020-10-14 RX ORDER — CYCLOBENZAPRINE HCL 10 MG
10 TABLET ORAL 3 TIMES DAILY PRN
Qty: 30 TABLET | Refills: 0 | Status: SHIPPED | OUTPATIENT
Start: 2020-10-14 | End: 2020-10-27

## 2020-10-14 NOTE — TELEPHONE ENCOUNTER
Flexeril       Last Written Prescription Date:  Request  Last Fill Quantity: 30,   # refills: 0  Last Office Visit: 10/1/2020  Future Office visit:

## 2020-10-16 ENCOUNTER — INFUSION THERAPY VISIT (OUTPATIENT)
Dept: INFUSION THERAPY | Facility: OTHER | Age: 85
End: 2020-10-16
Attending: FAMILY MEDICINE
Payer: MEDICARE

## 2020-10-16 VITALS
OXYGEN SATURATION: 97 % | SYSTOLIC BLOOD PRESSURE: 134 MMHG | HEIGHT: 69 IN | DIASTOLIC BLOOD PRESSURE: 72 MMHG | RESPIRATION RATE: 20 BRPM | BODY MASS INDEX: 24.2 KG/M2 | TEMPERATURE: 97.9 F | WEIGHT: 163.36 LBS | HEART RATE: 76 BPM

## 2020-10-16 DIAGNOSIS — E86.9 VOLUME DEPLETION: Primary | ICD-10-CM

## 2020-10-16 PROCEDURE — 250N000011 HC RX IP 250 OP 636: Performed by: FAMILY MEDICINE

## 2020-10-16 PROCEDURE — 258N000003 HC RX IP 258 OP 636: Performed by: FAMILY MEDICINE

## 2020-10-16 PROCEDURE — 96360 HYDRATION IV INFUSION INIT: CPT

## 2020-10-16 RX ORDER — HEPARIN SODIUM (PORCINE) LOCK FLUSH IV SOLN 100 UNIT/ML 100 UNIT/ML
5 SOLUTION INTRAVENOUS ONCE
Status: COMPLETED | OUTPATIENT
Start: 2020-10-16 | End: 2020-10-16

## 2020-10-16 RX ORDER — HEPARIN SODIUM (PORCINE) LOCK FLUSH IV SOLN 100 UNIT/ML 100 UNIT/ML
5 SOLUTION INTRAVENOUS ONCE
Status: CANCELLED
Start: 2020-10-16 | End: 2020-10-16

## 2020-10-16 RX ADMIN — HEPARIN 5 ML: 100 SYRINGE at 10:53

## 2020-10-16 RX ADMIN — SODIUM CHLORIDE 1000 ML: 9 INJECTION, SOLUTION INTRAVENOUS at 09:41

## 2020-10-16 ASSESSMENT — PAIN SCALES - GENERAL: PAINLEVEL: SEVERE PAIN (7)

## 2020-10-16 ASSESSMENT — MIFFLIN-ST. JEOR: SCORE: 1406.63

## 2020-10-16 NOTE — PROGRESS NOTES
Patient is a 87 year old male here accompanied by self today for infusion of IVF per order of Dr MALIA Ray under the supervision of Dr Walter Cardiology.  Patient identified with two identifiers, order verified, and verbal consent for today's infusion obtained from patient.        Patients port accessed using non-coring, 19 gauge, 3/4 inch power needle. Port accessed per facility protocol. Port flushed easily, blood return noted.  No signs and symptoms of infection or infiltration.      IV pump verified with dose, drug, and rate of administration.  Infusion administered per protocol.  Patient tolerated infusion well, no signs or symptoms of adverse reaction noted.  Patient denies pain nor discomfort.     IV removed, catheter intact.  Site clean, dry and intact.  No signs or symptoms of infiltration or infection.  Covered with a sterile bandage, slight pressure applied for 30 seconds.  Pt instructed to leave bandage intact for a minimum of one hour, and to call with questions or concerns.  Copy of appointments, discharge instructions, and after visit summary (AVS) provided to patient, updated on appt time change for next infusion, and highlighted for significant other to see.  Patient states understanding, discharged.

## 2020-10-20 ENCOUNTER — INFUSION THERAPY VISIT (OUTPATIENT)
Dept: INFUSION THERAPY | Facility: OTHER | Age: 85
End: 2020-10-20
Attending: FAMILY MEDICINE
Payer: MEDICARE

## 2020-10-20 VITALS
TEMPERATURE: 97.8 F | BODY MASS INDEX: 24.16 KG/M2 | OXYGEN SATURATION: 96 % | WEIGHT: 163.14 LBS | HEART RATE: 75 BPM | SYSTOLIC BLOOD PRESSURE: 128 MMHG | HEIGHT: 69 IN | RESPIRATION RATE: 18 BRPM | DIASTOLIC BLOOD PRESSURE: 72 MMHG

## 2020-10-20 DIAGNOSIS — E86.9 VOLUME DEPLETION: Primary | ICD-10-CM

## 2020-10-20 PROCEDURE — 96360 HYDRATION IV INFUSION INIT: CPT

## 2020-10-20 PROCEDURE — 96361 HYDRATE IV INFUSION ADD-ON: CPT

## 2020-10-20 PROCEDURE — 250N000011 HC RX IP 250 OP 636: Performed by: FAMILY MEDICINE

## 2020-10-20 PROCEDURE — 258N000003 HC RX IP 258 OP 636: Performed by: FAMILY MEDICINE

## 2020-10-20 RX ORDER — HEPARIN SODIUM (PORCINE) LOCK FLUSH IV SOLN 100 UNIT/ML 100 UNIT/ML
5 SOLUTION INTRAVENOUS ONCE
Status: CANCELLED
Start: 2020-10-20 | End: 2020-10-20

## 2020-10-20 RX ORDER — HEPARIN SODIUM (PORCINE) LOCK FLUSH IV SOLN 100 UNIT/ML 100 UNIT/ML
5 SOLUTION INTRAVENOUS ONCE
Status: COMPLETED | OUTPATIENT
Start: 2020-10-20 | End: 2020-10-20

## 2020-10-20 RX ADMIN — SODIUM CHLORIDE 1000 ML: 9 INJECTION, SOLUTION INTRAVENOUS at 13:30

## 2020-10-20 RX ADMIN — HEPARIN 5 ML: 100 SYRINGE at 14:42

## 2020-10-20 ASSESSMENT — MIFFLIN-ST. JEOR: SCORE: 1405.63

## 2020-10-20 ASSESSMENT — PAIN SCALES - GENERAL: PAINLEVEL: SEVERE PAIN (7)

## 2020-10-20 NOTE — PROGRESS NOTES
Patient is a 87 year old male here accompanied by self today for infusion of IVF per order of Dr MALIA Ray.  Patient identified with two identifiers, order verified, and verbal consent for today's infusion obtained from patient.      Patients port accessed using non-coring, 19 gauge, 3/4 inch power needle. Port accessed per facility protocol. Port flushed easily, blood return noted.  No signs and symptoms of infection or infiltration.      IV pump verified with dose, drug, and rate of administration.  Infusion administered per protocol.

## 2020-10-20 NOTE — PATIENT INSTRUCTIONS
We will see you back as planned. If you have any questions or concerns, we can be reached Monday through Friday 8am - 430pm at [phone] ([facility] [place] If you have concerns related to a potential reaction/side effect after hours/weekends/holiday's, please seek emergent medical care.

## 2020-10-23 ENCOUNTER — INFUSION THERAPY VISIT (OUTPATIENT)
Dept: INFUSION THERAPY | Facility: OTHER | Age: 85
End: 2020-10-23
Attending: FAMILY MEDICINE
Payer: MEDICARE

## 2020-10-23 VITALS
DIASTOLIC BLOOD PRESSURE: 82 MMHG | RESPIRATION RATE: 18 BRPM | SYSTOLIC BLOOD PRESSURE: 150 MMHG | BODY MASS INDEX: 23.85 KG/M2 | TEMPERATURE: 97.1 F | HEART RATE: 80 BPM | WEIGHT: 161.6 LBS | OXYGEN SATURATION: 97 %

## 2020-10-23 DIAGNOSIS — E86.9 VOLUME DEPLETION: Primary | ICD-10-CM

## 2020-10-23 PROCEDURE — 250N000011 HC RX IP 250 OP 636: Performed by: FAMILY MEDICINE

## 2020-10-23 PROCEDURE — 96365 THER/PROPH/DIAG IV INF INIT: CPT

## 2020-10-23 PROCEDURE — 96360 HYDRATION IV INFUSION INIT: CPT

## 2020-10-23 PROCEDURE — 258N000003 HC RX IP 258 OP 636: Performed by: FAMILY MEDICINE

## 2020-10-23 RX ORDER — HEPARIN SODIUM (PORCINE) LOCK FLUSH IV SOLN 100 UNIT/ML 100 UNIT/ML
5 SOLUTION INTRAVENOUS ONCE
Status: COMPLETED | OUTPATIENT
Start: 2020-10-23 | End: 2020-10-23

## 2020-10-23 RX ORDER — HEPARIN SODIUM (PORCINE) LOCK FLUSH IV SOLN 100 UNIT/ML 100 UNIT/ML
5 SOLUTION INTRAVENOUS ONCE
Status: CANCELLED
Start: 2020-10-23 | End: 2020-10-23

## 2020-10-23 RX ADMIN — HEPARIN 5 ML: 100 SYRINGE at 10:49

## 2020-10-23 RX ADMIN — SODIUM CHLORIDE 1000 ML: 9 INJECTION, SOLUTION INTRAVENOUS at 09:38

## 2020-10-23 NOTE — PATIENT INSTRUCTIONS
We will see you back as planned. If you have any questions or concerns, we can be reached Monday through Friday 8am - 430pm at [phone] ([facility] [place] If you have concerns related to a potential reaction/side effect after hours/weekends/holiday's, please seek emergent medical care.     Next appointment 10/27/2020 9:30 AM, we will be back at the hospital check in is [facility] [address].

## 2020-10-23 NOTE — PROGRESS NOTES
Patient is a 87 year old male here today for infusion of IVF per order of Dr MALIA Ray.  Patient identified with two identifiers, order verified, and verbal consent for today's infusion obtained from patient.      Patient denies questions or concerns regarding infusion and/or medication(s) being administered.    Patients right port accessed using non-coring, 19 gauge, 3/4 power needle. Port accessed per facility protocol. Port flushed easily, blood return noted.  No signs and symptoms of infection or infiltration.      0938 IV pump verified with IVF dose, drug, and rate of administration.  Infusion administered per protocol.  Patient tolerated infusion well, no signs or symptoms of adverse reaction noted.  Patient denies pain nor discomfort.     IV removed, catheter intact.  Site clean, dry and intact.  No signs or symptoms of infiltration or infection.  Covered with a sterile bandage, slight pressure applied for 30 seconds.  Pt instructed to leave bandage intact for a minimum of one hour, and to call with questions or concerns.  Copy of appointments, discharge instructions, and after visit summary (AVS).  Patient states understanding, discharged.

## 2020-10-24 DIAGNOSIS — M54.50 CHRONIC LOW BACK PAIN, UNSPECIFIED BACK PAIN LATERALITY, UNSPECIFIED WHETHER SCIATICA PRESENT: ICD-10-CM

## 2020-10-24 DIAGNOSIS — G89.29 CHRONIC LOW BACK PAIN, UNSPECIFIED BACK PAIN LATERALITY, UNSPECIFIED WHETHER SCIATICA PRESENT: ICD-10-CM

## 2020-10-27 ENCOUNTER — INFUSION THERAPY VISIT (OUTPATIENT)
Dept: INFUSION THERAPY | Facility: OTHER | Age: 85
End: 2020-10-27
Attending: FAMILY MEDICINE
Payer: MEDICARE

## 2020-10-27 VITALS
HEART RATE: 80 BPM | RESPIRATION RATE: 18 BRPM | SYSTOLIC BLOOD PRESSURE: 138 MMHG | TEMPERATURE: 97.8 F | HEIGHT: 69 IN | OXYGEN SATURATION: 96 % | DIASTOLIC BLOOD PRESSURE: 74 MMHG | BODY MASS INDEX: 23.54 KG/M2 | WEIGHT: 158.95 LBS

## 2020-10-27 DIAGNOSIS — E86.9 VOLUME DEPLETION: Primary | ICD-10-CM

## 2020-10-27 PROCEDURE — 250N000011 HC RX IP 250 OP 636: Performed by: FAMILY MEDICINE

## 2020-10-27 PROCEDURE — 96360 HYDRATION IV INFUSION INIT: CPT

## 2020-10-27 PROCEDURE — 258N000003 HC RX IP 258 OP 636: Performed by: FAMILY MEDICINE

## 2020-10-27 RX ORDER — CYCLOBENZAPRINE HCL 10 MG
10 TABLET ORAL 3 TIMES DAILY PRN
Qty: 30 TABLET | Refills: 0 | Status: SHIPPED | OUTPATIENT
Start: 2020-10-27 | End: 2020-10-29

## 2020-10-27 RX ORDER — HEPARIN SODIUM (PORCINE) LOCK FLUSH IV SOLN 100 UNIT/ML 100 UNIT/ML
5 SOLUTION INTRAVENOUS ONCE
Status: CANCELLED
Start: 2020-10-27 | End: 2020-10-27

## 2020-10-27 RX ORDER — HEPARIN SODIUM (PORCINE) LOCK FLUSH IV SOLN 100 UNIT/ML 100 UNIT/ML
5 SOLUTION INTRAVENOUS ONCE
Status: COMPLETED | OUTPATIENT
Start: 2020-10-27 | End: 2020-10-27

## 2020-10-27 RX ADMIN — SODIUM CHLORIDE 1000 ML: 9 INJECTION, SOLUTION INTRAVENOUS at 09:48

## 2020-10-27 RX ADMIN — HEPARIN 5 ML: 100 SYRINGE at 10:57

## 2020-10-27 ASSESSMENT — MIFFLIN-ST. JEOR: SCORE: 1386.63

## 2020-10-27 ASSESSMENT — PAIN SCALES - GENERAL: PAINLEVEL: SEVERE PAIN (6)

## 2020-10-27 NOTE — PATIENT INSTRUCTIONS

## 2020-10-27 NOTE — PROGRESS NOTES
Patient is a 87 year old male here accompanied by self today for infusion of IVF per order of Dr MALIA Ray under the supervision of Dr Walter, Cardiology.  Patient identified with two identifiers, order verified, and verbal consent for today's infusion obtained from patient.      Unable to review medications and allergies appropriately due to impaired cognition of patient and no significant other at appt.     Patients port accessed using non-coring, 19 gauge, 3/4 inch power needle. Port accessed per facility protocol. Port flushed easily, blood return noted.  No signs and symptoms of infection or infiltration.      IV pump verified with dose, drug, and rate of administration.  Infusion administered per protocol.  Patient tolerated infusion well, no signs or symptoms of adverse reaction noted.  Patient denies pain nor discomfort.     IV removed, catheter intact.  Site clean, dry and intact.  No signs or symptoms of infiltration or infection.  Covered with a sterile bandage, slight pressure applied for 30 seconds.  Pt instructed to leave bandage intact for a minimum of one hour, and to call with questions or concerns.   Patient states understanding, discharged.

## 2020-10-29 DIAGNOSIS — M54.50 CHRONIC LOW BACK PAIN, UNSPECIFIED BACK PAIN LATERALITY, UNSPECIFIED WHETHER SCIATICA PRESENT: ICD-10-CM

## 2020-10-29 DIAGNOSIS — G89.29 CHRONIC LOW BACK PAIN, UNSPECIFIED BACK PAIN LATERALITY, UNSPECIFIED WHETHER SCIATICA PRESENT: ICD-10-CM

## 2020-10-29 RX ORDER — CYCLOBENZAPRINE HCL 10 MG
10 TABLET ORAL 3 TIMES DAILY PRN
Qty: 30 TABLET | Refills: 0 | Status: SHIPPED | OUTPATIENT
Start: 2020-10-29 | End: 2020-11-03

## 2020-10-29 NOTE — TELEPHONE ENCOUNTER
FLEXERIL      Last Written Prescription Date:  10-  Last Fill Quantity: 30,   # refills: 0  Last Office Visit: 10-1-2020  Future Office visit:

## 2020-10-30 ENCOUNTER — INFUSION THERAPY VISIT (OUTPATIENT)
Dept: INFUSION THERAPY | Facility: OTHER | Age: 85
End: 2020-10-30
Attending: FAMILY MEDICINE
Payer: MEDICARE

## 2020-10-30 VITALS
HEART RATE: 81 BPM | SYSTOLIC BLOOD PRESSURE: 118 MMHG | HEIGHT: 69 IN | WEIGHT: 160.72 LBS | BODY MASS INDEX: 23.8 KG/M2 | TEMPERATURE: 97.9 F | RESPIRATION RATE: 16 BRPM | DIASTOLIC BLOOD PRESSURE: 64 MMHG | OXYGEN SATURATION: 95 %

## 2020-10-30 DIAGNOSIS — E86.9 VOLUME DEPLETION: Primary | ICD-10-CM

## 2020-10-30 PROCEDURE — 96360 HYDRATION IV INFUSION INIT: CPT

## 2020-10-30 PROCEDURE — 250N000011 HC RX IP 250 OP 636: Performed by: FAMILY MEDICINE

## 2020-10-30 PROCEDURE — 258N000003 HC RX IP 258 OP 636: Performed by: FAMILY MEDICINE

## 2020-10-30 RX ORDER — HEPARIN SODIUM (PORCINE) LOCK FLUSH IV SOLN 100 UNIT/ML 100 UNIT/ML
5 SOLUTION INTRAVENOUS ONCE
Status: CANCELLED
Start: 2020-10-30 | End: 2020-10-30

## 2020-10-30 RX ORDER — HEPARIN SODIUM (PORCINE) LOCK FLUSH IV SOLN 100 UNIT/ML 100 UNIT/ML
5 SOLUTION INTRAVENOUS ONCE
Status: COMPLETED | OUTPATIENT
Start: 2020-10-30 | End: 2020-10-30

## 2020-10-30 RX ADMIN — SODIUM CHLORIDE 1000 ML: 9 INJECTION, SOLUTION INTRAVENOUS at 09:33

## 2020-10-30 RX ADMIN — HEPARIN 5 ML: 100 SYRINGE at 10:50

## 2020-10-30 ASSESSMENT — MIFFLIN-ST. JEOR: SCORE: 1394.63

## 2020-10-30 ASSESSMENT — PAIN SCALES - GENERAL: PAINLEVEL: MODERATE PAIN (5)

## 2020-10-30 NOTE — PROGRESS NOTES
Patient is a 87 year old male here accompanied by self today for infusion of IVF per order of Dr MALIA Ray under the supervision of Dr Walter, Cardiology.  Patient identified with two identifiers, order verified, and verbal consent for today's infusion obtained from patient.      Unable to review accurately medications and allergies, as significant other is not with patient and patient's cognitive status fluctuating makes him a poor historian on these matters.     Patients port accessed using non-coring, 20 gauge, 3/4 needle. Port accessed per facility protocol. Port flushed easily, blood return noted.  No signs and symptoms of infection or infiltration.      IV pump verified with dose, drug, and rate of administration.  Infusion administered per protocol.

## 2020-10-30 NOTE — PATIENT INSTRUCTIONS
We will see you back as planned. If you have any questions or concerns, we can be reached Monday through Friday 8am - 430pm at [phone] ([facility] [place]). If you have concerns related to a potential reaction/side effect after hours/weekends/holiday's, please seek emergent medical care.

## 2020-11-02 ENCOUNTER — OFFICE VISIT (OUTPATIENT)
Dept: OTOLARYNGOLOGY | Facility: OTHER | Age: 85
End: 2020-11-02
Attending: NURSE PRACTITIONER
Payer: MEDICARE

## 2020-11-02 VITALS
SYSTOLIC BLOOD PRESSURE: 114 MMHG | OXYGEN SATURATION: 98 % | BODY MASS INDEX: 23.76 KG/M2 | TEMPERATURE: 97.8 F | DIASTOLIC BLOOD PRESSURE: 60 MMHG | HEART RATE: 104 BPM | WEIGHT: 161 LBS

## 2020-11-02 DIAGNOSIS — H61.23 IMPACTED CERUMEN, BILATERAL: Primary | ICD-10-CM

## 2020-11-02 PROCEDURE — 92504 EAR MICROSCOPY EXAMINATION: CPT | Performed by: NURSE PRACTITIONER

## 2020-11-02 PROCEDURE — 69210 REMOVE IMPACTED EAR WAX UNI: CPT | Performed by: NURSE PRACTITIONER

## 2020-11-02 PROCEDURE — G0463 HOSPITAL OUTPT CLINIC VISIT: HCPCS

## 2020-11-02 PROCEDURE — 99212 OFFICE O/P EST SF 10 MIN: CPT | Mod: 25 | Performed by: NURSE PRACTITIONER

## 2020-11-02 ASSESSMENT — PAIN SCALES - GENERAL: PAINLEVEL: NO PAIN (0)

## 2020-11-02 NOTE — PROGRESS NOTES
Otolaryngology Note         Chief Complaint:     Patient presents with:  Cerumen Impaction: ear cleaning           History of Present Illness:     Jf Ortiz is a 87 year old male who presents today for ear cleaning.     He last had ears cleaned 4+ months ago.   No concerns with hearing.   No tinnitus, vertigo, facial numbness or weakness.      No otalgia, otorrhea.    + hx of COM or otologic surgeries. He has a right T-tube            Medications:     Current Outpatient Rx   Medication Sig Dispense Refill     atorvastatin (LIPITOR) 20 MG tablet TAKE 1 TABLET BY MOUTH EVERY EVENING - GENERIC FOR LIPITOR 90 tablet 3     Cholecalciferol (VITAMIN D-3) 25 MCG (1000 UT) CAPS Take 50 mcg by mouth daily        Coenzyme Q10 (CO Q 10 PO) Take 100 mg by mouth every morning        cyclobenzaprine (FLEXERIL) 10 MG tablet TAKE 1 TABLET (10 MG) BY MOUTH 3 TIMES DAILY AS NEEDED FOR MUSCLE SPASMS 30 tablet 0     donepezil (ARICEPT) 10 MG tablet TAKE 1 TABLET BY MOUTH NIGHTLY AT BEDTIME 90 tablet 3     fludrocortisone (FLORINEF) 0.1 MG tablet Take 1 tablet (0.1 mg) by mouth every morning (before breakfast) 30 tablet 11     lidocaine (LIDODERM) 5 % patch Place 1 patch onto the skin every 24 hours 30 patch 3     magnesium 500 MG TABS Take 500 mg by mouth daily        melatonin 5 MG tablet Take 5 mg by mouth nightly as needed for sleep       midodrine (PROAMATINE) 5 MG tablet TAKE 2 TABLETS (10MG) BY MOUTH THREE TIMES DAILY 540 tablet 3     multivitamin, therapeutic with minerals (MULTI-VITAMIN) TABS tablet Take 1 tablet by mouth daily       potassium chloride ER (KLOR-CON M) 20 MEQ CR tablet TAKE 1 TABLET BY MOUTH EVERY DAY 30 tablet 1     RABEprazole (ACIPHEX) 20 MG EC tablet Take 20 mg by mouth 2 times daily       sodium chloride 1 GM tablet TAKE 1 TABLET BY MOUTH TWICE A  tablet 3     Turmeric 500 MG TABS Take 1,000 mg by mouth daily               Allergies:     Allergies: Cats and Lamotrigine          Past Medical  History:     Past Medical History:   Diagnosis Date     Autonomic orthostatic hypotension 10/14/2016     Coronary artery disease      Dementia without behavioral disturbance (H) 7/28/2015    Diagnosis updated by automated process. Provider to review and confirm.     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     Seizure disorder (H)      Stented coronary artery             Past Surgical History:     Past Surgical History:   Procedure Laterality Date     ------------OTHER-------------  1955    ulnar and radial fx - repair ulnar and radial fx x4     ------------OTHER-------------  6/14/2011    cataract extraction     BIOPSY  08/2015    skin biopsy     BLEPHAROPLASTY BILATERAL  5/6/2014    Procedure: BLEPHAROPLASTY BILATERAL;  Surgeon: Andrew Queen MD;  Location: [facility]     BYPASS GRAFT ARTERY CORONARY  11/2006    coronary artery disease x 5, [facility]     cataract extraction and lens implantation  2011    cataracts     cataract extraction and lens implantation      cataracts     COLONOSCOPY  2012     colonoscopy with polypectomy  3/13/2009    history of polyps - repeat 3 yrs     colonoscopy with polypectomy  2006     colonoscopy with polypectomy  2005     COMBINED COLONOSCOPY WITH ARGON PLASMA COAGULATOR (APC) N/A 10/31/2014    Procedure: COMBINED COLONOSCOPY WITH ARGON PLASMA COAGULATOR (APC);  Surgeon: Bassam Aguilar MD;  Location: [facility]     COMBINED COLONOSCOPY WITH ARGON PLASMA COAGULATOR (APC) N/A 11/13/2015    Procedure: COMBINED COLONOSCOPY WITH ARGON PLASMA COAGULATOR (APC);  Surgeon: Bassam Aguilar MD;  Location: [facility]     ENDOSCOPY UPPER, COLONOSCOPY, COMBINED N/A 10/31/2014    Procedure: COMBINED ENDOSCOPY UPPER, COLONOSCOPY;  Surgeon:  Bassam Aguilar MD;  Location: [facility]     ENDOSCOPY UPPER, COLONOSCOPY, COMBINED N/A 2015    Procedure: COMBINED ENDOSCOPY UPPER, COLONOSCOPY;  Surgeon: Bassam Aguilar MD;  Location: [facility]     ESOPHAGOSCOPY, GASTROSCOPY, DUODENOSCOPY (EGD), COMBINED  2014    Procedure: COMBINED ESOPHAGOSCOPY, GASTROSCOPY, DUODENOSCOPY (EGD);  UPPER ENDOSCOPY(PENA) W/ BIOPSIES;  Surgeon: Patricia Pena MD;  Location: [facility]     HERNIORRHAPHY INGUINAL Right 2018    Procedure: HERNIORRHAPHY INGUINAL;  OPEN RIGHT INGUINAL HERNIA REPAIR with Mesh;  Surgeon: Virgilio Zaragoza DO;  Location: [facility]     INSERT PORT VASCULAR ACCESS Right 2019    Procedure: PORT PLACEMENT;  Surgeon: Lloyd Ram MD;  Location: [facility]     LARYNGOSCOPY WITH MICROSCOPE  2014    Procedure: LARYNGOSCOPY WITH MICROSCOPE;;  Surgeon: Chayo Duke MD;  Location: [facility]     pacemaker placement      heart block     pacemaker placement      dual-chamber     REMOVE TUBE, MYRINGOTOMY, COMBINED  2014    Procedure: COMBINED REMOVE TUBE, MYRINGOTOMY;  MICRODIRECT LARYNGOSCOPY WITH BIOPSY AND FROZEN SECTIONS removal of right ear tube and myringoplasty;  Surgeon: Chayo Duke MD;  Location: [facility]     REPLACE PACEMAKER GENERATOR N/A 2018    Procedure: REPLACE PACEMAKER GENERATOR;  Pacemaker generator change;  Surgeon: Alma Kaplan MD;  Location: [facility]     stent placement to LAD  2008     ventilation tube  2012    right in office       ENT family history reviewed         Social History:     Social History     Tobacco Use     Smoking status: Former Smoker     Packs/day: 1.00     Years: 5.00     Pack years: 5.00     Types: Cigarettes     Quit date: 1985     Years since quittin.8     Smokeless tobacco: Never Used     Tobacco comment: quit in    Substance Use Topics     Alcohol use: Yes     Comment: social     Drug use: No            Review of Systems:     ROS: See HPI         Physical Exam:     BP  114/60   Pulse 104   Temp 97.8  F (36.6  C) (Tympanic)   Wt 73 kg (161 lb)   SpO2 98%   BMI 23.76 kg/m      General - The patient is well nourished and well developed, and appears to have good nutritional status.  Alert and oriented to person and place, answers questions and cooperates with examination appropriately.   Head and Face - Normocephalic and atraumatic, with no gross asymmetry noted.  The facial nerve is intact, with strong symmetric movements.  Voice and Breathing - The patient was breathing comfortably without the use of accessory muscles. There was no wheezing, stridor. The patients voice was clear and strong, and had appropriate pitch and quality.  Ears - The ears were examined with binocular microscopy and with otoscope.  External ears normal. Right canal cerumen impacted.  Left canal cerumen impacted.  The right ear was cleaned with #7, #5 sucker.  Right tympanic membrane is without effusion, retraction or mass, the T-tube is in good position and patent.  The outer lumen of the t-tube has cerumen, unable to remove but unable to remove without concern of pulling out the t-tube.  The cerumen is not on the TM or in the lumen of the tube so I left it.   The left ear was cleaned with #7, #5 sucker.  Left tympanic membrane is intact without effusion, retraction or mass.  Eyes - Extraocular movements intact, sclera were not icteric or injected, conjunctiva were pink and moist.  Mouth - Examination of the oral cavity showed pink, healthy oral mucosa. Dentition in good condition. No lesions or ulcerations noted. The tongue was mobile and midline.   Throat - The walls of the oropharynx were smooth, pink, moist, symmetric, and had no lesions or ulcerations.  The tonsillar pillars and soft palate were symmetric. The uvula was midline on elevation.    Neck - Full range of motion on passive movement.  Palpation of the occipital, submental, submandibular, internal jugular chain, and supraclavicular nodes did  not demonstrate any abnormal lymph nodes or masses.  Palpation of the thyroid was soft and smooth, with no nodules or goiter appreciated.  The trachea was mobile and midline.  Nose - External contour is symmetric, no gross deflection or scars.  Nasal mucosa is pink and moist with no abnormal mucus.  The septum and turbinates were evaluated with nasal speculum, no polyps, masses, or purulence noted on examination.         Assessment and Plan:       ICD-10-CM    1. Impacted cerumen, bilateral  H61.23      Follow up in 4-5 months for recheck  Ears were cleaned, tolerated well    The ears were cleaned today.  The patient may return here as needed or with their primary physician.  Aural hygiene was discussed.  Avoidance of Q-tips was reiterated.  Avoid flushing the ear canal if there is a possibility of a hole or perforation in the tympanic membrane.   If there are any unresolved concerns regarding hearing loss an audiogram is recommended.      Char Avila NP-C  [facility]

## 2020-11-02 NOTE — LETTER
11/2/2020         RE: Jf Ortiz  [address]  [place] [address]        Dear Colleague,    Thank you for referring your patient, Jf Ortiz, to the [facility] [place]. Please see a copy of my visit note below.    Otolaryngology Note         Chief Complaint:     Patient presents with:  Cerumen Impaction: ear cleaning           History of Present Illness:     Jf Ortiz is a 87 year old male who presents today for ear cleaning.     He last had ears cleaned 4+ months ago.   No concerns with hearing.   No tinnitus, vertigo, facial numbness or weakness.      No otalgia, otorrhea.    + hx of COM or otologic surgeries. He has a right T-tube            Medications:     Current Outpatient Rx   Medication Sig Dispense Refill     atorvastatin (LIPITOR) 20 MG tablet TAKE 1 TABLET BY MOUTH EVERY EVENING - GENERIC FOR LIPITOR 90 tablet 3     Cholecalciferol (VITAMIN D-3) 25 MCG (1000 UT) CAPS Take 50 mcg by mouth daily        Coenzyme Q10 (CO Q 10 PO) Take 100 mg by mouth every morning        cyclobenzaprine (FLEXERIL) 10 MG tablet TAKE 1 TABLET (10 MG) BY MOUTH 3 TIMES DAILY AS NEEDED FOR MUSCLE SPASMS 30 tablet 0     donepezil (ARICEPT) 10 MG tablet TAKE 1 TABLET BY MOUTH NIGHTLY AT BEDTIME 90 tablet 3     fludrocortisone (FLORINEF) 0.1 MG tablet Take 1 tablet (0.1 mg) by mouth every morning (before breakfast) 30 tablet 11     lidocaine (LIDODERM) 5 % patch Place 1 patch onto the skin every 24 hours 30 patch 3     magnesium 500 MG TABS Take 500 mg by mouth daily        melatonin 5 MG tablet Take 5 mg by mouth nightly as needed for sleep       midodrine (PROAMATINE) 5 MG tablet TAKE 2 TABLETS (10MG) BY MOUTH THREE TIMES DAILY 540 tablet 3     multivitamin, therapeutic with minerals (MULTI-VITAMIN) TABS tablet Take 1 tablet by mouth daily       potassium chloride ER (KLOR-CON M) 20 MEQ CR tablet TAKE 1 TABLET BY MOUTH EVERY DAY 30 tablet 1     RABEprazole (ACIPHEX) 20 MG EC tablet Take 20 mg by mouth 2  times daily       sodium chloride 1 GM tablet TAKE 1 TABLET BY MOUTH TWICE A  tablet 3     Turmeric 500 MG TABS Take 1,000 mg by mouth daily               Allergies:     Allergies: Cats and Lamotrigine          Past Medical History:     Past Medical History:   Diagnosis Date     Autonomic orthostatic hypotension 10/14/2016     Coronary artery disease      Dementia without behavioral disturbance (H) 7/28/2015    Diagnosis updated by automated process. Provider to review and confirm.     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     Seizure disorder (H)      Stented coronary artery             Past Surgical History:     Past Surgical History:   Procedure Laterality Date     ------------OTHER-------------  1955    ulnar and radial fx - repair ulnar and radial fx x4     ------------OTHER-------------  6/14/2011    cataract extraction     BIOPSY  08/2015    skin biopsy     BLEPHAROPLASTY BILATERAL  5/6/2014    Procedure: BLEPHAROPLASTY BILATERAL;  Surgeon: Andrew Queen MD;  Location: [facility]     BYPASS GRAFT ARTERY CORONARY  11/2006    coronary artery disease x 5, [facility]     cataract extraction and lens implantation  2011    cataracts     cataract extraction and lens implantation      cataracts     COLONOSCOPY  2012     colonoscopy with polypectomy  3/13/2009    history of polyps - repeat 3 yrs     colonoscopy with polypectomy  2006     colonoscopy with polypectomy  2005     COMBINED COLONOSCOPY WITH ARGON PLASMA COAGULATOR (APC) N/A 10/31/2014    Procedure: COMBINED COLONOSCOPY WITH ARGON PLASMA COAGULATOR (APC);  Surgeon: Bassam Aguilar MD;  Location: [facility]     COMBINED COLONOSCOPY WITH ARGON PLASMA COAGULATOR (APC) N/A 11/13/2015     Procedure: COMBINED COLONOSCOPY WITH ARGON PLASMA COAGULATOR (APC);  Surgeon: Bassam Aguilar MD;  Location: [facility]     ENDOSCOPY UPPER, COLONOSCOPY, COMBINED N/A 10/31/2014    Procedure: COMBINED ENDOSCOPY UPPER, COLONOSCOPY;  Surgeon: Bassam Aguilar MD;  Location: [facility]     ENDOSCOPY UPPER, COLONOSCOPY, COMBINED N/A 11/13/2015    Procedure: COMBINED ENDOSCOPY UPPER, COLONOSCOPY;  Surgeon: Bassam Aguilar MD;  Location: [facility]     ESOPHAGOSCOPY, GASTROSCOPY, DUODENOSCOPY (EGD), COMBINED  1/22/2014    Procedure: COMBINED ESOPHAGOSCOPY, GASTROSCOPY, DUODENOSCOPY (EGD);  UPPER ENDOSCOPY(PENA) W/ BIOPSIES;  Surgeon: Patricia Pena MD;  Location: [facility]     HERNIORRHAPHY INGUINAL Right 2/14/2018    Procedure: HERNIORRHAPHY INGUINAL;  OPEN RIGHT INGUINAL HERNIA REPAIR with Mesh;  Surgeon: Virgilio Zaragoza DO;  Location: [facility]     INSERT PORT VASCULAR ACCESS Right 7/12/2019    Procedure: PORT PLACEMENT;  Surgeon: Lloyd Ram MD;  Location: [facility]     LARYNGOSCOPY WITH MICROSCOPE  1/22/2014    Procedure: LARYNGOSCOPY WITH MICROSCOPE;;  Surgeon: Chayo Duke MD;  Location: [facility]     pacemaker placement  2000    heart block     pacemaker placement  2011    dual-chamber     REMOVE TUBE, MYRINGOTOMY, COMBINED  1/22/2014    Procedure: COMBINED REMOVE TUBE, MYRINGOTOMY;  MICRODIRECT LARYNGOSCOPY WITH BIOPSY AND FROZEN SECTIONS removal of right ear tube and myringoplasty;  Surgeon: Chayo Duke MD;  Location: [facility]     REPLACE PACEMAKER GENERATOR N/A 8/8/2018    Procedure: REPLACE PACEMAKER GENERATOR;  Pacemaker generator change;  Surgeon: Alma Kaplan MD;  Location: [facility]     stent placement to LAD  2008     ventilation tube  5/24/2012    right in office       ENT family history reviewed         Social History:     Social History     Tobacco Use     Smoking status: Former Smoker     Packs/day: 1.00     Years: 5.00     Pack years: 5.00     Types: Cigarettes     Quit date: 1/1/1985     Years since  quittin.8     Smokeless tobacco: Never Used     Tobacco comment: quit in    Substance Use Topics     Alcohol use: Yes     Comment: social     Drug use: No            Review of Systems:     ROS: See HPI         Physical Exam:     /60   Pulse 104   Temp 97.8  F (36.6  C) (Tympanic)   Wt 73 kg (161 lb)   SpO2 98%   BMI 23.76 kg/m      General - The patient is well nourished and well developed, and appears to have good nutritional status.  Alert and oriented to person and place, answers questions and cooperates with examination appropriately.   Head and Face - Normocephalic and atraumatic, with no gross asymmetry noted.  The facial nerve is intact, with strong symmetric movements.  Voice and Breathing - The patient was breathing comfortably without the use of accessory muscles. There was no wheezing, stridor. The patients voice was clear and strong, and had appropriate pitch and quality.  Ears - The ears were examined with binocular microscopy and with otoscope.  External ears normal. Right canal cerumen impacted.  Left canal cerumen impacted.  The right ear was cleaned with #7, #5 sucker.  Right tympanic membrane is without effusion, retraction or mass, the T-tube is in good position and patent.  The outer lumen of the t-tube has cerumen, unable to remove but unable to remove without concern of pulling out the t-tube.  The cerumen is not on the TM or in the lumen of the tube so I left it.   The left ear was cleaned with #7, #5 sucker.  Left tympanic membrane is intact without effusion, retraction or mass.  Eyes - Extraocular movements intact, sclera were not icteric or injected, conjunctiva were pink and moist.  Mouth - Examination of the oral cavity showed pink, healthy oral mucosa. Dentition in good condition. No lesions or ulcerations noted. The tongue was mobile and midline.   Throat - The walls of the oropharynx were smooth, pink, moist, symmetric, and had no lesions or ulcerations.  The  tonsillar pillars and soft palate were symmetric. The uvula was midline on elevation.    Neck - Full range of motion on passive movement.  Palpation of the occipital, submental, submandibular, internal jugular chain, and supraclavicular nodes did not demonstrate any abnormal lymph nodes or masses.  Palpation of the thyroid was soft and smooth, with no nodules or goiter appreciated.  The trachea was mobile and midline.  Nose - External contour is symmetric, no gross deflection or scars.  Nasal mucosa is pink and moist with no abnormal mucus.  The septum and turbinates were evaluated with nasal speculum, no polyps, masses, or purulence noted on examination.         Assessment and Plan:       ICD-10-CM    1. Impacted cerumen, bilateral  H61.23      Follow up in 4-5 months for recheck  Ears were cleaned, tolerated well    The ears were cleaned today.  The patient may return here as needed or with their primary physician.  Aural hygiene was discussed.  Avoidance of Q-tips was reiterated.  Avoid flushing the ear canal if there is a possibility of a hole or perforation in the tympanic membrane.   If there are any unresolved concerns regarding hearing loss an audiogram is recommended.      Char CLARKE  [facility]        Again, thank you for allowing me to participate in the care of your patient.        Sincerely,        Char Avila NP

## 2020-11-02 NOTE — NURSING NOTE
"Chief Complaint   Patient presents with     Cerumen Impaction     ear cleaning       Initial /60   Pulse 104   Temp 97.8  F (36.6  C) (Tympanic)   Wt 73 kg (161 lb)   SpO2 98%   BMI 23.76 kg/m   Estimated body mass index is 23.76 kg/m  as calculated from the following:    Height as of 10/30/20: 1.753 m (5' 9.02\").    Weight as of this encounter: 73 kg (161 lb).  Medication Reconciliation: complete  Lola Banda LPN  "

## 2020-11-02 NOTE — PATIENT INSTRUCTIONS
Follow up in 4-5 months for repeat ear cleaning, sooner as needed for cerumen impaction      Thank you for allowing Char CLARKE and our ENT team to participate in your care.  If your medications are too expensive, please call my nurse at the number listed below.  We can possibly change this medication.    If you have a scheduling or an appointment question please contact our Health Unit Coordinator at their direct line [phone].   ALL nursing questions or concerns can be directed to your ENT nurses at: [phone] or [phone].

## 2020-11-03 ENCOUNTER — INFUSION THERAPY VISIT (OUTPATIENT)
Dept: INFUSION THERAPY | Facility: OTHER | Age: 85
End: 2020-11-03
Attending: FAMILY MEDICINE
Payer: MEDICARE

## 2020-11-03 VITALS
TEMPERATURE: 97.5 F | HEART RATE: 81 BPM | DIASTOLIC BLOOD PRESSURE: 71 MMHG | BODY MASS INDEX: 24.08 KG/M2 | SYSTOLIC BLOOD PRESSURE: 135 MMHG | RESPIRATION RATE: 16 BRPM | OXYGEN SATURATION: 97 % | WEIGHT: 163.14 LBS

## 2020-11-03 DIAGNOSIS — G89.29 CHRONIC LOW BACK PAIN, UNSPECIFIED BACK PAIN LATERALITY, UNSPECIFIED WHETHER SCIATICA PRESENT: ICD-10-CM

## 2020-11-03 DIAGNOSIS — M54.50 CHRONIC LOW BACK PAIN, UNSPECIFIED BACK PAIN LATERALITY, UNSPECIFIED WHETHER SCIATICA PRESENT: ICD-10-CM

## 2020-11-03 DIAGNOSIS — E86.9 VOLUME DEPLETION: Primary | ICD-10-CM

## 2020-11-03 PROCEDURE — 250N000011 HC RX IP 250 OP 636: Performed by: FAMILY MEDICINE

## 2020-11-03 PROCEDURE — 96360 HYDRATION IV INFUSION INIT: CPT

## 2020-11-03 PROCEDURE — 258N000003 HC RX IP 258 OP 636: Performed by: FAMILY MEDICINE

## 2020-11-03 RX ORDER — CYCLOBENZAPRINE HCL 10 MG
10 TABLET ORAL 3 TIMES DAILY PRN
Qty: 30 TABLET | Refills: 0 | Status: ON HOLD | OUTPATIENT
Start: 2020-11-03 | End: 2020-12-22

## 2020-11-03 RX ORDER — HEPARIN SODIUM (PORCINE) LOCK FLUSH IV SOLN 100 UNIT/ML 100 UNIT/ML
5 SOLUTION INTRAVENOUS ONCE
Status: COMPLETED | OUTPATIENT
Start: 2020-11-03 | End: 2020-11-03

## 2020-11-03 RX ORDER — HEPARIN SODIUM (PORCINE) LOCK FLUSH IV SOLN 100 UNIT/ML 100 UNIT/ML
5 SOLUTION INTRAVENOUS ONCE
Status: CANCELLED
Start: 2020-11-03 | End: 2020-11-03

## 2020-11-03 RX ADMIN — HEPARIN 5 ML: 100 SYRINGE at 10:53

## 2020-11-03 RX ADMIN — SODIUM CHLORIDE 1000 ML: 9 INJECTION, SOLUTION INTRAVENOUS at 09:41

## 2020-11-03 NOTE — TELEPHONE ENCOUNTER
FLEXERIL      Last Written Prescription Date:  10/29/20  Last Fill Quantity: 30,   # refills: 0   Last Office Visit: 10/01/2020

## 2020-11-03 NOTE — PATIENT INSTRUCTIONS
We will see you back as planned. If you have any questions or concerns, we can be reached Monday through Friday 8am - 430pm at [phone] ([facility] [place]). If you have concerns related to a potential reaction/side effect after hours/weekends/holiday's, please seek emergent medical care.

## 2020-11-03 NOTE — PROGRESS NOTES
Patient is a 87 year old male here  today for infusion of IVF per order of Dr MALIA Ray under the supervision of Dr Walter.  Patient identified with two identifiers, order verified, and verbal consent for today's infusion obtained from patient.      Patient meets order parameters for today's treatment.     Patient denies questions or concerns regarding infusion and/or medication(s) being administered.    Patients right port accessed using non-coring, 19 gauge, 3/4 power needle. Port accessed per facility protocol. Port flushed easily, blood return noted.  No signs and symptoms of infection or infiltration.      0941 IV pump verified with IVF dose, drug, and rate of administration.  Infusion administered per protocol.  Patient tolerated infusion well, no signs or symptoms of adverse reaction noted.  Patient denies pain nor discomfort.     IV removed, catheter intact.  Site clean, dry and intact.  No signs or symptoms of infiltration or infection.  Covered with a sterile bandage, slight pressure applied for 30 seconds.  Pt instructed to leave bandage intact for a minimum of one hour, and to call with questions or concerns.  Patient declines copy of appointments, discharge instructions, and after visit summary (AVS). Patient states understanding, discharged.

## 2020-11-06 ENCOUNTER — INFUSION THERAPY VISIT (OUTPATIENT)
Dept: INFUSION THERAPY | Facility: OTHER | Age: 85
End: 2020-11-06
Attending: FAMILY MEDICINE
Payer: MEDICARE

## 2020-11-06 VITALS
HEIGHT: 69 IN | SYSTOLIC BLOOD PRESSURE: 157 MMHG | DIASTOLIC BLOOD PRESSURE: 80 MMHG | RESPIRATION RATE: 18 BRPM | WEIGHT: 156.31 LBS | HEART RATE: 73 BPM | TEMPERATURE: 97.1 F | BODY MASS INDEX: 23.15 KG/M2 | OXYGEN SATURATION: 97 %

## 2020-11-06 DIAGNOSIS — E86.9 VOLUME DEPLETION: Primary | ICD-10-CM

## 2020-11-06 PROCEDURE — 258N000003 HC RX IP 258 OP 636: Performed by: FAMILY MEDICINE

## 2020-11-06 PROCEDURE — 96360 HYDRATION IV INFUSION INIT: CPT

## 2020-11-06 PROCEDURE — 250N000011 HC RX IP 250 OP 636: Performed by: FAMILY MEDICINE

## 2020-11-06 RX ORDER — HEPARIN SODIUM (PORCINE) LOCK FLUSH IV SOLN 100 UNIT/ML 100 UNIT/ML
5 SOLUTION INTRAVENOUS ONCE
Status: CANCELLED
Start: 2020-11-06 | End: 2020-11-06

## 2020-11-06 RX ORDER — HEPARIN SODIUM (PORCINE) LOCK FLUSH IV SOLN 100 UNIT/ML 100 UNIT/ML
5 SOLUTION INTRAVENOUS ONCE
Status: COMPLETED | OUTPATIENT
Start: 2020-11-06 | End: 2020-11-06

## 2020-11-06 RX ADMIN — HEPARIN 5 ML: 100 SYRINGE at 10:49

## 2020-11-06 RX ADMIN — SODIUM CHLORIDE 1000 ML: 9 INJECTION, SOLUTION INTRAVENOUS at 09:37

## 2020-11-06 ASSESSMENT — MIFFLIN-ST. JEOR: SCORE: 1374.63

## 2020-11-06 ASSESSMENT — PAIN SCALES - GENERAL: PAINLEVEL: MODERATE PAIN (5)

## 2020-11-06 NOTE — PROGRESS NOTES
Patient is a 87 year old male here accompanied by self today for infusion of IVF per order of Dr MALIA Ray.  Patient identified with two identifiers, order verified, and verbal consent for today's infusion obtained from patient.      Unable to accurately review meds with patient today as he is a poor historian.     Patients port accessed using non-coring, 20 gauge, 3/4 inch power needle. Port accessed per facility protocol. Port flushed easily, blood return noted.  No signs and symptoms of infection or infiltration.      IV pump verified with dose, drug, and rate of administration.  Infusion administered per protocol.  Patient tolerated infusion well, no signs or symptoms of adverse reaction noted.  Patient denies pain nor discomfort.     IV removed, catheter intact.  Site clean, dry and intact.  No signs or symptoms of infiltration or infection.  Covered with a sterile bandage, slight pressure applied for 30 seconds.  Pt instructed to leave bandage intact for a minimum of one hour, and to call with questions or concerns.  Copy of appointments, discharge instructions, and after visit summary (AVS) provided to patient.  Patient states understanding, discharged.

## 2020-11-10 ENCOUNTER — INFUSION THERAPY VISIT (OUTPATIENT)
Dept: INFUSION THERAPY | Facility: OTHER | Age: 85
End: 2020-11-10
Attending: FAMILY MEDICINE
Payer: MEDICARE

## 2020-11-10 VITALS
TEMPERATURE: 97.6 F | WEIGHT: 162.7 LBS | BODY MASS INDEX: 24.1 KG/M2 | HEART RATE: 84 BPM | DIASTOLIC BLOOD PRESSURE: 72 MMHG | OXYGEN SATURATION: 97 % | SYSTOLIC BLOOD PRESSURE: 114 MMHG | HEIGHT: 69 IN

## 2020-11-10 DIAGNOSIS — E86.9 VOLUME DEPLETION: Primary | ICD-10-CM

## 2020-11-10 PROCEDURE — 96360 HYDRATION IV INFUSION INIT: CPT

## 2020-11-10 PROCEDURE — 250N000011 HC RX IP 250 OP 636: Performed by: FAMILY MEDICINE

## 2020-11-10 PROCEDURE — 258N000003 HC RX IP 258 OP 636: Performed by: FAMILY MEDICINE

## 2020-11-10 RX ORDER — HEPARIN SODIUM (PORCINE) LOCK FLUSH IV SOLN 100 UNIT/ML 100 UNIT/ML
5 SOLUTION INTRAVENOUS ONCE
Status: CANCELLED
Start: 2020-11-10 | End: 2020-11-10

## 2020-11-10 RX ORDER — HEPARIN SODIUM (PORCINE) LOCK FLUSH IV SOLN 100 UNIT/ML 100 UNIT/ML
5 SOLUTION INTRAVENOUS ONCE
Status: COMPLETED | OUTPATIENT
Start: 2020-11-10 | End: 2020-11-10

## 2020-11-10 RX ADMIN — HEPARIN 5 ML: 100 SYRINGE at 10:54

## 2020-11-10 RX ADMIN — SODIUM CHLORIDE 1000 ML: 9 INJECTION, SOLUTION INTRAVENOUS at 09:45

## 2020-11-10 ASSESSMENT — MIFFLIN-ST. JEOR: SCORE: 1403.63

## 2020-11-10 NOTE — PATIENT INSTRUCTIONS
We will see you back as planned. If you have any questions or concerns, we can be reached Monday through Friday 8am - 430pm at [phone] ([facility] [place]). If you have concerns related to a potential reaction/side effect after hours/weekends/holiday's, please seek emergent medical care.

## 2020-11-10 NOTE — PROGRESS NOTES
Patient is a 87 year old male  here accompanied by self  today for infusion of IVF per order of Dr. Ray under the supervision of Dr. Ray  Patient identified with two identifiers, order verified, and verbal consent for today's infusion obtained from patient.         Patient meets order parameters for today's treatment.    Patients port accessed using non-coring, 3/4 gauge, 19 needle. Port accessed per facility protocol. Port flushed easily, blood return noted.  No signs and symptoms of infection or infiltration.      IV pump verified with dose, drug, and rate of administration.  Infusion administered per protocol.  Patient tolerated infusion well, no signs or symptoms of adverse reaction noted.  Patient denies pain nor discomfort.     IV removed, catheter intact.  Site clean, dry and intact.  No signs or symptoms of infiltration or infection.  Covered with a sterile bandage, slight pressure applied for 30 seconds.  Pt instructed to leave bandage intact for a minimum of one hour, and to call with questions or concerns.  Copy of appointments, discharge instructions, and after visit summary (AVS) provided to patient.  Patient states understanding, discharged.

## 2020-11-14 DIAGNOSIS — G20.A1 PARKINSON'S DISEASE (H): ICD-10-CM

## 2020-11-14 DIAGNOSIS — I95.1 ORTHOSTATIC HYPOTENSION: ICD-10-CM

## 2020-11-17 ENCOUNTER — INFUSION THERAPY VISIT (OUTPATIENT)
Dept: INFUSION THERAPY | Facility: OTHER | Age: 85
End: 2020-11-17
Attending: FAMILY MEDICINE
Payer: MEDICARE

## 2020-11-17 VITALS
BODY MASS INDEX: 24.1 KG/M2 | DIASTOLIC BLOOD PRESSURE: 90 MMHG | WEIGHT: 162.7 LBS | TEMPERATURE: 96.9 F | HEART RATE: 68 BPM | RESPIRATION RATE: 18 BRPM | OXYGEN SATURATION: 98 % | SYSTOLIC BLOOD PRESSURE: 138 MMHG | HEIGHT: 69 IN

## 2020-11-17 DIAGNOSIS — E86.9 VOLUME DEPLETION: Primary | ICD-10-CM

## 2020-11-17 PROCEDURE — 258N000003 HC RX IP 258 OP 636: Performed by: FAMILY MEDICINE

## 2020-11-17 PROCEDURE — 250N000011 HC RX IP 250 OP 636: Performed by: FAMILY MEDICINE

## 2020-11-17 PROCEDURE — 96360 HYDRATION IV INFUSION INIT: CPT

## 2020-11-17 RX ORDER — HEPARIN SODIUM (PORCINE) LOCK FLUSH IV SOLN 100 UNIT/ML 100 UNIT/ML
5 SOLUTION INTRAVENOUS ONCE
Status: COMPLETED | OUTPATIENT
Start: 2020-11-17 | End: 2020-11-17

## 2020-11-17 RX ORDER — SODIUM CHLORIDE 1 G/1
TABLET ORAL
Qty: 180 TABLET | Refills: 3 | Status: SHIPPED | OUTPATIENT
Start: 2020-11-17

## 2020-11-17 RX ORDER — HEPARIN SODIUM (PORCINE) LOCK FLUSH IV SOLN 100 UNIT/ML 100 UNIT/ML
5 SOLUTION INTRAVENOUS ONCE
Status: CANCELLED
Start: 2020-11-17 | End: 2020-11-17

## 2020-11-17 RX ADMIN — HEPARIN 5 ML: 100 SYRINGE at 10:51

## 2020-11-17 RX ADMIN — SODIUM CHLORIDE 1000 ML: 9 INJECTION, SOLUTION INTRAVENOUS at 09:35

## 2020-11-17 ASSESSMENT — MIFFLIN-ST. JEOR: SCORE: 1403.63

## 2020-11-17 ASSESSMENT — PAIN SCALES - GENERAL: PAINLEVEL: MODERATE PAIN (5)

## 2020-11-17 NOTE — PROGRESS NOTES
Patient is a 87 year old male here accompanied by self today for infusion of IVF per order of Dr MALIA Ray.  Patient identified with two identifiers, order verified, and verbal consent for today's infusion obtained from patient.      Patients port accessed using non-coring, 20 gauge, 3/4 inch needle. Port accessed per facility protocol. Port flushed easily, blood return noted.  No signs and symptoms of infection or infiltration.      IV pump verified with dose, drug, and rate of administration.  Infusion administered per protocol.  Patient tolerated infusion well, no signs or symptoms of adverse reaction noted.  Patient denies pain nor discomfort.     IV removed, catheter intact.  Site clean, dry and intact.  No signs or symptoms of infiltration or infection.  Covered with a sterile bandage, slight pressure applied for 30 seconds.  Pt instructed to leave bandage intact for a minimum of one hour, and to call with questions or concerns. Patient states understanding, discharged.

## 2020-11-17 NOTE — TELEPHONE ENCOUNTER
Sodium       Last Written Prescription Date:  1-16-20  Last Fill Quantity: 180,   # refills: 3  Last Office Visit: 10-1-20  Future Office visit:

## 2020-11-20 ENCOUNTER — INFUSION THERAPY VISIT (OUTPATIENT)
Dept: INFUSION THERAPY | Facility: OTHER | Age: 85
End: 2020-11-20
Attending: FAMILY MEDICINE
Payer: MEDICARE

## 2020-11-20 VITALS
TEMPERATURE: 97.5 F | DIASTOLIC BLOOD PRESSURE: 81 MMHG | HEIGHT: 69 IN | OXYGEN SATURATION: 98 % | WEIGHT: 163.8 LBS | HEART RATE: 76 BPM | SYSTOLIC BLOOD PRESSURE: 149 MMHG | BODY MASS INDEX: 24.26 KG/M2

## 2020-11-20 DIAGNOSIS — E86.9 VOLUME DEPLETION: Primary | ICD-10-CM

## 2020-11-20 PROCEDURE — 258N000003 HC RX IP 258 OP 636: Performed by: FAMILY MEDICINE

## 2020-11-20 PROCEDURE — 250N000011 HC RX IP 250 OP 636: Performed by: FAMILY MEDICINE

## 2020-11-20 PROCEDURE — 96360 HYDRATION IV INFUSION INIT: CPT

## 2020-11-20 RX ORDER — HEPARIN SODIUM (PORCINE) LOCK FLUSH IV SOLN 100 UNIT/ML 100 UNIT/ML
5 SOLUTION INTRAVENOUS ONCE
Status: CANCELLED
Start: 2020-11-20 | End: 2020-11-20

## 2020-11-20 RX ORDER — HEPARIN SODIUM (PORCINE) LOCK FLUSH IV SOLN 100 UNIT/ML 100 UNIT/ML
5 SOLUTION INTRAVENOUS ONCE
Status: COMPLETED | OUTPATIENT
Start: 2020-11-20 | End: 2020-11-20

## 2020-11-20 RX ADMIN — SODIUM CHLORIDE 1000 ML: 9 INJECTION, SOLUTION INTRAVENOUS at 09:42

## 2020-11-20 RX ADMIN — HEPARIN 5 ML: 100 SYRINGE at 10:48

## 2020-11-20 ASSESSMENT — MIFFLIN-ST. JEOR: SCORE: 1408.63

## 2020-11-20 NOTE — PROGRESS NOTES
Patient is a 87 year old male here accompanied by self  today for infusion of IVF per order of   under the supervision of Dr. Ray.  Patient identified with two identifiers, order verified, and verbal consent for today's infusion obtained from patient.    Patient meets order parameters for today's treatment.     Patients port accessed using non-coring, 19 gauge, 3/4 needle. Port accessed per facility protocol. Port flushed easily, blood return noted.  No signs and symptoms of infection or infiltration.      0942  IV pump verified with dose, drug, and rate of administration.  Infusion administered per protocol.  Patient tolerated infusion well no signs or symptoms of adverse reaction noted.  Patient denies pain nor discomfort.     IV removed, catheter intact.  Site clean, dry and intact.  No signs or symptoms of infiltration or infection.  Covered with a sterile bandage, slight pressure applied for 30 seconds.  Pt instructed to leave bandage intact for a minimum of one hour, and to call with questions or concerns.  Copy of appointments, discharge instructions, and after visit summary (AVS) provided to patient.  Patient states understanding, discharged.

## 2020-11-20 NOTE — PATIENT INSTRUCTIONS
We will see you back as planned. If you have any questions or concerns, we can be reached Monday through Friday 8am - 430pm at [phone] ([facility] [place]). If you have concerns related to a potential reaction/side effect after hours/weekends/holiday's, please seek emergent medical care.

## 2020-11-21 DIAGNOSIS — E87.6 HYPOKALEMIA: ICD-10-CM

## 2020-11-23 RX ORDER — POTASSIUM CHLORIDE 1500 MG/1
TABLET, EXTENDED RELEASE ORAL
Qty: 30 TABLET | Refills: 1 | Status: SHIPPED | OUTPATIENT
Start: 2020-11-23

## 2020-11-23 NOTE — TELEPHONE ENCOUNTER
Potassium chloride      Last Written Prescription Date:  9/22/2020  Last Fill Quantity: 30,   # refills: 1  Last Office Visit: 10/1/2020  Future Office visit:

## 2020-11-24 ENCOUNTER — INFUSION THERAPY VISIT (OUTPATIENT)
Dept: INFUSION THERAPY | Facility: OTHER | Age: 85
End: 2020-11-24
Attending: FAMILY MEDICINE
Payer: MEDICARE

## 2020-11-24 VITALS
TEMPERATURE: 97.6 F | SYSTOLIC BLOOD PRESSURE: 139 MMHG | WEIGHT: 163.58 LBS | DIASTOLIC BLOOD PRESSURE: 80 MMHG | HEART RATE: 69 BPM | BODY MASS INDEX: 24.15 KG/M2 | RESPIRATION RATE: 16 BRPM

## 2020-11-24 DIAGNOSIS — E86.9 VOLUME DEPLETION: Primary | ICD-10-CM

## 2020-11-24 PROCEDURE — 250N000011 HC RX IP 250 OP 636: Performed by: FAMILY MEDICINE

## 2020-11-24 PROCEDURE — 258N000003 HC RX IP 258 OP 636: Performed by: FAMILY MEDICINE

## 2020-11-24 PROCEDURE — 96360 HYDRATION IV INFUSION INIT: CPT

## 2020-11-24 PROCEDURE — 96365 THER/PROPH/DIAG IV INF INIT: CPT

## 2020-11-24 RX ORDER — HEPARIN SODIUM (PORCINE) LOCK FLUSH IV SOLN 100 UNIT/ML 100 UNIT/ML
5 SOLUTION INTRAVENOUS ONCE
Status: CANCELLED
Start: 2020-11-24 | End: 2020-11-24

## 2020-11-24 RX ORDER — HEPARIN SODIUM (PORCINE) LOCK FLUSH IV SOLN 100 UNIT/ML 100 UNIT/ML
5 SOLUTION INTRAVENOUS ONCE
Status: COMPLETED | OUTPATIENT
Start: 2020-11-24 | End: 2020-11-24

## 2020-11-24 RX ADMIN — HEPARIN 5 ML: 100 SYRINGE at 11:10

## 2020-11-24 RX ADMIN — SODIUM CHLORIDE 1000 ML: 9 INJECTION, SOLUTION INTRAVENOUS at 09:51

## 2020-11-24 NOTE — PATIENT INSTRUCTIONS
We will see you back as planned. If you have any questions or concerns, we can be reached Monday through Friday 8am - 430pm at [phone] ([facility] [place]). If you have concerns related to a potential reaction/side effect after hours/weekends/holiday's, please seek emergent medical care.

## 2020-11-24 NOTE — PROGRESS NOTES
Patient is a 87 year old male here today for infusion of IVF per order of Dr Ray under the supervision of Dr Walter.  Patient identified with two identifiers, order verified, and verbal consent for today's infusion obtained from patient.      Patient denies questions or concerns regarding infusion and/or medication(s) being administered.    Patients right port accessed using non-coring, 19 gauge, 3/4 power needle. Port accessed per facility protocol. Port flushed easily, blood return noted.  No signs and symptoms of infection or infiltration.      0951 IV pump verified with dose, drug, and rate of administration.  Infusion administered per protocol.

## 2020-11-27 ENCOUNTER — INFUSION THERAPY VISIT (OUTPATIENT)
Dept: INFUSION THERAPY | Facility: OTHER | Age: 85
End: 2020-11-27
Attending: FAMILY MEDICINE
Payer: MEDICARE

## 2020-11-27 DIAGNOSIS — E86.9 VOLUME DEPLETION: Primary | ICD-10-CM

## 2020-11-27 PROCEDURE — 258N000003 HC RX IP 258 OP 636: Performed by: FAMILY MEDICINE

## 2020-11-27 PROCEDURE — 250N000011 HC RX IP 250 OP 636: Performed by: FAMILY MEDICINE

## 2020-11-27 PROCEDURE — 96360 HYDRATION IV INFUSION INIT: CPT

## 2020-11-27 RX ORDER — HEPARIN SODIUM (PORCINE) LOCK FLUSH IV SOLN 100 UNIT/ML 100 UNIT/ML
5 SOLUTION INTRAVENOUS ONCE
Status: CANCELLED
Start: 2020-11-27 | End: 2020-11-27

## 2020-11-27 RX ORDER — HEPARIN SODIUM (PORCINE) LOCK FLUSH IV SOLN 100 UNIT/ML 100 UNIT/ML
5 SOLUTION INTRAVENOUS ONCE
Status: COMPLETED | OUTPATIENT
Start: 2020-11-27 | End: 2020-11-27

## 2020-11-27 RX ADMIN — HEPARIN 5 ML: 100 SYRINGE at 10:52

## 2020-11-27 RX ADMIN — SODIUM CHLORIDE 1000 ML: 9 INJECTION, SOLUTION INTRAVENOUS at 09:44

## 2020-11-27 NOTE — PROGRESS NOTES
Patient is a 87 year old male  here accompanied by self today for infusion of IVF per order of Dr. Ray   Patient identified with two identifiers, order verified, and verbal consent for today's infusion obtained from patient.    Labs not required for today's infusion     Patient meets order parameters for today's treatment.   Patients port accessed using non-coring, 19 gauge, 3/4 needle. Port accessed per facility protocol. Port flushed easily, blood return noted.  No signs and symptoms of infection or infiltration.      IV pump verified with dose, drug, and rate of administration.  Infusion administered per protocol.  Patient tolerated infusion well, no signs or symptoms of adverse reaction noted.  Patient denies pain nor discomfort.     IV removed, catheter intact.  Site clean, dry and intact.  No signs or symptoms of infiltration or infection.  Covered with a sterile bandage, slight pressure applied for 30 seconds.  Pt instructed to leave bandage intact for a minimum of one hour, and to call with questions or concerns.  Copy of appointments, discharge instructions, and after visit summary (AVS) provided to patient.  Patient states understanding, discharged.

## 2020-11-27 NOTE — PATIENT INSTRUCTIONS
We will see you back as planned. If you have any questions or concerns, we can be reached Monday through Friday 8am - 430pm at [phone] ([facility] [place]). If you have concerns related to a potential reaction/side effect after hours/weekends/holiday's, please seek emergent medical care.

## 2020-12-01 ENCOUNTER — INFUSION THERAPY VISIT (OUTPATIENT)
Dept: INFUSION THERAPY | Facility: OTHER | Age: 85
End: 2020-12-01
Attending: FAMILY MEDICINE
Payer: MEDICARE

## 2020-12-01 VITALS
RESPIRATION RATE: 16 BRPM | BODY MASS INDEX: 24.31 KG/M2 | HEART RATE: 72 BPM | DIASTOLIC BLOOD PRESSURE: 82 MMHG | TEMPERATURE: 97.7 F | OXYGEN SATURATION: 95 % | WEIGHT: 164.68 LBS | SYSTOLIC BLOOD PRESSURE: 162 MMHG

## 2020-12-01 DIAGNOSIS — E86.9 VOLUME DEPLETION: Primary | ICD-10-CM

## 2020-12-01 PROCEDURE — 258N000003 HC RX IP 258 OP 636: Performed by: FAMILY MEDICINE

## 2020-12-01 PROCEDURE — 250N000011 HC RX IP 250 OP 636: Performed by: FAMILY MEDICINE

## 2020-12-01 PROCEDURE — 96360 HYDRATION IV INFUSION INIT: CPT

## 2020-12-01 PROCEDURE — 96365 THER/PROPH/DIAG IV INF INIT: CPT

## 2020-12-01 RX ORDER — HEPARIN SODIUM (PORCINE) LOCK FLUSH IV SOLN 100 UNIT/ML 100 UNIT/ML
5 SOLUTION INTRAVENOUS ONCE
Status: CANCELLED
Start: 2020-12-01 | End: 2020-12-01

## 2020-12-01 RX ORDER — HEPARIN SODIUM (PORCINE) LOCK FLUSH IV SOLN 100 UNIT/ML 100 UNIT/ML
5 SOLUTION INTRAVENOUS ONCE
Status: COMPLETED | OUTPATIENT
Start: 2020-12-01 | End: 2020-12-01

## 2020-12-01 RX ADMIN — SODIUM CHLORIDE 1000 ML: 9 INJECTION, SOLUTION INTRAVENOUS at 09:44

## 2020-12-01 RX ADMIN — HEPARIN 5 ML: 100 SYRINGE at 10:49

## 2020-12-01 NOTE — PROGRESS NOTES
Patient is a 87 year old male here accompanied by self today for infusion of IVF per order of Dr MALIA Ray.  Patient identified with two identifiers, order verified, and verbal consent for today's infusion obtained from patient.      Patient denies questions or concerns regarding infusion and/or medication(s) being administered.    Patients right port accessed using non-coring, 20 gauge, 3/4 power needle. Port accessed per facility protocol. Port flushed easily, blood return noted.  No signs and symptoms of infection or infiltration.      0944 IV pump verified with IVF dose, drug, and rate of administration.  Infusion administered per protocol.

## 2020-12-01 NOTE — PATIENT INSTRUCTIONS
We will see you back as planned. If you have any questions or concerns, we can be reached Monday through Friday 8am - 430pm at [phone] ([facility] [place] If you have concerns related to a potential reaction/side effect after hours/weekends/holiday's, please seek emergent medical care.

## 2020-12-04 ENCOUNTER — INFUSION THERAPY VISIT (OUTPATIENT)
Dept: INFUSION THERAPY | Facility: OTHER | Age: 85
End: 2020-12-04
Attending: FAMILY MEDICINE
Payer: MEDICARE

## 2020-12-04 VITALS
DIASTOLIC BLOOD PRESSURE: 90 MMHG | HEART RATE: 70 BPM | SYSTOLIC BLOOD PRESSURE: 170 MMHG | RESPIRATION RATE: 18 BRPM | WEIGHT: 165.12 LBS | OXYGEN SATURATION: 94 % | TEMPERATURE: 96.7 F | BODY MASS INDEX: 24.46 KG/M2 | HEIGHT: 69 IN

## 2020-12-04 DIAGNOSIS — E86.9 VOLUME DEPLETION: Primary | ICD-10-CM

## 2020-12-04 PROCEDURE — 96360 HYDRATION IV INFUSION INIT: CPT

## 2020-12-04 PROCEDURE — 258N000003 HC RX IP 258 OP 636: Performed by: FAMILY MEDICINE

## 2020-12-04 PROCEDURE — 250N000011 HC RX IP 250 OP 636: Performed by: FAMILY MEDICINE

## 2020-12-04 RX ORDER — HEPARIN SODIUM (PORCINE) LOCK FLUSH IV SOLN 100 UNIT/ML 100 UNIT/ML
5 SOLUTION INTRAVENOUS ONCE
Status: COMPLETED | OUTPATIENT
Start: 2020-12-04 | End: 2020-12-04

## 2020-12-04 RX ORDER — HEPARIN SODIUM (PORCINE) LOCK FLUSH IV SOLN 100 UNIT/ML 100 UNIT/ML
5 SOLUTION INTRAVENOUS ONCE
Status: CANCELLED
Start: 2020-12-04 | End: 2020-12-04

## 2020-12-04 RX ADMIN — SODIUM CHLORIDE 1000 ML: 9 INJECTION, SOLUTION INTRAVENOUS at 09:50

## 2020-12-04 RX ADMIN — HEPARIN 5 ML: 100 SYRINGE at 10:55

## 2020-12-04 ASSESSMENT — MIFFLIN-ST. JEOR: SCORE: 1414.63

## 2020-12-04 ASSESSMENT — PAIN SCALES - GENERAL: PAINLEVEL: MODERATE PAIN (4)

## 2020-12-04 NOTE — PATIENT INSTRUCTIONS

## 2020-12-04 NOTE — PROGRESS NOTES
Patient is a 87 year old male here accompanied by self today for infusion of IVF per order of Dr MALIA Ray.  Patient identified with two identifiers, order verified, and verbal consent for today's infusion obtained from patient.  Unable to adequately review meds and allergies, patient has no concrete knowledge of current medications.     Patients port accessed using non-coring, 19 gauge, 3/4 inch needle. Port accessed per facility protocol. Port flushed easily, blood return noted.  No signs and symptoms of infection or infiltration.      IV pump verified with dose, drug, and rate of administration.  Infusion administered per protocol.  Patient tolerated infusion well, no signs or symptoms of adverse reaction noted.  Patient denies pain nor discomfort.     IV removed, catheter intact.  Site clean, dry and intact.  No signs or symptoms of infiltration or infection.  Covered with a sterile bandage, slight pressure applied for 30 seconds.  Pt instructed to leave bandage intact for a minimum of one hour, and to call with questions or concerns.    Patient states understanding, discharged.

## 2020-12-08 ENCOUNTER — INFUSION THERAPY VISIT (OUTPATIENT)
Dept: INFUSION THERAPY | Facility: OTHER | Age: 85
End: 2020-12-08
Attending: FAMILY MEDICINE
Payer: MEDICARE

## 2020-12-08 DIAGNOSIS — E86.9 VOLUME DEPLETION: Primary | ICD-10-CM

## 2020-12-08 PROCEDURE — 96365 THER/PROPH/DIAG IV INF INIT: CPT

## 2020-12-08 PROCEDURE — 250N000011 HC RX IP 250 OP 636: Performed by: FAMILY MEDICINE

## 2020-12-08 PROCEDURE — 258N000003 HC RX IP 258 OP 636: Performed by: FAMILY MEDICINE

## 2020-12-08 PROCEDURE — 96360 HYDRATION IV INFUSION INIT: CPT

## 2020-12-08 RX ORDER — HEPARIN SODIUM (PORCINE) LOCK FLUSH IV SOLN 100 UNIT/ML 100 UNIT/ML
5 SOLUTION INTRAVENOUS ONCE
Status: CANCELLED
Start: 2020-12-08 | End: 2020-12-08

## 2020-12-08 RX ORDER — HEPARIN SODIUM (PORCINE) LOCK FLUSH IV SOLN 100 UNIT/ML 100 UNIT/ML
5 SOLUTION INTRAVENOUS ONCE
Status: COMPLETED | OUTPATIENT
Start: 2020-12-08 | End: 2020-12-08

## 2020-12-08 RX ADMIN — HEPARIN 5 ML: 100 SYRINGE at 10:59

## 2020-12-08 RX ADMIN — SODIUM CHLORIDE 1000 ML: 9 INJECTION, SOLUTION INTRAVENOUS at 09:50

## 2020-12-08 NOTE — PROGRESS NOTES
Patient is a 87 year old male here accompanied by self today for infusion of IVF per order of Dr. Ray.  Patient identified with two identifiers, order verified, and verbal consent for today's infusion obtained from patient.      Patients port accessed using non-coring, 20 gauge, 3/4 needle. Port accessed per facility protocol. Port flushed easily, blood return noted.  No signs and symptoms of infection or infiltration.      IV pump verified with dose, drug, and rate of administration.  Infusion administered per protocol.  Patient tolerated infusion well, no signs or symptoms of adverse reaction noted.  Patient denies pain nor discomfort.     IV removed, catheter intact.  Site clean, dry and intact.  No signs or symptoms of infiltration or infection.  Covered with a sterile bandage, slight pressure applied for 30 seconds.  Pt instructed to leave bandage intact for a minimum of one hour, and to call with questions or concerns.  Copy of appointments, discharge instructions, and after visit summary (AVS) provided to patient.  Patient states understanding, discharged.

## 2020-12-11 ENCOUNTER — INFUSION THERAPY VISIT (OUTPATIENT)
Dept: INFUSION THERAPY | Facility: OTHER | Age: 85
End: 2020-12-11
Attending: FAMILY MEDICINE
Payer: MEDICARE

## 2020-12-11 VITALS
TEMPERATURE: 97.3 F | OXYGEN SATURATION: 98 % | DIASTOLIC BLOOD PRESSURE: 72 MMHG | HEART RATE: 69 BPM | RESPIRATION RATE: 18 BRPM | SYSTOLIC BLOOD PRESSURE: 145 MMHG

## 2020-12-11 DIAGNOSIS — E86.9 VOLUME DEPLETION: Primary | ICD-10-CM

## 2020-12-11 PROCEDURE — 250N000011 HC RX IP 250 OP 636: Performed by: FAMILY MEDICINE

## 2020-12-11 PROCEDURE — 96365 THER/PROPH/DIAG IV INF INIT: CPT

## 2020-12-11 PROCEDURE — 96360 HYDRATION IV INFUSION INIT: CPT

## 2020-12-11 PROCEDURE — 258N000003 HC RX IP 258 OP 636: Performed by: FAMILY MEDICINE

## 2020-12-11 RX ORDER — HEPARIN SODIUM (PORCINE) LOCK FLUSH IV SOLN 100 UNIT/ML 100 UNIT/ML
5 SOLUTION INTRAVENOUS ONCE
Status: CANCELLED
Start: 2020-12-11 | End: 2020-12-11

## 2020-12-11 RX ORDER — HEPARIN SODIUM (PORCINE) LOCK FLUSH IV SOLN 100 UNIT/ML 100 UNIT/ML
5 SOLUTION INTRAVENOUS ONCE
Status: COMPLETED | OUTPATIENT
Start: 2020-12-11 | End: 2020-12-11

## 2020-12-11 RX ADMIN — HEPARIN 5 ML: 100 SYRINGE at 10:45

## 2020-12-11 RX ADMIN — SODIUM CHLORIDE 1000 ML: 9 INJECTION, SOLUTION INTRAVENOUS at 09:37

## 2020-12-11 NOTE — PROGRESS NOTES
Patient is a 87 year old male here today for infusion of IVF per order of Dr Ray.  Patient identified with two identifiers, order verified, and verbal consent for today's infusion obtained from patient.      Patient denies questions or concerns regarding infusion and/or medication being administered.    Patients right port accessed using non-coring, 20 gauge, 3/4 power needle. Port accessed per facility protocol. Port flushed easily, blood return noted. No signs and symptoms of infection or infiltration.      0937 IV pump verified with IVF dose, drug, and rate of administration. Infusion administered per protocol. Patient tolerated infusion well, no signs or symptoms of adverse reaction noted. Patient denies pain nor discomfort.     IV removed, catheter intact.  Site clean, dry and intact. No signs or symptoms of infiltration or infection. Covered with a sterile bandage, slight pressure applied for 30 seconds. Pt instructed to leave bandage intact for a minimum of one hour, and to call with questions or concerns. Patient declines copy of appointments, discharge instructions, and after visit summary (AVS). Patient states understanding, discharged.

## 2020-12-11 NOTE — PATIENT INSTRUCTIONS
We will see you back as planned. If you have any questions or concerns, we can be reached Monday through Friday 8am - 430pm at [phone] ([facility] [place]). If you have concerns related to a potential reaction/side effect after hours/weekends/holiday's, please seek emergent medical care.

## 2020-12-16 LAB
MDC_IDC_LEAD_IMPLANT_DT: NORMAL
MDC_IDC_LEAD_IMPLANT_DT: NORMAL
MDC_IDC_LEAD_LOCATION: NORMAL
MDC_IDC_LEAD_LOCATION: NORMAL
MDC_IDC_LEAD_LOCATION_DETAIL_1: NORMAL
MDC_IDC_LEAD_LOCATION_DETAIL_1: NORMAL
MDC_IDC_LEAD_MFG: NORMAL
MDC_IDC_LEAD_MFG: NORMAL
MDC_IDC_LEAD_MODEL: NORMAL
MDC_IDC_LEAD_MODEL: NORMAL
MDC_IDC_LEAD_POLARITY_TYPE: NORMAL
MDC_IDC_LEAD_POLARITY_TYPE: NORMAL
MDC_IDC_LEAD_SERIAL: NORMAL
MDC_IDC_LEAD_SERIAL: NORMAL
MDC_IDC_MSMT_BATTERY_DTM: NORMAL
MDC_IDC_MSMT_BATTERY_REMAINING_LONGEVITY: 94 MO
MDC_IDC_MSMT_BATTERY_RRT_TRIGGER: 2.62
MDC_IDC_MSMT_BATTERY_STATUS: NORMAL
MDC_IDC_MSMT_BATTERY_VOLTAGE: 2.99 V
MDC_IDC_MSMT_LEADCHNL_RA_IMPEDANCE_VALUE: 437 OHM
MDC_IDC_MSMT_LEADCHNL_RA_IMPEDANCE_VALUE: 551 OHM
MDC_IDC_MSMT_LEADCHNL_RA_PACING_THRESHOLD_AMPLITUDE: 1.38 V
MDC_IDC_MSMT_LEADCHNL_RA_PACING_THRESHOLD_PULSEWIDTH: 0.4 MS
MDC_IDC_MSMT_LEADCHNL_RA_SENSING_INTR_AMPL: 1.75 MV
MDC_IDC_MSMT_LEADCHNL_RA_SENSING_INTR_AMPL: 1.75 MV
MDC_IDC_MSMT_LEADCHNL_RV_IMPEDANCE_VALUE: 361 OHM
MDC_IDC_MSMT_LEADCHNL_RV_IMPEDANCE_VALUE: 532 OHM
MDC_IDC_MSMT_LEADCHNL_RV_PACING_THRESHOLD_AMPLITUDE: 0.88 V
MDC_IDC_MSMT_LEADCHNL_RV_PACING_THRESHOLD_PULSEWIDTH: 0.4 MS
MDC_IDC_MSMT_LEADCHNL_RV_SENSING_INTR_AMPL: 4.75 MV
MDC_IDC_MSMT_LEADCHNL_RV_SENSING_INTR_AMPL: 4.75 MV
MDC_IDC_PG_IMPLANT_DTM: NORMAL
MDC_IDC_PG_MFG: NORMAL
MDC_IDC_PG_MODEL: NORMAL
MDC_IDC_PG_SERIAL: NORMAL
MDC_IDC_PG_TYPE: NORMAL
MDC_IDC_SESS_CLINIC_NAME: NORMAL
MDC_IDC_SESS_DTM: NORMAL
MDC_IDC_SESS_TYPE: NORMAL
MDC_IDC_SET_BRADY_AT_MODE_SWITCH_RATE: 171 {BEATS}/MIN
MDC_IDC_SET_BRADY_HYSTRATE: NORMAL
MDC_IDC_SET_BRADY_LOWRATE: 70 {BEATS}/MIN
MDC_IDC_SET_BRADY_MAX_SENSOR_RATE: 130 {BEATS}/MIN
MDC_IDC_SET_BRADY_MAX_TRACKING_RATE: 130 {BEATS}/MIN
MDC_IDC_SET_BRADY_MODE: NORMAL
MDC_IDC_SET_BRADY_PAV_DELAY_LOW: 180 MS
MDC_IDC_SET_BRADY_SAV_DELAY_LOW: 150 MS
MDC_IDC_SET_LEADCHNL_RA_PACING_AMPLITUDE: 2.75 V
MDC_IDC_SET_LEADCHNL_RA_PACING_ANODE_ELECTRODE_1: NORMAL
MDC_IDC_SET_LEADCHNL_RA_PACING_ANODE_LOCATION_1: NORMAL
MDC_IDC_SET_LEADCHNL_RA_PACING_CAPTURE_MODE: NORMAL
MDC_IDC_SET_LEADCHNL_RA_PACING_CATHODE_ELECTRODE_1: NORMAL
MDC_IDC_SET_LEADCHNL_RA_PACING_CATHODE_LOCATION_1: NORMAL
MDC_IDC_SET_LEADCHNL_RA_PACING_POLARITY: NORMAL
MDC_IDC_SET_LEADCHNL_RA_PACING_PULSEWIDTH: 0.4 MS
MDC_IDC_SET_LEADCHNL_RA_SENSING_ANODE_ELECTRODE_1: NORMAL
MDC_IDC_SET_LEADCHNL_RA_SENSING_ANODE_LOCATION_1: NORMAL
MDC_IDC_SET_LEADCHNL_RA_SENSING_CATHODE_ELECTRODE_1: NORMAL
MDC_IDC_SET_LEADCHNL_RA_SENSING_CATHODE_LOCATION_1: NORMAL
MDC_IDC_SET_LEADCHNL_RA_SENSING_POLARITY: NORMAL
MDC_IDC_SET_LEADCHNL_RA_SENSING_SENSITIVITY: 0.9 MV
MDC_IDC_SET_LEADCHNL_RV_PACING_AMPLITUDE: 2 V
MDC_IDC_SET_LEADCHNL_RV_PACING_ANODE_ELECTRODE_1: NORMAL
MDC_IDC_SET_LEADCHNL_RV_PACING_ANODE_LOCATION_1: NORMAL
MDC_IDC_SET_LEADCHNL_RV_PACING_CAPTURE_MODE: NORMAL
MDC_IDC_SET_LEADCHNL_RV_PACING_CATHODE_ELECTRODE_1: NORMAL
MDC_IDC_SET_LEADCHNL_RV_PACING_CATHODE_LOCATION_1: NORMAL
MDC_IDC_SET_LEADCHNL_RV_PACING_POLARITY: NORMAL
MDC_IDC_SET_LEADCHNL_RV_PACING_PULSEWIDTH: 0.4 MS
MDC_IDC_SET_LEADCHNL_RV_SENSING_ANODE_ELECTRODE_1: NORMAL
MDC_IDC_SET_LEADCHNL_RV_SENSING_ANODE_LOCATION_1: NORMAL
MDC_IDC_SET_LEADCHNL_RV_SENSING_CATHODE_ELECTRODE_1: NORMAL
MDC_IDC_SET_LEADCHNL_RV_SENSING_CATHODE_LOCATION_1: NORMAL
MDC_IDC_SET_LEADCHNL_RV_SENSING_POLARITY: NORMAL
MDC_IDC_SET_LEADCHNL_RV_SENSING_SENSITIVITY: 0.9 MV
MDC_IDC_SET_ZONE_DETECTION_INTERVAL: 350 MS
MDC_IDC_SET_ZONE_DETECTION_INTERVAL: 400 MS
MDC_IDC_SET_ZONE_TYPE: NORMAL
MDC_IDC_STAT_BRADY_AP_VP_PERCENT: 97.46 %
MDC_IDC_STAT_BRADY_AP_VS_PERCENT: 0.06 %
MDC_IDC_STAT_BRADY_AS_VP_PERCENT: 1.99 %
MDC_IDC_STAT_BRADY_AS_VS_PERCENT: 0.49 %
MDC_IDC_STAT_BRADY_DTM_END: NORMAL
MDC_IDC_STAT_BRADY_DTM_START: NORMAL
MDC_IDC_STAT_BRADY_RA_PERCENT_PACED: 97.78 %
MDC_IDC_STAT_BRADY_RV_PERCENT_PACED: 99.45 %
MDC_IDC_STAT_EPISODE_RECENT_COUNT: 0
MDC_IDC_STAT_EPISODE_RECENT_COUNT_DTM_END: NORMAL
MDC_IDC_STAT_EPISODE_RECENT_COUNT_DTM_START: NORMAL
MDC_IDC_STAT_EPISODE_TOTAL_COUNT: 0
MDC_IDC_STAT_EPISODE_TOTAL_COUNT: 1
MDC_IDC_STAT_EPISODE_TOTAL_COUNT: 886
MDC_IDC_STAT_EPISODE_TOTAL_COUNT_DTM_END: NORMAL
MDC_IDC_STAT_EPISODE_TOTAL_COUNT_DTM_START: NORMAL
MDC_IDC_STAT_EPISODE_TYPE: NORMAL

## 2020-12-17 ENCOUNTER — HOSPITAL ENCOUNTER (EMERGENCY)
Facility: HOSPITAL | Age: 85
Discharge: HOME | End: 2020-12-17
Attending: NURSE PRACTITIONER | Admitting: NURSE PRACTITIONER
Payer: MEDICARE

## 2020-12-17 ENCOUNTER — TELEPHONE (OUTPATIENT)
Dept: FAMILY MEDICINE | Facility: OTHER | Age: 85
End: 2020-12-17

## 2020-12-17 ENCOUNTER — APPOINTMENT (OUTPATIENT)
Dept: GENERAL RADIOLOGY | Facility: HOSPITAL | Age: 85
End: 2020-12-17
Attending: NURSE PRACTITIONER
Payer: MEDICARE

## 2020-12-17 VITALS
DIASTOLIC BLOOD PRESSURE: 87 MMHG | SYSTOLIC BLOOD PRESSURE: 161 MMHG | HEART RATE: 69 BPM | OXYGEN SATURATION: 97 % | RESPIRATION RATE: 16 BRPM | TEMPERATURE: 98.3 F

## 2020-12-17 DIAGNOSIS — G89.29 CHRONIC LOW BACK PAIN, UNSPECIFIED BACK PAIN LATERALITY, UNSPECIFIED WHETHER SCIATICA PRESENT: Primary | ICD-10-CM

## 2020-12-17 DIAGNOSIS — M54.50 ACUTE BILATERAL LOW BACK PAIN WITHOUT SCIATICA: Primary | ICD-10-CM

## 2020-12-17 DIAGNOSIS — M54.50 CHRONIC LOW BACK PAIN, UNSPECIFIED BACK PAIN LATERALITY, UNSPECIFIED WHETHER SCIATICA PRESENT: Primary | ICD-10-CM

## 2020-12-17 PROCEDURE — G0463 HOSPITAL OUTPT CLINIC VISIT: HCPCS

## 2020-12-17 PROCEDURE — 72100 X-RAY EXAM L-S SPINE 2/3 VWS: CPT

## 2020-12-17 PROCEDURE — 99213 OFFICE O/P EST LOW 20 MIN: CPT | Performed by: NURSE PRACTITIONER

## 2020-12-17 ASSESSMENT — ENCOUNTER SYMPTOMS
DIFFICULTY URINATING: 0
VOMITING: 0
HEADACHES: 0
NUMBNESS: 0
DIARRHEA: 0
BACK PAIN: 1
CHILLS: 0
CONFUSION: 1
ARTHRALGIAS: 1
FEVER: 0
ABDOMINAL PAIN: 0
MYALGIAS: 1
SHORTNESS OF BREATH: 0
LIGHT-HEADEDNESS: 0
DIZZINESS: 0
NAUSEA: 0

## 2020-12-17 NOTE — DISCHARGE INSTRUCTIONS
Tylenol for discomfort    Flexeril for muscle spasms.    Lidoderm Patch for pain ( or any topical anesthetic such as Biofreeze, Bengay, Icy Hot, Lidocaine, others)    After initial injury you can try heat for comfort if this feels better than ice    Schedule Physical Therapy after 1 week of initial therapy if not improved with tylenol, lidoderm patch and flexeril.       Follow up with Dr. Ray regarding back pain.    Return to urgent care/ED with any worsening in condition or additional concerns.     Results for orders placed or performed during the hospital encounter of 12/17/20   Lumbar spine XR, 2-3 views     Status: None    Narrative    PROCEDURE: XR LUMBAR SPINE 2-3 VIEWS 12/17/2020 9:37 AM    HISTORY: Lower back pain-Fall    COMPARISONS: 3/21/2019.    TECHNIQUE: 3 views.    FINDINGS: There is mild anterior wedging of the L2 vertebral body with  inferior endplate deformity which appears more prominent than on the  prior exam.    No other compression deformity is seen and there is no malalignment.  Disc spaces are fairly well preserved.    Atherosclerotic calcification is seen.         Impression    IMPRESSION: Mild anterior compression deformity at the L2 level which  has developed since the prior study in 2019.    TY CANCHOLA MD

## 2020-12-17 NOTE — ED PROVIDER NOTES
History     Chief Complaint   Patient presents with     Back Pain     HPI  Jf Ortiz is a 87 year old male who presents to urgent care today due to complaints of lower back pain.  Onset two days ago when patient fell inside his home flat on his back on the floor.  Denies hitting head or LOC.  Patient has a history of falls.  Able to ambulate but states it is more difficult the last two days.  Denies any bowel or bladder dysfunction.  Took APAP last night.  Denies fever, chills, nausea, vomiting, diarrhea, SOB or chest pain.  No other concerns.    Back Pain     Duration: 2 days        Specific cause: fall     Description:   Location of pain: low back bilateral  Character of pain: sharp  Pain radiation:none  New numbness or weakness in legs, not attributed to pain:  no     Intensity: 9/10 with movement    History:   Pain interferes with ADL: Yes, spouse assists patient at home as needed.  History of back problems: Patient states no prior back problems  Any previous MRI or X-rays: XR THORACIC SPINE 10/2020, and XR LUMBAR 2019  Sees a specialist for back pain:  No  Therapies tried without relief: Tylenol with minimal relief, has flexeril at home-states has not used it in the last few days.    Alleviating factors:   Improved by: Nothing, has only tried APAP      Precipitating factors:  Worsened by: Bending    Accompanying Signs & Symptoms:  Risk of Fracture:  Age >64  Risk of Cauda Equina:  None  Risk of Infection:  None  Risk of Cancer:  None  Risk of Ankylosing Spondylitis:  Onset at age <35, male, AND morning back stiffness. no   Allergies:  Allergies   Allergen Reactions     Cats Unknown     Lamotrigine      Skin lesions       Problem List:    Patient Active Problem List    Diagnosis Date Noted     Longstanding persistent atrial fibrillation (H) 04/13/2020     Priority: Medium     Falls frequently 04/13/2020     Priority: Medium     Frequent falls 04/13/2020     Priority: Medium     Coronary artery disease  involving native coronary artery without angina pectoris 04/13/2020     Priority: Medium     Constipation, unspecified constipation type 04/11/2018     Priority: Medium     Pacemaker, [employer], Dual Chamber - Dependent 10/06/2017     Priority: Medium     Lewy body dementia without behavioral disturbance (H) 08/31/2017     Priority: Medium     Fatigue 08/31/2017     Priority: Medium     Urinary incontinence 08/31/2017     Priority: Medium     Autonomic orthostatic hypotension 10/14/2016     Priority: Medium     Dementia without behavioral disturbance (H) 07/28/2015     Priority: Medium     Diagnosis updated by automated process. Provider to review and confirm.       Hypocalcemia 07/28/2015     Priority: Medium     Parkinson disease (H)      Priority: Medium     Seizure disorder (H)      Priority: Medium     Volume depletion 08/02/2014     Priority: Medium     Syncope and collapse 08/01/2014     Priority: Medium     Stented coronary artery      Priority: Medium     Coronary atherosclerosis of native coronary artery      Priority: Medium     Overview:   IMO Update 10/11       Hypercholesterolemia 04/23/2013     Priority: Medium     Advanced care planning/counseling discussion 10/30/2012     Priority: Medium     REM sleep behavior disorder 01/01/2011     Priority: Medium     Osteoarthritis 01/01/2011     Priority: Medium     Problem list name updated by automated process. Provider to review       Diaphragmatic hernia 01/01/2011     Priority: Medium     Problem list name updated by automated process. 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     Priority: Medium     Overview:   IMO Update 10/11       Hypertension with target blood pressure goal under 150/90 09/05/2006     Priority: Medium     Overview:   IMO Update          Past Medical History:    Past Medical History:   Diagnosis Date     Autonomic  orthostatic hypotension 10/14/2016     Coronary artery disease      Dementia without behavioral disturbance (H) 7/28/2015     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     Seizure disorder (H)      Stented coronary artery        Past Surgical History:    Past Surgical History:   Procedure Laterality Date     ------------OTHER-------------  1955    ulnar and radial fx - repair ulnar and radial fx x4     ------------OTHER-------------  6/14/2011    cataract extraction     BIOPSY  08/2015    skin biopsy     BLEPHAROPLASTY BILATERAL  5/6/2014    Procedure: BLEPHAROPLASTY BILATERAL;  Surgeon: Andrew Queen MD;  Location: [facility]     BYPASS GRAFT ARTERY CORONARY  11/2006    coronary artery disease x 5, [facility]     cataract extraction and lens implantation  2011    cataracts     cataract extraction and lens implantation      cataracts     COLONOSCOPY  2012     colonoscopy with polypectomy  3/13/2009    history of polyps - repeat 3 yrs     colonoscopy with polypectomy  2006     colonoscopy with polypectomy  2005     COMBINED COLONOSCOPY WITH ARGON PLASMA COAGULATOR (APC) N/A 10/31/2014    Procedure: COMBINED COLONOSCOPY WITH ARGON PLASMA COAGULATOR (APC);  Surgeon: Bassam Aguilar MD;  Location: [facility]     COMBINED COLONOSCOPY WITH ARGON PLASMA COAGULATOR (APC) N/A 11/13/2015    Procedure: COMBINED COLONOSCOPY WITH ARGON PLASMA COAGULATOR (APC);  Surgeon: Bassam Aguilar MD;  Location: [facility]     ENDOSCOPY UPPER, COLONOSCOPY, COMBINED N/A 10/31/2014    Procedure: COMBINED ENDOSCOPY UPPER, COLONOSCOPY;  Surgeon: Bassam Aguilar MD;  [facility]     ENDOSCOPY UPPER, COLONOSCOPY, COMBINED N/A 11/13/2015    Procedure: COMBINED ENDOSCOPY UPPER, COLONOSCOPY;   Surgeon: Bassam Aguilar MD;  [facility]: [facility]     ESOPHAGOSCOPY, GASTROSCOPY, DUODENOSCOPY (EGD), COMBINED  2014    Procedure: COMBINED ESOPHAGOSCOPY, GASTROSCOPY, DUODENOSCOPY (EGD);  UPPER ENDOSCOPY(PENA) W/ BIOPSIES;  Surgeon: Patricia Pena MD;  Location: [facility]     HERNIORRHAPHY INGUINAL Right 2018    Procedure: HERNIORRHAPHY INGUINAL;  OPEN RIGHT INGUINAL HERNIA REPAIR with Mesh;  Surgeon: Virgilio Zaragoza DO;  Location: [facility]     INSERT PORT VASCULAR ACCESS Right 2019    Procedure: PORT PLACEMENT;  Surgeon: Lloyd Ram MD;  [facility]     LARYNGOSCOPY WITH MICROSCOPE  2014    Procedure: LARYNGOSCOPY WITH MICROSCOPE;;  Surgeon: Chayo Duke MD;  Location: [facility]     pacemaker placement      heart block     pacemaker placement      dual-chamber     REMOVE TUBE, MYRINGOTOMY, COMBINED  2014    Procedure: COMBINED REMOVE TUBE, MYRINGOTOMY;  MICRODIRECT LARYNGOSCOPY WITH BIOPSY AND FROZEN SECTIONS removal of right ear tube and myringoplasty;  Surgeon: Chayo Duke MD;  Location: [facility]     REPLACE PACEMAKER GENERATOR N/A 2018    Procedure: REPLACE PACEMAKER GENERATOR;  Pacemaker generator change;  Surgeon: Alma Kaplan MD;  Location: [facility]     stent placement to LAD  2008     ventilation tube  2012    right in office       Family History:    Family History   Problem Relation Age of Onset     Diabetes Mother      Breast Cancer Daughter        Social History:  Marital Status:  Single [1]  Social History     Tobacco Use     Smoking status: Former Smoker     Packs/day: 1.00     Years: 5.00     Pack years: 5.00     Types: Cigarettes     Quit date: 1985     Years since quittin.9     Smokeless tobacco: Never Used     Tobacco comment: quit in    Substance Use Topics     Alcohol use: Yes     Comment: social     Drug use: No        Medications:         atorvastatin (LIPITOR) 20 MG tablet       Cholecalciferol (VITAMIN D-3) 25 MCG (1000  UT) CAPS       Coenzyme Q10 (CO Q 10 PO)       cyclobenzaprine (FLEXERIL) 10 MG tablet       donepezil (ARICEPT) 10 MG tablet       fludrocortisone (FLORINEF) 0.1 MG tablet       lidocaine (LIDODERM) 5 % patch       magnesium 500 MG TABS       melatonin 5 MG tablet       midodrine (PROAMATINE) 5 MG tablet       multivitamin, therapeutic with minerals (MULTI-VITAMIN) TABS tablet       potassium chloride ER (KLOR-CON M) 20 MEQ CR tablet       RABEprazole (ACIPHEX) 20 MG EC tablet       sodium chloride 1 GM tablet       Turmeric 500 MG TABS      Review of Systems   Constitutional: Negative for chills and fever.   Respiratory: Negative for shortness of breath.    Cardiovascular: Negative for chest pain.   Gastrointestinal: Negative for abdominal pain, diarrhea, nausea and vomiting.   Genitourinary: Negative for difficulty urinating.   Musculoskeletal: Positive for arthralgias, back pain, gait problem (Acute back pain, also has parkinsons) and myalgias.   Neurological: Negative for dizziness, light-headedness, numbness and headaches.   Psychiatric/Behavioral: Positive for confusion (Lewy body dementia).     Physical Exam   BP: 161/87  Pulse: 69  Temp: 98.3  F (36.8  C)  Resp: 16  SpO2: 97 %    Physical Exam  Vitals signs and nursing note reviewed.   Constitutional:       General: He is not in acute distress.     Appearance: He is not ill-appearing.   HENT:      Head: Normocephalic.   Neck:      Musculoskeletal: Normal range of motion and neck supple. No neck rigidity or muscular tenderness.   Cardiovascular:      Rate and Rhythm: Normal rate and regular rhythm.      Pulses: Normal pulses.      Heart sounds: Normal heart sounds.   Pulmonary:      Effort: Pulmonary effort is normal.      Breath sounds: Normal breath sounds.   Abdominal:      General: Bowel sounds are normal.      Palpations: Abdomen is soft.      Tenderness: There is no abdominal tenderness.   Musculoskeletal:      Lumbar back: He exhibits decreased range  "of motion, tenderness and pain.   Lymphadenopathy:      Cervical: No cervical adenopathy.   Skin:     General: Skin is warm.   Neurological:      Mental Status: He is alert.      Comments: Patient has DX Parkinsons and Lewy Body Dementia.  Currently having acute back pain.  Unable to determine how much is related to back verses normal generalized weakness.     Psychiatric:         Mood and Affect: Mood normal.       ED Course     Results for orders placed or performed during the hospital encounter of 12/17/20 (from the past 24 hour(s))   Lumbar spine XR, 2-3 views    Narrative    PROCEDURE: XR LUMBAR SPINE 2-3 VIEWS 12/17/2020 9:37 AM    HISTORY: Lower back pain-Fall    COMPARISONS: 3/21/2019.    TECHNIQUE: 3 views.    FINDINGS: There is mild anterior wedging of the L2 vertebral body with  inferior endplate deformity which appears more prominent than on the  prior exam.    No other compression deformity is seen and there is no malalignment.  Disc spaces are fairly well preserved.    Atherosclerotic calcification is seen.         Impression    IMPRESSION: Mild anterior compression deformity at the L2 level which  has developed since the prior study in 2019.    TY CANCHOLA MD       Medications - No data to display    Assessments & Plan (with Medical Decision Making)     I have reviewed the nursing notes.    I have reviewed the findings, diagnosis, plan and need for follow up with the patient.  (M54.5) Acute bilateral low back pain without sciatica  (primary encounter diagnosis)  Plan: PHYSICAL THERAPY REFERRAL    Patient arrived to urgent care today alone for complaints of lower back pain.  Patient had confusion throughout appointment off and on and at one point stated \"I'm sorry I have Lewy Body and I get things mixed up sometimes.\"  Patient denied any history of lower back pain but looking back in his chart appears to be an ongoing issue.  Patient denies any APAP use today for discomfort but thought he may " have taken it yesterday.  Unsure of why he was placed on flexeril, believes he has a lot of them at home still but unaware of when he took one last.  When bringing up Lidoderm patches he could recall using them sometime previously (none currently on).  Patient states he has had some overall generalized weakness from the parkinson's and sometimes doesn't walk around that well with it and has a history of falls from it.  Unable to determine patients baseline strength at this time.  Denies any bladder or bowel issues. Completed Lumbar XR and reviewed results with Dr. Venegas who recommended PT in 1 week if back pain does not improve which would also help patients overall strength due to increased falls and generalized weakness.  Reviewed treatment plan with patient and he was in agreement with plan.       Patient education:  Tylenol for discomfort    Flexeril for muscle spasms.    Lidoderm Patch for pain (or any topical anesthetic such as Biofreeze, Bengay, Icy Hot, Lidocaine, others)    After initial injury you can try heat for comfort if this feels better than ice    Schedule Physical Therapy after 1 week of initial therapy if not improved with tylenol, lidoderm patch and flexeril.     Follow up with Dr. Ray regarding back pain.    Return to urgent care/ED with any worsening in condition or additional concerns.     Discharge Medication List as of 12/17/2020 10:25 AM        Final diagnoses:   Acute bilateral low back pain without sciatica     12/17/2020   [facility]     Anette [place], SANTIAGO  12/17/20 182

## 2020-12-17 NOTE — ED AVS SNAPSHOT
[facility]  [address] [address]  Phone: [phone]                                    Jf Ortiz   MRN: [id number]    Department: [facility]   Date of Visit: 12/17/2020           After Visit Summary Signature Page    I have received my discharge instructions, and my questions have been answered. I have discussed any challenges I see with this plan with the nurse or doctor.    ..........................................................................................................................................  Patient/Patient Representative Signature      ..........................................................................................................................................  Patient Representative Print Name and Relationship to Patient    ..................................................               ................................................  Date                                   Time    ..........................................................................................................................................  Reviewed by Signature/Title    ...................................................              ..............................................  Date                                               Time          22EPIC Rev 08/18

## 2020-12-17 NOTE — TELEPHONE ENCOUNTER
VANDANA called and reported the ER recommended PT. She would like this referral placed. PT is for backpain.

## 2020-12-17 NOTE — ED TRIAGE NOTES
Pt is here with c/o low back pain from a fall 2 days ago  Pt denies hitting head, denies LOC   Pt is unsure of why  He fell

## 2020-12-18 ENCOUNTER — APPOINTMENT (OUTPATIENT)
Dept: CT IMAGING | Facility: HOSPITAL | Age: 85
DRG: 552 | End: 2020-12-18
Attending: EMERGENCY MEDICINE
Payer: MEDICARE

## 2020-12-18 ENCOUNTER — APPOINTMENT (OUTPATIENT)
Dept: GENERAL RADIOLOGY | Facility: HOSPITAL | Age: 85
DRG: 552 | End: 2020-12-18
Attending: EMERGENCY MEDICINE
Payer: MEDICARE

## 2020-12-18 ENCOUNTER — HOSPITAL ENCOUNTER (INPATIENT)
Facility: HOSPITAL | Age: 85
LOS: 4 days | Discharge: NURSING, REHAB OR LONG-TERM CARE | DRG: 552 | End: 2020-12-22
Attending: EMERGENCY MEDICINE | Admitting: INTERNAL MEDICINE
Payer: MEDICARE

## 2020-12-18 DIAGNOSIS — S32.010A COMPRESSION FRACTURE OF L1 LUMBAR VERTEBRA, CLOSED, INITIAL ENCOUNTER (H): ICD-10-CM

## 2020-12-18 DIAGNOSIS — S22.000A COMPRESSION FRACTURE OF THORACIC VERTEBRA, INITIAL ENCOUNTER, UNSPECIFIED THORACIC VERTEBRAL LEVEL: ICD-10-CM

## 2020-12-18 LAB
ALBUMIN SERPL-MCNC: 3.1 G/DL (ref 3.4–5)
ALBUMIN UR-MCNC: NEGATIVE MG/DL
ALP SERPL-CCNC: 73 U/L (ref 40–150)
ALT SERPL W P-5'-P-CCNC: 17 U/L (ref 0–70)
ANION GAP SERPL CALCULATED.3IONS-SCNC: 8 MMOL/L (ref 3–14)
APPEARANCE UR: CLEAR
AST SERPL W P-5'-P-CCNC: 14 U/L (ref 0–45)
BACTERIA #/AREA URNS HPF: ABNORMAL /HPF
BASOPHILS # BLD AUTO: 0 10E9/L (ref 0–0.2)
BASOPHILS NFR BLD AUTO: 0.6 %
BILIRUB SERPL-MCNC: 0.6 MG/DL (ref 0.2–1.3)
BILIRUB UR QL STRIP: NEGATIVE
BUN SERPL-MCNC: 16 MG/DL (ref 7–30)
CALCIUM SERPL-MCNC: 8.3 MG/DL (ref 8.5–10.1)
CHLORIDE SERPL-SCNC: 107 MMOL/L (ref 94–109)
CK SERPL-CCNC: 59 U/L (ref 30–300)
CO2 SERPL-SCNC: 26 MMOL/L (ref 20–32)
COLOR UR AUTO: ABNORMAL
CREAT SERPL-MCNC: 1.1 MG/DL (ref 0.66–1.25)
DIFFERENTIAL METHOD BLD: NORMAL
EOSINOPHIL # BLD AUTO: 0.2 10E9/L (ref 0–0.7)
EOSINOPHIL NFR BLD AUTO: 4 %
ERYTHROCYTE [DISTWIDTH] IN BLOOD BY AUTOMATED COUNT: 12.1 % (ref 10–15)
FLUAV+FLUBV RNA SPEC QL NAA+PROBE: NEGATIVE
FLUAV+FLUBV RNA SPEC QL NAA+PROBE: NEGATIVE
GFR SERPL CREATININE-BSD FRML MDRD: 60 ML/MIN/{1.73_M2}
GLUCOSE SERPL-MCNC: 107 MG/DL (ref 70–99)
GLUCOSE UR STRIP-MCNC: NEGATIVE MG/DL
HCT VFR BLD AUTO: 42 % (ref 40–53)
HGB BLD-MCNC: 13.6 G/DL (ref 13.3–17.7)
HGB UR QL STRIP: NEGATIVE
IMM GRANULOCYTES # BLD: 0 10E9/L (ref 0–0.4)
IMM GRANULOCYTES NFR BLD: 0.4 %
INR PPP: 1.16 (ref 0.86–1.14)
KETONES UR STRIP-MCNC: 10 MG/DL
LABORATORY COMMENT REPORT: NORMAL
LEUKOCYTE ESTERASE UR QL STRIP: NEGATIVE
LYMPHOCYTES # BLD AUTO: 1.3 10E9/L (ref 0.8–5.3)
LYMPHOCYTES NFR BLD AUTO: 24 %
MCH RBC QN AUTO: 30.1 PG (ref 26.5–33)
MCHC RBC AUTO-ENTMCNC: 32.4 G/DL (ref 31.5–36.5)
MCV RBC AUTO: 93 FL (ref 78–100)
MONOCYTES # BLD AUTO: 0.6 10E9/L (ref 0–1.3)
MONOCYTES NFR BLD AUTO: 10.9 %
MUCOUS THREADS #/AREA URNS LPF: PRESENT /LPF
NEUTROPHILS # BLD AUTO: 3.1 10E9/L (ref 1.6–8.3)
NEUTROPHILS NFR BLD AUTO: 60.1 %
NITRATE UR QL: NEGATIVE
NRBC # BLD AUTO: 0 10*3/UL
NRBC BLD AUTO-RTO: 0 /100
NT-PROBNP SERPL-MCNC: 1299 PG/ML (ref 0–1800)
PH UR STRIP: 7 PH (ref 4.7–8)
PLATELET # BLD AUTO: 179 10E9/L (ref 150–450)
POTASSIUM SERPL-SCNC: 4.1 MMOL/L (ref 3.4–5.3)
PROT SERPL-MCNC: 6.8 G/DL (ref 6.8–8.8)
RBC # BLD AUTO: 4.52 10E12/L (ref 4.4–5.9)
RBC #/AREA URNS AUTO: 2 /HPF (ref 0–2)
RSV RNA SPEC QL NAA+PROBE: NEGATIVE
SARS-COV-2 RNA SPEC QL NAA+PROBE: NEGATIVE
SODIUM SERPL-SCNC: 141 MMOL/L (ref 133–144)
SOURCE: ABNORMAL
SP GR UR STRIP: 1.02 (ref 1–1.03)
SPECIMEN SOURCE: NORMAL
SQUAMOUS #/AREA URNS AUTO: 0 /HPF (ref 0–1)
TROPONIN I SERPL-MCNC: <0.015 UG/L (ref 0–0.04)
UROBILINOGEN UR STRIP-MCNC: NORMAL MG/DL (ref 0–2)
WBC # BLD AUTO: 5.2 10E9/L (ref 4–11)
WBC #/AREA URNS AUTO: <1 /HPF (ref 0–5)

## 2020-12-18 PROCEDURE — 99285 EMERGENCY DEPT VISIT HI MDM: CPT | Performed by: EMERGENCY MEDICINE

## 2020-12-18 PROCEDURE — 70450 CT HEAD/BRAIN W/O DYE: CPT

## 2020-12-18 PROCEDURE — 71250 CT THORAX DX C-: CPT

## 2020-12-18 PROCEDURE — 99222 1ST HOSP IP/OBS MODERATE 55: CPT | Mod: AI | Performed by: INTERNAL MEDICINE

## 2020-12-18 PROCEDURE — C9803 HOPD COVID-19 SPEC COLLECT: HCPCS

## 2020-12-18 PROCEDURE — 93010 ELECTROCARDIOGRAM REPORT: CPT | Performed by: INTERNAL MEDICINE

## 2020-12-18 PROCEDURE — 71045 X-RAY EXAM CHEST 1 VIEW: CPT

## 2020-12-18 PROCEDURE — 84484 ASSAY OF TROPONIN QUANT: CPT | Performed by: EMERGENCY MEDICINE

## 2020-12-18 PROCEDURE — 82550 ASSAY OF CK (CPK): CPT | Performed by: INTERNAL MEDICINE

## 2020-12-18 PROCEDURE — 72125 CT NECK SPINE W/O DYE: CPT

## 2020-12-18 PROCEDURE — 120N000001 HC R&B MED SURG/OB

## 2020-12-18 PROCEDURE — 87636 SARSCOV2 & INF A&B AMP PRB: CPT | Performed by: EMERGENCY MEDICINE

## 2020-12-18 PROCEDURE — 80053 COMPREHEN METABOLIC PANEL: CPT | Performed by: EMERGENCY MEDICINE

## 2020-12-18 PROCEDURE — 93005 ELECTROCARDIOGRAM TRACING: CPT

## 2020-12-18 PROCEDURE — 81001 URINALYSIS AUTO W/SCOPE: CPT | Performed by: EMERGENCY MEDICINE

## 2020-12-18 PROCEDURE — 85610 PROTHROMBIN TIME: CPT | Performed by: EMERGENCY MEDICINE

## 2020-12-18 PROCEDURE — 250N000013 HC RX MED GY IP 250 OP 250 PS 637: Performed by: INTERNAL MEDICINE

## 2020-12-18 PROCEDURE — 99285 EMERGENCY DEPT VISIT HI MDM: CPT | Mod: 25

## 2020-12-18 PROCEDURE — 85025 COMPLETE CBC W/AUTO DIFF WBC: CPT | Performed by: EMERGENCY MEDICINE

## 2020-12-18 PROCEDURE — 83880 ASSAY OF NATRIURETIC PEPTIDE: CPT | Performed by: EMERGENCY MEDICINE

## 2020-12-18 PROCEDURE — 250N000011 HC RX IP 250 OP 636: Performed by: INTERNAL MEDICINE

## 2020-12-18 PROCEDURE — 36415 COLL VENOUS BLD VENIPUNCTURE: CPT | Performed by: EMERGENCY MEDICINE

## 2020-12-18 RX ORDER — DEXAMETHASONE 4 MG/1
4 TABLET ORAL DAILY
Status: COMPLETED | OUTPATIENT
Start: 2020-12-19 | End: 2020-12-21

## 2020-12-18 RX ORDER — AMOXICILLIN 250 MG
1 CAPSULE ORAL 2 TIMES DAILY PRN
Status: DISCONTINUED | OUTPATIENT
Start: 2020-12-18 | End: 2020-12-22 | Stop reason: HOSPADM

## 2020-12-18 RX ORDER — ACETAMINOPHEN 325 MG/1
650 TABLET ORAL 4 TIMES DAILY
Status: DISCONTINUED | OUTPATIENT
Start: 2020-12-18 | End: 2020-12-22 | Stop reason: HOSPADM

## 2020-12-18 RX ORDER — AMOXICILLIN 250 MG
2 CAPSULE ORAL 2 TIMES DAILY PRN
Status: DISCONTINUED | OUTPATIENT
Start: 2020-12-18 | End: 2020-12-22 | Stop reason: HOSPADM

## 2020-12-18 RX ORDER — FLUDROCORTISONE ACETATE 0.1 MG/1
0.1 TABLET ORAL
Status: DISCONTINUED | OUTPATIENT
Start: 2020-12-19 | End: 2020-12-20

## 2020-12-18 RX ORDER — PANTOPRAZOLE SODIUM 40 MG/1
40 TABLET, DELAYED RELEASE ORAL
Status: DISCONTINUED | OUTPATIENT
Start: 2020-12-18 | End: 2020-12-22 | Stop reason: HOSPADM

## 2020-12-18 RX ORDER — RABEPRAZOLE SODIUM 20 MG/1
20 TABLET, DELAYED RELEASE ORAL 2 TIMES DAILY
Status: DISCONTINUED | OUTPATIENT
Start: 2020-12-18 | End: 2020-12-18 | Stop reason: CLARIF

## 2020-12-18 RX ORDER — LANOLIN ALCOHOL/MO/W.PET/CERES
3 CREAM (GRAM) TOPICAL
Status: DISCONTINUED | OUTPATIENT
Start: 2020-12-18 | End: 2020-12-19

## 2020-12-18 RX ORDER — DONEPEZIL HYDROCHLORIDE 10 MG/1
10 TABLET, FILM COATED ORAL AT BEDTIME
Status: DISCONTINUED | OUTPATIENT
Start: 2020-12-18 | End: 2020-12-22 | Stop reason: HOSPADM

## 2020-12-18 RX ORDER — CALCIUM CARBONATE 500 MG/1
1000 TABLET, CHEWABLE ORAL 4 TIMES DAILY PRN
Status: DISCONTINUED | OUTPATIENT
Start: 2020-12-18 | End: 2020-12-22 | Stop reason: HOSPADM

## 2020-12-18 RX ORDER — LIDOCAINE 40 MG/G
CREAM TOPICAL
Status: DISCONTINUED | OUTPATIENT
Start: 2020-12-18 | End: 2020-12-22 | Stop reason: HOSPADM

## 2020-12-18 RX ORDER — ONDANSETRON 2 MG/ML
4 INJECTION INTRAMUSCULAR; INTRAVENOUS EVERY 6 HOURS PRN
Status: DISCONTINUED | OUTPATIENT
Start: 2020-12-18 | End: 2020-12-22 | Stop reason: HOSPADM

## 2020-12-18 RX ORDER — ONDANSETRON 4 MG/1
4 TABLET, ORALLY DISINTEGRATING ORAL EVERY 6 HOURS PRN
Status: DISCONTINUED | OUTPATIENT
Start: 2020-12-18 | End: 2020-12-22 | Stop reason: HOSPADM

## 2020-12-18 RX ORDER — GABAPENTIN 100 MG/1
100 CAPSULE ORAL 3 TIMES DAILY
Status: DISCONTINUED | OUTPATIENT
Start: 2020-12-18 | End: 2020-12-22 | Stop reason: HOSPADM

## 2020-12-18 RX ORDER — POTASSIUM CHLORIDE 1500 MG/1
20 TABLET, EXTENDED RELEASE ORAL DAILY
Status: DISCONTINUED | OUTPATIENT
Start: 2020-12-18 | End: 2020-12-22 | Stop reason: HOSPADM

## 2020-12-18 RX ORDER — DEXAMETHASONE SODIUM PHOSPHATE 10 MG/ML
4 INJECTION, SOLUTION INTRAMUSCULAR; INTRAVENOUS ONCE
Status: COMPLETED | OUTPATIENT
Start: 2020-12-18 | End: 2020-12-18

## 2020-12-18 RX ORDER — NAPROXEN 250 MG/1
250 TABLET ORAL 2 TIMES DAILY WITH MEALS
Status: DISCONTINUED | OUTPATIENT
Start: 2020-12-18 | End: 2020-12-19

## 2020-12-18 RX ORDER — CHLORAL HYDRATE 500 MG
1 CAPSULE ORAL DAILY
COMMUNITY
End: 2021-06-22

## 2020-12-18 RX ORDER — SODIUM CHLORIDE 9 MG/ML
INJECTION, SOLUTION INTRAVENOUS CONTINUOUS
Status: DISCONTINUED | OUTPATIENT
Start: 2020-12-18 | End: 2020-12-18

## 2020-12-18 RX ADMIN — DICLOFENAC SODIUM 4 G: 10 GEL TOPICAL at 21:39

## 2020-12-18 RX ADMIN — NAPROXEN 250 MG: 250 TABLET ORAL at 21:32

## 2020-12-18 RX ADMIN — GABAPENTIN 100 MG: 100 CAPSULE ORAL at 21:37

## 2020-12-18 RX ADMIN — DEXAMETHASONE SODIUM PHOSPHATE 4 MG: 10 INJECTION, SOLUTION INTRAMUSCULAR; INTRAVENOUS at 21:43

## 2020-12-18 RX ADMIN — ACETAMINOPHEN 650 MG: 325 TABLET, FILM COATED ORAL at 21:32

## 2020-12-18 RX ADMIN — POTASSIUM CHLORIDE 20 MEQ: 1500 TABLET, EXTENDED RELEASE ORAL at 21:37

## 2020-12-18 RX ADMIN — DONEPEZIL HYDROCHLORIDE 10 MG: 10 TABLET, FILM COATED ORAL at 21:37

## 2020-12-18 ASSESSMENT — MIFFLIN-ST. JEOR: SCORE: 1374.38

## 2020-12-18 ASSESSMENT — ENCOUNTER SYMPTOMS
HEMATOLOGIC/LYMPHATIC NEGATIVE: 1
CHILLS: 0
SLEEP DISTURBANCE: 0
WEAKNESS: 1
FEVER: 0
CARDIOVASCULAR NEGATIVE: 1
NERVOUS/ANXIOUS: 0
PSYCHIATRIC NEGATIVE: 1
CONSTITUTIONAL NEGATIVE: 1
NECK STIFFNESS: 0
RESPIRATORY NEGATIVE: 1
PHOTOPHOBIA: 0
ENDOCRINE NEGATIVE: 1
ALLERGIC/IMMUNOLOGIC NEGATIVE: 1
MUSCULOSKELETAL NEGATIVE: 1
EYES NEGATIVE: 1

## 2020-12-18 ASSESSMENT — ACTIVITIES OF DAILY LIVING (ADL): ADLS_ACUITY_SCORE: 16

## 2020-12-18 NOTE — ED PROVIDER NOTES
History     Chief Complaint   Patient presents with     Generalized Weakness     HPI  Jf Ortiz is a 87 year old male who presents today with near syncope and weakness.  Symptoms began approximately few hours ago.  Patient remembers falling to the floor but never passed out.  Patient denies any chest pain or shortness of breath.  No recent trauma.  Patient has been at baseline state of health.    Allergies:  Allergies   Allergen Reactions     Cats Unknown     Lamotrigine      Skin lesions       Problem List:    Patient Active Problem List    Diagnosis Date Noted     Longstanding persistent atrial fibrillation (H) 04/13/2020     Priority: Medium     Falls frequently 04/13/2020     Priority: Medium     Frequent falls 04/13/2020     Priority: Medium     Coronary artery disease involving native coronary artery without angina pectoris 04/13/2020     Priority: Medium     Constipation, unspecified constipation type 04/11/2018     Priority: Medium     Pacemaker, [employer], Dual Chamber - Dependent 10/06/2017     Priority: Medium     Lewy body dementia without behavioral disturbance (H) 08/31/2017     Priority: Medium     Fatigue 08/31/2017     Priority: Medium     Urinary incontinence 08/31/2017     Priority: Medium     Autonomic orthostatic hypotension 10/14/2016     Priority: Medium     Dementia without behavioral disturbance (H) 07/28/2015     Priority: Medium     Diagnosis updated by automated process. Provider to review and confirm.       Hypocalcemia 07/28/2015     Priority: Medium     Parkinson disease (H)      Priority: Medium     Seizure disorder (H)      Priority: Medium     Volume depletion 08/02/2014     Priority: Medium     Syncope and collapse 08/01/2014     Priority: Medium     Stented coronary artery      Priority: Medium     Coronary atherosclerosis of native coronary artery      Priority: Medium     Overview:   IMO Update 10/11       Hypercholesterolemia 04/23/2013     Priority: Medium      Advanced care planning/counseling discussion 10/30/2012     Priority: Medium     REM sleep behavior disorder 01/01/2011     Priority: Medium     Osteoarthritis 01/01/2011     Priority: Medium     Problem list name updated by automated process. Provider to review       Diaphragmatic hernia 01/01/2011     Priority: Medium     Problem list name updated by automated process. 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     Priority: Medium     Overview:   IMO Update 10/11       Hypertension with target blood pressure goal under 150/90 09/05/2006     Priority: Medium     Overview:   IMO Update          Past Medical History:    Past Medical History:   Diagnosis Date     Autonomic orthostatic hypotension 10/14/2016     Coronary artery disease      Dementia without behavioral disturbance (H) 7/28/2015     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     Seizure disorder (H)      Stented coronary artery        Past Surgical History:    Past Surgical History:   Procedure Laterality Date     ------------OTHER-------------  1955    ulnar and radial fx - repair ulnar and radial fx x4     ------------OTHER-------------  6/14/2011    cataract extraction     BIOPSY  08/2015    skin biopsy     BLEPHAROPLASTY BILATERAL  5/6/2014    Procedure: BLEPHAROPLASTY BILATERAL;  Surgeon: Andrew Queen MD;  Location: [facility]     BYPASS GRAFT ARTERY CORONARY  11/2006    coronary artery disease x 5, [facility]     cataract extraction and lens implantation  2011    cataracts     cataract extraction and lens implantation      cataracts     COLONOSCOPY  2012     colonoscopy with  polypectomy  3/13/2009    history of polyps - repeat 3 yrs     colonoscopy with polypectomy  2006     colonoscopy with polypectomy  2005     COMBINED COLONOSCOPY WITH ARGON PLASMA COAGULATOR (APC) N/A 10/31/2014    Procedure: COMBINED COLONOSCOPY WITH ARGON PLASMA COAGULATOR (APC);  Surgeon: Bassam Aguilar MD;  Location: [facility]     COMBINED COLONOSCOPY WITH ARGON PLASMA COAGULATOR (APC) N/A 11/13/2015    Procedure: COMBINED COLONOSCOPY WITH ARGON PLASMA COAGULATOR (APC);  Surgeon: Bassam Aguilar MD;  Location: [facility]     ENDOSCOPY UPPER, COLONOSCOPY, COMBINED N/A 10/31/2014    Procedure: COMBINED ENDOSCOPY UPPER, COLONOSCOPY;  Surgeon: Bassam Aguilar MD;  Location: [facility]     ENDOSCOPY UPPER, COLONOSCOPY, COMBINED N/A 11/13/2015    Procedure: COMBINED ENDOSCOPY UPPER, COLONOSCOPY;  Surgeon: Bassam Aguilar MD;  Location: [facility]     ESOPHAGOSCOPY, GASTROSCOPY, DUODENOSCOPY (EGD), COMBINED  1/22/2014    Procedure: COMBINED ESOPHAGOSCOPY, GASTROSCOPY, DUODENOSCOPY (EGD);  UPPER ENDOSCOPY(PENA) W/ BIOPSIES;  Surgeon: Patricia Pena MD;  Location: [facility]     HERNIORRHAPHY INGUINAL Right 2/14/2018    Procedure: HERNIORRHAPHY INGUINAL;  OPEN RIGHT INGUINAL HERNIA REPAIR with Mesh;  Surgeon: Virgilio Zaragoza DO;  Location: [facility]     INSERT PORT VASCULAR ACCESS Right 7/12/2019    Procedure: PORT PLACEMENT;  Surgeon: Lloyd Ram MD;  Location: [facility]     LARYNGOSCOPY WITH MICROSCOPE  1/22/2014    Procedure: LARYNGOSCOPY WITH MICROSCOPE;;  Surgeon: Chayo Duke MD;  Location: [facility]     pacemaker placement  2000    heart block     pacemaker placement  2011    dual-chamber     REMOVE TUBE, MYRINGOTOMY, COMBINED  1/22/2014    Procedure: COMBINED REMOVE TUBE, MYRINGOTOMY;  MICRODIRECT LARYNGOSCOPY WITH BIOPSY AND FROZEN SECTIONS removal of right ear tube and myringoplasty;  Surgeon: Chayo Duke MD;  Location: [facility]     REPLACE PACEMAKER GENERATOR N/A 8/8/2018    Procedure: REPLACE PACEMAKER GENERATOR;   Pacemaker generator change;  Surgeon: Alma Kaplan MD;  Location: [facility]     stent placement to LAD       ventilation tube  2012    right in office       Family History:    Family History   Problem Relation Age of Onset     Diabetes Mother      Breast Cancer Daughter        Social History:  Marital Status:  Single [1]  Social History     Tobacco Use     Smoking status: Former Smoker     Packs/day: 1.00     Years: 5.00     Pack years: 5.00     Types: Cigarettes     Quit date: 1985     Years since quittin.9     Smokeless tobacco: Never Used     Tobacco comment: quit in    Substance Use Topics     Alcohol use: Yes     Comment: social     Drug use: No        Medications:         atorvastatin (LIPITOR) 20 MG tablet       Cholecalciferol (VITAMIN D-3) 25 MCG (1000 UT) CAPS       Coenzyme Q10 (CO Q 10 PO)       cyclobenzaprine (FLEXERIL) 10 MG tablet       donepezil (ARICEPT) 10 MG tablet       fludrocortisone (FLORINEF) 0.1 MG tablet       lidocaine (LIDODERM) 5 % patch       magnesium 500 MG TABS       melatonin 5 MG tablet       midodrine (PROAMATINE) 5 MG tablet       multivitamin, therapeutic with minerals (MULTI-VITAMIN) TABS tablet       potassium chloride ER (KLOR-CON M) 20 MEQ CR tablet       RABEprazole (ACIPHEX) 20 MG EC tablet       sodium chloride 1 GM tablet       Turmeric 500 MG TABS          Review of Systems   Constitutional: Negative.  Negative for chills and fever.   HENT: Negative.    Eyes: Negative.  Negative for photophobia.   Respiratory: Negative.    Cardiovascular: Negative.    Endocrine: Negative.    Genitourinary: Negative.    Musculoskeletal: Negative.  Negative for neck stiffness.   Skin: Negative.    Allergic/Immunologic: Negative.    Neurological: Positive for weakness.   Hematological: Negative.    Psychiatric/Behavioral: Negative.  Negative for self-injury, sleep disturbance and suicidal ideas. The patient is not nervous/anxious.        Physical Exam   BP:  165/84  Pulse: 74  Temp: 98.7  F (37.1  C)  Resp: 18  SpO2: 95 %      Physical Exam  Constitutional:       General: He is not in acute distress.     Appearance: Normal appearance. He is normal weight. He is not toxic-appearing.   HENT:      Head: Normocephalic and atraumatic.   Eyes:      Extraocular Movements: Extraocular movements intact.      Conjunctiva/sclera: Conjunctivae normal.   Neck:      Musculoskeletal: Normal range of motion and neck supple. No neck rigidity.   Cardiovascular:      Rate and Rhythm: Normal rate and regular rhythm.      Heart sounds: Murmur present.   Pulmonary:      Effort: Pulmonary effort is normal.   Abdominal:      General: Abdomen is flat.      Tenderness: There is no abdominal tenderness. There is no guarding.   Musculoskeletal: Normal range of motion.      Comments: Decreased range of motion of torso secondary to spine pain.  Point tenderness over thoracic spine and upper lumbar region.     Skin:     General: Skin is warm.      Capillary Refill: Capillary refill takes less than 2 seconds.   Neurological:      General: No focal deficit present.      Mental Status: He is alert and oriented to person, place, and time.      Motor: No weakness.      Comments: 5 out of 5 muscle strength bilaterally upper and lower extremities   Psychiatric:         Mood and Affect: Mood normal.         Behavior: Behavior normal.         Thought Content: Thought content normal.         Judgment: Judgment normal.         ED Course     ED Course as of Dec 18 1833   Fri Dec 18, 2020   1832 Patient with multiple pression fractures noted on CT.  Plan admit        Procedures    EKG - NSR with PVCs with LAD and LBBB    Ct of head, c-spine, chest, abdomen and pelvis - thoracic spine compression fractures, L1 - L2 verterbral compression fractures.        Results for orders placed or performed during the hospital encounter of 12/18/20 (from the past 24 hour(s))   CBC with platelets differential   Result Value Ref  Range    WBC 5.2 4.0 - 11.0 10e9/L    RBC Count 4.52 4.4 - 5.9 10e12/L    Hemoglobin 13.6 13.3 - 17.7 g/dL    Hematocrit 42.0 40.0 - 53.0 %    MCV 93 78 - 100 fl    MCH 30.1 26.5 - 33.0 pg    MCHC 32.4 31.5 - 36.5 g/dL    RDW 12.1 10.0 - 15.0 %    Platelet Count 179 150 - 450 10e9/L    Diff Method Automated Method     % Neutrophils 60.1 %    % Lymphocytes 24.0 %    % Monocytes 10.9 %    % Eosinophils 4.0 %    % Basophils 0.6 %    % Immature Granulocytes 0.4 %    Nucleated RBCs 0 0 /100    Absolute Neutrophil 3.1 1.6 - 8.3 10e9/L    Absolute Lymphocytes 1.3 0.8 - 5.3 10e9/L    Absolute Monocytes 0.6 0.0 - 1.3 10e9/L    Absolute Eosinophils 0.2 0.0 - 0.7 10e9/L    Absolute Basophils 0.0 0.0 - 0.2 10e9/L    Abs Immature Granulocytes 0.0 0 - 0.4 10e9/L    Absolute Nucleated RBC 0.0    Comprehensive metabolic panel   Result Value Ref Range    Sodium 141 133 - 144 mmol/L    Potassium 4.1 3.4 - 5.3 mmol/L    Chloride 107 94 - 109 mmol/L    Carbon Dioxide 26 20 - 32 mmol/L    Anion Gap 8 3 - 14 mmol/L    Glucose 107 (H) 70 - 99 mg/dL    Urea Nitrogen 16 7 - 30 mg/dL    Creatinine 1.10 0.66 - 1.25 mg/dL    GFR Estimate 60 (L) >60 mL/min/[1.73_m2]    GFR Estimate If Black 69 >60 mL/min/[1.73_m2]    Calcium 8.3 (L) 8.5 - 10.1 mg/dL    Bilirubin Total 0.6 0.2 - 1.3 mg/dL    Albumin 3.1 (L) 3.4 - 5.0 g/dL    Protein Total 6.8 6.8 - 8.8 g/dL    Alkaline Phosphatase 73 40 - 150 U/L    ALT 17 0 - 70 U/L    AST 14 0 - 45 U/L   INR   Result Value Ref Range    INR 1.16 (H) 0.86 - 1.14   Nt probnp inpatient (BNP)   Result Value Ref Range    N-Terminal Pro BNP Inpatient 1,299 0 - 1,800 pg/mL   Troponin I   Result Value Ref Range    Troponin I ES <0.015 0.000 - 0.045 ug/L   UA with Microscopic   Result Value Ref Range    Color Urine Light Yellow     Appearance Urine Clear     Glucose Urine Negative NEG^Negative mg/dL    Bilirubin Urine Negative NEG^Negative    Ketones Urine 10 (A) NEG^Negative mg/dL    Specific Gravity Urine 1.019 1.003  - 1.035    Blood Urine Negative NEG^Negative    pH Urine 7.0 4.7 - 8.0 pH    Protein Albumin Urine Negative NEG^Negative mg/dL    Urobilinogen mg/dL Normal 0.0 - 2.0 mg/dL    Nitrite Urine Negative NEG^Negative    Leukocyte Esterase Urine Negative NEG^Negative    Source Midstream Urine     WBC Urine <1 0 - 5 /HPF    RBC Urine 2 0 - 2 /HPF    Bacteria Urine None (A) NEG^Negative /HPF    Squamous Epithelial /HPF Urine 0 0 - 1 /HPF    Mucous Urine Present (A) NEG^Negative /LPF   XR Chest Port 1 View    Narrative    PROCEDURE:  XR CHEST PORT 1 VW    HISTORY: Patient with weakness and a near syncopal episode.  Rule out  pneumonia. .    COMPARISON:  10/1/2020    FINDINGS:  Right chest port.  The cardiomediastinal contours are stable. There is calcific aortic  atherosclerosis.   No new focal consolidation, effusion or pneumothorax.      Impression    IMPRESSION:  Stable portable chest.      ALE MYERS MD       Medications - No data to display    Assessments & Plan (with Medical Decision Making)     87-year-old male who presents today with complaints of near syncopal episode.  Patient here with complaint of weakness.  Was seen yesterday in the urgent care when he presented with back pain.  In the ER patient had a nonfocal neurological exam but tenderness with palpation of his thoracic and lumbar spine region.  CT showed multiple thoracic spine fractures and L1 and L2 lumbar spine fracture.    Patient lives at home with his wife who states that she cannot take care of him.  Patient needs physical therapy and possible discharge to a nursing home.  Patient to be admitted for pain control.  Dr. Turner consulted and agrees to admit.    Due to the nature of this electronic medical record, laboratory results, imaging results, diagnosis, other information and medications reported above may not represent information available to me at the the time of my care and disposition. Medications reported above may have not been  ordered by me.     Portions of the record may have been created with voice recognition software. Occasional wrong-word or 'sound-a- like' substitution may have occurred due to the inherent limitations of voice recognition software. Though the chart has been reviewed, there may be inadvertent transcription errors. Read the chart carefully and recognize, using context, where substitutions have occurred.       New Prescriptions    No medications on file       Final diagnoses:   Compression fracture of thoracic vertebra, initial encounter, unspecified thoracic vertebral level (H)   Compression fracture of L1 lumbar vertebra, closed, initial encounter (H)       12/18/2020   [facility]     Michael Hall MD  12/19/20 [id number]

## 2020-12-18 NOTE — ED NOTES
"Significant other, Des, called at this time to state that she cannot take this patient home as he has been increasingly weak and unable to walk without her assistance. She states \"Im 81 yo I just do it\". States he has been in ER multiple times for falls and syncope although he did not state this. Has Hx of Lewy Body Dementia. Told her that she is able to be present with patient here due to this diagnosis and she declined to be present but will answer questions via phone.  "

## 2020-12-19 ENCOUNTER — APPOINTMENT (OUTPATIENT)
Dept: PHYSICAL THERAPY | Facility: HOSPITAL | Age: 85
DRG: 552 | End: 2020-12-19
Attending: INTERNAL MEDICINE
Payer: MEDICARE

## 2020-12-19 LAB
ANION GAP SERPL CALCULATED.3IONS-SCNC: 5 MMOL/L (ref 3–14)
BUN SERPL-MCNC: 16 MG/DL (ref 7–30)
CALCIUM SERPL-MCNC: 8.3 MG/DL (ref 8.5–10.1)
CHLORIDE SERPL-SCNC: 108 MMOL/L (ref 94–109)
CO2 SERPL-SCNC: 27 MMOL/L (ref 20–32)
CREAT SERPL-MCNC: 0.98 MG/DL (ref 0.66–1.25)
ERYTHROCYTE [DISTWIDTH] IN BLOOD BY AUTOMATED COUNT: 11.9 % (ref 10–15)
GFR SERPL CREATININE-BSD FRML MDRD: 69 ML/MIN/{1.73_M2}
GLUCOSE SERPL-MCNC: 104 MG/DL (ref 70–99)
HCT VFR BLD AUTO: 40.6 % (ref 40–53)
HGB BLD-MCNC: 13.4 G/DL (ref 13.3–17.7)
MCH RBC QN AUTO: 30.4 PG (ref 26.5–33)
MCHC RBC AUTO-ENTMCNC: 33 G/DL (ref 31.5–36.5)
MCV RBC AUTO: 92 FL (ref 78–100)
PLATELET # BLD AUTO: 182 10E9/L (ref 150–450)
POTASSIUM SERPL-SCNC: 3.9 MMOL/L (ref 3.4–5.3)
RBC # BLD AUTO: 4.41 10E12/L (ref 4.4–5.9)
SODIUM SERPL-SCNC: 140 MMOL/L (ref 133–144)
WBC # BLD AUTO: 4.9 10E9/L (ref 4–11)

## 2020-12-19 PROCEDURE — 99232 SBSQ HOSP IP/OBS MODERATE 35: CPT | Performed by: NURSE PRACTITIONER

## 2020-12-19 PROCEDURE — 120N000001 HC R&B MED SURG/OB

## 2020-12-19 PROCEDURE — 250N000012 HC RX MED GY IP 250 OP 636 PS 637: Performed by: INTERNAL MEDICINE

## 2020-12-19 PROCEDURE — 36415 COLL VENOUS BLD VENIPUNCTURE: CPT | Performed by: INTERNAL MEDICINE

## 2020-12-19 PROCEDURE — 85027 COMPLETE CBC AUTOMATED: CPT | Performed by: INTERNAL MEDICINE

## 2020-12-19 PROCEDURE — 97161 PT EVAL LOW COMPLEX 20 MIN: CPT | Mod: GP

## 2020-12-19 PROCEDURE — 80048 BASIC METABOLIC PNL TOTAL CA: CPT | Performed by: INTERNAL MEDICINE

## 2020-12-19 PROCEDURE — 250N000013 HC RX MED GY IP 250 OP 250 PS 637: Performed by: NURSE PRACTITIONER

## 2020-12-19 PROCEDURE — 250N000013 HC RX MED GY IP 250 OP 250 PS 637: Performed by: INTERNAL MEDICINE

## 2020-12-19 PROCEDURE — 250N000011 HC RX IP 250 OP 636: Performed by: INTERNAL MEDICINE

## 2020-12-19 RX ORDER — LIDOCAINE 4 G/G
2 PATCH TOPICAL EVERY 24 HOURS
Status: DISCONTINUED | OUTPATIENT
Start: 2020-12-19 | End: 2020-12-19

## 2020-12-19 RX ORDER — NALOXONE HYDROCHLORIDE 0.4 MG/ML
0.2 INJECTION, SOLUTION INTRAMUSCULAR; INTRAVENOUS; SUBCUTANEOUS
Status: DISCONTINUED | OUTPATIENT
Start: 2020-12-19 | End: 2020-12-22 | Stop reason: HOSPADM

## 2020-12-19 RX ORDER — NALOXONE HYDROCHLORIDE 0.4 MG/ML
0.4 INJECTION, SOLUTION INTRAMUSCULAR; INTRAVENOUS; SUBCUTANEOUS
Status: DISCONTINUED | OUTPATIENT
Start: 2020-12-19 | End: 2020-12-22 | Stop reason: HOSPADM

## 2020-12-19 RX ORDER — HYDRALAZINE HYDROCHLORIDE 20 MG/ML
5 INJECTION INTRAMUSCULAR; INTRAVENOUS EVERY 6 HOURS PRN
Status: DISCONTINUED | OUTPATIENT
Start: 2020-12-19 | End: 2020-12-20

## 2020-12-19 RX ORDER — MIDODRINE HYDROCHLORIDE 5 MG/1
10 TABLET ORAL
Status: DISCONTINUED | OUTPATIENT
Start: 2020-12-19 | End: 2020-12-22 | Stop reason: HOSPADM

## 2020-12-19 RX ADMIN — TRAMADOL HYDROCHLORIDE 25 MG: 50 TABLET, FILM COATED ORAL at 13:20

## 2020-12-19 RX ADMIN — MIDODRINE HYDROCHLORIDE 10 MG: 5 TABLET ORAL at 13:18

## 2020-12-19 RX ADMIN — ACETAMINOPHEN 650 MG: 325 TABLET, FILM COATED ORAL at 13:19

## 2020-12-19 RX ADMIN — TRAMADOL HYDROCHLORIDE 25 MG: 50 TABLET, FILM COATED ORAL at 06:49

## 2020-12-19 RX ADMIN — MIDODRINE HYDROCHLORIDE 10 MG: 5 TABLET ORAL at 16:43

## 2020-12-19 RX ADMIN — GABAPENTIN 100 MG: 100 CAPSULE ORAL at 13:19

## 2020-12-19 RX ADMIN — DICLOFENAC SODIUM 4 G: 10 GEL TOPICAL at 09:00

## 2020-12-19 RX ADMIN — POTASSIUM CHLORIDE 20 MEQ: 1500 TABLET, EXTENDED RELEASE ORAL at 08:56

## 2020-12-19 RX ADMIN — DONEPEZIL HYDROCHLORIDE 10 MG: 10 TABLET, FILM COATED ORAL at 20:06

## 2020-12-19 RX ADMIN — DICLOFENAC SODIUM 4 G: 10 GEL TOPICAL at 13:25

## 2020-12-19 RX ADMIN — ACETAMINOPHEN 650 MG: 325 TABLET, FILM COATED ORAL at 08:56

## 2020-12-19 RX ADMIN — GABAPENTIN 100 MG: 100 CAPSULE ORAL at 08:56

## 2020-12-19 RX ADMIN — HYDRALAZINE HYDROCHLORIDE 5 MG: 20 INJECTION INTRAMUSCULAR; INTRAVENOUS at 18:45

## 2020-12-19 RX ADMIN — MIDODRINE HYDROCHLORIDE 10 MG: 5 TABLET ORAL at 08:56

## 2020-12-19 RX ADMIN — PANTOPRAZOLE SODIUM 40 MG: 40 TABLET, DELAYED RELEASE ORAL at 16:43

## 2020-12-19 RX ADMIN — ACETAMINOPHEN 650 MG: 325 TABLET, FILM COATED ORAL at 16:43

## 2020-12-19 RX ADMIN — FLUDROCORTISONE ACETATE 0.1 MG: 0.1 TABLET ORAL at 06:49

## 2020-12-19 RX ADMIN — PANTOPRAZOLE SODIUM 40 MG: 40 TABLET, DELAYED RELEASE ORAL at 06:49

## 2020-12-19 RX ADMIN — GABAPENTIN 100 MG: 100 CAPSULE ORAL at 20:06

## 2020-12-19 RX ADMIN — DEXAMETHASONE 4 MG: 4 TABLET ORAL at 08:56

## 2020-12-19 RX ADMIN — ACETAMINOPHEN 650 MG: 325 TABLET, FILM COATED ORAL at 20:06

## 2020-12-19 RX ADMIN — DICLOFENAC SODIUM 4 G: 10 GEL TOPICAL at 20:07

## 2020-12-19 ASSESSMENT — ACTIVITIES OF DAILY LIVING (ADL)
ADLS_ACUITY_SCORE: 18
ADLS_ACUITY_SCORE: 17
ADLS_ACUITY_SCORE: 18
ADLS_ACUITY_SCORE: 18
ADLS_ACUITY_SCORE: 17
ADLS_ACUITY_SCORE: 17

## 2020-12-19 NOTE — PROGRESS NOTES
12/19/20 1000   Signing Clinician's Name / Credentials   Signing clinician's name / credentials BRIE Beth Adds   Rehab Discipline PT   PT Discharge Planning    PT Discharge Recommendation (DC Rec) Transitional Care Facility   PT Rationale for DC Rec PT evaluation completed to assess needs. Based on pt's current level of function pt is not safe to discharge home. Currently would recommend short term rehab placement. Pt demonstrates with significant LE weakness, back pain, impaired gait/balance as well as decreased safety awareness putting him at a high risk for falls. Plan to treat pt during his stay and monitor progress.    PT Brief overview of current status  Ambulated 100' c FWW and min A for steadying, WC follow for safety. Ambulates with shuffled gait, flexed posture, decreased foot clearance bilaterally   Additional Documentation   PT Plan Progress mobility as tolerated   Total Session Time   Total Session Time (minutes) 15 minutes

## 2020-12-19 NOTE — PROGRESS NOTES
This writer, spoke with daughter Magi Burton on the phone.  Magi is concerned that Des can't continue to care for her father at home.  Explained that at this time the doctor was still working patient up for assessment.  Also explained that the hospital could work on getting placement at NH for her father even though he wasn't in the hospital.  Magi thought that her father would have to be admitted to the hospital to be able to get into a NH. Magi is unable to come and help Des as she lives in [place].

## 2020-12-19 NOTE — PROGRESS NOTES
Inpatient Physical Therapy Evaluation    Name: Jf Ortiz MRN# [id number]   Age: 87 year old YOB: 1933     Date of Consultation: 2020  Consultation is requested by:  Dr. Turner  Specific Consultation Request:  Assess needs   Primary care provider: FRANCES Ray    General Information:   Onset Date: 20    History of Current Problem/Admission: Compression fracture of L1 lumbar vertebra, closed, initial encounter (H) [S32.010A]  Compression fracture of thoracic vertebra, initial encounter, unspecified thoracic vertebral level (H) [S22.000A]    Prior Level of Function: Pt reports he was independent/mod I with ambulation at home using a walker or cane. Does admit that he doesn't always remember to use an assistive device. Mobility has been more difficult after recent fall   Ambulation: 1 - Assistive Equipment   Transferrin - Independent   Toiletin - Independent    Bathin - Not assessed  Dressin - Not assessed  Eatin - Independent   Communication: 0 - Understands/communicates without difficulty  Swallowing: N/A  Cognition: 1 - attention or memory deficits    Fall history within the last 6 months: Yes     Current Living Situation:  Pt lives in a house with his significant other of over 20 years. Patient reports there aren't any stairs to enter the home.    Current Equipment Used at Home: FWW, cane, grab bar in bathroom    Patient & Family Goals: Did not report but was agreeable to placement if recommended     Past Medical History:   Past Medical History:   Diagnosis Date     Autonomic orthostatic hypotension 10/14/2016     Coronary artery disease      Dementia without behavioral disturbance (H) 2015    Diagnosis updated by automated process. Provider to review and confirm.     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     Seizure disorder (H)      Stented coronary artery        Past Surgical History:  Past Surgical History:   Procedure Laterality Date     ------------OTHER-------------  1955    ulnar and radial fx - repair ulnar and radial fx x4     ------------OTHER-------------  6/14/2011    cataract extraction     BIOPSY  08/2015    skin biopsy     BLEPHAROPLASTY BILATERAL  5/6/2014    Procedure: BLEPHAROPLASTY BILATERAL;  Surgeon: Andrew Queen MD;  Location: [facility]     BYPASS GRAFT ARTERY CORONARY  11/2006    coronary artery disease x 5, [facility]     cataract extraction and lens implantation  2011    cataracts     cataract extraction and lens implantation      cataracts     COLONOSCOPY  2012     colonoscopy with polypectomy  3/13/2009    history of polyps - repeat 3 yrs     colonoscopy with polypectomy  2006     colonoscopy with polypectomy  2005     COMBINED COLONOSCOPY WITH ARGON PLASMA COAGULATOR (APC) N/A 10/31/2014    Procedure: COMBINED COLONOSCOPY WITH ARGON PLASMA COAGULATOR (APC);  Surgeon: Bassam Aguilar MD;  Location: [facility]     COMBINED COLONOSCOPY WITH ARGON PLASMA COAGULATOR (APC) N/A 11/13/2015    Procedure: COMBINED COLONOSCOPY WITH ARGON PLASMA COAGULATOR (APC);  Surgeon: Bassam Aguilar MD;  [facility]: [facility]     ENDOSCOPY UPPER, COLONOSCOPY, COMBINED N/A 10/31/2014    Procedure: COMBINED ENDOSCOPY UPPER, COLONOSCOPY;  Surgeon: Bassam Aguilar MD;  [facility]     ENDOSCOPY UPPER, COLONOSCOPY, COMBINED N/A 11/13/2015    Procedure: COMBINED ENDOSCOPY UPPER, COLONOSCOPY;  Surgeon: Bassam Aguilar MD;  [facility]     ESOPHAGOSCOPY, GASTROSCOPY, DUODENOSCOPY (EGD), COMBINED  1/22/2014    Procedure: COMBINED ESOPHAGOSCOPY, GASTROSCOPY, DUODENOSCOPY (EGD);  UPPER ENDOSCOPY(CARPENTER) W/ BIOPSIES;  Surgeon: Patricia Carpenter MD;  Location: [facility]     HERNIORRHAPHY INGUINAL Right 2/14/2018     Procedure: HERNIORRHAPHY INGUINAL;  OPEN RIGHT INGUINAL HERNIA REPAIR with Mesh;  Surgeon: Virgilio Zaragoza DO;  Location: [facility]     INSERT PORT VASCULAR ACCESS Right 7/12/2019    Procedure: PORT PLACEMENT;  Surgeon: Lloyd Ram MD;  [facility]: [facility]     LARYNGOSCOPY WITH MICROSCOPE  1/22/2014    Procedure: LARYNGOSCOPY WITH MICROSCOPE;;  Surgeon: Chayo Duke MD;  Location: [facility]     pacemaker placement  2000    heart block     pacemaker placement  2011    dual-chamber     REMOVE TUBE, MYRINGOTOMY, COMBINED  1/22/2014    Procedure: COMBINED REMOVE TUBE, MYRINGOTOMY;  MICRODIRECT LARYNGOSCOPY WITH BIOPSY AND FROZEN SECTIONS removal of right ear tube and myringoplasty;  Surgeon: Chayo Duke MD;  [facility]     REPLACE PACEMAKER GENERATOR N/A 8/8/2018    Procedure: REPLACE PACEMAKER GENERATOR;  Pacemaker generator change;  Surgeon: Alma Kaplan MD;  Location: [facility]     stent placement to LAD  2008     ventilation tube  5/24/2012    right in office       Medications:   Current Facility-Administered Medications   Medication     acetaminophen (TYLENOL) tablet 650 mg     calcium carbonate (TUMS) chewable tablet 1,000 mg     dexamethasone (DECADRON) tablet 4 mg     diclofenac (VOLTAREN) 1 % topical gel 4 g     donepezil (ARICEPT) tablet 10 mg     fludrocortisone (FLORINEF) tablet 0.1 mg     gabapentin (NEURONTIN) capsule 100 mg     lidocaine (LMX4) kit     lidocaine 1 % 0.1-1 mL     magnesium hydroxide (MILK OF MAGNESIA) suspension 30 mL     melatonin tablet 5 mg     midodrine (PROAMATINE) tablet 10 mg     naloxone (NARCAN) injection 0.2 mg    Or     naloxone (NARCAN) injection 0.4 mg    Or     naloxone (NARCAN) injection 0.2 mg    Or     naloxone (NARCAN) injection 0.4 mg     ondansetron (ZOFRAN-ODT) ODT tab 4 mg    Or     ondansetron (ZOFRAN) injection 4 mg     pantoprazole (PROTONIX) EC tablet 40 mg     potassium chloride ER (KLOR-CON M) CR tablet 20 mEq     senna-docusate  (SENOKOT-S/PERICOLACE) 8.6-50 MG per tablet 1 tablet    Or     senna-docusate (SENOKOT-S/PERICOLACE) 8.6-50 MG per tablet 2 tablet     sodium chloride (PF) 0.9% PF flush 3 mL     sodium chloride (PF) 0.9% PF flush 3 mL     traMADol (ULTRAM) half-tab 25 mg       Weight Bearing Status: WBAT      Cognitive Status Examination:  Orientation: awake and alert and oriented to time, place and person  Level of Consciousness: alert  Follows Commands and Answers Questions: 100% of the time  Personal Safety and Judgement: At Risk Behaviors Demonstrated  Memory: Immediate recall intact  Comments:     Pain:   Currently in pain? Yes  Pain Location? low back  Pain Rating: Did not give pain a number. Reported pain increased with movement but he was comfortable when seated     Physical Therapy Evaluation:   Integumentary/Edema: NA  ROM: WFL  Strength: BLE weakness demonstrated, grossly 3+ to 4-/5   Bed Mobility: NA  Transfers: CGA to min A with FWW  Gait: Ambulated 100' c FWW and min A for steadying, WC follow for safety. Ambulates with shuffled gait, flexed posture, decreased foot clearance bilaterally  Stairs: NA  Balance: Poor standing balance   Sensory: NA  Coordination: Fair     Physical Therapy Interventions: Balance, Bed Mobility, Gait Training , Strengthening, Transfer Training and Progressive Activity/Exercise     Clinical Impressions:  Criteria for Skilled Therapeutic Intervention Met: Yes, treatment indicated  PT Diagnosis: Weakness   Influenced by the following impairments: LE weakness, impaired gait, impaired balance, decreased activity tolerance, decreased safety awareness   Functional limitations due to impairment: High risk for falls, decreased level of independence with mobility   Clinical presentation: Stable/Uncomplicated  Clinical presentation rationale: Clinical judgement  Clinical Decision making (complexity): Low Complexity  Frequency: 5-6 days/week, 1-2x/day   Predicted Duration of Therapy Intervention  (days/wks): 5 days    Anticipated Discharge Disposition: Transitional Care Facility   Anticipated Equipment Needs at Discharge:   Risks and Benefits of therapy have been explained: Yes  Patient, Family & other staff in agreement with plan of care: Yes  Clinical Impression Comments: PT evaluation completed to assess needs. Based on pt's current level of function pt is not safe to discharge home. Currently would recommend short term rehab placement. Pt demonstrates with significant LE weakness, back pain, impaired gait/balance as well as decreased safety awareness putting him at a high risk for falls. Plan to treat pt during his stay and monitor progress.     Total Eval Time: 15

## 2020-12-19 NOTE — PLAN OF CARE
Face to face report given with opportunity to observe patient.    Report given to CATE Pandya RN   12/18/2020  9:06 PM

## 2020-12-19 NOTE — PLAN OF CARE
[facility] Inpatient Admission Note:    Patient admitted to 3110/3110-1 at approximately 1930 via cart accompanied by transport tech from emergency room . Report received from Yun in SBAR format at 1905 via telephone. Patient transferred to bed via slide board.. Patient is alert and oriented X 2, reports pain; rates at 7 on 0-10 scale.  Patient oriented to room, unit, hourly rounding, and plan of care. Explained admission packet and patient handbook with patient bill of rights brochure. Will continue to monitor and document as needed.     Inpatient Nursing criteria listed below was met:    Health care directives status obtained and documented: Yes    Care Everywhere authorization obtained No    Patient identifies a surrogate decision maker: Yes If yes, who:DES  Contact Information:[phone]     If initial lactic acid >2.0, repeat lactic acid drawn within one hour of arrival to unit: NA. If no, state reason:       Skin issues/needs documented: full assessment deferred to primary nurse, scattered bruises noted on left hip left thigh    Isolation Patient: no Education given, correct sign in place and documentation row added to PCS:  NA    Fall Prevention Yes: Care plan updated, education given and documented, sticker and magnet in place: Yes    Care Plan initiated: Yes    Education Documented (including assessment): Yes    Patient has discharge needs : Yes If yes, please explain:DES is unable to care for self, she reports that she called [employer] and they said they would have probable availablity on Monday.  Also, she is not allowing patient to have his hearing aids in the hospital as they were almost lost in urgent care the other day. As well are his glasses still missing last had on in urgent care and she has already spoke to the house supervisor about this issue.    Med Rec was reviewed and completed with Des.

## 2020-12-19 NOTE — PLAN OF CARE
"Most recent vitals: /87   Pulse 70   Temp 96.7  F (35.9  C) (Tympanic)   Resp 16   Ht 1.753 m (5' 9\")   Wt 70.9 kg (156 lb 4.9 oz)   SpO2 93%   BMI 23.08 kg/m      Alert and oriented. VSS. Denies pain. Lung sounds clear and diminished bilaterally. Denies any shortness of breath. Patient remains on RA O2 sats in the 90s. Denies loose stools. Bowel sounds are active in all quadrants. Blanchable redness to buttocks, scattered bruising and a scabbed area on coccyx. Alarms are active, patient is an assist of two with walker and gait belt. IV SL. Patient remains free from falls during the shift.    Face to face report given with opportunity to observe patient.    Report given to Patricia Garcia RN   12/19/2020  7:18 AM        "

## 2020-12-19 NOTE — UTILIZATION REVIEW
"[facility]   Admission Status; Secondary Review Determination     Admission Date: 12/18/2020  2:23 PM      Under the authority of the Utilization Management Committee, the utilization review process indicated a secondary review on the above patient.  The review outcome is based on review of the medical records, discussions with staff, and applying clinical experience noted on the date of the review.        (X)      Inpatient Status Appropriate - This patient's medical care is consistent with medical management for inpatient care and reasonable inpatient medical practice.      () Observation Status Appropriate - This patient does not meet hospital inpatient criteria and is placed in observation status. If this patient's primary payer is Medicare and was admitted as an inpatient, Condition Code 44 should be used and patient status changed to \"observation\".   () Admission Status NOT Appropriate - This patient's medical care is not consistent with medical management for Inpatient or Observation Status.          RATIONALE FOR DETERMINATION   88 yo male with dementia with progressive L hand tremor, autonomic dysfunction, atrial fibrillation and frequent falls. He was seen in the ER one day prior to admission with back pain and was found to have L1 and L2 compression fractures and was discharged to home. On this occasion he presented to the ER with worsening back pain and was found to have T6 and T8 compression fractures. Given dementia, analgesia is with non narcotic medications and topical agents. PT eval with assessment that he is not safe to discharge to home. Given above, including failure of outpatient management he is appropriate for inpatient cares. Additionally, the patient requires hospital based medical care which is anticipated to require a stay of 2 or more midnight; according to CMS guidelinesthe patient should be admitted as inpatient.     The severity of illness, intensity of service provided, " expected LOS and risk for adverse outcome make the care complex, high risk and appropriate for hospital admission.    The information on this document is developed by the utilization review team in order for the business office to ensure compliance.  This only denotes the appropriateness of proper admission status and does not reflect the quality of care rendered.         The definitions of Inpatient Status and Observation Status used in making the determination above are those provided in the CMS Coverage Manual, Chapter 1 and Chapter 6, section 70.4.      Sincerely,     Yelena Marie MD MPH  Utilization Review  [facility].

## 2020-12-19 NOTE — PROGRESS NOTES
[facility]    Hospitalist Progress Note    Date of Service (when I saw the patient): 12/19/2020    Assessment & Plan       Falls frequently: Family reports multiple falls at home over the last several weeks. He has been complaining of mid to lower back pain for at least a week. Spinal series shows thoracic and lumbar compression fractures of undetermined age. Will get PT evaluation, treat pain with tylenol, topicals, and look for placement as family no longer able to care for him at home.      Hypertension with target blood pressure goal under 150/90: With severe postural hypotension, he is on florinef and midodrine. Mild supine hypertension during the night 150'60's. Stable systolic 110's without dizziness when up in chair.       Longstanding persistent atrial fibrillation (H): Heart rates stable. He is not on long term anticoagulation.        Parkinson disease (H)    Lewy body dementia without behavioral disturbance (H)        DVT Prophylaxis: Pneumatic Compression Devices  Code Status: Full Code    Disposition: Expected discharge when placement found. Medically stable for discharge on arrival.     Julia Watson, CNP    Interval History   Denies pain at rest, states pain with movement. No abdominal pain, nausea, chest pain, dyspnea.     -Data reviewed today: I reviewed all new labs and imaging results over the last 24 hours.    Physical Exam   Temp: 97.7  F (36.5  C) Temp src: Tympanic BP: 118/58 Pulse: 70   Resp: 16 SpO2: 96 % O2 Device: None (Room air)    Vitals:    12/18/20 2217   Weight: 70.9 kg (156 lb 4.9 oz)     Vital Signs with Ranges  Temp:  [96.1  F (35.6  C)-98.7  F (37.1  C)] 97.7  F (36.5  C)  Pulse:  [68-78] 70  Resp:  [12-23] 16  BP: (118-189)/() 118/58  SpO2:  [91 %-97 %] 96 %  I/O last 3 completed shifts:  In: 240 [P.O.:240]  Out: 350 [Urine:350]    Peripheral IV 12/18/20 Right Upper forearm (Active)   Site Assessment WDL 12/19/20 0905   Line Status Saline locked 12/19/20  0905   Phlebitis Scale 0-->no symptoms 12/19/20 0905   Infiltration Scale 0 12/19/20 0905   Number of days: 1       Port A Cath Single 07/12/19 Right Chest wall (Active)   Number of days: 526       Incision/Surgical Site 07/12/19 Right Chest (Active)   Number of days: 526     Line/device assessment(s) completed for medical necessity    Constitutional: Awake, alert no acute distress  Respiratory: Clear bilaterally, no wheezes, no crackles or rhonchi.   Cardiovascular: HR regular, no murmurs, rubs, thrills. No peripheral edema.   GI: Flat, soft, nontender, bowel sounds are positive.   Skin/Integumen: No unusual rashes, open areas or bruising.       Medications       acetaminophen  650 mg Oral 4x Daily     dexamethasone  4 mg Oral Daily     diclofenac  4 g Topical TID     donepezil  10 mg Oral At Bedtime     fludrocortisone  0.1 mg Oral QAM AC     gabapentin  100 mg Oral TID     midodrine  10 mg Oral TID w/meals     pantoprazole  40 mg Oral BID AC     potassium chloride ER  20 mEq Oral Daily     sodium chloride (PF)  3 mL Intracatheter Q8H       Data   Recent Labs   Lab 12/19/20  0617 12/18/20  1512   WBC 4.9 5.2   HGB 13.4 13.6   MCV 92 93    179   INR  --  1.16*    141   POTASSIUM 3.9 4.1   CHLORIDE 108 107   CO2 27 26   BUN 16 16   CR 0.98 1.10   ANIONGAP 5 8   ROSALINDA 8.3* 8.3*   * 107*   ALBUMIN  --  3.1*   PROTTOTAL  --  6.8   BILITOTAL  --  0.6   ALKPHOS  --  73   ALT  --  17   AST  --  14   TROPI  --  <0.015       Recent Results (from the past 24 hour(s))   XR Chest Port 1 View    Narrative    PROCEDURE:  XR CHEST PORT 1 VW    HISTORY: Patient with weakness and a near syncopal episode.  Rule out  pneumonia. .    COMPARISON:  10/1/2020    FINDINGS:  Right chest port.  The cardiomediastinal contours are stable. There is calcific aortic  atherosclerosis.   No new focal consolidation, effusion or pneumothorax.      Impression    IMPRESSION:  Stable portable chest.      ALE MYERS MD   CT  Head w/o Contrast    Narrative    Exam: CT HEAD W/O CONTRAST    Clinical history:87 years Male Patient with complaints of weakness and  near fall. R/o stroke.    Comparisons: 4/13/2020    Technique: Axial CT imaging of the head was performed Without  intervenous contrast.    FINDINGS: There is generalized cerebral and cerebellar volume loss.   Ventricles and sulci are symmetric. The gray-white matter  differentiation throughout the brain is well maintained. There is  moderate periventricular white matter change of chronic small vessel  ischemic disease. There is no evidence of intracranial mass or  hemorrhage. Visualized portions of the paranasal sinuses and mastoid  air cells are well aerated. There is no evidence of skull fracture.      Impression    IMPRESSION: Negative Head CT    VIMAL PÉREZ MD   Cervical spine CT w/o contrast    Narrative    Exam:CT CERVICAL SPINE W/O CONTRAST    History:87 years Male R/o cervical spine fracture, 87-year-old status  post fall 3 days ago with complaints of upper back pain.  Rule out  fracture    Comparisons:None    Technique: Axial CT imaging of the cervical spine was performed.  Coronal and sagittal reconstructions were obtained.    Findings:Alignment of the cervical spine is normal. There is no  evidence of subluxation or fracture.  There is degenerative disc  disease.      Impression    IMPRESSION: No evidence of traumatic injury of the cervical spine.    VIMAL PÉREZ MD   CT Chest Abdomen Pelvis w/o Contrast    Narrative    CT CHEST ABDOMEN PELVIS W/O CONTRAST    HISTORY: 87 years Male 87-year-old status post fall 3 days ago with  complaints of back pain.  Rule out rib fracture versus pelvic fracture    COMPARISON: CT chest 5/24/2020    TECHNIQUE: Axial CT imaging of the chest abdomen and pelvis was  performed without contrast. Coronal and sagittal reconstructions were  obtained.    FINDINGS:      There is no mediastinal or hilar or axillary  lymphadenopathy.    Noncalcified lung nodules are again seen without concerning interval  change. There is interstitial prominence. There is no evidence of  pneumothorax or hemothorax.         There is a compression fracture of mild severity at T8. Age of this is  uncertain but this is not present on the prior study. There is a mild  superior endplate compression fracture of T6 also not seen on the  prior study.    There is a mild superior endplate compression fracture of L1. There is  a inferior compression fracture of L2. The L1 fracture deformity was  not seen on the prior study.    ABDOMEN:    Kidneys:No renal or ureteral calculi are present. There is  no hydronephrosis or hydroureter.       Abdomen: Evaluation is limited due to lack of intravenous contrast.  Unenhanced images of the liver spleen pancreas and adrenal glands are  unremarkable.  No abnormally distended or thickened loops of bowel are present.         Pelvis:There is no mass or lymphadenopathy. No abnormal fluid  collections are present.                  Impression    IMPRESSION: Mild compression fractures of the thoracic spine and L1 as  described above. These were not seen on the CT of 5/24/2020. It is  uncertain if these are acute subacute or older.    No evidence of significant intra-abdominal trauma    VIMAL PÉREZ MD

## 2020-12-19 NOTE — PLAN OF CARE
Took over care for patient at about 2130.     VSS, afebrile. Assessment as charted. Did report pain. Received scheduled acetaminophen and naproxen. Rated pain about a 2/10 while sitting but a 9/10 when ambulating or moving. Reported improvement upon reassessment. Remains on RA with sats in low 90s. Denies SOB. BS active x 4. Did stand up to use urinal with an assist x 2, walker, GB.  Blanchable redness to buttock, scabbed area to coccyx. Scattered bruises. Saline locked. Call light in reach, bed in low position.     Report given to CATE Diallo. Will be with RN throughout NOC shift.

## 2020-12-19 NOTE — PHARMACY-MEDICATION REGIMEN REVIEW
Subjective/Objective:    History of prolonged QT interval: 4/13/20 QTc = 502          Assessment/Plan:    1. Consider stopping prn Zofran  2. Consider rechecking QT interval    Thank you.    Karthik Cain RPH

## 2020-12-19 NOTE — H&P
[facility]    History and Physical - Hospitalist Service       Date of Admission:  12/18/2020    Assessment & Plan   Jf Ortiz is a 87 year old male admitted on 12/18/2020.      Acute T4, 5, 6, L1, 2 vertebral fractures: attempt symptom management, PT/OT assessment, check CK    Associated progressive fall history: as above. Partner Des Catherine can no longer provide his care. She has been in discussion with [facility] and would like assistance with placement. Social Work Consult    Diagnosed with autonomic dysfunction: he's currently on Midodrine and Florinef with elevated blood pressure. Will hold Midodrine for now.          Diet: Combination Diet Low Saturated Fat Na <2400mg Diet    DVT Prophylaxis: Pneumatic Compression Devices  Spears Catheter: not present  Code Status: Full Code           Disposition Plan   Expected discharge: depends on placement availability, recommended to SNF placement once assessments completed.  Entered: Enoc Turner DO 12/18/2020, 7:43 PM     The patient's care was discussed with the Patient and partner, Des Turner,   [facility]  Contact information available via [facility] Paging/Directory      ______________________________________________________________________    Chief Complaint   Repeated falls at home with disabling back pain    History is obtained from the patient's partner, Des Catherine. The patient has been diagnosed with Lewie Body dementia since 2015, and can't relate in-depth history    History of Present Illness   Jf Ortiz is a 87 year old male who has been living with his female partner, Des Catherine independently in the [place] area for over 20 years after [employer]CC from his job as an [employer] , working in [place]. He was diagnosed with dementia in 2015 with recent re testing showing relative stability. He has been developing left hand tremor, making his left-handed eating more problematic. He has been falling  more frequently with resultant spinal fractures and with an impact to his partner's quality of life because she is worried about his falls, she can't provide the required help for him and is generally more anxious and exhausted because of poor sleeping.     He was complaining about back pain yesterday and was diagnosed with the L1,2 fractures and sent home. He complained about worsening pain today.    ER Course: vitals were significant only for HTN (183/89); he was in no distress at rest, but couldn't safely move himself. Basic lab diagnostics were unremarkable. A basic trauma survey showed the additional thoracic spinal fractures    Review of Systems  this is from a's input. The patient notes leatha he feels fine until he starts to move. He acknowledges that the falls are a problem and is agreeable to SNF placement, if necessary    Constitutional: No fever or chills, no generalized weakness, no unintentional weight change, no particular change in appetite, but table manners and eating ability have been impacted by the left hand tremor  Ears, Nose & Throat: no sore throat, no nasal drainage, no congestion. No ear pain, no ear drainage, no particular change in hearing  Eyes: no particular change in vision, no redness, no drainage  Cardiovascular: No chest pain at rest, no chest pain with familiar activities.  Pulmonary: No cough, no particular change in work of breathing, no particular change in shortness of breath with position changes or familiar activites  Gastrointestinal: No abdominal pain, no nausea, no vomiting, no diarrhea. No particular change in bowel movement pattern, no black stools, no bloody stools  Genitourinary: No particular change in incontinence, no pain with urination, no particular change in stream, no particular change in amount urinated with urge, no discharge  Skin: No particular change in bruising, no rashes  Musculoskeletal: no particular change in strength, no particular change in muscle  development  Neurological: no numbness and tingling, no headache, no particular change in balance, no dizziness  Psychologic: No particular change in depression and/or anxiety  Endocrine: No particular change in heat or cold intolerance           Past Medical History    I have reviewed this patient's medical history and updated it with pertinent information if needed.   Past Medical History:   Diagnosis Date     Autonomic orthostatic hypotension 10/14/2016     Coronary artery disease      Dementia without behavioral disturbance (H) 7/28/2015    Diagnosis updated by automated process. Provider to review and confirm.     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  Past Surgical History:   Procedure Laterality Date     ------------OTHER-------------  1955    ulnar and radial fx - repair ulnar and radial fx x4     ------------OTHER-------------  6/14/2011    cataract extraction     BIOPSY  08/2015    skin biopsy     BLEPHAROPLASTY BILATERAL  5/6/2014    Procedure: BLEPHAROPLASTY BILATERAL;  Surgeon: Andrew Queen MD;  Location: [facility]     BYPASS GRAFT ARTERY CORONARY  11/2006    coronary artery disease x 5, [facility]     cataract extraction and lens implantation  2011    cataracts     cataract extraction and lens implantation      cataracts     COLONOSCOPY  2012     colonoscopy with polypectomy  3/13/2009    history of polyps - repeat 3 yrs     colonoscopy with polypectomy  2006     colonoscopy with polypectomy  2005     COMBINED COLONOSCOPY WITH ARGON PLASMA COAGULATOR  (APC) N/A 10/31/2014    Procedure: COMBINED COLONOSCOPY WITH ARGON PLASMA COAGULATOR (APC);  Surgeon: Bassam Aguilar MD;  Location: [facility]     COMBINED COLONOSCOPY WITH ARGON PLASMA COAGULATOR (APC) N/A 11/13/2015    Procedure: COMBINED COLONOSCOPY WITH ARGON PLASMA COAGULATOR (APC);  Surgeon: Bassam Aguilar MD;  Location: [facility]     ENDOSCOPY UPPER, COLONOSCOPY, COMBINED N/A 10/31/2014    Procedure: COMBINED ENDOSCOPY UPPER, COLONOSCOPY;  Surgeon: Bassam Aguilar MD;  [facility]: [facility]     ENDOSCOPY UPPER, COLONOSCOPY, COMBINED N/A 11/13/2015    Procedure: COMBINED ENDOSCOPY UPPER, COLONOSCOPY;  Surgeon: Bassam Aguilar MD;  [facility]     ESOPHAGOSCOPY, GASTROSCOPY, DUODENOSCOPY (EGD), COMBINED  1/22/2014    Procedure: COMBINED ESOPHAGOSCOPY, GASTROSCOPY, DUODENOSCOPY (EGD);  UPPER ENDOSCOPY(PENA) W/ BIOPSIES;  Surgeon: Patricia Pena MD;  Location: [facility]     HERNIORRHAPHY INGUINAL Right 2/14/2018    Procedure: HERNIORRHAPHY INGUINAL;  OPEN RIGHT INGUINAL HERNIA REPAIR with Mesh;  Surgeon: Virgilio Zaragoza DO;  Location: [facility]     INSERT PORT VASCULAR ACCESS Right 7/12/2019    Procedure: PORT PLACEMENT;  Surgeon: Lloyd Ram MD;  [facility]: [facility]     LARYNGOSCOPY WITH MICROSCOPE  1/22/2014    Procedure: LARYNGOSCOPY WITH MICROSCOPE;;  Surgeon: Chayo Duke MD;  Location: [facility]     pacemaker placement  2000    heart block     pacemaker placement  2011    dual-chamber     REMOVE TUBE, MYRINGOTOMY, COMBINED  1/22/2014    Procedure: COMBINED REMOVE TUBE, MYRINGOTOMY;  MICRODIRECT LARYNGOSCOPY WITH BIOPSY AND FROZEN SECTIONS removal of right ear tube and myringoplasty;  Surgeon: Chayo Duke MD;  Location: [facility]     REPLACE PACEMAKER GENERATOR N/A 8/8/2018    Procedure: REPLACE PACEMAKER GENERATOR;  Pacemaker generator change;  Surgeon: Alma Kaplan MD;  Location: [facility] OR     stent placement to LAD  2008     ventilation tube  5/24/2012    right in office       Social History   I have  reviewed this patient's social history and updated it with pertinent information if needed.  Social History     Tobacco Use     Smoking status: Former Smoker     Packs/day: 1.00     Years: 5.00     Pack years: 5.00     Types: Cigarettes     Quit date: 1985     Years since quittin.9     Smokeless tobacco: Never Used     Tobacco comment: quit in    Substance Use Topics     Alcohol use: Yes     Comment: social     Drug use: No       Family History   I have reviewed this patient's family history and updated it with pertinent information if needed.  Family History   Problem Relation Age of Onset     Diabetes Mother      Breast Cancer Daughter        Prior to Admission Medications   Prior to Admission Medications   Prescriptions Last Dose Informant Patient Reported? Taking?   Cholecalciferol (VITAMIN D-3) 25 MCG (1000 UT) CAPS  Spouse/Significant Other Yes No   Sig: Take 50 mcg by mouth daily    Coenzyme Q10 (CO Q 10 PO)  Spouse/Significant Other Yes No   Sig: Take 100 mg by mouth every morning    RABEprazole (ACIPHEX) 20 MG EC tablet  Spouse/Significant Other Yes No   Sig: Take 20 mg by mouth 2 times daily   Turmeric 500 MG TABS  Spouse/Significant Other Yes No   Sig: Take 1,000 mg by mouth daily    atorvastatin (LIPITOR) 20 MG tablet  Spouse/Significant Other No No   Sig: TAKE 1 TABLET BY MOUTH EVERY EVENING - GENERIC FOR LIPITOR   cyclobenzaprine (FLEXERIL) 10 MG tablet   No No   Sig: TAKE 1 TABLET (10 MG) BY MOUTH 3 TIMES DAILY AS NEEDED FOR MUSCLE SPASMS   donepezil (ARICEPT) 10 MG tablet  Spouse/Significant Other No No   Sig: TAKE 1 TABLET BY MOUTH NIGHTLY AT BEDTIME   fludrocortisone (FLORINEF) 0.1 MG tablet   No No   Sig: Take 1 tablet (0.1 mg) by mouth every morning (before breakfast)   lidocaine (LIDODERM) 5 % patch   No No   Sig: Place 1 patch onto the skin every 24 hours   magnesium 500 MG TABS  Spouse/Significant Other Yes No   Sig: Take 500 mg by mouth daily    melatonin 5 MG tablet   "Spouse/Significant Other Yes No   Sig: Take 5 mg by mouth nightly as needed for sleep   midodrine (PROAMATINE) 5 MG tablet  Spouse/Significant Other No No   Sig: TAKE 2 TABLETS (10MG) BY MOUTH THREE TIMES DAILY   multivitamin, therapeutic with minerals (MULTI-VITAMIN) TABS tablet  Spouse/Significant Other Yes No   Sig: Take 1 tablet by mouth daily   potassium chloride ER (KLOR-CON M) 20 MEQ CR tablet   No No   Sig: TAKE 1 TABLET BY MOUTH EVERY DAY   sodium chloride 1 GM tablet   No No   Sig: TAKE 1 TABLET BY MOUTH TWICE A DAY      Facility-Administered Medications: None     Allergies   Allergies   Allergen Reactions     Cats Unknown     Lamotrigine      Skin lesions       Physical Exam   Vital Signs: Temp: 96.1  F (35.6  C) Temp src: Oral BP: 176/93 Pulse: 70   Resp: 16 SpO2: 96 % O2 Device: None (Room air)    Weight: 0 lbs 0 oz     Case reviewed with the ER Provider, EHR reviewed; patient seen in ER room 5    Vital signs:  Temp: 96.1  F (35.6  C) Temp src: Oral BP: 176/93 Pulse: 70   Resp: 16 SpO2: 96 % O2 Device: None (Room air)        Estimated body mass index is 24.37 kg/m  as calculated from the following:    Height as of 12/4/20: 1.753 m (5' 9.02\").    Weight as of 12/4/20: 74.9 kg (165 lb 2 oz).      General: No distress, interactive, is alert and knows the President (\"Trump, but not for long\"), the month and location, but is not sure of the year  Head: normocephalic, no obvious trauma  Eyes: Gaze directed normally, sclera clear, no discharge, no abnormal ocular movements  Ears: Normal appearing age-related external ears, no discharge, stable hearing acuity loss  Nose: Normal age-related appearance  Mouth: Normal appearing oral mucosa, Gums and throat appear age-related normal  Neck: Normal age-related appearance, age-related range of motion, supple, no adenopathy  Pulmonary: Normal work of breathing, no expiratory delay, no coarseness, no wheezing  Cardiovascular: Distant heart sounds, regular rhythm, loud " systolic murmur that he knows about  Abdomen: No obvious distention, soft, bowel sounds present with normal frequency and pitch  Rectal: Deferred  Back: Age-related normal  Skin: Age-related normal, no rashes  Extremities: Not tender, no lower extremity edema. Moving upper and lower extremities  Neurological: Grossly in tact  Psychiatric: Mood is stable, appropriately interactive          Data   Data reviewed today: I reviewed all medications, new labs and imaging results over the last 24 hours.

## 2020-12-19 NOTE — PLAN OF CARE
"Patient remained in good spirits through shift. A/O. SBA. Reports pain to lumbar spine controlled by scheduled tylenol, cream and PRN tramadol. Blanchable redness noted to patient buttock. Chair cushion placed in pt chair to offload pressure. I/O adequate. Vitals remain stable. /58   Pulse 70   Temp 97.7  F (36.5  C) (Tympanic)   Resp 16   Ht 1.753 m (5' 9\")   Wt 70.9 kg (156 lb 4.9 oz)   SpO2 96%   BMI 23.08 kg/m  . IV remains saline locked.     Face to face report given with opportunity to observe patient.    Report given to CATE Reich RN   12/19/2020  3:13 PM        "

## 2020-12-20 PROBLEM — S32.000D CLOSED WEDGE COMPRESSION FRACTURE OF LUMBAR VERTEBRA WITH ROUTINE HEALING: Status: ACTIVE | Noted: 2020-12-20

## 2020-12-20 LAB
ANION GAP SERPL CALCULATED.3IONS-SCNC: 7 MMOL/L (ref 3–14)
BUN SERPL-MCNC: 18 MG/DL (ref 7–30)
CALCIUM SERPL-MCNC: 9 MG/DL (ref 8.5–10.1)
CHLORIDE SERPL-SCNC: 108 MMOL/L (ref 94–109)
CO2 SERPL-SCNC: 23 MMOL/L (ref 20–32)
CREAT SERPL-MCNC: 0.93 MG/DL (ref 0.66–1.25)
ERYTHROCYTE [DISTWIDTH] IN BLOOD BY AUTOMATED COUNT: 12 % (ref 10–15)
GFR SERPL CREATININE-BSD FRML MDRD: 73 ML/MIN/{1.73_M2}
GLUCOSE SERPL-MCNC: 122 MG/DL (ref 70–99)
HCT VFR BLD AUTO: 41.9 % (ref 40–53)
HGB BLD-MCNC: 14.1 G/DL (ref 13.3–17.7)
MCH RBC QN AUTO: 30.3 PG (ref 26.5–33)
MCHC RBC AUTO-ENTMCNC: 33.7 G/DL (ref 31.5–36.5)
MCV RBC AUTO: 90 FL (ref 78–100)
PLATELET # BLD AUTO: 250 10E9/L (ref 150–450)
POTASSIUM SERPL-SCNC: 4.2 MMOL/L (ref 3.4–5.3)
RBC # BLD AUTO: 4.66 10E12/L (ref 4.4–5.9)
SODIUM SERPL-SCNC: 138 MMOL/L (ref 133–144)
WBC # BLD AUTO: 7.6 10E9/L (ref 4–11)

## 2020-12-20 PROCEDURE — 99232 SBSQ HOSP IP/OBS MODERATE 35: CPT | Performed by: INTERNAL MEDICINE

## 2020-12-20 PROCEDURE — 250N000013 HC RX MED GY IP 250 OP 250 PS 637: Performed by: INTERNAL MEDICINE

## 2020-12-20 PROCEDURE — 36415 COLL VENOUS BLD VENIPUNCTURE: CPT | Performed by: INTERNAL MEDICINE

## 2020-12-20 PROCEDURE — 250N000012 HC RX MED GY IP 250 OP 636 PS 637: Performed by: INTERNAL MEDICINE

## 2020-12-20 PROCEDURE — 250N000013 HC RX MED GY IP 250 OP 250 PS 637: Performed by: NURSE PRACTITIONER

## 2020-12-20 PROCEDURE — 85027 COMPLETE CBC AUTOMATED: CPT | Performed by: INTERNAL MEDICINE

## 2020-12-20 PROCEDURE — 80048 BASIC METABOLIC PNL TOTAL CA: CPT | Performed by: INTERNAL MEDICINE

## 2020-12-20 PROCEDURE — 250N000011 HC RX IP 250 OP 636: Performed by: INTERNAL MEDICINE

## 2020-12-20 PROCEDURE — 120N000001 HC R&B MED SURG/OB

## 2020-12-20 RX ORDER — FLUDROCORTISONE ACETATE 0.1 MG/1
0.1 TABLET ORAL EVERY OTHER DAY
Status: DISCONTINUED | OUTPATIENT
Start: 2020-12-22 | End: 2020-12-22 | Stop reason: HOSPADM

## 2020-12-20 RX ADMIN — DICLOFENAC SODIUM 4 G: 10 GEL TOPICAL at 08:42

## 2020-12-20 RX ADMIN — DEXAMETHASONE 4 MG: 4 TABLET ORAL at 08:22

## 2020-12-20 RX ADMIN — GABAPENTIN 100 MG: 100 CAPSULE ORAL at 20:34

## 2020-12-20 RX ADMIN — FLUDROCORTISONE ACETATE 0.1 MG: 0.1 TABLET ORAL at 06:27

## 2020-12-20 RX ADMIN — MAGNESIUM HYDROXIDE 30 ML: 400 SUSPENSION ORAL at 15:57

## 2020-12-20 RX ADMIN — ACETAMINOPHEN 650 MG: 325 TABLET, FILM COATED ORAL at 13:35

## 2020-12-20 RX ADMIN — GABAPENTIN 100 MG: 100 CAPSULE ORAL at 13:35

## 2020-12-20 RX ADMIN — ACETAMINOPHEN 650 MG: 325 TABLET, FILM COATED ORAL at 16:00

## 2020-12-20 RX ADMIN — GABAPENTIN 100 MG: 100 CAPSULE ORAL at 08:21

## 2020-12-20 RX ADMIN — DONEPEZIL HYDROCHLORIDE 10 MG: 10 TABLET, FILM COATED ORAL at 20:34

## 2020-12-20 RX ADMIN — PANTOPRAZOLE SODIUM 40 MG: 40 TABLET, DELAYED RELEASE ORAL at 16:00

## 2020-12-20 RX ADMIN — ENOXAPARIN SODIUM 40 MG: 40 INJECTION SUBCUTANEOUS at 08:41

## 2020-12-20 RX ADMIN — PANTOPRAZOLE SODIUM 40 MG: 40 TABLET, DELAYED RELEASE ORAL at 06:27

## 2020-12-20 RX ADMIN — POTASSIUM CHLORIDE 20 MEQ: 1500 TABLET, EXTENDED RELEASE ORAL at 08:20

## 2020-12-20 RX ADMIN — DICLOFENAC SODIUM 4 G: 10 GEL TOPICAL at 20:34

## 2020-12-20 RX ADMIN — MIDODRINE HYDROCHLORIDE 10 MG: 5 TABLET ORAL at 08:21

## 2020-12-20 RX ADMIN — DICLOFENAC SODIUM 4 G: 10 GEL TOPICAL at 13:35

## 2020-12-20 RX ADMIN — MIDODRINE HYDROCHLORIDE 10 MG: 5 TABLET ORAL at 16:00

## 2020-12-20 RX ADMIN — ACETAMINOPHEN 650 MG: 325 TABLET, FILM COATED ORAL at 08:20

## 2020-12-20 RX ADMIN — ACETAMINOPHEN 650 MG: 325 TABLET, FILM COATED ORAL at 20:34

## 2020-12-20 ASSESSMENT — ACTIVITIES OF DAILY LIVING (ADL)
ADLS_ACUITY_SCORE: 17
ADLS_ACUITY_SCORE: 18
ADLS_ACUITY_SCORE: 17

## 2020-12-20 NOTE — PLAN OF CARE
12/19/20 1500-12/20/20 0700:  Pt alert and oriented x3, pleasant and cooperative. VS and assessment as charted. Denies pain. Afebrile.Tolerating a regular diet, encouraging oral hydration. Positions self independently. Transfers with assist of one with walker and gait belt. IV SL, received IV hydralazine x1 for elevated B/P with effectiveness. Free from falls/ injury, call light within reach and alarms activated.     Face to face report given with opportunity to observe patient.    Report given to CATE Gomez RN   12/20/2020  7:00 AM

## 2020-12-20 NOTE — PROGRESS NOTES
Assessment completed by face to face with patient as well as Uda/SO/HCA w/verbal consent from pt..    LOC: alert and oriented.    Dx: Chronic frequent falls  Chronic Disease Management: Lewy body dementia w/o behavioral disturbance, A-Fib, HTN, Parkinson Disease    Lives with: Significant other/Uda  Living at:  Home in [place]  Transportation: YES , SO/Uda    Primary PCP: FRANCES Ray  Insurance:  Medicare, [employer]  Medicare IM letter reviewed with patient et SO/Uda.    Support System:  Family    : YES      [facility] completed[facility]  Health Care Directive: Yes on file  HCA-Uda/SO along w/joaquin/Magi    Pharmacy: [facility]  Meds management: IUda/SO    Adequate Resources for needs (housing, utilities, food/med): YES  Household chores: Uda/SO  Work/community/social activity: YES , enjoys watching TV    ADLs: Independent, assist from UDA if needed  Ambulation:walker, cane  Falls: approximately six falls in the last six months  Nutrition: No concerns voiced  Sleep: Sleeps in a reg. bed    Equipment used: Walker, cane, grab bars in bathroom, built in seat bench in shower     Mental health: Parkinsons, Lewey body dementia  Substance abuse: occasional glass of wine  Exposure to violence/abuse: No concerns voiced    Able to Return to Prior Living Arrangements: NO    Choice of Vendor: Per discharge planning, the choice of vendor list has been provided to the patient/family for a skilled nursing facility.    First Choice : Skilled Nursing Facility [place]: [facility]     [phone]    Second Choice: Skilled Nursing Facility [facility]    [phone]    Third Choice: Skilled Nursing Facility [place]: [facility]         [phone]      Barriers: Availability for long term placement    AJITH: Medium    Plan: SNF via transportation Agency

## 2020-12-20 NOTE — PLAN OF CARE
"Patient rested well on shift. Continues to verbalize pain is under control. Vitals stable. /69   Pulse 69   Temp 97.1  F (36.2  C) (Tympanic)   Resp 18   Ht 1.753 m (5' 9\")   Wt 70.9 kg (156 lb 4.9 oz)   SpO2 94%   BMI 23.08 kg/m    Pt. Orthostatic upon standing.   Midodrine held per MD order. Red area to coccyx remains blanchable and unopened. I/O appropriate.     Face to face report given with opportunity to observe patient.    Report given to CATE Reich RN   12/20/2020  3:01 PM      "

## 2020-12-20 NOTE — PROVIDER NOTIFICATION
Updated Dr. Allen on orthostatic blood pressures  Lyin/92 Pulse 89  Sittin/101 Pulse 80  Standin/84 Pulse 77    Patient stated he was equally dizzy through the entire process. Patient stated he did not feel any increased dizziness or lightheadedness when  Standing. Although patient needed increased help of 2 staff to both hold some of his weight and keep him upright as he was rocking forward and back quite markedly.     MD updated. States he is not concerned and does not wish to intervene at this time. Requests vitals be taken every 4 hours.

## 2020-12-20 NOTE — PROGRESS NOTES
[facility]    Medicine Progress Note - Hospitalist Service       Date of Admission:  12/18/2020  Assessment & Plan       Principal Problem:    Falls frequently, status post to multiple vertebral fractures  Patient is frequent falls appear to be multifactorial with potential causes including Parkinson's disease, Lewy body dementia, potential multi-infarct dementia from atrial fibrillation as well as orthostatic hypotension.  We will obtain physical therapy evaluation for functional status and determine appropriate plan for placement.  Patient's family does not feel that they can take care of him at home anymore.      Hypertension with target blood pressure goal under 150/90  Blood pressure is elevated, but in light of patient's a history of a postural hypotension, we will try to avoid overtreatment of his hypertension which may precipitate hypotension.  Will resume Florinef and midodrine.      Longstanding persistent atrial fibrillation (H)  Patient's heart rate is pretty well controlled without any AV jeannine blockers.  Patient also is not on anticoagulation due to his frequent falls.      Parkinson disease (H)    Lewy body dementia without behavioral disturbance (H)       Diet: Combination Diet Regular Diet Adult    DVT Prophylaxis: Enoxaparin (Lovenox) SQ  Spears Catheter: not present  Code Status: Full Code           Disposition Plan   Expected discharge: 2 - 3 days, recommended to transitional care unit once safe disposition plan/ TCU bed available.  Entered: Praful Allen MD 12/20/2020, 8:01 AM       The patient's care was discussed with the Patient.    Praful Allen MD  Hospitalist Service  [facility]  Contact information available via [facility] Paging/Directory    ______________________________________________________________________    Interval History   Patient reports improved pain in the back.  No recurrent fall episode since admission.  Denies fever, chills, chest pain, palpitation, nausea,  vomiting, abdominal pain, diarrhea.    Data reviewed today: I reviewed all medications, new labs and imaging results over the last 24 hours. I personally reviewed no images or EKG's today.    Physical Exam   Vital Signs: Temp: 97.1  F (36.2  C) Temp src: Tympanic BP: 142/84 Pulse: 84   Resp: 16 SpO2: 97 % O2 Device: None (Room air)    Weight: 156 lbs 4.9 oz  General Appearance: Sitting in chair in no acute distress  Respiratory: Clear to auscultation bilaterally  Cardiovascular: Irregularly irregular, systolic murmur loudest at the left sternal border, no gallops  GI: Soft, nontender, nondistended  Skin: Intact  Other: Neuro: Mild weakness in the left elbow extension otherwise nonfocal.    Data   Recent Labs   Lab 12/20/20  0644 12/19/20  0617 12/18/20  1512   WBC 7.6 4.9 5.2   HGB 14.1 13.4 13.6   MCV 90 92 93    182 179   INR  --   --  1.16*    140 141   POTASSIUM 4.2 3.9 4.1   CHLORIDE 108 108 107   CO2 23 27 26   BUN 18 16 16   CR 0.93 0.98 1.10   ANIONGAP 7 5 8   ROSALINDA 9.0 8.3* 8.3*   * 104* 107*   ALBUMIN  --   --  3.1*   PROTTOTAL  --   --  6.8   BILITOTAL  --   --  0.6   ALKPHOS  --   --  73   ALT  --   --  17   AST  --   --  14   TROPI  --   --  <0.015     No results found for this or any previous visit (from the past 24 hour(s)).  Medications       acetaminophen  650 mg Oral 4x Daily     dexamethasone  4 mg Oral Daily     diclofenac  4 g Topical TID     donepezil  10 mg Oral At Bedtime     fludrocortisone  0.1 mg Oral QAM AC     gabapentin  100 mg Oral TID     midodrine  10 mg Oral TID w/meals     pantoprazole  40 mg Oral BID AC     potassium chloride ER  20 mEq Oral Daily     sodium chloride (PF)  3 mL Intracatheter Q8H

## 2020-12-20 NOTE — PLAN OF CARE
Blood pressure is elevated, 185/90  there is no apresoline in stock available, MD was updated, awaiting new orders.

## 2020-12-21 ENCOUNTER — APPOINTMENT (OUTPATIENT)
Dept: PHYSICAL THERAPY | Facility: HOSPITAL | Age: 85
DRG: 552 | End: 2020-12-21
Payer: MEDICARE

## 2020-12-21 ENCOUNTER — APPOINTMENT (OUTPATIENT)
Dept: OCCUPATIONAL THERAPY | Facility: HOSPITAL | Age: 85
DRG: 552 | End: 2020-12-21
Attending: INTERNAL MEDICINE
Payer: MEDICARE

## 2020-12-21 LAB
ALBUMIN SERPL-MCNC: 3.2 G/DL (ref 3.4–5)
ANION GAP SERPL CALCULATED.3IONS-SCNC: 6 MMOL/L (ref 3–14)
BUN SERPL-MCNC: 22 MG/DL (ref 7–30)
CALCIUM SERPL-MCNC: 9 MG/DL (ref 8.5–10.1)
CHLORIDE SERPL-SCNC: 106 MMOL/L (ref 94–109)
CO2 SERPL-SCNC: 27 MMOL/L (ref 20–32)
CREAT SERPL-MCNC: 0.95 MG/DL (ref 0.66–1.25)
ERYTHROCYTE [DISTWIDTH] IN BLOOD BY AUTOMATED COUNT: 12.1 % (ref 10–15)
GFR SERPL CREATININE-BSD FRML MDRD: 72 ML/MIN/{1.73_M2}
GLUCOSE SERPL-MCNC: 118 MG/DL (ref 70–99)
HCT VFR BLD AUTO: 40.9 % (ref 40–53)
HGB BLD-MCNC: 13.9 G/DL (ref 13.3–17.7)
LABORATORY COMMENT REPORT: NORMAL
MAGNESIUM SERPL-MCNC: 2.6 MG/DL (ref 1.6–2.3)
MCH RBC QN AUTO: 30.5 PG (ref 26.5–33)
MCHC RBC AUTO-ENTMCNC: 34 G/DL (ref 31.5–36.5)
MCV RBC AUTO: 90 FL (ref 78–100)
PHOSPHATE SERPL-MCNC: 4.2 MG/DL (ref 2.5–4.5)
PLATELET # BLD AUTO: 251 10E9/L (ref 150–450)
POTASSIUM SERPL-SCNC: 4.1 MMOL/L (ref 3.4–5.3)
RBC # BLD AUTO: 4.56 10E12/L (ref 4.4–5.9)
SARS-COV-2 RNA SPEC QL NAA+PROBE: NEGATIVE
SODIUM SERPL-SCNC: 139 MMOL/L (ref 133–144)
SPECIMEN SOURCE: NORMAL
WBC # BLD AUTO: 8.9 10E9/L (ref 4–11)

## 2020-12-21 PROCEDURE — 83735 ASSAY OF MAGNESIUM: CPT | Performed by: INTERNAL MEDICINE

## 2020-12-21 PROCEDURE — 250N000011 HC RX IP 250 OP 636: Performed by: INTERNAL MEDICINE

## 2020-12-21 PROCEDURE — 250N000013 HC RX MED GY IP 250 OP 250 PS 637: Performed by: INTERNAL MEDICINE

## 2020-12-21 PROCEDURE — 80069 RENAL FUNCTION PANEL: CPT | Performed by: INTERNAL MEDICINE

## 2020-12-21 PROCEDURE — 97165 OT EVAL LOW COMPLEX 30 MIN: CPT | Mod: GO

## 2020-12-21 PROCEDURE — 97530 THERAPEUTIC ACTIVITIES: CPT | Mod: GO

## 2020-12-21 PROCEDURE — 36415 COLL VENOUS BLD VENIPUNCTURE: CPT | Performed by: INTERNAL MEDICINE

## 2020-12-21 PROCEDURE — 85027 COMPLETE CBC AUTOMATED: CPT | Performed by: INTERNAL MEDICINE

## 2020-12-21 PROCEDURE — 87635 SARS-COV-2 COVID-19 AMP PRB: CPT | Performed by: INTERNAL MEDICINE

## 2020-12-21 PROCEDURE — 120N000001 HC R&B MED SURG/OB

## 2020-12-21 PROCEDURE — 99232 SBSQ HOSP IP/OBS MODERATE 35: CPT | Performed by: INTERNAL MEDICINE

## 2020-12-21 PROCEDURE — 97530 THERAPEUTIC ACTIVITIES: CPT | Mod: GP

## 2020-12-21 PROCEDURE — 250N000012 HC RX MED GY IP 250 OP 636 PS 637: Performed by: INTERNAL MEDICINE

## 2020-12-21 RX ORDER — DEXMEDETOMIDINE HYDROCHLORIDE 4 UG/ML
0.2-0.7 INJECTION, SOLUTION INTRAVENOUS CONTINUOUS
Status: DISCONTINUED | OUTPATIENT
Start: 2020-12-21 | End: 2020-12-21 | Stop reason: CLARIF

## 2020-12-21 RX ADMIN — DONEPEZIL HYDROCHLORIDE 10 MG: 10 TABLET, FILM COATED ORAL at 20:03

## 2020-12-21 RX ADMIN — MIDODRINE HYDROCHLORIDE 10 MG: 5 TABLET ORAL at 14:39

## 2020-12-21 RX ADMIN — ACETAMINOPHEN 650 MG: 325 TABLET, FILM COATED ORAL at 10:00

## 2020-12-21 RX ADMIN — MIDODRINE HYDROCHLORIDE 10 MG: 5 TABLET ORAL at 09:59

## 2020-12-21 RX ADMIN — ENOXAPARIN SODIUM 40 MG: 40 INJECTION SUBCUTANEOUS at 10:00

## 2020-12-21 RX ADMIN — PANTOPRAZOLE SODIUM 40 MG: 40 TABLET, DELAYED RELEASE ORAL at 07:45

## 2020-12-21 RX ADMIN — DICLOFENAC SODIUM 4 G: 10 GEL TOPICAL at 10:02

## 2020-12-21 RX ADMIN — GABAPENTIN 100 MG: 100 CAPSULE ORAL at 20:03

## 2020-12-21 RX ADMIN — PANTOPRAZOLE SODIUM 40 MG: 40 TABLET, DELAYED RELEASE ORAL at 16:48

## 2020-12-21 RX ADMIN — GABAPENTIN 100 MG: 100 CAPSULE ORAL at 09:59

## 2020-12-21 RX ADMIN — POTASSIUM CHLORIDE 20 MEQ: 1500 TABLET, EXTENDED RELEASE ORAL at 10:00

## 2020-12-21 RX ADMIN — DEXAMETHASONE 4 MG: 4 TABLET ORAL at 09:59

## 2020-12-21 RX ADMIN — SENNOSIDES AND DOCUSATE SODIUM 2 TABLET: 8.6; 5 TABLET ORAL at 17:21

## 2020-12-21 RX ADMIN — DICLOFENAC SODIUM 4 G: 10 GEL TOPICAL at 14:42

## 2020-12-21 RX ADMIN — GABAPENTIN 100 MG: 100 CAPSULE ORAL at 14:41

## 2020-12-21 ASSESSMENT — ACTIVITIES OF DAILY LIVING (ADL)
ADLS_ACUITY_SCORE: 19
ADLS_ACUITY_SCORE: 17
ADLS_ACUITY_SCORE: 18
ADLS_ACUITY_SCORE: 17

## 2020-12-21 NOTE — PROVIDER NOTIFICATION
Dr. Turner notified of elevated B/P. MD made adjustments to midodrine parameters and Florinef frequency.

## 2020-12-21 NOTE — PROGRESS NOTES
Patient has been accepted at [facility].  [facility] will p/u on 12/22/2020 @ 1100. Pt, SO/Uda, hospitalist , et nursing aware/updated.

## 2020-12-21 NOTE — PLAN OF CARE
Pt alert and oriented x3 with intermittent confusion, pleasant and cooperative. VS and assessment as charted. IV SL. Denies pain. Afebrile. HR irreg with a murmur, lungs clear t/o. Pt independently positioning self, staff reminders PRN. Tolerating a regular diet with a good appetite. Free from falls/ injury, call light within reach and alarms activated.     Face to face report given with opportunity to observe patient.    Report given to CATE Rivera RN   12/20/2020  11:00 PM

## 2020-12-21 NOTE — PROGRESS NOTES
[facility]    Medicine Progress Note - Hospitalist Service       Date of Admission:  12/18/2020  Assessment & Plan             Principal Problem:    Falls frequently, status post to multiple vertebral fractures  Patient is frequent falls appear to be multifactorial with potential causes including Parkinson's disease, Lewy body dementia, potential multi-infarct dementia from atrial fibrillation as well as orthostatic hypotension.  - 12/21: pt is stable.  PT/OT eval underway; TCU recommended    Postural hypotension, Hypertension with target blood pressure goal under 150/90  Blood pressure is elevated, but in light of patient's a history of a postural hypotension, we will try to avoid overtreatment of his hypertension which may precipitate hypotension.    - 12/21:  BP is elevated but will not treat with antihypertensives.  The dosages of Florinef and midodrine have been reduced.        Longstanding persistent atrial fibrillation (H)  Patient's heart rate is pretty well controlled without any AV jeannine blockers.  Patient also is not on anticoagulation due to his frequent falls.  - 12/21: stable.  HR controlled.      Parkinson disease (H)    Lewy body dementia without behavioral disturbance (H)    Patient's family does not feel that they can take care of him at home anymore.  - 12/21: looking for TCU.  Ordered repeat Covid19 testing.     Diet: Combination Diet Regular Diet Adult    DVT Prophylaxis: Enoxaparin (Lovenox) SQ  Spears Catheter: not present  Code Status: Full Code         Disposition Plan   Expected discharge: Tomorrow, recommended to transitional care unit once safe disposition plan/ TCU bed available.  Entered: Praful Allen MD 12/21/2020, 2:07 PM     The patient's care was discussed with the Patient.    Praful Allen MD  Hospitalist Service  [facility]  Contact information available via [facility]  Jane[place]/Directory  ______________________________________________________________________    Interval History   Pt reports feeling about the same.  No recurrent fall episode.  Permissive HTN due to his falls in light of postural hypotension.    Denies dyspnea, chest pain, headache, fever, chills, nausea, vomiting, abdominal pain, dysuria.    Data reviewed today: I reviewed all medications, new labs and imaging results over the last 24 hours. I personally reviewed no images or EKG's today.    Physical Exam   Vital Signs: Temp: 98.1  F (36.7  C) Temp src: Tympanic BP: 120/70 Pulse: 79   Resp: 18 SpO2: 96 % O2 Device: None (Room air)    Weight: 156 lbs 4.9 oz  General Appearance: In NAD  Respiratory: CTA bilaterally  Cardiovascular: RRR  GI: soft NT ND  Skin: intact  Other: Neuro: non-focal except for mild weakness in the left elbow extension    Data   Recent Labs   Lab 12/21/20  0527 12/20/20  0644 12/19/20  0617 12/18/20  1512   WBC 8.9 7.6 4.9 5.2   HGB 13.9 14.1 13.4 13.6   MCV 90 90 92 93    250 182 179   INR  --   --   --  1.16*    138 140 141   POTASSIUM 4.1 4.2 3.9 4.1   CHLORIDE 106 108 108 107   CO2 27 23 27 26   BUN 22 18 16 16   CR 0.95 0.93 0.98 1.10   ANIONGAP 6 7 5 8   ROSALINDA 9.0 9.0 8.3* 8.3*   * 122* 104* 107*   ALBUMIN 3.2*  --   --  3.1*   PROTTOTAL  --   --   --  6.8   BILITOTAL  --   --   --  0.6   ALKPHOS  --   --   --  73   ALT  --   --   --  17   AST  --   --   --  14   TROPI  --   --   --  <0.015     No results found for this or any previous visit (from the past 24 hour(s)).  Medications       acetaminophen  650 mg Oral 4x Daily     diclofenac  4 g Topical TID     donepezil  10 mg Oral At Bedtime     enoxaparin ANTICOAGULANT  40 mg Subcutaneous Q24H     [START ON 12/22/2020] fludrocortisone  0.1 mg Oral Every Other Day     gabapentin  100 mg Oral TID     midodrine  10 mg Oral TID w/meals     pantoprazole  40 mg Oral BID AC     potassium chloride ER  20 mEq Oral Daily     sodium  chloride (PF)  3 mL Intracatheter Q8H

## 2020-12-21 NOTE — PLAN OF CARE
Prior to Admission Medication Reconciliation:     Medications added:   [x] None  [] As listed below:    Medications deleted:   [x] None  [] As listed below:    Changes made to existing medications:   [x] None  [] As listed below:    Last times/dates taken verified with patient:  [] Yes- completed myself  [x] Nurse completed no changes made  [] Unable to review with patient:  [] Nurse completed/changes made:     Allergy modifications:    [x]Did not review  []Patient verified NKA  []Patient verified current existing allergies: no changes made  []New allergies: listed below    Medication reconciliation sources:   []Patient  []Patient family member/emergency contact: **  [x][facility] Report Review  [x]Epic Chart Review  [x]Care Everywhere review  []Pharmacy med list: **  []Pharmacy phone call  [x]Outside meds dispense report: see below  []Nursing home or Assisted Living MAR:  []Other: **    Pharmacy desired at discharge: [facility]    Is patient on coumadin?  [x]No    Requests for consultation by provider or pharmacist:   [] Patient understands why all of their meds were prescribed and how to take them. No questions.   [x] Fill dates coincide with compliancy for all maintenance meds.   [] Fill dates do not coincide with compliancy with maintenance meds. See notes in PTA medlist about how patient is taking.   [] Patient has questions about the following:      Comments:reviewed outside dispense report and chart. Nothing that I can find that needs further review. Sammy patient. Nurse completed, I double checked for errors. PTA meds filled in completely. No changes made. Will not be reviewing with patient.     Siria Zacarias on 12/21/2020 at 11:12 AM       Discrepancies: [x] No []Not Applicable []Yes: listed below    Time spent on medication reconciliation:   [x]5-20 mins  []20-40 mins  []> 40 mins    Issues completing PTA medication reconciliation:  [] On hold for a long time  [] Waited for a call back  [] Fax didn't come  through  [] Fax took a long time  [] Other:    Notifying appropriate party of changes/additions/discrepancies:  [x]No pertinent changes made, notification not necessary.   [] Notified attending provider via text page  [] Notified attending provider in person  [] Notified pharmacy  [] Notified nurse  [] Attending provider not available, left detailed notes  [] Changes/additions made don't need provider notification because provider has not seen patient or input any orders  [] Changes/additions made don't need provider notification because changes made are to medications not ordered  Medications Prior to Admission   Medication Sig Dispense Refill Last Dose     atorvastatin (LIPITOR) 20 MG tablet TAKE 1 TABLET BY MOUTH EVERY EVENING - GENERIC FOR LIPITOR 90 tablet 3 12/17/2020 at 2000     Cholecalciferol (VITAMIN D-3) 25 MCG (1000 UT) CAPS Take 50 mcg by mouth daily    12/18/2020 at Unknown time     Coenzyme Q10 (CO Q 10 PO) Take 100 mg by mouth every morning    12/18/2020 at 0900     cyclobenzaprine (FLEXERIL) 10 MG tablet TAKE 1 TABLET (10 MG) BY MOUTH 3 TIMES DAILY AS NEEDED FOR MUSCLE SPASMS 30 tablet 0 12/18/2020 at 0800     donepezil (ARICEPT) 10 MG tablet TAKE 1 TABLET BY MOUTH NIGHTLY AT BEDTIME 90 tablet 3 12/17/2020 at 2000     fish oil-omega-3 fatty acids 1000 MG capsule Take 1 g by mouth daily        fludrocortisone (FLORINEF) 0.1 MG tablet Take 1 tablet (0.1 mg) by mouth every morning (before breakfast) 30 tablet 11 12/18/2020 at 0900     lidocaine (LIDODERM) 5 % patch Place 1 patch onto the skin every 24 hours 30 patch 3 12/18/2020 at 0900     magnesium 500 MG TABS Take 500 mg by mouth daily    12/18/2020 at 0800     melatonin 5 MG tablet Take 5 mg by mouth nightly as needed for sleep   12/17/2020 at 2000     midodrine (PROAMATINE) 5 MG tablet TAKE 2 TABLETS (10MG) BY MOUTH THREE TIMES DAILY 540 tablet 3 12/18/2020 at 0800     potassium chloride ER (KLOR-CON M) 20 MEQ CR tablet TAKE 1 TABLET BY MOUTH  EVERY DAY 30 tablet 1 12/18/2020 at 0900     RABEprazole (ACIPHEX) 20 MG EC tablet Take 20 mg by mouth 2 times daily   12/18/2020 at 0900     sodium chloride 1 GM tablet TAKE 1 TABLET BY MOUTH TWICE A  tablet 3 12/18/2020 at 0900     Turmeric 500 MG TABS Take 1,000 mg by mouth daily    12/18/2020 at 0900         Medication Dispense History (from 12/22/2019 to 12/21/2020)  Expand All  Collapse All  Atorvastatin Calcium   Dispensed Days Supply Quantity Provider Pharmacy   ATORVASTATIN 20MG TABLET 12/14/2020 30 30 Units TIMBO MARSHALL #61 - Fa...   ATORVASTATIN 20MG TABLET 09/08/2020 90 90 Units TIMBO MARSHALL Pharmacy...   ATORVASTATIN 20MG TABLET 06/22/2020 90 90 Units TIM[facility] MARSHALL Pharmacy...   ATORVASTATIN 20MG TABLET 04/23/2020 90 90 [facility] #61 - Fa...   ATORVASTATIN 20MG TABLET 01/30/2020 90 90 Units TIMBO MARSHALL Pharmacy...         Cyclobenzaprine HCl   Dispensed Days Supply Quantity Provider Pharmacy   CYCLOBENZAPRINE 10MG TABLET 11/03/2020 10 30 Units MALENA CRISTINA Pharmacy...   CYCLOBENZAPRINE 10MG TABLET 10/29/2020 10 30 Units MALENA CRISTINA Pharmacy...   CYCLOBENZAPRINE 10MG TABLET 10/27/2020 10 30 Units MALENA CRISTINA Pharmacy...   CYCLOBENZAPRINE 10MG TABLET 10/20/2020 10 30 Units MALNEA CRISTINA #61 - Fa...   CYCLOBENZAPRINE 10MG TABLET 09/29/2020 10 30 Units MALENA CRISTINA Pharmacy...   CYCLOBENZAPR 10MG   TAB 05/25/2020 7 20 Units [id number] ...         Donepezil HCl   Dispensed Days Supply Quantity Provider Pharmacy   DONEPEZIL 10MG TABLET 09/24/2020 90 90 Units MALENA CRISTINA Pharmacy...   DONEPEZIL 10MG TABLET 09/03/2020 90 90 Units MALENA CRISTINA Pharmacy...   DONEPEZIL 10MG TABLET 08/02/2020 30 [facility]   DONEPEZIL 10MG  TABLET 06/03/2020 30 30 JONNIEMALENA Garnica #61 - Fa...   DONEPEZIL 10MG TABLET 05/06/2020 30 30 Units SOOMALENA FLORES #61 - Fa...   DONEPEZIL 10MG TABLET 05/05/2020 30 30 Units JONNIEMALENA Garnica #61 - Fa...   DONEPEZIL 10MG TABLET 03/24/2020 30 30 Units SOOMALENA FLORES #61 - Fa...   DONEPEZIL 10MG TABLET 02/24/2020 30 30 MALENA CRISTINA KEYSHAWN [facility] #61 - Fa...   DONEPEZIL 10MG TABLET 01/31/2020 30 30 Units MALENA CRISTINA Pharmacy...         Fludrocortisone Acetate   Dispensed Days Supply Quantity Provider Pharmacy   FLUDROCORTISONE 0.1MG TABLET 12/01/2020 30 30 Units VERSICH,BUZZ A. [facility]   FLUDROCORTISONE 0.1MG TABLET 11/01/2020 30 30 Units BUZZ EVANS #61 - Fa...   FLUDROCORTISONE 0.1MG TABLET 10/01/2020 30 30 Units VERSICH,BUZZ A. [facility]   FLUDROCORTISONE 0.1MG TABLET 09/01/2020 30 30 Units BUZZ EVANS #61 - Fa...   FLUDROCORTISONE 0.1MG TABLET 08/04/2020 30 30 Units VERSICH,BUZZ A. [facility]   FLUDROCORTISONE 0.1MG TABLET 07/13/2020 30 30 Units VERSICH,BUZZ A. [facility]   FLUDROCORTISONE 0.1MG TABLET 06/14/2020 30 30 Units VERSICH,BUZZ A. [facility]   FLUDROCORTISONE 0.1MG TABLET 05/10/2020 30 30 Units VERSICH,BUZZ A. [facility]   FLUDROCORTISONE 0.1MG TABLET 04/16/2020 30 30 Units VERSICH,BUZZ A. [facility]   FLUDROCORTISONE 0.1MG TABLET 04/14/2020 30 30 Units DAISY VALENCIA Pharmacy...         Lidocaine   Dispensed Days Supply Quantity Provider Pharmacy   LIDOCAINE 5% TRANSDERMAL PATCH 09/10/2020 30 30 Units MALENA CRISTINA Pharmacy...   LIDOCAINE 5% TRANSDERMAL PATCH 05/20/2020 30 30 Units MALENA CRISTINA Pharmacy...         Midodrine HCl   Dispensed Days Supply Quantity Provider Pharmacy   MIDODRINE 5MG TABLET  08/09/2020 90 540 Units KIRAN QUEVEDO Pharmacy...         Potassium Chloride Shabnam ER   Dispensed Days Supply Quantity Provider Pharmacy   POTASSIUM CL 20MEQ ER TABLET 12/01/2020 30 30 Units MALENA CRISTINA #61 - Fa...   POTASSIUM CL 20MEQ ER TABLET 11/01/2020 30 30 Units MALENA CRISTINA #61 - Fa...   POTASSIUM CL 20MEQ ER TABLET 10/01/2020 30 30 Units MALENA CRISTINA #61 - Fa...   POTASSIUM CL 20MEQ ER TABLET 09/01/2020 30 30 Units MALENA CRISTINA #61 - Fa...   POTASSIUM CL 20MEQ ER TABLET 08/03/2020 30 30 Units MALENA CRISTINA #61 - Fa...   POTASSIUM CL 20MEQ ER TABLET 07/13/2020 30 30 Units MALENA CRISTINA #61 - Fa...   POTASSIUM CL 20MEQ ER TABLET 06/16/2020 30 30 Units MALENA CRISTINA #61 - Fa...   POTASSIUM CL 20MEQ ER TABLET 06/03/2020 30 30 MALENA CRISTINA #61 - Fa...   POTASSIUM CL 20MEQ ER TABLET 05/10/2020 30 30 MALENA CRISTINA Pharmacy...   POTASSIUM CL 20MEQ ER TABLET 05/05/2020 30 30 Units MALENA CRISTINA #61 - Fa...   POTASSIUM CL 20MEQ ER TABLET 04/05/2020 30 30 Units MALENA CRISTINA #61 - Fa...   POTASSIUM CL 20MEQ ER TABLET 03/14/2020 30 30 Units MALENA CRISTINA #61 - Fa...   POTASSIUM CL 20MEQ ER TABLET 02/15/2020 30 30 Units MALENA CRISTINA #61 - Fa...         RABEprazole Sodium   Dispensed Days Supply Quantity Provider Pharmacy   RABEPRAZOLE 20MG DR TABLET 12/13/2020 30 60 Units LAURO,MAX [facility]   RABEPRAZOLE 20MG DR TABLET 11/08/2020 30 60 Units LAURO,MAX [facility]   RABEPRAZOLE 20MG DR TABLET 10/01/2020 30 60 Units LAURO,MAX [facility]   RABEPRAZOLE 20MG DR TABLET 09/03/2020 30 60 Units LAURO,MAX [facility]   RABEPRAZOLE 20MG DR TABLET 08/02/2020 30 60 Units [facility]  [place] [facility]...   RABEPRAZOLE 20MG DR TABLET 06/03/2020 30 [facility]   RABEPRAZOLE 20MG DR TABLET 05/05/2020 30 60 Units [facility] #61 - Fa...   RABEPRAZOLE 20MG DR TABLET 04/05/2020 30 60 Units [facility] #61 - Fa...   RABEPRAZOLE 20MG DR TABLET 03/04/2020 30 60 Units [facility] #61 - Fa...   RABEPRAZOLE 20MG DR TABLET 02/18/2020 30 60 Units LAURO,MAX [facility]   RABEPRAZOLE 20MG DR TABLET 02/05/2020 30 60 Units LAURO,MAX [facility]         Sodium Chloride   Dispensed Days Supply Quantity Provider Pharmacy   SODIUM CHLORIDE 1G TABLET 11/17/2020 90 180 Units MALENA CRISTINA Pharmacy...   SODIUM CHLORIDE 1G TABLET 11/08/2020 90 180 Units MALENA CRISTINA Pharmacy...   SODIUM CHLORIDE 1G TABLET 07/27/2020 90 [facility]   SODIUM CHLORIDE 1G TABLET 04/18/2020 90 180 [facility]   SODIUM CHLORIDE 1G TABLET 01/17/2020 90 180 [facility]   SODIUM CHLORIDE 1 GM TABLET 01/16/2020 90 180 Units MALENA CRISTINA [facility]...         Sucralfate   Dispensed Days Supply Quantity Provider Pharmacy   CARAFATE 1G/10ML SUSP 02/06/2020 30 600 mL LAUROABDULLAHI [facility]...   CARAFATE 1G/10ML SUSP 02/05/2020 30 600 [facility]

## 2020-12-21 NOTE — PLAN OF CARE
Pt is alert and oriented though pleasantly forgetful and can be impulsive at times. VSS to baseline/afebrile and denying pain. Lungs are CTA, BS are hypoactive. Up with assist of one using gait belt and walker. BP's elevated at start of shift, trending down. HR- irreg 60's. Alarms are audible and active.     Face to face report given with opportunity to observe patient.    Report given to Re Ac RN   12/21/2020  7:41 AM

## 2020-12-21 NOTE — PROGRESS NOTES
Inpatient Occupational Therapy Evaluation    Name: Jf Ortiz MRN# [id number]   Age: 87 year old YOB: 1933     Date of Consultation: 2020  Consultation is requested by: Dr. Turner  Specific Consultation Request: assess needs  Primary care provider: FRANCES Ray    General Information:   Onset Date: 2020    History of Current Problem/Admission: Compression fracture of L1 lumbar vertebra, closed, initial encounter (H) [S32.010A]  Compression fracture of thoracic vertebra, initial encounter, unspecified thoracic vertebral level (H) [S22.000A]    Prior Level of Function: Pt reports he was mainly independent in ADLs and uses a 4WW for functional mobility. His significant other would assist with ADLs if needed.   Ambulation: 1 - Assistive Equipment   Transferrin - Assistive Equipment   Toiletin - Independent    Bathin - Assistive Equipment   Dressin - Independent   Eatin - Independent   Communication: 0 - Understands/communicates without difficulty  Swallowin - swallows foods/liquids without difficulty  Cognition: 1 - attention or memory deficits    Fall history within the last 6 months: Yes    Current Living Situation: Pt lives in a house with his significant other. ~2 steps to enter home, 0 steps inside home. Bathroom has a regular toilet and walk in shower with grab bars and a seat.     Current Equipment Used at Home: 4WW, grab bars in tub/shower, shower seat     Patient & Family Goals: Pt stated he wants to go home. When asked about short term rehab, pt then stated that someone was looking into it. 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 Provider to review and confirm.     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     Seizure disorder (H)      Stented coronary artery        Past Surgical History:  Past Surgical History:   Procedure Laterality Date     ------------OTHER-------------  1955    ulnar and radial fx - repair ulnar and radial fx x4     ------------OTHER-------------  6/14/2011    cataract extraction     BIOPSY  08/2015    skin biopsy     BLEPHAROPLASTY BILATERAL  5/6/2014    Procedure: BLEPHAROPLASTY BILATERAL;  Surgeon: Andrew Queen MD;  Location: [facility]     BYPASS GRAFT ARTERY CORONARY  11/2006    coronary artery disease x 5, [facility]     cataract extraction and lens implantation  2011    cataracts     cataract extraction and lens implantation      cataracts     COLONOSCOPY  2012     colonoscopy with polypectomy  3/13/2009    history of polyps - repeat 3 yrs     colonoscopy with polypectomy  2006     colonoscopy with polypectomy  2005     COMBINED COLONOSCOPY WITH ARGON PLASMA COAGULATOR (APC) N/A 10/31/2014    Procedure: COMBINED COLONOSCOPY WITH ARGON PLASMA COAGULATOR (APC);  Surgeon: Bassam Aguilar MD;  Location: [facility]     COMBINED COLONOSCOPY WITH ARGON PLASMA COAGULATOR (APC) N/A 11/13/2015    Procedure: COMBINED COLONOSCOPY WITH ARGON PLASMA COAGULATOR (APC);  Surgeon: Bassam Aguilar MD;  Location: [facility]     ENDOSCOPY UPPER, COLONOSCOPY, COMBINED N/A 10/31/2014    Procedure: COMBINED ENDOSCOPY UPPER, COLONOSCOPY;  Surgeon: Bsasam Aguilar MD;  Location: [facility]     ENDOSCOPY UPPER, COLONOSCOPY, COMBINED N/A 11/13/2015    Procedure: COMBINED ENDOSCOPY UPPER, COLONOSCOPY;  Surgeon: Bassam Aguilar MD;  Location: [facility]     ESOPHAGOSCOPY, GASTROSCOPY, DUODENOSCOPY (EGD), COMBINED  1/22/2014    Procedure: COMBINED  ESOPHAGOSCOPY, GASTROSCOPY, DUODENOSCOPY (EGD);  UPPER ENDOSCOPY(JAYDEN) W/ BIOPSIES;  Surgeon: Patricia Pena MD;  Location: [facility]     HERNIORRHAPHY INGUINAL Right 2/14/2018    Procedure: HERNIORRHAPHY INGUINAL;  OPEN RIGHT INGUINAL HERNIA REPAIR with Mesh;  Surgeon: Virgilio Zaragoza DO;  Location: [facility]     INSERT PORT VASCULAR ACCESS Right 7/12/2019    Procedure: PORT PLACEMENT;  Surgeon: Lloyd Ram MD;  Location: [facility]     LARYNGOSCOPY WITH MICROSCOPE  1/22/2014    Procedure: LARYNGOSCOPY WITH MICROSCOPE;;  Surgeon: Chayo Duke MD;  Location: [facility]     pacemaker placement  2000    heart block     pacemaker placement  2011    dual-chamber     REMOVE TUBE, MYRINGOTOMY, COMBINED  1/22/2014    Procedure: COMBINED REMOVE TUBE, MYRINGOTOMY;  MICRODIRECT LARYNGOSCOPY WITH BIOPSY AND FROZEN SECTIONS removal of right ear tube and myringoplasty;  Surgeon: Chayo Duke MD;  Location: [facility]     REPLACE PACEMAKER GENERATOR N/A 8/8/2018    Procedure: REPLACE PACEMAKER GENERATOR;  Pacemaker generator change;  Surgeon: Alma Kaplan MD;  Location: [facility]     stent placement to LAD  2008     ventilation tube  5/24/2012    right in office       Medications:   Current Facility-Administered Medications   Medication     acetaminophen (TYLENOL) tablet 650 mg     calcium carbonate (TUMS) chewable tablet 1,000 mg     diclofenac (VOLTAREN) 1 % topical gel 4 g     donepezil (ARICEPT) tablet 10 mg     enoxaparin ANTICOAGULANT (LOVENOX) injection 40 mg     [START ON 12/22/2020] fludrocortisone (FLORINEF) tablet 0.1 mg     gabapentin (NEURONTIN) capsule 100 mg     lidocaine (LMX4) kit     lidocaine 1 % 0.1-1 mL     magnesium hydroxide (MILK OF MAGNESIA) suspension 30 mL     melatonin tablet 5 mg     midodrine (PROAMATINE) tablet 10 mg     naloxone (NARCAN) injection 0.2 mg    Or     naloxone (NARCAN) injection 0.4 mg    Or     naloxone (NARCAN) injection 0.2 mg    Or     naloxone (NARCAN) injection 0.4 mg      ondansetron (ZOFRAN-ODT) ODT tab 4 mg    Or     ondansetron (ZOFRAN) injection 4 mg     pantoprazole (PROTONIX) EC tablet 40 mg     potassium chloride ER (KLOR-CON M) CR tablet 20 mEq     senna-docusate (SENOKOT-S/PERICOLACE) 8.6-50 MG per tablet 1 tablet    Or     senna-docusate (SENOKOT-S/PERICOLACE) 8.6-50 MG per tablet 2 tablet     sodium chloride (PF) 0.9% PF flush 3 mL     sodium chloride (PF) 0.9% PF flush 3 mL     traMADol (ULTRAM) half-tab 25 mg       Weight Bearing Status:      Cognitive Status Examination:  Orientation: oriented to person, place, and time  Level of Consciousness: alert  Follows Commands and Answers Questions: 75% of the time  Personal Safety and Judgement: Impaired, At Risk Behaviors Demonstrated and Impulsive  Memory: Impaired  Comments: Pt had some difficulties following commands. He was slightly impulsive during functional mobility. Pt appeared to not (at first) recall short term rehab being discussed as discharge plans.     Pain:   Currently in pain? No  Pain Location?   Pain Ratin (No pain)    Occupational Therapy Evaluation:   Integumentary/Edema: WNL  Range of Motion: BUE's WFL   Strength: Generalized weakness noted  Muscle Tone Assessment: no issues noted  Coordination: fair    Mobility:   Transfer Skills: Deidra x2 sit<>stand from chair and verbal cues for safety  Bed to Chair/Chair to Bed: N/A in chair upon arrival    Balance: Impaired - pt ambulated around room using FWW with Deidra x2. He had one major LOB needing ModA to re-correct. Pt needed cues to keep the walker on the ground as he would pick it up and ambulate.     ADLs:   Lower Body Dressing: MaxA to doff/don pull ups, pt was able to doff/don socks in sitting with SBA  Grooming: Set up in sitting    Pt denied any symptoms during ADLs.     IADLs:   Previous Responsibilities of the Patient: Pt's SO completes IADLs     Activities of Daily Living Analysis:   Impairments Contributing to Impaired Activities of Daily  Living: Balance impaired , Cognition impaired , coordination impaired , strength decreased and activity tolerance decreased    Occupational Therapy Interventions: ADL Retraining , balance retraining , cognition , strengthening , progressive activity/exercise and risk factor education     Clinical Impressions:  Criteria for Skilled Therapeutic Intervention Met: Yes, treatment indicated  OT Diagnosis: Impaired ADLs  Influenced by the following impairments: Impaired balance/high fall risk, impaired coordination, impaired cognition, generalized weakness, decreased activity tolerance  Functional limitations due to impairment: Dressing, grooming, bathing, toileting, functional mobility  Clinical presentation: Stable/Uncomplicated  Clinical presentation rationale: Clinical judgement  Clinical Decision making (complexity): Low Complexity  Frequency: 5 times/week  Predicted Duration of Therapy Intervention (days/wks): 5 days    Anticipated Discharge Disposition: Transitional Care Facility   Anticipated Equipment Needs at Discharge: TBD  Risks and Benefits of therapy have been explained: Yes  Patient, Family & other staff in agreement with plan of care: Yes  Clinical Impression Comments: Pt presents with deficits with ADLs due to impaired balance (high fall risk), impaired coordination, impaired cognition, generalized weakness, and decreased activity tolerance. Pt would benefit from short term rehab to address his deficits and improve his safety and independence in ADLs. Plan to treat pt during his acute care stay.     Total Eval Time: 15

## 2020-12-21 NOTE — PLAN OF CARE
Pt is alert and oriented. Can be forgetful at times. VSS afebrile, denies pain. BS hypoactive. SBA with gait belt and walker. Alarms active, call light in reach.    Face to face report given with opportunity to observe patient.    Report given to Deepika RN and CATE Harmon RN   12/21/2020  3:36 PM

## 2020-12-21 NOTE — PROGRESS NOTES
12/21/20 1200   Signing Clinician's Name / Credentials   Signing clinician's name / credentials Yelena Petit, OTR/L   Quick Adds   Rehab Discipline OT   Quick Adds Therapeutic Activity   Therapeutic Activity   Minutes of Treatment 8 minutes   Symptoms Noted During/After Treatment Fatigue   Treatment Detail Pt ambulated to the door and back using FWW with Deidra x2 and verbal cues for safety and to keep the walker on the ground (would ). Pt returned to the chair and sat with Deidra x1-2. Pt needed verbal cues to safely sit down. Pt then brushed his teeth in sitting with set up. Pt left with clip alert attached and call light within reach.   OT Discharge Planning    OT Discharge Recommendation (DC Rec) Transitional Care Facility   OT Rationale for DC Rec Pt presents with deficits with ADLs due to impaired balance (high fall risk), impaired coordination, impaired cognition, generalized weakness, and decreased activity tolerance. Pt would benefit from short term rehab to address his deficits and improve his safety and independence in ADLs. Plan to treat pt during his acute care stay.   OT Brief overview of current status  Pt transferred with Deidra x2 sit<>stand from chair and verbal cues for safety. Pt ambulated around room using FWW with Deidra x2. He had one major LOB needing ModA to re-correct. Pt needed cues to keep the walker on the ground, as he would pick it up and ambulate. Pt needed MaxA to doff/don pull-ups, but was able to doff/don socks in sitting with SBA. Pt brushed his teeth with set up in sitting   Additional Documentation   OT Plan Progress functional capacity, as able.   Total Session Time   Total Session Time (minutes) 8 minutes

## 2020-12-22 VITALS
OXYGEN SATURATION: 98 % | BODY MASS INDEX: 23.15 KG/M2 | HEART RATE: 74 BPM | DIASTOLIC BLOOD PRESSURE: 66 MMHG | WEIGHT: 156.31 LBS | HEIGHT: 69 IN | RESPIRATION RATE: 18 BRPM | SYSTOLIC BLOOD PRESSURE: 145 MMHG | TEMPERATURE: 96.9 F

## 2020-12-22 LAB
ALBUMIN SERPL-MCNC: 3 G/DL (ref 3.4–5)
ANION GAP SERPL CALCULATED.3IONS-SCNC: 7 MMOL/L (ref 3–14)
BUN SERPL-MCNC: 28 MG/DL (ref 7–30)
CALCIUM SERPL-MCNC: 8.6 MG/DL (ref 8.5–10.1)
CHLORIDE SERPL-SCNC: 106 MMOL/L (ref 94–109)
CO2 SERPL-SCNC: 26 MMOL/L (ref 20–32)
CREAT SERPL-MCNC: 1 MG/DL (ref 0.66–1.25)
ERYTHROCYTE [DISTWIDTH] IN BLOOD BY AUTOMATED COUNT: 12.1 % (ref 10–15)
GFR SERPL CREATININE-BSD FRML MDRD: 67 ML/MIN/{1.73_M2}
GLUCOSE SERPL-MCNC: 117 MG/DL (ref 70–99)
HCT VFR BLD AUTO: 39.8 % (ref 40–53)
HGB BLD-MCNC: 13.4 G/DL (ref 13.3–17.7)
MAGNESIUM SERPL-MCNC: 2.5 MG/DL (ref 1.6–2.3)
MCH RBC QN AUTO: 30.5 PG (ref 26.5–33)
MCHC RBC AUTO-ENTMCNC: 33.7 G/DL (ref 31.5–36.5)
MCV RBC AUTO: 91 FL (ref 78–100)
PHOSPHATE SERPL-MCNC: 4.6 MG/DL (ref 2.5–4.5)
PLATELET # BLD AUTO: 251 10E9/L (ref 150–450)
POTASSIUM SERPL-SCNC: 4 MMOL/L (ref 3.4–5.3)
RBC # BLD AUTO: 4.4 10E12/L (ref 4.4–5.9)
SODIUM SERPL-SCNC: 139 MMOL/L (ref 133–144)
WBC # BLD AUTO: 6.7 10E9/L (ref 4–11)

## 2020-12-22 PROCEDURE — 83735 ASSAY OF MAGNESIUM: CPT | Performed by: INTERNAL MEDICINE

## 2020-12-22 PROCEDURE — 36415 COLL VENOUS BLD VENIPUNCTURE: CPT | Performed by: INTERNAL MEDICINE

## 2020-12-22 PROCEDURE — 80069 RENAL FUNCTION PANEL: CPT | Performed by: INTERNAL MEDICINE

## 2020-12-22 PROCEDURE — 250N000013 HC RX MED GY IP 250 OP 250 PS 637: Performed by: INTERNAL MEDICINE

## 2020-12-22 PROCEDURE — 99239 HOSP IP/OBS DSCHRG MGMT >30: CPT | Performed by: INTERNAL MEDICINE

## 2020-12-22 PROCEDURE — 250N000011 HC RX IP 250 OP 636: Performed by: INTERNAL MEDICINE

## 2020-12-22 PROCEDURE — 85027 COMPLETE CBC AUTOMATED: CPT | Performed by: INTERNAL MEDICINE

## 2020-12-22 RX ORDER — ACETAMINOPHEN 325 MG/1
650 TABLET ORAL 4 TIMES DAILY
Qty: 1 BOTTLE | Refills: 3 | Status: SHIPPED | OUTPATIENT
Start: 2020-12-22 | End: 2021-06-07

## 2020-12-22 RX ORDER — HYDRALAZINE HYDROCHLORIDE 10 MG/1
10 TABLET, FILM COATED ORAL EVERY 6 HOURS PRN
Status: DISCONTINUED | OUTPATIENT
Start: 2020-12-22 | End: 2020-12-22

## 2020-12-22 RX ADMIN — POTASSIUM CHLORIDE 20 MEQ: 1500 TABLET, EXTENDED RELEASE ORAL at 09:59

## 2020-12-22 RX ADMIN — SENNOSIDES AND DOCUSATE SODIUM 2 TABLET: 8.6; 5 TABLET ORAL at 09:58

## 2020-12-22 RX ADMIN — FLUDROCORTISONE ACETATE 0.1 MG: 0.1 TABLET ORAL at 09:54

## 2020-12-22 RX ADMIN — GABAPENTIN 100 MG: 100 CAPSULE ORAL at 09:54

## 2020-12-22 RX ADMIN — ENOXAPARIN SODIUM 40 MG: 40 INJECTION SUBCUTANEOUS at 09:55

## 2020-12-22 RX ADMIN — DICLOFENAC SODIUM 4 G: 10 GEL TOPICAL at 09:59

## 2020-12-22 RX ADMIN — ACETAMINOPHEN 650 MG: 325 TABLET, FILM COATED ORAL at 09:54

## 2020-12-22 RX ADMIN — MIDODRINE HYDROCHLORIDE 10 MG: 5 TABLET ORAL at 09:54

## 2020-12-22 RX ADMIN — PANTOPRAZOLE SODIUM 40 MG: 40 TABLET, DELAYED RELEASE ORAL at 06:18

## 2020-12-22 RX ADMIN — HYDRALAZINE HYDROCHLORIDE 10 MG: 10 TABLET, FILM COATED ORAL at 01:40

## 2020-12-22 ASSESSMENT — ACTIVITIES OF DAILY LIVING (ADL)
ADLS_ACUITY_SCORE: 18
ADLS_ACUITY_SCORE: 18
ADLS_ACUITY_SCORE: 19

## 2020-12-22 NOTE — DISCHARGE SUMMARY
[facility]  Hospitalist Discharge Summary      Date of Admission:  12/18/2020  Date of Discharge:  12/22/2020  Discharging Provider: Praful Allen MD      Discharge Diagnoses   Principal Problem:    Falls frequently  Active Problems:    Parkinson disease (H)    Lewy body dementia without behavioral disturbance (H)    Hypertension with target blood pressure goal under 150/90    Longstanding persistent atrial fibrillation (H)    Closed wedge compression fracture of lumbar vertebra with routine healing      Follow-ups Needed After Discharge   Follow-up Appointments     Follow Up and recommended labs and tests      Follow up with [facility] physician.  The following labs/tests are   recommended: CBC, BMP.  Follow up with primary care provider in 2 weeks weeks.  The following   labs/tests are recommended: CBC, BMP.           Unresulted Labs Ordered in the Past 30 Days of this Admission     No orders found from 11/18/2020 to 12/19/2020.      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 He was diagnosed with dementia in 2015 with recent re testing showing relative stability. He has been developing left hand tremor, making his left-handed eating more problematic. He has been falling more frequently with resultant spinal fractures and with an impact to his partner's quality of life because she is worried about his falls, she can't provide the required help for him and is generally more anxious and exhausted because of poor sleeping.    He was complaining about back pain yesterday and was diagnosed with the L1,2 fractures and sent home. He complained about worsening pain  today.   ER Course: vitals were significant only for HTN (183/89); he was in no distress at rest, but couldn't safely move himself. Basic lab diagnostics were unremarkable. A basic trauma survey showed the additional thoracic spinal fractures        Brief Hospital Course:    Falls frequently, status post to multiple vertebral fractures  Patient was admitted to the hospital and pain control was achieved with the tramadol and Tylenol as well as Voltaren and lidocaine patch.  Patient's pain improved and was able to participate in physical therapy session.  Patient's frequent falls were thought to have stemmed from postural hypotension which probably had come from Lewy body dementia as well as multiple small infarction coming from atrial fibrillation.  Patient was evaluated by physical therapy and Occupational Therapy and skilled nursing facility was thought to be most appropriate.  Patient is now being discharged to long-term care facility.    Postural hypotension, Hypertension with target blood pressure goal under 150/90  Patient was noted to have orthostatic hypotension as well as hypertension.  Patient's hypertension was not aggressively treated as patient was thought to need hypertension to maintained perfusion to the brain and in light of his postural hypotension.  Patient remained asymptomatic despite hypertension with systolic blood pressure going up to 190.  Rather than using antihypertensives, midodrine or Florinef were held when blood pressure was too high.      Longstanding persistent atrial fibrillation (H)  Patient's heart rate remained pretty well controlled without any AV jeannine blockers.  Patient also is not on anticoagulation due to his frequent falls.      Parkinson disease (H)    Lewy body dementia without behavioral disturbance (H)  Patient family did not feel they can take care of him at home anymore and patient was discharged to long-term care facility.    Consultations This Hospital Stay    PHYSICAL THERAPY ADULT IP CONSULT  OCCUPATIONAL THERAPY ADULT IP CONSULT  SOCIAL WORK IP CONSULT  PHYSICAL THERAPY ADULT IP CONSULT  OCCUPATIONAL THERAPY ADULT IP CONSULT    Code Status   Full Code    Time Spent on this Encounter   I, Praful Allen MD, personally saw the patient today and spent greater than 30 minutes discharging this patient.       Praful Allen MD  [facility]  [address] [place] [address]  [place] [address]  Phone: [phone]  Fax: [phone]  ______________________________________________________________________    Physical Exam   Vital Signs: Temp: 96.9  F (36.1  C) Temp src: Tympanic BP: 145/66 Pulse: 74   Resp: 18 SpO2: 98 % O2 Device: None (Room air)    Weight: 156 lbs 4.9 oz  General Appearance: In NAD  Respiratory: CTA bilaterally  Cardiovascular: irregular  GI: soft NT ND  Skin: intact  Other: Neuro: non-focal except for the left elbow extension weakness (chronic)       Primary Care Physician   FRANCES Ray    Discharge Orders      General info for SNF    Length of Stay Estimate: LongTerm Care  Condition at Discharge: Stable  Level of care:skilled   Rehabilitation Potential: Fair  Admission H&P remains valid and up-to-date: Yes  Recent Chemotherapy: N/A  Use Nursing Home Standing Orders: Yes     Mantoux instructions    Give two-step Mantoux (PPD) Per Facility Policy Yes     Reason for your hospital stay    Recurrent fall, postural hypotension, Lewy body dementia     Activity - Up with nursing assistance     Follow Up and recommended labs and tests    Follow up with long-term physician.  The following labs/tests are recommended: CBC, BMP.  Follow up with primary care provider in 2 weeks weeks.  The following labs/tests are recommended: CBC, BMP.     Additional Discharge Instructions    Pt has postural hypotension and has been falling as a result; please allow hypertension to maintain brain perfusion.  Take caution with giving antihypertensives as it may precipitate in syncope.     Permissive hypertension up to .     Full Code     Physical Therapy Adult Consult    Evaluate and treat as clinically indicated.    Reason:  Recurrent falls, postural hypotension     Occupational Therapy Adult Consult    Evaluate and treat as clinically indicated.    Reason:  Recurrent falls, postural hypotension     Fall precautions     Advance Diet as Tolerated    Follow this diet upon discharge: Orders Placed This Encounter      Combination Diet Regular Diet Adult       Significant Results and Procedures   Most Recent 3 CBC's:  Recent Labs   Lab Test 12/22/20  0520 12/21/20  0527 12/20/20  0644   WBC 6.7 8.9 7.6   HGB 13.4 13.9 14.1   MCV 91 90 90    251 250     Most Recent 3 BMP's:  Recent Labs   Lab Test 12/22/20  0520 12/21/20  0527 12/20/20  0644    139 138   POTASSIUM 4.0 4.1 4.2   CHLORIDE 106 106 108   CO2 26 27 23   BUN 28 22 18   CR 1.00 0.95 0.93   ANIONGAP 7 6 7   ROSALINDA 8.6 9.0 9.0   * 118* 122*   ,   Results for orders placed or performed during the hospital encounter of 12/18/20   XR Chest Port 1 View    Narrative    PROCEDURE:  XR CHEST PORT 1 VW    HISTORY: Patient with weakness and a near syncopal episode.  Rule out  pneumonia. .    COMPARISON:  10/1/2020    FINDINGS:  Right chest port.  The cardiomediastinal contours are stable. There is calcific aortic  atherosclerosis.   No new focal consolidation, effusion or pneumothorax.      Impression    IMPRESSION:  Stable portable chest.      ALE MYERS MD   CT Head w/o Contrast    Narrative    Exam: CT HEAD W/O CONTRAST    Clinical history:87 years Male Patient with complaints of weakness and  near fall. R/o stroke.    Comparisons: 4/13/2020    Technique: Axial CT imaging of the head was performed Without  intervenous contrast.    FINDINGS: There is generalized cerebral and cerebellar volume loss.   Ventricles and sulci are symmetric. The gray-white matter  differentiation throughout the brain is well maintained. There  is  moderate periventricular white matter change of chronic small vessel  ischemic disease. There is no evidence of intracranial mass or  hemorrhage. Visualized portions of the paranasal sinuses and mastoid  air cells are well aerated. There is no evidence of skull fracture.      Impression    IMPRESSION: Negative Head CT    VIMAL PÉREZ MD   Cervical spine CT w/o contrast    Narrative    Exam:CT CERVICAL SPINE W/O CONTRAST    History:87 years Male R/o cervical spine fracture, 87-year-old status  post fall 3 days ago with complaints of upper back pain.  Rule out  fracture    Comparisons:None    Technique: Axial CT imaging of the cervical spine was performed.  Coronal and sagittal reconstructions were obtained.    Findings:Alignment of the cervical spine is normal. There is no  evidence of subluxation or fracture.  There is degenerative disc  disease.      Impression    IMPRESSION: No evidence of traumatic injury of the cervical spine.    VIMAL PÉREZ MD   CT Chest Abdomen Pelvis w/o Contrast    Narrative    CT CHEST ABDOMEN PELVIS W/O CONTRAST    HISTORY: 87 years Male 87-year-old status post fall 3 days ago with  complaints of back pain.  Rule out rib fracture versus pelvic fracture    COMPARISON: CT chest 5/24/2020    TECHNIQUE: Axial CT imaging of the chest abdomen and pelvis was  performed without contrast. Coronal and sagittal reconstructions were  obtained.    FINDINGS:      There is no mediastinal or hilar or axillary lymphadenopathy.    Noncalcified lung nodules are again seen without concerning interval  change. There is interstitial prominence. There is no evidence of  pneumothorax or hemothorax.         There is a compression fracture of mild severity at T8. Age of this is  uncertain but this is not present on the prior study. There is a mild  superior endplate compression fracture of T6 also not seen on the  prior study.    There is a mild superior endplate compression fracture of L1. There  is  a inferior compression fracture of L2. The L1 fracture deformity was  not seen on the prior study.    ABDOMEN:    Kidneys:No renal or ureteral calculi are present. There is  no hydronephrosis or hydroureter.       Abdomen: Evaluation is limited due to lack of intravenous contrast.  Unenhanced images of the liver spleen pancreas and adrenal glands are  unremarkable.  No abnormally distended or thickened loops of bowel are present.         Pelvis:There is no mass or lymphadenopathy. No abnormal fluid  collections are present.                  Impression    IMPRESSION: Mild compression fractures of the thoracic spine and L1 as  described above. These were not seen on the CT of 5/24/2020. It is  uncertain if these are acute subacute or older.    No evidence of significant intra-abdominal trauma    VIMAL PÉREZ MD       Discharge Medications   Current Discharge Medication List      CONTINUE these medications which have NOT CHANGED    Details   atorvastatin (LIPITOR) 20 MG tablet TAKE 1 TABLET BY MOUTH EVERY EVENING - GENERIC FOR LIPITOR  Qty: 90 tablet, Refills: 3    Comments: Needs to have labs  Associated Diagnoses: Hypercholesterolemia      Cholecalciferol (VITAMIN D-3) 25 MCG (1000 UT) CAPS Take 50 mcg by mouth daily       Coenzyme Q10 (CO Q 10 PO) Take 100 mg by mouth every morning       donepezil (ARICEPT) 10 MG tablet TAKE 1 TABLET BY MOUTH NIGHTLY AT BEDTIME  Qty: 90 tablet, Refills: 3    Associated Diagnoses: Lewy body dementia without behavioral disturbance (H)      fish oil-omega-3 fatty acids 1000 MG capsule Take 1 g by mouth daily      fludrocortisone (FLORINEF) 0.1 MG tablet Take 1 tablet (0.1 mg) by mouth every morning (before breakfast)  Qty: 30 tablet, Refills: 11    Associated Diagnoses: Autonomic orthostatic hypotension      lidocaine (LIDODERM) 5 % patch Place 1 patch onto the skin every 24 hours  Qty: 30 patch, Refills: 3    Associated Diagnoses: Chronic low back pain, unspecified back pain  laterality, unspecified whether sciatica present      magnesium 500 MG TABS Take 500 mg by mouth daily       melatonin 5 MG tablet Take 5 mg by mouth nightly as needed for sleep      midodrine (PROAMATINE) 5 MG tablet TAKE 2 TABLETS (10MG) BY MOUTH THREE TIMES DAILY  Qty: 540 tablet, Refills: 3    Associated Diagnoses: Autonomic orthostatic hypotension      potassium chloride ER (KLOR-CON M) 20 MEQ CR tablet TAKE 1 TABLET BY MOUTH EVERY DAY  Qty: 30 tablet, Refills: 1    Comments: Patient enrolled in our Rx Med Sync service to improveadherence. We are requesting a refill authorization inadvance to ensure an active prescription is on file.  Associated Diagnoses: Hypokalemia      RABEprazole (ACIPHEX) 20 MG EC tablet Take 20 mg by mouth 2 times daily      sodium chloride 1 GM tablet TAKE 1 TABLET BY MOUTH TWICE A DAY  Qty: 180 tablet, Refills: 3    Associated Diagnoses: Parkinson's disease (H); Orthostatic hypotension      Turmeric 500 MG TABS Take 1,000 mg by mouth daily          STOP taking these medications       cyclobenzaprine (FLEXERIL) 10 MG tablet Comments:   Reason for Stopping:             Allergies   Allergies   Allergen Reactions     Cats Unknown     Lamotrigine      Skin lesions

## 2020-12-22 NOTE — PLAN OF CARE
Patient discharged at 1111 AM via wheel chair accompanied by staff. Prescriptions - None ordered for discharge. All belongings sent with patient.      Listed belongings gathered and returned to patient. yes    Patient discharged to [facility].   Report called to Nursing Home.    Core Measures and Vaccines  Core Measures applicable during stay: No. If yes, state diagnosis: N/A  Pneumonia and Influenza given prior to discharge, if indicated: N/A    Surgical Patient   Surgical Procedures during stay: N/A  Did patient receive discharge instruction on wound care and recognition of infection symptoms? Yes and N/A    MISC  Follow up appointment made:  No  Home and hospital aquired medications returned to patient: Yes  Patient reports pain was well managed at discharge: Yes

## 2020-12-22 NOTE — PROGRESS NOTES
Name: Jf Ortiz    MRN#: [id number]    Reason for Hospitalization: Compression fracture of L1 lumbar vertebra, closed, initial encounter (H) [S32.010A]  Compression fracture of thoracic vertebra, initial encounter, unspecified thoracic vertebral level (H) [S22.000A]    AJITH: Medium    Discharge Date: December 22, 2020    Medicare IM letter reviewed with Patient et [facility]    Patient / Family response to discharge plan: patient et family were involved in discharge planning et agree w/plan to go to [facility]    Follow-Up Appt:   Future Appointments   Date Time Provider Department Center   12/28/2020  9:30 AM HC BAY 8 [facility]   12/31/2020  9:30 AM HC BAY 3 - FAST TRACK [facility]   1/5/2021 12:00 [facility]   1/5/2021  9:30 AM HC BAY 7 [facility]   1/8/2021  9:30 AM HC BAY 4 - FAST TRACK [facility]   1/12/2021  9:30 AM HC BAY 4 - FAST TRACK [facility]   1/15/2021  9:30 AM HC BAY 4 - FAST TRACK [facility]   1/19/2021  9:30 AM HC BAY 4 - FAST TRACK [facility]   1/22/2021  9:30 AM HC BAY 4 - FAST TRACK [facility]   1/26/2021  9:30 AM HC BAY 4 - FAST TRACK [facility]   1/29/2021  9:30 AM HC BAY 4 - FAST TRACK [facility]   2/2/2021  9:30 AM HC BAY 4 - FAST TRACK [facility]   2/5/2021  9:30 AM HC BAY 4 - FAST TRACK [facility]   2/9/2021  9:30 AM HC BAY 4 - FAST TRACK [facility]   2/12/2021  9:30 AM HC BAY 4 - FAST TRACK [facility]   2/16/2021  9:30 AM HC BAY 4 - FAST TRACK [facility]   2/19/2021  9:30 AM HC BAY 3 - FAST TRACK [facility]   2/23/2021  9:30 AM HC BAY 4 - FAST TRACK [facility]   2/26/2021  9:30 AM HC BAY 3 - FAST TRACK [facility]   3/1/2021  1:30 PM Char Avila, NP [facility]   3/2/2021  9:30 AM HC [address] [facility]   3/5/2021  9:30 AM HC BAY 4 - FAST TRACK [facility]    3/9/2021  9:30 AM HC BAY 3 - FAST TRACK [facility]   3/12/2021  9:30 AM HC BAY 4 - FAST TRACK [facility]   3/16/2021  9:30 AM HC BAY 4 - FAST TRACK [facility]   3/19/2021  9:30 AM HC BAY 4 - FAST TRACK [facility]   3/23/2021  9:30 AM HC BAY 4 - FAST TRACK [facility]   3/26/2021  9:30 AM HC BAY 4 - FAST TRACK [facility]   3/30/2021  9:30 AM HC BAY 4 - FAST TRACK [facility]   4/2/2021  9:30 AM HC BAY 4 - FAST TRACK [facility]   4/6/2021  9:30 AM HC BAY 4 - FAST TRACK [facility]   4/9/2021  9:30 AM HC BAY 4 - FAST TRACK [facility]   4/13/2021  9:30 AM HC BAY 4 - FAST TRACK [facility]   4/16/2021  9:30 AM HC BAY 4 - FAST TRACK [facility]   4/20/2021  9:30 AM HC BAY 4 - FAST TRACK [facility]   4/23/2021  9:30 AM HC BAY 4 - FAST TRACK [facility]   4/27/2021  9:30 AM HC BAY 4 - FAST TRACK [facility]   4/30/2021  9:30 AM HC BAY 4 - FAST TRACK [facility]       Other Providers (Care Coordinator, County Services, PCA services etc): No    Discharge Disposition: [facility] via [facility] [employer] p/u at 1100.    Per [facility] regulation, CTS team completed and submitted PAS-RR to [place] [employer] Direct Connect via the [employer]. CTS team advised SNF and they are aware a PAS-RR has been submitted.     CTS team reviewed with pt or health care agent that they may be contacted for a follow up appointment within 10 days of hospital discharge if SNF stay is <30 days. Contact information for [employer] was also provided.     Pt or health care agent verbalized understanding.     PAS-RR # [id number]        Alecia Sood CM RN

## 2020-12-22 NOTE — PLAN OF CARE
"Reason for hospital stay:  falls frequency  Most recent vitals: /85   Pulse 76   Temp 96.8  F (36  C) (Tympanic)   Resp 20   Ht 1.753 m (5' 9\")   Wt 70.9 kg (156 lb 4.9 oz)   SpO2 97%   BMI 23.08 kg/m      Pt disorientated to time and situation with moments of increase confusion through the night. Pt has not been appropriately with call light use and attempts to get out of bed on his own at times to use bathroom. Urinary frequency noted. Bowels hypoactive. Lungs clear throughout. Maintaining on RA. Apical pulse irregular. Elevated BP, MD notified and orders for PRN hydralazine placed. IV lock. Bed alarms on, room near nurses station, reorientation provided.     Face to face report given with opportunity to observe patient.    Report given to Re Bruce RN   12/22/2020  7:52 AM      "

## 2020-12-22 NOTE — PLAN OF CARE
VSS per baseline and afebrile. No reports of pain or discomfort. Disorientated to time this shift. Remained calm and cooperative. Utilized call light appropriately. Remained in chair for majority of shift. Assist of 1 with walker and belt; no reports of dizziness with ambulation. PRN senna given per wife's request due to no BM in couple days. Alarms active and call light within reach.     Face to face report given with opportunity to observe patient.    Report given to Vinay Garnica RN   12/21/2020  11:21 PM

## 2020-12-22 NOTE — PLAN OF CARE
Occupational Therapy Discharge Summary    Reason for therapy discharge:    Discharged to transitional care facility.    Progress towards therapy goal(s). See goals on Care Plan in [facility] electronic health record for goal details.  Goals not met.  Barriers to achieving goals:   discharge from facility.    Therapy recommendation(s):    Continued therapy is recommended.  Rationale/Recommendations:  to improve pt's safety and independence in ADLs.

## 2020-12-22 NOTE — PLAN OF CARE
Physical Therapy Discharge Summary    Reason for therapy discharge:    Discharged to transitional care facility.    Progress towards therapy goal(s). See goals on Care Plan in [facility] electronic health record for goal details.  Goals partially met.  Barriers to achieving goals:   discharge from facility.    Therapy recommendation(s):    Continued therapy is recommended.  Rationale/Recommendations:  Continued PT recommend at SNF.

## 2020-12-24 ENCOUNTER — TELEPHONE (OUTPATIENT)
Dept: FAMILY MEDICINE | Facility: OTHER | Age: 85
End: 2020-12-24

## 2020-12-24 NOTE — TELEPHONE ENCOUNTER
I am assuming those orders are directly from a protocol.  Sounds good to me as long as there is a source that this comes from.  Thanks.  Rogelio Howell MD

## 2020-12-30 ENCOUNTER — MEDICAL CORRESPONDENCE (OUTPATIENT)
Dept: HEALTH INFORMATION MANAGEMENT | Facility: CLINIC | Age: 85
End: 2020-12-30

## 2021-01-04 ENCOUNTER — ANCILLARY PROCEDURE (OUTPATIENT)
Dept: CARDIOLOGY | Facility: CLINIC | Age: 86
End: 2021-01-04
Attending: INTERNAL MEDICINE
Payer: COMMERCIAL

## 2021-01-04 ENCOUNTER — TELEPHONE (OUTPATIENT)
Dept: FAMILY MEDICINE | Facility: OTHER | Age: 86
End: 2021-01-04

## 2021-01-04 DIAGNOSIS — I44.2 COMPLETE HEART BLOCK (H): ICD-10-CM

## 2021-01-04 PROCEDURE — 93296 REM INTERROG EVL PM/IDS: CPT

## 2021-01-04 PROCEDURE — 93294 REM INTERROG EVL PM/LDLS PM: CPT | Performed by: INTERNAL MEDICINE

## 2021-01-04 NOTE — TELEPHONE ENCOUNTER
Please call Des Catherine back regarding the patient.  [phone].  She would like to discuss with Dr Wolfe nurse.

## 2021-01-05 RX ORDER — SERTRALINE HYDROCHLORIDE 25 MG/1
50 TABLET, FILM COATED ORAL DAILY
COMMUNITY
Start: 2021-01-05 | End: 2022-01-16

## 2021-01-07 ENCOUNTER — TELEPHONE (OUTPATIENT)
Dept: FAMILY MEDICINE | Facility: OTHER | Age: 86
End: 2021-01-07

## 2021-01-11 LAB
MDC_IDC_LEAD_IMPLANT_DT: NORMAL
MDC_IDC_LEAD_IMPLANT_DT: NORMAL
MDC_IDC_LEAD_LOCATION: NORMAL
MDC_IDC_LEAD_LOCATION: NORMAL
MDC_IDC_LEAD_LOCATION_DETAIL_1: NORMAL
MDC_IDC_LEAD_LOCATION_DETAIL_1: NORMAL
MDC_IDC_LEAD_MFG: NORMAL
MDC_IDC_LEAD_MFG: NORMAL
MDC_IDC_LEAD_MODEL: NORMAL
MDC_IDC_LEAD_MODEL: NORMAL
MDC_IDC_LEAD_POLARITY_TYPE: NORMAL
MDC_IDC_LEAD_POLARITY_TYPE: NORMAL
MDC_IDC_LEAD_SERIAL: NORMAL
MDC_IDC_LEAD_SERIAL: NORMAL
MDC_IDC_MSMT_BATTERY_DTM: NORMAL
MDC_IDC_MSMT_BATTERY_REMAINING_LONGEVITY: 86 MO
MDC_IDC_MSMT_BATTERY_RRT_TRIGGER: 2.62
MDC_IDC_MSMT_BATTERY_STATUS: NORMAL
MDC_IDC_MSMT_BATTERY_VOLTAGE: 2.99 V
MDC_IDC_MSMT_LEADCHNL_RA_IMPEDANCE_VALUE: 437 OHM
MDC_IDC_MSMT_LEADCHNL_RA_IMPEDANCE_VALUE: 532 OHM
MDC_IDC_MSMT_LEADCHNL_RA_PACING_THRESHOLD_AMPLITUDE: 1.5 V
MDC_IDC_MSMT_LEADCHNL_RA_PACING_THRESHOLD_PULSEWIDTH: 0.4 MS
MDC_IDC_MSMT_LEADCHNL_RA_SENSING_INTR_AMPL: 1.62 MV
MDC_IDC_MSMT_LEADCHNL_RA_SENSING_INTR_AMPL: 1.62 MV
MDC_IDC_MSMT_LEADCHNL_RV_IMPEDANCE_VALUE: 380 OHM
MDC_IDC_MSMT_LEADCHNL_RV_IMPEDANCE_VALUE: 551 OHM
MDC_IDC_MSMT_LEADCHNL_RV_PACING_THRESHOLD_AMPLITUDE: 0.75 V
MDC_IDC_MSMT_LEADCHNL_RV_PACING_THRESHOLD_PULSEWIDTH: 0.4 MS
MDC_IDC_MSMT_LEADCHNL_RV_SENSING_INTR_AMPL: 4.75 MV
MDC_IDC_MSMT_LEADCHNL_RV_SENSING_INTR_AMPL: 4.75 MV
MDC_IDC_PG_IMPLANT_DTM: NORMAL
MDC_IDC_PG_MFG: NORMAL
MDC_IDC_PG_MODEL: NORMAL
MDC_IDC_PG_SERIAL: NORMAL
MDC_IDC_PG_TYPE: NORMAL
MDC_IDC_SESS_CLINIC_NAME: NORMAL
MDC_IDC_SESS_DTM: NORMAL
MDC_IDC_SESS_TYPE: NORMAL
MDC_IDC_SET_BRADY_AT_MODE_SWITCH_RATE: 171 {BEATS}/MIN
MDC_IDC_SET_BRADY_HYSTRATE: NORMAL
MDC_IDC_SET_BRADY_LOWRATE: 70 {BEATS}/MIN
MDC_IDC_SET_BRADY_MAX_SENSOR_RATE: 130 {BEATS}/MIN
MDC_IDC_SET_BRADY_MAX_TRACKING_RATE: 130 {BEATS}/MIN
MDC_IDC_SET_BRADY_MODE: NORMAL
MDC_IDC_SET_BRADY_PAV_DELAY_LOW: 180 MS
MDC_IDC_SET_BRADY_SAV_DELAY_LOW: 150 MS
MDC_IDC_SET_LEADCHNL_RA_PACING_AMPLITUDE: 3 V
MDC_IDC_SET_LEADCHNL_RA_PACING_ANODE_ELECTRODE_1: NORMAL
MDC_IDC_SET_LEADCHNL_RA_PACING_ANODE_LOCATION_1: NORMAL
MDC_IDC_SET_LEADCHNL_RA_PACING_CAPTURE_MODE: NORMAL
MDC_IDC_SET_LEADCHNL_RA_PACING_CATHODE_ELECTRODE_1: NORMAL
MDC_IDC_SET_LEADCHNL_RA_PACING_CATHODE_LOCATION_1: NORMAL
MDC_IDC_SET_LEADCHNL_RA_PACING_POLARITY: NORMAL
MDC_IDC_SET_LEADCHNL_RA_PACING_PULSEWIDTH: 0.4 MS
MDC_IDC_SET_LEADCHNL_RA_SENSING_ANODE_ELECTRODE_1: NORMAL
MDC_IDC_SET_LEADCHNL_RA_SENSING_ANODE_LOCATION_1: NORMAL
MDC_IDC_SET_LEADCHNL_RA_SENSING_CATHODE_ELECTRODE_1: NORMAL
MDC_IDC_SET_LEADCHNL_RA_SENSING_CATHODE_LOCATION_1: NORMAL
MDC_IDC_SET_LEADCHNL_RA_SENSING_POLARITY: NORMAL
MDC_IDC_SET_LEADCHNL_RA_SENSING_SENSITIVITY: 0.9 MV
MDC_IDC_SET_LEADCHNL_RV_PACING_AMPLITUDE: 2 V
MDC_IDC_SET_LEADCHNL_RV_PACING_ANODE_ELECTRODE_1: NORMAL
MDC_IDC_SET_LEADCHNL_RV_PACING_ANODE_LOCATION_1: NORMAL
MDC_IDC_SET_LEADCHNL_RV_PACING_CAPTURE_MODE: NORMAL
MDC_IDC_SET_LEADCHNL_RV_PACING_CATHODE_ELECTRODE_1: NORMAL
MDC_IDC_SET_LEADCHNL_RV_PACING_CATHODE_LOCATION_1: NORMAL
MDC_IDC_SET_LEADCHNL_RV_PACING_POLARITY: NORMAL
MDC_IDC_SET_LEADCHNL_RV_PACING_PULSEWIDTH: 0.4 MS
MDC_IDC_SET_LEADCHNL_RV_SENSING_ANODE_ELECTRODE_1: NORMAL
MDC_IDC_SET_LEADCHNL_RV_SENSING_ANODE_LOCATION_1: NORMAL
MDC_IDC_SET_LEADCHNL_RV_SENSING_CATHODE_ELECTRODE_1: NORMAL
MDC_IDC_SET_LEADCHNL_RV_SENSING_CATHODE_LOCATION_1: NORMAL
MDC_IDC_SET_LEADCHNL_RV_SENSING_POLARITY: NORMAL
MDC_IDC_SET_LEADCHNL_RV_SENSING_SENSITIVITY: 0.9 MV
MDC_IDC_SET_ZONE_DETECTION_INTERVAL: 350 MS
MDC_IDC_SET_ZONE_DETECTION_INTERVAL: 400 MS
MDC_IDC_SET_ZONE_TYPE: NORMAL
MDC_IDC_STAT_BRADY_AP_VP_PERCENT: 97.02 %
MDC_IDC_STAT_BRADY_AP_VS_PERCENT: 0.06 %
MDC_IDC_STAT_BRADY_AS_VP_PERCENT: 2 %
MDC_IDC_STAT_BRADY_AS_VS_PERCENT: 0.92 %
MDC_IDC_STAT_BRADY_DTM_END: NORMAL
MDC_IDC_STAT_BRADY_DTM_START: NORMAL
MDC_IDC_STAT_BRADY_RA_PERCENT_PACED: 97.61 %
MDC_IDC_STAT_BRADY_RV_PERCENT_PACED: 99.02 %
MDC_IDC_STAT_EPISODE_RECENT_COUNT: 0
MDC_IDC_STAT_EPISODE_RECENT_COUNT_DTM_END: NORMAL
MDC_IDC_STAT_EPISODE_RECENT_COUNT_DTM_START: NORMAL
MDC_IDC_STAT_EPISODE_TOTAL_COUNT: 0
MDC_IDC_STAT_EPISODE_TOTAL_COUNT: 1
MDC_IDC_STAT_EPISODE_TOTAL_COUNT: 886
MDC_IDC_STAT_EPISODE_TOTAL_COUNT_DTM_END: NORMAL
MDC_IDC_STAT_EPISODE_TOTAL_COUNT_DTM_START: NORMAL
MDC_IDC_STAT_EPISODE_TYPE: NORMAL

## 2021-01-14 ENCOUNTER — DOCUMENTATION ONLY (OUTPATIENT)
Dept: OTHER | Facility: CLINIC | Age: 86
End: 2021-01-14

## 2021-01-15 ENCOUNTER — TELEPHONE (OUTPATIENT)
Dept: FAMILY MEDICINE | Facility: OTHER | Age: 86
End: 2021-01-15

## 2021-01-21 NOTE — PROGRESS NOTES
Patient is a 86 year old male here accompanied by self today for infusion of IVF per order of Dr MALIA Ray under the supervision of Dr Walter, Cardiology.  Patient identified with two identifiers, order verified, and verbal consent for today's infusion obtained from patient.      Patients port accessed using non-coring, 20 gauge, 3/4 inch needle. Port accessed per facility protocol. Port flushed easily, blood return noted.  No signs and symptoms of infection or infiltration.      IV pump verified with dose, drug, and rate of administration.  Infusion administered per protocol.  Patient tolerated infusion well, no signs or symptoms of adverse reaction noted.  Patient denies pain nor discomfort.     IV removed, catheter intact.  Site clean, dry and intact.  No signs or symptoms of infiltration or infection.  Covered with a sterile bandage, slight pressure applied for 30 seconds.  Pt instructed to leave bandage intact for a minimum of one hour, and to call with questions or concerns. Patient states understanding, discharged.    
Negative

## 2021-01-25 ENCOUNTER — HOSPITAL ENCOUNTER (EMERGENCY)
Facility: HOSPITAL | Age: 86
Discharge: HOME | End: 2021-01-25
Attending: FAMILY MEDICINE | Admitting: FAMILY MEDICINE
Payer: MEDICARE

## 2021-01-25 VITALS
TEMPERATURE: 96.6 F | HEART RATE: 69 BPM | SYSTOLIC BLOOD PRESSURE: 154 MMHG | DIASTOLIC BLOOD PRESSURE: 84 MMHG | RESPIRATION RATE: 12 BRPM | OXYGEN SATURATION: 99 %

## 2021-01-25 DIAGNOSIS — I95.1 ORTHOSTATIC HYPOTENSION: ICD-10-CM

## 2021-01-25 DIAGNOSIS — G90.9 AUTONOMIC INSTABILITY: Primary | ICD-10-CM

## 2021-01-25 LAB
ALBUMIN SERPL-MCNC: 3.1 G/DL (ref 3.4–5)
ALBUMIN UR-MCNC: NEGATIVE MG/DL
ALP SERPL-CCNC: 77 U/L (ref 40–150)
ALT SERPL W P-5'-P-CCNC: 23 U/L (ref 0–70)
ANION GAP SERPL CALCULATED.3IONS-SCNC: 6 MMOL/L (ref 3–14)
APPEARANCE UR: CLEAR
AST SERPL W P-5'-P-CCNC: 10 U/L (ref 0–45)
BASOPHILS # BLD AUTO: 0 10E9/L (ref 0–0.2)
BASOPHILS NFR BLD AUTO: 0.4 %
BILIRUB SERPL-MCNC: 0.6 MG/DL (ref 0.2–1.3)
BILIRUB UR QL STRIP: NEGATIVE
BUN SERPL-MCNC: 13 MG/DL (ref 7–30)
CALCIUM SERPL-MCNC: 8.5 MG/DL (ref 8.5–10.1)
CHLORIDE SERPL-SCNC: 110 MMOL/L (ref 94–109)
CO2 SERPL-SCNC: 27 MMOL/L (ref 20–32)
COLOR UR AUTO: YELLOW
CREAT SERPL-MCNC: 0.92 MG/DL (ref 0.66–1.25)
DIFFERENTIAL METHOD BLD: NORMAL
EOSINOPHIL # BLD AUTO: 0.2 10E9/L (ref 0–0.7)
EOSINOPHIL NFR BLD AUTO: 3.9 %
ERYTHROCYTE [DISTWIDTH] IN BLOOD BY AUTOMATED COUNT: 12.2 % (ref 10–15)
GFR SERPL CREATININE-BSD FRML MDRD: 74 ML/MIN/{1.73_M2}
GLUCOSE SERPL-MCNC: 110 MG/DL (ref 70–99)
GLUCOSE UR STRIP-MCNC: NEGATIVE MG/DL
HCT VFR BLD AUTO: 41.6 % (ref 40–53)
HGB BLD-MCNC: 13.5 G/DL (ref 13.3–17.7)
HGB UR QL STRIP: NEGATIVE
IMM GRANULOCYTES # BLD: 0 10E9/L (ref 0–0.4)
IMM GRANULOCYTES NFR BLD: 0.4 %
KETONES UR STRIP-MCNC: NEGATIVE MG/DL
LACTATE BLD-SCNC: 1.3 MMOL/L (ref 0.7–2)
LEUKOCYTE ESTERASE UR QL STRIP: NEGATIVE
LYMPHOCYTES # BLD AUTO: 1.7 10E9/L (ref 0.8–5.3)
LYMPHOCYTES NFR BLD AUTO: 36.7 %
MCH RBC QN AUTO: 30.3 PG (ref 26.5–33)
MCHC RBC AUTO-ENTMCNC: 32.5 G/DL (ref 31.5–36.5)
MCV RBC AUTO: 93 FL (ref 78–100)
MONOCYTES # BLD AUTO: 0.5 10E9/L (ref 0–1.3)
MONOCYTES NFR BLD AUTO: 11.6 %
NEUTROPHILS # BLD AUTO: 2.2 10E9/L (ref 1.6–8.3)
NEUTROPHILS NFR BLD AUTO: 47 %
NITRATE UR QL: NEGATIVE
NRBC # BLD AUTO: 0 10*3/UL
NRBC BLD AUTO-RTO: 0 /100
PH UR STRIP: 5.5 PH (ref 4.7–8)
PLATELET # BLD AUTO: 190 10E9/L (ref 150–450)
POTASSIUM SERPL-SCNC: 3.4 MMOL/L (ref 3.4–5.3)
PROCALCITONIN SERPL-MCNC: <0.05 NG/ML
PROT SERPL-MCNC: 6.3 G/DL (ref 6.8–8.8)
RBC # BLD AUTO: 4.46 10E12/L (ref 4.4–5.9)
SODIUM SERPL-SCNC: 143 MMOL/L (ref 133–144)
SOURCE: NORMAL
SP GR UR STRIP: 1.02 (ref 1–1.03)
TROPONIN I SERPL-MCNC: 0.01 UG/L (ref 0–0.04)
UROBILINOGEN UR STRIP-MCNC: NORMAL MG/DL (ref 0–2)
WBC # BLD AUTO: 4.6 10E9/L (ref 4–11)

## 2021-01-25 PROCEDURE — 99284 EMERGENCY DEPT VISIT MOD MDM: CPT | Performed by: FAMILY MEDICINE

## 2021-01-25 PROCEDURE — 93005 ELECTROCARDIOGRAM TRACING: CPT

## 2021-01-25 PROCEDURE — 84484 ASSAY OF TROPONIN QUANT: CPT | Performed by: FAMILY MEDICINE

## 2021-01-25 PROCEDURE — 258N000003 HC RX IP 258 OP 636: Performed by: FAMILY MEDICINE

## 2021-01-25 PROCEDURE — 96360 HYDRATION IV INFUSION INIT: CPT

## 2021-01-25 PROCEDURE — 81003 URINALYSIS AUTO W/O SCOPE: CPT | Performed by: FAMILY MEDICINE

## 2021-01-25 PROCEDURE — 99284 EMERGENCY DEPT VISIT MOD MDM: CPT | Mod: 25

## 2021-01-25 PROCEDURE — 36415 COLL VENOUS BLD VENIPUNCTURE: CPT | Performed by: FAMILY MEDICINE

## 2021-01-25 PROCEDURE — 83605 ASSAY OF LACTIC ACID: CPT | Performed by: FAMILY MEDICINE

## 2021-01-25 PROCEDURE — 84145 PROCALCITONIN (PCT): CPT | Performed by: FAMILY MEDICINE

## 2021-01-25 PROCEDURE — 250N000011 HC RX IP 250 OP 636

## 2021-01-25 PROCEDURE — 93010 ELECTROCARDIOGRAM REPORT: CPT | Performed by: INTERNAL MEDICINE

## 2021-01-25 PROCEDURE — 80053 COMPREHEN METABOLIC PANEL: CPT | Performed by: FAMILY MEDICINE

## 2021-01-25 PROCEDURE — 85025 COMPLETE CBC W/AUTO DIFF WBC: CPT | Performed by: FAMILY MEDICINE

## 2021-01-25 RX ORDER — HEPARIN SODIUM (PORCINE) LOCK FLUSH IV SOLN 100 UNIT/ML 100 UNIT/ML
5 SOLUTION INTRAVENOUS
Status: DISCONTINUED | OUTPATIENT
Start: 2021-01-25 | End: 2021-01-25 | Stop reason: HOSPADM

## 2021-01-25 RX ORDER — SODIUM CHLORIDE 9 MG/ML
INJECTION, SOLUTION INTRAVENOUS CONTINUOUS
Status: DISCONTINUED | OUTPATIENT
Start: 2021-01-25 | End: 2021-01-25 | Stop reason: HOSPADM

## 2021-01-25 RX ORDER — HEPARIN SODIUM (PORCINE) LOCK FLUSH IV SOLN 100 UNIT/ML 100 UNIT/ML
SOLUTION INTRAVENOUS
Status: COMPLETED
Start: 2021-01-25 | End: 2021-01-25

## 2021-01-25 RX ADMIN — HEPARIN 5 ML: 100 SYRINGE at 08:05

## 2021-01-25 RX ADMIN — SODIUM CHLORIDE 500 ML: 9 INJECTION, SOLUTION INTRAVENOUS at 07:03

## 2021-01-25 RX ADMIN — HEPARIN SODIUM (PORCINE) LOCK FLUSH IV SOLN 100 UNIT/ML 5 ML: 100 SOLUTION at 08:05

## 2021-01-25 ASSESSMENT — ENCOUNTER SYMPTOMS
WHEEZING: 0
DIFFICULTY URINATING: 0
ABDOMINAL PAIN: 0
CONFUSION: 0
PALPITATIONS: 0
FATIGUE: 0
NECK STIFFNESS: 0
UNEXPECTED WEIGHT CHANGE: 0
ARTHRALGIAS: 0
HEADACHES: 0
EYE REDNESS: 0
COLOR CHANGE: 0

## 2021-01-25 NOTE — ED NOTES
Patient arrives per [facility] after having 3 falls over the past 12 hours. Patient states that he was getting dizzy also.  Staff from [facility] state that they are wondering if he has a UTI. They also state that he has fallen x 3 over the past 12 hours. Patient is A/O x 4. Denies head or neck pain, denies hitting head or LOC. Able to move all extremities.  Does use a walker with ambulation.

## 2021-01-25 NOTE — ED PROVIDER NOTES
History     Chief Complaint   Patient presents with     Fall     HPI  Jf Ortiz is a 87 year old male with history of Parkinson Disease, Lewy Body Dementia without behavioral disturbance. He was admitted to [facility] (12/18 - 12/22/20) for frequent falls thought to be related to Parkinson Disease, Lewy Body Dementia and autonomic instability and postural hypotension. He has sustained multiple compression fractures related to his falls. Of note, up until his recent admission he had been receiving infusions through his port for volume depletion, but these were stopped after his transition to NH care. He is also supported with midodrine and fludrocortisone to maintain his blood pressure.    He presents with 4 episodes of lowering himself to the floor when he feels dizzy. He states that this is similar to previous episodes he has had in the past. He did not hit his head or back during these falls, stating that he doesn't actually lose consciousness. He feels dizzy and sits down on the ground to prevent a fall. No neck pain.    The NH staff were concerned that these episodes may represent a UTI and sent him to [facility] for evaluation. The patient reports no fevers/chills, dysuria, hematuria or flank pain.    No fevers/chills, cough, shortness of breath, loss of taste/smell or nausea/vomiting/diarrhea. No recent known exposure to COVID.      Allergies:  Allergies   Allergen Reactions     Cats Unknown     Lamotrigine      Skin lesions       Problem List:    Patient Active Problem List    Diagnosis Date Noted     Closed wedge compression fracture of lumbar vertebra with routine healing 12/20/2020     Priority: Medium     Compression fracture of L1 lumbar vertebra, closed, initial encounter (H) 12/18/2020     Priority: Medium     Compression fracture of thoracic vertebra, initial encounter, unspecified thoracic vertebral level (H) 12/18/2020     Priority: Medium     Longstanding persistent atrial fibrillation (H) 04/13/2020      Priority: Medium     Falls frequently 04/13/2020     Priority: Medium     Frequent falls 04/13/2020     Priority: Medium     Coronary artery disease involving native coronary artery without angina pectoris 04/13/2020     Priority: Medium     Constipation, unspecified constipation type 04/11/2018     Priority: Medium     Pacemaker, [employer], Dual Chamber - Dependent 10/06/2017     Priority: Medium     Lewy body dementia without behavioral disturbance (H) 08/31/2017     Priority: Medium     Fatigue 08/31/2017     Priority: Medium     Urinary incontinence 08/31/2017     Priority: Medium     Autonomic orthostatic hypotension 10/14/2016     Priority: Medium     Dementia without behavioral disturbance (H) 07/28/2015     Priority: Medium     Diagnosis updated by automated process. Provider to review and confirm.       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 Provider to review       Diaphragmatic hernia 01/01/2011     Priority: Medium     Problem list name updated by automated process. 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     Priority: Medium     Overview:   IMO Update 10/11       Hypertension with target blood pressure goal under 150/90 09/05/2006     Priority: Medium     Overview:   IMO Update          Past Medical  History:    Past Medical History:   Diagnosis Date     Autonomic orthostatic hypotension 10/14/2016     Coronary artery disease      Dementia without behavioral disturbance (H) 7/28/2015     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     Seizure disorder (H)      Stented coronary artery        Past Surgical History:    Past Surgical History:   Procedure Laterality Date     ------------OTHER-------------  1955    ulnar and radial fx - repair ulnar and radial fx x4     ------------OTHER-------------  6/14/2011    cataract extraction     BIOPSY  08/2015    skin biopsy     BLEPHAROPLASTY BILATERAL  5/6/2014    Procedure: BLEPHAROPLASTY BILATERAL;  Surgeon: Andrew Queen MD;  Location: [facility]     BYPASS GRAFT ARTERY CORONARY  11/2006    coronary artery disease x 5, [facility]     cataract extraction and lens implantation  2011    cataracts     cataract extraction and lens implantation      cataracts     COLONOSCOPY  2012     colonoscopy with polypectomy  3/13/2009    history of polyps - repeat 3 yrs     colonoscopy with polypectomy  2006     colonoscopy with polypectomy  2005     COMBINED COLONOSCOPY WITH ARGON PLASMA COAGULATOR (APC) N/A 10/31/2014    Procedure: COMBINED COLONOSCOPY WITH ARGON PLASMA COAGULATOR (APC);  Surgeon: Bassam Aguilar MD;  Location: [facility]     COMBINED COLONOSCOPY WITH ARGON PLASMA COAGULATOR (APC) N/A 11/13/2015    Procedure: COMBINED COLONOSCOPY WITH ARGON PLASMA COAGULATOR (APC);  Surgeon: Bassam Aguilar MD;  Location: [facility]     ENDOSCOPY UPPER, COLONOSCOPY, COMBINED N/A 10/31/2014    Procedure: COMBINED ENDOSCOPY UPPER, COLONOSCOPY;  Surgeon: Bassam Aguilar MD;  Location: [facility]     ENDOSCOPY UPPER, COLONOSCOPY, COMBINED N/A  2015    Procedure: COMBINED ENDOSCOPY UPPER, COLONOSCOPY;  Surgeon: Bassam Aguilar MD;  [facility]: [facility]     ESOPHAGOSCOPY, GASTROSCOPY, DUODENOSCOPY (EGD), COMBINED  2014    Procedure: COMBINED ESOPHAGOSCOPY, GASTROSCOPY, DUODENOSCOPY (EGD);  UPPER ENDOSCOPY(PENA) W/ BIOPSIES;  Surgeon: Patricia Pena MD;  Location: [facility]     HERNIORRHAPHY INGUINAL Right 2018    Procedure: HERNIORRHAPHY INGUINAL;  OPEN RIGHT INGUINAL HERNIA REPAIR with Mesh;  Surgeon: Virgilio Zaragoza DO;  Location: [facility]     INSERT PORT VASCULAR ACCESS Right 2019    Procedure: PORT PLACEMENT;  Surgeon: Lloyd Ram MD;  Location: [facility]     LARYNGOSCOPY WITH MICROSCOPE  2014    Procedure: LARYNGOSCOPY WITH MICROSCOPE;;  Surgeon: Chayo Duke MD;  Location: [facility]     pacemaker placement      heart block     pacemaker placement      dual-chamber     REMOVE TUBE, MYRINGOTOMY, COMBINED  2014    Procedure: COMBINED REMOVE TUBE, MYRINGOTOMY;  MICRODIRECT LARYNGOSCOPY WITH BIOPSY AND FROZEN SECTIONS removal of right ear tube and myringoplasty;  Surgeon: Chayo Duke MD;  Location: [facility]     REPLACE PACEMAKER GENERATOR N/A 2018    Procedure: REPLACE PACEMAKER GENERATOR;  Pacemaker generator change;  Surgeon: Alma Kaplan MD;  Location: [facility]     stent placement to LAD  2008     ventilation tube  2012    right in office       Family History:    Family History   Problem Relation Age of Onset     Diabetes Mother      Breast Cancer Daughter        Social History:  Marital Status:  Single [1]  Social History     Tobacco Use     Smoking status: Former Smoker     Packs/day: 1.00     Years: 5.00     Pack years: 5.00     Types: Cigarettes     Quit date: 1985     Years since quittin.0     Smokeless tobacco: Never Used     Tobacco comment: quit in    Substance Use Topics     Alcohol use: Yes     Comment: social     Drug use: No        Medications:         acetaminophen  (TYLENOL) 325 MG tablet       atorvastatin (LIPITOR) 20 MG tablet       Cholecalciferol (VITAMIN D-3) 25 MCG (1000 UT) CAPS       Coenzyme Q10 (CO Q 10 PO)       donepezil (ARICEPT) 10 MG tablet       fish oil-omega-3 fatty acids 1000 MG capsule       fludrocortisone (FLORINEF) 0.1 MG tablet       magnesium 500 MG TABS       midodrine (PROAMATINE) 5 MG tablet       potassium chloride ER (KLOR-CON M) 20 MEQ CR tablet       RABEprazole (ACIPHEX) 20 MG EC tablet       senna-docusate (SENOKOT-S/PERICOLACE) 8.6-50 MG tablet       sodium chloride 1 GM tablet       Turmeric 500 MG TABS       diclofenac (VOLTAREN) 1 % topical gel       melatonin 5 MG tablet       sertraline (ZOLOFT) 25 MG tablet          Review of Systems   Constitutional: Negative for fatigue and unexpected weight change.   HENT: Negative for congestion.    Eyes: Negative for redness.   Respiratory: Negative for wheezing.    Cardiovascular: Negative for palpitations and leg swelling.   Gastrointestinal: Negative for abdominal pain.   Genitourinary: Negative for difficulty urinating.   Musculoskeletal: Negative for arthralgias and neck stiffness.   Skin: Negative for color change.   Neurological: Negative for headaches.   Psychiatric/Behavioral: Negative for confusion.       Physical Exam   BP: 137/83  Pulse: 84  Temp: 98.8  F (37.1  C)  Resp: 18  SpO2: 94 %      Physical Exam    General Appearance: well-developed, well-nourished, alert & oriented, no apparent distress.    HEENT: atraumatic. Pupils equal, round & reactive to light, extraocular movements intact. Bilateral ear canals clear. Nose without rhinorrhea or epistaxis. Clear oropharynx. Moist mucous membranes.    Neck: normal inspection, non-tender, full & painless ROM, supple, no lymphadenopathy or nuchal rigidity. No jugular venous distension.    Cardiovascular: regular rate, rhythm, normal S1 & S2, no murmurs, rubs or gallops.    Respiratory: clear lung sounds with good air entry, no wheezes rales  or rhonchi, no acute respiratory distress.    Gastrointestinal: normal inspection, normal bowel sounds, non-tender, no masses or organomegaly.    Extremities: normal inspection, 2+/4+ pulses bilaterally, normal & painless ROM, non-tender, joints normal.    Neurologic: alert & oriented x 3, CN II - XII intact, 5/5 strength in proximal & distal musculature in all extremities, sensation intact and equal in all extremities. Normal speech, no aphasia or dysarthria. Coordination intact. DTRs symmetric. Normal steady gait, but improved with walker. Negative Romberg test. Heel to shin normal. Negative Babinski test.    Skin: normal color, no skin rash.    ED Course        Procedures           Results for orders placed or performed during the hospital encounter of 01/25/21 (from the past 24 hour(s))   Procalcitonin   Result Value Ref [place]    Procalcitonin <0.05 ng/ml   CBC with platelets differential   Result Value Ref [place]    WBC 4.6 4.0 - 11.0 10e9/L    RBC Count 4.46 4.4 - 5.9 10e12/L    Hemoglobin 13.5 13.3 - 17.7 g/dL    Hematocrit 41.6 40.0 - 53.0 %    MCV 93 78 - 100 fl    MCH 30.3 26.5 - 33.0 pg    MCHC 32.5 31.5 - 36.5 g/dL    RDW 12.2 10.0 - 15.0 %    Platelet Count 190 150 - 450 10e9/L    Diff Method Automated Method     % Neutrophils 47.0 %    % Lymphocytes 36.7 %    % Monocytes 11.6 %    % Eosinophils 3.9 %    % Basophils 0.4 %    % Immature Granulocytes 0.4 %    Nucleated RBCs 0 0 /100    Absolute Neutrophil 2.2 1.6 - 8.3 10e9/L    Absolute Lymphocytes 1.7 0.8 - 5.3 10e9/L    Absolute Monocytes 0.5 0.0 - 1.3 10e9/L    Absolute Eosinophils 0.2 0.0 - 0.7 10e9/L    Absolute Basophils 0.0 0.0 - 0.2 10e9/L    Abs Immature Granulocytes 0.0 0 - 0.4 10e9/L    Absolute Nucleated RBC 0.0    Comprehensive metabolic panel   Result Value Ref [place]    Sodium 143 133 - 144 mmol/L    Potassium 3.4 3.4 - 5.3 mmol/L    Chloride 110 (H) 94 - 109 mmol/L    Carbon Dioxide 27 20 - 32 mmol/L    Anion Gap 6 3 - 14 mmol/L    Glucose  110 (H) 70 - 99 mg/dL    Urea Nitrogen 13 7 - 30 mg/dL    Creatinine 0.92 0.66 - 1.25 mg/dL    GFR Estimate 74 >60 mL/min/[1.73_m2]    GFR Estimate If Black 86 >60 mL/min/[1.73_m2]    Calcium 8.5 8.5 - 10.1 mg/dL    Bilirubin Total 0.6 0.2 - 1.3 mg/dL    Albumin 3.1 (L) 3.4 - 5.0 g/dL    Protein Total 6.3 (L) 6.8 - 8.8 g/dL    Alkaline Phosphatase 77 40 - 150 U/L    ALT 23 0 - 70 U/L    AST 10 0 - 45 U/L   Lactic acid whole blood   Result Value Ref [place]    Lactic Acid 1.3 0.7 - 2.0 mmol/L   Troponin I   Result Value Ref [facility]    Troponin I ES 0.015 0.000 - 0.045 ug/L   UA reflex to Microscopic and Culture    Specimen: Midstream Urine   Result Value Ref [place]    Color Urine Yellow     Appearance Urine Clear     Glucose Urine Negative NEG^Negative mg/dL    Bilirubin Urine Negative NEG^Negative    Ketones Urine Negative NEG^Negative mg/dL    Specific Gravity Urine 1.023 1.003 - 1.035    Blood Urine Negative NEG^Negative    pH Urine 5.5 4.7 - 8.0 pH    Protein Albumin Urine Negative NEG^Negative mg/dL    Urobilinogen mg/dL Normal 0.0 - 2.0 mg/dL    Nitrite Urine Negative NEG^Negative    Leukocyte Esterase Urine Negative NEG^Negative    Source Midstream Urine        Medications   0.9% sodium chloride BOLUS (0 mLs Intravenous Stopped 1/25/21 0812)     Followed by   sodium chloride 0.9% infusion (has no administration in time range)   heparin 100 UNIT/ML injection 5 mL (5 mLs Intracatheter Given 1/25/21 0805)       Assessments & Plan (with Medical Decision Making)   The patient is an 87 year old man with history of Parkinson Disease, Lewy Body Dementia and autonomic instability and postural hypotension causing multiple episodes of dizziness.    1) Dizziness - appears to be a chronic issue without change. No anemia, leukocytosis or acute renal/hepatic abnormality. Normal lactic acid. Normal procalcitonin. Negative troponin and no acute ST changes on EKG. Normal CXR. Patient treated with an NS bolus. No current symptoms. He  ambulated without difficulty, but improved significantly with a walker which was provided on discharge. Patient will be discharged to [facility] continue to rest, focus on oral fluids and continued self-monitoring.    Plan for PCP follow-up in 5 - 7 days as needed regarding this ED visit. He may discuss restarting infusions although he was not significantly volume depleted during this visit. The patient verbalizes agreement with this plan as well as to immediately return to the ED with any new/worsening S/Sx.      I have reviewed the nursing notes.    I have reviewed the findings, diagnosis, plan and need for follow up with the patient.    New Prescriptions    No medications on file       Final diagnoses:   Autonomic instability   Orthostatic hypotension       1/25/2021   [facility]     Bahman Bae MD  01/25/21 0801

## 2021-01-28 ENCOUNTER — TELEPHONE (OUTPATIENT)
Dept: FAMILY MEDICINE | Facility: OTHER | Age: 86
End: 2021-01-28

## 2021-01-28 NOTE — TELEPHONE ENCOUNTER
Midodrine is at maximal dose - per  encourage fluids and monitor.     Mita Guzmán LPN on 1/28/2021 at 2:40 PM

## 2021-01-28 NOTE — LETTER
[facility] [place]  [address]  [place] [address]  Phone: [phone]    January 28, 2021        Jf Ortiz  [address]  [place] [address]          To whom it may concern:    RE: Jf Ortiz    {WORK NOTE CHOICES:[id number]}    Please contact me for questions or concerns.      Sincerely,        Abran Whitman MD

## 2021-02-05 ENCOUNTER — TELEPHONE (OUTPATIENT)
Dept: INFUSION THERAPY | Facility: OTHER | Age: 86
End: 2021-02-05

## 2021-02-05 ENCOUNTER — INFUSION THERAPY VISIT (OUTPATIENT)
Dept: INFUSION THERAPY | Facility: OTHER | Age: 86
End: 2021-02-05
Attending: FAMILY MEDICINE
Payer: MEDICARE

## 2021-02-05 ENCOUNTER — TELEPHONE (OUTPATIENT)
Dept: FAMILY MEDICINE | Facility: OTHER | Age: 86
End: 2021-02-05

## 2021-02-05 DIAGNOSIS — E86.9 VOLUME DEPLETION: Primary | ICD-10-CM

## 2021-02-05 PROCEDURE — 258N000003 HC RX IP 258 OP 636: Performed by: FAMILY MEDICINE

## 2021-02-05 PROCEDURE — 96360 HYDRATION IV INFUSION INIT: CPT

## 2021-02-05 PROCEDURE — 250N000011 HC RX IP 250 OP 636: Performed by: FAMILY MEDICINE

## 2021-02-05 PROCEDURE — 96365 THER/PROPH/DIAG IV INF INIT: CPT

## 2021-02-05 RX ORDER — HEPARIN SODIUM (PORCINE) LOCK FLUSH IV SOLN 100 UNIT/ML 100 UNIT/ML
5 SOLUTION INTRAVENOUS ONCE
Status: COMPLETED | OUTPATIENT
Start: 2021-02-05 | End: 2021-02-05

## 2021-02-05 RX ORDER — HEPARIN SODIUM (PORCINE) LOCK FLUSH IV SOLN 100 UNIT/ML 100 UNIT/ML
5 SOLUTION INTRAVENOUS ONCE
Status: CANCELLED
Start: 2021-02-05 | End: 2021-02-05

## 2021-02-05 RX ADMIN — SODIUM CHLORIDE 1000 ML: 9 INJECTION, SOLUTION INTRAVENOUS at 09:46

## 2021-02-05 RX ADMIN — HEPARIN 5 ML: 100 SYRINGE at 10:59

## 2021-02-05 NOTE — PROGRESS NOTES
"Patient is a 87 year old male here accompanied by self today for infusion of IVF per order of Dr Ray.  Patient identified with two identifiers, order verified, and verbal consent for today's infusion obtained from patient.      Patients port accessed using non-coring, 20 gauge, 3/4\" power needle. Port accessed per facility protocol. Port flushed easily, blood return noted.  No signs and symptoms of infection or infiltration.      IV pump verified with dose, drug, and rate of administration.  Infusion administered per protocol.  Patient tolerated infusion well, no signs or symptoms of adverse reaction noted.  Patient denies pain nor discomfort.     IV removed, catheter intact.  Site clean, dry and intact.  No signs or symptoms of infiltration or infection.  Covered with a sterile bandage, slight pressure applied for 30 seconds.  Pt instructed to leave bandage intact for a minimum of one hour, and to call with questions or concerns.  Copy of appointments, discharge instructions, and after visit summary (AVS) provided to patient.  Patient states understanding, discharged.    "

## 2021-02-05 NOTE — TELEPHONE ENCOUNTER
Call from Dr MALIA Ray's nurse, noting that he was made aware that patient's therapy plan needs to be signed, as it is , but he is unsure how to complete this. Started to walk her through how to find it/sign it and she noted he has had difficulty with this in past, asked if this nurse able to take verbal order with ok to sign as current, alerted able to do so. Did with her on the phone at MD's direction.

## 2021-02-10 ENCOUNTER — INFUSION THERAPY VISIT (OUTPATIENT)
Dept: INFUSION THERAPY | Facility: OTHER | Age: 86
End: 2021-02-10
Attending: FAMILY MEDICINE
Payer: MEDICARE

## 2021-02-10 VITALS — SYSTOLIC BLOOD PRESSURE: 139 MMHG | DIASTOLIC BLOOD PRESSURE: 66 MMHG | HEART RATE: 78 BPM

## 2021-02-10 DIAGNOSIS — E86.9 VOLUME DEPLETION: Primary | ICD-10-CM

## 2021-02-10 PROCEDURE — 258N000003 HC RX IP 258 OP 636: Performed by: FAMILY MEDICINE

## 2021-02-10 PROCEDURE — 96523 IRRIG DRUG DELIVERY DEVICE: CPT

## 2021-02-10 PROCEDURE — 96360 HYDRATION IV INFUSION INIT: CPT

## 2021-02-10 PROCEDURE — 250N000011 HC RX IP 250 OP 636: Performed by: FAMILY MEDICINE

## 2021-02-10 RX ORDER — HEPARIN SODIUM (PORCINE) LOCK FLUSH IV SOLN 100 UNIT/ML 100 UNIT/ML
5 SOLUTION INTRAVENOUS ONCE
Status: CANCELLED
Start: 2021-02-10 | End: 2021-02-10

## 2021-02-10 RX ORDER — HEPARIN SODIUM (PORCINE) LOCK FLUSH IV SOLN 100 UNIT/ML 100 UNIT/ML
5 SOLUTION INTRAVENOUS ONCE
Status: COMPLETED | OUTPATIENT
Start: 2021-02-10 | End: 2021-02-10

## 2021-02-10 RX ADMIN — HEPARIN 5 ML: 100 SYRINGE at 10:38

## 2021-02-10 RX ADMIN — SODIUM CHLORIDE 1000 ML: 9 INJECTION, SOLUTION INTRAVENOUS at 09:36

## 2021-02-10 NOTE — PROGRESS NOTES
Patient is a 87 here accompanied by self today for infusion of IVF per order of Dr. Ray.  Patient identified with two identifiers, order verified, and verbal consent for today's infusion obtained from patient.    Patient meets order parameters for today's treatment.    Patients port accessed using non-coring, 20 gauge, 3/4 needle. Port accessed per facility protocol. Port flushed easily, blood return noted.  No signs and symptoms of infection or infiltration.      IV pump verified with dose, drug, and rate of administration.  Infusion administered per protocol.

## 2021-02-10 NOTE — PATIENT INSTRUCTIONS

## 2021-02-12 ENCOUNTER — HOSPITAL ENCOUNTER (EMERGENCY)
Facility: HOSPITAL | Age: 86
Discharge: ANOTHER HOSPITAL | End: 2021-02-12
Attending: EMERGENCY MEDICINE | Admitting: EMERGENCY MEDICINE
Payer: MEDICARE

## 2021-02-12 ENCOUNTER — TRANSFERRED RECORDS (OUTPATIENT)
Dept: HEALTH INFORMATION MANAGEMENT | Facility: HOSPITAL | Age: 86
End: 2021-02-12

## 2021-02-12 ENCOUNTER — APPOINTMENT (OUTPATIENT)
Dept: CT IMAGING | Facility: HOSPITAL | Age: 86
End: 2021-02-12
Attending: EMERGENCY MEDICINE
Payer: MEDICARE

## 2021-02-12 VITALS
SYSTOLIC BLOOD PRESSURE: 175 MMHG | HEART RATE: 69 BPM | TEMPERATURE: 97.6 F | RESPIRATION RATE: 9 BRPM | DIASTOLIC BLOOD PRESSURE: 93 MMHG | OXYGEN SATURATION: 96 %

## 2021-02-12 DIAGNOSIS — H53.8 BLURRY VISION, LEFT EYE: ICD-10-CM

## 2021-02-12 DIAGNOSIS — H54.61 VISUAL LOSS, RIGHT EYE: ICD-10-CM

## 2021-02-12 LAB
ANION GAP SERPL CALCULATED.3IONS-SCNC: 3 MMOL/L (ref 3–14)
APTT PPP: 28 SEC (ref 22–37)
BASOPHILS # BLD AUTO: 0 10E9/L (ref 0–0.2)
BASOPHILS NFR BLD AUTO: 0.5 %
BUN SERPL-MCNC: 16 MG/DL (ref 7–30)
CALCIUM SERPL-MCNC: 8.8 MG/DL (ref 8.5–10.1)
CHLORIDE SERPL-SCNC: 108 MMOL/L (ref 94–109)
CO2 SERPL-SCNC: 28 MMOL/L (ref 20–32)
CREAT SERPL-MCNC: 1.12 MG/DL (ref 0.66–1.25)
CRP SERPL-MCNC: <2.9 MG/L (ref 0–8)
DIFFERENTIAL METHOD BLD: NORMAL
EOSINOPHIL # BLD AUTO: 0.2 10E9/L (ref 0–0.7)
EOSINOPHIL NFR BLD AUTO: 3.4 %
ERYTHROCYTE [DISTWIDTH] IN BLOOD BY AUTOMATED COUNT: 12.6 % (ref 10–15)
ERYTHROCYTE [SEDIMENTATION RATE] IN BLOOD BY WESTERGREN METHOD: 9 MM/H (ref 0–20)
FLUAV RNA RESP QL NAA+PROBE: NEGATIVE
FLUBV RNA RESP QL NAA+PROBE: NEGATIVE
GFR SERPL CREATININE-BSD FRML MDRD: 59 ML/MIN/{1.73_M2}
GLUCOSE BLDC GLUCOMTR-MCNC: 123 MG/DL (ref 70–99)
GLUCOSE SERPL-MCNC: 115 MG/DL (ref 70–99)
HCT VFR BLD AUTO: 42 % (ref 40–53)
HGB BLD-MCNC: 13.9 G/DL (ref 13.3–17.7)
IMM GRANULOCYTES # BLD: 0 10E9/L (ref 0–0.4)
IMM GRANULOCYTES NFR BLD: 0.2 %
INR PPP: 1.12 (ref 0.86–1.14)
LABORATORY COMMENT REPORT: NORMAL
LYMPHOCYTES # BLD AUTO: 1.7 10E9/L (ref 0.8–5.3)
LYMPHOCYTES NFR BLD AUTO: 30.6 %
MCH RBC QN AUTO: 30.5 PG (ref 26.5–33)
MCHC RBC AUTO-ENTMCNC: 33.1 G/DL (ref 31.5–36.5)
MCV RBC AUTO: 92 FL (ref 78–100)
MONOCYTES # BLD AUTO: 0.6 10E9/L (ref 0–1.3)
MONOCYTES NFR BLD AUTO: 9.9 %
NEUTROPHILS # BLD AUTO: 3.1 10E9/L (ref 1.6–8.3)
NEUTROPHILS NFR BLD AUTO: 55.4 %
NRBC # BLD AUTO: 0 10*3/UL
NRBC BLD AUTO-RTO: 0 /100
PLATELET # BLD AUTO: 237 10E9/L (ref 150–450)
POTASSIUM SERPL-SCNC: 4.2 MMOL/L (ref 3.4–5.3)
RBC # BLD AUTO: 4.55 10E12/L (ref 4.4–5.9)
RSV RNA SPEC QL NAA+PROBE: NEGATIVE
SARS-COV-2 RNA RESP QL NAA+PROBE: NEGATIVE
SODIUM SERPL-SCNC: 139 MMOL/L (ref 133–144)
SPECIMEN SOURCE: NORMAL
TROPONIN I SERPL-MCNC: <0.015 UG/L (ref 0–0.04)
WBC # BLD AUTO: 5.6 10E9/L (ref 4–11)

## 2021-02-12 PROCEDURE — 250N000011 HC RX IP 250 OP 636: Performed by: RADIOLOGY

## 2021-02-12 PROCEDURE — 99285 EMERGENCY DEPT VISIT HI MDM: CPT | Performed by: EMERGENCY MEDICINE

## 2021-02-12 PROCEDURE — 99285 EMERGENCY DEPT VISIT HI MDM: CPT | Mod: 25

## 2021-02-12 PROCEDURE — 86140 C-REACTIVE PROTEIN: CPT | Performed by: EMERGENCY MEDICINE

## 2021-02-12 PROCEDURE — 258N000003 HC RX IP 258 OP 636: Performed by: EMERGENCY MEDICINE

## 2021-02-12 PROCEDURE — 85610 PROTHROMBIN TIME: CPT | Performed by: EMERGENCY MEDICINE

## 2021-02-12 PROCEDURE — C9803 HOPD COVID-19 SPEC COLLECT: HCPCS

## 2021-02-12 PROCEDURE — 93010 ELECTROCARDIOGRAM REPORT: CPT | Performed by: INTERNAL MEDICINE

## 2021-02-12 PROCEDURE — 84484 ASSAY OF TROPONIN QUANT: CPT | Performed by: EMERGENCY MEDICINE

## 2021-02-12 PROCEDURE — 87040 BLOOD CULTURE FOR BACTERIA: CPT | Performed by: EMERGENCY MEDICINE

## 2021-02-12 PROCEDURE — 999N001017 HC STATISTIC GLUCOSE BY METER IP

## 2021-02-12 PROCEDURE — 85730 THROMBOPLASTIN TIME PARTIAL: CPT | Performed by: EMERGENCY MEDICINE

## 2021-02-12 PROCEDURE — 93005 ELECTROCARDIOGRAM TRACING: CPT

## 2021-02-12 PROCEDURE — 80048 BASIC METABOLIC PNL TOTAL CA: CPT | Performed by: EMERGENCY MEDICINE

## 2021-02-12 PROCEDURE — 36415 COLL VENOUS BLD VENIPUNCTURE: CPT | Performed by: EMERGENCY MEDICINE

## 2021-02-12 PROCEDURE — 87636 SARSCOV2 & INF A&B AMP PRB: CPT | Performed by: EMERGENCY MEDICINE

## 2021-02-12 PROCEDURE — 250N000013 HC RX MED GY IP 250 OP 250 PS 637: Performed by: EMERGENCY MEDICINE

## 2021-02-12 PROCEDURE — 85025 COMPLETE CBC W/AUTO DIFF WBC: CPT | Performed by: EMERGENCY MEDICINE

## 2021-02-12 PROCEDURE — 70496 CT ANGIOGRAPHY HEAD: CPT | Mod: ME

## 2021-02-12 PROCEDURE — 85652 RBC SED RATE AUTOMATED: CPT | Performed by: EMERGENCY MEDICINE

## 2021-02-12 PROCEDURE — 70450 CT HEAD/BRAIN W/O DYE: CPT | Mod: ME

## 2021-02-12 RX ORDER — IOPAMIDOL 755 MG/ML
50 INJECTION, SOLUTION INTRAVASCULAR ONCE
Status: COMPLETED | OUTPATIENT
Start: 2021-02-12 | End: 2021-02-12

## 2021-02-12 RX ORDER — ASPIRIN 325 MG
325 TABLET ORAL ONCE
Status: COMPLETED | OUTPATIENT
Start: 2021-02-12 | End: 2021-02-12

## 2021-02-12 RX ADMIN — ASPIRIN 325 MG ORAL TABLET 325 MG: 325 PILL ORAL at 16:41

## 2021-02-12 RX ADMIN — IOPAMIDOL 50 ML: 755 INJECTION, SOLUTION INTRAVENOUS at 16:01

## 2021-02-12 RX ADMIN — SODIUM CHLORIDE 500 ML: 9 INJECTION, SOLUTION INTRAVENOUS at 16:39

## 2021-02-12 NOTE — CONSULTS
[facility]    Stroke Telephone Note    I was called by Dr. Abhi Gan on 02/12/21 at 1541 regarding patient Jf Ortiz. The patient is a 87 year old male who woke up with loss of vision out of his R eye. He went to see his eye doctor who told him to go to ED. On arrival, CT head was negative for hemorrhage or obvious areas of hypo densities concern for stroke. CTA shows bilateral atherosclerotic disease at bilateral carotid bifurcation and no large vessel occlusion or flow limited stenosis. He was out side window for any acute intervention.      Stroke Code Data  (for stroke code without tele)  Stroke code activated 02/12/21   1541   First stroke provider response 02/12/21   1542   Last known normal 02/11/21   2200   Time of discovery   (or onset of symptoms) 02/12/21   0700   Head CT read by me 02/12/21   1605   Was stroke code de-escalated? Yes 02/12/21 1610  patient is outside emergent treatment time parameters       TPA Treatment   Not given due to unclear or unfavorable risk-benefit profile for extended window thrombolysis beyond the conventional 4.5 hour time window.    Endovascular Treatment  Not initiated due to absence of proximal vessel occlusion    Impression  Loss of Vision of OD  Hx of Afib and not AC   Etiology: likely CRAO 2/2 R ICA vs Afib as he is not on AC.   Per my independent read, R ICA with atherosclerotic changes with a component of what appears to be soft plaque.      Recommendations  Acute Ischemic Stroke (without tPA) Recommendations  - Neurochecks Q 4 hours  - Permissive HTN; labetalol PRN for SBP > 220 x 24 hours   - Daily aspirin 81 mg for secondary stroke prevention  - Statin: Lipitor 40 mg  - MRI Stroke Protocol  - TTE with Bubble Study  - Telemetry, EKG  - Bedside Glucose Monitoring  - A1c, Lipid Panel, Troponin x 3  - PT/OT/SLP  - Stroke Education  - Euthermia, Euglycemia     If MRI shows scattered infarcts isolated over the R hemisphere, strokes may be related to  "his R ICA and he will need a vascular surgery consult for possible revascularizing    Additionally,  recommend discussing risk vs benefit regarding starting the patient on a DOAC given his history of afib. If DOAC is started, he can stop ASA unless medically indicated for other reasons     The Stroke Staff is ABENA Mac MD  Vascular Neurology Fellow  To page me or covering stroke neurology team member, click here: [email or web address]   Choose \"On Call\" tab at top, then search dropdown box for \"Neurology Adult\", select location, press Enter, then look for stroke/neuro ICU/telestroke.           "

## 2021-02-12 NOTE — DISCHARGE INSTRUCTIONS
What to expect when you have contrast    During your exam, we will inject  contrast  into your vein or artery. (Contrast is a clear liquid with iodine in it. It shows up on X-rays.)    You may feel warm or hot. You may have a metal taste in your mouth and a slight upset stomach. You may also feel pressure near the kidneys and bladder. These effects will last about 1 to 3 minutes.    Please tell us if you have:    Sneezing     Itching    Hives     Swelling in the face    A hoarse voice    Breathing problems    Other new symptoms    Serious problems are rare.  They may include:    Irregular heartbeat     Seizures    Kidney failure              Tissue damage    Shock      Death    If you have any problems during the exam, we  will treat them right away.    When you get home    Call your hospital if you have any new symptoms in the next 2 days, like hives or swelling. (Phone numbers are at the bottom of this page.) Or call your family doctor.     If you have wheezing or trouble breathing, call 911.    Self-care  -Drink at least 4 extra glasses of water today.   This reduces the stress on your kidneys.  -Keep taking your regular medicines.    The contrast will pass out of your body in your  Urine(pee). This will happen in the next 24 hours. You  will not feel this. Your urine will not  change color.      I have read and understand the above information.    Patient Sign Here:______________________________________Date:________Time:______    Staff Sign Here:________________________________________Date:_______Time:______      Radiology Departments:     ?[facility]: [phone] ?[facility]: [phone]     ?[place]: [phone] ?[facility]:[phone]      ?[facility]: [phone]  ?[facility]: [phone]  ?[place]:[phone]    ?[facility]:[phone]  ?[facility]:[phone]

## 2021-02-12 NOTE — ED TRIAGE NOTES
Sent to ED by [facility] for loss of vision in the right eye, last known well time was sometime prior to bed last night per family.

## 2021-02-13 NOTE — ED NOTES
CATE Mancera at [facility], called for update. Advised of diagnosis and transfer to [facility].

## 2021-02-14 ENCOUNTER — TRANSFERRED RECORDS (OUTPATIENT)
Dept: HEALTH INFORMATION MANAGEMENT | Facility: CLINIC | Age: 86
End: 2021-02-14

## 2021-02-16 ENCOUNTER — TELEPHONE (OUTPATIENT)
Dept: INFUSION THERAPY | Facility: OTHER | Age: 86
End: 2021-02-16

## 2021-02-17 ENCOUNTER — INFUSION THERAPY VISIT (OUTPATIENT)
Dept: INFUSION THERAPY | Facility: OTHER | Age: 86
End: 2021-02-17
Attending: FAMILY MEDICINE
Payer: MEDICARE

## 2021-02-17 VITALS
RESPIRATION RATE: 18 BRPM | OXYGEN SATURATION: 97 % | WEIGHT: 154.54 LBS | DIASTOLIC BLOOD PRESSURE: 88 MMHG | SYSTOLIC BLOOD PRESSURE: 155 MMHG | TEMPERATURE: 97.9 F | HEART RATE: 68 BPM | BODY MASS INDEX: 22.89 KG/M2 | HEIGHT: 69 IN

## 2021-02-17 DIAGNOSIS — E86.9 VOLUME DEPLETION: Primary | ICD-10-CM

## 2021-02-17 PROCEDURE — 96360 HYDRATION IV INFUSION INIT: CPT

## 2021-02-17 PROCEDURE — 258N000003 HC RX IP 258 OP 636: Performed by: FAMILY MEDICINE

## 2021-02-17 RX ORDER — ASPIRIN 81 MG/1
81 TABLET, CHEWABLE ORAL DAILY
COMMUNITY

## 2021-02-17 RX ORDER — HEPARIN SODIUM (PORCINE) LOCK FLUSH IV SOLN 100 UNIT/ML 100 UNIT/ML
5 SOLUTION INTRAVENOUS ONCE
Status: CANCELLED
Start: 2021-02-17 | End: 2021-02-17

## 2021-02-17 RX ORDER — NYSTATIN 100000 [USP'U]/G
POWDER TOPICAL 2 TIMES DAILY
COMMUNITY
End: 2022-03-02

## 2021-02-17 RX ORDER — LIDOCAINE/PRILOCAINE 2.5 %-2.5%
CREAM (GRAM) TOPICAL PRN
COMMUNITY
End: 2022-03-23

## 2021-02-17 RX ORDER — LIDOCAINE 50 MG/G
1 PATCH TOPICAL EVERY 24 HOURS
COMMUNITY
End: 2022-03-23

## 2021-02-17 RX ORDER — HEPARIN SODIUM (PORCINE) LOCK FLUSH IV SOLN 100 UNIT/ML 100 UNIT/ML
5 SOLUTION INTRAVENOUS ONCE
Status: COMPLETED | OUTPATIENT
Start: 2021-02-17 | End: 2021-02-17

## 2021-02-17 RX ADMIN — HEPARIN SODIUM (PORCINE) LOCK FLUSH IV SOLN 100 UNIT/ML 5 ML: 100 SOLUTION at 10:43

## 2021-02-17 RX ADMIN — SODIUM CHLORIDE 1000 ML: 9 INJECTION, SOLUTION INTRAVENOUS at 09:32

## 2021-02-17 ASSESSMENT — PAIN SCALES - GENERAL: PAINLEVEL: NO PAIN (0)

## 2021-02-17 ASSESSMENT — MIFFLIN-ST. JEOR: SCORE: 1366.63

## 2021-02-17 NOTE — PATIENT INSTRUCTIONS

## 2021-02-17 NOTE — PROGRESS NOTES
Patient is 87 years old, here accompanied by self ( brought to appt) today for infusion of IVF per order of Dr MALIA Ray.  Patient identified with two identifiers, order verified, and verbal consent for today's infusion obtained from patient.      Patients port accessed using non-coring, 20 gauge, 3/4 needle. Port accessed per facility protocol. Port flushed easily, blood return noted.  No signs and symptoms of infection or infiltration.      IV pump verified with dose, drug, and rate of administration.  Infusion administered per protocol.  Patient tolerated infusion well, no signs or symptoms of adverse reaction noted.  Patient denies pain nor discomfort.     IV removed, catheter intact.  Site clean, dry and intact.  No signs or symptoms of infiltration or infection.  Covered with a sterile bandage, slight pressure applied for 30 seconds.  Pt instructed to leave bandage intact for a minimum of one hour, and to call with questions or concerns.  Copy of appointments, discharge instructions, and after visit summary (AVS) provided to patient.  Patient states understanding, discharged.

## 2021-02-18 LAB
BACTERIA SPEC CULT: NORMAL
BACTERIA SPEC CULT: NORMAL
SPECIMEN SOURCE: NORMAL
SPECIMEN SOURCE: NORMAL

## 2021-02-26 ENCOUNTER — INFUSION THERAPY VISIT (OUTPATIENT)
Dept: INFUSION THERAPY | Facility: OTHER | Age: 86
End: 2021-02-26
Attending: FAMILY MEDICINE
Payer: MEDICARE

## 2021-02-26 DIAGNOSIS — E86.9 VOLUME DEPLETION: Primary | ICD-10-CM

## 2021-02-26 PROCEDURE — 96360 HYDRATION IV INFUSION INIT: CPT

## 2021-02-26 PROCEDURE — 250N000011 HC RX IP 250 OP 636: Performed by: FAMILY MEDICINE

## 2021-02-26 PROCEDURE — 258N000003 HC RX IP 258 OP 636: Performed by: FAMILY MEDICINE

## 2021-02-26 RX ORDER — HEPARIN SODIUM (PORCINE) LOCK FLUSH IV SOLN 100 UNIT/ML 100 UNIT/ML
5 SOLUTION INTRAVENOUS ONCE
Status: COMPLETED | OUTPATIENT
Start: 2021-02-26 | End: 2021-02-26

## 2021-02-26 RX ORDER — HEPARIN SODIUM (PORCINE) LOCK FLUSH IV SOLN 100 UNIT/ML 100 UNIT/ML
5 SOLUTION INTRAVENOUS ONCE
Status: CANCELLED
Start: 2021-02-26 | End: 2021-02-26

## 2021-02-26 RX ADMIN — HEPARIN 5 ML: 100 SYRINGE at 10:43

## 2021-02-26 RX ADMIN — SODIUM CHLORIDE 1000 ML: 9 INJECTION, SOLUTION INTRAVENOUS at 09:39

## 2021-02-26 NOTE — PROGRESS NOTES
Patient is 87 years old, here accompanied by self ( to appt) today for infusion of IVF per order of Dr MALIA Ray.  Patient identified with two identifiers, order verified, and verbal consent for today's infusion obtained from patient.      Patients port accessed using non-coring, 20 gauge, 3/4 inch needle. Port accessed per facility protocol. Port flushed easily, blood return noted.  No signs and symptoms of infection or infiltration.      IV pump verified with dose, drug, and rate of administration.  Infusion administered per protocol.

## 2021-02-26 NOTE — PATIENT INSTRUCTIONS

## 2021-03-03 ENCOUNTER — INFUSION THERAPY VISIT (OUTPATIENT)
Dept: INFUSION THERAPY | Facility: OTHER | Age: 86
End: 2021-03-03
Attending: FAMILY MEDICINE
Payer: MEDICARE

## 2021-03-03 VITALS
TEMPERATURE: 98 F | HEIGHT: 69 IN | HEART RATE: 79 BPM | WEIGHT: 157.85 LBS | SYSTOLIC BLOOD PRESSURE: 161 MMHG | BODY MASS INDEX: 23.38 KG/M2 | DIASTOLIC BLOOD PRESSURE: 81 MMHG | OXYGEN SATURATION: 95 %

## 2021-03-03 DIAGNOSIS — E86.9 VOLUME DEPLETION: Primary | ICD-10-CM

## 2021-03-03 PROCEDURE — 96360 HYDRATION IV INFUSION INIT: CPT

## 2021-03-03 PROCEDURE — 250N000011 HC RX IP 250 OP 636: Performed by: FAMILY MEDICINE

## 2021-03-03 PROCEDURE — 258N000003 HC RX IP 258 OP 636: Performed by: FAMILY MEDICINE

## 2021-03-03 RX ORDER — HEPARIN SODIUM (PORCINE) LOCK FLUSH IV SOLN 100 UNIT/ML 100 UNIT/ML
5 SOLUTION INTRAVENOUS ONCE
Status: COMPLETED | OUTPATIENT
Start: 2021-03-03 | End: 2021-03-03

## 2021-03-03 RX ORDER — HEPARIN SODIUM (PORCINE) LOCK FLUSH IV SOLN 100 UNIT/ML 100 UNIT/ML
5 SOLUTION INTRAVENOUS ONCE
Status: CANCELLED
Start: 2021-03-03 | End: 2021-03-03

## 2021-03-03 RX ADMIN — HEPARIN 5 ML: 100 SYRINGE at 10:39

## 2021-03-03 RX ADMIN — SODIUM CHLORIDE 1000 ML: 9 INJECTION, SOLUTION INTRAVENOUS at 09:38

## 2021-03-03 ASSESSMENT — MIFFLIN-ST. JEOR: SCORE: 1381.63

## 2021-03-03 NOTE — PATIENT INSTRUCTIONS
We will see you back as planned. If you have any questions or concerns, we can be reached Monday through Friday 8am - 430pm at [phone] ([facility] [place] If you have concerns related to a potential reaction/side effect after hours/weekends/holiday's, please seek emergent medical care.

## 2021-03-03 NOTE — PROGRESS NOTES
Patient is a 87 year old male here accompanied by self today for infusion of IVF per order of Dr. Ray under the supervision of Dr. Ray  Patient identified with two identifiers, order verified, and verbal consent for today's infusion obtained from patient.      Patient meets order parameters for today's treatment.    Patients port accessed using non-coring, 20 gauge, 3/4 needle. Port accessed per facility protocol. Port flushed easily, blood return noted.  No signs and symptoms of infection or infiltration.      0938 IV pump verified with dose, drug, and rate of administration.  Infusion administered per protocol.  Patient tolerated infusion well, no signs or symptoms of adverse reaction noted.  Patient denies pain nor discomfort.     IV removed, catheter intact.  Site clean, dry and intact.  No signs or symptoms of infiltration or infection.  Covered with a sterile bandage, slight pressure applied for 30 seconds.  Pt instructed to leave bandage intact for a minimum of one hour, and to call with questions or concerns.  Copy of appointments, discharge instructions, and after visit summary (AVS) provided to patient.  Patient states understanding, discharged.

## 2021-03-10 ENCOUNTER — INFUSION THERAPY VISIT (OUTPATIENT)
Dept: INFUSION THERAPY | Facility: OTHER | Age: 86
End: 2021-03-10
Attending: FAMILY MEDICINE
Payer: MEDICARE

## 2021-03-10 DIAGNOSIS — E86.9 VOLUME DEPLETION: Primary | ICD-10-CM

## 2021-03-10 PROCEDURE — 250N000011 HC RX IP 250 OP 636: Performed by: FAMILY MEDICINE

## 2021-03-10 PROCEDURE — 258N000003 HC RX IP 258 OP 636: Performed by: FAMILY MEDICINE

## 2021-03-10 PROCEDURE — 96360 HYDRATION IV INFUSION INIT: CPT

## 2021-03-10 RX ORDER — HEPARIN SODIUM (PORCINE) LOCK FLUSH IV SOLN 100 UNIT/ML 100 UNIT/ML
5 SOLUTION INTRAVENOUS ONCE
Status: CANCELLED
Start: 2021-03-10 | End: 2021-03-10

## 2021-03-10 RX ORDER — HEPARIN SODIUM (PORCINE) LOCK FLUSH IV SOLN 100 UNIT/ML 100 UNIT/ML
5 SOLUTION INTRAVENOUS ONCE
Status: COMPLETED | OUTPATIENT
Start: 2021-03-10 | End: 2021-03-10

## 2021-03-10 RX ADMIN — SODIUM CHLORIDE 1000 ML: 9 INJECTION, SOLUTION INTRAVENOUS at 09:36

## 2021-03-10 RX ADMIN — HEPARIN 5 ML: 100 SYRINGE at 10:49

## 2021-03-10 NOTE — PATIENT INSTRUCTIONS
We will see you back as planned. If you have any questions or concerns, we can be reached Monday through Friday 8am - 430pm at [phone] ([facility] – [place]). If you have concerns related to a potential reaction/side effect after hours/weekends/holiday's, please seek emergent medical care.

## 2021-03-10 NOTE — PROGRESS NOTES
Patient is a 87 year old male here accompanied by self today for infusion of IVF  per order of Dr. Ray under the supervision of Dr. Ray.  Patient identified with two identifiers, order verified, and verbal consent for today's infusion obtained from patient.      Patient lab values:  Not required for today's infusion.  Patient meets order parameters for today's treatment.     Patients port accessed using non-coring, 20 gauge, 3/4 power needle. Port accessed per facility protocol. Port flushed easily, blood return noted.  No signs and symptoms of infection or infiltration.      IV pump verified with dose, drug, and rate of administration.  Infusion administered per protocol.  Patient tolerated infusion well, no signs or symptoms of adverse reaction noted.  Patient denies pain nor discomfort.     IV removed, catheter intact.  Site clean, dry and intact.  No signs or symptoms of infiltration or infection.  Covered with a sterile bandage, slight pressure applied for 30 seconds.  Pt instructed to leave bandage intact for a minimum of one hour, and to call with questions or concerns.  Copy of appointments, discharge instructions, and after visit summary (AVS) provided to patient.  Patient states understanding, discharged.

## 2021-03-24 ENCOUNTER — INFUSION THERAPY VISIT (OUTPATIENT)
Dept: INFUSION THERAPY | Facility: OTHER | Age: 86
End: 2021-03-24
Attending: FAMILY MEDICINE
Payer: MEDICARE

## 2021-03-24 VITALS
RESPIRATION RATE: 18 BRPM | TEMPERATURE: 97.3 F | HEART RATE: 71 BPM | BODY MASS INDEX: 23.23 KG/M2 | OXYGEN SATURATION: 97 % | WEIGHT: 157.41 LBS | SYSTOLIC BLOOD PRESSURE: 160 MMHG | DIASTOLIC BLOOD PRESSURE: 79 MMHG

## 2021-03-24 DIAGNOSIS — E86.9 VOLUME DEPLETION: Primary | ICD-10-CM

## 2021-03-24 PROCEDURE — 258N000003 HC RX IP 258 OP 636: Performed by: FAMILY MEDICINE

## 2021-03-24 PROCEDURE — 96360 HYDRATION IV INFUSION INIT: CPT

## 2021-03-24 PROCEDURE — 250N000011 HC RX IP 250 OP 636: Performed by: FAMILY MEDICINE

## 2021-03-24 RX ORDER — HEPARIN SODIUM (PORCINE) LOCK FLUSH IV SOLN 100 UNIT/ML 100 UNIT/ML
5 SOLUTION INTRAVENOUS ONCE
Status: COMPLETED | OUTPATIENT
Start: 2021-03-24 | End: 2021-03-24

## 2021-03-24 RX ORDER — HEPARIN SODIUM (PORCINE) LOCK FLUSH IV SOLN 100 UNIT/ML 100 UNIT/ML
5 SOLUTION INTRAVENOUS ONCE
Status: CANCELLED
Start: 2021-03-24 | End: 2021-03-24

## 2021-03-24 RX ADMIN — HEPARIN 5 ML: 100 SYRINGE at 10:54

## 2021-03-24 RX ADMIN — SODIUM CHLORIDE 1000 ML: 9 INJECTION, SOLUTION INTRAVENOUS at 09:49

## 2021-03-24 NOTE — PATIENT INSTRUCTIONS

## 2021-03-24 NOTE — PROGRESS NOTES
Patient is a 87 year old here today for infusion of IVF per order of Dr MALIA Ray.  Patient identified with two identifiers, order verified, and verbal consent for today's infusion obtained from patient.      Patient meets order parameters for today's treatment.    Patient denies questions or concerns regarding infusion and/or medication(s) being administered.    Patients port accessed using non-coring, 20 gauge, 3/4 power needle. Port accessed per facility protocol. Port flushed easily, blood return noted.  No signs and symptoms of infection or infiltration.      0949 IV pump verified with IVF dose, drug, and rate of administration.  Infusion administered per protocol.  Patient tolerated infusion well, no signs or symptoms of adverse reaction noted.  Patient denies pain nor discomfort.     IV removed, catheter intact.  Site clean, dry and intact.  No signs or symptoms of infiltration or infection.  Covered with a sterile bandage, slight pressure applied for 30 seconds.  Pt instructed to leave bandage intact for a minimum of one hour, and to call with questions or concerns. Patient states understanding, discharged.

## 2021-03-30 ENCOUNTER — TELEPHONE (OUTPATIENT)
Dept: FAMILY MEDICINE | Facility: OTHER | Age: 86
End: 2021-03-30

## 2021-03-30 RX ORDER — TRAMADOL HYDROCHLORIDE 50 MG/1
50 TABLET ORAL EVERY 6 HOURS PRN
COMMUNITY
Start: 2021-03-30 | End: 2022-03-23

## 2021-03-31 ENCOUNTER — TELEPHONE (OUTPATIENT)
Dept: FAMILY MEDICINE | Facility: OTHER | Age: 86
End: 2021-03-31

## 2021-03-31 ENCOUNTER — INFUSION THERAPY VISIT (OUTPATIENT)
Dept: INFUSION THERAPY | Facility: OTHER | Age: 86
End: 2021-03-31
Attending: FAMILY MEDICINE
Payer: MEDICARE

## 2021-03-31 VITALS
RESPIRATION RATE: 18 BRPM | HEART RATE: 68 BPM | TEMPERATURE: 97.3 F | HEIGHT: 69 IN | DIASTOLIC BLOOD PRESSURE: 71 MMHG | OXYGEN SATURATION: 98 % | SYSTOLIC BLOOD PRESSURE: 134 MMHG | WEIGHT: 157.9 LBS | BODY MASS INDEX: 23.39 KG/M2

## 2021-03-31 DIAGNOSIS — E86.9 VOLUME DEPLETION: Primary | ICD-10-CM

## 2021-03-31 PROCEDURE — 258N000003 HC RX IP 258 OP 636: Performed by: FAMILY MEDICINE

## 2021-03-31 PROCEDURE — 96360 HYDRATION IV INFUSION INIT: CPT

## 2021-03-31 PROCEDURE — 250N000011 HC RX IP 250 OP 636: Performed by: FAMILY MEDICINE

## 2021-03-31 RX ORDER — HEPARIN SODIUM (PORCINE) LOCK FLUSH IV SOLN 100 UNIT/ML 100 UNIT/ML
5 SOLUTION INTRAVENOUS ONCE
Status: COMPLETED | OUTPATIENT
Start: 2021-03-31 | End: 2021-03-31

## 2021-03-31 RX ORDER — HEPARIN SODIUM (PORCINE) LOCK FLUSH IV SOLN 100 UNIT/ML 100 UNIT/ML
5 SOLUTION INTRAVENOUS ONCE
Status: CANCELLED
Start: 2021-03-31 | End: 2021-03-31

## 2021-03-31 RX ADMIN — SODIUM CHLORIDE 1000 ML: 9 INJECTION, SOLUTION INTRAVENOUS at 09:36

## 2021-03-31 RX ADMIN — HEPARIN 5 ML: 100 SYRINGE at 10:48

## 2021-03-31 ASSESSMENT — MIFFLIN-ST. JEOR: SCORE: 1381.86

## 2021-03-31 NOTE — PROGRESS NOTES
Patient is 87 years old, here accompanied by self () today for infusion of IVF per order of Dr MALIA Ray.  Patient identified with two identifiers, order verified, and verbal consent for today's infusion obtained from patient.     Patients port accessed using non-coring, 20 gauge, 3/4 inch needle. Port accessed per facility protocol. Port flushed easily, blood return noted.  No signs and symptoms of infection or infiltration.      IV pump verified with dose, drug, and rate of administration.  Infusion administered per protocol.  Patient tolerated infusion well, no signs or symptoms of adverse reaction noted.  Patient denies pain nor discomfort.     IV removed, catheter intact.  Site clean, dry and intact.  No signs or symptoms of infiltration or infection.  Covered with a sterile bandage, slight pressure applied for 30 seconds.  Pt instructed to leave bandage intact for a minimum of one hour, and to call with questions or concerns.  Copy of appointments, discharge instructions, and after visit summary (AVS) provided to patient.  Patient states understanding, discharged.  Wheeled in chair by Patsy SHAH to [facility], as he has appt there following this appt.

## 2021-03-31 NOTE — PATIENT INSTRUCTIONS

## 2021-04-07 ENCOUNTER — INFUSION THERAPY VISIT (OUTPATIENT)
Dept: INFUSION THERAPY | Facility: OTHER | Age: 86
End: 2021-04-07
Attending: FAMILY MEDICINE
Payer: MEDICARE

## 2021-04-07 VITALS
OXYGEN SATURATION: 95 % | DIASTOLIC BLOOD PRESSURE: 60 MMHG | WEIGHT: 156.97 LBS | SYSTOLIC BLOOD PRESSURE: 130 MMHG | RESPIRATION RATE: 18 BRPM | BODY MASS INDEX: 23.25 KG/M2 | HEIGHT: 69 IN | TEMPERATURE: 97.8 F | HEART RATE: 71 BPM

## 2021-04-07 DIAGNOSIS — E86.9 VOLUME DEPLETION: Primary | ICD-10-CM

## 2021-04-07 PROCEDURE — 96360 HYDRATION IV INFUSION INIT: CPT

## 2021-04-07 PROCEDURE — 250N000011 HC RX IP 250 OP 636: Performed by: FAMILY MEDICINE

## 2021-04-07 PROCEDURE — 258N000003 HC RX IP 258 OP 636: Performed by: FAMILY MEDICINE

## 2021-04-07 RX ORDER — HEPARIN SODIUM (PORCINE) LOCK FLUSH IV SOLN 100 UNIT/ML 100 UNIT/ML
5 SOLUTION INTRAVENOUS ONCE
Status: CANCELLED
Start: 2021-04-07 | End: 2021-04-07

## 2021-04-07 RX ORDER — HEPARIN SODIUM (PORCINE) LOCK FLUSH IV SOLN 100 UNIT/ML 100 UNIT/ML
5 SOLUTION INTRAVENOUS ONCE
Status: COMPLETED | OUTPATIENT
Start: 2021-04-07 | End: 2021-04-07

## 2021-04-07 RX ADMIN — HEPARIN 5 ML: 100 SYRINGE at 10:43

## 2021-04-07 RX ADMIN — SODIUM CHLORIDE 1000 ML: 9 INJECTION, SOLUTION INTRAVENOUS at 09:56

## 2021-04-07 ASSESSMENT — MIFFLIN-ST. JEOR: SCORE: 1377.63

## 2021-04-07 ASSESSMENT — PAIN SCALES - GENERAL: PAINLEVEL: NO PAIN (0)

## 2021-04-07 NOTE — PROGRESS NOTES
Patient is 87 years old, here accompanied by self today for infusion of IVF per order of Dr MALIA Ray.  Patient identified with two identifiers, order verified, and verbal consent for today's infusion obtained from patient.      Patients port accessed using non-coring, 20 gauge, 3/4 needle. Port accessed per facility protocol. Port flushed easily, blood return noted.  No signs and symptoms of infection or infiltration.      IV pump verified with dose, drug, and rate of administration.  Infusion administered per protocol.  Patient tolerated infusion well, no signs or symptoms of adverse reaction noted.  Patient denies pain nor discomfort.     IV removed, catheter intact.  Site clean, dry and intact.  No signs or symptoms of infiltration or infection.  Covered with a sterile bandage, slight pressure applied for 30 seconds.  Pt instructed to leave bandage intact for a minimum of one hour, and to call with questions or concerns.  Copy of appointments, discharge instructions, and after visit summary (AVS) provided to patient.  Patient states understanding, discharged.

## 2021-04-08 ENCOUNTER — ANCILLARY PROCEDURE (OUTPATIENT)
Dept: CARDIOLOGY | Facility: CLINIC | Age: 86
End: 2021-04-08
Attending: INTERNAL MEDICINE
Payer: MEDICARE

## 2021-04-08 DIAGNOSIS — I44.2 COMPLETE HEART BLOCK (H): ICD-10-CM

## 2021-04-08 PROCEDURE — 93296 REM INTERROG EVL PM/IDS: CPT

## 2021-04-08 PROCEDURE — 93294 REM INTERROG EVL PM/LDLS PM: CPT | Performed by: INTERNAL MEDICINE

## 2021-04-14 ENCOUNTER — INFUSION THERAPY VISIT (OUTPATIENT)
Dept: INFUSION THERAPY | Facility: OTHER | Age: 86
End: 2021-04-14
Attending: FAMILY MEDICINE
Payer: MEDICARE

## 2021-04-14 VITALS — HEART RATE: 80 BPM | SYSTOLIC BLOOD PRESSURE: 121 MMHG | DIASTOLIC BLOOD PRESSURE: 64 MMHG

## 2021-04-14 DIAGNOSIS — E86.9 VOLUME DEPLETION: Primary | ICD-10-CM

## 2021-04-14 PROCEDURE — 258N000003 HC RX IP 258 OP 636: Performed by: FAMILY MEDICINE

## 2021-04-14 PROCEDURE — 96360 HYDRATION IV INFUSION INIT: CPT

## 2021-04-14 PROCEDURE — 250N000011 HC RX IP 250 OP 636: Performed by: FAMILY MEDICINE

## 2021-04-14 RX ORDER — HEPARIN SODIUM (PORCINE) LOCK FLUSH IV SOLN 100 UNIT/ML 100 UNIT/ML
5 SOLUTION INTRAVENOUS ONCE
Status: COMPLETED | OUTPATIENT
Start: 2021-04-14 | End: 2021-04-14

## 2021-04-14 RX ORDER — HEPARIN SODIUM (PORCINE) LOCK FLUSH IV SOLN 100 UNIT/ML 100 UNIT/ML
5 SOLUTION INTRAVENOUS ONCE
Status: CANCELLED
Start: 2021-04-14 | End: 2021-04-14

## 2021-04-14 RX ADMIN — SODIUM CHLORIDE 1000 ML: 9 INJECTION, SOLUTION INTRAVENOUS at 09:32

## 2021-04-14 RX ADMIN — HEPARIN 5 ML: 100 SYRINGE at 10:39

## 2021-04-14 NOTE — PATIENT INSTRUCTIONS
We will see you back as planned. If you have any questions or concerns, we can be reached Monday through Friday 8am - 430pm at [phone] ([facility] [place] If you have concerns related to a potential reaction/side effect after hours/weekends/holiday's, please seek emergent medical care.

## 2021-04-14 NOTE — PROGRESS NOTES
Patient tolerated infusion well, no signs or symptoms of adverse reaction noted.  Patient denies pain nor discomfort.     Asked runner to have primary RN call for patient ride.     IV removed, catheter intact.  Site clean, dry and intact.  No signs or symptoms of infiltration or infection.  Covered with a sterile bandage, slight pressure applied for 30 seconds.  Pt instructed to leave bandage intact for a minimum of one hour, and to call with questions or concerns.   Patient states understanding, discharged.

## 2021-04-14 NOTE — PROGRESS NOTES
Patient is a 87 year old male here accompanied by self today for infusion of IVF per order of Dr. Ray under the supervision of Dr. Ray  Patient identified with two identifiers, order verified, and verbal consent for today's infusion obtained from patient.      Patient meets order parameters for today's treatment.     Patients port accessed using non-coring, 20 gauge, 3/4 needle. Port accessed per facility protocol. Port flushed easily, blood return noted.  No signs and symptoms of infection or infiltration.      IV pump verified with dose, drug, and rate of administration.  Infusion administered per protocol.

## 2021-04-15 ENCOUNTER — RESULTS ONLY (OUTPATIENT)
Dept: LAB | Age: 86
End: 2021-04-15

## 2021-04-16 LAB
ANION GAP SERPL CALCULATED.3IONS-SCNC: 5 MMOL/L (ref 3–14)
BASOPHILS # BLD AUTO: 0 10E9/L (ref 0–0.2)
BASOPHILS NFR BLD AUTO: 0.6 %
BUN SERPL-MCNC: 15 MG/DL (ref 7–30)
CALCIUM SERPL-MCNC: 8.9 MG/DL (ref 8.5–10.1)
CHLORIDE SERPL-SCNC: 106 MMOL/L (ref 94–109)
CO2 SERPL-SCNC: 26 MMOL/L (ref 20–32)
CREAT SERPL-MCNC: 0.9 MG/DL (ref 0.66–1.25)
DIFFERENTIAL METHOD BLD: NORMAL
EOSINOPHIL # BLD AUTO: 0.2 10E9/L (ref 0–0.7)
EOSINOPHIL NFR BLD AUTO: 3.4 %
ERYTHROCYTE [DISTWIDTH] IN BLOOD BY AUTOMATED COUNT: 12.8 % (ref 10–15)
GFR SERPL CREATININE-BSD FRML MDRD: 76 ML/MIN/{1.73_M2}
GLUCOSE SERPL-MCNC: 91 MG/DL (ref 70–99)
HCT VFR BLD AUTO: 44.9 % (ref 40–53)
HGB BLD-MCNC: 14.7 G/DL (ref 13.3–17.7)
IMM GRANULOCYTES # BLD: 0 10E9/L (ref 0–0.4)
IMM GRANULOCYTES NFR BLD: 0.2 %
LYMPHOCYTES # BLD AUTO: 1.8 10E9/L (ref 0.8–5.3)
LYMPHOCYTES NFR BLD AUTO: 34.5 %
MCH RBC QN AUTO: 31.3 PG (ref 26.5–33)
MCHC RBC AUTO-ENTMCNC: 32.7 G/DL (ref 31.5–36.5)
MCV RBC AUTO: 96 FL (ref 78–100)
MDC_IDC_LEAD_IMPLANT_DT: NORMAL
MDC_IDC_LEAD_IMPLANT_DT: NORMAL
MDC_IDC_LEAD_LOCATION: NORMAL
MDC_IDC_LEAD_LOCATION: NORMAL
MDC_IDC_LEAD_LOCATION_DETAIL_1: NORMAL
MDC_IDC_LEAD_LOCATION_DETAIL_1: NORMAL
MDC_IDC_LEAD_MFG: NORMAL
MDC_IDC_LEAD_MFG: NORMAL
MDC_IDC_LEAD_MODEL: NORMAL
MDC_IDC_LEAD_MODEL: NORMAL
MDC_IDC_LEAD_POLARITY_TYPE: NORMAL
MDC_IDC_LEAD_POLARITY_TYPE: NORMAL
MDC_IDC_LEAD_SERIAL: NORMAL
MDC_IDC_LEAD_SERIAL: NORMAL
MDC_IDC_MSMT_BATTERY_DTM: NORMAL
MDC_IDC_MSMT_BATTERY_REMAINING_LONGEVITY: 84 MO
MDC_IDC_MSMT_BATTERY_RRT_TRIGGER: 2.62
MDC_IDC_MSMT_BATTERY_STATUS: NORMAL
MDC_IDC_MSMT_BATTERY_VOLTAGE: 2.99 V
MDC_IDC_MSMT_LEADCHNL_RA_IMPEDANCE_VALUE: 456 OHM
MDC_IDC_MSMT_LEADCHNL_RA_IMPEDANCE_VALUE: 551 OHM
MDC_IDC_MSMT_LEADCHNL_RA_PACING_THRESHOLD_AMPLITUDE: 1.5 V
MDC_IDC_MSMT_LEADCHNL_RA_PACING_THRESHOLD_PULSEWIDTH: 0.4 MS
MDC_IDC_MSMT_LEADCHNL_RA_SENSING_INTR_AMPL: 1.5 MV
MDC_IDC_MSMT_LEADCHNL_RA_SENSING_INTR_AMPL: 1.5 MV
MDC_IDC_MSMT_LEADCHNL_RV_IMPEDANCE_VALUE: 380 OHM
MDC_IDC_MSMT_LEADCHNL_RV_IMPEDANCE_VALUE: 570 OHM
MDC_IDC_MSMT_LEADCHNL_RV_PACING_THRESHOLD_AMPLITUDE: 0.75 V
MDC_IDC_MSMT_LEADCHNL_RV_PACING_THRESHOLD_PULSEWIDTH: 0.4 MS
MDC_IDC_MSMT_LEADCHNL_RV_SENSING_INTR_AMPL: 4.75 MV
MDC_IDC_MSMT_LEADCHNL_RV_SENSING_INTR_AMPL: 4.75 MV
MDC_IDC_PG_IMPLANT_DTM: NORMAL
MDC_IDC_PG_MFG: NORMAL
MDC_IDC_PG_MODEL: NORMAL
MDC_IDC_PG_SERIAL: NORMAL
MDC_IDC_PG_TYPE: NORMAL
MDC_IDC_SESS_CLINIC_NAME: NORMAL
MDC_IDC_SESS_DTM: NORMAL
MDC_IDC_SESS_TYPE: NORMAL
MDC_IDC_SET_BRADY_AT_MODE_SWITCH_RATE: 171 {BEATS}/MIN
MDC_IDC_SET_BRADY_HYSTRATE: NORMAL
MDC_IDC_SET_BRADY_LOWRATE: 70 {BEATS}/MIN
MDC_IDC_SET_BRADY_MAX_SENSOR_RATE: 130 {BEATS}/MIN
MDC_IDC_SET_BRADY_MAX_TRACKING_RATE: 130 {BEATS}/MIN
MDC_IDC_SET_BRADY_MODE: NORMAL
MDC_IDC_SET_BRADY_PAV_DELAY_LOW: 180 MS
MDC_IDC_SET_BRADY_SAV_DELAY_LOW: 150 MS
MDC_IDC_SET_LEADCHNL_RA_PACING_AMPLITUDE: 3 V
MDC_IDC_SET_LEADCHNL_RA_PACING_ANODE_ELECTRODE_1: NORMAL
MDC_IDC_SET_LEADCHNL_RA_PACING_ANODE_LOCATION_1: NORMAL
MDC_IDC_SET_LEADCHNL_RA_PACING_CAPTURE_MODE: NORMAL
MDC_IDC_SET_LEADCHNL_RA_PACING_CATHODE_ELECTRODE_1: NORMAL
MDC_IDC_SET_LEADCHNL_RA_PACING_CATHODE_LOCATION_1: NORMAL
MDC_IDC_SET_LEADCHNL_RA_PACING_POLARITY: NORMAL
MDC_IDC_SET_LEADCHNL_RA_PACING_PULSEWIDTH: 0.4 MS
MDC_IDC_SET_LEADCHNL_RA_SENSING_ANODE_ELECTRODE_1: NORMAL
MDC_IDC_SET_LEADCHNL_RA_SENSING_ANODE_LOCATION_1: NORMAL
MDC_IDC_SET_LEADCHNL_RA_SENSING_CATHODE_ELECTRODE_1: NORMAL
MDC_IDC_SET_LEADCHNL_RA_SENSING_CATHODE_LOCATION_1: NORMAL
MDC_IDC_SET_LEADCHNL_RA_SENSING_POLARITY: NORMAL
MDC_IDC_SET_LEADCHNL_RA_SENSING_SENSITIVITY: 0.9 MV
MDC_IDC_SET_LEADCHNL_RV_PACING_AMPLITUDE: 2 V
MDC_IDC_SET_LEADCHNL_RV_PACING_ANODE_ELECTRODE_1: NORMAL
MDC_IDC_SET_LEADCHNL_RV_PACING_ANODE_LOCATION_1: NORMAL
MDC_IDC_SET_LEADCHNL_RV_PACING_CAPTURE_MODE: NORMAL
MDC_IDC_SET_LEADCHNL_RV_PACING_CATHODE_ELECTRODE_1: NORMAL
MDC_IDC_SET_LEADCHNL_RV_PACING_CATHODE_LOCATION_1: NORMAL
MDC_IDC_SET_LEADCHNL_RV_PACING_POLARITY: NORMAL
MDC_IDC_SET_LEADCHNL_RV_PACING_PULSEWIDTH: 0.4 MS
MDC_IDC_SET_LEADCHNL_RV_SENSING_ANODE_ELECTRODE_1: NORMAL
MDC_IDC_SET_LEADCHNL_RV_SENSING_ANODE_LOCATION_1: NORMAL
MDC_IDC_SET_LEADCHNL_RV_SENSING_CATHODE_ELECTRODE_1: NORMAL
MDC_IDC_SET_LEADCHNL_RV_SENSING_CATHODE_LOCATION_1: NORMAL
MDC_IDC_SET_LEADCHNL_RV_SENSING_POLARITY: NORMAL
MDC_IDC_SET_LEADCHNL_RV_SENSING_SENSITIVITY: 0.9 MV
MDC_IDC_SET_ZONE_DETECTION_INTERVAL: 350 MS
MDC_IDC_SET_ZONE_DETECTION_INTERVAL: 400 MS
MDC_IDC_SET_ZONE_TYPE: NORMAL
MDC_IDC_STAT_BRADY_AP_VP_PERCENT: 96.82 %
MDC_IDC_STAT_BRADY_AP_VS_PERCENT: 0.13 %
MDC_IDC_STAT_BRADY_AS_VP_PERCENT: 1.55 %
MDC_IDC_STAT_BRADY_AS_VS_PERCENT: 1.5 %
MDC_IDC_STAT_BRADY_DTM_END: NORMAL
MDC_IDC_STAT_BRADY_DTM_START: NORMAL
MDC_IDC_STAT_BRADY_RA_PERCENT_PACED: 97.82 %
MDC_IDC_STAT_BRADY_RV_PERCENT_PACED: 98.37 %
MDC_IDC_STAT_EPISODE_RECENT_COUNT: 0
MDC_IDC_STAT_EPISODE_RECENT_COUNT_DTM_END: NORMAL
MDC_IDC_STAT_EPISODE_RECENT_COUNT_DTM_START: NORMAL
MDC_IDC_STAT_EPISODE_TOTAL_COUNT: 0
MDC_IDC_STAT_EPISODE_TOTAL_COUNT: 1
MDC_IDC_STAT_EPISODE_TOTAL_COUNT: 886
MDC_IDC_STAT_EPISODE_TOTAL_COUNT_DTM_END: NORMAL
MDC_IDC_STAT_EPISODE_TOTAL_COUNT_DTM_START: NORMAL
MDC_IDC_STAT_EPISODE_TYPE: NORMAL
MONOCYTES # BLD AUTO: 0.5 10E9/L (ref 0–1.3)
MONOCYTES NFR BLD AUTO: 9.4 %
NEUTROPHILS # BLD AUTO: 2.8 10E9/L (ref 1.6–8.3)
NEUTROPHILS NFR BLD AUTO: 51.9 %
NRBC # BLD AUTO: 0 10*3/UL
NRBC BLD AUTO-RTO: 0 /100
PLATELET # BLD AUTO: 184 10E9/L (ref 150–450)
POTASSIUM SERPL-SCNC: 4 MMOL/L (ref 3.4–5.3)
RBC # BLD AUTO: 4.7 10E12/L (ref 4.4–5.9)
SODIUM SERPL-SCNC: 137 MMOL/L (ref 133–144)
WBC # BLD AUTO: 5.3 10E9/L (ref 4–11)

## 2021-04-19 ENCOUNTER — TELEPHONE (OUTPATIENT)
Dept: FAMILY MEDICINE | Facility: OTHER | Age: 86
End: 2021-04-19

## 2021-04-19 NOTE — TELEPHONE ENCOUNTER
Syncope would need to be seen in the ER.  Tell staff that they can have him seen if they think he passed out.  If vitals stable, etc, he can monitor and I think we should set him up with either Dr. Craft or Dr. Whitman when he is able.  Thanks.  Rogelio Howell MD

## 2021-04-19 NOTE — TELEPHONE ENCOUNTER
Notified [facility] via fax of recommendation of ER per  and Dr.J Ray.    Mita Guzmán LPN on 4/19/2021 at 11:11 AM

## 2021-04-21 ENCOUNTER — INFUSION THERAPY VISIT (OUTPATIENT)
Dept: INFUSION THERAPY | Facility: OTHER | Age: 86
End: 2021-04-21
Attending: FAMILY MEDICINE
Payer: MEDICARE

## 2021-04-21 VITALS
OXYGEN SATURATION: 95 % | TEMPERATURE: 97.7 F | HEART RATE: 74 BPM | RESPIRATION RATE: 18 BRPM | DIASTOLIC BLOOD PRESSURE: 57 MMHG | SYSTOLIC BLOOD PRESSURE: 126 MMHG

## 2021-04-21 DIAGNOSIS — E86.9 VOLUME DEPLETION: Primary | ICD-10-CM

## 2021-04-21 PROCEDURE — 250N000011 HC RX IP 250 OP 636: Performed by: FAMILY MEDICINE

## 2021-04-21 PROCEDURE — 96360 HYDRATION IV INFUSION INIT: CPT

## 2021-04-21 PROCEDURE — 258N000003 HC RX IP 258 OP 636: Performed by: FAMILY MEDICINE

## 2021-04-21 RX ORDER — HEPARIN SODIUM (PORCINE) LOCK FLUSH IV SOLN 100 UNIT/ML 100 UNIT/ML
5 SOLUTION INTRAVENOUS ONCE
Status: CANCELLED
Start: 2021-04-21 | End: 2021-04-21

## 2021-04-21 RX ORDER — HEPARIN SODIUM (PORCINE) LOCK FLUSH IV SOLN 100 UNIT/ML 100 UNIT/ML
5 SOLUTION INTRAVENOUS ONCE
Status: COMPLETED | OUTPATIENT
Start: 2021-04-21 | End: 2021-04-21

## 2021-04-21 RX ADMIN — HEPARIN 5 ML: 100 SYRINGE at 10:35

## 2021-04-21 RX ADMIN — SODIUM CHLORIDE 1000 ML: 9 INJECTION, SOLUTION INTRAVENOUS at 09:33

## 2021-04-21 NOTE — PATIENT INSTRUCTIONS

## 2021-04-21 NOTE — PROGRESS NOTES
Patient is a 87 year old here today for infusion of IVF per order of Dr MALIA Ray.  Patient identified with two identifiers, order verified, and verbal consent for today's infusion obtained from patient.      Patient denies questions or concerns regarding infusion and/or medication(s) being administered.    Patients port accessed using non-coring, 20 gauge, 3/4 power needle. Port accessed per facility protocol. Port flushed easily, blood return noted.  No signs and symptoms of infection or infiltration.      0933 IV pump verified with IVF dose, drug, and rate of administration.  Infusion administered per protocol.  Patient tolerated infusion well, no signs or symptoms of adverse reaction noted.  Patient denies pain nor discomfort.     IV removed, catheter intact.  Site clean, dry and intact.  No signs or symptoms of infiltration or infection.  Covered with a sterile bandage, slight pressure applied for 30 seconds.  Pt instructed to leave bandage intact for a minimum of one hour, and to call with questions or concerns. Patient states understanding, discharged.

## 2021-04-28 ENCOUNTER — INFUSION THERAPY VISIT (OUTPATIENT)
Dept: INFUSION THERAPY | Facility: OTHER | Age: 86
End: 2021-04-28
Attending: FAMILY MEDICINE
Payer: MEDICARE

## 2021-04-28 DIAGNOSIS — E86.9 VOLUME DEPLETION: Primary | ICD-10-CM

## 2021-04-28 PROCEDURE — 250N000011 HC RX IP 250 OP 636: Performed by: FAMILY MEDICINE

## 2021-04-28 PROCEDURE — 258N000003 HC RX IP 258 OP 636: Performed by: FAMILY MEDICINE

## 2021-04-28 PROCEDURE — 96360 HYDRATION IV INFUSION INIT: CPT

## 2021-04-28 RX ORDER — HEPARIN SODIUM (PORCINE) LOCK FLUSH IV SOLN 100 UNIT/ML 100 UNIT/ML
5 SOLUTION INTRAVENOUS ONCE
Status: CANCELLED
Start: 2021-04-28 | End: 2021-04-28

## 2021-04-28 RX ORDER — HEPARIN SODIUM (PORCINE) LOCK FLUSH IV SOLN 100 UNIT/ML 100 UNIT/ML
5 SOLUTION INTRAVENOUS ONCE
Status: COMPLETED | OUTPATIENT
Start: 2021-04-28 | End: 2021-04-28

## 2021-04-28 RX ADMIN — SODIUM CHLORIDE 1000 ML: 9 INJECTION, SOLUTION INTRAVENOUS at 09:39

## 2021-04-28 RX ADMIN — HEPARIN 5 ML: 100 SYRINGE at 10:42

## 2021-04-28 NOTE — PATIENT INSTRUCTIONS
We will see you back as planned. If you have any questions or concerns, we can be reached Monday through Friday 8am - 430pm at [phone] ([facility] [place]). If you have concerns related to a potential reaction/side effect after hours/weekends/holiday's, please seek emergent medical care.

## 2021-04-28 NOTE — PROGRESS NOTES
Patient is an 87 year old male here accompanied by self  today for infusion of IVF per order of Dr Ray under the supervision of Dr Ray.  Patient identified with two identifiers, order verified, and verbal consent for today's infusion obtained from patient.      Patient  lab values:  Not required for today's process.  Patient meets order parameters for today's treatment.      Patients port accessed using non-coring, 19 gauge, 3/4 needle. Port accessed per facility protocol. Port flushed easily, blood return noted.  No signs and symptoms of infection or infiltration.      IV pump verified with dose, drug, and rate of administration.  Infusion administered per protocol.  Patient tolerated infusion well, no signs or symptoms of adverse reaction noted.  Patient denies pain nor discomfort.     IV removed, catheter intact.  Site clean, dry and intact.  No signs or symptoms of infiltration or infection.  Covered with a sterile bandage, slight pressure applied for 30 seconds.  Pt instructed to leave bandage intact for a minimum of one hour, and to call with questions or concerns.  Copy of appointments, discharge instructions, and after visit summary (AVS) provided to patient.  Patient states understanding, discharged.

## 2021-05-05 ENCOUNTER — INFUSION THERAPY VISIT (OUTPATIENT)
Dept: INFUSION THERAPY | Facility: OTHER | Age: 86
End: 2021-05-05
Attending: FAMILY MEDICINE
Payer: MEDICARE

## 2021-05-05 VITALS
TEMPERATURE: 94.9 F | DIASTOLIC BLOOD PRESSURE: 60 MMHG | SYSTOLIC BLOOD PRESSURE: 128 MMHG | HEART RATE: 77 BPM | OXYGEN SATURATION: 95 %

## 2021-05-05 DIAGNOSIS — E86.9 VOLUME DEPLETION: Primary | ICD-10-CM

## 2021-05-05 PROCEDURE — 96360 HYDRATION IV INFUSION INIT: CPT

## 2021-05-05 PROCEDURE — 258N000003 HC RX IP 258 OP 636: Performed by: FAMILY MEDICINE

## 2021-05-05 PROCEDURE — 250N000011 HC RX IP 250 OP 636: Performed by: FAMILY MEDICINE

## 2021-05-05 RX ORDER — HEPARIN SODIUM (PORCINE) LOCK FLUSH IV SOLN 100 UNIT/ML 100 UNIT/ML
5 SOLUTION INTRAVENOUS ONCE
Status: CANCELLED
Start: 2021-05-05 | End: 2021-05-05

## 2021-05-05 RX ORDER — HEPARIN SODIUM (PORCINE) LOCK FLUSH IV SOLN 100 UNIT/ML 100 UNIT/ML
5 SOLUTION INTRAVENOUS ONCE
Status: COMPLETED | OUTPATIENT
Start: 2021-05-05 | End: 2021-05-05

## 2021-05-05 RX ADMIN — SODIUM CHLORIDE 1000 ML: 9 INJECTION, SOLUTION INTRAVENOUS at 09:40

## 2021-05-05 RX ADMIN — HEPARIN 5 ML: 100 SYRINGE at 10:44

## 2021-05-05 NOTE — PROGRESS NOTES
Patient is a 87 year old female  here accompanied by self today for infusion of IVF per order of Dr. Ray under the supervision of Dr. Ray.  Patient identified with two identifiers, order verified, and verbal consent for today's infusion obtained from patient.      Patient  lab values:  Patient does not need Labs today.  Patient meets order parameters for today's treatment.     Patients port accessed using non-coring, 20 gauge, 3/4 power needle. Port accessed per facility protocol. Port flushed easily, blood return noted.  No signs and symptoms of infection or infiltration.      1043  IV pump verified with dose, drug, and rate of administration.  Infusion administered per protocol.  Patient tolerated infusion well, no signs or symptoms of adverse reaction noted.  Patient denies pain nor discomfort.     IV removed, catheter intact.  Site clean, dry and intact.  No signs or symptoms of infiltration or infection.  Covered with a sterile bandage, slight pressure applied for 30 seconds.  Pt instructed to leave bandage intact for a minimum of one hour, and to call with questions or concerns.  Copy of appointments, discharge instructions, and after visit summary (AVS) provided to patient.  Patient states understanding, discharged.

## 2021-05-05 NOTE — PATIENT INSTRUCTIONS
We will see you back as planned. If you have any questions or concerns, we can be reached Monday through Friday 8am - 430pm at [phone] ([facility] [place]). If you have concerns related to a potential reaction/side effect after hours/weekends/holiday's, please seek emergent medical care.

## 2021-05-12 ENCOUNTER — INFUSION THERAPY VISIT (OUTPATIENT)
Dept: INFUSION THERAPY | Facility: OTHER | Age: 86
End: 2021-05-12
Attending: FAMILY MEDICINE
Payer: MEDICARE

## 2021-05-12 VITALS
DIASTOLIC BLOOD PRESSURE: 76 MMHG | SYSTOLIC BLOOD PRESSURE: 138 MMHG | HEART RATE: 88 BPM | TEMPERATURE: 98.6 F | OXYGEN SATURATION: 96 %

## 2021-05-12 DIAGNOSIS — E86.9 VOLUME DEPLETION: Primary | ICD-10-CM

## 2021-05-12 PROCEDURE — 96360 HYDRATION IV INFUSION INIT: CPT

## 2021-05-12 PROCEDURE — 258N000003 HC RX IP 258 OP 636: Performed by: FAMILY MEDICINE

## 2021-05-12 PROCEDURE — 250N000011 HC RX IP 250 OP 636: Performed by: FAMILY MEDICINE

## 2021-05-12 RX ORDER — HEPARIN SODIUM (PORCINE) LOCK FLUSH IV SOLN 100 UNIT/ML 100 UNIT/ML
5 SOLUTION INTRAVENOUS ONCE
Status: COMPLETED | OUTPATIENT
Start: 2021-05-12 | End: 2021-05-12

## 2021-05-12 RX ORDER — HEPARIN SODIUM (PORCINE) LOCK FLUSH IV SOLN 100 UNIT/ML 100 UNIT/ML
5 SOLUTION INTRAVENOUS ONCE
Status: CANCELLED
Start: 2021-05-12 | End: 2021-05-12

## 2021-05-12 RX ADMIN — SODIUM CHLORIDE 1000 ML: 9 INJECTION, SOLUTION INTRAVENOUS at 09:30

## 2021-05-12 RX ADMIN — HEPARIN 5 ML: 100 SYRINGE at 10:36

## 2021-05-12 ASSESSMENT — PAIN SCALES - GENERAL: PAINLEVEL: NO PAIN (0)

## 2021-05-12 NOTE — PROGRESS NOTES
Patient is a 87  here accompanied by self today for infusion of IVF per order of Dr. Ray  Patient identified with two identifiers, order verified, and verbal consent for today's infusion obtained from patient.      Patient lab values:      Patient meets order parameters for today's treatment.  Patients port accessed using non-coring, 19 gauge, 3/4 needle. Port accessed per facility protocol. Port flushed easily, blood return noted.  No signs and symptoms of infection or infiltration.      IV pump verified with dose, drug, and rate of administration.  Infusion administered per protocol.  Patient tolerated infusion well, no signs or symptoms of adverse reaction noted.  Patient denies pain nor discomfort.     IV removed, catheter intact.  Site clean, dry and intact.  No signs or symptoms of infiltration or infection.  Covered with a sterile bandage, slight pressure applied for 30 seconds.  Pt instructed to leave bandage intact for a minimum of one hour, and to call with questions or concerns.  Copy of appointments, discharge instructions, and after visit summary (AVS) provided to patient.  Patient states understanding, discharged.

## 2021-05-12 NOTE — PROGRESS NOTES
Patient tolerated infusion well, no signs or symptoms of adverse reaction noted.  Patient denies pain nor discomfort.     IV removed, catheter intact.  Site clean, dry and intact.  No signs or symptoms of infiltration or infection.  Covered with a sterile bandage, slight pressure applied for 30 seconds.  Pt instructed to leave bandage intact for a minimum of one hour, and to call with questions or concerns. CATE Bardales, already called ride.  Patient states understanding, discharged.

## 2021-05-19 ENCOUNTER — INFUSION THERAPY VISIT (OUTPATIENT)
Dept: INFUSION THERAPY | Facility: OTHER | Age: 86
End: 2021-05-19
Attending: FAMILY MEDICINE
Payer: MEDICARE

## 2021-05-19 VITALS
SYSTOLIC BLOOD PRESSURE: 133 MMHG | TEMPERATURE: 98 F | OXYGEN SATURATION: 95 % | DIASTOLIC BLOOD PRESSURE: 71 MMHG | HEART RATE: 69 BPM

## 2021-05-19 DIAGNOSIS — E86.9 VOLUME DEPLETION: Primary | ICD-10-CM

## 2021-05-19 PROCEDURE — 96360 HYDRATION IV INFUSION INIT: CPT

## 2021-05-19 PROCEDURE — 258N000003 HC RX IP 258 OP 636: Performed by: FAMILY MEDICINE

## 2021-05-19 PROCEDURE — 250N000011 HC RX IP 250 OP 636: Performed by: FAMILY MEDICINE

## 2021-05-19 RX ORDER — HEPARIN SODIUM (PORCINE) LOCK FLUSH IV SOLN 100 UNIT/ML 100 UNIT/ML
5 SOLUTION INTRAVENOUS ONCE
Status: COMPLETED | OUTPATIENT
Start: 2021-05-19 | End: 2021-05-19

## 2021-05-19 RX ORDER — HEPARIN SODIUM (PORCINE) LOCK FLUSH IV SOLN 100 UNIT/ML 100 UNIT/ML
5 SOLUTION INTRAVENOUS ONCE
Status: CANCELLED
Start: 2021-05-19 | End: 2021-05-19

## 2021-05-19 RX ADMIN — HEPARIN 5 ML: 100 SYRINGE at 10:42

## 2021-05-19 RX ADMIN — SODIUM CHLORIDE 1000 ML: 9 INJECTION, SOLUTION INTRAVENOUS at 09:41

## 2021-05-19 NOTE — PROGRESS NOTES
Patient is a 87 year old here accompanied by self today for infusion of IVF  per order of DR. Ray under the supervision of Dr. Ray  Patient identified with two identifiers, order verified, and verbal consent for today's infusion obtained from patient.      Patient lab values: Patient Labs not required for today.  Patient meets order parameters for today's treatment.            Patient tolerated infusion well, no signs or symptoms of adverse reaction noted.  Patient denies pain nor discomfort.     IV removed, catheter intact.  Site clean, dry and intact.  No signs or symptoms of infiltration or infection.  Covered with a sterile bandage, slight pressure applied for 30 seconds.  Pt instructed to leave bandage intact for a minimum of one hour, and to call with questions or concerns.  Copy of appointments, discharge instructions, and after visit summary (AVS) provided to patient.  Patient states understanding, discharged.

## 2021-05-19 NOTE — PROGRESS NOTES
Patient is a 87 year old male here accompanied by self today for infusion of IVF per order of Dr MALIA Ray.  Patient identified with two identifiers, order verified, and verbal consent for today's infusion obtained from patient.      Patients port accessed using non-coring, 20 gauge, 3/4 needle. Port accessed per facility protocol. Port flushed easily, blood return noted.  No signs and symptoms of infection or infiltration.      0941: IV pump verified with dose, drug, and rate of administration.  Infusion administered per protocol.

## 2021-05-26 ENCOUNTER — INFUSION THERAPY VISIT (OUTPATIENT)
Dept: INFUSION THERAPY | Facility: OTHER | Age: 86
End: 2021-05-26
Attending: FAMILY MEDICINE
Payer: MEDICARE

## 2021-05-26 VITALS
HEART RATE: 71 BPM | OXYGEN SATURATION: 97 % | DIASTOLIC BLOOD PRESSURE: 59 MMHG | TEMPERATURE: 97.6 F | SYSTOLIC BLOOD PRESSURE: 109 MMHG | RESPIRATION RATE: 18 BRPM

## 2021-05-26 DIAGNOSIS — E86.9 VOLUME DEPLETION: Primary | ICD-10-CM

## 2021-05-26 PROCEDURE — 258N000003 HC RX IP 258 OP 636: Performed by: FAMILY MEDICINE

## 2021-05-26 PROCEDURE — 96360 HYDRATION IV INFUSION INIT: CPT

## 2021-05-26 PROCEDURE — 250N000011 HC RX IP 250 OP 636: Performed by: FAMILY MEDICINE

## 2021-05-26 RX ORDER — HEPARIN SODIUM (PORCINE) LOCK FLUSH IV SOLN 100 UNIT/ML 100 UNIT/ML
5 SOLUTION INTRAVENOUS ONCE
Status: COMPLETED | OUTPATIENT
Start: 2021-05-26 | End: 2021-05-26

## 2021-05-26 RX ORDER — HEPARIN SODIUM (PORCINE) LOCK FLUSH IV SOLN 100 UNIT/ML 100 UNIT/ML
5 SOLUTION INTRAVENOUS ONCE
Status: CANCELLED
Start: 2021-05-26 | End: 2021-05-26

## 2021-05-26 RX ADMIN — SODIUM CHLORIDE 1000 ML: 9 INJECTION, SOLUTION INTRAVENOUS at 09:42

## 2021-05-26 RX ADMIN — HEPARIN 5 ML: 100 SYRINGE at 10:48

## 2021-05-26 NOTE — PROGRESS NOTES
Patient is a 87 year old here today for infusion of IVF per order of Dr MALIA Ray.  Patient identified with two identifiers, order verified, and verbal consent for today's infusion obtained from patient.      Patient meets order parameters for today's treatment.     Patient denies questions or concerns regarding infusion and/or medication(s) being administered.    Patients port accessed using non-coring, 19 gauge, 3/4 inch power needle. Port accessed per facility protocol. Port flushed easily, blood return noted.  No signs and symptoms of infection or infiltration.      0942 IV pump verified with IVF dose, drug, and rate of administration.  Infusion administered per protocol.  Patient tolerated infusion well, no signs or symptoms of adverse reaction noted.  Patient denies pain nor discomfort.     IV removed, catheter intact.  Site clean, dry and intact.  No signs or symptoms of infiltration or infection.  Covered with a sterile bandage, slight pressure applied for 30 seconds.  Pt instructed to leave bandage intact for a minimum of one hour, and to call with questions or concerns. Patient states understanding, discharged.

## 2021-05-26 NOTE — PATIENT INSTRUCTIONS

## 2021-06-01 ENCOUNTER — TELEPHONE (OUTPATIENT)
Dept: FAMILY MEDICINE | Facility: OTHER | Age: 86
End: 2021-06-01

## 2021-06-02 ENCOUNTER — INFUSION THERAPY VISIT (OUTPATIENT)
Dept: INFUSION THERAPY | Facility: OTHER | Age: 86
End: 2021-06-02
Attending: FAMILY MEDICINE
Payer: MEDICARE

## 2021-06-02 VITALS
SYSTOLIC BLOOD PRESSURE: 112 MMHG | HEART RATE: 78 BPM | RESPIRATION RATE: 16 BRPM | OXYGEN SATURATION: 97 % | DIASTOLIC BLOOD PRESSURE: 67 MMHG | TEMPERATURE: 96.7 F

## 2021-06-02 DIAGNOSIS — E86.9 VOLUME DEPLETION: Primary | ICD-10-CM

## 2021-06-02 PROCEDURE — 96360 HYDRATION IV INFUSION INIT: CPT

## 2021-06-02 PROCEDURE — 250N000011 HC RX IP 250 OP 636: Performed by: FAMILY MEDICINE

## 2021-06-02 PROCEDURE — 258N000003 HC RX IP 258 OP 636: Performed by: FAMILY MEDICINE

## 2021-06-02 RX ORDER — HEPARIN SODIUM (PORCINE) LOCK FLUSH IV SOLN 100 UNIT/ML 100 UNIT/ML
5 SOLUTION INTRAVENOUS ONCE
Status: COMPLETED | OUTPATIENT
Start: 2021-06-02 | End: 2021-06-02

## 2021-06-02 RX ORDER — HEPARIN SODIUM (PORCINE) LOCK FLUSH IV SOLN 100 UNIT/ML 100 UNIT/ML
5 SOLUTION INTRAVENOUS ONCE
Status: CANCELLED
Start: 2021-06-02 | End: 2021-06-02

## 2021-06-02 RX ADMIN — SODIUM CHLORIDE 1000 ML: 9 INJECTION, SOLUTION INTRAVENOUS at 09:33

## 2021-06-02 RX ADMIN — HEPARIN 5 ML: 100 SYRINGE at 10:41

## 2021-06-02 NOTE — PATIENT INSTRUCTIONS

## 2021-06-02 NOTE — PROGRESS NOTES
Patient is a 87 year old here today for infusion of IVF per order of Dr MALIA Ray.  Patient identified with two identifiers, order verified, and verbal consent for today's infusion obtained from patient.      Patient denies questions or concerns regarding infusion and/or medication(s) being administered.    Patients port accessed using non-coring, 20 gauge, 3/4 inch power needle. Port accessed per facility protocol. Port flushed easily, blood return noted.  No signs and symptoms of infection or infiltration.      0933 IV pump verified with IVF dose, drug, and rate of administration. Infusion administered per protocol. Patient tolerated infusion well, no signs or symptoms of adverse reaction noted. Patient denies pain nor discomfort.     IV removed, catheter intact. Site clean, dry and intact. No signs or symptoms of infiltration or infection. Covered with a sterile bandage, slight pressure applied for 30 seconds. Pt instructed to leave bandage intact for a minimum of one hour, and to call with questions or concerns. Patient states understanding, discharged.

## 2021-06-07 ENCOUNTER — TELEPHONE (OUTPATIENT)
Dept: FAMILY MEDICINE | Facility: OTHER | Age: 86
End: 2021-06-07

## 2021-06-07 DIAGNOSIS — S32.010A COMPRESSION FRACTURE OF L1 LUMBAR VERTEBRA, CLOSED, INITIAL ENCOUNTER (H): ICD-10-CM

## 2021-06-07 RX ORDER — ACETAMINOPHEN 325 MG/1
650 TABLET ORAL EVERY 4 HOURS PRN
COMMUNITY
Start: 2021-06-07 | End: 2022-03-23

## 2021-06-09 ENCOUNTER — INFUSION THERAPY VISIT (OUTPATIENT)
Dept: INFUSION THERAPY | Facility: OTHER | Age: 86
End: 2021-06-09
Attending: FAMILY MEDICINE
Payer: MEDICARE

## 2021-06-09 VITALS
SYSTOLIC BLOOD PRESSURE: 123 MMHG | TEMPERATURE: 96.4 F | DIASTOLIC BLOOD PRESSURE: 73 MMHG | HEART RATE: 83 BPM | OXYGEN SATURATION: 97 %

## 2021-06-09 DIAGNOSIS — E86.9 VOLUME DEPLETION: Primary | ICD-10-CM

## 2021-06-09 PROCEDURE — 96360 HYDRATION IV INFUSION INIT: CPT

## 2021-06-09 PROCEDURE — 250N000011 HC RX IP 250 OP 636: Performed by: FAMILY MEDICINE

## 2021-06-09 PROCEDURE — 258N000003 HC RX IP 258 OP 636: Performed by: FAMILY MEDICINE

## 2021-06-09 RX ORDER — HEPARIN SODIUM (PORCINE) LOCK FLUSH IV SOLN 100 UNIT/ML 100 UNIT/ML
5 SOLUTION INTRAVENOUS ONCE
Status: CANCELLED
Start: 2021-06-09 | End: 2021-06-09

## 2021-06-09 RX ORDER — HEPARIN SODIUM (PORCINE) LOCK FLUSH IV SOLN 100 UNIT/ML 100 UNIT/ML
5 SOLUTION INTRAVENOUS ONCE
Status: COMPLETED | OUTPATIENT
Start: 2021-06-09 | End: 2021-06-09

## 2021-06-09 RX ADMIN — HEPARIN 5 ML: 100 SYRINGE at 14:33

## 2021-06-09 RX ADMIN — SODIUM CHLORIDE 1000 ML: 9 INJECTION, SOLUTION INTRAVENOUS at 13:14

## 2021-06-09 NOTE — PROGRESS NOTES
Patient is a 87 year old here today for infusion of IVF per order of Dr MALIA Ray.  Patient identified with two identifiers, order verified, and verbal consent for today's infusion obtained from patient.        Patient meets order parameters for today's treatment.     Patient denies questions or concerns regarding infusion and/or medication(s) being administered.    Patients port accessed using non-coring, 20 gauge, 3/4 inch power needle. Port accessed per facility protocol. Port flushed easily, blood return noted.  No signs and symptoms of infection or infiltration.      1314 IV pump verified with IVF dose, drug, and rate of administration.  Infusion administered per protocol.  Patient tolerated infusion well, no signs or symptoms of adverse reaction noted.  Patient denies pain nor discomfort.     IV removed, catheter intact.  Site clean, dry and intact.  No signs or symptoms of infiltration or infection.  Covered with a sterile bandage, slight pressure applied for 30 seconds.  Pt instructed to leave bandage intact for a minimum of one hour, and to call with questions or concerns.  Copy of appointments, discharge instructions, and after visit summary (AVS) provided to patient.  Patient states understanding, discharged.

## 2021-06-09 NOTE — PATIENT INSTRUCTIONS
Thank you for scheduling your appointment with us today. If you have any questions or concerns related to procedure/medication at today's visit, please contact your primary care provider, or the provider who ordered today's procedure/medication. If you have any questions related to scheduling with our unit, or if you are having trouble getting in contact with your ordering provider, we can be reached at [phone].     At your visit today, we discussed your risk for falls and preventive options.    Fall Prevention  Falls often occur due to slipping, tripping or losing your balance. Millions of people fall every year and injure themselves. Here are ways to reduce your risk of falling again.     Think about your fall, was there anything that caused your fall that can be fixed, removed, or replaced?    Make your home safe by keeping walkways clear of objects you may trip over, such as electric cords.    Use non-slip pads under rugs. Don't use area rugs or small throw rugs.    Use non-slip mats in bathtubs and showers.    Install handrails and lights on staircases. The handrails should be on both sides of the stairs.    Don't walk in poorly lit areas.    Don't stand on chairs or wobbly ladders.    Use caution when reaching overhead or looking upward. This position can cause a loss of balance.    Be sure your shoes fit properly, have non-slip bottoms and are in good condition.     Wear shoes both inside and out. Don't go barefoot or wear slippers.    Be cautious when going up and down stairs, curbs, and when walking on uneven sidewalks.    If your balance is poor, consider using a cane or walker.    If your fall was related to alcohol use, stop or limit alcohol intake.     If your fall was related to use of sleeping medicines, talk to your healthcare provider about this. You may need to reduce your dosage at bedtime if you awaken during the night to go to the bathroom.      To reduce the need for nighttime bathroom  trips:  ? Don't drink fluids for several hours before going to bed  ? Empty your bladder before going to bed  ? Men can keep a urinal at the bedside    Stay as active as you can. Balance, flexibility, strength, and endurance all come from exercise. They all play a role in preventing falls. Ask your healthcare provider which types of activity are right for you.    Get your vision checked on a regular basis.    If you have pets, know where they are before you stand up or walk so you don't trip over them.    Use night lights.    Go over all your medicines with a pharmacist or other healthcare provider to see if any of them could make you more likely to fall.  [employer] last reviewed this educational content on 4/1/2018 2000-2021 [employer]. All rights reserved. This information is not intended as a substitute for professional medical care. Always follow your healthcare professional's instructions.

## 2021-06-16 ENCOUNTER — INFUSION THERAPY VISIT (OUTPATIENT)
Dept: INFUSION THERAPY | Facility: OTHER | Age: 86
End: 2021-06-16
Attending: FAMILY MEDICINE
Payer: MEDICARE

## 2021-06-16 VITALS
DIASTOLIC BLOOD PRESSURE: 96 MMHG | OXYGEN SATURATION: 97 % | HEART RATE: 71 BPM | SYSTOLIC BLOOD PRESSURE: 139 MMHG | TEMPERATURE: 96.2 F

## 2021-06-16 DIAGNOSIS — E86.9 VOLUME DEPLETION: Primary | ICD-10-CM

## 2021-06-16 PROCEDURE — 258N000003 HC RX IP 258 OP 636: Performed by: FAMILY MEDICINE

## 2021-06-16 PROCEDURE — 96365 THER/PROPH/DIAG IV INF INIT: CPT

## 2021-06-16 PROCEDURE — 96360 HYDRATION IV INFUSION INIT: CPT

## 2021-06-16 PROCEDURE — 250N000011 HC RX IP 250 OP 636: Performed by: FAMILY MEDICINE

## 2021-06-16 RX ORDER — HEPARIN SODIUM (PORCINE) LOCK FLUSH IV SOLN 100 UNIT/ML 100 UNIT/ML
5 SOLUTION INTRAVENOUS ONCE
Status: COMPLETED | OUTPATIENT
Start: 2021-06-16 | End: 2021-06-16

## 2021-06-16 RX ORDER — HEPARIN SODIUM (PORCINE) LOCK FLUSH IV SOLN 100 UNIT/ML 100 UNIT/ML
5 SOLUTION INTRAVENOUS ONCE
Status: CANCELLED
Start: 2021-06-16 | End: 2021-06-16

## 2021-06-16 RX ADMIN — SODIUM CHLORIDE 1000 ML: 9 INJECTION, SOLUTION INTRAVENOUS at 09:26

## 2021-06-16 RX ADMIN — HEPARIN 5 ML: 100 SYRINGE at 10:40

## 2021-06-16 NOTE — PROGRESS NOTES
"Patient is a 87 year old  here accompanied by self today for infusion of IVF per order of Dr MALIA Ray.  Patient identified with two identifiers, order verified, and verbal consent for today's infusion obtained from patient.      Patients port accessed using non-coring, 20 gauge, 3/4\" needle. Port accessed per facility protocol. Port flushed easily, blood return noted.  No signs and symptoms of infection or infiltration.      IV pump verified with dose, drug, and rate of administration.  Infusion administered per protocol.  Patient tolerated infusion well, no signs or symptoms of adverse reaction noted.  Patient denies pain nor discomfort.     IV removed, catheter intact.  Site clean, dry and intact.  No signs or symptoms of infiltration or infection.  Covered with a sterile bandage, slight pressure applied for 30 seconds.  Pt instructed to leave bandage intact for a minimum of one hour, and to call with questions or concerns.  Copy of appointments, discharge instructions, and after visit summary (AVS) provided to patient.  Patient states understanding, discharged.    "

## 2021-06-22 ENCOUNTER — HOSPITAL ENCOUNTER (EMERGENCY)
Facility: HOSPITAL | Age: 86
Discharge: NURSING, REHAB OR LONG-TERM CARE | End: 2021-06-22
Attending: STUDENT IN AN ORGANIZED HEALTH CARE EDUCATION/TRAINING PROGRAM | Admitting: STUDENT IN AN ORGANIZED HEALTH CARE EDUCATION/TRAINING PROGRAM
Payer: MEDICARE

## 2021-06-22 ENCOUNTER — APPOINTMENT (OUTPATIENT)
Dept: CT IMAGING | Facility: HOSPITAL | Age: 86
End: 2021-06-22
Attending: STUDENT IN AN ORGANIZED HEALTH CARE EDUCATION/TRAINING PROGRAM
Payer: MEDICARE

## 2021-06-22 VITALS
SYSTOLIC BLOOD PRESSURE: 166 MMHG | OXYGEN SATURATION: 95 % | DIASTOLIC BLOOD PRESSURE: 107 MMHG | HEART RATE: 70 BPM | RESPIRATION RATE: 12 BRPM
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
## 2021-06-22 DIAGNOSIS — S22.068A OTHER CLOSED FRACTURE OF EIGHTH THORACIC VERTEBRA, INITIAL ENCOUNTER (H): ICD-10-CM

## 2021-06-22 LAB
ABO + RH BLD: NORMAL
ABO + RH BLD: NORMAL
ALBUMIN SERPL-MCNC: 3.5 G/DL (ref 3.4–5)
ALP SERPL-CCNC: 71 U/L (ref 40–150)
ALT SERPL W P-5'-P-CCNC: 23 U/L (ref 0–70)
ANION GAP SERPL CALCULATED.3IONS-SCNC: 5 MMOL/L (ref 3–14)
APTT PPP: 30 SEC (ref 22–37)
AST SERPL W P-5'-P-CCNC: 18 U/L (ref 0–45)
BASOPHILS # BLD AUTO: 0 10E9/L (ref 0–0.2)
BASOPHILS NFR BLD AUTO: 0.6 %
BILIRUB SERPL-MCNC: 0.5 MG/DL (ref 0.2–1.3)
BLD GP AB SCN SERPL QL: NORMAL
BLOOD BANK CMNT PATIENT-IMP: NORMAL
BUN SERPL-MCNC: 15 MG/DL (ref 7–30)
CALCIUM SERPL-MCNC: 8.9 MG/DL (ref 8.5–10.1)
CHLORIDE SERPL-SCNC: 108 MMOL/L (ref 94–109)
CO2 SERPL-SCNC: 28 MMOL/L (ref 20–32)
CREAT SERPL-MCNC: 1.02 MG/DL (ref 0.66–1.25)
DIFFERENTIAL METHOD BLD: NORMAL
EOSINOPHIL # BLD AUTO: 0.1 10E9/L (ref 0–0.7)
EOSINOPHIL NFR BLD AUTO: 2.1 %
ERYTHROCYTE [DISTWIDTH] IN BLOOD BY AUTOMATED COUNT: 11.8 % (ref 10–15)
ETHANOL SERPL-MCNC: <0.01 G/DL
GFR SERPL CREATININE-BSD FRML MDRD: 65 ML/MIN/{1.73_M2}
GLUCOSE SERPL-MCNC: 137 MG/DL (ref 70–99)
HCT VFR BLD AUTO: 43.9 % (ref 40–53)
HGB BLD-MCNC: 14.8 G/DL (ref 13.3–17.7)
IMM GRANULOCYTES # BLD: 0 10E9/L (ref 0–0.4)
IMM GRANULOCYTES NFR BLD: 0.3 %
INR PPP: 1.3 (ref 0.86–1.14)
LACTATE BLD-SCNC: 1.5 MMOL/L (ref 0.7–2)
LYMPHOCYTES # BLD AUTO: 1.4 10E9/L (ref 0.8–5.3)
LYMPHOCYTES NFR BLD AUTO: 22.9 %
MCH RBC QN AUTO: 31.8 PG (ref 26.5–33)
MCHC RBC AUTO-ENTMCNC: 33.7 G/DL (ref 31.5–36.5)
MCV RBC AUTO: 94 FL (ref 78–100)
MONOCYTES # BLD AUTO: 0.5 10E9/L (ref 0–1.3)
MONOCYTES NFR BLD AUTO: 8.6 %
NEUTROPHILS # BLD AUTO: 4.1 10E9/L (ref 1.6–8.3)
NEUTROPHILS NFR BLD AUTO: 65.5 %
NRBC # BLD AUTO: 0 10*3/UL
NRBC BLD AUTO-RTO: 0 /100
PLATELET # BLD AUTO: 230 10E9/L (ref 150–450)
POTASSIUM SERPL-SCNC: 4.4 MMOL/L (ref 3.4–5.3)
PROT SERPL-MCNC: 6.9 G/DL (ref 6.8–8.8)
RBC # BLD AUTO: 4.65 10E12/L (ref 4.4–5.9)
SODIUM SERPL-SCNC: 141 MMOL/L (ref 133–144)
SPECIMEN EXP DATE BLD: NORMAL
WBC # BLD AUTO: 6.3 10E9/L (ref 4–11)

## 2021-06-22 PROCEDURE — 93005 ELECTROCARDIOGRAM TRACING: CPT

## 2021-06-22 PROCEDURE — 80053 COMPREHEN METABOLIC PANEL: CPT | Performed by: STUDENT IN AN ORGANIZED HEALTH CARE EDUCATION/TRAINING PROGRAM

## 2021-06-22 PROCEDURE — 72125 CT NECK SPINE W/O DYE: CPT | Mod: MG

## 2021-06-22 PROCEDURE — 71260 CT THORAX DX C+: CPT | Mod: MG

## 2021-06-22 PROCEDURE — 36415 COLL VENOUS BLD VENIPUNCTURE: CPT

## 2021-06-22 PROCEDURE — 85025 COMPLETE CBC W/AUTO DIFF WBC: CPT | Performed by: STUDENT IN AN ORGANIZED HEALTH CARE EDUCATION/TRAINING PROGRAM

## 2021-06-22 PROCEDURE — 86900 BLOOD TYPING SEROLOGIC ABO: CPT | Performed by: STUDENT IN AN ORGANIZED HEALTH CARE EDUCATION/TRAINING PROGRAM

## 2021-06-22 PROCEDURE — 85610 PROTHROMBIN TIME: CPT | Performed by: STUDENT IN AN ORGANIZED HEALTH CARE EDUCATION/TRAINING PROGRAM

## 2021-06-22 PROCEDURE — 86850 RBC ANTIBODY SCREEN: CPT | Performed by: STUDENT IN AN ORGANIZED HEALTH CARE EDUCATION/TRAINING PROGRAM

## 2021-06-22 PROCEDURE — 93010 ELECTROCARDIOGRAM REPORT: CPT | Performed by: INTERNAL MEDICINE

## 2021-06-22 PROCEDURE — 99291 CRITICAL CARE FIRST HOUR: CPT | Mod: 25

## 2021-06-22 PROCEDURE — 82077 ASSAY SPEC XCP UR&BREATH IA: CPT | Performed by: STUDENT IN AN ORGANIZED HEALTH CARE EDUCATION/TRAINING PROGRAM

## 2021-06-22 PROCEDURE — 83605 ASSAY OF LACTIC ACID: CPT | Performed by: STUDENT IN AN ORGANIZED HEALTH CARE EDUCATION/TRAINING PROGRAM

## 2021-06-22 PROCEDURE — 72128 CT CHEST SPINE W/O DYE: CPT | Mod: MG

## 2021-06-22 PROCEDURE — 70450 CT HEAD/BRAIN W/O DYE: CPT | Mod: MG

## 2021-06-22 PROCEDURE — 999N000157 HC STATISTIC RCP TIME EA 10 MIN

## 2021-06-22 PROCEDURE — 99291 CRITICAL CARE FIRST HOUR: CPT | Performed by: STUDENT IN AN ORGANIZED HEALTH CARE EDUCATION/TRAINING PROGRAM

## 2021-06-22 PROCEDURE — G1004 CDSM NDSC: HCPCS

## 2021-06-22 PROCEDURE — 86901 BLOOD TYPING SEROLOGIC RH(D): CPT | Performed by: STUDENT IN AN ORGANIZED HEALTH CARE EDUCATION/TRAINING PROGRAM

## 2021-06-22 PROCEDURE — G0390 TRAUMA RESPONS W/HOSP CRITI: HCPCS

## 2021-06-22 PROCEDURE — 255N000002 HC RX 255 OP 636: Performed by: RADIOLOGY

## 2021-06-22 PROCEDURE — 85730 THROMBOPLASTIN TIME PARTIAL: CPT | Performed by: STUDENT IN AN ORGANIZED HEALTH CARE EDUCATION/TRAINING PROGRAM

## 2021-06-22 RX ORDER — LIDOCAINE 4 G/G
1 PATCH TOPICAL ONCE
Status: DISCONTINUED | OUTPATIENT
Start: 2021-06-22 | End: 2021-06-23 | Stop reason: HOSPADM

## 2021-06-22 RX ORDER — IOPAMIDOL 612 MG/ML
100 INJECTION, SOLUTION INTRAVASCULAR ONCE
Status: COMPLETED | OUTPATIENT
Start: 2021-06-22 | End: 2021-06-22

## 2021-06-22 RX ADMIN — IOPAMIDOL 100 ML: 612 INJECTION, SOLUTION INTRAVENOUS at 14:20

## 2021-06-22 ASSESSMENT — ENCOUNTER SYMPTOMS
WEAKNESS: 0
HEADACHES: 1
ABDOMINAL PAIN: 0
FEVER: 0
HEMATURIA: 0
SHORTNESS OF BREATH: 0
BACK PAIN: 1
NAUSEA: 0
NECK PAIN: 0
EYE DISCHARGE: 0
NUMBNESS: 0
DIZZINESS: 0
SORE THROAT: 0
WOUND: 1
RHINORRHEA: 0
ABDOMINAL DISTENTION: 0
VOMITING: 0
DIARRHEA: 0
CHILLS: 0
WHEEZING: 0
COUGH: 0
EYE PAIN: 0

## 2021-06-22 NOTE — ED NOTES
[facility] transportation called and advised [employer]'s phone not working. [employer] will be here when he is able.

## 2021-06-22 NOTE — ED NOTES
"[facility] is here at this time.  Patient was able to stand and sit into a wheelchair.  Patient then asked to use the urinal.  Staff x 2 assist with using a urinal; tolerated well.  Wife went home \"I will text you later\" and patient returned to nursing home with [facility].  "

## 2021-06-22 NOTE — DISCHARGE INSTRUCTIONS
Please use acetaminophen and ibuprofen as needed for pain relief, as well as lidocaine patches follow-up with orthopedics as needed based on back pain symptoms. Please follow up with your primary care provider as discussed, return to the Emergency Department should your symptoms worsen or change.

## 2021-06-22 NOTE — ED NOTES
Bed: ED09a  Expected date:   Expected time:   Means of arrival:   Comments:  [facility]

## 2021-06-22 NOTE — ED PROVIDER NOTES
History     Chief Complaint   Patient presents with     Fall     HPI  Jf Ortiz is a 87 year old male who presents via EMS after a fall earlier today at his nursing facility, fell onto his head, unclear LOC.  No other recent injuries or illnesses, patient does have a history significant for prior compression fractures of the spine.  Has had prior falls.  Patient is anticoagulated.  Patient denies any symptoms other than head pain on arrival.    Allergies:  Allergies   Allergen Reactions     Cats Unknown     Lamotrigine      Skin lesions       Problem List:    Patient Active Problem List    Diagnosis Date Noted     Closed wedge compression fracture of lumbar vertebra with routine healing 12/20/2020     Priority: Medium     Compression fracture of L1 lumbar vertebra, closed, initial encounter (H) 12/18/2020     Priority: Medium     Compression fracture of thoracic vertebra, initial encounter, unspecified thoracic vertebral level (H) 12/18/2020     Priority: Medium     Longstanding persistent atrial fibrillation (H) 04/13/2020     Priority: Medium     Falls frequently 04/13/2020     Priority: Medium     Frequent falls 04/13/2020     Priority: Medium     Coronary artery disease involving native coronary artery without angina pectoris 04/13/2020     Priority: Medium     Constipation, unspecified constipation type 04/11/2018     Priority: Medium     Pacemaker, [employer], Dual Chamber - Dependent 10/06/2017     Priority: Medium     Lewy body dementia without behavioral disturbance (H) 08/31/2017     Priority: Medium     Fatigue 08/31/2017     Priority: Medium     Urinary incontinence 08/31/2017     Priority: Medium     Autonomic orthostatic hypotension 10/14/2016     Priority: Medium     Dementia without behavioral disturbance (H) 07/28/2015     Priority: Medium     Diagnosis updated by automated process. Provider to review and confirm.       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 Provider to review       Diaphragmatic hernia 01/01/2011     Priority: Medium     Problem list name updated by automated process. 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     Priority: Medium     Overview:   IMO Update 10/11       Hypertension with target blood pressure goal under 150/90 09/05/2006     Priority: Medium     Overview:   IMO Update          Past Medical History:    Past Medical History:   Diagnosis Date     Autonomic orthostatic hypotension 10/14/2016     Coronary artery disease      Dementia without behavioral disturbance (H) 7/28/2015     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     Seizure disorder (H)      Stented coronary artery        Past Surgical History:    Past Surgical History:   Procedure Laterality Date     ------------OTHER-------------  1955    ulnar and radial fx - repair ulnar and radial fx x4     ------------OTHER-------------  6/14/2011    cataract extraction     BIOPSY  08/2015    skin biopsy     BLEPHAROPLASTY BILATERAL  5/6/2014    Procedure:  BLEPHAROPLASTY BILATERAL;  Surgeon: Andrew Queen MD;  Location: [facility]     BYPASS GRAFT ARTERY CORONARY  11/2006    coronary artery disease x 5, [facility] [place]     cataract extraction and lens implantation  2011    cataracts     cataract extraction and lens implantation      cataracts     COLONOSCOPY  2012     colonoscopy with polypectomy  3/13/2009    history of polyps - repeat 3 yrs     colonoscopy with polypectomy  2006     colonoscopy with polypectomy  2005     COMBINED COLONOSCOPY WITH ARGON PLASMA COAGULATOR (APC) N/A 10/31/2014    Procedure: COMBINED COLONOSCOPY WITH ARGON PLASMA COAGULATOR (APC);  Surgeon: Bassam Aguilar MD;  Location: [facility]     COMBINED COLONOSCOPY WITH ARGON PLASMA COAGULATOR (APC) N/A 11/13/2015    Procedure: COMBINED COLONOSCOPY WITH ARGON PLASMA COAGULATOR (APC);  Surgeon: Bassam Aguilar MD;  Location: [facility]     ENDOSCOPY UPPER, COLONOSCOPY, COMBINED N/A 10/31/2014    Procedure: COMBINED ENDOSCOPY UPPER, COLONOSCOPY;  Surgeon: Bassam Aguilar MD;  [facility]: [facility]     ENDOSCOPY UPPER, COLONOSCOPY, COMBINED N/A 11/13/2015    Procedure: COMBINED ENDOSCOPY UPPER, COLONOSCOPY;  Surgeon: Bassam Aguilar MD;  Location: [facility]     ESOPHAGOSCOPY, GASTROSCOPY, DUODENOSCOPY (EGD), COMBINED  1/22/2014    Procedure: COMBINED ESOPHAGOSCOPY, GASTROSCOPY, DUODENOSCOPY (EGD);  UPPER ENDOSCOPY(PENA) W/ BIOPSIES;  Surgeon: Patricia Pena MD;  Location: [facility]     HERNIORRHAPHY INGUINAL Right 2/14/2018    Procedure: HERNIORRHAPHY INGUINAL;  OPEN RIGHT INGUINAL HERNIA REPAIR with Mesh;  Surgeon: Virgilio Zaragoza DO;  Location: [facility]     INSERT PORT VASCULAR ACCESS Right 7/12/2019    Procedure: PORT PLACEMENT;  Surgeon: Lloyd Ram MD;  Location: [facility]     LARYNGOSCOPY WITH MICROSCOPE  1/22/2014    Procedure: LARYNGOSCOPY WITH MICROSCOPE;;  Surgeon: Chayo Duke MD;  Location: [facility]     pacemaker placement  2000    heart block     pacemaker placement  2011    dual-chamber     REMOVE  TUBE, MYRINGOTOMY, COMBINED  2014    Procedure: COMBINED REMOVE TUBE, MYRINGOTOMY;  MICRODIRECT LARYNGOSCOPY WITH BIOPSY AND FROZEN SECTIONS removal of right ear tube and myringoplasty;  Surgeon: Chayo Duke MD;  Location: [facility]     REPLACE PACEMAKER GENERATOR N/A 2018    Procedure: REPLACE PACEMAKER GENERATOR;  Pacemaker generator change;  Surgeon: Alma Kaplan MD;  Location: [facility]     stent placement to LAD  2008     ventilation tube  2012    right in office       Family History:    Family History   Problem Relation Age of Onset     Diabetes Mother      Breast Cancer Daughter        Social History:  Marital Status:  Single [1]  Social History     Tobacco Use     Smoking status: Former Smoker     Packs/day: 1.00     Years: 5.00     Pack years: 5.00     Types: Cigarettes     Quit date: 1985     Years since quittin.4     Smokeless tobacco: Never Used     Tobacco comment: quit in    Substance Use Topics     Alcohol use: Yes     Comment: social     Drug use: No        Medications:    acetaminophen (TYLENOL) 325 MG tablet  apixaban ANTICOAGULANT (ELIQUIS) 5 MG tablet  aspirin (ASA) 81 MG chewable tablet  atorvastatin (LIPITOR) 20 MG tablet  Cholecalciferol (VITAMIN D-3) 25 MCG (1000 UT) CAPS  Coenzyme Q10 (CO Q 10 PO)  diclofenac (VOLTAREN) 1 % topical gel  donepezil (ARICEPT) 10 MG tablet  donepezil (ARICEPT) 10 MG tablet  fludrocortisone (FLORINEF) 0.1 MG tablet  lidocaine (LIDODERM) 5 % patch  lidocaine-prilocaine (EMLA) 2.5-2.5 % external cream  melatonin 5 MG tablet  midodrine (PROAMATINE) 5 MG tablet  NONFORMULARY  nystatin (MYCOSTATIN) 198620 UNIT/GM external powder  potassium chloride ER (KLOR-CON M) 20 MEQ CR tablet  RABEprazole (ACIPHEX) 20 MG EC tablet  senna-docusate (SENOKOT-S/PERICOLACE) 8.6-50 MG tablet  sertraline (ZOLOFT) 25 MG tablet  sodium chloride 1 GM tablet  traMADol (ULTRAM) 50 MG tablet          Review of Systems   Constitutional: Negative for chills and  fever.   HENT: Negative for congestion, rhinorrhea and sore throat.    Eyes: Negative for pain and discharge.   Respiratory: Negative for cough, shortness of breath and wheezing.    Cardiovascular: Negative for chest pain and leg swelling.   Gastrointestinal: Negative for abdominal distention, abdominal pain, diarrhea, nausea and vomiting.   Genitourinary: Negative for hematuria and urgency.   Musculoskeletal: Positive for back pain. Negative for neck pain.   Skin: Positive for wound. Negative for rash.   Neurological: Positive for headaches. Negative for dizziness, weakness and numbness.       Physical Exam   BP: (!) 188/80  Pulse: 74  Temp: (see trauma sheet)  Resp: 16  SpO2: 98 %      Physical Exam  Constitutional:       General: He is awake. He is not in acute distress.     Appearance: Normal appearance.   HENT:      Head: Normocephalic. Contusion present. No raccoon eyes or Reeves's sign.      Jaw: There is normal jaw occlusion.      Right Ear: Tympanic membrane normal.      Left Ear: Tympanic membrane normal.      Nose: Nose normal.      Right Nostril: No epistaxis or septal hematoma.      Left Nostril: No epistaxis or septal hematoma.      Mouth/Throat:      Lips: No lesions.      Mouth: Mucous membranes are dry.      Pharynx: Oropharynx is clear.      Comments: Noted hematoma development of the right forehead.  Eyes:      General: Lids are normal.      Extraocular Movements: Extraocular movements intact.      Conjunctiva/sclera: Conjunctivae normal.      Pupils: Pupils are equal, round, and reactive to light.   Neck:      Musculoskeletal: Full passive range of motion without pain, normal range of motion and neck supple.   Cardiovascular:      Rate and Rhythm: Normal rate and regular rhythm.      Pulses: Normal pulses.      Heart sounds: Normal heart sounds. No murmur. No friction rub. No gallop.    Pulmonary:      Effort: Pulmonary effort is normal. No respiratory distress.      Breath sounds: Normal breath  sounds. No stridor. No wheezing or rhonchi.   Abdominal:      General: Abdomen is flat.      Palpations: Abdomen is soft.   Musculoskeletal: Normal range of motion.      Right shoulder: Normal.      Left shoulder: Normal.      Right elbow: Normal.     Left elbow: Normal.      Right wrist: Normal.      Left wrist: Normal.      Right hip: Normal.      Left hip: Normal.      Right knee: Normal.      Left knee: Normal.      Right ankle: Normal.      Left ankle: Normal.      Cervical back: Normal.      Thoracic back: Normal.      Lumbar back: Normal.      Right upper arm: Normal.      Left upper arm: Normal.      Right forearm: Normal.      Left forearm: Normal.      Right hand: Normal.      Left hand: Normal.      Right upper leg: Normal.      Left upper leg: Normal.      Right lower leg: Normal.      Left lower leg: Normal.      Right foot: Normal.      Left foot: Normal.   Skin:     General: Skin is warm and dry.      Capillary Refill: Capillary refill takes less than 2 seconds.   Neurological:      General: No focal deficit present.      Mental Status: He is alert and oriented to person, place, and time. Mental status is at baseline.   Psychiatric:         Attention and Perception: Attention normal.         Mood and Affect: Mood normal.         Behavior: Behavior normal. Behavior is cooperative.         ED Course        Procedures               EKG Interpretation:      Interpreted by David Turner MD  Time reviewed:13:32  Symptoms at time of EKG: None   Rhythm: paced  Rate: Normal  Axis: Other (Paced)  Ectopy: none  Conduction: Paced  ST Segments/ T Waves: Paced  Q Waves: none  Comparison to prior: Unchanged    Clinical Impression: paced    Critical Care time:  was 35 minutes for this patient excluding procedures.  Trauma:  Level of trauma activation: Partial  C-collar and immobilization: applied prior to arrival.  CSpine Clearance: C spine cleared clinically  GCS at arrival: 15  GCS at disposition:  unchanged  Full Primary and Secondary survey with appropriate immobilization of spine completed in exam section.  Consults prior to admission or transfer: None  Procedures done in the ED: Fast exam         Results for orders placed or performed during the hospital encounter of 06/22/21 (from the past 24 hour(s))   CBC with platelets differential   Result Value Ref Range    WBC 6.3 4.0 - 11.0 10e9/L    RBC Count 4.65 4.4 - 5.9 10e12/L    Hemoglobin 14.8 13.3 - 17.7 g/dL    Hematocrit 43.9 40.0 - 53.0 %    MCV 94 78 - 100 fl    MCH 31.8 26.5 - 33.0 pg    MCHC 33.7 31.5 - 36.5 g/dL    RDW 11.8 10.0 - 15.0 %    Platelet Count 230 150 - 450 10e9/L    Diff Method Automated Method     % Neutrophils 65.5 %    % Lymphocytes 22.9 %    % Monocytes 8.6 %    % Eosinophils 2.1 %    % Basophils 0.6 %    % Immature Granulocytes 0.3 %    Nucleated RBCs 0 0 /100    Absolute Neutrophil 4.1 1.6 - 8.3 10e9/L    Absolute Lymphocytes 1.4 0.8 - 5.3 10e9/L    Absolute Monocytes 0.5 0.0 - 1.3 10e9/L    Absolute Eosinophils 0.1 0.0 - 0.7 10e9/L    Absolute Basophils 0.0 0.0 - 0.2 10e9/L    Abs Immature Granulocytes 0.0 0 - 0.4 10e9/L    Absolute Nucleated RBC 0.0    Comprehensive metabolic panel   Result Value Ref Range    Sodium 141 133 - 144 mmol/L    Potassium 4.4 3.4 - 5.3 mmol/L    Chloride 108 94 - 109 mmol/L    Carbon Dioxide 28 20 - 32 mmol/L    Anion Gap 5 3 - 14 mmol/L    Glucose 137 (H) 70 - 99 mg/dL    Urea Nitrogen 15 7 - 30 mg/dL    Creatinine 1.02 0.66 - 1.25 mg/dL    GFR Estimate 65 >60 mL/min/[1.73_m2]    GFR Estimate If Black 76 >60 mL/min/[1.73_m2]    Calcium 8.9 8.5 - 10.1 mg/dL    Bilirubin Total 0.5 0.2 - 1.3 mg/dL    Albumin 3.5 3.4 - 5.0 g/dL    Protein Total 6.9 6.8 - 8.8 g/dL    Alkaline Phosphatase 71 40 - 150 U/L    ALT 23 0 - 70 U/L    AST 18 0 - 45 U/L   INR   Result Value Ref Range    INR 1.30 (H) 0.86 - 1.14   Partial thromboplastin time   Result Value Ref Range    PTT 30 22 - 37 sec   Alcohol ethyl   Result  Value Ref Range    Ethanol g/dL <0.01 0.01 g/dL   ABO/Rh type and screen   Result Value Ref Range    ABO A     RH(D) Pos     Antibody Screen Neg     Test Valid Only At [facility]        Specimen Expires 06/25/2021    Lactic acid whole blood   Result Value Ref Range    Lactic Acid 1.5 0.7 - 2.0 mmol/L   CT Head w/o Contrast    Narrative    PROCEDURE: CT HEAD W/O CONTRAST   6/22/2021 2:26 PM    HISTORY:Male, age,  87 years, , , Trauma - Head Injury    COMPARISON: 2/12/2021    TECHNIQUE: CT of the brain without contrast.    FINDINGS: Ventricles and sulci are mildly enlarged consistent with  atrophy . Gray and white matter demonstrate scattered areas of  decreased density.    There is no evidence of mass, mass effect or midline shift. No  evidence of acute intracranial hemorrhage. Large right frontal  subcutaneous scalp hematoma.    The bones are unremarkable. No fracture.       Impression    IMPRESSION:   Large right frontal scalp hematoma without evidence of acute  intracranial hemorrhage.  No acute fracture.    LAWRENCE AJIN MD   CT Cervical Spine w/o Contrast    Narrative    CT CERVICAL SPINE W/O CONTRAST, 6/22/2021 2:26 PM    History: Male, age 87 years; Trauma - C-Spine Injury    Comparison: 12/18/2020    TECHNIQUE: CT was performed of the cervical spine. Sagittal, coronal,  and axial reconstructions were reviewed.    FINDINGS: The  cervical spine demonstrates normal curvature. No acute  fracture, no acute subluxation. Soft tissues are unremarkable.  Degenerative changes throughout the cervical spine and the  temporomandibular joints are similar in appearance.      Impression    IMPRESSION:   No evidence of acute or subacute bony abnormality.     Cervical spine and temporomandibular joint degenerative changes are  similar in appearance dating back to the previous study.    LAWRENCE JAIN MD   CT Thoracic Spine w/o Contrast    Narrative    CT THORACIC SPINE W/O CONTRAST, 6/22/2021 2:29  PM    History: Male, age 87 years; Trauma - T-Spine Injury    Comparison: None.    TECHNIQUE: CT was performed of the thoracic spine. Sagittal, coronal,  and axial reconstructions were reviewed.    FINDINGS: The  thoracic spine demonstrates normal curvature. An acute  fracture deformity involving the T8 inferior endplate causes  approximately 30% height loss anteriorly. There is no evidence of  retropulsed fracture fragment. Otherwise, no evidence of an acute  fracture.    Mild degenerative changes are otherwise seen throughout the thoracic  spine. Visualized portions of the rib cage demonstrate no acute  abnormality. Chronic changes are seen within the lungs. Dense  calcifications are seen within the coronary arteries.      Impression    IMPRESSION:   Acute T8 inferior endplate compression fracture resulting in 30%  height loss anteriorly. No evidence of retropulsed fracture fragment.    LAWRENCE JAIN MD   CT Lumbar Spine w/o Contrast    Narrative    CT LUMBAR SPINE W/O CONTRAST, 6/22/2021 2:33 PM    History: Male, age 87 years; Trauma - L-Spine Injury    Comparison: Lumbar spine x-rays 12/17/2020    TECHNIQUE: CT was performed of the lumbar spine. Sagittal, coronal,  and axial reconstructions were reviewed.    FINDINGS: The  lumbar spine demonstrates normal curvature. L1 superior  endplate fracture results in approximately 30% height loss anteriorly.  No retropulsed fracture fragment. Very small paraspinal hematoma at  L1. Right perineal soft tissues demonstrate no acute abnormality. SI  joints are congruent. No apparent sacral fracture. Chronic appearing  L2 inferior endplate fracture is not significantly changed compared to  the earlier study.      Impression    IMPRESSION:   Acute L1 superior endplate fracture resulting in 30% height loss  anteriorly. No evidence of retropulsed fracture fragment.    LAWRENCE JAIN MD   CT Chest/Abdomen/Pelvis w Contrast    Narrative    PROCEDURE: CT CHEST/ABDOMEN/PELVIS W  CONTRAST 6/22/2021 2:35 PM    HISTORY: Trauma - Chest, Abdomen, and Pelvis Injury    COMPARISONS: None.    Meds/Dose Given: Isovue 300 100ml Given    TECHNIQUE: CT scan of the chest abdomen and pelvis with IV contrast  sagittal and coronal reconstructions were obtained    FINDINGS: There are no rib fractures noted. Median sternotomy suture  wires are noted. There is a compression fracture of the T8 vertebra  which is stable from December 2020. There is some mild wedging of the  vertebra at the thoracolumbar junction unchanged from previous  examination consistent with Scheuermann's disease. No pneumothorax or  pleural effusion is noted. There is some dependent atelectasis at the  lung bases. There is some mild interstitial thickening stable from  previous examination. There are some small nodules in the lungs also  stable. The thoracic aorta has some atherosclerotic plaquing. No hilar  or mediastinal masses or lymphadenopathy is noted. Axillary and  supraclavicular lymph nodes appear normal.    The upper portion of the liver spleen appeared normal. 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 CT follow-up in 6 months  time is recommended. The adrenal glands are normal. The right and left  kidneys are free of masses or hydronephrosis. The periaortic lymph  nodes are normal in caliber. No free air or free fluid is seen in the  abdomen. No intraperitoneal masses or inflammatory changes are noted.  The bladder and rectum are normal. The pelvis and both proximal femurs  are intact. The sacrum and sacroiliac joints appear normal.         Impression    IMPRESSION: No chest abdomen or pelvic injury. 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 Follow-up CT scan in  6 months time is recommended    JACKY JAMES MD       Medications   sodium chloride (PF) 0.9% PF flush 60 mL (60 mLs Intravenous Given 6/22/21 8634)    iopamidol (ISOVUE-300) IV solution 61% 100 mL (100 mLs Intravenous Given 6/22/21 1420)       Assessments & Plan (with Medical Decision Making)     87 year old male presents to the ED as a trauma activation.   PREHOSPITAL CARE   - Vitals: highest HR: 70, lowest BP: 160s  - Origin: Nursing home  - Mechanism: Mechanical fall  - Injuries: Right forehead hematoma  - Treatments: C-collar, IV access  RESUSCITATION   - Testing: trauma lab panel   - Interventions: IV access   - Tetanus: Will need to be updated.  COURSE   Patient is hard of hearing, is hemodynamically stable, FAST exam was negative, however given the age and the fact he is anticoagulated we will proceed with advanced imaging  IMPRESSION/REPORT/PLAN   1. Level 2 trauma   2.  Noted hematoma  POC Labs significant for lactate is not elevated which is reassuring, otherwise pending   Portable imaging as exam negative  Will proceed to CT scan for trauma series - head, cervical spine, chest, abdomen, pelvis with T and L spine reconstructions.   Disposition pending results.     FAST exam is negative.    Blood work is overall reassuring, no noted anemia, no leukocytosis, normal renal function, no acute electrode abnormalities, glucose is slightly elevated, ethanol is negative.    CT of head, cervical, thoracic, lumbar spine, as well as chest abdomen pelvis negative for acute injuries other than a compression fracture of the anterior endplate of T8.  No acute management indicated, discussed follow-up with wife and patient, given his age and nursing home status, they do not wish to proceed with referral to orthopedics for more advanced management.    Overall work-up here is relatively benign given the risks and mechanism of injury, hematoma will require basic wound care, icing as necessary and pain relief with acetaminophen and management of back pain with lidocaine patches.    Patient transferred back to his nursing home.    I have reviewed the nursing notes.    I  have reviewed the findings, diagnosis, plan and need for follow up with the patient.     Critical Care Addendum    My initial assessment, based on my review of prehospital provider report, review of nursing observations, review of vital signs, focused history, physical exam, review of cardiac rhythm monitor and 12 lead ECG analysis, established that Jf Ortiz has severe traumatic injuries, which requires immediate intervention, and therefore he is critically ill.     After the initial assessment, the care team initiated multiple lab tests, initiated IV fluid administration and performed FAST exam to provide stabilization care. Due to the critical nature of this patient, I reassessed nursing observations, vital signs, physical exam, review of cardiac rhythm monitor, 12 lead ECG analysis, mental status, neurologic status and respiratory status multiple times prior to his disposition.     Time also spent performing documentation, discussion with family to obtain medical information for decision making, reviewing test results and coordination of care.     Critical care time (excluding teaching time and procedures): 35 minutes.     New Prescriptions    No medications on file       Final diagnoses:   Other closed fracture of eighth thoracic vertebra, initial encounter (H)   Fall, initial encounter       6/22/2021   [facility]     David Turner MD  06/22/21 1597

## 2021-06-22 NOTE — ED TRIAGE NOTES
Pt fell around 0930 this am. On blood thinners. Nursing home stated pt was at normal baseline confusion. Brought by [facility]. On blood thinners blood glucose 143. C collar on.

## 2021-06-23 ENCOUNTER — DOCUMENTATION ONLY (OUTPATIENT)
Dept: OTHER | Facility: CLINIC | Age: 86
End: 2021-06-23

## 2021-06-23 ENCOUNTER — INFUSION THERAPY VISIT (OUTPATIENT)
Dept: INFUSION THERAPY | Facility: OTHER | Age: 86
End: 2021-06-23
Attending: FAMILY MEDICINE
Payer: MEDICARE

## 2021-06-23 DIAGNOSIS — E86.9 VOLUME DEPLETION: Primary | ICD-10-CM

## 2021-06-23 PROCEDURE — 258N000003 HC RX IP 258 OP 636: Performed by: FAMILY MEDICINE

## 2021-06-23 PROCEDURE — 96360 HYDRATION IV INFUSION INIT: CPT

## 2021-06-23 PROCEDURE — 250N000011 HC RX IP 250 OP 636: Performed by: FAMILY MEDICINE

## 2021-06-23 RX ORDER — HEPARIN SODIUM (PORCINE) LOCK FLUSH IV SOLN 100 UNIT/ML 100 UNIT/ML
5 SOLUTION INTRAVENOUS ONCE
Status: COMPLETED | OUTPATIENT
Start: 2021-06-23 | End: 2021-06-23

## 2021-06-23 RX ORDER — HEPARIN SODIUM (PORCINE) LOCK FLUSH IV SOLN 100 UNIT/ML 100 UNIT/ML
5 SOLUTION INTRAVENOUS ONCE
Status: CANCELLED
Start: 2021-06-23 | End: 2021-06-23

## 2021-06-23 RX ADMIN — HEPARIN 5 ML: 100 SYRINGE at 10:21

## 2021-06-23 RX ADMIN — SODIUM CHLORIDE 1000 ML: 9 INJECTION, SOLUTION INTRAVENOUS at 09:20

## 2021-06-23 NOTE — PROGRESS NOTES
Patient is a 87 year old male here accompanied by self today for infusion of IVF per order of Dr Ray under the supervision of Dr Ray.  Patient identified with two identifiers, order verified, and verbal consent for today's infusion obtained from patient.      Patient  lab values:  Not required for today's infusion    Patient meets order parameters for today's treatment.     Patients port accessed using non-coring, 19 gauge, 3/4 needle. Port accessed per facility protocol. Port flushed easily, blood return noted.  No signs and symptoms of infection or infiltration.      IV pump verified with dose, drug, and rate of administration.  Infusion administered per protocol.  Patient tolerated infusion well, no signs or symptoms of adverse reaction noted.  Patient denies pain nor discomfort.     IV removed, catheter intact.  Site clean, dry and intact.  No signs or symptoms of infiltration or infection.  Covered with a sterile bandage, slight pressure applied for 30 seconds.  Pt instructed to leave bandage intact for a minimum of one hour, and to call with questions or concerns.  Copy of appointments, discharge instructions, and after visit summary (AVS) provided to patient.  Patient states understanding, discharged.

## 2021-06-30 ENCOUNTER — INFUSION THERAPY VISIT (OUTPATIENT)
Dept: INFUSION THERAPY | Facility: OTHER | Age: 86
End: 2021-06-30
Attending: FAMILY MEDICINE
Payer: MEDICARE

## 2021-06-30 DIAGNOSIS — E86.9 VOLUME DEPLETION: Primary | ICD-10-CM

## 2021-06-30 PROCEDURE — 96365 THER/PROPH/DIAG IV INF INIT: CPT

## 2021-06-30 PROCEDURE — 96360 HYDRATION IV INFUSION INIT: CPT

## 2021-06-30 PROCEDURE — 250N000011 HC RX IP 250 OP 636: Performed by: FAMILY MEDICINE

## 2021-06-30 PROCEDURE — 258N000003 HC RX IP 258 OP 636: Performed by: FAMILY MEDICINE

## 2021-06-30 RX ORDER — HEPARIN SODIUM (PORCINE) LOCK FLUSH IV SOLN 100 UNIT/ML 100 UNIT/ML
5 SOLUTION INTRAVENOUS ONCE
Status: CANCELLED
Start: 2021-06-30 | End: 2021-06-30

## 2021-06-30 RX ORDER — HEPARIN SODIUM (PORCINE) LOCK FLUSH IV SOLN 100 UNIT/ML 100 UNIT/ML
5 SOLUTION INTRAVENOUS ONCE
Status: COMPLETED | OUTPATIENT
Start: 2021-06-30 | End: 2021-06-30

## 2021-06-30 RX ADMIN — SODIUM CHLORIDE 1000 ML: 9 INJECTION, SOLUTION INTRAVENOUS at 09:27

## 2021-06-30 RX ADMIN — HEPARIN 5 ML: 100 SYRINGE at 10:35

## 2021-06-30 NOTE — PROGRESS NOTES
Patient is a 87 year old male here accompanied by self today for infusion of IVF per order of Dr MALIA Ray.  Patient identified with two identifiers, order verified, and verbal consent for today's infusion obtained from patient.      Patients port accessed using non-coring, 20 gauge, 3/4 needle. Port accessed per facility protocol. Port flushed easily, blood return noted.  No signs and symptoms of infection or infiltration.      IV pump verified with dose, drug, and rate of administration.  Infusion administered per protocol.  Patient tolerated infusion well, no signs or symptoms of adverse reaction noted.  Patient denies pain nor discomfort.     IV removed, catheter intact.  Site clean, dry and intact.  No signs or symptoms of infiltration or infection.  Covered with a sterile bandage, slight pressure applied for 30 seconds.  Pt instructed to leave bandage intact for a minimum of one hour, and to call with questions or concerns.  Copy of appointments, discharge instructions, and after visit summary (AVS) provided to patient.  Patient states understanding, discharged.

## 2021-07-07 ENCOUNTER — INFUSION THERAPY VISIT (OUTPATIENT)
Dept: INFUSION THERAPY | Facility: OTHER | Age: 86
End: 2021-07-07
Attending: FAMILY MEDICINE
Payer: MEDICARE

## 2021-07-07 DIAGNOSIS — E86.9 VOLUME DEPLETION: Primary | ICD-10-CM

## 2021-07-07 PROCEDURE — 250N000011 HC RX IP 250 OP 636: Performed by: FAMILY MEDICINE

## 2021-07-07 PROCEDURE — 258N000003 HC RX IP 258 OP 636: Performed by: FAMILY MEDICINE

## 2021-07-07 PROCEDURE — 96360 HYDRATION IV INFUSION INIT: CPT

## 2021-07-07 RX ORDER — HEPARIN SODIUM (PORCINE) LOCK FLUSH IV SOLN 100 UNIT/ML 100 UNIT/ML
5 SOLUTION INTRAVENOUS ONCE
Status: CANCELLED
Start: 2021-07-07 | End: 2021-07-07

## 2021-07-07 RX ORDER — HEPARIN SODIUM (PORCINE) LOCK FLUSH IV SOLN 100 UNIT/ML 100 UNIT/ML
5 SOLUTION INTRAVENOUS ONCE
Status: COMPLETED | OUTPATIENT
Start: 2021-07-07 | End: 2021-07-07

## 2021-07-07 RX ADMIN — SODIUM CHLORIDE 1000 ML: 9 INJECTION, SOLUTION INTRAVENOUS at 13:37

## 2021-07-07 RX ADMIN — HEPARIN 5 ML: 100 SYRINGE at 14:39

## 2021-07-07 NOTE — PROGRESS NOTES
Patient is a 87 year old male here accompanied by self today for infusion of IVF per order of Dr. Ray under the supervision of Dr. Ray.  Patient identified with two identifiers, order verified, and verbal consent for today's infusion obtained from patient.    Patient meets order parameters for today's treatment.     Patients port accessed using non-coring, 19 gauge, 3/4 needle. Port accessed per facility protocol. Port flushed easily, blood return noted.  No signs and symptoms of infection or infiltration.      IV pump verified with dose, drug, and rate of administration.  Infusion administered per protocol.  Patient tolerated infusion well, no signs or symptoms of adverse reaction noted.  Patient denies pain nor discomfort.     IV removed, catheter intact.  Site clean, dry and intact.  No signs or symptoms of infiltration or infection.  Covered with a sterile bandage, slight pressure applied for 30 seconds.  Pt instructed to leave bandage intact for a minimum of one hour, and to call with questions or concerns.  Copy of appointments, discharge instructions, and after visit summary (AVS) provided to patient.  Patient states understanding, discharged.

## 2021-07-14 ENCOUNTER — INFUSION THERAPY VISIT (OUTPATIENT)
Dept: INFUSION THERAPY | Facility: OTHER | Age: 86
End: 2021-07-14
Attending: FAMILY MEDICINE
Payer: MEDICARE

## 2021-07-14 VITALS — DIASTOLIC BLOOD PRESSURE: 69 MMHG | HEART RATE: 70 BPM | SYSTOLIC BLOOD PRESSURE: 119 MMHG

## 2021-07-14 DIAGNOSIS — E86.9 VOLUME DEPLETION: Primary | ICD-10-CM

## 2021-07-14 PROCEDURE — 96365 THER/PROPH/DIAG IV INF INIT: CPT

## 2021-07-14 PROCEDURE — 96360 HYDRATION IV INFUSION INIT: CPT

## 2021-07-14 PROCEDURE — 258N000003 HC RX IP 258 OP 636: Performed by: FAMILY MEDICINE

## 2021-07-14 PROCEDURE — 250N000011 HC RX IP 250 OP 636: Performed by: FAMILY MEDICINE

## 2021-07-14 RX ORDER — HEPARIN SODIUM (PORCINE) LOCK FLUSH IV SOLN 100 UNIT/ML 100 UNIT/ML
5 SOLUTION INTRAVENOUS ONCE
Status: COMPLETED | OUTPATIENT
Start: 2021-07-14 | End: 2021-07-14

## 2021-07-14 RX ORDER — HEPARIN SODIUM (PORCINE) LOCK FLUSH IV SOLN 100 UNIT/ML 100 UNIT/ML
5 SOLUTION INTRAVENOUS ONCE
Status: CANCELLED
Start: 2021-07-14 | End: 2021-07-14

## 2021-07-14 RX ADMIN — SODIUM CHLORIDE 1000 ML: 9 INJECTION, SOLUTION INTRAVENOUS at 09:27

## 2021-07-14 RX ADMIN — HEPARIN 5 ML: 100 SYRINGE at 10:30

## 2021-07-14 NOTE — PATIENT INSTRUCTIONS
We will see you back as planned. If you have any questions or concerns, we can be reached Monday through Friday 8am - 430pm at [phone] ([facility] [place]). If you have concerns related to a potential reaction/side effect after hours/weekends/holiday's, please seek emergent medical care.

## 2021-07-21 ENCOUNTER — INFUSION THERAPY VISIT (OUTPATIENT)
Dept: INFUSION THERAPY | Facility: OTHER | Age: 86
End: 2021-07-21
Attending: FAMILY MEDICINE
Payer: MEDICARE

## 2021-07-21 DIAGNOSIS — E86.9 VOLUME DEPLETION: Primary | ICD-10-CM

## 2021-07-21 PROCEDURE — 258N000003 HC RX IP 258 OP 636: Performed by: FAMILY MEDICINE

## 2021-07-21 PROCEDURE — 250N000011 HC RX IP 250 OP 636: Performed by: FAMILY MEDICINE

## 2021-07-21 PROCEDURE — 96360 HYDRATION IV INFUSION INIT: CPT

## 2021-07-21 RX ORDER — HEPARIN SODIUM (PORCINE) LOCK FLUSH IV SOLN 100 UNIT/ML 100 UNIT/ML
5 SOLUTION INTRAVENOUS ONCE
Status: CANCELLED
Start: 2021-07-21 | End: 2021-07-21

## 2021-07-21 RX ORDER — HEPARIN SODIUM (PORCINE) LOCK FLUSH IV SOLN 100 UNIT/ML 100 UNIT/ML
5 SOLUTION INTRAVENOUS ONCE
Status: COMPLETED | OUTPATIENT
Start: 2021-07-21 | End: 2021-07-21

## 2021-07-21 RX ADMIN — HEPARIN 5 ML: 100 SYRINGE at 10:38

## 2021-07-21 RX ADMIN — SODIUM CHLORIDE 1000 ML: 9 INJECTION, SOLUTION INTRAVENOUS at 09:37

## 2021-07-21 NOTE — PROGRESS NOTES
Patient is a 87 year old male here accompanied by self today for infusion of IVF per order of Dr. Ray under the supervision of   Patient identified with two identifiers, order verified, and verbal consent for today's infusion obtained from patient.      Patient lab values:  Not required for today's infusion    Patient meets order parameters for today's treatment.    Patients port accessed using non-coring, 19 gauge, 3/4 needle. Port accessed per facility protocol. Port flushed easily, blood return noted.  No signs and symptoms of infection or infiltration.      IV pump verified with dose, drug, and rate of administration.  Infusion administered per protocol.  Patient tolerated infusion well, no signs or symptoms of adverse reaction noted.  Patient denies pain nor discomfort.     IV removed, catheter intact.  Site clean, dry and intact.  No signs or symptoms of infiltration or infection.  Covered with a sterile bandage, slight pressure applied for 30 seconds.  Pt instructed to leave bandage intact for a minimum of one hour, and to call with questions or concerns.  Copy of appointments, discharge instructions, and after visit summary (AVS) provided to patient.  Patient states understanding, discharged.

## 2021-07-26 ENCOUNTER — TELEPHONE (OUTPATIENT)
Dept: FAMILY MEDICINE | Facility: OTHER | Age: 86
End: 2021-07-26

## 2021-07-28 ENCOUNTER — INFUSION THERAPY VISIT (OUTPATIENT)
Dept: INFUSION THERAPY | Facility: OTHER | Age: 86
End: 2021-07-28
Attending: FAMILY MEDICINE
Payer: MEDICARE

## 2021-07-28 DIAGNOSIS — E86.9 VOLUME DEPLETION: Primary | ICD-10-CM

## 2021-07-28 PROCEDURE — 250N000011 HC RX IP 250 OP 636: Performed by: FAMILY MEDICINE

## 2021-07-28 PROCEDURE — 96360 HYDRATION IV INFUSION INIT: CPT

## 2021-07-28 PROCEDURE — 258N000003 HC RX IP 258 OP 636: Performed by: FAMILY MEDICINE

## 2021-07-28 RX ORDER — HEPARIN SODIUM (PORCINE) LOCK FLUSH IV SOLN 100 UNIT/ML 100 UNIT/ML
5 SOLUTION INTRAVENOUS ONCE
Status: CANCELLED
Start: 2021-07-28 | End: 2021-07-28

## 2021-07-28 RX ORDER — HEPARIN SODIUM (PORCINE) LOCK FLUSH IV SOLN 100 UNIT/ML 100 UNIT/ML
5 SOLUTION INTRAVENOUS ONCE
Status: COMPLETED | OUTPATIENT
Start: 2021-07-28 | End: 2021-07-28

## 2021-07-28 RX ADMIN — SODIUM CHLORIDE 1000 ML: 9 INJECTION, SOLUTION INTRAVENOUS at 09:25

## 2021-07-28 RX ADMIN — HEPARIN 5 ML: 100 SYRINGE at 10:34

## 2021-07-28 NOTE — PROGRESS NOTES
Patient is a 87 year old male here accompanied by self today for infusion of IVF per order of Dr. Ray   Patient identified with two identifiers, order verified, and verbal consent for today's infusion obtained from patient.       Patient meets order parameters for today's treatment.     Patients port accessed using non-coring, 20 gauge, 3/4 needle. Port accessed per facility protocol. Port flushed easily, blood return noted.  No signs and symptoms of infection or infiltration.      IV pump verified with dose, drug, and rate of administration.  Infusion administered per protocol.  Patient tolerated infusion well, no signs or symptoms of adverse reaction noted.  Patient denies pain nor discomfort.     IV removed, catheter intact.  Site clean, dry and intact.  No signs or symptoms of infiltration or infection.  Covered with a sterile bandage, slight pressure applied for 30 seconds.  Pt instructed to leave bandage intact for a minimum of one hour, and to call with questions or concerns.  Copy of appointments, discharge instructions, and after visit summary (AVS) provided to patient.  Patient states understanding, discharged.

## 2021-08-04 ENCOUNTER — INFUSION THERAPY VISIT (OUTPATIENT)
Dept: INFUSION THERAPY | Facility: OTHER | Age: 86
End: 2021-08-04
Attending: FAMILY MEDICINE
Payer: MEDICARE

## 2021-08-04 DIAGNOSIS — E86.9 VOLUME DEPLETION: Primary | ICD-10-CM

## 2021-08-04 PROCEDURE — 250N000011 HC RX IP 250 OP 636: Performed by: FAMILY MEDICINE

## 2021-08-04 PROCEDURE — 258N000003 HC RX IP 258 OP 636: Performed by: FAMILY MEDICINE

## 2021-08-04 PROCEDURE — 96365 THER/PROPH/DIAG IV INF INIT: CPT

## 2021-08-04 PROCEDURE — 96360 HYDRATION IV INFUSION INIT: CPT

## 2021-08-04 RX ORDER — HEPARIN SODIUM (PORCINE) LOCK FLUSH IV SOLN 100 UNIT/ML 100 UNIT/ML
5 SOLUTION INTRAVENOUS ONCE
Status: COMPLETED | OUTPATIENT
Start: 2021-08-04 | End: 2021-08-04

## 2021-08-04 RX ORDER — HEPARIN SODIUM (PORCINE) LOCK FLUSH IV SOLN 100 UNIT/ML 100 UNIT/ML
5 SOLUTION INTRAVENOUS ONCE
Status: CANCELLED
Start: 2021-08-04 | End: 2021-08-04

## 2021-08-04 RX ADMIN — HEPARIN 5 ML: 100 SYRINGE at 10:59

## 2021-08-04 RX ADMIN — SODIUM CHLORIDE 1000 ML: 9 INJECTION, SOLUTION INTRAVENOUS at 09:54

## 2021-08-04 NOTE — PROGRESS NOTES
Patient is a 87 year old male here accompanied by self today for infusion of IVF per order of Dr Ray.  Patient identified with two identifiers, order verified, and verbal consent for today's infusion obtained from patient.      Patients port accessed using non-coring, 20 gauge, 3/4 needle. Port accessed per facility protocol. Port flushed easily, blood return noted.  No signs and symptoms of infection or infiltration.      IV pump verified with dose, drug, and rate of administration.  Infusion administered per protocol.  Patient tolerated infusion well, no signs or symptoms of adverse reaction noted.  Patient denies pain nor discomfort.     IV removed, catheter intact.  Site clean, dry and intact.  No signs or symptoms of infiltration or infection.  Covered with a sterile bandage, slight pressure applied for 30 seconds.  Pt instructed to leave bandage intact for a minimum of one hour, and to call with questions or concerns.  Copy of appointments, discharge instructions, and after visit summary (AVS) provided to patient.  Patient states understanding, discharged.

## 2021-08-05 ENCOUNTER — ANCILLARY PROCEDURE (OUTPATIENT)
Dept: CARDIOLOGY | Facility: CLINIC | Age: 86
End: 2021-08-05
Attending: INTERNAL MEDICINE
Payer: MEDICARE

## 2021-08-05 DIAGNOSIS — I44.2 COMPLETE HEART BLOCK (H): ICD-10-CM

## 2021-08-05 PROCEDURE — 93294 REM INTERROG EVL PM/LDLS PM: CPT | Performed by: INTERNAL MEDICINE

## 2021-08-05 PROCEDURE — 93296 REM INTERROG EVL PM/IDS: CPT

## 2021-08-11 ENCOUNTER — INFUSION THERAPY VISIT (OUTPATIENT)
Dept: INFUSION THERAPY | Facility: OTHER | Age: 86
End: 2021-08-11
Attending: FAMILY MEDICINE
Payer: MEDICARE

## 2021-08-11 DIAGNOSIS — E86.9 VOLUME DEPLETION: Primary | ICD-10-CM

## 2021-08-11 PROCEDURE — 258N000003 HC RX IP 258 OP 636: Performed by: FAMILY MEDICINE

## 2021-08-11 PROCEDURE — 96360 HYDRATION IV INFUSION INIT: CPT

## 2021-08-11 PROCEDURE — 250N000011 HC RX IP 250 OP 636: Performed by: FAMILY MEDICINE

## 2021-08-11 RX ORDER — HEPARIN SODIUM (PORCINE) LOCK FLUSH IV SOLN 100 UNIT/ML 100 UNIT/ML
5 SOLUTION INTRAVENOUS ONCE
Status: COMPLETED | OUTPATIENT
Start: 2021-08-11 | End: 2021-08-11

## 2021-08-11 RX ORDER — HEPARIN SODIUM (PORCINE) LOCK FLUSH IV SOLN 100 UNIT/ML 100 UNIT/ML
5 SOLUTION INTRAVENOUS ONCE
Status: CANCELLED
Start: 2021-08-11 | End: 2021-08-11

## 2021-08-11 RX ADMIN — SODIUM CHLORIDE 1000 ML: 9 INJECTION, SOLUTION INTRAVENOUS at 09:30

## 2021-08-11 RX ADMIN — HEPARIN 5 ML: 100 SYRINGE at 10:40

## 2021-08-11 NOTE — PROGRESS NOTES
Patient is a 87 here accompanied by self today for infusion of IVF per order of Dr. Ray Patient identified with two identifiers, order verified, and verbal consent for today's infusion obtained from patient.      Patient meets order parameters for today's treatment.     Patients port accessed using non-coring, 19 gauge, 3/4 needle. Port accessed per facility protocol. Port flushed easily, blood return noted.  No signs and symptoms of infection or infiltration.      IV pump verified with dose, drug, and rate of administration.  Infusion administered per protocol.  Patient tolerated infusion well, no signs or symptoms of adverse reaction noted.  Patient denies pain nor discomfort.     IV removed, catheter intact.  Site clean, dry and intact.  No signs or symptoms of infiltration or infection.  Covered with a sterile bandage, slight pressure applied for 30 seconds.  Pt instructed to leave bandage intact for a minimum of one hour, and to call with questions or concerns.  Copy of appointments, discharge instructions, and after visit summary (AVS) provided to patient.  Patient states understanding, discharged.

## 2021-08-12 ENCOUNTER — TELEPHONE (OUTPATIENT)
Dept: FAMILY MEDICINE | Facility: OTHER | Age: 86
End: 2021-08-12

## 2021-08-18 ENCOUNTER — INFUSION THERAPY VISIT (OUTPATIENT)
Dept: INFUSION THERAPY | Facility: OTHER | Age: 86
End: 2021-08-18
Attending: FAMILY MEDICINE
Payer: MEDICARE

## 2021-08-18 DIAGNOSIS — E86.9 VOLUME DEPLETION: Primary | ICD-10-CM

## 2021-08-18 PROCEDURE — 258N000003 HC RX IP 258 OP 636: Performed by: FAMILY MEDICINE

## 2021-08-18 PROCEDURE — 96360 HYDRATION IV INFUSION INIT: CPT

## 2021-08-18 PROCEDURE — 250N000011 HC RX IP 250 OP 636: Performed by: FAMILY MEDICINE

## 2021-08-18 RX ORDER — HEPARIN SODIUM (PORCINE) LOCK FLUSH IV SOLN 100 UNIT/ML 100 UNIT/ML
5 SOLUTION INTRAVENOUS ONCE
Status: CANCELLED
Start: 2021-08-18 | End: 2021-08-18

## 2021-08-18 RX ORDER — HEPARIN SODIUM (PORCINE) LOCK FLUSH IV SOLN 100 UNIT/ML 100 UNIT/ML
5 SOLUTION INTRAVENOUS ONCE
Status: COMPLETED | OUTPATIENT
Start: 2021-08-18 | End: 2021-08-18

## 2021-08-18 RX ADMIN — HEPARIN 5 ML: 100 SYRINGE at 10:46

## 2021-08-18 RX ADMIN — SODIUM CHLORIDE 1000 ML: 9 INJECTION, SOLUTION INTRAVENOUS at 09:36

## 2021-08-18 NOTE — PROGRESS NOTES
Patient received infusion of 1000 mL NS today over one hour.  Patient discharged to NH with instructions to return in one week or sooner.    Port flushed per facility protocol.

## 2021-08-25 ENCOUNTER — INFUSION THERAPY VISIT (OUTPATIENT)
Dept: INFUSION THERAPY | Facility: OTHER | Age: 86
End: 2021-08-25
Attending: FAMILY MEDICINE
Payer: MEDICARE

## 2021-08-25 DIAGNOSIS — E86.9 VOLUME DEPLETION: Primary | ICD-10-CM

## 2021-08-25 LAB
MDC_IDC_EPISODE_DTM: NORMAL
MDC_IDC_EPISODE_DURATION: 2 S
MDC_IDC_EPISODE_ID: 888
MDC_IDC_EPISODE_TYPE: NORMAL
MDC_IDC_LEAD_IMPLANT_DT: NORMAL
MDC_IDC_LEAD_IMPLANT_DT: NORMAL
MDC_IDC_LEAD_LOCATION: NORMAL
MDC_IDC_LEAD_LOCATION: NORMAL
MDC_IDC_LEAD_LOCATION_DETAIL_1: NORMAL
MDC_IDC_LEAD_LOCATION_DETAIL_1: NORMAL
MDC_IDC_LEAD_MFG: NORMAL
MDC_IDC_LEAD_MFG: NORMAL
MDC_IDC_LEAD_MODEL: NORMAL
MDC_IDC_LEAD_MODEL: NORMAL
MDC_IDC_LEAD_POLARITY_TYPE: NORMAL
MDC_IDC_LEAD_POLARITY_TYPE: NORMAL
MDC_IDC_LEAD_SERIAL: NORMAL
MDC_IDC_LEAD_SERIAL: NORMAL
MDC_IDC_MSMT_BATTERY_DTM: NORMAL
MDC_IDC_MSMT_BATTERY_REMAINING_LONGEVITY: 80 MO
MDC_IDC_MSMT_BATTERY_RRT_TRIGGER: 2.62
MDC_IDC_MSMT_BATTERY_STATUS: NORMAL
MDC_IDC_MSMT_BATTERY_VOLTAGE: 2.98 V
MDC_IDC_MSMT_LEADCHNL_RA_IMPEDANCE_VALUE: 437 OHM
MDC_IDC_MSMT_LEADCHNL_RA_IMPEDANCE_VALUE: 532 OHM
MDC_IDC_MSMT_LEADCHNL_RA_SENSING_INTR_AMPL: 2 MV
MDC_IDC_MSMT_LEADCHNL_RV_IMPEDANCE_VALUE: 380 OHM
MDC_IDC_MSMT_LEADCHNL_RV_IMPEDANCE_VALUE: 570 OHM
MDC_IDC_MSMT_LEADCHNL_RV_PACING_THRESHOLD_AMPLITUDE: 0.75 V
MDC_IDC_MSMT_LEADCHNL_RV_PACING_THRESHOLD_PULSEWIDTH: 0.4 MS
MDC_IDC_PG_IMPLANT_DTM: NORMAL
MDC_IDC_PG_MFG: NORMAL
MDC_IDC_PG_MODEL: NORMAL
MDC_IDC_PG_SERIAL: NORMAL
MDC_IDC_PG_TYPE: NORMAL
MDC_IDC_SESS_CLINIC_NAME: NORMAL
MDC_IDC_SESS_DTM: NORMAL
MDC_IDC_SESS_TYPE: NORMAL
MDC_IDC_SET_BRADY_AT_MODE_SWITCH_RATE: 171 {BEATS}/MIN
MDC_IDC_SET_BRADY_HYSTRATE: NORMAL
MDC_IDC_SET_BRADY_LOWRATE: 70 {BEATS}/MIN
MDC_IDC_SET_BRADY_MAX_SENSOR_RATE: 130 {BEATS}/MIN
MDC_IDC_SET_BRADY_MAX_TRACKING_RATE: 130 {BEATS}/MIN
MDC_IDC_SET_BRADY_MODE: NORMAL
MDC_IDC_SET_BRADY_PAV_DELAY_LOW: 180 MS
MDC_IDC_SET_BRADY_SAV_DELAY_LOW: 150 MS
MDC_IDC_SET_LEADCHNL_RA_PACING_AMPLITUDE: 3 V
MDC_IDC_SET_LEADCHNL_RA_PACING_ANODE_ELECTRODE_1: NORMAL
MDC_IDC_SET_LEADCHNL_RA_PACING_ANODE_LOCATION_1: NORMAL
MDC_IDC_SET_LEADCHNL_RA_PACING_CAPTURE_MODE: NORMAL
MDC_IDC_SET_LEADCHNL_RA_PACING_CATHODE_ELECTRODE_1: NORMAL
MDC_IDC_SET_LEADCHNL_RA_PACING_CATHODE_LOCATION_1: NORMAL
MDC_IDC_SET_LEADCHNL_RA_PACING_POLARITY: NORMAL
MDC_IDC_SET_LEADCHNL_RA_PACING_PULSEWIDTH: 0.4 MS
MDC_IDC_SET_LEADCHNL_RA_SENSING_ANODE_ELECTRODE_1: NORMAL
MDC_IDC_SET_LEADCHNL_RA_SENSING_ANODE_LOCATION_1: NORMAL
MDC_IDC_SET_LEADCHNL_RA_SENSING_CATHODE_ELECTRODE_1: NORMAL
MDC_IDC_SET_LEADCHNL_RA_SENSING_CATHODE_LOCATION_1: NORMAL
MDC_IDC_SET_LEADCHNL_RA_SENSING_POLARITY: NORMAL
MDC_IDC_SET_LEADCHNL_RA_SENSING_SENSITIVITY: 0.9 MV
MDC_IDC_SET_LEADCHNL_RV_PACING_AMPLITUDE: 2 V
MDC_IDC_SET_LEADCHNL_RV_PACING_ANODE_ELECTRODE_1: NORMAL
MDC_IDC_SET_LEADCHNL_RV_PACING_ANODE_LOCATION_1: NORMAL
MDC_IDC_SET_LEADCHNL_RV_PACING_CAPTURE_MODE: NORMAL
MDC_IDC_SET_LEADCHNL_RV_PACING_CATHODE_ELECTRODE_1: NORMAL
MDC_IDC_SET_LEADCHNL_RV_PACING_CATHODE_LOCATION_1: NORMAL
MDC_IDC_SET_LEADCHNL_RV_PACING_POLARITY: NORMAL
MDC_IDC_SET_LEADCHNL_RV_PACING_PULSEWIDTH: 0.4 MS
MDC_IDC_SET_LEADCHNL_RV_SENSING_ANODE_ELECTRODE_1: NORMAL
MDC_IDC_SET_LEADCHNL_RV_SENSING_ANODE_LOCATION_1: NORMAL
MDC_IDC_SET_LEADCHNL_RV_SENSING_CATHODE_ELECTRODE_1: NORMAL
MDC_IDC_SET_LEADCHNL_RV_SENSING_CATHODE_LOCATION_1: NORMAL
MDC_IDC_SET_LEADCHNL_RV_SENSING_POLARITY: NORMAL
MDC_IDC_SET_LEADCHNL_RV_SENSING_SENSITIVITY: 0.9 MV
MDC_IDC_SET_ZONE_DETECTION_INTERVAL: 350 MS
MDC_IDC_SET_ZONE_DETECTION_INTERVAL: 400 MS
MDC_IDC_SET_ZONE_TYPE: NORMAL
MDC_IDC_STAT_AT_BURDEN_PERCENT: 0.1 %
MDC_IDC_STAT_BRADY_AP_VP_PERCENT: 97.57 %
MDC_IDC_STAT_BRADY_AP_VS_PERCENT: 0.05 %
MDC_IDC_STAT_BRADY_AS_VP_PERCENT: 1.03 %
MDC_IDC_STAT_BRADY_AS_VS_PERCENT: 1.35 %
MDC_IDC_STAT_BRADY_DTM_END: NORMAL
MDC_IDC_STAT_BRADY_DTM_START: NORMAL
MDC_IDC_STAT_BRADY_RA_PERCENT_PACED: 98.47 %
MDC_IDC_STAT_BRADY_RV_PERCENT_PACED: 98.6 %
MDC_IDC_STAT_EPISODE_RECENT_COUNT: 0
MDC_IDC_STAT_EPISODE_RECENT_COUNT: 0
MDC_IDC_STAT_EPISODE_RECENT_COUNT: 1
MDC_IDC_STAT_EPISODE_RECENT_COUNT_DTM_END: NORMAL
MDC_IDC_STAT_EPISODE_RECENT_COUNT_DTM_START: NORMAL
MDC_IDC_STAT_EPISODE_TOTAL_COUNT: 0
MDC_IDC_STAT_EPISODE_TOTAL_COUNT: 2
MDC_IDC_STAT_EPISODE_TOTAL_COUNT: 886
MDC_IDC_STAT_EPISODE_TOTAL_COUNT_DTM_END: NORMAL
MDC_IDC_STAT_EPISODE_TOTAL_COUNT_DTM_START: NORMAL
MDC_IDC_STAT_EPISODE_TYPE: NORMAL

## 2021-08-25 PROCEDURE — 96365 THER/PROPH/DIAG IV INF INIT: CPT

## 2021-08-25 PROCEDURE — 258N000003 HC RX IP 258 OP 636: Performed by: FAMILY MEDICINE

## 2021-08-25 PROCEDURE — 250N000011 HC RX IP 250 OP 636: Performed by: FAMILY MEDICINE

## 2021-08-25 PROCEDURE — 96360 HYDRATION IV INFUSION INIT: CPT

## 2021-08-25 RX ORDER — HEPARIN SODIUM (PORCINE) LOCK FLUSH IV SOLN 100 UNIT/ML 100 UNIT/ML
5 SOLUTION INTRAVENOUS ONCE
Status: CANCELLED
Start: 2021-08-25 | End: 2021-08-25

## 2021-08-25 RX ORDER — HEPARIN SODIUM (PORCINE) LOCK FLUSH IV SOLN 100 UNIT/ML 100 UNIT/ML
5 SOLUTION INTRAVENOUS ONCE
Status: COMPLETED | OUTPATIENT
Start: 2021-08-25 | End: 2021-08-25

## 2021-08-25 RX ADMIN — SODIUM CHLORIDE 1000 ML: 9 INJECTION, SOLUTION INTRAVENOUS at 09:31

## 2021-08-25 RX ADMIN — HEPARIN 5 ML: 100 SYRINGE at 10:37

## 2021-09-01 ENCOUNTER — INFUSION THERAPY VISIT (OUTPATIENT)
Dept: INFUSION THERAPY | Facility: OTHER | Age: 86
End: 2021-09-01
Attending: FAMILY MEDICINE
Payer: MEDICARE

## 2021-09-01 DIAGNOSIS — E86.9 VOLUME DEPLETION: Primary | ICD-10-CM

## 2021-09-01 PROCEDURE — 258N000003 HC RX IP 258 OP 636: Performed by: FAMILY MEDICINE

## 2021-09-01 PROCEDURE — 250N000011 HC RX IP 250 OP 636: Performed by: FAMILY MEDICINE

## 2021-09-01 PROCEDURE — 96360 HYDRATION IV INFUSION INIT: CPT

## 2021-09-01 RX ORDER — HEPARIN SODIUM (PORCINE) LOCK FLUSH IV SOLN 100 UNIT/ML 100 UNIT/ML
5 SOLUTION INTRAVENOUS ONCE
Status: CANCELLED
Start: 2021-09-01 | End: 2021-09-01

## 2021-09-01 RX ORDER — HEPARIN SODIUM (PORCINE) LOCK FLUSH IV SOLN 100 UNIT/ML 100 UNIT/ML
5 SOLUTION INTRAVENOUS ONCE
Status: COMPLETED | OUTPATIENT
Start: 2021-09-01 | End: 2021-09-01

## 2021-09-01 RX ADMIN — HEPARIN 5 ML: 100 SYRINGE at 10:42

## 2021-09-01 RX ADMIN — SODIUM CHLORIDE 1000 ML: 9 INJECTION, SOLUTION INTRAVENOUS at 09:41

## 2021-09-01 NOTE — PROGRESS NOTES
Patient is a 87 year old here today for infusion of IVF per order of Dr Ray.  Patient identified with two identifiers, order verified, and verbal consent for today's infusion obtained from patient.      Patient meets order parameters for today's treatment.    Patient denies questions or concerns regarding infusion and/or medication(s) being administered.    Patients port accessed using non-coring, 20 gauge, 3/4 inch power needle. Port accessed per facility protocol. Port flushed easily, blood return noted.  No signs and symptoms of infection or infiltration.      0941 IV pump verified with IVF dose, drug, and rate of administration.  Infusion administered per protocol.  Patient tolerated infusion well, no signs or symptoms of adverse reaction noted.  Patient denies pain nor discomfort.     IV removed, catheter intact.  Site clean, dry and intact.  No signs or symptoms of infiltration or infection.  Covered with a sterile bandage, slight pressure applied for 30 seconds.  Pt instructed to leave bandage intact for a minimum of one hour, and to call with questions or concerns.  Copy of appointments, discharge instructions, and after visit summary (AVS) provided to patient.  Patient states understanding, discharged.

## 2021-09-01 NOTE — PATIENT INSTRUCTIONS

## 2021-09-08 ENCOUNTER — INFUSION THERAPY VISIT (OUTPATIENT)
Dept: INFUSION THERAPY | Facility: OTHER | Age: 86
End: 2021-09-08
Attending: FAMILY MEDICINE
Payer: MEDICARE

## 2021-09-08 DIAGNOSIS — E86.9 VOLUME DEPLETION: Primary | ICD-10-CM

## 2021-09-08 PROCEDURE — 96360 HYDRATION IV INFUSION INIT: CPT

## 2021-09-08 PROCEDURE — 258N000003 HC RX IP 258 OP 636: Performed by: FAMILY MEDICINE

## 2021-09-08 PROCEDURE — 96365 THER/PROPH/DIAG IV INF INIT: CPT

## 2021-09-08 PROCEDURE — 250N000011 HC RX IP 250 OP 636: Performed by: FAMILY MEDICINE

## 2021-09-08 RX ORDER — HEPARIN SODIUM (PORCINE) LOCK FLUSH IV SOLN 100 UNIT/ML 100 UNIT/ML
5 SOLUTION INTRAVENOUS ONCE
Status: CANCELLED
Start: 2021-09-08 | End: 2021-09-08

## 2021-09-08 RX ORDER — HEPARIN SODIUM (PORCINE) LOCK FLUSH IV SOLN 100 UNIT/ML 100 UNIT/ML
5 SOLUTION INTRAVENOUS ONCE
Status: COMPLETED | OUTPATIENT
Start: 2021-09-08 | End: 2021-09-08

## 2021-09-08 RX ADMIN — HEPARIN 5 ML: 100 SYRINGE at 10:54

## 2021-09-08 RX ADMIN — SODIUM CHLORIDE 1000 ML: 9 INJECTION, SOLUTION INTRAVENOUS at 09:46

## 2021-09-08 NOTE — PROGRESS NOTES
Patient is a 87 year old here today for infusion of IVF per order of Dr MALIA Ray.  Patient identified with two identifiers, order verified, and verbal consent for today's infusion obtained from patient.       Patient denies questions or concerns regarding infusion and/or medication(s) being administered.    Patients port accessed using non-coring, 20 gauge, 3/4 inch power needle. Port accessed per facility protocol. Port flushed easily, blood return noted.  No signs and symptoms of infection or infiltration.      IV pump verified with dose, drug, and rate of administration.  Infusion administered per protocol.  Patient tolerated infusion well, no signs or symptoms of adverse reaction noted.  Patient denies pain nor discomfort.     IV removed, catheter intact.  Site clean, dry and intact.  No signs or symptoms of infiltration or infection.  Covered with a sterile bandage, slight pressure applied for 30 seconds.  Pt instructed to leave bandage intact for a minimum of one hour, and to call with questions or concerns.  Copy of appointments, discharge instructions, and after visit summary (AVS) provided to patient.  Patient states understanding, discharged.

## 2021-09-08 NOTE — PATIENT INSTRUCTIONS

## 2021-09-15 ENCOUNTER — INFUSION THERAPY VISIT (OUTPATIENT)
Dept: INFUSION THERAPY | Facility: OTHER | Age: 86
End: 2021-09-15
Attending: FAMILY MEDICINE
Payer: MEDICARE

## 2021-09-15 DIAGNOSIS — E86.9 VOLUME DEPLETION: Primary | ICD-10-CM

## 2021-09-15 PROCEDURE — 250N000011 HC RX IP 250 OP 636: Performed by: FAMILY MEDICINE

## 2021-09-15 PROCEDURE — 258N000003 HC RX IP 258 OP 636: Performed by: FAMILY MEDICINE

## 2021-09-15 PROCEDURE — 96523 IRRIG DRUG DELIVERY DEVICE: CPT

## 2021-09-15 PROCEDURE — 96360 HYDRATION IV INFUSION INIT: CPT

## 2021-09-15 RX ORDER — HEPARIN SODIUM (PORCINE) LOCK FLUSH IV SOLN 100 UNIT/ML 100 UNIT/ML
5 SOLUTION INTRAVENOUS ONCE
Status: COMPLETED | OUTPATIENT
Start: 2021-09-15 | End: 2021-09-15

## 2021-09-15 RX ORDER — HEPARIN SODIUM (PORCINE) LOCK FLUSH IV SOLN 100 UNIT/ML 100 UNIT/ML
5 SOLUTION INTRAVENOUS ONCE
Status: CANCELLED
Start: 2021-09-15 | End: 2021-09-15

## 2021-09-15 RX ADMIN — HEPARIN 5 ML: 100 SYRINGE at 10:29

## 2021-09-15 RX ADMIN — SODIUM CHLORIDE 1000 ML: 9 INJECTION, SOLUTION INTRAVENOUS at 09:27

## 2021-09-15 NOTE — PATIENT INSTRUCTIONS

## 2021-09-15 NOTE — PROGRESS NOTES
Patient is a 87 year old here today for infusion of IVF per order of Dr MALIA Ray. Patient identified with two identifiers, order verified, and verbal consent for today's infusion obtained from patient.      Patient denies questions or concerns regarding infusion and/or medication(s) being administered.    Patients port accessed using non-coring, 19 gauge, 3/4 inch power needle. Port accessed per facility protocol. Port flushed easily, blood return noted.  No signs and symptoms of infection or infiltration.      0927 IV pump verified with IVF dose, drug, and rate of administration.  Infusion administered per protocol.  Patient tolerated infusion well, no signs or symptoms of adverse reaction noted.  Patient denies pain nor discomfort.     IV removed, catheter intact.  Site clean, dry and intact.  No signs or symptoms of infiltration or infection.  Covered with a sterile bandage, slight pressure applied for 30 seconds.  Pt instructed to leave bandage intact for a minimum of one hour, and to call with questions or concerns.  Copy of appointments, discharge instructions, and after visit summary (AVS) provided to patient.  Patient states understanding, discharged.

## 2021-09-22 ENCOUNTER — INFUSION THERAPY VISIT (OUTPATIENT)
Dept: INFUSION THERAPY | Facility: OTHER | Age: 86
End: 2021-09-22
Attending: FAMILY MEDICINE
Payer: MEDICARE

## 2021-09-22 DIAGNOSIS — E86.9 VOLUME DEPLETION: Primary | ICD-10-CM

## 2021-09-22 PROCEDURE — 258N000003 HC RX IP 258 OP 636: Performed by: FAMILY MEDICINE

## 2021-09-22 PROCEDURE — 96360 HYDRATION IV INFUSION INIT: CPT

## 2021-09-22 PROCEDURE — 250N000011 HC RX IP 250 OP 636: Performed by: FAMILY MEDICINE

## 2021-09-22 RX ORDER — HEPARIN SODIUM (PORCINE) LOCK FLUSH IV SOLN 100 UNIT/ML 100 UNIT/ML
5 SOLUTION INTRAVENOUS ONCE
Status: COMPLETED | OUTPATIENT
Start: 2021-09-22 | End: 2021-09-22

## 2021-09-22 RX ORDER — HEPARIN SODIUM (PORCINE) LOCK FLUSH IV SOLN 100 UNIT/ML 100 UNIT/ML
5 SOLUTION INTRAVENOUS ONCE
Status: CANCELLED
Start: 2021-09-22 | End: 2021-09-22

## 2021-09-22 RX ADMIN — HEPARIN 5 ML: 100 SYRINGE at 10:44

## 2021-09-22 RX ADMIN — SODIUM CHLORIDE 1000 ML: 9 INJECTION, SOLUTION INTRAVENOUS at 09:36

## 2021-09-22 NOTE — PROGRESS NOTES
Patient tolerated infusion welln  , no signs or symptoms of adverse reaction noted.  Patient denies pain nor discomfort.     IV removed, catheter intact.  Site clean, dry and intact.  No signs or symptoms of infiltration or infection.  Covered with a sterile bandage, slight pressure applied for 30 seconds.  Pt instructed to leave bandage intact for a minimum of one hour, and to call with questions or concerns.  Copy of appointments, discharge instructions, and after visit summary (AVS) provided to patient.  Patient states understanding, discharged.

## 2021-09-22 NOTE — PROGRESS NOTES
Patient is a 87 year old male  here accompanied by self today for infusion of IVF per order of Dr. Ray .  Patient identified with two identifiers, order verified, and verbal consent for today's infusion obtained from patient.    Patient meets order parameters for today's treatment.    Patients port accessed using non-coring, 20 gauge, 3/4 needle. Port accessed per facility protocol. Port flushed easily, blood return noted.  No signs and symptoms of infection or infiltration.      0936 IV pump verified with dose, drug, and rate of administration.  Infusion administered per protocol.  Patient tolerated infusion well, no signs or symptoms of adverse reaction noted.  Patient denies pain nor discomfort.     IV removed, catheter intact.  Site clean, dry and intact.  No signs or symptoms of infiltration or infection.  Covered with a sterile bandage, slight pressure applied for 30 seconds.  Pt instructed to leave bandage intact for a minimum of one hour, and to call with questions or concerns.  Copy of appointments, discharge instructions, and after visit summary (AVS) provided to patient.  Patient states understanding, discharged.

## 2021-09-29 ENCOUNTER — INFUSION THERAPY VISIT (OUTPATIENT)
Dept: INFUSION THERAPY | Facility: OTHER | Age: 86
End: 2021-09-29
Attending: FAMILY MEDICINE
Payer: MEDICARE

## 2021-09-29 DIAGNOSIS — E86.9 VOLUME DEPLETION: Primary | ICD-10-CM

## 2021-09-29 PROCEDURE — 250N000011 HC RX IP 250 OP 636: Performed by: FAMILY MEDICINE

## 2021-09-29 PROCEDURE — 96523 IRRIG DRUG DELIVERY DEVICE: CPT

## 2021-09-29 PROCEDURE — 258N000003 HC RX IP 258 OP 636: Performed by: FAMILY MEDICINE

## 2021-09-29 PROCEDURE — 96360 HYDRATION IV INFUSION INIT: CPT

## 2021-09-29 RX ORDER — HEPARIN SODIUM (PORCINE) LOCK FLUSH IV SOLN 100 UNIT/ML 100 UNIT/ML
5 SOLUTION INTRAVENOUS ONCE
Status: COMPLETED | OUTPATIENT
Start: 2021-09-29 | End: 2021-09-29

## 2021-09-29 RX ORDER — HEPARIN SODIUM (PORCINE) LOCK FLUSH IV SOLN 100 UNIT/ML 100 UNIT/ML
5 SOLUTION INTRAVENOUS ONCE
Status: CANCELLED
Start: 2021-09-29 | End: 2021-09-29

## 2021-09-29 RX ADMIN — SODIUM CHLORIDE 1000 ML: 9 INJECTION, SOLUTION INTRAVENOUS at 09:36

## 2021-09-29 RX ADMIN — HEPARIN 5 ML: 100 SYRINGE at 10:43

## 2021-09-29 NOTE — PATIENT INSTRUCTIONS

## 2021-09-29 NOTE — PROGRESS NOTES
Patient is a 87 year old here today for infusion of IVF per order of Dr Ray.  Patient identified with two identifiers, order verified, and verbal consent for today's infusion obtained from patient.      Patient meets order parameters for today's treatment.     Patient denies questions or concerns regarding infusion and/or medication(s) being administered.    Patients port accessed using non-coring, 20 gauge, 3/4 inch needle. Port accessed per facility protocol. Port flushed easily, blood return noted.  No signs and symptoms of infection or infiltration.      0936 IV pump verified with IVF dose, drug, and rate of administration.  Infusion administered per protocol.  Patient tolerated infusion well, no signs or symptoms of adverse reaction noted.  Patient denies pain nor discomfort.     IV removed, catheter intact.  Site clean, dry and intact.  No signs or symptoms of infiltration or infection.  Covered with a sterile bandage, slight pressure applied for 30 seconds.  Pt instructed to leave bandage intact for a minimum of one hour, and to call with questions or concerns.  Copy of appointments, discharge instructions, and after visit summary (AVS) provided to patient.  Patient states understanding, discharged.

## 2021-10-06 ENCOUNTER — INFUSION THERAPY VISIT (OUTPATIENT)
Dept: INFUSION THERAPY | Facility: OTHER | Age: 86
End: 2021-10-06
Attending: FAMILY MEDICINE
Payer: MEDICARE

## 2021-10-06 DIAGNOSIS — E86.9 VOLUME DEPLETION: Primary | ICD-10-CM

## 2021-10-06 PROCEDURE — 96360 HYDRATION IV INFUSION INIT: CPT

## 2021-10-06 PROCEDURE — 250N000011 HC RX IP 250 OP 636: Performed by: FAMILY MEDICINE

## 2021-10-06 PROCEDURE — 258N000003 HC RX IP 258 OP 636: Performed by: FAMILY MEDICINE

## 2021-10-06 RX ORDER — HEPARIN SODIUM (PORCINE) LOCK FLUSH IV SOLN 100 UNIT/ML 100 UNIT/ML
5 SOLUTION INTRAVENOUS ONCE
Status: COMPLETED | OUTPATIENT
Start: 2021-10-06 | End: 2021-10-06

## 2021-10-06 RX ORDER — HEPARIN SODIUM (PORCINE) LOCK FLUSH IV SOLN 100 UNIT/ML 100 UNIT/ML
5 SOLUTION INTRAVENOUS ONCE
Status: CANCELLED
Start: 2021-10-06 | End: 2021-10-06

## 2021-10-06 RX ADMIN — HEPARIN 5 ML: 100 SYRINGE at 10:45

## 2021-10-06 RX ADMIN — SODIUM CHLORIDE 1000 ML: 9 INJECTION, SOLUTION INTRAVENOUS at 09:39

## 2021-10-06 NOTE — PROGRESS NOTES
Patient is a 88 year old  here accompanied by self  today for infusion of IVF per order of Dr. Ray under the supervision of Dr. Ray.  Patient meets parameters for today's infusion. Patient identified with two identifiers, order verified, and verbal consent for today's infusion obtained from patient.  +++Written consent for treatment is on file and valid.+++    Recent lab values:      Patients right side port accessed using non-coring, 20 gauge, 3/4 needle. Port accessed per facility protocol. Port flushed easily, blood return noted.  No signs and symptoms of infection or infiltration.      0939  IV pump verified with dose, drug, and rate of administration by second R[place]at[place] Infusion administered per protocol.  Patient tolerated infusion well, no signs or symptoms of adverse reaction noted.  Patient denies pain nor discomfort.     IV removed, catheter intact.  Site clean, dry and intact.  No signs or symptoms of infiltration or infection.  Covered with a sterile bandage, slight pressure applied for 30 seconds.  Pt instructed to leave bandage intact for a minimum of one hour, and to call with questions or concerns.  Copy of appointments, discharge instructions, and after visit summary (AVS) provided to patient.  Patient states understanding, discharged ambulatory.

## 2021-10-13 ENCOUNTER — INFUSION THERAPY VISIT (OUTPATIENT)
Dept: INFUSION THERAPY | Facility: OTHER | Age: 86
End: 2021-10-13
Attending: FAMILY MEDICINE
Payer: MEDICARE

## 2021-10-13 ENCOUNTER — TELEPHONE (OUTPATIENT)
Dept: FAMILY MEDICINE | Facility: OTHER | Age: 86
End: 2021-10-13

## 2021-10-13 DIAGNOSIS — E86.9 VOLUME DEPLETION: Primary | ICD-10-CM

## 2021-10-13 PROCEDURE — 258N000003 HC RX IP 258 OP 636: Performed by: FAMILY MEDICINE

## 2021-10-13 PROCEDURE — 250N000011 HC RX IP 250 OP 636: Performed by: FAMILY MEDICINE

## 2021-10-13 PROCEDURE — 96360 HYDRATION IV INFUSION INIT: CPT

## 2021-10-13 RX ORDER — HEPARIN SODIUM (PORCINE) LOCK FLUSH IV SOLN 100 UNIT/ML 100 UNIT/ML
5 SOLUTION INTRAVENOUS ONCE
Status: CANCELLED
Start: 2021-10-13 | End: 2021-10-13

## 2021-10-13 RX ORDER — HEPARIN SODIUM (PORCINE) LOCK FLUSH IV SOLN 100 UNIT/ML 100 UNIT/ML
5 SOLUTION INTRAVENOUS ONCE
Status: COMPLETED | OUTPATIENT
Start: 2021-10-13 | End: 2021-10-13

## 2021-10-13 RX ADMIN — SODIUM CHLORIDE 1000 ML: 9 INJECTION, SOLUTION INTRAVENOUS at 09:35

## 2021-10-13 RX ADMIN — HEPARIN 5 ML: 100 SYRINGE at 10:37

## 2021-10-13 NOTE — PROGRESS NOTES
Patient is a 88 year old male here accompanied by self today for infusion of IVF per order of Dr. Ray under the supervision of Dr Ray.  Patient identified with two identifiers, order verified, and verbal consent for today's infusion obtained from patient.      Patient  lab values:  Not required for today's infusion  Patient meets order parameters for today's treatment.  Patients port accessed using non-coring, 20 gauge, 3/4 needle. Port accessed per facility protocol. Port flushed easily, blood return noted.  No signs and symptoms of infection or infiltration.      IV pump verified with dose, drug, and rate of administration.  Infusion administered per protocol.  Patient tolerated infusion well, no signs or symptoms of adverse reaction noted.  Patient denies pain nor discomfort.     IV removed, catheter intact.  Site clean, dry and intact.  No signs or symptoms of infiltration or infection.  Covered with a sterile bandage, slight pressure applied for 30 seconds.  Pt instructed to leave bandage intact for a minimum of one hour, and to call with questions or concerns.  Copy of appointments, discharge instructions, and after visit summary (AVS) provided to patient.  Patient states understanding, discharged.

## 2021-10-20 ENCOUNTER — INFUSION THERAPY VISIT (OUTPATIENT)
Dept: INFUSION THERAPY | Facility: OTHER | Age: 86
End: 2021-10-20
Attending: FAMILY MEDICINE
Payer: MEDICARE

## 2021-10-20 DIAGNOSIS — E86.9 VOLUME DEPLETION: Primary | ICD-10-CM

## 2021-10-20 PROCEDURE — 96360 HYDRATION IV INFUSION INIT: CPT

## 2021-10-20 PROCEDURE — 258N000003 HC RX IP 258 OP 636: Performed by: FAMILY MEDICINE

## 2021-10-20 PROCEDURE — 96365 THER/PROPH/DIAG IV INF INIT: CPT

## 2021-10-20 PROCEDURE — 250N000011 HC RX IP 250 OP 636: Performed by: FAMILY MEDICINE

## 2021-10-20 RX ORDER — HEPARIN SODIUM (PORCINE) LOCK FLUSH IV SOLN 100 UNIT/ML 100 UNIT/ML
5 SOLUTION INTRAVENOUS ONCE
Status: COMPLETED | OUTPATIENT
Start: 2021-10-20 | End: 2021-10-20

## 2021-10-20 RX ORDER — HEPARIN SODIUM (PORCINE) LOCK FLUSH IV SOLN 100 UNIT/ML 100 UNIT/ML
5 SOLUTION INTRAVENOUS ONCE
Status: CANCELLED
Start: 2021-10-20 | End: 2021-10-20

## 2021-10-20 RX ADMIN — HEPARIN 5 ML: 100 SYRINGE at 10:42

## 2021-10-20 RX ADMIN — SODIUM CHLORIDE 1000 ML: 9 INJECTION, SOLUTION INTRAVENOUS at 09:36

## 2021-10-20 NOTE — PROGRESS NOTES
Patient is a 88 year old here today for infusion of IVF per order of Dr Ray.  Patient identified with two identifiers, order verified, and verbal consent for today's infusion obtained from patient.      Patient meets order parameters for today's treatment.     Patient denies questions or concerns regarding infusion and/or medication(s) being administered.    Patients port accessed using non-coring, 20 gauge, 3/4 inch power needle. Port accessed per facility protocol. Port flushed easily, blood return noted.  No signs and symptoms of infection or infiltration.      0936 IV pump verified with IVF dose, drug, and rate of administration.  Infusion administered per protocol.

## 2021-10-20 NOTE — PATIENT INSTRUCTIONS

## 2021-10-27 ENCOUNTER — INFUSION THERAPY VISIT (OUTPATIENT)
Dept: INFUSION THERAPY | Facility: OTHER | Age: 86
End: 2021-10-27
Attending: FAMILY MEDICINE
Payer: MEDICARE

## 2021-10-27 VITALS
TEMPERATURE: 97.5 F | HEART RATE: 88 BPM | DIASTOLIC BLOOD PRESSURE: 86 MMHG | RESPIRATION RATE: 16 BRPM | SYSTOLIC BLOOD PRESSURE: 141 MMHG | OXYGEN SATURATION: 98 %

## 2021-10-27 DIAGNOSIS — E86.9 VOLUME DEPLETION: Primary | ICD-10-CM

## 2021-10-27 PROCEDURE — 250N000011 HC RX IP 250 OP 636: Performed by: FAMILY MEDICINE

## 2021-10-27 PROCEDURE — 258N000003 HC RX IP 258 OP 636: Performed by: FAMILY MEDICINE

## 2021-10-27 PROCEDURE — 96360 HYDRATION IV INFUSION INIT: CPT

## 2021-10-27 RX ORDER — HEPARIN SODIUM (PORCINE) LOCK FLUSH IV SOLN 100 UNIT/ML 100 UNIT/ML
5 SOLUTION INTRAVENOUS ONCE
Status: CANCELLED
Start: 2021-10-27 | End: 2021-10-27

## 2021-10-27 RX ORDER — HEPARIN SODIUM (PORCINE) LOCK FLUSH IV SOLN 100 UNIT/ML 100 UNIT/ML
5 SOLUTION INTRAVENOUS ONCE
Status: COMPLETED | OUTPATIENT
Start: 2021-10-27 | End: 2021-10-27

## 2021-10-27 RX ADMIN — HEPARIN 5 ML: 100 SYRINGE at 10:45

## 2021-10-27 RX ADMIN — SODIUM CHLORIDE 1000 ML: 9 INJECTION, SOLUTION INTRAVENOUS at 09:39

## 2021-10-27 ASSESSMENT — PAIN SCALES - GENERAL: PAINLEVEL: NO PAIN (0)

## 2021-10-27 NOTE — PROGRESS NOTES
Patient is 88 years old, here accompanied by self ( to door) today for infusion of IVF per order of Dr MALIA Ray.  Patient identified with two identifiers, order verified, and verbal consent for today's infusion obtained from patient.      Patients port accessed using non-coring, 20 gauge, 3/4 inch needle. Port accessed per facility protocol. Port flushed easily, blood return noted.  No signs and symptoms of infection or infiltration.      IV pump verified with dose, drug, and rate of administration.  Infusion administered per protocol.

## 2021-10-28 PROBLEM — H54.61 VISION LOSS OF RIGHT EYE: Status: ACTIVE | Noted: 2021-02-12

## 2021-10-28 PROBLEM — H34.239 RETINAL ARTERY BRANCH OCCLUSION: Status: ACTIVE | Noted: 2021-02-13

## 2021-10-29 ENCOUNTER — OFFICE VISIT (OUTPATIENT)
Dept: OTOLARYNGOLOGY | Facility: OTHER | Age: 86
End: 2021-10-29
Attending: OTOLARYNGOLOGY
Payer: MEDICARE

## 2021-10-29 VITALS
TEMPERATURE: 97.1 F | DIASTOLIC BLOOD PRESSURE: 62 MMHG | HEART RATE: 70 BPM | SYSTOLIC BLOOD PRESSURE: 98 MMHG | OXYGEN SATURATION: 96 %

## 2021-10-29 DIAGNOSIS — H90.3 ASNHL (ASYMMETRICAL SENSORINEURAL HEARING LOSS): ICD-10-CM

## 2021-10-29 DIAGNOSIS — G31.83 LEWY BODY DEMENTIA WITHOUT BEHAVIORAL DISTURBANCE (H): ICD-10-CM

## 2021-10-29 DIAGNOSIS — H61.23 BILATERAL IMPACTED CERUMEN: ICD-10-CM

## 2021-10-29 DIAGNOSIS — F02.80 LEWY BODY DEMENTIA WITHOUT BEHAVIORAL DISTURBANCE (H): ICD-10-CM

## 2021-10-29 DIAGNOSIS — Z96.22 S/P BILATERAL MYRINGOTOMY WITH TUBE PLACEMENT: Primary | ICD-10-CM

## 2021-10-29 PROBLEM — S32.010A CLOSED COMPRESSION FRACTURE OF BODY OF L1 VERTEBRA (H): Status: ACTIVE | Noted: 2021-02-12

## 2021-10-29 PROCEDURE — 69210 REMOVE IMPACTED EAR WAX UNI: CPT | Performed by: OTOLARYNGOLOGY

## 2021-10-29 PROCEDURE — G0463 HOSPITAL OUTPT CLINIC VISIT: HCPCS | Mod: 25

## 2021-10-29 PROCEDURE — 99213 OFFICE O/P EST LOW 20 MIN: CPT | Mod: 25 | Performed by: OTOLARYNGOLOGY

## 2021-10-29 PROCEDURE — 92504 EAR MICROSCOPY EXAMINATION: CPT | Performed by: OTOLARYNGOLOGY

## 2021-10-29 RX ORDER — CIPROFLOXACIN AND DEXAMETHASONE 3; 1 MG/ML; MG/ML
4 SUSPENSION/ DROPS AURICULAR (OTIC) 2 TIMES DAILY
Qty: 7.5 ML | Refills: 0 | Status: SHIPPED | OUTPATIENT
Start: 2021-10-29 | End: 2021-11-05

## 2021-10-29 NOTE — NURSING NOTE
"Chief Complaint   Patient presents with     Cerumen Impaction     Ear Cleaning       Initial BP 98/62 (BP Location: Left arm, Patient Position: Sitting, Cuff Size: Adult Regular)   Pulse 70   Temp 97.1  F (36.2  C) (Tympanic)   SpO2 96%  Estimated body mass index is 23.4 kg/m  as calculated from the following:    Height as of 4/13/21: 1.753 m (5' 9.02\").    Weight as of 10/28/21: 71.9 kg (158 lb 8 oz).  Medication Reconciliation: complete  SHAKIRA WEEMS LPN  "

## 2021-10-29 NOTE — LETTER
10/29/2021         RE: Jf Ortiz  [address] [place] [address]        Dear Colleague,    Thank you for referring your patient, Jf Ortiz, to the [facility]. Please see a copy of my visit note below.    Otolaryngology Progress Note          Jf Ortiz is a 88 year old male    Follow-up of his ears.      Most recently saw Geeta on 2/18/2020 who removed cerumen and ntoed right tube was patent    status post office right tube insertion on 7/9/2015 for right chronic otitis media with effusion.  Thick glue ear was noted and I inserted a T-tube he last saw me on 7/9/2015 at that time he had a moderate sloping to severe to profound right mixed hearing loss.      He has diagnosis of Lewy body dementia and has seen us in the past for otitis media with effusion and vertigo which is felt to be related to his diagnosis of dementia       Negative nasopharyngoscopy 2014      Also noting chronic intermittent episodes of imbalance with standing, seconds at a time no fluctuating hearing loss, no facial numbness, weakness or dysphagia.    He most recently saw Geeta on 2/18/2020    Audiogram dated 11/15/2016 was reviewed and shows a bilateral moderate sloping to severe to profound asymmetric sensorineural hearing loss.  High-frequency right hearing loss is worse SRT 45 dB bilaterally.    43% discrimination the right large B tympanogram on the right and the left 78% discrimination type a tympanogram    Audiogram 11/15/2017 o showed stable mild to profound bilateral asymmetric sensorineural hearing loss stable discrimination    He has a pacemaker and therefore has not had an MRI IAC    He has had several CT heads most recently 6/22/2021 the internal auditory canal diameter is grossly symmetric no mastoid effusion the middle ear is clear on the right there is some cerumen or fluid in the ear canal.  No bony erosions        Physical Exam  BP 98/62 (BP Location: Left arm, Patient Position: Sitting, Cuff  Size: Adult Regular)   Pulse 70   Temp 97.1  F (36.2  C) (Tympanic)   SpO2 96%   General - The patient is well nourished and well developed, and appears to have good nutritional status.  Appears chronically ill  Nonverbal until after ear cleaning  Head and Face - Normocephalic and atraumatic, with no gross asymmetry noted of the contour of the facial features.  The facial nerve is intact, with strong symmetric movements.  Neck-no palpable lymphadenopathy or thyroid mass.  Trachea is midline.  Eyes - Extraocular movements intact.     Ears-right canal filled with cerumen removed with suction and loop.  The T-tube is occluded with milky otorrhea that was suction debrided with an 3 Carr the tube is in good position and patent.  Complete cerumen impaction on the left removed with suction and a loop.  The left tympanic membrane is intact without effusion or retraction    Nose - Nasal mucosa is pink and moist with no abnormal mucus.  The septum was grossly midline and non-obstructive, turbinates of normal size and position.  No polyps, masses, or purulence noted on examination.  Mouth - Examination of the oral cavity shows pink, healthy, moist mucosa.  No lesions or ulceration noted.  The dentition are in good repair.  The tongue is mobile and midline.  Throat - The walls of the oropharynx were smooth, pink, moist, symmetric, and had no lesions or ulcerations.  The tonsillar pillars and soft palate were symmetric.  The uvula was midline on elevation.      Impression/Plan  Jf Ortiz is a 88 year old male    ICD-10-CM    1. S/p bilateral myringotomy with tube placement  Z96.22    2. Bilateral impacted cerumen  H61.23 ciprofloxacin-dexamethasone (CIPRODEX) 0.3-0.1 % otic suspension   3. ASNHL (asymmetrical sensorineural hearing loss)  H90.3    4. Lewy body dementia without behavioral disturbance (H)  G31.83     F02.80          He can now hear directed conversation  I spoke with Des, his wife by phone and she was very  thankful    I appreciate that Edgardo had started Cerumenex prior disimpaction and this was helpful    Complete ciprodex gtt   Follow up with Geeta Alegria in 6 months  Repeat     Start wearing hearing aids in 1 week    HA compliance reinforced    importance of hearing aid compliance and prevention of worsening dementia and vertigo was discussed and reinforced      Chayo Duke D.O.  Otolaryngology/Head and Neck Surgery  Allergy          Again, thank you for allowing me to participate in the care of your patient.        Sincerely,        Chayo Duke MD

## 2021-10-29 NOTE — PATIENT INSTRUCTIONS
Thank you for allowing Dr. Duke and our ENT team to participate in your care.  If your medications are too expensive, please give the nurse a call.  We can possibly change this medication.  If you have a scheduling or an appointment question please contact our Health Unit Coordinator at their direct line [phone] ext [phone].   ALL nursing questions or concerns can be directed to your ENT nurse at: [phone] - [place]    Follow up with Geeta Alegria in 6 months  Use ear drops as prescribed for 1 week  Start wearing hearing aids in 1 week

## 2021-10-29 NOTE — PROGRESS NOTES
Otolaryngology Progress Note          Jf Ortiz is a 88 year old male    Follow-up of his ears.      Most recently saw Geeta on 2/18/2020 who removed cerumen and ntoed right tube was patent    status post office right tube insertion on 7/9/2015 for right chronic otitis media with effusion.  Thick glue ear was noted and I inserted a T-tube he last saw me on 7/9/2015 at that time he had a moderate sloping to severe to profound right mixed hearing loss.      He has diagnosis of Lewy body dementia and has seen us in the past for otitis media with effusion and vertigo which is felt to be related to his diagnosis of dementia       Negative nasopharyngoscopy 2014      Also noting chronic intermittent episodes of imbalance with standing, seconds at a time no fluctuating hearing loss, no facial numbness, weakness or dysphagia.    He most recently saw Geeta on 2/18/2020    Audiogram dated 11/15/2016 was reviewed and shows a bilateral moderate sloping to severe to profound asymmetric sensorineural hearing loss.  High-frequency right hearing loss is worse SRT 45 dB bilaterally.    43% discrimination the right large B tympanogram on the right and the left 78% discrimination type a tympanogram    Audiogram 11/15/2017 o showed stable mild to profound bilateral asymmetric sensorineural hearing loss stable discrimination    He has a pacemaker and therefore has not had an MRI IAC    He has had several CT heads most recently 6/22/2021 the internal auditory canal diameter is grossly symmetric no mastoid effusion the middle ear is clear on the right there is some cerumen or fluid in the ear canal.  No bony erosions        Physical Exam  BP 98/62 (BP Location: Left arm, Patient Position: Sitting, Cuff Size: Adult Regular)   Pulse 70   Temp 97.1  F (36.2  C) (Tympanic)   SpO2 96%   General - The patient is well nourished and well developed, and appears to have good nutritional status.  Appears chronically ill  Nonverbal until after  ear cleaning  Head and Face - Normocephalic and atraumatic, with no gross asymmetry noted of the contour of the facial features.  The facial nerve is intact, with strong symmetric movements.  Neck-no palpable lymphadenopathy or thyroid mass.  Trachea is midline.  Eyes - Extraocular movements intact.     Ears-right canal filled with cerumen removed with suction and loop.  The T-tube is occluded with milky otorrhea that was suction debrided with an 3 Carr the tube is in good position and patent.  Complete cerumen impaction on the left removed with suction and a loop.  The left tympanic membrane is intact without effusion or retraction    Nose - Nasal mucosa is pink and moist with no abnormal mucus.  The septum was grossly midline and non-obstructive, turbinates of normal size and position.  No polyps, masses, or purulence noted on examination.  Mouth - Examination of the oral cavity shows pink, healthy, moist mucosa.  No lesions or ulceration noted.  The dentition are in good repair.  The tongue is mobile and midline.  Throat - The walls of the oropharynx were smooth, pink, moist, symmetric, and had no lesions or ulcerations.  The tonsillar pillars and soft palate were symmetric.  The uvula was midline on elevation.      Impression/Plan  Jf Ortiz is a 88 year old male    ICD-10-CM    1. S/p bilateral myringotomy with tube placement  Z96.22    2. Bilateral impacted cerumen  H61.23 ciprofloxacin-dexamethasone (CIPRODEX) 0.3-0.1 % otic suspension   3. ASNHL (asymmetrical sensorineural hearing loss)  H90.3    4. Lewy body dementia without behavioral disturbance (H)  G31.83     F02.80          He can now hear directed conversation  I spoke with Des, his wife by phone and she was very thankful    I appreciate that Edgardo had started Cerumenex prior disimpaction and this was helpful    Complete ciprodex gtt   Follow up with Geeta Alegria in 6 months  Repeat     Start wearing hearing aids in 1 week    HA compliance  reinforced    importance of hearing aid compliance and prevention of worsening dementia and vertigo was discussed and reinforced      Chayo Duke D.O.  Otolaryngology/Head and Neck Surgery  Allergy

## 2021-11-03 ENCOUNTER — INFUSION THERAPY VISIT (OUTPATIENT)
Dept: INFUSION THERAPY | Facility: OTHER | Age: 86
End: 2021-11-03
Attending: FAMILY MEDICINE
Payer: MEDICARE

## 2021-11-03 VITALS
SYSTOLIC BLOOD PRESSURE: 142 MMHG | DIASTOLIC BLOOD PRESSURE: 76 MMHG | OXYGEN SATURATION: 97 % | HEART RATE: 73 BPM | TEMPERATURE: 97.1 F

## 2021-11-03 DIAGNOSIS — E86.9 VOLUME DEPLETION: Primary | ICD-10-CM

## 2021-11-03 PROCEDURE — 258N000003 HC RX IP 258 OP 636: Performed by: FAMILY MEDICINE

## 2021-11-03 PROCEDURE — 250N000011 HC RX IP 250 OP 636: Performed by: FAMILY MEDICINE

## 2021-11-03 PROCEDURE — 96360 HYDRATION IV INFUSION INIT: CPT

## 2021-11-03 RX ORDER — HEPARIN SODIUM (PORCINE) LOCK FLUSH IV SOLN 100 UNIT/ML 100 UNIT/ML
5 SOLUTION INTRAVENOUS ONCE
Status: COMPLETED | OUTPATIENT
Start: 2021-11-03 | End: 2021-11-03

## 2021-11-03 RX ORDER — HEPARIN SODIUM (PORCINE) LOCK FLUSH IV SOLN 100 UNIT/ML 100 UNIT/ML
5 SOLUTION INTRAVENOUS ONCE
Status: CANCELLED
Start: 2021-11-03 | End: 2021-11-03

## 2021-11-03 RX ADMIN — SODIUM CHLORIDE 1000 ML: 9 INJECTION, SOLUTION INTRAVENOUS at 09:53

## 2021-11-03 RX ADMIN — HEPARIN 5 ML: 100 SYRINGE at 10:56

## 2021-11-03 NOTE — PROGRESS NOTES
Intravenous fluids were administered, normal saline 1000 mL ml.  Patient tolerated will.  Discharged to [facility] to transport to NH.

## 2021-11-10 ENCOUNTER — INFUSION THERAPY VISIT (OUTPATIENT)
Dept: INFUSION THERAPY | Facility: OTHER | Age: 86
End: 2021-11-10
Attending: FAMILY MEDICINE
Payer: MEDICARE

## 2021-11-10 ENCOUNTER — TELEPHONE (OUTPATIENT)
Dept: FAMILY MEDICINE | Facility: OTHER | Age: 86
End: 2021-11-10

## 2021-11-10 DIAGNOSIS — E86.9 VOLUME DEPLETION: Primary | ICD-10-CM

## 2021-11-10 PROCEDURE — 258N000003 HC RX IP 258 OP 636: Performed by: FAMILY MEDICINE

## 2021-11-10 PROCEDURE — 250N000011 HC RX IP 250 OP 636: Performed by: FAMILY MEDICINE

## 2021-11-10 PROCEDURE — 96360 HYDRATION IV INFUSION INIT: CPT

## 2021-11-10 RX ORDER — HEPARIN SODIUM (PORCINE) LOCK FLUSH IV SOLN 100 UNIT/ML 100 UNIT/ML
5 SOLUTION INTRAVENOUS ONCE
Status: COMPLETED | OUTPATIENT
Start: 2021-11-10 | End: 2021-11-10

## 2021-11-10 RX ORDER — HEPARIN SODIUM (PORCINE) LOCK FLUSH IV SOLN 100 UNIT/ML 100 UNIT/ML
5 SOLUTION INTRAVENOUS ONCE
Status: CANCELLED
Start: 2021-11-10 | End: 2021-11-10

## 2021-11-10 RX ADMIN — SODIUM CHLORIDE 1000 ML: 9 INJECTION, SOLUTION INTRAVENOUS at 09:44

## 2021-11-10 RX ADMIN — HEPARIN 5 ML: 100 SYRINGE at 10:47

## 2021-11-10 NOTE — PROGRESS NOTES
Patient is a 88 year old male here accompanied by self today for infusion of IVF per order of Dr. Ray under the supervision of Dr. Ray  Patient identified with two identifiers, order verified, and verbal consent for today's infusion obtained from patient.      Patient lab values:  Not required for today's infusion  Patient meets order parameters for today's treatment.     Patients port accessed using non-coring, 19 gauge, 3/4 needle. Port accessed per facility protocol. Port flushed easily, blood return noted.  No signs and symptoms of infection or infiltration.      IV pump verified with dose, drug, and rate of administration.  Infusion administered per protocol.  Patient tolerated infusion well, no signs or symptoms of adverse reaction noted.  Patient denies pain nor discomfort.     IV removed, catheter intact.  Site clean, dry and intact.  No signs or symptoms of infiltration or infection.  Covered with a sterile bandage, slight pressure applied for 30 seconds.  Pt instructed to leave bandage intact for a minimum of one hour, and to call with questions or concerns.  Copy of appointments, discharge instructions, and after visit summary (AVS) provided to patient.  Patient states understanding, discharged.

## 2021-11-15 ENCOUNTER — TELEPHONE (OUTPATIENT)
Dept: FAMILY MEDICINE | Facility: OTHER | Age: 86
End: 2021-11-15
Payer: MEDICARE

## 2021-11-16 ENCOUNTER — ANCILLARY PROCEDURE (OUTPATIENT)
Dept: CARDIOLOGY | Facility: CLINIC | Age: 86
End: 2021-11-16
Attending: INTERNAL MEDICINE
Payer: MEDICARE

## 2021-11-16 DIAGNOSIS — I44.2 COMPLETE HEART BLOCK (H): ICD-10-CM

## 2021-11-16 PROCEDURE — 93294 REM INTERROG EVL PM/LDLS PM: CPT | Performed by: INTERNAL MEDICINE

## 2021-11-16 PROCEDURE — 93296 REM INTERROG EVL PM/IDS: CPT

## 2021-11-17 ENCOUNTER — INFUSION THERAPY VISIT (OUTPATIENT)
Dept: INFUSION THERAPY | Facility: OTHER | Age: 86
End: 2021-11-17
Attending: FAMILY MEDICINE
Payer: MEDICARE

## 2021-11-17 VITALS
SYSTOLIC BLOOD PRESSURE: 119 MMHG | HEART RATE: 71 BPM | OXYGEN SATURATION: 97 % | TEMPERATURE: 96.3 F | DIASTOLIC BLOOD PRESSURE: 72 MMHG

## 2021-11-17 DIAGNOSIS — E86.9 VOLUME DEPLETION: Primary | ICD-10-CM

## 2021-11-17 PROCEDURE — 96360 HYDRATION IV INFUSION INIT: CPT

## 2021-11-17 PROCEDURE — 250N000011 HC RX IP 250 OP 636: Performed by: FAMILY MEDICINE

## 2021-11-17 PROCEDURE — 258N000003 HC RX IP 258 OP 636: Performed by: FAMILY MEDICINE

## 2021-11-17 RX ORDER — HEPARIN SODIUM (PORCINE) LOCK FLUSH IV SOLN 100 UNIT/ML 100 UNIT/ML
5 SOLUTION INTRAVENOUS ONCE
Status: COMPLETED | OUTPATIENT
Start: 2021-11-17 | End: 2021-11-17

## 2021-11-17 RX ORDER — HEPARIN SODIUM (PORCINE) LOCK FLUSH IV SOLN 100 UNIT/ML 100 UNIT/ML
5 SOLUTION INTRAVENOUS ONCE
Status: CANCELLED
Start: 2021-11-17 | End: 2021-11-17

## 2021-11-17 RX ADMIN — SODIUM CHLORIDE 1000 ML: 9 INJECTION, SOLUTION INTRAVENOUS at 09:43

## 2021-11-17 RX ADMIN — HEPARIN 5 ML: 100 SYRINGE at 10:51

## 2021-11-17 ASSESSMENT — PAIN SCALES - GENERAL: PAINLEVEL: NO PAIN (0)

## 2021-11-17 NOTE — PATIENT INSTRUCTIONS

## 2021-11-17 NOTE — PROGRESS NOTES
Intravenous fluids were administered, normal saline 1000 mL.  Patients port accessed using non-coring, 19 gauge, 3/4 needle. Port accessed per facility protocol.   Hand hygiene performed: yes   Mask donned by caregiver: yes   Site prepped with CHG: yes   Labs drawn: no     Dressing applied using aseptic technique: yes   Port flushed easily, blood return noted.  No signs and symptoms of infection or infiltration.  Port flushed 10 mLs Normal Saline then Heparin 5mLs of 100 unit/mL.  Needle removed, small dressing applied.  Patient discharged with no complaints.

## 2021-11-24 ENCOUNTER — INFUSION THERAPY VISIT (OUTPATIENT)
Dept: INFUSION THERAPY | Facility: OTHER | Age: 86
End: 2021-11-24
Attending: FAMILY MEDICINE
Payer: MEDICARE

## 2021-11-24 DIAGNOSIS — E86.9 VOLUME DEPLETION: Primary | ICD-10-CM

## 2021-11-24 PROCEDURE — 258N000003 HC RX IP 258 OP 636: Performed by: FAMILY MEDICINE

## 2021-11-24 PROCEDURE — 96360 HYDRATION IV INFUSION INIT: CPT

## 2021-11-24 PROCEDURE — 250N000011 HC RX IP 250 OP 636: Performed by: FAMILY MEDICINE

## 2021-11-24 RX ORDER — HEPARIN SODIUM (PORCINE) LOCK FLUSH IV SOLN 100 UNIT/ML 100 UNIT/ML
5 SOLUTION INTRAVENOUS ONCE
Status: COMPLETED | OUTPATIENT
Start: 2021-11-24 | End: 2021-11-24

## 2021-11-24 RX ORDER — HEPARIN SODIUM (PORCINE) LOCK FLUSH IV SOLN 100 UNIT/ML 100 UNIT/ML
5 SOLUTION INTRAVENOUS ONCE
Status: CANCELLED
Start: 2021-11-24 | End: 2021-11-24

## 2021-11-24 RX ADMIN — Medication 5 ML: at 10:33

## 2021-11-24 RX ADMIN — SODIUM CHLORIDE 1000 ML: 9 INJECTION, SOLUTION INTRAVENOUS at 09:24

## 2021-11-24 NOTE — PROGRESS NOTES
Intravenous fluids were administered, normal saline 1000 mL Per facility protocol.  Patients port accessed using non-coring, 19 gauge, 3/4 needle. Port accessed per facility protocol.   Hand hygiene performed: yes   Mask donned by caregiver: yes   Site prepped with CHG: yes   Labs drawn: yes   Dressing applied using aseptic technique: yes   Port flushed easily, blood return noted.  No signs and symptoms of infection or infiltration.  Port flushed 10 mLs Normal Saline then Heparin 5mLs of 100 unit/mL.  Needle removed, small dressing applied.  Patient discharged with no complaints.

## 2021-12-01 ENCOUNTER — INFUSION THERAPY VISIT (OUTPATIENT)
Dept: INFUSION THERAPY | Facility: OTHER | Age: 86
End: 2021-12-01
Attending: FAMILY MEDICINE
Payer: MEDICARE

## 2021-12-01 DIAGNOSIS — E86.9 VOLUME DEPLETION: Primary | ICD-10-CM

## 2021-12-01 PROCEDURE — 96365 THER/PROPH/DIAG IV INF INIT: CPT

## 2021-12-01 PROCEDURE — 250N000011 HC RX IP 250 OP 636: Performed by: FAMILY MEDICINE

## 2021-12-01 PROCEDURE — 258N000003 HC RX IP 258 OP 636: Performed by: FAMILY MEDICINE

## 2021-12-01 RX ORDER — HEPARIN SODIUM (PORCINE) LOCK FLUSH IV SOLN 100 UNIT/ML 100 UNIT/ML
5 SOLUTION INTRAVENOUS ONCE
Status: COMPLETED | OUTPATIENT
Start: 2021-12-01 | End: 2021-12-01

## 2021-12-01 RX ORDER — HEPARIN SODIUM (PORCINE) LOCK FLUSH IV SOLN 100 UNIT/ML 100 UNIT/ML
5 SOLUTION INTRAVENOUS ONCE
Status: CANCELLED
Start: 2021-12-01 | End: 2021-12-01

## 2021-12-01 RX ADMIN — HEPARIN 5 ML: 100 SYRINGE at 10:41

## 2021-12-01 RX ADMIN — SODIUM CHLORIDE 1000 ML: 9 INJECTION, SOLUTION INTRAVENOUS at 09:38

## 2021-12-01 NOTE — PROGRESS NOTES
Patient is a 88 year old here accompanied by self today for infusion of IVF per order of Dr Ray. Patient identified with two identifiers, order verified, and verbal consent for today's infusion obtained from patient.       Patient meets order parameters for today's treatment.     Patient denies questions or concerns regarding infusion and/or medication(s) being administered.    0938 IV pump verified with IVF dose, drug, and rate of administration. Infusion administered per protocol. Patient tolerated infusion well, no signs or symptoms of adverse reaction noted. Patient denies pain nor discomfort.     IV removed, catheter intact. Site clean, dry and intact. No signs or symptoms of infiltration or infection. Covered with a sterile bandage, slight pressure applied for 30 seconds. Pt instructed to leave bandage intact for a minimum of one hour, and to call with questions or concerns. Copy of appointments, discharge instructions, and after visit summary (AVS) provided to patient.  Patient states understanding, discharged.

## 2021-12-01 NOTE — PATIENT INSTRUCTIONS

## 2021-12-03 NOTE — ED NOTES
Ambulated to room 1 independently, accompanied by friend, gown placed, call light in reach.  Weakness and dizziness x 2 weeks.  Fall during the night, losing consciousness for a minute or less, fell to the floor.  Right mid back pain 10 with movement.  Pain goal is zero.     OBSERVATION

## 2021-12-06 NOTE — PATIENT INSTRUCTIONS
Noted.   Forwarded to [facility].     Thank you for scheduling your appointment with us today. If you have any questions or concerns related to procedure/medication at today's visit, please contact your primary care provider, or the provider who ordered today's procedure/medication. If you have any questions related to scheduling with our unit, or if you are having trouble getting in contact with your ordering provider, we can be reached at [phone].

## 2021-12-08 ENCOUNTER — INFUSION THERAPY VISIT (OUTPATIENT)
Dept: INFUSION THERAPY | Facility: OTHER | Age: 86
End: 2021-12-08
Attending: FAMILY MEDICINE
Payer: MEDICARE

## 2021-12-08 DIAGNOSIS — E86.9 VOLUME DEPLETION: Primary | ICD-10-CM

## 2021-12-08 PROCEDURE — 250N000011 HC RX IP 250 OP 636: Performed by: FAMILY MEDICINE

## 2021-12-08 PROCEDURE — 96360 HYDRATION IV INFUSION INIT: CPT

## 2021-12-08 PROCEDURE — 258N000003 HC RX IP 258 OP 636: Performed by: FAMILY MEDICINE

## 2021-12-08 RX ORDER — HEPARIN SODIUM (PORCINE) LOCK FLUSH IV SOLN 100 UNIT/ML 100 UNIT/ML
5 SOLUTION INTRAVENOUS ONCE
Status: CANCELLED
Start: 2021-12-08 | End: 2021-12-08

## 2021-12-08 RX ORDER — HEPARIN SODIUM (PORCINE) LOCK FLUSH IV SOLN 100 UNIT/ML 100 UNIT/ML
5 SOLUTION INTRAVENOUS ONCE
Status: COMPLETED | OUTPATIENT
Start: 2021-12-08 | End: 2021-12-08

## 2021-12-08 RX ADMIN — Medication 5 ML: at 11:00

## 2021-12-08 RX ADMIN — SODIUM CHLORIDE 1000 ML: 9 INJECTION, SOLUTION INTRAVENOUS at 09:48

## 2021-12-09 LAB
MDC_IDC_EPISODE_DTM: NORMAL
MDC_IDC_EPISODE_DURATION: 1 S
MDC_IDC_EPISODE_ID: 889
MDC_IDC_EPISODE_TYPE: NORMAL
MDC_IDC_LEAD_IMPLANT_DT: NORMAL
MDC_IDC_LEAD_IMPLANT_DT: NORMAL
MDC_IDC_LEAD_LOCATION: NORMAL
MDC_IDC_LEAD_LOCATION: NORMAL
MDC_IDC_LEAD_LOCATION_DETAIL_1: NORMAL
MDC_IDC_LEAD_LOCATION_DETAIL_1: NORMAL
MDC_IDC_LEAD_MFG: NORMAL
MDC_IDC_LEAD_MFG: NORMAL
MDC_IDC_LEAD_MODEL: NORMAL
MDC_IDC_LEAD_MODEL: NORMAL
MDC_IDC_LEAD_POLARITY_TYPE: NORMAL
MDC_IDC_LEAD_POLARITY_TYPE: NORMAL
MDC_IDC_LEAD_SERIAL: NORMAL
MDC_IDC_LEAD_SERIAL: NORMAL
MDC_IDC_MSMT_BATTERY_DTM: NORMAL
MDC_IDC_MSMT_BATTERY_REMAINING_LONGEVITY: 74 MO
MDC_IDC_MSMT_BATTERY_RRT_TRIGGER: 2.62
MDC_IDC_MSMT_BATTERY_STATUS: NORMAL
MDC_IDC_MSMT_BATTERY_VOLTAGE: 2.98 V
MDC_IDC_MSMT_LEADCHNL_RA_IMPEDANCE_VALUE: 418 OHM
MDC_IDC_MSMT_LEADCHNL_RA_IMPEDANCE_VALUE: 475 OHM
MDC_IDC_MSMT_LEADCHNL_RA_PACING_THRESHOLD_AMPLITUDE: 1.5 V
MDC_IDC_MSMT_LEADCHNL_RA_PACING_THRESHOLD_PULSEWIDTH: 0.4 MS
MDC_IDC_MSMT_LEADCHNL_RA_SENSING_INTR_AMPL: 1.62 MV
MDC_IDC_MSMT_LEADCHNL_RA_SENSING_INTR_AMPL: 1.62 MV
MDC_IDC_MSMT_LEADCHNL_RV_IMPEDANCE_VALUE: 361 OHM
MDC_IDC_MSMT_LEADCHNL_RV_IMPEDANCE_VALUE: 532 OHM
MDC_IDC_MSMT_LEADCHNL_RV_PACING_THRESHOLD_AMPLITUDE: 0.88 V
MDC_IDC_MSMT_LEADCHNL_RV_PACING_THRESHOLD_PULSEWIDTH: 0.4 MS
MDC_IDC_MSMT_LEADCHNL_RV_SENSING_INTR_AMPL: 4.75 MV
MDC_IDC_MSMT_LEADCHNL_RV_SENSING_INTR_AMPL: 4.75 MV
MDC_IDC_PG_IMPLANT_DTM: NORMAL
MDC_IDC_PG_MFG: NORMAL
MDC_IDC_PG_MODEL: NORMAL
MDC_IDC_PG_SERIAL: NORMAL
MDC_IDC_PG_TYPE: NORMAL
MDC_IDC_SESS_CLINIC_NAME: NORMAL
MDC_IDC_SESS_DTM: NORMAL
MDC_IDC_SESS_TYPE: NORMAL
MDC_IDC_SET_BRADY_AT_MODE_SWITCH_RATE: 171 {BEATS}/MIN
MDC_IDC_SET_BRADY_HYSTRATE: NORMAL
MDC_IDC_SET_BRADY_LOWRATE: 70 {BEATS}/MIN
MDC_IDC_SET_BRADY_MAX_SENSOR_RATE: 130 {BEATS}/MIN
MDC_IDC_SET_BRADY_MAX_TRACKING_RATE: 130 {BEATS}/MIN
MDC_IDC_SET_BRADY_MODE: NORMAL
MDC_IDC_SET_BRADY_PAV_DELAY_LOW: 180 MS
MDC_IDC_SET_BRADY_SAV_DELAY_LOW: 150 MS
MDC_IDC_SET_LEADCHNL_RA_PACING_AMPLITUDE: 3 V
MDC_IDC_SET_LEADCHNL_RA_PACING_ANODE_ELECTRODE_1: NORMAL
MDC_IDC_SET_LEADCHNL_RA_PACING_ANODE_LOCATION_1: NORMAL
MDC_IDC_SET_LEADCHNL_RA_PACING_CAPTURE_MODE: NORMAL
MDC_IDC_SET_LEADCHNL_RA_PACING_CATHODE_ELECTRODE_1: NORMAL
MDC_IDC_SET_LEADCHNL_RA_PACING_CATHODE_LOCATION_1: NORMAL
MDC_IDC_SET_LEADCHNL_RA_PACING_POLARITY: NORMAL
MDC_IDC_SET_LEADCHNL_RA_PACING_PULSEWIDTH: 0.4 MS
MDC_IDC_SET_LEADCHNL_RA_SENSING_ANODE_ELECTRODE_1: NORMAL
MDC_IDC_SET_LEADCHNL_RA_SENSING_ANODE_LOCATION_1: NORMAL
MDC_IDC_SET_LEADCHNL_RA_SENSING_CATHODE_ELECTRODE_1: NORMAL
MDC_IDC_SET_LEADCHNL_RA_SENSING_CATHODE_LOCATION_1: NORMAL
MDC_IDC_SET_LEADCHNL_RA_SENSING_POLARITY: NORMAL
MDC_IDC_SET_LEADCHNL_RA_SENSING_SENSITIVITY: 0.9 MV
MDC_IDC_SET_LEADCHNL_RV_PACING_AMPLITUDE: 2 V
MDC_IDC_SET_LEADCHNL_RV_PACING_ANODE_ELECTRODE_1: NORMAL
MDC_IDC_SET_LEADCHNL_RV_PACING_ANODE_LOCATION_1: NORMAL
MDC_IDC_SET_LEADCHNL_RV_PACING_CAPTURE_MODE: NORMAL
MDC_IDC_SET_LEADCHNL_RV_PACING_CATHODE_ELECTRODE_1: NORMAL
MDC_IDC_SET_LEADCHNL_RV_PACING_CATHODE_LOCATION_1: NORMAL
MDC_IDC_SET_LEADCHNL_RV_PACING_POLARITY: NORMAL
MDC_IDC_SET_LEADCHNL_RV_PACING_PULSEWIDTH: 0.4 MS
MDC_IDC_SET_LEADCHNL_RV_SENSING_ANODE_ELECTRODE_1: NORMAL
MDC_IDC_SET_LEADCHNL_RV_SENSING_ANODE_LOCATION_1: NORMAL
MDC_IDC_SET_LEADCHNL_RV_SENSING_CATHODE_ELECTRODE_1: NORMAL
MDC_IDC_SET_LEADCHNL_RV_SENSING_CATHODE_LOCATION_1: NORMAL
MDC_IDC_SET_LEADCHNL_RV_SENSING_POLARITY: NORMAL
MDC_IDC_SET_LEADCHNL_RV_SENSING_SENSITIVITY: 0.9 MV
MDC_IDC_SET_ZONE_DETECTION_INTERVAL: 350 MS
MDC_IDC_SET_ZONE_DETECTION_INTERVAL: 400 MS
MDC_IDC_SET_ZONE_TYPE: NORMAL
MDC_IDC_STAT_AT_BURDEN_PERCENT: 0 %
MDC_IDC_STAT_AT_DTM_END: NORMAL
MDC_IDC_STAT_AT_DTM_START: NORMAL
MDC_IDC_STAT_BRADY_AP_VP_PERCENT: 98.02 %
MDC_IDC_STAT_BRADY_AP_VS_PERCENT: 0.03 %
MDC_IDC_STAT_BRADY_AS_VP_PERCENT: 0.73 %
MDC_IDC_STAT_BRADY_AS_VS_PERCENT: 1.22 %
MDC_IDC_STAT_BRADY_DTM_END: NORMAL
MDC_IDC_STAT_BRADY_DTM_START: NORMAL
MDC_IDC_STAT_BRADY_RA_PERCENT_PACED: 98.98 %
MDC_IDC_STAT_BRADY_RV_PERCENT_PACED: 98.75 %
MDC_IDC_STAT_EPISODE_RECENT_COUNT: 0
MDC_IDC_STAT_EPISODE_RECENT_COUNT: 1
MDC_IDC_STAT_EPISODE_RECENT_COUNT_DTM_END: NORMAL
MDC_IDC_STAT_EPISODE_RECENT_COUNT_DTM_START: NORMAL
MDC_IDC_STAT_EPISODE_TOTAL_COUNT: 0
MDC_IDC_STAT_EPISODE_TOTAL_COUNT: 3
MDC_IDC_STAT_EPISODE_TOTAL_COUNT: 886
MDC_IDC_STAT_EPISODE_TOTAL_COUNT_DTM_END: NORMAL
MDC_IDC_STAT_EPISODE_TOTAL_COUNT_DTM_START: NORMAL
MDC_IDC_STAT_EPISODE_TYPE: NORMAL

## 2021-12-10 ENCOUNTER — TELEPHONE (OUTPATIENT)
Dept: FAMILY MEDICINE | Facility: OTHER | Age: 86
End: 2021-12-10
Payer: MEDICARE

## 2021-12-15 ENCOUNTER — INFUSION THERAPY VISIT (OUTPATIENT)
Dept: INFUSION THERAPY | Facility: OTHER | Age: 86
End: 2021-12-15
Attending: FAMILY MEDICINE
Payer: MEDICARE

## 2021-12-15 DIAGNOSIS — E86.9 VOLUME DEPLETION: Primary | ICD-10-CM

## 2021-12-15 PROCEDURE — 96360 HYDRATION IV INFUSION INIT: CPT

## 2021-12-15 PROCEDURE — 250N000011 HC RX IP 250 OP 636: Performed by: FAMILY MEDICINE

## 2021-12-15 PROCEDURE — 258N000003 HC RX IP 258 OP 636: Performed by: FAMILY MEDICINE

## 2021-12-15 RX ORDER — HEPARIN SODIUM (PORCINE) LOCK FLUSH IV SOLN 100 UNIT/ML 100 UNIT/ML
5 SOLUTION INTRAVENOUS ONCE
Status: CANCELLED
Start: 2021-12-15 | End: 2021-12-15

## 2021-12-15 RX ORDER — HEPARIN SODIUM (PORCINE) LOCK FLUSH IV SOLN 100 UNIT/ML 100 UNIT/ML
5 SOLUTION INTRAVENOUS ONCE
Status: COMPLETED | OUTPATIENT
Start: 2021-12-15 | End: 2021-12-15

## 2021-12-15 RX ADMIN — SODIUM CHLORIDE 1000 ML: 9 INJECTION, SOLUTION INTRAVENOUS at 09:38

## 2021-12-15 RX ADMIN — Medication 5 ML: at 10:46

## 2021-12-15 NOTE — PROGRESS NOTES
Patient is a 88 year old here today for infusion of IVF per order of Dr MALIA Ray.  Patient identified with two identifiers, order verified, and verbal consent for today's infusion obtained from patient.      Patient meets order parameters for today's treatment.     Patient denies questions or concerns regarding infusion and/or medication(s) being administered.    Patients port accessed using non-coring, 20 gauge, 3/4 inch power needle. Port accessed per facility protocol. Port flushed easily, blood return noted.  No signs and symptoms of infection or infiltration.      0938 IV pump verified with IVF dose, drug, and rate of administration.  Infusion administered per protocol. Patient tolerated infusion well, no signs or symptoms of adverse reaction noted.  Patient denies pain nor discomfort.     IV removed, catheter intact.  Site clean, dry and intact.  No signs or symptoms of infiltration or infection.  Covered with a sterile bandage, slight pressure applied for 30 seconds.  Pt instructed to leave bandage intact for a minimum of one hour, and to call with questions or concerns.  Copy of appointments, discharge instructions, and after visit summary (AVS) provided to patient.  Patient states understanding, discharged.

## 2021-12-15 NOTE — PATIENT INSTRUCTIONS

## 2021-12-22 ENCOUNTER — APPOINTMENT (OUTPATIENT)
Dept: CT IMAGING | Facility: HOSPITAL | Age: 86
End: 2021-12-22
Attending: STUDENT IN AN ORGANIZED HEALTH CARE EDUCATION/TRAINING PROGRAM
Payer: MEDICARE

## 2021-12-22 ENCOUNTER — ANCILLARY PROCEDURE (OUTPATIENT)
Dept: CARDIOLOGY | Facility: CLINIC | Age: 86
End: 2021-12-22
Attending: INTERNAL MEDICINE
Payer: MEDICARE

## 2021-12-22 ENCOUNTER — HOSPITAL ENCOUNTER (EMERGENCY)
Facility: HOSPITAL | Age: 86
Discharge: HOME | End: 2021-12-22
Attending: STUDENT IN AN ORGANIZED HEALTH CARE EDUCATION/TRAINING PROGRAM | Admitting: STUDENT IN AN ORGANIZED HEALTH CARE EDUCATION/TRAINING PROGRAM
Payer: MEDICARE

## 2021-12-22 VITALS
HEART RATE: 70 BPM | OXYGEN SATURATION: 94 % | DIASTOLIC BLOOD PRESSURE: 72 MMHG | RESPIRATION RATE: 18 BRPM | TEMPERATURE: 97.4 F | SYSTOLIC BLOOD PRESSURE: 120 MMHG

## 2021-12-22 DIAGNOSIS — R55 VASOVAGAL SYNCOPE: ICD-10-CM

## 2021-12-22 DIAGNOSIS — I44.2 COMPLETE HEART BLOCK (H): ICD-10-CM

## 2021-12-22 LAB
ANION GAP SERPL CALCULATED.3IONS-SCNC: 7 MMOL/L (ref 3–14)
BASOPHILS # BLD AUTO: 0 10E3/UL (ref 0–0.2)
BASOPHILS NFR BLD AUTO: 0 %
BUN SERPL-MCNC: 15 MG/DL (ref 7–30)
CALCIUM SERPL-MCNC: 8.5 MG/DL (ref 8.5–10.1)
CHLORIDE BLD-SCNC: 108 MMOL/L (ref 94–109)
CO2 SERPL-SCNC: 26 MMOL/L (ref 20–32)
CREAT SERPL-MCNC: 1.11 MG/DL (ref 0.66–1.25)
EOSINOPHIL # BLD AUTO: 0.2 10E3/UL (ref 0–0.7)
EOSINOPHIL NFR BLD AUTO: 5 %
ERYTHROCYTE [DISTWIDTH] IN BLOOD BY AUTOMATED COUNT: 12.3 % (ref 10–15)
GFR SERPL CREATININE-BSD FRML MDRD: 64 ML/MIN/1.73M2
GLUCOSE BLD-MCNC: 165 MG/DL (ref 70–99)
HCT VFR BLD AUTO: 39.1 % (ref 40–53)
HGB BLD-MCNC: 12.8 G/DL (ref 13.3–17.7)
HOLD SPECIMEN: NORMAL
HOLD SPECIMEN: NORMAL
IMM GRANULOCYTES # BLD: 0 10E3/UL
IMM GRANULOCYTES NFR BLD: 0 %
LYMPHOCYTES # BLD AUTO: 1.4 10E3/UL (ref 0.8–5.3)
LYMPHOCYTES NFR BLD AUTO: 27 %
MAGNESIUM SERPL-MCNC: 2 MG/DL (ref 1.6–2.3)
MCH RBC QN AUTO: 30.8 PG (ref 26.5–33)
MCHC RBC AUTO-ENTMCNC: 32.7 G/DL (ref 31.5–36.5)
MCV RBC AUTO: 94 FL (ref 78–100)
MONOCYTES # BLD AUTO: 0.4 10E3/UL (ref 0–1.3)
MONOCYTES NFR BLD AUTO: 8 %
NEUTROPHILS # BLD AUTO: 3 10E3/UL (ref 1.6–8.3)
NEUTROPHILS NFR BLD AUTO: 60 %
NRBC # BLD AUTO: 0 10E3/UL
NRBC BLD AUTO-RTO: 0 /100
PLATELET # BLD AUTO: 179 10E3/UL (ref 150–450)
POTASSIUM BLD-SCNC: 3.5 MMOL/L (ref 3.4–5.3)
RBC # BLD AUTO: 4.15 10E6/UL (ref 4.4–5.9)
SODIUM SERPL-SCNC: 141 MMOL/L (ref 133–144)
WBC # BLD AUTO: 5 10E3/UL (ref 4–11)

## 2021-12-22 PROCEDURE — 99285 EMERGENCY DEPT VISIT HI MDM: CPT | Performed by: STUDENT IN AN ORGANIZED HEALTH CARE EDUCATION/TRAINING PROGRAM

## 2021-12-22 PROCEDURE — 72125 CT NECK SPINE W/O DYE: CPT

## 2021-12-22 PROCEDURE — 70450 CT HEAD/BRAIN W/O DYE: CPT | Mod: ME

## 2021-12-22 PROCEDURE — 85025 COMPLETE CBC W/AUTO DIFF WBC: CPT | Performed by: STUDENT IN AN ORGANIZED HEALTH CARE EDUCATION/TRAINING PROGRAM

## 2021-12-22 PROCEDURE — 36415 COLL VENOUS BLD VENIPUNCTURE: CPT | Performed by: STUDENT IN AN ORGANIZED HEALTH CARE EDUCATION/TRAINING PROGRAM

## 2021-12-22 PROCEDURE — 93005 ELECTROCARDIOGRAM TRACING: CPT

## 2021-12-22 PROCEDURE — 96360 HYDRATION IV INFUSION INIT: CPT

## 2021-12-22 PROCEDURE — 99285 EMERGENCY DEPT VISIT HI MDM: CPT | Mod: 25

## 2021-12-22 PROCEDURE — 93010 ELECTROCARDIOGRAM REPORT: CPT | Performed by: INTERNAL MEDICINE

## 2021-12-22 PROCEDURE — 80048 BASIC METABOLIC PNL TOTAL CA: CPT | Performed by: STUDENT IN AN ORGANIZED HEALTH CARE EDUCATION/TRAINING PROGRAM

## 2021-12-22 PROCEDURE — 99207 CARDIAC DEVICE CHECK - REMOTE: CPT | Performed by: INTERNAL MEDICINE

## 2021-12-22 PROCEDURE — 258N000003 HC RX IP 258 OP 636: Performed by: STUDENT IN AN ORGANIZED HEALTH CARE EDUCATION/TRAINING PROGRAM

## 2021-12-22 PROCEDURE — 96361 HYDRATE IV INFUSION ADD-ON: CPT

## 2021-12-22 PROCEDURE — 93296 REM INTERROG EVL PM/IDS: CPT

## 2021-12-22 PROCEDURE — 83735 ASSAY OF MAGNESIUM: CPT | Performed by: STUDENT IN AN ORGANIZED HEALTH CARE EDUCATION/TRAINING PROGRAM

## 2021-12-22 RX ORDER — HEPARIN SODIUM (PORCINE) LOCK FLUSH IV SOLN 100 UNIT/ML 100 UNIT/ML
SOLUTION INTRAVENOUS
Status: DISCONTINUED
Start: 2021-12-22 | End: 2021-12-22 | Stop reason: HOSPADM

## 2021-12-22 RX ADMIN — SODIUM CHLORIDE 1000 ML: 9 INJECTION, SOLUTION INTRAVENOUS at 10:11

## 2021-12-22 NOTE — ED PROVIDER NOTES
History     Chief Complaint   Patient presents with     Loss of Consciousness     HPI  Jf Ortiz is a 88 year old male with a history of dementia, autonomic dysfunction leading to hypotension who gets regular infusions of fluids to maintain his blood pressure presents to the emergency department after a witnessed syncopal event here in the hospital.  He was at the infusion center need to go to the bathroom, had been having some diarrhea, and fell forward in the bathroom, did strike his head.     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     Priority: Medium     Vision loss of right eye 02/12/2021     Priority: Medium     Orthostatic hypotension dysautonomic syndrome 02/12/2021     Priority: Medium     Chronic atrial fibrillation (H) 02/12/2021     Priority: Medium     Closed compression fracture of body of L1 vertebra (H) 02/12/2021     Priority: Medium     Closed wedge compression fracture of lumbar vertebra with routine healing 12/20/2020     Priority: Medium     Compression fracture of L1 lumbar vertebra, closed, initial encounter (H) 12/18/2020     Priority: Medium     Compression fracture of thoracic vertebra, initial encounter, unspecified thoracic vertebral level 12/18/2020     Priority: Medium     Replacing diagnoses that were inactivated after the 10/1/2021 regulatory import.       Longstanding persistent atrial fibrillation (H) 04/13/2020     Priority: Medium     Falls frequently 04/13/2020     Priority: Medium     Frequent falls 04/13/2020     Priority: Medium     Coronary artery disease involving native coronary artery without angina pectoris 04/13/2020     Priority: Medium     Constipation, unspecified constipation type 04/11/2018     Priority: Medium     Pacemaker, [employer], Dual Chamber - Dependent 10/06/2017      Priority: Medium     Lewy body dementia without behavioral disturbance (H) 08/31/2017     Priority: Medium     Fatigue 08/31/2017     Priority: Medium     Urinary incontinence 08/31/2017     Priority: Medium     Autonomic orthostatic hypotension 10/14/2016     Priority: Medium     Dementia without behavioral disturbance (H) 07/28/2015     Priority: Medium     Diagnosis updated by automated process. Provider to review and confirm.       Hypocalcemia 07/28/2015     Priority: Medium     Parkinson disease (H)      Priority: Medium     Seizure disorder (H)      Priority: Medium     Volume depletion 08/02/2014     Priority: Medium     Syncope and collapse 08/01/2014     Priority: Medium     Stented coronary artery      Priority: Medium     Hypercholesterolemia 04/23/2013     Priority: Medium     Cardiac pacemaker in situ 01/08/2013     Priority: Medium     Overview:   [employer] S606 DREL,  Serial #[id number]  5-13-11  Atrial lead [employer] 4054 Serial #[id number]  8-11-00  Ventriculer lead [employer] 4137  Serial #[id number]  5-13-11  2nd Degree AV Block   Dr. Campbell  Intrinsic:  Pt. is Pacemaker Dependant, remains paced at temp. rate of 30 bpm (6-13-14)    Formatting of this note might be different from the original.  [employer] S606 DREL,  Serial #[id number]  5-13-11  Atrial lead [employer] 4054 Serial #[id number]  8-11-00  Ventriculer lead [employer] 4137  Serial #[id number]  5-13-11  2nd Degree AV Block   Dr. Campbell  Intrinsic:  Pt. is Pacemaker Dependant, remains paced at temp. 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 Provider to review       Diaphragmatic hernia 01/01/2011     Priority: Medium     Problem list name updated by automated process. Provider to review       Pain in joint, forearm 07/31/2008     Priority: Medium      Formatting of this note might be different from the original.  IMO Update 10/11       Pain in joint, ankle and foot 07/31/2008     Priority: Medium     Formatting of this note might be different from the original.  IMO Update 10/11       Dysphagia 05/22/2007     Priority: Medium     Overview:   IMO Update    Formatting of this note might be different from the original.  IMO Update       Coronary atherosclerosis of native coronary artery 11/28/2006     Priority: Medium     Overview:   IMO Update 10/11    Formatting of this note might be different from the original.  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  IMO Update 10/11       Hypertension with target blood pressure goal under 150/90 09/05/2006     Priority: Medium     Overview:   IMO Update       Hyperlipidemia 09/05/2006     Priority: Medium     Formatting of this note might be different from the original.  IMO Update 10/11       Essential hypertension 09/05/2006     Priority: Medium     Formatting of this note might be different from the original.  IMO Update          Past Medical History:    Past Medical History:   Diagnosis Date     Autonomic orthostatic hypotension 10/14/2016     Coronary artery disease      Dementia without behavioral disturbance (H) 7/28/2015     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     Seizure disorder (H)      Stented coronary artery        Past Surgical History:    Past Surgical History:   Procedure Laterality Date     ------------OTHER-------------  1955    ulnar and radial fx - repair ulnar and radial fx x4      ------------OTHER-------------  6/14/2011    cataract extraction     BIOPSY  08/2015    skin biopsy     BLEPHAROPLASTY BILATERAL  5/6/2014    Procedure: BLEPHAROPLASTY BILATERAL;  Surgeon: Andrew Queen MD;  Location: [facility]     BYPASS GRAFT ARTERY CORONARY  11/2006    coronary artery disease x 5, [facility] [place]     cataract extraction and lens implantation  2011    cataracts     cataract extraction and lens implantation      cataracts     COLONOSCOPY  2012     colonoscopy with polypectomy  3/13/2009    history of polyps - repeat 3 yrs     colonoscopy with polypectomy  2006     colonoscopy with polypectomy  2005     COMBINED COLONOSCOPY WITH ARGON PLASMA COAGULATOR (APC) N/A 10/31/2014    Procedure: COMBINED COLONOSCOPY WITH ARGON PLASMA COAGULATOR (APC);  Surgeon: Bassam Aguilar MD;  Location: [facility]     COMBINED COLONOSCOPY WITH ARGON PLASMA COAGULATOR (APC) N/A 11/13/2015    Procedure: COMBINED COLONOSCOPY WITH ARGON PLASMA COAGULATOR (APC);  Surgeon: Bassam Aguilar MD;  Location: [facility]     ENDOSCOPY UPPER, COLONOSCOPY, COMBINED N/A 10/31/2014    Procedure: COMBINED ENDOSCOPY UPPER, COLONOSCOPY;  Surgeon: Bassam Aguilar MD;  [facility]: [facility]     ENDOSCOPY UPPER, COLONOSCOPY, COMBINED N/A 11/13/2015    Procedure: COMBINED ENDOSCOPY UPPER, COLONOSCOPY;  Surgeon: Bassam Aguilar MD;  [facility]: [facility]     ESOPHAGOSCOPY, GASTROSCOPY, DUODENOSCOPY (EGD), COMBINED  1/22/2014    Procedure: COMBINED ESOPHAGOSCOPY, GASTROSCOPY, DUODENOSCOPY (EGD);  UPPER ENDOSCOPY(PENA) W/ BIOPSIES;  Surgeon: Patricia Pena MD;  Location: [facility]     HERNIORRHAPHY INGUINAL Right 2/14/2018    Procedure: HERNIORRHAPHY INGUINAL;  OPEN RIGHT INGUINAL HERNIA REPAIR with Mesh;  Surgeon: Virgilio Zaragoza DO;  Location: [facility]     INSERT PORT VASCULAR ACCESS Right 7/12/2019    Procedure: PORT PLACEMENT;  Surgeon: Lloyd Ram MD;  Location: [facility]     LARYNGOSCOPY WITH MICROSCOPE  1/22/2014    Procedure: LARYNGOSCOPY WITH MICROSCOPE;;   Surgeon: Chayo Duke MD;  Location: [facility]     pacemaker placement      heart block     pacemaker placement      dual-chamber     REMOVE TUBE, MYRINGOTOMY, COMBINED  2014    Procedure: COMBINED REMOVE TUBE, MYRINGOTOMY;  MICRODIRECT LARYNGOSCOPY WITH BIOPSY AND FROZEN SECTIONS removal of right ear tube and myringoplasty;  Surgeon: Chayo Duke MD;  Location: [facility]     REPLACE PACEMAKER GENERATOR N/A 2018    Procedure: REPLACE PACEMAKER GENERATOR;  Pacemaker generator change;  Surgeon: Alma Kaplan MD;  Location: [facility]     stent placement to LAD       ventilation tube  2012    right in office       Family History:    Family History   Problem Relation Age of Onset     Diabetes Mother      Breast Cancer Daughter        Social History:  Marital Status:  Single [1]  Social History     Tobacco Use     Smoking status: Former Smoker     Packs/day: 1.00     Years: 5.00     Pack years: 5.00     Types: Cigarettes     Quit date: 1985     Years since quittin.9     Smokeless tobacco: Never Used     Tobacco comment: quit in    Substance Use Topics     Alcohol use: Yes     Comment: social     Drug use: No        Medications:    acetaminophen (TYLENOL) 325 MG tablet  apixaban ANTICOAGULANT (ELIQUIS) 5 MG tablet  aspirin (ASA) 81 MG chewable tablet  atorvastatin (LIPITOR) 20 MG tablet  Cholecalciferol (VITAMIN D-3) 25 MCG (1000 UT) CAPS  Coenzyme Q10 (CO Q 10 PO)  diclofenac (VOLTAREN) 1 % topical gel  donepezil (ARICEPT) 10 MG tablet  fludrocortisone (FLORINEF) 0.1 MG tablet  lidocaine (LIDODERM) 5 % patch  lidocaine-prilocaine (EMLA) 2.5-2.5 % external cream  melatonin 5 MG tablet  midodrine (PROAMATINE) 5 MG tablet  NONFORMULARY  nystatin (MYCOSTATIN) 894095 UNIT/GM external powder  potassium chloride ER (KLOR-CON M) 20 MEQ CR tablet  RABEprazole (ACIPHEX) 20 MG EC tablet  senna-docusate (SENOKOT-S/PERICOLACE) 8.6-50 MG tablet  sertraline (ZOLOFT) 25 MG tablet  sodium  chloride 1 GM tablet  traMADol (ULTRAM) 50 MG tablet          Review of Systems  A complete review of systems was performed and is otherwise negative.     Physical Exam   BP: 120/72  Pulse: 70  Temp: 97.4  F (36.3  C)  Resp: 18  SpO2: 94 %      Physical Exam  Constitutional: Alert and conversant. NAD   HENT: NCAT   Eyes: Normal pupils   Neck: supple   CV: Normal rate, regular rhythm, no murmur   Pulmonary/Chest: Non-labored respirations, clear to auscultation bilaterally   Abdominal: Soft, non-tender, non-distended   MSK: JONES.   Neuro: Alert and appropriate aware of self, place, purpose moving all extremities, symmetrical upper extremity strength  Skin: Warm and dry. No diaphoresis. No rashes on exposed skin    Psych: Appropriate mood and affect     ED Course              ED Course as of 12/22/21 1204   Wed Dec 22, 2021   1057 Jf Ortiz is a 88 year old male presenting with transient loss of consciousness.   Vitals reassuring   Exam reassuring, well-appearing, appears to be at his baseline  Differential includes but is not limited to syncope, hypoglycemia, seizure, SAH/ICH, TIA, MI/ACS, arrythmia (WPW, brugada, Long QT, HOCM), aortic dissection, ruptured AAA, aortic stenosis, toxins/drugs, infection/sepsis, PE, GI bleed/hypovolemia, and orthostatic hypotension. No clinical history consistent with seizure.     I think arrhythmia is unlikely.  Electrocardiogram shows a paced rhythm and no e/o QT prolongation or WPW.  There are no findings to suggest Brugada syndrome.  Cardiac monitoring in the emergency Department reveals no tachycardia or bradycardic dysrhythmia.  Hypertropic cardiomyopathy was considered but there are no clear historical elements pointing toward this.  Electrocardiogram is not suggestive.  The QRS voltage is not extremely large and there are no suggestive Q waves.     BG nl. Patient does not complain of headache so SAH unlikely. Neuro exam unremarkable, low suspicion for acute intracranial bleed  or ischemia. EKG with normal intervals and no evidence of dysrhythmia or ischemia. No tearing pain to back and no chest/abdominal pain so dissection less likely. Hemodynamically stable so less consistent with ruptured AAA. Aortic stenosis possible but less likely given better explanation and no exertional component. PE less likely as patient does not have pleuritic pain, SOB, or ongoing symptoms to suggest massive/submassive PE capable of causing cerebral hypoperfusion and syncope and is low risk.    Labs wnl and reassuring. Imaging not indicated. No evidence of sepsis. Most likely etiology of syncope is vasovagal.     Patient is appropriate for further outpatient management. Discharged in stable condition with all questions answered and return precautions given. Follow up with PCP withi n1 week.     1103 CT head, CT C-spine reassuring, patient is on thinners   1204 Pacemaker interrogated without acute or concerning findings     Procedures            Results for orders placed or performed during the hospital encounter of 12/22/21 (from the past 24 hour(s))   Basic metabolic panel   Result Value Ref Range    Sodium 141 133 - 144 mmol/L    Potassium 3.5 3.4 - 5.3 mmol/L    Chloride 108 94 - 109 mmol/L    Carbon Dioxide (CO2) 26 20 - 32 mmol/L    Anion Gap 7 3 - 14 mmol/L    Urea Nitrogen 15 7 - 30 mg/dL    Creatinine 1.11 0.66 - 1.25 mg/dL    Calcium 8.5 8.5 - 10.1 mg/dL    Glucose 165 (H) 70 - 99 mg/dL    GFR Estimate 64 >60 mL/min/1.73m2   CBC with platelets differential    Narrative    The following orders were created for panel order CBC with platelets differential.  Procedure                               Abnormality         Status                     ---------                               -----------         ------                     CBC with platelets and d...[[id number]]  Abnormal            Final result                 Please view results for these tests on the individual orders.   Magnesium   Result Value  Ref Range    Magnesium 2.0 1.6 - 2.3 mg/dL   CBC with platelets and differential   Result Value Ref Range    WBC Count 5.0 4.0 - 11.0 10e3/uL    RBC Count 4.15 (L) 4.40 - 5.90 10e6/uL    Hemoglobin 12.8 (L) 13.3 - 17.7 g/dL    Hematocrit 39.1 (L) 40.0 - 53.0 %    MCV 94 78 - 100 fL    MCH 30.8 26.5 - 33.0 pg    MCHC 32.7 31.5 - 36.5 g/dL    RDW 12.3 10.0 - 15.0 %    Platelet Count 179 150 - 450 10e3/uL    % Neutrophils 60 %    % Lymphocytes 27 %    % Monocytes 8 %    % Eosinophils 5 %    % Basophils 0 %    % Immature Granulocytes 0 %    NRBCs per 100 WBC 0 <1 /100    Absolute Neutrophils 3.0 1.6 - 8.3 10e3/uL    Absolute Lymphocytes 1.4 0.8 - 5.3 10e3/uL    Absolute Monocytes 0.4 0.0 - 1.3 10e3/uL    Absolute Eosinophils 0.2 0.0 - 0.7 10e3/uL    Absolute Basophils 0.0 0.0 - 0.2 10e3/uL    Absolute Immature Granulocytes 0.0 <=0.4 10e3/uL    Absolute NRBCs 0.0 10e3/uL   [facility] Draw    Narrative    The following orders were created for panel order [facility] Draw.  Procedure                               Abnormality         Status                     ---------                               -----------         ------                     Extra Blue Top Tube[[id number]]                              Final result               Extra Red Top Tube[[id number]]                               Final result                 Please view results for these tests on the individual orders.   Extra Blue Top Tube   Result Value Ref Range    Hold Specimen JIC    Extra Red Top Tube   Result Value Ref Range    Hold Specimen JIC    CT Head w/o Contrast    Narrative    Exam: CT HEAD W/O CONTRAST      Exam reason: Mental status change, unknown cause    Technique:   -Axial images of the head obtained without contrast. Coronal and  sagittal reformations were generated.    Comparison: 6/22/2021, 2/12/2021       Findings:      Parenchyma: No evidence of intraparenchymal hemorrhage, mass, acute  cortical infarct or prior infarct in a major vascular  territory.      There is moderate diffuse cerebral volume loss. There is patch repair  of jugular and deep white matter hypoattenuation which is nonspecific  but likely due to chronic microvascular ischemic change.    No midline shift. The basilar cisterns are patent.    Extra-axial spaces: No extra-axial fluid collection or hemorrhage.     Ventricles: The ventricles are prominent, but stable and within normal  limits given the degree of volume loss.  Paranasal sinuses: Clear.   Mastoid air cells: Clear.    Osseous: No acute findings.  Orbits: Normal.    Soft tissues: Unremarkable.       Impression    Impression:  No acute intracranial abnormalities.      MUSA RIVER MD         SYSTEM ID:  [id number]   CT Cervical Spine w/o Contrast    Narrative    Exam: CT CERVICAL SPINE W/O CONTRAST    Exam reason: Trauma    Technique: Using helical technique, axial images of the cervical spine  were obtained.  Coronal and sagittal reformations were generated.  No  IV contrast.     Comparison: 6/22/2021, 12/18/2020    FINDINGS:    Osseous:     Normal spinal alignment.    No acute fracture.    No lytic or sclerotic bony lesion.    There are multilevel degenerative changes of the cervical spine, which  have not changed significantly since 6/22/2021, worst at C3-C4 and  C5-C6. There is severe facet arthropathy at C2-C3 on the right..    Prevertebral soft tissues: Unremarkable    There is biapical lung scarring. No pneumothorax.      Impression    IMPRESSION:  No acute fracture or subluxation.    MUSA RIVER MD         SYSTEM ID:  [id number]       Medications   0.9% sodium chloride BOLUS (0 mLs Intravenous Stopped 12/22/21 1203)       Assessments & Plan (with Medical Decision Making)     I have reviewed the nursing notes.    I have reviewed the findings, diagnosis, plan and need for follow up with the patient.    New Prescriptions    No medications on file       Final diagnoses:   Vasovagal syncope       12/22/2021   [facility]     Anup Interiano MD  12/22/21 [id number]

## 2021-12-22 NOTE — ED NOTES
Spoke with Latanya at the [facility] who reports there was nothing abnormal found on the pacemaker interrogation. Updated Dr. Interiano.

## 2021-12-22 NOTE — ED NOTES
Nurse to nurse given to Chet ESPOSITO at [facility].   Awaiting [employer] transportation.

## 2021-12-22 NOTE — DISCHARGE INSTRUCTIONS
Return to the emergency department if you have worsening of symptoms or concerning symptoms, please follow-up with your primary care provider next 1 week.  Call to schedule appointment, I do not recommend any specific change in your medication.

## 2021-12-22 NOTE — ED NOTES
Incoming call from Pacemaker nurse. Verified that yes, we are using [employer] Interrogator.   Will await transmission.

## 2021-12-22 NOTE — ED NOTES
"Patient presents to emergency room after having a syncopal episode while using the bathroom up on the medical floor. A rapid response was activated. Pt reports he had just completed an infusion at the infusion center. Pt denies pain. Denies dizziness. Reports he feels fine. Awake and alert. Placed on monitors. Central line accessed. Labs sent. Afebrile. \"What happened?\" No signs or trauma. Pt was incontinent of a large amount of loose stool. Denies dizziness. On monitor pt is 100% dual paced 70's.   "

## 2021-12-22 NOTE — ED NOTES
Patient given verbal and written discharge instructions. Patient verbalized understanding of discharge instructions. Pt transferred to [facility] via [facility] at this time. [facility] . Report given to  nurse Chet ESPOSITO.

## 2021-12-24 LAB
MDC_IDC_LEAD_IMPLANT_DT: NORMAL
MDC_IDC_LEAD_IMPLANT_DT: NORMAL
MDC_IDC_LEAD_LOCATION: NORMAL
MDC_IDC_LEAD_LOCATION: NORMAL
MDC_IDC_LEAD_LOCATION_DETAIL_1: NORMAL
MDC_IDC_LEAD_LOCATION_DETAIL_1: NORMAL
MDC_IDC_LEAD_MFG: NORMAL
MDC_IDC_LEAD_MFG: NORMAL
MDC_IDC_LEAD_MODEL: NORMAL
MDC_IDC_LEAD_MODEL: NORMAL
MDC_IDC_LEAD_POLARITY_TYPE: NORMAL
MDC_IDC_LEAD_POLARITY_TYPE: NORMAL
MDC_IDC_LEAD_SERIAL: NORMAL
MDC_IDC_LEAD_SERIAL: NORMAL
MDC_IDC_MSMT_BATTERY_DTM: NORMAL
MDC_IDC_MSMT_BATTERY_REMAINING_LONGEVITY: 76 MO
MDC_IDC_MSMT_BATTERY_RRT_TRIGGER: 2.62
MDC_IDC_MSMT_BATTERY_STATUS: NORMAL
MDC_IDC_MSMT_BATTERY_VOLTAGE: 2.98 V
MDC_IDC_MSMT_LEADCHNL_RA_IMPEDANCE_VALUE: 437 OHM
MDC_IDC_MSMT_LEADCHNL_RA_IMPEDANCE_VALUE: 551 OHM
MDC_IDC_MSMT_LEADCHNL_RA_PACING_THRESHOLD_AMPLITUDE: 1.5 V
MDC_IDC_MSMT_LEADCHNL_RA_PACING_THRESHOLD_PULSEWIDTH: 0.4 MS
MDC_IDC_MSMT_LEADCHNL_RA_SENSING_INTR_AMPL: 1.62 MV
MDC_IDC_MSMT_LEADCHNL_RA_SENSING_INTR_AMPL: 1.62 MV
MDC_IDC_MSMT_LEADCHNL_RV_IMPEDANCE_VALUE: 380 OHM
MDC_IDC_MSMT_LEADCHNL_RV_IMPEDANCE_VALUE: 570 OHM
MDC_IDC_MSMT_LEADCHNL_RV_PACING_THRESHOLD_AMPLITUDE: 0.75 V
MDC_IDC_MSMT_LEADCHNL_RV_PACING_THRESHOLD_PULSEWIDTH: 0.4 MS
MDC_IDC_MSMT_LEADCHNL_RV_SENSING_INTR_AMPL: 4.75 MV
MDC_IDC_MSMT_LEADCHNL_RV_SENSING_INTR_AMPL: 4.75 MV
MDC_IDC_PG_IMPLANT_DTM: NORMAL
MDC_IDC_PG_MFG: NORMAL
MDC_IDC_PG_MODEL: NORMAL
MDC_IDC_PG_SERIAL: NORMAL
MDC_IDC_PG_TYPE: NORMAL
MDC_IDC_SESS_CLINIC_NAME: NORMAL
MDC_IDC_SESS_DTM: NORMAL
MDC_IDC_SESS_TYPE: NORMAL
MDC_IDC_SET_BRADY_AT_MODE_SWITCH_RATE: 171 {BEATS}/MIN
MDC_IDC_SET_BRADY_HYSTRATE: NORMAL
MDC_IDC_SET_BRADY_LOWRATE: 70 {BEATS}/MIN
MDC_IDC_SET_BRADY_MAX_SENSOR_RATE: 130 {BEATS}/MIN
MDC_IDC_SET_BRADY_MAX_TRACKING_RATE: 130 {BEATS}/MIN
MDC_IDC_SET_BRADY_MODE: NORMAL
MDC_IDC_SET_BRADY_PAV_DELAY_LOW: 180 MS
MDC_IDC_SET_BRADY_SAV_DELAY_LOW: 150 MS
MDC_IDC_SET_LEADCHNL_RA_PACING_AMPLITUDE: 3 V
MDC_IDC_SET_LEADCHNL_RA_PACING_ANODE_ELECTRODE_1: NORMAL
MDC_IDC_SET_LEADCHNL_RA_PACING_ANODE_LOCATION_1: NORMAL
MDC_IDC_SET_LEADCHNL_RA_PACING_CAPTURE_MODE: NORMAL
MDC_IDC_SET_LEADCHNL_RA_PACING_CATHODE_ELECTRODE_1: NORMAL
MDC_IDC_SET_LEADCHNL_RA_PACING_CATHODE_LOCATION_1: NORMAL
MDC_IDC_SET_LEADCHNL_RA_PACING_POLARITY: NORMAL
MDC_IDC_SET_LEADCHNL_RA_PACING_PULSEWIDTH: 0.4 MS
MDC_IDC_SET_LEADCHNL_RA_SENSING_ANODE_ELECTRODE_1: NORMAL
MDC_IDC_SET_LEADCHNL_RA_SENSING_ANODE_LOCATION_1: NORMAL
MDC_IDC_SET_LEADCHNL_RA_SENSING_CATHODE_ELECTRODE_1: NORMAL
MDC_IDC_SET_LEADCHNL_RA_SENSING_CATHODE_LOCATION_1: NORMAL
MDC_IDC_SET_LEADCHNL_RA_SENSING_POLARITY: NORMAL
MDC_IDC_SET_LEADCHNL_RA_SENSING_SENSITIVITY: 0.9 MV
MDC_IDC_SET_LEADCHNL_RV_PACING_AMPLITUDE: 2 V
MDC_IDC_SET_LEADCHNL_RV_PACING_ANODE_ELECTRODE_1: NORMAL
MDC_IDC_SET_LEADCHNL_RV_PACING_ANODE_LOCATION_1: NORMAL
MDC_IDC_SET_LEADCHNL_RV_PACING_CAPTURE_MODE: NORMAL
MDC_IDC_SET_LEADCHNL_RV_PACING_CATHODE_ELECTRODE_1: NORMAL
MDC_IDC_SET_LEADCHNL_RV_PACING_CATHODE_LOCATION_1: NORMAL
MDC_IDC_SET_LEADCHNL_RV_PACING_POLARITY: NORMAL
MDC_IDC_SET_LEADCHNL_RV_PACING_PULSEWIDTH: 0.4 MS
MDC_IDC_SET_LEADCHNL_RV_SENSING_ANODE_ELECTRODE_1: NORMAL
MDC_IDC_SET_LEADCHNL_RV_SENSING_ANODE_LOCATION_1: NORMAL
MDC_IDC_SET_LEADCHNL_RV_SENSING_CATHODE_ELECTRODE_1: NORMAL
MDC_IDC_SET_LEADCHNL_RV_SENSING_CATHODE_LOCATION_1: NORMAL
MDC_IDC_SET_LEADCHNL_RV_SENSING_POLARITY: NORMAL
MDC_IDC_SET_LEADCHNL_RV_SENSING_SENSITIVITY: 0.9 MV
MDC_IDC_SET_ZONE_DETECTION_INTERVAL: 350 MS
MDC_IDC_SET_ZONE_DETECTION_INTERVAL: 400 MS
MDC_IDC_SET_ZONE_TYPE: NORMAL
MDC_IDC_STAT_AT_BURDEN_PERCENT: 0 %
MDC_IDC_STAT_AT_DTM_END: NORMAL
MDC_IDC_STAT_AT_DTM_START: NORMAL
MDC_IDC_STAT_BRADY_AP_VP_PERCENT: 98.25 %
MDC_IDC_STAT_BRADY_AP_VS_PERCENT: 0.03 %
MDC_IDC_STAT_BRADY_AS_VP_PERCENT: 0.52 %
MDC_IDC_STAT_BRADY_AS_VS_PERCENT: 1.19 %
MDC_IDC_STAT_BRADY_DTM_END: NORMAL
MDC_IDC_STAT_BRADY_DTM_START: NORMAL
MDC_IDC_STAT_BRADY_RA_PERCENT_PACED: 99.22 %
MDC_IDC_STAT_BRADY_RV_PERCENT_PACED: 98.77 %
MDC_IDC_STAT_EPISODE_RECENT_COUNT: 0
MDC_IDC_STAT_EPISODE_RECENT_COUNT_DTM_END: NORMAL
MDC_IDC_STAT_EPISODE_RECENT_COUNT_DTM_START: NORMAL
MDC_IDC_STAT_EPISODE_TOTAL_COUNT: 0
MDC_IDC_STAT_EPISODE_TOTAL_COUNT: 3
MDC_IDC_STAT_EPISODE_TOTAL_COUNT: 886
MDC_IDC_STAT_EPISODE_TOTAL_COUNT_DTM_END: NORMAL
MDC_IDC_STAT_EPISODE_TOTAL_COUNT_DTM_START: NORMAL
MDC_IDC_STAT_EPISODE_TYPE: NORMAL

## 2021-12-28 ENCOUNTER — TELEPHONE (OUTPATIENT)
Dept: OTOLARYNGOLOGY | Facility: OTHER | Age: 86
End: 2021-12-28
Payer: MEDICARE

## 2021-12-28 DIAGNOSIS — H91.93 DECREASED HEARING OF BOTH EARS: Primary | ICD-10-CM

## 2021-12-29 ENCOUNTER — INFUSION THERAPY VISIT (OUTPATIENT)
Dept: INFUSION THERAPY | Facility: OTHER | Age: 86
End: 2021-12-29
Attending: FAMILY MEDICINE
Payer: MEDICARE

## 2021-12-29 DIAGNOSIS — E86.9 VOLUME DEPLETION: Primary | ICD-10-CM

## 2021-12-29 PROCEDURE — 96360 HYDRATION IV INFUSION INIT: CPT

## 2021-12-29 PROCEDURE — 250N000011 HC RX IP 250 OP 636: Performed by: FAMILY MEDICINE

## 2021-12-29 PROCEDURE — 258N000003 HC RX IP 258 OP 636: Performed by: FAMILY MEDICINE

## 2021-12-29 RX ORDER — HEPARIN SODIUM (PORCINE) LOCK FLUSH IV SOLN 100 UNIT/ML 100 UNIT/ML
5 SOLUTION INTRAVENOUS ONCE
Status: CANCELLED
Start: 2021-12-29 | End: 2021-12-29

## 2021-12-29 RX ORDER — HEPARIN SODIUM (PORCINE) LOCK FLUSH IV SOLN 100 UNIT/ML 100 UNIT/ML
5 SOLUTION INTRAVENOUS ONCE
Status: COMPLETED | OUTPATIENT
Start: 2021-12-29 | End: 2021-12-29

## 2021-12-29 RX ADMIN — SODIUM CHLORIDE 1000 ML: 9 INJECTION, SOLUTION INTRAVENOUS at 09:54

## 2021-12-29 RX ADMIN — HEPARIN 5 ML: 100 SYRINGE at 11:00

## 2021-12-29 NOTE — PROGRESS NOTES
Patient is a 88 year old male here accompanied by self today for infusion of IVF per order of DR. Ray under the supervision of Dr. Ray  Patient identified with two identifiers, order verified, and verbal consent for today's infusion obtained from patient.      Patient meets order parameters for today's treatment.  Patients port accessed using non-coring, 20 gauge, 3/4 needle. Port accessed per facility protocol. Port flushed easily, blood return noted.  No signs and symptoms of infection or infiltration.      Intravenous fluids were administered, normal saline 1000 mL

## 2021-12-29 NOTE — PATIENT INSTRUCTIONS

## 2022-01-03 ENCOUNTER — TELEPHONE (OUTPATIENT)
Dept: FAMILY MEDICINE | Facility: OTHER | Age: 87
End: 2022-01-03
Payer: MEDICARE

## 2022-01-03 DIAGNOSIS — R35.0 URINARY FREQUENCY: Primary | ICD-10-CM

## 2022-01-04 ENCOUNTER — TELEPHONE (OUTPATIENT)
Dept: FAMILY MEDICINE | Facility: OTHER | Age: 87
End: 2022-01-04
Payer: MEDICARE

## 2022-01-05 ENCOUNTER — INFUSION THERAPY VISIT (OUTPATIENT)
Dept: INFUSION THERAPY | Facility: OTHER | Age: 87
End: 2022-01-05
Attending: FAMILY MEDICINE
Payer: MEDICARE

## 2022-01-05 DIAGNOSIS — E86.9 VOLUME DEPLETION: Primary | ICD-10-CM

## 2022-01-05 PROCEDURE — 250N000011 HC RX IP 250 OP 636: Performed by: FAMILY MEDICINE

## 2022-01-05 PROCEDURE — 258N000003 HC RX IP 258 OP 636: Performed by: FAMILY MEDICINE

## 2022-01-05 PROCEDURE — 96360 HYDRATION IV INFUSION INIT: CPT

## 2022-01-05 RX ORDER — HEPARIN SODIUM (PORCINE) LOCK FLUSH IV SOLN 100 UNIT/ML 100 UNIT/ML
5 SOLUTION INTRAVENOUS ONCE
Status: COMPLETED | OUTPATIENT
Start: 2022-01-05 | End: 2022-01-05

## 2022-01-05 RX ORDER — HEPARIN SODIUM (PORCINE) LOCK FLUSH IV SOLN 100 UNIT/ML 100 UNIT/ML
5 SOLUTION INTRAVENOUS ONCE
Status: CANCELLED
Start: 2022-01-05 | End: 2022-01-05

## 2022-01-05 RX ADMIN — Medication 5 ML: at 10:37

## 2022-01-05 RX ADMIN — SODIUM CHLORIDE 1000 ML: 9 INJECTION, SOLUTION INTRAVENOUS at 09:38

## 2022-01-05 NOTE — PATIENT INSTRUCTIONS

## 2022-01-05 NOTE — PROGRESS NOTES
Patient is a 88 year old here today for infusion of IVF per order of Dr Ray.  Patient identified with two identifiers, order verified, and verbal consent for today's infusion obtained from patient.      Patient meets order parameters for today's treatment.     Patient denies questions or concerns regarding infusion and/or medication(s) being administered.    Patients port accessed using non-coring, 20 gauge, 3/4 inch power needle. Port accessed per facility protocol. Port flushed easily, blood return noted.  No signs and symptoms of infection or infiltration.      IV pump verified with IVF dose, drug, and rate of administration.  Infusion administered per protocol.  Patient tolerated infusion well, no signs or symptoms of adverse reaction noted.  Patient denies pain nor discomfort.     IV removed, catheter intact.  Site clean, dry and intact.  No signs or symptoms of infiltration or infection.  Covered with a sterile bandage, slight pressure applied for 30 seconds.  Pt instructed to leave bandage intact for a minimum of one hour, and to call with questions or concerns.  Copy of appointments, discharge instructions, and after visit summary (AVS) provided to patient.  Patient states understanding, discharged.    
Intact

## 2022-01-06 ENCOUNTER — TELEPHONE (OUTPATIENT)
Dept: FAMILY MEDICINE | Facility: OTHER | Age: 87
End: 2022-01-06
Payer: MEDICARE

## 2022-01-12 ENCOUNTER — INFUSION THERAPY VISIT (OUTPATIENT)
Dept: INFUSION THERAPY | Facility: OTHER | Age: 87
End: 2022-01-12
Attending: FAMILY MEDICINE
Payer: MEDICARE

## 2022-01-12 DIAGNOSIS — E86.9 VOLUME DEPLETION: Primary | ICD-10-CM

## 2022-01-12 PROCEDURE — 250N000011 HC RX IP 250 OP 636: Performed by: FAMILY MEDICINE

## 2022-01-12 PROCEDURE — 258N000003 HC RX IP 258 OP 636: Performed by: FAMILY MEDICINE

## 2022-01-12 PROCEDURE — 96360 HYDRATION IV INFUSION INIT: CPT

## 2022-01-12 RX ORDER — HEPARIN SODIUM (PORCINE) LOCK FLUSH IV SOLN 100 UNIT/ML 100 UNIT/ML
5 SOLUTION INTRAVENOUS ONCE
Status: COMPLETED | OUTPATIENT
Start: 2022-01-12 | End: 2022-01-12

## 2022-01-12 RX ORDER — HEPARIN SODIUM (PORCINE) LOCK FLUSH IV SOLN 100 UNIT/ML 100 UNIT/ML
5 SOLUTION INTRAVENOUS ONCE
Status: CANCELLED
Start: 2022-01-12 | End: 2022-01-12

## 2022-01-12 RX ADMIN — SODIUM CHLORIDE 1000 ML: 9 INJECTION, SOLUTION INTRAVENOUS at 09:34

## 2022-01-12 RX ADMIN — Medication 5 ML: at 10:45

## 2022-01-12 NOTE — PROGRESS NOTES
Patient is a 87 y/o male here accompanied by self today for infusion of IVF per order of Dr. Whitman.  Patient identified with two identifiers, order verified, and verbal consent for today's infusion obtained from patient.          Patient meets order parameters for today's treatment.         Patients port accessed using non-coring, 19 gauge, 3/4 needle. Port accessed per facility protocol. Port flushed easily, blood return noted.  No signs and symptoms of infection or infiltration.      IV pump verified with dose, drug, and rate of administration.  Infusion administered per protocol.  Patient tolerated infusion well, no signs or symptoms of adverse reaction noted.  Patient denies pain nor discomfort.     IV removed, catheter intact.  Site clean, dry and intact.  No signs or symptoms of infiltration or infection.  Covered with a sterile bandage, slight pressure applied for 30 seconds.  Pt instructed to leave bandage intact for a minimum of one hour, and to call with questions or concerns.  Copy of appointments, discharge instructions, and after visit summary (AVS) provided to patient.  Patient states understanding, discharged.

## 2022-01-16 ENCOUNTER — APPOINTMENT (OUTPATIENT)
Dept: CT IMAGING | Facility: HOSPITAL | Age: 87
End: 2022-01-16
Attending: STUDENT IN AN ORGANIZED HEALTH CARE EDUCATION/TRAINING PROGRAM
Payer: MEDICARE

## 2022-01-16 ENCOUNTER — HOSPITAL ENCOUNTER (EMERGENCY)
Facility: HOSPITAL | Age: 87
Discharge: HOME | End: 2022-01-16
Attending: STUDENT IN AN ORGANIZED HEALTH CARE EDUCATION/TRAINING PROGRAM | Admitting: STUDENT IN AN ORGANIZED HEALTH CARE EDUCATION/TRAINING PROGRAM
Payer: MEDICARE

## 2022-01-16 VITALS
DIASTOLIC BLOOD PRESSURE: 121 MMHG | RESPIRATION RATE: 18 BRPM | HEART RATE: 94 BPM | SYSTOLIC BLOOD PRESSURE: 139 MMHG | TEMPERATURE: 97.8 F | OXYGEN SATURATION: 93 %

## 2022-01-16 DIAGNOSIS — S22.31XA CLOSED FRACTURE OF ONE RIB OF RIGHT SIDE, INITIAL ENCOUNTER: ICD-10-CM

## 2022-01-16 LAB
ANION GAP SERPL CALCULATED.3IONS-SCNC: 6 MMOL/L (ref 3–14)
BASOPHILS # BLD AUTO: 0 10E3/UL (ref 0–0.2)
BASOPHILS NFR BLD AUTO: 0 %
BUN SERPL-MCNC: 18 MG/DL (ref 7–30)
CALCIUM SERPL-MCNC: 8.7 MG/DL (ref 8.5–10.1)
CHLORIDE BLD-SCNC: 107 MMOL/L (ref 94–109)
CO2 SERPL-SCNC: 26 MMOL/L (ref 20–32)
CREAT SERPL-MCNC: 1.01 MG/DL (ref 0.66–1.25)
EOSINOPHIL # BLD AUTO: 0.3 10E3/UL (ref 0–0.7)
EOSINOPHIL NFR BLD AUTO: 4 %
ERYTHROCYTE [DISTWIDTH] IN BLOOD BY AUTOMATED COUNT: 12.2 % (ref 10–15)
GFR SERPL CREATININE-BSD FRML MDRD: 72 ML/MIN/1.73M2
GLUCOSE BLD-MCNC: 127 MG/DL (ref 70–99)
HCT VFR BLD AUTO: 41.3 % (ref 40–53)
HGB BLD-MCNC: 13.5 G/DL (ref 13.3–17.7)
HOLD SPECIMEN: NORMAL
HOLD SPECIMEN: NORMAL
IMM GRANULOCYTES # BLD: 0 10E3/UL
IMM GRANULOCYTES NFR BLD: 0 %
LYMPHOCYTES # BLD AUTO: 1.4 10E3/UL (ref 0.8–5.3)
LYMPHOCYTES NFR BLD AUTO: 22 %
MCH RBC QN AUTO: 31.1 PG (ref 26.5–33)
MCHC RBC AUTO-ENTMCNC: 32.7 G/DL (ref 31.5–36.5)
MCV RBC AUTO: 95 FL (ref 78–100)
MONOCYTES # BLD AUTO: 0.7 10E3/UL (ref 0–1.3)
MONOCYTES NFR BLD AUTO: 10 %
NEUTROPHILS # BLD AUTO: 4.2 10E3/UL (ref 1.6–8.3)
NEUTROPHILS NFR BLD AUTO: 64 %
NRBC # BLD AUTO: 0 10E3/UL
NRBC BLD AUTO-RTO: 0 /100
PLATELET # BLD AUTO: 197 10E3/UL (ref 150–450)
POTASSIUM BLD-SCNC: 4.2 MMOL/L (ref 3.4–5.3)
RBC # BLD AUTO: 4.34 10E6/UL (ref 4.4–5.9)
SODIUM SERPL-SCNC: 139 MMOL/L (ref 133–144)
WBC # BLD AUTO: 6.6 10E3/UL (ref 4–11)

## 2022-01-16 PROCEDURE — 999N000157 HC STATISTIC RCP TIME EA 10 MIN

## 2022-01-16 PROCEDURE — 72125 CT NECK SPINE W/O DYE: CPT

## 2022-01-16 PROCEDURE — 93010 ELECTROCARDIOGRAM REPORT: CPT | Performed by: INTERNAL MEDICINE

## 2022-01-16 PROCEDURE — 99285 EMERGENCY DEPT VISIT HI MDM: CPT | Mod: 25

## 2022-01-16 PROCEDURE — 36415 COLL VENOUS BLD VENIPUNCTURE: CPT | Performed by: STUDENT IN AN ORGANIZED HEALTH CARE EDUCATION/TRAINING PROGRAM

## 2022-01-16 PROCEDURE — 93005 ELECTROCARDIOGRAM TRACING: CPT

## 2022-01-16 PROCEDURE — 85025 COMPLETE CBC W/AUTO DIFF WBC: CPT | Performed by: STUDENT IN AN ORGANIZED HEALTH CARE EDUCATION/TRAINING PROGRAM

## 2022-01-16 PROCEDURE — 72128 CT CHEST SPINE W/O DYE: CPT

## 2022-01-16 PROCEDURE — 80048 BASIC METABOLIC PNL TOTAL CA: CPT | Performed by: STUDENT IN AN ORGANIZED HEALTH CARE EDUCATION/TRAINING PROGRAM

## 2022-01-16 PROCEDURE — 99284 EMERGENCY DEPT VISIT MOD MDM: CPT | Performed by: STUDENT IN AN ORGANIZED HEALTH CARE EDUCATION/TRAINING PROGRAM

## 2022-01-16 PROCEDURE — 72131 CT LUMBAR SPINE W/O DYE: CPT

## 2022-01-16 PROCEDURE — 250N000013 HC RX MED GY IP 250 OP 250 PS 637: Performed by: STUDENT IN AN ORGANIZED HEALTH CARE EDUCATION/TRAINING PROGRAM

## 2022-01-16 PROCEDURE — 70450 CT HEAD/BRAIN W/O DYE: CPT

## 2022-01-16 RX ORDER — SERTRALINE HYDROCHLORIDE 100 MG/1
100 TABLET, FILM COATED ORAL EVERY EVENING
Status: ON HOLD | COMMUNITY
End: 2023-02-02

## 2022-01-16 RX ORDER — ATORVASTATIN CALCIUM 40 MG/1
40 TABLET, FILM COATED ORAL EVERY EVENING
COMMUNITY
End: 2023-04-27

## 2022-01-16 RX ORDER — OXYCODONE HYDROCHLORIDE 5 MG/1
5 TABLET ORAL EVERY 6 HOURS PRN
Qty: 6 TABLET | Refills: 0 | Status: SHIPPED | OUTPATIENT
Start: 2022-01-16 | End: 2022-03-23

## 2022-01-16 RX ORDER — LIDOCAINE 4 G/G
PATCH TOPICAL
Qty: 7 PATCH | Refills: 0 | Status: SHIPPED | OUTPATIENT
Start: 2022-01-16 | End: 2022-03-23

## 2022-01-16 RX ORDER — MEMANTINE HYDROCHLORIDE 5 MG/1
5 TABLET ORAL 2 TIMES DAILY
COMMUNITY

## 2022-01-16 RX ORDER — ACETAMINOPHEN 325 MG/1
650 TABLET ORAL EVERY 6 HOURS PRN
Qty: 60 TABLET | Refills: 0 | Status: SHIPPED | OUTPATIENT
Start: 2022-01-16 | End: 2022-04-23

## 2022-01-16 RX ORDER — ACETAMINOPHEN 325 MG/1
975 TABLET ORAL ONCE
Status: COMPLETED | OUTPATIENT
Start: 2022-01-16 | End: 2022-01-16

## 2022-01-16 RX ORDER — OXYCODONE HYDROCHLORIDE 5 MG/1
5 TABLET ORAL ONCE
Status: COMPLETED | OUTPATIENT
Start: 2022-01-16 | End: 2022-01-16

## 2022-01-16 RX ADMIN — ACETAMINOPHEN 975 MG: 325 TABLET, FILM COATED ORAL at 16:18

## 2022-01-16 RX ADMIN — OXYCODONE HYDROCHLORIDE 5 MG: 5 TABLET ORAL at 16:18

## 2022-01-16 NOTE — ED NOTES
Pt presents to the ED from [facility] via [facility].  Staff at [facility] report that the patient had a witnessed fall this morning around 1100.  Pt was ambulating behind a wheelchair when he became weak and was lowered to the ground by staff.  No injuries occurred at this time.  Around 1300 staff noticed that the patient had a small hematoma on the right side of his forehead.  Pts blood pressure is also unusually high according to staff and the patient is on Eliquis, so the provider wanted the patient evaluated.  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  Staff states that the patient is at baseline mentation.

## 2022-01-17 NOTE — DISCHARGE INSTRUCTIONS
Return to the emergency department for worsening symptoms or new concerning symptoms, please follow-up with primary care provider within the next 5 to 7 days.  Call to schedule appointment.  He can control pain with Tylenol and lidocaine patches.  For breakthrough pain you can use the oxycodone that I have given you.  Do not mix oxycodone with tramadol.    ASSIST PATIENT WITH INCENTIVE SPIROMETRY EVERY 2 HOURS WHILE AWAKE.        Using an Incentive Spirometer    An incentive spirometer is a device that helps you do deep breathing exercises after surgery. Or it helps lower the risk for breathing problems if you have a lung disease or condition. These exercises expand your lungs, aid in circulation, and help prevent pneumonia. Deep breathing exercises also help you breathe better and improve the function of your lungs by:    Keeping your lungs clear    Strengthening your breathing muscles    Helping prevent respiratory complications or problems  The incentive spirometer gives you a way to take an active part in your recovery. A nurse or respiratory therapist will teach you breathing exercises. To do these exercises, you will breathe in through your mouth and not your nose. The incentive spirometer only works correctly if you breathe in through your mouth.  Your healthcare provider or his/her staff will tell you how to use the device, your targeted volume(s), and provide other helpful tips to prevent complications (such as pain, dizziness, feeling lightheaded) when blowing in the incentive spirometer.  Steps to clear lungs  Step 1. Exhale normally. Then, inhale normally.    Relax and breathe out.  Step 2. Place your lips tightly around the mouthpiece.    Make sure the device is upright and not tilted.    Sit up and breathe out (exhale) fully    Tightly seal your lips around the mouthpiece  Step 3. Inhale as much air as you can through the mouthpiece. Don't breathe through your nose.    Breathe in (inhale) slowly and  deeply.    Hold your breath long enough to keep the balls, piston, or disk raised for at least 3 to 5 seconds, or as instructed by your healthcare provider.    Exhale slowly to allow the balls, piston, or disk to fall before repeating again.  Note: Some spirometers have an indicator to let you know that you are breathing in too fast. If the indicator goes off, breathe in more slowly.  Step 4. Repeat the exercise regularly.    Do sets of 10 exercises every hour while you're awake, or as instructed by your healthcare provider. Don't do more than 30 breaths in each set.    If you were taught deep breathing and coughing exercises, do them regularly as instructed by your healthcare provider, nurse, or respiratory therapist.     Follow-up care  Make a follow up appointment, or as directed by your healthcare provider. Also, follow up with your healthcare provider as advised or if your symptoms don't improve or continue to get worse.  When to call your healthcare provider  Call your healthcare provider right away if you have any of the following:    Fever 100.4  (38 C) or higher, or as directed by your healthcare provider    Brownish, bloody, or smelly sputum (phlegm that you cough up)  Call 911  Call 911 if any of these occur:     Shortness of breath that doesn't get better after taking your medicine    Cool, moist, pale or blue skin    Trouble breathing or swallowing, wheezing    Fainting or loss of consciousness    Feeling of dizziness or weakness, or a sudden drop in blood pressure    Feeling very ill    Lightheadedness    Chest pain or rapid heart rate  [employer] last reviewed this educational content on 6/1/2019 2000-2021 [employer]. All rights reserved. This information is not intended as a substitute for professional medical care. Always follow your healthcare professional's instructions.            Rib Fracture    You broke one or more ribs. This is called a rib fracture. Rib fractures don't need a  cast like other bones. They will heal by themselves in about 4 to 6 weeks. The first 3 to 4 weeks will be the most painful. During this time deep breathing, coughing, or changing position from sitting to lying down, may cause the broken ends to move slightly.   Home care  1. Rest. You should not be doing any heavy lifting or strenuous exertion until the pain goes away.  2. It hurts to breathe when you have a broken rib. This puts you at risk of getting pneumonia from poor airflow through your lungs. To prevent this:  ? Take several very deep breaths once an hour while you're awake. Breathe out through pursed lips as if you are blowing up a balloon. If possible, actually blow up a balloon or a rubber glove. This exercise builds up pressure inside the lung and prevents collapse of the small air sacs of the lung. This exercise may cause some pain at the site of injury. This is normal.  ? You may have gotten a breathing exercise device called an incentive spirometer. Use it at least 4 times a day, or as directed.  3. Apply an ice pack over the injured area for 15 to 20 minutes every 1 to 2 hours. You should do this for the first 24 to 48 hours. To make an ice pack, put ice cubes in a plastic bag that seals at the top. Wrap the bag in a clean, thin towel or cloth. Never put ice or an ice pack directly on the skin. Keep using ice packs as needed for the relief of pain and swelling.  4. You may use over-the-counter pain medicine to control pain, unless another pain medicine was prescribed. If you have chronic liver or kidney disease or ever had a stomach ulcer or GI (gastrointestinal) bleeding, talk with your healthcare provider before using these medicines.  5. If your pain is not controlled, contact your healthcare provider. Sometimes a stronger pain medicine may be needed. A nerve block can be done in case of severe pain. It will numb the nerve between the ribs.    Follow-up care  Follow up with your healthcare  provider, or as advised. In rare cases, a broken rib will cause complications in the first few days that may not be clearly seen during your initial exam. This can include collapsed lung, bleeding around the lung or into the belly (abdomen), or pneumonia. So watch for the signs below.   If X-rays were taken, you will be told of any new findings that may affect your care.  Call 911  Call 911 if you have:     Dizziness, weakness or fainting    Shortness of breath with or without chest discomfort    New or worsening abdominal pain    Discomfort in other areas of your upper body such as your shoulders, jaw, neck, or arms  When to seek medical advice  Call your healthcare provider right away if any of these occur:    Increasing chest pain with breathing    Fever of 100.4 F (38 C) or above, or as directed by your healthcare provider    Congested cough, nausea, or vomiting  [employer] last reviewed this educational content on 4/1/2018 2000-2021 [employer]. All rights reserved. This information is not intended as a substitute for professional medical care. Always follow your healthcare professional's instructions.

## 2022-01-17 NOTE — ED PROVIDER NOTES
History     Chief Complaint   Patient presents with     Fall     HPI  Jf Ortiz is a 88 year old male with a history of hypotension for which he gets fluid resuscitation via IV on a weekly basis, as well as dementia who was recently seen at the emergency department here for vasovagal syncope presents to the emergency department today brought in by ambulance for concern of falls.  He was being ambulated this morning around 11 when he became weak and was lowered to the ground by staff.  They noted hematoma on the right side of his head and suspect that he had had a previous fall and sent him to the emergency department today for further evaluation.  They do note that he takes Eliquis.  He states he has pain in his right back/flank area and that everything else feels just fine.  No other complaints at this time.    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     Priority: Medium     Vision loss of right eye 02/12/2021     Priority: Medium     Orthostatic hypotension dysautonomic syndrome 02/12/2021     Priority: Medium     Chronic atrial fibrillation (H) 02/12/2021     Priority: Medium     Closed compression fracture of body of L1 vertebra (H) 02/12/2021     Priority: Medium     Closed wedge compression fracture of lumbar vertebra with routine healing 12/20/2020     Priority: Medium     Compression fracture of L1 lumbar vertebra, closed, initial encounter (H) 12/18/2020     Priority: Medium     Compression fracture of thoracic vertebra, initial encounter, unspecified thoracic vertebral level 12/18/2020     Priority: Medium     Replacing diagnoses that were inactivated after the 10/1/2021 regulatory import.       Longstanding persistent atrial fibrillation (H) 04/13/2020     Priority: Medium     Falls frequently 04/13/2020     Priority: Medium      Frequent falls 04/13/2020     Priority: Medium     Coronary artery disease involving native coronary artery without angina pectoris 04/13/2020     Priority: Medium     Constipation, unspecified constipation type 04/11/2018     Priority: Medium     Pacemaker, [employer], Dual Chamber - Dependent 10/06/2017     Priority: Medium     Lewy body dementia without behavioral disturbance (H) 08/31/2017     Priority: Medium     Fatigue 08/31/2017     Priority: Medium     Urinary incontinence 08/31/2017     Priority: Medium     Autonomic orthostatic hypotension 10/14/2016     Priority: Medium     Dementia without behavioral disturbance (H) 07/28/2015     Priority: Medium     Diagnosis updated by automated process. Provider to review and confirm.       Hypocalcemia 07/28/2015     Priority: Medium     Parkinson disease (H)      Priority: Medium     Seizure disorder (H)      Priority: Medium     Volume depletion 08/02/2014     Priority: Medium     Syncope and collapse 08/01/2014     Priority: Medium     Stented coronary artery      Priority: Medium     Hypercholesterolemia 04/23/2013     Priority: Medium     Cardiac pacemaker in situ 01/08/2013     Priority: Medium     Overview:   [employer] Altrua S606 DREL,  Serial #[id number]  5-13-11  Atrial lead [employer] 4054 Serial #[id number]  8-11-00  Ventriculer lead [employer] 4137  Serial #[id number]  5-13-11  2nd Degree AV Block   Dr. Campbell  Intrinsic:  Pt. is Pacemaker Dependant, remains paced at temp. rate of 30 bpm (6-13-14)    Formatting of this note might be different from the original.  [employer] Altrua S606 DREL,  Serial #[id number]  5-13-11  Atrial lead [employer] 4054 Serial #[id number]  8-11-00  Ventriculer lead [employer] [id number]  Serial #[id number]  5-13-11  2nd Degree AV Block   Dr. Campbell  Intrinsic:  Pt. is Pacemaker Dependant, remains paced at temp. rate of 30 bpm (6-13-14)       REM sleep behavior disorder 01/01/2011     Priority: Medium      Osteoarthritis 01/01/2011     Priority: Medium     Problem list name updated by automated process. Provider to review       Diaphragmatic hernia 01/01/2011     Priority: Medium     Problem list name updated by automated process. Provider to review       Pain in joint, forearm 07/31/2008     Priority: Medium     Formatting of this note might be different from the original.  IMO Update 10/11       Pain in joint, ankle and foot 07/31/2008     Priority: Medium     Formatting of this note might be different from the original.  IMO Update 10/11       Dysphagia 05/22/2007     Priority: Medium     Overview:   IMO Update    Formatting of this note might be different from the original.  IMO Update       Coronary atherosclerosis of native coronary artery 11/28/2006     Priority: Medium     Overview:   IMO Update 10/11    Formatting of this note might be different from the original.  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  IMO Update 10/11       Hypertension with target blood pressure goal under 150/90 09/05/2006     Priority: Medium     Overview:   IMO Update       Hyperlipidemia 09/05/2006     Priority: Medium     Formatting of this note might be different from the original.  IMO Update 10/11       Essential hypertension 09/05/2006     Priority: Medium     Formatting of this note might be different from the original.  IMO Update          Past Medical History:    Past Medical History:   Diagnosis Date     Autonomic orthostatic hypotension 10/14/2016     Coronary artery disease      Dementia without behavioral disturbance (H) 7/28/2015     Diaphragmatic hernia without mention of obstruction or gangrene 1/1/2011     Hypercholesterolemia 4/23/2013     Osteoarthrosis, unspecified whether generalized or localized, unspecified site 1/1/2011     Other and unspecified  hyperlipidemia 1/1/2011     Pacemaker      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     Seizure disorder (H)      Stented coronary artery        Past Surgical History:    Past Surgical History:   Procedure Laterality Date     ------------OTHER-------------  1955    ulnar and radial fx - repair ulnar and radial fx x4     ------------OTHER-------------  6/14/2011    cataract extraction     BIOPSY  08/2015    skin biopsy     BLEPHAROPLASTY BILATERAL  5/6/2014    Procedure: BLEPHAROPLASTY BILATERAL;  Surgeon: Andrew Queen MD;  Location: [facility]     BYPASS GRAFT ARTERY CORONARY  11/2006    coronary artery disease x 5, [facility]     cataract extraction and lens implantation  2011    cataracts     cataract extraction and lens implantation      cataracts     COLONOSCOPY  2012     colonoscopy with polypectomy  3/13/2009    history of polyps - repeat 3 yrs     colonoscopy with polypectomy  2006     colonoscopy with polypectomy  2005     COMBINED COLONOSCOPY WITH ARGON PLASMA COAGULATOR (APC) N/A 10/31/2014    Procedure: COMBINED COLONOSCOPY WITH ARGON PLASMA COAGULATOR (APC);  Surgeon: Bassam Aguilar MD;  Location: [facility]     COMBINED COLONOSCOPY WITH ARGON PLASMA COAGULATOR (APC) N/A 11/13/2015    Procedure: COMBINED COLONOSCOPY WITH ARGON PLASMA COAGULATOR (APC);  Surgeon: Bassam Aguilar MD;  Location: [facility]     ENDOSCOPY UPPER, COLONOSCOPY, COMBINED N/A 10/31/2014    Procedure: COMBINED ENDOSCOPY UPPER, COLONOSCOPY;  Surgeon: Bassam Aguilar MD;  Location: [facility]     ENDOSCOPY UPPER, COLONOSCOPY, COMBINED N/A 11/13/2015    Procedure: COMBINED ENDOSCOPY UPPER, COLONOSCOPY;  Surgeon: Bassam Aguilar MD;  Location: [facility]     ESOPHAGOSCOPY, GASTROSCOPY, DUODENOSCOPY (EGD), COMBINED  1/22/2014    Procedure: COMBINED ESOPHAGOSCOPY, GASTROSCOPY, DUODENOSCOPY (EGD);  UPPER ENDOSCOPY(CARPENTER) W/ BIOPSIES;  Surgeon: Patricia Carpenter MD;  Location: [facility]     HERNIORRHAPHY INGUINAL Right 2/14/2018    Procedure: HERNIORRHAPHY INGUINAL;   OPEN RIGHT INGUINAL HERNIA REPAIR with Mesh;  Surgeon: Virgilio Zaragoza DO;  Location: [facility]     INSERT PORT VASCULAR ACCESS Right 2019    Procedure: PORT PLACEMENT;  Surgeon: Lloyd Ram MD;  Location: [facility]     LARYNGOSCOPY WITH MICROSCOPE  2014    Procedure: LARYNGOSCOPY WITH MICROSCOPE;;  Surgeon: Chayo Duke MD;  Location: [facility]     pacemaker placement      heart block     pacemaker placement      dual-chamber     REMOVE TUBE, MYRINGOTOMY, COMBINED  2014    Procedure: COMBINED REMOVE TUBE, MYRINGOTOMY;  MICRODIRECT LARYNGOSCOPY WITH BIOPSY AND FROZEN SECTIONS removal of right ear tube and myringoplasty;  Surgeon: Chayo Duke MD;  Location: [facility]     REPLACE PACEMAKER GENERATOR N/A 2018    Procedure: REPLACE PACEMAKER GENERATOR;  Pacemaker generator change;  Surgeon: Alma Kaplan MD;  Location: [facility]     stent placement to LAD  2008     ventilation tube  2012    right in office       Family History:    Family History   Problem Relation Age of Onset     Diabetes Mother      Breast Cancer Daughter        Social History:  Marital Status:  Single [1]  Social History     Tobacco Use     Smoking status: Former Smoker     Packs/day: 1.00     Years: 5.00     Pack years: 5.00     Types: Cigarettes     Quit date: 1985     Years since quittin.0     Smokeless tobacco: Never Used     Tobacco comment: quit in    Substance Use Topics     Alcohol use: Yes     Comment: social     Drug use: No        Medications:    acetaminophen (TYLENOL) 325 MG tablet  apixaban ANTICOAGULANT (ELIQUIS) 5 MG tablet  aspirin (ASA) 81 MG chewable tablet  atorvastatin (LIPITOR) 40 MG tablet  fludrocortisone (FLORINEF) 0.1 MG tablet  Lidocaine (LIDOCARE) 4 % Patch  memantine (NAMENDA) 5 MG tablet  midodrine (PROAMATINE) 5 MG tablet  oxyCODONE (ROXICODONE) 5 MG tablet  potassium chloride ER (KLOR-CON M) 20 MEQ CR tablet  RABEprazole (ACIPHEX) 20 MG EC tablet  senna-docusate  (SENOKOT-S/PERICOLACE) 8.6-50 MG tablet  sertraline (ZOLOFT) 50 MG tablet  sodium chloride 1 GM tablet  acetaminophen (TYLENOL) 325 MG tablet  Cholecalciferol (VITAMIN D-3) 25 MCG (1000 UT) CAPS  Coenzyme Q10 (CO Q 10 PO)  diclofenac (VOLTAREN) 1 % topical gel  donepezil (ARICEPT) 10 MG tablet  lidocaine (LIDODERM) 5 % patch  lidocaine-prilocaine (EMLA) 2.5-2.5 % external cream  melatonin 5 MG tablet  NONFORMULARY  nystatin (MYCOSTATIN) 035468 UNIT/GM external powder  traMADol (ULTRAM) 50 MG tablet          Review of Systems  A complete review of systems was performed and is otherwise negative.     Physical Exam   BP: 165/93  Pulse: 88  Temp: 97.9  F (36.6  C)  Resp: 18  SpO2: 96 %      Physical Exam  Constitutional: Alert and conversant. NAD   HENT: NCAT   Eyes: Normal pupils   Neck: supple   CV: Normal rate, regular rhythm, no murmur   Pulmonary/Chest: Non-labored respirations, clear to auscultation bilaterally   Abdominal: Soft, non-tender, non-distended   MSK: JONES. Pain and TTP of the right flank  Neuro: Alert and appropriate   Skin: Warm and dry. No diaphoresis. No rashes on exposed skin    Psych: Appropriate mood and affect     ED Course              ED Course as of 01/16/22 1833   Sun Jan 16, 2022 1831 Jf Ortiz is a 88 year old old male presenting with fall. Differential includes but is not limited to syncope, mechanical fall, cerebrovascular accident, intracranial hemorrhage, skull fracture, spinal column fracture, muscular strain, fracture/dislocation of extremity. History is consistent with mechanical fall and patient does not require a syncope workup. Based on the mechanism of injury, patient age and comorbidities, symptoms, and exam findings, intracranial hemorrhage or fracture/dislocation cannot be ruled out by history and exam and this patient requires diagnostic imaging. Imaging revealed acute 11th R rib fracture.  Tetanus not indicated. Patient ambulates without ataxia and feels comfortable and  safe at current place of residence and is stable for discharge with further outpatient evaluation and management. Patient given instructions on follow-up and warning signs for which to return to ED. All questions were answered and the patient is comfortable with plan for discharge. The patient was discharged in stable condition. Pain medications Rx's given.    1833 IS ordered     Procedures              Results for orders placed or performed during the hospital encounter of 01/16/22 (from the past 24 hour(s))   CT Head w/o Contrast    Narrative    Exam: CT HEAD W/O CONTRAST      Exam reason: Fall, on blood thinners    Technique:   -Axial images of the head obtained without contrast. Coronal and  sagittal reformations were generated.    Comparison: 12/22/2021, 6/22/2021, 2/12/2021       Findings:      Parenchyma: No evidence of intraparenchymal hemorrhage, mass, acute  cortical infarct or prior infarct in a major vascular territory.      There is moderate diffuse cerebral volume loss. Patchy periventricular  and deep white matter hypoattenuation is nonspecific but likely due to  chronic microvascular ischemic change.    No midline shift. The basilar cisterns are patent.    Extra-axial spaces: No extra-axial fluid collection or hemorrhage.     Ventricles: Given the degree of volume loss.  Paranasal sinuses: Clear.   Mastoid air cells: Clear.    Osseous: No acute findings.  Orbits: Normal.    Soft tissues: Small right frontal scalp hematoma.       Impression    Impression:  No acute intracranial abnormality.      MUSA RIVER MD         SYSTEM ID:  [id number]   CT Cervical Spine w/o Contrast    Narrative    Exam: CT CERVICAL SPINE W/O CONTRAST    Exam reason: Fall, on blood thinners    Technique: Using helical technique, axial images of the cervical spine  were obtained.  Coronal and sagittal reformations were generated.  No  IV contrast.     Comparison: 12/22/2021, 6/22/2021    FINDINGS:    Osseous:     Normal  spinal alignment.    No fracture.    No lytic or sclerotic bony lesion.    There are multilevel degenerative changes of the cervical spine, worst  at C3-C4 and C5-C6. Severe facet arthropathy at C2-C3 on the right.     Prevertebral soft tissues: Unremarkable    Lung apices: No pneumothorax or consolidation in visualized portions.      Impression    IMPRESSION:  No acute fracture or subluxation.    MUSA RIVER MD         SYSTEM ID:  [id number]   Basic metabolic panel   Result Value Ref Range    Sodium 139 133 - 144 mmol/L    Potassium 4.2 3.4 - 5.3 mmol/L    Chloride 107 94 - 109 mmol/L    Carbon Dioxide (CO2) 26 20 - 32 mmol/L    Anion Gap 6 3 - 14 mmol/L    Urea Nitrogen 18 7 - 30 mg/dL    Creatinine 1.01 0.66 - 1.25 mg/dL    Calcium 8.7 8.5 - 10.1 mg/dL    Glucose 127 (H) 70 - 99 mg/dL    GFR Estimate 72 >60 mL/min/1.73m2   CBC with platelets differential    Narrative    The following orders were created for panel order CBC with platelets differential.  Procedure                               Abnormality         Status                     ---------                               -----------         ------                     CBC with platelets and d...[[id number]]  Abnormal            Final result                 Please view results for these tests on the individual orders.   CBC with platelets and differential   Result Value Ref Range    WBC Count 6.6 4.0 - 11.0 10e3/uL    RBC Count 4.34 (L) 4.40 - 5.90 10e6/uL    Hemoglobin 13.5 13.3 - 17.7 g/dL    Hematocrit 41.3 40.0 - 53.0 %    MCV 95 78 - 100 fL    MCH 31.1 26.5 - 33.0 pg    MCHC 32.7 31.5 - 36.5 g/dL    RDW 12.2 10.0 - 15.0 %    Platelet Count 197 150 - 450 10e3/uL    % Neutrophils 64 %    % Lymphocytes 22 %    % Monocytes 10 %    % Eosinophils 4 %    % Basophils 0 %    % Immature Granulocytes 0 %    NRBCs per 100 WBC 0 <1 /100    Absolute Neutrophils 4.2 1.6 - 8.3 10e3/uL    Absolute Lymphocytes 1.4 0.8 - 5.3 10e3/uL    Absolute Monocytes 0.7 0.0 - 1.3  10e3/uL    Absolute Eosinophils 0.3 0.0 - 0.7 10e3/uL    Absolute Basophils 0.0 0.0 - 0.2 10e3/uL    Absolute Immature Granulocytes 0.0 <=0.4 10e3/uL    Absolute NRBCs 0.0 10e3/uL   Extra Tube    Narrative    The following orders were created for panel order Extra Tube.  Procedure                               Abnormality         Status                     ---------                               -----------         ------                     Extra Blue Top Tube[[id number]]                              Final result               Extra Green Top (Lithium...[[id number]]                      Final result                 Please view results for these tests on the individual orders.   Extra Blue Top Tube   Result Value Ref Range    Hold Specimen JIC    Extra Green Top (Lithium Heparin) ON ICE   Result Value Ref Range    Hold Specimen JIC    Lumbar spine CT w/o contrast    Narrative    Exam: CT THORACIC SPINE W/O CONTRAST, CT LUMBAR SPINE W/O CONTRAST    Exam reason: Trauma, back pain    Technique: Using helical CT technique, axial images were obtained  through the thoracic and lumbar spine. Coronal and sagittal  reconstructions also performed. No contrast used.    Comparison: 6/22/2021    Findings:    Normal alignment of the thoracic and lumbar spine.    Stable T8 inferior endplate compression fracture compared to  6/22/2021. There is also a stable L1 superior endplate compression  fracture and a stable L2 inferior endplate compression fracture.    No new fracture or subluxation in the thoracic or lumbar spine.    There are mild multilevel degenerative changes throughout the thoracic  and lumbar spine, not significantly changed.    Paraspinous/retroperitoneal regions: Visualized portions unremarkable.    There is an acute mildly displaced posterior right 11th rib fracture.       Impression    Impression:  Stable T8, L1, and L2 compression fractures and no new compression  fractures.    There is an acute mildly displaced  posterior right 11th rib fracture.    MUSA RIVER MD         SYSTEM ID:  [id number]   CT Thoracic Spine w/o Contrast    Narrative    Exam: CT THORACIC SPINE W/O CONTRAST, CT LUMBAR SPINE W/O CONTRAST    Exam reason: Trauma, back pain    Technique: Using helical CT technique, axial images were obtained  through the thoracic and lumbar spine. Coronal and sagittal  reconstructions also performed. No contrast used.    Comparison: 6/22/2021    Findings:    Normal alignment of the thoracic and lumbar spine.    Stable T8 inferior endplate compression fracture compared to  6/22/2021. There is also a stable L1 superior endplate compression  fracture and a stable L2 inferior endplate compression fracture.    No new fracture or subluxation in the thoracic or lumbar spine.    There are mild multilevel degenerative changes throughout the thoracic  and lumbar spine, not significantly changed.    Paraspinous/retroperitoneal regions: Visualized portions unremarkable.    There is an acute mildly displaced posterior right 11th rib fracture.       Impression    Impression:  Stable T8, L1, and L2 compression fractures and no new compression  fractures.    There is an acute mildly displaced posterior right 11th rib fracture.    MUSA RIVER MD         SYSTEM ID:  [id number]       Medications   acetaminophen (TYLENOL) tablet 975 mg (975 mg Oral Given 1/16/22 1618)   oxyCODONE (ROXICODONE) tablet 5 mg (5 mg Oral Given 1/16/22 1618)       Assessments & Plan (with Medical Decision Making)     I have reviewed the nursing notes.    I have reviewed the findings, diagnosis, plan and need for follow up with the patient.    New Prescriptions    ACETAMINOPHEN (TYLENOL) 325 MG TABLET    Take 2 tablets (650 mg) by mouth every 6 hours as needed for mild pain Do not exceed 3000 mg of acetaminophen from all sources in 24 hours.    LIDOCAINE (LIDOCARE) 4 % PATCH    1 patch to affected area for 12 hours, then remove. There should be a 12 hour  patch free time per day.    OXYCODONE (ROXICODONE) 5 MG TABLET    Take 1 tablet (5 mg) by mouth every 6 hours as needed for severe pain       Final diagnoses:   Closed fracture of one rib of right side, initial encounter       1/16/2022   [facility]     Anup Interiano MD  01/16/22 [id number]

## 2022-01-18 ENCOUNTER — DOCUMENTATION ONLY (OUTPATIENT)
Dept: OTHER | Facility: CLINIC | Age: 87
End: 2022-01-18
Payer: MEDICARE

## 2022-01-19 ENCOUNTER — INFUSION THERAPY VISIT (OUTPATIENT)
Dept: INFUSION THERAPY | Facility: OTHER | Age: 87
End: 2022-01-19
Attending: FAMILY MEDICINE
Payer: MEDICARE

## 2022-01-19 VITALS
RESPIRATION RATE: 18 BRPM | OXYGEN SATURATION: 96 % | DIASTOLIC BLOOD PRESSURE: 65 MMHG | HEART RATE: 71 BPM | SYSTOLIC BLOOD PRESSURE: 102 MMHG | TEMPERATURE: 98.6 F

## 2022-01-19 DIAGNOSIS — E86.9 VOLUME DEPLETION: Primary | ICD-10-CM

## 2022-01-19 PROCEDURE — 96360 HYDRATION IV INFUSION INIT: CPT

## 2022-01-19 PROCEDURE — 96365 THER/PROPH/DIAG IV INF INIT: CPT

## 2022-01-19 PROCEDURE — 250N000011 HC RX IP 250 OP 636: Performed by: FAMILY MEDICINE

## 2022-01-19 PROCEDURE — 258N000003 HC RX IP 258 OP 636: Performed by: FAMILY MEDICINE

## 2022-01-19 RX ORDER — HEPARIN SODIUM (PORCINE) LOCK FLUSH IV SOLN 100 UNIT/ML 100 UNIT/ML
5 SOLUTION INTRAVENOUS ONCE
Status: COMPLETED | OUTPATIENT
Start: 2022-01-19 | End: 2022-01-19

## 2022-01-19 RX ORDER — HEPARIN SODIUM (PORCINE) LOCK FLUSH IV SOLN 100 UNIT/ML 100 UNIT/ML
5 SOLUTION INTRAVENOUS ONCE
Status: CANCELLED
Start: 2022-01-19 | End: 2022-01-19

## 2022-01-19 RX ADMIN — SODIUM CHLORIDE 1000 ML: 9 INJECTION, SOLUTION INTRAVENOUS at 09:48

## 2022-01-19 RX ADMIN — Medication 5 ML: at 11:05

## 2022-01-19 NOTE — PROGRESS NOTES
Patient is a 88 here accompanied by self today for infusion of normal saline per order of Dr. Whitman.  Patient identified with two identifiers, order verified, and verbal consent for today's infusion obtained from patient.      Patient meets order parameters for today's treatment.    Patients port accessed using non-coring, 19 gauge, 3/4 inch needle. Port accessed per facility protocol. Port flushed easily, blood return noted.  No signs and symptoms of infection or infiltration.      0948 IV pump verified with dose, drug, and rate of administration.  Infusion administered per protocol.  Patient tolerated infusion well, no signs or symptoms of adverse reaction noted.  Patient denies pain nor discomfort.     IV removed, catheter intact.  Site clean, dry and intact.  No signs or symptoms of infiltration or infection.  Covered with a sterile bandage, slight pressure applied for 30 seconds.  Pt instructed to leave bandage intact for a minimum of one hour, and to call with questions or concerns.  Copy of appointments, discharge instructions, and after visit summary (AVS) provided to patient.  Patient states understanding, discharged.

## 2022-01-20 ENCOUNTER — APPOINTMENT (OUTPATIENT)
Dept: CT IMAGING | Facility: HOSPITAL | Age: 87
End: 2022-01-20
Attending: STUDENT IN AN ORGANIZED HEALTH CARE EDUCATION/TRAINING PROGRAM
Payer: MEDICARE

## 2022-01-20 ENCOUNTER — APPOINTMENT (OUTPATIENT)
Dept: GENERAL RADIOLOGY | Facility: HOSPITAL | Age: 87
End: 2022-01-20
Attending: STUDENT IN AN ORGANIZED HEALTH CARE EDUCATION/TRAINING PROGRAM
Payer: MEDICARE

## 2022-01-20 ENCOUNTER — HOSPITAL ENCOUNTER (EMERGENCY)
Facility: HOSPITAL | Age: 87
Discharge: NURSING, REHAB OR LONG-TERM CARE | End: 2022-01-20
Attending: STUDENT IN AN ORGANIZED HEALTH CARE EDUCATION/TRAINING PROGRAM | Admitting: STUDENT IN AN ORGANIZED HEALTH CARE EDUCATION/TRAINING PROGRAM
Payer: MEDICARE

## 2022-01-20 ENCOUNTER — VIRTUAL VISIT (OUTPATIENT)
Dept: UROLOGY | Facility: OTHER | Age: 87
End: 2022-01-20
Attending: UROLOGY
Payer: MEDICARE

## 2022-01-20 VITALS
TEMPERATURE: 98 F | BODY MASS INDEX: 25.28 KG/M2 | HEART RATE: 69 BPM | DIASTOLIC BLOOD PRESSURE: 87 MMHG | OXYGEN SATURATION: 90 % | WEIGHT: 171.3 LBS | RESPIRATION RATE: 20 BRPM | SYSTOLIC BLOOD PRESSURE: 173 MMHG

## 2022-01-20 DIAGNOSIS — R29.6 FALLS FREQUENTLY: ICD-10-CM

## 2022-01-20 DIAGNOSIS — R35.0 URINARY FREQUENCY: ICD-10-CM

## 2022-01-20 DIAGNOSIS — R44.3 HALLUCINATIONS: ICD-10-CM

## 2022-01-20 PROCEDURE — 99284 EMERGENCY DEPT VISIT MOD MDM: CPT | Mod: 25

## 2022-01-20 PROCEDURE — G1004 CDSM NDSC: HCPCS

## 2022-01-20 PROCEDURE — 99203 OFFICE O/P NEW LOW 30 MIN: CPT | Mod: 95 | Performed by: UROLOGY

## 2022-01-20 PROCEDURE — 71045 X-RAY EXAM CHEST 1 VIEW: CPT

## 2022-01-20 PROCEDURE — 99284 EMERGENCY DEPT VISIT MOD MDM: CPT | Performed by: STUDENT IN AN ORGANIZED HEALTH CARE EDUCATION/TRAINING PROGRAM

## 2022-01-20 PROCEDURE — 72125 CT NECK SPINE W/O DYE: CPT | Mod: ME

## 2022-01-20 RX ORDER — BISACODYL 5 MG/1
5 TABLET, DELAYED RELEASE ORAL DAILY PRN
COMMUNITY
End: 2022-05-06

## 2022-01-20 NOTE — PROGRESS NOTES
"Akila Waller is a 88 year old male who is being evaluated via a billable video visit.      The patient has been notified of following:     \"This video visit will be conducted via a call between you and your physician/provider. We have found that certain health care needs can be provided without the need for an in-person physical exam.  This service lets us provide the care you need with a video conversation.  If a prescription is necessary we can send it directly to your pharmacy.  If lab work is needed we can place an order for that and you can then stop by our lab to have the test done at a later time.    If during the course of the call the physician/provider feels a video visit is not appropriate, you will not be charged for this service.\"     Physician has received verbal consent for a Video Visit from the patient? Yes    Akila Waller complains of  No chief complaint on file.      I have reviewed and updated the patient's Past Medical History, Social History, Family History and Medication List.    ALLERGIES  Cats and Lamotrigine    HPI  Mr. Waller is an 87yo male residing in a nursing home where staff was concerned due to frequent urination at night.  He wakes to void hourly.  During the day however he may go 3 to 5 hours between voids.  He receives IV fluids for maintaining hydration.  He also drinks coffee throughout the day, oftentimes drinks a cup of coffee with dinner.  He denies dysuria and gross hematuria.  He has never been treated for urinary complaints in the past.    In talking with the staff it sounds like a recent UA was collected and is clear per nursing staff     Results for AKILA WALLER (MRN [id number]) as of 1/20/2022 13:17   1/16/2022 16:40   Creatinine 1.01   GFR Estimate 72     Imaging  CT c/a/p  6/22/2021  IMPRESSION: No chest abdomen or pelvic injury. 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 Follow-up CT scan in  6 months time is " recommended.    Assessment/Plan  Mr. Ortiz is an 87yo male residing in a nursing home where staff was concerned due to nocturia.  We discussed nonmedication ways to optimize nocturia such as voiding before bedtime, minimizing fluids after dinner and avoiding caffeine after noon.  We discussed various medications such as anticholinergics.  After discussing potential risks and benefits we elected to proceed without medication.  The nursing home does not have a bladder scanner so a PVR could not be obtained however I reviewed his CT scan from 6 months ago which revealed a relatively empty bladder suggesting he is emptying quite well.      Video-Visit Details  Type of service:  Video Visit    Video Start Time:  12:56  Video End Time:  13:26  Time spent on pre-visit work, post visit work, and documentation on day of visit outside of time during video visit: 10 min  Total 40 min    Originating Location (pt. Location): Home    Distant Location (provider location):  [facility]     Mode of Communication:  Video Conference      Gabriel Dupree MD

## 2022-01-21 NOTE — ED NOTES
Called Latanya at [facility] and advised that patient will be discharged back to them by [facility] in the next 45 minutes.

## 2022-01-21 NOTE — ED PROVIDER NOTES
History     Chief Complaint   Patient presents with     Fall     HPI  Jf Ortiz is a 88 year old male with complicated PMH, see EMR, presenting after a fall. He has frequent falls. No reported seizure behavior today. Reported mechanical fall. Also has been hallucinating possibly talking to people who aren't there. Has severe dementia. Is anticoagulated for a fib. At baseline, he is often not oriented. He denies any complaints at this time.    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     Priority: Medium     Vision loss of right eye 02/12/2021     Priority: Medium     Orthostatic hypotension dysautonomic syndrome 02/12/2021     Priority: Medium     Chronic atrial fibrillation (H) 02/12/2021     Priority: Medium     Closed compression fracture of body of L1 vertebra (H) 02/12/2021     Priority: Medium     Closed wedge compression fracture of lumbar vertebra with routine healing 12/20/2020     Priority: Medium     Compression fracture of L1 lumbar vertebra, closed, initial encounter (H) 12/18/2020     Priority: Medium     Compression fracture of thoracic vertebra, initial encounter, unspecified thoracic vertebral level 12/18/2020     Priority: Medium     Replacing diagnoses that were inactivated after the 10/1/2021 regulatory import.       Longstanding persistent atrial fibrillation (H) 04/13/2020     Priority: Medium     Falls frequently 04/13/2020     Priority: Medium     Frequent falls 04/13/2020     Priority: Medium     Coronary artery disease involving native coronary artery without angina pectoris 04/13/2020     Priority: Medium     Constipation, unspecified constipation type 04/11/2018     Priority: Medium     Pacemaker, [employer], Dual Chamber - Dependent 10/06/2017     Priority: Medium     Lewy body dementia without  behavioral disturbance (H) 08/31/2017     Priority: Medium     Fatigue 08/31/2017     Priority: Medium     Urinary incontinence 08/31/2017     Priority: Medium     Autonomic orthostatic hypotension 10/14/2016     Priority: Medium     Dementia without behavioral disturbance (H) 07/28/2015     Priority: Medium     Diagnosis updated by automated process. Provider to review and confirm.       Hypocalcemia 07/28/2015     Priority: Medium     Parkinson disease (H)      Priority: Medium     Seizure disorder (H)      Priority: Medium     Volume depletion 08/02/2014     Priority: Medium     Syncope and collapse 08/01/2014     Priority: Medium     Stented coronary artery      Priority: Medium     Hypercholesterolemia 04/23/2013     Priority: Medium     Cardiac pacemaker in situ 01/08/2013     Priority: Medium     Overview:   [employer] S606 DREL,  Serial #[id number]  5-13-11  Atrial lead [employer] 4054 Serial #[id number]  8-11-00  Ventriculer lead [employer] 4137  Serial #[id number]  5-13-11  2nd Degree AV Block   Dr. Campbell  Intrinsic:  Pt. is Pacemaker Dependant, remains paced at temp. rate of 30 bpm (6-13-14)    Formatting of this note might be different from the original.  [employer] S606 DREL,  Serial #[id number]  5-13-11  Atrial lead [employer] 4054 Serial #[id number]  8-11-00  Ventriculer lead [employer] 4137  Serial #[id number]  5-13-11  2nd Degree AV Block   Dr. Campbell  Intrinsic:  Pt. is Pacemaker Dependant, remains paced at temp. 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 Provider to review       Diaphragmatic hernia 01/01/2011     Priority: Medium     Problem list name updated by automated process. Provider to review       Pain in joint, forearm 07/31/2008     Priority: Medium     Formatting of this note might be different from the  original.  IMO Update 10/11       Pain in joint, ankle and foot 07/31/2008     Priority: Medium     Formatting of this note might be different from the original.  IMO Update 10/11       Dysphagia 05/22/2007     Priority: Medium     Overview:   IMO Update    Formatting of this note might be different from the original.  IMO Update       Coronary atherosclerosis of native coronary artery 11/28/2006     Priority: Medium     Overview:   IMO Update 10/11    Formatting of this note might be different from the original.  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  IMO Update 10/11       Hypertension with target blood pressure goal under 150/90 09/05/2006     Priority: Medium     Overview:   IMO Update       Hyperlipidemia 09/05/2006     Priority: Medium     Formatting of this note might be different from the original.  IMO Update 10/11       Essential hypertension 09/05/2006     Priority: Medium     Formatting of this note might be different from the original.  IMO Update          Past Medical History:    Past Medical History:   Diagnosis Date     Autonomic orthostatic hypotension 10/14/2016     Coronary artery disease      Dementia without behavioral disturbance (H) 7/28/2015     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     Seizure disorder (H)      Stented coronary artery        Past Surgical History:    Past Surgical History:   Procedure Laterality Date     ------------OTHER-------------  1955    ulnar and radial fx - repair ulnar and radial fx x4     ------------OTHER-------------  6/14/2011    cataract  extraction     BIOPSY  08/2015    skin biopsy     BLEPHAROPLASTY BILATERAL  5/6/2014    Procedure: BLEPHAROPLASTY BILATERAL;  Surgeon: Andrew Queen MD;  Location: [facility]     BYPASS GRAFT ARTERY CORONARY  11/2006    coronary artery disease x 5, [facility] [place]     cataract extraction and lens implantation  2011    cataracts     cataract extraction and lens implantation      cataracts     COLONOSCOPY  2012     colonoscopy with polypectomy  3/13/2009    history of polyps - repeat 3 yrs     colonoscopy with polypectomy  2006     colonoscopy with polypectomy  2005     COMBINED COLONOSCOPY WITH ARGON PLASMA COAGULATOR (APC) N/A 10/31/2014    Procedure: COMBINED COLONOSCOPY WITH ARGON PLASMA COAGULATOR (APC);  Surgeon: Bassam Aguilar MD;  Location: [facility]     COMBINED COLONOSCOPY WITH ARGON PLASMA COAGULATOR (APC) N/A 11/13/2015    Procedure: COMBINED COLONOSCOPY WITH ARGON PLASMA COAGULATOR (APC);  Surgeon: Bassam Aguilar MD;  Location: [facility]     ENDOSCOPY UPPER, COLONOSCOPY, COMBINED N/A 10/31/2014    Procedure: COMBINED ENDOSCOPY UPPER, COLONOSCOPY;  Surgeon: Bassam Aguilar MD;  [facility]: [facility]     ENDOSCOPY UPPER, COLONOSCOPY, COMBINED N/A 11/13/2015    Procedure: COMBINED ENDOSCOPY UPPER, COLONOSCOPY;  Surgeon: Bassam Aguilar MD;  [facility]: [facility]     ESOPHAGOSCOPY, GASTROSCOPY, DUODENOSCOPY (EGD), COMBINED  1/22/2014    Procedure: COMBINED ESOPHAGOSCOPY, GASTROSCOPY, DUODENOSCOPY (EGD);  UPPER ENDOSCOPY(PENA) W/ BIOPSIES;  Surgeon: Patricia Pena MD;  Location: [facility]     HERNIORRHAPHY INGUINAL Right 2/14/2018    Procedure: HERNIORRHAPHY INGUINAL;  OPEN RIGHT INGUINAL HERNIA REPAIR with Mesh;  Surgeon: Virgilio Zaragoza DO;  Location: [facility]     INSERT PORT VASCULAR ACCESS Right 7/12/2019    Procedure: PORT PLACEMENT;  Surgeon: Lloyd Ram MD;  Location: [facility]     LARYNGOSCOPY WITH MICROSCOPE  1/22/2014    Procedure: LARYNGOSCOPY WITH MICROSCOPE;;  Surgeon: Chayo Duke MD;  Location: [facility]      pacemaker placement      heart block     pacemaker placement      dual-chamber     REMOVE TUBE, MYRINGOTOMY, COMBINED  2014    Procedure: COMBINED REMOVE TUBE, MYRINGOTOMY;  MICRODIRECT LARYNGOSCOPY WITH BIOPSY AND FROZEN SECTIONS removal of right ear tube and myringoplasty;  Surgeon: Chayo Duke MD;  Location: [facility]     REPLACE PACEMAKER GENERATOR N/A 2018    Procedure: REPLACE PACEMAKER GENERATOR;  Pacemaker generator change;  Surgeon: Alma Kaplan MD;  Location: [facility]     stent placement to LAD       ventilation tube  2012    right in office       Family History:    Family History   Problem Relation Age of Onset     Diabetes Mother      Breast Cancer Daughter        Social History:  Marital Status:  Single [1]  Social History     Tobacco Use     Smoking status: Former Smoker     Packs/day: 1.00     Years: 5.00     Pack years: 5.00     Types: Cigarettes     Quit date: 1985     Years since quittin.0     Smokeless tobacco: Never Used     Tobacco comment: quit in    Substance Use Topics     Alcohol use: Yes     Comment: social     Drug use: No        Medications:    acetaminophen (TYLENOL) 325 MG tablet  apixaban ANTICOAGULANT (ELIQUIS) 5 MG tablet  aspirin (ASA) 81 MG chewable tablet  atorvastatin (LIPITOR) 40 MG tablet  bisacodyl (DULCOLAX) 5 MG EC tablet  diclofenac (VOLTAREN) 1 % topical gel  fludrocortisone (FLORINEF) 0.1 MG tablet  Lidocaine (LIDOCARE) 4 % Patch  Magnesium Hydroxide (MILK OF MAGNESIA PO)  memantine (NAMENDA) 5 MG tablet  midodrine (PROAMATINE) 5 MG tablet  oxyCODONE (ROXICODONE) 5 MG tablet  potassium chloride ER (KLOR-CON M) 20 MEQ CR tablet  RABEprazole (ACIPHEX) 20 MG EC tablet  senna-docusate (SENOKOT-S/PERICOLACE) 8.6-50 MG tablet  sertraline (ZOLOFT) 50 MG tablet  sodium chloride 1 GM tablet  acetaminophen (TYLENOL) 325 MG tablet  Cholecalciferol (VITAMIN D-3) 25 MCG (1000 UT) CAPS  Coenzyme Q10 (CO Q 10 PO)  lidocaine (LIDODERM) 5 %  patch  lidocaine-prilocaine (EMLA) 2.5-2.5 % external cream  melatonin 5 MG tablet  NONFORMULARY  nystatin (MYCOSTATIN) 290970 UNIT/GM external powder  traMADol (ULTRAM) 50 MG tablet          Review of Systems   Unable to perform ROS: Dementia       Physical Exam   BP: (!) 147/104  Pulse: 80  Temp: 98  F (36.7  C)  Resp: 20  Weight: 77.7 kg (171 lb 4.8 oz)  SpO2: 98 %      Physical Exam  Vitals and nursing note reviewed.   Constitutional:       General: He is not in acute distress.     Appearance: Normal appearance. He is not ill-appearing, toxic-appearing or diaphoretic.   HENT:      Head: Normocephalic and atraumatic.      Right Ear: Tympanic membrane and external ear normal.      Left Ear: Tympanic membrane and external ear normal.      Nose: Nose normal.      Mouth/Throat:      Mouth: Mucous membranes are moist.      Pharynx: Oropharynx is clear.   Eyes:      Extraocular Movements: Extraocular movements intact.      Pupils: Pupils are equal, round, and reactive to light.   Cardiovascular:      Rate and Rhythm: Normal rate. Rhythm irregular.      Pulses: Normal pulses.      Heart sounds: Normal heart sounds.   Pulmonary:      Effort: Pulmonary effort is normal.      Breath sounds: Normal breath sounds.   Abdominal:      General: Abdomen is flat.      Palpations: Abdomen is soft.   Musculoskeletal:         General: Normal range of motion.      Cervical back: Normal range of motion and neck supple.   Skin:     General: Skin is warm and dry.      Capillary Refill: Capillary refill takes less than 2 seconds.   Neurological:      General: No focal deficit present.      Mental Status: He is alert and oriented to person, place, and time.   Psychiatric:         Mood and Affect: Mood normal.         Behavior: Behavior normal.         ED Course          ED Course as of 01/20/22 2259   Thu Jan 20, 2022   2221 Alert, no acute distress. Patient has severe dementia and was reportedly hallucinating at home but no obvious  hallucinations at present. He is otherwise well appearing. Recently had lab work-ups and has frequent falls. Otherwise okay to discharge home and follow-up with PCP.     Findings were discussed with the patients care facility. Additional verbal instructions were discussed with the patients facility as well. Instructed to follow up with a primary care provider within 3-4 days. Also discussed specific warning signs and instructed to return to the ED if there are any concerns. Patients care facility voiced understanding of instructions, questions were answered and the patient was discharged home in stable condition.    Procedures              No results found for this or any previous visit (from the past 24 hour(s)).    Medications - No data to display    Assessments & Plan (with Medical Decision Making)     I have reviewed the nursing notes.    Frqeuent falls. No indication for work-up of etiology of fall as this is quite frequent and not with new features. Patient has no evidence of head trauma but plan on CT head/neck given the ability to provide hx or even say if he has pain. No hallucinations here. No SI/HI. He is behaving pleasantly but confused which seems to be baseline. Otherwise stable.    See ED Course.    I have reviewed the findings, diagnosis, plan and need for follow up with the patient.    New Prescriptions    No medications on file       Final diagnoses:   Falls frequently   Hallucinations       1/20/2022   [facility]     Adria Lemus MD  01/20/22 [id number]

## 2022-01-21 NOTE — ED NOTES
Patient assisted into wheelchair with assist of 2. [facility] Report previously called to CATE Pelaez.

## 2022-01-21 NOTE — DISCHARGE INSTRUCTIONS
- Please take your previously prescribed medications for your symptoms  - Please return to the Emergency Room if you do not improve, feel worse, or have any new or concerning symptoms. We would especially want to see you back if you experience recurrent falls  - Please follow up with a primary care physician in 2-3 days to discuss your medications and if you can stop taking any of them or if you need other new medications

## 2022-01-24 ENCOUNTER — TELEPHONE (OUTPATIENT)
Dept: FAMILY MEDICINE | Facility: OTHER | Age: 87
End: 2022-01-24
Payer: MEDICARE

## 2022-01-26 ENCOUNTER — INFUSION THERAPY VISIT (OUTPATIENT)
Dept: INFUSION THERAPY | Facility: OTHER | Age: 87
End: 2022-01-26
Attending: FAMILY MEDICINE
Payer: MEDICARE

## 2022-01-26 VITALS
HEART RATE: 71 BPM | TEMPERATURE: 97.3 F | RESPIRATION RATE: 18 BRPM | DIASTOLIC BLOOD PRESSURE: 74 MMHG | SYSTOLIC BLOOD PRESSURE: 108 MMHG | OXYGEN SATURATION: 94 %

## 2022-01-26 DIAGNOSIS — E86.9 VOLUME DEPLETION: Primary | ICD-10-CM

## 2022-01-26 PROCEDURE — 96360 HYDRATION IV INFUSION INIT: CPT

## 2022-01-26 PROCEDURE — 250N000011 HC RX IP 250 OP 636: Performed by: FAMILY MEDICINE

## 2022-01-26 PROCEDURE — 258N000003 HC RX IP 258 OP 636: Performed by: FAMILY MEDICINE

## 2022-01-26 RX ORDER — HEPARIN SODIUM (PORCINE) LOCK FLUSH IV SOLN 100 UNIT/ML 100 UNIT/ML
5 SOLUTION INTRAVENOUS ONCE
Status: CANCELLED
Start: 2022-01-26 | End: 2022-01-26

## 2022-01-26 RX ORDER — HEPARIN SODIUM (PORCINE) LOCK FLUSH IV SOLN 100 UNIT/ML 100 UNIT/ML
5 SOLUTION INTRAVENOUS ONCE
Status: COMPLETED | OUTPATIENT
Start: 2022-01-26 | End: 2022-01-26

## 2022-01-26 RX ADMIN — SODIUM CHLORIDE 1000 ML: 9 INJECTION, SOLUTION INTRAVENOUS at 09:37

## 2022-01-26 RX ADMIN — Medication 5 ML: at 10:46

## 2022-01-26 ASSESSMENT — PAIN SCALES - GENERAL: PAINLEVEL: NO PAIN (0)

## 2022-01-26 NOTE — PATIENT INSTRUCTIONS

## 2022-01-26 NOTE — PROGRESS NOTES
Patient is 88 years old, here accompanied by self today for infusion of IVF.  Patient identified with two identifiers, order verified, and verbal consent for today's infusion obtained from patient.      Significant changes to med list/allergy list. Updated to reflect what is listed as taking and not listed as not taking.     Patients port accessed using non-coring, 19 gauge, 3/4 needle. Port accessed per facility protocol. Port flushed easily, blood return noted.  No signs and symptoms of infection or infiltration.      IV pump verified with dose, drug, and rate of administration.  Infusion administered per protocol.  Patient tolerated infusion well, no signs or symptoms of adverse reaction noted.  Patient denies pain nor discomfort.     Therapy plan sent to Dr Wilson for sign, as patient's previous PCP has retired.     IV removed, catheter intact.  Site clean, dry and intact.  No signs or symptoms of infiltration or infection.  Covered with a sterile bandage, slight pressure applied for 30 seconds.  Pt instructed to leave bandage intact for a minimum of one hour, and to call with questions or concerns.  Copy of appointments, discharge instructions, and after visit summary (AVS) provided to patient.  Patient states understanding, discharged.

## 2022-01-28 ENCOUNTER — OFFICE VISIT (OUTPATIENT)
Dept: AUDIOLOGY | Facility: OTHER | Age: 87
End: 2022-01-28
Attending: NURSE PRACTITIONER
Payer: MEDICARE

## 2022-01-28 DIAGNOSIS — H90.3 SENSORINEURAL HEARING LOSS (SNHL) OF BOTH EARS: Primary | ICD-10-CM

## 2022-01-28 DIAGNOSIS — H91.93 DECREASED HEARING OF BOTH EARS: Primary | ICD-10-CM

## 2022-01-28 DIAGNOSIS — H91.93 DECREASED HEARING OF BOTH EARS: ICD-10-CM

## 2022-01-28 NOTE — PROGRESS NOTES
Background: Patient referred by ENT for hearing evaluation. ENT encounter 10/29/21.   Previous audiogram 11/15/17. Hearing aids 6 years old.   He now resides in nursing home.    Results: He arrives unaccompanied without hearing aids. Requested to bring them for hearing aid clean/check.     Otoscopy shows left canal cerumen impacted. Right ear canal cerumen free. Margo bowl cerumen shown.     Recommendations: Heairng evaluation pending ENT cerumen management.     Communicated with patient by writing.    Kirstie Zaman M.S., [facility]  Audiologist #[id number]

## 2022-02-02 ENCOUNTER — INFUSION THERAPY VISIT (OUTPATIENT)
Dept: INFUSION THERAPY | Facility: OTHER | Age: 87
End: 2022-02-02
Attending: FAMILY MEDICINE
Payer: MEDICARE

## 2022-02-02 DIAGNOSIS — E86.9 VOLUME DEPLETION: Primary | ICD-10-CM

## 2022-02-02 PROCEDURE — 96360 HYDRATION IV INFUSION INIT: CPT

## 2022-02-02 PROCEDURE — 250N000011 HC RX IP 250 OP 636: Performed by: FAMILY MEDICINE

## 2022-02-02 PROCEDURE — 258N000003 HC RX IP 258 OP 636: Performed by: FAMILY MEDICINE

## 2022-02-02 RX ORDER — HEPARIN SODIUM (PORCINE) LOCK FLUSH IV SOLN 100 UNIT/ML 100 UNIT/ML
5 SOLUTION INTRAVENOUS ONCE
Status: COMPLETED | OUTPATIENT
Start: 2022-02-02 | End: 2022-02-02

## 2022-02-02 RX ORDER — HEPARIN SODIUM (PORCINE) LOCK FLUSH IV SOLN 100 UNIT/ML 100 UNIT/ML
5 SOLUTION INTRAVENOUS ONCE
Status: CANCELLED
Start: 2022-02-02 | End: 2022-02-02

## 2022-02-02 RX ADMIN — Medication 5 ML: at 11:08

## 2022-02-02 RX ADMIN — SODIUM CHLORIDE 1000 ML: 9 INJECTION, SOLUTION INTRAVENOUS at 09:42

## 2022-02-02 NOTE — PROGRESS NOTES
Patient tolerated infusion well, no signs or symptoms of adverse reaction noted.  Patient denies pain nor discomfort.     IV removed, catheter intact.  Site clean, dry and intact.  No signs or symptoms of infiltration or infection.  Covered with a sterile bandage, slight pressure applied for 30 seconds.  Pt instructed to leave bandage intact for a minimum of one hour, and to call with questions or concerns.  Copy of appointments, discharge instructions, and after visit summary (AVS) provided to patient.  Patient states understanding, discharged. Call to ride service placed prior to discharge.

## 2022-02-02 NOTE — PROGRESS NOTES
Patient is a 88 year old male here accompanied by self today for infusion of IVF per order of Dr. Ray under the supervision of   Patient identified with two identifiers, order verified, and verbal consent for today's infusion obtained from patient.    Patient meets order parameters for today's treatment.      Patients port accessed using non-coring, 19 gauge, 3/4 needle. Port accessed per facility protocol. Port flushed easily, blood return noted.  No signs and symptoms of infection or infiltration.      IV pump verified with dose, drug, and rate of administration.  Infusion administered per protocol.

## 2022-02-08 ENCOUNTER — OFFICE VISIT (OUTPATIENT)
Dept: OTOLARYNGOLOGY | Facility: OTHER | Age: 87
End: 2022-02-08
Attending: NURSE PRACTITIONER
Payer: MEDICARE

## 2022-02-08 VITALS
TEMPERATURE: 98 F | SYSTOLIC BLOOD PRESSURE: 130 MMHG | WEIGHT: 159 LBS | DIASTOLIC BLOOD PRESSURE: 50 MMHG | HEART RATE: 70 BPM | BODY MASS INDEX: 23.55 KG/M2 | OXYGEN SATURATION: 95 % | HEIGHT: 69 IN

## 2022-02-08 DIAGNOSIS — H90.3 ASNHL (ASYMMETRICAL SENSORINEURAL HEARING LOSS): ICD-10-CM

## 2022-02-08 DIAGNOSIS — Z97.4 HEARING AID WORN: ICD-10-CM

## 2022-02-08 DIAGNOSIS — H61.22 IMPACTED CERUMEN OF LEFT EAR: Primary | ICD-10-CM

## 2022-02-08 PROCEDURE — 69210 REMOVE IMPACTED EAR WAX UNI: CPT | Performed by: NURSE PRACTITIONER

## 2022-02-08 PROCEDURE — G0463 HOSPITAL OUTPT CLINIC VISIT: HCPCS

## 2022-02-08 ASSESSMENT — PAIN SCALES - GENERAL: PAINLEVEL: NO PAIN (0)

## 2022-02-08 ASSESSMENT — MIFFLIN-ST. JEOR: SCORE: 1381.6

## 2022-02-08 NOTE — PATIENT INSTRUCTIONS
Thank you for allowing [facility] Avila and our ENT team to participate in your care.  If your medications are too expensive, please give the nurse a call.  We can possibly change this medication.  If you have a scheduling or an appointment question please contact our Health Unit Coordinator at their direct line [phone] ext [phone].   ALL nursing questions or concerns can be directed to your ENT nurse at: [phone] - ann     Follow up as needed for cleaning

## 2022-02-08 NOTE — PROGRESS NOTES
Otolaryngology Note         Chief Complaint:     Patient presents with:  Cerumen Impaction: Ear cleaning            History of Present Illness:     Jf Ortiz is a 88 year old male who presents today for ear cleaning.  He saw Kirstie Zaman on 1/28/22 for routine audiogram and cerumen noted in left ear.      He last had ears cleaned about 4 months ago.   No current concerns with hearing.  No tinnitus, vertigo, facial numbness or weakness.      No otalgia, otorrhea.   He has a history of cerumen impaction and right T-tube in 2015     Audiogram 11/15/2017 o showed stable mild to profound bilateral asymmetric sensorineural hearing loss stable discrimination     He has a pacemaker and therefore has not had an MRI IAC     He has had several CT heads most recently 6/22/2021 the internal auditory canal diameter is grossly symmetric no mastoid effusion the middle ear is clear on the right there is some cerumen or fluid in the ear canal.  No bony erosions            Medications:     Current Outpatient Rx   Medication Sig Dispense Refill     acetaminophen (TYLENOL) 325 MG tablet Take 2 tablets (650 mg) by mouth every 6 hours as needed for mild pain Do not exceed 3000 mg of acetaminophen from all sources in 24 hours. 60 tablet 0     acetaminophen (TYLENOL) 325 MG tablet Take 2 tablets (650 mg) by mouth every 4 hours as needed       apixaban ANTICOAGULANT (ELIQUIS) 5 MG tablet Take 5 mg by mouth 2 times daily       aspirin (ASA) 81 MG chewable tablet Take 81 mg by mouth daily       atorvastatin (LIPITOR) 40 MG tablet Take 40 mg by mouth daily       fludrocortisone (FLORINEF) 0.1 MG tablet Take 1 tablet (0.1 mg) by mouth every morning (before breakfast) 30 tablet 11     Lidocaine (LIDOCARE) 4 % Patch 1 patch to affected area for 12 hours, then remove. There should be a 12 hour patch free time per day. 7 patch 0     lidocaine-prilocaine (EMLA) 2.5-2.5 % external cream Apply topically as needed for moderate pain To port site  30-60 min prior to access       Magnesium Hydroxide (MILK OF MAGNESIA PO)        memantine (NAMENDA) 5 MG tablet Take 5 mg by mouth 2 times daily       NONFORMULARY 3 times daily Barrier Cream;    Apply liberally to bilateral buttocks/coccyx area       nystatin (MYCOSTATIN) 051973 UNIT/GM external powder Apply topically 2 times daily Apply to affected areas       potassium chloride ER (KLOR-CON M) 20 MEQ CR tablet TAKE 1 TABLET BY MOUTH EVERY DAY 30 tablet 1     RABEprazole (ACIPHEX) 20 MG EC tablet Take 20 mg by mouth 2 times daily       senna-docusate (SENOKOT-S/PERICOLACE) 8.6-50 MG tablet Take 1 tablet by mouth daily       sertraline (ZOLOFT) 50 MG tablet Take 50 mg by mouth daily       sodium chloride 1 GM tablet TAKE 1 TABLET BY MOUTH TWICE A  tablet 3     bisacodyl (DULCOLAX) 5 MG EC tablet Take 5 mg by mouth daily as needed for constipation (Patient not taking: Reported on 2/8/2022)       Cholecalciferol (VITAMIN D-3) 25 MCG (1000 UT) CAPS Take 50 mcg by mouth daily  (Patient not taking: Reported on 1/26/2022)       Coenzyme Q10 (CO Q 10 PO) Take 100 mg by mouth every morning  (Patient not taking: Reported on 1/26/2022)       diclofenac (VOLTAREN) 1 % topical gel Apply 4 g topically 3 times daily (Patient not taking: Reported on 1/26/2022) 150 g 1     lidocaine (LIDODERM) 5 % patch Place 1 patch onto the skin every 24 hours For wedge compression fracture of first lumbar vertebra (Patient not taking: Reported on 1/26/2022)       melatonin 5 MG tablet Take 5 mg by mouth nightly as needed for sleep (Patient not taking: Reported on 1/26/2022)       midodrine (PROAMATINE) 5 MG tablet TAKE 2 TABLETS (10MG) BY MOUTH THREE TIMES DAILY (Patient not taking: Reported on 1/26/2022) 540 tablet 3     oxyCODONE (ROXICODONE) 5 MG tablet Take 1 tablet (5 mg) by mouth every 6 hours as needed for severe pain (Patient not taking: Reported on 1/26/2022) 6 tablet 0     traMADol (ULTRAM) 50 MG tablet Take 50 mg by mouth  every 6 hours as needed (Patient not taking: Reported on 1/26/2022)              Allergies:     Allergies: Cats, Haldol [haloperidol], Klonopin [clonazepam], Lamotrigine, and Oxycodone          Past Medical History:     Past Medical History:   Diagnosis Date     Autonomic orthostatic hypotension 10/14/2016     Coronary artery disease      Dementia without behavioral disturbance (H) 7/28/2015    Diagnosis updated by automated process. Provider to review and confirm.     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     Seizure disorder (H)      Stented coronary artery             Past Surgical History:     Past Surgical History:   Procedure Laterality Date     ------------OTHER-------------  1955    ulnar and radial fx - repair ulnar and radial fx x4     ------------OTHER-------------  6/14/2011    cataract extraction     BIOPSY  08/2015    skin biopsy     BLEPHAROPLASTY BILATERAL  5/6/2014    Procedure: BLEPHAROPLASTY BILATERAL;  Surgeon: Andrew Queen MD;  Location: [facility]     BYPASS GRAFT ARTERY CORONARY  11/2006    coronary artery disease x 5, [facility]     cataract extraction and lens implantation  2011    cataracts     cataract extraction and lens implantation      cataracts     COLONOSCOPY  2012     colonoscopy with polypectomy  3/13/2009    history of polyps - repeat 3 yrs     colonoscopy with polypectomy  2006     colonoscopy with polypectomy  2005     COMBINED COLONOSCOPY WITH ARGON PLASMA COAGULATOR (APC) N/A 10/31/2014    Procedure: COMBINED COLONOSCOPY WITH ARGON PLASMA COAGULATOR (APC);  Surgeon: Bassam Aguilar MD;  [facility]: [facility]     COMBINED COLONOSCOPY WITH ARGON PLASMA COAGULATOR (APC) N/A 11/13/2015    Procedure: COMBINED COLONOSCOPY  WITH ARGON PLASMA COAGULATOR (APC);  Surgeon: Bassam Aguilar MD;  [facility]     ENDOSCOPY UPPER, COLONOSCOPY, COMBINED N/A 10/31/2014    Procedure: COMBINED ENDOSCOPY UPPER, COLONOSCOPY;  Surgeon: Bassam Aguilar MD;  [facility]     ENDOSCOPY UPPER, COLONOSCOPY, COMBINED N/A 2015    Procedure: COMBINED ENDOSCOPY UPPER, COLONOSCOPY;  Surgeon: Bassam Aguilar MD;  [facility]     ESOPHAGOSCOPY, GASTROSCOPY, DUODENOSCOPY (EGD), COMBINED  2014    Procedure: COMBINED ESOPHAGOSCOPY, GASTROSCOPY, DUODENOSCOPY (EGD);  UPPER ENDOSCOPY(PENA) W/ BIOPSIES;  Surgeon: Patricia Pena MD;  [facility]     HERNIORRHAPHY INGUINAL Right 2018    Procedure: HERNIORRHAPHY INGUINAL;  OPEN RIGHT INGUINAL HERNIA REPAIR with Mesh;  Surgeon: Virgilio Zaragoza DO;  [facility]     INSERT PORT VASCULAR ACCESS Right 2019    Procedure: PORT PLACEMENT;  Surgeon: Lloyd Ram MD;  [facility]     LARYNGOSCOPY WITH MICROSCOPE  2014    Procedure: LARYNGOSCOPY WITH MICROSCOPE;;  Surgeon: Chayo Duke MD;  [facility]     pacemaker placement      heart block     pacemaker placement      dual-chamber     REMOVE TUBE, MYRINGOTOMY, COMBINED  2014    Procedure: COMBINED REMOVE TUBE, MYRINGOTOMY;  MICRODIRECT LARYNGOSCOPY WITH BIOPSY AND FROZEN SECTIONS removal of right ear tube and myringoplasty;  Surgeon: Chayo Duke MD;  [facility]     REPLACE PACEMAKER GENERATOR N/A 2018    Procedure: REPLACE PACEMAKER GENERATOR;  Pacemaker generator change;  Surgeon: Alma Kaplan MD;  [facility]     stent placement to LAD  2008     ventilation tube  2012    right in office       ENT family history reviewed         Social History:     Social History     Tobacco Use     Smoking status: Former Smoker     Packs/day: 1.00     Years: 5.00     Pack years: 5.00     Types: Cigarettes     Quit date: 1985     Years since quittin.1     Smokeless tobacco:  "Never Used     Tobacco comment: quit in 1985   Substance Use Topics     Alcohol use: Yes     Comment: social     Drug use: No            Review of Systems:     ROS: See HPI         Physical Exam:     /50 (BP Location: Right arm, Patient Position: Chair, Cuff Size: Adult Regular)   Pulse 70   Temp 98  F (36.7  C) (Tympanic)   Ht 1.753 m (5' 9\")   Wt 72.1 kg (159 lb)   SpO2 95%   BMI 23.48 kg/m      General - The patient is well nourished and well developed, and appears to have good nutritional status.  Alert and oriented to person and place, answers questions and cooperates with examination appropriately.   Head and Face - Normocephalic and atraumatic, with no gross asymmetry noted.  The facial nerve is intact, with strong symmetric movements.  Voice and Breathing - The patient was breathing comfortably without the use of accessory muscles. There was no wheezing, stridor. The patients voice was clear and strong, and had appropriate pitch and quality.  Ears - The ears were examined with binocular microscopy and with otoscope.  External ears normal. Right canal is patent.  Left canal cerumen impcated.  Right tympanic membrane has patent appearing T-tube, no  effusion, retraction or mass. The left ear was cleaned with gentle suctioning and cupped forceps.  Left tympanic membrane is intact without effusion, retraction or mass.  Eyes - Extraocular movements intact, sclera were not icteric or injected, conjunctiva were pink and moist.  Mouth - Examination of the oral cavity showed pink, healthy oral mucosa. Dentition in good repair. No lesions or ulcerations noted. The tongue was mobile and midline.   Throat - The walls of the oropharynx were smooth, pink, moist, symmetric, and had no lesions or ulcerations.  The tonsillar pillars and soft palate were symmetric. The uvula was midline on elevation.    Neck - Full range of motion on passive movement.  Palpation of the occipital, submental, submandibular, internal " jugular chain, and supraclavicular nodes did not demonstrate any abnormal lymph nodes or masses.  Palpation of the thyroid was soft and smooth, with no nodules or goiter appreciated.  The trachea was mobile and midline.  Nose - External contour is symmetric, no gross deflection or scars.  Nasal mucosa is pink and moist with no abnormal mucus.  The septum and turbinates were evaluated with nasal speculum, no polyps, masses, or purulence noted on examination.         Assessment and Plan:       ICD-10-CM    1. Impacted cerumen of left ear  H61.22    2. ASNHL (asymmetrical sensorineural hearing loss)  H90.3    3. Hearing aid worn  Z97.4      Follow up for audiogram  Follow up as needed for ear cleaning      Char Avila NP-C  [facility]

## 2022-02-08 NOTE — LETTER
2/8/2022         RE: Jf Ortiz  [address]  [place] [address]        Dear Colleague,    Thank you for referring your patient, Jf Ortiz, to the [facility] [place]. Please see a copy of my visit note below.    Otolaryngology Note         Chief Complaint:     Patient presents with:  Cerumen Impaction: Ear cleaning            History of Present Illness:     Jf Ortiz is a 88 year old male who presents today for ear cleaning.  He saw Kirstie Zaman on 1/28/22 for routine audiogram and cerumen noted in left ear.      He last had ears cleaned about 4 months ago.   No current concerns with hearing.  No tinnitus, vertigo, facial numbness or weakness.      No otalgia, otorrhea.   He has a history of cerumen impaction and right T-tube in 2015     Audiogram 11/15/2017 o showed stable mild to profound bilateral asymmetric sensorineural hearing loss stable discrimination     He has a pacemaker and therefore has not had an MRI IAC     He has had several CT heads most recently 6/22/2021 the internal auditory canal diameter is grossly symmetric no mastoid effusion the middle ear is clear on the right there is some cerumen or fluid in the ear canal.  No bony erosions            Medications:     Current Outpatient Rx   Medication Sig Dispense Refill     acetaminophen (TYLENOL) 325 MG tablet Take 2 tablets (650 mg) by mouth every 6 hours as needed for mild pain Do not exceed 3000 mg of acetaminophen from all sources in 24 hours. 60 tablet 0     acetaminophen (TYLENOL) 325 MG tablet Take 2 tablets (650 mg) by mouth every 4 hours as needed       apixaban ANTICOAGULANT (ELIQUIS) 5 MG tablet Take 5 mg by mouth 2 times daily       aspirin (ASA) 81 MG chewable tablet Take 81 mg by mouth daily       atorvastatin (LIPITOR) 40 MG tablet Take 40 mg by mouth daily       fludrocortisone (FLORINEF) 0.1 MG tablet Take 1 tablet (0.1 mg) by mouth every morning (before breakfast) 30 tablet 11     Lidocaine (LIDOCARE) 4 %  Patch 1 patch to affected area for 12 hours, then remove. There should be a 12 hour patch free time per day. 7 patch 0     lidocaine-prilocaine (EMLA) 2.5-2.5 % external cream Apply topically as needed for moderate pain To port site 30-60 min prior to access       Magnesium Hydroxide (MILK OF MAGNESIA PO)        memantine (NAMENDA) 5 MG tablet Take 5 mg by mouth 2 times daily       NONFORMULARY 3 times daily Barrier Cream;    Apply liberally to bilateral buttocks/coccyx area       nystatin (MYCOSTATIN) 216579 UNIT/GM external powder Apply topically 2 times daily Apply to affected areas       potassium chloride ER (KLOR-CON M) 20 MEQ CR tablet TAKE 1 TABLET BY MOUTH EVERY DAY 30 tablet 1     RABEprazole (ACIPHEX) 20 MG EC tablet Take 20 mg by mouth 2 times daily       senna-docusate (SENOKOT-S/PERICOLACE) 8.6-50 MG tablet Take 1 tablet by mouth daily       sertraline (ZOLOFT) 50 MG tablet Take 50 mg by mouth daily       sodium chloride 1 GM tablet TAKE 1 TABLET BY MOUTH TWICE A  tablet 3     bisacodyl (DULCOLAX) 5 MG EC tablet Take 5 mg by mouth daily as needed for constipation (Patient not taking: Reported on 2/8/2022)       Cholecalciferol (VITAMIN D-3) 25 MCG (1000 UT) CAPS Take 50 mcg by mouth daily  (Patient not taking: Reported on 1/26/2022)       Coenzyme Q10 (CO Q 10 PO) Take 100 mg by mouth every morning  (Patient not taking: Reported on 1/26/2022)       diclofenac (VOLTAREN) 1 % topical gel Apply 4 g topically 3 times daily (Patient not taking: Reported on 1/26/2022) 150 g 1     lidocaine (LIDODERM) 5 % patch Place 1 patch onto the skin every 24 hours For wedge compression fracture of first lumbar vertebra (Patient not taking: Reported on 1/26/2022)       melatonin 5 MG tablet Take 5 mg by mouth nightly as needed for sleep (Patient not taking: Reported on 1/26/2022)       midodrine (PROAMATINE) 5 MG tablet TAKE 2 TABLETS (10MG) BY MOUTH THREE TIMES DAILY (Patient not taking: Reported on 1/26/2022)  540 tablet 3     oxyCODONE (ROXICODONE) 5 MG tablet Take 1 tablet (5 mg) by mouth every 6 hours as needed for severe pain (Patient not taking: Reported on 1/26/2022) 6 tablet 0     traMADol (ULTRAM) 50 MG tablet Take 50 mg by mouth every 6 hours as needed (Patient not taking: Reported on 1/26/2022)              Allergies:     Allergies: Cats, Haldol [haloperidol], Klonopin [clonazepam], Lamotrigine, and Oxycodone          Past Medical History:     Past Medical History:   Diagnosis Date     Autonomic orthostatic hypotension 10/14/2016     Coronary artery disease      Dementia without behavioral disturbance (H) 7/28/2015    Diagnosis updated by automated process. Provider to review and confirm.     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     Seizure disorder (H)      Stented coronary artery             Past Surgical History:     Past Surgical History:   Procedure Laterality Date     ------------OTHER-------------  1955    ulnar and radial fx - repair ulnar and radial fx x4     ------------OTHER-------------  6/14/2011    cataract extraction     BIOPSY  08/2015    skin biopsy     BLEPHAROPLASTY BILATERAL  5/6/2014    Procedure: BLEPHAROPLASTY BILATERAL;  Surgeon: Andrew Queen MD;  [facility]     BYPASS GRAFT ARTERY CORONARY  11/2006    coronary artery disease x 5, [facility]     cataract extraction and lens implantation  2011    cataracts     cataract extraction and lens implantation      cataracts     COLONOSCOPY  2012     colonoscopy with polypectomy  3/13/2009    history of polyps - repeat 3 yrs     colonoscopy with polypectomy  2006     colonoscopy with polypectomy  2005     COMBINED COLONOSCOPY WITH ARGON PLASMA COAGULATOR (APC) N/A  10/31/2014    Procedure: COMBINED COLONOSCOPY WITH ARGON PLASMA COAGULATOR (APC);  Surgeon: Bassam Aguilar MD;  [facility]     COMBINED COLONOSCOPY WITH ARGON PLASMA COAGULATOR (APC) N/A 11/13/2015    Procedure: COMBINED COLONOSCOPY WITH ARGON PLASMA COAGULATOR (APC);  Surgeon: Bassam Aguilar MD;  [facility]     ENDOSCOPY UPPER, COLONOSCOPY, COMBINED N/A 10/31/2014    Procedure: COMBINED ENDOSCOPY UPPER, COLONOSCOPY;  Surgeon: Bassam Aguilar MD;  [facility]     ENDOSCOPY UPPER, COLONOSCOPY, COMBINED N/A 11/13/2015    Procedure: COMBINED ENDOSCOPY UPPER, COLONOSCOPY;  Surgeon: Bassam Aguilar MD;  [facility]     ESOPHAGOSCOPY, GASTROSCOPY, DUODENOSCOPY (EGD), COMBINED  1/22/2014    Procedure: COMBINED ESOPHAGOSCOPY, GASTROSCOPY, DUODENOSCOPY (EGD);  UPPER ENDOSCOPY(PENA) W/ BIOPSIES;  Surgeon: Patricia Pena MD;  [facility]     HERNIORRHAPHY INGUINAL Right 2/14/2018    Procedure: HERNIORRHAPHY INGUINAL;  OPEN RIGHT INGUINAL HERNIA REPAIR with Mesh;  Surgeon: Virgilio Zaragoza DO;  [facility]     INSERT PORT VASCULAR ACCESS Right 7/12/2019    Procedure: PORT PLACEMENT;  Surgeon: Lloyd Ram MD;  [facility]     LARYNGOSCOPY WITH MICROSCOPE  1/22/2014    Procedure: LARYNGOSCOPY WITH MICROSCOPE;;  Surgeon: Chayo Duke MD;  [facility]     pacemaker placement  2000    heart block     pacemaker placement  2011    dual-chamber     REMOVE TUBE, MYRINGOTOMY, COMBINED  1/22/2014    Procedure: COMBINED REMOVE TUBE, MYRINGOTOMY;  MICRODIRECT LARYNGOSCOPY WITH BIOPSY AND FROZEN SECTIONS removal of right ear tube and myringoplasty;  Surgeon: Chayo Duke MD;  [facility]     REPLACE PACEMAKER GENERATOR N/A 8/8/2018    Procedure: REPLACE PACEMAKER GENERATOR;  Pacemaker generator change;  Surgeon: Alma Kaplan MD;  [facility]     stent placement to LAD  2008     ventilation tube  5/24/2012    right in office       ENT family history reviewed          "Social History:     Social History     Tobacco Use     Smoking status: Former Smoker     Packs/day: 1.00     Years: 5.00     Pack years: 5.00     Types: Cigarettes     Quit date: 1985     Years since quittin.1     Smokeless tobacco: Never Used     Tobacco comment: quit in    Substance Use Topics     Alcohol use: Yes     Comment: social     Drug use: No            Review of Systems:     ROS: See HPI         Physical Exam:     /50 (BP Location: Right arm, Patient Position: Chair, Cuff Size: Adult Regular)   Pulse 70   Temp 98  F (36.7  C) (Tympanic)   Ht 1.753 m (5' 9\")   Wt 72.1 kg (159 lb)   SpO2 95%   BMI 23.48 kg/m      General - The patient is well nourished and well developed, and appears to have good nutritional status.  Alert and oriented to person and place, answers questions and cooperates with examination appropriately.   Head and Face - Normocephalic and atraumatic, with no gross asymmetry noted.  The facial nerve is intact, with strong symmetric movements.  Voice and Breathing - The patient was breathing comfortably without the use of accessory muscles. There was no wheezing, stridor. The patients voice was clear and strong, and had appropriate pitch and quality.  Ears - The ears were examined with binocular microscopy and with otoscope.  External ears normal. Right canal is patent.  Left canal cerumen impcated.  Right tympanic membrane has patent appearing T-tube, no  effusion, retraction or mass. The left ear was cleaned with gentle suctioning and cupped forceps.  Left tympanic membrane is intact without effusion, retraction or mass.  Eyes - Extraocular movements intact, sclera were not icteric or injected, conjunctiva were pink and moist.  Mouth - Examination of the oral cavity showed pink, healthy oral mucosa. Dentition in good repair. No lesions or ulcerations noted. The tongue was mobile and midline.   Throat - The walls of the oropharynx were smooth, pink, moist, symmetric, " and had no lesions or ulcerations.  The tonsillar pillars and soft palate were symmetric. The uvula was midline on elevation.    Neck - Full range of motion on passive movement.  Palpation of the occipital, submental, submandibular, internal jugular chain, and supraclavicular nodes did not demonstrate any abnormal lymph nodes or masses.  Palpation of the thyroid was soft and smooth, with no nodules or goiter appreciated.  The trachea was mobile and midline.  Nose - External contour is symmetric, no gross deflection or scars.  Nasal mucosa is pink and moist with no abnormal mucus.  The septum and turbinates were evaluated with nasal speculum, no polyps, masses, or purulence noted on examination.         Assessment and Plan:       ICD-10-CM    1. Impacted cerumen of left ear  H61.22    2. ASNHL (asymmetrical sensorineural hearing loss)  H90.3    3. Hearing aid worn  Z97.4      Follow up for audiogram  Follow up as needed for ear cleaning      Char CLARKE  [facility]        Again, thank you for allowing me to participate in the care of your patient.        Sincerely,        Char Avila NP

## 2022-02-08 NOTE — NURSING NOTE
"Chief Complaint   Patient presents with     Cerumen Impaction     Ear cleaning        Initial /50 (BP Location: Right arm, Patient Position: Chair, Cuff Size: Adult Regular)   Pulse 70   Temp 98  F (36.7  C) (Tympanic)   Ht 1.753 m (5' 9\")   Wt 72.1 kg (159 lb)   SpO2 95%   BMI 23.48 kg/m   Estimated body mass index is 23.48 kg/m  as calculated from the following:    Height as of this encounter: 1.753 m (5' 9\").    Weight as of this encounter: 72.1 kg (159 lb).  Medication Reconciliation: complete  LUZMA WOO LPN    "

## 2022-02-09 ENCOUNTER — INFUSION THERAPY VISIT (OUTPATIENT)
Dept: INFUSION THERAPY | Facility: OTHER | Age: 87
End: 2022-02-09
Attending: FAMILY MEDICINE
Payer: MEDICARE

## 2022-02-09 VITALS
DIASTOLIC BLOOD PRESSURE: 65 MMHG | TEMPERATURE: 98.4 F | HEART RATE: 70 BPM | RESPIRATION RATE: 16 BRPM | SYSTOLIC BLOOD PRESSURE: 106 MMHG | OXYGEN SATURATION: 97 %

## 2022-02-09 DIAGNOSIS — E86.9 VOLUME DEPLETION: Primary | ICD-10-CM

## 2022-02-09 PROCEDURE — 96365 THER/PROPH/DIAG IV INF INIT: CPT

## 2022-02-09 PROCEDURE — 250N000011 HC RX IP 250 OP 636: Performed by: FAMILY MEDICINE

## 2022-02-09 PROCEDURE — 96360 HYDRATION IV INFUSION INIT: CPT

## 2022-02-09 PROCEDURE — 258N000003 HC RX IP 258 OP 636: Performed by: FAMILY MEDICINE

## 2022-02-09 RX ORDER — HEPARIN SODIUM (PORCINE) LOCK FLUSH IV SOLN 100 UNIT/ML 100 UNIT/ML
5 SOLUTION INTRAVENOUS ONCE
Status: CANCELLED
Start: 2022-02-09 | End: 2022-02-09

## 2022-02-09 RX ORDER — HEPARIN SODIUM (PORCINE) LOCK FLUSH IV SOLN 100 UNIT/ML 100 UNIT/ML
5 SOLUTION INTRAVENOUS ONCE
Status: COMPLETED | OUTPATIENT
Start: 2022-02-09 | End: 2022-02-09

## 2022-02-09 RX ADMIN — SODIUM CHLORIDE 1000 ML: 9 INJECTION, SOLUTION INTRAVENOUS at 09:42

## 2022-02-09 RX ADMIN — Medication 5 ML: at 10:53

## 2022-02-09 ASSESSMENT — PAIN SCALES - GENERAL: PAINLEVEL: NO PAIN (0)

## 2022-02-09 NOTE — PROGRESS NOTES
Patient is a 88 here accompanied by self today for infusion of IVF per order of Dr. Wilson.  Patient identified with two identifiers, order verified, and verbal consent for today's infusion obtained from patient.      Patient meets order parameters for today's treatment.    Patients port accessed using non-coring, 20 gauge, 3/4 inch needle. Port accessed per facility protocol. Port flushed easily, blood return noted.  No signs and symptoms of infection or infiltration.      IV pump verified with dose, drug, and rate of administration.  Infusion administered per protocol.

## 2022-02-16 ENCOUNTER — INFUSION THERAPY VISIT (OUTPATIENT)
Dept: INFUSION THERAPY | Facility: OTHER | Age: 87
End: 2022-02-16
Attending: FAMILY MEDICINE
Payer: MEDICARE

## 2022-02-16 DIAGNOSIS — E86.9 VOLUME DEPLETION: Primary | ICD-10-CM

## 2022-02-16 PROCEDURE — 250N000011 HC RX IP 250 OP 636: Performed by: FAMILY MEDICINE

## 2022-02-16 PROCEDURE — 258N000003 HC RX IP 258 OP 636: Performed by: FAMILY MEDICINE

## 2022-02-16 PROCEDURE — 96360 HYDRATION IV INFUSION INIT: CPT

## 2022-02-16 RX ORDER — HEPARIN SODIUM (PORCINE) LOCK FLUSH IV SOLN 100 UNIT/ML 100 UNIT/ML
5 SOLUTION INTRAVENOUS ONCE
Status: COMPLETED | OUTPATIENT
Start: 2022-02-16 | End: 2022-02-16

## 2022-02-16 RX ORDER — HEPARIN SODIUM (PORCINE) LOCK FLUSH IV SOLN 100 UNIT/ML 100 UNIT/ML
5 SOLUTION INTRAVENOUS ONCE
Status: CANCELLED
Start: 2022-02-16 | End: 2022-02-16

## 2022-02-16 RX ADMIN — HEPARIN 5 ML: 100 SYRINGE at 10:45

## 2022-02-16 RX ADMIN — SODIUM CHLORIDE 1000 ML: 9 INJECTION, SOLUTION INTRAVENOUS at 09:39

## 2022-02-16 NOTE — PROGRESS NOTES
Patient is a 88 year old male here accompanied by self today for infusion of IVF per order of Dr. Ray under the supervision of Dr. Wilson  Patient identified with two identifiers, order verified, and verbal consent for today's infusion obtained from patient.      Patient lab values:  Not required by today's infusion     Patient meets order parameters for today's treatment.     Patients port accessed using non-coring, 19 gauge, 3/4 needle. Port accessed per facility protocol. Port flushed easily, blood return noted.  No signs and symptoms of infection or infiltration.      IV pump verified with dose, drug, and rate of administration.  Infusion administered per protocol.  Patient tolerated infusion well, no signs or symptoms of adverse reaction noted.  Patient denies pain nor discomfort.     IV removed, catheter intact.  Site clean, dry and intact.  No signs or symptoms of infiltration or infection.  Covered with a sterile bandage, slight pressure applied for 30 seconds.  Pt instructed to leave bandage intact for a minimum of one hour, and to call with questions or concerns.  Copy of appointments, discharge instructions, and after visit summary (AVS) provided to patient.  Patient states understanding, discharged.

## 2022-02-17 PROBLEM — S22.000A COMPRESSION FRACTURE OF THORACIC VERTEBRA, INITIAL ENCOUNTER (H): Status: ACTIVE | Noted: 2020-12-18

## 2022-02-23 ENCOUNTER — INFUSION THERAPY VISIT (OUTPATIENT)
Dept: INFUSION THERAPY | Facility: OTHER | Age: 87
End: 2022-02-23
Attending: FAMILY MEDICINE
Payer: MEDICARE

## 2022-02-23 DIAGNOSIS — E86.9 VOLUME DEPLETION: Primary | ICD-10-CM

## 2022-02-23 PROCEDURE — 250N000011 HC RX IP 250 OP 636: Performed by: FAMILY MEDICINE

## 2022-02-23 PROCEDURE — 96360 HYDRATION IV INFUSION INIT: CPT

## 2022-02-23 PROCEDURE — 258N000003 HC RX IP 258 OP 636: Performed by: FAMILY MEDICINE

## 2022-02-23 RX ORDER — HEPARIN SODIUM (PORCINE) LOCK FLUSH IV SOLN 100 UNIT/ML 100 UNIT/ML
5 SOLUTION INTRAVENOUS ONCE
Status: COMPLETED | OUTPATIENT
Start: 2022-02-23 | End: 2022-02-23

## 2022-02-23 RX ORDER — HEPARIN SODIUM (PORCINE) LOCK FLUSH IV SOLN 100 UNIT/ML 100 UNIT/ML
5 SOLUTION INTRAVENOUS ONCE
Status: CANCELLED
Start: 2022-02-23 | End: 2022-02-23

## 2022-02-23 RX ADMIN — SODIUM CHLORIDE 1000 ML: 9 INJECTION, SOLUTION INTRAVENOUS at 09:37

## 2022-02-23 RX ADMIN — Medication 5 ML: at 10:42

## 2022-02-23 NOTE — PROGRESS NOTES
Patient is a 88 year old here today for infusion of IVF per order of Dr Wilson.  Patient identified with two identifiers, order verified, and verbal consent for today's infusion obtained from patient.      Patient meets order parameters for today's treatment.     Patient denies questions or concerns regarding infusion and/or medication(s) being administered.    Patients port accessed using non-coring, 20 gauge, power needle. Port accessed per facility protocol. Port flushed easily, blood return noted.  No signs and symptoms of infection or infiltration.      IV pump verified with IVF dose, drug, and rate of administration.  Infusion administered per protocol.  Patient tolerated infusion well, no signs or symptoms of adverse reaction noted.  Patient denies pain nor discomfort.     IV removed, catheter intact.  Site clean, dry and intact.  No signs or symptoms of infiltration or infection.  Covered with a sterile bandage, slight pressure applied for 30 seconds.  Pt instructed to leave bandage intact for a minimum of one hour, and to call with questions or concerns.  Copy of appointments, discharge instructions, and after visit summary (AVS) provided to patient.  Patient states understanding, discharged.

## 2022-02-23 NOTE — PATIENT INSTRUCTIONS

## 2022-03-02 ENCOUNTER — TELEPHONE (OUTPATIENT)
Dept: INFUSION THERAPY | Facility: OTHER | Age: 87
End: 2022-03-02

## 2022-03-02 ENCOUNTER — INFUSION THERAPY VISIT (OUTPATIENT)
Dept: INFUSION THERAPY | Facility: OTHER | Age: 87
End: 2022-03-02
Attending: FAMILY MEDICINE
Payer: MEDICARE

## 2022-03-02 ENCOUNTER — ANCILLARY PROCEDURE (OUTPATIENT)
Dept: CARDIOLOGY | Facility: CLINIC | Age: 87
End: 2022-03-02
Attending: INTERNAL MEDICINE
Payer: MEDICARE

## 2022-03-02 VITALS
OXYGEN SATURATION: 95 % | RESPIRATION RATE: 18 BRPM | DIASTOLIC BLOOD PRESSURE: 61 MMHG | TEMPERATURE: 97 F | SYSTOLIC BLOOD PRESSURE: 91 MMHG | HEART RATE: 70 BPM

## 2022-03-02 DIAGNOSIS — E86.9 VOLUME DEPLETION: Primary | ICD-10-CM

## 2022-03-02 DIAGNOSIS — I44.2 COMPLETE HEART BLOCK (H): ICD-10-CM

## 2022-03-02 PROCEDURE — 99207 CARDIAC DEVICE CHECK - REMOTE: CPT | Performed by: INTERNAL MEDICINE

## 2022-03-02 PROCEDURE — 96360 HYDRATION IV INFUSION INIT: CPT

## 2022-03-02 PROCEDURE — 93296 REM INTERROG EVL PM/IDS: CPT

## 2022-03-02 PROCEDURE — 250N000011 HC RX IP 250 OP 636: Performed by: FAMILY MEDICINE

## 2022-03-02 PROCEDURE — 258N000003 HC RX IP 258 OP 636: Performed by: FAMILY MEDICINE

## 2022-03-02 RX ORDER — HEPARIN SODIUM (PORCINE) LOCK FLUSH IV SOLN 100 UNIT/ML 100 UNIT/ML
5 SOLUTION INTRAVENOUS ONCE
Status: COMPLETED | OUTPATIENT
Start: 2022-03-02 | End: 2022-03-02

## 2022-03-02 RX ORDER — HEPARIN SODIUM (PORCINE) LOCK FLUSH IV SOLN 100 UNIT/ML 100 UNIT/ML
5 SOLUTION INTRAVENOUS ONCE
Status: CANCELLED
Start: 2022-03-02 | End: 2022-03-02

## 2022-03-02 RX ORDER — NYSTATIN 10B UNIT
POWDER (EA) MISCELLANEOUS 2 TIMES DAILY
COMMUNITY
End: 2023-02-08

## 2022-03-02 RX ADMIN — SODIUM CHLORIDE 1000 ML: 9 INJECTION, SOLUTION INTRAVENOUS at 09:35

## 2022-03-02 RX ADMIN — Medication 5 ML: at 10:40

## 2022-03-02 ASSESSMENT — PAIN SCALES - GENERAL: PAINLEVEL: NO PAIN (0)

## 2022-03-02 NOTE — PROGRESS NOTES
Patient is 88 years old, here today for infusion of IVF.      Patient is more bright and alert today than has been for some time, but remains difficult to assess with level of cognition. He does deny any abuse, or SI. Denies falls but history in chart shows otherwise. Denies any physical symptoms today that are concerning and appears comfortable. Nurse does not note any over symptoms.     Med/allergy list updated per SNF notes sent with patient.    Patient identified with two identifiers, order verified, and verbal consent for today's infusion obtained from patient.      See telephone call routed to Dr Wilson re need for new signing [facility] credentialed provider.     Patients port accessed using non-coring, 20 gauge, 3/4 inch needle. Port accessed per facility protocol. Port flushed easily, blood return noted.  No signs and symptoms of infection or infiltration.      IV pump verified with dose, drug, and rate of administration.  Infusion administered per protocol.

## 2022-03-02 NOTE — PATIENT INSTRUCTIONS

## 2022-03-03 PROBLEM — I25.10 CORONARY ARTERY DISEASE INVOLVING NATIVE CORONARY ARTERY WITHOUT ANGINA PECTORIS: Status: ACTIVE | Noted: 2020-04-13

## 2022-03-03 PROBLEM — R53.83 FATIGUE: Status: RESOLVED | Noted: 2017-08-31 | Resolved: 2022-03-03

## 2022-03-03 PROBLEM — R29.6 FALLS FREQUENTLY: Chronic | Status: RESOLVED | Noted: 2020-04-13 | Resolved: 2022-03-03

## 2022-03-03 PROBLEM — Z78.9 NURSING HOME RESIDENT: Status: ACTIVE | Noted: 2022-03-03

## 2022-03-03 PROBLEM — I95.1 ORTHOSTATIC HYPOTENSION DYSAUTONOMIC SYNDROME: Status: ACTIVE | Noted: 2021-02-12

## 2022-03-03 PROBLEM — I48.11 LONGSTANDING PERSISTENT ATRIAL FIBRILLATION (H): Status: ACTIVE | Noted: 2020-04-13

## 2022-03-04 ENCOUNTER — OFFICE VISIT (OUTPATIENT)
Dept: AUDIOLOGY | Facility: OTHER | Age: 87
End: 2022-03-04
Attending: AUDIOLOGIST
Payer: MEDICARE

## 2022-03-04 DIAGNOSIS — H90.3 SENSORINEURAL HEARING LOSS (SNHL) OF BOTH EARS: Primary | ICD-10-CM

## 2022-03-04 DIAGNOSIS — H91.93 DECREASED HEARING OF BOTH EARS: ICD-10-CM

## 2022-03-04 PROCEDURE — 92567 TYMPANOMETRY: CPT | Performed by: AUDIOLOGIST

## 2022-03-04 PROCEDURE — 92557 COMPREHENSIVE HEARING TEST: CPT | Performed by: AUDIOLOGIST

## 2022-03-04 NOTE — PROGRESS NOTES
Audiology Evaluation Completed. Please refer SCANNED AUDIOGRAM and/or TYMPANOGRAM for BACKGROUND, RESULTS, RECOMMENDATIONS.      Kirstie TO, [facility]  Audiologist #[id number]

## 2022-03-09 ENCOUNTER — INFUSION THERAPY VISIT (OUTPATIENT)
Dept: INFUSION THERAPY | Facility: OTHER | Age: 87
End: 2022-03-09
Attending: FAMILY MEDICINE
Payer: MEDICARE

## 2022-03-09 VITALS
DIASTOLIC BLOOD PRESSURE: 50 MMHG | OXYGEN SATURATION: 94 % | TEMPERATURE: 97.9 F | HEART RATE: 71 BPM | SYSTOLIC BLOOD PRESSURE: 90 MMHG | RESPIRATION RATE: 16 BRPM

## 2022-03-09 DIAGNOSIS — E86.9 VOLUME DEPLETION: Primary | ICD-10-CM

## 2022-03-09 PROCEDURE — 258N000003 HC RX IP 258 OP 636: Performed by: FAMILY MEDICINE

## 2022-03-09 PROCEDURE — 250N000011 HC RX IP 250 OP 636: Performed by: FAMILY MEDICINE

## 2022-03-09 PROCEDURE — 96360 HYDRATION IV INFUSION INIT: CPT

## 2022-03-09 RX ORDER — HEPARIN SODIUM (PORCINE) LOCK FLUSH IV SOLN 100 UNIT/ML 100 UNIT/ML
5 SOLUTION INTRAVENOUS ONCE
Status: CANCELLED
Start: 2022-03-09 | End: 2022-03-09

## 2022-03-09 RX ORDER — HEPARIN SODIUM (PORCINE) LOCK FLUSH IV SOLN 100 UNIT/ML 100 UNIT/ML
5 SOLUTION INTRAVENOUS ONCE
Status: COMPLETED | OUTPATIENT
Start: 2022-03-09 | End: 2022-03-09

## 2022-03-09 RX ADMIN — SODIUM CHLORIDE 1000 ML: 9 INJECTION, SOLUTION INTRAVENOUS at 09:49

## 2022-03-09 RX ADMIN — Medication 5 ML: at 10:58

## 2022-03-09 ASSESSMENT — PAIN SCALES - GENERAL: PAINLEVEL: NO PAIN (0)

## 2022-03-09 NOTE — PROGRESS NOTES
Patient is 88 years old, here today for infusion of IVF.    See telephone encounter re orders faxed to Dr Wilson for continuing infusions.     Patient is a poor historian. Alert and answering quickly today to questions, though denies any sx or any concerns today. No envelope from SNF with med list, etc, today so unable to verify meds/allergies today, though did do thoroughly at last weeks visit.     Patient identified with two identifiers, order verified, and verbal consent for today's infusion obtained from patient.      Patients port accessed using non-coring, 20 gauge, 3/4 inch needle. Port accessed per facility protocol. Port flushed easily, blood return noted.  No signs and symptoms of infection or infiltration.      IV pump verified with dose, drug, and rate of administration.  Infusion administered per protocol.  Patient tolerated infusion well, no signs or symptoms of adverse reaction noted.  Patient denies pain nor discomfort.     IV removed, catheter intact.  Site clean, dry and intact.  No signs or symptoms of infiltration or infection.  Covered with a sterile bandage, slight pressure applied for 30 seconds.  Pt instructed to leave bandage intact for a minimum of one hour, and to call with questions or concerns. Patient states understanding, discharged. Call to  transportation for .

## 2022-03-09 NOTE — PATIENT INSTRUCTIONS

## 2022-03-09 NOTE — PROGRESS NOTES
Plan not signed within Epic by Dr Wilson, so plan printed and faxed to her requesting sign, as no PCP in system and continues to be scheduled for weekly infusions.

## 2022-03-11 ENCOUNTER — TELEPHONE (OUTPATIENT)
Dept: INFUSION THERAPY | Facility: OTHER | Age: 87
End: 2022-03-11
Payer: MEDICARE

## 2022-03-11 NOTE — PROGRESS NOTES
Co-signed orders received from Dr Wilson for IVF. Therapy plan accurate and up to date. Co-signed orders sent to scanning.

## 2022-03-16 ENCOUNTER — INFUSION THERAPY VISIT (OUTPATIENT)
Dept: INFUSION THERAPY | Facility: OTHER | Age: 87
End: 2022-03-16
Attending: FAMILY MEDICINE
Payer: MEDICARE

## 2022-03-16 DIAGNOSIS — E86.9 VOLUME DEPLETION: Primary | ICD-10-CM

## 2022-03-16 PROCEDURE — 96360 HYDRATION IV INFUSION INIT: CPT

## 2022-03-16 PROCEDURE — 250N000011 HC RX IP 250 OP 636: Performed by: FAMILY MEDICINE

## 2022-03-16 PROCEDURE — 258N000003 HC RX IP 258 OP 636: Performed by: FAMILY MEDICINE

## 2022-03-16 PROCEDURE — 96365 THER/PROPH/DIAG IV INF INIT: CPT

## 2022-03-16 RX ORDER — HEPARIN SODIUM (PORCINE) LOCK FLUSH IV SOLN 100 UNIT/ML 100 UNIT/ML
5 SOLUTION INTRAVENOUS ONCE
Status: CANCELLED
Start: 2022-03-16 | End: 2022-03-16

## 2022-03-16 RX ORDER — HEPARIN SODIUM (PORCINE) LOCK FLUSH IV SOLN 100 UNIT/ML 100 UNIT/ML
5 SOLUTION INTRAVENOUS ONCE
Status: COMPLETED | OUTPATIENT
Start: 2022-03-16 | End: 2022-03-16

## 2022-03-16 RX ADMIN — SODIUM CHLORIDE 1000 ML: 9 INJECTION, SOLUTION INTRAVENOUS at 09:55

## 2022-03-16 RX ADMIN — Medication 5 ML: at 10:56

## 2022-03-16 NOTE — PROGRESS NOTES
"INTRAVENOUS ACCESS:  IVAD SL/DL: Site PORT ; 20 G 3/4 \" Power needle; Accessed without difficulty:  YES flushed with 10cc NS and 5cc 100u/ml heparin and needle d/c'd intact; Labs drawn from IVAD: NO    Central line dressing: yes    Intravenous fluids were administered, normal saline 1000 cc's.    DRUG ADMINISTRATION:  Infusion rate(s) regulated using pump and Blood return noted prior and post infusion(s)  "

## 2022-03-16 NOTE — PROGRESS NOTES
IV removed, catheter intact.  Site clean, dry and intact.  No signs or symptoms of infiltration or infection.  Covered with a sterile bandage, slight pressure applied for 30 seconds.  Pt instructed to leave bandage intact for a minimum of one hour, and to call with questions or concerns.  Copy of appointments, discharge instructions, and after visit summary (AVS) provided to patient.  Patient states understanding, discharged.

## 2022-03-23 ENCOUNTER — INFUSION THERAPY VISIT (OUTPATIENT)
Dept: INFUSION THERAPY | Facility: OTHER | Age: 87
End: 2022-03-23
Attending: FAMILY MEDICINE
Payer: MEDICARE

## 2022-03-23 VITALS
SYSTOLIC BLOOD PRESSURE: 115 MMHG | DIASTOLIC BLOOD PRESSURE: 73 MMHG | OXYGEN SATURATION: 95 % | HEART RATE: 69 BPM | TEMPERATURE: 97.5 F | RESPIRATION RATE: 18 BRPM

## 2022-03-23 DIAGNOSIS — E86.9 VOLUME DEPLETION: Primary | ICD-10-CM

## 2022-03-23 PROCEDURE — 250N000011 HC RX IP 250 OP 636: Performed by: FAMILY MEDICINE

## 2022-03-23 PROCEDURE — 96360 HYDRATION IV INFUSION INIT: CPT

## 2022-03-23 PROCEDURE — 258N000003 HC RX IP 258 OP 636: Performed by: FAMILY MEDICINE

## 2022-03-23 RX ORDER — HEPARIN SODIUM (PORCINE) LOCK FLUSH IV SOLN 100 UNIT/ML 100 UNIT/ML
5 SOLUTION INTRAVENOUS ONCE
Status: CANCELLED
Start: 2022-03-23 | End: 2022-03-23

## 2022-03-23 RX ORDER — HEPARIN SODIUM (PORCINE) LOCK FLUSH IV SOLN 100 UNIT/ML 100 UNIT/ML
5 SOLUTION INTRAVENOUS ONCE
Status: COMPLETED | OUTPATIENT
Start: 2022-03-23 | End: 2022-03-23

## 2022-03-23 RX ADMIN — SODIUM CHLORIDE 1000 ML: 9 INJECTION, SOLUTION INTRAVENOUS at 09:43

## 2022-03-23 RX ADMIN — HEPARIN 5 ML: 100 SYRINGE at 10:48

## 2022-03-23 ASSESSMENT — PAIN SCALES - GENERAL: PAINLEVEL: NO PAIN (0)

## 2022-03-23 NOTE — PROGRESS NOTES
Patient is 88 years old, here today for infusion of IVF per order of Dr Wilson.      Patient identified with two identifiers, order verified, and verbal consent for today's infusion obtained from patient.      Patients port accessed using non-coring, 20 gauge, 3/4 inch needle. Port accessed per facility protocol. Port flushed easily, blood return noted.  No signs and symptoms of infection or infiltration.      IV pump verified with dose, drug, and rate of administration.  Infusion administered per protocol.  Patient tolerated infusion well, no signs or symptoms of adverse reaction noted.  Patient denies pain nor discomfort.     IV removed, catheter intact.  Site clean, dry and intact.  No signs or symptoms of infiltration or infection.  Covered with a sterile bandage, slight pressure applied for 30 seconds.  Pt instructed to leave bandage intact for a minimum of one hour, and to call with questions or concerns. Patient states understanding, discharged.

## 2022-03-23 NOTE — PATIENT INSTRUCTIONS

## 2022-03-30 ENCOUNTER — INFUSION THERAPY VISIT (OUTPATIENT)
Dept: INFUSION THERAPY | Facility: OTHER | Age: 87
End: 2022-03-30
Attending: FAMILY MEDICINE
Payer: MEDICARE

## 2022-03-30 VITALS
DIASTOLIC BLOOD PRESSURE: 54 MMHG | SYSTOLIC BLOOD PRESSURE: 118 MMHG | HEART RATE: 71 BPM | RESPIRATION RATE: 17 BRPM | TEMPERATURE: 97.9 F | OXYGEN SATURATION: 97 %

## 2022-03-30 DIAGNOSIS — E86.9 VOLUME DEPLETION: Primary | ICD-10-CM

## 2022-03-30 PROCEDURE — 258N000003 HC RX IP 258 OP 636: Performed by: FAMILY MEDICINE

## 2022-03-30 PROCEDURE — 96360 HYDRATION IV INFUSION INIT: CPT

## 2022-03-30 PROCEDURE — 250N000011 HC RX IP 250 OP 636: Performed by: FAMILY MEDICINE

## 2022-03-30 RX ORDER — HEPARIN SODIUM (PORCINE) LOCK FLUSH IV SOLN 100 UNIT/ML 100 UNIT/ML
5 SOLUTION INTRAVENOUS ONCE
Status: COMPLETED | OUTPATIENT
Start: 2022-03-30 | End: 2022-03-30

## 2022-03-30 RX ORDER — HEPARIN SODIUM (PORCINE) LOCK FLUSH IV SOLN 100 UNIT/ML 100 UNIT/ML
5 SOLUTION INTRAVENOUS ONCE
Status: CANCELLED
Start: 2022-03-30 | End: 2022-03-30

## 2022-03-30 RX ADMIN — SODIUM CHLORIDE 1000 ML: 9 INJECTION, SOLUTION INTRAVENOUS at 09:56

## 2022-03-30 RX ADMIN — HEPARIN 5 ML: 100 SYRINGE at 11:09

## 2022-03-30 NOTE — PROGRESS NOTES
Patient is 88 years old, here today for infusion of 1 Liter NS per order of Dr Wilson.      Patient identified with two identifiers, order verified, and verbal consent for today's infusion obtained from patient.      Lab values:  N/A for this visit      Patient meets order parameters for today's treatment.         Patients port accessed using non-coring, 19 gauge, port needle. Port accessed per facility protocol. Port flushed easily, blood return noted.  No signs and symptoms of infection or infiltration.      IV pump verified with dose, drug, and rate of administration.  Infusion administered per protocol.  Patient tolerated infusion well no signs or symptoms of adverse reaction noted.  Patient denies pain nor discomfort.     IV removed, catheter intact.  Site clean, dry and intact.  No signs or symptoms of infiltration or infection.  Covered with a sterile bandage, slight pressure applied for 30 seconds.  Pt instructed to leave bandage intact for a minimum of one hour, and to call with questions or concerns. Patient states understanding, discharged.

## 2022-04-06 ENCOUNTER — INFUSION THERAPY VISIT (OUTPATIENT)
Dept: INFUSION THERAPY | Facility: OTHER | Age: 87
End: 2022-04-06
Attending: FAMILY MEDICINE
Payer: MEDICARE

## 2022-04-06 VITALS
DIASTOLIC BLOOD PRESSURE: 61 MMHG | HEART RATE: 72 BPM | SYSTOLIC BLOOD PRESSURE: 116 MMHG | TEMPERATURE: 97.8 F | RESPIRATION RATE: 18 BRPM | OXYGEN SATURATION: 97 %

## 2022-04-06 DIAGNOSIS — E86.9 VOLUME DEPLETION: Primary | ICD-10-CM

## 2022-04-06 PROCEDURE — 250N000011 HC RX IP 250 OP 636: Performed by: FAMILY MEDICINE

## 2022-04-06 PROCEDURE — 258N000003 HC RX IP 258 OP 636: Performed by: FAMILY MEDICINE

## 2022-04-06 PROCEDURE — 96360 HYDRATION IV INFUSION INIT: CPT

## 2022-04-06 RX ORDER — HEPARIN SODIUM (PORCINE) LOCK FLUSH IV SOLN 100 UNIT/ML 100 UNIT/ML
5 SOLUTION INTRAVENOUS ONCE
Status: CANCELLED
Start: 2022-04-06 | End: 2022-04-06

## 2022-04-06 RX ORDER — HEPARIN SODIUM (PORCINE) LOCK FLUSH IV SOLN 100 UNIT/ML 100 UNIT/ML
5 SOLUTION INTRAVENOUS ONCE
Status: COMPLETED | OUTPATIENT
Start: 2022-04-06 | End: 2022-04-06

## 2022-04-06 RX ADMIN — SODIUM CHLORIDE 1000 ML: 9 INJECTION, SOLUTION INTRAVENOUS at 09:50

## 2022-04-06 RX ADMIN — HEPARIN 5 ML: 100 SYRINGE at 10:54

## 2022-04-06 NOTE — PROGRESS NOTES
Patient is a 88 year old here today for infusion of IVF per order of Dr Wilson. Patient identified with two identifiers, order verified, and verbal consent for today's infusion obtained from patient.      Patient denies questions or concerns regarding infusion and/or medication(s) being administered.    Patients port accessed using non-coring, 20 gauge, 3/4 inch power needle. Port accessed per facility protocol. Port flushed easily, blood return noted.  No signs and symptoms of infection or infiltration.      IV pump verified with dose, drug, and rate of administration.  Infusion administered per protocol.  Patient tolerated infusion well, no signs or symptoms of adverse reaction noted.  Patient denies pain nor discomfort.     IV removed, catheter intact. Site clean, dry and intact. No signs or symptoms of infiltration or infection. Covered with a sterile bandage, slight pressure applied for 30 seconds. Pt instructed to leave bandage intact for a minimum of one hour, and to call with questions or concerns. Copy of appointments, discharge instructions, and after visit summary (AVS) provided to patient. Patient states understanding, discharged.

## 2022-04-06 NOTE — PATIENT INSTRUCTIONS

## 2022-04-13 ENCOUNTER — ANCILLARY PROCEDURE (OUTPATIENT)
Dept: CARDIOLOGY | Facility: CLINIC | Age: 87
End: 2022-04-13
Attending: INTERNAL MEDICINE
Payer: MEDICARE

## 2022-04-13 ENCOUNTER — INFUSION THERAPY VISIT (OUTPATIENT)
Dept: INFUSION THERAPY | Facility: OTHER | Age: 87
End: 2022-04-13
Attending: FAMILY MEDICINE
Payer: MEDICARE

## 2022-04-13 ENCOUNTER — HOSPITAL ENCOUNTER (EMERGENCY)
Facility: HOSPITAL | Age: 87
Discharge: HOME | End: 2022-04-13
Attending: EMERGENCY MEDICINE | Admitting: EMERGENCY MEDICINE
Payer: MEDICARE

## 2022-04-13 ENCOUNTER — APPOINTMENT (OUTPATIENT)
Dept: CT IMAGING | Facility: HOSPITAL | Age: 87
End: 2022-04-13
Attending: EMERGENCY MEDICINE
Payer: MEDICARE

## 2022-04-13 VITALS — HEART RATE: 76 BPM | SYSTOLIC BLOOD PRESSURE: 84 MMHG | OXYGEN SATURATION: 95 % | DIASTOLIC BLOOD PRESSURE: 50 MMHG

## 2022-04-13 VITALS
SYSTOLIC BLOOD PRESSURE: 176 MMHG | TEMPERATURE: 97.4 F | RESPIRATION RATE: 16 BRPM | HEART RATE: 69 BPM | DIASTOLIC BLOOD PRESSURE: 101 MMHG | OXYGEN SATURATION: 98 %

## 2022-04-13 DIAGNOSIS — E86.9 VOLUME DEPLETION: Primary | ICD-10-CM

## 2022-04-13 DIAGNOSIS — R55 SYNCOPE, UNSPECIFIED SYNCOPE TYPE: ICD-10-CM

## 2022-04-13 DIAGNOSIS — I65.29 STENOSIS OF CAROTID ARTERY, UNSPECIFIED LATERALITY: ICD-10-CM

## 2022-04-13 DIAGNOSIS — Z95.0 CARDIAC PACEMAKER IN SITU: Primary | ICD-10-CM

## 2022-04-13 DIAGNOSIS — Z95.0 CARDIAC PACEMAKER IN SITU: ICD-10-CM

## 2022-04-13 DIAGNOSIS — I44.2 COMPLETE HEART BLOCK (H): ICD-10-CM

## 2022-04-13 LAB
ALBUMIN SERPL-MCNC: 3.2 G/DL (ref 3.4–5)
ALP SERPL-CCNC: 56 U/L (ref 40–150)
ALT SERPL W P-5'-P-CCNC: 24 U/L (ref 0–70)
ANION GAP SERPL CALCULATED.3IONS-SCNC: 4 MMOL/L (ref 3–14)
AST SERPL W P-5'-P-CCNC: 12 U/L (ref 0–45)
BASOPHILS # BLD AUTO: 0 10E3/UL (ref 0–0.2)
BASOPHILS NFR BLD AUTO: 1 %
BILIRUB SERPL-MCNC: 0.5 MG/DL (ref 0.2–1.3)
BUN SERPL-MCNC: 19 MG/DL (ref 7–30)
CALCIUM SERPL-MCNC: 8.4 MG/DL (ref 8.5–10.1)
CHLORIDE BLD-SCNC: 109 MMOL/L (ref 94–109)
CO2 SERPL-SCNC: 27 MMOL/L (ref 20–32)
CREAT SERPL-MCNC: 0.98 MG/DL (ref 0.66–1.25)
EOSINOPHIL # BLD AUTO: 0.3 10E3/UL (ref 0–0.7)
EOSINOPHIL NFR BLD AUTO: 6 %
ERYTHROCYTE [DISTWIDTH] IN BLOOD BY AUTOMATED COUNT: 12.5 % (ref 10–15)
GFR SERPL CREATININE-BSD FRML MDRD: 74 ML/MIN/1.73M2
GLUCOSE BLD-MCNC: 96 MG/DL (ref 70–99)
HCT VFR BLD AUTO: 42.8 % (ref 40–53)
HGB BLD-MCNC: 13.7 G/DL (ref 13.3–17.7)
HOLD SPECIMEN: NORMAL
IMM GRANULOCYTES # BLD: 0 10E3/UL
IMM GRANULOCYTES NFR BLD: 0 %
LYMPHOCYTES # BLD AUTO: 1.3 10E3/UL (ref 0.8–5.3)
LYMPHOCYTES NFR BLD AUTO: 27 %
MAGNESIUM SERPL-MCNC: 2.1 MG/DL (ref 1.6–2.3)
MCH RBC QN AUTO: 30.2 PG (ref 26.5–33)
MCHC RBC AUTO-ENTMCNC: 32 G/DL (ref 31.5–36.5)
MCV RBC AUTO: 95 FL (ref 78–100)
MONOCYTES # BLD AUTO: 0.4 10E3/UL (ref 0–1.3)
MONOCYTES NFR BLD AUTO: 8 %
NEUTROPHILS # BLD AUTO: 2.8 10E3/UL (ref 1.6–8.3)
NEUTROPHILS NFR BLD AUTO: 58 %
NRBC # BLD AUTO: 0 10E3/UL
NRBC BLD AUTO-RTO: 0 /100
PLATELET # BLD AUTO: 163 10E3/UL (ref 150–450)
POTASSIUM BLD-SCNC: 4 MMOL/L (ref 3.4–5.3)
PROT SERPL-MCNC: 6.5 G/DL (ref 6.8–8.8)
RBC # BLD AUTO: 4.53 10E6/UL (ref 4.4–5.9)
SODIUM SERPL-SCNC: 140 MMOL/L (ref 133–144)
TROPONIN I SERPL HS-MCNC: 11 NG/L
TROPONIN I SERPL HS-MCNC: 12 NG/L
WBC # BLD AUTO: 4.8 10E3/UL (ref 4–11)

## 2022-04-13 PROCEDURE — 99285 EMERGENCY DEPT VISIT HI MDM: CPT | Performed by: EMERGENCY MEDICINE

## 2022-04-13 PROCEDURE — 96361 HYDRATE IV INFUSION ADD-ON: CPT

## 2022-04-13 PROCEDURE — 96360 HYDRATION IV INFUSION INIT: CPT | Mod: XU

## 2022-04-13 PROCEDURE — 93296 REM INTERROG EVL PM/IDS: CPT

## 2022-04-13 PROCEDURE — 99285 EMERGENCY DEPT VISIT HI MDM: CPT | Mod: 25

## 2022-04-13 PROCEDURE — 82040 ASSAY OF SERUM ALBUMIN: CPT | Performed by: EMERGENCY MEDICINE

## 2022-04-13 PROCEDURE — 93005 ELECTROCARDIOGRAM TRACING: CPT

## 2022-04-13 PROCEDURE — 36415 COLL VENOUS BLD VENIPUNCTURE: CPT | Performed by: EMERGENCY MEDICINE

## 2022-04-13 PROCEDURE — G1004 CDSM NDSC: HCPCS

## 2022-04-13 PROCEDURE — 84484 ASSAY OF TROPONIN QUANT: CPT | Mod: 91 | Performed by: EMERGENCY MEDICINE

## 2022-04-13 PROCEDURE — 80053 COMPREHEN METABOLIC PANEL: CPT | Performed by: EMERGENCY MEDICINE

## 2022-04-13 PROCEDURE — 70496 CT ANGIOGRAPHY HEAD: CPT | Mod: ME

## 2022-04-13 PROCEDURE — 250N000011 HC RX IP 250 OP 636: Performed by: RADIOLOGY

## 2022-04-13 PROCEDURE — 93010 ELECTROCARDIOGRAM REPORT: CPT | Performed by: INTERNAL MEDICINE

## 2022-04-13 PROCEDURE — 85025 COMPLETE CBC W/AUTO DIFF WBC: CPT | Performed by: EMERGENCY MEDICINE

## 2022-04-13 PROCEDURE — 93294 REM INTERROG EVL PM/LDLS PM: CPT | Performed by: INTERNAL MEDICINE

## 2022-04-13 PROCEDURE — 83735 ASSAY OF MAGNESIUM: CPT | Performed by: EMERGENCY MEDICINE

## 2022-04-13 PROCEDURE — 258N000003 HC RX IP 258 OP 636: Performed by: EMERGENCY MEDICINE

## 2022-04-13 RX ORDER — IOPAMIDOL 755 MG/ML
50 INJECTION, SOLUTION INTRAVASCULAR ONCE
Status: COMPLETED | OUTPATIENT
Start: 2022-04-13 | End: 2022-04-13

## 2022-04-13 RX ADMIN — SODIUM CHLORIDE, POTASSIUM CHLORIDE, SODIUM LACTATE AND CALCIUM CHLORIDE 1000 ML: 600; 310; 30; 20 INJECTION, SOLUTION INTRAVENOUS at 10:15

## 2022-04-13 RX ADMIN — IOPAMIDOL 50 ML: 755 INJECTION, SOLUTION INTRAVENOUS at 12:09

## 2022-04-13 NOTE — ED PROVIDER NOTES
History     Chief Complaint   Patient presents with     Syncope     HPI  Jf Ortiz is a 88 year old male who presents with syncopal episode.  He was in the infusion clinic today, gets a liter of fluid infused once weekly.  He has a history of Parkinson disease, Lewy body dementia, orthostatic hypotension dysautonomic syndrome, as well as atrial fibrillation on anticoagulation, seizure disorder.  While in infusion clinic he was being transferred from his wheelchair to the infusion chair and had a brief episode of loss of consciousness during which his eyes rolled back in his head.  By the time we responded to the rapid response he was alert, at mental baseline, mildly hypotensive, nontachycardic.  This is a second time he has had a syncopal episode at infusion clinic.  He denies any pain.  He did not have any trauma.    Allergies:  Allergies   Allergen Reactions     Cats Unknown     Haldol [Haloperidol]      Per SNF reporting     Klonopin [Clonazepam]      Per SNF reporting     Lamotrigine      Skin lesions     Oxycodone      Per SNF reporting       Problem List:    Patient Active Problem List    Diagnosis Date Noted     Nursing Home Visit 03/03/2022     Priority: Medium     Retinal artery branch occlusion 02/13/2021     Priority: Medium     Vision loss of right eye 02/12/2021     Priority: Medium     Orthostatic hypotension dysautonomic syndrome 02/12/2021     Priority: Medium     Chronic atrial fibrillation (H) 02/12/2021     Priority: Medium     Closed compression fracture of body of L1 vertebra (H) 02/12/2021     Priority: Medium     Closed wedge compression fracture of lumbar vertebra with routine healing 12/20/2020     Priority: Medium     Compression fracture of L1 lumbar vertebra, closed, initial encounter (H) 12/18/2020     Priority: Medium     Compression fracture of thoracic vertebra, initial encounter, unspecified thoracic vertebral level 12/18/2020     Priority: Medium     Replacing diagnoses that  were inactivated after the 10/1/2021 regulatory import.       Longstanding persistent atrial fibrillation (H) 04/13/2020     Priority: Medium     Frequent falls 04/13/2020     Priority: Medium     Coronary artery disease involving native coronary artery without angina pectoris 04/13/2020     Priority: Medium     Constipation, unspecified constipation type 04/11/2018     Priority: Medium     Pacemaker, [employer], Dual Chamber - Dependent 10/06/2017     Priority: Medium     Lewy body dementia without behavioral disturbance (H) 08/31/2017     Priority: Medium     Urinary incontinence 08/31/2017     Priority: Medium     Autonomic orthostatic hypotension 10/14/2016     Priority: Medium     Dementia without behavioral disturbance (H) 07/28/2015     Priority: Medium     Diagnosis updated by automated process. Provider to review and confirm.       Parkinson disease (H)      Priority: Medium     Seizure disorder (H)      Priority: Medium     Volume depletion 08/02/2014     Priority: Medium     Stented coronary artery      Priority: Medium     Cardiac pacemaker in situ 01/08/2013     Priority: Medium     Overview:   [employer] Altrua S606 DREL,  Serial #[id number]  5-13-11  Atrial lead [employer] 4054 Serial #[id number]  8-11-00  Ventriculer lead [employer] 4137  Serial #[id number]  5-13-11  2nd Degree AV Block   Dr. Campbell  Intrinsic:  Pt. is Pacemaker Dependant, remains paced at temp. rate of 30 bpm (6-13-14)    Formatting of this note might be different from the original.  [employer] Alt[employer]a S606 DREL,  Serial #[id number]  5-13-11  Atrial lead [employer] 4054 Serial #[id number]  8-11-00  Ventriculer lead [employer] 4137  Serial #[id number]  5-13-11  2nd Degree AV Block   Dr. Campbell  Intrinsic:  Pt. is Pacemaker Dependant, remains paced at temp. 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 Provider to review       Diaphragmatic hernia 01/01/2011     Priority: Medium     Problem list name updated by automated process. Provider to review       Pain in joint, forearm 07/31/2008     Priority: Medium     Formatting of this note might be different from the original.  IMO Update 10/11       Pain in joint, ankle and foot 07/31/2008     Priority: Medium     Formatting of this note might be different from the original.  IMO Update 10/11       Dysphagia 05/22/2007     Priority: Medium     Overview:   IMO Update    Formatting of this note might be different from the original.  IMO Update       Coronary atherosclerosis of native coronary artery 11/28/2006     Priority: Medium     Overview:   IMO Update 10/11    Formatting of this note might be different from the original.  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  IMO Update 10/11       Hypertension with target blood pressure goal under 150/90 09/05/2006     Priority: Medium     Overview:   IMO Update       Hyperlipidemia 09/05/2006     Priority: Medium     Formatting of this note might be different from the original.  IMO Update 10/11       Essential hypertension 09/05/2006     Priority: Medium     Formatting of this note might be different from the original.  IMO Update          Past Medical History:    Past Medical History:   Diagnosis Date     Autonomic orthostatic hypotension 10/14/2016     Coronary artery disease      Dementia without behavioral disturbance (H) 7/28/2015     Diaphragmatic hernia without mention of obstruction or gangrene 1/1/2011     Hypercholesterolemia 4/23/2013     Osteoarthrosis, unspecified whether generalized or localized, unspecified site 1/1/2011     Other and unspecified hyperlipidemia 1/1/2011     Pacemaker      REM sleep behavior disorder  1/1/2011     Seizure disorder (H)      Stented coronary artery        Past Surgical History:    Past Surgical History:   Procedure Laterality Date     ------------OTHER-------------  1955    ulnar and radial fx - repair ulnar and radial fx x4     ------------OTHER-------------  6/14/2011    cataract extraction     BIOPSY  08/2015    skin biopsy     BLEPHAROPLASTY BILATERAL  5/6/2014    Procedure: BLEPHAROPLASTY BILATERAL;  Surgeon: Andrew Queen MD;  Location: [facility]     BYPASS GRAFT ARTERY CORONARY  11/2006    coronary artery disease x 5, [facility]     cataract extraction and lens implantation  2011    cataracts     cataract extraction and lens implantation      cataracts     COLONOSCOPY  2012     colonoscopy with polypectomy  3/13/2009    history of polyps - repeat 3 yrs     colonoscopy with polypectomy  2006     colonoscopy with polypectomy  2005     COMBINED COLONOSCOPY WITH ARGON PLASMA COAGULATOR (APC) N/A 10/31/2014    Procedure: COMBINED COLONOSCOPY WITH ARGON PLASMA COAGULATOR (APC);  Surgeon: Bassam Aguilar MD;  Location: [facility]     COMBINED COLONOSCOPY WITH ARGON PLASMA COAGULATOR (APC) N/A 11/13/2015    Procedure: COMBINED COLONOSCOPY WITH ARGON PLASMA COAGULATOR (APC);  Surgeon: Bassam Aguilar MD;  Location: [facility]     ENDOSCOPY UPPER, COLONOSCOPY, COMBINED N/A 10/31/2014    Procedure: COMBINED ENDOSCOPY UPPER, COLONOSCOPY;  Surgeon: Bassam Aguilar MD;  Location: [facility]     ENDOSCOPY UPPER, COLONOSCOPY, COMBINED N/A 11/13/2015    Procedure: COMBINED ENDOSCOPY UPPER, COLONOSCOPY;  Surgeon: Bassam Aguilar MD;  Location: [facility]     ESOPHAGOSCOPY, GASTROSCOPY, DUODENOSCOPY (EGD), COMBINED  1/22/2014    Procedure: COMBINED ESOPHAGOSCOPY, GASTROSCOPY, DUODENOSCOPY (EGD);  UPPER ENDOSCOPY(PENA) W/ BIOPSIES;  Surgeon: Patricia Pena MD;  Location: [facility]     HERNIORRHAPHY INGUINAL Right 2/14/2018    Procedure: HERNIORRHAPHY INGUINAL;  OPEN RIGHT INGUINAL HERNIA REPAIR with Mesh;  Surgeon: Virgilio Zaragoza  D, DO;  Location: [facility]     INSERT PORT VASCULAR ACCESS Right 2019    Procedure: PORT PLACEMENT;  Surgeon: Lloyd Ram MD;  Location: [facility]     LARYNGOSCOPY WITH MICROSCOPE  2014    Procedure: LARYNGOSCOPY WITH MICROSCOPE;;  Surgeon: Chayo Duke MD;  Location: [facility]     pacemaker placement      heart block     pacemaker placement      dual-chamber     REMOVE TUBE, MYRINGOTOMY, COMBINED  2014    Procedure: COMBINED REMOVE TUBE, MYRINGOTOMY;  MICRODIRECT LARYNGOSCOPY WITH BIOPSY AND FROZEN SECTIONS removal of right ear tube and myringoplasty;  Surgeon: Chayo Duke MD;  Location: [facility]     REPLACE PACEMAKER GENERATOR N/A 2018    Procedure: REPLACE PACEMAKER GENERATOR;  Pacemaker generator change;  Surgeon: Alma Kaplan MD;  Location: [facility]     stent placement to LAD       ventilation tube  2012    right in office       Family History:    Family History   Problem Relation Age of Onset     Diabetes Mother      Breast Cancer Daughter        Social History:  Marital Status:  Single [1]  Social History     Tobacco Use     Smoking status: Former Smoker     Packs/day: 1.00     Years: 5.00     Pack years: 5.00     Types: Cigarettes     Quit date: 1985     Years since quittin.3     Smokeless tobacco: Never Used     Tobacco comment: quit in    Substance Use Topics     Alcohol use: Yes     Comment: social     Drug use: No        Medications:    acetaminophen (TYLENOL) 325 MG tablet  apixaban ANTICOAGULANT (ELIQUIS) 5 MG tablet  aspirin (ASA) 81 MG chewable tablet  atorvastatin (LIPITOR) 40 MG tablet  bisacodyl (DULCOLAX) 5 MG EC tablet  fludrocortisone (FLORINEF) 0.1 MG tablet  Magnesium Hydroxide (MILK OF MAGNESIA PO)  melatonin 5 MG tablet  memantine (NAMENDA) 5 MG tablet  NONFORMULARY  nystatin POWD  potassium chloride ER (KLOR-CON M) 20 MEQ CR tablet  RABEprazole (ACIPHEX) 20 MG EC tablet  senna-docusate (SENOKOT-S/PERICOLACE) 8.6-50 MG  tablet  sertraline (ZOLOFT) 50 MG tablet  sodium chloride 1 GM tablet          Review of Systems   Unable to perform ROS: Dementia       Physical Exam   BP: 118/74  Pulse: 79  Temp: 97.6  F (36.4  C)  Resp: 16  SpO2: 98 %  Lying Orthostatic BP: 135/77  Lying Orthostatic Pulse: 70 bpm  Sitting Orthostatic BP: 123/78  Sitting Orthostatic Pulse: 71 bpm  Standing Orthostatic BP: 102/90  Standing Orthostatic Pulse: 72 bpm      Physical Exam  Constitutional:       General: He is not in acute distress.     Appearance: He is not ill-appearing.   HENT:      Head: Normocephalic and atraumatic.      Mouth/Throat:      Mouth: Mucous membranes are moist.      Pharynx: Oropharynx is clear.   Eyes:      Conjunctiva/sclera: Conjunctivae normal.      Pupils: Pupils are equal, round, and reactive to light.   Cardiovascular:      Rate and Rhythm: Normal rate and regular rhythm.   Pulmonary:      Effort: Pulmonary effort is normal.      Breath sounds: Normal breath sounds.   Abdominal:      Palpations: Abdomen is soft.      Tenderness: There is no abdominal tenderness.   Musculoskeletal:      Cervical back: Normal range of motion.      Comments: 5/5 strength upper and lower extremities    Skin:     General: Skin is warm and dry.   Neurological:      Mental Status: He is alert.      Comments: Pupils equal, round and reactive to light and accommodation, extraoccular muscles intact without nystagmus, CN II-XII intact, strength and sensation to all extremities intact.  unable to correctly count fingers reliably.  Unable to perform finger-to-nose testing.         ED Course              ED Course as of 04/13/22 1610   Wed Apr 13, 2022   1443 Troponin I High Sensitivity: 12  Unchanged. Still waiting for CTA results. Called CT, delay due to technical problems with 3D imaging   1609 CTA shows new carotid stenosis. Placed referral to vascular surgery and recommended discussing follow up with . Will provide transport back to nursing home.   Return precautions specified in AVS along with follow up instructions.      Procedures              EKG Interpretation:      Interpreted by Kimberly Howell MD  Time reviewed: 949  Symptoms at time of EKG: None   Rhythm: paced  Rate: Normal  Axis: Left Axis Deviation  Ectopy: none  Conduction: paced  ST Segments/ T Waves: No ST-T wave changes  Q Waves: v1, v2, v3, v4, v5, v6, II, III and aVf  Comparison to prior: Unchanged    Clinical Impression: Paced EKG without apparent ischemic changes                Critical Care time:  none               Results for orders placed or performed during the hospital encounter of 04/13/22 (from the past 24 hour(s))   CBC with platelets differential    Narrative    The following orders were created for panel order CBC with platelets differential.  Procedure                               Abnormality         Status                     ---------                               -----------         ------                     CBC with platelets and d...[[id number]]                      Final result                 Please view results for these tests on the individual orders.   Troponin I   Result Value Ref Range    Troponin I High Sensitivity 11 <79 ng/L   Comprehensive metabolic panel   Result Value Ref Range    Sodium 140 133 - 144 mmol/L    Potassium 4.0 3.4 - 5.3 mmol/L    Chloride 109 94 - 109 mmol/L    Carbon Dioxide (CO2) 27 20 - 32 mmol/L    Anion Gap 4 3 - 14 mmol/L    Urea Nitrogen 19 7 - 30 mg/dL    Creatinine 0.98 0.66 - 1.25 mg/dL    Calcium 8.4 (L) 8.5 - 10.1 mg/dL    Glucose 96 70 - 99 mg/dL    Alkaline Phosphatase 56 40 - 150 U/L    AST 12 0 - 45 U/L    ALT 24 0 - 70 U/L    Protein Total 6.5 (L) 6.8 - 8.8 g/dL    Albumin 3.2 (L) 3.4 - 5.0 g/dL    Bilirubin Total 0.5 0.2 - 1.3 mg/dL    GFR Estimate 74 >60 mL/min/1.73m2   Magnesium   Result Value Ref Range    Magnesium 2.1 1.6 - 2.3 mg/dL   CBC with platelets and differential   Result Value Ref Range    WBC Count 4.8 4.0 -  11.0 10e3/uL    RBC Count 4.53 4.40 - 5.90 10e6/uL    Hemoglobin 13.7 13.3 - 17.7 g/dL    Hematocrit 42.8 40.0 - 53.0 %    MCV 95 78 - 100 fL    MCH 30.2 26.5 - 33.0 pg    MCHC 32.0 31.5 - 36.5 g/dL    RDW 12.5 10.0 - 15.0 %    Platelet Count 163 150 - 450 10e3/uL    % Neutrophils 58 %    % Lymphocytes 27 %    % Monocytes 8 %    % Eosinophils 6 %    % Basophils 1 %    % Immature Granulocytes 0 %    NRBCs per 100 WBC 0 <1 /100    Absolute Neutrophils 2.8 1.6 - 8.3 10e3/uL    Absolute Lymphocytes 1.3 0.8 - 5.3 10e3/uL    Absolute Monocytes 0.4 0.0 - 1.3 10e3/uL    Absolute Eosinophils 0.3 0.0 - 0.7 10e3/uL    Absolute Basophils 0.0 0.0 - 0.2 10e3/uL    Absolute Immature Granulocytes 0.0 <=0.4 10e3/uL    Absolute NRBCs 0.0 10e3/uL   Extra Tube    Narrative    The following orders were created for panel order Extra Tube.  Procedure                               Abnormality         Status                     ---------                               -----------         ------                     Extra Red Top Tube[[id number]]                               Final result               Extra Blood Bank Purple ...[[id number]]                      Final result               Extra Green Top (Lithium...[[id number]]                      Final result                 Please view results for these tests on the individual orders.   Extra Red Top Tube   Result Value Ref Range    Hold Specimen JIC    Extra Green Top (Lithium Heparin) ON ICE   Result Value Ref Range    Hold Specimen JIC    Extra Tube    Narrative    The following orders were created for panel order Extra Tube.  Procedure                               Abnormality         Status                     ---------                               -----------         ------                     Extra Blue Top Tube[[id number]]                              Final result                 Please view results for these tests on the individual orders.   Extra Blue Top Tube   Result Value Ref  Range    Hold Specimen [facility] Blood Bank Purple Top Tube   Result Value Ref Range    Hold Specimen [facility]    Head CT w/o contrast    Narrative    PROCEDURE: CT HEAD W/O CONTRAST     HISTORY: Neuro deficit, acute, stroke suspected.    COMPARISON: January 20, 2022    TECHNIQUE:  Helical images of the head from the foramen magnum to the  vertex were obtained without contrast.    FINDINGS: There is dilation of the lateral and third ventricles. This  is unchanged as compared to January 2022. This may be on the basis of  atrophy or communicating hydrocephalus. Persistent enlargement of the  cortical sulci consistent with atrophy. There is white matter  low-density in both hemispheres consistent with small vessel. The  brainstem and cerebellum appear normal. Cranial vault is intact. The  paranasal sinuses are clear.      Impression    IMPRESSION: Stable appearance of the brain is compared to January 20, 2022      JACKY JAMES MD         SYSTEM ID:  [id number]   Troponin I   Result Value Ref Range    Troponin I High Sensitivity 12 <79 ng/L   CTA Head Neck with Contrast    Narrative    PROCEDURE: CTA  HEAD NECK WITH CONTRAST   4/13/2022 3:25 PM    HISTORY:Male, age,  88 years, , , Neuro deficit, acute, stroke  suspected    COMPARISON: CTA head neck 2/12/2021    TECHNIQUE: CT scan was performed of the head and neck  before and  after the administration of intravenous contrast. Sagittal, coronal,  axial and 3-D reconstructed MIP  images were reviewed.    FINDINGS:   CTA of the neck: The visualized portions of the aortic arch are  unchanged, again demonstrating scattered areas of calcified  atherosclerotic plaque. The innominate artery, left and right  subclavian arteries, left and right common carotid arteries are  patent. There is hypoplasia of the right vertebral artery. The left  vertebral artery is patent and normal in appearance. Densely calcified  atherosclerotic plaque is seen within the carotid bifurcations  and  proximal right internal carotid artery. Vessel analysis demonstrates  34% diameter stenosis in the proximal left internal carotid artery and  73% diameter stenosis in the right carotid bifurcation/proximal  internal carotid artery    CT of the brain: The hypoplastic right vertebral artery terminates in  the right posterior inferior cerebellar artery. The left vertebral  artery and the basilar artery are patent. Posterior cerebral arteries  and branches are unremarkable. The petrous and cavernous portions of  the left and right internal carotid artery are normal. The anterior  cerebral and middle cerebral arteries and respective branches are also  unremarkable.    CT scan of the neck with IV contrast the muscles of mastication and  salivary glands are grossly normal. Oral and pharyngeal mucosa is  normal. A number of normal-sized lymph nodes are again seen throughout  the neck, similar in appearance in comparison to prior examination.  The larynx is unremarkable. Thyroid gland is hypoplastic. Visualized  portions of the lungs demonstrate interstitial thickening and patchy  areas of air trapping.    Contrast-enhanced CT scan of the brain: Generalized atrophy is not  really changed compared to previous CT scans. There is no evidence of  abnormal enhancement within the cerebral or cerebellar hemispheres.  Bony structures demonstrate no acute abnormality        Impression    IMPRESSION:   Densely calcified atherosclerotic plaque severely narrows the right  internal carotid artery. Vessel analysis demonstrates 73% diameter  stenosis which is a new finding compared to prior study. No evidence  of acute appearing occlusion.     Moderate changes are similar in appearance including a hypoplastic  right vertebral artery.    LAWRENCE JAIN MD         SYSTEM ID:  [id number]       Medications   lactated ringers BOLUS 1,000 mL (0 mLs Intravenous Stopped 4/13/22 1212)   sodium chloride (PF) 0.9% PF flush 100 mL (100 mLs  Intravenous Given 4/13/22 1209)   iopamidol (ISOVUE-370) solution 50 mL (50 mLs Intravenous Given 4/13/22 1209)       Assessments & Plan (with Medical Decision Making)     I have reviewed the nursing notes.    I have reviewed the findings, diagnosis, plan and need for follow up with the patient.   Mr Ortiz is an 89 yo man who presents with syncopal episode. He has a history of orthostatic hypotension as well as seizure disorder. Did not appear to have seizure today. Exam is normal except seems to have double vision at more than 3 feet away and unable to perform finger to nose testing. Unsure if this is related to Lewy body dementia/parkinsons or new finding. Will perform CTA. Not TPA candidate due to anticoagulation and unknown last known well so will not call stroke code.    New Prescriptions    No medications on file       Final diagnoses:   Syncope, unspecified syncope type   Stenosis of carotid artery, unspecified laterality       4/13/2022   [facility]     Kimberly Howell MD  04/13/22 1633

## 2022-04-13 NOTE — ED NOTES
ICU nurse to come to floor to interrogate pacemaker.     Spoke with Capri at [facility], pt has [employer] pacemaker.

## 2022-04-13 NOTE — ED NOTES
Discharge instructions and update called to Yoanna [facility], all questions answered at this time.

## 2022-04-13 NOTE — PLAN OF CARE
Accessed chart for pacemaker interrogation. Call made to [facility] Pacemaker nurse. Awaiting call back before beginning interrogation.

## 2022-04-13 NOTE — ED NOTES
Per Brandi at [facility], pacemaker interrogation showed no arrhythmias and nothing concerning at this time.

## 2022-04-13 NOTE — DISCHARGE INSTRUCTIONS
Continue usual medications as directed  Your CT showed narrowing of the carotid artery. You should discuss with your primary doctor whether to see a vascular surgeon to consider removing this.   You likely had a fainting episode due to your known diagnosis of low blood pressure when you stand. However if you develop seizure, vomiting, fever change in mental status, loss of consciousness, or other symptoms, please seek emergency care.      What to expect when you have contrast    During your exam, we will inject  contrast  into your vein or artery. (Contrast is a clear liquid with iodine in it. It shows up on X-rays.)    You may feel warm or hot. You may have a metal taste in your mouth and a slight upset stomach. You may also feel pressure near the kidneys and bladder. These effects will last about 1 to 3 minutes.    Please tell us if you have:   Sneezing    Itching   Hives    Swelling in the face   A hoarse voice   Breathing problems   Other new symptoms    Serious problems are rare.  They may include:   Irregular heartbeat    Seizures   Kidney failure             Tissue damage   Shock     Death    If you have any problems during the exam, we  will treat them right away.    When you get home    Call your hospital if you have any new symptoms in the next 2 days, like hives or swelling. (Phone numbers are at the bottom of this page.) Or call your family doctor.     If you have wheezing or trouble breathing, call 911.    Self-care  -Drink at least 4 extra glasses of water today.   This reduces the stress on your kidneys.  -Keep taking your regular medicines.    The contrast will pass out of your body in your  Urine(pee). This will happen in the next 24 hours. You  will not feel this. Your urine will not  change color.    If you have kidney problems or take metformin    Drink 4 to 8 large glasses of water for the next  2 days, if you are not on a fluid restriction.    ?If you take metformin (Glucophage or Glucovance)  for diabetes, keep taking it.      ?Your kidney function tests are abnormal.  If you take Metformin, do not take it for 48 hours. Please go to your clinic for a blood test within 3 days after your exam before the restarting this medicine.     (Note to provider:please give patient prescription for lab tests.)    ?Special instructions: -    I have read and understand the above information.    Patient Sign Here:______________________________________Date:________Time:______    Staff Sign Here:________________________________________Date:_______Time:______      Radiology Departments:     ?[facility]]: [phone] ?[place]: [phone]     ?[place]: [phone] ?[facility]:[phone]      ?[place]: [phone]  ?[facility]: [phone]  ?[place]:[phone]    ?[facility]:[phone]  ?[facility]:[phone]

## 2022-04-13 NOTE — PROGRESS NOTES
Patient arrived in w/c for IV fluids and stated he was feeling weak.  This writer and Catia CEDILLO RN, were transferring patient from w/c to infusion chair.  Patient unable to pivot and legs became very weak. Lowered back down to w/c.  Patsy MORGAN LPN, came to assist and patient's upper body was slumped forward.  While she was trying to have a conversation with him, his eyes started rolling back.  Rapid response called.  B/P 84/50, HR 76, 02 sat 95%.  Rapid response team arrived and patient was able to converse with them and he did not remember what happened and was asking why he was getting all this attention.  Rapid response team transferred patient to ED to be evaluated.

## 2022-04-17 LAB
MDC_IDC_LEAD_IMPLANT_DT: NORMAL
MDC_IDC_LEAD_IMPLANT_DT: NORMAL
MDC_IDC_LEAD_LOCATION: NORMAL
MDC_IDC_LEAD_LOCATION: NORMAL
MDC_IDC_LEAD_LOCATION_DETAIL_1: NORMAL
MDC_IDC_LEAD_LOCATION_DETAIL_1: NORMAL
MDC_IDC_LEAD_MFG: NORMAL
MDC_IDC_LEAD_MFG: NORMAL
MDC_IDC_LEAD_MODEL: NORMAL
MDC_IDC_LEAD_MODEL: NORMAL
MDC_IDC_LEAD_POLARITY_TYPE: NORMAL
MDC_IDC_LEAD_POLARITY_TYPE: NORMAL
MDC_IDC_LEAD_SERIAL: NORMAL
MDC_IDC_LEAD_SERIAL: NORMAL
MDC_IDC_MSMT_BATTERY_DTM: NORMAL
MDC_IDC_MSMT_BATTERY_REMAINING_LONGEVITY: 76 MO
MDC_IDC_MSMT_BATTERY_RRT_TRIGGER: 2.62
MDC_IDC_MSMT_BATTERY_STATUS: NORMAL
MDC_IDC_MSMT_BATTERY_VOLTAGE: 2.98 V
MDC_IDC_MSMT_LEADCHNL_RA_IMPEDANCE_VALUE: 437 OHM
MDC_IDC_MSMT_LEADCHNL_RA_IMPEDANCE_VALUE: 532 OHM
MDC_IDC_MSMT_LEADCHNL_RA_PACING_THRESHOLD_AMPLITUDE: 1.25 V
MDC_IDC_MSMT_LEADCHNL_RA_PACING_THRESHOLD_PULSEWIDTH: 0.4 MS
MDC_IDC_MSMT_LEADCHNL_RA_SENSING_INTR_AMPL: 2.62 MV
MDC_IDC_MSMT_LEADCHNL_RA_SENSING_INTR_AMPL: 2.62 MV
MDC_IDC_MSMT_LEADCHNL_RV_IMPEDANCE_VALUE: 399 OHM
MDC_IDC_MSMT_LEADCHNL_RV_IMPEDANCE_VALUE: 570 OHM
MDC_IDC_MSMT_LEADCHNL_RV_PACING_THRESHOLD_AMPLITUDE: 0.75 V
MDC_IDC_MSMT_LEADCHNL_RV_PACING_THRESHOLD_PULSEWIDTH: 0.4 MS
MDC_IDC_MSMT_LEADCHNL_RV_SENSING_INTR_AMPL: 4.75 MV
MDC_IDC_MSMT_LEADCHNL_RV_SENSING_INTR_AMPL: 4.75 MV
MDC_IDC_PG_IMPLANT_DTM: NORMAL
MDC_IDC_PG_MFG: NORMAL
MDC_IDC_PG_MODEL: NORMAL
MDC_IDC_PG_SERIAL: NORMAL
MDC_IDC_PG_TYPE: NORMAL
MDC_IDC_SESS_CLINIC_NAME: NORMAL
MDC_IDC_SESS_DTM: NORMAL
MDC_IDC_SESS_TYPE: NORMAL
MDC_IDC_SET_BRADY_AT_MODE_SWITCH_RATE: 171 {BEATS}/MIN
MDC_IDC_SET_BRADY_HYSTRATE: NORMAL
MDC_IDC_SET_BRADY_LOWRATE: 70 {BEATS}/MIN
MDC_IDC_SET_BRADY_MAX_SENSOR_RATE: 130 {BEATS}/MIN
MDC_IDC_SET_BRADY_MAX_TRACKING_RATE: 130 {BEATS}/MIN
MDC_IDC_SET_BRADY_MODE: NORMAL
MDC_IDC_SET_BRADY_PAV_DELAY_LOW: 180 MS
MDC_IDC_SET_BRADY_SAV_DELAY_LOW: 150 MS
MDC_IDC_SET_LEADCHNL_RA_PACING_AMPLITUDE: 2.75 V
MDC_IDC_SET_LEADCHNL_RA_PACING_ANODE_ELECTRODE_1: NORMAL
MDC_IDC_SET_LEADCHNL_RA_PACING_ANODE_LOCATION_1: NORMAL
MDC_IDC_SET_LEADCHNL_RA_PACING_CAPTURE_MODE: NORMAL
MDC_IDC_SET_LEADCHNL_RA_PACING_CATHODE_ELECTRODE_1: NORMAL
MDC_IDC_SET_LEADCHNL_RA_PACING_CATHODE_LOCATION_1: NORMAL
MDC_IDC_SET_LEADCHNL_RA_PACING_POLARITY: NORMAL
MDC_IDC_SET_LEADCHNL_RA_PACING_PULSEWIDTH: 0.4 MS
MDC_IDC_SET_LEADCHNL_RA_SENSING_ANODE_ELECTRODE_1: NORMAL
MDC_IDC_SET_LEADCHNL_RA_SENSING_ANODE_LOCATION_1: NORMAL
MDC_IDC_SET_LEADCHNL_RA_SENSING_CATHODE_ELECTRODE_1: NORMAL
MDC_IDC_SET_LEADCHNL_RA_SENSING_CATHODE_LOCATION_1: NORMAL
MDC_IDC_SET_LEADCHNL_RA_SENSING_POLARITY: NORMAL
MDC_IDC_SET_LEADCHNL_RA_SENSING_SENSITIVITY: 0.9 MV
MDC_IDC_SET_LEADCHNL_RV_PACING_AMPLITUDE: 2 V
MDC_IDC_SET_LEADCHNL_RV_PACING_ANODE_ELECTRODE_1: NORMAL
MDC_IDC_SET_LEADCHNL_RV_PACING_ANODE_LOCATION_1: NORMAL
MDC_IDC_SET_LEADCHNL_RV_PACING_CAPTURE_MODE: NORMAL
MDC_IDC_SET_LEADCHNL_RV_PACING_CATHODE_ELECTRODE_1: NORMAL
MDC_IDC_SET_LEADCHNL_RV_PACING_CATHODE_LOCATION_1: NORMAL
MDC_IDC_SET_LEADCHNL_RV_PACING_POLARITY: NORMAL
MDC_IDC_SET_LEADCHNL_RV_PACING_PULSEWIDTH: 0.4 MS
MDC_IDC_SET_LEADCHNL_RV_SENSING_ANODE_ELECTRODE_1: NORMAL
MDC_IDC_SET_LEADCHNL_RV_SENSING_ANODE_LOCATION_1: NORMAL
MDC_IDC_SET_LEADCHNL_RV_SENSING_CATHODE_ELECTRODE_1: NORMAL
MDC_IDC_SET_LEADCHNL_RV_SENSING_CATHODE_LOCATION_1: NORMAL
MDC_IDC_SET_LEADCHNL_RV_SENSING_POLARITY: NORMAL
MDC_IDC_SET_LEADCHNL_RV_SENSING_SENSITIVITY: 0.9 MV
MDC_IDC_SET_ZONE_DETECTION_INTERVAL: 350 MS
MDC_IDC_SET_ZONE_DETECTION_INTERVAL: 400 MS
MDC_IDC_SET_ZONE_TYPE: NORMAL
MDC_IDC_STAT_AT_BURDEN_PERCENT: 0.1 %
MDC_IDC_STAT_AT_DTM_END: NORMAL
MDC_IDC_STAT_AT_DTM_START: NORMAL
MDC_IDC_STAT_BRADY_AP_VP_PERCENT: 99.15 %
MDC_IDC_STAT_BRADY_AP_VS_PERCENT: 0.06 %
MDC_IDC_STAT_BRADY_AS_VP_PERCENT: 0.42 %
MDC_IDC_STAT_BRADY_AS_VS_PERCENT: 0.37 %
MDC_IDC_STAT_BRADY_DTM_END: NORMAL
MDC_IDC_STAT_BRADY_DTM_START: NORMAL
MDC_IDC_STAT_BRADY_RA_PERCENT_PACED: 99.4 %
MDC_IDC_STAT_BRADY_RV_PERCENT_PACED: 99.57 %
MDC_IDC_STAT_EPISODE_RECENT_COUNT: 0
MDC_IDC_STAT_EPISODE_RECENT_COUNT_DTM_END: NORMAL
MDC_IDC_STAT_EPISODE_RECENT_COUNT_DTM_START: NORMAL
MDC_IDC_STAT_EPISODE_TOTAL_COUNT: 0
MDC_IDC_STAT_EPISODE_TOTAL_COUNT: 3
MDC_IDC_STAT_EPISODE_TOTAL_COUNT: 886
MDC_IDC_STAT_EPISODE_TOTAL_COUNT_DTM_END: NORMAL
MDC_IDC_STAT_EPISODE_TOTAL_COUNT_DTM_START: NORMAL
MDC_IDC_STAT_EPISODE_TYPE: NORMAL

## 2022-04-19 ENCOUNTER — TELEPHONE (OUTPATIENT)
Dept: FAMILY MEDICINE | Facility: OTHER | Age: 87
End: 2022-04-19
Payer: MEDICARE

## 2022-04-19 NOTE — TELEPHONE ENCOUNTER
Contacted by infusion therapy RN regarding pts functional decline.     Routing to primary (pt is seen at [facility])     Dotty Wilson MD

## 2022-04-20 ENCOUNTER — INFUSION THERAPY VISIT (OUTPATIENT)
Dept: INFUSION THERAPY | Facility: OTHER | Age: 87
End: 2022-04-20
Attending: FAMILY MEDICINE
Payer: MEDICARE

## 2022-04-20 VITALS
RESPIRATION RATE: 15 BRPM | TEMPERATURE: 97.8 F | HEART RATE: 71 BPM | OXYGEN SATURATION: 94 % | DIASTOLIC BLOOD PRESSURE: 42 MMHG | SYSTOLIC BLOOD PRESSURE: 110 MMHG

## 2022-04-20 DIAGNOSIS — E86.9 VOLUME DEPLETION: Primary | ICD-10-CM

## 2022-04-20 PROCEDURE — 250N000011 HC RX IP 250 OP 636: Performed by: FAMILY MEDICINE

## 2022-04-20 PROCEDURE — 96365 THER/PROPH/DIAG IV INF INIT: CPT

## 2022-04-20 PROCEDURE — 96360 HYDRATION IV INFUSION INIT: CPT

## 2022-04-20 PROCEDURE — 258N000003 HC RX IP 258 OP 636: Performed by: FAMILY MEDICINE

## 2022-04-20 RX ORDER — HEPARIN SODIUM (PORCINE) LOCK FLUSH IV SOLN 100 UNIT/ML 100 UNIT/ML
5 SOLUTION INTRAVENOUS ONCE
Status: COMPLETED | OUTPATIENT
Start: 2022-04-20 | End: 2022-04-20

## 2022-04-20 RX ORDER — HEPARIN SODIUM (PORCINE) LOCK FLUSH IV SOLN 100 UNIT/ML 100 UNIT/ML
5 SOLUTION INTRAVENOUS ONCE
Status: CANCELLED
Start: 2022-04-20 | End: 2022-04-20

## 2022-04-20 RX ADMIN — SODIUM CHLORIDE 1000 ML: 9 INJECTION, SOLUTION INTRAVENOUS at 09:42

## 2022-04-20 RX ADMIN — HEPARIN 5 ML: 100 SYRINGE at 10:48

## 2022-04-20 NOTE — PROGRESS NOTES
Patient is 88 years old, here today for infusion of 1 L Normal Saline per order of Dr Whitman.      Patient identified with two identifiers, order verified, and verbal consent for today's infusion obtained from patient.      Lab values:  N/A      Patient meets order parameters for today's treatment.         Patients port accessed using non-coring, 19 gauge, 3/4 needle. Port accessed per facility protocol. Port flushed easily, blood return noted.  No signs and symptoms of infection or infiltration.      IV pump verified with dose, drug, and rate of administration.  Infusion administered per protocol.

## 2022-04-21 LAB
MDC_IDC_LEAD_IMPLANT_DT: NORMAL
MDC_IDC_LEAD_IMPLANT_DT: NORMAL
MDC_IDC_LEAD_LOCATION: NORMAL
MDC_IDC_LEAD_LOCATION: NORMAL
MDC_IDC_LEAD_LOCATION_DETAIL_1: NORMAL
MDC_IDC_LEAD_LOCATION_DETAIL_1: NORMAL
MDC_IDC_LEAD_MFG: NORMAL
MDC_IDC_LEAD_MFG: NORMAL
MDC_IDC_LEAD_MODEL: NORMAL
MDC_IDC_LEAD_MODEL: NORMAL
MDC_IDC_LEAD_POLARITY_TYPE: NORMAL
MDC_IDC_LEAD_POLARITY_TYPE: NORMAL
MDC_IDC_LEAD_SERIAL: NORMAL
MDC_IDC_LEAD_SERIAL: NORMAL
MDC_IDC_MSMT_BATTERY_DTM: NORMAL
MDC_IDC_MSMT_BATTERY_REMAINING_LONGEVITY: 77 MO
MDC_IDC_MSMT_BATTERY_RRT_TRIGGER: 2.62
MDC_IDC_MSMT_BATTERY_STATUS: NORMAL
MDC_IDC_MSMT_BATTERY_VOLTAGE: 2.98 V
MDC_IDC_MSMT_LEADCHNL_RA_IMPEDANCE_VALUE: 532 OHM
MDC_IDC_MSMT_LEADCHNL_RA_PACING_THRESHOLD_AMPLITUDE: 1.25 V
MDC_IDC_MSMT_LEADCHNL_RA_PACING_THRESHOLD_PULSEWIDTH: 0.4 MS
MDC_IDC_MSMT_LEADCHNL_RA_SENSING_INTR_AMPL: 1.3 MV
MDC_IDC_MSMT_LEADCHNL_RV_IMPEDANCE_VALUE: 551 OHM
MDC_IDC_MSMT_LEADCHNL_RV_PACING_THRESHOLD_AMPLITUDE: 0.75 V
MDC_IDC_MSMT_LEADCHNL_RV_PACING_THRESHOLD_PULSEWIDTH: 0.4 MS
MDC_IDC_MSMT_LEADCHNL_RV_SENSING_INTR_AMPL: 4.8 MV
MDC_IDC_PG_IMPLANT_DTM: NORMAL
MDC_IDC_PG_MFG: NORMAL
MDC_IDC_PG_MODEL: NORMAL
MDC_IDC_PG_SERIAL: NORMAL
MDC_IDC_PG_TYPE: NORMAL
MDC_IDC_SESS_CLINIC_NAME: NORMAL
MDC_IDC_SESS_DTM: NORMAL
MDC_IDC_SESS_TYPE: NORMAL
MDC_IDC_SET_BRADY_AT_MODE_SWITCH_RATE: 171 {BEATS}/MIN
MDC_IDC_SET_BRADY_HYSTRATE: NORMAL
MDC_IDC_SET_BRADY_LOWRATE: 70 {BEATS}/MIN
MDC_IDC_SET_BRADY_MAX_SENSOR_RATE: 130 {BEATS}/MIN
MDC_IDC_SET_BRADY_MAX_TRACKING_RATE: 130 {BEATS}/MIN
MDC_IDC_SET_BRADY_MODE: NORMAL
MDC_IDC_SET_BRADY_PAV_DELAY_LOW: 180 MS
MDC_IDC_SET_BRADY_SAV_DELAY_LOW: 150 MS
MDC_IDC_SET_LEADCHNL_RA_PACING_AMPLITUDE: 2.75 V
MDC_IDC_SET_LEADCHNL_RA_PACING_ANODE_ELECTRODE_1: NORMAL
MDC_IDC_SET_LEADCHNL_RA_PACING_ANODE_LOCATION_1: NORMAL
MDC_IDC_SET_LEADCHNL_RA_PACING_CAPTURE_MODE: NORMAL
MDC_IDC_SET_LEADCHNL_RA_PACING_CATHODE_ELECTRODE_1: NORMAL
MDC_IDC_SET_LEADCHNL_RA_PACING_CATHODE_LOCATION_1: NORMAL
MDC_IDC_SET_LEADCHNL_RA_PACING_POLARITY: NORMAL
MDC_IDC_SET_LEADCHNL_RA_PACING_PULSEWIDTH: 0.4 MS
MDC_IDC_SET_LEADCHNL_RA_SENSING_ANODE_ELECTRODE_1: NORMAL
MDC_IDC_SET_LEADCHNL_RA_SENSING_ANODE_LOCATION_1: NORMAL
MDC_IDC_SET_LEADCHNL_RA_SENSING_CATHODE_ELECTRODE_1: NORMAL
MDC_IDC_SET_LEADCHNL_RA_SENSING_CATHODE_LOCATION_1: NORMAL
MDC_IDC_SET_LEADCHNL_RA_SENSING_POLARITY: NORMAL
MDC_IDC_SET_LEADCHNL_RA_SENSING_SENSITIVITY: 0.9 MV
MDC_IDC_SET_LEADCHNL_RV_PACING_AMPLITUDE: 2 V
MDC_IDC_SET_LEADCHNL_RV_PACING_ANODE_ELECTRODE_1: NORMAL
MDC_IDC_SET_LEADCHNL_RV_PACING_ANODE_LOCATION_1: NORMAL
MDC_IDC_SET_LEADCHNL_RV_PACING_CAPTURE_MODE: NORMAL
MDC_IDC_SET_LEADCHNL_RV_PACING_CATHODE_ELECTRODE_1: NORMAL
MDC_IDC_SET_LEADCHNL_RV_PACING_CATHODE_LOCATION_1: NORMAL
MDC_IDC_SET_LEADCHNL_RV_PACING_POLARITY: NORMAL
MDC_IDC_SET_LEADCHNL_RV_PACING_PULSEWIDTH: 0.4 MS
MDC_IDC_SET_LEADCHNL_RV_SENSING_ANODE_ELECTRODE_1: NORMAL
MDC_IDC_SET_LEADCHNL_RV_SENSING_ANODE_LOCATION_1: NORMAL
MDC_IDC_SET_LEADCHNL_RV_SENSING_CATHODE_ELECTRODE_1: NORMAL
MDC_IDC_SET_LEADCHNL_RV_SENSING_CATHODE_LOCATION_1: NORMAL
MDC_IDC_SET_LEADCHNL_RV_SENSING_POLARITY: NORMAL
MDC_IDC_SET_LEADCHNL_RV_SENSING_SENSITIVITY: 0.9 MV
MDC_IDC_SET_ZONE_DETECTION_INTERVAL: 350 MS
MDC_IDC_SET_ZONE_DETECTION_INTERVAL: 400 MS
MDC_IDC_SET_ZONE_TYPE: NORMAL
MDC_IDC_STAT_AT_BURDEN_PERCENT: 0 %
MDC_IDC_STAT_AT_DTM_END: NORMAL
MDC_IDC_STAT_AT_DTM_START: NORMAL
MDC_IDC_STAT_BRADY_AP_VP_PERCENT: 97.56 %
MDC_IDC_STAT_BRADY_AP_VS_PERCENT: 0.02 %
MDC_IDC_STAT_BRADY_AS_VP_PERCENT: 1.84 %
MDC_IDC_STAT_BRADY_AS_VS_PERCENT: 0.58 %
MDC_IDC_STAT_BRADY_DTM_END: NORMAL
MDC_IDC_STAT_BRADY_DTM_START: NORMAL
MDC_IDC_STAT_BRADY_RA_PERCENT_PACED: 97.9 %
MDC_IDC_STAT_BRADY_RV_PERCENT_PACED: 99.4 %
MDC_IDC_STAT_EPISODE_RECENT_COUNT: 0
MDC_IDC_STAT_EPISODE_RECENT_COUNT_DTM_END: NORMAL
MDC_IDC_STAT_EPISODE_RECENT_COUNT_DTM_START: NORMAL
MDC_IDC_STAT_EPISODE_TOTAL_COUNT: 0
MDC_IDC_STAT_EPISODE_TOTAL_COUNT: 3
MDC_IDC_STAT_EPISODE_TOTAL_COUNT: 886
MDC_IDC_STAT_EPISODE_TOTAL_COUNT_DTM_END: NORMAL
MDC_IDC_STAT_EPISODE_TOTAL_COUNT_DTM_START: NORMAL
MDC_IDC_STAT_EPISODE_TYPE: NORMAL

## 2022-04-22 NOTE — ED TRIAGE NOTES
"Pt arrives via EMS from [facility].  EMS reports that the pt had a witnessed fall tonight.  They also report that staff stated that the patient has been hallucinating.  When speaking with the pt he is unsure why he is here \"this is a surprise to me.\"  Pt does have a hx of dementia.  " No

## 2022-04-23 ENCOUNTER — APPOINTMENT (OUTPATIENT)
Dept: GENERAL RADIOLOGY | Facility: HOSPITAL | Age: 87
End: 2022-04-23
Attending: STUDENT IN AN ORGANIZED HEALTH CARE EDUCATION/TRAINING PROGRAM
Payer: MEDICARE

## 2022-04-23 ENCOUNTER — APPOINTMENT (OUTPATIENT)
Dept: CT IMAGING | Facility: HOSPITAL | Age: 87
End: 2022-04-23
Attending: NURSE PRACTITIONER
Payer: MEDICARE

## 2022-04-23 ENCOUNTER — HOSPITAL ENCOUNTER (OUTPATIENT)
Facility: HOSPITAL | Age: 87
Setting detail: OBSERVATION
Discharge: NURSING, REHAB OR LONG-TERM CARE | End: 2022-04-25
Attending: NURSE PRACTITIONER | Admitting: SURGERY
Payer: MEDICARE

## 2022-04-23 DIAGNOSIS — S20.212A HEMATOMA OF CHEST WALL, LEFT, INITIAL ENCOUNTER: ICD-10-CM

## 2022-04-23 LAB
ABO/RH(D): NORMAL
ALBUMIN SERPL-MCNC: 3.3 G/DL (ref 3.4–5)
ALBUMIN UR-MCNC: NEGATIVE MG/DL
ALP SERPL-CCNC: 55 U/L (ref 40–150)
ALT SERPL W P-5'-P-CCNC: 26 U/L (ref 0–70)
ANION GAP SERPL CALCULATED.3IONS-SCNC: 4 MMOL/L (ref 3–14)
ANTIBODY SCREEN: NEGATIVE
APPEARANCE UR: CLEAR
APTT PPP: 31 SECONDS (ref 22–38)
AST SERPL W P-5'-P-CCNC: 18 U/L (ref 0–45)
BASOPHILS # BLD AUTO: 0 10E3/UL (ref 0–0.2)
BASOPHILS NFR BLD AUTO: 1 %
BILIRUB SERPL-MCNC: 0.4 MG/DL (ref 0.2–1.3)
BILIRUB UR QL STRIP: NEGATIVE
BUN SERPL-MCNC: 14 MG/DL (ref 7–30)
CALCIUM SERPL-MCNC: 8.5 MG/DL (ref 8.5–10.1)
CHLORIDE BLD-SCNC: 108 MMOL/L (ref 94–109)
CO2 SERPL-SCNC: 28 MMOL/L (ref 20–32)
COLOR UR AUTO: NORMAL
CREAT SERPL-MCNC: 0.97 MG/DL (ref 0.66–1.25)
EOSINOPHIL # BLD AUTO: 0.3 10E3/UL (ref 0–0.7)
EOSINOPHIL NFR BLD AUTO: 5 %
ERYTHROCYTE [DISTWIDTH] IN BLOOD BY AUTOMATED COUNT: 12.7 % (ref 10–15)
GFR SERPL CREATININE-BSD FRML MDRD: 75 ML/MIN/1.73M2
GLUCOSE BLD-MCNC: 91 MG/DL (ref 70–99)
GLUCOSE UR STRIP-MCNC: NEGATIVE MG/DL
HCT VFR BLD AUTO: 42.3 % (ref 40–53)
HGB BLD-MCNC: 14 G/DL (ref 13.3–17.7)
HGB UR QL STRIP: NEGATIVE
IMM GRANULOCYTES # BLD: 0 10E3/UL
IMM GRANULOCYTES NFR BLD: 0 %
INR PPP: 1.34 (ref 0.85–1.15)
KETONES UR STRIP-MCNC: NEGATIVE MG/DL
LEUKOCYTE ESTERASE UR QL STRIP: NEGATIVE
LYMPHOCYTES # BLD AUTO: 1.2 10E3/UL (ref 0.8–5.3)
LYMPHOCYTES NFR BLD AUTO: 21 %
MCH RBC QN AUTO: 31.1 PG (ref 26.5–33)
MCHC RBC AUTO-ENTMCNC: 33.1 G/DL (ref 31.5–36.5)
MCV RBC AUTO: 94 FL (ref 78–100)
MONOCYTES # BLD AUTO: 0.6 10E3/UL (ref 0–1.3)
MONOCYTES NFR BLD AUTO: 11 %
NEUTROPHILS # BLD AUTO: 3.7 10E3/UL (ref 1.6–8.3)
NEUTROPHILS NFR BLD AUTO: 62 %
NITRATE UR QL: NEGATIVE
NRBC # BLD AUTO: 0 10E3/UL
NRBC BLD AUTO-RTO: 0 /100
PH UR STRIP: 6.5 [PH] (ref 4.7–8)
PLATELET # BLD AUTO: 165 10E3/UL (ref 150–450)
POTASSIUM BLD-SCNC: 4.2 MMOL/L (ref 3.4–5.3)
PROT SERPL-MCNC: 6.8 G/DL (ref 6.8–8.8)
RBC # BLD AUTO: 4.5 10E6/UL (ref 4.4–5.9)
SARS-COV-2 RNA RESP QL NAA+PROBE: NEGATIVE
SODIUM SERPL-SCNC: 140 MMOL/L (ref 133–144)
SP GR UR STRIP: 1.03 (ref 1–1.03)
SPECIMEN EXPIRATION DATE: NORMAL
UROBILINOGEN UR STRIP-MCNC: NORMAL MG/DL
WBC # BLD AUTO: 5.8 10E3/UL (ref 4–11)

## 2022-04-23 PROCEDURE — 81003 URINALYSIS AUTO W/O SCOPE: CPT | Performed by: NURSE PRACTITIONER

## 2022-04-23 PROCEDURE — G0378 HOSPITAL OBSERVATION PER HR: HCPCS

## 2022-04-23 PROCEDURE — 99285 EMERGENCY DEPT VISIT HI MDM: CPT | Mod: 25

## 2022-04-23 PROCEDURE — 250N000011 HC RX IP 250 OP 636: Performed by: NURSE PRACTITIONER

## 2022-04-23 PROCEDURE — 85004 AUTOMATED DIFF WBC COUNT: CPT | Performed by: NURSE PRACTITIONER

## 2022-04-23 PROCEDURE — U0005 INFEC AGEN DETEC AMPLI PROBE: HCPCS | Performed by: NURSE PRACTITIONER

## 2022-04-23 PROCEDURE — 99284 EMERGENCY DEPT VISIT MOD MDM: CPT | Performed by: NURSE PRACTITIONER

## 2022-04-23 PROCEDURE — 80053 COMPREHEN METABOLIC PANEL: CPT | Performed by: NURSE PRACTITIONER

## 2022-04-23 PROCEDURE — 93010 ELECTROCARDIOGRAM REPORT: CPT | Mod: 76 | Performed by: INTERNAL MEDICINE

## 2022-04-23 PROCEDURE — 99219 PR INITIAL OBSERVATION CARE,LEVEL II: CPT | Performed by: SURGERY

## 2022-04-23 PROCEDURE — 85610 PROTHROMBIN TIME: CPT | Performed by: NURSE PRACTITIONER

## 2022-04-23 PROCEDURE — 93005 ELECTROCARDIOGRAM TRACING: CPT | Mod: 76

## 2022-04-23 PROCEDURE — 86901 BLOOD TYPING SEROLOGIC RH(D): CPT | Performed by: NURSE PRACTITIONER

## 2022-04-23 PROCEDURE — G1004 CDSM NDSC: HCPCS

## 2022-04-23 PROCEDURE — 74177 CT ABD & PELVIS W/CONTRAST: CPT | Mod: MG

## 2022-04-23 PROCEDURE — C9803 HOPD COVID-19 SPEC COLLECT: HCPCS

## 2022-04-23 PROCEDURE — 85730 THROMBOPLASTIN TIME PARTIAL: CPT | Performed by: NURSE PRACTITIONER

## 2022-04-23 PROCEDURE — 36415 COLL VENOUS BLD VENIPUNCTURE: CPT | Performed by: NURSE PRACTITIONER

## 2022-04-23 PROCEDURE — 258N000003 HC RX IP 258 OP 636: Performed by: SURGERY

## 2022-04-23 PROCEDURE — 71045 X-RAY EXAM CHEST 1 VIEW: CPT

## 2022-04-23 PROCEDURE — 72131 CT LUMBAR SPINE W/O DYE: CPT | Mod: MG

## 2022-04-23 RX ORDER — LIDOCAINE 40 MG/G
CREAM TOPICAL
Status: DISCONTINUED | OUTPATIENT
Start: 2022-04-23 | End: 2022-04-23

## 2022-04-23 RX ORDER — IOPAMIDOL 755 MG/ML
78 INJECTION, SOLUTION INTRAVASCULAR ONCE
Status: COMPLETED | OUTPATIENT
Start: 2022-04-23 | End: 2022-04-23

## 2022-04-23 RX ORDER — ONDANSETRON 4 MG/1
4 TABLET, ORALLY DISINTEGRATING ORAL EVERY 6 HOURS PRN
Status: DISCONTINUED | OUTPATIENT
Start: 2022-04-23 | End: 2022-04-25 | Stop reason: HOSPADM

## 2022-04-23 RX ORDER — LANOLIN ALCOHOL/MO/W.PET/CERES
5 CREAM (GRAM) TOPICAL AT BEDTIME
COMMUNITY
End: 2022-09-07

## 2022-04-23 RX ORDER — SODIUM CHLORIDE, SODIUM LACTATE, POTASSIUM CHLORIDE, CALCIUM CHLORIDE 600; 310; 30; 20 MG/100ML; MG/100ML; MG/100ML; MG/100ML
INJECTION, SOLUTION INTRAVENOUS CONTINUOUS
Status: DISCONTINUED | OUTPATIENT
Start: 2022-04-23 | End: 2022-04-23

## 2022-04-23 RX ORDER — ACETAMINOPHEN 500 MG
1000 TABLET ORAL 3 TIMES DAILY
COMMUNITY

## 2022-04-23 RX ORDER — ONDANSETRON 2 MG/ML
4 INJECTION INTRAMUSCULAR; INTRAVENOUS EVERY 6 HOURS PRN
Status: DISCONTINUED | OUTPATIENT
Start: 2022-04-23 | End: 2022-04-25 | Stop reason: HOSPADM

## 2022-04-23 RX ORDER — ACETAMINOPHEN 325 MG/1
650 TABLET ORAL EVERY 6 HOURS PRN
Status: DISCONTINUED | OUTPATIENT
Start: 2022-04-23 | End: 2022-04-25 | Stop reason: HOSPADM

## 2022-04-23 RX ADMIN — IOPAMIDOL 78 ML: 755 INJECTION, SOLUTION INTRAVENOUS at 13:33

## 2022-04-23 RX ADMIN — SODIUM CHLORIDE, POTASSIUM CHLORIDE, SODIUM LACTATE AND CALCIUM CHLORIDE: 600; 310; 30; 20 INJECTION, SOLUTION INTRAVENOUS at 16:41

## 2022-04-23 ASSESSMENT — ACTIVITIES OF DAILY LIVING (ADL)
WALKING_OR_CLIMBING_STAIRS: AMBULATION DIFFICULTY, DEPENDENT;STAIR CLIMBING DIFFICULTY, DEPENDENT;TRANSFERRING DIFFICULTY, DEPENDENT
WALKING_OR_CLIMBING_STAIRS_DIFFICULTY: YES
DRESSING/BATHING: BATHING DIFFICULTY, DEPENDENT;DRESSING DIFFICULTY, DEPENDENT
DIFFICULTY_EATING/SWALLOWING: NO
TRANSFERRING: 2-->COMPLETELY DEPENDENT
CONCENTRATING,_REMEMBERING_OR_MAKING_DECISIONS_DIFFICULTY: YES
TOILETING_ASSISTANCE: TOILETING DIFFICULTY, DEPENDENT
TOILETING: 2-->COMPLETELY DEPENDENT (NOT DEVELOPMENTALLY APPROPRIATE)
BATHING: 2-->COMPLETELY DEPENDENT (NOT DEVELOPMENTALLY APPROPRIATE)
TOILETING_ISSUES: YES
CHANGE_IN_FUNCTIONAL_STATUS_SINCE_ONSET_OF_CURRENT_ILLNESS/INJURY: NO
DRESS: 2-->COMPLETELY DEPENDENT
DOING_ERRANDS_INDEPENDENTLY_DIFFICULTY: YES
TRANSFERRING: 2-->COMPLETELY DEPENDENT (NOT DEVELOPMENTALLY APPROPRIATE)
TOILETING: 2-->COMPLETELY DEPENDENT
FALL_HISTORY_WITHIN_LAST_SIX_MONTHS: YES
DRESS: 2-->COMPLETELY DEPENDENT (NOT DEVELOPMENTALLY APPROPRIATE)
DRESSING/BATHING_DIFFICULTY: YES
EQUIPMENT_CURRENTLY_USED_AT_HOME: WALKER, STANDARD
DIFFICULTY_COMMUNICATING: NO
NUMBER_OF_TIMES_PATIENT_HAS_FALLEN_WITHIN_LAST_SIX_MONTHS: 4
WEAR_GLASSES_OR_BLIND: YES

## 2022-04-23 ASSESSMENT — ENCOUNTER SYMPTOMS
ALLERGIC/IMMUNOLOGIC NEGATIVE: 1
ENDOCRINE NEGATIVE: 1
CARDIOVASCULAR NEGATIVE: 1
WOUND: 1
RESPIRATORY NEGATIVE: 1
EYES NEGATIVE: 1
NEUROLOGICAL NEGATIVE: 1
CONFUSION: 1
CONSTITUTIONAL NEGATIVE: 1
MUSCULOSKELETAL NEGATIVE: 1
HEMATOLOGIC/LYMPHATIC NEGATIVE: 1
GASTROINTESTINAL NEGATIVE: 1

## 2022-04-23 NOTE — PLAN OF CARE
"Most recent vitals: BP (!) 100/48 (BP Location: Left arm)   Pulse 70   Temp 97  F (36.1  C) (Tympanic)   Resp 16   Ht 1.753 m (5' 9\")   Wt 74 kg (163 lb 2.3 oz)   SpO2 95%   BMI 24.09 kg/m      Alert but confused intermittently. Forgetful. Second BP check, soft. Otherwise VSS. Afebrile. Denies pain. Bruising to L chest and L thigh. Dressing to L chest. Lungs clear bilaterally. Bowel sounds active. IV fluids running most of the shift, discontinued at 2100. IV SL. Was NPO for most of the shift but changed to soft and bite sized around 2100. Small open area to coccyx. Can turn self and does. Stands at bedside to use urinal, incontinent at times. Alarms active.    Face to face report given with opportunity to observe patient.    Report given to Radha Garcia RN   4/23/2022  11:13 PM        "

## 2022-04-23 NOTE — H&P
[facility] History and Physical    Jf Ortiz MRN# [id number]   Age: 88 year old YOB: 1933     Date of Admission:  4/23/2022      Primary care provider: No Ref-Primary, Physician          Assessment and Plan:   Assessment:   Hematoma of lateral chest left side.        Plan:   Will plan on admitting to observation to ensure the hematoma does not continue to increase in size.  Will stop eliquis and aspirin for now.  Will repeat labs in a.m.  Anticipate return to his NH tomorrow as long as he is stable.            Chief Complaint:   fall    History is obtained from the patient and significant other.         History of Present Illness:   This patient is a 88 year old male who presents with fall this morning at his NH.  He slipped.  No loss of consciousness.  Hit his side.  No significant pain, but was brought here because of hematoma on his left lower chest wall.    Had syncope about 10 days ago, found to have 73 % narrowing in right carotid bifurcation.    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  Also on aspirin.              Past Medical History:     Past Medical History:   Diagnosis Date     Autonomic orthostatic hypotension 10/14/2016     Coronary artery disease      Dementia without behavioral disturbance (H) 7/28/2015    Diagnosis updated by automated process. Provider to review and confirm.     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     Seizure disorder (H)      Stented coronary artery             Past Surgical History:     Past Surgical History:   Procedure Laterality Date     ------------OTHER-------------  1955    ulnar and radial fx - repair ulnar and  radial fx x4     ------------OTHER-------------  6/14/2011    cataract extraction     BIOPSY  08/2015    skin biopsy     BLEPHAROPLASTY BILATERAL  5/6/2014    Procedure: BLEPHAROPLASTY BILATERAL;  Surgeon: Andrew Queen MD;  Location: [facility]     BYPASS GRAFT ARTERY CORONARY  11/2006    coronary artery disease x 5, [facility] [place]     cataract extraction and lens implantation  2011    cataracts     cataract extraction and lens implantation      cataracts     COLONOSCOPY  2012     colonoscopy with polypectomy  3/13/2009    history of polyps - repeat 3 yrs     colonoscopy with polypectomy  2006     colonoscopy with polypectomy  2005     COMBINED COLONOSCOPY WITH ARGON PLASMA COAGULATOR (APC) N/A 10/31/2014    Procedure: COMBINED COLONOSCOPY WITH ARGON PLASMA COAGULATOR (APC);  Surgeon: Bassam Aguilar MD;  Location: [facility]     COMBINED COLONOSCOPY WITH ARGON PLASMA COAGULATOR (APC) N/A 11/13/2015    Procedure: COMBINED COLONOSCOPY WITH ARGON PLASMA COAGULATOR (APC);  Surgeon: Bassam Aguilar MD;  Location: [facility]     ENDOSCOPY UPPER, COLONOSCOPY, COMBINED N/A 10/31/2014    Procedure: COMBINED ENDOSCOPY UPPER, COLONOSCOPY;  Surgeon: Bassam Aguilar MD;  Location: [facility]     ENDOSCOPY UPPER, COLONOSCOPY, COMBINED N/A 11/13/2015    Procedure: COMBINED ENDOSCOPY UPPER, COLONOSCOPY;  Surgeon: Bassam Aguilar MD;  Location: [facility]     ESOPHAGOSCOPY, GASTROSCOPY, DUODENOSCOPY (EGD), COMBINED  1/22/2014    Procedure: COMBINED ESOPHAGOSCOPY, GASTROSCOPY, DUODENOSCOPY (EGD);  UPPER ENDOSCOPY(PENA) W/ BIOPSIES;  Surgeon: Patricia Pena MD;  Location: [facility]     HERNIORRHAPHY INGUINAL Right 2/14/2018    Procedure: HERNIORRHAPHY INGUINAL;  OPEN RIGHT INGUINAL HERNIA REPAIR with Mesh;  Surgeon: Virgilio Zaragoza DO;  Location: [facility]     INSERT PORT VASCULAR ACCESS Right 7/12/2019    Procedure: PORT PLACEMENT;  Surgeon: Lloyd Ram MD;  Location: [facility]     LARYNGOSCOPY WITH MICROSCOPE  1/22/2014    Procedure: LARYNGOSCOPY WITH  MICROSCOPE;;  Surgeon: Chayo Duke MD;  Location: [facility]     pacemaker placement      heart block     pacemaker placement      dual-chamber     REMOVE TUBE, MYRINGOTOMY, COMBINED  2014    Procedure: COMBINED REMOVE TUBE, MYRINGOTOMY;  MICRODIRECT LARYNGOSCOPY WITH BIOPSY AND FROZEN SECTIONS removal of right ear tube and myringoplasty;  Surgeon: Chayo Duke MD;  Location: [facility]     REPLACE PACEMAKER GENERATOR N/A 2018    Procedure: REPLACE PACEMAKER GENERATOR;  Pacemaker generator change;  Surgeon: Alma Kaplan MD;  Location: [facility] OR     stent placement to LAD       ventilation tube  2012    right in office            Social History:     Social History     Tobacco Use     Smoking status: Former Smoker     Packs/day: 1.00     Years: 5.00     Pack years: 5.00     Types: Cigarettes     Quit date: 1985     Years since quittin.3     Smokeless tobacco: Never Used     Tobacco comment: quit in    Substance Use Topics     Alcohol use: Yes     Comment: social            Family History:   This patient has no significant family history             Allergies:     Allergies   Allergen Reactions     Cats Unknown     Haldol [Haloperidol]      Per SNF reporting     Klonopin [Clonazepam]      Per SNF reporting     Lamotrigine      Skin lesions     Oxycodone      Per SNF reporting            Medications:     Current Facility-Administered Medications   Medication     lactated ringers infusion     ondansetron (ZOFRAN-ODT) ODT tab 4 mg    Or     ondansetron (ZOFRAN) injection 4 mg     Current Outpatient Medications   Medication Sig     acetaminophen (TYLENOL) 500 MG tablet Take 1,000 mg by mouth 2 times daily     apixaban ANTICOAGULANT (ELIQUIS) 5 MG tablet Take 5 mg by mouth 2 times daily     aspirin (ASA) 81 MG chewable tablet Take 81 mg by mouth daily     atorvastatin (LIPITOR) 40 MG tablet Take 40 mg by mouth daily     fludrocortisone (FLORINEF) 0.1 MG tablet Take 1 tablet (0.1  mg) by mouth every morning (before breakfast)     Magnesium Hydroxide (MILK OF MAGNESIA PO)      melatonin 3 MG tablet Take 3 mg by mouth At Bedtime     memantine (NAMENDA) 5 MG tablet Take 5 mg by mouth 2 times daily     NONFORMULARY 2 times daily Barrier Cream;    Apply liberally to bilateral buttocks/coccyx area     nystatin POWD PRN to affected areas/areas of yeast     potassium chloride ER (KLOR-CON M) 20 MEQ CR tablet TAKE 1 TABLET BY MOUTH EVERY DAY     RABEprazole (ACIPHEX) 20 MG EC tablet Take 20 mg by mouth 2 times daily     senna-docusate (SENOKOT-S/PERICOLACE) 8.6-50 MG tablet Take 1 tablet by mouth daily     sertraline (ZOLOFT) 50 MG tablet Take 50 mg by mouth daily     sodium chloride 1 GM tablet TAKE 1 TABLET BY MOUTH TWICE A DAY     bisacodyl (DULCOLAX) 5 MG EC tablet Take 5 mg by mouth daily as needed for constipation (Patient not taking: No sig reported)     Facility-Administered Medications Ordered in Other Encounters   Medication     sodium chloride (PF) 0.9% PF flush 20 mL             Review of Systems:   CONSTITUTIONAL: NEGATIVE for fever, chills.  Head: negative for headaches.  ENT/MOUTH: NEGATIVE for ear, mouth and throat problems  RESP: NEGATIVE for significant cough or SOB  CV: NEGATIVE for chest pain, palpitations or peripheral edema  GI: NEGATIVE for nausea, abdominal pain, heartburn, or change in bowel habits, abdominal pain,   poor appetite  : positive for frequent urination.  Significant other states he did have recent visit with urologist via televisit, but no changes were made.  MUSCULOSKELETAL: NEGATIVE for significant arthralgias or myalgia  NEURO: NEGATIVE for weakness, dizziness or paresthesias         Physical Exam:     All vitals stable  Constitutional:   awake, alert, cooperative, no apparent distress, and appears stated age     Eyes:   Eyes move in all directions with no pain.  Able to follow my fingers to command.     ENT:   Normocephalic, without obvious abnormality,  atraumatic, sinuses nontender on palpation, external ears without lesions.     Neck:   No tenderness on palpation of cervical vertebrae.     Back:   Symmetric, no curvature, spinous processes are non-tender on palpation, paraspinous muscles are non-tender on palpation, no costal vertebral tenderness     Lungs:   No increased work of breathing, good air exchange, clear to auscultation bilaterally, no crackles or wheezing     Cardiovascular:   Regular rhythm.  Systolic murmur.     Abdomen:   No scars, soft, non-distended, non-tender, no masses palpated, no hepatosplenomegally     Skin:   Hematoma on left lower thorax.  10 cm in vertical diameter, about 4 cm wide.  Significant skin bruising, abrasion over area.  Minimally tender.  Pressure bandage has been placed on it.  Skin otherwise normal except small abrasion on left arm.      Extremities:  Able to move all four extremities with no pain.  No tenderness on palpation of bones of any of his four extremities.  Good and equal strength in both arms and legs.             Data:   All laboratory data reviewed  CT scans reviewed.  Normal CT's of cervical, thoracic and lumbar spines.  CT head normal.  CT chest/abdomen shows 8.9 cm hematoma over left lateral chest wall with possible extravasation.     Attestation:  I have reviewed today's vital signs, notes, medications, labs and imaging.  Amount of time performed on this consult: 30 minutes.    Luis Carlos Noel MD

## 2022-04-23 NOTE — ED NOTES
Pt's diet includes no scrambled eggs, no bread or sandwiches, only soft and bite size food at the nursing home.

## 2022-04-23 NOTE — ED TRIAGE NOTES
Pt brought in by [facility]. Pt with history dementia. Pt from [facility]. Fell at 1145 and on blood thinners. Left rib area with abrasion. Unwitnessed fall.

## 2022-04-23 NOTE — ED PROVIDER NOTES
History     Chief Complaint   Patient presents with     Fall     HPI   History of presenting illness given by patient.    Jf Ortiz is a 88 year old male with a history of dementia who presents via ambulance after a fall in the nursing home he resides in.  He is currently on the blood thinner Eliquis and aspirin for known chronic atrial fibrillation.  Patient also has pacemaker.  He is not sure if he hit his head or not.  He does have pain to the left lower rib region with a large hematoma and abrasion to that same region.  He denies any other pain.  He is not sure if he hit his head or not.  He denies chest pain or shortness of breath.      Allergies:  Allergies   Allergen Reactions     Cats Unknown     Haldol [Haloperidol]      Per SNF reporting     Klonopin [Clonazepam]      Per SNF reporting     Lamotrigine      Skin lesions     Oxycodone      Per SNF reporting       Problem List:    Patient Active Problem List    Diagnosis Date Noted     Hematoma of chest wall, left, initial encounter 04/23/2022     Priority: Medium     Nursing Home Visit 03/03/2022     Priority: Medium     Retinal artery branch occlusion 02/13/2021     Priority: Medium     Vision loss of right eye 02/12/2021     Priority: Medium     Orthostatic hypotension dysautonomic syndrome 02/12/2021     Priority: Medium     Chronic atrial fibrillation (H) 02/12/2021     Priority: Medium     Closed compression fracture of body of L1 vertebra (H) 02/12/2021     Priority: Medium     Closed wedge compression fracture of lumbar vertebra with routine healing 12/20/2020     Priority: Medium     Compression fracture of L1 lumbar vertebra, closed, initial encounter (H) 12/18/2020     Priority: Medium     Compression fracture of thoracic vertebra, initial encounter, unspecified thoracic vertebral level 12/18/2020     Priority: Medium     Replacing diagnoses that were inactivated after the 10/1/2021 regulatory import.       Longstanding persistent atrial  fibrillation (H) 04/13/2020     Priority: Medium     Frequent falls 04/13/2020     Priority: Medium     Coronary artery disease involving native coronary artery without angina pectoris 04/13/2020     Priority: Medium     Constipation, unspecified constipation type 04/11/2018     Priority: Medium     Pacemaker, [employer], Dual Chamber - Dependent 10/06/2017     Priority: Medium     Lewy body dementia without behavioral disturbance (H) 08/31/2017     Priority: Medium     Urinary incontinence 08/31/2017     Priority: Medium     Autonomic orthostatic hypotension 10/14/2016     Priority: Medium     Dementia without behavioral disturbance (H) 07/28/2015     Priority: Medium     Diagnosis updated by automated process. Provider to review and confirm.       Parkinson disease (H)      Priority: Medium     Seizure disorder (H)      Priority: Medium     Volume depletion 08/02/2014     Priority: Medium     Stented coronary artery      Priority: Medium     Cardiac pacemaker in situ 01/08/2013     Priority: Medium     Overview:   [employer] Altrua S606 DREL,  Serial #[id number]  5-13-11  Atrial lead [employer] 4054 Serial #[id number]  8-11-00  Ventriculer lead [employer] 4137  Serial #[id number]  5-13-11  2nd Degree AV Block   Dr. Campbell  Intrinsic:  Pt. is Pacemaker Dependant, remains paced at temp. rate of 30 bpm (6-13-14)    Formatting of this note might be different from the original.  [employer] Altrua S606 DREL,  Serial #[id number]  5-13-11  Atrial lead [employer] 4054 Serial #[id number]  8-11-00  Ventriculer lead [employer] 4137  Serial #[id number]  5-13-11  2nd Degree AV Block   Dr. Campbell  Intrinsic:  Pt. is Pacemaker Dependant, remains paced at temp. 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 Provider to review       Diaphragmatic hernia  01/01/2011     Priority: Medium     Problem list name updated by automated process. Provider to review       Pain in joint, forearm 07/31/2008     Priority: Medium     Formatting of this note might be different from the original.  IMO Update 10/11       Pain in joint, ankle and foot 07/31/2008     Priority: Medium     Formatting of this note might be different from the original.  IMO Update 10/11       Dysphagia 05/22/2007     Priority: Medium     Overview:   IMO Update    Formatting of this note might be different from the original.  IMO Update       Coronary atherosclerosis of native coronary artery 11/28/2006     Priority: Medium     Overview:   IMO Update 10/11    Formatting of this note might be different from the original.  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  IMO Update 10/11       Hypertension with target blood pressure goal under 150/90 09/05/2006     Priority: Medium     Overview:   IMO Update       Hyperlipidemia 09/05/2006     Priority: Medium     Formatting of this note might be different from the original.  IMO Update 10/11       Essential hypertension 09/05/2006     Priority: Medium     Formatting of this note might be different from the original.  IMO Update          Past Medical History:    Past Medical History:   Diagnosis Date     Autonomic orthostatic hypotension 10/14/2016     Coronary artery disease      Dementia without behavioral disturbance (H) 7/28/2015     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     Seizure disorder (H)      Stented coronary artery        Past Surgical  History:    Past Surgical History:   Procedure Laterality Date     ------------OTHER-------------  1955    ulnar and radial fx - repair ulnar and radial fx x4     ------------OTHER-------------  6/14/2011    cataract extraction     BIOPSY  08/2015    skin biopsy     BLEPHAROPLASTY BILATERAL  5/6/2014    Procedure: BLEPHAROPLASTY BILATERAL;  Surgeon: Andrew Queen MD;  Location: [facility]     BYPASS GRAFT ARTERY CORONARY  11/2006    coronary artery disease x 5, [facility] [place]     cataract extraction and lens implantation  2011    cataracts     cataract extraction and lens implantation      cataracts     COLONOSCOPY  2012     colonoscopy with polypectomy  3/13/2009    history of polyps - repeat 3 yrs     colonoscopy with polypectomy  2006     colonoscopy with polypectomy  2005     COMBINED COLONOSCOPY WITH ARGON PLASMA COAGULATOR (APC) N/A 10/31/2014    Procedure: COMBINED COLONOSCOPY WITH ARGON PLASMA COAGULATOR (APC);  Surgeon: Bassam Aguilar MD;  Location: [facility]     COMBINED COLONOSCOPY WITH ARGON PLASMA COAGULATOR (APC) N/A 11/13/2015    Procedure: COMBINED COLONOSCOPY WITH ARGON PLASMA COAGULATOR (APC);  Surgeon: Bassam Aguilar MD;  Location: [facility]     ENDOSCOPY UPPER, COLONOSCOPY, COMBINED N/A 10/31/2014    Procedure: COMBINED ENDOSCOPY UPPER, COLONOSCOPY;  Surgeon: Bassam Aguilar MD;  [facility]: [facility]     ENDOSCOPY UPPER, COLONOSCOPY, COMBINED N/A 11/13/2015    Procedure: COMBINED ENDOSCOPY UPPER, COLONOSCOPY;  Surgeon: Bassam Aguilar MD;  [facility]     ESOPHAGOSCOPY, GASTROSCOPY, DUODENOSCOPY (EGD), COMBINED  1/22/2014    Procedure: COMBINED ESOPHAGOSCOPY, GASTROSCOPY, DUODENOSCOPY (EGD);  UPPER ENDOSCOPY(PENA) W/ BIOPSIES;  Surgeon: Patricia Pena MD;  Location: [facility]     HERNIORRHAPHY INGUINAL Right 2/14/2018    Procedure: HERNIORRHAPHY INGUINAL;  OPEN RIGHT INGUINAL HERNIA REPAIR with Mesh;  Surgeon: Virgilio Zaragoza DO;  Location: [facility]     INSERT PORT VASCULAR ACCESS Right 7/12/2019     Procedure: PORT PLACEMENT;  Surgeon: Lloyd Ram MD;  Location: [facility]     LARYNGOSCOPY WITH MICROSCOPE  2014    Procedure: LARYNGOSCOPY WITH MICROSCOPE;;  Surgeon: Chayo Duke MD;  Location: [facility]     pacemaker placement      heart block     pacemaker placement      dual-chamber     REMOVE TUBE, MYRINGOTOMY, COMBINED  2014    Procedure: COMBINED REMOVE TUBE, MYRINGOTOMY;  MICRODIRECT LARYNGOSCOPY WITH BIOPSY AND FROZEN SECTIONS removal of right ear tube and myringoplasty;  Surgeon: Chayo Duke MD;  Location: [facility]     REPLACE PACEMAKER GENERATOR N/A 2018    Procedure: REPLACE PACEMAKER GENERATOR;  Pacemaker generator change;  Surgeon: Alma Kaplan MD;  Location: [facility]     stent placement to LAD       ventilation tube  2012    right in office       Family History:    Family History   Problem Relation Age of Onset     Diabetes Mother      Breast Cancer Daughter        Social History:  Marital Status:  Single [1]  Social History     Tobacco Use     Smoking status: Former Smoker     Packs/day: 1.00     Years: 5.00     Pack years: 5.00     Types: Cigarettes     Quit date: 1985     Years since quittin.3     Smokeless tobacco: Never Used     Tobacco comment: quit in    Substance Use Topics     Alcohol use: Yes     Comment: social     Drug use: No        Medications:    acetaminophen (TYLENOL) 500 MG tablet  aspirin (ASA) 81 MG chewable tablet  atorvastatin (LIPITOR) 40 MG tablet  fludrocortisone (FLORINEF) 0.1 MG tablet  Magnesium Hydroxide (MILK OF MAGNESIA PO)  melatonin 3 MG tablet  memantine (NAMENDA) 5 MG tablet  NONFORMULARY  nystatin POWD  potassium chloride ER (KLOR-CON M) 20 MEQ CR tablet  RABEprazole (ACIPHEX) 20 MG EC tablet  senna-docusate (SENOKOT-S/PERICOLACE) 8.6-50 MG tablet  sertraline (ZOLOFT) 50 MG tablet  sodium chloride 1 GM tablet  bisacodyl (DULCOLAX) 5 MG EC tablet          Review of Systems   Constitutional: Negative.    HENT:  "Negative.    Eyes: Negative.    Respiratory: Negative.    Cardiovascular: Negative.    Gastrointestinal: Negative.    Endocrine: Negative.    Genitourinary: Negative.    Musculoskeletal: Negative.    Skin: Positive for wound.        Left lateral abrasion with hematoma.   Allergic/Immunologic: Negative.    Neurological: Negative.    Hematological: Negative.    Psychiatric/Behavioral: Positive for confusion.        Patient has dementia       Physical Exam   BP: 161/89  Pulse: 87  Temp: 97.3  F (36.3  C)  Resp: 16  Height: 175.3 cm (5' 9\")  Weight: 74 kg (163 lb 2.3 oz)  SpO2: 98 %      Physical Exam  Vitals and nursing note reviewed.   Constitutional:       Appearance: Normal appearance. He is normal weight.   HENT:      Head: Normocephalic and atraumatic.      Right Ear: Tympanic membrane, ear canal and external ear normal.      Left Ear: Tympanic membrane, ear canal and external ear normal.      Nose: Nose normal.      Mouth/Throat:      Mouth: Mucous membranes are moist.      Pharynx: Oropharynx is clear.   Eyes:      Extraocular Movements: Extraocular movements intact.      Conjunctiva/sclera: Conjunctivae normal.      Pupils: Pupils are equal, round, and reactive to light.   Musculoskeletal:      Cervical back: Normal range of motion and neck supple.   Neurological:      Mental Status: He is alert.         ED Course         ED Course as of 04/27/22 1031   Sat Apr 23, 2022   1225 MDM: Differential diagnosis includes and was not limited to:  Stroke  Acute coronary syndrome  Arrhythmia/syncope  Sepsis  Hyperglycemia  Electrolyte disturbance  Anemia  Pyelonephritis  Pneumonia  Hypoxia   1422 Consulted with general surgeon on-call Dr. Noel regarding CT findings of chest abdomen pelvis with contrast that shows: 8.9 cm hematoma formation over the low lateral left chest with  possible ongoing contrast extravasation/hemorrhage.  He accepted patient for inpatient observation to hospital.          EKG Interpretation: "      Interpreted by MEGHA Boudreaux CNP  Time reviewed: 13:45 repeat EKG as first 1 did not set up his pacemaker.  Symptoms at time of EKG: Fall  Rhythm: normal sinus rhythm  Rate: normal  Axis: Left axis deviation  Ectopy: none  Conduction: normal  ST Segments/ T Waves: No ST-T wave changes  Q Waves: none  Comparison to prior:     Clinical Impression: normal EKG                   No results found for this or any previous visit (from the past 24 hour(s)).    Medications   iopamidol (ISOVUE-370) solution 78 mL (78 mLs Intravenous Given 4/23/22 1333)   sodium chloride (PF) 0.9% PF flush 60 mL (50 mLs Intravenous Given 4/23/22 1332)       Assessments & Plan (with Medical Decision Making)   Findings as above.  88-year-old male presents after fall and has large hematoma to the left lower rib region lateral side. Pleasantly confused related to dementia.  On assessment there is no visual trauma to head, ecchymosis, abrasions or bleeding.     Results: CBC is within patient's normal range.  COVID-negative.  UA without infection and normal.  CMP normal.     CT chest abdomen pelvis with contrast: 8.9 cm hematoma formation over the low lateral left chest with possible ongoing contrast extravasation/hemorrhage.    CT cervical spine without contrast: IMPRESSION: No acute fracture of the cervical, thoracic and lumbar spines    CT thoracic spine without contrast: IMPRESSION: No acute fracture of the cervical, thoracic and lumbar spines.    CT lumbar spine without contrast: IMPRESSION: No acute fracture of the cervical, thoracic and lumbar spines.    CT head without contrast: IMPRESSION: No acute intracranial hemorrhage or evidence of calvarial fracture.    X-ray chest portable 1 view: IMPRESSION:  Stable portable chest. o    14:20 I consulted with Dr. Noel regarding CT findings of chest, abdomen, and pelvis.  We discussed the findings of 8.9 cm hematoma formation over the lower lateral left chest with possible ongoing  contrast extravasation/hemorrhage.  He agreed to accept patient for in-hospital observation and careful watch of this bleed.        I have reviewed the nursing notes.    I have reviewed the findings, diagnosis, plan and need for follow up with the patient.      Discharge Medication List as of 4/25/2022  9:07 AM          Final diagnoses:   Hematoma of chest wall, left, initial encounter       4/23/2022   [facility]     Alma Brunson APRN CNP  04/27/22 1031

## 2022-04-23 NOTE — PLAN OF CARE
[facility] Inpatient Admission Note:    Patient admitted to 3108/3108-1 at approximately 1630 via bed accompanied by transport tech from emergency room . Report received from Yasmin ESPOSITO in SBAR format at 1600 via telephone. Patient transferred to bed via slide board.. Patient is alert and oriented X 2, denies pain; rates at 0 on 0-10 scale.  Patient oriented to room, unit, hourly rounding, and plan of care. Explained admission packet and patient handbook with patient bill of rights brochure. Will continue to monitor and document as needed.     Inpatient Nursing criteria listed below was met:    Health care directives status obtained and documented: Yes    Patient identifies a surrogate decision maker: No If yes, who:n/a Contact Information:n/a     If initial lactic acid greater than 2.0, repeat lactic acid drawn within one hour of arrival to unit: NA. If no, state reason: n/a    Clergy visit ordered if patient requests: No    Skin issues/needs documented: Yes    Isolation Patient: no Education given, correct sign in place and documentation row added to PCS:  Yes    Fall Prevention Yes: Care plan updated, education given and documented, sticker and magnet in place: Yes    Care Plan initiated: Yes    Education Documented (including assessment): Yes    Patient has discharge needs : No If yes, please explain: back to SNF

## 2022-04-24 LAB
ANION GAP SERPL CALCULATED.3IONS-SCNC: 3 MMOL/L (ref 3–14)
BASOPHILS # BLD AUTO: 0 10E3/UL (ref 0–0.2)
BASOPHILS NFR BLD AUTO: 0 %
BUN SERPL-MCNC: 12 MG/DL (ref 7–30)
CALCIUM SERPL-MCNC: 8.5 MG/DL (ref 8.5–10.1)
CHLORIDE BLD-SCNC: 109 MMOL/L (ref 94–109)
CO2 SERPL-SCNC: 27 MMOL/L (ref 20–32)
CREAT SERPL-MCNC: 0.88 MG/DL (ref 0.66–1.25)
EOSINOPHIL # BLD AUTO: 0.2 10E3/UL (ref 0–0.7)
EOSINOPHIL NFR BLD AUTO: 3 %
ERYTHROCYTE [DISTWIDTH] IN BLOOD BY AUTOMATED COUNT: 12.7 % (ref 10–15)
GFR SERPL CREATININE-BSD FRML MDRD: 83 ML/MIN/1.73M2
GLUCOSE BLD-MCNC: 116 MG/DL (ref 70–99)
HCT VFR BLD AUTO: 36.6 % (ref 40–53)
HGB BLD-MCNC: 12.1 G/DL (ref 13.3–17.7)
IMM GRANULOCYTES # BLD: 0 10E3/UL
IMM GRANULOCYTES NFR BLD: 0 %
LYMPHOCYTES # BLD AUTO: 1.2 10E3/UL (ref 0.8–5.3)
LYMPHOCYTES NFR BLD AUTO: 23 %
MCH RBC QN AUTO: 30.6 PG (ref 26.5–33)
MCHC RBC AUTO-ENTMCNC: 33.1 G/DL (ref 31.5–36.5)
MCV RBC AUTO: 93 FL (ref 78–100)
MONOCYTES # BLD AUTO: 0.5 10E3/UL (ref 0–1.3)
MONOCYTES NFR BLD AUTO: 10 %
NEUTROPHILS # BLD AUTO: 3.2 10E3/UL (ref 1.6–8.3)
NEUTROPHILS NFR BLD AUTO: 64 %
NRBC # BLD AUTO: 0 10E3/UL
NRBC BLD AUTO-RTO: 0 /100
PLATELET # BLD AUTO: 170 10E3/UL (ref 150–450)
POTASSIUM BLD-SCNC: 3.9 MMOL/L (ref 3.4–5.3)
RBC # BLD AUTO: 3.95 10E6/UL (ref 4.4–5.9)
SODIUM SERPL-SCNC: 139 MMOL/L (ref 133–144)
WBC # BLD AUTO: 5 10E3/UL (ref 4–11)

## 2022-04-24 PROCEDURE — 250N000013 HC RX MED GY IP 250 OP 250 PS 637: Performed by: SURGERY

## 2022-04-24 PROCEDURE — G0378 HOSPITAL OBSERVATION PER HR: HCPCS

## 2022-04-24 PROCEDURE — 85025 COMPLETE CBC W/AUTO DIFF WBC: CPT | Performed by: SURGERY

## 2022-04-24 PROCEDURE — 36415 COLL VENOUS BLD VENIPUNCTURE: CPT | Performed by: SURGERY

## 2022-04-24 PROCEDURE — 99225 PR SUBSEQUENT OBSERVATION CARE,LEVEL II: CPT | Performed by: SURGERY

## 2022-04-24 PROCEDURE — 80048 BASIC METABOLIC PNL TOTAL CA: CPT | Performed by: SURGERY

## 2022-04-24 RX ORDER — FLUDROCORTISONE ACETATE 0.1 MG/1
0.1 TABLET ORAL DAILY
Status: DISCONTINUED | OUTPATIENT
Start: 2022-04-24 | End: 2022-04-25 | Stop reason: HOSPADM

## 2022-04-24 RX ADMIN — FLUDROCORTISONE ACETATE 0.1 MG: 0.1 TABLET ORAL at 10:30

## 2022-04-24 NOTE — PLAN OF CARE
"Goal Outcome Evaluation: Unchanged       Reason for hospital stay:  Hematoma of left chest wall.   Living situation PTA: Lives at [facility].   Most recent vitals: /77 (BP Location: Right arm, Patient Position: Supine, Cuff Size: Adult Regular)   Pulse 82   Temp 98  F (36.7  C) (Tympanic)   Resp 18   Ht 1.753 m (5' 9\")   Wt 74 kg (163 lb 2.3 oz)   SpO2 96%   BMI 24.09 kg/m      Pain Management:  Pt denies having pain.   LOC:  Alert but confused/forgetful. Pt has dementia. He is alert and orientated to self. Has to be reminded of time, place, and situation.   Cardiac:  Pacemaker.   Respiratory:  All fields clear, equal bilateral.   GI:  All quadrants soft, non-tender, hypoactive, and audible.   :  Can be incontinent, Can be continent, has dribbling issues.   Skin Issues:  Skin is warm, dry, and pale. Redness blanchable buttock/IT. Small open area coccyx/sacrum. Skin barrier is being applied. Left chest and left thigh are bruised.     IVF:  None. Iv access left ante cubical. IV SL.   ABX:  None.     Nutrition: Adequate. Bite sized, soft food and mildly thick (level 2).   Ambulation: Heavy assist x 2.   Safety:  Bed in the low position, call light within reach, personal items within reach, ID band in place, free from falls, will not use call light and will try to climb out of bed by himself.       Comments:Vitally stable and afebrile. Pt is cooperative and polite. He is easily redirectable. HX; CAD, dementia, has a pacemaker, seizure disorder, and stented coronary disease.   Face to face report given with opportunity to observe patient.    Report given to Praveena EDGAR RN.     Patsy Parks RN   4/24/2022  7:07 AM                   "

## 2022-04-24 NOTE — PROGRESS NOTES
Care Transitions focused note:      Spoke with Des Catherine, significant other who stated on discharge Jf is to go back to [facility] via [facility]. Des acknowledged understanding of [facility] letter.    Teri Sood CM

## 2022-04-24 NOTE — PROGRESS NOTES
Patient seen this a.m.  Was sitting up in chair.  He has no new complaints.    On exam:  VSS.  No acute distress.  Head - non tender.  Neck - no tenderness.  Back - no tenderness over spinal column.  Lungs - cta  Heart - rrr.  Abdomen - soft, nondistended, nontender.  Hematoma - slightly larger, softer.  More bruising as to be expected.  Extremities - moves all four normally.  Good  strength bilaterally.  No tenderness over thighs.      Labs - hgb has dropped from 14 to 12.1.    A/p - hospital day #2, s/p fall with 8.9 cm hematoma on CT.  Clinical exam today is not unexpected and I think it is unlikely that there is any active bleeding.  However, in light of his hgb drop and his underlying comorbidities, I do think it safest to observe him for another 24 hours.  He is understanding of this.  I did call his significant other notifying her of my impression and plan and she is in agreement.

## 2022-04-24 NOTE — PLAN OF CARE
"Goal Outcome Evaluation: Unchanged       Reason for hospital stay:  Hematoma of left chest wall.  Living situation PTA: Lives at [facility].   Most recent vitals: /64 (BP Location: Right arm, Patient Position: Chair, Cuff Size: Adult Regular)   Pulse 73   Temp 96.8  F (36  C) (Tympanic)   Resp 18   Ht 1.753 m (5' 9\")   Wt 74 kg (163 lb 2.3 oz)   SpO2 98%   BMI 24.09 kg/m      Pain Management:  Pt denies having pain.   LOC:  Alert and orientated to self and place. Disorientated to time and situation.   Cardiac:  Apical pulse regular. Heart murmer. Pacemaker.   Respiratory:  All fields clear, equal bilateral.    GI:  All quadrants hypoactive, audible, soft, and  non-tender.   :  Can be continent, Can be incontinent, Has urgent frequency without producing much urine.   Skin Issues:  Skin is warm, dry, and pale. Redness blanchable buttock/IT/sacrum/coccyx. Bruised left side of chest and left thigh. Small opening in coccyx/sacrum area. Skin barrier is being applied.     IVF:  None. No IV access. Pt removed IV himself.   ABX:  None.     Nutrition: Adequate. Soft and bite sized diet, Mildly thick (Level 2).   ADL's:  Up in the chair for most most of the day and evening.   Ambulation: Heavy assist x 2 with use of gait belt and walker. Pt had syncope episode when he got up from the commode this afternoon. He had dizziness and weakness that almost brought him to his knees.   Safety:  Bed in the low position,call light within reach, ID band in place, personal items within reach, free from falls, use of call light appropriately, and makes needs known.     Comments: Vitally stable and afebrile. Pt is polite and cooperative. HX: CAD, dementia, seizure disorder, and stented coronary artery.   Face to face report given with opportunity to observe patient.    Report given to CATE Rodriguez.     Patsy Parks RN   4/24/2022  11:39 PM                      "

## 2022-04-24 NOTE — PROGRESS NOTES
Patient seen at 8:30.  No new issues.  Denies pain.  On exam, the hematoma is similar, slightly softer.    VSS.  A/p - hematoma stable. Will stop iv fluid, let him have diet.  Continue current management.

## 2022-04-24 NOTE — PLAN OF CARE
"/68 (BP Location: Right arm, Patient Position: Chair, Cuff Size: Adult Regular)   Pulse 70   Temp 97.6  F (36.4  C) (Tympanic)   Resp 17   Ht 1.753 m (5' 9\")   Wt 74 kg (163 lb 2.3 oz)   SpO2 96%   BMI 24.09 kg/m      Pt alert and disoriented to time, VSS, afebrile, and denies pain. On room air. Lung sounds clear and equal bilaterally. Denies SOB/WATERMAN. Incont at times. Up in chair for meals. Good appetite. Ecchymosis noted to left side of chest and thigh. Abrasions open to air. Blanchable redness to coccyx. Patient repositions independently in bed. Transfers Ax2 with gait belt and walker. Assessment as charted. IV patent and SL. Call light within reach, alarms on, and room door open. Free of falls/injuries this shift.    Face to face report given with opportunity to observe patient.    Report given to CATE Garcia RN   4/24/2022  2:54 PM  "

## 2022-04-25 ENCOUNTER — DOCUMENTATION ONLY (OUTPATIENT)
Dept: OTHER | Facility: CLINIC | Age: 87
End: 2022-04-25
Payer: MEDICARE

## 2022-04-25 VITALS
TEMPERATURE: 97.8 F | DIASTOLIC BLOOD PRESSURE: 54 MMHG | HEART RATE: 72 BPM | HEIGHT: 69 IN | RESPIRATION RATE: 18 BRPM | OXYGEN SATURATION: 99 % | WEIGHT: 163.14 LBS | BODY MASS INDEX: 24.16 KG/M2 | SYSTOLIC BLOOD PRESSURE: 97 MMHG

## 2022-04-25 LAB
ANION GAP SERPL CALCULATED.3IONS-SCNC: 4 MMOL/L (ref 3–14)
BUN SERPL-MCNC: 15 MG/DL (ref 7–30)
CALCIUM SERPL-MCNC: 8.5 MG/DL (ref 8.5–10.1)
CHLORIDE BLD-SCNC: 110 MMOL/L (ref 94–109)
CO2 SERPL-SCNC: 27 MMOL/L (ref 20–32)
CREAT SERPL-MCNC: 0.88 MG/DL (ref 0.66–1.25)
ERYTHROCYTE [DISTWIDTH] IN BLOOD BY AUTOMATED COUNT: 12.8 % (ref 10–15)
GFR SERPL CREATININE-BSD FRML MDRD: 83 ML/MIN/1.73M2
GLUCOSE BLD-MCNC: 110 MG/DL (ref 70–99)
HCT VFR BLD AUTO: 34.8 % (ref 40–53)
HGB BLD-MCNC: 11.6 G/DL (ref 13.3–17.7)
MCH RBC QN AUTO: 30.9 PG (ref 26.5–33)
MCHC RBC AUTO-ENTMCNC: 33.3 G/DL (ref 31.5–36.5)
MCV RBC AUTO: 93 FL (ref 78–100)
PLATELET # BLD AUTO: 161 10E3/UL (ref 150–450)
POTASSIUM BLD-SCNC: 3.6 MMOL/L (ref 3.4–5.3)
RBC # BLD AUTO: 3.75 10E6/UL (ref 4.4–5.9)
SODIUM SERPL-SCNC: 141 MMOL/L (ref 133–144)
WBC # BLD AUTO: 4.9 10E3/UL (ref 4–11)

## 2022-04-25 PROCEDURE — 99217 PR OBSERVATION CARE DISCHARGE: CPT | Performed by: SURGERY

## 2022-04-25 PROCEDURE — 85027 COMPLETE CBC AUTOMATED: CPT | Performed by: SURGERY

## 2022-04-25 PROCEDURE — G0378 HOSPITAL OBSERVATION PER HR: HCPCS

## 2022-04-25 PROCEDURE — 80048 BASIC METABOLIC PNL TOTAL CA: CPT | Performed by: SURGERY

## 2022-04-25 PROCEDURE — 36415 COLL VENOUS BLD VENIPUNCTURE: CPT | Performed by: SURGERY

## 2022-04-25 PROCEDURE — 250N000013 HC RX MED GY IP 250 OP 250 PS 637: Performed by: SURGERY

## 2022-04-25 RX ADMIN — FLUDROCORTISONE ACETATE 0.1 MG: 0.1 TABLET ORAL at 09:33

## 2022-04-25 NOTE — PROGRESS NOTES
Care Transitions focused note:      Faxed discharge to [facility].    Teri Sood CM

## 2022-04-25 NOTE — DISCHARGE SUMMARY
[facility]    Discharge Summary  General Surgery    Date of Admission:  4/23/2022  Date of Discharge:  4/25/2022  Discharging Provider: Aftab Mejia  Date of Service (when I saw the patient): 04/25/22    Discharge Diagnoses   Active Problems:    Hematoma of chest wall, left, initial encounter      Procedure/Surgery Information   Procedure:    Surgeon(s): * Surgery not found *   Specimens: * Cannot find log *   Non-operative procedures None performed     History of Present Illness   This patient is a 88 year old male who presents with fall this morning at his NH.  He slipped.  No loss of consciousness.  Hit his side.  No significant pain, but was brought here because of hematoma on his left lower chest wall.    Had syncope about 10 days ago, found to have 73 % narrowing in right carotid bifurcation.    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  Also on aspirin.      Hospital Course   Jf Ortiz was admitted on 4/23/2022.  The following problems were addressed during his hospitalization:  Active Problems:    Hematoma of chest wall, left, initial encounter  He was noted to have a drop in his hemoglobin without clinical change in exam which prompted and additional 24 hours of observation. His hbg remained relatively stable without any movement of ecchymosis outside the prior marked area. Will continue to hold xarelto until can be seen back by PCP.     # Discharge Pain Plan: none           Medications discontinued or adjusted during this hospitalization: See below     Antibiotics prescribed at discharge: None prescribed     Imaging study follow up needs:   -    Significant Findings:   CT Chest/Abdomen/Pelvis w Contrast   Final Result   IMPRESSION:       8.9 cm hematoma formation over the low lateral left chest with   possible ongoing contrast extravasation/hemorrhage.      ALE MYERS MD            SYSTEM ID:  [email or web address]      CT Thoracic  Spine w/o Contrast   Final Result   IMPRESSION: No acute fracture of the cervical, thoracic and lumbar   spines.      ALE MYERS MD            SYSTEM ID:  [email or web address]      CT Lumbar Spine w/o Contrast   Final Result   IMPRESSION: No acute fracture of the cervical, thoracic and lumbar   spines.      ALE MYERS MD            SYSTEM ID:  [email or web address]      CT Cervical Spine w/o Contrast   Final Result   IMPRESSION: No acute fracture of the cervical, thoracic and lumbar   spines.      ALE MYERS MD            SYSTEM ID:  [email or web address]      CT Head w/o Contrast   Final Result   IMPRESSION: No acute intracranial hemorrhage or evidence of calvarial   fracture.      ALE MYERS MD            SYSTEM ID:  [id number]      XR Chest Port 1 View   Final Result   IMPRESSION:  Stable portable chest.        ALE MYERS MD            SYSTEM ID:  [email or web address]            Aftab Mejia MD    Discharge Disposition   Discharged to nursing home   Condition at discharge: Stable    Pending Results   Final pathology results: No pathology submitted  These results will be followed up by   Unresulted Labs Ordered in the Past 30 Days of this Admission     No orders found from 3/24/2022 to 4/24/2022.          Primary Care Physician   Physician No Ref-Primary    Sitting at side of bed, alert though not oriented.   He has left side ecchymosis that is soft, mild tenderness, not tense. Not moved outside the marked line.     Consultations This Hospital Stay   None    Time Spent on this Encounter   I have spent less than 30 minutes on this discharge.    Discharge Orders      Follow Up and recommended labs and tests    Follow up with Nursing home physician.  No follow up labs or test are needed. Discuss anticoagulation     Reason for your hospital stay    You had a fall, with a hematoma on your side which is like a large bruise.     Activity - Up with nursing assistance     No CPR- Do NOT Intubate     Fall precautions      Diet    Follow this diet upon discharge: Orders Placed This Encounter      Soft & Bite Sized Diet (level 6) Mildly Thick (level 2) (Pt's diet includes no scrambled eggs, no bread or sandwiches, only soft and bite size food at the nursing home.)     Discharge Medications   Current Discharge Medication List      CONTINUE these medications which have NOT CHANGED    Details   acetaminophen (TYLENOL) 500 MG tablet Take 1,000 mg by mouth 2 times daily      aspirin (ASA) 81 MG chewable tablet Take 81 mg by mouth daily      atorvastatin (LIPITOR) 40 MG tablet Take 40 mg by mouth daily      fludrocortisone (FLORINEF) 0.1 MG tablet Take 1 tablet (0.1 mg) by mouth every morning (before breakfast)  Qty: 30 tablet, Refills: 11    Associated Diagnoses: Autonomic orthostatic hypotension      Magnesium Hydroxide (MILK OF MAGNESIA PO)       melatonin 3 MG tablet Take 3 mg by mouth At Bedtime      memantine (NAMENDA) 5 MG tablet Take 5 mg by mouth 2 times daily      NONFORMULARY 2 times daily Barrier Cream;    Apply liberally to bilateral buttocks/coccyx area      nystatin POWD PRN to affected areas/areas of yeast      potassium chloride ER (KLOR-CON M) 20 MEQ CR tablet TAKE 1 TABLET BY MOUTH EVERY DAY  Qty: 30 tablet, Refills: 1    Comments: Patient enrolled in our Rx Med Sync service to improveadherence. We are requesting a refill authorization inadvance to ensure an active prescription is on file.  Associated Diagnoses: Hypokalemia      RABEprazole (ACIPHEX) 20 MG EC tablet Take 20 mg by mouth 2 times daily      senna-docusate (SENOKOT-S/PERICOLACE) 8.6-50 MG tablet Take 1 tablet by mouth daily      sertraline (ZOLOFT) 50 MG tablet Take 50 mg by mouth daily      sodium chloride 1 GM tablet TAKE 1 TABLET BY MOUTH TWICE A DAY  Qty: 180 tablet, Refills: 3    Associated Diagnoses: Parkinson's disease (H); Orthostatic hypotension      bisacodyl (DULCOLAX) 5 MG EC tablet Take 5 mg by mouth daily as needed for constipation          STOP taking these medications       apixaban ANTICOAGULANT (ELIQUIS) 5 MG tablet Comments:   Reason for Stopping:             Allergies   Allergies   Allergen Reactions     Cats Unknown     Haldol [Haloperidol]      Per SNF reporting     Klonopin [Clonazepam]      Per SNF reporting     Lamotrigine      Skin lesions     Oxycodone      Per SNF reporting     Data   Most Recent 3 CBC's:Recent Labs   Lab Test 04/25/22  0513 04/24/22  0508 04/23/22  1247   WBC 4.9 5.0 5.8   HGB 11.6* 12.1* 14.0   MCV 93 93 94    170 165      Most Recent 3 BMP's:  Recent Labs   Lab Test 04/25/22  0513 04/24/22  0508 04/23/22  1247    139 140   POTASSIUM 3.6 3.9 4.2   CHLORIDE 110* 109 108   CO2 27 27 28   BUN 15 12 14   CR 0.88 0.88 0.97   ANIONGAP 4 3 4   ROSALINDA 8.5 8.5 8.5   * 116* 91     Most Recent 2 LFT's:  Recent Labs   Lab Test 04/23/22  1247 04/13/22  1026   AST 18 12   ALT 26 24   ALKPHOS 55 56   BILITOTAL 0.4 0.5     Most Recent INR's and Anticoagulation Dosing History:  Anticoagulation Dose History     Recent Dosing and Labs Latest Ref Rng & Units 1/10/2014 12/11/2014 10/21/2019 12/18/2020 2/12/2021 6/22/2021 4/23/2022    INR 0.85 - 1.15 1.16 1.11 1.14 1.16(H) 1.12 1.30(H) 1.34(H)        Most Recent 3 Troponin's:  Recent Labs   Lab Test 02/12/21  1549 01/25/21  0552 12/18/20  1512   TROPI <0.015 0.015 <0.015     Most Recent Cholesterol Panel:  Recent Labs   Lab Test 01/16/17  1024   CHOL 156   LDL 75   HDL 70   TRIG 56     Most Recent 6 Bacteria Isolates From Any Culture (See EPIC Reports for Culture Details):  Recent Labs   Lab Test 02/12/21  2018 02/12/21 2015 04/13/20  2225 08/02/14  0114   CULT No growth after 6 days No growth after 6 days No MRSA isolated No MRSA isolated     Most Recent TSH, T4 and A1c Labs:  Recent Labs   Lab Test 07/20/20  1054 04/13/20  1515 05/09/19  1053   TSH  --  3.66 4.88*   T4  --   --  1.10   A1C 5.8*  --   --

## 2022-04-25 NOTE — PLAN OF CARE
"Goal Outcome Evaluation:    Reason for hospital stay:  Admitted April 23rd with hematoma of left lateral wall  Living situation PTA: From [facility]   Most recent vitals: /80 (BP Location: Right arm, Patient Position: Semi-Collado's, Cuff Size: Adult Regular)   Pulse 90   Temp 96.8  F (36  C) (Tympanic)   Resp 17   Ht 1.753 m (5' 9\")   Wt 74 kg (163 lb 2.3 oz)   SpO2 96%   BMI 24.09 kg/m      Pain Management:  Denies pain, repositioned for comfort throughout   LOC:  A&Ox3  Cardiac:  No complaint of chest pain, heart rate regular. Paced heart rate.  Respiratory:  Clear air entry   GI:  bowel sounds active x4, no bowel movement on shift.  Abdomen large, soft and non-tender.   :  Oliguria - incontinence product on for dribbling - bladder scanned for 130cc, dehydrated   Skin Issues:  Left lateral chest/flank hematoma - hematoma outlined     IVF:  No IV access   ABX: N/A    Nutrition: Soft and bite sized (level 6), mildy thick (level 2)  ADL's:  x1 assist with personal care   Ambulation: x2 assist walker and gait belt  Safety:  Call bell and personal items within reach. All alarms on. Room near nursing station and frequent rounding and observation.     Comments:  - Possibly back to [facility] today via [place] [facility] if patient remains stable.   - Hgb 11.6 this AM from 12.1   - Autonomic orthostatic hypotension, stented coronary artery, seizure disorder, pacemaker, osteoarthritis, hypercholesterolemia, dementia without behavioral disturbance     4/25/2022  4:16 AM  Jennifer Painting RN    Face to face report given with opportunity to observe patient.    Report given to Mita Painting RN   4/25/2022  7:33 AM                            "

## 2022-04-25 NOTE — CARE PLAN
Prior to Admission Medication Reconciliation:     Medications added:   [x] None  [] As listed below:    Medications deleted:   [x] None  [] As listed below:    Medications marked for review/removal by attending:  [x] None  [] As listed below:    Changes made to existing medications:   [] None  [x] Updated strengths and frequencies to most current:    Input barrier crm correctly    Input frequency for nystatin.     Removed one time milk of magnesia    Updated sertraline and lipitor to evening  [] As listed below:    Last times/dates taken verified with patient:  [] Yes- completed myself  [] Prepared PTA medlist for review only. (will not be available to review personally)  [] Did not review with patient. Rx verification only. Review completed by nursing.    [x] Nurse completed no changes made (double checked entries)  [] Unable to review with patient at this time:  [] Nurse completed/changes made:     Allergies listed at another location:  []Allergies match allergies listed in Epic  []Other allergies listed:    Allergy review:    [x]Did not review: reviewed by nursing  []Did not review: pt unable at this time  []Patient/MAR verified NKDA  []Patient/MAR verified current existing allergies: no changes made  []Patient confirmed current existing allergies and new allergies added:    Medication reconciliation sources:   []Patient  []Patient family member/emergency contact: **  [][facility] Report Review  []Epic Chart Review  []Care Everywhere review  []Pharmacy med list: **  []Pharmacy phone call  []Outside meds dispense report: see below  [x]Nursing home or Assisted Living MAR: [facility]   Name Strength Instructions Times given Notes   [] Milk of magnesia   30 ml once PRN  Completed 4/23/22     [x] apap  500 mg  2 tabs BID      [x] Melatonin  3 mg  At bedtime  2000    [x] Sertraline  50 mg  qpm 1600 1600    [x] eliquis  5 mg BID  0800/1600    [x] memantine 5 mg BID  0800/1600    [x] aciphex  20 mg BID  0800/1600     [x] NaCl 1 g  BID  0800/1600    [x] atorvastatin 40 mg  qpm 1600    [x] Florinef  0.1 mg  qam prior to getting out of bed 0500    [x] Senna plus  8.6-50 Daily  0800    [x] KCl  20 meq  Daily  0800    [x] Asa chew 81 mg  Daily  0800    [] bariier crm   BID to rash on intergluteal cleft, coccyx and rogelio area. And PRN      [x] Nystatin powder  BID to affected areas and PRN          []Other: **    Pharmacy desired at discharge: NA    Is patient on coumadin?  [x]No    Medication Management:  [] Manages meds independently  [] Family member/ other party manages meds/assists:  [x] Meds managed by staff at facility  [] Meds set up by home care, family/other party helps administer  [] Meds set up by home care, self administers  [] Did not review with patient. Cannot assess.     Comments: completed PTA meds after patient discharged. Minor changes that were not pertinent enough to disrupt discharge or needed any provider notice. Cleaned up medications.       Siria Zacarias on 4/25/2022 at 8:28 AM       Discrepancies: [x] No []Not Applicable []Yes: listed below    Issues completing PTA medication reconciliation:  [] On hold for a long time  [] Waited for a call back  [] Fax didn't come through  [] Fax took a long time  [] Other:    Notifying appropriate party of changes/additions/discrepancies:  [x]No pertinent changes made, notification not necessary.   [] Notified attending provider via text page/phone call  [] Notified attending provider in person  [] Notified pharmacy  [] Notified nurse  [] Medications have not been reconciled by a provider yet, notification not necessary  [] Pt is not admitted to floor yet, PTA meds completed before admission.

## 2022-04-25 NOTE — PLAN OF CARE
Goal Outcome Evaluation:    Pt is alert, orientated to self, situation, and place but forgetful of time, and date.   Pt denies pain this morning. VSS. Pt afebrile. Pt is on room air.   Assist of 1 with walker and gait belt to chair this morning.   Mildly thick fluids, and soft and bite sized foods. Pt needs tray set up but is able to feed self.     Patient discharged at 11:30 AM via wheel chair accompanied by staff. Prescriptions - None ordered for discharge. All belongings sent with patient.     Discharge instructions reviewed CATE Rosenberg at [facility]. Listed belongings gathered and returned to patient.     Patient discharged to [facility].   Report called to Nursing Home:  [facility] given to CATE Rosenberg via telephone    Surgical Patient   Surgical Procedures during stay: No  Did patient receive discharge instruction on wound care and recognition of infection symptoms? Yes    MISC  Follow up appointment made:  Yes  Home medications returned to patient: N/A  Patient reports pain was well managed at discharge: Yes

## 2022-04-25 NOTE — PROGRESS NOTES
Patient seen this evening.  No complaints.  VSS.  On exam, his hematoma is stable, no different from this morning.  Continue current management.

## 2022-04-25 NOTE — PROGRESS NOTES
Name: Jf Ortiz    MRN#: [id number]    Reason for Hospitalization: Hematoma of chest wall, left, initial encounter [S20.389Y]    Discharge Date: 4/25/2022    Patient / Family response to discharge plan: in agreement    Follow-Up Appt:   Future Appointments   Date Time Provider Department Center   4/27/2022  9:30 AM HC [address] [facility] [place]   5/4/2022  9:30 AM HC BAY 4 - FAST TRACK [facility] [place]   5/11/2022  9:30 AM HC [address] [facility] [place]   5/18/2022  9:30 AM HC BAY 4 - FAST TRACK [facility] [place]   5/25/2022  9:30 AM HC BAY 4 - FAST TRACK [facility] [place]   7/19/2022 12:00 AM UC ICD REMOTE [id number]       Other Providers (Care Coordinator, County Services, PCA services etc): Yes: [facility] and [facility].    Discharge Disposition: long term care facility, [facility]    Teri Sood CM

## 2022-04-27 ENCOUNTER — INFUSION THERAPY VISIT (OUTPATIENT)
Dept: INFUSION THERAPY | Facility: OTHER | Age: 87
End: 2022-04-27
Attending: FAMILY MEDICINE
Payer: MEDICARE

## 2022-04-27 ENCOUNTER — TELEPHONE (OUTPATIENT)
Dept: CASE MANAGEMENT | Facility: HOSPITAL | Age: 87
End: 2022-04-27

## 2022-04-27 DIAGNOSIS — E86.9 VOLUME DEPLETION: Primary | ICD-10-CM

## 2022-04-27 PROCEDURE — 258N000003 HC RX IP 258 OP 636: Performed by: FAMILY MEDICINE

## 2022-04-27 PROCEDURE — 250N000011 HC RX IP 250 OP 636: Performed by: FAMILY MEDICINE

## 2022-04-27 PROCEDURE — 96360 HYDRATION IV INFUSION INIT: CPT

## 2022-04-27 RX ORDER — HEPARIN SODIUM (PORCINE) LOCK FLUSH IV SOLN 100 UNIT/ML 100 UNIT/ML
5 SOLUTION INTRAVENOUS ONCE
Status: COMPLETED | OUTPATIENT
Start: 2022-04-27 | End: 2022-04-27

## 2022-04-27 RX ORDER — HEPARIN SODIUM (PORCINE) LOCK FLUSH IV SOLN 100 UNIT/ML 100 UNIT/ML
5 SOLUTION INTRAVENOUS ONCE
Status: CANCELLED
Start: 2022-04-27 | End: 2022-04-27

## 2022-04-27 RX ADMIN — SODIUM CHLORIDE 1000 ML: 9 INJECTION, SOLUTION INTRAVENOUS at 09:45

## 2022-04-27 RX ADMIN — HEPARIN 5 ML: 100 SYRINGE at 11:00

## 2022-04-27 NOTE — TELEPHONE ENCOUNTER
Jf Ortiz, was discharged to [facility] on 4/25/22 from [facility]. I spoke today with Sheri ESPOSITO regarding the patient's discharge.   She indicates she has receive(d) sufficient information upon discharge. Medications were reviewed in full on discharge, including: Medications to be started; medications to be stopped; medications to be continued from preadmission and any side effects. Prescriptions were received at discharge and were able to be filled. Medications are being taken as prescribed.   Ssuanne Norton RN on 4/27/2022 at 1:41 PM

## 2022-04-27 NOTE — PROGRESS NOTES
Patient is a 88 year old  here accompanied by self  today for infusion of ivf  per order of Dr. Wilson under the supervision of Dr. Whitman  Patient identified with two identifiers, order verified, and verbal consent for today's infusion obtained from patient.      Patient lab values:  Not required   Patient meets order parameters for today's treatment.    Patients port accessed using non-coring, 20 gauge, 3/4 needle. Port accessed per facility protocol. Port flushed easily, blood return noted.  No signs and symptoms of infection or infiltration.      IV pump verified with dose, drug, and rate of administration.  Infusion administered per protocol.  Patient tolerated infusion well, no signs or symptoms of adverse reaction noted.  Patient denies pain nor discomfort.     IV removed, catheter intact.  Site clean, dry and intact.  No signs or symptoms of infiltration or infection.  Covered with a sterile bandage, slight pressure applied for 30 seconds.  Pt instructed to leave bandage intact for a minimum of one hour, and to call with questions or concerns.  Copy of appointments, discharge instructions, and after visit summary (AVS) provided to patient.  Patient states understanding, discharged.

## 2022-05-04 ENCOUNTER — INFUSION THERAPY VISIT (OUTPATIENT)
Dept: INFUSION THERAPY | Facility: OTHER | Age: 87
End: 2022-05-04
Attending: FAMILY MEDICINE
Payer: MEDICARE

## 2022-05-04 DIAGNOSIS — E86.9 VOLUME DEPLETION: Primary | ICD-10-CM

## 2022-05-04 PROCEDURE — 250N000011 HC RX IP 250 OP 636: Performed by: FAMILY MEDICINE

## 2022-05-04 PROCEDURE — 96360 HYDRATION IV INFUSION INIT: CPT

## 2022-05-04 PROCEDURE — 258N000003 HC RX IP 258 OP 636: Performed by: FAMILY MEDICINE

## 2022-05-04 RX ORDER — HEPARIN SODIUM (PORCINE) LOCK FLUSH IV SOLN 100 UNIT/ML 100 UNIT/ML
5 SOLUTION INTRAVENOUS ONCE
Status: COMPLETED | OUTPATIENT
Start: 2022-05-04 | End: 2022-05-04

## 2022-05-04 RX ORDER — HEPARIN SODIUM (PORCINE) LOCK FLUSH IV SOLN 100 UNIT/ML 100 UNIT/ML
5 SOLUTION INTRAVENOUS ONCE
Status: CANCELLED
Start: 2022-05-04 | End: 2022-05-04

## 2022-05-04 RX ADMIN — SODIUM CHLORIDE 1000 ML: 9 INJECTION, SOLUTION INTRAVENOUS at 09:42

## 2022-05-04 RX ADMIN — HEPARIN 5 ML: 100 SYRINGE at 10:43

## 2022-05-04 NOTE — PROGRESS NOTES
Patient is a 88 year old here today for infusion of IVF per order of Dr Wilson under the supervision of Dr Whitman. Patient identified with two identifiers, order verified, and verbal consent for today's infusion obtained from patient.       Patient meets order parameters for today's treatment.     Patient denies questions or concerns regarding infusion and/or medication(s) being administered.    Patients port accessed using non-coring, 20 gauge, 3/4 inch power needle. Port accessed per facility protocol. Port flushed easily, blood return noted.  No signs and symptoms of infection or infiltration.      IV pump verified with dose, drug, and rate of administration.  Infusion administered per protocol.  Patient tolerated infusion well, no signs or symptoms of adverse reaction noted.  Patient denies pain nor discomfort.     IV removed, catheter intact.  Site clean, dry and intact.  No signs or symptoms of infiltration or infection.  Covered with a sterile bandage, slight pressure applied for 30 seconds.  Pt instructed to leave bandage intact for a minimum of one hour, and to call with questions or concerns.  Copy of appointments, discharge instructions, and after visit summary (AVS) provided to patient.  Patient states understanding, discharged.

## 2022-05-04 NOTE — PATIENT INSTRUCTIONS

## 2022-05-09 ENCOUNTER — APPOINTMENT (OUTPATIENT)
Dept: CT IMAGING | Facility: HOSPITAL | Age: 87
End: 2022-05-09
Attending: STUDENT IN AN ORGANIZED HEALTH CARE EDUCATION/TRAINING PROGRAM
Payer: MEDICARE

## 2022-05-09 ENCOUNTER — HOSPITAL ENCOUNTER (EMERGENCY)
Facility: HOSPITAL | Age: 87
Discharge: NURSING, REHAB OR LONG-TERM CARE | End: 2022-05-09
Attending: STUDENT IN AN ORGANIZED HEALTH CARE EDUCATION/TRAINING PROGRAM | Admitting: STUDENT IN AN ORGANIZED HEALTH CARE EDUCATION/TRAINING PROGRAM
Payer: MEDICARE

## 2022-05-09 VITALS
SYSTOLIC BLOOD PRESSURE: 169 MMHG | HEART RATE: 78 BPM | RESPIRATION RATE: 16 BRPM | DIASTOLIC BLOOD PRESSURE: 93 MMHG | TEMPERATURE: 98.5 F | OXYGEN SATURATION: 96 %

## 2022-05-09 DIAGNOSIS — R55 SYNCOPE, UNSPECIFIED SYNCOPE TYPE: ICD-10-CM

## 2022-05-09 LAB
ABO/RH(D): NORMAL
ALBUMIN SERPL-MCNC: 3.2 G/DL (ref 3.4–5)
ALBUMIN UR-MCNC: NEGATIVE MG/DL
ALP SERPL-CCNC: 53 U/L (ref 40–150)
ALT SERPL W P-5'-P-CCNC: 23 U/L (ref 0–70)
ANION GAP SERPL CALCULATED.3IONS-SCNC: 4 MMOL/L (ref 3–14)
ANTIBODY SCREEN: NEGATIVE
APPEARANCE UR: CLEAR
APTT PPP: 28 SECONDS (ref 22–38)
AST SERPL W P-5'-P-CCNC: 15 U/L (ref 0–45)
BASOPHILS # BLD AUTO: 0 10E3/UL (ref 0–0.2)
BASOPHILS NFR BLD AUTO: 0 %
BILIRUB SERPL-MCNC: 0.7 MG/DL (ref 0.2–1.3)
BILIRUB UR QL STRIP: NEGATIVE
BUN SERPL-MCNC: 17 MG/DL (ref 7–30)
CALCIUM SERPL-MCNC: 8.5 MG/DL (ref 8.5–10.1)
CHLORIDE BLD-SCNC: 109 MMOL/L (ref 94–109)
CO2 SERPL-SCNC: 28 MMOL/L (ref 20–32)
COLOR UR AUTO: ABNORMAL
CREAT SERPL-MCNC: 0.98 MG/DL (ref 0.66–1.25)
EOSINOPHIL # BLD AUTO: 0.2 10E3/UL (ref 0–0.7)
EOSINOPHIL NFR BLD AUTO: 4 %
ERYTHROCYTE [DISTWIDTH] IN BLOOD BY AUTOMATED COUNT: 13.9 % (ref 10–15)
ETHANOL SERPL-MCNC: <0.01 G/DL
GFR SERPL CREATININE-BSD FRML MDRD: 74 ML/MIN/1.73M2
GLUCOSE BLD-MCNC: 126 MG/DL (ref 70–99)
GLUCOSE UR STRIP-MCNC: NEGATIVE MG/DL
HCT VFR BLD AUTO: 41.2 % (ref 40–53)
HGB BLD-MCNC: 13.4 G/DL (ref 13.3–17.7)
HGB UR QL STRIP: NEGATIVE
HOLD SPECIMEN: NORMAL
IMM GRANULOCYTES # BLD: 0 10E3/UL
IMM GRANULOCYTES NFR BLD: 0 %
INR PPP: 1.21 (ref 0.85–1.15)
KETONES UR STRIP-MCNC: NEGATIVE MG/DL
LEUKOCYTE ESTERASE UR QL STRIP: NEGATIVE
LYMPHOCYTES # BLD AUTO: 1 10E3/UL (ref 0.8–5.3)
LYMPHOCYTES NFR BLD AUTO: 22 %
MAGNESIUM SERPL-MCNC: 2.2 MG/DL (ref 1.6–2.3)
MCH RBC QN AUTO: 31.2 PG (ref 26.5–33)
MCHC RBC AUTO-ENTMCNC: 32.5 G/DL (ref 31.5–36.5)
MCV RBC AUTO: 96 FL (ref 78–100)
MONOCYTES # BLD AUTO: 0.5 10E3/UL (ref 0–1.3)
MONOCYTES NFR BLD AUTO: 10 %
MUCOUS THREADS #/AREA URNS LPF: PRESENT /LPF
NEUTROPHILS # BLD AUTO: 3.1 10E3/UL (ref 1.6–8.3)
NEUTROPHILS NFR BLD AUTO: 64 %
NITRATE UR QL: NEGATIVE
NRBC # BLD AUTO: 0 10E3/UL
NRBC BLD AUTO-RTO: 0 /100
PH UR STRIP: 6.5 [PH] (ref 4.7–8)
PLATELET # BLD AUTO: 180 10E3/UL (ref 150–450)
POTASSIUM BLD-SCNC: 4 MMOL/L (ref 3.4–5.3)
PROT SERPL-MCNC: 6.6 G/DL (ref 6.8–8.8)
RBC # BLD AUTO: 4.3 10E6/UL (ref 4.4–5.9)
RBC URINE: 1 /HPF
SODIUM SERPL-SCNC: 141 MMOL/L (ref 133–144)
SP GR UR STRIP: 1.05 (ref 1–1.03)
SPECIMEN EXPIRATION DATE: NORMAL
SQUAMOUS EPITHELIAL: <1 /HPF
UROBILINOGEN UR STRIP-MCNC: NORMAL MG/DL
WBC # BLD AUTO: 4.8 10E3/UL (ref 4–11)
WBC URINE: 1 /HPF

## 2022-05-09 PROCEDURE — 85730 THROMBOPLASTIN TIME PARTIAL: CPT | Performed by: STUDENT IN AN ORGANIZED HEALTH CARE EDUCATION/TRAINING PROGRAM

## 2022-05-09 PROCEDURE — 74177 CT ABD & PELVIS W/CONTRAST: CPT | Mod: MG

## 2022-05-09 PROCEDURE — 86901 BLOOD TYPING SEROLOGIC RH(D): CPT | Performed by: STUDENT IN AN ORGANIZED HEALTH CARE EDUCATION/TRAINING PROGRAM

## 2022-05-09 PROCEDURE — 82040 ASSAY OF SERUM ALBUMIN: CPT | Performed by: STUDENT IN AN ORGANIZED HEALTH CARE EDUCATION/TRAINING PROGRAM

## 2022-05-09 PROCEDURE — 80053 COMPREHEN METABOLIC PANEL: CPT | Performed by: STUDENT IN AN ORGANIZED HEALTH CARE EDUCATION/TRAINING PROGRAM

## 2022-05-09 PROCEDURE — 85004 AUTOMATED DIFF WBC COUNT: CPT | Performed by: STUDENT IN AN ORGANIZED HEALTH CARE EDUCATION/TRAINING PROGRAM

## 2022-05-09 PROCEDURE — 93005 ELECTROCARDIOGRAM TRACING: CPT

## 2022-05-09 PROCEDURE — 36415 COLL VENOUS BLD VENIPUNCTURE: CPT | Performed by: STUDENT IN AN ORGANIZED HEALTH CARE EDUCATION/TRAINING PROGRAM

## 2022-05-09 PROCEDURE — 83735 ASSAY OF MAGNESIUM: CPT | Performed by: STUDENT IN AN ORGANIZED HEALTH CARE EDUCATION/TRAINING PROGRAM

## 2022-05-09 PROCEDURE — 81001 URINALYSIS AUTO W/SCOPE: CPT | Performed by: STUDENT IN AN ORGANIZED HEALTH CARE EDUCATION/TRAINING PROGRAM

## 2022-05-09 PROCEDURE — 258N000003 HC RX IP 258 OP 636: Performed by: STUDENT IN AN ORGANIZED HEALTH CARE EDUCATION/TRAINING PROGRAM

## 2022-05-09 PROCEDURE — G1004 CDSM NDSC: HCPCS

## 2022-05-09 PROCEDURE — 82077 ASSAY SPEC XCP UR&BREATH IA: CPT | Performed by: STUDENT IN AN ORGANIZED HEALTH CARE EDUCATION/TRAINING PROGRAM

## 2022-05-09 PROCEDURE — 93010 ELECTROCARDIOGRAM REPORT: CPT | Performed by: INTERNAL MEDICINE

## 2022-05-09 PROCEDURE — 250N000011 HC RX IP 250 OP 636: Performed by: RADIOLOGY

## 2022-05-09 PROCEDURE — 99284 EMERGENCY DEPT VISIT MOD MDM: CPT | Performed by: STUDENT IN AN ORGANIZED HEALTH CARE EDUCATION/TRAINING PROGRAM

## 2022-05-09 PROCEDURE — 72125 CT NECK SPINE W/O DYE: CPT | Mod: MG

## 2022-05-09 PROCEDURE — 96360 HYDRATION IV INFUSION INIT: CPT

## 2022-05-09 PROCEDURE — 86850 RBC ANTIBODY SCREEN: CPT | Performed by: STUDENT IN AN ORGANIZED HEALTH CARE EDUCATION/TRAINING PROGRAM

## 2022-05-09 PROCEDURE — 85610 PROTHROMBIN TIME: CPT | Performed by: STUDENT IN AN ORGANIZED HEALTH CARE EDUCATION/TRAINING PROGRAM

## 2022-05-09 PROCEDURE — 99285 EMERGENCY DEPT VISIT HI MDM: CPT | Mod: 25

## 2022-05-09 RX ORDER — LIDOCAINE 40 MG/G
CREAM TOPICAL
Status: DISCONTINUED | OUTPATIENT
Start: 2022-05-09 | End: 2022-05-09 | Stop reason: HOSPADM

## 2022-05-09 RX ORDER — IOPAMIDOL 755 MG/ML
80 INJECTION, SOLUTION INTRAVASCULAR ONCE
Status: COMPLETED | OUTPATIENT
Start: 2022-05-09 | End: 2022-05-09

## 2022-05-09 RX ADMIN — SODIUM CHLORIDE 1000 ML: 9 INJECTION, SOLUTION INTRAVENOUS at 10:53

## 2022-05-09 RX ADMIN — IOPAMIDOL 80 ML: 755 INJECTION, SOLUTION INTRAVENOUS at 12:10

## 2022-05-09 NOTE — DISCHARGE INSTRUCTIONS
Return to the emergency department as needed for ongoing symptoms or worsening symptoms, please follow-up with your primary care provider within the next week.  Call to schedule appointment.  No specific changes to your medications are indicated at this time.    What to expect when you have contrast    During your exam, we will inject  contrast  into your vein or artery. (Contrast is a clear liquid with iodine in it. It shows up on X-rays.)    You may feel warm or hot. You may have a metal taste in your mouth and a slight upset stomach. You may also feel pressure near the kidneys and bladder. These effects will last about 1 to 3 minutes.    Please tell us if you have:   Sneezing    Itching   Hives    Swelling in the face   A hoarse voice   Breathing problems   Other new symptoms    Serious problems are rare.  They may include:   Irregular heartbeat    Seizures   Kidney failure             Tissue damage   Shock     Death    If you have any problems during the exam, we  will treat them right away.    When you get home    Call your hospital if you have any new symptoms in the next 2 days, like hives or swelling. (Phone numbers are at the bottom of this page.) Or call your family doctor.     If you have wheezing or trouble breathing, call 911.    Self-care  -Drink at least 4 extra glasses of water today.   This reduces the stress on your kidneys.  -Keep taking your regular medicines.    The contrast will pass out of your body in your  Urine(pee). This will happen in the next 24 hours. You  will not feel this. Your urine will not  change color.    If you have kidney problems or take metformin    Drink 4 to 8 large glasses of water for the next  2 days, if you are not on a fluid restriction.    ?If you take metformin (Glucophage or Glucovance) for diabetes, keep taking it.      ?Your kidney function tests are abnormal.  If you take Metformin, do not take it for 48 hours. Please go to your clinic for a blood test within 3  days after your exam before the restarting this medicine.     (Note to provider:please give patient prescription for lab tests.)    ?Special instructions: -    I have read and understand the above information.    Patient Sign Here:______________________________________Date:________Time:______    Staff Sign Here:________________________________________Date:_______Time:______      Radiology Departments:     ?[facility]: [phone] ?[place]: [phone]     ?[place]: [phone] ?[facility]:[phone]      ?[place]: [phone]  ?[place]: [phone]  ?[place]:[phone]    ?[facility]:[phone]  ?[facility]:[phone]

## 2022-05-09 NOTE — ED PROVIDER NOTES
History     Chief Complaint   Patient presents with     Generalized Weakness     HPI  Jf Ortiz is a 88 year old male well-known in our department with history of multiple syncopal  with an orthostatic dysautonomic hypotension syndrome who presents to the emergency department today because of concern for frequent falls and recent syncopal event where he fell.  He has some dementia does not remember things particularly well and is unable to tell me much about his falls.  He states nothing is particularly painful however that he can move his arms and his legs no problem but he has noticed the bruising on his belly seems to have spread somewhat.  Denies fevers, urinary symptoms, chest pain abdominal pain nausea vomiting diarrhea, headaches.  He states that when he has a syncopal event he does not get bad headaches chest pains palpitation shortness of breath abdominal pain nausea vomiting diarrhea.  He has not been vomiting blood.  No blood in his stool.  No other complaints at this time    Allergies:  Allergies   Allergen Reactions     Cats Unknown     Haldol [Haloperidol]      Per SNF reporting     Klonopin [Clonazepam]      Per SNF reporting     Lamotrigine      Skin lesions     Oxycodone      Per SNF reporting       Problem List:    Patient Active Problem List    Diagnosis Date Noted     Hematoma of chest wall, left, initial encounter 04/23/2022     Priority: Medium     Nursing Home Visit 03/03/2022     Priority: Medium     Retinal artery branch occlusion 02/13/2021     Priority: Medium     Vision loss of right eye 02/12/2021     Priority: Medium     Orthostatic hypotension dysautonomic syndrome 02/12/2021     Priority: Medium     Chronic atrial fibrillation (H) 02/12/2021     Priority: Medium     Closed compression fracture of body of L1 vertebra (H) 02/12/2021     Priority: Medium     Closed wedge compression fracture of lumbar vertebra with routine healing 12/20/2020     Priority: Medium      Compression fracture of L1 lumbar vertebra, closed, initial encounter (H) 12/18/2020     Priority: Medium     Compression fracture of thoracic vertebra, initial encounter, unspecified thoracic vertebral level 12/18/2020     Priority: Medium     Replacing diagnoses that were inactivated after the 10/1/2021 regulatory import.       Longstanding persistent atrial fibrillation (H) 04/13/2020     Priority: Medium     Frequent falls 04/13/2020     Priority: Medium     Coronary artery disease involving native coronary artery without angina pectoris 04/13/2020     Priority: Medium     Constipation, unspecified constipation type 04/11/2018     Priority: Medium     Pacemaker, [employer], Dual Chamber - Dependent 10/06/2017     Priority: Medium     Lewy body dementia without behavioral disturbance (H) 08/31/2017     Priority: Medium     Urinary incontinence 08/31/2017     Priority: Medium     Autonomic orthostatic hypotension 10/14/2016     Priority: Medium     Dementia without behavioral disturbance (H) 07/28/2015     Priority: Medium     Diagnosis updated by automated process. Provider to review and confirm.       Parkinson disease (H)      Priority: Medium     Seizure disorder (H)      Priority: Medium     Volume depletion 08/02/2014     Priority: Medium     Stented coronary artery      Priority: Medium     Cardiac pacemaker in situ 01/08/2013     Priority: Medium     Overview:   [employer] Altrua S606 DREL,  Serial #[id number]  5-13-11  Atrial lead [employer] [id number] Serial #[id number]  8-11-00  Ventriculer lead [employer] [id number]  Serial #[id number]  5-13-11  2nd Degree AV Block   Dr. Campbell  Intrinsic:  Pt. is Pacemaker Dependant, remains paced at temp. rate of 30 bpm (6-13-14)    Formatting of this note might be different from the original.  [employer]rua S606 DREL,  Serial #[id number]  5-13-11  Atrial lead [employer] 4054 Serial #[id number]  8-11-00  Ventriculer lead [employer] [id number]  Serial  #[id number]  5-13-11  2nd Degree AV Block   Dr. Campbell  Intrinsic:  Pt. is Pacemaker Dependant, remains paced at temp. 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 Provider to review       Diaphragmatic hernia 01/01/2011     Priority: Medium     Problem list name updated by automated process. Provider to review       Pain in joint, forearm 07/31/2008     Priority: Medium     Formatting of this note might be different from the original.  IMO Update 10/11       Pain in joint, ankle and foot 07/31/2008     Priority: Medium     Formatting of this note might be different from the original.  IMO Update 10/11       Dysphagia 05/22/2007     Priority: Medium     Overview:   IMO Update    Formatting of this note might be different from the original.  IMO Update       Coronary atherosclerosis of native coronary artery 11/28/2006     Priority: Medium     Overview:   IMO Update 10/11    Formatting of this note might be different from the original.  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  IMO Update 10/11       Hypertension with target blood pressure goal under 150/90 09/05/2006     Priority: Medium     Overview:   IMO Update       Hyperlipidemia 09/05/2006     Priority: Medium     Formatting of this note might be different from the original.  IMO Update 10/11       Essential hypertension 09/05/2006     Priority: Medium     Formatting of this note might be different from the original.  IMO Update          Past Medical History:    Past Medical History:   Diagnosis Date     Autonomic orthostatic hypotension 10/14/2016     Coronary artery disease      Dementia without behavioral disturbance (H) 7/28/2015     Diaphragmatic hernia without  mention of obstruction or gangrene 1/1/2011     Hypercholesterolemia 4/23/2013     Osteoarthrosis, unspecified whether generalized or localized, unspecified site 1/1/2011     Other and unspecified hyperlipidemia 1/1/2011     Pacemaker      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     Seizure disorder (H)      Stented coronary artery        Past Surgical History:    Past Surgical History:   Procedure Laterality Date     ------------OTHER-------------  1955    ulnar and radial fx - repair ulnar and radial fx x4     ------------OTHER-------------  6/14/2011    cataract extraction     BIOPSY  08/2015    skin biopsy     BLEPHAROPLASTY BILATERAL  5/6/2014    Procedure: BLEPHAROPLASTY BILATERAL;  Surgeon: Andrew Queen MD;  Location: [facility]     BYPASS GRAFT ARTERY CORONARY  11/2006    coronary artery disease x 5, [facility]     cataract extraction and lens implantation  2011    cataracts     cataract extraction and lens implantation      cataracts     COLONOSCOPY  2012     colonoscopy with polypectomy  3/13/2009    history of polyps - repeat 3 yrs     colonoscopy with polypectomy  2006     colonoscopy with polypectomy  2005     COMBINED COLONOSCOPY WITH ARGON PLASMA COAGULATOR (APC) N/A 10/31/2014    Procedure: COMBINED COLONOSCOPY WITH ARGON PLASMA COAGULATOR (APC);  Surgeon: Bassam Aguilar MD;  Location: [facility]     COMBINED COLONOSCOPY WITH ARGON PLASMA COAGULATOR (APC) N/A 11/13/2015    Procedure: COMBINED COLONOSCOPY WITH ARGON PLASMA COAGULATOR (APC);  Surgeon: Bassam Aguilar MD;  Location: [facility]     ENDOSCOPY UPPER, COLONOSCOPY, COMBINED N/A 10/31/2014    Procedure: COMBINED ENDOSCOPY UPPER, COLONOSCOPY;  Surgeon: Bassam Aguilar MD;  Location: [facility]     ENDOSCOPY UPPER, COLONOSCOPY, COMBINED N/A 11/13/2015    Procedure: COMBINED ENDOSCOPY UPPER, COLONOSCOPY;  Surgeon: Bassam Aguilar MD;  Location: [facility]     ESOPHAGOSCOPY, GASTROSCOPY, DUODENOSCOPY (EGD), COMBINED  1/22/2014    Procedure: COMBINED ESOPHAGOSCOPY,  GASTROSCOPY, DUODENOSCOPY (EGD);  UPPER ENDOSCOPY(JAYDEN) W/ BIOPSIES;  Surgeon: Patricia Pena MD;  Location: [facility]     HERNIORRHAPHY INGUINAL Right 2018    Procedure: HERNIORRHAPHY INGUINAL;  OPEN RIGHT INGUINAL HERNIA REPAIR with Mesh;  Surgeon: Virgilio Zaragoza DO;  Location: [facility]     INSERT PORT VASCULAR ACCESS Right 2019    Procedure: PORT PLACEMENT;  Surgeon: Lloyd Ram MD;  Location[facility]     LARYNGOSCOPY WITH MICROSCOPE  2014    Procedure: LARYNGOSCOPY WITH MICROSCOPE;;  Surgeon: Chayo Duke MD;  Location: [facility]     pacemaker placement      heart block     pacemaker placement      dual-chamber     REMOVE TUBE, MYRINGOTOMY, COMBINED  2014    Procedure: COMBINED REMOVE TUBE, MYRINGOTOMY;  MICRODIRECT LARYNGOSCOPY WITH BIOPSY AND FROZEN SECTIONS removal of right ear tube and myringoplasty;  Surgeon: Chayo Duke MD;  Location[facility]     REPLACE PACEMAKER GENERATOR N/A 2018    Procedure: REPLACE PACEMAKER GENERATOR;  Pacemaker generator change;  Surgeon: Alma Kaplan MD;  Location: [facility]     stent placement to LAD  2008     ventilation tube  2012    right in office       Family History:    Family History   Problem Relation Age of Onset     Diabetes Mother      Breast Cancer Daughter        Social History:  Marital Status:  Single [1]  Social History     Tobacco Use     Smoking status: Former Smoker     Packs/day: 1.00     Years: 5.00     Pack years: 5.00     Types: Cigarettes     Quit date: 1985     Years since quittin.3     Smokeless tobacco: Never Used     Tobacco comment: quit in    Substance Use Topics     Alcohol use: Yes     Comment: social     Drug use: No        Medications:    acetaminophen (TYLENOL) 500 MG tablet  aspirin (ASA) 81 MG chewable tablet  atorvastatin (LIPITOR) 40 MG tablet  fludrocortisone (FLORINEF) 0.1 MG tablet  melatonin 3 MG tablet  memantine (NAMENDA) 5 MG tablet  Menthol-Zinc Oxide (MOISTURE  BARRIER EX)  nystatin POWD  potassium chloride ER (KLOR-CON M) 20 MEQ CR tablet  RABEprazole (ACIPHEX) 20 MG EC tablet  senna-docusate (SENOKOT-S/PERICOLACE) 8.6-50 MG tablet  sertraline (ZOLOFT) 50 MG tablet  sodium chloride 1 GM tablet          Review of Systems  A complete review of systems was performed and is otherwise negative.     Physical Exam   BP: 128/69  Pulse: 86  Temp: 98.5  F (36.9  C)  Resp: 16  SpO2: 96 %      Physical Exam  Constitutional: Alert and conversant. NAD   HENT: NCAT   Eyes: Normal pupils   Neck: supple   CV: Normal rate, regular rhythm, no murmur   Pulmonary/Chest: Non-labored respirations, clear to auscultation bilaterally   Abdominal: Soft, non-tender, non-distended   MSK: JONES.  Full active range of motion of all joints of all extremities with no limitations.  Neuro: Alert and appropriate cranial nerves II through XII intact, normal finger-to-nose, symmetrical upper and lower body strength.  Sensation intact light touch in 4 extremities.  Skin: Warm and dry. No diaphoresis. No rashes on exposed skin but there is significant bruising across the patient's abdomen  Psych: Appropriate mood and affect     ED Course              ED Course as of 05/09/22 1318   Mon May 09, 2022   1149 88-year-old male presents to the emergency department today after syncopal event.  He has had multiple syncopal event syncopal events recently.  He has been a long-term problem for him, he suffers from hypotension that he gets weekly fluid resuscitation's in our infusion clinic.  I have seen him here in the emergency department for similar.  Work-up here looks quite normal and reassuring, no concerning findings here, pending CT   1238 CT Head w/o Contrast  Head CT: No evidence of acute intracranial abnormality. Atrophic  changes are similar in appearance compared to prior study.     Cervical spine CT: No evidence of acute or subacute bony abnormality.    Cervical spine and temporomandibular joint degenerative  changes are  similar in appearance compared to the prior study.   1251 CT Chest/Abdomen/Pelvis w Contrast   No evidence of acute abnormality related to recent trauma.     Mild wall thickening and fat stranding suggesting long segment colitis  of the distal colon. No evidence of perforation or abscess.     Healing fracture posteriorly right 11th rib.     Additional findings as described above.   1304 EKG is virtually identical to previous, no concerning findings, blood pressure maintaining well, patient passed ambulation trial.  He does seem to syncopized fairly regularly, I did fluid bolus him to help with his symptoms.  Will recommend close primary care follow-up but no indications at this time for admission.  CT of the abdomen pelvis is reassuring there is some suggestion of colitis but the previously seen hematoma seems to have improved and not worsened.  Patient is appropriate for further outpatient management discharged in stable edition all questions answered return precautions given.     Procedures              Results for orders placed or performed during the hospital encounter of 05/09/22 (from the past 24 hour(s))   CBC with platelets differential    Narrative    The following orders were created for panel order CBC with platelets differential.  Procedure                               Abnormality         Status                     ---------                               -----------         ------                     CBC with platelets and d...[[id number]]  Abnormal            Final result                 Please view results for these tests on the individual orders.   Comprehensive metabolic panel   Result Value Ref Range    Sodium 141 133 - 144 mmol/L    Potassium 4.0 3.4 - 5.3 mmol/L    Chloride 109 94 - 109 mmol/L    Carbon Dioxide (CO2) 28 20 - 32 mmol/L    Anion Gap 4 3 - 14 mmol/L    Urea Nitrogen 17 7 - 30 mg/dL    Creatinine 0.98 0.66 - 1.25 mg/dL    Calcium 8.5 8.5 - 10.1 mg/dL    Glucose 126 (H) 70  - 99 mg/dL    Alkaline Phosphatase 53 40 - 150 U/L    AST 15 0 - 45 U/L    ALT 23 0 - 70 U/L    Protein Total 6.6 (L) 6.8 - 8.8 g/dL    Albumin 3.2 (L) 3.4 - 5.0 g/dL    Bilirubin Total 0.7 0.2 - 1.3 mg/dL    GFR Estimate 74 >60 mL/min/1.73m2   Magnesium   Result Value Ref Range    Magnesium 2.2 1.6 - 2.3 mg/dL   ABO/Rh type and screen    Narrative    The following orders were created for panel order ABO/Rh type and screen.  Procedure                               Abnormality         Status                     ---------                               -----------         ------                     Adult Type and Screen[[id number]]                            Edited Result - FINAL        Please view results for these tests on the individual orders.   INR   Result Value Ref Range    INR 1.21 (H) 0.85 - 1.15   Partial thromboplastin time   Result Value Ref Range    aPTT 28 22 - 38 Seconds   Alcohol ethyl   Result Value Ref Range    Alcohol ethyl <0.01 <=0.01 g/dL   CBC with platelets and differential   Result Value Ref Range    WBC Count 4.8 4.0 - 11.0 10e3/uL    RBC Count 4.30 (L) 4.40 - 5.90 10e6/uL    Hemoglobin 13.4 13.3 - 17.7 g/dL    Hematocrit 41.2 40.0 - 53.0 %    MCV 96 78 - 100 fL    MCH 31.2 26.5 - 33.0 pg    MCHC 32.5 31.5 - 36.5 g/dL    RDW 13.9 10.0 - 15.0 %    Platelet Count 180 150 - 450 10e3/uL    % Neutrophils 64 %    % Lymphocytes 22 %    % Monocytes 10 %    % Eosinophils 4 %    % Basophils 0 %    % Immature Granulocytes 0 %    NRBCs per 100 WBC 0 <1 /100    Absolute Neutrophils 3.1 1.6 - 8.3 10e3/uL    Absolute Lymphocytes 1.0 0.8 - 5.3 10e3/uL    Absolute Monocytes 0.5 0.0 - 1.3 10e3/uL    Absolute Eosinophils 0.2 0.0 - 0.7 10e3/uL    Absolute Basophils 0.0 0.0 - 0.2 10e3/uL    Absolute Immature Granulocytes 0.0 <=0.4 10e3/uL    Absolute NRBCs 0.0 10e3/uL   Adult Type and Screen   Result Value Ref Range    ABO/RH(D) A POS     Antibody Screen Negative Negative    SPECIMEN EXPIRATION DATE [id number]     Extra Tube    Narrative    The following orders were created for panel order Extra Tube.  Procedure                               Abnormality         Status                     ---------                               -----------         ------                     Extra Red Top Tube[[id number]]                               Final result               Extra Heparinized Syringe[[id number]]                                                     Please view results for these tests on the individual orders.   Extra Red Top Tube   Result Value Ref Range    Hold Specimen     CT Head w/o Contrast    Narrative    CT HEAD W/O CONTRAST, CT CERVICAL SPINE W/O CONTRAST, 5/9/2022 12:18  PM    History: Male, age 88 years; Trauma - Head Injury    Comparison: Head CT and cervical spine CT 4/23/2022    Technique:  Head CT technique: CT scan was performed of the head/brain without  contrast. Sagittal, coronal and axial reconstructions were reviewed.    Cervical spine CT technique: CT was performed of the cervical spine.  Sagittal, coronal, and axial reconstructions were reviewed.    Findings:  Head CT: The ventricles and sulci are enlarged and unchanged  consistent with moderate generalized atrophy. The gray and white  matter demonstrate scattered areas of decreased density. The bony  structures demonstrate no evidence of an acute fracture. Small mucous  retention cyst is seen medially in the right maxillary sinus.    Cervical spine CT: The  cervical spine demonstrates normal curvature.  No acute fracture, no acute subluxation. Soft tissues of the neck  demonstrate no acute abnormality..        Impression    IMPRESSION:   Head CT: No evidence of acute intracranial abnormality. Atrophic  changes are similar in appearance compared to prior study.    Cervical spine CT: No evidence of acute or subacute bony abnormality.    Cervical spine and temporomandibular joint degenerative changes are  similar in appearance compared to the prior  study.    LAWRENCE JAIN MD         SYSTEM ID:  [id number]   CT Cervical Spine w/o Contrast    Narrative    CT HEAD W/O CONTRAST, CT CERVICAL SPINE W/O CONTRAST, 5/9/2022 12:18  PM    History: Male, age 88 years; Trauma - Head Injury    Comparison: Head CT and cervical spine CT 4/23/2022    Technique:  Head CT technique: CT scan was performed of the head/brain without  contrast. Sagittal, coronal and axial reconstructions were reviewed.    Cervical spine CT technique: CT was performed of the cervical spine.  Sagittal, coronal, and axial reconstructions were reviewed.    Findings:  Head CT: The ventricles and sulci are enlarged and unchanged  consistent with moderate generalized atrophy. The gray and white  matter demonstrate scattered areas of decreased density. The bony  structures demonstrate no evidence of an acute fracture. Small mucous  retention cyst is seen medially in the right maxillary sinus.    Cervical spine CT: The  cervical spine demonstrates normal curvature.  No acute fracture, no acute subluxation. Soft tissues of the neck  demonstrate no acute abnormality..        Impression    IMPRESSION:   Head CT: No evidence of acute intracranial abnormality. Atrophic  changes are similar in appearance compared to prior study.    Cervical spine CT: No evidence of acute or subacute bony abnormality.    Cervical spine and temporomandibular joint degenerative changes are  similar in appearance compared to the prior study.    LAWRENCE JAIN MD         SYSTEM ID:  [id number]   CT Chest/Abdomen/Pelvis w Contrast    Narrative    CT CHEST/ABDOMEN/PELVIS W CONTRAST    CLINICAL HISTORY: Male, age 88 years, Trauma - Chest, Abdomen, and  Pelvis Injury;    Comparison:  CT scan of the chest, abdomen and pelvis 4/23/2022    TECHNIQUE:  CT was performed of the chest, abdomen and pelvis  after  the administration of IV  contrast.   Sagittal, coronal, axial MIP reconstructions were reviewed.     FINDINGS:  Chest CT:   Lungs  demonstrate patchy areas of air trapping intermixed with  groundglass opacification, similar in appearance when compared to the  prior study. No evidence of pneumothorax.    Transvenous pacer leads and dense coronary artery calcifications are  similar in appearance, as are postoperative changes consistent with  coronary artery bypass graft. No distinct evidence of pulmonary  embolus or aortic dissection. Respiratory motion limits evaluation.    Bony structures demonstrate no acute abnormality. Chronic appearing  anterior wedging again seen at T8. Healed fracture suggested in the  anterior aspects of the left fourth rib. No distinct evidence of an  acute rib fracture. There is a healing fracture posteriorly in the  right 11th rib.    Abdomen/Pelvis CT:  Stomach and duodenum: Normal.    Other: Normal.    Gallbladder: Normal.    Spleen: Normal.    Pancreas: Mild atrophy, unchanged. No acute abnormality.    Adrenal glands: Normal.    Kidneys: Normal.    Ureters: Normal.    Urinary bladder: Incompletely distended, grossly normal. Mild/moderate  prostate hypertrophy slightly indents the floor of the urinary  bladder.    Large and small bowel: Diverticulosis of the colon. The distal  portions of the sigmoid colon are nondistended and appear to  demonstrate mild wall thickening with pericolonic fat stranding.    Appendix: Grossly normal.    No free fluid in the lower pelvis. No evidence of ventral wall hernia.    Dense calcifications throughout the arterial structures of the abdomen  and pelvis.     Bony structures: There is a healing fracture seen in the posterior  aspect of the right 11th rib. No evidence of an acute rib fracture.    Remnants of a subcutaneous hematoma in the left flank are again seen.  Inflammatory changes/fat stranding about the hematoma are less evident  on the current examination.          Impression    IMPRESSION:   No evidence of acute abnormality related to recent trauma.    Mild wall thickening  and fat stranding suggesting long segment colitis  of the distal colon. No evidence of perforation or abscess.    Healing fracture posteriorly right 11th rib.    Additional findings as described above.    LAWRENCE JAIN MD         SYSTEM ID:  [id number]   UA with Microscopic reflex to Culture    Specimen: Urine, Clean Catch   Result Value Ref Range    Color Urine Light Yellow Colorless, Straw, Light Yellow, Yellow    Appearance Urine Clear Clear    Glucose Urine Negative Negative mg/dL    Bilirubin Urine Negative Negative    Ketones Urine Negative Negative mg/dL    Specific Gravity Urine 1.049 (H) 1.003 - 1.035    Blood Urine Negative Negative    pH Urine 6.5 4.7 - 8.0    Protein Albumin Urine Negative Negative mg/dL    Urobilinogen Urine Normal Normal, 2.0 mg/dL    Nitrite Urine Negative Negative    Leukocyte Esterase Urine Negative Negative    Mucus Urine Present (A) None Seen /LPF    RBC Urine 1 <=2 /HPF    WBC Urine 1 <=5 /HPF    Squamous Epithelials Urine <1 <=1 /HPF    Narrative    Urine Culture not indicated       Medications   lidocaine 1 % 0.1-1 mL (has no administration in time range)   lidocaine (LMX4) cream (has no administration in time range)   0.9% sodium chloride BOLUS (0 mLs Intravenous Stopped 5/9/22 1212)   sodium chloride (PF) 0.9% PF flush 60 mL (60 mLs Intravenous Given 5/9/22 1210)   iopamidol (ISOVUE-370) solution 80 mL (80 mLs Intravenous Given 5/9/22 1210)       Assessments & Plan (with Medical Decision Making)     I have reviewed the nursing notes.    I have reviewed the findings, diagnosis, plan and need for follow up with the patient.      New Prescriptions    No medications on file       Final diagnoses:   Syncope, unspecified syncope type       5/9/2022   [facility]     Anup Interiano MD  05/09/22 0562

## 2022-05-09 NOTE — ED NOTES
Monica Ring called and asked if the provider would look at his right ear as there was some charting done at his facility yesterday that he has some puss like drainage.

## 2022-05-11 ENCOUNTER — INFUSION THERAPY VISIT (OUTPATIENT)
Dept: INFUSION THERAPY | Facility: OTHER | Age: 87
End: 2022-05-11
Attending: FAMILY MEDICINE
Payer: MEDICARE

## 2022-05-11 VITALS
HEART RATE: 70 BPM | DIASTOLIC BLOOD PRESSURE: 65 MMHG | SYSTOLIC BLOOD PRESSURE: 110 MMHG | RESPIRATION RATE: 16 BRPM | OXYGEN SATURATION: 98 % | TEMPERATURE: 97 F

## 2022-05-11 DIAGNOSIS — E86.9 VOLUME DEPLETION: Primary | ICD-10-CM

## 2022-05-11 PROCEDURE — 96365 THER/PROPH/DIAG IV INF INIT: CPT

## 2022-05-11 PROCEDURE — 250N000011 HC RX IP 250 OP 636: Performed by: FAMILY MEDICINE

## 2022-05-11 PROCEDURE — 258N000003 HC RX IP 258 OP 636: Performed by: FAMILY MEDICINE

## 2022-05-11 PROCEDURE — 96360 HYDRATION IV INFUSION INIT: CPT

## 2022-05-11 RX ORDER — HEPARIN SODIUM (PORCINE) LOCK FLUSH IV SOLN 100 UNIT/ML 100 UNIT/ML
5 SOLUTION INTRAVENOUS ONCE
Status: COMPLETED | OUTPATIENT
Start: 2022-05-11 | End: 2022-05-11

## 2022-05-11 RX ORDER — HEPARIN SODIUM (PORCINE) LOCK FLUSH IV SOLN 100 UNIT/ML 100 UNIT/ML
5 SOLUTION INTRAVENOUS ONCE
Status: CANCELLED
Start: 2022-05-11 | End: 2022-05-11

## 2022-05-11 RX ADMIN — SODIUM CHLORIDE 1000 ML: 9 INJECTION, SOLUTION INTRAVENOUS at 09:47

## 2022-05-11 RX ADMIN — HEPARIN 5 ML: 100 SYRINGE at 11:06

## 2022-05-11 ASSESSMENT — PAIN SCALES - GENERAL: PAINLEVEL: NO PAIN (0)

## 2022-05-11 NOTE — PROGRESS NOTES
Patient is a 87 y/o here accompanied by self today for infusion of IVF per order of Dr. Wilson.  Patient identified with two identifiers, order verified, and verbal consent for today's infusion obtained from patient.      Patient meets order parameters for today's treatment.     Patients port accessed using non-coring, 19 gauge, 3/4 inch needle. Port accessed per facility protocol. Port flushed easily, blood return noted.  No signs and symptoms of infection or infiltration.      Infusion administered per protocol.  Patient tolerated infusion well, no signs or symptoms of adverse reaction noted.  Patient denies pain nor discomfort.     Port needle removed, needle intact.  Site clean, dry and intact.  No signs or symptoms of infiltration or infection.  Covered with a sterile bandage, slight pressure applied for 30 seconds.  Pt instructed to leave bandage intact for a minimum of one hour, and to call with questions or concerns.  Copy of appointments, discharge instructions, and after visit summary (AVS) provided to patient.  Patient states understanding, discharged.

## 2022-05-18 ENCOUNTER — INFUSION THERAPY VISIT (OUTPATIENT)
Dept: INFUSION THERAPY | Facility: OTHER | Age: 87
End: 2022-05-18
Attending: FAMILY MEDICINE
Payer: MEDICARE

## 2022-05-18 ENCOUNTER — HOSPITAL ENCOUNTER (EMERGENCY)
Facility: HOSPITAL | Age: 87
Discharge: OTHER INSTITUTION | End: 2022-05-18
Attending: STUDENT IN AN ORGANIZED HEALTH CARE EDUCATION/TRAINING PROGRAM | Admitting: STUDENT IN AN ORGANIZED HEALTH CARE EDUCATION/TRAINING PROGRAM
Payer: MEDICARE

## 2022-05-18 ENCOUNTER — APPOINTMENT (OUTPATIENT)
Dept: CT IMAGING | Facility: HOSPITAL | Age: 87
End: 2022-05-18
Attending: STUDENT IN AN ORGANIZED HEALTH CARE EDUCATION/TRAINING PROGRAM
Payer: MEDICARE

## 2022-05-18 VITALS
RESPIRATION RATE: 16 BRPM | HEART RATE: 70 BPM | SYSTOLIC BLOOD PRESSURE: 127 MMHG | TEMPERATURE: 98.8 F | OXYGEN SATURATION: 95 % | DIASTOLIC BLOOD PRESSURE: 66 MMHG

## 2022-05-18 VITALS
HEART RATE: 71 BPM | TEMPERATURE: 97.6 F | OXYGEN SATURATION: 96 % | SYSTOLIC BLOOD PRESSURE: 156 MMHG | DIASTOLIC BLOOD PRESSURE: 87 MMHG | RESPIRATION RATE: 10 BRPM

## 2022-05-18 DIAGNOSIS — R41.82 ALTERED MENTAL STATUS, UNSPECIFIED ALTERED MENTAL STATUS TYPE: ICD-10-CM

## 2022-05-18 DIAGNOSIS — E86.9 VOLUME DEPLETION: Primary | ICD-10-CM

## 2022-05-18 LAB
ALBUMIN UR-MCNC: NEGATIVE MG/DL
ANION GAP SERPL CALCULATED.3IONS-SCNC: 6 MMOL/L (ref 3–14)
APPEARANCE UR: CLEAR
BASE EXCESS BLDV CALC-SCNC: 0.6 MMOL/L (ref -7.7–1.9)
BILIRUB UR QL STRIP: NEGATIVE
BUN SERPL-MCNC: 13 MG/DL (ref 7–30)
CALCIUM SERPL-MCNC: 8 MG/DL (ref 8.5–10.1)
CHLORIDE BLD-SCNC: 109 MMOL/L (ref 94–109)
CO2 SERPL-SCNC: 24 MMOL/L (ref 20–32)
COLOR UR AUTO: ABNORMAL
CREAT SERPL-MCNC: 0.85 MG/DL (ref 0.66–1.25)
ERYTHROCYTE [DISTWIDTH] IN BLOOD BY AUTOMATED COUNT: 13.7 % (ref 10–15)
FLUAV RNA SPEC QL NAA+PROBE: NEGATIVE
FLUBV RNA RESP QL NAA+PROBE: NEGATIVE
GFR SERPL CREATININE-BSD FRML MDRD: 84 ML/MIN/1.73M2
GLUCOSE BLD-MCNC: 100 MG/DL (ref 70–99)
GLUCOSE BLDC GLUCOMTR-MCNC: 83 MG/DL (ref 70–99)
GLUCOSE BLDC GLUCOMTR-MCNC: 96 MG/DL (ref 70–99)
GLUCOSE UR STRIP-MCNC: NEGATIVE MG/DL
HCO3 BLDV-SCNC: 26 MMOL/L (ref 21–28)
HCT VFR BLD AUTO: 35.5 % (ref 40–53)
HGB BLD-MCNC: 11.7 G/DL (ref 13.3–17.7)
HGB UR QL STRIP: NEGATIVE
HYALINE CASTS: 1 /LPF
KETONES UR STRIP-MCNC: NEGATIVE MG/DL
LACTATE SERPL-SCNC: 0.7 MMOL/L (ref 0.7–2)
LEUKOCYTE ESTERASE UR QL STRIP: NEGATIVE
MCH RBC QN AUTO: 31.6 PG (ref 26.5–33)
MCHC RBC AUTO-ENTMCNC: 33 G/DL (ref 31.5–36.5)
MCV RBC AUTO: 96 FL (ref 78–100)
NITRATE UR QL: NEGATIVE
O2/TOTAL GAS SETTING VFR VENT: 21 %
OXYHGB MFR BLDV: 84 % (ref 70–75)
PCO2 BLDV: 42 MM HG (ref 40–50)
PH BLDV: 7.39 [PH] (ref 7.32–7.43)
PH UR STRIP: 6 [PH] (ref 4.7–8)
PLATELET # BLD AUTO: 137 10E3/UL (ref 150–450)
PO2 BLDV: 52 MM HG (ref 25–47)
POTASSIUM BLD-SCNC: 3.7 MMOL/L (ref 3.4–5.3)
RBC # BLD AUTO: 3.7 10E6/UL (ref 4.4–5.9)
RBC URINE: 3 /HPF
RSV RNA SPEC NAA+PROBE: NEGATIVE
SARS-COV-2 RNA RESP QL NAA+PROBE: NEGATIVE
SODIUM SERPL-SCNC: 139 MMOL/L (ref 133–144)
SP GR UR STRIP: 1.01 (ref 1–1.03)
SQUAMOUS EPITHELIAL: 0 /HPF
UROBILINOGEN UR STRIP-MCNC: NORMAL MG/DL
WBC # BLD AUTO: 4.2 10E3/UL (ref 4–11)
WBC URINE: <1 /HPF

## 2022-05-18 PROCEDURE — 82310 ASSAY OF CALCIUM: CPT | Performed by: STUDENT IN AN ORGANIZED HEALTH CARE EDUCATION/TRAINING PROGRAM

## 2022-05-18 PROCEDURE — G1004 CDSM NDSC: HCPCS

## 2022-05-18 PROCEDURE — 82805 BLOOD GASES W/O2 SATURATION: CPT | Performed by: STUDENT IN AN ORGANIZED HEALTH CARE EDUCATION/TRAINING PROGRAM

## 2022-05-18 PROCEDURE — 93010 ELECTROCARDIOGRAM REPORT: CPT | Performed by: INTERNAL MEDICINE

## 2022-05-18 PROCEDURE — C9803 HOPD COVID-19 SPEC COLLECT: HCPCS

## 2022-05-18 PROCEDURE — 82962 GLUCOSE BLOOD TEST: CPT | Mod: ZL

## 2022-05-18 PROCEDURE — 83605 ASSAY OF LACTIC ACID: CPT | Performed by: STUDENT IN AN ORGANIZED HEALTH CARE EDUCATION/TRAINING PROGRAM

## 2022-05-18 PROCEDURE — 36415 COLL VENOUS BLD VENIPUNCTURE: CPT | Performed by: STUDENT IN AN ORGANIZED HEALTH CARE EDUCATION/TRAINING PROGRAM

## 2022-05-18 PROCEDURE — 250N000011 HC RX IP 250 OP 636: Performed by: STUDENT IN AN ORGANIZED HEALTH CARE EDUCATION/TRAINING PROGRAM

## 2022-05-18 PROCEDURE — 81001 URINALYSIS AUTO W/SCOPE: CPT | Performed by: STUDENT IN AN ORGANIZED HEALTH CARE EDUCATION/TRAINING PROGRAM

## 2022-05-18 PROCEDURE — 96365 THER/PROPH/DIAG IV INF INIT: CPT

## 2022-05-18 PROCEDURE — 99285 EMERGENCY DEPT VISIT HI MDM: CPT | Performed by: STUDENT IN AN ORGANIZED HEALTH CARE EDUCATION/TRAINING PROGRAM

## 2022-05-18 PROCEDURE — 99285 EMERGENCY DEPT VISIT HI MDM: CPT | Mod: 25

## 2022-05-18 PROCEDURE — 85027 COMPLETE CBC AUTOMATED: CPT | Performed by: STUDENT IN AN ORGANIZED HEALTH CARE EDUCATION/TRAINING PROGRAM

## 2022-05-18 PROCEDURE — 87040 BLOOD CULTURE FOR BACTERIA: CPT | Performed by: STUDENT IN AN ORGANIZED HEALTH CARE EDUCATION/TRAINING PROGRAM

## 2022-05-18 PROCEDURE — 258N000003 HC RX IP 258 OP 636: Performed by: FAMILY MEDICINE

## 2022-05-18 PROCEDURE — 250N000011 HC RX IP 250 OP 636: Performed by: FAMILY MEDICINE

## 2022-05-18 PROCEDURE — 93005 ELECTROCARDIOGRAM TRACING: CPT

## 2022-05-18 PROCEDURE — 96374 THER/PROPH/DIAG INJ IV PUSH: CPT

## 2022-05-18 PROCEDURE — 87637 SARSCOV2&INF A&B&RSV AMP PRB: CPT | Performed by: STUDENT IN AN ORGANIZED HEALTH CARE EDUCATION/TRAINING PROGRAM

## 2022-05-18 RX ORDER — HEPARIN SODIUM (PORCINE) LOCK FLUSH IV SOLN 100 UNIT/ML 100 UNIT/ML
5 SOLUTION INTRAVENOUS ONCE
Status: CANCELLED
Start: 2022-05-18 | End: 2022-05-18

## 2022-05-18 RX ORDER — HEPARIN SODIUM (PORCINE) LOCK FLUSH IV SOLN 100 UNIT/ML 100 UNIT/ML
5 SOLUTION INTRAVENOUS ONCE
Status: COMPLETED | OUTPATIENT
Start: 2022-05-18 | End: 2022-05-18

## 2022-05-18 RX ORDER — NALOXONE HYDROCHLORIDE 0.4 MG/ML
1 INJECTION, SOLUTION INTRAMUSCULAR; INTRAVENOUS; SUBCUTANEOUS ONCE
Status: COMPLETED | OUTPATIENT
Start: 2022-05-18 | End: 2022-05-18

## 2022-05-18 RX ADMIN — HEPARIN 5 ML: 100 SYRINGE at 11:09

## 2022-05-18 RX ADMIN — SODIUM CHLORIDE 1000 ML: 9 INJECTION, SOLUTION INTRAVENOUS at 10:01

## 2022-05-18 RX ADMIN — NALXONE HYDROCHLORIDE 1 MG: 0.4 INJECTION INTRAMUSCULAR; INTRAVENOUS; SUBCUTANEOUS at 12:29

## 2022-05-18 NOTE — ED NOTES
Hand hygiene performed: yes   Mask donned by caregiver: yes   Site prepped with CHG: yes   Labs drawn: yes   Dressing applied using aseptic technique: yes   Comment:

## 2022-05-18 NOTE — ED NOTES
"Incoming call from CARLOS Nunes. Updated on plan of care. Des states he \"is always sleeping. And I don't know why\". She states he has been worked up for that before and was told her has a narrowed carotid arteries  "

## 2022-05-18 NOTE — ED PROVIDER NOTES
History     Chief Complaint   Patient presents with     Altered Mental Status     Increased somnolence     HPI  Jf Ortiz is a 88 year old male who presents after rapid response.  He was in the infusion clinic getting a fluid bolus.  After his infusion he seemed quite sleepy and so he was sent here.  He has constricted pupils.  He is not take any pain medications.  He wakes up easily and answers questions appropriately and is oriented but he falls back to sleep easily.  He had no syncopal event today.  He had no falls today.  Denies any medical complaints and is not sure why they made him come to the emergency room.  He denies any chest pain/shortness of breath.  No nausea/vomiting/diarrhea.  No abdominal pain. Intermittently low hemoglobin, stable from 3 weeks ago but down from 9 days ago.  He denies any vomiting of blood or rectal bleeding. He has no other complaints.    Allergies:  Allergies   Allergen Reactions     Cats Unknown     Haldol [Haloperidol]      Per SNF reporting     Klonopin [Clonazepam]      Per SNF reporting     Lamotrigine      Skin lesions     Oxycodone      Per SNF reporting       Problem List:    Patient Active Problem List    Diagnosis Date Noted     Hematoma of chest wall, left, initial encounter 04/23/2022     Priority: Medium     Nursing Home Visit 03/03/2022     Priority: Medium     Retinal artery branch occlusion 02/13/2021     Priority: Medium     Vision loss of right eye 02/12/2021     Priority: Medium     Orthostatic hypotension dysautonomic syndrome 02/12/2021     Priority: Medium     Chronic atrial fibrillation (H) 02/12/2021     Priority: Medium     Closed compression fracture of body of L1 vertebra (H) 02/12/2021     Priority: Medium     Closed wedge compression fracture of lumbar vertebra with routine healing 12/20/2020     Priority: Medium     Compression fracture of L1 lumbar vertebra, closed, initial encounter (H) 12/18/2020     Priority: Medium     Compression fracture  of thoracic vertebra, initial encounter, unspecified thoracic vertebral level 12/18/2020     Priority: Medium     Replacing diagnoses that were inactivated after the 10/1/2021 regulatory import.       Longstanding persistent atrial fibrillation (H) 04/13/2020     Priority: Medium     Frequent falls 04/13/2020     Priority: Medium     Coronary artery disease involving native coronary artery without angina pectoris 04/13/2020     Priority: Medium     Constipation, unspecified constipation type 04/11/2018     Priority: Medium     Pacemaker, [employer], Dual Chamber - Dependent 10/06/2017     Priority: Medium     Lewy body dementia without behavioral disturbance (H) 08/31/2017     Priority: Medium     Urinary incontinence 08/31/2017     Priority: Medium     Autonomic orthostatic hypotension 10/14/2016     Priority: Medium     Dementia without behavioral disturbance (H) 07/28/2015     Priority: Medium     Diagnosis updated by automated process. Provider to review and confirm.       Parkinson disease (H)      Priority: Medium     Seizure disorder (H)      Priority: Medium     Volume depletion 08/02/2014     Priority: Medium     Stented coronary artery      Priority: Medium     Cardiac pacemaker in situ 01/08/2013     Priority: Medium     Overview:   [employer] S606 DREL,  Serial #[id number]  5-13-11  Atrial lead [employer] 4054 Serial #[id number]  8-11-00  Ventriculer lead [employer] 4137  Serial #[id number]  5-13-11  2nd Degree AV Block   Dr. Campbell  Intrinsic:  Pt. is Pacemaker Dependant, remains paced at temp. rate of 30 bpm (6-13-14)    Formatting of this note might be different from the original.  [employer] S606 DREL,  Serial #[id number]  5-13-11  Atrial lead [employer] 4054 Serial #[id number]  8-11-00  Ventriculer lead [employer] 4137  Serial #[id number]  5-13-11  2nd Degree AV Block   Dr. Campbell  Intrinsic:  Pt. is Pacemaker Dependant, remains paced at temp. rate of 30 bpm  (6-13-14)       REM sleep behavior disorder 01/01/2011     Priority: Medium     Osteoarthritis 01/01/2011     Priority: Medium     Problem list name updated by automated process. Provider to review       Diaphragmatic hernia 01/01/2011     Priority: Medium     Problem list name updated by automated process. Provider to review       Pain in joint, forearm 07/31/2008     Priority: Medium     Formatting of this note might be different from the original.  IMO Update 10/11       Pain in joint, ankle and foot 07/31/2008     Priority: Medium     Formatting of this note might be different from the original.  IMO Update 10/11       Dysphagia 05/22/2007     Priority: Medium     Overview:   IMO Update    Formatting of this note might be different from the original.  IMO Update       Coronary atherosclerosis of native coronary artery 11/28/2006     Priority: Medium     Overview:   IMO Update 10/11    Formatting of this note might be different from the original.  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  IMO Update 10/11       Hypertension with target blood pressure goal under 150/90 09/05/2006     Priority: Medium     Overview:   IMO Update       Hyperlipidemia 09/05/2006     Priority: Medium     Formatting of this note might be different from the original.  IMO Update 10/11       Essential hypertension 09/05/2006     Priority: Medium     Formatting of this note might be different from the original.  IMO Update          Past Medical History:    Past Medical History:   Diagnosis Date     Autonomic orthostatic hypotension 10/14/2016     Coronary artery disease      Dementia without behavioral disturbance (H) 7/28/2015     Diaphragmatic hernia without mention of obstruction or gangrene 1/1/2011     Hypercholesterolemia 4/23/2013     Osteoarthrosis, unspecified whether generalized  or localized, unspecified site 1/1/2011     Other and unspecified hyperlipidemia 1/1/2011     Pacemaker      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     Seizure disorder (H)      Stented coronary artery        Past Surgical History:    Past Surgical History:   Procedure Laterality Date     ------------OTHER-------------  1955    ulnar and radial fx - repair ulnar and radial fx x4     ------------OTHER-------------  6/14/2011    cataract extraction     BIOPSY  08/2015    skin biopsy     BLEPHAROPLASTY BILATERAL  5/6/2014    Procedure: BLEPHAROPLASTY BILATERAL;  Surgeon: Andrew Queen MD;  Location: [facility]     BYPASS GRAFT ARTERY CORONARY  11/2006    coronary artery disease x 5, [facility]     cataract extraction and lens implantation  2011    cataracts     cataract extraction and lens implantation      cataracts     COLONOSCOPY  2012     colonoscopy with polypectomy  3/13/2009    history of polyps - repeat 3 yrs     colonoscopy with polypectomy  2006     colonoscopy with polypectomy  2005     COMBINED COLONOSCOPY WITH ARGON PLASMA COAGULATOR (APC) N/A 10/31/2014    Procedure: COMBINED COLONOSCOPY WITH ARGON PLASMA COAGULATOR (APC);  Surgeon: Bassam Aguilar MD;  Location: [facility]     COMBINED COLONOSCOPY WITH ARGON PLASMA COAGULATOR (APC) N/A 11/13/2015    Procedure: COMBINED COLONOSCOPY WITH ARGON PLASMA COAGULATOR (APC);  Surgeon: Bassam Aguilar MD;  Location: [facility]     ENDOSCOPY UPPER, COLONOSCOPY, COMBINED N/A 10/31/2014    Procedure: COMBINED ENDOSCOPY UPPER, COLONOSCOPY;  Surgeon: Bassam Aguilar MD;  Location: [facility]     ENDOSCOPY UPPER, COLONOSCOPY, COMBINED N/A 11/13/2015    Procedure: COMBINED ENDOSCOPY UPPER, COLONOSCOPY;  Surgeon: Bassam Aguilar MD;  Location: [facility]     ESOPHAGOSCOPY, GASTROSCOPY, DUODENOSCOPY (EGD), COMBINED  1/22/2014    Procedure: COMBINED ESOPHAGOSCOPY, GASTROSCOPY, DUODENOSCOPY (EGD);  UPPER ENDOSCOPY(CARPENTER) W/ BIOPSIES;  Surgeon: Patricia Carpenter MD;  Location: [facility]     HERNIORRHAPHY  INGUINAL Right 2018    Procedure: HERNIORRHAPHY INGUINAL;  OPEN RIGHT INGUINAL HERNIA REPAIR with Mesh;  Surgeon: Virgilio Zaragoza DO;  Location: [facility]     INSERT PORT VASCULAR ACCESS Right 2019    Procedure: PORT PLACEMENT;  Surgeon: Lloyd Ram MD;  Location: [facility]     LARYNGOSCOPY WITH MICROSCOPE  2014    Procedure: LARYNGOSCOPY WITH MICROSCOPE;;  Surgeon: Chayo Duke MD;  Location: [facility]     pacemaker placement      heart block     pacemaker placement      dual-chamber     REMOVE TUBE, MYRINGOTOMY, COMBINED  2014    Procedure: COMBINED REMOVE TUBE, MYRINGOTOMY;  MICRODIRECT LARYNGOSCOPY WITH BIOPSY AND FROZEN SECTIONS removal of right ear tube and myringoplasty;  Surgeon: Chayo Duke MD;  Location: [facility]     REPLACE PACEMAKER GENERATOR N/A 2018    Procedure: REPLACE PACEMAKER GENERATOR;  Pacemaker generator change;  Surgeon: Alma Kaplan MD;  Location: [facility]     stent placement to LAD       ventilation tube  2012    right in office       Family History:    Family History   Problem Relation Age of Onset     Diabetes Mother      Breast Cancer Daughter        Social History:  Marital Status:  Single [1]  Social History     Tobacco Use     Smoking status: Former Smoker     Packs/day: 1.00     Years: 5.00     Pack years: 5.00     Types: Cigarettes     Quit date: 1985     Years since quittin.4     Smokeless tobacco: Never Used     Tobacco comment: quit in    Substance Use Topics     Alcohol use: Yes     Comment: social     Drug use: No        Medications:    acetaminophen (TYLENOL) 500 MG tablet  aspirin (ASA) 81 MG chewable tablet  atorvastatin (LIPITOR) 40 MG tablet  fludrocortisone (FLORINEF) 0.1 MG tablet  melatonin 3 MG tablet  memantine (NAMENDA) 5 MG tablet  Menthol-Zinc Oxide (MOISTURE BARRIER EX)  nystatin POWD  potassium chloride ER (KLOR-CON M) 20 MEQ CR tablet  RABEprazole (ACIPHEX) 20 MG EC tablet  senna-docusate  (SENOKOT-S/PERICOLACE) 8.6-50 MG tablet  sertraline (ZOLOFT) 50 MG tablet  sodium chloride 1 GM tablet          Review of Systems   All other systems reviewed and are negative.      Physical Exam   BP: 156/84  Pulse: 73  Temp: 98.5  F (36.9  C)  Resp: 16  SpO2: 95 %      Physical Exam  Vitals and nursing note reviewed.   Constitutional:       General: He is not in acute distress.     Appearance: He is well-developed. He is not ill-appearing or toxic-appearing.      Comments: Sleeping but easily arouses when I come in the room. Opens eyes when told to.   HENT:      Head: Normocephalic and atraumatic.      Mouth/Throat:      Mouth: Mucous membranes are moist.   Eyes:      Extraocular Movements: Extraocular movements intact.      Pupils: Pupils are equal, round, and reactive to light.   Cardiovascular:      Heart sounds: Normal heart sounds.   Pulmonary:      Effort: Pulmonary effort is normal.      Breath sounds: Normal breath sounds.      Comments: Normal respiratory rate (consistently between 14-16). Charting here has been inaccurate and showed both low and high respirations.  Abdominal:      General: Bowel sounds are normal.      Palpations: Abdomen is soft.      Comments: Bruise to abdomen is non-tender and seems older   Musculoskeletal:         General: Normal range of motion.      Cervical back: Normal range of motion.   Skin:     General: Skin is warm and dry.      Capillary Refill: Capillary refill takes less than 2 seconds.   Neurological:      Mental Status: He is alert.      GCS: GCS eye subscore is 3. GCS verbal subscore is 5. GCS motor subscore is 6.      Sensory: No sensory deficit.      Motor: No weakness.      Coordination: Romberg sign negative.      Deep Tendon Reflexes: Reflexes normal.   Psychiatric:         Mood and Affect: Mood normal.         Behavior: Behavior normal.       ED Course        ED Course as of 05/18/22 1350   Wed May 18, 2022   1334 Hemoglobin(!): 11.7  Intermittently low  hemoglobin, stable from 3 weeks ago but down from 9 days ago.  He denies any vomiting of blood or rectal bleeding.   1340 No change with Narcan.   1347 Labs entirely unremarkable. He states that he feels well. Okay for discharge and PCP follow-up.   1348 Findings were discussed with the patient. Additional verbal instructions were discussed with the patient as well. Instructed to follow up with a primary care provider within 2-3 days. Also discussed specific warning signs and instructed to return to the ED if there are any concerns. Patient voiced understanding of instructions, questions were answered and the patient was discharged home in stable condition.       Procedures              EKG Interpretation:      Interpreted by KEREN ANDREW MD  Time reviewed: 1215  Symptoms at time of EKG: Possible increase in confusion/sleepiness from baseline although he declines   Rhythm: normal sinus   Rate: Normal  Axis: Left Axis Deviation  Ectopy: none  Conduction: left bundle branch block (complete)  ST Segments/ T Waves: No acute ischemic changes  Q Waves: none  Comparison to prior: Unchanged    Clinical Impression: no acute changes           Results for orders placed or performed during the hospital encounter of 05/18/22 (from the past 24 hour(s))   Symptomatic; Auto-generated order Influenza A/B & SARS-CoV2 (COVID-19) Virus PCR Multiplex Nasopharyngeal    Specimen: Nasopharyngeal; Swab   Result Value Ref Range    Influenza A PCR Negative Negative    Influenza B PCR Negative Negative    RSV PCR Negative Negative    SARS CoV2 PCR Negative Negative    Narrative    Testing was performed using the Xpert Xpress CoV2/Flu/RSV Assay on the [employer] GeneXpert Instrument. This test should be ordered for the detection of SARS-CoV-2 and influenza viruses in individuals who meet clinical and/or epidemiological criteria. Test performance is unknown in asymptomatic patients. This test is for in vitro diagnostic use under the FDA EUA for  laboratories certified under CLIA to perform high or moderate complexity testing. This test has not been FDA cleared or approved. A negative result does not rule out the presence of PCR inhibitors in the specimen or target RNA in concentration below the limit of detection for the assay. If only one viral target is positive but coinfection with multiple targets is suspected, the sample should be re-tested with another FDA cleared, approved, or authorized test, if coinfection would change clinical management. This test was validated by the [facility]. These laboratories are certified under the Clinical  Laboratory Improvement Amendments of 1988 (CLIA-88) as qualified to perform high complexity laboratory testing.   Blood gas venous and oxyhgb   Result Value Ref Range    pH Venous 7.39 7.32 - 7.43    pCO2 Venous 42 40 - 50 mm Hg    pO2 Venous 52 (H) 25 - 47 mm Hg    Bicarbonate Venous 26 21 - 28 mmol/L    FIO2 21     Oxyhemoglobin Venous 84 (H) 70 - 75 %    Base Excess/Deficit (+/-) 0.6 -7.7 - 1.9 mmol/L   CBC with platelets   Result Value Ref Range    WBC Count 4.2 4.0 - 11.0 10e3/uL    RBC Count 3.70 (L) 4.40 - 5.90 10e6/uL    Hemoglobin 11.7 (L) 13.3 - 17.7 g/dL    Hematocrit 35.5 (L) 40.0 - 53.0 %    MCV 96 78 - 100 fL    MCH 31.6 26.5 - 33.0 pg    MCHC 33.0 31.5 - 36.5 g/dL    RDW 13.7 10.0 - 15.0 %    Platelet Count 137 (L) 150 - 450 10e3/uL   Basic metabolic panel   Result Value Ref Range    Sodium 139 133 - 144 mmol/L    Potassium 3.7 3.4 - 5.3 mmol/L    Chloride 109 94 - 109 mmol/L    Carbon Dioxide (CO2) 24 20 - 32 mmol/L    Anion Gap 6 3 - 14 mmol/L    Urea Nitrogen 13 7 - 30 mg/dL    Creatinine 0.85 0.66 - 1.25 mg/dL    Calcium 8.0 (L) 8.5 - 10.1 mg/dL    Glucose 100 (H) 70 - 99 mg/dL    GFR Estimate 84 >60 mL/min/1.73m2   Lactic acid whole blood   Result Value Ref Range    Lactic Acid 0.7 0.7 - 2.0 mmol/L   Head CT w/o contrast    Narrative    Exam: CT HEAD W/O CONTRAST      Exam  reason: Mental status change, unknown cause    Technique:   -Axial images of the head obtained without contrast. Coronal and  sagittal reformations were generated.    Comparison: 5/9/2022, 4/23/2022, 4/13/2022       Findings:      Parenchyma: No evidence of intraparenchymal hemorrhage, mass, acute  cortical infarct or prior infarct in a major vascular territory.      Mild to moderate diffuse cerebral volume loss. There is patchy  periventricular deep white matter hypoattenuation which is nonspecific  but likely due to chronic microvascular ischemic change.    No midline shift. The basilar cisterns are patent.    Extra-axial spaces: No extra-axial fluid collection or hemorrhage.     Ventricles: Prominent but likely normal given the degree of volume  loss  Paranasal sinuses: Scattered mild mucosal thickening throughout the  sinuses.   Mastoid air cells: There is a small amount of fluid in the right  mastoid air cells. The left mastoid air cells are clear.    Osseous: No acute findings.  Orbits: Normal.    Soft tissues: Unremarkable.       Impression    Impression:  No acute intracranial abnormality.      MUSA RIVER MD         SYSTEM ID:  [id number]   UA with Microscopic reflex to Culture    Specimen: Urine, Clean Catch   Result Value Ref Range    Color Urine Light Yellow Colorless, Straw, Light Yellow, Yellow    Appearance Urine Clear Clear    Glucose Urine Negative Negative mg/dL    Bilirubin Urine Negative Negative    Ketones Urine Negative Negative mg/dL    Specific Gravity Urine 1.011 1.003 - 1.035    Blood Urine Negative Negative    pH Urine 6.0 4.7 - 8.0    Protein Albumin Urine Negative Negative mg/dL    Urobilinogen Urine Normal Normal, 2.0 mg/dL    Nitrite Urine Negative Negative    Leukocyte Esterase Urine Negative Negative    RBC Urine 3 (H) <=2 /HPF    WBC Urine <1 <=5 /HPF    Squamous Epithelials Urine 0 <=1 /HPF    Hyaline Casts Urine 1 <=2 /LPF    Narrative    Urine Culture not indicated        Medications   naloxone (NARCAN) injection 1 mg (1 mg Intravenous Given 5/18/22 5654)       Assessments & Plan (with Medical Decision Making)     I have reviewed the nursing notes.    No focal deficits. Unclear if change from baseline. He denies complaints. No evidence of toxicology or pharmacy interaction and he appears well. Labs so far unremarkable. He has been stable while here the entire time. He has had 3 other rapid responses called on him for this same mental status over several weeks to months and each time he has been found to not have acute issues. He denies any complaints. He is well appearing. However, given his age and deentia, will plan on broad work-up.    See ED Course.    I have reviewed the findings, diagnosis, plan and need for follow up with the patient.    New Prescriptions    No medications on file       Final diagnoses:   Altered mental status, unspecified altered mental status type       5/18/2022   [facility]     Adria Lemus MD  05/18/22 1760       Adria Lemus MD  05/18/22 9701

## 2022-05-18 NOTE — ED NOTES
Pt remains unchanged after Narcan at this time. Pt wakes to name call and knows he is in the ED. Denies taking any pain medication today.   Immediately back to sleep snoring.

## 2022-05-18 NOTE — PATIENT INSTRUCTIONS

## 2022-05-18 NOTE — DISCHARGE INSTRUCTIONS
- Please take your already prescribed medications for your symptoms  - Please return to the Emergency Room if you do not improve, feel worse, or have any new or concerning symptoms. We would especially want to see you back if you experience worsening confusion  - Please follow up with a primary care physician in 2-3 days

## 2022-05-18 NOTE — ED TRIAGE NOTES
Pt is from [facility], pt was receiving IVFs in infusion therapy was was called overhead as an RRT for increased somnolence after therapy. Per infusuion pt arrived sleepy today, but they felt he woke easier thismorning than now. Pt woke to stimulation and name call. orientated to self and place. Falls asleep quickly. Pinpoint pupils, no narcotics on pt list from NH.  Pt was waking easily throughout RRT interview.  Pt was able to stand and pivot w asst x 2 to ED cot and transported to ED. Pt continues to fall asleep immediately with snoring respirations, but awakes to name call easily and remains orientated.       Triage Assessment     Row Name 05/18/22 1144       Triage Assessment (Adult)    Airway WDL WDL       Respiratory WDL    Respiratory WDL WDL       Skin Circulation/Temperature WDL    Skin Circulation/Temperature WDL WDL       Cardiac WDL    Cardiac WDL WDL       Peripheral/Neurovascular WDL    Peripheral Neurovascular WDL WDL       Cognitive/Neuro/Behavioral WDL    Cognitive/Neuro/Behavioral WDL X;arousability;level of consciousness;mood/behavior;WDL;orientation    Level of Consciousness lethargic    Arousal Level arouses to voice    Orientation person;place;time;situation    Speech logical;clear;spontaneous    Mood/Behavior calm;cooperative       Pupils (CN II)    Pupil Size Left 1 mm    Pupil Shape Left round    Pupil Reaction Left sluggish    Pupil Size Right 1 mm    Pupil Shape Right round    Pupil Reaction Right sluggish       Motor Response    LUE Motor Response purposeful motor response    RUE Motor Response purposeful motor response    LLE Motor Response purposeful motor response    RLE Motor Response purposeful motor response

## 2022-05-18 NOTE — PROGRESS NOTES
Patient is a 89 y/o here accompanied by self today for infusion of IVF per order of Dr. Wilson.  Patient identified with two identifiers, order verified, and verbal consent for today's infusion obtained from patient.      Patient very sleepy upon arrival. Does rouse when spoke to and is able to answer some simple questions and is able to follow commands.  Alert and oriented to self.  Needed assist of 2 to transfer from w/c to infusion chair.     Patient meets order parameters for today's treatment.     Patients port accessed using non-coring, 20 gauge, 3/4 inch needle. Port accessed per facility protocol. Port flushed easily, blood return noted.  No signs and symptoms of infection or infiltration.      Infusion administered per protocol.  Port needle removed, needle intact.  Site clean, dry and intact.  No signs or symptoms of infiltration or infection.  Covered with a sterile bandage, slight pressure applied for 30 seconds.  Pt instructed to leave bandage intact for a minimum of one hour, and to call with questions or concerns.      Patient tolerated infusion well, but is more drowsy and is not responding when spoken to.  VSS. Patient does respond to sternal rub, but is not able to follow simple commands to allow transfer to w/ and cannot be transferred safely.  Rapid response called d/t change in status.  Rapid response team arrived and evaluated patient.  Patient transported to ED by rapid response team.

## 2022-05-23 ENCOUNTER — NURSING HOME VISIT (OUTPATIENT)
Dept: FAMILY MEDICINE | Facility: OTHER | Age: 87
End: 2022-05-23
Attending: FAMILY MEDICINE
Payer: MEDICARE

## 2022-05-23 DIAGNOSIS — M54.50 CHRONIC LOW BACK PAIN, UNSPECIFIED BACK PAIN LATERALITY, UNSPECIFIED WHETHER SCIATICA PRESENT: ICD-10-CM

## 2022-05-23 DIAGNOSIS — G20.A1 PARKINSON DISEASE (H): ICD-10-CM

## 2022-05-23 DIAGNOSIS — I25.10 CORONARY ARTERY DISEASE INVOLVING NATIVE CORONARY ARTERY OF NATIVE HEART WITHOUT ANGINA PECTORIS: ICD-10-CM

## 2022-05-23 DIAGNOSIS — F02.80 LEWY BODY DEMENTIA WITHOUT BEHAVIORAL DISTURBANCE (H): ICD-10-CM

## 2022-05-23 DIAGNOSIS — G89.29 CHRONIC LOW BACK PAIN, UNSPECIFIED BACK PAIN LATERALITY, UNSPECIFIED WHETHER SCIATICA PRESENT: ICD-10-CM

## 2022-05-23 DIAGNOSIS — G31.83 LEWY BODY DEMENTIA WITHOUT BEHAVIORAL DISTURBANCE (H): ICD-10-CM

## 2022-05-23 DIAGNOSIS — I10 HYPERTENSION WITH TARGET BLOOD PRESSURE GOAL UNDER 150/90: ICD-10-CM

## 2022-05-23 DIAGNOSIS — Z78.9 NURSING HOME RESIDENT: Primary | ICD-10-CM

## 2022-05-23 LAB — BACTERIA BLD CULT: NO GROWTH

## 2022-05-23 PROCEDURE — 99308 SBSQ NF CARE LOW MDM 20: CPT | Performed by: FAMILY MEDICINE

## 2022-05-24 ENCOUNTER — TELEPHONE (OUTPATIENT)
Dept: INFUSION THERAPY | Facility: OTHER | Age: 87
End: 2022-05-24
Payer: MEDICARE

## 2022-05-24 NOTE — PROGRESS NOTES
Call from Alessia at [facility] alerting that patient will not be coming for his appt tomorrow. Notes they are going to try skipping this week, see how he does, and determine if he will continue to come for weekly infusions or not. Appt cancelled.

## 2022-06-01 ENCOUNTER — OFFICE VISIT (OUTPATIENT)
Dept: OTOLARYNGOLOGY | Facility: OTHER | Age: 87
End: 2022-06-01
Attending: NURSE PRACTITIONER
Payer: MEDICARE

## 2022-06-01 VITALS
BODY MASS INDEX: 23.85 KG/M2 | WEIGHT: 161 LBS | HEIGHT: 69 IN | HEART RATE: 70 BPM | TEMPERATURE: 97 F | OXYGEN SATURATION: 98 % | SYSTOLIC BLOOD PRESSURE: 132 MMHG | DIASTOLIC BLOOD PRESSURE: 74 MMHG

## 2022-06-01 DIAGNOSIS — Z96.22 PATENT TYMPANOSTOMY TUBE: ICD-10-CM

## 2022-06-01 DIAGNOSIS — H61.22 IMPACTED CERUMEN OF LEFT EAR: ICD-10-CM

## 2022-06-01 DIAGNOSIS — H92.11 OTORRHEA, RIGHT: Primary | ICD-10-CM

## 2022-06-01 LAB
GRAM STAIN RESULT: ABNORMAL

## 2022-06-01 PROCEDURE — 92504 EAR MICROSCOPY EXAMINATION: CPT | Performed by: NURSE PRACTITIONER

## 2022-06-01 PROCEDURE — 99213 OFFICE O/P EST LOW 20 MIN: CPT | Mod: 25 | Performed by: NURSE PRACTITIONER

## 2022-06-01 PROCEDURE — G0463 HOSPITAL OUTPT CLINIC VISIT: HCPCS

## 2022-06-01 PROCEDURE — 87102 FUNGUS ISOLATION CULTURE: CPT | Mod: ZL | Performed by: NURSE PRACTITIONER

## 2022-06-01 PROCEDURE — 69210 REMOVE IMPACTED EAR WAX UNI: CPT | Performed by: NURSE PRACTITIONER

## 2022-06-01 PROCEDURE — 87205 SMEAR GRAM STAIN: CPT | Mod: ZL | Performed by: NURSE PRACTITIONER

## 2022-06-01 PROCEDURE — 87070 CULTURE OTHR SPECIMN AEROBIC: CPT | Mod: ZL | Performed by: NURSE PRACTITIONER

## 2022-06-01 RX ORDER — CIPROFLOXACIN AND DEXAMETHASONE 3; 1 MG/ML; MG/ML
4 SUSPENSION/ DROPS AURICULAR (OTIC) 2 TIMES DAILY
Qty: 4 ML | Refills: 0 | Status: SHIPPED | OUTPATIENT
Start: 2022-06-01 | End: 2022-06-11

## 2022-06-01 ASSESSMENT — PAIN SCALES - GENERAL: PAINLEVEL: NO PAIN (0)

## 2022-06-01 NOTE — LETTER
6/1/2022         RE: Jf Ortiz  [address]  [place] [address]        Dear Colleague,    Thank you for referring your patient, Jf Ortiz, to the [facility] [place]. Please see a copy of my visit note below.    Otolaryngology Note         Chief Complaint:     Patient presents with:  Ent Problem: Otorrhea           History of Present Illness:     Jf Ortiz is a 88 year old male who presents today for right sided otorrhea.  He reports he is confused and does not contribute much to the HPI.  He has a history of lewy body dementia.  He is now in [facility].  Unsure how long drainage has been present. He denies current ear pain or drainage.  He is unable to answer if hearing has changed.      Jf has a history of right sided T-tube placement in 2015 for chronic otitis media with effusion.  Thick glue ear was noted on insertion.      I last saw him on 2/8/22 for ear cleaning.  At that time the right T-tube was patent without otorrhea.      Audiogram 11/15/2017 o showed stable mild to profound bilateral asymmetric sensorineural hearing loss stable discrimination     He has a pacemaker and therefore has not had an MRI IAC     He has had several CT heads most recently 6/22/2021 the internal auditory canal diameter is grossly symmetric no mastoid effusion the middle ear is clear on the right there is some cerumen or fluid in the ear canal.  No bony erosions         Medications:     Current Outpatient Rx   Medication Sig Dispense Refill     acetaminophen (TYLENOL) 500 MG tablet Take 1,000 mg by mouth 2 times daily       aspirin (ASA) 81 MG chewable tablet Take 81 mg by mouth daily       atorvastatin (LIPITOR) 40 MG tablet Take 40 mg by mouth every evening       ciprofloxacin-dexamethasone (CIPRODEX) 0.3-0.1 % otic suspension Place 4 drops into the right ear 2 times daily for 10 days 4 mL 0     fludrocortisone (FLORINEF) 0.1 MG tablet Take 1 tablet (0.1 mg) by mouth every morning (before  This office note has been dictated.     breakfast) 30 tablet 11     melatonin 3 MG tablet Take 3 mg by mouth At Bedtime       memantine (NAMENDA) 5 MG tablet Take 5 mg by mouth 2 times daily       Menthol-Zinc Oxide (MOISTURE BARRIER EX) Apply topically 2 times daily to rash on intergluteal cleft, coccyx and rogelio area. Also may use as needed.       nystatin POWD Apply topically 2 times daily as needed (affected areas/areas of yeast)       potassium chloride ER (KLOR-CON M) 20 MEQ CR tablet TAKE 1 TABLET BY MOUTH EVERY DAY 30 tablet 1     RABEprazole (ACIPHEX) 20 MG EC tablet Take 20 mg by mouth 2 times daily       senna-docusate (SENOKOT-S/PERICOLACE) 8.6-50 MG tablet Take 1 tablet by mouth daily       sertraline (ZOLOFT) 50 MG tablet Take 50 mg by mouth every evening       sodium chloride 1 GM tablet TAKE 1 TABLET BY MOUTH TWICE A  tablet 3            Allergies:     Allergies: Cats, Haldol [haloperidol], Klonopin [clonazepam], Lamotrigine, and Oxycodone          Past Medical History:     Past Medical History:   Diagnosis Date     Autonomic orthostatic hypotension 10/14/2016     Coronary artery disease      Dementia without behavioral disturbance (H) 7/28/2015    Diagnosis updated by automated process. Provider to review and confirm.     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     Seizure disorder (H)      Stented coronary artery             Past Surgical History:     Past Surgical History:   Procedure Laterality Date     ------------OTHER-------------  1955    ulnar and radial fx - repair ulnar and radial fx x4     ------------OTHER-------------  6/14/2011    cataract extraction     BIOPSY  08/2015    skin biopsy     BLEPHAROPLASTY BILATERAL  5/6/2014    Procedure: BLEPHAROPLASTY  BILATERAL;  Surgeon: Andrew Queen MD;  Location: [facility]     BYPASS GRAFT ARTERY CORONARY  11/2006    coronary artery disease x 5, [facility] [place]     cataract extraction and lens implantation  2011    cataracts     cataract extraction and lens implantation      cataracts     COLONOSCOPY  2012     colonoscopy with polypectomy  3/13/2009    history of polyps - repeat 3 yrs     colonoscopy with polypectomy  2006     colonoscopy with polypectomy  2005     COMBINED COLONOSCOPY WITH ARGON PLASMA COAGULATOR (APC) N/A 10/31/2014    Procedure: COMBINED COLONOSCOPY WITH ARGON PLASMA COAGULATOR (APC);  Surgeon: Bassam Aguilar MD;  [facility]: [facility]     COMBINED COLONOSCOPY WITH ARGON PLASMA COAGULATOR (APC) N/A 11/13/2015    Procedure: COMBINED COLONOSCOPY WITH ARGON PLASMA COAGULATOR (APC);  Surgeon: Bassam Aguilar MD;  [facility]     ENDOSCOPY UPPER, COLONOSCOPY, COMBINED N/A 10/31/2014    Procedure: COMBINED ENDOSCOPY UPPER, COLONOSCOPY;  Surgeon: Bassam Aguilar MD;  [facility]     ENDOSCOPY UPPER, COLONOSCOPY, COMBINED N/A 11/13/2015    Procedure: COMBINED ENDOSCOPY UPPER, COLONOSCOPY;  Surgeon: Bassam Aguilar MD;  [facility]     ESOPHAGOSCOPY, GASTROSCOPY, DUODENOSCOPY (EGD), COMBINED  1/22/2014    Procedure: COMBINED ESOPHAGOSCOPY, GASTROSCOPY, DUODENOSCOPY (EGD);  UPPER ENDOSCOPY(PENA) W/ BIOPSIES;  Surgeon: Patricia Pena MD;  [facility]     HERNIORRHAPHY INGUINAL Right 2/14/2018    Procedure: HERNIORRHAPHY INGUINAL;  OPEN RIGHT INGUINAL HERNIA REPAIR with Mesh;  Surgeon: Virgilio Zaragoza DO;  Location: [facility]     INSERT PORT VASCULAR ACCESS Right 7/12/2019    Procedure: PORT PLACEMENT;  Surgeon: Lloyd Ram MD;  [facility]     LARYNGOSCOPY WITH MICROSCOPE  1/22/2014    Procedure: LARYNGOSCOPY WITH MICROSCOPE;;  Surgeon: Chayo Duke MD;  [facility]: [facility]     pacemaker placement  2000    heart block     pacemaker placement  2011    dual-chamber     REMOVE TUBE,  "MYRINGOTOMY, COMBINED  2014    Procedure: COMBINED REMOVE TUBE, MYRINGOTOMY;  MICRODIRECT LARYNGOSCOPY WITH BIOPSY AND FROZEN SECTIONS removal of right ear tube and myringoplasty;  Surgeon: Chayo Duke MD;  [facility]     REPLACE PACEMAKER GENERATOR N/A 2018    Procedure: REPLACE PACEMAKER GENERATOR;  Pacemaker generator change;  Surgeon: Alma Kaplan MD;  Location: [facility]     stent placement to LAD       ventilation tube  2012    right in office       ENT family history reviewed         Social History:     Social History     Tobacco Use     Smoking status: Former Smoker     Packs/day: 1.00     Years: 5.00     Pack years: 5.00     Types: Cigarettes     Quit date: 1985     Years since quittin.4     Smokeless tobacco: Never Used     Tobacco comment: quit in    Substance Use Topics     Alcohol use: Yes     Comment: social     Drug use: No            Review of Systems:     ROS: See HPI         Physical Exam:     /74 (Cuff Size: Adult Regular)   Pulse 70   Temp 97  F (36.1  C) (Tympanic)   Ht 1.753 m (5' 9\")   Wt 73 kg (161 lb)   SpO2 98%   BMI 23.78 kg/m      General - Chronically ill appearing man sitting in a wheelchair with his eyes closed upon entry to the room.  He rouses with verbal stimuli.  He reports he is confused but answers questions appropriately/    Head and Face - Normocephalic and atraumatic, with no gross asymmetry noted.  The facial nerve is intact, with strong symmetric movements.  Voice and Breathing - The patient was breathing comfortably without the use of accessory muscles. There was no wheezing, stridor.   Ears - The ears were examined with binocular microscopy and with otoscope.  External ears normal. Right canal with dried cerumen and otorrhea debris.  Left canal cerumen impacted.  I was only able to partially clear the right canal of debris.  I used a #7, #5 cuevas and cupped forceps.  I was able to remove the dried crusted debris to " reveal thick tan otorrhea around the base of the tube.  A culture was obtained.  I then debrided the area to tolerance.  There is mild polypoid change around the base of the tube.  The T-tube appears patent.  No active otorrhea from the lumen of the tube.  Ciprodex drops were instilled into the ear.  The visualized portions of the right tympanic membrane is intact without effusion, retraction or mass. The left ear was cleaned with #7 cuevas.  Left tympanic membrane is intact without effusion, retraction or mass.  Eyes - Extraocular movements intact, sclera were not icteric or injected, conjunctiva were pink and moist.  Neck -No palpable lymphadenopathy.    Nose - External contour is symmetric, no gross deflection or scars.           Assessment and Plan:       ICD-10-CM    1. Otorrhea, right  H92.11 ciprofloxacin-dexamethasone (CIPRODEX) 0.3-0.1 % otic suspension   2. Patent tympanostomy tube  Z96.22     right   3. Impacted cerumen of left ear  H61.22      Keep right ear dry.  Start ciprodex drops take as prescribed.   Ear culture completed today    Follow up in 14 days with Char CLARKE  [facility] ENT        Again, thank you for allowing me to participate in the care of your patient.        Sincerely,        Char Avila NP

## 2022-06-01 NOTE — PATIENT INSTRUCTIONS
Thank you for allowing Char Avila and our ENT team to participate in your care.  If your medications are too expensive, please give the nurse a call.  We can possibly change this medication.  If you have a scheduling or an appointment question please contact our Health Unit Coordinator at their direct line [phone].   ALL nursing questions or concerns can be directed to your ENT nurse at: [phone] - [place]     Keep right ear dry.  Start ciprodex drops take as prescribed.   Ear culture completed today    Follow up in 14 days with Char Avila

## 2022-06-01 NOTE — PROGRESS NOTES
Otolaryngology Note         Chief Complaint:     Patient presents with:  Ent Problem: Otorrhea           History of Present Illness:     Jf Ortiz is a 88 year old male who presents today for right sided otorrhea.  He reports he is confused and does not contribute much to the HPI.  He has a history of lewy body dementia.  He is now in [facility].  Unsure how long drainage has been present. He denies current ear pain or drainage.  He is unable to answer if hearing has changed.      Jf has a history of right sided T-tube placement in 2015 for chronic otitis media with effusion.  Thick glue ear was noted on insertion.      I last saw him on 2/8/22 for ear cleaning.  At that time the right T-tube was patent without otorrhea.      Audiogram 11/15/2017 o showed stable mild to profound bilateral asymmetric sensorineural hearing loss stable discrimination     He has a pacemaker and therefore has not had an MRI IAC     He has had several CT heads most recently 6/22/2021 the internal auditory canal diameter is grossly symmetric no mastoid effusion the middle ear is clear on the right there is some cerumen or fluid in the ear canal.  No bony erosions         Medications:     Current Outpatient Rx   Medication Sig Dispense Refill     acetaminophen (TYLENOL) 500 MG tablet Take 1,000 mg by mouth 2 times daily       aspirin (ASA) 81 MG chewable tablet Take 81 mg by mouth daily       atorvastatin (LIPITOR) 40 MG tablet Take 40 mg by mouth every evening       ciprofloxacin-dexamethasone (CIPRODEX) 0.3-0.1 % otic suspension Place 4 drops into the right ear 2 times daily for 10 days 4 mL 0     fludrocortisone (FLORINEF) 0.1 MG tablet Take 1 tablet (0.1 mg) by mouth every morning (before breakfast) 30 tablet 11     melatonin 3 MG tablet Take 3 mg by mouth At Bedtime       memantine (NAMENDA) 5 MG tablet Take 5 mg by mouth 2 times daily       Menthol-Zinc Oxide (MOISTURE BARRIER EX) Apply topically 2 times daily to rash on  intergluteal cleft, coccyx and rogelio area. Also may use as needed.       nystatin POWD Apply topically 2 times daily as needed (affected areas/areas of yeast)       potassium chloride ER (KLOR-CON M) 20 MEQ CR tablet TAKE 1 TABLET BY MOUTH EVERY DAY 30 tablet 1     RABEprazole (ACIPHEX) 20 MG EC tablet Take 20 mg by mouth 2 times daily       senna-docusate (SENOKOT-S/PERICOLACE) 8.6-50 MG tablet Take 1 tablet by mouth daily       sertraline (ZOLOFT) 50 MG tablet Take 50 mg by mouth every evening       sodium chloride 1 GM tablet TAKE 1 TABLET BY MOUTH TWICE A  tablet 3            Allergies:     Allergies: Cats, Haldol [haloperidol], Klonopin [clonazepam], Lamotrigine, and Oxycodone          Past Medical History:     Past Medical History:   Diagnosis Date     Autonomic orthostatic hypotension 10/14/2016     Coronary artery disease      Dementia without behavioral disturbance (H) 7/28/2015    Diagnosis updated by automated process. Provider to review and confirm.     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     Seizure disorder (H)      Stented coronary artery             Past Surgical History:     Past Surgical History:   Procedure Laterality Date     ------------OTHER-------------  1955    ulnar and radial fx - repair ulnar and radial fx x4     ------------OTHER-------------  6/14/2011    cataract extraction     BIOPSY  08/2015    skin biopsy     BLEPHAROPLASTY BILATERAL  5/6/2014    Procedure: BLEPHAROPLASTY BILATERAL;  Surgeon: Andrew Queen MD;  [facility]: [facility]     BYPASS GRAFT ARTERY CORONARY  11/2006    coronary artery disease x 5, [facility]     cataract extraction and lens implantation  2011    cataracts     cataract extraction and  lens implantation      cataracts     COLONOSCOPY  2012     colonoscopy with polypectomy  3/13/2009    history of polyps - repeat 3 yrs     colonoscopy with polypectomy  2006     colonoscopy with polypectomy  2005     COMBINED COLONOSCOPY WITH ARGON PLASMA COAGULATOR (APC) N/A 10/31/2014    Procedure: COMBINED COLONOSCOPY WITH ARGON PLASMA COAGULATOR (APC);  Surgeon: Bassam Aguilar MD;  [facility]     COMBINED COLONOSCOPY WITH ARGON PLASMA COAGULATOR (APC) N/A 11/13/2015    Procedure: COMBINED COLONOSCOPY WITH ARGON PLASMA COAGULATOR (APC);  Surgeon: Bassam Aguilar MD;  [facility]     ENDOSCOPY UPPER, COLONOSCOPY, COMBINED N/A 10/31/2014    Procedure: COMBINED ENDOSCOPY UPPER, COLONOSCOPY;  Surgeon: Bassam Aguilar MD;  [facility]     ENDOSCOPY UPPER, COLONOSCOPY, COMBINED N/A 11/13/2015    Procedure: COMBINED ENDOSCOPY UPPER, COLONOSCOPY;  Surgeon: Bassam Aguilar MD;  [facility]     ESOPHAGOSCOPY, GASTROSCOPY, DUODENOSCOPY (EGD), COMBINED  1/22/2014    Procedure: COMBINED ESOPHAGOSCOPY, GASTROSCOPY, DUODENOSCOPY (EGD);  UPPER ENDOSCOPY(PENA) W/ BIOPSIES;  Surgeon: Patricia Pena MD;  [facility]     HERNIORRHAPHY INGUINAL Right 2/14/2018    Procedure: HERNIORRHAPHY INGUINAL;  OPEN RIGHT INGUINAL HERNIA REPAIR with Mesh;  Surgeon: Virgilio Zaragoza DO;  [facility]     INSERT PORT VASCULAR ACCESS Right 7/12/2019    Procedure: PORT PLACEMENT;  Surgeon: Lloyd Ram MD;  [facility]     LARYNGOSCOPY WITH MICROSCOPE  1/22/2014    Procedure: LARYNGOSCOPY WITH MICROSCOPE;;  Surgeon: Chayo Duke MD;  [facility]     pacemaker placement  2000    heart block     pacemaker placement  2011    dual-chamber     REMOVE TUBE, MYRINGOTOMY, COMBINED  1/22/2014    Procedure: COMBINED REMOVE TUBE, MYRINGOTOMY;  MICRODIRECT LARYNGOSCOPY WITH BIOPSY AND FROZEN SECTIONS removal of right ear tube and myringoplasty;  Surgeon: Chayo Duke MD;  [facility]     REPLACE  "PACEMAKER GENERATOR N/A 2018    Procedure: REPLACE PACEMAKER GENERATOR;  Pacemaker generator change;  Surgeon: Alma Kaplan MD;  Location: [facility]     stent placement to LAD       ventilation tube  2012    right in office       ENT family history reviewed         Social History:     Social History     Tobacco Use     Smoking status: Former Smoker     Packs/day: 1.00     Years: 5.00     Pack years: 5.00     Types: Cigarettes     Quit date: 1985     Years since quittin.4     Smokeless tobacco: Never Used     Tobacco comment: quit in    Substance Use Topics     Alcohol use: Yes     Comment: social     Drug use: No            Review of Systems:     ROS: See HPI         Physical Exam:     /74 (Cuff Size: Adult Regular)   Pulse 70   Temp 97  F (36.1  C) (Tympanic)   Ht 1.753 m (5' 9\")   Wt 73 kg (161 lb)   SpO2 98%   BMI 23.78 kg/m      General - Chronically ill appearing man sitting in a wheelchair with his eyes closed upon entry to the room.  He rouses with verbal stimuli.  He reports he is confused but answers questions appropriately/    Head and Face - Normocephalic and atraumatic, with no gross asymmetry noted.  The facial nerve is intact, with strong symmetric movements.  Voice and Breathing - The patient was breathing comfortably without the use of accessory muscles. There was no wheezing, stridor.   Ears - The ears were examined with binocular microscopy and with otoscope.  External ears normal. Right canal with dried cerumen and otorrhea debris.  Left canal cerumen impacted.  I was only able to partially clear the right canal of debris.  I used a #7, #5 cuevas and cupped forceps.  I was able to remove the dried crusted debris to reveal thick tan otorrhea around the base of the tube.  A culture was obtained.  I then debrided the area to tolerance.  There is mild polypoid change around the base of the tube.  The T-tube appears patent.  No active otorrhea from the lumen of the " tube.  Ciprodex drops were instilled into the ear.  The visualized portions of the right tympanic membrane is intact without effusion, retraction or mass. The left ear was cleaned with #7 cuevas.  Left tympanic membrane is intact without effusion, retraction or mass.  Eyes - Extraocular movements intact, sclera were not icteric or injected, conjunctiva were pink and moist.  Neck -No palpable lymphadenopathy.    Nose - External contour is symmetric, no gross deflection or scars.           Assessment and Plan:       ICD-10-CM    1. Otorrhea, right  H92.11 ciprofloxacin-dexamethasone (CIPRODEX) 0.3-0.1 % otic suspension   2. Patent tympanostomy tube  Z96.22     right   3. Impacted cerumen of left ear  H61.22      Keep right ear dry.  Start ciprodex drops take as prescribed.   Ear culture completed today    Follow up in 14 days with Char Avila NP-C  [facility] ENT

## 2022-06-01 NOTE — NURSING NOTE
"Chief Complaint   Patient presents with     Ent Problem     Otorrhea       Initial /74 (Cuff Size: Adult Regular)   Pulse 70   Temp 97  F (36.1  C) (Tympanic)   Ht 1.753 m (5' 9\")   Wt 73 kg (161 lb)   SpO2 98%   BMI 23.78 kg/m   Estimated body mass index is 23.78 kg/m  as calculated from the following:    Height as of this encounter: 1.753 m (5' 9\").    Weight as of this encounter: 73 kg (161 lb).  Medication Reconciliation: complete  Marilu Cha LPN    "

## 2022-06-03 LAB — BACTERIA SPEC CULT: NORMAL

## 2022-06-08 ENCOUNTER — INFUSION THERAPY VISIT (OUTPATIENT)
Dept: INFUSION THERAPY | Facility: OTHER | Age: 87
End: 2022-06-08
Payer: MEDICARE

## 2022-06-08 VITALS
SYSTOLIC BLOOD PRESSURE: 143 MMHG | DIASTOLIC BLOOD PRESSURE: 75 MMHG | RESPIRATION RATE: 18 BRPM | HEART RATE: 74 BPM | TEMPERATURE: 97 F | OXYGEN SATURATION: 97 %

## 2022-06-08 DIAGNOSIS — E86.9 VOLUME DEPLETION: Primary | ICD-10-CM

## 2022-06-08 PROCEDURE — 250N000011 HC RX IP 250 OP 636: Performed by: FAMILY MEDICINE

## 2022-06-08 PROCEDURE — 258N000003 HC RX IP 258 OP 636: Performed by: FAMILY MEDICINE

## 2022-06-08 PROCEDURE — 96360 HYDRATION IV INFUSION INIT: CPT

## 2022-06-08 RX ORDER — HEPARIN SODIUM (PORCINE) LOCK FLUSH IV SOLN 100 UNIT/ML 100 UNIT/ML
5 SOLUTION INTRAVENOUS ONCE
Status: CANCELLED
Start: 2022-06-08 | End: 2022-06-08

## 2022-06-08 RX ORDER — HEPARIN SODIUM (PORCINE) LOCK FLUSH IV SOLN 100 UNIT/ML 100 UNIT/ML
5 SOLUTION INTRAVENOUS ONCE
Status: COMPLETED | OUTPATIENT
Start: 2022-06-08 | End: 2022-06-08

## 2022-06-08 RX ADMIN — HEPARIN 5 ML: 100 SYRINGE at 10:36

## 2022-06-08 RX ADMIN — SODIUM CHLORIDE 1000 ML: 9 INJECTION, SOLUTION INTRAVENOUS at 09:34

## 2022-06-08 NOTE — PROGRESS NOTES
Patient tolerated infusion well, no signs or symptoms of adverse reaction noted.  Patient denies pain nor discomfort.     Port needle removed, needle intact.  Site clean, dry and intact.  No signs or symptoms of infiltration or infection.  Covered with a sterile bandage, slight pressure applied for 30 seconds.  Pt instructed to leave bandage intact for a minimum of one hour, and to call with questions or concerns.  Copy of appointments, discharge instructions, and after visit summary (AVS) provided to patient.  Patient states understanding, discharged.

## 2022-06-08 NOTE — PATIENT INSTRUCTIONS

## 2022-06-08 NOTE — PROGRESS NOTES
Patient is a 88 year old here today for infusion of IVF per order of Dr Wilson.  Patient identified with two identifiers, order verified, and verbal consent for today's infusion obtained from patient.      Patient meets order parameters for today's treatment.     Patient denies questions or concerns regarding infusion and/or medication(s) being administered.    Patients port accessed using non-coring, 19 gauge, 3/4 needle. Port accessed per facility protocol. Port flushed easily, blood return noted.  No signs and symptoms of infection or infiltration.      IV pump verified with dose, drug, and rate of administration.  Infusion administered per protocol.

## 2022-06-22 ENCOUNTER — INFUSION THERAPY VISIT (OUTPATIENT)
Dept: INFUSION THERAPY | Facility: OTHER | Age: 87
End: 2022-06-22
Payer: MEDICARE

## 2022-06-22 VITALS
HEART RATE: 69 BPM | TEMPERATURE: 97 F | SYSTOLIC BLOOD PRESSURE: 107 MMHG | RESPIRATION RATE: 18 BRPM | OXYGEN SATURATION: 96 % | DIASTOLIC BLOOD PRESSURE: 66 MMHG

## 2022-06-22 DIAGNOSIS — E86.9 VOLUME DEPLETION: Primary | ICD-10-CM

## 2022-06-22 PROCEDURE — 96360 HYDRATION IV INFUSION INIT: CPT

## 2022-06-22 PROCEDURE — 250N000011 HC RX IP 250 OP 636: Performed by: FAMILY MEDICINE

## 2022-06-22 PROCEDURE — 258N000003 HC RX IP 258 OP 636: Performed by: FAMILY MEDICINE

## 2022-06-22 RX ORDER — HEPARIN SODIUM (PORCINE) LOCK FLUSH IV SOLN 100 UNIT/ML 100 UNIT/ML
5 SOLUTION INTRAVENOUS ONCE
Status: CANCELLED
Start: 2022-06-22 | End: 2022-06-22

## 2022-06-22 RX ORDER — TAMSULOSIN HYDROCHLORIDE 0.4 MG/1
0.4 CAPSULE ORAL DAILY
COMMUNITY
End: 2022-12-16

## 2022-06-22 RX ORDER — HEPARIN SODIUM (PORCINE) LOCK FLUSH IV SOLN 100 UNIT/ML 100 UNIT/ML
5 SOLUTION INTRAVENOUS ONCE
Status: COMPLETED | OUTPATIENT
Start: 2022-06-22 | End: 2022-06-22

## 2022-06-22 RX ADMIN — Medication 5 ML: at 10:44

## 2022-06-22 RX ADMIN — SODIUM CHLORIDE 1000 ML: 9 INJECTION, SOLUTION INTRAVENOUS at 09:39

## 2022-06-22 ASSESSMENT — PAIN SCALES - GENERAL: PAINLEVEL: NO PAIN (0)

## 2022-06-22 NOTE — PATIENT INSTRUCTIONS

## 2022-06-22 NOTE — PROGRESS NOTES
Patient is 88 years old, here today for infusion of IVF.      Patient identified with two identifiers, order verified, and verbal consent for today's infusion obtained from patient.     Patients port accessed using non-coring, 20 gauge, 3/4 inch needle. Port accessed per facility protocol. Port flushed easily, blood return noted.  No signs and symptoms of infection or infiltration.      IV pump verified with dose, drug, and rate of administration.  Infusion administered per protocol.  Patient tolerated infusion well, no signs or symptoms of adverse reaction noted.  Patient denies pain nor discomfort.     IV removed, catheter intact.  Site clean, dry and intact.  No signs or symptoms of infiltration or infection.  Covered with a sterile bandage, slight pressure applied for 30 seconds.  Pt instructed to leave bandage intact for a minimum of one hour, and to call with questions or concerns. Patient states understanding, discharged.

## 2022-06-28 ENCOUNTER — OFFICE VISIT (OUTPATIENT)
Dept: OTOLARYNGOLOGY | Facility: OTHER | Age: 87
End: 2022-06-28
Attending: NURSE PRACTITIONER
Payer: MEDICARE

## 2022-06-28 VITALS
TEMPERATURE: 97.8 F | OXYGEN SATURATION: 98 % | HEIGHT: 69 IN | HEART RATE: 69 BPM | DIASTOLIC BLOOD PRESSURE: 60 MMHG | SYSTOLIC BLOOD PRESSURE: 100 MMHG | WEIGHT: 161 LBS | BODY MASS INDEX: 23.85 KG/M2

## 2022-06-28 DIAGNOSIS — H92.11 OTORRHEA, RIGHT: Primary | ICD-10-CM

## 2022-06-28 DIAGNOSIS — Z96.22 PATENT TYMPANOSTOMY TUBE: ICD-10-CM

## 2022-06-28 PROCEDURE — G0463 HOSPITAL OUTPT CLINIC VISIT: HCPCS

## 2022-06-28 PROCEDURE — 99212 OFFICE O/P EST SF 10 MIN: CPT | Performed by: NURSE PRACTITIONER

## 2022-06-28 ASSESSMENT — PAIN SCALES - GENERAL: PAINLEVEL: NO PAIN (0)

## 2022-06-28 NOTE — NURSING NOTE
"Chief Complaint   Patient presents with     Follow Up     14 day ear recheck        Initial /60 (BP Location: Right arm, Patient Position: Sitting, Cuff Size: Adult Regular)   Pulse 69   Temp 97.8  F (36.6  C) (Tympanic)   Ht 1.753 m (5' 9\")   Wt 73 kg (161 lb)   SpO2 98%   BMI 23.78 kg/m   Estimated body mass index is 23.78 kg/m  as calculated from the following:    Height as of this encounter: 1.753 m (5' 9\").    Weight as of this encounter: 73 kg (161 lb).  Medication Reconciliation: complete  Rose Barrios LPN    "

## 2022-06-28 NOTE — LETTER
6/28/2022         RE: Jf Ortiz  [address]  [place] [address]        Dear Colleague,    Thank you for referring your patient, Jf Ortiz, to the [facility] - [place]. Please see a copy of my visit note below.    Otolaryngology Note         Chief Complaint:     Patient presents with:  Follow Up: 14 day ear recheck            History of Present Illness:     Jf Ortiz is a 88 year old male seen today for follow up ear check.  I saw Gatito on 6/1/2022 with right-sided otorrhea.  Ear culture grew out normal emily.  He was treated with Ciprodex with good improvement.  Today he reports he has not had any further drainage.  He denies any otalgia, bothersome tinnitus, flux hearing, vertigo.         Medications:     Current Outpatient Rx   Medication Sig Dispense Refill     acetaminophen (TYLENOL) 500 MG tablet Take 1,000 mg by mouth 2 times daily       aspirin (ASA) 81 MG chewable tablet Take 81 mg by mouth daily       atorvastatin (LIPITOR) 40 MG tablet Take 40 mg by mouth every evening       fludrocortisone (FLORINEF) 0.1 MG tablet Take 1 tablet (0.1 mg) by mouth every morning (before breakfast) 30 tablet 11     melatonin 3 MG tablet Take 3 mg by mouth At Bedtime       memantine (NAMENDA) 5 MG tablet Take 5 mg by mouth 2 times daily       Menthol-Zinc Oxide (MOISTURE BARRIER EX) Apply topically 2 times daily to rash on intergluteal cleft, coccyx and rogelio area. Also may use as needed.       nystatin POWD Apply topically 2 times daily       potassium chloride ER (KLOR-CON M) 20 MEQ CR tablet TAKE 1 TABLET BY MOUTH EVERY DAY 30 tablet 1     RABEprazole (ACIPHEX) 20 MG EC tablet Take 20 mg by mouth 2 times daily       senna-docusate (SENOKOT-S/PERICOLACE) 8.6-50 MG tablet Take 1 tablet by mouth daily       sertraline (ZOLOFT) 50 MG tablet Take 50 mg by mouth every evening       sodium chloride 1 GM tablet TAKE 1 TABLET BY MOUTH TWICE A  tablet 3     tamsulosin (FLOMAX) 0.4 MG capsule Take 0.4  mg by mouth daily              Allergies:     Allergies: Cats, Haldol [haloperidol], Klonopin [clonazepam], Lamotrigine, and Oxycodone          Past Medical History:     Past Medical History:   Diagnosis Date     Autonomic orthostatic hypotension 10/14/2016     Coronary artery disease      Dementia without behavioral disturbance (H) 7/28/2015    Diagnosis updated by automated process. Provider to review and confirm.     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     Seizure disorder (H)      Stented coronary artery             Past Surgical History:     Past Surgical History:   Procedure Laterality Date     ------------OTHER-------------  1955    ulnar and radial fx - repair ulnar and radial fx x4     ------------OTHER-------------  6/14/2011    cataract extraction     BIOPSY  08/2015    skin biopsy     BLEPHAROPLASTY BILATERAL  5/6/2014    Procedure: BLEPHAROPLASTY BILATERAL;  Surgeon: Andrew Queen MD;  Location: [facility]     BYPASS GRAFT ARTERY CORONARY  11/2006    coronary artery disease x 5, [facility]     cataract extraction and lens implantation  2011    cataracts     cataract extraction and lens implantation      cataracts     COLONOSCOPY  2012     colonoscopy with polypectomy  3/13/2009    history of polyps - repeat 3 yrs     colonoscopy with polypectomy  2006     colonoscopy with polypectomy  2005     COMBINED COLONOSCOPY WITH ARGON PLASMA COAGULATOR (APC) N/A 10/31/2014    Procedure: COMBINED COLONOSCOPY WITH ARGON PLASMA COAGULATOR (APC);  Surgeon: Bassam Aguilar MD;  [facility]: [facility]     COMBINED COLONOSCOPY WITH ARGON PLASMA COAGULATOR (APC) N/A 11/13/2015    Procedure: COMBINED COLONOSCOPY WITH ARGON PLASMA COAGULATOR (APC);  Surgeon:  Bassam Aguilar MD;  [facility]: [facility]     ENDOSCOPY UPPER, COLONOSCOPY, COMBINED N/A 10/31/2014    Procedure: COMBINED ENDOSCOPY UPPER, COLONOSCOPY;  Surgeon: Bassam Aguilar MD;  [facility]     ENDOSCOPY UPPER, COLONOSCOPY, COMBINED N/A 2015    Procedure: COMBINED ENDOSCOPY UPPER, COLONOSCOPY;  Surgeon: Bassam Aguilar MD;  [facility]     ESOPHAGOSCOPY, GASTROSCOPY, DUODENOSCOPY (EGD), COMBINED  2014    Procedure: COMBINED ESOPHAGOSCOPY, GASTROSCOPY, DUODENOSCOPY (EGD);  UPPER ENDOSCOPY(PENA) W/ BIOPSIES;  Surgeon: Patricia Pena MD;  [facility]: [facility]     HERNIORRHAPHY INGUINAL Right 2018    Procedure: HERNIORRHAPHY INGUINAL;  OPEN RIGHT INGUINAL HERNIA REPAIR with Mesh;  Surgeon: Virgilio Zaragoza DO;  Location: [facility]     INSERT PORT VASCULAR ACCESS Right 2019    Procedure: PORT PLACEMENT;  Surgeon: Lloyd Ram MD;  [facility]     LARYNGOSCOPY WITH MICROSCOPE  2014    Procedure: LARYNGOSCOPY WITH MICROSCOPE;;  Surgeon: Chayo Duke MD;  [facility]: [facility]     pacemaker placement      heart block     pacemaker placement      dual-chamber     REMOVE TUBE, MYRINGOTOMY, COMBINED  2014    Procedure: COMBINED REMOVE TUBE, MYRINGOTOMY;  MICRODIRECT LARYNGOSCOPY WITH BIOPSY AND FROZEN SECTIONS removal of right ear tube and myringoplasty;  Surgeon: Chayo Duke MD;  [facility]     REPLACE PACEMAKER GENERATOR N/A 2018    Procedure: REPLACE PACEMAKER GENERATOR;  Pacemaker generator change;  Surgeon: Alma Kaplan MD;  Location: [facility]     stent placement to LAD  2008     ventilation tube  2012    right in office       ENT family history reviewed         Social History:     Social History     Tobacco Use     Smoking status: Former Smoker     Packs/day: 1.00     Years: 5.00     Pack years: 5.00     Types: Cigarettes     Quit date: 1985     Years since quittin.5     Smokeless tobacco: Never Used     Tobacco comment: quit in   "  Substance Use Topics     Alcohol use: Yes     Comment: social     Drug use: No            Review of Systems:     ROS: See HPI         Physical Exam:     /60 (BP Location: Right arm, Patient Position: Sitting, Cuff Size: Adult Regular)   Pulse 69   Temp 97.8  F (36.6  C) (Tympanic)   Ht 1.753 m (5' 9\")   Wt 73 kg (161 lb)   SpO2 98%   BMI 23.78 kg/m      General - elderly appearing appearing man sitting in a wheelchair with his eyes closed upon entry to the room.  He rouses with verbal stimuli.  He is bright and interactive today.  Head and Face - Normocephalic and atraumatic, with no gross asymmetry noted.  The facial nerve is intact, with strong symmetric movements.  Voice and Breathing - The patient was breathing comfortably without the use of accessory muscles. There was no wheezing, stridor.   Ears - the ears were examined with otoscope.  Bilateral canals are patent.  The right tympanic membrane has intact and patent hearing T-tube.  No further otorrhea noted.  No effusion or retraction noted.  The left TM is intact without effusion or retraction.    Eyes - Extraocular movements intact, sclera were not icteric or injected, conjunctiva were pink and moist.  Neck -No palpable lymphadenopathy.    Nose - External contour is symmetric, no gross deflection or scars.           Assessment and Plan:       ICD-10-CM    1. Otorrhea, right - resolved  H92.11    2. Patent tympanostomy tube  Z96.22      Both ears appear healthy.  Follow-up in 6 months for tube check, sooner as needed with worsening of symptoms or new concerns.    Char CLARKE  [facility]        Again, thank you for allowing me to participate in the care of your patient.        Sincerely,        Char Avila NP    "

## 2022-06-28 NOTE — PATIENT INSTRUCTIONS
Thank you for allowing Char Avila and our ENT team to participate in your care.  If your medications are too expensive, please give the nurse a call.  We can possibly change this medication.  If you have a scheduling or an appointment question please contact our Health Unit Coordinator at their direct line [phone] ext [phone].   ALL nursing questions or concerns can be directed to your ENT nurse at: [phone] - [facility]

## 2022-06-29 LAB — BACTERIA SPEC CULT: NO GROWTH

## 2022-06-29 NOTE — PROGRESS NOTES
Otolaryngology Note         Chief Complaint:     Patient presents with:  Follow Up: 14 day ear recheck            History of Present Illness:     Jf Ortiz is a 88 year old male seen today for follow up ear check.  I saw Gatito on 6/1/2022 with right-sided otorrhea.  Ear culture grew out normal emily.  He was treated with Ciprodex with good improvement.  Today he reports he has not had any further drainage.  He denies any otalgia, bothersome tinnitus, flux hearing, vertigo.         Medications:     Current Outpatient Rx   Medication Sig Dispense Refill     acetaminophen (TYLENOL) 500 MG tablet Take 1,000 mg by mouth 2 times daily       aspirin (ASA) 81 MG chewable tablet Take 81 mg by mouth daily       atorvastatin (LIPITOR) 40 MG tablet Take 40 mg by mouth every evening       fludrocortisone (FLORINEF) 0.1 MG tablet Take 1 tablet (0.1 mg) by mouth every morning (before breakfast) 30 tablet 11     melatonin 3 MG tablet Take 3 mg by mouth At Bedtime       memantine (NAMENDA) 5 MG tablet Take 5 mg by mouth 2 times daily       Menthol-Zinc Oxide (MOISTURE BARRIER EX) Apply topically 2 times daily to rash on intergluteal cleft, coccyx and rogelio area. Also may use as needed.       nystatin POWD Apply topically 2 times daily       potassium chloride ER (KLOR-CON M) 20 MEQ CR tablet TAKE 1 TABLET BY MOUTH EVERY DAY 30 tablet 1     RABEprazole (ACIPHEX) 20 MG EC tablet Take 20 mg by mouth 2 times daily       senna-docusate (SENOKOT-S/PERICOLACE) 8.6-50 MG tablet Take 1 tablet by mouth daily       sertraline (ZOLOFT) 50 MG tablet Take 50 mg by mouth every evening       sodium chloride 1 GM tablet TAKE 1 TABLET BY MOUTH TWICE A  tablet 3     tamsulosin (FLOMAX) 0.4 MG capsule Take 0.4 mg by mouth daily              Allergies:     Allergies: Cats, Haldol [haloperidol], Klonopin [clonazepam], Lamotrigine, and Oxycodone          Past Medical History:     Past Medical History:   Diagnosis Date     Autonomic orthostatic  hypotension 10/14/2016     Coronary artery disease      Dementia without behavioral disturbance (H) 7/28/2015    Diagnosis updated by automated process. Provider to review and confirm.     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     Seizure disorder (H)      Stented coronary artery             Past Surgical History:     Past Surgical History:   Procedure Laterality Date     ------------OTHER-------------  1955    ulnar and radial fx - repair ulnar and radial fx x4     ------------OTHER-------------  6/14/2011    cataract extraction     BIOPSY  08/2015    skin biopsy     BLEPHAROPLASTY BILATERAL  5/6/2014    Procedure: BLEPHAROPLASTY BILATERAL;  Surgeon: Andrew Queen MD;  [facility]: [facility]     BYPASS GRAFT ARTERY CORONARY  11/2006    coronary artery disease x 5, [facility]     cataract extraction and lens implantation  2011    cataracts     cataract extraction and lens implantation      cataracts     COLONOSCOPY  2012     colonoscopy with polypectomy  3/13/2009    history of polyps - repeat 3 yrs     colonoscopy with polypectomy  2006     colonoscopy with polypectomy  2005     COMBINED COLONOSCOPY WITH ARGON PLASMA COAGULATOR (APC) N/A 10/31/2014    Procedure: COMBINED COLONOSCOPY WITH ARGON PLASMA COAGULATOR (APC);  Surgeon: Bassam Aguilar MD;  [facility]     COMBINED COLONOSCOPY WITH ARGON PLASMA COAGULATOR (APC) N/A 11/13/2015    Procedure: COMBINED COLONOSCOPY WITH ARGON PLASMA COAGULATOR (APC);  Surgeon: Bassam Aguilar MD;  [facility]     ENDOSCOPY UPPER, COLONOSCOPY, COMBINED N/A 10/31/2014    Procedure: COMBINED ENDOSCOPY UPPER, COLONOSCOPY;  Surgeon: Bassam Aguilar MD;  [facility]     ENDOSCOPY UPPER, COLONOSCOPY, COMBINED N/A  2015    Procedure: COMBINED ENDOSCOPY UPPER, COLONOSCOPY;  Surgeon: Bassam Aguilar MD;  [facility]     ESOPHAGOSCOPY, GASTROSCOPY, DUODENOSCOPY (EGD), COMBINED  2014    Procedure: COMBINED ESOPHAGOSCOPY, GASTROSCOPY, DUODENOSCOPY (EGD);  UPPER ENDOSCOPY(PENA) W/ BIOPSIES;  Surgeon: Patricia Pena MD;  [facility]     HERNIORRHAPHY INGUINAL Right 2018    Procedure: HERNIORRHAPHY INGUINAL;  OPEN RIGHT INGUINAL HERNIA REPAIR with Mesh;  Surgeon: Virgilio Zaragoza DO;  [facility]     INSERT PORT VASCULAR ACCESS Right 2019    Procedure: PORT PLACEMENT;  Surgeon: Lloyd Ram MD;  [facility]     LARYNGOSCOPY WITH MICROSCOPE  2014    Procedure: LARYNGOSCOPY WITH MICROSCOPE;;  Surgeon: Chayo Duke MD;  [facility]     pacemaker placement      heart block     pacemaker placement      dual-chamber     REMOVE TUBE, MYRINGOTOMY, COMBINED  2014    Procedure: COMBINED REMOVE TUBE, MYRINGOTOMY;  MICRODIRECT LARYNGOSCOPY WITH BIOPSY AND FROZEN SECTIONS removal of right ear tube and myringoplasty;  Surgeon: Chayo Duke MD;  [facility]     REPLACE PACEMAKER GENERATOR N/A 2018    Procedure: REPLACE PACEMAKER GENERATOR;  Pacemaker generator change;  Surgeon: Alma Kaplan MD;  [facility]: [facility]     stent placement to LAD  2008     ventilation tube  2012    right in office       ENT family history reviewed         Social History:     Social History     Tobacco Use     Smoking status: Former Smoker     Packs/day: 1.00     Years: 5.00     Pack years: 5.00     Types: Cigarettes     Quit date: 1985     Years since quittin.5     Smokeless tobacco: Never Used     Tobacco comment: quit in    Substance Use Topics     Alcohol use: Yes     Comment: social     Drug use: No            Review of Systems:     ROS: See HPI         Physical Exam:     /60 (BP Location: Right arm, Patient Position: Sitting, Cuff Size: Adult  "Regular)   Pulse 69   Temp 97.8  F (36.6  C) (Tympanic)   Ht 1.753 m (5' 9\")   Wt 73 kg (161 lb)   SpO2 98%   BMI 23.78 kg/m      General - elderly appearing appearing man sitting in a wheelchair with his eyes closed upon entry to the room.  He rouses with verbal stimuli.  He is bright and interactive today.  Head and Face - Normocephalic and atraumatic, with no gross asymmetry noted.  The facial nerve is intact, with strong symmetric movements.  Voice and Breathing - The patient was breathing comfortably without the use of accessory muscles. There was no wheezing, stridor.   Ears - the ears were examined with otoscope.  Bilateral canals are patent.  The right tympanic membrane has intact and patent hearing T-tube.  No further otorrhea noted.  No effusion or retraction noted.  The left TM is intact without effusion or retraction.    Eyes - Extraocular movements intact, sclera were not icteric or injected, conjunctiva were pink and moist.  Neck -No palpable lymphadenopathy.    Nose - External contour is symmetric, no gross deflection or scars.           Assessment and Plan:       ICD-10-CM    1. Otorrhea, right - resolved  H92.11    2. Patent tympanostomy tube  Z96.22      Both ears appear healthy.  Follow-up in 6 months for tube check, sooner as needed with worsening of symptoms or new concerns.    Char Avila N[place]  [facility]    "

## 2022-07-13 ENCOUNTER — INFUSION THERAPY VISIT (OUTPATIENT)
Dept: INFUSION THERAPY | Facility: OTHER | Age: 87
End: 2022-07-13
Payer: MEDICARE

## 2022-07-13 DIAGNOSIS — E86.9 VOLUME DEPLETION: Primary | ICD-10-CM

## 2022-07-13 PROCEDURE — 250N000011 HC RX IP 250 OP 636: Performed by: FAMILY MEDICINE

## 2022-07-13 PROCEDURE — 96360 HYDRATION IV INFUSION INIT: CPT

## 2022-07-13 PROCEDURE — 258N000003 HC RX IP 258 OP 636: Performed by: FAMILY MEDICINE

## 2022-07-13 RX ORDER — HEPARIN SODIUM (PORCINE) LOCK FLUSH IV SOLN 100 UNIT/ML 100 UNIT/ML
5 SOLUTION INTRAVENOUS ONCE
Status: CANCELLED
Start: 2022-07-13 | End: 2022-07-13

## 2022-07-13 RX ORDER — HEPARIN SODIUM (PORCINE) LOCK FLUSH IV SOLN 100 UNIT/ML 100 UNIT/ML
5 SOLUTION INTRAVENOUS ONCE
Status: COMPLETED | OUTPATIENT
Start: 2022-07-13 | End: 2022-07-13

## 2022-07-13 RX ADMIN — SODIUM CHLORIDE 1000 ML: 9 INJECTION, SOLUTION INTRAVENOUS at 09:45

## 2022-07-13 RX ADMIN — HEPARIN 5 ML: 100 SYRINGE at 15:30

## 2022-07-13 NOTE — PROGRESS NOTES
Intravenous fluids were administered, normal saline 1000 cc's.    Patients port accessed using non-coring, 19 gauge, 3/4 needle, per facility protocol.     Hand hygiene performed: yes   Mask donned by caregiver: yes  Site prepped with CHG: yes   Labs drawn:  Dressing applied using aseptic technique: yes     Port flushed easily, without resistance. Flushed with 10 cc's normal saline.   Immediate blood return noted. 10 cc blood discarded.  Port flushed per orders/MAR. Needle removed intact, sterile dressing applied.  Slight pressure applied for 30 seconds.   Patient tolerated port flush well, denies pain nor discomfort at this time. Patient instructed to leave dressing intact for a minimum of one hour, and to call with questions or concerns.  Patient states understanding and is in agreement with this plan. Patient discharged.

## 2022-07-19 ENCOUNTER — ANCILLARY PROCEDURE (OUTPATIENT)
Dept: CARDIOLOGY | Facility: CLINIC | Age: 87
End: 2022-07-19
Attending: INTERNAL MEDICINE
Payer: MEDICARE

## 2022-07-19 ENCOUNTER — DOCUMENTATION ONLY (OUTPATIENT)
Dept: CARDIOLOGY | Facility: OTHER | Age: 87
End: 2022-07-19

## 2022-07-19 DIAGNOSIS — I44.2 COMPLETE HEART BLOCK (H): ICD-10-CM

## 2022-07-19 PROCEDURE — 93294 REM INTERROG EVL PM/LDLS PM: CPT | Performed by: INTERNAL MEDICINE

## 2022-07-19 PROCEDURE — 93296 REM INTERROG EVL PM/IDS: CPT

## 2022-07-19 NOTE — PROGRESS NOTES
Talk to his significant other/partner as well as nursing staff at [facility] where he is presently residing.  He has dementia with memory issues.  He is described as a high fall risk.  He falls frequently and has actually hit his head in the past.  Pacemaker interrogation today showed A. fib.  Had intended to start him on anticoagulation.  In light of his fall risk with head trauma, will not plan to start him on anticoagulation.  Discussed with nursing staff at [facility] who agree.    Dr. Rivers

## 2022-07-20 LAB
MDC_IDC_EPISODE_DTM: NORMAL
MDC_IDC_EPISODE_DURATION: 1010 S
MDC_IDC_EPISODE_DURATION: 102 S
MDC_IDC_EPISODE_DURATION: 11 S
MDC_IDC_EPISODE_DURATION: 110 S
MDC_IDC_EPISODE_DURATION: 112 S
MDC_IDC_EPISODE_DURATION: 12 S
MDC_IDC_EPISODE_DURATION: 129 S
MDC_IDC_EPISODE_DURATION: 13 S
MDC_IDC_EPISODE_DURATION: 13 S
MDC_IDC_EPISODE_DURATION: 1563 S
MDC_IDC_EPISODE_DURATION: 1571 S
MDC_IDC_EPISODE_DURATION: 176 S
MDC_IDC_EPISODE_DURATION: 197 S
MDC_IDC_EPISODE_DURATION: 20 S
MDC_IDC_EPISODE_DURATION: 220 S
MDC_IDC_EPISODE_DURATION: 222 S
MDC_IDC_EPISODE_DURATION: 222 S
MDC_IDC_EPISODE_DURATION: 235 S
MDC_IDC_EPISODE_DURATION: 24 S
MDC_IDC_EPISODE_DURATION: 248 S
MDC_IDC_EPISODE_DURATION: 249 S
MDC_IDC_EPISODE_DURATION: 25 S
MDC_IDC_EPISODE_DURATION: 257 S
MDC_IDC_EPISODE_DURATION: 262 S
MDC_IDC_EPISODE_DURATION: 27 S
MDC_IDC_EPISODE_DURATION: 27 S
MDC_IDC_EPISODE_DURATION: 2888 S
MDC_IDC_EPISODE_DURATION: 32 S
MDC_IDC_EPISODE_DURATION: 320 S
MDC_IDC_EPISODE_DURATION: 35 S
MDC_IDC_EPISODE_DURATION: 351 S
MDC_IDC_EPISODE_DURATION: 365 S
MDC_IDC_EPISODE_DURATION: 372 S
MDC_IDC_EPISODE_DURATION: 382 S
MDC_IDC_EPISODE_DURATION: 385 S
MDC_IDC_EPISODE_DURATION: 39 S
MDC_IDC_EPISODE_DURATION: 454 S
MDC_IDC_EPISODE_DURATION: 501 S
MDC_IDC_EPISODE_DURATION: 598 S
MDC_IDC_EPISODE_DURATION: 63 S
MDC_IDC_EPISODE_DURATION: 68 S
MDC_IDC_EPISODE_DURATION: 75 S
MDC_IDC_EPISODE_DURATION: 75 S
MDC_IDC_EPISODE_DURATION: 89 S
MDC_IDC_EPISODE_DURATION: 92 S
MDC_IDC_EPISODE_DURATION: 922 S
MDC_IDC_EPISODE_DURATION: 931 S
MDC_IDC_EPISODE_DURATION: 97 S
MDC_IDC_EPISODE_DURATION: NORMAL S
MDC_IDC_EPISODE_DURATION: NORMAL S
MDC_IDC_EPISODE_ID: 1146
MDC_IDC_EPISODE_ID: 1147
MDC_IDC_EPISODE_ID: 1148
MDC_IDC_EPISODE_ID: 1149
MDC_IDC_EPISODE_ID: 1150
MDC_IDC_EPISODE_ID: 1151
MDC_IDC_EPISODE_ID: 1152
MDC_IDC_EPISODE_ID: 1153
MDC_IDC_EPISODE_ID: 1154
MDC_IDC_EPISODE_ID: 1155
MDC_IDC_EPISODE_ID: 1156
MDC_IDC_EPISODE_ID: 1157
MDC_IDC_EPISODE_ID: 1158
MDC_IDC_EPISODE_ID: 1159
MDC_IDC_EPISODE_ID: 1160
MDC_IDC_EPISODE_ID: 1161
MDC_IDC_EPISODE_ID: 1162
MDC_IDC_EPISODE_ID: 1163
MDC_IDC_EPISODE_ID: 1164
MDC_IDC_EPISODE_ID: 1165
MDC_IDC_EPISODE_ID: 1166
MDC_IDC_EPISODE_ID: 1167
MDC_IDC_EPISODE_ID: 1168
MDC_IDC_EPISODE_ID: 1169
MDC_IDC_EPISODE_ID: 1170
MDC_IDC_EPISODE_ID: 1171
MDC_IDC_EPISODE_ID: 1172
MDC_IDC_EPISODE_ID: 1173
MDC_IDC_EPISODE_ID: 1174
MDC_IDC_EPISODE_ID: 1175
MDC_IDC_EPISODE_ID: 1176
MDC_IDC_EPISODE_ID: 1177
MDC_IDC_EPISODE_ID: 1178
MDC_IDC_EPISODE_ID: 1179
MDC_IDC_EPISODE_ID: 1180
MDC_IDC_EPISODE_ID: 1181
MDC_IDC_EPISODE_ID: 1182
MDC_IDC_EPISODE_ID: 1183
MDC_IDC_EPISODE_ID: 1184
MDC_IDC_EPISODE_ID: 1185
MDC_IDC_EPISODE_ID: 1186
MDC_IDC_EPISODE_ID: 1187
MDC_IDC_EPISODE_ID: 1188
MDC_IDC_EPISODE_ID: 1189
MDC_IDC_EPISODE_ID: 1190
MDC_IDC_EPISODE_ID: 1191
MDC_IDC_EPISODE_ID: 1192
MDC_IDC_EPISODE_ID: 1193
MDC_IDC_EPISODE_ID: 1194
MDC_IDC_EPISODE_ID: 1195
MDC_IDC_EPISODE_TYPE: NORMAL
MDC_IDC_LEAD_IMPLANT_DT: NORMAL
MDC_IDC_LEAD_IMPLANT_DT: NORMAL
MDC_IDC_LEAD_LOCATION: NORMAL
MDC_IDC_LEAD_LOCATION: NORMAL
MDC_IDC_LEAD_LOCATION_DETAIL_1: NORMAL
MDC_IDC_LEAD_LOCATION_DETAIL_1: NORMAL
MDC_IDC_LEAD_MFG: NORMAL
MDC_IDC_LEAD_MFG: NORMAL
MDC_IDC_LEAD_MODEL: NORMAL
MDC_IDC_LEAD_MODEL: NORMAL
MDC_IDC_LEAD_POLARITY_TYPE: NORMAL
MDC_IDC_LEAD_POLARITY_TYPE: NORMAL
MDC_IDC_LEAD_SERIAL: NORMAL
MDC_IDC_LEAD_SERIAL: NORMAL
MDC_IDC_MSMT_BATTERY_DTM: NORMAL
MDC_IDC_MSMT_BATTERY_REMAINING_LONGEVITY: 71 MO
MDC_IDC_MSMT_BATTERY_RRT_TRIGGER: 2.62
MDC_IDC_MSMT_BATTERY_STATUS: NORMAL
MDC_IDC_MSMT_BATTERY_VOLTAGE: 2.97 V
MDC_IDC_MSMT_LEADCHNL_RA_IMPEDANCE_VALUE: 418 OHM
MDC_IDC_MSMT_LEADCHNL_RA_IMPEDANCE_VALUE: 532 OHM
MDC_IDC_MSMT_LEADCHNL_RA_PACING_THRESHOLD_AMPLITUDE: 1.38 V
MDC_IDC_MSMT_LEADCHNL_RA_PACING_THRESHOLD_PULSEWIDTH: 0.4 MS
MDC_IDC_MSMT_LEADCHNL_RA_SENSING_INTR_AMPL: 2 MV
MDC_IDC_MSMT_LEADCHNL_RA_SENSING_INTR_AMPL: 2 MV
MDC_IDC_MSMT_LEADCHNL_RV_IMPEDANCE_VALUE: 361 OHM
MDC_IDC_MSMT_LEADCHNL_RV_IMPEDANCE_VALUE: 532 OHM
MDC_IDC_MSMT_LEADCHNL_RV_PACING_THRESHOLD_AMPLITUDE: 0.75 V
MDC_IDC_MSMT_LEADCHNL_RV_PACING_THRESHOLD_PULSEWIDTH: 0.4 MS
MDC_IDC_MSMT_LEADCHNL_RV_SENSING_INTR_AMPL: 4.75 MV
MDC_IDC_MSMT_LEADCHNL_RV_SENSING_INTR_AMPL: 4.75 MV
MDC_IDC_PG_IMPLANT_DTM: NORMAL
MDC_IDC_PG_MFG: NORMAL
MDC_IDC_PG_MODEL: NORMAL
MDC_IDC_PG_SERIAL: NORMAL
MDC_IDC_PG_TYPE: NORMAL
MDC_IDC_SESS_CLINIC_NAME: NORMAL
MDC_IDC_SESS_DTM: NORMAL
MDC_IDC_SESS_TYPE: NORMAL
MDC_IDC_SET_BRADY_AT_MODE_SWITCH_RATE: 171 {BEATS}/MIN
MDC_IDC_SET_BRADY_HYSTRATE: NORMAL
MDC_IDC_SET_BRADY_LOWRATE: 70 {BEATS}/MIN
MDC_IDC_SET_BRADY_MAX_SENSOR_RATE: 130 {BEATS}/MIN
MDC_IDC_SET_BRADY_MAX_TRACKING_RATE: 130 {BEATS}/MIN
MDC_IDC_SET_BRADY_MODE: NORMAL
MDC_IDC_SET_BRADY_PAV_DELAY_LOW: 180 MS
MDC_IDC_SET_BRADY_SAV_DELAY_LOW: 150 MS
MDC_IDC_SET_LEADCHNL_RA_PACING_AMPLITUDE: 2.75 V
MDC_IDC_SET_LEADCHNL_RA_PACING_ANODE_ELECTRODE_1: NORMAL
MDC_IDC_SET_LEADCHNL_RA_PACING_ANODE_LOCATION_1: NORMAL
MDC_IDC_SET_LEADCHNL_RA_PACING_CAPTURE_MODE: NORMAL
MDC_IDC_SET_LEADCHNL_RA_PACING_CATHODE_ELECTRODE_1: NORMAL
MDC_IDC_SET_LEADCHNL_RA_PACING_CATHODE_LOCATION_1: NORMAL
MDC_IDC_SET_LEADCHNL_RA_PACING_POLARITY: NORMAL
MDC_IDC_SET_LEADCHNL_RA_PACING_PULSEWIDTH: 0.4 MS
MDC_IDC_SET_LEADCHNL_RA_SENSING_ANODE_ELECTRODE_1: NORMAL
MDC_IDC_SET_LEADCHNL_RA_SENSING_ANODE_LOCATION_1: NORMAL
MDC_IDC_SET_LEADCHNL_RA_SENSING_CATHODE_ELECTRODE_1: NORMAL
MDC_IDC_SET_LEADCHNL_RA_SENSING_CATHODE_LOCATION_1: NORMAL
MDC_IDC_SET_LEADCHNL_RA_SENSING_POLARITY: NORMAL
MDC_IDC_SET_LEADCHNL_RA_SENSING_SENSITIVITY: 0.9 MV
MDC_IDC_SET_LEADCHNL_RV_PACING_AMPLITUDE: 2 V
MDC_IDC_SET_LEADCHNL_RV_PACING_ANODE_ELECTRODE_1: NORMAL
MDC_IDC_SET_LEADCHNL_RV_PACING_ANODE_LOCATION_1: NORMAL
MDC_IDC_SET_LEADCHNL_RV_PACING_CAPTURE_MODE: NORMAL
MDC_IDC_SET_LEADCHNL_RV_PACING_CATHODE_ELECTRODE_1: NORMAL
MDC_IDC_SET_LEADCHNL_RV_PACING_CATHODE_LOCATION_1: NORMAL
MDC_IDC_SET_LEADCHNL_RV_PACING_POLARITY: NORMAL
MDC_IDC_SET_LEADCHNL_RV_PACING_PULSEWIDTH: 0.4 MS
MDC_IDC_SET_LEADCHNL_RV_SENSING_ANODE_ELECTRODE_1: NORMAL
MDC_IDC_SET_LEADCHNL_RV_SENSING_ANODE_LOCATION_1: NORMAL
MDC_IDC_SET_LEADCHNL_RV_SENSING_CATHODE_ELECTRODE_1: NORMAL
MDC_IDC_SET_LEADCHNL_RV_SENSING_CATHODE_LOCATION_1: NORMAL
MDC_IDC_SET_LEADCHNL_RV_SENSING_POLARITY: NORMAL
MDC_IDC_SET_LEADCHNL_RV_SENSING_SENSITIVITY: 0.9 MV
MDC_IDC_SET_ZONE_DETECTION_INTERVAL: 350 MS
MDC_IDC_SET_ZONE_DETECTION_INTERVAL: 400 MS
MDC_IDC_SET_ZONE_TYPE: NORMAL
MDC_IDC_STAT_AT_BURDEN_PERCENT: 2 %
MDC_IDC_STAT_AT_DTM_END: NORMAL
MDC_IDC_STAT_AT_DTM_START: NORMAL
MDC_IDC_STAT_BRADY_AP_VP_PERCENT: 98.39 %
MDC_IDC_STAT_BRADY_AP_VS_PERCENT: 0.11 %
MDC_IDC_STAT_BRADY_AS_VP_PERCENT: 0.96 %
MDC_IDC_STAT_BRADY_AS_VS_PERCENT: 0.54 %
MDC_IDC_STAT_BRADY_DTM_END: NORMAL
MDC_IDC_STAT_BRADY_DTM_START: NORMAL
MDC_IDC_STAT_BRADY_RA_PERCENT_PACED: 97.22 %
MDC_IDC_STAT_BRADY_RV_PERCENT_PACED: 99.34 %
MDC_IDC_STAT_EPISODE_RECENT_COUNT: 0
MDC_IDC_STAT_EPISODE_RECENT_COUNT: 306
MDC_IDC_STAT_EPISODE_RECENT_COUNT_DTM_END: NORMAL
MDC_IDC_STAT_EPISODE_RECENT_COUNT_DTM_START: NORMAL
MDC_IDC_STAT_EPISODE_TOTAL_COUNT: 0
MDC_IDC_STAT_EPISODE_TOTAL_COUNT: 1192
MDC_IDC_STAT_EPISODE_TOTAL_COUNT: 3
MDC_IDC_STAT_EPISODE_TOTAL_COUNT_DTM_END: NORMAL
MDC_IDC_STAT_EPISODE_TOTAL_COUNT_DTM_START: NORMAL
MDC_IDC_STAT_EPISODE_TYPE: NORMAL

## 2022-07-27 ENCOUNTER — INFUSION THERAPY VISIT (OUTPATIENT)
Dept: INFUSION THERAPY | Facility: OTHER | Age: 87
End: 2022-07-27
Payer: MEDICARE

## 2022-07-27 VITALS
OXYGEN SATURATION: 94 % | HEART RATE: 71 BPM | SYSTOLIC BLOOD PRESSURE: 119 MMHG | RESPIRATION RATE: 17 BRPM | DIASTOLIC BLOOD PRESSURE: 64 MMHG | TEMPERATURE: 98 F | WEIGHT: 160.94 LBS | BODY MASS INDEX: 23.77 KG/M2

## 2022-07-27 DIAGNOSIS — E86.9 VOLUME DEPLETION: Primary | ICD-10-CM

## 2022-07-27 PROCEDURE — 96360 HYDRATION IV INFUSION INIT: CPT

## 2022-07-27 PROCEDURE — 258N000003 HC RX IP 258 OP 636: Performed by: FAMILY MEDICINE

## 2022-07-27 PROCEDURE — 250N000011 HC RX IP 250 OP 636: Performed by: FAMILY MEDICINE

## 2022-07-27 RX ORDER — HEPARIN SODIUM (PORCINE) LOCK FLUSH IV SOLN 100 UNIT/ML 100 UNIT/ML
5 SOLUTION INTRAVENOUS ONCE
Status: COMPLETED | OUTPATIENT
Start: 2022-07-27 | End: 2022-07-27

## 2022-07-27 RX ORDER — HEPARIN SODIUM (PORCINE) LOCK FLUSH IV SOLN 100 UNIT/ML 100 UNIT/ML
5 SOLUTION INTRAVENOUS ONCE
Status: CANCELLED
Start: 2022-07-27 | End: 2022-07-27

## 2022-07-27 RX ADMIN — Medication 5 ML: at 11:05

## 2022-07-27 RX ADMIN — SODIUM CHLORIDE 1000 ML: 9 INJECTION, SOLUTION INTRAVENOUS at 09:48

## 2022-07-27 NOTE — PROGRESS NOTES
Patient is 88 years old, here today for infusion of normal saline per order of Dr Wilson.      Patient identified with two identifiers, order verified, and verbal consent for today's infusion obtained from patient.      Lab values:  n/a      Patient meets order parameters for today's treatment.       Patients port accessed using non-coring, 19 gauge, 3/4 needle. Port accessed per facility protocol. Port flushed easily, blood return noted.  No signs and symptoms of infection or infiltration.      IV pump verified with dose, drug, and rate of administration.  Infusion administered per protocol.  Patient tolerated infusion well, no signs or symptoms of adverse reaction noted.  Patient denies pain nor discomfort.     IV removed, catheter intact.  Site clean, dry and intact.  No signs or symptoms of infiltration or infection.  Covered with a sterile bandage, slight pressure applied for 30 seconds.  Pt instructed to leave bandage intact for a minimum of one hour, and to call with questions or concerns. Patient states understanding, discharged.

## 2022-08-05 NOTE — PROGRESS NOTES
Nursing Home Visit    HISTORY OF PRESENT ILLNESS:  Jf is a 88 year old male (10/5/1933)  resident of [facility] who is being seen today for routine 30 day follow up.     He has a history of Parkinson's disease, Lewy body dementia, atrial fibrillation, coronary heart disease status post aortocoronary bypass as well as stenting, seizure disorder, ans well as recurrent falls.    Jf was admitted from [facility] after being hospitalized with falls and compression fractures of L1 and L2.and in need of pain control.  This was achieved with the combination of tramadol, voltaren, as well as lidocaine patch.      The patient notes no current complaints.     Discussed with nursing staff who note no concerns.    The patient is seen in his room.  Family is not present.     Medical records are reviewed.    Current medications, allergies, and interdisciplinary care plan are reviewed.      Patient Active Problem List    Diagnosis Date Noted     Orthostatic hypotension dysautonomic syndrome 02/12/2021     Priority: High     Chronic atrial fibrillation (H) 02/12/2021     Priority: High     Longstanding persistent atrial fibrillation (H) 04/13/2020     Priority: High     Coronary artery disease involving native coronary artery without angina pectoris 04/13/2020     Priority: High     Pacemaker, [employer], Dual Chamber - Dependent 10/06/2017     Priority: High     Lewy body dementia without behavioral disturbance (H) 08/31/2017     Priority: High     Dementia without behavioral disturbance (H) 07/28/2015     Priority: High     Diagnosis updated by automated process. Provider to review and confirm.       Parkinson disease (H)      Priority: High     Seizure disorder (H)      Priority: High     Cardiac pacemaker in situ 01/08/2013     Priority: High     Overview:   [employer] Altgwena S606 DREL,  Serial #[id number]  5-13-11  Atrial lead [employer] 4054 Serial #[id number]   8-11-00  Ventriculer lead [employer] 4137  Serial #[id number]  5-13-11  2nd Degree AV Block   Dr. Campbell  Intrinsic:  Pt. is Pacemaker Dependant, remains paced at temp. rate of 30 bpm (6-13-14)    Formatting of this note might be different from the original.  [employer] Altrua S606 DREL,  Serial #[id number]  5-13-11  Atrial lead [employer] 4054 Serial #[id number]  8-11-00  Ventriculer lead [employer] 4137  Serial #[id number]  5-13-11  2nd Degree AV Block   Dr. Campbell  Intrinsic:  Pt. is Pacemaker Dependant, remains paced at temp. rate of 30 bpm (6-13-14)       Hypertension with target blood pressure goal under 150/90 09/05/2006     Priority: High     Overview:   IMO Update       Essential hypertension 09/05/2006     Priority: High     Formatting of this note might be different from the original.  IMO Update       Hematoma of chest wall, left, initial encounter 04/23/2022     Priority: Medium     Retinal artery branch occlusion 02/13/2021     Priority: Medium     Vision loss of right eye 02/12/2021     Priority: Medium     Closed compression fracture of body of L1 vertebra (H) 02/12/2021     Priority: Medium     Closed wedge compression fracture of lumbar vertebra with routine healing 12/20/2020     Priority: Medium     Compression fracture of L1 lumbar vertebra, closed, initial encounter (H) 12/18/2020     Priority: Medium     Compression fracture of thoracic vertebra, initial encounter, unspecified thoracic vertebral level 12/18/2020     Priority: Medium     Replacing diagnoses that were inactivated after the 10/1/2021 regulatory import.       Frequent falls 04/13/2020     Priority: Medium     Constipation, unspecified constipation type 04/11/2018     Priority: Medium     Urinary incontinence 08/31/2017     Priority: Medium     Autonomic orthostatic hypotension 10/14/2016     Priority: Medium     Volume depletion 08/02/2014     Priority: Medium     Stented coronary artery      Priority: Medium     REM  sleep behavior disorder 2011     Priority: Medium     Osteoarthritis 2011     Priority: Medium     Problem list name updated by automated process. Provider to review       Diaphragmatic hernia 2011     Priority: Medium     Problem list name updated by automated process. Provider to review       Pain in joint, forearm 2008     Priority: Medium     Formatting of this note might be different from the original.  IMO Update 10/       Pain in joint, ankle and foot 2008     Priority: Medium     Formatting of this note might be different from the original.  IMO Update 10/11       Dysphagia 2007     Priority: Medium     Overview:   IMO Update    Formatting of this note might be different from the original.  IMO Update       Coronary atherosclerosis of native coronary artery 2006     Priority: Medium     Overview:   IMO Update 10/11    Formatting of this note might be different from the original.  IMO Update 10/11       Postsurgical aortocoronary bypass status 2006     Priority: Medium     Overview:   IMO Update 10/11    Formatting of this note might be different from the original.  IMO Update 10/11       Hyperlipidemia 2006     Priority: Medium     Formatting of this note might be different from the original.  IMO Update 10       Nursing Home Visit 2022     Priority: Low          Social History     Socioeconomic History     Marital status: Single     Spouse name: Not on file     Number of children: Not on file     Years of education: Not on file     Highest education level: Not on file   Occupational History     Occupation: retired   Tobacco Use     Smoking status: Former Smoker     Packs/day: 1.00     Years: 5.00     Pack years: 5.00     Types: Cigarettes     Quit date: 1985     Years since quittin.6     Smokeless tobacco: Never Used     Tobacco comment: quit in    Substance and Sexual Activity     Alcohol use: Yes     Comment: social     Drug  use: No     Sexual activity: Not Currently   Other Topics Concern      Service Not Asked     Blood Transfusions Yes     Caffeine Concern Yes     Comment: 1 cup coffee daily     Occupational Exposure Not Asked     Hobby Hazards Not Asked     Sleep Concern Not Asked     Stress Concern Not Asked     Weight Concern Not Asked     Special Diet Not Asked     Back Care Not Asked     Exercise Not Asked     Bike Helmet Not Asked     Seat Belt Not Asked     Self-Exams Not Asked     Parent/sibling w/ CABG, MI or angioplasty before 65F 55M? No   Social History Narrative     Not on file     Social Determinants of Health     Financial Resource Strain: Not on file   Food Insecurity: Not on file   Transportation Needs: Not on file   Physical Activity: Not on file   Stress: Not on file   Social Connections: Not on file   Intimate Partner Violence: Not on file   Housing Stability: Not on file        Current Outpatient Medications   Medication Sig     acetaminophen (TYLENOL) 500 MG tablet Take 1,000 mg by mouth 2 times daily     aspirin (ASA) 81 MG chewable tablet Take 81 mg by mouth daily     atorvastatin (LIPITOR) 40 MG tablet Take 40 mg by mouth every evening     fludrocortisone (FLORINEF) 0.1 MG tablet Take 1 tablet (0.1 mg) by mouth every morning (before breakfast)     melatonin 3 MG tablet Take 3 mg by mouth At Bedtime     memantine (NAMENDA) 5 MG tablet Take 5 mg by mouth 2 times daily     Menthol-Zinc Oxide (MOISTURE BARRIER EX) Apply topically 2 times daily to rash on intergluteal cleft, coccyx and rogelio area. Also may use as needed.     nystatin POWD Apply topically 2 times daily     potassium chloride ER (KLOR-CON M) 20 MEQ CR tablet TAKE 1 TABLET BY MOUTH EVERY DAY     RABEprazole (ACIPHEX) 20 MG EC tablet Take 20 mg by mouth 2 times daily     senna-docusate (SENOKOT-S/PERICOLACE) 8.6-50 MG tablet Take 1 tablet by mouth daily     sertraline (ZOLOFT) 50 MG tablet Take 50 mg by mouth every evening     sodium chloride 1  GM tablet TAKE 1 TABLET BY MOUTH TWICE A DAY     tamsulosin (FLOMAX) 0.4 MG capsule Take 0.4 mg by mouth daily     No current facility-administered medications for this visit.       Allergies   Allergen Reactions     Cats Unknown     Haldol [Haloperidol]      Per SNF reporting     Klonopin [Clonazepam]      Per SNF reporting     Lamotrigine      Skin lesions     Oxycodone      Per SNF reporting       I have reviewed the care plan and do agree with the plan.      ROS:  No chest pain, shortness of breath, fever, chills, headache, nausea, vomiting, dysuria, or changes in bowel habits.  Appetite is poor.  Low back pain noted.          OBJECTIVE:  There were no vitals taken for this visit.    GENERAL: Healthy, alert and no distress  EYES: Eyes grossly normal to inspection.  No discharge or erythema, or obvious scleral/conjunctival abnormalities.  RESP: No audible wheeze, cough, or visible cyanosis.  No visible retractions or increased work of breathing.    SKIN: Visible skin clear. No significant rash, abnormal pigmentation or lesions.  NEURO: Cranial nerves grossly intact.  Mentation impaired.Speech appropriate for age.  PSYCH: Mentation shows impaired cognition affect normal/bright, judgement and insight intact, normal speech and appearance well-groomed.        Lab/Diagnostic data:    Reviewed    ASSESSMENT/ORDERS:  Nursing Home Visit  Discussed with staff. Care plan reviewed and orders updated.  Chronic issues are stable. Routine 30 day Nursing Home follow up.     Closed compression fracture of L1 lumbar vertebra with routine healing, subsequent encounter  Pain controlled. Continue Physical Therapy and Occupational Therapy     Lewy body dementia without behavioral disturbance (H)  Stable    Parkinson disease (H)  Stable    Coronary artery disease involving native coronary artery of native heart without angina pectoris  No recurrent symptoms    Hypertension with target blood pressure goal under 150/90  Recent readings  reviewed.  Continue same medication regimen        Total time spent with patient visit was 25 min including patient visit, review of pertinent clinical information, and treatment plan.      Abran Whitman MD [facility]

## 2022-08-08 DIAGNOSIS — R35.0 URINARY FREQUENCY: Primary | ICD-10-CM

## 2022-08-10 ENCOUNTER — INFUSION THERAPY VISIT (OUTPATIENT)
Dept: INFUSION THERAPY | Facility: OTHER | Age: 87
End: 2022-08-10
Payer: MEDICARE

## 2022-08-10 ENCOUNTER — OFFICE VISIT (OUTPATIENT)
Dept: UROLOGY | Facility: OTHER | Age: 87
End: 2022-08-10
Attending: UROLOGY
Payer: MEDICARE

## 2022-08-10 VITALS
TEMPERATURE: 97.6 F | HEART RATE: 71 BPM | WEIGHT: 160 LBS | BODY MASS INDEX: 23.63 KG/M2 | OXYGEN SATURATION: 95 % | DIASTOLIC BLOOD PRESSURE: 72 MMHG | RESPIRATION RATE: 16 BRPM | SYSTOLIC BLOOD PRESSURE: 120 MMHG

## 2022-08-10 DIAGNOSIS — R35.0 URINARY FREQUENCY: Primary | ICD-10-CM

## 2022-08-10 DIAGNOSIS — E86.9 VOLUME DEPLETION: Primary | ICD-10-CM

## 2022-08-10 PROCEDURE — 258N000003 HC RX IP 258 OP 636: Performed by: FAMILY MEDICINE

## 2022-08-10 PROCEDURE — 51798 US URINE CAPACITY MEASURE: CPT | Performed by: UROLOGY

## 2022-08-10 PROCEDURE — 96360 HYDRATION IV INFUSION INIT: CPT

## 2022-08-10 PROCEDURE — 99213 OFFICE O/P EST LOW 20 MIN: CPT | Performed by: UROLOGY

## 2022-08-10 PROCEDURE — 250N000011 HC RX IP 250 OP 636: Performed by: FAMILY MEDICINE

## 2022-08-10 PROCEDURE — G0463 HOSPITAL OUTPT CLINIC VISIT: HCPCS | Mod: 25 | Performed by: UROLOGY

## 2022-08-10 PROCEDURE — 96365 THER/PROPH/DIAG IV INF INIT: CPT

## 2022-08-10 RX ORDER — HEPARIN SODIUM (PORCINE) LOCK FLUSH IV SOLN 100 UNIT/ML 100 UNIT/ML
5 SOLUTION INTRAVENOUS ONCE
Status: CANCELLED
Start: 2022-08-10 | End: 2022-08-10

## 2022-08-10 RX ORDER — HEPARIN SODIUM (PORCINE) LOCK FLUSH IV SOLN 100 UNIT/ML 100 UNIT/ML
5 SOLUTION INTRAVENOUS ONCE
Status: COMPLETED | OUTPATIENT
Start: 2022-08-10 | End: 2022-08-10

## 2022-08-10 RX ADMIN — Medication 5 ML: at 11:12

## 2022-08-10 RX ADMIN — SODIUM CHLORIDE 1000 ML: 9 INJECTION, SOLUTION INTRAVENOUS at 09:50

## 2022-08-10 ASSESSMENT — PAIN SCALES - GENERAL: PAINLEVEL: NO PAIN (0)

## 2022-08-10 NOTE — NURSING NOTE
Chief Complaint   Patient presents with     Follow Up     Frequent urination     Patient presents to the clinic today for a follow up for frequent urination.  Review of Systems:    Weight loss:    No     Recent fever/chills:  No   Night sweats:   No  Current skin rash:  No   Recent hair loss:  No  Heat intolerance:  No   Cold intolerance:  No  Chest pain:   No   Palpitations:   No  Shortness of breath:  No   Wheezing:   No  Constipation:    No   Diarrhea:   No   Nausea:   No   Vomiting:   No   Kidney/side pain:  No   Back pain:   No  Frequent headaches:  No   Dizziness:     No  Leg swelling:   No   Calf pain:    No      Latanya Haddad LPN  8/10/2022 8:54 AM      Medication Reconciliation: completed   Latanya Haddad LPN  8/10/2022 8:40 AM

## 2022-08-10 NOTE — PROGRESS NOTES
Patient is a 87 y/o here accompanied by self today for infusion of IVF per order of Dr. Wilson. Patient identified with two identifiers, order verified, and verbal consent for today's infusion obtained from patient.      Patient meets order parameters for today's treatment.    Patients port accessed using non-coring, 19 gauge, 3/4 inch needle. Port accessed per facility protocol. Port flushed easily, blood return noted.  No signs and symptoms of infection or infiltration.    Infusion administered per protocol.

## 2022-08-10 NOTE — PROGRESS NOTES
"Type of Visit  EST    Chief Complaint  \"Frequent urination, feels like he is not emptying\"    HPI  Mr. Ortiz is a 88 year old male w/h/o Lewy body dementia who presents from a nursing home.  The paper records report that his complaint involves urinary frequency and urgency and feels like he is not emptying.  The patient is unable to voice that complaint and he is not accompanied by a member of his family or a member of the nursing home staff.  He states his urine is clear.  He denies fevers or otherwise feeling ill.      Review of Systems  I personally reviewed the ROS with the patient.    Nursing Notes:   Latanya Haddad LPN  8/10/2022  8:55 AM  Addendum  Chief Complaint   Patient presents with     Follow Up     Frequent urination     Patient presents to the clinic today for a follow up for frequent urination.  Review of Systems:    Weight loss:    No     Recent fever/chills:  No   Night sweats:   No  Current skin rash:  No   Recent hair loss:  No  Heat intolerance:  No   Cold intolerance:  No  Chest pain:   No   Palpitations:   No  Shortness of breath:  No   Wheezing:   No  Constipation:    No   Diarrhea:   No   Nausea:   No   Vomiting:   No   Kidney/side pain:  No   Back pain:   No  Frequent headaches:  No   Dizziness:     No  Leg swelling:   No   Calf pain:    No      Latanya Haddad LPN  8/10/2022 8:54 AM      Medication Reconciliation: completed   Latanya Haddad LPN  8/10/2022 8:40 AM     Physical Exam  Vitals:    08/10/22 0853   BP: 120/72   BP Location: Right arm   Patient Position: Sitting   Cuff Size: Adult Regular   Pulse: 71   Resp: 16   Temp: 97.6  F (36.4  C)   TempSrc: Tympanic   SpO2: 95%   Weight: 72.6 kg (160 lb)     Constitutional: No acute distress.  Alert and cooperative   Head: NCAT  Eyes: Conjunctivae normal  Pulmonary/Chest: Respirations are even and non-labored bilaterally, no audible wheezing  Abdominal: Soft. No distension, tenderness, masses or guarding.   Extremities: OG x 4, Warm. No " clubbing.  No cyanosis.    Skin: Pink, warm and dry.  No visible rashes noted.  Back:  No left CVA tenderness.  No right CVA tenderness.  Psychiatric:  Significant confusion/dementia  Genitourinary:  Nonpalpable bladder    Labs   05/18/22 13:20   Color Urine Light Yellow   Appearance Urine Clear   Glucose Urine Negative   Bilirubin Urine Negative   Ketones Urine Negative   Specific Gravity Urine 1.011   pH Urine 6.0   Protein Albumin Urine Negative   Urobilinogen mg/dL Normal   Nitrite Urine Negative   Blood Urine Negative   Leukocyte Esterase Urine Negative   WBC Urine <1   RBC Urine 3 (H)   Squamous Epithelial /HPF Urine 0   Hyaline Casts 1     Post-Void Residual  A post-void residual was measured by ultrasonic bladder scanner.  15 mL (previously recorded)    Assessment  Mr. Ortiz is a 88 year old male w/h/o Lewy body dementia who follows up requesting an IV transfusion.  The patient has advanced dementia.  He presents alone today and is unaccompanied by staff or family member.  Urinary complaints and management are patient driven.    He previously failed Flomax  He has previously failed anticholinergics.  Interstim is unreasonable.  Botox is contraindicated  PTNS is not reasonable due to excessive travel    Plan  Continue assuring good bowel function with daily MiraLAX.  No other urinary treatments will likely improve his symptoms and his severe dementia complicates his clinical picture and mgmt. In addition, we have essentially tried all reasonable treatments in the past 10 years without benefit.

## 2022-08-24 ENCOUNTER — INFUSION THERAPY VISIT (OUTPATIENT)
Dept: INFUSION THERAPY | Facility: OTHER | Age: 87
End: 2022-08-24
Payer: MEDICARE

## 2022-08-24 DIAGNOSIS — E86.9 VOLUME DEPLETION: Primary | ICD-10-CM

## 2022-08-24 PROCEDURE — 250N000011 HC RX IP 250 OP 636: Performed by: FAMILY MEDICINE

## 2022-08-24 PROCEDURE — 258N000003 HC RX IP 258 OP 636: Performed by: FAMILY MEDICINE

## 2022-08-24 PROCEDURE — 96360 HYDRATION IV INFUSION INIT: CPT

## 2022-08-24 RX ORDER — HEPARIN SODIUM (PORCINE) LOCK FLUSH IV SOLN 100 UNIT/ML 100 UNIT/ML
5 SOLUTION INTRAVENOUS ONCE
Status: CANCELLED
Start: 2022-08-24 | End: 2022-08-24

## 2022-08-24 RX ORDER — HEPARIN SODIUM (PORCINE) LOCK FLUSH IV SOLN 100 UNIT/ML 100 UNIT/ML
5 SOLUTION INTRAVENOUS ONCE
Status: COMPLETED | OUTPATIENT
Start: 2022-08-24 | End: 2022-08-24

## 2022-08-24 RX ADMIN — SODIUM CHLORIDE 1000 ML: 9 INJECTION, SOLUTION INTRAVENOUS at 09:45

## 2022-08-24 RX ADMIN — Medication 5 ML: at 10:52

## 2022-08-24 NOTE — PROGRESS NOTES
Patient is an 88 year old here today for infusion of IVF per order of Dr Wilson.  Patient identified with two identifiers, order verified, and verbal consent for today's infusion obtained from patient.      Patient meets order parameters for today's treatment.    Patient denies questions or concerns regarding infusion and/or medication(s) being administered.    Patients port accessed using non-coring, 20 gauge, 3/4 inch needle. Port accessed per facility protocol. Port flushed easily, blood return noted.  No signs and symptoms of infection or infiltration.      IV pump verified with IVF dose, drug, and rate of administration.  Infusion administered per protocol.  Patient tolerated infusion well, no signs or symptoms of adverse reaction noted. Patient denies pain nor discomfort.     IV removed, catheter intact. Site clean, dry and intact.  No signs or symptoms of infiltration or infection. Covered with a sterile bandage, slight pressure applied for 30 seconds. Pt instructed to leave bandage intact for a minimum of one hour, and to call with questions or concerns. Patient states understanding, discharged.

## 2022-08-24 NOTE — PATIENT INSTRUCTIONS

## 2022-08-31 ENCOUNTER — TELEPHONE (OUTPATIENT)
Dept: INFUSION THERAPY | Facility: OTHER | Age: 87
End: 2022-08-31

## 2022-08-31 NOTE — PROGRESS NOTES
Call from Capri at [facility] where patient resides, noting he is not coming for infusion today. Notes she spoke with patient family/significant other, and they wish to schedule patient IVF every other week. Patient to come next week. Note sent to PAC Purvi asking she update schedule.

## 2022-09-07 ENCOUNTER — INFUSION THERAPY VISIT (OUTPATIENT)
Dept: INFUSION THERAPY | Facility: OTHER | Age: 87
End: 2022-09-07
Payer: MEDICARE

## 2022-09-07 DIAGNOSIS — E86.9 VOLUME DEPLETION: Primary | ICD-10-CM

## 2022-09-07 PROCEDURE — 250N000011 HC RX IP 250 OP 636: Performed by: FAMILY MEDICINE

## 2022-09-07 PROCEDURE — 96360 HYDRATION IV INFUSION INIT: CPT

## 2022-09-07 PROCEDURE — 258N000003 HC RX IP 258 OP 636: Performed by: FAMILY MEDICINE

## 2022-09-07 RX ORDER — HEPARIN SODIUM (PORCINE) LOCK FLUSH IV SOLN 100 UNIT/ML 100 UNIT/ML
5 SOLUTION INTRAVENOUS ONCE
Status: CANCELLED
Start: 2022-09-07 | End: 2022-09-07

## 2022-09-07 RX ORDER — IBUPROFEN 400 MG/1
400 TABLET, FILM COATED ORAL 3 TIMES DAILY PRN
COMMUNITY
End: 2023-06-01

## 2022-09-07 RX ORDER — HEPARIN SODIUM (PORCINE) LOCK FLUSH IV SOLN 100 UNIT/ML 100 UNIT/ML
5 SOLUTION INTRAVENOUS ONCE
Status: COMPLETED | OUTPATIENT
Start: 2022-09-07 | End: 2022-09-07

## 2022-09-07 RX ADMIN — SODIUM CHLORIDE 1000 ML: 9 INJECTION, SOLUTION INTRAVENOUS at 09:33

## 2022-09-07 RX ADMIN — Medication 5 ML: at 10:36

## 2022-09-07 NOTE — PATIENT INSTRUCTIONS

## 2022-09-07 NOTE — PROGRESS NOTES
Patient is an 88 year old here today for infusion of IVF per order of Dr Wilson.  Patient identified with two identifiers, order verified, and verbal consent for today's infusion obtained from patient.      Allergies and home medications updated from information sheet from [facility].    Patient meets order parameters for today's treatment.    Patient denies questions or concerns regarding infusion and/or medication(s) being administered.    Patients port accessed using non-coring, 20 gauge, 3/4 inch needle. Port accessed per facility protocol. Port flushed easily, blood return noted.  No signs and symptoms of infection or infiltration.      IV pump verified with IVF dose, drug, and rate of administration.  Infusion administered per protocol.  Patient tolerated infusion well, no signs or symptoms of adverse reaction noted.  Patient denies pain nor discomfort.     IV removed, catheter intact.  Site clean, dry and intact.  No signs or symptoms of infiltration or infection.  Covered with a sterile bandage, slight pressure applied for 30 seconds.  Pt instructed to leave bandage intact for a minimum of one hour, and to call with questions or concerns.  Copy of appointments, discharge instructions, and after visit summary (AVS) provided to patient.  Patient states understanding, discharged.

## 2022-09-21 ENCOUNTER — INFUSION THERAPY VISIT (OUTPATIENT)
Dept: INFUSION THERAPY | Facility: OTHER | Age: 87
End: 2022-09-21
Payer: MEDICARE

## 2022-09-21 VITALS — SYSTOLIC BLOOD PRESSURE: 144 MMHG | HEART RATE: 74 BPM | DIASTOLIC BLOOD PRESSURE: 78 MMHG

## 2022-09-21 DIAGNOSIS — E86.9 VOLUME DEPLETION: Primary | ICD-10-CM

## 2022-09-21 PROCEDURE — 250N000011 HC RX IP 250 OP 636: Performed by: FAMILY MEDICINE

## 2022-09-21 PROCEDURE — 258N000003 HC RX IP 258 OP 636: Performed by: FAMILY MEDICINE

## 2022-09-21 PROCEDURE — 96360 HYDRATION IV INFUSION INIT: CPT

## 2022-09-21 RX ORDER — HEPARIN SODIUM (PORCINE) LOCK FLUSH IV SOLN 100 UNIT/ML 100 UNIT/ML
5 SOLUTION INTRAVENOUS ONCE
Status: COMPLETED | OUTPATIENT
Start: 2022-09-21 | End: 2022-09-21

## 2022-09-21 RX ORDER — HEPARIN SODIUM (PORCINE) LOCK FLUSH IV SOLN 100 UNIT/ML 100 UNIT/ML
5 SOLUTION INTRAVENOUS ONCE
Status: CANCELLED
Start: 2022-09-21 | End: 2022-09-21

## 2022-09-21 RX ADMIN — SODIUM CHLORIDE 1000 ML: 9 INJECTION, SOLUTION INTRAVENOUS at 09:36

## 2022-09-21 RX ADMIN — HEPARIN 5 ML: 100 SYRINGE at 10:42

## 2022-09-21 NOTE — PROGRESS NOTES
"INTRAVENOUS ACCESS:  IVAD SL/DL: Site right sided; 20 G 3/4 \" cotton gripper plus needle; Accessed without difficulty:  YES flushed with 10cc NS and 5cc 100u/ml heparin and needle d/c'd intact; Labs drawn from IVAD: YES    Central line dressing: Placed     Intravenous fluids were administered, normal saline 1000 cc's.    DRUG ADMINISTRATION:  Infusion rate(s) regulated using pump, Vital signs monitored, Blood return noted prior and post infusion(s), Site patent and intact, free of redness, edema or complaint of discomfort, No evidence of extravasations, Patient tolerated administration of agents without incident and Access discontinued per protocol  "

## 2022-09-21 NOTE — PROGRESS NOTES
Patient tolerated infusion well, no signs or symptoms of adverse reaction noted.  Patient denies pain nor discomfort.     IV removed, catheter intact.  Site clean, dry and intact.  No signs or symptoms of infiltration or infection.  Covered with a sterile bandage, slight pressure applied for 30 seconds.  Pt instructed to leave bandage intact for a minimum of one hour, and to call with questions or concerns. Patient states understanding, discharged.  Primary RN Catia gave discharge paperwork and called for ride.
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
## 2022-10-05 ENCOUNTER — INFUSION THERAPY VISIT (OUTPATIENT)
Dept: INFUSION THERAPY | Facility: OTHER | Age: 87
End: 2022-10-05
Attending: INTERNAL MEDICINE
Payer: MEDICARE

## 2022-10-05 VITALS
OXYGEN SATURATION: 97 % | DIASTOLIC BLOOD PRESSURE: 58 MMHG | RESPIRATION RATE: 16 BRPM | TEMPERATURE: 97.3 F | SYSTOLIC BLOOD PRESSURE: 121 MMHG | HEART RATE: 70 BPM

## 2022-10-05 DIAGNOSIS — E86.9 VOLUME DEPLETION: Primary | ICD-10-CM

## 2022-10-05 PROCEDURE — 250N000011 HC RX IP 250 OP 636: Performed by: FAMILY MEDICINE

## 2022-10-05 PROCEDURE — 96365 THER/PROPH/DIAG IV INF INIT: CPT

## 2022-10-05 PROCEDURE — 258N000003 HC RX IP 258 OP 636: Performed by: FAMILY MEDICINE

## 2022-10-05 PROCEDURE — 96360 HYDRATION IV INFUSION INIT: CPT

## 2022-10-05 RX ORDER — HEPARIN SODIUM (PORCINE) LOCK FLUSH IV SOLN 100 UNIT/ML 100 UNIT/ML
5 SOLUTION INTRAVENOUS ONCE
Status: COMPLETED | OUTPATIENT
Start: 2022-10-05 | End: 2022-10-05

## 2022-10-05 RX ORDER — HEPARIN SODIUM (PORCINE) LOCK FLUSH IV SOLN 100 UNIT/ML 100 UNIT/ML
5 SOLUTION INTRAVENOUS ONCE
Status: CANCELLED
Start: 2022-10-05 | End: 2022-10-05

## 2022-10-05 RX ADMIN — HEPARIN 5 ML: 100 SYRINGE at 11:05

## 2022-10-05 RX ADMIN — SODIUM CHLORIDE 1000 ML: 9 INJECTION, SOLUTION INTRAVENOUS at 09:56

## 2022-10-05 NOTE — PROGRESS NOTES
Patient is a 88 y/o here accompanied by self today for infusion of IVF per order of Dr. Wilson.  Patient identified with two identifiers, order verified, and verbal consent for today's infusion obtained from patient.      Lab values: N/A    Patient meets order parameters for today's treatment.     Patients port accessed using non-coring, 20 gauge, 3/4 inch needle. Port accessed per facility protocol. Port flushed easily, blood return noted.  No signs and symptoms of infection or infiltration.      Infusion administered per protocol.  Patient tolerated infusion well, no signs or symptoms of adverse reaction noted.  Patient denies pain nor discomfort.     Port needle removed, needle intact.  Site clean, dry and intact.  No signs or symptoms of infiltration or infection.  Covered with a sterile bandage, slight pressure applied for 30 seconds.  Pt instructed to leave bandage intact for a minimum of one hour, and to call with questions or concerns.  Copy of appointments, discharge instructions, and after visit summary (AVS) provided to patient.  Patient states understanding, discharged.

## 2022-10-19 ENCOUNTER — INFUSION THERAPY VISIT (OUTPATIENT)
Dept: INFUSION THERAPY | Facility: OTHER | Age: 87
End: 2022-10-19
Attending: INTERNAL MEDICINE
Payer: MEDICARE

## 2022-10-19 DIAGNOSIS — E86.9 VOLUME DEPLETION: Primary | ICD-10-CM

## 2022-10-19 PROCEDURE — 258N000003 HC RX IP 258 OP 636: Performed by: FAMILY MEDICINE

## 2022-10-19 PROCEDURE — 250N000011 HC RX IP 250 OP 636: Performed by: FAMILY MEDICINE

## 2022-10-19 PROCEDURE — 96360 HYDRATION IV INFUSION INIT: CPT

## 2022-10-19 RX ORDER — HEPARIN SODIUM (PORCINE) LOCK FLUSH IV SOLN 100 UNIT/ML 100 UNIT/ML
5 SOLUTION INTRAVENOUS ONCE
Status: CANCELLED
Start: 2022-10-19 | End: 2022-10-19

## 2022-10-19 RX ORDER — HEPARIN SODIUM (PORCINE) LOCK FLUSH IV SOLN 100 UNIT/ML 100 UNIT/ML
5 SOLUTION INTRAVENOUS ONCE
Status: COMPLETED | OUTPATIENT
Start: 2022-10-19 | End: 2022-10-19

## 2022-10-19 RX ADMIN — HEPARIN 5 ML: 100 SYRINGE at 11:03

## 2022-10-19 RX ADMIN — SODIUM CHLORIDE 1000 ML: 9 INJECTION, SOLUTION INTRAVENOUS at 09:57

## 2022-10-19 NOTE — PROGRESS NOTES
Patient is 89 years old, here today for infusion of IVF.      Patient identified with two identifiers, order verified, and verbal consent for today's infusion obtained from patient.      Patients port accessed using non-coring, 19 gauge, 3/4 inch needle. Port accessed per facility protocol. Port flushed easily, blood return noted.  No signs and symptoms of infection or infiltration.      IV pump verified with dose, drug, and rate of administration.  Infusion administered per protocol.  Patient tolerated infusion well, no signs or symptoms of adverse reaction noted.  Patient denies pain nor discomfort.     IV removed, catheter intact.  Site clean, dry and intact.  No signs or symptoms of infiltration or infection.  Covered with a sterile bandage, slight pressure applied for 30 seconds.  Pt instructed to leave bandage intact for a minimum of one hour, and to call with questions or concerns. Patient states understanding, discharged.

## 2022-10-20 ENCOUNTER — ANCILLARY PROCEDURE (OUTPATIENT)
Dept: CARDIOLOGY | Facility: CLINIC | Age: 87
End: 2022-10-20
Attending: INTERNAL MEDICINE
Payer: MEDICARE

## 2022-10-20 DIAGNOSIS — I44.2 COMPLETE HEART BLOCK (H): ICD-10-CM

## 2022-10-20 PROCEDURE — 93296 REM INTERROG EVL PM/IDS: CPT

## 2022-10-20 PROCEDURE — 93294 REM INTERROG EVL PM/LDLS PM: CPT | Performed by: INTERNAL MEDICINE

## 2022-10-31 LAB
MDC_IDC_LEAD_IMPLANT_DT: NORMAL
MDC_IDC_LEAD_IMPLANT_DT: NORMAL
MDC_IDC_LEAD_LOCATION: NORMAL
MDC_IDC_LEAD_LOCATION: NORMAL
MDC_IDC_LEAD_LOCATION_DETAIL_1: NORMAL
MDC_IDC_LEAD_LOCATION_DETAIL_1: NORMAL
MDC_IDC_LEAD_MFG: NORMAL
MDC_IDC_LEAD_MFG: NORMAL
MDC_IDC_LEAD_MODEL: NORMAL
MDC_IDC_LEAD_MODEL: NORMAL
MDC_IDC_LEAD_POLARITY_TYPE: NORMAL
MDC_IDC_LEAD_POLARITY_TYPE: NORMAL
MDC_IDC_LEAD_SERIAL: NORMAL
MDC_IDC_LEAD_SERIAL: NORMAL
MDC_IDC_MSMT_BATTERY_DTM: NORMAL
MDC_IDC_MSMT_BATTERY_REMAINING_LONGEVITY: 71 MO
MDC_IDC_MSMT_BATTERY_RRT_TRIGGER: 2.62
MDC_IDC_MSMT_BATTERY_STATUS: NORMAL
MDC_IDC_MSMT_BATTERY_VOLTAGE: 2.97 V
MDC_IDC_MSMT_LEADCHNL_RA_IMPEDANCE_VALUE: 418 OHM
MDC_IDC_MSMT_LEADCHNL_RA_IMPEDANCE_VALUE: 532 OHM
MDC_IDC_MSMT_LEADCHNL_RA_PACING_THRESHOLD_AMPLITUDE: 1.25 V
MDC_IDC_MSMT_LEADCHNL_RA_PACING_THRESHOLD_PULSEWIDTH: 0.4 MS
MDC_IDC_MSMT_LEADCHNL_RA_SENSING_INTR_AMPL: 1.12 MV
MDC_IDC_MSMT_LEADCHNL_RA_SENSING_INTR_AMPL: 1.12 MV
MDC_IDC_MSMT_LEADCHNL_RV_IMPEDANCE_VALUE: 342 OHM
MDC_IDC_MSMT_LEADCHNL_RV_IMPEDANCE_VALUE: 532 OHM
MDC_IDC_MSMT_LEADCHNL_RV_PACING_THRESHOLD_AMPLITUDE: 0.75 V
MDC_IDC_MSMT_LEADCHNL_RV_PACING_THRESHOLD_PULSEWIDTH: 0.4 MS
MDC_IDC_MSMT_LEADCHNL_RV_SENSING_INTR_AMPL: 4.75 MV
MDC_IDC_MSMT_LEADCHNL_RV_SENSING_INTR_AMPL: 4.75 MV
MDC_IDC_PG_IMPLANT_DTM: NORMAL
MDC_IDC_PG_MFG: NORMAL
MDC_IDC_PG_MODEL: NORMAL
MDC_IDC_PG_SERIAL: NORMAL
MDC_IDC_PG_TYPE: NORMAL
MDC_IDC_SESS_CLINIC_NAME: NORMAL
MDC_IDC_SESS_DTM: NORMAL
MDC_IDC_SESS_TYPE: NORMAL
MDC_IDC_SET_BRADY_AT_MODE_SWITCH_RATE: 171 {BEATS}/MIN
MDC_IDC_SET_BRADY_HYSTRATE: NORMAL
MDC_IDC_SET_BRADY_LOWRATE: 70 {BEATS}/MIN
MDC_IDC_SET_BRADY_MAX_SENSOR_RATE: 130 {BEATS}/MIN
MDC_IDC_SET_BRADY_MAX_TRACKING_RATE: 130 {BEATS}/MIN
MDC_IDC_SET_BRADY_MODE: NORMAL
MDC_IDC_SET_BRADY_PAV_DELAY_LOW: 180 MS
MDC_IDC_SET_BRADY_SAV_DELAY_LOW: 150 MS
MDC_IDC_SET_LEADCHNL_RA_PACING_AMPLITUDE: 2.5 V
MDC_IDC_SET_LEADCHNL_RA_PACING_ANODE_ELECTRODE_1: NORMAL
MDC_IDC_SET_LEADCHNL_RA_PACING_ANODE_LOCATION_1: NORMAL
MDC_IDC_SET_LEADCHNL_RA_PACING_CAPTURE_MODE: NORMAL
MDC_IDC_SET_LEADCHNL_RA_PACING_CATHODE_ELECTRODE_1: NORMAL
MDC_IDC_SET_LEADCHNL_RA_PACING_CATHODE_LOCATION_1: NORMAL
MDC_IDC_SET_LEADCHNL_RA_PACING_POLARITY: NORMAL
MDC_IDC_SET_LEADCHNL_RA_PACING_PULSEWIDTH: 0.4 MS
MDC_IDC_SET_LEADCHNL_RA_SENSING_ANODE_ELECTRODE_1: NORMAL
MDC_IDC_SET_LEADCHNL_RA_SENSING_ANODE_LOCATION_1: NORMAL
MDC_IDC_SET_LEADCHNL_RA_SENSING_CATHODE_ELECTRODE_1: NORMAL
MDC_IDC_SET_LEADCHNL_RA_SENSING_CATHODE_LOCATION_1: NORMAL
MDC_IDC_SET_LEADCHNL_RA_SENSING_POLARITY: NORMAL
MDC_IDC_SET_LEADCHNL_RA_SENSING_SENSITIVITY: 0.9 MV
MDC_IDC_SET_LEADCHNL_RV_PACING_AMPLITUDE: 2 V
MDC_IDC_SET_LEADCHNL_RV_PACING_ANODE_ELECTRODE_1: NORMAL
MDC_IDC_SET_LEADCHNL_RV_PACING_ANODE_LOCATION_1: NORMAL
MDC_IDC_SET_LEADCHNL_RV_PACING_CAPTURE_MODE: NORMAL
MDC_IDC_SET_LEADCHNL_RV_PACING_CATHODE_ELECTRODE_1: NORMAL
MDC_IDC_SET_LEADCHNL_RV_PACING_CATHODE_LOCATION_1: NORMAL
MDC_IDC_SET_LEADCHNL_RV_PACING_POLARITY: NORMAL
MDC_IDC_SET_LEADCHNL_RV_PACING_PULSEWIDTH: 0.4 MS
MDC_IDC_SET_LEADCHNL_RV_SENSING_ANODE_ELECTRODE_1: NORMAL
MDC_IDC_SET_LEADCHNL_RV_SENSING_ANODE_LOCATION_1: NORMAL
MDC_IDC_SET_LEADCHNL_RV_SENSING_CATHODE_ELECTRODE_1: NORMAL
MDC_IDC_SET_LEADCHNL_RV_SENSING_CATHODE_LOCATION_1: NORMAL
MDC_IDC_SET_LEADCHNL_RV_SENSING_POLARITY: NORMAL
MDC_IDC_SET_LEADCHNL_RV_SENSING_SENSITIVITY: 0.9 MV
MDC_IDC_SET_ZONE_DETECTION_INTERVAL: 350 MS
MDC_IDC_SET_ZONE_DETECTION_INTERVAL: 400 MS
MDC_IDC_SET_ZONE_TYPE: NORMAL
MDC_IDC_STAT_AT_BURDEN_PERCENT: 0.1 %
MDC_IDC_STAT_AT_DTM_END: NORMAL
MDC_IDC_STAT_AT_DTM_START: NORMAL
MDC_IDC_STAT_BRADY_AP_VP_PERCENT: 98.99 %
MDC_IDC_STAT_BRADY_AP_VS_PERCENT: 0.02 %
MDC_IDC_STAT_BRADY_AS_VP_PERCENT: 0.8 %
MDC_IDC_STAT_BRADY_AS_VS_PERCENT: 0.18 %
MDC_IDC_STAT_BRADY_DTM_END: NORMAL
MDC_IDC_STAT_BRADY_DTM_START: NORMAL
MDC_IDC_STAT_BRADY_RA_PERCENT_PACED: 99.09 %
MDC_IDC_STAT_BRADY_RV_PERCENT_PACED: 99.8 %
MDC_IDC_STAT_EPISODE_RECENT_COUNT: 0
MDC_IDC_STAT_EPISODE_RECENT_COUNT_DTM_END: NORMAL
MDC_IDC_STAT_EPISODE_RECENT_COUNT_DTM_START: NORMAL
MDC_IDC_STAT_EPISODE_TOTAL_COUNT: 0
MDC_IDC_STAT_EPISODE_TOTAL_COUNT: 1192
MDC_IDC_STAT_EPISODE_TOTAL_COUNT: 3
MDC_IDC_STAT_EPISODE_TOTAL_COUNT_DTM_END: NORMAL
MDC_IDC_STAT_EPISODE_TOTAL_COUNT_DTM_START: NORMAL
MDC_IDC_STAT_EPISODE_TYPE: NORMAL

## 2022-11-02 ENCOUNTER — INFUSION THERAPY VISIT (OUTPATIENT)
Dept: INFUSION THERAPY | Facility: OTHER | Age: 87
End: 2022-11-02
Attending: INTERNAL MEDICINE
Payer: MEDICARE

## 2022-11-02 VITALS
BODY MASS INDEX: 23.33 KG/M2 | TEMPERATURE: 96.7 F | WEIGHT: 158 LBS | DIASTOLIC BLOOD PRESSURE: 63 MMHG | RESPIRATION RATE: 16 BRPM | HEART RATE: 71 BPM | SYSTOLIC BLOOD PRESSURE: 124 MMHG

## 2022-11-02 DIAGNOSIS — E86.9 VOLUME DEPLETION: Primary | ICD-10-CM

## 2022-11-02 PROCEDURE — 250N000011 HC RX IP 250 OP 636: Performed by: FAMILY MEDICINE

## 2022-11-02 PROCEDURE — 258N000003 HC RX IP 258 OP 636: Performed by: FAMILY MEDICINE

## 2022-11-02 PROCEDURE — 96360 HYDRATION IV INFUSION INIT: CPT

## 2022-11-02 PROCEDURE — 96365 THER/PROPH/DIAG IV INF INIT: CPT

## 2022-11-02 RX ORDER — MIRABEGRON 25 MG/1
25 TABLET, FILM COATED, EXTENDED RELEASE ORAL DAILY
COMMUNITY

## 2022-11-02 RX ORDER — HEPARIN SODIUM (PORCINE) LOCK FLUSH IV SOLN 100 UNIT/ML 100 UNIT/ML
5 SOLUTION INTRAVENOUS ONCE
Status: COMPLETED | OUTPATIENT
Start: 2022-11-02 | End: 2022-11-02

## 2022-11-02 RX ORDER — HEPARIN SODIUM (PORCINE) LOCK FLUSH IV SOLN 100 UNIT/ML 100 UNIT/ML
5 SOLUTION INTRAVENOUS ONCE
Status: CANCELLED
Start: 2022-11-02 | End: 2022-11-02

## 2022-11-02 RX ORDER — CHOLECALCIFEROL (VITAMIN D3) 50 MCG
1 TABLET ORAL DAILY
COMMUNITY

## 2022-11-02 RX ADMIN — HEPARIN 5 ML: 100 SYRINGE at 10:21

## 2022-11-02 RX ADMIN — SODIUM CHLORIDE 1000 ML: 9 INJECTION, SOLUTION INTRAVENOUS at 09:03

## 2022-11-02 ASSESSMENT — PAIN SCALES - GENERAL: PAINLEVEL: NO PAIN (0)

## 2022-11-02 NOTE — PROGRESS NOTES
Patient is a 89 y/o here accompanied by self today for infusion of IVF per order of Dr. Wilson.  Patient identified with two identifiers, order verified, and verbal consent for today's infusion obtained from patient.      Lab values:  N/A    Patient meets order parameters for today's treatment.    Patients port accessed using non-coring, 20 gauge, 3/4 inch needle. Port accessed per facility protocol. Port flushed easily, blood return noted.  No signs and symptoms of infection or infiltration.      Infusion administered per protocol.

## 2022-11-16 ENCOUNTER — INFUSION THERAPY VISIT (OUTPATIENT)
Dept: INFUSION THERAPY | Facility: OTHER | Age: 87
End: 2022-11-16
Attending: INTERNAL MEDICINE
Payer: MEDICARE

## 2022-11-16 VITALS
OXYGEN SATURATION: 97 % | RESPIRATION RATE: 16 BRPM | SYSTOLIC BLOOD PRESSURE: 110 MMHG | TEMPERATURE: 98.1 F | HEART RATE: 70 BPM | DIASTOLIC BLOOD PRESSURE: 65 MMHG

## 2022-11-16 DIAGNOSIS — E86.9 VOLUME DEPLETION: Primary | ICD-10-CM

## 2022-11-16 PROCEDURE — 250N000011 HC RX IP 250 OP 636: Performed by: FAMILY MEDICINE

## 2022-11-16 PROCEDURE — 96365 THER/PROPH/DIAG IV INF INIT: CPT

## 2022-11-16 PROCEDURE — 258N000003 HC RX IP 258 OP 636: Performed by: FAMILY MEDICINE

## 2022-11-16 PROCEDURE — 96360 HYDRATION IV INFUSION INIT: CPT

## 2022-11-16 RX ORDER — HEPARIN SODIUM (PORCINE) LOCK FLUSH IV SOLN 100 UNIT/ML 100 UNIT/ML
5 SOLUTION INTRAVENOUS ONCE
Status: COMPLETED | OUTPATIENT
Start: 2022-11-16 | End: 2022-11-16

## 2022-11-16 RX ORDER — HEPARIN SODIUM (PORCINE) LOCK FLUSH IV SOLN 100 UNIT/ML 100 UNIT/ML
5 SOLUTION INTRAVENOUS ONCE
Status: CANCELLED
Start: 2022-11-16 | End: 2022-11-16

## 2022-11-16 RX ADMIN — SODIUM CHLORIDE 1000 ML: 9 INJECTION, SOLUTION INTRAVENOUS at 09:48

## 2022-11-16 RX ADMIN — HEPARIN 5 ML: 100 SYRINGE at 10:58

## 2022-11-16 ASSESSMENT — PAIN SCALES - GENERAL: PAINLEVEL: NO PAIN (0)

## 2022-11-16 NOTE — PROGRESS NOTES
Patient is a 88 y/o here accompanied by self today for infusion of IVF per order of Dr. Wilson.  Patient identified with two identifiers, order verified, and verbal consent for today's infusion obtained from patient.      Lab values:  N/A    Patient meets order parameters for today's treatment.    Patient's port accessed using non-coring, 20 gauge, 3/4 inch needle. Port accessed per facility protocol. Port flushed easily, blood return noted.  No signs and symptoms of infection or infiltration.      Infusion administered per protocol.  Patient tolerated infusion well, no signs or symptoms of adverse reaction noted.  Patient denies pain nor discomfort.     Port needle removed, needle intact.  Site clean, dry and intact.  No signs or symptoms of infiltration or infection.  Covered with a sterile bandage, slight pressure applied for 30 seconds.  Pt instructed to leave bandage intact for a minimum of one hour, and to call with questions or concerns.  Copy of appointments, discharge instructions, and after visit summary (AVS) provided to patient.  Patient states understanding, discharged.

## 2022-11-21 ENCOUNTER — OFFICE VISIT (OUTPATIENT)
Dept: OTOLARYNGOLOGY | Facility: OTHER | Age: 87
End: 2022-11-21
Attending: NURSE PRACTITIONER
Payer: MEDICARE

## 2022-11-21 VITALS
HEIGHT: 69 IN | OXYGEN SATURATION: 95 % | TEMPERATURE: 98.1 F | BODY MASS INDEX: 23.4 KG/M2 | HEART RATE: 77 BPM | WEIGHT: 158 LBS

## 2022-11-21 DIAGNOSIS — H61.23 BILATERAL IMPACTED CERUMEN: Primary | ICD-10-CM

## 2022-11-21 DIAGNOSIS — Z96.22 PATENT TYMPANOSTOMY TUBE: ICD-10-CM

## 2022-11-21 PROCEDURE — 69210 REMOVE IMPACTED EAR WAX UNI: CPT | Performed by: NURSE PRACTITIONER

## 2022-11-21 PROCEDURE — G0463 HOSPITAL OUTPT CLINIC VISIT: HCPCS

## 2022-11-21 PROCEDURE — 69210 REMOVE IMPACTED EAR WAX UNI: CPT | Mod: 50 | Performed by: NURSE PRACTITIONER

## 2022-11-21 ASSESSMENT — PAIN SCALES - GENERAL: PAINLEVEL: NO PAIN (0)

## 2022-11-21 NOTE — PROGRESS NOTES
Otolaryngology Note         Chief Complaint:     Patient presents with:  Cerumen Impaction: Ear Cleaning            History of Present Illness:     Jf Ortiz is a 89 year old male who presents today for ear cleaning.  He was last seen in ENT on 6/28/22 for follow up otorrhea on right, resolved.      He has a history of right sided T-tube placement in 2015 for chronic otitis media with effusion, thick glue ear was noted on insertion.      He last had ears cleaned 6 months ago.   No verbalized acute changes in hearing.    He is minimally verbal, does not offer any concerns for vertigo, bothersome tinnitus, facial numbness or tingling.  No concerns for current otalgia or otorrhea.      Audiogram completed 3/4/22:  Tympanograms are Type A for left ear suggesting normal eardrum mobility and TYpe B large volume right-pe tube.  Acoustic Reflex Thresholds at 1000 Hz are present for both ears.  Thresholds are decreased compared to 2017 moderate to profounds sensorineural hearing loss.  Speech reception thresholds are in good agreement with pure tone average.  Word discrimination scores are very poor right and good left at supra-thresholds level.           Medications:     Current Outpatient Rx   Medication Sig Dispense Refill     acetaminophen (TYLENOL) 500 MG tablet Take 1,000 mg by mouth 3 times daily       aspirin (ASA) 81 MG chewable tablet Take 81 mg by mouth daily       atorvastatin (LIPITOR) 40 MG tablet Take 40 mg by mouth every evening       calcium carbonate (OS-ROSALINDA) 1500 (600 Ca) MG tablet Take 600 mg by mouth daily       fludrocortisone (FLORINEF) 0.1 MG tablet Take 1 tablet (0.1 mg) by mouth every morning (before breakfast) 30 tablet 11     ibuprofen (ADVIL/MOTRIN) 400 MG tablet Take 400 mg by mouth 3 times daily as needed for moderate pain       melatonin 5 MG tablet Take 5 mg by mouth nightly as needed for sleep       memantine (NAMENDA) 5 MG tablet Take 5 mg by mouth 2 times daily       Menthol-Zinc Oxide  (MOISTURE BARRIER EX) Apply topically 2 times daily to rash on intergluteal cleft, coccyx and rogelio area. Also may use as needed.       mirabegron (MYRBETRIQ) 25 MG 24 hr tablet Take 25 mg by mouth daily       nystatin POWD Apply topically 2 times daily       potassium chloride ER (KLOR-CON M) 20 MEQ CR tablet TAKE 1 TABLET BY MOUTH EVERY DAY 30 tablet 1     RABEprazole (ACIPHEX) 20 MG EC tablet Take 20 mg by mouth 2 times daily       senna-docusate (SENOKOT-S/PERICOLACE) 8.6-50 MG tablet Take 1 tablet by mouth daily       sertraline (ZOLOFT) 50 MG tablet Take 50 mg by mouth every evening       sodium chloride 1 GM tablet TAKE 1 TABLET BY MOUTH TWICE A  tablet 3     tamsulosin (FLOMAX) 0.4 MG capsule Take 0.4 mg by mouth daily       vitamin D3 (CHOLECALCIFEROL) 50 mcg (2000 units) tablet Take 1 tablet by mouth daily              Allergies:     Allergies: Cats, Haldol [haloperidol], Klonopin [clonazepam], Lamotrigine, and Oxycodone          Past Medical History:     Past Medical History:   Diagnosis Date     Autonomic orthostatic hypotension 10/14/2016     Coronary artery disease      Dementia without behavioral disturbance (H) 7/28/2015    Diagnosis updated by automated process. Provider to review and confirm.     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     Seizure disorder (H)      Stented coronary artery             Past Surgical History:     Past Surgical History:   Procedure Laterality Date     ------------OTHER-------------  1955    ulnar and radial fx - repair ulnar and radial fx x4     ------------OTHER-------------  6/14/2011    cataract extraction     BIOPSY  08/2015    skin biopsy     BLEPHAROPLASTY BILATERAL  5/6/2014    Procedure:  BLEPHAROPLASTY BILATERAL;  Surgeon: Andrew Queen MD;  Location: [facility]     BYPASS GRAFT ARTERY CORONARY  11/2006    coronary artery disease x 5, [facility] [place]     cataract extraction and lens implantation  2011    cataracts     cataract extraction and lens implantation      cataracts     COLONOSCOPY  2012     colonoscopy with polypectomy  3/13/2009    history of polyps - repeat 3 yrs     colonoscopy with polypectomy  2006     colonoscopy with polypectomy  2005     COMBINED COLONOSCOPY WITH ARGON PLASMA COAGULATOR (APC) N/A 10/31/2014    Procedure: COMBINED COLONOSCOPY WITH ARGON PLASMA COAGULATOR (APC);  Surgeon: Bassam Aguilar MD;  [facility]     COMBINED COLONOSCOPY WITH ARGON PLASMA COAGULATOR (APC) N/A 11/13/2015    Procedure: COMBINED COLONOSCOPY WITH ARGON PLASMA COAGULATOR (APC);  Surgeon: Bassam Aguilar MD;  [facility]     ENDOSCOPY UPPER, COLONOSCOPY, COMBINED N/A 10/31/2014    Procedure: COMBINED ENDOSCOPY UPPER, COLONOSCOPY;  Surgeon: Bassam Aguilar MD;  [facility]     ENDOSCOPY UPPER, COLONOSCOPY, COMBINED N/A 11/13/2015    Procedure: COMBINED ENDOSCOPY UPPER, COLONOSCOPY;  Surgeon: Bassam Aguilar MD;  [facility]     ESOPHAGOSCOPY, GASTROSCOPY, DUODENOSCOPY (EGD), COMBINED  1/22/2014    Procedure: COMBINED ESOPHAGOSCOPY, GASTROSCOPY, DUODENOSCOPY (EGD);  UPPER ENDOSCOPY(PENA) W/ BIOPSIES;  Surgeon: Patricia Pena MD;  [facility]     HERNIORRHAPHY INGUINAL Right 2/14/2018    Procedure: HERNIORRHAPHY INGUINAL;  OPEN RIGHT INGUINAL HERNIA REPAIR with Mesh;  Surgeon: Virgilio Zaragoza DO;  [facility]     INSERT PORT VASCULAR ACCESS Right 7/12/2019    Procedure: PORT PLACEMENT;  Surgeon: Lloyd Ram MD;  [facility]     LARYNGOSCOPY WITH MICROSCOPE  1/22/2014    Procedure: LARYNGOSCOPY WITH MICROSCOPE;;  Surgeon: Chayo Duke MD;  [facility]     pacemaker placement  2000    heart block     pacemaker placement  2011    dual-chamber     REMOVE  "TUBE, MYRINGOTOMY, COMBINED  2014    Procedure: COMBINED REMOVE TUBE, MYRINGOTOMY;  MICRODIRECT LARYNGOSCOPY WITH BIOPSY AND FROZEN SECTIONS removal of right ear tube and myringoplasty;  Surgeon: Chayo Duke MD;  [facility]     REPLACE PACEMAKER GENERATOR N/A 2018    Procedure: REPLACE PACEMAKER GENERATOR;  Pacemaker generator change;  Surgeon: Alma Kaplan MD;  [facility]: [facility]     stent placement to LAD       ventilation tube  2012    right in office       ENT family history reviewed         Social History:     Social History     Tobacco Use     Smoking status: Former     Packs/day: 1.00     Years: 5.00     Pack years: 5.00     Types: Cigarettes     Quit date: 1985     Years since quittin.9     Smokeless tobacco: Never     Tobacco comments:     quit in    Substance Use Topics     Alcohol use: Yes     Comment: social     Drug use: No            Review of Systems:     ROS: See HPI         Physical Exam:     Pulse 77   Temp 98.1  F (36.7  C)   Ht 1.753 m (5' 9\")   Wt 71.7 kg (158 lb)   SpO2 95%   BMI 23.33 kg/m      General - He presents via w/c, sleeping when I entered the room. He rouses with verbal stimuli and is able to transfer from w/c to exam room chair with SBA.    Head and Face - Normocephalic and atraumatic, with no gross asymmetry noted.  The facial nerve is intact, with strong symmetric movements.  Voice and Breathing - The patient was breathing comfortably without the use of accessory muscles. There was no wheezing, stridor. The patients voice was clear and strong, and had appropriate pitch and quality.  Ears - The ears were examined with binocular microscopy and with otoscope.  External ears normal. Right canal cerumen impacted.  Left canal cerumen impacted.  The right ear was cleaned with #7 cuevas and cupped forceps.  Patent appearing t-tube in right tympanic membrane without obvious effusion, retraction or mass. The left ear was cleaned with #7 " suction.  Left tympanic membrane is intact without effusion, retraction or mass.  Eyes - Extraocular movements intact, sclera were not icteric or injected, conjunctiva were pink and moist.  Nose - External contour is symmetric, no gross deflection or scars.  Nasal mucosa is pink and moist with no abnormal mucus.             Assessment and Plan:       ICD-10-CM    1. Bilateral impacted cerumen  H61.23       2. Patent tympanostomy tube  Z96.22         Follow up in 6 months for ear cleaning and tube check.     Ears were cleaned, tolerated well    The ears were cleaned today.  The patient may return here as needed or with their primary physician.  Aural hygiene was discussed.  Avoidance of Q-tips was reiterated.  Avoid flushing the ear canal if there is a possibility of a hole or perforation in the tympanic membrane.   If there are any unresolved concerns regarding hearing loss an audiogram is recommended.      Char CLARKE  [facility] ENT

## 2022-11-21 NOTE — LETTER
11/21/2022         [facility]  [address] [facility] [address]        Dear Colleague,    Thank you for referring your patient, Jf Ortiz, to the [facility]. Please see a copy of my visit note below.    Otolaryngology Note         Chief Complaint:     Patient presents with:  Cerumen Impaction: Ear Cleaning            History of Present Illness:     Jf Ortiz is a 89 year old male who presents today for ear cleaning.  He was last seen in ENT on 6/28/22 for follow up otorrhea on right, resolved.      He has a history of right sided T-tube placement in 2015 for chronic otitis media with effusion, thick glue ear was noted on insertion.      He last had ears cleaned 6 months ago.   No verbalized acute changes in hearing.    He is minimally verbal, does not offer any concerns for vertigo, bothersome tinnitus, facial numbness or tingling.  No concerns for current otalgia or otorrhea.      Audiogram completed 3/4/22:  Tympanograms are Type A for left ear suggesting normal eardrum mobility and TYpe B large volume right-pe tube.  Acoustic Reflex Thresholds at 1000 Hz are present for both ears.  Thresholds are decreased compared to 2017 moderate to profounds sensorineural hearing loss.  Speech reception thresholds are in good agreement with pure tone average.  Word discrimination scores are very poor right and good left at supra-thresholds level.           Medications:     Current Outpatient Rx   Medication Sig Dispense Refill     acetaminophen (TYLENOL) 500 MG tablet Take 1,000 mg by mouth 3 times daily       aspirin (ASA) 81 MG chewable tablet Take 81 mg by mouth daily       atorvastatin (LIPITOR) 40 MG tablet Take 40 mg by mouth every evening       calcium carbonate (OS-ROSALINDA) 1500 (600 Ca) MG tablet Take 600 mg by mouth daily       fludrocortisone (FLORINEF) 0.1 MG tablet Take 1 tablet (0.1 mg) by mouth every morning (before breakfast) 30 tablet 11     ibuprofen  (ADVIL/MOTRIN) 400 MG tablet Take 400 mg by mouth 3 times daily as needed for moderate pain       melatonin 5 MG tablet Take 5 mg by mouth nightly as needed for sleep       memantine (NAMENDA) 5 MG tablet Take 5 mg by mouth 2 times daily       Menthol-Zinc Oxide (MOISTURE BARRIER EX) Apply topically 2 times daily to rash on intergluteal cleft, coccyx and rogelio area. Also may use as needed.       mirabegron (MYRBETRIQ) 25 MG 24 hr tablet Take 25 mg by mouth daily       nystatin POWD Apply topically 2 times daily       potassium chloride ER (KLOR-CON M) 20 MEQ CR tablet TAKE 1 TABLET BY MOUTH EVERY DAY 30 tablet 1     RABEprazole (ACIPHEX) 20 MG EC tablet Take 20 mg by mouth 2 times daily       senna-docusate (SENOKOT-S/PERICOLACE) 8.6-50 MG tablet Take 1 tablet by mouth daily       sertraline (ZOLOFT) 50 MG tablet Take 50 mg by mouth every evening       sodium chloride 1 GM tablet TAKE 1 TABLET BY MOUTH TWICE A  tablet 3     tamsulosin (FLOMAX) 0.4 MG capsule Take 0.4 mg by mouth daily       vitamin D3 (CHOLECALCIFEROL) 50 mcg (2000 units) tablet Take 1 tablet by mouth daily              Allergies:     Allergies: Cats, Haldol [haloperidol], Klonopin [clonazepam], Lamotrigine, and Oxycodone          Past Medical History:     Past Medical History:   Diagnosis Date     Autonomic orthostatic hypotension 10/14/2016     Coronary artery disease      Dementia without behavioral disturbance (H) 7/28/2015    Diagnosis updated by automated process. Provider to review and confirm.     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     Seizure disorder (H)      Stented coronary artery             Past Surgical History:     Past Surgical History:   Procedure Laterality  Date     ------------OTHER-------------  1955    ulnar and radial fx - repair ulnar and radial fx x4     ------------OTHER-------------  6/14/2011    cataract extraction     BIOPSY  08/2015    skin biopsy     BLEPHAROPLASTY BILATERAL  5/6/2014    Procedure: BLEPHAROPLASTY BILATERAL;  Surgeon: Andrew Queen MD;  [facility]     BYPASS GRAFT ARTERY CORONARY  11/2006    coronary artery disease x 5, [facility]     cataract extraction and lens implantation  2011    cataracts     cataract extraction and lens implantation      cataracts     COLONOSCOPY  2012     colonoscopy with polypectomy  3/13/2009    history of polyps - repeat 3 yrs     colonoscopy with polypectomy  2006     colonoscopy with polypectomy  2005     COMBINED COLONOSCOPY WITH ARGON PLASMA COAGULATOR (APC) N/A 10/31/2014    Procedure: COMBINED COLONOSCOPY WITH ARGON PLASMA COAGULATOR (APC);  Surgeon: Bassam Aguilar MD;  [facility]     COMBINED COLONOSCOPY WITH ARGON PLASMA COAGULATOR (APC) N/A 11/13/2015    Procedure: COMBINED COLONOSCOPY WITH ARGON PLASMA COAGULATOR (APC);  Surgeon: Bassam Aguilar MD;  [facility]     ENDOSCOPY UPPER, COLONOSCOPY, COMBINED N/A 10/31/2014    Procedure: COMBINED ENDOSCOPY UPPER, COLONOSCOPY;  Surgeon: Bassam Aguilar MD;  [facility]     ENDOSCOPY UPPER, COLONOSCOPY, COMBINED N/A 11/13/2015    Procedure: COMBINED ENDOSCOPY UPPER, COLONOSCOPY;  Surgeon: Bassam Aguilar MD;  [facility]     ESOPHAGOSCOPY, GASTROSCOPY, DUODENOSCOPY (EGD), COMBINED  1/22/2014    Procedure: COMBINED ESOPHAGOSCOPY, GASTROSCOPY, DUODENOSCOPY (EGD);  UPPER ENDOSCOPY(PENA) W/ BIOPSIES;  Surgeon: Patricia Pena MD;  [facility]     HERNIORRHAPHY INGUINAL Right 2/14/2018    Procedure: HERNIORRHAPHY INGUINAL;  OPEN RIGHT INGUINAL HERNIA REPAIR with Mesh;  Surgeon: Virgilio Zaragoza, DO;  [facility]     INSERT PORT VASCULAR ACCESS Right 7/12/2019    Procedure: PORT PLACEMENT;  Surgeon: Lloyd Ram MD;   "[facility]: [facility]     LARYNGOSCOPY WITH MICROSCOPE  2014    Procedure: LARYNGOSCOPY WITH MICROSCOPE;;  Surgeon: Chayo Duke MD;  [facility]     pacemaker placement      heart block     pacemaker placement      dual-chamber     REMOVE TUBE, MYRINGOTOMY, COMBINED  2014    Procedure: COMBINED REMOVE TUBE, MYRINGOTOMY;  MICRODIRECT LARYNGOSCOPY WITH BIOPSY AND FROZEN SECTIONS removal of right ear tube and myringoplasty;  Surgeon: Chayo Duke MD;  [facility]     REPLACE PACEMAKER GENERATOR N/A 2018    Procedure: REPLACE PACEMAKER GENERATOR;  Pacemaker generator change;  Surgeon: Alma Kaplan MD;  [facility]     stent placement to LAD       ventilation tube  2012    right in office       ENT family history reviewed         Social History:     Social History     Tobacco Use     Smoking status: Former     Packs/day: 1.00     Years: 5.00     Pack years: 5.00     Types: Cigarettes     Quit date: 1985     Years since quittin.9     Smokeless tobacco: Never     Tobacco comments:     quit in    Substance Use Topics     Alcohol use: Yes     Comment: social     Drug use: No            Review of Systems:     ROS: See HPI         Physical Exam:     Pulse 77   Temp 98.1  F (36.7  C)   Ht 1.753 m (5' 9\")   Wt 71.7 kg (158 lb)   SpO2 95%   BMI 23.33 kg/m      General - He presents via w/c, sleeping when I entered the room. He rouses with verbal stimuli and is able to transfer from w/c to exam room chair with SBA.    Head and Face - Normocephalic and atraumatic, with no gross asymmetry noted.  The facial nerve is intact, with strong symmetric movements.  Voice and Breathing - The patient was breathing comfortably without the use of accessory muscles. There was no wheezing, stridor. The patients voice was clear and strong, and had appropriate pitch and quality.  Ears - The ears were examined with binocular microscopy and with otoscope.  External ears normal. " Right canal cerumen impacted.  Left canal cerumen impacted.  The right ear was cleaned with #7 cuevas and cupped forceps.  Patent appearing t-tube in right tympanic membrane without obvious effusion, retraction or mass. The left ear was cleaned with #7 suction.  Left tympanic membrane is intact without effusion, retraction or mass.  Eyes - Extraocular movements intact, sclera were not icteric or injected, conjunctiva were pink and moist.  Nose - External contour is symmetric, no gross deflection or scars.  Nasal mucosa is pink and moist with no abnormal mucus.             Assessment and Plan:       ICD-10-CM    1. Bilateral impacted cerumen  H61.23       2. Patent tympanostomy tube  Z96.22         Follow up in 6 months for ear cleaning and tube check.     Ears were cleaned, tolerated well    The ears were cleaned today.  The patient may return here as needed or with their primary physician.  Aural hygiene was discussed.  Avoidance of Q-tips was reiterated.  Avoid flushing the ear canal if there is a possibility of a hole or perforation in the tympanic membrane.   If there are any unresolved concerns regarding hearing loss an audiogram is recommended.      Char CLARKE  [facility]        Again, thank you for allowing me to participate in the care of your patient.        Sincerely,        Char Avila NP

## 2022-11-21 NOTE — PATIENT INSTRUCTIONS
Thank you for allowing Char Avila and our ENT team to participate in your care.  If your medications are too expensive, please give the nurse a call.  We can possibly change this medication.  If you have a scheduling or an appointment question please contact our Health Unit Coordinator at their direct line [phone].   ALL nursing questions or concerns can be directed to your ENT nurse at: [phone] - Mbf

## 2022-11-30 ENCOUNTER — INFUSION THERAPY VISIT (OUTPATIENT)
Dept: INFUSION THERAPY | Facility: OTHER | Age: 87
End: 2022-11-30
Attending: INTERNAL MEDICINE
Payer: MEDICARE

## 2022-11-30 VITALS
TEMPERATURE: 97.9 F | OXYGEN SATURATION: 96 % | RESPIRATION RATE: 16 BRPM | SYSTOLIC BLOOD PRESSURE: 113 MMHG | DIASTOLIC BLOOD PRESSURE: 64 MMHG | HEART RATE: 71 BPM

## 2022-11-30 DIAGNOSIS — E86.9 VOLUME DEPLETION: Primary | ICD-10-CM

## 2022-11-30 PROCEDURE — 258N000003 HC RX IP 258 OP 636: Performed by: FAMILY MEDICINE

## 2022-11-30 PROCEDURE — 96360 HYDRATION IV INFUSION INIT: CPT

## 2022-11-30 PROCEDURE — 250N000011 HC RX IP 250 OP 636: Performed by: FAMILY MEDICINE

## 2022-11-30 PROCEDURE — 96365 THER/PROPH/DIAG IV INF INIT: CPT

## 2022-11-30 RX ORDER — HEPARIN SODIUM (PORCINE) LOCK FLUSH IV SOLN 100 UNIT/ML 100 UNIT/ML
5 SOLUTION INTRAVENOUS ONCE
Status: COMPLETED | OUTPATIENT
Start: 2022-11-30 | End: 2022-11-30

## 2022-11-30 RX ORDER — HEPARIN SODIUM (PORCINE) LOCK FLUSH IV SOLN 100 UNIT/ML 100 UNIT/ML
5 SOLUTION INTRAVENOUS ONCE
Status: CANCELLED
Start: 2022-11-30 | End: 2022-11-30

## 2022-11-30 RX ADMIN — SODIUM CHLORIDE 1000 ML: 9 INJECTION, SOLUTION INTRAVENOUS at 09:37

## 2022-11-30 RX ADMIN — Medication 5 ML: at 10:44

## 2022-11-30 ASSESSMENT — PAIN SCALES - GENERAL: PAINLEVEL: NO PAIN (0)

## 2022-11-30 NOTE — PROGRESS NOTES
Patient tolerated infusion well no signs or symptoms of adverse reaction noted.  Patient denies pain nor discomfort.      Port needle removed, needle intact.  Site clean, dry and intact.  No signs or symptoms of infiltration or infection.  Covered with a sterile bandage, slight pressure applied for 30 seconds.  Pt instructed to leave bandage intact for a minimum of one hour, and to call with questions or concerns.  Copy of appointments, discharge instructions, and after visit summary (AVS) provided to patient.  Patient discharged.

## 2022-11-30 NOTE — PROGRESS NOTES
Patient is a 89 y/o here accompanied by self today for infusion of IVF per order of Dr. Wilson.  Patient identified with two identifiers, order verified, and verbal consent for today's infusion obtained from patient.      Lab values:  N/A    Patient meets order parameters for today's treatment.    Patient's port accessed using non-coring, 20 gauge, 3/4 inch needle. Port accessed per facility protocol. Port flushed easily, blood return noted.  No signs and symptoms of infection or infiltration.      Infusion administered per protocol.     Patient very sleepy when transportation arrived.  Patient stated he is very tired and did not want to stand.  Transferred to / with the assist of 3.

## 2022-12-14 ENCOUNTER — TELEPHONE (OUTPATIENT)
Dept: INFUSION THERAPY | Facility: OTHER | Age: 87
End: 2022-12-14

## 2022-12-16 ENCOUNTER — INFUSION THERAPY VISIT (OUTPATIENT)
Dept: INFUSION THERAPY | Facility: OTHER | Age: 87
End: 2022-12-16
Attending: INTERNAL MEDICINE
Payer: MEDICARE

## 2022-12-16 VITALS
DIASTOLIC BLOOD PRESSURE: 60 MMHG | HEART RATE: 76 BPM | TEMPERATURE: 97.6 F | SYSTOLIC BLOOD PRESSURE: 105 MMHG | OXYGEN SATURATION: 91 %

## 2022-12-16 DIAGNOSIS — E86.9 VOLUME DEPLETION: Primary | ICD-10-CM

## 2022-12-16 PROCEDURE — 250N000011 HC RX IP 250 OP 636: Performed by: FAMILY MEDICINE

## 2022-12-16 PROCEDURE — 258N000003 HC RX IP 258 OP 636: Performed by: FAMILY MEDICINE

## 2022-12-16 PROCEDURE — 96360 HYDRATION IV INFUSION INIT: CPT

## 2022-12-16 RX ORDER — HEPARIN SODIUM (PORCINE) LOCK FLUSH IV SOLN 100 UNIT/ML 100 UNIT/ML
5 SOLUTION INTRAVENOUS ONCE
Status: COMPLETED | OUTPATIENT
Start: 2022-12-16 | End: 2022-12-16

## 2022-12-16 RX ORDER — OSELTAMIVIR PHOSPHATE 75 MG/1
75 CAPSULE ORAL DAILY
COMMUNITY
End: 2023-01-28

## 2022-12-16 RX ORDER — HEPARIN SODIUM (PORCINE) LOCK FLUSH IV SOLN 100 UNIT/ML 100 UNIT/ML
5 SOLUTION INTRAVENOUS ONCE
Status: CANCELLED
Start: 2022-12-16 | End: 2022-12-16

## 2022-12-16 RX ADMIN — SODIUM CHLORIDE 1000 ML: 9 INJECTION, SOLUTION INTRAVENOUS at 13:27

## 2022-12-16 RX ADMIN — Medication 5 ML: at 14:34

## 2022-12-16 ASSESSMENT — PAIN SCALES - GENERAL: PAINLEVEL: NO PAIN (0)

## 2022-12-16 NOTE — PATIENT INSTRUCTIONS

## 2022-12-16 NOTE — PROGRESS NOTES
Patient is 89 years old, here today for infusion of IVF.      Patient identified with two identifiers, order verified, and verbal consent for today's infusion obtained from patient.      Patients port accessed using non-coring, 20 gauge, 3/4 inch needle. Port accessed per facility protocol. Port flushed easily, blood return noted.  No signs and symptoms of infection or infiltration.      IV pump verified with dose, drug, and rate of administration.  Infusion administered per protocol.  Patient tolerated infusion well, no signs or symptoms of adverse reaction noted.  Patient denies pain nor discomfort.     IV removed, catheter intact.  Site clean, dry and intact.  No signs or symptoms of infiltration or infection.  Covered with a sterile bandage, slight pressure applied for 30 seconds.  Pt instructed to leave bandage intact for a minimum of one hour, and to call with questions or concerns. Patient states understanding, discharged.

## 2022-12-28 ENCOUNTER — INFUSION THERAPY VISIT (OUTPATIENT)
Dept: INFUSION THERAPY | Facility: OTHER | Age: 87
End: 2022-12-28
Attending: INTERNAL MEDICINE
Payer: MEDICARE

## 2022-12-28 DIAGNOSIS — E86.9 VOLUME DEPLETION: Primary | ICD-10-CM

## 2022-12-28 PROCEDURE — 250N000011 HC RX IP 250 OP 636: Performed by: FAMILY MEDICINE

## 2022-12-28 PROCEDURE — 96360 HYDRATION IV INFUSION INIT: CPT

## 2022-12-28 PROCEDURE — 258N000003 HC RX IP 258 OP 636: Performed by: FAMILY MEDICINE

## 2022-12-28 RX ORDER — HEPARIN SODIUM (PORCINE) LOCK FLUSH IV SOLN 100 UNIT/ML 100 UNIT/ML
5 SOLUTION INTRAVENOUS ONCE
Status: COMPLETED | OUTPATIENT
Start: 2022-12-28 | End: 2022-12-28

## 2022-12-28 RX ORDER — HEPARIN SODIUM (PORCINE) LOCK FLUSH IV SOLN 100 UNIT/ML 100 UNIT/ML
5 SOLUTION INTRAVENOUS ONCE
Status: CANCELLED
Start: 2022-12-28 | End: 2022-12-28

## 2022-12-28 RX ADMIN — SODIUM CHLORIDE 1000 ML: 9 INJECTION, SOLUTION INTRAVENOUS at 09:53

## 2022-12-28 RX ADMIN — Medication 5 ML: at 11:00

## 2022-12-28 NOTE — PROGRESS NOTES
"INTRAVENOUS ACCESS:  IVAD SL/DL: Site right sided; 20 G  \" cotton gripper plus needle; Accessed without difficulty:  YES flushed with 10cc NS and 5cc 100u/ml heparin and needle d/c'd intact; Labs drawn from IVAD: YES    Central line dressing: Placed     Intravenous fluids were administered, normal saline 1000 cc's.    DRUG ADMINISTRATION:  Infusion rate(s) regulated using pump    Port flushed per facility policy.    "

## 2023-01-01 ENCOUNTER — OFFICE VISIT (OUTPATIENT)
Dept: OTOLARYNGOLOGY | Facility: OTHER | Age: 88
End: 2023-01-01
Attending: PHYSICIAN ASSISTANT
Payer: MEDICARE

## 2023-01-01 ENCOUNTER — INFUSION THERAPY VISIT (OUTPATIENT)
Dept: INFUSION THERAPY | Facility: OTHER | Age: 88
End: 2023-01-01
Payer: MEDICARE

## 2023-01-01 ENCOUNTER — INFUSION THERAPY VISIT (OUTPATIENT)
Dept: INFUSION THERAPY | Facility: OTHER | Age: 88
End: 2023-01-01
Attending: FAMILY MEDICINE
Payer: MEDICARE

## 2023-01-01 ENCOUNTER — NURSING HOME VISIT (OUTPATIENT)
Dept: FAMILY MEDICINE | Facility: OTHER | Age: 88
End: 2023-01-01
Attending: FAMILY MEDICINE
Payer: MEDICARE

## 2023-01-01 VITALS
DIASTOLIC BLOOD PRESSURE: 77 MMHG | HEART RATE: 71 BPM | TEMPERATURE: 97 F | RESPIRATION RATE: 18 BRPM | SYSTOLIC BLOOD PRESSURE: 118 MMHG | OXYGEN SATURATION: 98 %

## 2023-01-01 VITALS
DIASTOLIC BLOOD PRESSURE: 70 MMHG | OXYGEN SATURATION: 97 % | TEMPERATURE: 97.4 F | RESPIRATION RATE: 18 BRPM | HEART RATE: 80 BPM | SYSTOLIC BLOOD PRESSURE: 113 MMHG

## 2023-01-01 VITALS
SYSTOLIC BLOOD PRESSURE: 116 MMHG | HEART RATE: 70 BPM | TEMPERATURE: 96 F | OXYGEN SATURATION: 98 % | DIASTOLIC BLOOD PRESSURE: 58 MMHG

## 2023-01-01 VITALS
OXYGEN SATURATION: 98 % | HEART RATE: 72 BPM | RESPIRATION RATE: 16 BRPM | BODY MASS INDEX: 21.09 KG/M2 | SYSTOLIC BLOOD PRESSURE: 120 MMHG | WEIGHT: 142.8 LBS | TEMPERATURE: 97.9 F | DIASTOLIC BLOOD PRESSURE: 78 MMHG

## 2023-01-01 VITALS
DIASTOLIC BLOOD PRESSURE: 76 MMHG | HEART RATE: 77 BPM | OXYGEN SATURATION: 97 % | TEMPERATURE: 97.5 F | SYSTOLIC BLOOD PRESSURE: 134 MMHG

## 2023-01-01 DIAGNOSIS — I95.1 ORTHOSTATIC HYPOTENSION DYSAUTONOMIC SYNDROME: ICD-10-CM

## 2023-01-01 DIAGNOSIS — Z96.22 RETAINED MYRINGOTOMY TUBE IN RIGHT EAR: ICD-10-CM

## 2023-01-01 DIAGNOSIS — G31.83 LEWY BODY DEMENTIA WITHOUT BEHAVIORAL DISTURBANCE (H): ICD-10-CM

## 2023-01-01 DIAGNOSIS — H61.23 EXCESSIVE CERUMEN IN BOTH EAR CANALS: Primary | ICD-10-CM

## 2023-01-01 DIAGNOSIS — G20.A1 PARKINSON'S DISEASE WITHOUT DYSKINESIA OR FLUCTUATING MANIFESTATIONS (H): ICD-10-CM

## 2023-01-01 DIAGNOSIS — H72.91 PERFORATION OF RIGHT TYMPANIC MEMBRANE: ICD-10-CM

## 2023-01-01 DIAGNOSIS — I25.10 CORONARY ARTERY DISEASE INVOLVING NATIVE CORONARY ARTERY OF NATIVE HEART WITHOUT ANGINA PECTORIS: ICD-10-CM

## 2023-01-01 DIAGNOSIS — Z78.9 NURSING HOME RESIDENT: Primary | ICD-10-CM

## 2023-01-01 DIAGNOSIS — I10 HYPERTENSION WITH TARGET BLOOD PRESSURE GOAL UNDER 150/90: ICD-10-CM

## 2023-01-01 DIAGNOSIS — F02.80 LEWY BODY DEMENTIA WITHOUT BEHAVIORAL DISTURBANCE (H): ICD-10-CM

## 2023-01-01 DIAGNOSIS — E86.9 VOLUME DEPLETION: Primary | ICD-10-CM

## 2023-01-01 DIAGNOSIS — H90.3 ASNHL (ASYMMETRICAL SENSORINEURAL HEARING LOSS): ICD-10-CM

## 2023-01-01 DIAGNOSIS — G89.29 CHRONIC LOW BACK PAIN, UNSPECIFIED BACK PAIN LATERALITY, UNSPECIFIED WHETHER SCIATICA PRESENT: ICD-10-CM

## 2023-01-01 DIAGNOSIS — M54.50 CHRONIC LOW BACK PAIN, UNSPECIFIED BACK PAIN LATERALITY, UNSPECIFIED WHETHER SCIATICA PRESENT: ICD-10-CM

## 2023-01-01 PROCEDURE — 250N000011 HC RX IP 250 OP 636: Mod: JZ | Performed by: FAMILY MEDICINE

## 2023-01-01 PROCEDURE — 96523 IRRIG DRUG DELIVERY DEVICE: CPT

## 2023-01-01 PROCEDURE — 96360 HYDRATION IV INFUSION INIT: CPT

## 2023-01-01 PROCEDURE — 99212 OFFICE O/P EST SF 10 MIN: CPT | Mod: 25 | Performed by: PHYSICIAN ASSISTANT

## 2023-01-01 PROCEDURE — 258N000003 HC RX IP 258 OP 636: Performed by: FAMILY MEDICINE

## 2023-01-01 PROCEDURE — G0463 HOSPITAL OUTPT CLINIC VISIT: HCPCS

## 2023-01-01 PROCEDURE — 92504 EAR MICROSCOPY EXAMINATION: CPT | Performed by: PHYSICIAN ASSISTANT

## 2023-01-01 PROCEDURE — 250N000011 HC RX IP 250 OP 636: Performed by: FAMILY MEDICINE

## 2023-01-01 PROCEDURE — 69210 REMOVE IMPACTED EAR WAX UNI: CPT | Mod: RT | Performed by: PHYSICIAN ASSISTANT

## 2023-01-01 PROCEDURE — 69210 REMOVE IMPACTED EAR WAX UNI: CPT | Performed by: PHYSICIAN ASSISTANT

## 2023-01-01 PROCEDURE — 99308 SBSQ NF CARE LOW MDM 20: CPT | Performed by: FAMILY MEDICINE

## 2023-01-01 RX ORDER — HEPARIN SODIUM (PORCINE) LOCK FLUSH IV SOLN 100 UNIT/ML 100 UNIT/ML
5 SOLUTION INTRAVENOUS ONCE
Status: CANCELLED
Start: 2023-01-01 | End: 2023-01-01

## 2023-01-01 RX ORDER — HEPARIN SODIUM (PORCINE) LOCK FLUSH IV SOLN 100 UNIT/ML 100 UNIT/ML
5 SOLUTION INTRAVENOUS ONCE
Status: COMPLETED | OUTPATIENT
Start: 2023-01-01 | End: 2023-01-01

## 2023-01-01 RX ORDER — BISACODYL 10 MG
10 SUPPOSITORY, RECTAL RECTAL DAILY PRN
COMMUNITY

## 2023-01-01 RX ADMIN — SODIUM CHLORIDE 1000 ML: 9 INJECTION, SOLUTION INTRAVENOUS at 10:32

## 2023-01-01 RX ADMIN — HEPARIN 5 ML: 100 SYRINGE at 10:08

## 2023-01-01 RX ADMIN — SODIUM CHLORIDE 1000 ML: 9 INJECTION, SOLUTION INTRAVENOUS at 11:34

## 2023-01-01 RX ADMIN — HEPARIN 5 ML: 100 SYRINGE at 12:36

## 2023-01-01 RX ADMIN — SODIUM CHLORIDE 1000 ML: 9 INJECTION, SOLUTION INTRAVENOUS at 08:56

## 2023-01-01 RX ADMIN — HEPARIN 5 ML: 100 SYRINGE at 11:35

## 2023-01-01 ASSESSMENT — PAIN SCALES - GENERAL: PAINLEVEL: NO PAIN (0)

## 2023-01-11 ENCOUNTER — INFUSION THERAPY VISIT (OUTPATIENT)
Dept: INFUSION THERAPY | Facility: OTHER | Age: 88
End: 2023-01-11
Attending: INTERNAL MEDICINE
Payer: MEDICARE

## 2023-01-11 VITALS
DIASTOLIC BLOOD PRESSURE: 65 MMHG | TEMPERATURE: 97.7 F | RESPIRATION RATE: 18 BRPM | HEART RATE: 70 BPM | SYSTOLIC BLOOD PRESSURE: 133 MMHG | OXYGEN SATURATION: 96 %

## 2023-01-11 DIAGNOSIS — E86.9 VOLUME DEPLETION: Primary | ICD-10-CM

## 2023-01-11 PROCEDURE — 258N000003 HC RX IP 258 OP 636: Performed by: FAMILY MEDICINE

## 2023-01-11 PROCEDURE — 250N000011 HC RX IP 250 OP 636: Performed by: FAMILY MEDICINE

## 2023-01-11 PROCEDURE — 96360 HYDRATION IV INFUSION INIT: CPT

## 2023-01-11 RX ORDER — HEPARIN SODIUM (PORCINE) LOCK FLUSH IV SOLN 100 UNIT/ML 100 UNIT/ML
5 SOLUTION INTRAVENOUS ONCE
Status: COMPLETED | OUTPATIENT
Start: 2023-01-11 | End: 2023-01-11

## 2023-01-11 RX ORDER — HEPARIN SODIUM (PORCINE) LOCK FLUSH IV SOLN 100 UNIT/ML 100 UNIT/ML
5 SOLUTION INTRAVENOUS ONCE
Status: CANCELLED
Start: 2023-01-11 | End: 2023-01-11

## 2023-01-11 RX ADMIN — Medication 5 ML: at 10:42

## 2023-01-11 RX ADMIN — SODIUM CHLORIDE 1000 ML: 9 INJECTION, SOLUTION INTRAVENOUS at 09:34

## 2023-01-11 NOTE — PROGRESS NOTES
Patient is an 89 year old here today for infusion of IVF per order of Dr Wilson.  Patient identified with two identifiers, order verified, and verbal consent for today's infusion obtained from patient.        Patient meets order parameters for today's treatment.    Patient denies questions or concerns regarding infusion and/or medication(s) being administered.    Patients port accessed using non-coring, 19 gauge, 3/4 inch needle. Port accessed per facility protocol. Port flushed easily, blood return noted.  No signs and symptoms of infection or infiltration.      IV pump verified with IVF dose, drug, and rate of administration.  Infusion administered per protocol.

## 2023-01-11 NOTE — PROGRESS NOTES
Patient tolerated infusion well, no signs or symptoms of adverse reaction noted.  Patient denies pain nor discomfort.     IV removed, catheter intact.  Site clean, dry and intact.  No signs or symptoms of infiltration or infection.  Covered with a sterile bandage, slight pressure applied for 30 seconds.  Pt instructed to leave bandage intact for a minimum of one hour, and to call with questions or concerns.  Patient states understanding, discharged. Call to [facility] ride service, they will  pt on unit.

## 2023-01-13 ENCOUNTER — TELEPHONE (OUTPATIENT)
Dept: FAMILY MEDICINE | Facility: OTHER | Age: 88
End: 2023-01-13

## 2023-01-16 ENCOUNTER — NURSING HOME VISIT (OUTPATIENT)
Dept: FAMILY MEDICINE | Facility: OTHER | Age: 88
End: 2023-01-16
Attending: FAMILY MEDICINE
Payer: MEDICARE

## 2023-01-16 VITALS
DIASTOLIC BLOOD PRESSURE: 92 MMHG | WEIGHT: 152.4 LBS | BODY MASS INDEX: 22.51 KG/M2 | OXYGEN SATURATION: 96 % | RESPIRATION RATE: 22 BRPM | SYSTOLIC BLOOD PRESSURE: 182 MMHG | HEART RATE: 121 BPM

## 2023-01-16 DIAGNOSIS — M54.50 CHRONIC LOW BACK PAIN, UNSPECIFIED BACK PAIN LATERALITY, UNSPECIFIED WHETHER SCIATICA PRESENT: ICD-10-CM

## 2023-01-16 DIAGNOSIS — I10 HYPERTENSION WITH TARGET BLOOD PRESSURE GOAL UNDER 150/90: ICD-10-CM

## 2023-01-16 DIAGNOSIS — Z78.9 NURSING HOME RESIDENT: Primary | ICD-10-CM

## 2023-01-16 DIAGNOSIS — G31.83 LEWY BODY DEMENTIA WITHOUT BEHAVIORAL DISTURBANCE (H): ICD-10-CM

## 2023-01-16 DIAGNOSIS — G89.29 CHRONIC LOW BACK PAIN, UNSPECIFIED BACK PAIN LATERALITY, UNSPECIFIED WHETHER SCIATICA PRESENT: ICD-10-CM

## 2023-01-16 DIAGNOSIS — I25.10 CORONARY ARTERY DISEASE INVOLVING NATIVE CORONARY ARTERY OF NATIVE HEART WITHOUT ANGINA PECTORIS: ICD-10-CM

## 2023-01-16 DIAGNOSIS — F02.80 LEWY BODY DEMENTIA WITHOUT BEHAVIORAL DISTURBANCE (H): ICD-10-CM

## 2023-01-16 DIAGNOSIS — G20.A1 PARKINSON DISEASE (H): ICD-10-CM

## 2023-01-16 PROCEDURE — 99308 SBSQ NF CARE LOW MDM 20: CPT | Performed by: FAMILY MEDICINE

## 2023-01-16 NOTE — PROGRESS NOTES
Nursing Home Visit    HISTORY OF PRESENT ILLNESS:  Jf is a 89 year old male (10/5/1933)  resident of [facility] who is being seen today for routine 30 day follow up.     He has a history of Parkinson's disease, Lewy body dementia, atrial fibrillation, coronary heart disease status post aortocoronary bypass as well as stenting, seizure disorder, ans well as recurrent falls.    Jf was admitted from [facility] after being hospitalized with falls and compression fractures of L1 and L2.and in need of pain control.  This was achieved with the combination of tramadol, voltaren, as well as lidocaine patch.      The patient notes no current complaints.     Discussed with nursing staff who note occasional hypotensive episodes.    The patient is seen in his room.  Family is not present.     Medical records are reviewed.    Current medications, allergies, and interdisciplinary care plan are reviewed.      Patient Active Problem List    Diagnosis Date Noted     Orthostatic hypotension dysautonomic syndrome 02/12/2021     Priority: High     Chronic atrial fibrillation (H) 02/12/2021     Priority: High     Longstanding persistent atrial fibrillation (H) 04/13/2020     Priority: High     Coronary artery disease involving native coronary artery without angina pectoris 04/13/2020     Priority: High     Pacemaker, [employer], Dual Chamber - Dependent 10/06/2017     Priority: High     Lewy body dementia without behavioral disturbance (H) 08/31/2017     Priority: High     Dementia without behavioral disturbance (H) 07/28/2015     Priority: High     Diagnosis updated by automated process. Provider to review and confirm.       Parkinson disease (H)      Priority: High     Seizure disorder (H)      Priority: High     Cardiac pacemaker in situ 01/08/2013     Priority: High     Overview:   [employer] Domi S606 DREL,  Serial #[id number]  5-13-11  Atrial lead [employer] 4054 Serial  #[id number]  8-11-00  Ventriculer lead [employer] 4137  Serial #[id number]  5-13-11  2nd Degree AV Block   Dr. Campbell  Intrinsic:  Pt. is Pacemaker Dependant, remains paced at temp. rate of 30 bpm (6-13-14)    Formatting of this note might be different from the original.  [employer] Altrua S606 DREL,  Serial #[id number]  5-13-11  Atrial lead [employer] 4054 Serial #[id number]  8-11-00  Ventriculer lead [employer] 4137  Serial #[id number]  5-13-11  2nd Degree AV Block   Dr. Campbell  Intrinsic:  Pt. is Pacemaker Dependant, remains paced at temp. rate of 30 bpm (6-13-14)       Hypertension with target blood pressure goal under 150/90 09/05/2006     Priority: High     Overview:   IMO Update       Essential hypertension 09/05/2006     Priority: High     Formatting of this note might be different from the original.  IMO Update       Fall, initial encounter 09/27/2022     Priority: Medium     Hematoma of chest wall, left, initial encounter 04/23/2022     Priority: Medium     Retinal artery branch occlusion 02/13/2021     Priority: Medium     Vision loss of right eye 02/12/2021     Priority: Medium     Closed compression fracture of body of L1 vertebra (H) 02/12/2021     Priority: Medium     Closed wedge compression fracture of lumbar vertebra with routine healing 12/20/2020     Priority: Medium     Compression fracture of L1 lumbar vertebra, closed, initial encounter (H) 12/18/2020     Priority: Medium     Compression fracture of thoracic vertebra, initial encounter, unspecified thoracic vertebral level 12/18/2020     Priority: Medium     Replacing diagnoses that were inactivated after the 10/1/2021 regulatory import.       Frequent falls 04/13/2020     Priority: Medium     Constipation, unspecified constipation type 04/11/2018     Priority: Medium     Urinary incontinence 08/31/2017     Priority: Medium     Autonomic orthostatic hypotension 10/14/2016     Priority: Medium     Volume depletion 08/02/2014      Priority: Medium     Stented coronary artery      Priority: Medium     REM sleep behavior disorder 2011     Priority: Medium     Osteoarthritis 2011     Priority: Medium     Problem list name updated by automated process. Provider to review       Diaphragmatic hernia 2011     Priority: Medium     Problem list name updated by automated process. Provider to review       Pain in joint, forearm 2008     Priority: Medium     Formatting of this note might be different from the original.  IMO Update 10/11       Pain in joint, ankle and foot 2008     Priority: Medium     Formatting of this note might be different from the original.  IMO Update 10/11       Dysphagia 2007     Priority: Medium     Overview:   IMO Update    Formatting of this note might be different from the original.  IMO Update       Coronary atherosclerosis of native coronary artery 2006     Priority: Medium     Overview:   IMO Update 10/    Formatting of this note might be different from the original.  IMO Update 10/       Postsurgical aortocoronary bypass status 2006     Priority: Medium     Overview:   IMO Update 10/11    Formatting of this note might be different from the original.  IMO Update 10/       Hyperlipidemia 2006     Priority: Medium     Formatting of this note might be different from the original.  IMO Update 10/       Nursing Home Visit 2022     Priority: Low          Social History     Socioeconomic History     Marital status: Single     Spouse name: Not on file     Number of children: Not on file     Years of education: Not on file     Highest education level: Not on file   Occupational History     Occupation: retired   Tobacco Use     Smoking status: Former     Packs/day: 1.00     Years: 5.00     Pack years: 5.00     Types: Cigarettes     Quit date: 1985     Years since quittin.0     Smokeless tobacco: Never     Tobacco comments:     quit in    Substance and  Sexual Activity     Alcohol use: Yes     Comment: social     Drug use: No     Sexual activity: Not Currently   Other Topics Concern      Service Not Asked     Blood Transfusions Yes     Caffeine Concern Yes     Comment: 1 cup coffee daily     Occupational Exposure Not Asked     Hobby Hazards Not Asked     Sleep Concern Not Asked     Stress Concern Not Asked     Weight Concern Not Asked     Special Diet Not Asked     Back Care Not Asked     Exercise Not Asked     Bike Helmet Not Asked     Seat Belt Not Asked     Self-Exams Not Asked     Parent/sibling w/ CABG, MI or angioplasty before 65F 55M? No   Social History Narrative     Not on file     Social Determinants of Health     Financial Resource Strain: Not on file   Food Insecurity: Not on file   Transportation Needs: Not on file   Physical Activity: Not on file   Stress: Not on file   Social Connections: Not on file   Intimate Partner Violence: Not on file   Housing Stability: Not on file        Current Outpatient Medications   Medication Sig     acetaminophen (TYLENOL) 500 MG tablet Take 1,000 mg by mouth 3 times daily     aspirin (ASA) 81 MG chewable tablet Take 81 mg by mouth daily     atorvastatin (LIPITOR) 40 MG tablet Take 40 mg by mouth every evening     calcium carbonate (OS-ROSALINDA) 1500 (600 Ca) MG tablet Take 600 mg by mouth daily     fludrocortisone (FLORINEF) 0.1 MG tablet Take 1 tablet (0.1 mg) by mouth every morning (before breakfast)     ibuprofen (ADVIL/MOTRIN) 400 MG tablet Take 400 mg by mouth 3 times daily as needed for moderate pain     magnesium hydroxide (MILK OF MAGNESIA) 400 MG/5ML suspension Take by mouth daily as needed for constipation or heartburn     melatonin 5 MG tablet Take 5 mg by mouth At Bedtime     memantine (NAMENDA) 5 MG tablet Take 5 mg by mouth 2 times daily     Menthol-Zinc Oxide (MOISTURE BARRIER EX) Apply topically 2 times daily to rash on intergluteal cleft, coccyx and rogelio area. Also may use as needed.     mirabegron  (MYRBETRIQ) 25 MG 24 hr tablet Take 25 mg by mouth daily     nystatin POWD Apply topically 2 times daily     potassium chloride ER (KLOR-CON M) 20 MEQ CR tablet TAKE 1 TABLET BY MOUTH EVERY DAY     RABEprazole (ACIPHEX) 20 MG EC tablet Take 20 mg by mouth 2 times daily     senna-docusate (SENOKOT-S/PERICOLACE) 8.6-50 MG tablet Take 1 tablet by mouth daily     sertraline (ZOLOFT) 50 MG tablet Take 50 mg by mouth every evening     sodium chloride 1 GM tablet TAKE 1 TABLET BY MOUTH TWICE A DAY     vitamin D3 (CHOLECALCIFEROL) 50 mcg (2000 units) tablet Take 1 tablet by mouth daily     oseltamivir (TAMIFLU) 75 MG capsule Take 75 mg by mouth daily Through 12-17-22     No current facility-administered medications for this visit.       Allergies   Allergen Reactions     Cats Unknown     Haldol [Haloperidol]      Per SNF reporting     Klonopin [Clonazepam]      Per SNF reporting     Lamotrigine      Skin lesions     Oxycodone      Per SNF reporting       I have reviewed the care plan and do agree with the plan.      ROS:  No chest pain, shortness of breath, fever, chills, headache, nausea, vomiting, dysuria, or changes in bowel habits.  Appetite is poor.  Low back pain noted.          OBJECTIVE:  BP (!) 182/92   Pulse (!) 121   Resp 22   Wt 69.1 kg (152 lb 6.4 oz)   SpO2 96%   BMI 22.51 kg/m      GENERAL: Healthy, alert and no distress  EYES: Eyes grossly normal to inspection.  No discharge or erythema, or obvious scleral/conjunctival abnormalities.  RESP: No audible wheeze, cough, or visible cyanosis.  No visible retractions or increased work of breathing.    SKIN: Visible skin clear. No significant rash, abnormal pigmentation or lesions.  NEURO: Cranial nerves grossly intact.  Mentation impaired. Speech appropriate for age.  PSYCH: Mentation shows impaired cognition affect normal/bright, judgement and insight intact, normal speech and appearance well-groomed.        Lab/Diagnostic data:     Reviewed    ASSESSMENT/ORDERS:  Nursing Home Visit  Discussed with staff. Care plan reviewed and orders updated.  Chronic issues are stable. Routine 30 day Nursing Home follow up.     Fall  No sequelae.  Reviewed with nursing.  Will follow.    Closed compression fracture of L1 lumbar vertebra with routine healing, subsequent encounter  Stable    Lewy body dementia without behavioral disturbance (H)  Stable    Parkinson disease (H)  Stable    Coronary artery disease involving native coronary artery of native heart without angina pectoris  No recurrent symptoms    Hypertension with target blood pressure goal under 150/90  Recent readings reviewed.  Continue same medication regimen        Total time spent with patient visit was 25 min including patient visit, review of pertinent clinical information, and treatment plan.      Abran Whitman MD FAAFP [facility]  Geriatrics  Hospice and Palliative Care

## 2023-01-24 ENCOUNTER — ANCILLARY PROCEDURE (OUTPATIENT)
Dept: CARDIOLOGY | Facility: CLINIC | Age: 88
End: 2023-01-24
Attending: INTERNAL MEDICINE
Payer: MEDICARE

## 2023-01-24 DIAGNOSIS — I44.2 COMPLETE HEART BLOCK (H): ICD-10-CM

## 2023-01-24 LAB
MDC_IDC_LEAD_IMPLANT_DT: NORMAL
MDC_IDC_LEAD_IMPLANT_DT: NORMAL
MDC_IDC_LEAD_LOCATION: NORMAL
MDC_IDC_LEAD_LOCATION: NORMAL
MDC_IDC_LEAD_LOCATION_DETAIL_1: NORMAL
MDC_IDC_LEAD_LOCATION_DETAIL_1: NORMAL
MDC_IDC_LEAD_MFG: NORMAL
MDC_IDC_LEAD_MFG: NORMAL
MDC_IDC_LEAD_MODEL: NORMAL
MDC_IDC_LEAD_MODEL: NORMAL
MDC_IDC_LEAD_POLARITY_TYPE: NORMAL
MDC_IDC_LEAD_POLARITY_TYPE: NORMAL
MDC_IDC_LEAD_SERIAL: NORMAL
MDC_IDC_LEAD_SERIAL: NORMAL
MDC_IDC_MSMT_BATTERY_DTM: NORMAL
MDC_IDC_MSMT_BATTERY_REMAINING_LONGEVITY: 66 MO
MDC_IDC_MSMT_BATTERY_RRT_TRIGGER: 2.62
MDC_IDC_MSMT_BATTERY_STATUS: NORMAL
MDC_IDC_MSMT_BATTERY_VOLTAGE: 2.97 V
MDC_IDC_MSMT_LEADCHNL_RA_IMPEDANCE_VALUE: 418 OHM
MDC_IDC_MSMT_LEADCHNL_RA_IMPEDANCE_VALUE: 532 OHM
MDC_IDC_MSMT_LEADCHNL_RA_PACING_THRESHOLD_AMPLITUDE: 1.25 V
MDC_IDC_MSMT_LEADCHNL_RA_PACING_THRESHOLD_PULSEWIDTH: 0.4 MS
MDC_IDC_MSMT_LEADCHNL_RA_SENSING_INTR_AMPL: 1.9 MV
MDC_IDC_MSMT_LEADCHNL_RV_IMPEDANCE_VALUE: 361 OHM
MDC_IDC_MSMT_LEADCHNL_RV_IMPEDANCE_VALUE: 551 OHM
MDC_IDC_MSMT_LEADCHNL_RV_PACING_THRESHOLD_AMPLITUDE: 0.88 V
MDC_IDC_MSMT_LEADCHNL_RV_PACING_THRESHOLD_PULSEWIDTH: 0.4 MS
MDC_IDC_MSMT_LEADCHNL_RV_SENSING_INTR_AMPL: 4.8 MV
MDC_IDC_PG_IMPLANT_DTM: NORMAL
MDC_IDC_PG_MFG: NORMAL
MDC_IDC_PG_MODEL: NORMAL
MDC_IDC_PG_SERIAL: NORMAL
MDC_IDC_PG_TYPE: NORMAL
MDC_IDC_SESS_CLINIC_NAME: NORMAL
MDC_IDC_SESS_DTM: NORMAL
MDC_IDC_SESS_TYPE: NORMAL
MDC_IDC_SET_BRADY_AT_MODE_SWITCH_RATE: 171 {BEATS}/MIN
MDC_IDC_SET_BRADY_HYSTRATE: NORMAL
MDC_IDC_SET_BRADY_LOWRATE: 70 {BEATS}/MIN
MDC_IDC_SET_BRADY_MAX_SENSOR_RATE: 130 {BEATS}/MIN
MDC_IDC_SET_BRADY_MAX_TRACKING_RATE: 130 {BEATS}/MIN
MDC_IDC_SET_BRADY_MODE: NORMAL
MDC_IDC_SET_BRADY_PAV_DELAY_LOW: 180 MS
MDC_IDC_SET_BRADY_SAV_DELAY_LOW: 150 MS
MDC_IDC_SET_LEADCHNL_RA_PACING_AMPLITUDE: 2.5 V
MDC_IDC_SET_LEADCHNL_RA_PACING_ANODE_ELECTRODE_1: NORMAL
MDC_IDC_SET_LEADCHNL_RA_PACING_ANODE_LOCATION_1: NORMAL
MDC_IDC_SET_LEADCHNL_RA_PACING_CAPTURE_MODE: NORMAL
MDC_IDC_SET_LEADCHNL_RA_PACING_CATHODE_ELECTRODE_1: NORMAL
MDC_IDC_SET_LEADCHNL_RA_PACING_CATHODE_LOCATION_1: NORMAL
MDC_IDC_SET_LEADCHNL_RA_PACING_POLARITY: NORMAL
MDC_IDC_SET_LEADCHNL_RA_PACING_PULSEWIDTH: 0.4 MS
MDC_IDC_SET_LEADCHNL_RA_SENSING_ANODE_ELECTRODE_1: NORMAL
MDC_IDC_SET_LEADCHNL_RA_SENSING_ANODE_LOCATION_1: NORMAL
MDC_IDC_SET_LEADCHNL_RA_SENSING_CATHODE_ELECTRODE_1: NORMAL
MDC_IDC_SET_LEADCHNL_RA_SENSING_CATHODE_LOCATION_1: NORMAL
MDC_IDC_SET_LEADCHNL_RA_SENSING_POLARITY: NORMAL
MDC_IDC_SET_LEADCHNL_RA_SENSING_SENSITIVITY: 0.9 MV
MDC_IDC_SET_LEADCHNL_RV_PACING_AMPLITUDE: 2 V
MDC_IDC_SET_LEADCHNL_RV_PACING_ANODE_ELECTRODE_1: NORMAL
MDC_IDC_SET_LEADCHNL_RV_PACING_ANODE_LOCATION_1: NORMAL
MDC_IDC_SET_LEADCHNL_RV_PACING_CAPTURE_MODE: NORMAL
MDC_IDC_SET_LEADCHNL_RV_PACING_CATHODE_ELECTRODE_1: NORMAL
MDC_IDC_SET_LEADCHNL_RV_PACING_CATHODE_LOCATION_1: NORMAL
MDC_IDC_SET_LEADCHNL_RV_PACING_POLARITY: NORMAL
MDC_IDC_SET_LEADCHNL_RV_PACING_PULSEWIDTH: 0.4 MS
MDC_IDC_SET_LEADCHNL_RV_SENSING_ANODE_ELECTRODE_1: NORMAL
MDC_IDC_SET_LEADCHNL_RV_SENSING_ANODE_LOCATION_1: NORMAL
MDC_IDC_SET_LEADCHNL_RV_SENSING_CATHODE_ELECTRODE_1: NORMAL
MDC_IDC_SET_LEADCHNL_RV_SENSING_CATHODE_LOCATION_1: NORMAL
MDC_IDC_SET_LEADCHNL_RV_SENSING_POLARITY: NORMAL
MDC_IDC_SET_LEADCHNL_RV_SENSING_SENSITIVITY: 0.9 MV
MDC_IDC_SET_ZONE_DETECTION_INTERVAL: 350 MS
MDC_IDC_SET_ZONE_DETECTION_INTERVAL: 400 MS
MDC_IDC_SET_ZONE_TYPE: NORMAL
MDC_IDC_STAT_AT_BURDEN_PERCENT: 0 %
MDC_IDC_STAT_AT_DTM_END: NORMAL
MDC_IDC_STAT_AT_DTM_START: NORMAL
MDC_IDC_STAT_BRADY_AP_VP_PERCENT: 98.99 %
MDC_IDC_STAT_BRADY_AP_VS_PERCENT: 0 %
MDC_IDC_STAT_BRADY_AS_VP_PERCENT: 0.98 %
MDC_IDC_STAT_BRADY_AS_VS_PERCENT: 0.03 %
MDC_IDC_STAT_BRADY_DTM_END: NORMAL
MDC_IDC_STAT_BRADY_DTM_START: NORMAL
MDC_IDC_STAT_BRADY_RA_PERCENT_PACED: 98.98 %
MDC_IDC_STAT_BRADY_RV_PERCENT_PACED: 99.97 %
MDC_IDC_STAT_EPISODE_RECENT_COUNT: 0
MDC_IDC_STAT_EPISODE_RECENT_COUNT_DTM_END: NORMAL
MDC_IDC_STAT_EPISODE_RECENT_COUNT_DTM_START: NORMAL
MDC_IDC_STAT_EPISODE_TOTAL_COUNT: 0
MDC_IDC_STAT_EPISODE_TOTAL_COUNT: 1192
MDC_IDC_STAT_EPISODE_TOTAL_COUNT: 3
MDC_IDC_STAT_EPISODE_TOTAL_COUNT_DTM_END: NORMAL
MDC_IDC_STAT_EPISODE_TOTAL_COUNT_DTM_START: NORMAL
MDC_IDC_STAT_EPISODE_TYPE: NORMAL

## 2023-01-24 PROCEDURE — 93294 REM INTERROG EVL PM/LDLS PM: CPT | Performed by: INTERNAL MEDICINE

## 2023-01-24 PROCEDURE — 93296 REM INTERROG EVL PM/IDS: CPT

## 2023-01-25 ENCOUNTER — INFUSION THERAPY VISIT (OUTPATIENT)
Dept: INFUSION THERAPY | Facility: OTHER | Age: 88
DRG: 091 | End: 2023-01-25
Attending: INTERNAL MEDICINE
Payer: MEDICARE

## 2023-01-25 VITALS
HEART RATE: 75 BPM | TEMPERATURE: 98 F | SYSTOLIC BLOOD PRESSURE: 95 MMHG | OXYGEN SATURATION: 97 % | DIASTOLIC BLOOD PRESSURE: 50 MMHG | RESPIRATION RATE: 20 BRPM

## 2023-01-25 DIAGNOSIS — E86.9 VOLUME DEPLETION: Primary | ICD-10-CM

## 2023-01-25 PROCEDURE — 96360 HYDRATION IV INFUSION INIT: CPT

## 2023-01-25 PROCEDURE — 2894A PR VOIDCORRECT: CPT | Performed by: FAMILY MEDICINE

## 2023-01-25 RX ORDER — HEPARIN SODIUM (PORCINE) LOCK FLUSH IV SOLN 100 UNIT/ML 100 UNIT/ML
5 SOLUTION INTRAVENOUS ONCE
Status: CANCELLED
Start: 2023-01-25 | End: 2023-01-25

## 2023-01-25 RX ORDER — HEPARIN SODIUM (PORCINE) LOCK FLUSH IV SOLN 100 UNIT/ML 100 UNIT/ML
5 SOLUTION INTRAVENOUS ONCE
Status: COMPLETED | OUTPATIENT
Start: 2023-01-25 | End: 2023-01-25

## 2023-01-25 RX ADMIN — Medication 1000 ML: at 09:42

## 2023-01-25 RX ADMIN — HEPARIN SODIUM (PORCINE) LOCK FLUSH IV SOLN 100 UNIT/ML 5 ML: 100 SOLUTION at 10:46

## 2023-01-25 NOTE — PROGRESS NOTES
Patient is 89 years old, here today for infusion of normal saline per order of Dr Whitman.      Patient identified with two identifiers, order verified, and verbal consent for today's infusion obtained from patient.      Lab values:  n/a      Patient meets order parameters for today's treatment.     .    Patients port accessed using non-coring, 19 gauge, 3/4 needle. Port accessed per facility protocol. Port flushed easily, blood return noted.  No signs and symptoms of infection or infiltration.      IV pump verified with dose, drug, and rate of administration.  Infusion administered per protocol.  Patient tolerated infusion well, no signs or symptoms of adverse reaction noted.  Patient denies pain nor discomfort.     IV removed, catheter intact.  Site clean, dry and intact.  No signs or symptoms of infiltration or infection.  Covered with a sterile bandage, slight pressure applied for 30 seconds.  Pt instructed to leave bandage intact for a minimum of one hour, and to call with questions or concerns. Patient states understanding, discharged.

## 2023-01-28 ENCOUNTER — HOSPITAL ENCOUNTER (INPATIENT)
Facility: HOSPITAL | Age: 88
LOS: 5 days | Discharge: NURSING, REHAB OR LONG-TERM CARE | DRG: 091 | End: 2023-02-02
Attending: PHYSICIAN ASSISTANT | Admitting: INTERNAL MEDICINE
Payer: MEDICARE

## 2023-01-28 ENCOUNTER — APPOINTMENT (OUTPATIENT)
Dept: CT IMAGING | Facility: HOSPITAL | Age: 88
DRG: 091 | End: 2023-01-28
Attending: PHYSICIAN ASSISTANT
Payer: MEDICARE

## 2023-01-28 DIAGNOSIS — N39.0 UTI (URINARY TRACT INFECTION): ICD-10-CM

## 2023-01-28 DIAGNOSIS — R41.82 ALTERED MENTAL STATUS: ICD-10-CM

## 2023-01-28 DIAGNOSIS — N39.0 URINARY TRACT INFECTION WITHOUT HEMATURIA, SITE UNSPECIFIED: ICD-10-CM

## 2023-01-28 DIAGNOSIS — R33.8 ACUTE RETENTION OF URINE: ICD-10-CM

## 2023-01-28 DIAGNOSIS — F02.80 LEWY BODY DEMENTIA WITHOUT BEHAVIORAL DISTURBANCE (H): Primary | ICD-10-CM

## 2023-01-28 DIAGNOSIS — G31.83 LEWY BODY DEMENTIA WITHOUT BEHAVIORAL DISTURBANCE (H): Primary | ICD-10-CM

## 2023-01-28 DIAGNOSIS — T50.905A MEDICATION REACTION, INITIAL ENCOUNTER: ICD-10-CM

## 2023-01-28 LAB
ALBUMIN SERPL BCG-MCNC: 3.6 G/DL (ref 3.5–5.2)
ALBUMIN UR-MCNC: NEGATIVE MG/DL
ALP SERPL-CCNC: 56 U/L (ref 40–129)
ALT SERPL W P-5'-P-CCNC: 11 U/L (ref 10–50)
ANION GAP SERPL CALCULATED.3IONS-SCNC: 9 MMOL/L (ref 7–15)
APPEARANCE UR: ABNORMAL
AST SERPL W P-5'-P-CCNC: 16 U/L (ref 10–50)
BACTERIA #/AREA URNS HPF: ABNORMAL /HPF
BASOPHILS # BLD AUTO: 0 10E3/UL (ref 0–0.2)
BASOPHILS NFR BLD AUTO: 1 %
BILIRUB SERPL-MCNC: 0.5 MG/DL
BILIRUB UR QL STRIP: NEGATIVE
BUN SERPL-MCNC: 11.8 MG/DL (ref 8–23)
CALCIUM SERPL-MCNC: 8.9 MG/DL (ref 8.8–10.2)
CHLORIDE SERPL-SCNC: 104 MMOL/L (ref 98–107)
COLOR UR AUTO: ABNORMAL
CREAT SERPL-MCNC: 0.99 MG/DL (ref 0.67–1.17)
CRP SERPL-MCNC: <3 MG/L
DEPRECATED HCO3 PLAS-SCNC: 27 MMOL/L (ref 22–29)
EOSINOPHIL # BLD AUTO: 0.3 10E3/UL (ref 0–0.7)
EOSINOPHIL NFR BLD AUTO: 5 %
ERYTHROCYTE [DISTWIDTH] IN BLOOD BY AUTOMATED COUNT: 11.8 % (ref 10–15)
FLUAV RNA SPEC QL NAA+PROBE: NEGATIVE
FLUBV RNA RESP QL NAA+PROBE: NEGATIVE
GFR SERPL CREATININE-BSD FRML MDRD: 73 ML/MIN/1.73M2
GLUCOSE BLDC GLUCOMTR-MCNC: 101 MG/DL (ref 70–99)
GLUCOSE SERPL-MCNC: 106 MG/DL (ref 70–99)
GLUCOSE UR STRIP-MCNC: NEGATIVE MG/DL
HCT VFR BLD AUTO: 38 % (ref 40–53)
HGB BLD-MCNC: 12.7 G/DL (ref 13.3–17.7)
HGB UR QL STRIP: NEGATIVE
HYALINE CASTS: 1 /LPF
IMM GRANULOCYTES # BLD: 0 10E3/UL
IMM GRANULOCYTES NFR BLD: 0 %
INR PPP: 1.32 (ref 0.85–1.15)
KETONES UR STRIP-MCNC: NEGATIVE MG/DL
LACTATE SERPL-SCNC: 1.3 MMOL/L (ref 0.7–2)
LEUKOCYTE ESTERASE UR QL STRIP: ABNORMAL
LYMPHOCYTES # BLD AUTO: 1.3 10E3/UL (ref 0.8–5.3)
LYMPHOCYTES NFR BLD AUTO: 23 %
MAGNESIUM SERPL-MCNC: 2 MG/DL (ref 1.7–2.3)
MCH RBC QN AUTO: 31.6 PG (ref 26.5–33)
MCHC RBC AUTO-ENTMCNC: 33.4 G/DL (ref 31.5–36.5)
MCV RBC AUTO: 95 FL (ref 78–100)
MONOCYTES # BLD AUTO: 0.5 10E3/UL (ref 0–1.3)
MONOCYTES NFR BLD AUTO: 9 %
MUCOUS THREADS #/AREA URNS LPF: PRESENT /LPF
NEUTROPHILS # BLD AUTO: 3.5 10E3/UL (ref 1.6–8.3)
NEUTROPHILS NFR BLD AUTO: 62 %
NITRATE UR QL: NEGATIVE
NRBC # BLD AUTO: 0 10E3/UL
NRBC BLD AUTO-RTO: 0 /100
NT-PROBNP SERPL-MCNC: 2612 PG/ML (ref 0–1800)
PH UR STRIP: 7 [PH] (ref 4.7–8)
PLATELET # BLD AUTO: 187 10E3/UL (ref 150–450)
POTASSIUM SERPL-SCNC: 4 MMOL/L (ref 3.4–5.3)
PROT SERPL-MCNC: 6.1 G/DL (ref 6.4–8.3)
RBC # BLD AUTO: 4.02 10E6/UL (ref 4.4–5.9)
RBC URINE: 1 /HPF
RSV RNA SPEC NAA+PROBE: NEGATIVE
SARS-COV-2 RNA RESP QL NAA+PROBE: NEGATIVE
SODIUM SERPL-SCNC: 140 MMOL/L (ref 136–145)
SP GR UR STRIP: 1.01 (ref 1–1.03)
SQUAMOUS EPITHELIAL: 0 /HPF
TROPONIN T SERPL HS-MCNC: 16 NG/L
TROPONIN T SERPL HS-MCNC: 18 NG/L
UROBILINOGEN UR STRIP-MCNC: NORMAL MG/DL
WBC # BLD AUTO: 5.6 10E3/UL (ref 4–11)
WBC URINE: 3 /HPF

## 2023-01-28 PROCEDURE — 87086 URINE CULTURE/COLONY COUNT: CPT | Performed by: PHYSICIAN ASSISTANT

## 2023-01-28 PROCEDURE — 85610 PROTHROMBIN TIME: CPT | Performed by: PHYSICIAN ASSISTANT

## 2023-01-28 PROCEDURE — 96375 TX/PRO/DX INJ NEW DRUG ADDON: CPT

## 2023-01-28 PROCEDURE — 36415 COLL VENOUS BLD VENIPUNCTURE: CPT | Performed by: PHYSICIAN ASSISTANT

## 2023-01-28 PROCEDURE — 258N000003 HC RX IP 258 OP 636: Performed by: PHYSICIAN ASSISTANT

## 2023-01-28 PROCEDURE — 84484 ASSAY OF TROPONIN QUANT: CPT | Performed by: PHYSICIAN ASSISTANT

## 2023-01-28 PROCEDURE — 80053 COMPREHEN METABOLIC PANEL: CPT | Performed by: PHYSICIAN ASSISTANT

## 2023-01-28 PROCEDURE — 250N000013 HC RX MED GY IP 250 OP 250 PS 637: Performed by: PHYSICIAN ASSISTANT

## 2023-01-28 PROCEDURE — 83605 ASSAY OF LACTIC ACID: CPT | Performed by: PHYSICIAN ASSISTANT

## 2023-01-28 PROCEDURE — 99223 1ST HOSP IP/OBS HIGH 75: CPT | Mod: AI | Performed by: INTERNAL MEDICINE

## 2023-01-28 PROCEDURE — 85025 COMPLETE CBC W/AUTO DIFF WBC: CPT | Performed by: PHYSICIAN ASSISTANT

## 2023-01-28 PROCEDURE — 83735 ASSAY OF MAGNESIUM: CPT | Performed by: PHYSICIAN ASSISTANT

## 2023-01-28 PROCEDURE — 93010 ELECTROCARDIOGRAM REPORT: CPT | Performed by: INTERNAL MEDICINE

## 2023-01-28 PROCEDURE — G1010 CDSM STANSON: HCPCS

## 2023-01-28 PROCEDURE — 258N000003 HC RX IP 258 OP 636: Performed by: INTERNAL MEDICINE

## 2023-01-28 PROCEDURE — 250N000011 HC RX IP 250 OP 636: Performed by: INTERNAL MEDICINE

## 2023-01-28 PROCEDURE — 81001 URINALYSIS AUTO W/SCOPE: CPT | Performed by: PHYSICIAN ASSISTANT

## 2023-01-28 PROCEDURE — 82962 GLUCOSE BLOOD TEST: CPT

## 2023-01-28 PROCEDURE — 250N000011 HC RX IP 250 OP 636: Performed by: PHYSICIAN ASSISTANT

## 2023-01-28 PROCEDURE — 86140 C-REACTIVE PROTEIN: CPT | Performed by: PHYSICIAN ASSISTANT

## 2023-01-28 PROCEDURE — 83880 ASSAY OF NATRIURETIC PEPTIDE: CPT | Performed by: PHYSICIAN ASSISTANT

## 2023-01-28 PROCEDURE — 87637 SARSCOV2&INF A&B&RSV AMP PRB: CPT | Performed by: PHYSICIAN ASSISTANT

## 2023-01-28 PROCEDURE — C9803 HOPD COVID-19 SPEC COLLECT: HCPCS

## 2023-01-28 PROCEDURE — 96366 THER/PROPH/DIAG IV INF ADDON: CPT

## 2023-01-28 PROCEDURE — 93005 ELECTROCARDIOGRAM TRACING: CPT

## 2023-01-28 PROCEDURE — 96365 THER/PROPH/DIAG IV INF INIT: CPT

## 2023-01-28 PROCEDURE — 99285 EMERGENCY DEPT VISIT HI MDM: CPT | Performed by: PHYSICIAN ASSISTANT

## 2023-01-28 PROCEDURE — 99285 EMERGENCY DEPT VISIT HI MDM: CPT | Mod: 25

## 2023-01-28 PROCEDURE — 120N000001 HC R&B MED SURG/OB

## 2023-01-28 RX ORDER — SODIUM CHLORIDE 9 MG/ML
INJECTION, SOLUTION INTRAVENOUS CONTINUOUS
Status: DISCONTINUED | OUTPATIENT
Start: 2023-01-28 | End: 2023-01-28

## 2023-01-28 RX ORDER — MIRABEGRON 25 MG/1
25 TABLET, FILM COATED, EXTENDED RELEASE ORAL DAILY
Status: DISCONTINUED | OUTPATIENT
Start: 2023-01-29 | End: 2023-01-28

## 2023-01-28 RX ORDER — AMOXICILLIN 250 MG
1 CAPSULE ORAL DAILY
Status: DISCONTINUED | OUTPATIENT
Start: 2023-01-29 | End: 2023-02-02 | Stop reason: HOSPADM

## 2023-01-28 RX ORDER — ENOXAPARIN SODIUM 100 MG/ML
40 INJECTION SUBCUTANEOUS EVERY 24 HOURS
Status: DISCONTINUED | OUTPATIENT
Start: 2023-01-28 | End: 2023-02-02 | Stop reason: HOSPADM

## 2023-01-28 RX ORDER — PANTOPRAZOLE SODIUM 40 MG/1
40 TABLET, DELAYED RELEASE ORAL
Status: DISCONTINUED | OUTPATIENT
Start: 2023-01-29 | End: 2023-02-02 | Stop reason: HOSPADM

## 2023-01-28 RX ORDER — LEVETIRACETAM 5 MG/ML
500 INJECTION INTRAVASCULAR ONCE
Status: COMPLETED | OUTPATIENT
Start: 2023-01-28 | End: 2023-01-28

## 2023-01-28 RX ORDER — CEFTRIAXONE SODIUM 2 G/50ML
2 INJECTION, SOLUTION INTRAVENOUS EVERY 24 HOURS
Status: DISCONTINUED | OUTPATIENT
Start: 2023-01-29 | End: 2023-02-01

## 2023-01-28 RX ORDER — ACETAMINOPHEN 650 MG/1
650 SUPPOSITORY RECTAL EVERY 6 HOURS PRN
Status: DISCONTINUED | OUTPATIENT
Start: 2023-01-28 | End: 2023-02-02 | Stop reason: HOSPADM

## 2023-01-28 RX ORDER — MEMANTINE HYDROCHLORIDE 5 MG/1
5 TABLET ORAL ONCE
Status: COMPLETED | OUTPATIENT
Start: 2023-01-28 | End: 2023-01-28

## 2023-01-28 RX ORDER — LORAZEPAM 2 MG/ML
0.5 INJECTION INTRAMUSCULAR ONCE
Status: COMPLETED | OUTPATIENT
Start: 2023-01-28 | End: 2023-01-28

## 2023-01-28 RX ORDER — POTASSIUM CHLORIDE 1500 MG/1
20 TABLET, EXTENDED RELEASE ORAL DAILY
Status: DISCONTINUED | OUTPATIENT
Start: 2023-01-29 | End: 2023-02-02 | Stop reason: HOSPADM

## 2023-01-28 RX ORDER — VITAMIN B COMPLEX
50 TABLET ORAL DAILY
Status: DISCONTINUED | OUTPATIENT
Start: 2023-01-29 | End: 2023-02-02 | Stop reason: HOSPADM

## 2023-01-28 RX ORDER — FLUDROCORTISONE ACETATE 0.1 MG/1
0.1 TABLET ORAL
Status: DISCONTINUED | OUTPATIENT
Start: 2023-01-29 | End: 2023-02-02 | Stop reason: HOSPADM

## 2023-01-28 RX ORDER — ACETAMINOPHEN 325 MG/1
650 TABLET ORAL EVERY 6 HOURS PRN
Status: DISCONTINUED | OUTPATIENT
Start: 2023-01-28 | End: 2023-02-02 | Stop reason: HOSPADM

## 2023-01-28 RX ORDER — ONDANSETRON 2 MG/ML
4 INJECTION INTRAMUSCULAR; INTRAVENOUS EVERY 6 HOURS PRN
Status: DISCONTINUED | OUTPATIENT
Start: 2023-01-28 | End: 2023-01-29

## 2023-01-28 RX ORDER — MEMANTINE HYDROCHLORIDE 5 MG/1
5 TABLET ORAL 2 TIMES DAILY
Status: DISCONTINUED | OUTPATIENT
Start: 2023-01-29 | End: 2023-01-28

## 2023-01-28 RX ORDER — ONDANSETRON 4 MG/1
4 TABLET, ORALLY DISINTEGRATING ORAL EVERY 6 HOURS PRN
Status: DISCONTINUED | OUTPATIENT
Start: 2023-01-28 | End: 2023-01-29

## 2023-01-28 RX ORDER — LIDOCAINE 40 MG/G
CREAM TOPICAL
Status: DISCONTINUED | OUTPATIENT
Start: 2023-01-28 | End: 2023-02-02 | Stop reason: HOSPADM

## 2023-01-28 RX ORDER — ACETAMINOPHEN 325 MG/1
1000 TABLET ORAL 3 TIMES DAILY
Status: DISCONTINUED | OUTPATIENT
Start: 2023-01-28 | End: 2023-02-02 | Stop reason: HOSPADM

## 2023-01-28 RX ORDER — LIDOCAINE 40 MG/G
CREAM TOPICAL
Status: DISCONTINUED | OUTPATIENT
Start: 2023-01-28 | End: 2023-01-28

## 2023-01-28 RX ORDER — SODIUM CHLORIDE 9 MG/ML
INJECTION, SOLUTION INTRAVENOUS CONTINUOUS
Status: DISCONTINUED | OUTPATIENT
Start: 2023-01-28 | End: 2023-01-31

## 2023-01-28 RX ORDER — FUROSEMIDE 10 MG/ML
40 INJECTION INTRAMUSCULAR; INTRAVENOUS ONCE
Status: COMPLETED | OUTPATIENT
Start: 2023-01-28 | End: 2023-01-28

## 2023-01-28 RX ORDER — CEFTRIAXONE SODIUM 2 G/50ML
2 INJECTION, SOLUTION INTRAVENOUS ONCE
Status: COMPLETED | OUTPATIENT
Start: 2023-01-28 | End: 2023-01-28

## 2023-01-28 RX ORDER — OLANZAPINE 10 MG/2ML
5 INJECTION, POWDER, FOR SOLUTION INTRAMUSCULAR DAILY PRN
Status: DISCONTINUED | OUTPATIENT
Start: 2023-01-28 | End: 2023-02-02 | Stop reason: HOSPADM

## 2023-01-28 RX ORDER — SODIUM CHLORIDE 1 G/1
1 TABLET ORAL 2 TIMES DAILY
Status: DISCONTINUED | OUTPATIENT
Start: 2023-01-28 | End: 2023-02-02 | Stop reason: HOSPADM

## 2023-01-28 RX ORDER — LORAZEPAM 2 MG/ML
1 INJECTION INTRAMUSCULAR ONCE
Status: COMPLETED | OUTPATIENT
Start: 2023-01-28 | End: 2023-01-28

## 2023-01-28 RX ADMIN — CEFTRIAXONE SODIUM 2 G: 2 INJECTION, SOLUTION INTRAVENOUS at 21:24

## 2023-01-28 RX ADMIN — SODIUM CHLORIDE: 9 INJECTION, SOLUTION INTRAVENOUS at 22:32

## 2023-01-28 RX ADMIN — ENOXAPARIN SODIUM 40 MG: 40 INJECTION SUBCUTANEOUS at 23:16

## 2023-01-28 RX ADMIN — SODIUM CHLORIDE 1000 ML: 9 INJECTION, SOLUTION INTRAVENOUS at 18:37

## 2023-01-28 RX ADMIN — MEMANTINE 5 MG: 5 TABLET ORAL at 19:23

## 2023-01-28 RX ADMIN — LEVETIRACETAM 500 MG: 5 INJECTION INTRAVENOUS at 17:05

## 2023-01-28 RX ADMIN — FUROSEMIDE 40 MG: 10 INJECTION, SOLUTION INTRAVENOUS at 19:33

## 2023-01-28 RX ADMIN — SODIUM CHLORIDE: 9 INJECTION, SOLUTION INTRAVENOUS at 23:09

## 2023-01-28 RX ADMIN — LORAZEPAM 1 MG: 2 INJECTION INTRAMUSCULAR; INTRAVENOUS at 22:33

## 2023-01-28 RX ADMIN — LORAZEPAM 0.5 MG: 2 INJECTION INTRAMUSCULAR; INTRAVENOUS at 18:55

## 2023-01-28 ASSESSMENT — ACTIVITIES OF DAILY LIVING (ADL)
ADLS_ACUITY_SCORE: 55
ADLS_ACUITY_SCORE: 35

## 2023-01-28 ASSESSMENT — ENCOUNTER SYMPTOMS
SEIZURES: 1
TREMORS: 1
AGITATION: 1
CONFUSION: 1

## 2023-01-28 NOTE — ED PROVIDER NOTES
"  History     Chief Complaint   Patient presents with     Tremors     HPI  Jf Ortiz is a 89 year old male who resides at a local nursing facility ( [facility]).  Staff there report that he seemed to have \"seizure-like activity\".  Per the patient's wife 2 days ago at the started giving him atropine as well as Zoloft and since then he seems more confused and more altered than usual.  His Zoloft was increased from 50 mg to 100 mg.  Past medical history is noted that he has a seizure disorder, Lewy body dementia with behavioral disturbance, Parkinson's disease, cardiac pacemaker, coronary artery disease, dysphagia, hypertension, urinary incontinence, longstanding persistent atrial fibrillation for which she has his pacemaker, and hyperlipidemia.    Allergies:  Allergies   Allergen Reactions     Cats Unknown     Haldol [Haloperidol]      Per SNF reporting     Klonopin [Clonazepam]      Per SNF reporting     Lamotrigine      Skin lesions     Oxycodone      Per SNF reporting       Problem List:    Patient Active Problem List    Diagnosis Date Noted     Fall, initial encounter 09/27/2022     Priority: Medium     Hematoma of chest wall, left, initial encounter 04/23/2022     Priority: Medium     Nursing Home Visit 03/03/2022     Priority: Medium     Retinal artery branch occlusion 02/13/2021     Priority: Medium     Vision loss of right eye 02/12/2021     Priority: Medium     Orthostatic hypotension dysautonomic syndrome 02/12/2021     Priority: Medium     Chronic atrial fibrillation (H) 02/12/2021     Priority: Medium     Closed compression fracture of body of L1 vertebra (H) 02/12/2021     Priority: Medium     Closed wedge compression fracture of lumbar vertebra with routine healing 12/20/2020     Priority: Medium     Compression fracture of L1 lumbar vertebra, closed, initial encounter (H) 12/18/2020     Priority: Medium     Compression fracture of thoracic vertebra, initial encounter, unspecified " thoracic vertebral level 12/18/2020     Priority: Medium     Replacing diagnoses that were inactivated after the 10/1/2021 regulatory import.       Longstanding persistent atrial fibrillation (H) 04/13/2020     Priority: Medium     Frequent falls 04/13/2020     Priority: Medium     Coronary artery disease involving native coronary artery without angina pectoris 04/13/2020     Priority: Medium     Constipation, unspecified constipation type 04/11/2018     Priority: Medium     Pacemaker, [employer], Dual Chamber - Dependent 10/06/2017     Priority: Medium     Lewy body dementia without behavioral disturbance (H) 08/31/2017     Priority: Medium     Urinary incontinence 08/31/2017     Priority: Medium     Autonomic orthostatic hypotension 10/14/2016     Priority: Medium     Dementia without behavioral disturbance (H) 07/28/2015     Priority: Medium     Diagnosis updated by automated process. Provider to review and confirm.       Parkinson disease (H)      Priority: Medium     Seizure disorder (H)      Priority: Medium     Volume depletion 08/02/2014     Priority: Medium     Stented coronary artery      Priority: Medium     Cardiac pacemaker in situ 01/08/2013     Priority: Medium     Overview:   [employer] S606 DREL,  Serial #[id number]  5-13-11  Atrial lead [employer] 4054 Serial #[id number]  8-11-00  Ventriculer lead [employer] 4137  Serial #[id number]  5-13-11  2nd Degree AV Block   Dr. Campbell  Intrinsic:  Pt. is Pacemaker Dependant, remains paced at temp. rate of 30 bpm (6-13-14)    Formatting of this note might be different from the original.  [employer] S606 DREL,  Serial #[id number]  5-13-11  Atrial lead [employer] 4054 Serial #[id number]  8-11-00  Ventriculer lead [employer] 4137  Serial #[id number]  5-13-11  2nd Degree AV Block   Dr. Campbell  Intrinsic:  Pt. is Pacemaker Dependant, remains paced at temp. 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 Provider to review       Diaphragmatic hernia 01/01/2011     Priority: Medium     Problem list name updated by automated process. Provider to review       Pain in joint, forearm 07/31/2008     Priority: Medium     Formatting of this note might be different from the original.  IMO Update 10/11       Pain in joint, ankle and foot 07/31/2008     Priority: Medium     Formatting of this note might be different from the original.  IMO Update 10/11       Dysphagia 05/22/2007     Priority: Medium     Overview:   IMO Update    Formatting of this note might be different from the original.  IMO Update       Coronary atherosclerosis of native coronary artery 11/28/2006     Priority: Medium     Overview:   IMO Update 10/11    Formatting of this note might be different from the original.  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  IMO Update 10/11       Hypertension with target blood pressure goal under 150/90 09/05/2006     Priority: Medium     Overview:   IMO Update       Hyperlipidemia 09/05/2006     Priority: Medium     Formatting of this note might be different from the original.  IMO Update 10/11       Essential hypertension 09/05/2006     Priority: Medium     Formatting of this note might be different from the original.  IMO Update          Past Medical History:    Past Medical History:   Diagnosis Date     Autonomic orthostatic hypotension 10/14/2016     Coronary artery disease      Dementia without behavioral disturbance (H) 7/28/2015     Diaphragmatic hernia without mention of obstruction or gangrene 1/1/2011     Hypercholesterolemia 4/23/2013     Osteoarthrosis, unspecified whether generalized or localized, unspecified site 1/1/2011     Other and  unspecified hyperlipidemia 1/1/2011     Pacemaker      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     Seizure disorder (H)      Stented coronary artery        Past Surgical History:    Past Surgical History:   Procedure Laterality Date     ------------OTHER-------------  1955    ulnar and radial fx - repair ulnar and radial fx x4     ------------OTHER-------------  6/14/2011    cataract extraction     BIOPSY  08/2015    skin biopsy     BLEPHAROPLASTY BILATERAL  5/6/2014    Procedure: BLEPHAROPLASTY BILATERAL;  Surgeon: Andrew Queen MD;  Location: [facility]     BYPASS GRAFT ARTERY CORONARY  11/2006    coronary artery disease x 5, [facility]     cataract extraction and lens implantation  2011    cataracts     cataract extraction and lens implantation      cataracts     COLONOSCOPY  2012     colonoscopy with polypectomy  3/13/2009    history of polyps - repeat 3 yrs     colonoscopy with polypectomy  2006     colonoscopy with polypectomy  2005     COMBINED COLONOSCOPY WITH ARGON PLASMA COAGULATOR (APC) N/A 10/31/2014    Procedure: COMBINED COLONOSCOPY WITH ARGON PLASMA COAGULATOR (APC);  Surgeon: Bassam Aguilar MD;  Location: [facility]     COMBINED COLONOSCOPY WITH ARGON PLASMA COAGULATOR (APC) N/A 11/13/2015    Procedure: COMBINED COLONOSCOPY WITH ARGON PLASMA COAGULATOR (APC);  Surgeon: Bassam Aguilar MD;  Location: [facility]     ENDOSCOPY UPPER, COLONOSCOPY, COMBINED N/A 10/31/2014    Procedure: COMBINED ENDOSCOPY UPPER, COLONOSCOPY;  Surgeon: Bassam Aguilar MD;  Location: [facility]     ENDOSCOPY UPPER, COLONOSCOPY, COMBINED N/A 11/13/2015    Procedure: COMBINED ENDOSCOPY UPPER, COLONOSCOPY;  Surgeon: Bassam Aguilar MD;  Location: [facility]     ESOPHAGOSCOPY, GASTROSCOPY, DUODENOSCOPY (EGD), COMBINED  1/22/2014    Procedure: COMBINED ESOPHAGOSCOPY, GASTROSCOPY, DUODENOSCOPY (EGD);  UPPER ENDOSCOPY(CARPENTER) W/ BIOPSIES;  Surgeon: Patricia Carpenter MD;  Location: [facility]     HERNIORRHAPHY INGUINAL Right 2/14/2018    Procedure: HERNIORRHAPHY  INGUINAL;  OPEN RIGHT INGUINAL HERNIA REPAIR with Mesh;  Surgeon: Virgilio Zaragoza DO;  Location: [facility]     INSERT PORT VASCULAR ACCESS Right 2019    Procedure: PORT PLACEMENT;  Surgeon: Lloyd Ram MD;  Location: [facility]     LARYNGOSCOPY WITH MICROSCOPE  2014    Procedure: LARYNGOSCOPY WITH MICROSCOPE;;  Surgeon: Chayo Duke MD;  Location: [facility]     pacemaker placement      heart block     pacemaker placement      dual-chamber     REMOVE TUBE, MYRINGOTOMY, COMBINED  2014    Procedure: COMBINED REMOVE TUBE, MYRINGOTOMY;  MICRODIRECT LARYNGOSCOPY WITH BIOPSY AND FROZEN SECTIONS removal of right ear tube and myringoplasty;  Surgeon: Chayo Duke MD;  Location: [facility]     REPLACE PACEMAKER GENERATOR N/A 2018    Procedure: REPLACE PACEMAKER GENERATOR;  Pacemaker generator change;  Surgeon: Alma Kaplan MD;  Location: [facility]     stent placement to LAD  2008     ventilation tube  2012    right in office       Family History:    Family History   Problem Relation Age of Onset     Diabetes Mother      Breast Cancer Daughter        Social History:  Marital Status:  Single [1]  Social History     Tobacco Use     Smoking status: Former     Packs/day: 1.00     Years: 5.00     Pack years: 5.00     Types: Cigarettes     Quit date: 1985     Years since quittin.0     Smokeless tobacco: Never     Tobacco comments:     quit in    Substance Use Topics     Alcohol use: Yes     Comment: social     Drug use: No        Medications:    acetaminophen (TYLENOL) 500 MG tablet  aspirin (ASA) 81 MG chewable tablet  atorvastatin (LIPITOR) 40 MG tablet  calcium carbonate (OS-ROSALINDA) 1500 (600 Ca) MG tablet  fludrocortisone (FLORINEF) 0.1 MG tablet  ibuprofen (ADVIL/MOTRIN) 400 MG tablet  magnesium hydroxide (MILK OF MAGNESIA) 400 MG/5ML suspension  melatonin 5 MG tablet  memantine (NAMENDA) 5 MG tablet  mirabegron (MYRBETRIQ) 25 MG 24 hr tablet  potassium chloride ER (KLOR-CON  M) 20 MEQ CR tablet  RABEprazole (ACIPHEX) 20 MG EC tablet  senna-docusate (SENOKOT-S/PERICOLACE) 8.6-50 MG tablet  sertraline (ZOLOFT) 50 MG tablet  sodium chloride 1 GM tablet  vitamin D3 (CHOLECALCIFEROL) 50 mcg (2000 units) tablet  Menthol-Zinc Oxide (MOISTURE BARRIER EX)  nystatin POWD          Review of Systems   Unable to perform ROS: Dementia   Neurological: Positive for tremors and seizures.   Psychiatric/Behavioral: Positive for agitation and confusion.       Physical Exam   BP: 109/73  Pulse: 92  Temp: 98.8  F (37.1  C)  Resp: 16  SpO2: 94 %      Physical Exam  Vitals and nursing note reviewed.   Constitutional:       General: He is not in acute distress.     Appearance: Normal appearance. He is not ill-appearing or toxic-appearing.   HENT:      Head: Normocephalic.      Nose: Nose normal.   Eyes:      General: No scleral icterus.     Extraocular Movements: Extraocular movements intact.   Neck:      Trachea: No tracheal deviation.   Cardiovascular:      Rate and Rhythm: Normal rate.   Pulmonary:      Effort: Pulmonary effort is normal. No respiratory distress.   Musculoskeletal:         General: Normal range of motion.      Cervical back: Normal range of motion.      Comments: Occasional spastic motions with his extremities.   Skin:     General: Skin is warm and dry.      Coloration: Skin is not jaundiced or pale.   Neurological:      Mental Status: He is alert.      Comments: Patient does not respond to questions.     Psychiatric:         Attention and Perception: Attention normal.      Comments: Patient with altered mental status.  At times of.  Answer questions appropriately at other times.  Confused.         ED Course     EKG shows undetermined rhythm.  Left axis deviation.  Nonspecific intraventricular block.  Most likely a left bundle branch block, which she showed on previous EKGs.  Lateral and inferior infarct, age undetermined heart rate is 107 this tracing is essentially unchanged from previous  "EKG on 5/18/2022      Results for orders placed or performed during the hospital encounter of 01/28/23 (from the past 24 hour(s))   Glucose by meter   Result Value Ref Range    GLUCOSE BY METER POCT 101 (H) 70 - 99 mg/dL     *Note: Due to a large number of results and/or encounters for the requested time period, some results have not been displayed. A complete set of results can be found in Results Review.       Medications   lidocaine 1 % 0.1-1 mL (has no administration in time range)   lidocaine (LMX4) cream (has no administration in time range)   sodium chloride (PF) 0.9% PF flush 3 mL (has no administration in time range)   sodium chloride (PF) 0.9% PF flush 3 mL (has no administration in time range)   levETIRAcetam (KEPPRA) intermittent infusion 500 mg (has no administration in time range)       Assessments & Plan (with Medical Decision Making)     I have reviewed the nursing notes.    I have reviewed the findings, diagnosis, plan and need for follow up with the patient.       ED to Inpatient Handoff:    Discussed with Dr. Allen at [facility]  Patient accepted for Inpatient Stay  Pending studies include UA  Code Status: DNR/DNI                 Current Discharge Medication List          Final diagnoses:   Altered mental status   UTI (urinary tract infection)   Medication reaction, initial encounter   Jf Ortiz is a 89 year old male who resides at a local nursing facility ( [facility]).  Staff there report that he seemed to have \"seizure-like activity\".  Per the patient's wife 2 days ago at the started giving him atropine as well as Zoloft and since then he seems more confused and more altered than usual.  His Zoloft was increased from 50 mg to 100 mg.  Past medical history is noted that he has a seizure disorder, Lewy body dementia with behavioral disturbance, Parkinson's disease, cardiac pacemaker, coronary artery disease, dysphagia, hypertension, urinary incontinence, longstanding " persistent atrial fibrillation for which she has his pacemaker, and hyperlipidemia.   Physical exam shows the patient to be afebrile.  He he appears to be altered mentally answering some questions appropriately but other times very confused.  He has some spastic motions.  IV was established and he was given fluids.  He is not currently on any antiseizure medication so he was given Keppra initially given his clonazepam allergy.  EKG shows undetermined rhythm.  Left axis deviation.  Nonspecific intraventricular block.  Most likely a left bundle branch block, which she showed on previous EKGs.  Lateral and inferior infarct, age undetermined heart rate is 107 this tracing is essentially unchanged from previous EKG on 5/18/2022.  Troponin is normal.  CBC shows normal white blood cells no left shift.  His hemoglobin is 12.7.  INR is 1.32.  Magnesium is 2.0.  CRP is normal.  CMP shows a glucose of 106 but otherwise unremarkable.  Lactic acid is normal.  CT of the patient's head shows no acute findings.  The patient after discussion with his wife was given Ativan as well and has helped somewhat with his spastic motions but he is still somewhat confused and altered mentally.  Patient's BNP returns elevated at 2612, normally he is in the thousand range.  He was given 40 mg of Lasix.  A long discussion with the patient's wife and she does not want extensive neurological evaluation.  The patient is DNR/DNI.  His UA returns and has moderate amount of leukocyte Estrace and few bacteria.  UC is pending.  She has elected to have him admitted for antibiotics and continued management.  She feels strongly that the increase in his medication is what has changed his mental status.  I reviewed this case with Dr. Allen and the patient will be admitted at this time for further medical management and evaluation.  He was started on Rocephin IV.  The patient's influenza, RSV and COVID tests are negative      1/28/2023   [facility]  [facility]     Aurelio Albrecht PA-C  01/28/23 [id number]

## 2023-01-29 PROBLEM — G31.83 LEWY BODY DEMENTIA WITHOUT BEHAVIORAL DISTURBANCE (H): Status: ACTIVE | Noted: 2017-08-31

## 2023-01-29 PROBLEM — I48.20 CHRONIC ATRIAL FIBRILLATION (H): Status: ACTIVE | Noted: 2021-02-12

## 2023-01-29 PROBLEM — F02.80 LEWY BODY DEMENTIA WITHOUT BEHAVIORAL DISTURBANCE (H): Status: ACTIVE | Noted: 2017-08-31

## 2023-01-29 LAB
ALBUMIN SERPL BCG-MCNC: 3.4 G/DL (ref 3.5–5.2)
ALP SERPL-CCNC: 56 U/L (ref 40–129)
ALT SERPL W P-5'-P-CCNC: 11 U/L (ref 10–50)
ANION GAP SERPL CALCULATED.3IONS-SCNC: 12 MMOL/L (ref 7–15)
AST SERPL W P-5'-P-CCNC: 21 U/L (ref 10–50)
BILIRUB DIRECT SERPL-MCNC: <0.2 MG/DL (ref 0–0.3)
BILIRUB SERPL-MCNC: 0.5 MG/DL
BUN SERPL-MCNC: 13.1 MG/DL (ref 8–23)
CALCIUM SERPL-MCNC: 8.7 MG/DL (ref 8.8–10.2)
CHLORIDE SERPL-SCNC: 108 MMOL/L (ref 98–107)
CREAT SERPL-MCNC: 1.66 MG/DL (ref 0.67–1.17)
DEPRECATED HCO3 PLAS-SCNC: 24 MMOL/L (ref 22–29)
ERYTHROCYTE [DISTWIDTH] IN BLOOD BY AUTOMATED COUNT: 11.9 % (ref 10–15)
GFR SERPL CREATININE-BSD FRML MDRD: 39 ML/MIN/1.73M2
GLUCOSE BLDC GLUCOMTR-MCNC: 119 MG/DL (ref 70–99)
GLUCOSE SERPL-MCNC: 121 MG/DL (ref 70–99)
HCT VFR BLD AUTO: 37.4 % (ref 40–53)
HGB BLD-MCNC: 12.5 G/DL (ref 13.3–17.7)
MCH RBC QN AUTO: 31.5 PG (ref 26.5–33)
MCHC RBC AUTO-ENTMCNC: 33.4 G/DL (ref 31.5–36.5)
MCV RBC AUTO: 94 FL (ref 78–100)
PHOSPHATE SERPL-MCNC: 3.7 MG/DL (ref 2.5–4.5)
PLATELET # BLD AUTO: 183 10E3/UL (ref 150–450)
POTASSIUM SERPL-SCNC: 4 MMOL/L (ref 3.4–5.3)
PROT SERPL-MCNC: 6 G/DL (ref 6.4–8.3)
RBC # BLD AUTO: 3.97 10E6/UL (ref 4.4–5.9)
SODIUM SERPL-SCNC: 144 MMOL/L (ref 136–145)
WBC # BLD AUTO: 8.9 10E3/UL (ref 4–11)

## 2023-01-29 PROCEDURE — 36415 COLL VENOUS BLD VENIPUNCTURE: CPT | Performed by: INTERNAL MEDICINE

## 2023-01-29 PROCEDURE — 85027 COMPLETE CBC AUTOMATED: CPT | Performed by: INTERNAL MEDICINE

## 2023-01-29 PROCEDURE — 99222 1ST HOSP IP/OBS MODERATE 55: CPT | Mod: 95 | Performed by: PSYCHIATRY & NEUROLOGY

## 2023-01-29 PROCEDURE — 258N000003 HC RX IP 258 OP 636: Performed by: INTERNAL MEDICINE

## 2023-01-29 PROCEDURE — 250N000013 HC RX MED GY IP 250 OP 250 PS 637: Performed by: INTERNAL MEDICINE

## 2023-01-29 PROCEDURE — 99233 SBSQ HOSP IP/OBS HIGH 50: CPT | Performed by: NURSE PRACTITIONER

## 2023-01-29 PROCEDURE — 250N000011 HC RX IP 250 OP 636: Performed by: INTERNAL MEDICINE

## 2023-01-29 PROCEDURE — 258N000003 HC RX IP 258 OP 636: Performed by: NURSE PRACTITIONER

## 2023-01-29 PROCEDURE — 84100 ASSAY OF PHOSPHORUS: CPT | Performed by: INTERNAL MEDICINE

## 2023-01-29 PROCEDURE — 82248 BILIRUBIN DIRECT: CPT | Performed by: INTERNAL MEDICINE

## 2023-01-29 PROCEDURE — 120N000001 HC R&B MED SURG/OB

## 2023-01-29 RX ORDER — LORAZEPAM 2 MG/ML
1 INJECTION INTRAMUSCULAR ONCE
Status: COMPLETED | OUTPATIENT
Start: 2023-01-29 | End: 2023-01-29

## 2023-01-29 RX ADMIN — ACETAMINOPHEN 975 MG: 325 TABLET, FILM COATED ORAL at 11:51

## 2023-01-29 RX ADMIN — ACETAMINOPHEN 975 MG: 325 TABLET, FILM COATED ORAL at 20:25

## 2023-01-29 RX ADMIN — SODIUM CHLORIDE TAB 1 GM 1 G: 1 TAB at 16:34

## 2023-01-29 RX ADMIN — ENOXAPARIN SODIUM 40 MG: 40 INJECTION SUBCUTANEOUS at 20:25

## 2023-01-29 RX ADMIN — ANORECTAL OINTMENT: 15.7; .44; 24; 20.6 OINTMENT TOPICAL at 20:24

## 2023-01-29 RX ADMIN — SODIUM CHLORIDE 500 ML: 9 INJECTION, SOLUTION INTRAVENOUS at 09:00

## 2023-01-29 RX ADMIN — Medication 50 MCG: at 11:51

## 2023-01-29 RX ADMIN — POTASSIUM CHLORIDE 20 MEQ: 1500 TABLET, EXTENDED RELEASE ORAL at 11:51

## 2023-01-29 RX ADMIN — SODIUM CHLORIDE TAB 1 GM 1 G: 1 TAB at 20:25

## 2023-01-29 RX ADMIN — ACETAMINOPHEN 975 MG: 325 TABLET, FILM COATED ORAL at 16:45

## 2023-01-29 RX ADMIN — ANORECTAL OINTMENT: 15.7; .44; 24; 20.6 OINTMENT TOPICAL at 12:04

## 2023-01-29 RX ADMIN — SENNOSIDES AND DOCUSATE SODIUM 1 TABLET: 8.6; 5 TABLET ORAL at 11:51

## 2023-01-29 RX ADMIN — LORAZEPAM 1 MG: 2 INJECTION INTRAMUSCULAR at 00:52

## 2023-01-29 RX ADMIN — CEFTRIAXONE SODIUM 2 G: 2 INJECTION, SOLUTION INTRAVENOUS at 20:24

## 2023-01-29 RX ADMIN — SODIUM CHLORIDE: 9 INJECTION, SOLUTION INTRAVENOUS at 15:20

## 2023-01-29 RX ADMIN — MICONAZOLE NITRATE: 2 POWDER TOPICAL at 12:05

## 2023-01-29 RX ADMIN — MICONAZOLE NITRATE: 2 POWDER TOPICAL at 20:24

## 2023-01-29 ASSESSMENT — ACTIVITIES OF DAILY LIVING (ADL)
ADLS_ACUITY_SCORE: 63
ADLS_ACUITY_SCORE: 55
ADLS_ACUITY_SCORE: 67
ADLS_ACUITY_SCORE: 67
ADLS_ACUITY_SCORE: 63
ADLS_ACUITY_SCORE: 55
ADLS_ACUITY_SCORE: 63
ADLS_ACUITY_SCORE: 67
ADLS_ACUITY_SCORE: 55
ADLS_ACUITY_SCORE: 55
ADLS_ACUITY_SCORE: 67
ADLS_ACUITY_SCORE: 55

## 2023-01-29 NOTE — CONSULTS
[facility] PSYCHIATRY   CONSULT     ADMISSION DATA     Jf Ortiz MRN# [id number]   Age: 89 year old YOB: 1933     Date of Admission: 1/28/2023  Primary Physician: Abran Whitman        REASON FOR CONSULT     Assessment of altered mental status.       HISTORY OF PRESENT ILLNESS   Presented last night from nursing home. Concern for AMS. On examination, Jf is not quique to participate in psychiatric interview. He is laying in bed, sensorium impaired. He arouses slightly to voice by looking in general direction but then nods off.          PSYCHIATRIC HISTORY   Unclear. Hx of sertraline Rx.      SUBSTANCE USE HISTORY   History   Drug Use No       Social History    Substance and Sexual Activity      Alcohol use: Yes        Comment: social      History   Smoking Status     Former     Packs/day: 1.00     Years: 5.00     Types: Cigarettes     Quit date: 1/1/1985   Smokeless Tobacco     Never          SOCIAL HISTORY   Lives in nursing home. Family involved in care.      FAMILY HISTORY   Family History   Problem Relation Age of Onset     Diabetes Mother      Breast Cancer Daughter          PAST MEDICAL HISTORY   Past Medical History:   Diagnosis Date     Autonomic orthostatic hypotension 10/14/2016     Coronary artery disease      Dementia without behavioral disturbance (H) 7/28/2015    Diagnosis updated by automated process. Provider to review and confirm.     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     Seizure disorder (H)      Stented coronary artery        Past Surgical History:   Procedure Laterality Date     ------------OTHER-------------  1955    ulnar and radial fx - repair ulnar and radial fx x4     ------------OTHER-------------   6/14/2011    cataract extraction     BIOPSY  08/2015    skin biopsy     BLEPHAROPLASTY BILATERAL  5/6/2014    Procedure: BLEPHAROPLASTY BILATERAL;  Surgeon: Andrew Queen MD;  Location: [facility]     BYPASS GRAFT ARTERY CORONARY  11/2006    coronary artery disease x 5, [facility] [place]     cataract extraction and lens implantation  2011    cataracts     cataract extraction and lens implantation      cataracts     COLONOSCOPY  2012     colonoscopy with polypectomy  3/13/2009    history of polyps - repeat 3 yrs     colonoscopy with polypectomy  2006     colonoscopy with polypectomy  2005     COMBINED COLONOSCOPY WITH ARGON PLASMA COAGULATOR (APC) N/A 10/31/2014    Procedure: COMBINED COLONOSCOPY WITH ARGON PLASMA COAGULATOR (APC);  Surgeon: Bassam Aguilar MD;  Location: [facility]     COMBINED COLONOSCOPY WITH ARGON PLASMA COAGULATOR (APC) N/A 11/13/2015    Procedure: COMBINED COLONOSCOPY WITH ARGON PLASMA COAGULATOR (APC);  Surgeon: Bassam Aguilar MD;  Location: [facility]     ENDOSCOPY UPPER, COLONOSCOPY, COMBINED N/A 10/31/2014    Procedure: COMBINED ENDOSCOPY UPPER, COLONOSCOPY;  Surgeon: Bassam Aguilar MD;  Location: [facility]     ENDOSCOPY UPPER, COLONOSCOPY, COMBINED N/A 11/13/2015    Procedure: COMBINED ENDOSCOPY UPPER, COLONOSCOPY;  Surgeon: Bassam Aguilar MD;  Location: [facility]     ESOPHAGOSCOPY, GASTROSCOPY, DUODENOSCOPY (EGD), COMBINED  1/22/2014    Procedure: COMBINED ESOPHAGOSCOPY, GASTROSCOPY, DUODENOSCOPY (EGD);  UPPER ENDOSCOPY(PENA) W/ BIOPSIES;  Surgeon: Patricia Pena MD;  Location: [facility]     HERNIORRHAPHY INGUINAL Right 2/14/2018    Procedure: HERNIORRHAPHY INGUINAL;  OPEN RIGHT INGUINAL HERNIA REPAIR with Mesh;  Surgeon: Virgilio Zaragoza DO;  Location: [facility]     INSERT PORT VASCULAR ACCESS Right 7/12/2019    Procedure: PORT PLACEMENT;  Surgeon: Lloyd Ram MD;  Location: [facility]     LARYNGOSCOPY WITH MICROSCOPE  1/22/2014    Procedure: LARYNGOSCOPY WITH MICROSCOPE;;  Surgeon: Chayo Duke MD;   Location: [facility]     pacemaker placement  2000    heart block     pacemaker placement  2011    dual-chamber     REMOVE TUBE, MYRINGOTOMY, COMBINED  1/22/2014    Procedure: COMBINED REMOVE TUBE, MYRINGOTOMY;  MICRODIRECT LARYNGOSCOPY WITH BIOPSY AND FROZEN SECTIONS removal of right ear tube and myringoplasty;  Surgeon: Chayo Duke MD;  Location: [facility]     REPLACE PACEMAKER GENERATOR N/A 8/8/2018    Procedure: REPLACE PACEMAKER GENERATOR;  Pacemaker generator change;  Surgeon: Alma Kaplan MD;  Location: [facility] OR     stent placement to LAD  2008     ventilation tube  5/24/2012    right in office       Cats, Haldol [haloperidol], Klonopin [clonazepam], Lamotrigine, and Oxycodone     MEDICATIONS   No current outpatient medications on file.        PHYSICAL EXAM/ROS     I have reviewed the physical exam as documented by the medical team and agree with findings and assessment and have no additional findings to add at this time. The review of systems is negative other than noted in the HPI.       LABS   Recent Results (from the past 24 hour(s))   Glucose by meter    Collection Time: 01/28/23  4:27 PM   Result Value Ref Range    GLUCOSE BY METER POCT 101 (H) 70 - 99 mg/dL   INR    Collection Time: 01/28/23  4:57 PM   Result Value Ref Range    INR 1.32 (H) 0.85 - 1.15   Comprehensive metabolic panel    Collection Time: 01/28/23  4:57 PM   Result Value Ref Range    Sodium 140 136 - 145 mmol/L    Potassium 4.0 3.4 - 5.3 mmol/L    Chloride 104 98 - 107 mmol/L    Carbon Dioxide (CO2) 27 22 - 29 mmol/L    Anion Gap 9 7 - 15 mmol/L    Urea Nitrogen 11.8 8.0 - 23.0 mg/dL    Creatinine 0.99 0.67 - 1.17 mg/dL    Calcium 8.9 8.8 - 10.2 mg/dL    Glucose 106 (H) 70 - 99 mg/dL    Alkaline Phosphatase 56 40 - 129 U/L    AST 16 10 - 50 U/L    ALT 11 10 - 50 U/L    Protein Total 6.1 (L) 6.4 - 8.3 g/dL    Albumin 3.6 3.5 - 5.2 g/dL    Bilirubin Total 0.5 <=1.2 mg/dL    GFR Estimate 73 >60 mL/min/1.73m2   Magnesium    Collection Time:  01/28/23  4:57 PM   Result Value Ref Range    Magnesium 2.0 1.7 - 2.3 mg/dL   CRP inflammation    Collection Time: 01/28/23  4:57 PM   Result Value Ref Range    CRP Inflammation <3.00 <5.00 mg/L   Nt probnp inpatient (BNP)    Collection Time: 01/28/23  4:57 PM   Result Value Ref Range    N terminal Pro BNP Inpatient 2,612 (H) 0 - 1,800 pg/mL   Troponin T, High Sensitivity    Collection Time: 01/28/23  4:57 PM   Result Value Ref Range    Troponin T, High Sensitivity 16 <=22 ng/L   Lactic acid whole blood    Collection Time: 01/28/23  4:57 PM   Result Value Ref Range    Lactic Acid 1.3 0.7 - 2.0 mmol/L   CBC with platelets and differential    Collection Time: 01/28/23  4:57 PM   Result Value Ref Range    WBC Count 5.6 4.0 - 11.0 10e3/uL    RBC Count 4.02 (L) 4.40 - 5.90 10e6/uL    Hemoglobin 12.7 (L) 13.3 - 17.7 g/dL    Hematocrit 38.0 (L) 40.0 - 53.0 %    MCV 95 78 - 100 fL    MCH 31.6 26.5 - 33.0 pg    MCHC 33.4 31.5 - 36.5 g/dL    RDW 11.8 10.0 - 15.0 %    Platelet Count 187 150 - 450 10e3/uL    % Neutrophils 62 %    % Lymphocytes 23 %    % Monocytes 9 %    % Eosinophils 5 %    % Basophils 1 %    % Immature Granulocytes 0 %    NRBCs per 100 WBC 0 <1 /100    Absolute Neutrophils 3.5 1.6 - 8.3 10e3/uL    Absolute Lymphocytes 1.3 0.8 - 5.3 10e3/uL    Absolute Monocytes 0.5 0.0 - 1.3 10e3/uL    Absolute Eosinophils 0.3 0.0 - 0.7 10e3/uL    Absolute Basophils 0.0 0.0 - 0.2 10e3/uL    Absolute Immature Granulocytes 0.0 <=0.4 10e3/uL    Absolute NRBCs 0.0 10e3/uL   Asymptomatic Influenza A/B & SARS-CoV2 (COVID-19) Virus PCR Multiplex Nasopharyngeal    Collection Time: 01/28/23  6:32 PM    Specimen: Nasopharyngeal; Swab   Result Value Ref Range    Influenza A PCR Negative Negative    Influenza B PCR Negative Negative    RSV PCR Negative Negative    SARS CoV2 PCR Negative Negative   UA with Microscopic reflex to Culture    Collection Time: 01/28/23  8:05 PM    Specimen: Urine, Clean Catch   Result Value Ref Range    Color  Urine Light Yellow Colorless, Straw, Light Yellow, Yellow    Appearance Urine Slightly Cloudy (A) Clear    Glucose Urine Negative Negative mg/dL    Bilirubin Urine Negative Negative    Ketones Urine Negative Negative mg/dL    Specific Gravity Urine 1.012 1.003 - 1.035    Blood Urine Negative Negative    pH Urine 7.0 4.7 - 8.0    Protein Albumin Urine Negative Negative mg/dL    Urobilinogen Urine Normal Normal, 2.0 mg/dL    Nitrite Urine Negative Negative    Leukocyte Esterase Urine Large (A) Negative    Bacteria Urine Few (A) None Seen /HPF    Mucus Urine Present (A) None Seen /LPF    RBC Urine 1 <=2 /HPF    WBC Urine 3 <=5 /HPF    Squamous Epithelials Urine 0 <=1 /HPF    Hyaline Casts Urine 1 <=2 /LPF   Urine Culture    Collection Time: 01/28/23  8:05 PM    Specimen: Urine, Clean Catch   Result Value Ref Range    Culture Culture in progress    Troponin T, High Sensitivity    Collection Time: 01/28/23  8:29 PM   Result Value Ref Range    Troponin T, High Sensitivity 18 <=22 ng/L   Renal panel    Collection Time: 01/29/23  6:29 AM   Result Value Ref Range    Sodium 144 136 - 145 mmol/L    Potassium 4.0 3.4 - 5.3 mmol/L    Chloride 108 (H) 98 - 107 mmol/L    Carbon Dioxide (CO2) 24 22 - 29 mmol/L    Anion Gap 12 7 - 15 mmol/L    Glucose 121 (H) 70 - 99 mg/dL    Urea Nitrogen 13.1 8.0 - 23.0 mg/dL    Creatinine 1.66 (H) 0.67 - 1.17 mg/dL    GFR Estimate 39 (L) >60 mL/min/1.73m2    Calcium 8.7 (L) 8.8 - 10.2 mg/dL    Albumin 3.4 (L) 3.5 - 5.2 g/dL    Phosphorus 3.7 2.5 - 4.5 mg/dL   CBC with platelets    Collection Time: 01/29/23  6:29 AM   Result Value Ref Range    WBC Count 8.9 4.0 - 11.0 10e3/uL    RBC Count 3.97 (L) 4.40 - 5.90 10e6/uL    Hemoglobin 12.5 (L) 13.3 - 17.7 g/dL    Hematocrit 37.4 (L) 40.0 - 53.0 %    MCV 94 78 - 100 fL    MCH 31.5 26.5 - 33.0 pg    MCHC 33.4 31.5 - 36.5 g/dL    RDW 11.9 10.0 - 15.0 %    Platelet Count 183 150 - 450 10e3/uL   ALT    Collection Time: 01/29/23  6:29 AM   Result Value Ref  "Range    ALT 11 10 - 50 U/L   AST    Collection Time: 01/29/23  6:29 AM   Result Value Ref Range    AST 21 10 - 50 U/L   Alkaline phosphatase    Collection Time: 01/29/23  6:29 AM   Result Value Ref Range    Alkaline Phosphatase 56 40 - 129 U/L   Bilirubin direct    Collection Time: 01/29/23  6:29 AM   Result Value Ref Range    Bilirubin Direct <0.20 0.00 - 0.30 mg/dL   Bilirubin  total    Collection Time: 01/29/23  6:29 AM   Result Value Ref Range    Bilirubin Total 0.5 <=1.2 mg/dL   Protein total    Collection Time: 01/29/23  6:29 AM   Result Value Ref Range    Protein Total 6.0 (L) 6.4 - 8.3 g/dL   Glucose by meter    Collection Time: 01/29/23  8:40 AM   Result Value Ref Range    GLUCOSE BY METER POCT 119 (H) 70 - 99 mg/dL         MENTAL STATUS EXAM   Vitals: /82 (BP Location: Right arm, Patient Position: Left side, Cuff Size: Adult Regular)   Pulse 100   Temp 98.7  F (37.1  C) (Tympanic)   Resp 18   Ht 1.753 m (5' 9\")   Wt 75.9 kg (167 lb 5.3 oz)   SpO2 93%   BMI 24.71 kg/m    Jf is interviewed via video technology. He is laying propped up in bed, sleeping. Sensorium is impaired. He is not oriented to person, place, time or situation. He glances in the general direction of writer when attempting to speak but he falls back asleep.       ASSESSMENT     This is a 89 year old male, currently living in [facility]. Presents to hospital with altered mental status.  Found to have UTI on admission. Sertraline recently increased from 50 mg to 100 mg in outpatient setting. He is not alert and not oriented. Psychiatry was consulted to assess further. On examination, his sensorium is impaired. He is encephalopathic. Likely multifactorial in nature, but chief contributor being UTI.  He was receiving benzodiazepines with concerns for serotonin syndrome, however highly unlikely that subtle increase in sertraline from 50 mg to 100 mg caused this.  Reasonable to continue to hold sertraline and will " need to re-assess mood in the coming week once his delirium clears.  He may be having a continued decline in his cognitive function secondary to underlying lewy body.  At this point, recommendation is to continue to treat underlying contributors to encephalopathy, chiefly UTI.  Enact delirum precautions, including frequent re-orientations to date, time, place and situation, keep the blinds up and encourage patient to not nap during the day if possible.  Recommend checking his EKG for QTC and if below 500, would recommend adding Seroquel 12.5-25 mg q4 hours PRN for agitation (rather than bnz which can cause and prolong encephalopathy)       DIAGNOSES   1. Encephalopathy, likely 2/2 UTI  2. Urinary Tract Infection  3. Lewy Body Dementia   4. CAD     RECOMMENDATIONS     1. Medical management per primary team.  2. Recommend avoiding usage of benzodiazepines given propensity to cause delirium and exacerbate delirium  3. Okay to continue to hold sertraline (although noting low suspicion for serotonin syndrome)  4. Please obtain updated EKG to check QTC. If QTC is within reason (recommend <500), recommend utilization of Quetiapine 12.5 mg - 25 mg q4 hr PRN for agitation    Thank you for the consult. Psychiatry consult service will continue to follow. Please contact us with any questions or concerns.       ATTESTATION    Herman Marino MD  [facility]   Psychiatry    Video Visit: Patient has given verbal consent for video visit?: Yes  Type of Service: video visit for mental health treatment  Reason for Video Visit: COVID-19 [place]  Originating Site (patient location): [facility]  Distant Site (provider location): Remote Location  Mode of Communication: Video Conference via [employer]  Time of Service: Date: 01/29/2023 , Start: 08:45 end: 09:00

## 2023-01-29 NOTE — PROGRESS NOTES
"[facility]    Hospitalist Progress Note    Date of Service (when I saw the patient): 01/29/2023    Assessment & Plan       Altered mental status: Per family rather abrupt change, feeling that it is related to increased sertraline. Concern for \"seizure\" activity due to increased tremor-he does have known history of both parkinson's and has had prior seizure-though not on any chronic treatment for either. However, he is still quite tremulous with increased somnolence possibly due to acute metabolic encephalopathy from UTI in setting of underlying Lewy body dementia. Cannot rule out progression of Lewy body as well. Because his sertraline was recently increased psychiatry was consulted to rule out serotonin syndrome-please see their notes.       Possible Acute cystitis without hematuria: UA positive for leuk esterase and few bacteria, however he does not have a fever, no leukocytosis. Culture is pending, due to possibility of metabolic encephalopathy will continue IV antibiotics until culture complete.       Lewy body dementia without behavioral disturbance (H): Long standing and chronic. He is quite somnolent today, clinical dehydrated as well. As above his altered mental status may be just progression of his underlying disease.      Chronic atrial fibrillation (H): Rates well controlled, he is not on long term anticoagulation due to high fall risk.       Depression: He has been on sertraline for several years, dose was increased from 50mg to 100mg on 1/16/23, holding for now.        Essential hypertension: Stable      Parkinson disease (H)    Seizure disorder (H)              # Hypoalbuminemia: Lowest albumin = 3.4 g/dL at 1/29/2023  6:29 AM, will monitor as appropriate  # Coagulation Defect: INR = 1.32 (Ref range: 0.85 - 1.15) and/or PTT = N/A, will monitor for bleeding   # Acute Kidney Injury, unspecified: based on a >150% or 0.3 mg/dL increase in last creatinine compared to past 90 day average, will " monitor renal function    # Dementia: noted on problem list              DVT Prophylaxis: Enoxaparin (Lovenox) SQ  Code Status: No CPR- Do NOT Intubate    Disposition: Expected discharge in 1-3 days once mentation clears or source of encephalopathy determined.    Julia Watson CNP    Interval History   Sleeps unless aroused, then responds minimally to touch. Does not make any attempt at conversation or following commands.     -Data reviewed today: I reviewed all new labs and imaging results over the last 24 hours.     Physical Exam   Temp: 98.7  F (37.1  C) Temp src: Tympanic BP: 155/82 Pulse: 100   Resp: 18 SpO2: 93 % O2 Device: None (Room air)    Vitals:    01/28/23 2217   Weight: 75.9 kg (167 lb 5.3 oz)     Vital Signs with Ranges  Temp:  [98.7  F (37.1  C)-98.8  F (37.1  C)] 98.7  F (37.1  C)  Pulse:  [] 100  Resp:  [9-41] 18  BP: (109-188)/() 155/82  SpO2:  [92 %-96 %] 93 %  No intake/output data recorded.    Peripheral IV 01/28/23 Anterior;Left Upper forearm (Active)   Site Assessment WDL 01/29/23 0431   Line Status Infusing 01/29/23 0431   Phlebitis Scale 0-->no symptoms 01/29/23 0431   Infiltration Scale 0 01/29/23 0431   Number of days: 1       Port A Cath Single 07/12/19 Right Chest wall (Active)   Number of days: 1297       Wound Abdomen Contusion (Active)   Number of days: 281       Wound Thigh Contusion (Active)   Number of days: 281       Wound Coccyx Pressure injury community acquired (Active)   Number of days: 281     Line/device assessment(s) completed for medical necessity    Constitutional: Somnolent  Respiratory: Clear bilaterally with occasional upper airway rhonchi,   Cardiovascular: HRR, loud murmur present in all fields, no edema  GI: Soft,nontender, bowel sounds hypoactive  Skin/Integumen: Pale, very dry mucous membranes, tongue is cracked and bloody as are lips.   Other:      Medications     sodium chloride 100 mL/hr at 01/28/23 3905       acetaminophen  975 mg Oral TID      cefTRIAXone  2 g Intravenous Q24H     enoxaparin ANTICOAGULANT  40 mg Subcutaneous Q24H     fludrocortisone  0.1 mg Oral QAM AC     menthol-zinc oxide   Topical BID     miconazole   Topical BID     pantoprazole  40 mg Oral QAM AC     potassium chloride ER  20 mEq Oral Daily     senna-docusate  1 tablet Oral Daily     sodium chloride (PF)  3 mL Intracatheter Q8H     sodium chloride (PF)  3 mL Intracatheter Q8H     sodium chloride  1 g Oral BID     vitamin D3  50 mcg Oral Daily       Data   Recent Labs   Lab 01/29/23  0840 01/29/23  0629 01/28/23  1657   WBC  --  8.9 5.6   HGB  --  12.5* 12.7*   MCV  --  94 95   PLT  --  183 187   INR  --   --  1.32*   NA  --  144 140   POTASSIUM  --  4.0 4.0   CHLORIDE  --  108* 104   CO2  --  24 27   BUN  --  13.1 11.8   CR  --  1.66* 0.99   ANIONGAP  --  12 9   ROSALINDA  --  8.7* 8.9   * 121* 106*   ALBUMIN  --  3.4* 3.6   PROTTOTAL  --  6.0* 6.1*   BILITOTAL  --  0.5 0.5   ALKPHOS  --  56 56   ALT  --  11 11   AST  --  21 16       Recent Results (from the past 24 hour(s))   Head CT w/o contrast    Narrative    PROCEDURE: CT HEAD W/O CONTRAST     HISTORY: tremors, altered emntalcapacity.    COMPARISON: May 18, 2022    TECHNIQUE:  Helical images of the head from the foramen magnum to the  vertex were obtained without contrast.    FINDINGS: There is enlargement of the ventricular system and cortical  sulci consistent with atrophy. There are no masses or ventricular  shifts or extracerebral collections. Brainstem and cerebellum appear  normal.  The visualized paranasal sinuses are clear.      Impression    IMPRESSION: Atrophy. No acute brain abnormality      JACKY JAMES MD         SYSTEM ID:  [id number]

## 2023-01-29 NOTE — PLAN OF CARE
[facility] Note   HISTORY    Alban is a 24 month old male who comes in with a 3 day history of nasal drainage, loose cough and low grade fever.  He had a temperature of 100 on the first day of the illness.     He has been happy, playing, active.  Sleeping well and eating well.      No rashes.  Denies vomiting or diarrhea.      There have been covid cases at the .      He is not pulling at his ears.  Does not have any difficulty with breathing that they are noting.      Mom is concerned about abnormal appearing toenails and fingernails.  He has some defects, brittle looking nails that will get crevasses and then fall off.  He has had no injuries to the nails.  It is not all the nails but involves both fingers and toes.        There is no problem list on file for this patient.      No current outpatient medications on file.     No current facility-administered medications for this visit.       Allergies as of 12/09/2021   • (No Known Allergies)       PHYSICAL EXAM  Vitals: Pulse 98, temperature 97.8 °F (36.6 °C), temperature source Temporal, SpO2 100 %.  Well appearing, happy, he is congested, rare cough, clear nasal drainage noted, no apparent distress.  TM's and canals slight erythema, pink coloring bilaterally.  No bulging.  Canals normal.    Oropharynx with moderate erythema, no edema or exudate. No petechiae of the palate.  Nasal mucosa boggy, congested appearing.   Neck supple without lymphadenopathy.  Coronary exam reveals a regular rate and rhythm without murmur or extra sounds.   Lungs clear to auscultation without wheeze or crackles.   Skin is clear.  Hands and feet without rash.  Fingernails and toenails with peeling appearing, crevasses present.  See photo.  Some digits spared.       Strep is : negative  Covid antigen by ligia is negative.    ASSESSMENT/PLAN  Viral uri with cough, early OM    Mom prefers monitoring ears, symptoms.  Add nasal saline drops.  Humidified air.   Took over pt at about 0430.     Did find some dried blood in pt's mouth this AM. Swabbed inside to clean it up. Unsure of etiology of the dried blood in his mouth. No activity since I took over that would point to this happening. Unsure if pt bit tounge, mouth, lip, etc.     [facility] nurse this AM reported pt on a minced and moist diet with thin liquids at home. Second dysphagia screen pending - pt was sleeping away snoring this AM as I went in to do it - allowing pt to rest as he only started to sleep at about 4 AM last night per report.     NS running at 100 ml/hr.     Face to face report given with opportunity to observe patient.    Report given to CATE Whitley RN   1/29/2023  7:19 AM       Acetaminophen alternating with ibuprofen if needed.  If not improving or with worsening then follow up further.  Mom is offered pcr testing but preferred antigen testing, will quarantine based on symptoms.

## 2023-01-29 NOTE — PLAN OF CARE
"/87 (BP Location: Right arm, Patient Position: Left side, Cuff Size: Adult Regular)   Pulse 102   Temp 98.7  F (37.1  C) (Tympanic)   Resp 20   Ht 1.753 m (5' 9\")   Wt 75.9 kg (167 lb 5.3 oz)   SpO2 92%   BMI 24.71 kg/m      [place] [facility] Inpatient Admission Note:    Patient admitted to 3108/3108-1 at approximately 2217 via cart accompanied by transport tech and other:Grandson from emergency room . Report received from [facility] in SBAR format at 2135 via telephone. Patient transferred to bed via slide board.. Patient is alert and oriented X 0 - Disoriented x 4, denies pain; rates at 0 on 0-10 scale.  Patient oriented to room, unit, hourly rounding, and plan of care. Explained admission packet and patient handbook with patient bill of rights brochure. Will continue to monitor and document as needed.     Inpatient Nursing criteria listed below was met:    Health care directives status obtained and documented: Yes    Patient identifies a surrogate decision maker: Yes If yes, who:See face sheet Contact Information:See face sheet     If initial lactic acid greater than 2.0, repeat lactic acid drawn within one hour of arrival to unit: NA. If no, state reason: NA    Clergy visit ordered if patient requests: N/A    Skin issues/needs documented: Yes    Isolation Patient: no Education given, correct sign in place and documentation row added to PCS:  Yes    Fall Prevention Yes: Care plan updated, education given and documented, sticker and magnet in place: Yes    Care Plan initiated: Yes    Education Documented (including assessment): Yes    Patient has discharge needs : Unknown at this time  If yes, please explain: Unknown at this time     Admitted from ER this shift, from [facility]. VSS, afebrile and disoriented x4. Speech mostly garbled and incoherent, but will have a clear sentence every once in a while. Assessment as charted, remains on RA. Does attempt to get out of bed and thrashes but redirectable. See " MAR for meds given. IV running 100/HR NS in L AC. Incontinent of bladder this shift, barrier cream utilized on bottom. Remains on rocephin, last dose given before admission to the floor. Seizure pads in place, side rails raised x3, alarms utilized. Patient unable to swallow water for screening coughed up all fluids. Remains NPO except for ice chips. Port is not accessed. Updated grandson this shift.     Face to face report given with opportunity to observe patient.    Report given to Alana Aguayo RN   1/29/2023  4:16 AM

## 2023-01-29 NOTE — ED NOTES
Patient is admitting to 3108 with report given to Constanza ESPOSITO.  Patient is transporting by stretcher with ERT and seizure pads in place.  Patietn vitally stable at this time.

## 2023-01-29 NOTE — H&P
[facility]    History and Physical - Hospitalist Service       Date of Admission:  1/28/2023    Assessment & Plan      Jf Ortiz is a 89 year old male admitted on 1/28/2023.     #1  Altered mental status, restlessness  Patient's family reports that this started to occur soon after the dosage of sertraline was increased from 50 to 100 mg.  On exam, patient does have hyperreflexia and I am wondering if this represents serotonin syndrome.  Patient is not behaving like seizure.  ER provider spoke with the family and informed that patient is not appropriate to be hospitalized here for seizure work-up.  Patient's family requested the patient to be hospitalized here knowing that we cannot perform a neurological work-up.  No heroic measures or extensive work-up requested.  Currently, patient appears to be responding to benzodiazepine.  We will continue with benzodiazepine treatment for patient's restlessness and altered mental status assuming that this could be a mild case of serotonin syndrome.  We will continue to monitor patient's condition closely.  Of note, patient carries a diagnosis of seizure disorder, but he has not been on any antiepileptic medications.  Will also request psychiatry consult.    2.  UTI  Patient's urinalysis is consistent with a UTI.  I will continue ceftriaxone and follow urine culture.    3.  Lewy body dementia, depressive disorder  Continue with the memantine.  Psychiatry consult requested    #4 autonomic orthostatic hypotension  Will continue with fludrocortisone    #5 coronary artery disease, status post stent placement, atrial fibrillation  Will continue with PTA meds.  Patient is not on anticoagulation for atrial fibrillation due to his fall risk.         Diet: NPO for Medical/Clinical Reasons Except for: Ice Chips    DVT Prophylaxis: Enoxaparin (Lovenox) SQ  Spears Catheter: Not present  Lines: PRESENT             Cardiac Monitoring: None  Code Status: No CPR- Do NOT Intubate       Clinically Significant Risk Factors Present on Admission               # Coagulation Defect: INR = 1.32 (Ref range: 0.85 - 1.15) and/or PTT = N/A, will monitor for bleeding      # Dementia: noted on problem list            Disposition Plan      Expected Discharge Date: 01/30/2023                  Praful Allen MD  Hospitalist Service  [facility]  Securely message with [employer] (more info)  Text page via [facility] Paging/Directory     ______________________________________________________________________    Chief Complaint   Altered mental status    History is obtained from the electronic health record, emergency department physician and patient's family    History of Present Illness   Jf Ortiz is a 89 year old male with history of Lewy body dementia with behavioral disturbance, Parkinson's disease, dysphagia, urinary incontinence, REM sleep behavior disorder, orthostatic hypotension, coronary disease, status post coronary stent placement, chronic atrial fibrillation, status post permanent pacemaker placement, hyperlipidemia, who is a nursing home resident at [facility].  Patient's wife reported that his Zoloft dose was increased from 50 to 100 mg and atropine was given 2 days ago for unclear reason.  She noted that he seemed have become more confused and restless.  The nursing home staff noted patient having seizure-like activity and EMS was called and patient was brought to the emergency room.  In the emergency room, patient appeared confused and restless along with spastic motions in his extremities.  He was evaluated with a head CT, which showed no evidence of acute changes.  Urinalysis showed a large leukocyte esterase.  WBC was within normal limit, troponin was negative, BNP was mildly elevated 2612, CRP less than 3.  With the diagnosis of UTI, patient was started on ceftriaxone.  For possible seizure, Keppra 500 mg IV was given as well as 0.5 mg of lorazepam.  Patient is not being admitted to  the [facility] for further care.      Past Medical History    Past Medical History:   Diagnosis Date     Autonomic orthostatic hypotension 10/14/2016     Coronary artery disease      Dementia without behavioral disturbance (H) 7/28/2015    Diagnosis updated by automated process. Provider to review and confirm.     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     Seizure disorder (H)      Stented coronary artery        Past Surgical History   Past Surgical History:   Procedure Laterality Date     ------------OTHER-------------  1955    ulnar and radial fx - repair ulnar and radial fx x4     ------------OTHER-------------  6/14/2011    cataract extraction     BIOPSY  08/2015    skin biopsy     BLEPHAROPLASTY BILATERAL  5/6/2014    Procedure: BLEPHAROPLASTY BILATERAL;  Surgeon: Andrew Queen MD;  Location: [facility] OR     BYPASS GRAFT ARTERY CORONARY  11/2006    coronary artery disease x 5, [facility]     cataract extraction and lens implantation  2011    cataracts     cataract extraction and lens implantation      cataracts     COLONOSCOPY  2012     colonoscopy with polypectomy  3/13/2009    history of polyps - repeat 3 yrs     colonoscopy with polypectomy  2006     colonoscopy with polypectomy  2005     COMBINED COLONOSCOPY WITH ARGON PLASMA COAGULATOR (APC) N/A 10/31/2014    Procedure: COMBINED COLONOSCOPY WITH ARGON PLASMA COAGULATOR (APC);  Surgeon: Bassam Aguilar MD;  Location: [facility] OR     COMBINED COLONOSCOPY WITH ARGON PLASMA COAGULATOR (APC) N/A 11/13/2015    Procedure: COMBINED COLONOSCOPY WITH ARGON PLASMA COAGULATOR (APC);  Surgeon: Bassam Aguilar MD;  Location: [facility] OR     ENDOSCOPY UPPER, COLONOSCOPY, COMBINED N/A 10/31/2014    Procedure: COMBINED  ENDOSCOPY UPPER, COLONOSCOPY;  Surgeon: Bassam Aguilar MD;  Location: [facility]     ENDOSCOPY UPPER, COLONOSCOPY, COMBINED N/A 11/13/2015    Procedure: COMBINED ENDOSCOPY UPPER, COLONOSCOPY;  Surgeon: Bassam Aguilar MD;  Location: [facility]     ESOPHAGOSCOPY, GASTROSCOPY, DUODENOSCOPY (EGD), COMBINED  1/22/2014    Procedure: COMBINED ESOPHAGOSCOPY, GASTROSCOPY, DUODENOSCOPY (EGD);  UPPER ENDOSCOPY(PENA) W/ BIOPSIES;  Surgeon: Patricia Pena MD;  Location: [facility] OR     HERNIORRHAPHY INGUINAL Right 2/14/2018    Procedure: HERNIORRHAPHY INGUINAL;  OPEN RIGHT INGUINAL HERNIA REPAIR with Mesh;  Surgeon: Virgilio Zaragoza DO;  Location: [facility] OR     INSERT PORT VASCULAR ACCESS Right 7/12/2019    Procedure: PORT PLACEMENT;  Surgeon: Lloyd Ram MD;  Location: [facility]     LARYNGOSCOPY WITH MICROSCOPE  1/22/2014    Procedure: LARYNGOSCOPY WITH MICROSCOPE;;  Surgeon: Chayo Duke MD;  Location: [facility]     pacemaker placement  2000    heart block     pacemaker placement  2011    dual-chamber     REMOVE TUBE, MYRINGOTOMY, COMBINED  1/22/2014    Procedure: COMBINED REMOVE TUBE, MYRINGOTOMY;  MICRODIRECT LARYNGOSCOPY WITH BIOPSY AND FROZEN SECTIONS removal of right ear tube and myringoplasty;  Surgeon: Chayo Duke MD;  Location: [facility]     REPLACE PACEMAKER GENERATOR N/A 8/8/2018    Procedure: REPLACE PACEMAKER GENERATOR;  Pacemaker generator change;  Surgeon: Alma Kaplan MD;  Location: [facility] OR     stent placement to LAD  2008     ventilation tube  5/24/2012    right in office       Prior to Admission Medications   Prior to Admission Medications   Prescriptions Last Dose Informant Patient Reported? Taking?   Menthol-Zinc Oxide (MOISTURE BARRIER EX) 1/27/2023  Yes Yes   Sig: Apply topically 2 times daily to rash on intergluteal cleft, coccyx and rogelio area. Also may use as needed.   RABEprazole (ACIPHEX) 20 MG EC tablet Unknown Spouse/Significant Other Yes Yes   Sig: Take 20 mg by mouth 2 times daily   acetaminophen  (TYLENOL) 500 MG tablet 1/28/2023  Yes Yes   Sig: Take 1,000 mg by mouth 3 times daily   aspirin (ASA) 81 MG chewable tablet Unknown  Yes Yes   Sig: Take 81 mg by mouth daily   atorvastatin (LIPITOR) 40 MG tablet Unknown  Yes Yes   Sig: Take 40 mg by mouth every evening   calcium carbonate (OS-ROSALINDA) 1500 (600 Ca) MG tablet 1/28/2023  Yes Yes   Sig: Take 600 mg by mouth daily   fludrocortisone (FLORINEF) 0.1 MG tablet Unknown  No Yes   Sig: Take 1 tablet (0.1 mg) by mouth every morning (before breakfast)   ibuprofen (ADVIL/MOTRIN) 400 MG tablet 1/28/2023  Yes Yes   Sig: Take 400 mg by mouth 3 times daily as needed for moderate pain   magnesium hydroxide (MILK OF MAGNESIA) 400 MG/5ML suspension 1/13/2023  Yes Yes   Sig: Take by mouth daily as needed for constipation or heartburn   melatonin 5 MG tablet 1/27/2023  Yes Yes   Sig: Take 5 mg by mouth At Bedtime   memantine (NAMENDA) 5 MG tablet Unknown  Yes Yes   Sig: Take 5 mg by mouth 2 times daily   mirabegron (MYRBETRIQ) 25 MG 24 hr tablet 1/28/2023  Yes Yes   Sig: Take 25 mg by mouth daily   nystatin POWD 1/27/2023  Yes Yes   Sig: Apply topically 2 times daily   potassium chloride ER (KLOR-CON M) 20 MEQ CR tablet 1/28/2023  No Yes   Sig: TAKE 1 TABLET BY MOUTH EVERY DAY   senna-docusate (SENOKOT-S/PERICOLACE) 8.6-50 MG tablet 1/28/2023  Yes Yes   Sig: Take 1 tablet by mouth daily   sertraline (ZOLOFT) 50 MG tablet 1/27/2023  Yes Yes   Sig: Take 100 mg by mouth every evening   sodium chloride 1 GM tablet 1/28/2023  No Yes   Sig: TAKE 1 TABLET BY MOUTH TWICE A DAY   vitamin D3 (CHOLECALCIFEROL) 50 mcg (2000 units) tablet 1/28/2023  Yes Yes   Sig: Take 1 tablet by mouth daily      Facility-Administered Medications: None        Review of Systems    The 10 point Review of Systems is negative other than noted in the HPI or here.      Social History   I have reviewed this patient's social history and updated it with pertinent information if needed.  Social History      Tobacco Use     Smoking status: Former     Packs/day: 1.00     Years: 5.00     Pack years: 5.00     Types: Cigarettes     Quit date: 1985     Years since quittin.1     Smokeless tobacco: Never     Tobacco comments:     quit in    Substance Use Topics     Alcohol use: Yes     Comment: social     Drug use: No       Family History   I have reviewed this patient's family history and updated it with pertinent information if needed.  Family History   Problem Relation Age of Onset     Diabetes Mother      Breast Cancer Daughter        Allergies   Allergies   Allergen Reactions     Cats Unknown     Haldol [Haloperidol]      Per SNF reporting     Klonopin [Clonazepam]      Per SNF reporting     Lamotrigine      Skin lesions     Oxycodone      Per SNF reporting        Physical Exam   Vital Signs: Temp: 98.7  F (37.1  C) Temp src: Tympanic BP: 140/87 Pulse: 102   Resp: 20 SpO2: 92 % O2 Device: None (Room air)    Weight: 167 lbs 5.27 oz    General Appearance: Lying in bed in moderate distress, patient is very restless constant movements  Eyes: Appears to have intact ocular movements  HEENT: Clear oropharynx  Respiratory: Clear to auscultation bilaterally  Cardiovascular: S1-S2, irregularly irregular rhythm, normal rate, systolic murmur 3/6 loudest at the right upper sternal border.  No gallops appreciated  GI: Soft, nontender, nondistended  Lymph/Hematologic: No palpable lymph nodes  Genitourinary: Deferred  Skin: Intact  Musculoskeletal: No edema  Neurologic: Alert but confused and not answering my verbal questions, motor examination showed spontaneous movements in all 4 extremities, sensory examination was not done due to patient's mental status, deep tendon reflexes were 3+ at the elbow bilaterally otherwise 2+ throughout    Medical Decision Making       80 MINUTES SPENT BY ME on the date of service doing chart review, history, exam, documentation & further activities per the note.      Data     I have  personally reviewed the following data over the past 24 hrs:    5.6  \   12.7 (L)   / 187     140 104 11.8 /  106 (H)   4.0 27 0.99 \       ALT: 11 AST: 16 AP: 56 TBILI: 0.5   ALB: 3.6 TOT PROTEIN: 6.1 (L) LIPASE: N/A       Trop: 18 BNP: 2,612 (H)       Procal: N/A CRP: <3.00 Lactic Acid: 1.3       INR:  1.32 (H) PTT:  N/A   D-dimer:  N/A Fibrinogen:  N/A       Imaging results reviewed over the past 24 hrs:   Recent Results (from the past 24 hour(s))   Head CT w/o contrast    Narrative    PROCEDURE: CT HEAD W/O CONTRAST     HISTORY: tremors, altered emntalcapacity.    COMPARISON: May 18, 2022    TECHNIQUE:  Helical images of the head from the foramen magnum to the  vertex were obtained without contrast.    FINDINGS: There is enlargement of the ventricular system and cortical  sulci consistent with atrophy. There are no masses or ventricular  shifts or extracerebral collections. Brainstem and cerebellum appear  normal.  The visualized paranasal sinuses are clear.      Impression    IMPRESSION: Atrophy. No acute brain abnormality      JACKY JAMES MD         SYSTEM ID:  [id number]

## 2023-01-29 NOTE — PHARMACY-MEDICATION REGIMEN REVIEW
Pharmacy Antimicrobial Stewardship Assessment     Current Antimicrobial Therapy:  Anti-infectives (From now, onward)    Start     Dose/Rate Route Frequency Ordered Stop    23  cefTRIAXone IN D5W (ROCEPHIN) intermittent infusion 2 g         2 g  100 mL/hr over 30 Minutes Intravenous EVERY 24 HOURS 23 0089            Indication: UTI    Days of Therapy: 2     Pertinent Labs:  Recent Labs   Lab Test 23  0629 23  1657   WBC 8.9 5.6     Recent Labs   Lab Test 23  1657 LACT 1.3 CRP  --  SED  --  PCAL  --   < > = values in this interval not displayed.        Temperature:  Temp (24hrs), Av.8  F (37.1  C), Min:98.7  F (37.1  C), Max:98.8  F (37.1  C)    Culture Results:   (23) Urine  (23) COVID/Influenza/RSV = negative    Recommendations/Interventions:  1. None    Karthik Cain, [facility]  2023

## 2023-01-30 ENCOUNTER — APPOINTMENT (OUTPATIENT)
Dept: SPEECH THERAPY | Facility: HOSPITAL | Age: 88
DRG: 091 | End: 2023-01-30
Attending: INTERNAL MEDICINE
Payer: MEDICARE

## 2023-01-30 ENCOUNTER — APPOINTMENT (OUTPATIENT)
Dept: PHYSICAL THERAPY | Facility: HOSPITAL | Age: 88
DRG: 091 | End: 2023-01-30
Attending: INTERNAL MEDICINE
Payer: MEDICARE

## 2023-01-30 ENCOUNTER — APPOINTMENT (OUTPATIENT)
Dept: OCCUPATIONAL THERAPY | Facility: HOSPITAL | Age: 88
DRG: 091 | End: 2023-01-30
Attending: INTERNAL MEDICINE
Payer: MEDICARE

## 2023-01-30 LAB
ANION GAP SERPL CALCULATED.3IONS-SCNC: 12 MMOL/L (ref 7–15)
BACTERIA UR CULT: NORMAL
BUN SERPL-MCNC: 12.8 MG/DL (ref 8–23)
CALCIUM SERPL-MCNC: 8.3 MG/DL (ref 8.8–10.2)
CHLORIDE SERPL-SCNC: 112 MMOL/L (ref 98–107)
CREAT SERPL-MCNC: 1.56 MG/DL (ref 0.67–1.17)
DEPRECATED HCO3 PLAS-SCNC: 21 MMOL/L (ref 22–29)
ERYTHROCYTE [DISTWIDTH] IN BLOOD BY AUTOMATED COUNT: 12.2 % (ref 10–15)
GFR SERPL CREATININE-BSD FRML MDRD: 42 ML/MIN/1.73M2
GLUCOSE BLDC GLUCOMTR-MCNC: 119 MG/DL (ref 70–99)
GLUCOSE BLDC GLUCOMTR-MCNC: 142 MG/DL (ref 70–99)
GLUCOSE SERPL-MCNC: 107 MG/DL (ref 70–99)
HCT VFR BLD AUTO: 37 % (ref 40–53)
HGB BLD-MCNC: 12.3 G/DL (ref 13.3–17.7)
MCH RBC QN AUTO: 31.8 PG (ref 26.5–33)
MCHC RBC AUTO-ENTMCNC: 33.2 G/DL (ref 31.5–36.5)
MCV RBC AUTO: 96 FL (ref 78–100)
PLATELET # BLD AUTO: 177 10E3/UL (ref 150–450)
POTASSIUM SERPL-SCNC: 3.7 MMOL/L (ref 3.4–5.3)
RBC # BLD AUTO: 3.87 10E6/UL (ref 4.4–5.9)
SODIUM SERPL-SCNC: 145 MMOL/L (ref 136–145)
WBC # BLD AUTO: 8.4 10E3/UL (ref 4–11)

## 2023-01-30 PROCEDURE — 250N000011 HC RX IP 250 OP 636: Performed by: INTERNAL MEDICINE

## 2023-01-30 PROCEDURE — 80048 BASIC METABOLIC PNL TOTAL CA: CPT | Performed by: NURSE PRACTITIONER

## 2023-01-30 PROCEDURE — 250N000011 HC RX IP 250 OP 636: Performed by: FAMILY MEDICINE

## 2023-01-30 PROCEDURE — 97165 OT EVAL LOW COMPLEX 30 MIN: CPT | Mod: GO

## 2023-01-30 PROCEDURE — 85027 COMPLETE CBC AUTOMATED: CPT | Performed by: NURSE PRACTITIONER

## 2023-01-30 PROCEDURE — 99233 SBSQ HOSP IP/OBS HIGH 50: CPT | Performed by: NURSE PRACTITIONER

## 2023-01-30 PROCEDURE — 97162 PT EVAL MOD COMPLEX 30 MIN: CPT | Mod: GP | Performed by: PHYSICAL THERAPIST

## 2023-01-30 PROCEDURE — 36415 COLL VENOUS BLD VENIPUNCTURE: CPT | Performed by: NURSE PRACTITIONER

## 2023-01-30 PROCEDURE — 92610 EVALUATE SWALLOWING FUNCTION: CPT | Mod: GN

## 2023-01-30 PROCEDURE — 258N000003 HC RX IP 258 OP 636: Performed by: INTERNAL MEDICINE

## 2023-01-30 PROCEDURE — 250N000013 HC RX MED GY IP 250 OP 250 PS 637: Performed by: INTERNAL MEDICINE

## 2023-01-30 PROCEDURE — 120N000001 HC R&B MED SURG/OB

## 2023-01-30 RX ORDER — LOPERAMIDE HCL 2 MG
2 CAPSULE ORAL PRN
COMMUNITY
End: 2023-06-01

## 2023-01-30 RX ORDER — LORAZEPAM 2 MG/ML
0.5 INJECTION INTRAMUSCULAR ONCE
Status: COMPLETED | OUTPATIENT
Start: 2023-01-31 | End: 2023-01-30

## 2023-01-30 RX ORDER — ATROPINE SULFATE 10 MG/ML
2 SOLUTION/ DROPS OPHTHALMIC DAILY
Status: ON HOLD | COMMUNITY
End: 2023-02-02

## 2023-01-30 RX ORDER — OLANZAPINE 5 MG/1
5 TABLET, ORALLY DISINTEGRATING ORAL DAILY PRN
Status: DISCONTINUED | OUTPATIENT
Start: 2023-01-30 | End: 2023-02-02 | Stop reason: HOSPADM

## 2023-01-30 RX ADMIN — ANORECTAL OINTMENT: 15.7; .44; 24; 20.6 OINTMENT TOPICAL at 09:19

## 2023-01-30 RX ADMIN — POTASSIUM CHLORIDE 20 MEQ: 1500 TABLET, EXTENDED RELEASE ORAL at 13:11

## 2023-01-30 RX ADMIN — CEFTRIAXONE SODIUM 2 G: 2 INJECTION, SOLUTION INTRAVENOUS at 22:02

## 2023-01-30 RX ADMIN — SENNOSIDES AND DOCUSATE SODIUM 1 TABLET: 8.6; 5 TABLET ORAL at 13:11

## 2023-01-30 RX ADMIN — ACETAMINOPHEN 975 MG: 325 TABLET, FILM COATED ORAL at 13:11

## 2023-01-30 RX ADMIN — PANTOPRAZOLE SODIUM 40 MG: 40 TABLET, DELAYED RELEASE ORAL at 06:30

## 2023-01-30 RX ADMIN — ENOXAPARIN SODIUM 40 MG: 40 INJECTION SUBCUTANEOUS at 21:44

## 2023-01-30 RX ADMIN — FLUDROCORTISONE ACETATE 0.1 MG: 0.1 TABLET ORAL at 06:30

## 2023-01-30 RX ADMIN — Medication 50 MCG: at 13:11

## 2023-01-30 RX ADMIN — MICONAZOLE NITRATE: 2 POWDER TOPICAL at 21:57

## 2023-01-30 RX ADMIN — SODIUM CHLORIDE: 9 INJECTION, SOLUTION INTRAVENOUS at 01:38

## 2023-01-30 RX ADMIN — ACETAMINOPHEN 975 MG: 325 TABLET, FILM COATED ORAL at 21:46

## 2023-01-30 RX ADMIN — SODIUM CHLORIDE TAB 1 GM 1 G: 1 TAB at 13:11

## 2023-01-30 RX ADMIN — LORAZEPAM 0.5 MG: 2 INJECTION INTRAMUSCULAR at 23:43

## 2023-01-30 RX ADMIN — ANORECTAL OINTMENT: 15.7; .44; 24; 20.6 OINTMENT TOPICAL at 21:57

## 2023-01-30 RX ADMIN — MICONAZOLE NITRATE: 2 POWDER TOPICAL at 09:19

## 2023-01-30 ASSESSMENT — ACTIVITIES OF DAILY LIVING (ADL)
ADLS_ACUITY_SCORE: 63

## 2023-01-30 NOTE — CARE PLAN
Prior to Admission Medication Reconciliation:     Medications added:   [] None  [x] As listed below:    Atropine    Medications deleted:   [x] None  [] As listed below:    Medications marked for review/removal by attending:  [x] None  [] As listed below:    Changes made to existing medications:   [] None  [x] Updated time of day, strengths and frequencies to most current.     Senna from 1 tab daily to 2 tabs BID    Pertinent notes/medications patient takes different than prescribed:     none    Last times/dates taken verified with patient:  [] Yes- completed myself   [x] Nurse completed, no changes made (double checked entries)  [] Unable to review with patient at this time:    Allergy review:    []Did not review: reviewed by nursing  [x]Allergies reviewed and updated if needed.     Medication reconciliation sources:   []Patient  []Patient family member/emergency contact: **  [][facility] Report Review  []Epic Chart Review  []Care Everywhere review  [x]Pharmacy med list/phone call: [facility]   [x]Fill dates reflect compliancy. No concerns.  []Fill dates do not reflect compliancy on the following medications:   []Outside meds dispense report:   []Fill dates reflect compliancy. No concerns.  []Fill dates do not reflect compliancy on the following medications:   [x]HomeCare medlist, Nursing home or Assisted Living MAR: Monica Ring  RX Verify Name/Strength Instructions DATE TAKEN TIME TAKEN Notes   []RX [x]OTC Asa 81 mg chew Daily  1/28/23 0923    [x]RX []OTC Atorvastatin 40 mg  qPM  1/27/23 1536    [x]RX []OTC Fludrocortisone 0.1 mg  Daily  1/28/23 0923    [x]RX []OTC memantine 5 mg BID  1/28/23 0923    [x]RX []OTC rabeprazole 20 mg  BID   1/28/23 0923    [x]RX []OTC Potassium cl 20 meq  Daily  1/28/23 0923    [x]RX []OTC apap  mg  2 tabs TID  1/28/23 0923    [x]RX []OTC ibu 400 mg TID PRN  1/28/23 0151    []RX [x]OTC Melatonin 5 mg At bedtime  1/27/23 2011    [x]RX []OTC Vitamin D 2000 units Daily  1/28/23  0923    []RX [x]OTC Calcium 600 mg  Daily  1/28/23 0923    [x]RX []OTC myrbetriq 25 mg ER  Daily  1/28/23 0923    [x]RX []OTC Sodium cl 1 gm  BID  1/28/23 0923    []RX [x]OTC Milk of magnesia 30 ml daily PRN  1/13/23 0128    [x]RX []OTC Sertraline 100 mg  qPM 1/27/23 1536 Increased 1/16/23    []RX [x]OTC Senna plus 8.6-50 2 tabs BID  1/28/23 0923    []RX [x]OTC Loperamide 2 mg  PRN       [x]RX []OTC Atropine 1% ophthalmic  2 drops SL daily  1/28/23 0923    [x]RX []OTC Nystatin powder  AAA BID  1/27/23 2011    []RX [x]OTC Barrier crm  Apply to rash on intergluteal cleft, coccyx and rogelio-area BID  1/28/23 0923    **Standing orders that are not frequently administered or are not prescriptions have not been added  to PTA medications to keep the list straightforward and free from potential error that can be caused by having too many medications to review/order. See below for reference what is available at patient's facility, if any:    none  []Behavioral Health Provider:      []Other: **    Pharmacy desired at discharge: [facility]    Is patient on coumadin?   [x]No  []Yes      Fill dates and reported compliancy:  [x] Compliant: fill dates reflect reported compliancy.   [] Not applicable. Patient is not taking any prescribed maintenance medications at this time.   [] Fill dates do not coincide with compliancy, but reports compliancy.    [] Fill dates reflect compliancy, but reports non-compliancy.   [] Cannot assess at this time.     Historian accuracy:  [] Excellent- alert and oriented, understands why meds were prescribed and how to take, able to answer specifics  [] Good- alert and oriented, understands why meds were prescribed and how to take, some confusion   [] Fair- alert and oriented, doesn't know medications without list, cannot answer specifics about medications, but has a decent process for which to take at home  [] Poor- does not know medications, may not have a process to take at home, may be cognitively  unable to review at this time  [x]Medication management done by family member or facility, no concerns about historian accuracy.   [] Cannot assess at this time.     Medication Management:  [] Manages meds independently  [] Family member/ other party manages meds/assists:  [x] Meds managed by staff at facility  [] Meds set up by home care, family/other party helps administer  [] Meds set up by home care, self administers  [] Cannot assess at this time.     Other medications aside from PTA:  [x] Denies taking any other medications aside from those listed in PTA meds, this includes over-the-counter vitamins, supplements and analgesics.   [] Unable to confirm at this time.     Siria Zacarias on 1/30/2023 at 9:13 AM     Notifying appropriate party of changes/additions/discrepancies:  [x]No pertinent changes made, notification not necessary.   [] Notified attending provider via text page/phone call/sticky note or other:  [] Notified other:  [] Medications have not been reconciled by a provider yet, notification not necessary  [] Pt is not admitted to floor yet or patient is boarding, PTA meds completed before admission.     Medications Prior to Admission   Medication Sig Dispense Refill Last Dose     acetaminophen (TYLENOL) 500 MG tablet Take 1,000 mg by mouth 3 times daily   1/28/2023     aspirin (ASA) 81 MG chewable tablet Take 81 mg by mouth daily   1/28/2023     atorvastatin (LIPITOR) 40 MG tablet Take 40 mg by mouth every evening   1/27/2023     atropine 1 % ophthalmic solution Place 2 drops under the tongue daily   1/28/2023     calcium carbonate (OS-ROSALINDA) 1500 (600 Ca) MG tablet Take 600 mg by mouth daily   1/28/2023     fludrocortisone (FLORINEF) 0.1 MG tablet Take 1 tablet (0.1 mg) by mouth every morning (before breakfast) 30 tablet 11 1/28/2023     ibuprofen (ADVIL/MOTRIN) 400 MG tablet Take 400 mg by mouth 3 times daily as needed   1/28/2023     loperamide (IMODIUM) 2 MG capsule Take 2 mg by mouth as needed -take  as directed.   Unknown     magnesium hydroxide (MILK OF MAGNESIA) 400 MG/5ML suspension Take 30 mLs by mouth daily as needed   1/13/2023     melatonin 5 MG tablet Take 5 mg by mouth At Bedtime   1/27/2023     memantine (NAMENDA) 5 MG tablet Take 5 mg by mouth 2 times daily   1/28/2023     Menthol-Zinc Oxide (MOISTURE BARRIER EX) Apply topically 2 times daily to rash on intergluteal cleft, coccyx and rogelio area. Also may use as needed.   1/27/2023     mirabegron (MYRBETRIQ) 25 MG 24 hr tablet Take 25 mg by mouth daily   1/28/2023     nystatin POWD Apply topically 2 times daily   1/27/2023     potassium chloride ER (KLOR-CON M) 20 MEQ CR tablet TAKE 1 TABLET BY MOUTH EVERY DAY 30 tablet 1 1/28/2023     RABEprazole (ACIPHEX) 20 MG EC tablet Take 20 mg by mouth 2 times daily   1/28/2023     senna-docusate (SENOKOT-S/PERICOLACE) 8.6-50 MG tablet Take 2 tablets by mouth 2 times daily   1/28/2023     sertraline (ZOLOFT) 100 MG tablet Take 100 mg by mouth every evening   1/27/2023     sodium chloride 1 GM tablet TAKE 1 TABLET BY MOUTH TWICE A  tablet 3 1/28/2023     vitamin D3 (CHOLECALCIFEROL) 50 mcg (2000 units) tablet Take 1 tablet by mouth daily   1/28/2023

## 2023-01-30 NOTE — PHARMACY
Pharmacy Antimicrobial Stewardship Assessment     Current Antimicrobial Therapy:  Anti-infectives (From now, onward)    Start     Dose/Rate Route Frequency Ordered Stop    23 2100  cefTRIAXone IN D5W (ROCEPHIN) intermittent infusion 2 g         2 g  100 mL/hr over 30 Minutes Intravenous EVERY 24 HOURS 23 004            Indication: UTI    Days of Therapy: 3     Pertinent Labs:    Recent labs: (last 7 days)     23  1657 23  0629 23  0509   WBC 5.6 8.9 8.4   LACT 1.3  --   --        Temperature:  Temp (24hrs), Av.2  F (36.8  C), Min:97.6  F (36.4  C), Max:98.9  F (37.2  C)      Culture Results:       Recommendations/Interventions:  1. None at this time.    Char Huynh, [facility]  2023

## 2023-01-30 NOTE — PROGRESS NOTES
Care Transitions focused note:      Jf is a resident at [facility], provided them with update.  Checked in with significant other, Des and daughter, Alecia.  Anticipate will return to [facility] via agency.  IMM letter reviewed.

## 2023-01-30 NOTE — PLAN OF CARE
"/92 (BP Location: Left arm, Patient Position: Supine, Cuff Size: Adult Regular)   Pulse 80   Temp 98.6  F (37  C) (Tympanic)   Resp 20   Ht 1.753 m (5' 9\")   Wt 69 kg (152 lb 1.9 oz)   SpO2 97%   BMI 22.46 kg/m      Pt restless quite a bit overnight, disoriented to time, situation, and place at times, A2 turn and repo q 2 hrs, not getting oob, free of falls or injury this shift. VS and assessment as charted. Speech garbled at times but clear at other times. PAINAD 2 due to restlessness, but pt did verbally deny anything hurting when asked. IV NS running at 100 ml/hr, on IV abx. Ipad video monitoring in place, call light within reach, bed alarm on, room near nurse's station. Family member has been in room all night as well.     Face to face report given with opportunity to observe patient.    Report given to CATE Whitley RN   1/30/2023  7:06 AM      "

## 2023-01-30 NOTE — PROGRESS NOTES
01/30/23 1320   Appointment Info   Signing Clinician's Name / Credentials (PT) Yakelin Victoria DPT   Living Environment   People in Home facility resident   Current Living Arrangements extended care facility   Transportation Anticipated agency   Living Environment Comments Pt is a resident of [facility].   Self-Care   Usual Activity Tolerance fair   Current Activity Tolerance poor   Equipment Currently Used at Home wheelchair, manual   Fall history within last six months yes   Number of times patient has fallen within last six months 6   Activity/Exercise/Self-Care Comment Per wife, pt transfers from bed<>w/c<>toilet with assist of 1-2 staff at baseline, does not use a walker.  Pt gets around in a w/c.   General Information   Onset of Illness/Injury or Date of Surgery 01/28/23   Referring Physician Dr. Allen   Patient/Family Therapy Goals Statement (PT) does not verbalize goal, plan will be to return to SNF   Pertinent History of Current Problem (include personal factors and/or comorbidities that impact the POC) Pt admitteed with altered mental status.  Pt has h/o Lewy body dementia.   Cognition   Follows Commands (Cognition) 50-74% accuracy   Range of Motion (ROM)   Range of Motion ROM is WFL   Strength (Manual Muscle Testing)   Strength Comments unable to assess due to uncontrolled LE movements/flaling   Bed Mobility   Bed Mobility rolling right;rolling left   Rolling Left Randall (Bed Mobility) moderate assist (50% patient effort)   Rolling Right Randall (Bed Mobility) moderate assist (50% patient effort)   Comment, (Bed Mobility) Pt's LEs flaling at times throughout evaluation, needs assist to help control them to keep them from hitting foot board on bed   Transfers   Comment, (Transfers) Attempted to initiate sup>sit due to pt needing to go to bathroom however extreme flaling of LEs began, pt unable to control LE movement, not safe to attempt to stand based on current status    Clinical Impression   Criteria for Skilled Therapeutic Intervention Yes, treatment indicated   PT Diagnosis (PT) limited functional transfers and mobility   Influenced by the following impairments involuntary movements of LEs, weakness, dementia   Functional limitations due to impairments decreased tolerance and safety for all functional transfers and mobility   Clinical Presentation (PT Evaluation Complexity) Evolving/Changing   Clinical Presentation Rationale clinical judgement   Clinical Decision Making (Complexity) moderate complexity   Planned Therapy Interventions (PT) bed mobility training;patient/family education;strengthening;transfer training;progressive activity/exercise   Risk & Benefits of therapy have been explained evaluation/treatment results reviewed;care plan/treatment goals reviewed;risks/benefits reviewed;participants included;patient;spouse/significant other   Clinical Impression Comments PT evaluation completed although limited due to involuntary flaling of LEs with attempts at mobility.  Pt is a resident [facility] and per spouse is typically able to complete pivot transfers to w/c with assist of 1-2 staff.  Wife reports onset of these uncontrolled movements on 1/28/23.  At this time pt would require use of luigi lift in order to safely complete bed<>chair transfer due to unpredictability of movements.  Will reassess tomorrow to see if functional status is improved.  Pt will return to long term care upon discharge.   PT Total Evaluation Time   PT Eval, Moderate Complexity Minutes (93561) 10   Physical Therapy Goals   PT Frequency 6x/week   PT Predicted Duration/Target Date for Goal Attainment 02/03/23   PT Goals Bed Mobility;Transfers   PT: Bed Mobility Minimal assist;Supine to/from sit   PT: Transfers Moderate assist;Sit to/from stand;Bed to/from chair   PT Discharge Planning   PT Plan Reassess functional mobility, complete pivot transfer if involuntary movemens improved   PT  Discharge Recommendation (DC Rec) Long term care facility   PT Rationale for DC Rec PT evaluation completed although limited due to involuntary flaling of LEs with attempts at mobility.  Pt is a resident of [facility] and per spouse is typically able to complete pivot transfers to w/c with assist of 1-2 staff.  Wife reports onset of these uncontrolled movements on 1/28/23.  At this time pt would require use of luigi lift in order to safely complete bed<>chair transfer due to unpredictability of movements.  Will reassess tomorrow to see if functional status is improved.  Pt will return to long term care upon discharge.   Total Session Time   Total Session Time (sum of timed and untimed services) 10

## 2023-01-30 NOTE — PROGRESS NOTES
"[facility]    Hospitalist Progress Note    Date of Service (when I saw the patient): 01/30/2023    Assessment & Plan       Altered mental status: Per family rather abrupt change, feeling that it is related to increased sertraline. Concern for \"seizure\" activity due to increased tremor-he does have known history of both parkinson's and has had prior seizure-though not on any chronic treatment for either. However, he is still quite tremulous with increased somnolence possibly due to acute metabolic encephalopathy from UTI in setting of underlying Lewy body dementia. Cannot rule out progression of Lewy body as well. Because his sertraline was recently increased psychiatry was consulted to rule out serotonin syndrome-please see their notes.  -1/30: Significant improvement in mental status this morning-awakens to voice, asking appropriate questions though still confused. He does follow simple commands.        Possible Acute cystitis without hematuria: UA positive for leuk esterase and few bacteria, however he does not have a fever, no leukocytosis. Culture is pending, due to possibility of metabolic encephalopathy will continue IV antibiotics until culture complete.       Lewy body dementia without behavioral disturbance (H): Long standing and chronic. He is quite somnolent today, clinical dehydrated as well. As above his altered mental status may be just progression of his underlying disease.      Chronic atrial fibrillation (H): Rates well controlled, he is not on long term anticoagulation due to high fall risk.       Depression: He has been on sertraline for several years, dose was increased from 50mg to 100mg on 1/16/23, holding for now.  Please see notes from psychiatry.       Essential hypertension: Stable      Parkinson disease (H)    Seizure disorder (H)          # Hypocalcemia: Lowest Ca = 8.3 mg/dL in last 2 days, will monitor and replace as appropriate     # Hypoalbuminemia: Lowest albumin = 3.4 " g/dL at 1/29/2023  6:29 AM, will monitor as appropriate                      DVT Prophylaxis: Enoxaparin (Lovenox) SQ  Code Status: No CPR- Do NOT Intubate    Disposition: Expected discharge in 1-3 days once mentation clears or source of encephalopathy determined.    Juliamaryam Watson, CNP    Interval History    Significant improvement in mental status this morning-awakens to voice, asking appropriate questions though still confused. He does follow simple commands. Denies chest pain, shortness of breath, nausea or abdominal pain. Declines breakfast this morning stating he wants to sleep a bit more.     -Data reviewed today: I reviewed all new labs and imaging results over the last 24 hours.     Physical Exam   Temp: 98.2  F (36.8  C) Temp src: Tympanic BP: 165/94 Pulse: 102   Resp: 16 SpO2: 95 % O2 Device: None (Room air)    Vitals:    01/28/23 2217 01/30/23 0419   Weight: 75.9 kg (167 lb 5.3 oz) 69 kg (152 lb 1.9 oz)     Vital Signs with Ranges  Temp:  [97.6  F (36.4  C)-98.6  F (37  C)] 98.2  F (36.8  C)  Pulse:  [] 102  Resp:  [16-20] 16  BP: (148-176)/(73-94) 165/94  SpO2:  [93 %-97 %] 95 %  I/O last 3 completed shifts:  In: 884 [I.V.:884]  Out: -     Peripheral IV 01/29/23 Anterior;Right Lower forearm (Active)   Site Assessment WDL 01/30/23 0831   Line Status Infusing 01/30/23 0831   Phlebitis Scale 0-->no symptoms 01/30/23 0831   Infiltration Scale 0 01/30/23 0831   Number of days: 1       Port A Cath Single 07/12/19 Right Chest wall (Active)   Number of days: 1298       Wound Abdomen Contusion (Active)   Number of days: 282       Wound Thigh Contusion (Active)   Number of days: 282       Wound Coccyx Pressure injury community acquired (Active)   Number of days: 282     Line/device assessment(s) completed for medical necessity    Constitutional: Somnolent  Respiratory: Clear bilaterally with occasional upper airway rhonchi,   Cardiovascular: HRR, loud murmur present in all fields, no edema  GI:  Soft,nontender, bowel sounds hypoactive  Skin/Integumen: Pale, very dry mucous membranes, tongue is cracked and bloody as are lips.   Other:      Medications     sodium chloride 100 mL/hr at 01/30/23 0138       acetaminophen  975 mg Oral TID     cefTRIAXone  2 g Intravenous Q24H     enoxaparin ANTICOAGULANT  40 mg Subcutaneous Q24H     fludrocortisone  0.1 mg Oral QAM AC     menthol-zinc oxide   Topical BID     miconazole   Topical BID     pantoprazole  40 mg Oral QAM AC     potassium chloride ER  20 mEq Oral Daily     senna-docusate  1 tablet Oral Daily     sodium chloride (PF)  3 mL Intracatheter Q8H     sodium chloride  1 g Oral BID     vitamin D3  50 mcg Oral Daily       Data   Recent Labs   Lab 01/30/23  0509 01/29/23  0840 01/29/23  0629 01/28/23  1657   WBC 8.4  --  8.9 5.6   HGB 12.3*  --  12.5* 12.7*   MCV 96  --  94 95     --  183 187   INR  --   --   --  1.32*     --  144 140   POTASSIUM 3.7  --  4.0 4.0   CHLORIDE 112*  --  108* 104   CO2 21*  --  24 27   BUN 12.8  --  13.1 11.8   CR 1.56*  --  1.66* 0.99   ANIONGAP 12  --  12 9   ROSALINDA 8.3*  --  8.7* 8.9   * 119* 121* 106*   ALBUMIN  --   --  3.4* 3.6   PROTTOTAL  --   --  6.0* 6.1*   BILITOTAL  --   --  0.5 0.5   ALKPHOS  --   --  56 56   ALT  --   --  11 11   AST  --   --  21 16       No results found for this or any previous visit (from the past 24 hour(s)).     English

## 2023-01-30 NOTE — PROGRESS NOTES
01/30/23 1100   Appointment Info   Signing Clinician's Name / Credentials (SLP) Susan Mcgill MS CCC-SLP   General Information   Onset of Illness/Injury or Date of Surgery 01/28/23   Referring Physician Dr. Allen   Patient/Family Therapy Goal Statement (SLP) None stated.   Pertinent History of Current Problem Pt is an 89 year old male who was admitted on 1/28/23. Orders received for clinical swallow evaluation due to history of dysphagia. Pt resides at [facility] and per chart review his baseline diet is IDDSI 5 (minced and moist) and thin liquids. Prior to evaluation this AM, nursing staff reported the pt choked on water.   General Observations Pt's wife and daughter were present at bedside during evaluation.   Type of Evaluation   Type of Evaluation Swallow Evaluation   Oral Motor   Oral Musculature unable to assess due to poor participation/comprehension   Dentition (Oral Motor)   Dentition (Oral Motor) other (see comments)  (unable to assess)   Facial Symmetry (Oral Motor)   Facial Symmetry (Oral Motor) unable/difficult to assess   Lip Function (Oral Motor)   Lip Range of Motion (Oral Motor) unable/difficult to assess   Lip Sensitivity (Oral Motor) unable/difficult to assess   Lip Strength (Oral Motor) unable/difficult to assess   Tongue Function (Oral Motor)   Tongue Sensitivity (Oral Motor) unable/difficult to assess   Tongue Strength (Oral Motor) unable/difficult to assess   Tongue Coordination/Speed (Oral Motor) unable/difficult to assess   Tongue ROM (Oral Motor) unable/difficult to assess   Jaw Function (Oral Motor)   Jaw Function (Oral Motor) unable/difficult to assess   Cough/Swallow/Gag Reflex (Oral Motor)   Soft Palate/Velum (Oral Motor) unable/difficult to assess   Gag Reflex (Oral Motor) unable/difficult to assess   Volitional Throat Clear/Cough (Oral Motor) unable/difficult to assess   Volitional Swallow (Oral Motor) unable/difficult to assess   Vocal Quality/Secretion Management (Oral  Motor)   Vocal Quality (Oral Motor) unable/difficult to assess   Secretion Management (Oral Motor)   (No overt difficulty of swallowing secretions reported.)   General Swallowing Observations   Past History of Dysphagia Pt is on a modified diet of IDDSI 5 (minced and moist) and thin liquids at nursing facility.   Respiratory Support (General Swallowing Observations) none   Current Diet/Method of Nutritional Intake (General Swallowing Observations, NIS) thin liquids (level 0);minced & moist (level 5)   Swallowing Evaluation Clinical swallow evaluation   Clinical Swallow Evaluation   Feeding Assistance dependent   Adaptive Eating Utensils N/A   Additional evaluation(s) completed today No   Rationale for completing additional evaluation A clinical swallow evaluation cannot rule out silent aspiration. If provider or other suspects that pt is silently aspirating a video swallow study may be warranted. .   Clinical Swallow Evaluation Textures Trialed thin liquids;pureed   Clinical Swallow Eval: Thin Liquid Texture Trial   Mode of Presentation, Thin Liquids cup;straw;fed by clinician   Volume of Liquid or Food Presented 8 oz.   Oral Phase of Swallow WFL   Pharyngeal Phase of Swallow intact   Diagnostic Statement Pt tolerated 8 oz of thin liquids via cup and straw without demonstrating any overt signs/symptoms of aspiration.   Clinical Swallow Evaluation: Puree Solid Texture Trial   Mode of Presentation, Puree spoon;fed by clinician   Volume of Puree Presented 3 oz.   Oral Phase, Puree WFL   Pharyngeal Phase, Puree intact   Diagnostic Statement Pt tolerated 3 oz. of pureed textures via spoon from clinician without demonstrating any overt signs/symptoms of aspiration.   Swallowing Recommendations   Diet Consistency Recommendations thin liquids (level 0);pureed (level 4)   Supervision Level for Intake 1:1 supervision needed   Mode of Delivery Recommendations bolus size, small;slow rate of intake  (cues to prepare pt for  bolus)   Postural Recommendations other (see comments)  (sitting up right in chair)   Swallowing Maneuver Recommendations alternate food and liquid intake   Monitoring/Assistance Required (Eating/Swallowing) stop eating activities when fatigue is present;monitor for cough or change in vocal quality with intake   Recommended Feeding/Eating Techniques (Swallow Eval) maintain upright sitting position for eating;maintain upright posture during/after eating for 30 minutes;provide assist with feeding   Medication Administration Recommendations, Swallowing (SLP) Bury or crush in pureed texture if pt demonstrates increased difficulty.   Instrumental Assessment Recommendations reassess via non-instrumental clinical swallow evaluation   Comment, Swallowing Recommendations Recommend IDDSI 4 (pureed) and thin liquids with 1:1 feeder. Pt required frequent cues throughout evaluation to prepare him for PO intake.   Clinical Impression   Criteria for Skilled Therapeutic Interventions Met (SLP Eval) Yes, treatment indicated   SLP Diagnosis Dysphagia   Problem List (SLP) Swallowing   Functional Limitations Related to Problem List (SLP) Pt is unable to safely tolerate his baseline diet of IDDSI 5/0 at this time.   Risks & Benefits of therapy have been explained evaluation/treatment results reviewed;care plan/treatment goals reviewed;risks/benefits reviewed;current/potential barriers reviewed;participants included;patient;spouse/significant other   Clinical Impression Comments Pt seen this AM for clinical swallow evaluation with family present at bedside. Pt has current history of dysphagia and baseline diet is IDDSI 5 (minced and moist) and thin liquids. The following textures were trialed during clinical swallow evaluation, thin liquids and pureed. No overt signs/symptoms of aspiration were observed throughout evaluation. Pt required 1:1 assistance in bringing the cup/straw/spoon to his mouth, limiting bolus size, and pacing oral  "intake. He benefited from frequent cues to prepare him for oral intake and pt was often observed to \"feed himself\" by bringing his hand to his mouth however he required the SLP to actually bring the cup/straw/spoon to his mouth. Suspect pt may demonstrate increased difficulty with swallowing if he is not provided with consistent verbal cueing prior to PO intake. Recommend IDDSI 4 (pureed) and thin liquids with 1:1 feeder. Recommend feeder provide frequent cues throughout mealtimes to prepare pt for PO intake. Recommend aspiration precautions be followed during all PO intake.   SLP Total Evaluation Time   Eval: oral/pharyngeal swallow function, clinical swallow Minutes (84062) 35   SLP Goals   Therapy Frequency (SLP Eval) 5 times/wk   SLP Predicted Duration/Target Date for Goal Attainment 02/03/23   SLP Goals Swallow   SLP: Safely tolerate diet without signs/symptoms of aspiration Minced & moist diet;Thin liquids;With assistance/supervision   SLP Discharge Planning   SLP Plan Continue to assess safest/least restrictive diet recommendations.   SLP Discharge Recommendation Long term care facility   SLP Rationale for DC Rec Pt resided in long term care facility prior to being admitted.   SLP Brief overview of current status  Pt seen this AM for clinical swallow evaluation with family present at bedside. Pt has current history of dysphagia and baseline diet is IDDSI 5 (minced and moist) and thin liquids. The following textures were trialed during clinical swallow evaluation, thin liquids and pureed. No overt signs/symptoms of aspiration were observed throughout evaluation. Pt required 1:1 assistance in bringing the cup/straw/spoon to his mouth, limiting bolus size, and pacing oral intake. He benefited from frequent cues to prepare him for oral intake and pt was often observed to \"feed himself\" by bringing his hand to his mouth however he required the SLP to actually bring the cup/straw/spoon to his mouth. Suspect pt may " demonstrate increased difficulty with swallowing if he is not provided with consistent verbal cueing prior to being provided PO intake. Recommend IDDSI 4 (pureed) and thin liquids with 1:1 feeder. Recommend feeder provide frequent cues throughout mealtimes to prepare pt for PO intake. Recommend aspiration precautions be followed during all PO intake.   Total Session Time   Total Session Time (sum of timed and un timed services) 35       Recommendations for Feeding:  - Patient requires 1:1 feeder with eating and drinking  - Patient must sit in the chair at 90 degrees (may require pillows behind back)  - Eliminate all distractions; turn off the TV  - Patient should only eat/drink when they are fully alert  - Encourage small bites and small sips  - Alternate between a bite of food and a sip of liquid  - Check for oral pocketing  - Swallow bite of food/liquid before offering another bite/sip   - Patient must remain upright in chair at least 30-45 minutes after oral intake    NOTE: If patient becomes too tired, there are noted changes in their vocal quality/breathing (wet and gurgly), or they begin coughing frequently, stop feeding immediately and complete oral cares. SLP to re-assess swallowing

## 2023-01-30 NOTE — PROGRESS NOTES
"CLINICAL NUTRITION SERVICES  -  ASSESSMENT NOTE    Jf DANIELS Prafuljunior : Admission Nutrition Risk Screen - unsure of weight loss, reduced appetite/intake    89 yom admitted for AMS, UTI. Past medical hx includes lewy body dementia, a fib, HTN, parkinson disease, seizure disorder, depression. Reduced intake before admission, poor intake continues this admission. Reduced intake likely due to AMS, drowsiness. Minced and moist diet with thin liquids at the nursing home. SLP evaluated pt's swallowing today, recommended pureed diet. Weight is down 6lbs in the last 2 months, 8lbs in the last 6 months.     Diet Order: Pureed (level 4), thin liquids  Intake: none documented in flowsheets. RN note- eating less than 25% of meals. Declined breakfast.     Height: 5' 9\"  Weight: 152 lbs 1.88 oz  Body mass index is 22.46 kg/m .  Weight Status:  Normal BMI  Weight History:   Wt Readings from Last 10 Encounters:   01/30/23 69 kg (152 lb 1.9 oz)   01/16/23 69.1 kg (152 lb 6.4 oz)   11/21/22 71.7 kg (158 lb)   11/02/22 71.7 kg (158 lb)   08/10/22 72.6 kg (160 lb)   07/27/22 73 kg (160 lb 15 oz)   06/28/22 73 kg (161 lb)   06/01/22 73 kg (161 lb)   04/23/22 74 kg (163 lb 2.3 oz)   02/21/22 72 kg (158 lb 12.8 oz)        Weight used to calculate needs: 69kg current weight  Estimated Energy Needs: 7851-5640 kcals (25-30 Kcal/Kg)   Estimated Protein Needs: 55-70 grams protein (0.8-1 g pro/Kg)    Malnutrition Diagnosis: Patient does not meet two of the criteria necessary for diagnosing malnutrition. At risk with reduced intake, weight trending down.    NUTRITION DIAGNOSIS:  Inadequate oral intake related to AMS as evidenced by eating less than 25% of meals, weight trending down    NUTRITION RECOMMENDATIONS  - Encourage intake during meal times  - offer Ensure with/between meals    MONITORING AND EVALUATION:  RD will monitor intake, weight, labs    "

## 2023-01-30 NOTE — PLAN OF CARE
"Most recent vitals: /77 (BP Location: R. Arm arm, Patient Position: Semi Fowlers, Cuff Size: Adult Regular)   Pulse 83  Temp 98.2  F (36.9  C) (Tympanic)   Resp 20   Ht 1.753 m (5' 9\")   Wt 75.9 kg (167 lb 5.3 oz)   SpO2 95%   BMI 24.71 kg/m      Comments:      Drowsy, Disoriented x3 (was able to give me birthday a few times), VS Stable, afebrile. No pain observed per PAINAD scale, Less than 25% intake of meals, Oral cares done multiple times today, was able to get dried blood out of majority of mouth.  Incontinenet of Bowel and Bladder.    Face to face report given with opportunity to observe patient.    Report given to Alana Martinez RN   1/29/2023  7:45 PM        "

## 2023-01-31 LAB
ANION GAP SERPL CALCULATED.3IONS-SCNC: 14 MMOL/L (ref 7–15)
BUN SERPL-MCNC: 11.8 MG/DL (ref 8–23)
CALCIUM SERPL-MCNC: 8.1 MG/DL (ref 8.8–10.2)
CHLORIDE SERPL-SCNC: 113 MMOL/L (ref 98–107)
CREAT SERPL-MCNC: 0.74 MG/DL (ref 0.67–1.17)
DEPRECATED HCO3 PLAS-SCNC: 19 MMOL/L (ref 22–29)
ERYTHROCYTE [DISTWIDTH] IN BLOOD BY AUTOMATED COUNT: 12.3 % (ref 10–15)
GFR SERPL CREATININE-BSD FRML MDRD: 87 ML/MIN/1.73M2
GLUCOSE BLDC GLUCOMTR-MCNC: 104 MG/DL (ref 70–99)
GLUCOSE BLDC GLUCOMTR-MCNC: 122 MG/DL (ref 70–99)
GLUCOSE BLDC GLUCOMTR-MCNC: 135 MG/DL (ref 70–99)
GLUCOSE SERPL-MCNC: 118 MG/DL (ref 70–99)
HCT VFR BLD AUTO: 37.3 % (ref 40–53)
HGB BLD-MCNC: 11.9 G/DL (ref 13.3–17.7)
LACTATE SERPL-SCNC: 1.4 MMOL/L (ref 0.7–2)
MCH RBC QN AUTO: 31.5 PG (ref 26.5–33)
MCHC RBC AUTO-ENTMCNC: 31.9 G/DL (ref 31.5–36.5)
MCV RBC AUTO: 99 FL (ref 78–100)
PLATELET # BLD AUTO: 143 10E3/UL (ref 150–450)
POTASSIUM SERPL-SCNC: 4.3 MMOL/L (ref 3.4–5.3)
RBC # BLD AUTO: 3.78 10E6/UL (ref 4.4–5.9)
SODIUM SERPL-SCNC: 146 MMOL/L (ref 136–145)
WBC # BLD AUTO: 7.6 10E3/UL (ref 4–11)

## 2023-01-31 PROCEDURE — 250N000011 HC RX IP 250 OP 636: Performed by: INTERNAL MEDICINE

## 2023-01-31 PROCEDURE — 258N000001 HC RX 258: Performed by: INTERNAL MEDICINE

## 2023-01-31 PROCEDURE — 85027 COMPLETE CBC AUTOMATED: CPT | Performed by: NURSE PRACTITIONER

## 2023-01-31 PROCEDURE — 250N000013 HC RX MED GY IP 250 OP 250 PS 637: Performed by: INTERNAL MEDICINE

## 2023-01-31 PROCEDURE — 99233 SBSQ HOSP IP/OBS HIGH 50: CPT | Performed by: INTERNAL MEDICINE

## 2023-01-31 PROCEDURE — 93010 ELECTROCARDIOGRAM REPORT: CPT | Performed by: INTERNAL MEDICINE

## 2023-01-31 PROCEDURE — 250N000013 HC RX MED GY IP 250 OP 250 PS 637: Performed by: PSYCHIATRY & NEUROLOGY

## 2023-01-31 PROCEDURE — 258N000003 HC RX IP 258 OP 636: Performed by: INTERNAL MEDICINE

## 2023-01-31 PROCEDURE — 120N000001 HC R&B MED SURG/OB

## 2023-01-31 PROCEDURE — 93005 ELECTROCARDIOGRAM TRACING: CPT

## 2023-01-31 PROCEDURE — 999N000226 HC STATISTIC SLP IP EVAL DEFER

## 2023-01-31 PROCEDURE — 36415 COLL VENOUS BLD VENIPUNCTURE: CPT | Performed by: INTERNAL MEDICINE

## 2023-01-31 PROCEDURE — 80048 BASIC METABOLIC PNL TOTAL CA: CPT | Performed by: NURSE PRACTITIONER

## 2023-01-31 PROCEDURE — 83605 ASSAY OF LACTIC ACID: CPT | Performed by: INTERNAL MEDICINE

## 2023-01-31 PROCEDURE — 36415 COLL VENOUS BLD VENIPUNCTURE: CPT | Performed by: NURSE PRACTITIONER

## 2023-01-31 RX ORDER — TAMSULOSIN HYDROCHLORIDE 0.4 MG/1
0.4 CAPSULE ORAL DAILY
Status: DISCONTINUED | OUTPATIENT
Start: 2023-01-31 | End: 2023-02-02 | Stop reason: HOSPADM

## 2023-01-31 RX ADMIN — Medication 50 MCG: at 08:35

## 2023-01-31 RX ADMIN — CEFTRIAXONE SODIUM 2 G: 2 INJECTION, SOLUTION INTRAVENOUS at 21:05

## 2023-01-31 RX ADMIN — DEXTROSE AND SODIUM CHLORIDE: 5; 450 INJECTION, SOLUTION INTRAVENOUS at 15:04

## 2023-01-31 RX ADMIN — PANTOPRAZOLE SODIUM 40 MG: 40 TABLET, DELAYED RELEASE ORAL at 08:34

## 2023-01-31 RX ADMIN — ENOXAPARIN SODIUM 40 MG: 40 INJECTION SUBCUTANEOUS at 21:23

## 2023-01-31 RX ADMIN — SODIUM CHLORIDE TAB 1 GM 1 G: 1 TAB at 08:32

## 2023-01-31 RX ADMIN — ANORECTAL OINTMENT: 15.7; .44; 24; 20.6 OINTMENT TOPICAL at 21:25

## 2023-01-31 RX ADMIN — QUETIAPINE FUMARATE 12.5 MG: 25 TABLET ORAL at 08:34

## 2023-01-31 RX ADMIN — MICONAZOLE NITRATE: 2 POWDER TOPICAL at 21:25

## 2023-01-31 RX ADMIN — SODIUM CHLORIDE: 9 INJECTION, SOLUTION INTRAVENOUS at 07:37

## 2023-01-31 RX ADMIN — FLUDROCORTISONE ACETATE 0.1 MG: 0.1 TABLET ORAL at 08:33

## 2023-01-31 RX ADMIN — ANORECTAL OINTMENT: 15.7; .44; 24; 20.6 OINTMENT TOPICAL at 10:06

## 2023-01-31 RX ADMIN — SENNOSIDES AND DOCUSATE SODIUM 1 TABLET: 8.6; 5 TABLET ORAL at 08:33

## 2023-01-31 RX ADMIN — ACETAMINOPHEN 975 MG: 325 TABLET, FILM COATED ORAL at 08:31

## 2023-01-31 RX ADMIN — MICONAZOLE NITRATE: 2 POWDER TOPICAL at 10:07

## 2023-01-31 RX ADMIN — Medication 5 MG: at 20:57

## 2023-01-31 RX ADMIN — TAMSULOSIN HYDROCHLORIDE 0.4 MG: 0.4 CAPSULE ORAL at 08:36

## 2023-01-31 RX ADMIN — ACETAMINOPHEN 975 MG: 325 TABLET, FILM COATED ORAL at 14:03

## 2023-01-31 RX ADMIN — POTASSIUM CHLORIDE 20 MEQ: 1500 TABLET, EXTENDED RELEASE ORAL at 08:32

## 2023-01-31 RX ADMIN — ACETAMINOPHEN 975 MG: 325 TABLET, FILM COATED ORAL at 20:58

## 2023-01-31 ASSESSMENT — ACTIVITIES OF DAILY LIVING (ADL)
ADLS_ACUITY_SCORE: 63
ADLS_ACUITY_SCORE: 59
ADLS_ACUITY_SCORE: 59
ADLS_ACUITY_SCORE: 63
ADLS_ACUITY_SCORE: 59
ADLS_ACUITY_SCORE: 63

## 2023-01-31 NOTE — PLAN OF CARE
"Goal Outcome Evaluation:       Reason for hospital stay:  altered mental status   Living situation PTA: nursing home   Most recent vitals: /74 (BP Location: Left arm, Patient Position: Semi-Collado's, Cuff Size: Adult Regular)   Pulse 65   Temp 98.7  F (37.1  C) (Tympanic)   Resp 18   Ht 1.753 m (5' 9\")   Wt 69 kg (152 lb 1.9 oz)   SpO2 95%   BMI 22.46 kg/m      Pain Management:  scheduled tylenol  LOC:  confused x4  Cardiac:  apicla regular   Respiratory:  placed on 1 LPM NC to help with apneic periods   GI:  incontinent - did need to straight cath pt 1x due to retention   :  incontinent   Skin Issues:  please see charting     IVF:   ml/h   ABX:  rocephin     Nutrition: pureed with think liquids - pt did need to drink thickened liquids over night to help with swallowing   ADL's:  assist 2  Ambulation:bed fast   Safety:  jorge light in place, 1:1 remained in place, bed I low position with wheels locked     Comments: pt did become increasingly confused through shift and appears to be hallucinating more, also has more frequent trebling and uncontrolled movements of hand and feet.     Face to face report given with opportunity to observe patient.    Report given to Mita Prado RN   1/31/2023  7:56 AM        1/31/2023  5:03 AM  Israel Prado RN                   "

## 2023-01-31 NOTE — SIGNIFICANT EVENT
Significant Event Note    Time of event: 11:37 PM January 30, 2023    Description of event:  Nursing notified patient increased agitation/restlessness and is yelling out. Daughter in room.  Per Dr. Allen note, he had done well with ativan 0.5mg IV  Further review and psych consult will hold off any further ativan.  Seroquel prn per psych    Plan:  Ativan 0.5mg IV x 1, only one dose, will avoid  Will try to avoid any further medications and redirect      Discussed with: bedside nurse    Kerri Gonzalez MD    1230  Increasing hallucinations  Patient seen, similar to description of previous episodes, movement of extremities, answering some questions    Discussed with daughter, present.    Bladder scan 630ml, will try cath    Day team to follow

## 2023-01-31 NOTE — PROVIDER NOTIFICATION
Text paged provider to come assess pt as he is having increased hallucinations and shaking in arms and legs

## 2023-01-31 NOTE — PROVIDER NOTIFICATION
Text paged provider that pt is having increased restlessness and is yelling text paged provider to see if he can place an order to help pt

## 2023-01-31 NOTE — PROGRESS NOTES
01/31/23 1500   Appointment Info   Signing Clinician's Name / Credentials (PT) Emmie Dumont PT   Appointment Canceled Reason (PT) Medical status;Unarousable   Appointment Cancel Comments (PT) unable to participate in PT   Rehab Comments (PT) No PT completed. Will assess/treat as indicated tomorrow and per MD recommendations.

## 2023-01-31 NOTE — PLAN OF CARE
Goal Outcome Evaluation:          Reason for hospital stay:  Altered mental status  Living situation PTA: nursing home  Most recent vitals: /86 (BP Location: Left arm, Patient Position: Semi-Fowlers, Cuff Size: Adult Regular), T 99.8 (Tympanic), P 77, RR 22, O2Sat 94% (Room air)  Pain Management:  Scheduled acetaminophen  LOC:  confused x4  Cardiac: Apical pulse regular    Respiratory:   Pt taken off of oxygen, now on room air  GI:  can be incontinent of stool, no BM on shift  :  incontinent - straight cath pt 1x due to retention  Skin Issues: pt had redness on sacrum, rash on elbows and hands, groin looks red and irritated     IVF:  NS 100mL/hour    Nutrition: pureed diet with thin liquids, encourage chin down while swallowing  ADL's:  assist 2  Ambulation: bed fast  Safety:   close observation, call light in reach, bed in low position, wheels locked  Comments:  waiting for Dextrose 5% and 0.45% NaCl to be verified     Face to face report given with opportunity to observe patient.    Report given to Mita Quach   1/31/2023  2:23 PM

## 2023-01-31 NOTE — CARE PLAN
0045- called to assist as patient seemingly more agitated, hallucinations, waving arms, kicking legs, yelling out.  Assisted in changing patient at this time as noted small incontinent urine.  Bladder scan after found volume >630.  Dr. Leos order rec'd to st. cath.  Gathered supplies and assistance- found patient to be incontinent again small amount of urine.  Pt. Straight cathed without difficulty for 550 clear yellow urine.  Patient repositioned for comfort.  Primary RN at bedside.

## 2023-01-31 NOTE — PHARMACY-MEDICATION REGIMEN REVIEW
Pharmacy Antimicrobial Stewardship Assessment     Current Antimicrobial Therapy:  Anti-infectives (From now, onward)    Start     Dose/Rate Route Frequency Ordered Stop    23  cefTRIAXone IN D5W (ROCEPHIN) intermittent infusion 2 g         2 g  100 mL/hr over 30 Minutes Intravenous EVERY 24 HOURS 23 6030          Indication: UTI     Days of Therapy: 4     Pertinent Labs:  Recent Labs   Lab Test 23  0527 23  0509 23  0629   WBC 7.6 8.4 8.9     Recent Labs   Lab Test 23  0830 23  1657 LACT 1.4 1.3 CRP  --   --  SED  --   --  PCAL  --   --   < > = values in this interval not displayed.        Temperature:  Temp (24hrs), Av.3  F (37.4  C), Min:98.7  F (37.1  C), Max:100.8  F (38.2  C)    Culture Results:   (23) Urine=>100k mixed emily  (23) COVID/Influenza/RSV = negative    Recommendations/Interventions:  1. None    Karthik Cain, [facility]  2023

## 2023-01-31 NOTE — PROGRESS NOTES
"[facility]    Hospitalist Progress Note  Jf Ortiz is a 89 year old male with history of Lewy body dementia with behavioral disturbance, Parkinson's disease, dysphagia, urinary incontinence, REM sleep behavior disorder, orthostatic hypotension, coronary disease, status post coronary stent placement, chronic atrial fibrillation, status post permanent pacemaker placement, hyperlipidemia, who is a nursing home resident at [facility].  Patient's wife reported that his Zoloft dose was increased from 50 to 100 mg and atropine was given 2 days ago for unclear reason.  She noted that he seemed have become more confused and restless.  The nursing home staff noted patient having seizure-like activity and EMS was called and patient was brought to the emergency room.  In the emergency room, patient appeared confused and restless along with spastic motions in his extremities.  He was evaluated with a head CT, which showed no evidence of acute changes.  Urinalysis showed a large leukocyte esterase.  WBC was within normal limit, troponin was negative, BNP was mildly elevated 2612, CRP less than 3.  With the diagnosis of UTI, patient was started on ceftriaxone.  For possible seizure, Keppra 500 mg IV was given as well as 0.5 mg of lorazepam.  Patient is   being admitted to the hospital for further care.  His presenting vital signs to the ER were blood pressure 109/73 pulse 92 temp 98.894% sats on room air.    Assessment & Plan      Altered mental status: Per family rather abrupt change, feeling that it is related to increased sertraline. Concern for \"seizure\" activity due to increased tremor-he does have known history of both parkinson's and has had prior seizure-though not on any chronic treatment for either. However, he is still quite tremulous with increased somnolence possibly due to acute metabolic encephalopathy from UTI in setting of underlying Lewy body dementia. Cannot rule out progression of Lewy body " as well. Because his sertraline was recently increased psychiatry was consulted to rule out serotonin syndrome-please see their notes.  -1/30: Significant improvement in mental status this morning-awakens to voice, asking appropriate questions though still confused. He does follow simple commands.   -1/31: Patient overnight had more episode of confusion and agitation led to very involuntary jerking.  Did get a little bit of Ativan and was then straight cathed about 550 cc.  He did get some Seroquel and now is is quite sedate.  Still having some involuntary jerking.  I did talk it length with his significant other.  He denies Zoloft for a long time and then up to 100 just last couple of days but apparently also was getting some atropine drops for the last several days I think may be because of secretions unsure but I am wondering if that had something to do with his current presentation.  He is having a lot of urinary retention also..  What I would like to do is just hold any other medications if we can if he has another large amount of urinary retention we will need to place a Spears catheter in him as that distention may be causing worsening in his overall agitation.  I do not find any other signs of infection his urine is basically clear growing out some mixed emily has been treated with Rocephin at any rate.  His other lab work is basically unremarkable.  Hemoglobin white count stable electrolytes all look actually rather good.  We will cut down on his IV fluids a little bit here now.  His CT scan of his head showed nothing acute.  Does have fairly significant dementia and perhaps getting a bump on Zoloft which probably should not of caused anything but may be possibly the atropine started this ball rolling here.  What I like to do is just withhold any medications try and get his bladder decompressed and see how he does with all this.      Possible Acute cystitis without hematuria: UA positive for leuk esterase  and few bacteria, however he does not have a fever, no leukocytosis. Culture is pending, due to possibility of metabolic encephalopathy will continue IV antibiotics until culture complete.  -1/31: His urine is just growing out mixed emily.  Has been treated with Rocephin which should cover anything if truly a problem which I do not feel that it was.       Lewy body dementia without behavioral disturbance (H): Long standing and chronic. He is quite somnolent today, clinical dehydrated as well. As above his altered mental status may be just progression of his underlying disease.  -1/31: This is a longstanding known issue with him.  He came in with the decreased level consciousness and confusion.  So likely what ever cause this was at the nursing home being here certainly could prolong it also with anything that were giving him.  We will need to closely monitor him.         Chronic atrial fibrillation (H): Rates well controlled, he is not on long term anticoagulation due to high fall risk.  -1/31: Patient does have a pacemaker in place.  EKG shows 100% atrial paced and V paced checking his QTC it is less than 500.  So it is okay for him to get the Seroquel if so needed.       Depression: He has been on sertraline for several years, dose was increased from 50mg to 100mg on 1/16/23, holding for now.  Please see notes from psychiatry.   -1/31: Family is not really sure why he has been on the Zoloft all these years and why it was certainly increased recently.  Psychiatry felt that can be held do not need to do a taper.       Essential hypertension: Stable  -1/31: Hemodynamically stable at this time.    Orthostatic hypotension: Patient has a Port-A-Cath in place and it is there because he gets recurrent infusions of saline likely due to some his hypotension is also on Florinef.       Parkinson disease (H)    Seizure disorder (H)       Diet: Pureed Diet (level 4) Thin Liquids (level 0)  Fluids: D5 half-normal saline    DVT  Prophylaxis: Enoxaparin (Lovenox) SQ  Code Status: No CPR- Do NOT Intubate    Disposition: Expected discharge in 2-3 days once mental status improves.    Calderon Merritt MD    Interval History   Overnight got a little more agitated found to have significant urinary retention.  Has been straight cath all couple of times also straight catheter another 600 cc here this morning.  Not sure if this is causing worsening of his mental status as he got some Seroquel at the same time that he was straight cath so can hold off of the Seroquel is very sedate right now see how his bladder goes if he has another big residual volume we will need to place a Spears catheter and tied down as best we can.  If his mental status improves without any recurrent bladder issues then that may be the problem here.  Photo they was getting atropine drops also over the last several days prior to his admission.  Likely that is causing some issues 2.    -Data reviewed today: I reviewed all new labs and imaging results over the last 24 hours. I personally reviewed no images or EKG's today.    Physical Exam   Temp: 99.8  F (37.7  C) Temp src: Tympanic BP: 158/86 Pulse: 77   Resp: 22 SpO2: 94 % O2 Device: None (Room air) Oxygen Delivery: Others (comment) (Dr. Merritt said that delivering oxygen via nasal cannula is most likely just drying patients mouth out and that if SpO2 are adequate to take him off oxygen, patient is currently on room air)  Vitals:    01/28/23 2217 01/30/23 0419 01/31/23 0606   Weight: 75.9 kg (167 lb 5.3 oz) 69 kg (152 lb 1.9 oz) 75 kg (165 lb 5.5 oz)     Vital Signs with Ranges  Temp:  [98.7  F (37.1  C)-99.8  F (37.7  C)] 99.8  F (37.7  C)  Pulse:  [] 77  Resp:  [18-26] 22  BP: (148-173)/(74-93) 158/86  SpO2:  [91 %-95 %] 94 %  I/O last 3 completed shifts:  In: -   Out: 550 [Urine:550]    Peripheral IV 01/29/23 Anterior;Right Lower forearm (Active)   Site Assessment WDL 01/31/23 031   Line Status Infusing 01/31/23 0310    Phlebitis Scale 0-->no symptoms 01/31/23 0315   Infiltration Scale 0 01/31/23 0315   Number of days: 2       Port A Cath Single 07/12/19 Right Chest wall (Active)   Number of days: 1299       Wound Abdomen Contusion (Active)   Number of days: 283       Wound Thigh Contusion (Active)   Number of days: 283       Wound Coccyx Pressure injury community acquired (Active)   Number of days: 283     No line/device    Constitutional: he is very sedate currently after having received the Seroquel.    HEENT: Normocephalic/atraumatic, PERRL, EOMI, mouth very dry, neck supple, no LN.     Cardiovascular: RRR.   1-2 out of 6 murmur, no  rubs, or gallops.  JVD flat.  Bruits no.  Pulses 2    Respiratory: Upper airway rhonchi otherwise clear to auscultation bilaterally    Abdomen: Soft, nontender, nondistended, no organomegaly. Bowel sounds present    Extremities: Warm/dry.  No edema    Neuro: Patient very sedate currently.  A lot of involuntary kind of movements.  Non focal, cranial nerves intact, Moves all extremities.  Medications     sodium chloride 100 mL/hr at 01/31/23 0737       acetaminophen  975 mg Oral TID     cefTRIAXone  2 g Intravenous Q24H     enoxaparin ANTICOAGULANT  40 mg Subcutaneous Q24H     fludrocortisone  0.1 mg Oral QAM AC     melatonin  5 mg Oral At Bedtime     menthol-zinc oxide   Topical BID     miconazole   Topical BID     pantoprazole  40 mg Oral QAM AC     potassium chloride ER  20 mEq Oral Daily     senna-docusate  1 tablet Oral Daily     sodium chloride (PF)  3 mL Intracatheter Q8H     sodium chloride  1 g Oral BID     tamsulosin  0.4 mg Oral Daily     vitamin D3  50 mcg Oral Daily       Data   Recent Labs   Lab 01/31/23  0810 01/31/23  0527 01/30/23  2143 01/30/23  1502 01/30/23  0509 01/29/23  0840 01/29/23  0629 01/28/23  1657   WBC  --  7.6  --   --  8.4  --  8.9 5.6   HGB  --  11.9*  --   --  12.3*  --  12.5* 12.7*   MCV  --  99  --   --  96  --  94 95   PLT  --  143*  --   --  177  --  183 187    INR  --   --   --   --   --   --   --  1.32*   NA  --  146*  --   --  145  --  144 140   POTASSIUM  --  4.3  --   --  3.7  --  4.0 4.0   CHLORIDE  --  113*  --   --  112*  --  108* 104   CO2  --  19*  --   --  21*  --  24 27   BUN  --  11.8  --   --  12.8  --  13.1 11.8   CR  --  0.74  --   --  1.56*  --  1.66* 0.99   ANIONGAP  --  14  --   --  12  --  12 9   ROSALINDA  --  8.1*  --   --  8.3*  --  8.7* 8.9   * 118* 119*   < > 107*   < > 121* 106*   ALBUMIN  --   --   --   --   --   --  3.4* 3.6   PROTTOTAL  --   --   --   --   --   --  6.0* 6.1*   BILITOTAL  --   --   --   --   --   --  0.5 0.5   ALKPHOS  --   --   --   --   --   --  56 56   ALT  --   --   --   --   --   --  11 11   AST  --   --   --   --   --   --  21 16    < > = values in this interval not displayed.       No results found for this or any previous visit (from the past 24 hour(s)).

## 2023-01-31 NOTE — PROGRESS NOTES
01/30/23 1400   Appointment Info   Signing Clinician's Name / Credentials (OT) Yelena Marisabel, OTR/L   Living Environment   People in Home facility resident   Current Living Arrangements extended care facility   Transportation Anticipated agency   Living Environment Comments Pt resides at [facility]   Self-Care   Usual Activity Tolerance fair   Current Activity Tolerance poor   Equipment Currently Used at Home wheelchair, manual   Fall history within last six months yes   Number of times patient has fallen within last six months 6   Activity/Exercise/Self-Care Comment Per wife, pt is able to transfer with assist x 1-2 staff, no walker. He uses a wheelchair for mobility. Staff assist with all ADLs.   Instrumental Activities of Daily Living (IADL)   IADL Comments Pt has assist with IADLs   General Information   Onset of Illness/Injury or Date of Surgery 01/28/23   Referring Physician Dr. Allen   Patient/Family Therapy Goal Statement (OT) did not report, pt will return to LTC at D/C   Additional Occupational Profile Info/Pertinent History of Current Problem Pt here due to AMS, UTI, med reaction. He has a h/o Lewy body dementia, chronic afib, depression, HTN, PD, and seizure disorder   Existing Precautions/Restrictions fall   Limitations/Impairments safety/cognitive   General Observations and Info Pt in bed upon OT arrival. wife and daughter present   Cognitive Status Examination   Affect/Mental Status (Cognitive) confused   Follows Commands 50-74% accuracy   Range of Motion Comprehensive   Comment, General Range of Motion WFL   Bed Mobility   Comment (Bed Mobility) modA to roll in bed, totalA x2 to boost x2   Transfers   Transfer Comments pt was attempting to transfer supine>sit at EOB however started flaling and was not safe to continue attempting. pt had scooted down in bed and his feet were hitting the footrest   Activities of Daily Living   BADL Assessment/Intervention toileting   Toileting    Position (Toileting) supine   Comment, (Toileting) pt urinated in brief   [place] Level (Toileting) dependent (less than 25% patient effort);assist of 2   Clinical Impression   Criteria for Skilled Therapeutic Interventions Met (OT) Yes, treatment indicated   OT Diagnosis impaired ADLs   Influenced by the following impairments involuntary movements of LEs, weakness, decreased cognition   Assessment of Occupational Performance 1-3 Performance Deficits   Identified Performance Deficits ADLs   Planned Therapy Interventions (OT) ADL retraining;progressive activity/exercise;strengthening   Clinical Decision Making Complexity (OT) low complexity   Risk & Benefits of therapy have been explained evaluation/treatment results reviewed;care plan/treatment goals reviewed;risks/benefits reviewed;patient;spouse/significant other;daughter   Clinical Impression Comments OT evaluation limited today due to involuntary flaling of LEs while pt attempted ADLs. Per wife this started just a couple days ago. Prior to admission, pt resided at LT and required assist with all ADLs. He was able to transfer with assist x1-2 staff but no AD needed. Pt will return to LTC upon D/C. OT to reassess pt's functional status tomorrow to determine if he has improved or not.   OT Total Evaluation Time   OT Eval, Low Complexity Minutes (16467) 10   OT Goals   Therapy Frequency (OT) 5 times/wk   OT Predicted Duration/Target Date for Goal Attainment 02/06/23   OT Goals Toilet Transfer/Toileting;Aerobic Activity   OT: Toilet Transfer/Toileting Moderate assist   OT: Perform aerobic activity with stable cardiovascular response 15 minutes   OT Discharge Planning   OT Plan Reassess ADLs   OT Discharge Recommendation (DC Rec) Long term care facility   OT Rationale for DC Rec OT evaluation limited today due to involuntary flaling of LEs while pt attempted ADLs. Per wife this started just a couple days ago. Prior to admission, pt resided at LT and required  assist with all ADLs. He was able to transfer with assist x1-2 staff but no AD needed. Pt will return to LTC upon D/C. OT to reassess pt's functional status tomorrow to determine if he has improved or not.   Total Session Time   Total Session Time (sum of timed and untimed services) 10

## 2023-01-31 NOTE — PLAN OF CARE
"Most recent vitals: /94 (BP Location: Right arm, Patient Position: Supine, Cuff Size: Adult Regular)   Pulse 102   Temp (!) 100.8  F (38.2  C) (Tympanic)   Resp 16   Ht 1.753 m (5' 9\")   Wt 69 kg (152 lb 1.9 oz)   SpO2 95%   BMI 22.46 kg/m      Pain Management: Pain level 2 on the PAINAD scale  LOC: Alert, Oriented to person  Cardiac: WDL  Respiratory: WDL  GI: No BM's this shift incontinenet  : Incontinent, red/rash rogelio area and coccyx(powder & cream per MAR)  Skin Issues: Blanchable redness (saccrum, buttocks, rogelio area), Scattered bruising & scars.     IVF:  mL/hr    Nutrition: Pureed, thin liquids (feeder)  ADL's: dependent (assist x 2)  Ambulation: Non-Ambulatory  Safety: WDL  Comments:    VS Stable, afebrile, pain 2/10 PAINAD, scheduled tylenol.  Patient more restless throughout day, more alert, and oriented to self.  Speaking more clearly, and less confused then prior day.  Needs direction with cares, feeding, and swallowing.  Restless at times, flailing arms and legs at times hitting them on bed rails no current visual bruising noted. Wife and 2 daughters in room w/ patient majority of day helping with cares such as feeding.   Face to face report given with opportunity to observe patient.    Report given to Israel CORDERO RN    1/30/2023  6:59 PM  Kristen Martinez RN   "

## 2023-02-01 ENCOUNTER — APPOINTMENT (OUTPATIENT)
Dept: SPEECH THERAPY | Facility: HOSPITAL | Age: 88
DRG: 091 | End: 2023-02-01
Payer: MEDICARE

## 2023-02-01 LAB
ALBUMIN SERPL BCG-MCNC: 3 G/DL (ref 3.5–5.2)
ALP SERPL-CCNC: 47 U/L (ref 40–129)
ALT SERPL W P-5'-P-CCNC: 19 U/L (ref 10–50)
ANION GAP SERPL CALCULATED.3IONS-SCNC: 9 MMOL/L (ref 7–15)
AST SERPL W P-5'-P-CCNC: 35 U/L (ref 10–50)
BILIRUB SERPL-MCNC: 0.4 MG/DL
BUN SERPL-MCNC: 8.7 MG/DL (ref 8–23)
CALCIUM SERPL-MCNC: 7.9 MG/DL (ref 8.8–10.2)
CHLORIDE SERPL-SCNC: 113 MMOL/L (ref 98–107)
CREAT SERPL-MCNC: 0.61 MG/DL (ref 0.67–1.17)
DEPRECATED HCO3 PLAS-SCNC: 23 MMOL/L (ref 22–29)
ERYTHROCYTE [DISTWIDTH] IN BLOOD BY AUTOMATED COUNT: 12.4 % (ref 10–15)
GFR SERPL CREATININE-BSD FRML MDRD: >90 ML/MIN/1.73M2
GLUCOSE BLDC GLUCOMTR-MCNC: 112 MG/DL (ref 70–99)
GLUCOSE SERPL-MCNC: 122 MG/DL (ref 70–99)
HCT VFR BLD AUTO: 34.7 % (ref 40–53)
HGB BLD-MCNC: 11.3 G/DL (ref 13.3–17.7)
MCH RBC QN AUTO: 31.5 PG (ref 26.5–33)
MCHC RBC AUTO-ENTMCNC: 32.6 G/DL (ref 31.5–36.5)
MCV RBC AUTO: 97 FL (ref 78–100)
PLATELET # BLD AUTO: 157 10E3/UL (ref 150–450)
POTASSIUM SERPL-SCNC: 3 MMOL/L (ref 3.4–5.3)
POTASSIUM SERPL-SCNC: 3.1 MMOL/L (ref 3.4–5.3)
POTASSIUM SERPL-SCNC: 3.6 MMOL/L (ref 3.4–5.3)
PROT SERPL-MCNC: 5.5 G/DL (ref 6.4–8.3)
RBC # BLD AUTO: 3.59 10E6/UL (ref 4.4–5.9)
SODIUM SERPL-SCNC: 145 MMOL/L (ref 136–145)
WBC # BLD AUTO: 4.6 10E3/UL (ref 4–11)

## 2023-02-01 PROCEDURE — 92526 ORAL FUNCTION THERAPY: CPT | Mod: GN

## 2023-02-01 PROCEDURE — 250N000013 HC RX MED GY IP 250 OP 250 PS 637: Performed by: INTERNAL MEDICINE

## 2023-02-01 PROCEDURE — 99232 SBSQ HOSP IP/OBS MODERATE 35: CPT | Performed by: INTERNAL MEDICINE

## 2023-02-01 PROCEDURE — 36415 COLL VENOUS BLD VENIPUNCTURE: CPT | Performed by: INTERNAL MEDICINE

## 2023-02-01 PROCEDURE — 120N000001 HC R&B MED SURG/OB

## 2023-02-01 PROCEDURE — 84132 ASSAY OF SERUM POTASSIUM: CPT | Performed by: NURSE PRACTITIONER

## 2023-02-01 PROCEDURE — 258N000001 HC RX 258: Performed by: INTERNAL MEDICINE

## 2023-02-01 PROCEDURE — 85027 COMPLETE CBC AUTOMATED: CPT | Performed by: INTERNAL MEDICINE

## 2023-02-01 PROCEDURE — 36415 COLL VENOUS BLD VENIPUNCTURE: CPT | Performed by: NURSE PRACTITIONER

## 2023-02-01 PROCEDURE — 250N000013 HC RX MED GY IP 250 OP 250 PS 637: Performed by: PSYCHIATRY & NEUROLOGY

## 2023-02-01 PROCEDURE — 80053 COMPREHEN METABOLIC PANEL: CPT | Performed by: INTERNAL MEDICINE

## 2023-02-01 RX ORDER — POTASSIUM CHLORIDE 1.5 G/1.58G
40 POWDER, FOR SOLUTION ORAL ONCE
Status: COMPLETED | OUTPATIENT
Start: 2023-02-01 | End: 2023-02-01

## 2023-02-01 RX ADMIN — DEXTROSE AND SODIUM CHLORIDE: 5; 450 INJECTION, SOLUTION INTRAVENOUS at 04:16

## 2023-02-01 RX ADMIN — DEXTROSE AND SODIUM CHLORIDE: 5; 450 INJECTION, SOLUTION INTRAVENOUS at 17:14

## 2023-02-01 RX ADMIN — ANORECTAL OINTMENT: 15.7; .44; 24; 20.6 OINTMENT TOPICAL at 21:16

## 2023-02-01 RX ADMIN — ACETAMINOPHEN 975 MG: 325 TABLET, FILM COATED ORAL at 21:15

## 2023-02-01 RX ADMIN — Medication 5 MG: at 21:15

## 2023-02-01 RX ADMIN — ANORECTAL OINTMENT: 15.7; .44; 24; 20.6 OINTMENT TOPICAL at 10:50

## 2023-02-01 RX ADMIN — Medication 50 MCG: at 10:09

## 2023-02-01 RX ADMIN — ACETAMINOPHEN 975 MG: 325 TABLET, FILM COATED ORAL at 14:46

## 2023-02-01 RX ADMIN — POTASSIUM CHLORIDE 40 MEQ: 1.5 POWDER, FOR SOLUTION ORAL at 10:21

## 2023-02-01 RX ADMIN — MICONAZOLE NITRATE: 2 POWDER TOPICAL at 21:16

## 2023-02-01 RX ADMIN — PANTOPRAZOLE SODIUM 40 MG: 40 TABLET, DELAYED RELEASE ORAL at 06:59

## 2023-02-01 RX ADMIN — TAMSULOSIN HYDROCHLORIDE 0.4 MG: 0.4 CAPSULE ORAL at 10:05

## 2023-02-01 RX ADMIN — SENNOSIDES AND DOCUSATE SODIUM 1 TABLET: 8.6; 5 TABLET ORAL at 10:09

## 2023-02-01 RX ADMIN — ACETAMINOPHEN 975 MG: 325 TABLET, FILM COATED ORAL at 10:06

## 2023-02-01 RX ADMIN — FLUDROCORTISONE ACETATE 0.1 MG: 0.1 TABLET ORAL at 06:58

## 2023-02-01 RX ADMIN — MICONAZOLE NITRATE: 2 POWDER TOPICAL at 10:54

## 2023-02-01 ASSESSMENT — ACTIVITIES OF DAILY LIVING (ADL)
ADLS_ACUITY_SCORE: 59
ADLS_ACUITY_SCORE: 59
ADLS_ACUITY_SCORE: 63
ADLS_ACUITY_SCORE: 63
ADLS_ACUITY_SCORE: 59
ADLS_ACUITY_SCORE: 59
ADLS_ACUITY_SCORE: 63
ADLS_ACUITY_SCORE: 59
ADLS_ACUITY_SCORE: 63

## 2023-02-01 NOTE — PLAN OF CARE
Occupational Therapy Discharge Summary    Reason for therapy discharge:    No further expectations of functional progress. Patient is from long term care facility and receives assistance with all ADLs as baseline.    Progress towards therapy goal(s). See goals on Care Plan in [facility] electronic health record for goal details.  Goals not met.  Barriers to achieving goals:   unable to participate in skilled OT services.    Therapy recommendation(s):    No further therapy is recommended.

## 2023-02-01 NOTE — PLAN OF CARE
Goal Outcome Evaluation:    2130: Patients mentation is improved from last night.  Is able to follow direction.  Is  conversational and making some jokes with staff and in good spirits, is confused to situation and how he came to the hospital.  Gently reassured and reoriented, seems accepting.  Not having noticeable jerking tonight either. Occasional tremor but much improved.  Last night, it was difficult to understand his speech as it was garbled.  Tonight he is able to communicate the urge to void, among other needs.  Did assist patient in using the urinal in bed to no success.  Attempted him standing at edge of bed- as he was able to get to the edge of bed and stand with min assist of 2- pt unable to void.  And then we attempt to have him sit on the commode- he was again not able to void.  He lost his urgency, did bladder scan him once back in bed an got a reading of 592.  Do have order to straight cath but will wait until we can try one more time to help him to void on his own when his urge to void returns, since his mentation seems a little better.     Encouraged some fluids at this time. Teeth brushed and oral cares were provided.

## 2023-02-01 NOTE — PROGRESS NOTES
01/31/23 1800   Appointment Info   Signing Clinician's Name / Credentials (OT) Yelena Petit, OTR/L   Appointment Canceled Reason (OT) Medical status;Fatigue;Other (see Cancel Comments row)   Appointment Cancel Comments (OT) Pt sleeping when OT attempted and was unable to participate in skilled OT at that time. Will attempt again tomorrow.

## 2023-02-01 NOTE — PROGRESS NOTES
"[facility]    Hospitalist Progress Note  Jf Ortiz is a 89 year old male with history of Lewy body dementia with behavioral disturbance, Parkinson's disease, dysphagia, urinary incontinence, REM sleep behavior disorder, orthostatic hypotension, coronary disease, status post coronary stent placement, chronic atrial fibrillation, status post permanent pacemaker placement, hyperlipidemia, who is a nursing home resident at [facility].  Patient's wife reported that his Zoloft dose was increased from 50 to 100 mg and atropine was given 2 days ago for unclear reason.  She noted that he seemed have become more confused and restless.  The nursing home staff noted patient having seizure-like activity and EMS was called and patient was brought to the emergency room.  In the emergency room, patient appeared confused and restless along with spastic motions in his extremities.  He was evaluated with a head CT, which showed no evidence of acute changes.  Urinalysis showed a large leukocyte esterase.  WBC was within normal limit, troponin was negative, BNP was mildly elevated 2612, CRP less than 3.  With the diagnosis of UTI, patient was started on ceftriaxone.  For possible seizure, Keppra 500 mg IV was given as well as 0.5 mg of lorazepam.  Patient is   being admitted to the hospital for further care.  His presenting vital signs to the ER were blood pressure 109/73 pulse 92 temp 98.894% sats on room air.    Assessment & Plan      Altered mental status: Per family rather abrupt change, feeling that it is related to increased sertraline. Concern for \"seizure\" activity due to increased tremor-he does have known history of both parkinson's and has had prior seizure-though not on any chronic treatment for either. However, he is still quite tremulous with increased somnolence possibly due to acute metabolic encephalopathy from UTI in setting of underlying Lewy body dementia. Cannot rule out progression of Lewy body " as well. Because his sertraline was recently increased psychiatry was consulted to rule out serotonin syndrome-please see their notes.  -1/30: Significant improvement in mental status this morning-awakens to voice, asking appropriate questions though still confused. He does follow simple commands.   -1/31: Patient overnight had more episode of confusion and agitation led to very involuntary jerking.  Did get a little bit of Ativan and was then straight cathed about 550 cc.  He did get some Seroquel and now is is quite sedate.  Still having some involuntary jerking.  I did talk it length with his significant other.  He denies Zoloft for a long time and then up to 100 just last couple of days but apparently also was getting some atropine drops for the last several days I think may be because of secretions unsure but I am wondering if that had something to do with his current presentation.  He is having a lot of urinary retention also..  What I would like to do is just hold any other medications if we can if he has another large amount of urinary retention we will need to place a Spears catheter in him as that distention may be causing worsening in his overall agitation.  I do not find any other signs of infection his urine is basically clear growing out some mixed emily has been treated with Rocephin at any rate.  His other lab work is basically unremarkable.  Hemoglobin white count stable electrolytes all look actually rather good.  We will cut down on his IV fluids a little bit here now.  His CT scan of his head showed nothing acute.  Does have fairly significant dementia and perhaps getting a bump on Zoloft which probably should not of caused anything but may be possibly the atropine started this ball rolling here.  What I like to do is just withhold any medications try and get his bladder decompressed and see how he does with all this.  -2/1: Patient clinically improved, conversant.  No acute complaints, only  stating that he is tired.  Altered mental status secondary to anticholinergic syndrome versus urinary retention.  Continue bladder scanning.      Possible Acute cystitis without hematuria: UA positive for leuk esterase and few bacteria, however he does not have a fever, no leukocytosis. Culture is pending, due to possibility of metabolic encephalopathy will continue IV antibiotics until culture complete.  -1/31: His urine is just growing out mixed emily.  Has been treated with Rocephin which should cover anything if truly a problem which I do not feel that it was.  -2/1: Rocephin stopped.  We will monitor him for further infectious symptoms.       Lewy body dementia without behavioral disturbance (H): Long standing and chronic. He is quite somnolent today, clinical dehydrated as well. As above his altered mental status may be just progression of his underlying disease.  -1/31: This is a longstanding known issue with him.  He came in with the decreased level consciousness and confusion.  So likely what ever cause this was at the nursing home being here certainly could prolong it also with anything that were giving him.  We will need to closely monitor him.  -2/1: Patient likely at baseline at this point.  He is conversant, pleasant.  Dementia is apparent.         Chronic atrial fibrillation (H): Rates well controlled, he is not on long term anticoagulation due to high fall risk.  -1/31: Patient does have a pacemaker in place.  EKG shows 100% atrial paced and V paced checking his QTC it is less than 500.  So it is okay for him to get the Seroquel if so needed.  -2/1: No issues       Depression: He has been on sertraline for several years, dose was increased from 50mg to 100mg on 1/16/23, holding for now.  Please see notes from psychiatry.   -1/31: Family is not really sure why he has been on the Zoloft all these years and why it was certainly increased recently.  Psychiatry felt that can be held do not need to do a  taper.  -2/1: Zoloft stopped.       Essential hypertension: Stable  -1/31: Hemodynamically stable at this time.  -2/1: No issues    Orthostatic hypotension: Patient has a Port-A-Cath in place and it is there because he gets recurrent infusions of saline likely due to some his hypotension is also on Florinef.       Parkinson disease (H)    Seizure disorder (H)     Hypokalemia: We will replace    Diet: Pureed Diet (level 4) Thin Liquids (level 0)  Fluids: D5 half-normal saline    DVT Prophylaxis: Enoxaparin (Lovenox) SQ  Code Status: No CPR- Do NOT Intubate    Disposition: Expected discharge tomorrow back to [facility] if stable.    Ronni Cazares MD, MD        Interval History   Patient seen in room.  Patient had issues of urinary retention overnight, however no Spears was placed.  He is still able to spontaneously void.  Otherwise, patient is clinically improved.  Mental status changes have resolved, dementia still apparent.  Patient is interactive, conversant.  No new symptoms, denies pain, shortness of breath.    -Data reviewed today: I reviewed all new labs and imaging results over the last 24 hours. I personally reviewed no images or EKG's today.    Physical Exam   Temp: (!) 96.7  F (35.9  C) Temp src: Tympanic BP: 151/56 Pulse: 78   Resp: 24 SpO2: 94 % O2 Device: None (Room air) Oxygen Delivery: Others (comment) (Dr. Merritt said that delivering oxygen via nasal cannula is most likely just drying patients mouth out and that if SpO2 are adequate to take him off oxygen, patient is currently on room air)  Vitals:    01/30/23 0419 01/31/23 0606 02/01/23 0354   Weight: 69 kg (152 lb 1.9 oz) 75 kg (165 lb 5.5 oz) 76.2 kg (167 lb 15.9 oz)     Vital Signs with Ranges  Temp:  [96.7  F (35.9  C)-99.8  F (37.7  C)] 96.7  F (35.9  C)  Pulse:  [70-81] 78  Resp:  [20-24] 24  BP: (149-166)/(56-86) 151/56  SpO2:  [91 %-96 %] 94 %  I/O last 3 completed shifts:  In: 3356 [I.V.:3356]  Out: 650 [Urine:650]    Peripheral IV  01/31/23 Anterior;Left Lower forearm (Active)   Site Assessment WDL 01/31/23 2149   Line Status Infusing 01/31/23 2149   Phlebitis Scale 0-->no symptoms 01/31/23 2149   Infiltration Scale 0 01/31/23 2149   Number of days: 1       Port A Cath Single 07/12/19 Right Chest wall (Active)   Number of days: 1300       Wound Abdomen Contusion (Active)   Number of days: 284       Wound Thigh Contusion (Active)   Number of days: 284       Wound Coccyx Pressure injury community acquired (Active)   Number of days: 284     No line/device    Constitutional: Awake, conversant  HEENT: Normocephalic/atraumatic, PERRL, EOMI, mouth very dry, neck supple, no LN.   Cardiovascular: Irregular.   1-2 out of 6 murmur, no  rubs, or gallops.  JVD flat.  Bruits no.  Pulses 2  Respiratory: Upper airway rhonchi otherwise clear to auscultation bilaterally  Abdomen: Soft, nontender, nondistended, no organomegaly. Bowel sounds present  Extremities: Warm/dry.  No edema  Neuro: Patient very sedate currently.  A lot of involuntary kind of movements.  Non focal, cranial nerves intact, Moves all extremities.        Medications     dextrose 5% and 0.45% NaCl 75 mL/hr at 02/01/23 0416       acetaminophen  975 mg Oral TID     enoxaparin ANTICOAGULANT  40 mg Subcutaneous Q24H     fludrocortisone  0.1 mg Oral QAM AC     melatonin  5 mg Oral At Bedtime     menthol-zinc oxide   Topical BID     miconazole   Topical BID     pantoprazole  40 mg Oral QAM AC     potassium chloride  40 mEq Oral or Feeding Tube Once     potassium chloride ER  20 mEq Oral Daily     senna-docusate  1 tablet Oral Daily     sodium chloride (PF)  3 mL Intracatheter Q8H     [Held by provider] sodium chloride  1 g Oral BID     tamsulosin  0.4 mg Oral Daily     vitamin D3  50 mcg Oral Daily       Data   Recent Labs   Lab 02/01/23  0716 02/01/23  0504 01/31/23  2211 01/31/23  1618 01/31/23  0810 01/31/23  0527 01/30/23  1502 01/30/23  0509 01/29/23  0840 01/29/23  0629 01/28/23  1657   WBC   --  4.6  --   --   --  7.6  --  8.4  --  8.9 5.6   HGB  --  11.3*  --   --   --  11.9*  --  12.3*  --  12.5* 12.7*   MCV  --  97  --   --   --  99  --  96  --  94 95   PLT  --  157  --   --   --  143*  --  177  --  183 187   INR  --   --   --   --   --   --   --   --   --   --  1.32*   NA  --  145  --   --   --  146*  --  145  --  144 140   POTASSIUM 3.1* 3.0*  --   --   --  4.3  --  3.7  --  4.0 4.0   CHLORIDE  --  113*  --   --   --  113*  --  112*  --  108* 104   CO2  --  23  --   --   --  19*  --  21*  --  24 27   BUN  --  8.7  --   --   --  11.8  --  12.8  --  13.1 11.8   CR  --  0.61*  --   --   --  0.74  --  1.56*  --  1.66* 0.99   ANIONGAP  --  9  --   --   --  14  --  12  --  12 9   ROSALINDA  --  7.9*  --   --   --  8.1*  --  8.3*  --  8.7* 8.9   GLC  --  122* 135* 104*   < > 118*   < > 107*   < > 121* 106*   ALBUMIN  --  3.0*  --   --   --   --   --   --   --  3.4* 3.6   PROTTOTAL  --  5.5*  --   --   --   --   --   --   --  6.0* 6.1*   BILITOTAL  --  0.4  --   --   --   --   --   --   --  0.5 0.5   ALKPHOS  --  47  --   --   --   --   --   --   --  56 56   ALT  --  19  --   --   --   --   --   --   --  11 11   AST  --  35  --   --   --   --   --   --   --  21 16    < > = values in this interval not displayed.       No results found for this or any previous visit (from the past 24 hour(s)).

## 2023-02-01 NOTE — PLAN OF CARE
Pt restless this morning, moving legs and shifting self sideways in bed. Pt needs redirection to stay in bed this shift. Pt remains on video monitoring this shift, and room near nurses' station. Pt afebrile. Pt titrated to room air this shift. Lung sounds are diminished.   Pt receives PRN Seroquel for anxiety/restlessness.   Pt less restless this afternoon. Pt lethargic, unable to assess orientation as garbled speech, and illogical.   Pt incontinent at times this shift x 2 but needs to be straight cathetered with 650 ml output.   Barrier cream to coccyx for blanchable redness.   Medications crushed this shift, and shallows without difficulty.   Pt refuses lunch, and dinner this shift. Poor intake for breakfast.    Face to face report given with opportunity to observe patient.    Report given to CATE Wood.     Mita Turner RN   1/31/2023  7:34 PM

## 2023-02-01 NOTE — PLAN OF CARE
Physical Therapy Discharge Summary    Reason for therapy discharge:    No further expectations of functional progress.  pt with baseline dementia from long term care facility    Progress towards therapy goal(s). [facility] for goal details.  Goals not met.  Barriers to achieving goals:   unable to participate meaningfully with PT.    Therapy recommendation(s):    No further therapy is recommended.    Goal Outcome Evaluation:

## 2023-02-01 NOTE — PROGRESS NOTES
Transportation arranged for return to [facility] at 10:00 on Thursday, 02/02, via [facility] [employer]. Sheri at [facility] was updated on this return.

## 2023-02-01 NOTE — PROVIDER NOTIFICATION
Patient is back into bed now, has been up most of morning in chair. One small incontinent void with a BM around 0920. Bladder scanned at this time for 721 ml. Patient attempted to void, states he doesn't feel like he has to go at this time, unable to void at this time. Spoke with Dr. Cazares, continue to monitor at this time per Dr. Cazares. Did not straight cath patient at this time, will continue to monitor and re-assess as needed.

## 2023-02-01 NOTE — PLAN OF CARE
Goal Outcome Evaluation:       text paged provider that sodium is elevated to see if I should still give sodium tab

## 2023-02-01 NOTE — PLAN OF CARE
"Goal Outcome Evaluation:       Reason for hospital stay:  altered mental status   Living situation PTA: [facility]   Most recent vitals: /56 (BP Location: Right arm, Patient Position: Semi-Collado's, Cuff Size: Adult Regular)   Pulse 78   Temp (!) 96.7  F (35.9  C) (Tympanic)   Resp 24   Ht 1.753 m (5' 9\")   Wt 76.2 kg (167 lb 15.9 oz)   SpO2 94%   BMI 24.81 kg/m      Pain Management:  scheduled tylenol  LOC:  confused to situation   Cardiac:  mur mur heard  Respiratory:  room air, lungs sounds as charted   GI:  WNL  :  bladder scanned 2x, pt was able to void self, nurse did not straight cath as there is already trauma to urethra as there is pink tinged urine and pt is in no discomfort and was able to void by self   Skin Issues:  as charted     IVF:  D5w in 1/2 NS   ABX:  rocephin    Nutrition:  pureed, thin liquids   ADL's:  assist 1/2  Ambulation:assist 2 with gait belt and walker   Safety:  call light in place, bed in low position wit wheels locked, fall alarm on, room near nurses station, close observation remained in place, family in room through night, seizure precautions maintained, repo Q 2 hours       Comments: pt much more aware at beginning of shift was able to maintain conversation, recall events over the past 3 days, make jokes, express the need to use the bathroom, was able to stand at bedside with assistance and was able to eat some pudding and drink fluids. Pt did tolerate melatonin and did help patient sleep through the night     Face to face report given with opportunity to observe patient.    Report given to Miriam Prado RN   2/1/2023  7:28 AM      2/1/2023  5:55 AM  Israel Prado RN                   "

## 2023-02-01 NOTE — PROVIDER NOTIFICATION
Patient stated he had to void, up to bedside commode, only voided approximately 25 ml, unable to void more, Patient then incontinent small amount while returning to bed. Post void bladder scan 615 ml. Update sent to MD at this time.

## 2023-02-01 NOTE — PROGRESS NOTES
"   02/01/23 1100   Appointment Info   Signing Clinician's Name / Credentials (SLP) Erika Camara MA, CF-SLP   Interventions   Interventions Quick Adds Swallowing Dysfunction   Swallowing Dysfunction &/or Oral Function for Feeding   Treatment of Swallowing Dysfunction &/or Oral Function for Feeding Minutes (65590) 15   Symptoms Noted During/After Treatment Fatigue   Treatment Detail/Skilled Intervention Pt was seen this AM for skilled PO trials. Pt upright but sleeping upon SLP's arrival but was rousable with SLP. Pt continuously closed his eyes but was able to stay alert for PO trials. Pt trialed thin liquids, pureed texutres, and minced and moist textures. Pt attempted straw sip with thin liquids but was unable to create enough pressure to obtain any liquid through straw. SLP provided assistance with cup sips where patient had no overt signs/symptoms of aspiration/penetration observed on 2 oz of thin liqudis. Pt trailed 1 oz pureed textures with SLP with adequate bolus manipulation with SLP feeding pt. No overt signs/symptoms of aspiration/penetration observed with this texture. Pt then trialed 1 oz minced and moist textures. Pt swallowed first attempt without any attempts at mastication. Facial grimmacing observed. Pt reported feeling \"sharp pains\" during this trial, suspected due to lack of mastication. SLP cued pt to chew prior to each bite with minced and moist, which increased pt's ability to chew prior to swallow. No overt signs/symptoms of aspiration/penetration observed. Due to pt's variable mentation status and no independence with mastication, SLP recommends continuation of IDDSI 0 and 4 (thin and puree) with 1:1 feeder and frequent cues prior to feeding PO with aspiration precautions.   SLP Discharge Planning   SLP Plan Continue to assess safest/least restrictive diet recommendations.   SLP Discharge Recommendation Long term care facility   SLP Rationale for DC Rec Pt resided in long term care facility " prior to being admitted.   SLP Brief overview of current status  Pt seen this AM for skilled PO trials. Pt trialed thin liquids, pureed textures, and minced & moist textures. Pt without overt signs/symptoms of aspiration/penetration on any texture. With minced & moist, pt was unaware he needed to masticate prior to swallow and needed frequent cues before each trial. Due to this and pt's variable mentation status, SLP recommends continuation of IDDSI 0 and 4 (thin and puree) with 1:1 feeder and frequent cues prior to feeding PO with aspiration precautions.   Total Session Time   Total Session Time (sum of timed and untimed services) 15     Recommendations for Feeding:  - Patient requires 1:1 feeder with eating and drinking  - Patient must sit in the chair at 90 degrees (may require pillows behind back)  - Eliminate all distractions; turn off the TV  - Patient should only eat/drink when they are fully alert  - Encourage small bites and small sips  - Alternate between a bite of food and a sip of liquid  - Check for oral pocketing  - Swallow bite of food/liquid before offering another bite/sip   - Patient must remain upright in chair at least 30-45 minutes after oral intake    NOTE: If patient becomes too tired, there are noted changes in their vocal quality/breathing (wet and gurgly), or they begin coughing frequently, stop feeding immediately and complete oral cares. SLP to re-assess swallowing

## 2023-02-02 VITALS
HEART RATE: 81 BPM | TEMPERATURE: 98.8 F | RESPIRATION RATE: 18 BRPM | WEIGHT: 169.31 LBS | BODY MASS INDEX: 25.08 KG/M2 | HEIGHT: 69 IN | DIASTOLIC BLOOD PRESSURE: 80 MMHG | OXYGEN SATURATION: 98 % | SYSTOLIC BLOOD PRESSURE: 147 MMHG

## 2023-02-02 LAB
GLUCOSE BLDC GLUCOMTR-MCNC: 106 MG/DL (ref 70–99)
MAGNESIUM SERPL-MCNC: 1.6 MG/DL (ref 1.7–2.3)
POTASSIUM SERPL-SCNC: 3.2 MMOL/L (ref 3.4–5.3)
POTASSIUM SERPL-SCNC: 3.6 MMOL/L (ref 3.4–5.3)

## 2023-02-02 PROCEDURE — 250N000013 HC RX MED GY IP 250 OP 250 PS 637: Performed by: INTERNAL MEDICINE

## 2023-02-02 PROCEDURE — 36415 COLL VENOUS BLD VENIPUNCTURE: CPT | Performed by: NURSE PRACTITIONER

## 2023-02-02 PROCEDURE — 99239 HOSP IP/OBS DSCHRG MGMT >30: CPT | Performed by: INTERNAL MEDICINE

## 2023-02-02 PROCEDURE — 250N000013 HC RX MED GY IP 250 OP 250 PS 637: Performed by: NURSE PRACTITIONER

## 2023-02-02 PROCEDURE — 84132 ASSAY OF SERUM POTASSIUM: CPT | Performed by: INTERNAL MEDICINE

## 2023-02-02 PROCEDURE — 83735 ASSAY OF MAGNESIUM: CPT | Performed by: NURSE PRACTITIONER

## 2023-02-02 PROCEDURE — 84132 ASSAY OF SERUM POTASSIUM: CPT | Performed by: NURSE PRACTITIONER

## 2023-02-02 RX ORDER — TAMSULOSIN HYDROCHLORIDE 0.4 MG/1
0.4 CAPSULE ORAL DAILY
Qty: 30 CAPSULE | Refills: 3 | Status: SHIPPED | OUTPATIENT
Start: 2023-02-03

## 2023-02-02 RX ORDER — POTASSIUM CHLORIDE 1.5 G/1.58G
40 POWDER, FOR SOLUTION ORAL ONCE
Status: COMPLETED | OUTPATIENT
Start: 2023-02-02 | End: 2023-02-02

## 2023-02-02 RX ORDER — POTASSIUM CHLORIDE 1.5 G/1.58G
40 POWDER, FOR SOLUTION ORAL ONCE
Status: DISCONTINUED | OUTPATIENT
Start: 2023-02-02 | End: 2023-02-02

## 2023-02-02 RX ORDER — QUETIAPINE FUMARATE 25 MG/1
12.5 TABLET, FILM COATED ORAL EVERY 4 HOURS PRN
Qty: 10 TABLET | Refills: 0 | Status: SHIPPED | OUTPATIENT
Start: 2023-02-02 | End: 2023-03-22

## 2023-02-02 RX ADMIN — ACETAMINOPHEN 975 MG: 325 TABLET, FILM COATED ORAL at 08:08

## 2023-02-02 RX ADMIN — TAMSULOSIN HYDROCHLORIDE 0.4 MG: 0.4 CAPSULE ORAL at 08:08

## 2023-02-02 RX ADMIN — POTASSIUM CHLORIDE 20 MEQ: 1500 TABLET, EXTENDED RELEASE ORAL at 08:08

## 2023-02-02 RX ADMIN — POTASSIUM CHLORIDE 40 MEQ: 1.5 POWDER, FOR SOLUTION ORAL at 06:35

## 2023-02-02 RX ADMIN — MICONAZOLE NITRATE: 2 POWDER TOPICAL at 08:16

## 2023-02-02 RX ADMIN — FLUDROCORTISONE ACETATE 0.1 MG: 0.1 TABLET ORAL at 06:35

## 2023-02-02 RX ADMIN — PANTOPRAZOLE SODIUM 40 MG: 40 TABLET, DELAYED RELEASE ORAL at 06:35

## 2023-02-02 RX ADMIN — Medication 50 MCG: at 08:08

## 2023-02-02 RX ADMIN — ANORECTAL OINTMENT: 15.7; .44; 24; 20.6 OINTMENT TOPICAL at 08:16

## 2023-02-02 RX ADMIN — SENNOSIDES AND DOCUSATE SODIUM 1 TABLET: 8.6; 5 TABLET ORAL at 08:17

## 2023-02-02 ASSESSMENT — ACTIVITIES OF DAILY LIVING (ADL)
ADLS_ACUITY_SCORE: 63

## 2023-02-02 NOTE — PLAN OF CARE
Patient more alert/awake off and on throughout the day today. Up in chair most of morning. Numerous family in to see patient. Remains disoriented to time/place/situation, but pleasant and cooperative. Patient continues to be bladder scanned every 6 hours (see flowsheet) and Dr. Cazares was updated each time. Patient incontinent of urine several times today (see flowsheet) but only voiding small amounts at a time. Continues on flomax. Patient has not been straight cath'd this shift (see provider notification notes/TORB to monitor voiding and update MD). Patient did say he had to void and was able to go a small amount in commode and some incontinence at that time. Last incontinent void was 1630 (see charting). Patient more alert for meals and taking in more fluid and food per family report. IVF running at 75 ml/hr. Blood glucose was 122 & 112 this shift. Seizure precautions maintained. Alarms on, call light within reach, free from falls.     Face to face report given with opportunity to observe patient.    Report given to Maryann Dockery RN   2/1/2023  7:35 PM

## 2023-02-02 NOTE — PROGRESS NOTES
Speech Language Therapy Discharge Summary    Reason for therapy discharge:    Discharged to transitional care facility.    Progress towards therapy goal(s). See goals on Care Plan in [facility] electronic health record for goal details.  Goals not met.  Barriers to achieving goals:   discharge from facility.    Therapy recommendation(s):    Continued therapy is recommended.  Rationale/Recommendations:  continue to assess safest/least restrictive diet..

## 2023-02-02 NOTE — PLAN OF CARE
"Most recent vitals: /71 (BP Location: Right arm, Patient Position: Semi-Collado's)   Pulse 74   Temp 98.1  F (36.7  C) (Tympanic)   Resp 18   Ht 1.753 m (5' 9\")   Wt 76.2 kg (167 lb 15.9 oz)   SpO2 97%   BMI 24.81 kg/m      Patient alert and pleasant but disoriented to time, situation and place. Turn and reposition q2hr. Slept well in between cares. Daughter, Magi, spent the night by the patients bedside. Blanchable redness noted on coccyx and sacrum area. Barrier cream applied. Powder applied around groin area. Patient does take pills crushed in chocolate pudding. Incontinent of urine. Bladder scan q6hr per order. Does attempt to use the urinal. IV infiltrated early this morning. PIV removed and coban placed. Seizure precautions remain in place. Potassium replacement given PO this AM. Bed alarm remains in place. Call light within reach, has been better at communicating needs. Has not used the call light appropriately this shift.     Face to face report given with opportunity to observe patient.    Report given to Tona Montana RN   2/2/2023  7:26 AM      "

## 2023-02-02 NOTE — PROGRESS NOTES
Name: Jf Ortiz    MRN#: [id number]    Reason for Hospitalization: Altered mental status [R41.82];UTI (urinary tract infection) [N39.0];Medication reaction, initial encounter [T50.905A]    AJITH: low    Discharge Date: 2/2/2023    Patient / Family response to discharge plan: agreeable    Follow-Up Appt:   Future Appointments   Date Time Provider Department Center   2/8/2023  9:30 AM [facility] [place]   2/20/2023  2:00 PM Abran Whitman MD [facility] [place]   2/22/2023  9:30 AM [facility] [place]   3/20/2023  2:00 PM Abran Whitman MD [facility] [place]   4/17/2023  2:00 PM Abran Whitman MD [facility] [place]   4/26/2023 12:00 AM [id number]   5/15/2023  2:15 PM Abran Whitman MD [facility] [place]   6/19/2023  2:00 PM Abran Whitman MD [facility] [place]   7/17/2023  2:00 PM Abran Whitman MD [facility] [place]   8/21/2023  2:00 PM Abran Whitman MD [facility] [place]   9/18/2023  2:00 PM Abran Whitman MD [facility] [place]   10/16/2023  2:00 PM Abran Whitman MD [facility] [place]   11/20/2023  2:00 PM Abran Whitman MD [facility] [place]   12/18/2023  2:00 PM Abran Whitman MD [facility] [place]       Other Providers (Care Coordinator, County Services, PCA services etc): Yes: orders sent to [facility]    Discharge Disposition: nursing home- [facility] via [facility] at 1000    MARLENI Joy

## 2023-02-02 NOTE — PLAN OF CARE
"Patient discharged at 10:38 AM via wheel chair accompanied by daughter and staff. Prescriptions sent to patients preferred pharmacy. All belongings sent with patient.     Discharge instructions reviewed with [facility]. Listed belongings gathered and returned to patient. Yes    Patient discharged to [facility].   Report called to Nursing Home:      Surgical Patient   Surgical Procedures during stay: No  Did patient receive discharge instruction on wound care and recognition of infection symptoms? N/A    MISC  Follow up appointment made:  No  Home medications returned to patient: N/A  Patient reports pain was well managed at discharge: Yes     /80 (BP Location: Left arm, Patient Position: Semi-Collado's, Cuff Size: Adult Regular)   Pulse 81   Temp 98.8  F (37.1  C) (Tympanic)   Resp 18   Ht 1.753 m (5' 9\")   Wt 76.8 kg (169 lb 5 oz)   SpO2 98%   BMI 25.00 kg/m       Alert, VSS and daughter Magi in room all shift. Patient denied pain all shift, ate well and voided just prior to leaving with healthline. Bedbath completed with UA this morning prior to discharge. Nurse to Nurse called to charge nurse at [facility].                        "

## 2023-02-02 NOTE — DISCHARGE SUMMARY
[facility]    Discharge Summary  Hospitalist    Date of Admission:  1/28/2023  Date of Discharge:  2/2/2023  Discharging Provider: Ronni Cazares MD, MD  Date of Service (when I saw the patient): 02/02/23    Discharge Diagnoses   Active Problems:    Parkinson disease (H)    Seizure disorder (H)    Lewy body dementia without behavioral disturbance (H)    Chronic atrial fibrillation (H)    Essential hypertension    Altered mental status    UTI (urinary tract infection)  Acute metabolic encephalopathy  Acute urinary retention    Addendum:  Encephalopathy, Multifactorial (please specify):                       Toxic, due to atropine and Zoloft                       Metabolic associated with dehydration                       Secondary to urinary retention      History of Present Illness   Jf Ortiz is an 89 year old male who presented with encephalopathy, questionable seizure.  Please see admission H+P for additional details.    Hospital Course   Jf Ortiz was admitted on 1/28/2023.  89-year-old male with a history of Parkinson's dementia, Lewy body dementia, chronic atrial fibrillation not on anticoagulation due to fall risk, hypertension, as well as orthostasis on Florinef as well as salt tabs, gets weekly fluids through a Port-A-Cath presents with some altered mental status.  He also has history of coronary disease status post stent placement and permanent pacemaker for sick sinus syndrome.  Patient recently had a Zoloft dose increased from 50 to 100 mg.  In addition, patient was prescribed atropine drops, presumably for secretions.  Work-up for acute causes of encephalopathy was negative.  Head CT was negative.  Chest x-ray, UA were negative for infection.  Empiric antibiotics were given on admission, however stopped shortly thereafter.  He does have history of seizure, and this was a question on admission.  Due to the increase in Zoloft, NMS versus serotonin syndrome was considered.  In addition,  with the recent starting epigastric pain, anticholinergic syndrome was a possibility.  Zoloft as well as aspirin were held.  During his hospital stay, his mental status did gradually clear.  Patient is no interactive, able to eat without difficulty.  He is conversant, although his dementia is apparent.  He did exhibit some urinary retention that required straight cath treatments.  He was started on Flomax.  Due to his dementia and high infection risk, we elected not to discharge him with a Spears.  He does have incontinent episodes and he is able to urinate small amounts.  We will discharge him with Flomax, and we will give him referral to urology as an outpatient.  With his mental status returned back to his baseline, he is now stable to be discharged back to skilled nursing facility.  He will follow-up with nursing home physician per facility protocol.    Ronni Cazares MD        Significant Results and Procedures   See below    Pending Results   These results will be followed up by Abran Whitman    Unresulted Labs Ordered in the Past 30 Days of this Admission       No orders found from 12/29/2022 to 1/29/2023.            Code Status   DNR / DNI       Primary Care Physician   Abran Whitman    Physical Exam   Temp: 98.8  F (37.1  C) Temp src: Tympanic BP: 147/80 Pulse: 81   Resp: 18 SpO2: 98 % O2 Device: None (Room air)    Vitals:    01/31/23 0606 02/01/23 0354 02/02/23 0546   Weight: 75 kg (165 lb 5.5 oz) 76.2 kg (167 lb 15.9 oz) 76.8 kg (169 lb 5 oz)     Vital Signs with Ranges  Temp:  [97  F (36.1  C)-98.8  F (37.1  C)] 98.8  F (37.1  C)  Pulse:  [69-81] 81  Resp:  [18] 18  BP: (100-170)/(70-97) 147/80  SpO2:  [91 %-98 %] 98 %  I/O last 3 completed shifts:  In: 1382 [P.O.:420; I.V.:962]  Out: 50 [Urine:50]    Constitutional: AA, NAD  Eyes: PERRLA, no injection, no icterus  HEENT: atraumatic, normocephalic  Respiratory: CTA b/l  Cardiovascular: S1 S2 RRR 3/6 systolic murmur  GI: soft, NT, ND, + bowel  sounds  Lymph/Hematologic: no palpable lymphadenopathy  Skin: no rashes, no lesions  Musculoskeletal: No edema, good tone, no deformities  Neurologic: oriented x 2, no focal deficits  Psychiatric: appropriate affect      Discharge Disposition   Discharged to nursing home  Condition at discharge: Stable    Consultations This Hospital Stay   PHYSICAL THERAPY ADULT IP CONSULT  OCCUPATIONAL THERAPY ADULT IP CONSULT  PSYCHIATRY IP CONSULT  SPEECH LANGUAGE PATH ADULT IP CONSULT    Time Spent on this Encounter   Ronni CORDERO MD, personally saw the patient today and spent greater than 30 minutes discharging this patient.    Discharge Orders      Adult Urology  Referral      General info for SNF    Length of Stay Estimate: Long Term Care  Condition at Discharge: Stable  Level of care:skilled   Rehabilitation Potential: Poor  Admission H&P remains valid and up-to-date: Yes  Recent Chemotherapy: N/A  Use Nursing Home Standing Orders: Yes     Mantoux instructions    Give two-step Mantoux (PPD) Per Facility Policy Yes     Follow Up and recommended labs and tests    Follow up with Nursing home physician.  No follow up labs or test are needed.     Reason for your hospital stay    Altered mental status     Intake and output    Every shift     Bladder scan    X 2 for post void residual     Activity - Up with nursing assistance     Fall precautions     Diet    Follow this diet upon discharge: Orders Placed This Encounter      Pureed Diet (level 4) Thin Liquids (level 0)     Discharge Medications   Current Discharge Medication List        START taking these medications    Details   QUEtiapine (SEROQUEL) 25 MG tablet Take 0.5 tablets (12.5 mg) by mouth every 4 hours as needed (agitation)  Qty: 10 tablet, Refills: 0    Associated Diagnoses: Lewy body dementia without behavioral disturbance (H)      tamsulosin (FLOMAX) 0.4 MG capsule Take 1 capsule (0.4 mg) by mouth daily  Qty: 30 capsule, Refills: 3    Associated  Diagnoses: Acute retention of urine           CONTINUE these medications which have NOT CHANGED    Details   acetaminophen (TYLENOL) 500 MG tablet Take 1,000 mg by mouth 3 times daily      aspirin (ASA) 81 MG chewable tablet Take 81 mg by mouth daily      atorvastatin (LIPITOR) 40 MG tablet Take 40 mg by mouth every evening      calcium carbonate (OS-ROSALINDA) 1500 (600 Ca) MG tablet Take 600 mg by mouth daily      fludrocortisone (FLORINEF) 0.1 MG tablet Take 1 tablet (0.1 mg) by mouth every morning (before breakfast)  Qty: 30 tablet, Refills: 11    Associated Diagnoses: Autonomic orthostatic hypotension      ibuprofen (ADVIL/MOTRIN) 400 MG tablet Take 400 mg by mouth 3 times daily as needed      loperamide (IMODIUM) 2 MG capsule Take 2 mg by mouth as needed -take as directed.      magnesium hydroxide (MILK OF MAGNESIA) 400 MG/5ML suspension Take 30 mLs by mouth daily as needed      melatonin 5 MG tablet Take 5 mg by mouth At Bedtime      memantine (NAMENDA) 5 MG tablet Take 5 mg by mouth 2 times daily      Menthol-Zinc Oxide (MOISTURE BARRIER EX) Apply topically 2 times daily to rash on intergluteal cleft, coccyx and rogelio area. Also may use as needed.      mirabegron (MYRBETRIQ) 25 MG 24 hr tablet Take 25 mg by mouth daily      nystatin POWD Apply topically 2 times daily      potassium chloride ER (KLOR-CON M) 20 MEQ CR tablet TAKE 1 TABLET BY MOUTH EVERY DAY  Qty: 30 tablet, Refills: 1    Comments: Patient enrolled in our Rx Med Sync service to improveadherence. We are requesting a refill authorization inadvance to ensure an active prescription is on file.  Associated Diagnoses: Hypokalemia      RABEprazole (ACIPHEX) 20 MG EC tablet Take 20 mg by mouth 2 times daily      senna-docusate (SENOKOT-S/PERICOLACE) 8.6-50 MG tablet Take 2 tablets by mouth 2 times daily      sodium chloride 1 GM tablet TAKE 1 TABLET BY MOUTH TWICE A DAY  Qty: 180 tablet, Refills: 3    Associated Diagnoses: Parkinson's disease (H); Orthostatic  hypotension      vitamin D3 (CHOLECALCIFEROL) 50 mcg (2000 units) tablet Take 1 tablet by mouth daily           STOP taking these medications       atropine 1 % ophthalmic solution Comments:   Reason for Stopping:         sertraline (ZOLOFT) 100 MG tablet Comments:   Reason for Stopping:             Allergies   Allergies   Allergen Reactions    Cats Unknown    Haldol [Haloperidol]      Per SNF reporting    Klonopin [Clonazepam]      Per SNF reporting    Lamotrigine      Skin lesions    Oxycodone      Per SNF reporting     Data   Most Recent 3 CBC's:  Recent Labs   Lab Test 02/01/23  0504 01/31/23  0527 01/30/23  0509   WBC 4.6 7.6 8.4   HGB 11.3* 11.9* 12.3*   MCV 97 99 96    143* 177      Most Recent 3 BMP's:  Recent Labs   Lab Test 02/02/23  0818 02/02/23  0500 02/01/23  1701 02/01/23  1436 02/01/23  0716 02/01/23  0504 01/31/23  0810 01/31/23  0527 01/30/23  1502 01/30/23  0509   NA  --   --   --   --   --  145  --  146*  --  145   POTASSIUM  --  3.2*  --  3.6 3.1* 3.0*  --  4.3  --  3.7   CHLORIDE  --   --   --   --   --  113*  --  113*  --  112*   CO2  --   --   --   --   --  23  --  19*  --  21*   BUN  --   --   --   --   --  8.7  --  11.8  --  12.8   CR  --   --   --   --   --  0.61*  --  0.74  --  1.56*   ANIONGAP  --   --   --   --   --  9  --  14  --  12   ROSALINDA  --   --   --   --   --  7.9*  --  8.1*  --  8.3*   *  --  112*  --   --  122*   < > 118*   < > 107*    < > = values in this interval not displayed.     Most Recent 2 LFT's:  Recent Labs   Lab Test 02/01/23  0504 01/29/23  0629   AST 35 21   ALT 19 11   ALKPHOS 47 56   BILITOTAL 0.4 0.5     Most Recent INR's and Anticoagulation Dosing History:  Anticoagulation Dose History       Recent Dosing and Labs Latest Ref Rng & Units 10/21/2019 12/18/2020 2/12/2021 6/22/2021 4/23/2022 5/9/2022 1/28/2023    INR 0.85 - 1.15 1.14 1.16(H) 1.12 1.30(H) 1.34(H) 1.21(H) 1.32(H)          Most Recent 3 Troponin's:  Recent Labs   Lab Test 02/12/21  3571  01/25/21  0552 12/18/20  1512   TROPI <0.015 0.015 <0.015     Most Recent Cholesterol Panel:  Recent Labs   Lab Test 01/16/17  1024   CHOL 156   LDL 75   HDL 70   TRIG 56     Most Recent 6 Bacteria Isolates From Any Culture (See EPIC Reports for Culture Details):  Recent Labs   Lab Test 02/12/21 2018 02/12/21 2015 04/13/20  2225   CULT No growth after 6 days No growth after 6 days No MRSA isolated     Most Recent TSH, T4 and A1c Labs:  Recent Labs   Lab Test 07/20/20  1054 04/13/20  1515 05/09/19  1053   TSH  --  3.66 4.88*   T4  --   --  1.10   A1C 5.8*  --   --      Results for orders placed or performed during the hospital encounter of 01/28/23   Head CT w/o contrast    Narrative    PROCEDURE: CT HEAD W/O CONTRAST     HISTORY: tremors, altered emntalcapacity.    COMPARISON: May 18, 2022    TECHNIQUE:  Helical images of the head from the foramen magnum to the  vertex were obtained without contrast.    FINDINGS: There is enlargement of the ventricular system and cortical  sulci consistent with atrophy. There are no masses or ventricular  shifts or extracerebral collections. Brainstem and cerebellum appear  normal.  The visualized paranasal sinuses are clear.      Impression    IMPRESSION: Atrophy. No acute brain abnormality      JACKY JAMES MD         SYSTEM ID:  [id number]     *Note: Due to a large number of results and/or encounters for the requested time period, some results have not been displayed. A complete set of results can be found in Results Review.

## 2023-02-06 ENCOUNTER — NURSING HOME VISIT (OUTPATIENT)
Dept: FAMILY MEDICINE | Facility: OTHER | Age: 88
End: 2023-02-06
Attending: FAMILY MEDICINE
Payer: MEDICARE

## 2023-02-06 VITALS
SYSTOLIC BLOOD PRESSURE: 97 MMHG | OXYGEN SATURATION: 94 % | RESPIRATION RATE: 18 BRPM | DIASTOLIC BLOOD PRESSURE: 79 MMHG | HEART RATE: 70 BPM | TEMPERATURE: 97 F

## 2023-02-06 DIAGNOSIS — R41.0 DELIRIUM: Primary | ICD-10-CM

## 2023-02-06 PROCEDURE — 99309 SBSQ NF CARE MODERATE MDM 30: CPT | Performed by: FAMILY MEDICINE

## 2023-02-07 ENCOUNTER — DOCUMENTATION ONLY (OUTPATIENT)
Dept: OTHER | Facility: CLINIC | Age: 88
End: 2023-02-07
Payer: MEDICARE

## 2023-02-08 ENCOUNTER — INFUSION THERAPY VISIT (OUTPATIENT)
Dept: INFUSION THERAPY | Facility: OTHER | Age: 88
End: 2023-02-08
Attending: INTERNAL MEDICINE
Payer: MEDICARE

## 2023-02-08 VITALS
SYSTOLIC BLOOD PRESSURE: 100 MMHG | BODY MASS INDEX: 22.77 KG/M2 | HEART RATE: 70 BPM | RESPIRATION RATE: 16 BRPM | WEIGHT: 154.2 LBS | TEMPERATURE: 98 F | OXYGEN SATURATION: 97 % | DIASTOLIC BLOOD PRESSURE: 53 MMHG

## 2023-02-08 DIAGNOSIS — E86.9 VOLUME DEPLETION: Primary | ICD-10-CM

## 2023-02-08 PROCEDURE — 250N000011 HC RX IP 250 OP 636: Performed by: FAMILY MEDICINE

## 2023-02-08 PROCEDURE — 258N000003 HC RX IP 258 OP 636: Performed by: FAMILY MEDICINE

## 2023-02-08 PROCEDURE — 96360 HYDRATION IV INFUSION INIT: CPT

## 2023-02-08 RX ORDER — HEPARIN SODIUM (PORCINE) LOCK FLUSH IV SOLN 100 UNIT/ML 100 UNIT/ML
5 SOLUTION INTRAVENOUS ONCE
Status: COMPLETED | OUTPATIENT
Start: 2023-02-08 | End: 2023-02-08

## 2023-02-08 RX ORDER — HEPARIN SODIUM (PORCINE) LOCK FLUSH IV SOLN 100 UNIT/ML 100 UNIT/ML
5 SOLUTION INTRAVENOUS ONCE
Status: CANCELLED
Start: 2023-02-08 | End: 2023-02-08

## 2023-02-08 RX ORDER — NYSTATIN 100000 [USP'U]/G
POWDER TOPICAL 2 TIMES DAILY
COMMUNITY
End: 2023-02-16

## 2023-02-08 RX ADMIN — SODIUM CHLORIDE 1000 ML: 9 INJECTION, SOLUTION INTRAVENOUS at 09:30

## 2023-02-08 RX ADMIN — Medication 5 ML: at 10:34

## 2023-02-08 NOTE — PROGRESS NOTES
Patient is 89 years old, here today for infusion of IVF.      Patient identified with two identifiers, order verified, and verbal consent for today's infusion obtained from patient.      Patients port accessed using non-coring, 20 gauge, 3/4 inch needle. Port accessed per facility protocol. Port flushed easily, blood return noted.  No signs and symptoms of infection or infiltration.      IV pump verified with dose, drug, and rate of administration.  Infusion administered per protocol.

## 2023-02-08 NOTE — PATIENT INSTRUCTIONS

## 2023-02-09 ENCOUNTER — TELEPHONE (OUTPATIENT)
Dept: FAMILY MEDICINE | Facility: OTHER | Age: 88
End: 2023-02-09

## 2023-02-09 NOTE — TELEPHONE ENCOUNTER
Patient continues to come for IVF, approx every 2 weeks. Plan had been switched to Dr Wilson at one point, after you left regular clinic practice. However, you are still listed as patient PCP and noted as still rounding on him as care MD at [facility]. Dr Wilson asked that we send plan to you for sign. Plan sent within epic. Any questions or concerns re plan/signature, please call us at ext [phone]. Thank you!

## 2023-02-10 ENCOUNTER — HOSPITAL ENCOUNTER (EMERGENCY)
Facility: HOSPITAL | Age: 88
Discharge: HOME | End: 2023-02-10
Attending: PHYSICIAN ASSISTANT | Admitting: PHYSICIAN ASSISTANT
Payer: MEDICARE

## 2023-02-10 ENCOUNTER — APPOINTMENT (OUTPATIENT)
Dept: GENERAL RADIOLOGY | Facility: HOSPITAL | Age: 88
End: 2023-02-10
Attending: PHYSICIAN ASSISTANT
Payer: MEDICARE

## 2023-02-10 VITALS
RESPIRATION RATE: 16 BRPM | DIASTOLIC BLOOD PRESSURE: 102 MMHG | SYSTOLIC BLOOD PRESSURE: 146 MMHG | HEART RATE: 71 BPM | TEMPERATURE: 97.8 F | OXYGEN SATURATION: 98 %

## 2023-02-10 DIAGNOSIS — R07.0 THROAT PAIN: ICD-10-CM

## 2023-02-10 DIAGNOSIS — R05.9 COUGH: ICD-10-CM

## 2023-02-10 LAB
FLUAV RNA SPEC QL NAA+PROBE: NEGATIVE
FLUBV RNA RESP QL NAA+PROBE: NEGATIVE
GROUP A STREP BY PCR: NOT DETECTED
RSV RNA SPEC NAA+PROBE: NEGATIVE
SARS-COV-2 RNA RESP QL NAA+PROBE: NEGATIVE

## 2023-02-10 PROCEDURE — 87651 STREP A DNA AMP PROBE: CPT | Performed by: PHYSICIAN ASSISTANT

## 2023-02-10 PROCEDURE — 99283 EMERGENCY DEPT VISIT LOW MDM: CPT | Mod: CS | Performed by: PHYSICIAN ASSISTANT

## 2023-02-10 PROCEDURE — C9803 HOPD COVID-19 SPEC COLLECT: HCPCS

## 2023-02-10 PROCEDURE — 87637 SARSCOV2&INF A&B&RSV AMP PRB: CPT | Performed by: PHYSICIAN ASSISTANT

## 2023-02-10 PROCEDURE — 99284 EMERGENCY DEPT VISIT MOD MDM: CPT | Mod: 25,CS

## 2023-02-10 PROCEDURE — 71045 X-RAY EXAM CHEST 1 VIEW: CPT

## 2023-02-10 ASSESSMENT — ENCOUNTER SYMPTOMS
CONSTIPATION: 1
HEMATURIA: 0
DIFFICULTY URINATING: 1
MYALGIAS: 0
NAUSEA: 0
DIARRHEA: 0
FATIGUE: 1
FREQUENCY: 0
BACK PAIN: 0
LIGHT-HEADEDNESS: 0
SLEEP DISTURBANCE: 0
SHORTNESS OF BREATH: 0
DIZZINESS: 1
CHEST TIGHTNESS: 0
ARTHRALGIAS: 1
NECK PAIN: 0
DYSURIA: 0
APPETITE CHANGE: 1
EXTREMITY WEAKNESS: 0
TROUBLE SWALLOWING: 0
ABDOMINAL PAIN: 0
WHEEZING: 0
COUGH: 1
BLOOD IN STOOL: 0
NUMBNESS: 0
DEPRESSION: 1
WOUND: 0
VOICE CHANGE: 0
NERVOUS/ANXIOUS: 0
EYE PROBLEMS: 0
SEIZURES: 0
PALPITATIONS: 0
SPEECH DIFFICULTY: 0
VOMITING: 0
HEMOPTYSIS: 0
UNEXPECTED WEIGHT CHANGE: 0
LEG SWELLING: 0
ADENOPATHY: 0

## 2023-02-10 ASSESSMENT — ACTIVITIES OF DAILY LIVING (ADL)
ADLS_ACUITY_SCORE: 35

## 2023-02-10 NOTE — DISCHARGE INSTRUCTIONS
Follow-up with your doctor as needed as symptoms worsen he feel he can need to come to the ER he may return to device the ER.  Take your medications as you normally have taken them.  We will contact you if the strep or the COVID test come back positive.  Chest x-ray was negative.

## 2023-02-10 NOTE — ED PROVIDER NOTES
History     Chief Complaint   Patient presents with     Neck Pain     Cough     HPI  Jf Ortiz is a 89 year old male who presents to the ER for evaluation of sore throat and cough.  Currently patient's had a cough with some productive sputum for a few days.  He was complaining that his throat hurt when he swallowed today.  Patient denies having any fevers or chills nursing home did not note.  Patient is not sure why he is here he does have a history of Lewy body dementia.  He is able to tell me that he does not have fever chills he does not have ear pain he does not have chest pain or shortness of breath.    Allergies:  Allergies   Allergen Reactions     Cats Unknown     Haldol [Haloperidol]      Per SNF reporting     Klonopin [Clonazepam]      Per SNF reporting     Lamotrigine      Skin lesions     Oxycodone      Per SNF reporting       Problem List:    Patient Active Problem List    Diagnosis Date Noted     Altered mental status 01/28/2023     Priority: Medium     UTI (urinary tract infection) 01/28/2023     Priority: Medium     Medication reaction, initial encounter 01/28/2023     Priority: Medium     Fall, initial encounter 09/27/2022     Priority: Medium     Hematoma of chest wall, left, initial encounter 04/23/2022     Priority: Medium     Nursing Home Visit 03/03/2022     Priority: Medium     Retinal artery branch occlusion 02/13/2021     Priority: Medium     Vision loss of right eye 02/12/2021     Priority: Medium     Orthostatic hypotension dysautonomic syndrome 02/12/2021     Priority: Medium     Chronic atrial fibrillation (H) 02/12/2021     Priority: Medium     Closed compression fracture of body of L1 vertebra (H) 02/12/2021     Priority: Medium     Closed wedge compression fracture of lumbar vertebra with routine healing 12/20/2020     Priority: Medium     Compression fracture of L1 lumbar vertebra, closed, initial encounter (H) 12/18/2020     Priority: Medium     Compression fracture of thoracic  vertebra, initial encounter, unspecified thoracic vertebral level 12/18/2020     Priority: Medium     Replacing diagnoses that were inactivated after the 10/1/2021 regulatory import.       Longstanding persistent atrial fibrillation (H) 04/13/2020     Priority: Medium     Frequent falls 04/13/2020     Priority: Medium     Coronary artery disease involving native coronary artery without angina pectoris 04/13/2020     Priority: Medium     Constipation, unspecified constipation type 04/11/2018     Priority: Medium     Pacemaker, [employer], Dual Chamber - Dependent 10/06/2017     Priority: Medium     Lewy body dementia without behavioral disturbance (H) 08/31/2017     Priority: Medium     Urinary incontinence 08/31/2017     Priority: Medium     Autonomic orthostatic hypotension 10/14/2016     Priority: Medium     Dementia without behavioral disturbance (H) 07/28/2015     Priority: Medium     Diagnosis updated by automated process. Provider to review and confirm.       Parkinson disease (H)      Priority: Medium     Seizure disorder (H)      Priority: Medium     Volume depletion 08/02/2014     Priority: Medium     Stented coronary artery      Priority: Medium     Cardiac pacemaker in situ 01/08/2013     Priority: Medium     Overview:   [employer] S606 DREL,  Serial #[id number]  5-13-11  Atrial lead [employer] 4054 Serial #[id number]  8-11-00  Ventriculer lead [employer] 4137  Serial #[id number]  5-13-11  2nd Degree AV Block   Dr. Campbell  Intrinsic:  Pt. is Pacemaker Dependant, remains paced at temp. rate of 30 bpm (6-13-14)    Formatting of this note might be different from the original.  [employer] S606 DREL,  Serial #[id number]  5-13-11  Atrial lead [employer] 4054 Serial #[id number]  8-11-00  Ventriculer lead [employer] 4137  Serial #[id number]  5-13-11  2nd Degree AV Block   Dr. Campbell  Intrinsic:  Pt. is Pacemaker Dependant, remains paced at temp. 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 Provider to review       Diaphragmatic hernia 01/01/2011     Priority: Medium     Problem list name updated by automated process. Provider to review       Pain in joint, forearm 07/31/2008     Priority: Medium     Formatting of this note might be different from the original.  IMO Update 10/11       Pain in joint, ankle and foot 07/31/2008     Priority: Medium     Formatting of this note might be different from the original.  IMO Update 10/11       Dysphagia 05/22/2007     Priority: Medium     Overview:   IMO Update    Formatting of this note might be different from the original.  IMO Update       Coronary atherosclerosis of native coronary artery 11/28/2006     Priority: Medium     Overview:   IMO Update 10/11    Formatting of this note might be different from the original.  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  IMO Update 10/11       Hypertension with target blood pressure goal under 150/90 09/05/2006     Priority: Medium     Overview:   IMO Update       Hyperlipidemia 09/05/2006     Priority: Medium     Formatting of this note might be different from the original.  IMO Update 10/11       Essential hypertension 09/05/2006     Priority: Medium     Formatting of this note might be different from the original.  IMO Update          Past Medical History:    Past Medical History:   Diagnosis Date     Autonomic orthostatic hypotension 10/14/2016     Coronary artery disease      Dementia without behavioral disturbance (H) 7/28/2015     Diaphragmatic hernia without mention of obstruction or gangrene 1/1/2011     Hypercholesterolemia 4/23/2013     Osteoarthrosis, unspecified whether generalized or  localized, unspecified site 1/1/2011     Other and unspecified hyperlipidemia 1/1/2011     Pacemaker      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     Seizure disorder (H)      Stented coronary artery        Past Surgical History:    Past Surgical History:   Procedure Laterality Date     ------------OTHER-------------  1955    ulnar and radial fx - repair ulnar and radial fx x4     ------------OTHER-------------  6/14/2011    cataract extraction     BIOPSY  08/2015    skin biopsy     BLEPHAROPLASTY BILATERAL  5/6/2014    Procedure: BLEPHAROPLASTY BILATERAL;  Surgeon: Andrew Queen MD;  Location: [facility]     BYPASS GRAFT ARTERY CORONARY  11/2006    coronary artery disease x 5, [facility]     cataract extraction and lens implantation  2011    cataracts     cataract extraction and lens implantation      cataracts     COLONOSCOPY  2012     colonoscopy with polypectomy  3/13/2009    history of polyps - repeat 3 yrs     colonoscopy with polypectomy  2006     colonoscopy with polypectomy  2005     COMBINED COLONOSCOPY WITH ARGON PLASMA COAGULATOR (APC) N/A 10/31/2014    Procedure: COMBINED COLONOSCOPY WITH ARGON PLASMA COAGULATOR (APC);  Surgeon: Bassam Aguilar MD;  Location: [facility]     COMBINED COLONOSCOPY WITH ARGON PLASMA COAGULATOR (APC) N/A 11/13/2015    Procedure: COMBINED COLONOSCOPY WITH ARGON PLASMA COAGULATOR (APC);  Surgeon: Bassam Aguilar MD;  Location: [facility]     ENDOSCOPY UPPER, COLONOSCOPY, COMBINED N/A 10/31/2014    Procedure: COMBINED ENDOSCOPY UPPER, COLONOSCOPY;  Surgeon: Bassam Aguilar MD;  Location: [facility]     ENDOSCOPY UPPER, COLONOSCOPY, COMBINED N/A 11/13/2015    Procedure: COMBINED ENDOSCOPY UPPER, COLONOSCOPY;  Surgeon: Bassam Aguilar MD;  Location: [facility]     ESOPHAGOSCOPY, GASTROSCOPY, DUODENOSCOPY (EGD), COMBINED  1/22/2014    Procedure: COMBINED ESOPHAGOSCOPY, GASTROSCOPY, DUODENOSCOPY (EGD);  UPPER ENDOSCOPY(CARPENTER) W/ BIOPSIES;  Surgeon: Patricia Carpenter MD;  Location: [facility]     HERNIORRHAPHY  INGUINAL Right 2018    Procedure: HERNIORRHAPHY INGUINAL;  OPEN RIGHT INGUINAL HERNIA REPAIR with Mesh;  Surgeon: Virgilio Zaragoza DO;  Location: [facility]     INSERT PORT VASCULAR ACCESS Right 2019    Procedure: PORT PLACEMENT;  Surgeon: Lloyd Ram MD;  Location: [facility]     LARYNGOSCOPY WITH MICROSCOPE  2014    Procedure: LARYNGOSCOPY WITH MICROSCOPE;;  Surgeon: Chayo Duke MD;  Location: [facility]     pacemaker placement      heart block     pacemaker placement      dual-chamber     REMOVE TUBE, MYRINGOTOMY, COMBINED  2014    Procedure: COMBINED REMOVE TUBE, MYRINGOTOMY;  MICRODIRECT LARYNGOSCOPY WITH BIOPSY AND FROZEN SECTIONS removal of right ear tube and myringoplasty;  Surgeon: Chayo Duke MD;  Location: [facility]     REPLACE PACEMAKER GENERATOR N/A 2018    Procedure: REPLACE PACEMAKER GENERATOR;  Pacemaker generator change;  Surgeon: Alma Kaplan MD;  Location: [facility]     stent placement to LAD  2008     ventilation tube  2012    right in office       Family History:    Family History   Problem Relation Age of Onset     Diabetes Mother      Breast Cancer Daughter        Social History:  Marital Status:  Single [1]  Social History     Tobacco Use     Smoking status: Former     Packs/day: 1.00     Years: 5.00     Pack years: 5.00     Types: Cigarettes     Quit date: 1985     Years since quittin.1     Smokeless tobacco: Never     Tobacco comments:     quit in    Substance Use Topics     Alcohol use: Yes     Comment: social     Drug use: No        Medications:    acetaminophen (TYLENOL) 500 MG tablet  aspirin (ASA) 81 MG chewable tablet  atorvastatin (LIPITOR) 40 MG tablet  calcium carbonate (OS-ROSALINDA) 1500 (600 Ca) MG tablet  fludrocortisone (FLORINEF) 0.1 MG tablet  ibuprofen (ADVIL/MOTRIN) 400 MG tablet  loperamide (IMODIUM) 2 MG capsule  magnesium hydroxide (MILK OF MAGNESIA) 400 MG/5ML suspension  melatonin 5 MG tablet  memantine (NAMENDA) 5  MG tablet  Menthol-Zinc Oxide (MOISTURE BARRIER EX)  mirabegron (MYRBETRIQ) 25 MG 24 hr tablet  nystatin (MYCOSTATIN) 063065 UNIT/GM external powder  potassium chloride ER (KLOR-CON M) 20 MEQ CR tablet  QUEtiapine (SEROQUEL) 25 MG tablet  RABEprazole (ACIPHEX) 20 MG EC tablet  senna-docusate (SENOKOT-S/PERICOLACE) 8.6-50 MG tablet  sodium chloride 1 GM tablet  tamsulosin (FLOMAX) 0.4 MG capsule  vitamin D3 (CHOLECALCIFEROL) 50 mcg (2000 units) tablet          Review of Systems   All other systems reviewed and are negative.      Physical Exam   BP: 128/80  Pulse: 88  Temp: 97.8  F (36.6  C)  Resp: 16  SpO2: 97 %      Physical Exam  Constitutional:       General: He is not in acute distress.     Appearance: Normal appearance. He is normal weight. He is not ill-appearing, toxic-appearing or diaphoretic.   HENT:      Head: Normocephalic and atraumatic.      Right Ear: External ear normal.      Left Ear: External ear normal.      Mouth/Throat:      Mouth: Mucous membranes are moist.      Pharynx: No oropharyngeal exudate or posterior oropharyngeal erythema.   Eyes:      Extraocular Movements: Extraocular movements intact.      Conjunctiva/sclera: Conjunctivae normal.      Pupils: Pupils are equal, round, and reactive to light.   Cardiovascular:      Rate and Rhythm: Normal rate and regular rhythm.   Pulmonary:      Effort: Pulmonary effort is normal. No respiratory distress.      Breath sounds: Normal breath sounds. No wheezing, rhonchi or rales.   Musculoskeletal:         General: Normal range of motion.   Skin:     General: Skin is warm and dry.      Coloration: Skin is not jaundiced or pale.   Neurological:      Mental Status: He is alert and oriented to person, place, and time. Mental status is at baseline.      Cranial Nerves: No cranial nerve deficit.   Psychiatric:         Mood and Affect: Mood normal.         ED Course       I have reviewed the epic chart, the nurses note and triage note. vital signs were  reviewed.  Chest x-ray is obtained and reviewed as negative.  Patient tolerating orals handling secretions able to drink without difficulty.  Will obtain a COVID and a strep.  If either these are positive we will notify [facility].  Patient at this time to go back to his place of residence.    Strep and COVID are both negative.       Procedures                Results for orders placed or performed during the hospital encounter of 02/10/23 (from the past 24 hour(s))   XR Chest Port 1 View    Narrative    Procedure:XR CHEST PORT 1 VIEW    Clinical history:Male, 89 years, cough    Technique: Single view was obtained.    Comparison: 4/23/2022    Findings: The cardiac silhouette is normal. The pulmonary vasculature  is normal.    The lungs are clear. Bony structures are unremarkable. Transvenous  pacer, sternotomy changes and right sided subcutaneous port are  unchanged.      Impression    Impression:   No acute abnormality. No evidence of acute or active cardiopulmonary  disease.    LAWRENCE JAIN MD         SYSTEM ID:  [id number]   Symptomatic Influenza A/B & SARS-CoV2 (COVID-19) Virus PCR Multiplex Nasopharyngeal    Specimen: Nasopharyngeal; Swab   Result Value Ref Range    Influenza A PCR Negative Negative    Influenza B PCR Negative Negative    RSV PCR Negative Negative    SARS CoV2 PCR Negative Negative    Narrative    Testing was performed using the Xpert Xpress CoV2/Flu/RSV Assay on the [employer] GeneXpert Instrument. This test should be ordered for the detection of SARS-CoV-2 and influenza viruses in individuals who meet clinical and/or epidemiological criteria. Test performance is unknown in asymptomatic patients. This test is for in vitro diagnostic use under the FDA EUA for laboratories certified under CLIA to perform high or moderate complexity testing. This test has not been FDA cleared or approved. A negative result does not rule out the presence of PCR inhibitors in the specimen or target RNA in  concentration below the limit of detection for the assay. If only one viral target is positive but coinfection with multiple targets is suspected, the sample should be re-tested with another FDA cleared, approved, or authorized test, if coinfection would change clinical management. This test was validated by the [facility]. These laboratories are certified under the Clinical Laboratory Improvement Amendments of 1988 (CLIA-88) as qualified to perform high complexity laboratory testing.   Group A Streptococcus PCR Throat Swab    Specimen: Throat; Swab   Result Value Ref Range    Group A strep by PCR Not Detected Not Detected    Narrative    The Xpert Xpress Strep A test, performed on the [employer] Systems, is a rapid, qualitative in vitro diagnostic test for the detection of Streptococcus pyogenes (Group A ß-hemolytic Streptococcus, Strep A) in throat swab specimens from patients with signs and symptoms of pharyngitis. The Xpert Xpress Strep A test can be used as an aid in the diagnosis of Group A Streptococcal pharyngitis. The assay is not intended to monitor treatment for Group A Streptococcus infections. The Xpert Xpress Strep A test utilizes an automated real-time polymerase chain reaction (PCR) to detect Streptococcus pyogenes DNA.     *Note: Due to a large number of results and/or encounters for the requested time period, some results have not been displayed. A complete set of results can be found in Results Review.       Medications - No data to display    Assessments & Plan (with Medical Decision Making)     I have reviewed the nursing notes.    I have reviewed the findings, diagnosis, plan and need for follow up with the patient.        New Prescriptions    No medications on file       Final diagnoses:   Throat pain   Cough       2/10/2023   [facility]     Rashad Salomon PA-C  02/10/23 [id number]

## 2023-02-10 NOTE — ED NOTES
"88 y/o male presents via [facility] from [facility] with reports of a cough with brown sputum. Patient currently not complaining of a cough. Chet, staff at [facility], states patient was complaining of having something stuck in his throat. Patient is denying this currently. He denies shortness of breath. He does endorse \"a little\" neck muscle pain. He denies injury.   "

## 2023-02-10 NOTE — PROGRESS NOTES
Nursing Home Hospital Follow Up Visit        HISTORY OF PRESENT ILLNESS:  Jf is a 89 year old male (10/5/1933)  resident of [facility] who is being seen today for acute NH visit..     He has a history of Parkinson's disease, Lewy body dementia, atrial fibrillation, coronary heart disease status post aortocoronary bypass as well as stenting, seizure disorder, ans well as recurrent falls.     Jf was admitted from [facility] after being hospitalized with falls and compression fractures of L1 and L2.and in need of pain control.  This was achieved with the combination of tramadol, voltaren, as well as lidocaine patch.      He was admitted to [facility] after having altered mental status and possible seizure activity. He was placed on empiric antibiotics and was subsequently stopped when laboratory evaluation was negative. He gradually cleared and was returned to the facility.    Discussed with nursing staff who note no new concerns.    The patient is seen in his room.  Family is not present.     Medical records are reviewed.    Current medications, allergies, and interdisciplinary care plan are reviewed.      Patient Active Problem List    Diagnosis Date Noted     Orthostatic hypotension dysautonomic syndrome 02/12/2021     Priority: High     Chronic atrial fibrillation (H) 02/12/2021     Priority: High     Longstanding persistent atrial fibrillation (H) 04/13/2020     Priority: High     Coronary artery disease involving native coronary artery without angina pectoris 04/13/2020     Priority: High     Pacemaker, [employer], Dual Chamber - Dependent 10/06/2017     Priority: High     Lewy body dementia without behavioral disturbance (H) 08/31/2017     Priority: High     Dementia without behavioral disturbance (H) 07/28/2015     Priority: High     Diagnosis updated by automated process. Provider to review and confirm.       Parkinson disease (H)       Priority: High     Seizure disorder (H)      Priority: High     Cardiac pacemaker in situ 01/08/2013     Priority: High     Overview:   [employer] S606 DREL,  Serial #[id number]  5-13-11  Atrial lead [employer] 4054 Serial #[id number]  8-11-00  Ventriculer lead [employer] 4137  Serial #[id number]  5-13-11  2nd Degree AV Block   Dr. Campbell  Intrinsic:  Pt. is Pacemaker Dependant, remains paced at temp. rate of 30 bpm (6-13-14)    Formatting of this note might be different from the original.  [employer] S606 DREL,  Serial #[id number]  5-13-11  Atrial lead [employer] 4054 Serial #[id number]  8-11-00  Ventriculer lead [employer] 4137  Serial #[id number]  5-13-11  2nd Degree AV Block   Dr. Campbell  Intrinsic:  Pt. is Pacemaker Dependant, remains paced at temp. rate of 30 bpm (6-13-14)       Hypertension with target blood pressure goal under 150/90 09/05/2006     Priority: High     Overview:   IMO Update       Essential hypertension 09/05/2006     Priority: High     Formatting of this note might be different from the original.  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     Priority: Medium     Vision loss of right eye 02/12/2021     Priority: Medium     Closed compression fracture of body of L1 vertebra (H) 02/12/2021     Priority: Medium     Closed wedge compression fracture of lumbar vertebra with routine healing 12/20/2020     Priority: Medium     Compression fracture of L1 lumbar vertebra, closed, initial encounter (H) 12/18/2020     Priority: Medium     Compression fracture of thoracic vertebra, initial encounter, unspecified  thoracic vertebral level 12/18/2020     Priority: Medium     Replacing diagnoses that were inactivated after the 10/1/2021 regulatory import.       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 Provider to review       Diaphragmatic hernia 01/01/2011     Priority: Medium     Problem list name updated by automated process. Provider to review       Pain in joint, forearm 07/31/2008     Priority: Medium     Formatting of this note might be different from the original.  IMO Update 10/11       Pain in joint, ankle and foot 07/31/2008     Priority: Medium     Formatting of this note might be different from the original.  IMO Update 10/11       Dysphagia 05/22/2007     Priority: Medium     Overview:   IMO Update    Formatting of this note might be different from the original.  IMO Update       Coronary atherosclerosis of native coronary artery 11/28/2006     Priority: Medium     Overview:   IMO Update 10/11    Formatting of this note might be different from the original.  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  IMO Update 10/11       Hyperlipidemia 09/05/2006     Priority: Medium     Formatting of this note might be different from the original.  IMO Update 10/11       Nursing Home Visit 03/03/2022     Priority: Low          Social History     Socioeconomic History     Marital status: Single     Spouse  name: Not on file     Number of children: Not on file     Years of education: Not on file     Highest education level: Not on file   Occupational History     Occupation: retired   Tobacco Use     Smoking status: Former     Packs/day: 1.00     Years: 5.00     Pack years: 5.00     Types: Cigarettes     Quit date: 1985     Years since quittin.1     Smokeless tobacco: Never     Tobacco comments:     quit in    Substance and Sexual Activity     Alcohol use: Yes     Comment: social     Drug use: No     Sexual activity: Not Currently   Other Topics Concern      Service Not Asked     Blood Transfusions Yes     Caffeine Concern Yes     Comment: 1 cup coffee daily     Occupational Exposure Not Asked     Hobby Hazards Not Asked     Sleep Concern Not Asked     Stress Concern Not Asked     Weight Concern Not Asked     Special Diet Not Asked     Back Care Not Asked     Exercise Not Asked     Bike Helmet Not Asked     Seat Belt Not Asked     Self-Exams Not Asked     Parent/sibling w/ CABG, MI or angioplasty before 65F 55M? No   Social History Narrative     Not on file     Social Determinants of Health     Financial Resource Strain: Not on file   Food Insecurity: Not on file   Transportation Needs: Not on file   Physical Activity: Not on file   Stress: Not on file   Social Connections: Not on file   Intimate Partner Violence: Not on file   Housing Stability: Not on file        Current Outpatient Medications   Medication Sig     acetaminophen (TYLENOL) 500 MG tablet Take 1,000 mg by mouth 3 times daily     aspirin (ASA) 81 MG chewable tablet Take 81 mg by mouth daily     atorvastatin (LIPITOR) 40 MG tablet Take 40 mg by mouth every evening     calcium carbonate (OS-ROSALINDA) 1500 (600 Ca) MG tablet Take 600 mg by mouth daily     fludrocortisone (FLORINEF) 0.1 MG tablet Take 1 tablet (0.1 mg) by mouth every morning (before breakfast)     ibuprofen (ADVIL/MOTRIN) 400 MG tablet Take 400 mg by mouth 3 times daily as  needed     loperamide (IMODIUM) 2 MG capsule Take 2 mg by mouth as needed -take as directed.     magnesium hydroxide (MILK OF MAGNESIA) 400 MG/5ML suspension Take 30 mLs by mouth daily as needed     melatonin 5 MG tablet Take 5 mg by mouth At Bedtime     memantine (NAMENDA) 5 MG tablet Take 5 mg by mouth 2 times daily     Menthol-Zinc Oxide (MOISTURE BARRIER EX) Apply topically 2 times daily to rash on intergluteal cleft, coccyx and rogelio area. Also may use as needed. (Patient not taking: Reported on 2/8/2023)     mirabegron (MYRBETRIQ) 25 MG 24 hr tablet Take 25 mg by mouth daily     potassium chloride ER (KLOR-CON M) 20 MEQ CR tablet TAKE 1 TABLET BY MOUTH EVERY DAY     QUEtiapine (SEROQUEL) 25 MG tablet Take 0.5 tablets (12.5 mg) by mouth every 4 hours as needed (agitation)     RABEprazole (ACIPHEX) 20 MG EC tablet Take 20 mg by mouth 2 times daily     senna-docusate (SENOKOT-S/PERICOLACE) 8.6-50 MG tablet Take 2 tablets by mouth 2 times daily     sodium chloride 1 GM tablet TAKE 1 TABLET BY MOUTH TWICE A DAY     tamsulosin (FLOMAX) 0.4 MG capsule Take 1 capsule (0.4 mg) by mouth daily     vitamin D3 (CHOLECALCIFEROL) 50 mcg (2000 units) tablet Take 1 tablet by mouth daily     nystatin (MYCOSTATIN) 835431 UNIT/GM external powder Apply topically 2 times daily To affected areas     No current facility-administered medications for this visit.       Allergies   Allergen Reactions     Cats Unknown     Haldol [Haloperidol]      Per SNF reporting     Klonopin [Clonazepam]      Per SNF reporting     Lamotrigine      Skin lesions     Oxycodone      Per SNF reporting       I have reviewed the care plan and do agree with the plan.      ROS:  Review of Systems   Constitutional: Positive for appetite change and fatigue. Negative for unexpected weight change.   HENT:   Negative for hearing loss, trouble swallowing and voice change.    Eyes: Negative for eye problems.   Respiratory: Positive for cough. Negative for chest  tightness, hemoptysis, shortness of breath and wheezing.    Cardiovascular: Negative for chest pain, leg swelling and palpitations.   Gastrointestinal: Positive for constipation. Negative for abdominal pain, blood in stool, diarrhea, nausea and vomiting.   Genitourinary: Positive for bladder incontinence and difficulty urinating. Negative for dysuria, frequency, hematuria and nocturia.    Musculoskeletal: Positive for arthralgias and gait problem. Negative for back pain, myalgias and neck pain.   Skin: Negative for itching, rash and wound.   Neurological: Positive for dizziness and gait problem. Negative for extremity weakness, light-headedness, numbness, seizures and speech difficulty.   Hematological: Negative for adenopathy.   Psychiatric/Behavioral: Positive for depression. Negative for sleep disturbance. The patient is not nervous/anxious.              OBJECTIVE:  BP 97/79   Pulse 70   Temp 97  F (36.1  C)   Resp 18   SpO2 94%     GENERAL:  Chronically ill appearing, alert, and in no acute distress  RESP:  Lungs clear.  No rales, rhonchi, or wheezing  CV:  RRR.  S1 S2 without murmur. No clicks or rubs.  SKIN:  Age-related changes.  No suspicious lesions or rashes.  No areas of breakdown.  PSYCH:  Mentation confused, affect bright, and orientation not assessed.  EXTREM:  No edema.  Pulses palpable.      Lab/Diagnostic data:    Reviewed in Epic    ASSESSMENT/ORDERS:  Delirium  Hospital records reviewed in detail.  His symptoms have cleared.  Miguel followl        Total time spent with patient visit was 35 min including patient visit, review of pertinent clinical information, and treatment plan.      Abran Whitman MD FAAFP [facility]  Geriatrics  Hospice and Palliative Care

## 2023-02-10 NOTE — ED NOTES
Pt given fluid challenge swallows without difficulty denies onstruction but states his throat is sore when he swallows.

## 2023-02-16 VITALS — WEIGHT: 151 LBS | BODY MASS INDEX: 22.3 KG/M2

## 2023-02-20 ENCOUNTER — NURSING HOME VISIT (OUTPATIENT)
Dept: FAMILY MEDICINE | Facility: OTHER | Age: 88
End: 2023-02-20
Attending: FAMILY MEDICINE
Payer: MEDICARE

## 2023-02-20 DIAGNOSIS — G31.83 LEWY BODY DEMENTIA WITHOUT BEHAVIORAL DISTURBANCE (H): ICD-10-CM

## 2023-02-20 DIAGNOSIS — I10 HYPERTENSION WITH TARGET BLOOD PRESSURE GOAL UNDER 150/90: ICD-10-CM

## 2023-02-20 DIAGNOSIS — F02.80 LEWY BODY DEMENTIA WITHOUT BEHAVIORAL DISTURBANCE (H): ICD-10-CM

## 2023-02-20 DIAGNOSIS — I25.10 CORONARY ARTERY DISEASE INVOLVING NATIVE CORONARY ARTERY OF NATIVE HEART WITHOUT ANGINA PECTORIS: ICD-10-CM

## 2023-02-20 DIAGNOSIS — G20.A1 PARKINSON DISEASE (H): ICD-10-CM

## 2023-02-20 DIAGNOSIS — Z78.9 NURSING HOME RESIDENT: Primary | ICD-10-CM

## 2023-02-20 PROCEDURE — 99308 SBSQ NF CARE LOW MDM 20: CPT | Performed by: FAMILY MEDICINE

## 2023-02-22 ENCOUNTER — INFUSION THERAPY VISIT (OUTPATIENT)
Dept: INFUSION THERAPY | Facility: OTHER | Age: 88
End: 2023-02-22
Attending: INTERNAL MEDICINE
Payer: MEDICARE

## 2023-02-22 ENCOUNTER — TELEPHONE (OUTPATIENT)
Dept: FAMILY MEDICINE | Facility: OTHER | Age: 88
End: 2023-02-22

## 2023-02-22 DIAGNOSIS — E86.9 VOLUME DEPLETION: Primary | ICD-10-CM

## 2023-02-22 PROCEDURE — 96360 HYDRATION IV INFUSION INIT: CPT

## 2023-02-22 PROCEDURE — 250N000011 HC RX IP 250 OP 636: Performed by: FAMILY MEDICINE

## 2023-02-22 PROCEDURE — 258N000003 HC RX IP 258 OP 636: Performed by: FAMILY MEDICINE

## 2023-02-22 RX ORDER — LACTOSE-REDUCED FOOD
1 LIQUID (ML) ORAL DAILY
COMMUNITY

## 2023-02-22 RX ORDER — HEPARIN SODIUM (PORCINE) LOCK FLUSH IV SOLN 100 UNIT/ML 100 UNIT/ML
5 SOLUTION INTRAVENOUS ONCE
Status: CANCELLED
Start: 2023-02-22 | End: 2023-02-22

## 2023-02-22 RX ORDER — HEPARIN SODIUM (PORCINE) LOCK FLUSH IV SOLN 100 UNIT/ML 100 UNIT/ML
5 SOLUTION INTRAVENOUS ONCE
Status: COMPLETED | OUTPATIENT
Start: 2023-02-22 | End: 2023-02-22

## 2023-02-22 RX ADMIN — Medication 5 ML: at 10:44

## 2023-02-22 RX ADMIN — SODIUM CHLORIDE 1000 ML: 9 INJECTION, SOLUTION INTRAVENOUS at 09:41

## 2023-02-22 NOTE — PROGRESS NOTES
"INTRAVENOUS ACCESS:  IVAD SL/DL: Site 19; 20 G 3/4 \" cotton gripper plus needle; Accessed without difficulty:  YES flushed with 10cc NS and 5cc 100u/ml heparin and needle d/c'd intact; Labs drawn from IVAD: NO    Central line dressing: Placed     Intravenous fluids were administered, normal saline 1000 cc's.    DRUG ADMINISTRATION:  Infusion rate(s) regulated using pump, Vital signs monitored, Blood return noted prior and post infusion(s), Site patent and intact, free of redness, edema or complaint of discomfort, No evidence of extravasations, Patient tolerated administration of agents without incident and Access discontinued per protocol  "

## 2023-02-22 NOTE — PROGRESS NOTES
Nursing Home Visit    HISTORY OF PRESENT ILLNESS:  Jf is a 89 year old male (10/5/1933)  resident of [facility] who is being seen today for routine 30 day follow up.     He has a history of Parkinson's disease, Lewy body dementia, atrial fibrillation, coronary heart disease status post aortocoronary bypass as well as stenting, seizure disorder, ans well as recurrent falls.    Jf was admitted from [facility] after being hospitalized with falls and compression fractures of L1 and L2.and in need of pain control.  This was achieved with the combination of tramadol, voltaren, as well as lidocaine patch.      The patient notes no current complaints.     Discussed with nursing staff who note occasional hypotensive episodes.    The patient is seen in his room.  Family is not present.     Medical records are reviewed.    Current medications, allergies, and interdisciplinary care plan are reviewed.      Patient Active Problem List    Diagnosis Date Noted     Orthostatic hypotension dysautonomic syndrome 02/12/2021     Priority: High     Chronic atrial fibrillation (H) 02/12/2021     Priority: High     Longstanding persistent atrial fibrillation (H) 04/13/2020     Priority: High     Coronary artery disease involving native coronary artery without angina pectoris 04/13/2020     Priority: High     Pacemaker, [employer], Dual Chamber - Dependent 10/06/2017     Priority: High     Lewy body dementia without behavioral disturbance (H) 08/31/2017     Priority: High     Dementia without behavioral disturbance (H) 07/28/2015     Priority: High     Diagnosis updated by automated process. Provider to review and confirm.       Parkinson disease (H)      Priority: High     Seizure disorder (H)      Priority: High     Cardiac pacemaker in situ 01/08/2013     Priority: High     Overview:   [employer] S606 DREL,  Serial #[id number]  5-13-11  Atrial lead [employer] 4054 Serial  #[id number]  8-11-00  Ventriculer lead [employer] 4137  Serial #[id number]  5-13-11  2nd Degree AV Block   Dr. Campbell  Intrinsic:  Pt. is Pacemaker Dependant, remains paced at temp. rate of 30 bpm (6-13-14)    Formatting of this note might be different from the original.  [employer] S606 DREL,  Serial #[id number]  5-13-11  Atrial lead [employer] 4054 Serial #[id number]  8-11-00  Ventriculer lead [employer] 4137  Serial #[id number]  5-13-11  2nd Degree AV Block   Dr. Campbell  Intrinsic:  Pt. is Pacemaker Dependant, remains paced at temp. rate of 30 bpm (6-13-14)       Hypertension with target blood pressure goal under 150/90 09/05/2006     Priority: High     Overview:   IMO Update       Essential hypertension 09/05/2006     Priority: High     Formatting of this note might be different from the original.  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     Priority: Medium     Vision loss of right eye 02/12/2021     Priority: Medium     Closed compression fracture of body of L1 vertebra (H) 02/12/2021     Priority: Medium     Closed wedge compression fracture of lumbar vertebra with routine healing 12/20/2020     Priority: Medium     Compression fracture of L1 lumbar vertebra, closed, initial encounter (H) 12/18/2020     Priority: Medium     Compression fracture of thoracic vertebra, initial encounter, unspecified thoracic vertebral level 12/18/2020     Priority: Medium     Replacing diagnoses that were inactivated after the 10/1/2021 regulatory import.       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 Provider to review       Diaphragmatic hernia 01/01/2011     Priority: Medium     Problem list name updated by automated process. Provider to review       Pain in joint, forearm 07/31/2008     Priority: Medium     Formatting of this note might be different from the original.  IMO Update 10/11       Pain in joint, ankle and foot 07/31/2008     Priority: Medium     Formatting of this note might be different from the original.  IMO Update 10/11       Dysphagia 05/22/2007     Priority: Medium     Overview:   IMO Update    Formatting of this note might be different from the original.  IMO Update       Coronary atherosclerosis of native coronary artery 11/28/2006     Priority: Medium     Overview:   IMO Update 10/11    Formatting of this note might be different from the original.  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  IMO Update 10/11       Hyperlipidemia 09/05/2006     Priority: Medium     Formatting of this note might be different from the original.  IMO Update 10/11       Nursing Home Visit 03/03/2022     Priority: Low          Social History     Socioeconomic History     Marital status: Single     Spouse name: Not on file     Number of children: Not on file     Years of education: Not on file     Highest education level: Not on file   Occupational History     Occupation: retired   Tobacco Use     Smoking status: Former     Packs/day: 1.00      Years: 5.00     Pack years: 5.00     Types: Cigarettes     Quit date: 1985     Years since quittin.1     Smokeless tobacco: Never     Tobacco comments:     quit in    Substance and Sexual Activity     Alcohol use: Yes     Comment: social     Drug use: No     Sexual activity: Not Currently   Other Topics Concern      Service Not Asked     Blood Transfusions Yes     Caffeine Concern Yes     Comment: 1 cup coffee daily     Occupational Exposure Not Asked     Hobby Hazards Not Asked     Sleep Concern Not Asked     Stress Concern Not Asked     Weight Concern Not Asked     Special Diet Not Asked     Back Care Not Asked     Exercise Not Asked     Bike Helmet Not Asked     Seat Belt Not Asked     Self-Exams Not Asked     Parent/sibling w/ CABG, MI or angioplasty before 65F 55M? No   Social History Narrative     Not on file     Social Determinants of Health     Financial Resource Strain: Not on file   Food Insecurity: Not on file   Transportation Needs: Not on file   Physical Activity: Not on file   Stress: Not on file   Social Connections: Not on file   Intimate Partner Violence: Not on file   Housing Stability: Not on file        Current Outpatient Medications   Medication Sig     acetaminophen (TYLENOL) 500 MG tablet Take 1,000 mg by mouth 3 times daily     aspirin (ASA) 81 MG chewable tablet Take 81 mg by mouth daily     atorvastatin (LIPITOR) 40 MG tablet Take 40 mg by mouth every evening     calcium carbonate (OS-ROSALINDA) 1500 (600 Ca) MG tablet Take 600 mg by mouth daily     fludrocortisone (FLORINEF) 0.1 MG tablet Take 1 tablet (0.1 mg) by mouth every morning (before breakfast)     ibuprofen (ADVIL/MOTRIN) 400 MG tablet Take 400 mg by mouth 3 times daily as needed     loperamide (IMODIUM) 2 MG capsule Take 2 mg by mouth as needed -take as directed.     magnesium hydroxide (MILK OF MAGNESIA) 400 MG/5ML suspension Take 30 mLs by mouth daily as needed     melatonin 5 MG tablet Take 5 mg by mouth At  Bedtime     memantine (NAMENDA) 5 MG tablet Take 5 mg by mouth 2 times daily     mirabegron (MYRBETRIQ) 25 MG 24 hr tablet Take 25 mg by mouth daily     potassium chloride ER (KLOR-CON M) 20 MEQ CR tablet TAKE 1 TABLET BY MOUTH EVERY DAY     QUEtiapine (SEROQUEL) 25 MG tablet Take 0.5 tablets (12.5 mg) by mouth every 4 hours as needed (agitation)     RABEprazole (ACIPHEX) 20 MG EC tablet Take 20 mg by mouth 2 times daily     senna-docusate (SENOKOT-S/PERICOLACE) 8.6-50 MG tablet Take 2 tablets by mouth 2 times daily     sodium chloride 1 GM tablet TAKE 1 TABLET BY MOUTH TWICE A DAY     tamsulosin (FLOMAX) 0.4 MG capsule Take 1 capsule (0.4 mg) by mouth daily     vitamin D3 (CHOLECALCIFEROL) 50 mcg (2000 units) tablet Take 1 tablet by mouth daily     No current facility-administered medications for this visit.       Allergies   Allergen Reactions     Cats Unknown     Haldol [Haloperidol]      Per SNF reporting     Klonopin [Clonazepam]      Per SNF reporting     Lamotrigine      Skin lesions     Oxycodone      Per SNF reporting       I have reviewed the care plan and do agree with the plan.      ROS:  No chest pain, shortness of breath, fever, chills, headache, nausea, vomiting, dysuria, or changes in bowel habits.  Appetite is poor.  Low back pain noted.          OBJECTIVE:  Wt 68.5 kg (151 lb)   BMI 22.30 kg/m      GENERAL: Healthy, alert and no distress  EYES: Eyes grossly normal to inspection.  No discharge or erythema, or obvious scleral/conjunctival abnormalities.  RESP: No audible wheeze, cough, or visible cyanosis.  No visible retractions or increased work of breathing.    SKIN: Visible skin clear. No significant rash, abnormal pigmentation or lesions.  NEURO: Cranial nerves grossly intact.  Mentation impaired. Speech appropriate for age.  PSYCH: Mentation shows impaired cognition affect normal/bright, judgement and insight intact, normal speech and appearance well-groomed.        Lab/Diagnostic data:     Reviewed    ASSESSMENT/ORDERS:  Nursing Home Visit  Discussed with staff. Care plan reviewed and orders updated.  Chronic issues are stable. Routine 30 day Nursing Home follow up.     Fall  No sequelae.  Reviewed with nursing.  Will follow.    Closed compression fracture of L1 lumbar vertebra with routine healing, subsequent encounter  Stable    Lewy body dementia without behavioral disturbance (H)  Stable    Parkinson disease (H)  Stable    Coronary artery disease involving native coronary artery of native heart without angina pectoris  No recurrent symptoms    Hypertension with target blood pressure goal under 150/90  Recent readings reviewed.  Continue same medication regimen        Total time spent with patient visit was 25 min including patient visit, review of pertinent clinical information, and treatment plan.      Abran Whitman MD FAAFP [facility]  Geriatrics  Hospice and Palliative Care

## 2023-03-03 ENCOUNTER — TELEPHONE (OUTPATIENT)
Dept: OTOLARYNGOLOGY | Facility: OTHER | Age: 88
End: 2023-03-03

## 2023-03-03 DIAGNOSIS — H92.13 OTORRHEA, BILATERAL: Primary | ICD-10-CM

## 2023-03-03 RX ORDER — CIPROFLOXACIN AND DEXAMETHASONE 3; 1 MG/ML; MG/ML
4 SUSPENSION/ DROPS AURICULAR (OTIC) 2 TIMES DAILY
Qty: 2.8 ML | Refills: 0 | Status: SHIPPED | OUTPATIENT
Start: 2023-03-03 | End: 2023-03-10

## 2023-03-03 NOTE — TELEPHONE ENCOUNTER
OB Start Ciprodex BID for 7 days. He should be seen next week.TR    Right ear has a tube present at his last visit

## 2023-03-03 NOTE — TELEPHONE ENCOUNTER
Alessia from patient's facility called and states that Jf has been having brown drainage coming out of his ears that runs down his neck.  This does not have an odor. He did use the Debrox drops a few days ago.  Patient uses [facility].  Please advise.

## 2023-03-13 ENCOUNTER — TELEPHONE (OUTPATIENT)
Dept: OTOLARYNGOLOGY | Facility: OTHER | Age: 88
End: 2023-03-13

## 2023-03-13 DIAGNOSIS — R33.9 URINARY RETENTION: Primary | ICD-10-CM

## 2023-03-15 ENCOUNTER — TELEPHONE (OUTPATIENT)
Dept: INFUSION THERAPY | Facility: OTHER | Age: 88
End: 2023-03-15

## 2023-03-15 NOTE — NURSING NOTE
Call received from [facility] to inquire about if Jf should be scheduled for infusions. Orders were confirmed that they are present, and should be scheduled. Call transferred down to scheduling.

## 2023-03-16 NOTE — PROGRESS NOTES
Chief Complaint   Patient presents with     Ear Problem     Follow up ear drainage     Patient presents to ENT for concerns regarding active otorrhea.  Staff from [facility] contacted nurse on 3/3/2023 and informed they attempted to complete ear wash and then Jf developed drainage from his ear.  At that time Ciprodex drops were sent in for 7 days and recommended ENT follow-up to ensure ears were improved.    Today, Jf reports his ears feel funny and possible drainage a few days ago. He has history of having tubes placed and currently is known to have his right T-tube.      Audiogram completed 3/4/22:  Tympanograms are Type A for left ear suggesting normal eardrum mobility and TYpe B large volume right-pe tube.  Acoustic Reflex Thresholds at 1000 Hz are present for both ears.  Thresholds are decreased compared to 2017 moderate to profounds sensorineural hearing loss.  Speech reception thresholds are in good agreement with pure tone average.  Word discrimination scores are very poor right and good left at supra-thresholds level.      Past Medical History:   Diagnosis Date     Autonomic orthostatic hypotension 10/14/2016     Coronary artery disease      Dementia without behavioral disturbance (H) 7/28/2015    Diagnosis updated by automated process. Provider to review and confirm.     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     Seizure disorder (H)      Stented coronary artery         Allergies   Allergen Reactions     Cats Unknown     Haldol [Haloperidol]      Per SNF reporting     Klonopin [Clonazepam]      Per SNF reporting     Lamotrigine      Skin lesions     Oxycodone      Per SNF reporting     Current Outpatient Medications   Medication      "acetaminophen (TYLENOL) 500 MG tablet     aspirin (ASA) 81 MG chewable tablet     atorvastatin (LIPITOR) 40 MG tablet     calcium carbonate (OS-ROSALINDA) 1500 (600 Ca) MG tablet     fludrocortisone (FLORINEF) 0.1 MG tablet     ibuprofen (ADVIL/MOTRIN) 400 MG tablet     loperamide (IMODIUM) 2 MG capsule     magnesium hydroxide (MILK OF MAGNESIA) 400 MG/5ML suspension     melatonin 5 MG tablet     memantine (NAMENDA) 5 MG tablet     mirabegron (MYRBETRIQ) 25 MG 24 hr tablet     Nutritional Supplements (BOOST)     potassium chloride ER (KLOR-CON M) 20 MEQ CR tablet     QUEtiapine (SEROQUEL) 25 MG tablet     RABEprazole (ACIPHEX) 20 MG EC tablet     senna-docusate (SENOKOT-S/PERICOLACE) 8.6-50 MG tablet     sodium chloride 1 GM tablet     tamsulosin (FLOMAX) 0.4 MG capsule     vitamin D3 (CHOLECALCIFEROL) 50 mcg (2000 units) tablet     No current facility-administered medications for this visit.     ROS- SEE HPI  /84 (Cuff Size: Adult Regular)   Pulse 72   Temp 98.2  F (36.8  C) (Tympanic)   Ht 1.753 m (5' 9\")   Wt 69.8 kg (153 lb 12.8 oz)   SpO2 94%   BMI 22.71 kg/m      General - He presents via w/c, sleeping when I entered the room. He rouses with verbal stimuli and remained in w/c during visit.   Head and Face - Normocephalic and atraumatic, with no gross asymmetry noted.  The facial nerve is intact, with strong symmetric movements.  Voice and Breathing - The patient was breathing comfortably without the use of accessory muscles. There was no wheezing, stridor. The patients voice was clear and strong, and had appropriate pitch and quality.  Ears - The ears were examined with binocular microscopy and with otoscope.  External ears normal. Right canal cerumen impacted/ dried crusting/ film.  Left canal cerumen partially impacted.  The right ear was cleaned with #7 cuevas and cupped forceps.  Patent appearing t-tube in right tympanic membrane without obvious effusion, retraction or mass. The left ear was cleaned " with #7 suction.  Left tympanic membrane is intact without effusion, retraction or mass.  Eyes - Extraocular movements intact, sclera were not icteric or injected, conjunctiva were pink and moist.  Nose - External contour is symmetric, no gross deflection or scars.  Nasal mucosa is pink and moist with no abnormal mucus.        ASSESSMENT/ PLAN:    ICD-10-CM    1. Otorrhea, right - resolved  H92.11       2. Patent tympanostomy tube  Z96.22       3. ASNHL (asymmetrical sensorineural hearing loss)  H90.3       4. Excessive cerumen in both ear canals  H61.23           Ears were cleaned.  No active otorrhea or drainage on exam today.  Right tube remains in good position and patent.  Left ear remains clear there is no effusion present.  Recommended tube check and ear cleaning in 6 months.  Advised no use of ear flushing in right ear due to T-tube present.    Recommended LORA use.       Geeta Alegria PA-C  ENT  [facility], [place]

## 2023-03-17 ENCOUNTER — OFFICE VISIT (OUTPATIENT)
Dept: OTOLARYNGOLOGY | Facility: OTHER | Age: 88
End: 2023-03-17
Attending: PHYSICIAN ASSISTANT
Payer: MEDICARE

## 2023-03-17 VITALS
SYSTOLIC BLOOD PRESSURE: 136 MMHG | BODY MASS INDEX: 22.78 KG/M2 | TEMPERATURE: 98.2 F | DIASTOLIC BLOOD PRESSURE: 84 MMHG | OXYGEN SATURATION: 94 % | WEIGHT: 153.8 LBS | HEART RATE: 72 BPM | HEIGHT: 69 IN

## 2023-03-17 DIAGNOSIS — Z96.22 PATENT TYMPANOSTOMY TUBE: ICD-10-CM

## 2023-03-17 DIAGNOSIS — H92.11 OTORRHEA, RIGHT: Primary | ICD-10-CM

## 2023-03-17 DIAGNOSIS — H61.23 EXCESSIVE CERUMEN IN BOTH EAR CANALS: ICD-10-CM

## 2023-03-17 DIAGNOSIS — H90.3 ASNHL (ASYMMETRICAL SENSORINEURAL HEARING LOSS): ICD-10-CM

## 2023-03-17 PROCEDURE — 92504 EAR MICROSCOPY EXAMINATION: CPT | Performed by: PHYSICIAN ASSISTANT

## 2023-03-17 PROCEDURE — G0463 HOSPITAL OUTPT CLINIC VISIT: HCPCS

## 2023-03-17 PROCEDURE — 99213 OFFICE O/P EST LOW 20 MIN: CPT | Mod: 25 | Performed by: PHYSICIAN ASSISTANT

## 2023-03-17 ASSESSMENT — PAIN SCALES - GENERAL: PAINLEVEL: NO PAIN (0)

## 2023-03-17 NOTE — PATIENT INSTRUCTIONS
Ears were cleaned.   Both ears had debris removed.   Right tube is still open and in good position.   Follow up in 6 months for ear cleaning/ tube check  Avoid ear flushes in right ear due to tube.       Thank you for allowing Geeta Alegria PA-C and our ENT team to participate in your care.  If your medications are too expensive, please give the nurse a call.  We can possibly change this medication.  If you have a scheduling or an appointment question please contact our Health Unit Coordinator at [phone], Ext. [phone].    ALL nursing questions or concerns can be directed to your ENT nurse at: [phone] Rehana

## 2023-03-17 NOTE — LETTER
3/17/2023         RE: Jf Ortiz  [facility]  [address] [facility] [address]        Dear Colleague,    Thank you for referring your patient, Jf Ortiz, to the [facility]. Please see a copy of my visit note below.        Chief Complaint   Patient presents with     Ear Problem     Follow up ear drainage     Patient presents to ENT for concerns regarding active otorrhea.  Staff from [facility] contacted nurse on 3/3/2023 and informed they attempted to complete ear wash and then Jf developed drainage from his ear.  At that time Ciprodex drops were sent in for 7 days and recommended ENT follow-up to ensure ears were improved.    Today, Jf reports his ears feel funny and possible drainage a few days ago. He has history of having tubes placed and currently is known to have his right T-tube.      Audiogram completed 3/4/22:  Tympanograms are Type A for left ear suggesting normal eardrum mobility and TYpe B large volume right-pe tube.  Acoustic Reflex Thresholds at 1000 Hz are present for both ears.  Thresholds are decreased compared to 2017 moderate to profounds sensorineural hearing loss.  Speech reception thresholds are in good agreement with pure tone average.  Word discrimination scores are very poor right and good left at supra-thresholds level.      Past Medical History:   Diagnosis Date     Autonomic orthostatic hypotension 10/14/2016     Coronary artery disease      Dementia without behavioral disturbance (H) 7/28/2015    Diagnosis updated by automated process. Provider to review and confirm.     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     Seizure disorder (H)      Stented coronary artery         Allergies  "  Allergen Reactions     Cats Unknown     Haldol [Haloperidol]      Per SNF reporting     Klonopin [Clonazepam]      Per SNF reporting     Lamotrigine      Skin lesions     Oxycodone      Per SNF reporting     Current Outpatient Medications   Medication     acetaminophen (TYLENOL) 500 MG tablet     aspirin (ASA) 81 MG chewable tablet     atorvastatin (LIPITOR) 40 MG tablet     calcium carbonate (OS-ROSALINDA) 1500 (600 Ca) MG tablet     fludrocortisone (FLORINEF) 0.1 MG tablet     ibuprofen (ADVIL/MOTRIN) 400 MG tablet     loperamide (IMODIUM) 2 MG capsule     magnesium hydroxide (MILK OF MAGNESIA) 400 MG/5ML suspension     melatonin 5 MG tablet     memantine (NAMENDA) 5 MG tablet     mirabegron (MYRBETRIQ) 25 MG 24 hr tablet     Nutritional Supplements (BOOST)     potassium chloride ER (KLOR-CON M) 20 MEQ CR tablet     QUEtiapine (SEROQUEL) 25 MG tablet     RABEprazole (ACIPHEX) 20 MG EC tablet     senna-docusate (SENOKOT-S/PERICOLACE) 8.6-50 MG tablet     sodium chloride 1 GM tablet     tamsulosin (FLOMAX) 0.4 MG capsule     vitamin D3 (CHOLECALCIFEROL) 50 mcg (2000 units) tablet     No current facility-administered medications for this visit.     ROS- SEE HPI  /84 (Cuff Size: Adult Regular)   Pulse 72   Temp 98.2  F (36.8  C) (Tympanic)   Ht 1.753 m (5' 9\")   Wt 69.8 kg (153 lb 12.8 oz)   SpO2 94%   BMI 22.71 kg/m      General - He presents via w/c, sleeping when I entered the room. He rouses with verbal stimuli and remained in w/c during visit.   Head and Face - Normocephalic and atraumatic, with no gross asymmetry noted.  The facial nerve is intact, with strong symmetric movements.  Voice and Breathing - The patient was breathing comfortably without the use of accessory muscles. There was no wheezing, stridor. The patients voice was clear and strong, and had appropriate pitch and quality.  Ears - The ears were examined with binocular microscopy and with otoscope.  External ears normal. Right canal cerumen " impacted/ dried crusting/ film.  Left canal cerumen partially impacted.  The right ear was cleaned with #7 cuevas and cupped forceps.  Patent appearing t-tube in right tympanic membrane without obvious effusion, retraction or mass. The left ear was cleaned with #7 suction.  Left tympanic membrane is intact without effusion, retraction or mass.  Eyes - Extraocular movements intact, sclera were not icteric or injected, conjunctiva were pink and moist.  Nose - External contour is symmetric, no gross deflection or scars.  Nasal mucosa is pink and moist with no abnormal mucus.        ASSESSMENT/ PLAN:    ICD-10-CM    1. Otorrhea, right - resolved  H92.11       2. Patent tympanostomy tube  Z96.22       3. ASNHL (asymmetrical sensorineural hearing loss)  H90.3       4. Excessive cerumen in both ear canals  H61.23           Ears were cleaned.  No active otorrhea or drainage on exam today.  Right tube remains in good position and patent.  Left ear remains clear there is no effusion present.  Recommended tube check and ear cleaning in 6 months.  Advised no use of ear flushing in right ear due to T-tube present.    Recommended LORA use.       Geeta Alegria PA-C  ENT  [facility], [place]          Again, thank you for allowing me to participate in the care of your patient.        Sincerely,        Geeta Alegria PA-C

## 2023-03-21 ENCOUNTER — INFUSION THERAPY VISIT (OUTPATIENT)
Dept: INFUSION THERAPY | Facility: OTHER | Age: 88
End: 2023-03-21
Attending: INTERNAL MEDICINE
Payer: MEDICARE

## 2023-03-21 DIAGNOSIS — E86.9 VOLUME DEPLETION: Primary | ICD-10-CM

## 2023-03-21 PROCEDURE — 96360 HYDRATION IV INFUSION INIT: CPT

## 2023-03-21 PROCEDURE — 250N000011 HC RX IP 250 OP 636: Performed by: FAMILY MEDICINE

## 2023-03-21 PROCEDURE — 258N000003 HC RX IP 258 OP 636: Performed by: FAMILY MEDICINE

## 2023-03-21 RX ORDER — HEPARIN SODIUM (PORCINE) LOCK FLUSH IV SOLN 100 UNIT/ML 100 UNIT/ML
5 SOLUTION INTRAVENOUS ONCE
Status: COMPLETED | OUTPATIENT
Start: 2023-03-21 | End: 2023-03-21

## 2023-03-21 RX ORDER — HEPARIN SODIUM (PORCINE) LOCK FLUSH IV SOLN 100 UNIT/ML 100 UNIT/ML
5 SOLUTION INTRAVENOUS ONCE
Status: CANCELLED
Start: 2023-03-21 | End: 2023-03-21

## 2023-03-21 RX ADMIN — SODIUM CHLORIDE 1000 ML: 9 INJECTION, SOLUTION INTRAVENOUS at 09:45

## 2023-03-21 RX ADMIN — HEPARIN 5 ML: 100 SYRINGE at 11:00

## 2023-03-21 NOTE — PROGRESS NOTES
Patient is a 89 year old male here accompanied by self today for infusion of IV fluids per order of Dr. Whitman under the supervision of Dr. Whitman.  Patient identified with two identifiers, order verified, and verbal consent for today's infusion obtained from patient.      Patient lab values:  Labs not required     Patient meets order parameters for today's treatment.    Patients port accessed using non-coring, 22 gauge, 3/4 needle. Port accessed per facility protocol. Port flushed easily, blood return noted.  No signs and symptoms of infection or infiltration.      IV pump verified with dose, drug, and rate of administration.  Infusion administered per protocol.  Patient tolerated infusion well, no signs or symptoms of adverse reaction noted.  Patient denies pain nor discomfort.     IV removed, catheter intact.  Site clean, dry and intact.  No signs or symptoms of infiltration or infection.  Covered with a sterile bandage, slight pressure applied for 30 seconds.  Pt instructed to leave bandage intact for a minimum of one hour, and to call with questions or concerns.  Copy of appointments, discharge instructions, and after visit summary (AVS) provided to patient.  Patient states understanding, discharged.

## 2023-03-22 VITALS
TEMPERATURE: 97.5 F | SYSTOLIC BLOOD PRESSURE: 108 MMHG | WEIGHT: 153.8 LBS | BODY MASS INDEX: 22.71 KG/M2 | DIASTOLIC BLOOD PRESSURE: 62 MMHG | HEART RATE: 77 BPM | RESPIRATION RATE: 20 BRPM | OXYGEN SATURATION: 99 %

## 2023-03-27 ENCOUNTER — NURSING HOME VISIT (OUTPATIENT)
Dept: FAMILY MEDICINE | Facility: OTHER | Age: 88
End: 2023-03-27
Attending: FAMILY MEDICINE
Payer: MEDICARE

## 2023-03-27 DIAGNOSIS — G31.83 LEWY BODY DEMENTIA WITHOUT BEHAVIORAL DISTURBANCE (H): ICD-10-CM

## 2023-03-27 DIAGNOSIS — Z78.9 NURSING HOME RESIDENT: Primary | ICD-10-CM

## 2023-03-27 DIAGNOSIS — F02.80 LEWY BODY DEMENTIA WITHOUT BEHAVIORAL DISTURBANCE (H): ICD-10-CM

## 2023-03-27 DIAGNOSIS — I10 HYPERTENSION WITH TARGET BLOOD PRESSURE GOAL UNDER 150/90: ICD-10-CM

## 2023-03-27 DIAGNOSIS — G89.29 CHRONIC LOW BACK PAIN, UNSPECIFIED BACK PAIN LATERALITY, UNSPECIFIED WHETHER SCIATICA PRESENT: ICD-10-CM

## 2023-03-27 DIAGNOSIS — I25.10 CORONARY ARTERY DISEASE INVOLVING NATIVE CORONARY ARTERY OF NATIVE HEART WITHOUT ANGINA PECTORIS: ICD-10-CM

## 2023-03-27 DIAGNOSIS — G20.A1 PARKINSON DISEASE (H): ICD-10-CM

## 2023-03-27 DIAGNOSIS — M54.50 CHRONIC LOW BACK PAIN, UNSPECIFIED BACK PAIN LATERALITY, UNSPECIFIED WHETHER SCIATICA PRESENT: ICD-10-CM

## 2023-03-27 PROCEDURE — 99308 SBSQ NF CARE LOW MDM 20: CPT | Performed by: FAMILY MEDICINE

## 2023-03-27 NOTE — PROGRESS NOTES
Nursing Home Visit    HISTORY OF PRESENT ILLNESS:  Jf is a 89 year old male (10/5/1933)  resident of [facility] who is being seen today for routine 30 day follow up.     He has a history of Parkinson's disease, Lewy body dementia, atrial fibrillation, coronary heart disease status post aortocoronary bypass as well as stenting, seizure disorder, ans well as recurrent falls.    Jf was admitted from [facility] after being hospitalized with falls and compression fractures of L1 and L2.and in need of pain control.  This was achieved with the combination of tramadol, voltaren, as well as lidocaine patch.      The patient notes no current complaints.     Discussed with nursing staff who note occasional hypotensive episodes.    The patient is seen in his room.  Family is not present.     Medical records are reviewed.    Current medications, allergies, and interdisciplinary care plan are reviewed.      Patient Active Problem List    Diagnosis Date Noted     Orthostatic hypotension dysautonomic syndrome 02/12/2021     Priority: High     Chronic atrial fibrillation (H) 02/12/2021     Priority: High     Longstanding persistent atrial fibrillation (H) 04/13/2020     Priority: High     Coronary artery disease involving native coronary artery without angina pectoris 04/13/2020     Priority: High     Pacemaker, [employer], Dual Chamber - Dependent 10/06/2017     Priority: High     Lewy body dementia without behavioral disturbance (H) 08/31/2017     Priority: High     Dementia without behavioral disturbance (H) 07/28/2015     Priority: High     Diagnosis updated by automated process. Provider to review and confirm.       Parkinson disease (H)      Priority: High     Seizure disorder (H)      Priority: High     Cardiac pacemaker in situ 01/08/2013     Priority: High     Overview:   [employer] Domi S606 DREL,  Serial #[id number]  5-13-11  Atrial lead [employer] 4054 Serial  hx of prior dellen formation and possible corneal melt with progressive thinning. #[id number]  8-11-00  Ventriculer lead [employer] 4137  Serial #[id number]  5-13-11  2nd Degree AV Block   Dr. Campbell  Intrinsic:  Pt. is Pacemaker Dependant, remains paced at temp. rate of 30 bpm (6-13-14)    Formatting of this note might be different from the original.  [employer] Altrua S606 DREL,  Serial #[id number]  5-13-11  Atrial lead [employer] 4054 Serial #[id number]  8-11-00  Ventriculer lead [employer] 4137  Serial #[id number]  5-13-11  2nd Degree AV Block   Dr. Campbell  Intrinsic:  Pt. is Pacemaker Dependant, remains paced at temp. rate of 30 bpm (6-13-14)       Hypertension with target blood pressure goal under 150/90 09/05/2006     Priority: High     Overview:   IMO Update       Essential hypertension 09/05/2006     Priority: High     Formatting of this note might be different from the original.  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     Priority: Medium     Vision loss of right eye 02/12/2021     Priority: Medium     Closed compression fracture of body of L1 vertebra (H) 02/12/2021     Priority: Medium     Closed wedge compression fracture of lumbar vertebra with routine healing 12/20/2020     Priority: Medium     Compression fracture of L1 lumbar vertebra, closed, initial encounter (H) 12/18/2020     Priority: Medium     Compression fracture of thoracic vertebra, initial encounter, unspecified thoracic vertebral level 12/18/2020     Priority: Medium     Replacing diagnoses that were inactivated after the 10/1/2021 regulatory import.       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 Provider to review       Diaphragmatic hernia 01/01/2011     Priority: Medium     Problem list name updated by automated process. Provider to review       Pain in joint, forearm 07/31/2008     Priority: Medium     Formatting of this note might be different from the original.  IMO Update 10/11       Pain in joint, ankle and foot 07/31/2008     Priority: Medium     Formatting of this note might be different from the original.  IMO Update 10/11       Dysphagia 05/22/2007     Priority: Medium     Overview:   IMO Update    Formatting of this note might be different from the original.  IMO Update       Coronary atherosclerosis of native coronary artery 11/28/2006     Priority: Medium     Overview:   IMO Update 10/11    Formatting of this note might be different from the original.  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  IMO Update 10/11       Hyperlipidemia 09/05/2006     Priority: Medium     Formatting of this note might be different from the original.  IMO Update 10/11       Nursing Home Visit 03/03/2022     Priority: Low          Social History     Socioeconomic History     Marital status: Single     Spouse name: Not on file     Number of children: Not on file     Years of education: Not on file     Highest education level: Not on file   Occupational History     Occupation: retired   Tobacco Use     Smoking status: Former     Packs/day: 1.00      Years: 5.00     Pack years: 5.00     Types: Cigarettes     Quit date: 1985     Years since quittin.2     Smokeless tobacco: Never     Tobacco comments:     quit in    Substance and Sexual Activity     Alcohol use: Yes     Comment: social     Drug use: No     Sexual activity: Not Currently   Other Topics Concern      Service Not Asked     Blood Transfusions Yes     Caffeine Concern Yes     Comment: 1 cup coffee daily     Occupational Exposure Not Asked     Hobby Hazards Not Asked     Sleep Concern Not Asked     Stress Concern Not Asked     Weight Concern Not Asked     Special Diet Not Asked     Back Care Not Asked     Exercise Not Asked     Bike Helmet Not Asked     Seat Belt Not Asked     Self-Exams Not Asked     Parent/sibling w/ CABG, MI or angioplasty before 65F 55M? No   Social History Narrative     Not on file     Social Determinants of Health     Financial Resource Strain: Not on file   Food Insecurity: Not on file   Transportation Needs: Not on file   Physical Activity: Not on file   Stress: Not on file   Social Connections: Not on file   Intimate Partner Violence: Not on file   Housing Stability: Not on file        Current Outpatient Medications   Medication Sig     acetaminophen (TYLENOL) 500 MG tablet Take 1,000 mg by mouth 3 times daily     aspirin (ASA) 81 MG chewable tablet Take 81 mg by mouth daily     atorvastatin (LIPITOR) 40 MG tablet Take 40 mg by mouth every evening     calcium carbonate (OS-ROSALINDA) 1500 (600 Ca) MG tablet Take 600 mg by mouth daily     fludrocortisone (FLORINEF) 0.1 MG tablet Take 1 tablet (0.1 mg) by mouth every morning (before breakfast)     ibuprofen (ADVIL/MOTRIN) 400 MG tablet Take 400 mg by mouth 3 times daily as needed     loperamide (IMODIUM) 2 MG capsule Take 2 mg by mouth as needed -take as directed.     magnesium hydroxide (MILK OF MAGNESIA) 400 MG/5ML suspension Take 30 mLs by mouth daily as needed     melatonin 5 MG tablet Take 5 mg by mouth At  Bedtime     memantine (NAMENDA) 5 MG tablet Take 5 mg by mouth 2 times daily     mirabegron (MYRBETRIQ) 25 MG 24 hr tablet Take 25 mg by mouth daily     Nutritional Supplements (BOOST) Take 1 Bottle by mouth daily     potassium chloride ER (KLOR-CON M) 20 MEQ CR tablet TAKE 1 TABLET BY MOUTH EVERY DAY     RABEprazole (ACIPHEX) 20 MG EC tablet Take 20 mg by mouth 2 times daily     senna-docusate (SENOKOT-S/PERICOLACE) 8.6-50 MG tablet Take 2 tablets by mouth 2 times daily     sodium chloride 1 GM tablet TAKE 1 TABLET BY MOUTH TWICE A DAY     tamsulosin (FLOMAX) 0.4 MG capsule Take 1 capsule (0.4 mg) by mouth daily     vitamin D3 (CHOLECALCIFEROL) 50 mcg (2000 units) tablet Take 1 tablet by mouth daily     No current facility-administered medications for this visit.       Allergies   Allergen Reactions     Cats Unknown     Haldol [Haloperidol]      Per SNF reporting     Klonopin [Clonazepam]      Per SNF reporting     Lamotrigine      Skin lesions     Oxycodone      Per SNF reporting       I have reviewed the care plan and do agree with the plan.      ROS:  No chest pain, shortness of breath, fever, chills, headache, nausea, vomiting, dysuria, or changes in bowel habits.  Appetite is poor.  Low back pain noted.          OBJECTIVE:  /62   Pulse 77   Temp 97.5  F (36.4  C)   Resp 20   Wt 69.8 kg (153 lb 12.8 oz)   SpO2 99%   BMI 22.71 kg/m      GENERAL: Healthy, alert and no distress  EYES: Eyes grossly normal to inspection.  No discharge or erythema, or obvious scleral/conjunctival abnormalities.  RESP: No audible wheeze, cough, or visible cyanosis.  No visible retractions or increased work of breathing.    SKIN: Visible skin clear. No significant rash, abnormal pigmentation or lesions.  NEURO: Cranial nerves grossly intact.  Mentation impaired. Speech appropriate for age.  PSYCH: Mentation shows impaired cognition affect normal/bright, judgement and insight intact, normal speech and appearance  well-groomed.        Lab/Diagnostic data:    Reviewed    ASSESSMENT/ORDERS:  Nursing Home Visit  Discussed with staff. Care plan reviewed and orders updated.  Chronic issues are stable. Routine 30 day Nursing Home follow up.     Fall  No sequelae.  Reviewed with nursing.  Will follow.    Closed compression fracture of L1 lumbar vertebra with routine healing, subsequent encounter  Stable    Lewy body dementia without behavioral disturbance (H)  Stable    Parkinson disease (H)  Stable    Coronary artery disease involving native coronary artery of native heart without angina pectoris  No recurrent symptoms    Hypertension with target blood pressure goal under 150/90  Recent readings reviewed.  Continue same medication regimen        Total time spent with patient visit was 25 min including patient visit, review of pertinent clinical information, and treatment plan.      Abran Whitman MD FAAFP [facility]  Geriatrics  Hospice and Palliative Care

## 2023-04-12 ENCOUNTER — TELEPHONE (OUTPATIENT)
Dept: INFUSION THERAPY | Facility: OTHER | Age: 88
End: 2023-04-12

## 2023-04-13 ENCOUNTER — TELEPHONE (OUTPATIENT)
Dept: FAMILY MEDICINE | Facility: OTHER | Age: 88
End: 2023-04-13

## 2023-04-18 ENCOUNTER — TELEPHONE (OUTPATIENT)
Dept: INFUSION THERAPY | Facility: OTHER | Age: 88
End: 2023-04-18

## 2023-04-26 ENCOUNTER — ANCILLARY PROCEDURE (OUTPATIENT)
Dept: CARDIOLOGY | Facility: CLINIC | Age: 88
End: 2023-04-26
Attending: INTERNAL MEDICINE
Payer: MEDICARE

## 2023-04-26 DIAGNOSIS — I44.2 COMPLETE HEART BLOCK (H): ICD-10-CM

## 2023-04-26 PROCEDURE — 93296 REM INTERROG EVL PM/IDS: CPT

## 2023-04-26 PROCEDURE — 93294 REM INTERROG EVL PM/LDLS PM: CPT | Performed by: INTERNAL MEDICINE

## 2023-04-27 RX ORDER — NYSTATIN 100000 [USP'U]/G
POWDER TOPICAL PRN
COMMUNITY

## 2023-04-28 ENCOUNTER — INFUSION THERAPY VISIT (OUTPATIENT)
Dept: INFUSION THERAPY | Facility: OTHER | Age: 88
End: 2023-04-28
Attending: INTERNAL MEDICINE
Payer: MEDICARE

## 2023-04-28 VITALS
RESPIRATION RATE: 20 BRPM | HEART RATE: 75 BPM | SYSTOLIC BLOOD PRESSURE: 130 MMHG | OXYGEN SATURATION: 99 % | DIASTOLIC BLOOD PRESSURE: 79 MMHG | TEMPERATURE: 97.4 F

## 2023-04-28 DIAGNOSIS — E86.9 VOLUME DEPLETION: Primary | ICD-10-CM

## 2023-04-28 PROCEDURE — 96360 HYDRATION IV INFUSION INIT: CPT

## 2023-04-28 PROCEDURE — 250N000011 HC RX IP 250 OP 636: Performed by: FAMILY MEDICINE

## 2023-04-28 PROCEDURE — 258N000003 HC RX IP 258 OP 636: Performed by: FAMILY MEDICINE

## 2023-04-28 RX ORDER — HEPARIN SODIUM (PORCINE) LOCK FLUSH IV SOLN 100 UNIT/ML 100 UNIT/ML
5 SOLUTION INTRAVENOUS ONCE
Status: CANCELLED
Start: 2023-04-28 | End: 2023-04-28

## 2023-04-28 RX ORDER — HEPARIN SODIUM (PORCINE) LOCK FLUSH IV SOLN 100 UNIT/ML 100 UNIT/ML
5 SOLUTION INTRAVENOUS ONCE
Status: COMPLETED | OUTPATIENT
Start: 2023-04-28 | End: 2023-04-28

## 2023-04-28 RX ADMIN — SODIUM CHLORIDE 1000 ML: 9 INJECTION, SOLUTION INTRAVENOUS at 09:05

## 2023-04-28 RX ADMIN — Medication 5 ML: at 10:10

## 2023-04-28 NOTE — PATIENT INSTRUCTIONS

## 2023-04-28 NOTE — PROGRESS NOTES
Infusion Nursing Note:  Jf Ortiz presents today for IVF.    Patient seen by provider today: No   present during visit today: Not Applicable.    Intravenous Access:Implanted Port.    Treatment Conditions:  Not Applicable.  Confirmed patient received the Fact Sheet for Patients, Parents and Caregivers prior to receiving medication.     Post Infusion Assessment:  Patient tolerated infusion without incident.  Blood return noted pre and post infusion.  Site patent and intact, free from redness, edema or discomfort.  No evidence of extravasations.  Access discontinued per protocol.   *If a hypersensitivity, medication error, or an adverse event occurred, pharmacy was notified in addition to the provider for FDA Reporting.        Discharge Plan:   Discharge instructions reviewed with: Patient/AVS sent to nursing home with patient.  Patient and/or family verbalized understanding of discharge instructions and all questions answered.  Copy of AVS reviewed with patient and/or family.  Patient will return 5/3/23 for next appointment.  Patient discharged in stable condition accompanied by:[employer]  Departure Mode: Wheelchair.

## 2023-04-30 LAB
MDC_IDC_EPISODE_DTM: NORMAL
MDC_IDC_EPISODE_DURATION: 1042 S
MDC_IDC_EPISODE_DURATION: 114 S
MDC_IDC_EPISODE_DURATION: 129 S
MDC_IDC_EPISODE_DURATION: 136 S
MDC_IDC_EPISODE_DURATION: 1439 S
MDC_IDC_EPISODE_DURATION: 148 S
MDC_IDC_EPISODE_DURATION: 1487 S
MDC_IDC_EPISODE_DURATION: 1609 S
MDC_IDC_EPISODE_DURATION: 168 S
MDC_IDC_EPISODE_DURATION: 170 S
MDC_IDC_EPISODE_DURATION: 1850 S
MDC_IDC_EPISODE_DURATION: 1867 S
MDC_IDC_EPISODE_DURATION: 19 S
MDC_IDC_EPISODE_DURATION: 207 S
MDC_IDC_EPISODE_DURATION: 221 S
MDC_IDC_EPISODE_DURATION: 234 S
MDC_IDC_EPISODE_DURATION: 236 S
MDC_IDC_EPISODE_DURATION: 2422 S
MDC_IDC_EPISODE_DURATION: 244 S
MDC_IDC_EPISODE_DURATION: 247 S
MDC_IDC_EPISODE_DURATION: 248 S
MDC_IDC_EPISODE_DURATION: 2650 S
MDC_IDC_EPISODE_DURATION: 272 S
MDC_IDC_EPISODE_DURATION: 274 S
MDC_IDC_EPISODE_DURATION: 29 S
MDC_IDC_EPISODE_DURATION: 292 S
MDC_IDC_EPISODE_DURATION: 30 S
MDC_IDC_EPISODE_DURATION: 313 S
MDC_IDC_EPISODE_DURATION: 32 S
MDC_IDC_EPISODE_DURATION: 320 S
MDC_IDC_EPISODE_DURATION: 322 S
MDC_IDC_EPISODE_DURATION: 325 S
MDC_IDC_EPISODE_DURATION: 36 S
MDC_IDC_EPISODE_DURATION: 36 S
MDC_IDC_EPISODE_DURATION: 384 S
MDC_IDC_EPISODE_DURATION: 40 S
MDC_IDC_EPISODE_DURATION: 4502 S
MDC_IDC_EPISODE_DURATION: 511 S
MDC_IDC_EPISODE_DURATION: 5119 S
MDC_IDC_EPISODE_DURATION: 5326 S
MDC_IDC_EPISODE_DURATION: 62 S
MDC_IDC_EPISODE_DURATION: 62 S
MDC_IDC_EPISODE_DURATION: 66 S
MDC_IDC_EPISODE_DURATION: 690 S
MDC_IDC_EPISODE_DURATION: 725 S
MDC_IDC_EPISODE_DURATION: 81 S
MDC_IDC_EPISODE_DURATION: 83 S
MDC_IDC_EPISODE_DURATION: 87 S
MDC_IDC_EPISODE_DURATION: 88 S
MDC_IDC_EPISODE_DURATION: NORMAL S
MDC_IDC_EPISODE_ID: 1784
MDC_IDC_EPISODE_ID: 1785
MDC_IDC_EPISODE_ID: 1786
MDC_IDC_EPISODE_ID: 1787
MDC_IDC_EPISODE_ID: 1788
MDC_IDC_EPISODE_ID: 1789
MDC_IDC_EPISODE_ID: 1790
MDC_IDC_EPISODE_ID: 1791
MDC_IDC_EPISODE_ID: 1792
MDC_IDC_EPISODE_ID: 1793
MDC_IDC_EPISODE_ID: 1794
MDC_IDC_EPISODE_ID: 1795
MDC_IDC_EPISODE_ID: 1796
MDC_IDC_EPISODE_ID: 1797
MDC_IDC_EPISODE_ID: 1798
MDC_IDC_EPISODE_ID: 1799
MDC_IDC_EPISODE_ID: 1800
MDC_IDC_EPISODE_ID: 1801
MDC_IDC_EPISODE_ID: 1802
MDC_IDC_EPISODE_ID: 1803
MDC_IDC_EPISODE_ID: 1804
MDC_IDC_EPISODE_ID: 1805
MDC_IDC_EPISODE_ID: 1806
MDC_IDC_EPISODE_ID: 1807
MDC_IDC_EPISODE_ID: 1808
MDC_IDC_EPISODE_ID: 1809
MDC_IDC_EPISODE_ID: 1810
MDC_IDC_EPISODE_ID: 1811
MDC_IDC_EPISODE_ID: 1812
MDC_IDC_EPISODE_ID: 1813
MDC_IDC_EPISODE_ID: 1814
MDC_IDC_EPISODE_ID: 1815
MDC_IDC_EPISODE_ID: 1816
MDC_IDC_EPISODE_ID: 1817
MDC_IDC_EPISODE_ID: 1818
MDC_IDC_EPISODE_ID: 1819
MDC_IDC_EPISODE_ID: 1820
MDC_IDC_EPISODE_ID: 1821
MDC_IDC_EPISODE_ID: 1822
MDC_IDC_EPISODE_ID: 1823
MDC_IDC_EPISODE_ID: 1824
MDC_IDC_EPISODE_ID: 1825
MDC_IDC_EPISODE_ID: 1826
MDC_IDC_EPISODE_ID: 1827
MDC_IDC_EPISODE_ID: 1828
MDC_IDC_EPISODE_ID: 1829
MDC_IDC_EPISODE_ID: 1830
MDC_IDC_EPISODE_ID: 1831
MDC_IDC_EPISODE_ID: 1832
MDC_IDC_EPISODE_ID: 1833
MDC_IDC_EPISODE_TYPE: NORMAL
MDC_IDC_LEAD_IMPLANT_DT: NORMAL
MDC_IDC_LEAD_IMPLANT_DT: NORMAL
MDC_IDC_LEAD_LOCATION: NORMAL
MDC_IDC_LEAD_LOCATION: NORMAL
MDC_IDC_LEAD_LOCATION_DETAIL_1: NORMAL
MDC_IDC_LEAD_LOCATION_DETAIL_1: NORMAL
MDC_IDC_LEAD_MFG: NORMAL
MDC_IDC_LEAD_MFG: NORMAL
MDC_IDC_LEAD_MODEL: NORMAL
MDC_IDC_LEAD_MODEL: NORMAL
MDC_IDC_LEAD_POLARITY_TYPE: NORMAL
MDC_IDC_LEAD_POLARITY_TYPE: NORMAL
MDC_IDC_LEAD_SERIAL: NORMAL
MDC_IDC_LEAD_SERIAL: NORMAL
MDC_IDC_MSMT_BATTERY_DTM: NORMAL
MDC_IDC_MSMT_BATTERY_REMAINING_LONGEVITY: 57 MO
MDC_IDC_MSMT_BATTERY_RRT_TRIGGER: 2.62
MDC_IDC_MSMT_BATTERY_STATUS: NORMAL
MDC_IDC_MSMT_BATTERY_VOLTAGE: 2.96 V
MDC_IDC_MSMT_LEADCHNL_RA_IMPEDANCE_VALUE: 418 OHM
MDC_IDC_MSMT_LEADCHNL_RA_IMPEDANCE_VALUE: 551 OHM
MDC_IDC_MSMT_LEADCHNL_RA_PACING_THRESHOLD_AMPLITUDE: 1.5 V
MDC_IDC_MSMT_LEADCHNL_RA_PACING_THRESHOLD_PULSEWIDTH: 0.4 MS
MDC_IDC_MSMT_LEADCHNL_RA_SENSING_INTR_AMPL: 2.12 MV
MDC_IDC_MSMT_LEADCHNL_RV_IMPEDANCE_VALUE: 380 OHM
MDC_IDC_MSMT_LEADCHNL_RV_IMPEDANCE_VALUE: 551 OHM
MDC_IDC_MSMT_LEADCHNL_RV_PACING_THRESHOLD_AMPLITUDE: 0.75 V
MDC_IDC_MSMT_LEADCHNL_RV_PACING_THRESHOLD_PULSEWIDTH: 0.4 MS
MDC_IDC_PG_IMPLANT_DTM: NORMAL
MDC_IDC_PG_MFG: NORMAL
MDC_IDC_PG_MODEL: NORMAL
MDC_IDC_PG_SERIAL: NORMAL
MDC_IDC_PG_TYPE: NORMAL
MDC_IDC_SESS_CLINIC_NAME: NORMAL
MDC_IDC_SESS_DTM: NORMAL
MDC_IDC_SESS_TYPE: NORMAL
MDC_IDC_SET_BRADY_AT_MODE_SWITCH_RATE: 171 {BEATS}/MIN
MDC_IDC_SET_BRADY_HYSTRATE: NORMAL
MDC_IDC_SET_BRADY_LOWRATE: 70 {BEATS}/MIN
MDC_IDC_SET_BRADY_MAX_SENSOR_RATE: 130 {BEATS}/MIN
MDC_IDC_SET_BRADY_MAX_TRACKING_RATE: 130 {BEATS}/MIN
MDC_IDC_SET_BRADY_MODE: NORMAL
MDC_IDC_SET_BRADY_PAV_DELAY_LOW: 180 MS
MDC_IDC_SET_BRADY_SAV_DELAY_LOW: 150 MS
MDC_IDC_SET_LEADCHNL_RA_PACING_AMPLITUDE: 3 V
MDC_IDC_SET_LEADCHNL_RA_PACING_ANODE_ELECTRODE_1: NORMAL
MDC_IDC_SET_LEADCHNL_RA_PACING_ANODE_LOCATION_1: NORMAL
MDC_IDC_SET_LEADCHNL_RA_PACING_CAPTURE_MODE: NORMAL
MDC_IDC_SET_LEADCHNL_RA_PACING_CATHODE_ELECTRODE_1: NORMAL
MDC_IDC_SET_LEADCHNL_RA_PACING_CATHODE_LOCATION_1: NORMAL
MDC_IDC_SET_LEADCHNL_RA_PACING_POLARITY: NORMAL
MDC_IDC_SET_LEADCHNL_RA_PACING_PULSEWIDTH: 0.4 MS
MDC_IDC_SET_LEADCHNL_RA_SENSING_ANODE_ELECTRODE_1: NORMAL
MDC_IDC_SET_LEADCHNL_RA_SENSING_ANODE_LOCATION_1: NORMAL
MDC_IDC_SET_LEADCHNL_RA_SENSING_CATHODE_ELECTRODE_1: NORMAL
MDC_IDC_SET_LEADCHNL_RA_SENSING_CATHODE_LOCATION_1: NORMAL
MDC_IDC_SET_LEADCHNL_RA_SENSING_POLARITY: NORMAL
MDC_IDC_SET_LEADCHNL_RA_SENSING_SENSITIVITY: 0.9 MV
MDC_IDC_SET_LEADCHNL_RV_PACING_AMPLITUDE: 2 V
MDC_IDC_SET_LEADCHNL_RV_PACING_ANODE_ELECTRODE_1: NORMAL
MDC_IDC_SET_LEADCHNL_RV_PACING_ANODE_LOCATION_1: NORMAL
MDC_IDC_SET_LEADCHNL_RV_PACING_CAPTURE_MODE: NORMAL
MDC_IDC_SET_LEADCHNL_RV_PACING_CATHODE_ELECTRODE_1: NORMAL
MDC_IDC_SET_LEADCHNL_RV_PACING_CATHODE_LOCATION_1: NORMAL
MDC_IDC_SET_LEADCHNL_RV_PACING_POLARITY: NORMAL
MDC_IDC_SET_LEADCHNL_RV_PACING_PULSEWIDTH: 0.4 MS
MDC_IDC_SET_LEADCHNL_RV_SENSING_ANODE_ELECTRODE_1: NORMAL
MDC_IDC_SET_LEADCHNL_RV_SENSING_ANODE_LOCATION_1: NORMAL
MDC_IDC_SET_LEADCHNL_RV_SENSING_CATHODE_ELECTRODE_1: NORMAL
MDC_IDC_SET_LEADCHNL_RV_SENSING_CATHODE_LOCATION_1: NORMAL
MDC_IDC_SET_LEADCHNL_RV_SENSING_POLARITY: NORMAL
MDC_IDC_SET_LEADCHNL_RV_SENSING_SENSITIVITY: 0.9 MV
MDC_IDC_SET_ZONE_DETECTION_INTERVAL: 350 MS
MDC_IDC_SET_ZONE_DETECTION_INTERVAL: 400 MS
MDC_IDC_SET_ZONE_TYPE: NORMAL
MDC_IDC_STAT_AT_BURDEN_PERCENT: 8.5 %
MDC_IDC_STAT_AT_DTM_END: NORMAL
MDC_IDC_STAT_AT_DTM_START: NORMAL
MDC_IDC_STAT_BRADY_AP_VP_PERCENT: 97.92 %
MDC_IDC_STAT_BRADY_AP_VS_PERCENT: 0.02 %
MDC_IDC_STAT_BRADY_AS_VP_PERCENT: 2.05 %
MDC_IDC_STAT_BRADY_AS_VS_PERCENT: 0.01 %
MDC_IDC_STAT_BRADY_DTM_END: NORMAL
MDC_IDC_STAT_BRADY_DTM_START: NORMAL
MDC_IDC_STAT_BRADY_RA_PERCENT_PACED: 90.88 %
MDC_IDC_STAT_BRADY_RV_PERCENT_PACED: 99.97 %
MDC_IDC_STAT_EPISODE_RECENT_COUNT: 0
MDC_IDC_STAT_EPISODE_RECENT_COUNT: 638
MDC_IDC_STAT_EPISODE_RECENT_COUNT_DTM_END: NORMAL
MDC_IDC_STAT_EPISODE_RECENT_COUNT_DTM_START: NORMAL
MDC_IDC_STAT_EPISODE_TOTAL_COUNT: 0
MDC_IDC_STAT_EPISODE_TOTAL_COUNT: 1830
MDC_IDC_STAT_EPISODE_TOTAL_COUNT: 3
MDC_IDC_STAT_EPISODE_TOTAL_COUNT_DTM_END: NORMAL
MDC_IDC_STAT_EPISODE_TOTAL_COUNT_DTM_START: NORMAL
MDC_IDC_STAT_EPISODE_TYPE: NORMAL

## 2023-05-01 ENCOUNTER — NURSING HOME VISIT (OUTPATIENT)
Dept: FAMILY MEDICINE | Facility: OTHER | Age: 88
End: 2023-05-01
Attending: FAMILY MEDICINE
Payer: MEDICARE

## 2023-05-01 DIAGNOSIS — I10 HYPERTENSION WITH TARGET BLOOD PRESSURE GOAL UNDER 150/90: ICD-10-CM

## 2023-05-01 DIAGNOSIS — R21 RASH: ICD-10-CM

## 2023-05-01 DIAGNOSIS — G89.29 CHRONIC LOW BACK PAIN, UNSPECIFIED BACK PAIN LATERALITY, UNSPECIFIED WHETHER SCIATICA PRESENT: ICD-10-CM

## 2023-05-01 DIAGNOSIS — F02.80 LEWY BODY DEMENTIA WITHOUT BEHAVIORAL DISTURBANCE (H): ICD-10-CM

## 2023-05-01 DIAGNOSIS — G20.A1 PARKINSON DISEASE (H): ICD-10-CM

## 2023-05-01 DIAGNOSIS — M54.50 CHRONIC LOW BACK PAIN, UNSPECIFIED BACK PAIN LATERALITY, UNSPECIFIED WHETHER SCIATICA PRESENT: ICD-10-CM

## 2023-05-01 DIAGNOSIS — Z78.9 NURSING HOME RESIDENT: Primary | ICD-10-CM

## 2023-05-01 DIAGNOSIS — G31.83 LEWY BODY DEMENTIA WITHOUT BEHAVIORAL DISTURBANCE (H): ICD-10-CM

## 2023-05-01 DIAGNOSIS — I25.10 CORONARY ARTERY DISEASE INVOLVING NATIVE CORONARY ARTERY OF NATIVE HEART WITHOUT ANGINA PECTORIS: ICD-10-CM

## 2023-05-01 PROCEDURE — 99308 SBSQ NF CARE LOW MDM 20: CPT | Performed by: FAMILY MEDICINE

## 2023-05-01 NOTE — PROGRESS NOTES
Nursing Home Visit    HISTORY OF PRESENT ILLNESS:  Jf is a 89 year old male (10/5/1933)  resident of [facility] who is being seen today for routine 30 day follow up.     He has a history of Parkinson's disease, Lewy body dementia, atrial fibrillation, coronary heart disease status post aortocoronary bypass as well as stenting, seizure disorder, ans well as recurrent falls.    Jf was admitted from [facility] after being hospitalized with falls and compression fractures of L1 and L2.and in need of pain control.  This was achieved     The patient notes no current complaints.     Discussed with nursing staff who note significant erythematous rash perineum.    The patient is seen in his room.  Family is not present.     Medical records are reviewed.    Current medications, allergies, and interdisciplinary care plan are reviewed.      Patient Active Problem List    Diagnosis Date Noted     Orthostatic hypotension dysautonomic syndrome 02/12/2021     Priority: High     Chronic atrial fibrillation (H) 02/12/2021     Priority: High     Longstanding persistent atrial fibrillation (H) 04/13/2020     Priority: High     Coronary artery disease involving native coronary artery without angina pectoris 04/13/2020     Priority: High     Pacemaker, [employer], Dual Chamber - Dependent 10/06/2017     Priority: High     Lewy body dementia without behavioral disturbance (H) 08/31/2017     Priority: High     Dementia without behavioral disturbance (H) 07/28/2015     Priority: High     Diagnosis updated by automated process. Provider to review and confirm.       Parkinson disease (H)      Priority: High     Seizure disorder (H)      Priority: High     Cardiac pacemaker in situ 01/08/2013     Priority: High     Overview:   [employer] Altrua S606 DREL,  Serial #[id number]  5-13-11  Atrial lead [employer] 4054 Serial #[id number]  8-11-00  Ventriculer lead [employer] 4137  Serial  #[id number]  5-13-11  2nd Degree AV Block   Dr. Campbell  Intrinsic:  Pt. is Pacemaker Dependant, remains paced at temp. rate of 30 bpm (6-13-14)    Formatting of this note might be different from the original.  [employer]a S606 DREL,  Serial #[id number]  5-13-11  Atrial lead [employer] 4054 Serial #[id number]  8-11-00  Ventriculer lead [employer] 4137  Serial #[id number]  5-13-11  2nd Degree AV Block   Dr. Campbell  Intrinsic:  Pt. is Pacemaker Dependant, remains paced at temp. rate of 30 bpm (6-13-14)       Hypertension with target blood pressure goal under 150/90 09/05/2006     Priority: High     Overview:   IMO Update       Essential hypertension 09/05/2006     Priority: High     Formatting of this note might be different from the original.  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     Priority: Medium     Vision loss of right eye 02/12/2021     Priority: Medium     Closed compression fracture of body of L1 vertebra (H) 02/12/2021     Priority: Medium     Closed wedge compression fracture of lumbar vertebra with routine healing 12/20/2020     Priority: Medium     Compression fracture of L1 lumbar vertebra, closed, initial encounter (H) 12/18/2020     Priority: Medium     Compression fracture of thoracic vertebra, initial encounter, unspecified thoracic vertebral level 12/18/2020     Priority: Medium     Replacing diagnoses that were inactivated after the 10/1/2021 regulatory import.       Frequent falls 04/13/2020     Priority: Medium     Constipation, unspecified constipation type 04/11/2018     Priority: Medium     Urinary incontinence 08/31/2017      Priority: Medium     Autonomic orthostatic hypotension 10/14/2016     Priority: Medium     Volume depletion 08/02/2014     Priority: Medium     Stented coronary artery      Priority: Medium     REM sleep behavior disorder 01/01/2011     Priority: Medium     Osteoarthritis 01/01/2011     Priority: Medium     Problem list name updated by automated process. Provider to review       Diaphragmatic hernia 01/01/2011     Priority: Medium     Problem list name updated by automated process. Provider to review       Pain in joint, forearm 07/31/2008     Priority: Medium     Formatting of this note might be different from the original.  IMO Update 10/11       Pain in joint, ankle and foot 07/31/2008     Priority: Medium     Formatting of this note might be different from the original.  IMO Update 10/11       Dysphagia 05/22/2007     Priority: Medium     Overview:   IMO Update    Formatting of this note might be different from the original.  IMO Update       Coronary atherosclerosis of native coronary artery 11/28/2006     Priority: Medium     Overview:   IMO Update 10/11    Formatting of this note might be different from the original.  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  IMO Update 10/11       Hyperlipidemia 09/05/2006     Priority: Medium     Formatting of this note might be different from the original.  IMO Update 10/11       Nursing Home Visit 03/03/2022     Priority: Low          Social History     Socioeconomic History     Marital status: Single     Spouse name: Not on file     Number of children: Not on file     Years of education: Not on file     Highest education level: Not on file   Occupational History     Occupation: retired   Tobacco Use     Smoking status: Former     Packs/day: 1.00     Years: 5.00     Pack years: 5.00     Types: Cigarettes     Quit  date: 1985     Years since quittin.3     Smokeless tobacco: Never     Tobacco comments:     quit in    Vaping Use     Vaping status: Not on file   Substance and Sexual Activity     Alcohol use: Yes     Comment: social     Drug use: No     Sexual activity: Not Currently   Other Topics Concern      Service Not Asked     Blood Transfusions Yes     Caffeine Concern Yes     Comment: 1 cup coffee daily     Occupational Exposure Not Asked     Hobby Hazards Not Asked     Sleep Concern Not Asked     Stress Concern Not Asked     Weight Concern Not Asked     Special Diet Not Asked     Back Care Not Asked     Exercise Not Asked     Bike Helmet Not Asked     Seat Belt Not Asked     Self-Exams Not Asked     Parent/sibling w/ CABG, MI or angioplasty before 65F 55M? No   Social History Narrative     Not on file     Social Determinants of Health     Financial Resource Strain: Not on file   Food Insecurity: Not on file   Transportation Needs: Not on file   Physical Activity: Not on file   Stress: Not on file   Social Connections: Not on file   Intimate Partner Violence: Not on file   Housing Stability: Not on file        Current Outpatient Medications   Medication Sig     acetaminophen (TYLENOL) 500 MG tablet Take 1,000 mg by mouth 3 times daily     aspirin (ASA) 81 MG chewable tablet Take 81 mg by mouth daily     calcium carbonate (OS-ROSALINDA) 1500 (600 Ca) MG tablet Take 600 mg by mouth daily     fludrocortisone (FLORINEF) 0.1 MG tablet Take 1 tablet (0.1 mg) by mouth every morning (before breakfast)     ibuprofen (ADVIL/MOTRIN) 400 MG tablet Take 400 mg by mouth 3 times daily as needed     loperamide (IMODIUM) 2 MG capsule Take 2 mg by mouth as needed -take as directed.     magnesium hydroxide (MILK OF MAGNESIA) 400 MG/5ML suspension Take 30 mLs by mouth daily as needed     melatonin 5 MG tablet Take 5 mg by mouth At Bedtime     memantine (NAMENDA) 5 MG tablet Take 5 mg by mouth 2 times daily     mirabegron  (MYRBETRIQ) 25 MG 24 hr tablet Take 25 mg by mouth daily     Nutritional Supplements (BOOST) Take 1 Bottle by mouth daily     nystatin (MYCOSTATIN) 518609 UNIT/GM external powder Apply topically as needed     potassium chloride ER (KLOR-CON M) 20 MEQ CR tablet TAKE 1 TABLET BY MOUTH EVERY DAY     RABEprazole (ACIPHEX) 20 MG EC tablet Take 20 mg by mouth 2 times daily     senna-docusate (SENOKOT-S/PERICOLACE) 8.6-50 MG tablet Take 2 tablets by mouth 2 times daily     sodium chloride 1 GM tablet TAKE 1 TABLET BY MOUTH TWICE A DAY     tamsulosin (FLOMAX) 0.4 MG capsule Take 1 capsule (0.4 mg) by mouth daily     vitamin D3 (CHOLECALCIFEROL) 50 mcg (2000 units) tablet Take 1 tablet by mouth daily     No current facility-administered medications for this visit.       Allergies   Allergen Reactions     Cats Unknown     Haldol [Haloperidol]      Per SNF reporting     Klonopin [Clonazepam]      Per SNF reporting     Lamotrigine      Skin lesions     Oxycodone      Per SNF reporting       I have reviewed the care plan and do agree with the plan.      ROS:  No chest pain, shortness of breath, fever, chills, headache, nausea, vomiting, dysuria, or changes in bowel habits.  Appetite is poor.  Low back pain noted.          OBJECTIVE:  There were no vitals taken for this visit.    GENERAL: Healthy, alert and no distress  EYES: Eyes grossly normal to inspection.  No discharge or erythema, or obvious scleral/conjunctival abnormalities.  RESP: No audible wheeze, cough, or visible cyanosis.  No visible retractions or increased work of breathing.    SKIN: Erythema with serpiginous borders noted with sattelite lesions. No abnormal pigmentation or lesions.  NEURO: Cranial nerves grossly intact.  Mentation impaired. Speech appropriate for age.  PSYCH: Mentation shows impaired cognition affect normal/bright, judgement and insight intact, normal speech and appearance well-groomed.              Lab/Diagnostic data:     Reviewed    ASSESSMENT/ORDERS:  Nursing Home Visit  Discussed with staff. Care plan reviewed and orders updated.  Chronic issues are stable. Routine 30 day Nursing Home follow up.     Rash  Suspect fungal.  Nystatin powder bid.  Follow    Closed compression fracture of L1 lumbar vertebra with routine healing, subsequent encounter  Stable    Lewy body dementia without behavioral disturbance (H)  Stable    Parkinson disease (H)  Stable    Coronary artery disease involving native coronary artery of native heart without angina pectoris  No recurrent symptoms    Hypertension with target blood pressure goal under 150/90  Recent readings reviewed.  Continue same medication regimen        Total time spent with patient visit was 25 min including patient visit, review of pertinent clinical information, and treatment plan.      Abran Whitman MD FAAFP Psychiatric  Geriatrics  Hospice and Palliative Care

## 2023-05-03 ENCOUNTER — INFUSION THERAPY VISIT (OUTPATIENT)
Dept: INFUSION THERAPY | Facility: OTHER | Age: 88
End: 2023-05-03
Attending: INTERNAL MEDICINE
Payer: MEDICARE

## 2023-05-03 ENCOUNTER — DOCUMENTATION ONLY (OUTPATIENT)
Dept: PHARMACY | Facility: HOSPITAL | Age: 88
End: 2023-05-03

## 2023-05-03 VITALS
TEMPERATURE: 98 F | SYSTOLIC BLOOD PRESSURE: 127 MMHG | RESPIRATION RATE: 17 BRPM | HEART RATE: 70 BPM | DIASTOLIC BLOOD PRESSURE: 73 MMHG

## 2023-05-03 DIAGNOSIS — E86.9 VOLUME DEPLETION: Primary | ICD-10-CM

## 2023-05-03 PROCEDURE — 258N000003 HC RX IP 258 OP 636: Performed by: FAMILY MEDICINE

## 2023-05-03 PROCEDURE — 96360 HYDRATION IV INFUSION INIT: CPT

## 2023-05-03 PROCEDURE — 250N000011 HC RX IP 250 OP 636: Performed by: FAMILY MEDICINE

## 2023-05-03 RX ORDER — HEPARIN SODIUM (PORCINE) LOCK FLUSH IV SOLN 100 UNIT/ML 100 UNIT/ML
5 SOLUTION INTRAVENOUS ONCE
Status: COMPLETED | OUTPATIENT
Start: 2023-05-03 | End: 2023-05-03

## 2023-05-03 RX ORDER — HEPARIN SODIUM (PORCINE) LOCK FLUSH IV SOLN 100 UNIT/ML 100 UNIT/ML
5 SOLUTION INTRAVENOUS ONCE
Status: CANCELLED
Start: 2023-05-03 | End: 2023-05-03

## 2023-05-03 RX ADMIN — Medication 5 ML: at 10:12

## 2023-05-03 RX ADMIN — SODIUM CHLORIDE 1000 ML: 9 INJECTION, SOLUTION INTRAVENOUS at 09:10

## 2023-05-03 NOTE — PROGRESS NOTES
Patient is 89 years old, here today for infusion of normal saline.      Patient identified with two identifiers, order verified, and verbal consent for today's infusion obtained from patient.      Lab values:  n/a      Patient meets order parameters for today's treatment.      Patients port accessed using non-coring, 19 gauge, 3/4 needle. Port accessed per facility protocol. Port flushed easily, blood return noted.  No signs and symptoms of infection or infiltration.      IV pump verified with dose, drug, and rate of administration.  Infusion administered per protocol.  Patient tolerated infusion well, no signs or symptoms of adverse reaction noted.  Patient denies pain nor discomfort.     IV removed, catheter intact.  Site clean, dry and intact.  No signs or symptoms of infiltration or infection.  Covered with a sterile bandage, slight pressure applied for 30 seconds.  Pt instructed to leave bandage intact for a minimum of one hour, and to call with questions or concerns. Patient states understanding, discharged.

## 2023-05-11 ENCOUNTER — TELEPHONE (OUTPATIENT)
Dept: FAMILY MEDICINE | Facility: OTHER | Age: 88
End: 2023-05-11

## 2023-05-11 NOTE — TELEPHONE ENCOUNTER
Per dr. J Carlos fox as directed DX pyoderma   Notification sent to Nursing home.  Kimberly Yost, CMA

## 2023-05-15 ENCOUNTER — TELEPHONE (OUTPATIENT)
Dept: FAMILY MEDICINE | Facility: OTHER | Age: 88
End: 2023-05-15

## 2023-05-17 ENCOUNTER — INFUSION THERAPY VISIT (OUTPATIENT)
Dept: INFUSION THERAPY | Facility: OTHER | Age: 88
End: 2023-05-17
Attending: INTERNAL MEDICINE
Payer: MEDICARE

## 2023-05-17 VITALS
WEIGHT: 149.6 LBS | OXYGEN SATURATION: 92 % | DIASTOLIC BLOOD PRESSURE: 71 MMHG | BODY MASS INDEX: 22.09 KG/M2 | TEMPERATURE: 97.3 F | HEART RATE: 69 BPM | RESPIRATION RATE: 16 BRPM | SYSTOLIC BLOOD PRESSURE: 129 MMHG

## 2023-05-17 DIAGNOSIS — E86.9 VOLUME DEPLETION: Primary | ICD-10-CM

## 2023-05-17 PROCEDURE — 258N000003 HC RX IP 258 OP 636: Performed by: FAMILY MEDICINE

## 2023-05-17 PROCEDURE — 250N000011 HC RX IP 250 OP 636: Performed by: FAMILY MEDICINE

## 2023-05-17 PROCEDURE — 96360 HYDRATION IV INFUSION INIT: CPT

## 2023-05-17 RX ORDER — HEPARIN SODIUM (PORCINE) LOCK FLUSH IV SOLN 100 UNIT/ML 100 UNIT/ML
5 SOLUTION INTRAVENOUS ONCE
Status: COMPLETED | OUTPATIENT
Start: 2023-05-17 | End: 2023-05-17

## 2023-05-17 RX ORDER — HEPARIN SODIUM (PORCINE) LOCK FLUSH IV SOLN 100 UNIT/ML 100 UNIT/ML
5 SOLUTION INTRAVENOUS ONCE
Status: CANCELLED
Start: 2023-05-17 | End: 2023-05-17

## 2023-05-17 RX ADMIN — Medication 5 ML: at 10:27

## 2023-05-17 RX ADMIN — SODIUM CHLORIDE 1000 ML: 9 INJECTION, SOLUTION INTRAVENOUS at 09:18

## 2023-05-17 ASSESSMENT — PAIN SCALES - GENERAL: PAINLEVEL: NO PAIN (0)

## 2023-05-17 NOTE — PROGRESS NOTES
Infusion Nursing Note:  Jf Ortiz presents today for IVF.    Patient seen by provider today: No   present during visit today: Not Applicable.    Note: Patient arrived to unit and  noting he needed to use the restroom immediately. Juan J RN attempted to assist to restroom immediately but before able to get him into a room, patient noting he was incontinent. Patient was cleaned up and dressed in paper hospital pants by Juan J and Patsy [facility], and his pants were placed in a belongings bag to be sent home with him. Note on SNF paperwork alerting.       Intravenous Access:  Implanted Port.      Post Infusion Assessment:  Patient tolerated infusion without incident.  Blood return noted pre and post infusion.  Site patent and intact, free from redness, edema or discomfort.  No evidence of extravasations.   Access discontinued per protocol.      Discharge Plan:   Copy of AVS reviewed with patient and/or family. Patient discharged in stable condition accompanied by: self and medical .  Departure Mode: Wheelchair.

## 2023-05-31 ENCOUNTER — INFUSION THERAPY VISIT (OUTPATIENT)
Dept: INFUSION THERAPY | Facility: OTHER | Age: 88
End: 2023-05-31
Attending: INTERNAL MEDICINE
Payer: MEDICARE

## 2023-05-31 ENCOUNTER — TELEPHONE (OUTPATIENT)
Dept: FAMILY MEDICINE | Facility: OTHER | Age: 88
End: 2023-05-31

## 2023-05-31 DIAGNOSIS — E86.9 VOLUME DEPLETION: Primary | ICD-10-CM

## 2023-05-31 PROCEDURE — 96360 HYDRATION IV INFUSION INIT: CPT

## 2023-05-31 PROCEDURE — 250N000011 HC RX IP 250 OP 636: Performed by: FAMILY MEDICINE

## 2023-05-31 PROCEDURE — 258N000003 HC RX IP 258 OP 636: Performed by: FAMILY MEDICINE

## 2023-05-31 RX ORDER — HEPARIN SODIUM (PORCINE) LOCK FLUSH IV SOLN 100 UNIT/ML 100 UNIT/ML
5 SOLUTION INTRAVENOUS ONCE
Status: CANCELLED
Start: 2023-05-31 | End: 2023-05-31

## 2023-05-31 RX ORDER — HEPARIN SODIUM (PORCINE) LOCK FLUSH IV SOLN 100 UNIT/ML 100 UNIT/ML
5 SOLUTION INTRAVENOUS ONCE
Status: COMPLETED | OUTPATIENT
Start: 2023-05-31 | End: 2023-05-31

## 2023-05-31 RX ADMIN — SODIUM CHLORIDE 1000 ML: 9 INJECTION, SOLUTION INTRAVENOUS at 09:17

## 2023-05-31 RX ADMIN — Medication 5 ML: at 10:23

## 2023-05-31 NOTE — TELEPHONE ENCOUNTER
Patient needs to be scheduled out for more IV fluids every two weeks for hypovolemia.  Please reach out to patient nursing home to schedule.

## 2023-05-31 NOTE — PROGRESS NOTES
Message sent to scheduling team to reach out the nursing home to schedule more appointments for the summer.

## 2023-05-31 NOTE — PROGRESS NOTES
Infusion Nursing Note:  Jf Ortiz presents today for IVF.    Patient seen by provider today: No   present during visit today: Not Applicable.    Note: Patient arrived via [facility].  Assisted to the restroom with assist of two on immediate arrival.  Patient continent of bowel.  Patient assist of two to the chair for infusion.  Mild confusion, patient reported he was very tired.  Patient asked what the day of the week was and the date.  Patient orientated to the current day/date.  Patient able to give three identifiers at the start of IVF.  Patient requested coffee and warm blanket.  Patient reclined and left to nap.       Intravenous Access:Implanted Port.    Treatment Conditions:  Patient meets treatment parmeters.  Confirmed patient received the Fact Sheet for Patients, Parents and Caregivers prior to receiving medication.     Post Infusion Assessment:  Patient tolerated infusion without incident.  Blood return noted pre and post infusion.  Site patent and intact, free from redness, edema or discomfort.  No evidence of extravasations.  Access discontinued per protocol.   *If a hypersensitivity, medication error, or an adverse event occurred, pharmacy was notified in addition to the provider for FDA Reporting.        Discharge Plan:   Discharge instructions reviewed with: Patient.  Patient and/or family verbalized understanding of discharge instructions and all questions answered.  Copy of AVS reviewed with patient and/or family.  Patient will return two weeks for next appointment.  Patient discharged in stable condition accompanied by: self.  Departure Mode: Wheelchair.    Catia Dockery RN

## 2023-06-01 VITALS — WEIGHT: 149 LBS | BODY MASS INDEX: 22 KG/M2

## 2023-06-01 RX ORDER — BISACODYL 5 MG/1
10 TABLET, DELAYED RELEASE ORAL DAILY PRN
COMMUNITY
End: 2023-07-07

## 2023-06-01 RX ORDER — BISACODYL 10 MG
10 SUPPOSITORY, RECTAL RECTAL DAILY PRN
COMMUNITY
End: 2023-07-07

## 2023-06-05 ENCOUNTER — NURSING HOME VISIT (OUTPATIENT)
Dept: FAMILY MEDICINE | Facility: OTHER | Age: 88
End: 2023-06-05
Attending: FAMILY MEDICINE
Payer: MEDICARE

## 2023-06-05 DIAGNOSIS — G31.83 LEWY BODY DEMENTIA WITHOUT BEHAVIORAL DISTURBANCE (H): ICD-10-CM

## 2023-06-05 DIAGNOSIS — G89.29 CHRONIC LOW BACK PAIN, UNSPECIFIED BACK PAIN LATERALITY, UNSPECIFIED WHETHER SCIATICA PRESENT: ICD-10-CM

## 2023-06-05 DIAGNOSIS — F02.80 LEWY BODY DEMENTIA WITHOUT BEHAVIORAL DISTURBANCE (H): ICD-10-CM

## 2023-06-05 DIAGNOSIS — I10 HYPERTENSION WITH TARGET BLOOD PRESSURE GOAL UNDER 150/90: ICD-10-CM

## 2023-06-05 DIAGNOSIS — I25.10 CORONARY ARTERY DISEASE INVOLVING NATIVE CORONARY ARTERY OF NATIVE HEART WITHOUT ANGINA PECTORIS: ICD-10-CM

## 2023-06-05 DIAGNOSIS — M54.50 CHRONIC LOW BACK PAIN, UNSPECIFIED BACK PAIN LATERALITY, UNSPECIFIED WHETHER SCIATICA PRESENT: ICD-10-CM

## 2023-06-05 DIAGNOSIS — G20.A1 PARKINSON DISEASE (H): ICD-10-CM

## 2023-06-05 DIAGNOSIS — Z78.9 NURSING HOME RESIDENT: Primary | ICD-10-CM

## 2023-06-05 PROCEDURE — 99308 SBSQ NF CARE LOW MDM 20: CPT | Performed by: FAMILY MEDICINE

## 2023-06-05 NOTE — PROGRESS NOTES
Nursing Home Visit    HISTORY OF PRESENT ILLNESS:  Jf is a 89 year old male (10/5/1933)  resident of [facility] who is being seen today for routine 30 day follow up.     He has a history of Parkinson's disease, Lewy body dementia, atrial fibrillation, coronary heart disease status post aortocoronary bypass as well as stenting, seizure disorder, ans well as recurrent falls.    Jf was admitted from [facility] after being hospitalized with falls and compression fractures of L1 and L2.and in need of pain control.  This was achieved     The patient notes no current complaints.     Discussed with nursing staff who note significant erythematous rash perineum.  This is now improving.    The patient is seen in his room.  Family is not present.     Medical records are reviewed.    Current medications, allergies, and interdisciplinary care plan are reviewed.      Patient Active Problem List    Diagnosis Date Noted     Orthostatic hypotension dysautonomic syndrome 02/12/2021     Priority: High     Chronic atrial fibrillation (H) 02/12/2021     Priority: High     Longstanding persistent atrial fibrillation (H) 04/13/2020     Priority: High     Coronary artery disease involving native coronary artery without angina pectoris 04/13/2020     Priority: High     Pacemaker, [employer], Dual Chamber - Dependent 10/06/2017     Priority: High     Lewy body dementia without behavioral disturbance (H) 08/31/2017     Priority: High     Dementia without behavioral disturbance (H) 07/28/2015     Priority: High     Diagnosis updated by automated process. Provider to review and confirm.       Parkinson disease (H)      Priority: High     Seizure disorder (H)      Priority: High     Cardiac pacemaker in situ 01/08/2013     Priority: High     Overview:   [employer] S606 DREL,  Serial #[id number]  5-13-11  Atrial lead [employer] 4054 Serial #[id number]  8-11-00  Ventriculer lead [employer] 4137  Serial #[id number]  5-13-11  2nd Degree AV Block   Dr. Campbell  Intrinsic:  Pt. is Pacemaker Dependant, remains paced at temp. rate of 30 bpm (6-13-14)    Formatting of this note might be different from the original.  [employer] S606 DREL,  Serial #[id number]  5-13-11  Atrial lead [employer] 4054 Serial #[id number]  8-11-00  Ventriculer lead [employer] 4137  Serial #[id number]  5-13-11  2nd Degree AV Block   Dr. Campbell  Intrinsic:  Pt. is Pacemaker Dependant, remains paced at temp. rate of 30 bpm (6-13-14)       Hypertension with target blood pressure goal under 150/90 09/05/2006     Priority: High     Overview:   IMO Update       Essential hypertension 09/05/2006     Priority: High     Formatting of this note might be different from the original.  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     Priority: Medium     Vision loss of right eye 02/12/2021     Priority: Medium     Closed compression fracture of body of L1 vertebra (H) 02/12/2021     Priority: Medium     Closed wedge compression fracture of lumbar vertebra with routine healing 12/20/2020     Priority: Medium     Compression fracture of L1 lumbar vertebra, closed, initial encounter (H) 12/18/2020     Priority: Medium     Compression fracture of thoracic vertebra, initial encounter, unspecified thoracic vertebral level 12/18/2020     Priority: Medium     Replacing diagnoses that were inactivated after the 10/1/2021 regulatory import.       Frequent falls 04/13/2020     Priority: Medium     Constipation, unspecified constipation type 04/11/2018     Priority: Medium     Urinary  incontinence 08/31/2017     Priority: Medium     Autonomic orthostatic hypotension 10/14/2016     Priority: Medium     Volume depletion 08/02/2014     Priority: Medium     Stented coronary artery      Priority: Medium     REM sleep behavior disorder 01/01/2011     Priority: Medium     Osteoarthritis 01/01/2011     Priority: Medium     Problem list name updated by automated process. Provider to review       Diaphragmatic hernia 01/01/2011     Priority: Medium     Problem list name updated by automated process. Provider to review       Pain in joint, forearm 07/31/2008     Priority: Medium     Formatting of this note might be different from the original.  IMO Update 10/11       Pain in joint, ankle and foot 07/31/2008     Priority: Medium     Formatting of this note might be different from the original.  IMO Update 10/11       Dysphagia 05/22/2007     Priority: Medium     Overview:   IMO Update    Formatting of this note might be different from the original.  IMO Update       Coronary atherosclerosis of native coronary artery 11/28/2006     Priority: Medium     Overview:   IMO Update 10/11    Formatting of this note might be different from the original.  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  IMO Update 10/11       Hyperlipidemia 09/05/2006     Priority: Medium     Formatting of this note might be different from the original.  IMO Update 10/11       Nursing Home Visit 03/03/2022     Priority: Low          Social History     Socioeconomic History     Marital status: Single     Spouse name: Not on file     Number of children: Not on file     Years of education: Not on file     Highest education level: Not on file   Occupational History     Occupation: retired   Tobacco Use     Smoking status: Former     Packs/day: 1.00     Years: 5.00     Pack years: 5.00     Types:  Cigarettes     Quit date: 1985     Years since quittin.4     Smokeless tobacco: Never     Tobacco comments:     quit in    Vaping Use     Vaping status: Not on file   Substance and Sexual Activity     Alcohol use: Yes     Comment: social     Drug use: No     Sexual activity: Not Currently   Other Topics Concern      Service Not Asked     Blood Transfusions Yes     Caffeine Concern Yes     Comment: 1 cup coffee daily     Occupational Exposure Not Asked     Hobby Hazards Not Asked     Sleep Concern Not Asked     Stress Concern Not Asked     Weight Concern Not Asked     Special Diet Not Asked     Back Care Not Asked     Exercise Not Asked     Bike Helmet Not Asked     Seat Belt Not Asked     Self-Exams Not Asked     Parent/sibling w/ CABG, MI or angioplasty before 65F 55M? No   Social History Narrative     Not on file     Social Determinants of Health     Financial Resource Strain: Not on file   Food Insecurity: Not on file   Transportation Needs: Not on file   Physical Activity: Not on file   Stress: Not on file   Social Connections: Not on file   Intimate Partner Violence: Not on file   Housing Stability: Not on file        Current Outpatient Medications   Medication Sig     acetaminophen (TYLENOL) 500 MG tablet Take 1,000 mg by mouth 3 times daily     aspirin (ASA) 81 MG chewable tablet Take 81 mg by mouth daily     bisacodyl (DULCOLAX) 10 MG suppository Place 10 mg rectally daily as needed for constipation     bisacodyl (DULCOLAX) 5 MG EC tablet Take 10 mg by mouth daily as needed for constipation     calcium carbonate (OS-ROSALINDA) 1500 (600 Ca) MG tablet Take 600 mg by mouth daily     fludrocortisone (FLORINEF) 0.1 MG tablet Take 1 tablet (0.1 mg) by mouth every morning (before breakfast)     magnesium hydroxide (MILK OF MAGNESIA) 400 MG/5ML suspension Take 30 mLs by mouth daily as needed     melatonin 5 MG tablet Take 5 mg by mouth At Bedtime     memantine (NAMENDA) 5 MG tablet Take 5 mg by mouth  2 times daily     mirabegron (MYRBETRIQ) 25 MG 24 hr tablet Take 25 mg by mouth daily     NONFORMULARY 2 times daily Barrier cream     Apply to rash: intergluteal cleft, coccyx, rogelio-area.     Nutritional Supplements (BOOST) Take 1 Bottle by mouth daily     nystatin (MYCOSTATIN) 699127 UNIT/GM external powder Apply topically as needed     potassium chloride ER (KLOR-CON M) 20 MEQ CR tablet TAKE 1 TABLET BY MOUTH EVERY DAY     RABEprazole (ACIPHEX) 20 MG EC tablet Take 20 mg by mouth 2 times daily     senna-docusate (SENOKOT-S/PERICOLACE) 8.6-50 MG tablet Take 2 tablets by mouth 2 times daily     sodium chloride 1 GM tablet TAKE 1 TABLET BY MOUTH TWICE A DAY     sodium phosphate (FLEET ENEMA) 7-19 GM/118ML rectal enema Place 1 enema rectally once as needed for constipation     tamsulosin (FLOMAX) 0.4 MG capsule Take 1 capsule (0.4 mg) by mouth daily     vitamin D3 (CHOLECALCIFEROL) 50 mcg (2000 units) tablet Take 1 tablet by mouth daily     No current facility-administered medications for this visit.       Allergies   Allergen Reactions     Cats Unknown     Haldol [Haloperidol]      Per SNF reporting     Klonopin [Clonazepam]      Per SNF reporting     Lamotrigine      Skin lesions     Oxycodone      Per SNF reporting       I have reviewed the care plan and do agree with the plan.      ROS:  No chest pain, shortness of breath, fever, chills, headache, nausea, vomiting, dysuria, or changes in bowel habits.  Appetite is poor.  Low back pain noted.          OBJECTIVE:  Wt 67.6 kg (149 lb)   BMI 22.00 kg/m      GENERAL: Healthy, alert and no distress  EYES: Eyes grossly normal to inspection.  No discharge or erythema, or obvious scleral/conjunctival abnormalities.  RESP: No audible wheeze, cough, or visible cyanosis.  No visible retractions or increased work of breathing.    SKIN: Erythema with serpiginous borders noted with sattelite lesions. No abnormal pigmentation or lesions.  NEURO: Cranial nerves grossly intact.   Mentation impaired. Speech appropriate for age.  PSYCH: Mentation shows impaired cognition affect normal/bright, judgement and insight intact, normal speech and appearance well-groomed.              Lab/Diagnostic data:    Reviewed    ASSESSMENT/ORDERS:  Nursing Home Visit  Discussed with staff. Care plan reviewed and orders updated.  Chronic issues are stable. Routine 30 day Nursing Home follow up.     Rash  Suspect fungal. Finished diflucan.  Will follow.    Closed compression fracture of L1 lumbar vertebra with routine healing, subsequent encounter  Stable    Lewy body dementia without behavioral disturbance (H)  Stable    Parkinson disease (H)  Stable    Coronary artery disease involving native coronary artery of native heart without angina pectoris  No recurrent symptoms    Hypertension with target blood pressure goal under 150/90  Recent readings reviewed.  Continue same medication regimen        Total time spent with patient visit was 25 min including patient visit, review of pertinent clinical information, and treatment plan.      Abran Whitman MD FAAFP [facility]  Geriatrics  Hospice and Palliative Care

## 2023-06-21 NOTE — ED NOTES
Patient port was deaccessed and flushed with heparin. Provider was in to speak with patient prior to discontinue. Capri RN was updated on patient status and discharge. Patient transported back via [facility]   HUMAIRA

## 2023-06-22 NOTE — PROGRESS NOTES
Patient is a 86 year old male here accompanied by significant other today for infusion of IVF per order of Dr MALIA Ray under the supervision of Dr Walter, Cardiology.  Patient meets parameters for today's infusion. Patient identified with two identifiers, order verified, and verbal consent for today's infusion obtained from patient.         Patients right sided port accessed using non-coring, 19 gauge, 3/4 inch power needle. Port accessed per facility protocol. Port flushed easily, blood return noted.  No signs and symptoms of infection or infiltration.      IV pump verified with dose, drug, and rate of administration.  Infusion administered per protocol.  Patient tolerated infusion well, no signs or symptoms of adverse reaction noted.  Patient denies pain nor discomfort.     IV removed, catheter intact.  Site clean, dry and intact.  No signs or symptoms of infiltration or infection.  Covered with a sterile bandage, slight pressure applied for 30 seconds.  Pt instructed to leave bandage intact for a minimum of one hour, and to call with questions or concerns. Patient states understanding, discharged ambulatory.    
resilient/elastic

## 2023-06-28 ENCOUNTER — INFUSION THERAPY VISIT (OUTPATIENT)
Dept: INFUSION THERAPY | Facility: OTHER | Age: 88
End: 2023-06-28
Attending: INTERNAL MEDICINE
Payer: MEDICARE

## 2023-06-28 VITALS
BODY MASS INDEX: 21.95 KG/M2 | SYSTOLIC BLOOD PRESSURE: 135 MMHG | DIASTOLIC BLOOD PRESSURE: 60 MMHG | HEART RATE: 66 BPM | HEIGHT: 69 IN | TEMPERATURE: 97.5 F | OXYGEN SATURATION: 95 % | RESPIRATION RATE: 16 BRPM | WEIGHT: 148.2 LBS

## 2023-06-28 DIAGNOSIS — E86.9 VOLUME DEPLETION: Primary | ICD-10-CM

## 2023-06-28 PROCEDURE — 250N000011 HC RX IP 250 OP 636: Mod: JZ | Performed by: FAMILY MEDICINE

## 2023-06-28 PROCEDURE — 258N000003 HC RX IP 258 OP 636: Performed by: FAMILY MEDICINE

## 2023-06-28 PROCEDURE — 96360 HYDRATION IV INFUSION INIT: CPT

## 2023-06-28 RX ORDER — HEPARIN SODIUM (PORCINE) LOCK FLUSH IV SOLN 100 UNIT/ML 100 UNIT/ML
5 SOLUTION INTRAVENOUS ONCE
Status: COMPLETED | OUTPATIENT
Start: 2023-06-28 | End: 2023-06-28

## 2023-06-28 RX ORDER — HEPARIN SODIUM (PORCINE) LOCK FLUSH IV SOLN 100 UNIT/ML 100 UNIT/ML
5 SOLUTION INTRAVENOUS ONCE
Status: CANCELLED
Start: 2023-06-28 | End: 2023-06-28

## 2023-06-28 RX ADMIN — SODIUM CHLORIDE 1000 ML: 9 INJECTION, SOLUTION INTRAVENOUS at 09:02

## 2023-06-28 RX ADMIN — Medication 5 ML: at 10:08

## 2023-06-28 ASSESSMENT — PAIN SCALES - GENERAL: PAINLEVEL: NO PAIN (0)

## 2023-06-28 NOTE — PROGRESS NOTES
Infusion Nursing Note:  Jf Ortiz presents today for IVF.    Patient seen by provider today: No   present during visit today: Not Applicable.    Intravenous Access:  Implanted Port.    Patient discharged by another nurse, see their charting. AVS was printed and placed with patient SNF patperwork prior to end of appt.     HLT ride here at 1040 to .

## 2023-06-28 NOTE — PATIENT INSTRUCTIONS

## 2023-07-06 NOTE — PROGRESS NOTES
01/31/23 1400   Appointment Info   Signing Clinician's Name / Credentials (SLP) Susan Mcgill MS CCC-SLP   Appointment Canceled Reason (SLP) Unarousable   Appointment Cancel Comments (SLP) Attempted to see pt 2x for skilled PO trials. Pt unarousable during both attempts, therefore unsafe to assess skilled PO trials at this time. Per chart review pt demonstrated increased difficulty with thin liquids and required thickened liquids overnight. Unable to assess safety of thin vs. thickened liquids due to pt being unarousable. Nursing staff reported this AM that pt tolerated medications crushed in pureed texture without difficulty. During second attempt pt had family members present at bedside. Provided skilled education to pt's family members regarding current swallow functions, rationale behind modified diet, and strategies for 1:1 feeding when pt is alert enough for PO intake. Will attempt to see pt again tomorrow AM.         Zoryve Counseling:  I discussed with the patient that Zoryve is not for use in the eyes, mouth or vagina. The most commonly reported side effects include diarrhea, headache, insomnia, application site pain, upper respiratory tract infections, and urinary tract infections.  All of the patient's questions and concerns were addressed.

## 2023-07-07 VITALS — WEIGHT: 149.9 LBS | BODY MASS INDEX: 22.14 KG/M2

## 2023-07-10 ENCOUNTER — NURSING HOME VISIT (OUTPATIENT)
Dept: FAMILY MEDICINE | Facility: OTHER | Age: 88
End: 2023-07-10
Attending: FAMILY MEDICINE
Payer: MEDICARE

## 2023-07-10 DIAGNOSIS — G89.29 CHRONIC LOW BACK PAIN, UNSPECIFIED BACK PAIN LATERALITY, UNSPECIFIED WHETHER SCIATICA PRESENT: ICD-10-CM

## 2023-07-10 DIAGNOSIS — Z78.9 NURSING HOME RESIDENT: Primary | ICD-10-CM

## 2023-07-10 DIAGNOSIS — G20.A1 PARKINSON DISEASE (H): ICD-10-CM

## 2023-07-10 DIAGNOSIS — F02.80 LEWY BODY DEMENTIA WITHOUT BEHAVIORAL DISTURBANCE (H): ICD-10-CM

## 2023-07-10 DIAGNOSIS — I95.1 ORTHOSTATIC HYPOTENSION DYSAUTONOMIC SYNDROME: ICD-10-CM

## 2023-07-10 DIAGNOSIS — I25.10 CORONARY ARTERY DISEASE INVOLVING NATIVE CORONARY ARTERY OF NATIVE HEART WITHOUT ANGINA PECTORIS: ICD-10-CM

## 2023-07-10 DIAGNOSIS — I10 HYPERTENSION WITH TARGET BLOOD PRESSURE GOAL UNDER 150/90: ICD-10-CM

## 2023-07-10 DIAGNOSIS — M54.50 CHRONIC LOW BACK PAIN, UNSPECIFIED BACK PAIN LATERALITY, UNSPECIFIED WHETHER SCIATICA PRESENT: ICD-10-CM

## 2023-07-10 DIAGNOSIS — G31.83 LEWY BODY DEMENTIA WITHOUT BEHAVIORAL DISTURBANCE (H): ICD-10-CM

## 2023-07-10 PROCEDURE — 99308 SBSQ NF CARE LOW MDM 20: CPT | Performed by: FAMILY MEDICINE

## 2023-07-12 ENCOUNTER — INFUSION THERAPY VISIT (OUTPATIENT)
Dept: INFUSION THERAPY | Facility: OTHER | Age: 88
End: 2023-07-12
Attending: INTERNAL MEDICINE
Payer: MEDICARE

## 2023-07-12 VITALS — OXYGEN SATURATION: 98 % | HEART RATE: 76 BPM | DIASTOLIC BLOOD PRESSURE: 68 MMHG | SYSTOLIC BLOOD PRESSURE: 136 MMHG

## 2023-07-12 DIAGNOSIS — E86.9 VOLUME DEPLETION: Primary | ICD-10-CM

## 2023-07-12 PROCEDURE — 250N000011 HC RX IP 250 OP 636: Mod: JZ | Performed by: FAMILY MEDICINE

## 2023-07-12 PROCEDURE — 258N000003 HC RX IP 258 OP 636: Performed by: FAMILY MEDICINE

## 2023-07-12 PROCEDURE — 96360 HYDRATION IV INFUSION INIT: CPT

## 2023-07-12 RX ORDER — HEPARIN SODIUM (PORCINE) LOCK FLUSH IV SOLN 100 UNIT/ML 100 UNIT/ML
5 SOLUTION INTRAVENOUS ONCE
Status: CANCELLED
Start: 2023-07-12 | End: 2023-07-12

## 2023-07-12 RX ORDER — HEPARIN SODIUM (PORCINE) LOCK FLUSH IV SOLN 100 UNIT/ML 100 UNIT/ML
5 SOLUTION INTRAVENOUS ONCE
Status: COMPLETED | OUTPATIENT
Start: 2023-07-12 | End: 2023-07-12

## 2023-07-12 RX ADMIN — SODIUM CHLORIDE 1000 ML: 9 INJECTION, SOLUTION INTRAVENOUS at 09:00

## 2023-07-12 RX ADMIN — Medication 5 ML: at 10:17

## 2023-07-12 NOTE — PROGRESS NOTES
Infusion Nursing Note:  Jf Ortiz presents today for IVF.    Patient seen by provider today: No   present during visit today: Not Applicable.    Note: N/A.      Intravenous Access:  No Intravenous access/labs at this visit.    Treatment Conditions:  Results reviewed, labs MET treatment parameters, ok to proceed with treatment.      Post Infusion Assessment:  Patient tolerated infusion without incident.       Discharge Plan:   Copy of AVS reviewed with patient and/or family.  Patient will return 7-26-23 for next appointment.      Callie Alvarado RN

## 2023-07-26 ENCOUNTER — INFUSION THERAPY VISIT (OUTPATIENT)
Dept: INFUSION THERAPY | Facility: OTHER | Age: 88
End: 2023-07-26
Attending: INTERNAL MEDICINE
Payer: MEDICARE

## 2023-07-26 ENCOUNTER — ANCILLARY PROCEDURE (OUTPATIENT)
Dept: CARDIOLOGY | Facility: CLINIC | Age: 88
End: 2023-07-26
Attending: INTERNAL MEDICINE
Payer: MEDICARE

## 2023-07-26 VITALS
DIASTOLIC BLOOD PRESSURE: 57 MMHG | RESPIRATION RATE: 16 BRPM | HEART RATE: 76 BPM | SYSTOLIC BLOOD PRESSURE: 125 MMHG | TEMPERATURE: 98 F

## 2023-07-26 DIAGNOSIS — I44.2 COMPLETE HEART BLOCK (H): ICD-10-CM

## 2023-07-26 DIAGNOSIS — E86.9 VOLUME DEPLETION: Primary | ICD-10-CM

## 2023-07-26 PROCEDURE — 93296 REM INTERROG EVL PM/IDS: CPT

## 2023-07-26 PROCEDURE — 93294 REM INTERROG EVL PM/LDLS PM: CPT | Performed by: INTERNAL MEDICINE

## 2023-07-26 PROCEDURE — 258N000003 HC RX IP 258 OP 636: Performed by: FAMILY MEDICINE

## 2023-07-26 PROCEDURE — 96360 HYDRATION IV INFUSION INIT: CPT

## 2023-07-26 PROCEDURE — 250N000011 HC RX IP 250 OP 636: Mod: JZ | Performed by: FAMILY MEDICINE

## 2023-07-26 RX ORDER — HEPARIN SODIUM (PORCINE) LOCK FLUSH IV SOLN 100 UNIT/ML 100 UNIT/ML
5 SOLUTION INTRAVENOUS ONCE
Status: COMPLETED | OUTPATIENT
Start: 2023-07-26 | End: 2023-07-26

## 2023-07-26 RX ORDER — HEPARIN SODIUM (PORCINE) LOCK FLUSH IV SOLN 100 UNIT/ML 100 UNIT/ML
5 SOLUTION INTRAVENOUS ONCE
Status: CANCELLED
Start: 2023-07-26 | End: 2023-07-26

## 2023-07-26 RX ADMIN — SODIUM CHLORIDE 1000 ML: 9 INJECTION, SOLUTION INTRAVENOUS at 09:10

## 2023-07-26 RX ADMIN — HEPARIN 5 ML: 100 SYRINGE at 10:20

## 2023-07-26 NOTE — PROGRESS NOTES
Infusion Nursing Note:  Jf Ortiz presents today for normal saline.    Patient seen by provider today: No   present during visit today: Not Applicable.    Note: N/A.      Intravenous Access:  Implanted Port.    Treatment Conditions:  Results reviewed, , ok to proceed with treatment.      Post Infusion Assessment:  Patient tolerated infusion without incident.       Discharge Plan:   Discharge instructions reviewed with: Patient.      VIOLETTA MENENDEZ RN

## 2023-08-05 LAB
MDC_IDC_LEAD_IMPLANT_DT: NORMAL
MDC_IDC_LEAD_IMPLANT_DT: NORMAL
MDC_IDC_LEAD_LOCATION: NORMAL
MDC_IDC_LEAD_LOCATION: NORMAL
MDC_IDC_LEAD_LOCATION_DETAIL_1: NORMAL
MDC_IDC_LEAD_LOCATION_DETAIL_1: NORMAL
MDC_IDC_LEAD_MFG: NORMAL
MDC_IDC_LEAD_MFG: NORMAL
MDC_IDC_LEAD_MODEL: NORMAL
MDC_IDC_LEAD_MODEL: NORMAL
MDC_IDC_LEAD_POLARITY_TYPE: NORMAL
MDC_IDC_LEAD_POLARITY_TYPE: NORMAL
MDC_IDC_LEAD_SERIAL: NORMAL
MDC_IDC_LEAD_SERIAL: NORMAL
MDC_IDC_MSMT_BATTERY_DTM: NORMAL
MDC_IDC_MSMT_BATTERY_REMAINING_LONGEVITY: 55 MO
MDC_IDC_MSMT_BATTERY_RRT_TRIGGER: 2.62
MDC_IDC_MSMT_BATTERY_STATUS: NORMAL
MDC_IDC_MSMT_BATTERY_VOLTAGE: 2.96 V
MDC_IDC_MSMT_LEADCHNL_RA_IMPEDANCE_VALUE: 418 OHM
MDC_IDC_MSMT_LEADCHNL_RA_IMPEDANCE_VALUE: 551 OHM
MDC_IDC_MSMT_LEADCHNL_RA_PACING_THRESHOLD_AMPLITUDE: 1.38 V
MDC_IDC_MSMT_LEADCHNL_RA_PACING_THRESHOLD_PULSEWIDTH: 0.4 MS
MDC_IDC_MSMT_LEADCHNL_RA_SENSING_INTR_AMPL: 1.88 MV
MDC_IDC_MSMT_LEADCHNL_RA_SENSING_INTR_AMPL: 1.88 MV
MDC_IDC_MSMT_LEADCHNL_RV_IMPEDANCE_VALUE: 380 OHM
MDC_IDC_MSMT_LEADCHNL_RV_IMPEDANCE_VALUE: 551 OHM
MDC_IDC_MSMT_LEADCHNL_RV_PACING_THRESHOLD_AMPLITUDE: 0.75 V
MDC_IDC_MSMT_LEADCHNL_RV_PACING_THRESHOLD_PULSEWIDTH: 0.4 MS
MDC_IDC_MSMT_LEADCHNL_RV_SENSING_INTR_AMPL: 4.75 MV
MDC_IDC_MSMT_LEADCHNL_RV_SENSING_INTR_AMPL: 4.75 MV
MDC_IDC_PG_IMPLANT_DTM: NORMAL
MDC_IDC_PG_MFG: NORMAL
MDC_IDC_PG_MODEL: NORMAL
MDC_IDC_PG_SERIAL: NORMAL
MDC_IDC_PG_TYPE: NORMAL
MDC_IDC_SESS_CLINIC_NAME: NORMAL
MDC_IDC_SESS_DTM: NORMAL
MDC_IDC_SESS_TYPE: NORMAL
MDC_IDC_SET_BRADY_AT_MODE_SWITCH_RATE: 171 {BEATS}/MIN
MDC_IDC_SET_BRADY_HYSTRATE: NORMAL
MDC_IDC_SET_BRADY_LOWRATE: 70 {BEATS}/MIN
MDC_IDC_SET_BRADY_MAX_SENSOR_RATE: 130 {BEATS}/MIN
MDC_IDC_SET_BRADY_MAX_TRACKING_RATE: 130 {BEATS}/MIN
MDC_IDC_SET_BRADY_MODE: NORMAL
MDC_IDC_SET_BRADY_PAV_DELAY_LOW: 180 MS
MDC_IDC_SET_BRADY_SAV_DELAY_LOW: 150 MS
MDC_IDC_SET_LEADCHNL_RA_PACING_AMPLITUDE: 2.75 V
MDC_IDC_SET_LEADCHNL_RA_PACING_ANODE_ELECTRODE_1: NORMAL
MDC_IDC_SET_LEADCHNL_RA_PACING_ANODE_LOCATION_1: NORMAL
MDC_IDC_SET_LEADCHNL_RA_PACING_CAPTURE_MODE: NORMAL
MDC_IDC_SET_LEADCHNL_RA_PACING_CATHODE_ELECTRODE_1: NORMAL
MDC_IDC_SET_LEADCHNL_RA_PACING_CATHODE_LOCATION_1: NORMAL
MDC_IDC_SET_LEADCHNL_RA_PACING_POLARITY: NORMAL
MDC_IDC_SET_LEADCHNL_RA_PACING_PULSEWIDTH: 0.4 MS
MDC_IDC_SET_LEADCHNL_RA_SENSING_ANODE_ELECTRODE_1: NORMAL
MDC_IDC_SET_LEADCHNL_RA_SENSING_ANODE_LOCATION_1: NORMAL
MDC_IDC_SET_LEADCHNL_RA_SENSING_CATHODE_ELECTRODE_1: NORMAL
MDC_IDC_SET_LEADCHNL_RA_SENSING_CATHODE_LOCATION_1: NORMAL
MDC_IDC_SET_LEADCHNL_RA_SENSING_POLARITY: NORMAL
MDC_IDC_SET_LEADCHNL_RA_SENSING_SENSITIVITY: 0.9 MV
MDC_IDC_SET_LEADCHNL_RV_PACING_AMPLITUDE: 2 V
MDC_IDC_SET_LEADCHNL_RV_PACING_ANODE_ELECTRODE_1: NORMAL
MDC_IDC_SET_LEADCHNL_RV_PACING_ANODE_LOCATION_1: NORMAL
MDC_IDC_SET_LEADCHNL_RV_PACING_CAPTURE_MODE: NORMAL
MDC_IDC_SET_LEADCHNL_RV_PACING_CATHODE_ELECTRODE_1: NORMAL
MDC_IDC_SET_LEADCHNL_RV_PACING_CATHODE_LOCATION_1: NORMAL
MDC_IDC_SET_LEADCHNL_RV_PACING_POLARITY: NORMAL
MDC_IDC_SET_LEADCHNL_RV_PACING_PULSEWIDTH: 0.4 MS
MDC_IDC_SET_LEADCHNL_RV_SENSING_ANODE_ELECTRODE_1: NORMAL
MDC_IDC_SET_LEADCHNL_RV_SENSING_ANODE_LOCATION_1: NORMAL
MDC_IDC_SET_LEADCHNL_RV_SENSING_CATHODE_ELECTRODE_1: NORMAL
MDC_IDC_SET_LEADCHNL_RV_SENSING_CATHODE_LOCATION_1: NORMAL
MDC_IDC_SET_LEADCHNL_RV_SENSING_POLARITY: NORMAL
MDC_IDC_SET_LEADCHNL_RV_SENSING_SENSITIVITY: 0.9 MV
MDC_IDC_SET_ZONE_DETECTION_INTERVAL: 350 MS
MDC_IDC_SET_ZONE_DETECTION_INTERVAL: 400 MS
MDC_IDC_SET_ZONE_TYPE: NORMAL
MDC_IDC_STAT_AT_BURDEN_PERCENT: 0.1 %
MDC_IDC_STAT_AT_DTM_END: NORMAL
MDC_IDC_STAT_AT_DTM_START: NORMAL
MDC_IDC_STAT_BRADY_AP_VP_PERCENT: 98.59 %
MDC_IDC_STAT_BRADY_AP_VS_PERCENT: 0.18 %
MDC_IDC_STAT_BRADY_AS_VP_PERCENT: 0.93 %
MDC_IDC_STAT_BRADY_AS_VS_PERCENT: 0.3 %
MDC_IDC_STAT_BRADY_DTM_END: NORMAL
MDC_IDC_STAT_BRADY_DTM_START: NORMAL
MDC_IDC_STAT_BRADY_RA_PERCENT_PACED: 98.91 %
MDC_IDC_STAT_BRADY_RV_PERCENT_PACED: 99.53 %
MDC_IDC_STAT_EPISODE_RECENT_COUNT: 0
MDC_IDC_STAT_EPISODE_RECENT_COUNT_DTM_END: NORMAL
MDC_IDC_STAT_EPISODE_RECENT_COUNT_DTM_START: NORMAL
MDC_IDC_STAT_EPISODE_TOTAL_COUNT: 0
MDC_IDC_STAT_EPISODE_TOTAL_COUNT: 1830
MDC_IDC_STAT_EPISODE_TOTAL_COUNT: 3
MDC_IDC_STAT_EPISODE_TOTAL_COUNT_DTM_END: NORMAL
MDC_IDC_STAT_EPISODE_TOTAL_COUNT_DTM_START: NORMAL
MDC_IDC_STAT_EPISODE_TYPE: NORMAL

## 2023-08-09 ENCOUNTER — INFUSION THERAPY VISIT (OUTPATIENT)
Dept: INFUSION THERAPY | Facility: OTHER | Age: 88
End: 2023-08-09
Attending: INTERNAL MEDICINE
Payer: MEDICARE

## 2023-08-09 VITALS
TEMPERATURE: 98 F | HEART RATE: 77 BPM | DIASTOLIC BLOOD PRESSURE: 61 MMHG | SYSTOLIC BLOOD PRESSURE: 132 MMHG | OXYGEN SATURATION: 98 % | RESPIRATION RATE: 16 BRPM

## 2023-08-09 DIAGNOSIS — E86.9 VOLUME DEPLETION: Primary | ICD-10-CM

## 2023-08-09 PROCEDURE — 258N000003 HC RX IP 258 OP 636: Performed by: FAMILY MEDICINE

## 2023-08-09 PROCEDURE — 250N000011 HC RX IP 250 OP 636: Mod: JZ | Performed by: FAMILY MEDICINE

## 2023-08-09 PROCEDURE — 96360 HYDRATION IV INFUSION INIT: CPT

## 2023-08-09 RX ORDER — HEPARIN SODIUM (PORCINE) LOCK FLUSH IV SOLN 100 UNIT/ML 100 UNIT/ML
5 SOLUTION INTRAVENOUS ONCE
Status: CANCELLED
Start: 2023-08-09 | End: 2023-08-09

## 2023-08-09 RX ORDER — HEPARIN SODIUM (PORCINE) LOCK FLUSH IV SOLN 100 UNIT/ML 100 UNIT/ML
5 SOLUTION INTRAVENOUS ONCE
Status: COMPLETED | OUTPATIENT
Start: 2023-08-09 | End: 2023-08-09

## 2023-08-09 RX ADMIN — SODIUM CHLORIDE 1000 ML: 9 INJECTION, SOLUTION INTRAVENOUS at 09:02

## 2023-08-09 RX ADMIN — HEPARIN 5 ML: 100 SYRINGE at 10:21

## 2023-08-09 NOTE — PROGRESS NOTES
Infusion Nursing Note:  Jf Ortiz presents today for IVF.    Patient seen by provider today: No   present during visit today: Not Applicable.    Intravenous Access:  Implanted Port.    Treatment Conditions:  Not Applicable.      Post Infusion Assessment:  Patient tolerated infusion without incident.  Site patent and intact, free from redness, edema or discomfort.  No evidence of extravasations.  Access discontinued per protocol.       Discharge Plan:   Patient discharged in stable condition accompanied by: [employer].  Departure Mode: Wheelchair.      Kelli ESPOSITO

## 2023-08-09 NOTE — PATIENT INSTRUCTIONS

## 2023-08-11 ENCOUNTER — TELEPHONE (OUTPATIENT)
Dept: FAMILY MEDICINE | Facility: OTHER | Age: 88
End: 2023-08-11

## 2023-08-11 VITALS
HEART RATE: 91 BPM | BODY MASS INDEX: 21.59 KG/M2 | DIASTOLIC BLOOD PRESSURE: 68 MMHG | SYSTOLIC BLOOD PRESSURE: 132 MMHG | WEIGHT: 146.2 LBS | OXYGEN SATURATION: 94 % | RESPIRATION RATE: 16 BRPM | TEMPERATURE: 97.6 F

## 2023-08-11 RX ORDER — FLUCONAZOLE 150 MG/1
150 TABLET ORAL ONCE
COMMUNITY
End: 2023-10-20

## 2023-08-14 ENCOUNTER — NURSING HOME VISIT (OUTPATIENT)
Dept: FAMILY MEDICINE | Facility: OTHER | Age: 88
End: 2023-08-14
Attending: FAMILY MEDICINE
Payer: MEDICARE

## 2023-08-14 DIAGNOSIS — G31.83 LEWY BODY DEMENTIA WITHOUT BEHAVIORAL DISTURBANCE (H): ICD-10-CM

## 2023-08-14 DIAGNOSIS — I10 HYPERTENSION WITH TARGET BLOOD PRESSURE GOAL UNDER 150/90: ICD-10-CM

## 2023-08-14 DIAGNOSIS — I95.1 ORTHOSTATIC HYPOTENSION DYSAUTONOMIC SYNDROME: ICD-10-CM

## 2023-08-14 DIAGNOSIS — M54.50 CHRONIC LOW BACK PAIN, UNSPECIFIED BACK PAIN LATERALITY, UNSPECIFIED WHETHER SCIATICA PRESENT: ICD-10-CM

## 2023-08-14 DIAGNOSIS — I25.10 CORONARY ARTERY DISEASE INVOLVING NATIVE CORONARY ARTERY OF NATIVE HEART WITHOUT ANGINA PECTORIS: ICD-10-CM

## 2023-08-14 DIAGNOSIS — G20.A1 PARKINSON DISEASE (H): ICD-10-CM

## 2023-08-14 DIAGNOSIS — Z78.9 NURSING HOME RESIDENT: Primary | ICD-10-CM

## 2023-08-14 DIAGNOSIS — R21 RASH: ICD-10-CM

## 2023-08-14 DIAGNOSIS — G89.29 CHRONIC LOW BACK PAIN, UNSPECIFIED BACK PAIN LATERALITY, UNSPECIFIED WHETHER SCIATICA PRESENT: ICD-10-CM

## 2023-08-14 DIAGNOSIS — F02.80 LEWY BODY DEMENTIA WITHOUT BEHAVIORAL DISTURBANCE (H): ICD-10-CM

## 2023-08-14 PROCEDURE — 99308 SBSQ NF CARE LOW MDM 20: CPT | Performed by: FAMILY MEDICINE

## 2023-08-14 NOTE — PROGRESS NOTES
Nursing Home Visit    HISTORY OF PRESENT ILLNESS:  Jf is a 89 year old male (10/5/1933)  resident of [facility] who is being seen today for routine 30 day follow up.     He has a history of Parkinson's disease, Lewy body dementia, atrial fibrillation, coronary heart disease status post aortocoronary bypass as well as stenting, seizure disorder, ans well as recurrent falls.    Jf was admitted from [facility] after being hospitalized with falls and compression fractures of L1 and L2.and in need of pain control.  This was achieved     The patient notes no current complaints.     Discussed with nursing staff who note significant erythematous rash perineum.  This continues to improve.    The patient is seen in his room.  Family is not present.     Medical records are reviewed.    Current medications, allergies, and interdisciplinary care plan are reviewed.      Patient Active Problem List    Diagnosis Date Noted    Orthostatic hypotension dysautonomic syndrome 02/12/2021     Priority: High    Chronic atrial fibrillation (H) 02/12/2021     Priority: High    Longstanding persistent atrial fibrillation (H) 04/13/2020     Priority: High    Coronary artery disease involving native coronary artery without angina pectoris 04/13/2020     Priority: High    Pacemaker, [employer], Dual Chamber - Dependent 10/06/2017     Priority: High    Lewy body dementia without behavioral disturbance (H) 08/31/2017     Priority: High    Dementia without behavioral disturbance (H) 07/28/2015     Priority: High     Diagnosis updated by automated process. Provider to review and confirm.      Parkinson disease (H)      Priority: High    Seizure disorder (H)      Priority: High    Cardiac pacemaker in situ 01/08/2013     Priority: High     Overview:   [employer] S606 DREL,  Serial #[id number]  5-13-11  Atrial lead [employer] 4054 Serial #[id number]  8-11-00  Ventriculer lead [employer] [id number]  Serial #[id number]  5-13-11  2nd Degree AV Block   Dr. Campbell  Intrinsic:  Pt. is Pacemaker Dependant, remains paced at temp. rate of 30 bpm (6-13-14)    Formatting of this note might be different from the original.  [employer] S606 DREL,  Serial #[id number]  5-13-11  Atrial lead [employer] 4054 Serial #[id number]  8-11-00  Ventriculer lead [employer] 4137  Serial #[id number]  5-13-11  2nd Degree AV Block   Dr. Campbell  Intrinsic:  Pt. is Pacemaker Dependant, remains paced at temp. rate of 30 bpm (6-13-14)      Hypertension with target blood pressure goal under 150/90 09/05/2006     Priority: High     Overview:   IMO Update      Essential hypertension 09/05/2006     Priority: High     Formatting of this note might be different from the original.  IMO Update      Altered mental status 01/28/2023     Priority: Medium    UTI (urinary tract infection) 01/28/2023     Priority: Medium    Medication reaction, initial encounter 01/28/2023     Priority: Medium    Fall, initial encounter 09/27/2022     Priority: Medium    Hematoma of chest wall, left, initial encounter 04/23/2022     Priority: Medium    Retinal artery branch occlusion 02/13/2021     Priority: Medium    Vision loss of right eye 02/12/2021     Priority: Medium    Closed compression fracture of body of L1 vertebra (H) 02/12/2021     Priority: Medium    Closed wedge compression fracture of lumbar vertebra with routine healing 12/20/2020     Priority: Medium    Compression fracture of L1 lumbar vertebra, closed, initial encounter (H) 12/18/2020     Priority: Medium    Compression fracture of thoracic vertebra, initial encounter, unspecified thoracic vertebral level 12/18/2020     Priority: Medium     Replacing diagnoses that were inactivated after the 10/1/2021 regulatory import.      Frequent falls 04/13/2020     Priority: Medium    Constipation, unspecified constipation type 04/11/2018     Priority: Medium    Urinary incontinence  08/31/2017     Priority: Medium    Autonomic orthostatic hypotension 10/14/2016     Priority: Medium    Volume depletion 08/02/2014     Priority: Medium    Stented coronary artery      Priority: Medium    REM sleep behavior disorder 01/01/2011     Priority: Medium    Osteoarthritis 01/01/2011     Priority: Medium     Problem list name updated by automated process. Provider to review      Diaphragmatic hernia 01/01/2011     Priority: Medium     Problem list name updated by automated process. Provider to review      Pain in joint, forearm 07/31/2008     Priority: Medium     Formatting of this note might be different from the original.  IMO Update 10/11      Pain in joint, ankle and foot 07/31/2008     Priority: Medium     Formatting of this note might be different from the original.  IMO Update 10/11      Dysphagia 05/22/2007     Priority: Medium     Overview:   IMO Update    Formatting of this note might be different from the original.  IMO Update      Coronary atherosclerosis of native coronary artery 11/28/2006     Priority: Medium     Overview:   IMO Update 10/11    Formatting of this note might be different from the original.  IMO Update 10/11      Postsurgical aortocoronary bypass status 11/28/2006     Priority: Medium     Overview:   IMO Update 10/11    Formatting of this note might be different from the original.  IMO Update 10/11      Hyperlipidemia 09/05/2006     Priority: Medium     Formatting of this note might be different from the original.  IMO Update 10/11      Nursing Home Visit 03/03/2022     Priority: Low          Social History     Socioeconomic History    Marital status: Single     Spouse name: Not on file    Number of children: Not on file    Years of education: Not on file    Highest education level: Not on file   Occupational History    Occupation: retired   Tobacco Use    Smoking status: Former     Packs/day: 1.00     Years: 5.00     Pack years: 5.00     Types: Cigarettes     Quit date:  1985     Years since quittin.6    Smokeless tobacco: Never    Tobacco comments:     quit in    Substance and Sexual Activity    Alcohol use: Yes     Comment: social    Drug use: No    Sexual activity: Not Currently   Other Topics Concern     Service Not Asked    Blood Transfusions Yes    Caffeine Concern Yes     Comment: 1 cup coffee daily    Occupational Exposure Not Asked    Hobby Hazards Not Asked    Sleep Concern Not Asked    Stress Concern Not Asked    Weight Concern Not Asked    Special Diet Not Asked    Back Care Not Asked    Exercise Not Asked    Bike Helmet Not Asked    Seat Belt Not Asked    Self-Exams Not Asked    Parent/sibling w/ CABG, MI or angioplasty before 65F 55M? No   Social History Narrative    Not on file     Social Determinants of Health     Financial Resource Strain: Not on file   Food Insecurity: Not on file   Transportation Needs: Not on file   Physical Activity: Not on file   Stress: Not on file   Social Connections: Not on file   Intimate Partner Violence: Not on file   Housing Stability: Not on file        Current Outpatient Medications   Medication Sig    acetaminophen (TYLENOL) 500 MG tablet Take 1,000 mg by mouth 3 times daily    aspirin (ASA) 81 MG chewable tablet Take 81 mg by mouth daily    calcium carbonate (OS-ROSALINDA) 1500 (600 Ca) MG tablet Take 600 mg by mouth daily    carbamide peroxide (DEBROX) 6.5 % otic solution Place 5 drops into both ears daily    fluconazole (DIFLUCAN) 150 MG tablet Take 150 mg by mouth once    fludrocortisone (FLORINEF) 0.1 MG tablet Take 1 tablet (0.1 mg) by mouth every morning (before breakfast)    melatonin 5 MG tablet Take 5 mg by mouth At Bedtime    memantine (NAMENDA) 5 MG tablet Take 5 mg by mouth 2 times daily    mirabegron (MYRBETRIQ) 25 MG 24 hr tablet Take 25 mg by mouth daily    NONFORMULARY 2 times daily Barrier cream     Apply to rash: intergluteal cleft, coccyx, rogelio-area.    Nutritional Supplements (BOOST) Take 1 Bottle  by mouth daily    nystatin (MYCOSTATIN) 587699 UNIT/GM external powder Apply topically as needed    potassium chloride ER (KLOR-CON M) 20 MEQ CR tablet TAKE 1 TABLET BY MOUTH EVERY DAY    RABEprazole (ACIPHEX) 20 MG EC tablet Take 20 mg by mouth 2 times daily    senna-docusate (SENOKOT-S/PERICOLACE) 8.6-50 MG tablet Take 2 tablets by mouth 2 times daily    sodium chloride 1 GM tablet TAKE 1 TABLET BY MOUTH TWICE A DAY    tamsulosin (FLOMAX) 0.4 MG capsule Take 1 capsule (0.4 mg) by mouth daily    vitamin D3 (CHOLECALCIFEROL) 50 mcg (2000 units) tablet Take 1 tablet by mouth daily     No current facility-administered medications for this visit.       Allergies   Allergen Reactions    Cats Unknown    Haldol [Haloperidol]      Per SNF reporting    Klonopin [Clonazepam]      Per SNF reporting    Lamotrigine      Skin lesions    Oxycodone      Per SNF reporting       I have reviewed the care plan and do agree with the plan.      ROS:  No chest pain, shortness of breath, fever, chills, headache, nausea, vomiting, dysuria, or changes in bowel habits.  Appetite is poor.  Low back pain noted.          OBJECTIVE:  /68   Pulse 91   Temp 97.6  F (36.4  C)   Resp 16   Wt 66.3 kg (146 lb 3.2 oz)   SpO2 94%   BMI 21.59 kg/m      GENERAL: Healthy, alert and no distress  EYES: Eyes grossly normal to inspection.  No discharge or erythema, or obvious scleral/conjunctival abnormalities.  RESP: No audible wheeze, cough, or visible cyanosis.  No visible retractions or increased work of breathing.    SKIN: Erythema with serpiginous borders noted with sattelite lesions. No abnormal pigmentation or lesions.  NEURO: Cranial nerves grossly intact.  Mentation impaired. Speech appropriate for age.  PSYCH: Mentation shows impaired cognition affect normal/bright, judgement and insight intact, normal speech and appearance well-groomed.      Previous      Lab/Diagnostic data:    Reviewed    ASSESSMENT/ORDERS:  Nursing Home  Visit  Discussed with staff. Care plan reviewed and orders updated.  Chronic issues are stable. Routine 30 day Nursing Home follow up.     Rash  Suspect fungal. Finished diflucan.  Will follow.    Closed compression fracture of L1 lumbar vertebra with routine healing, subsequent encounter  Stable    Lewy body dementia without behavioral disturbance (H)  Stable    Parkinson disease (H)  Stable    Coronary artery disease involving native coronary artery of native heart without angina pectoris  No recurrent symptoms    Hypertension with target blood pressure goal under 150/90  Recent readings reviewed.  Continue same medication regimen        Total time spent with patient visit was 25 min including patient visit, review of pertinent clinical information, and treatment plan.      Abran Whitman MD FAAFP [facility]  Geriatrics  Hospice and Palliative Care

## 2023-08-23 ENCOUNTER — INFUSION THERAPY VISIT (OUTPATIENT)
Dept: INFUSION THERAPY | Facility: OTHER | Age: 88
End: 2023-08-23
Attending: INTERNAL MEDICINE
Payer: MEDICARE

## 2023-08-23 ENCOUNTER — TELEPHONE (OUTPATIENT)
Dept: INFUSION THERAPY | Facility: OTHER | Age: 88
End: 2023-08-23

## 2023-08-23 VITALS — BODY MASS INDEX: 21.63 KG/M2 | WEIGHT: 146.5 LBS

## 2023-08-23 DIAGNOSIS — E86.9 VOLUME DEPLETION: Primary | ICD-10-CM

## 2023-08-23 PROCEDURE — 96360 HYDRATION IV INFUSION INIT: CPT

## 2023-08-23 PROCEDURE — 258N000003 HC RX IP 258 OP 636: Performed by: FAMILY MEDICINE

## 2023-08-23 PROCEDURE — 250N000011 HC RX IP 250 OP 636: Mod: JZ | Performed by: FAMILY MEDICINE

## 2023-08-23 RX ORDER — HEPARIN SODIUM (PORCINE) LOCK FLUSH IV SOLN 100 UNIT/ML 100 UNIT/ML
5 SOLUTION INTRAVENOUS ONCE
Status: CANCELLED
Start: 2023-08-23 | End: 2023-08-23

## 2023-08-23 RX ORDER — HEPARIN SODIUM (PORCINE) LOCK FLUSH IV SOLN 100 UNIT/ML 100 UNIT/ML
5 SOLUTION INTRAVENOUS ONCE
Status: COMPLETED | OUTPATIENT
Start: 2023-08-23 | End: 2023-08-23

## 2023-08-23 RX ADMIN — SODIUM CHLORIDE 1000 ML: 9 INJECTION, SOLUTION INTRAVENOUS at 09:13

## 2023-08-23 RX ADMIN — HEPARIN 5 ML: 100 SYRINGE at 10:32

## 2023-08-23 NOTE — PROGRESS NOTES
Call from admitting alerting patient is here for infusion, but no appt noted. On review, patient does not appear to ever have had an appt for today (none cancelled or scheduled at this time). Juan J RN notes she had sent communication back to SNF last infusion alerting them that patient did not have any further appointments with us, as this same thing happened last time, and to please call to set patient up. Orders are current, we do have chair availability. Call to Kimberly LINTON and asked that we put patient on schedule for 0900. Admitting will check in once scheduled. See infusion note this date for more.

## 2023-08-23 NOTE — PROGRESS NOTES
Patient tolerated infusion well, Patient transported to SNF via [facility] transport in wheelchair.

## 2023-08-23 NOTE — PROGRESS NOTES
Infusion Nursing Note:  Jf FRANCES Ortiz presents today for IVF.    Patient seen by provider today: No   present during visit today: Not Applicable.    Note: Message to PAC scheduling pool ONC asking that patient be scheduled out for 2 to 3 more infusions, and that it appears he has typically been coming every 2 weeks and ok to continue this pattern. Will provide highlighted AVS on discharge, if ready.       Intravenous Access:  Implanted Port    Patient discharged by Catia ESPOSITO. Hand  off of care prior.

## 2023-08-31 ENCOUNTER — TELEPHONE (OUTPATIENT)
Dept: FAMILY MEDICINE | Facility: OTHER | Age: 88
End: 2023-08-31

## 2023-08-31 ENCOUNTER — APPOINTMENT (OUTPATIENT)
Dept: GENERAL RADIOLOGY | Facility: HOSPITAL | Age: 88
End: 2023-08-31
Attending: EMERGENCY MEDICINE
Payer: MEDICARE

## 2023-08-31 ENCOUNTER — HOSPITAL ENCOUNTER (EMERGENCY)
Facility: HOSPITAL | Age: 88
Discharge: NURSING, REHAB OR LONG-TERM CARE | End: 2023-08-31
Attending: EMERGENCY MEDICINE | Admitting: EMERGENCY MEDICINE
Payer: MEDICARE

## 2023-08-31 VITALS
DIASTOLIC BLOOD PRESSURE: 79 MMHG | TEMPERATURE: 97.6 F | SYSTOLIC BLOOD PRESSURE: 133 MMHG | OXYGEN SATURATION: 97 % | RESPIRATION RATE: 18 BRPM | HEART RATE: 69 BPM

## 2023-08-31 DIAGNOSIS — R09.A2 GLOBUS SENSATION: ICD-10-CM

## 2023-08-31 PROCEDURE — 99283 EMERGENCY DEPT VISIT LOW MDM: CPT | Performed by: EMERGENCY MEDICINE

## 2023-08-31 PROCEDURE — 71045 X-RAY EXAM CHEST 1 VIEW: CPT

## 2023-08-31 PROCEDURE — 99283 EMERGENCY DEPT VISIT LOW MDM: CPT | Mod: 25

## 2023-08-31 PROCEDURE — 70360 X-RAY EXAM OF NECK: CPT

## 2023-08-31 ASSESSMENT — ENCOUNTER SYMPTOMS
CHILLS: 0
SHORTNESS OF BREATH: 0
FEVER: 0

## 2023-08-31 ASSESSMENT — ACTIVITIES OF DAILY LIVING (ADL): ADLS_ACUITY_SCORE: 35

## 2023-08-31 NOTE — ED NOTES
Per provider, patient is given some water to drink and he is able to swallow water without difficulty. Provider updated.

## 2023-08-31 NOTE — DISCHARGE INSTRUCTIONS
You presented to the emergency department for a sensation of something being stuck in your throat.  This is highly unlikely because you have a puréed diet and your pills are crushed.  We performed x-rays to look for anything obvious, wean as anything.  If you continue to have trouble swallowing, come back and you may need a CT scan.  At this time you can eat and drink without difficulty.  Come back for fever or any nausea vomiting.

## 2023-08-31 NOTE — ED NOTES
"Caregiver from [facility] called and states that patient has pureed diet and that medications are crushed and given in pudding or apple sauce. He states that patient told him tonight that he had a bottle cap stuck in his throat. They did check patient room and there were no bottles in his room.They did also give him some ginger ale, apple sauce, and Maalox and he was able to swallow all of these.  Caregiver also states that patient is confused. Patient tells me that tonight when he had supper, he got something stuck in his throat. Patient not showing signs of difficulty talking or breathing. He does point to upper throat and states \"it feels like it is here.\" Provider in room to see patient.   "

## 2023-08-31 NOTE — ED TRIAGE NOTES
Sent from [facility] for having something stuck in his throat. Patient states he doesn't know what he has stuck in his throat.      Triage Assessment       Row Name 08/31/23 0106       Triage Assessment (Adult)    Airway WDL WDL

## 2023-08-31 NOTE — ED PROVIDER NOTES
History   No chief complaint on file.    HPI  Jf Ortiz is a 89 year old male who is here with sensation of something stuck in his throat.  Patient states they are going on for a few days, nursing home said in the last few hours.  Denies any pain.  Unsure what is in there.  Originally thought he swallowed a bottle However there were no bottle caps near him when staff Dr. Gonzalez.  Confused at baseline.    Allergies:  Allergies   Allergen Reactions    Cats Unknown    Haldol [Haloperidol]      Per SNF reporting    Klonopin [Clonazepam]      Per SNF reporting    Lamotrigine      Skin lesions    Oxycodone      Per SNF reporting       Problem List:    Patient Active Problem List    Diagnosis Date Noted    Altered mental status 01/28/2023     Priority: Medium    UTI (urinary tract infection) 01/28/2023     Priority: Medium    Medication reaction, initial encounter 01/28/2023     Priority: Medium    Fall, initial encounter 09/27/2022     Priority: Medium    Hematoma of chest wall, left, initial encounter 04/23/2022     Priority: Medium    Nursing Home Visit 03/03/2022     Priority: Medium    Retinal artery branch occlusion 02/13/2021     Priority: Medium    Vision loss of right eye 02/12/2021     Priority: Medium    Orthostatic hypotension dysautonomic syndrome 02/12/2021     Priority: Medium    Chronic atrial fibrillation (H) 02/12/2021     Priority: Medium    Closed compression fracture of body of L1 vertebra (H) 02/12/2021     Priority: Medium    Closed wedge compression fracture of lumbar vertebra with routine healing 12/20/2020     Priority: Medium    Compression fracture of L1 lumbar vertebra, closed, initial encounter (H) 12/18/2020     Priority: Medium    Compression fracture of thoracic vertebra, initial encounter, unspecified thoracic vertebral level 12/18/2020     Priority: Medium     Replacing diagnoses that were inactivated after the 10/1/2021 regulatory import.      Longstanding persistent atrial  fibrillation (H) 04/13/2020     Priority: Medium    Frequent falls 04/13/2020     Priority: Medium    Coronary artery disease involving native coronary artery without angina pectoris 04/13/2020     Priority: Medium    Constipation, unspecified constipation type 04/11/2018     Priority: Medium    Pacemaker, [employer], Dual Chamber - Dependent 10/06/2017     Priority: Medium    Lewy body dementia without behavioral disturbance (H) 08/31/2017     Priority: Medium    Urinary incontinence 08/31/2017     Priority: Medium    Autonomic orthostatic hypotension 10/14/2016     Priority: Medium    Dementia without behavioral disturbance (H) 07/28/2015     Priority: Medium     Diagnosis updated by automated process. Provider to review and confirm.      Parkinson disease (H)      Priority: Medium    Seizure disorder (H)      Priority: Medium    Volume depletion 08/02/2014     Priority: Medium    Stented coronary artery      Priority: Medium    Cardiac pacemaker in situ 01/08/2013     Priority: Medium     Overview:   [employer] S606 DREL,  Serial #[id number]  5-13-11  Atrial lead [employer] 4054 Serial #[id number]  8-11-00  Ventriculer lead [employer] 4137  Serial #[id number]  5-13-11  2nd Degree AV Block   Dr. Campbell  Intrinsic:  Pt. is Pacemaker Dependant, remains paced at temp. rate of 30 bpm (6-13-14)    Formatting of this note might be different from the original.  [employer] S606 DREL,  Serial #[id number]  5-13-11  Atrial lead [employer] 4054 Serial #[id number]  8-11-00  Ventriculer lead [employer] 4137  Serial #[id number]  5-13-11  2nd Degree AV Block   Dr. Campbell  Intrinsic:  Pt. is Pacemaker Dependant, remains paced at temp. rate of 30 bpm (6-13-14)      REM sleep behavior disorder 01/01/2011     Priority: Medium    Osteoarthritis 01/01/2011     Priority: Medium     Problem list name updated by automated process. Provider to review      Diaphragmatic hernia 01/01/2011     Priority:  Medium     Problem list name updated by automated process. Provider to review      Pain in joint, forearm 07/31/2008     Priority: Medium     Formatting of this note might be different from the original.  IMO Update 10/11      Pain in joint, ankle and foot 07/31/2008     Priority: Medium     Formatting of this note might be different from the original.  IMO Update 10/11      Dysphagia 05/22/2007     Priority: Medium     Overview:   IMO Update    Formatting of this note might be different from the original.  IMO Update      Coronary atherosclerosis of native coronary artery 11/28/2006     Priority: Medium     Overview:   IMO Update 10/11    Formatting of this note might be different from the original.  IMO Update 10/11      Postsurgical aortocoronary bypass status 11/28/2006     Priority: Medium     Overview:   IMO Update 10/11    Formatting of this note might be different from the original.  IMO Update 10/11      Hypertension with target blood pressure goal under 150/90 09/05/2006     Priority: Medium     Overview:   IMO Update      Hyperlipidemia 09/05/2006     Priority: Medium     Formatting of this note might be different from the original.  IMO Update 10/11      Essential hypertension 09/05/2006     Priority: Medium     Formatting of this note might be different from the original.  IMO Update          Past Medical History:    Past Medical History:   Diagnosis Date    Autonomic orthostatic hypotension 10/14/2016    Coronary artery disease     Dementia without behavioral disturbance (H) 7/28/2015    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    Seizure disorder (H)     Stented coronary artery        Past Surgical History:    Past Surgical History:   Procedure  Laterality Date    ------------OTHER-------------  1955    ulnar and radial fx - repair ulnar and radial fx x4    ------------OTHER-------------  6/14/2011    cataract extraction    BIOPSY  08/2015    skin biopsy    BLEPHAROPLASTY BILATERAL  5/6/2014    Procedure: BLEPHAROPLASTY BILATERAL;  Surgeon: Andrew Queen MD;  Location: [facility]    BYPASS GRAFT ARTERY CORONARY  11/2006    coronary artery disease x 5, [facility]    cataract extraction and lens implantation  2011    cataracts    cataract extraction and lens implantation      cataracts    COLONOSCOPY  2012    colonoscopy with polypectomy  3/13/2009    history of polyps - repeat 3 yrs    colonoscopy with polypectomy  2006    colonoscopy with polypectomy  2005    COMBINED COLONOSCOPY WITH ARGON PLASMA COAGULATOR (APC) N/A 10/31/2014    Procedure: COMBINED COLONOSCOPY WITH ARGON PLASMA COAGULATOR (APC);  Surgeon: Bassam Aguilar MD;  Location: [facility]    COMBINED COLONOSCOPY WITH ARGON PLASMA COAGULATOR (APC) N/A 11/13/2015    Procedure: COMBINED COLONOSCOPY WITH ARGON PLASMA COAGULATOR (APC);  Surgeon: Bassam Aguilar MD;  Location: [facility]    ENDOSCOPY UPPER, COLONOSCOPY, COMBINED N/A 10/31/2014    Procedure: COMBINED ENDOSCOPY UPPER, COLONOSCOPY;  Surgeon: Bassam Aguilar MD;  Location: [facility]    ENDOSCOPY UPPER, COLONOSCOPY, COMBINED N/A 11/13/2015    Procedure: COMBINED ENDOSCOPY UPPER, COLONOSCOPY;  Surgeon: Bassam Aguilar MD;  Location: [facility]    ESOPHAGOSCOPY, GASTROSCOPY, DUODENOSCOPY (EGD), COMBINED  1/22/2014    Procedure: COMBINED ESOPHAGOSCOPY, GASTROSCOPY, DUODENOSCOPY (EGD);  UPPER ENDOSCOPY(PENA) W/ BIOPSIES;  Surgeon: Patricia Pena MD;  Location: [facility]    HERNIORRHAPHY INGUINAL Right 2/14/2018    Procedure: HERNIORRHAPHY INGUINAL;  OPEN RIGHT INGUINAL HERNIA REPAIR with Mesh;  Surgeon: Virgilio Zaragoza, DO;  Location: [facility]    INSERT PORT VASCULAR ACCESS Right 7/12/2019    Procedure: PORT PLACEMENT;  Surgeon: Lloyd Ram MD;  Location:  [facility]    LARYNGOSCOPY WITH MICROSCOPE  2014    Procedure: LARYNGOSCOPY WITH MICROSCOPE;;  Surgeon: Chayo Duke MD;  Location: [facility]    pacemaker placement      heart block    pacemaker placement      dual-chamber    REMOVE TUBE, MYRINGOTOMY, COMBINED  2014    Procedure: COMBINED REMOVE TUBE, MYRINGOTOMY;  MICRODIRECT LARYNGOSCOPY WITH BIOPSY AND FROZEN SECTIONS removal of right ear tube and myringoplasty;  Surgeon: Chayo Duke MD;  Location: [facility]    REPLACE PACEMAKER GENERATOR N/A 2018    Procedure: REPLACE PACEMAKER GENERATOR;  Pacemaker generator change;  Surgeon: Alma Kaplan MD;  Location: [facility]    stent placement to LAD      ventilation tube  2012    right in office       Family History:    Family History   Problem Relation Age of Onset    Diabetes Mother     Breast Cancer Daughter        Social History:  Marital Status:  Single [1]  Social History     Tobacco Use    Smoking status: Former     Packs/day: 1.00     Years: 5.00     Pack years: 5.00     Types: Cigarettes     Quit date: 1985     Years since quittin.6    Smokeless tobacco: Never    Tobacco comments:     quit in    Substance Use Topics    Alcohol use: Yes     Comment: social    Drug use: No        Medications:    acetaminophen (TYLENOL) 500 MG tablet  aspirin (ASA) 81 MG chewable tablet  calcium carbonate (OS-ROSALINDA) 1500 (600 Ca) MG tablet  carbamide peroxide (DEBROX) 6.5 % otic solution  fluconazole (DIFLUCAN) 150 MG tablet  fludrocortisone (FLORINEF) 0.1 MG tablet  melatonin 5 MG tablet  memantine (NAMENDA) 5 MG tablet  mirabegron (MYRBETRIQ) 25 MG 24 hr tablet  NONFORMULARY  Nutritional Supplements (BOOST)  nystatin (MYCOSTATIN) 742454 UNIT/GM external powder  potassium chloride ER (KLOR-CON M) 20 MEQ CR tablet  RABEprazole (ACIPHEX) 20 MG EC tablet  senna-docusate (SENOKOT-S/PERICOLACE) 8.6-50 MG tablet  sodium chloride 1 GM tablet  tamsulosin (FLOMAX) 0.4 MG capsule  vitamin D3  (CHOLECALCIFEROL) 50 mcg (2000 units) tablet          Review of Systems   Unable to perform ROS: Dementia   Constitutional:  Negative for chills and fever.   Respiratory:  Negative for shortness of breath.    Cardiovascular:  Negative for chest pain.   All other systems reviewed and are negative.      Physical Exam   BP: 142/95  Pulse: 85  Temp: 97.6  F (36.4  C)  Resp: 18  SpO2: 95 %      Physical Exam  Vitals and nursing note reviewed.   Constitutional:       General: He is not in acute distress.     Appearance: Normal appearance. He is not ill-appearing, toxic-appearing or diaphoretic.   HENT:      Head: Normocephalic and atraumatic.      Right Ear: External ear normal.      Left Ear: External ear normal.      Mouth/Throat:      Comments: Nothing visible in oropharynx  Eyes:      General: No scleral icterus.     Conjunctiva/sclera: Conjunctivae normal.   Pulmonary:      Effort: Pulmonary effort is normal. No respiratory distress.      Breath sounds: No stridor. No wheezing.   Skin:     General: Skin is warm and dry.      Capillary Refill: Capillary refill takes less than 2 seconds.   Neurological:      General: No focal deficit present.      Mental Status: He is alert and oriented to person, place, and time. Mental status is at baseline.   Psychiatric:         Mood and Affect: Mood normal.         Behavior: Behavior normal.         ED Course                 Procedures             Critical Care time:               No results found. However, due to the size of the patient record, not all encounters were searched. Please check Results Review for a complete set of results.    Medications - No data to display    Assessments & Plan (with Medical Decision Making)     I have reviewed the nursing notes.    I have reviewed the findings, diagnosis, plan and need for follow up with the patient.          Medical Decision Making  The patient's presentation was of low complexity (an acute and uncomplicated illness or  injury).    The patient's evaluation involved:  an assessment requiring an independent historian (nursing home, ems)  ordering and/or review of 2 test(s) in this encounter (see separate area of note for details)    The patient's management necessitated only low risk treatment.    89-year-old male here with foreign body sensation in back of throat.  Able to swallow without difficulty.  Obtain plain radiographs to look for any bottle, none seen all bottle caps are radiopaque.  The odds of him having a food bolus are minimal given all of his food and medications are crushed.  I suspect part a pill scratched his throat and that is what he is sensing.  Was observed to swallow without any issue at the bedside without any vomiting afterwards.  Will discharge home with return precautions.    The risks of anesthesia at his age which would be required for endoscopy outweigh any benefit from identifying a tiny object that is in his throat since he is able to eat and drink without any problem.    New Prescriptions    No medications on file       Final diagnoses:   Globus sensation       8/31/2023   [facility]       Adrian Ly MD  08/31/23 0153

## 2023-09-01 ENCOUNTER — DOCUMENTATION ONLY (OUTPATIENT)
Dept: OTHER | Facility: CLINIC | Age: 88
End: 2023-09-01
Payer: MEDICARE

## 2023-09-06 ENCOUNTER — INFUSION THERAPY VISIT (OUTPATIENT)
Dept: INFUSION THERAPY | Facility: OTHER | Age: 88
End: 2023-09-06
Attending: FAMILY MEDICINE
Payer: MEDICARE

## 2023-09-06 VITALS — HEART RATE: 67 BPM | SYSTOLIC BLOOD PRESSURE: 131 MMHG | DIASTOLIC BLOOD PRESSURE: 57 MMHG

## 2023-09-06 DIAGNOSIS — E86.9 VOLUME DEPLETION: Primary | ICD-10-CM

## 2023-09-06 PROCEDURE — 250N000011 HC RX IP 250 OP 636: Mod: JZ | Performed by: FAMILY MEDICINE

## 2023-09-06 PROCEDURE — 96360 HYDRATION IV INFUSION INIT: CPT

## 2023-09-06 PROCEDURE — 258N000003 HC RX IP 258 OP 636: Performed by: FAMILY MEDICINE

## 2023-09-06 RX ORDER — HEPARIN SODIUM (PORCINE) LOCK FLUSH IV SOLN 100 UNIT/ML 100 UNIT/ML
5 SOLUTION INTRAVENOUS ONCE
Status: CANCELLED
Start: 2023-09-06 | End: 2023-09-06

## 2023-09-06 RX ORDER — HEPARIN SODIUM (PORCINE) LOCK FLUSH IV SOLN 100 UNIT/ML 100 UNIT/ML
5 SOLUTION INTRAVENOUS ONCE
Status: COMPLETED | OUTPATIENT
Start: 2023-09-06 | End: 2023-09-06

## 2023-09-06 RX ADMIN — SODIUM CHLORIDE 1000 ML: 9 INJECTION, SOLUTION INTRAVENOUS at 09:22

## 2023-09-06 RX ADMIN — HEPARIN 5 ML: 100 SYRINGE at 10:28

## 2023-09-06 NOTE — PROGRESS NOTES
Infusion Nursing Note:  Jf Ortiz presents today for IVF.    Patient seen by provider today: No   present during visit today: Not Applicable.    Note: N/A.    Intravenous Access:Implanted Port.    Treatment Conditions:  Lab Results   Component Value Date    HGB 11.3 (L) 02/01/2023    WBC 4.6 02/01/2023    ANEU 4.1 06/22/2021    ANEUTAUTO 3.5 01/28/2023     02/01/2023        Lab Results   Component Value Date     02/01/2023    POTASSIUM 3.6 02/02/2023    MAG 1.6 (L) 02/02/2023    CR 0.61 (L) 02/01/2023    ROSALINDA 7.9 (L) 02/01/2023    BILITOTAL 0.4 02/01/2023    ALBUMIN 3.0 (L) 02/01/2023    ALT 19 02/01/2023    AST 35 02/01/2023       Results reviewed, labs MET treatment parameters, ok to proceed with treatment.  Confirmed patient received the Fact Sheet for Patients, Parents and Caregivers prior to receiving medication.     Post Infusion Assessment:  Patient tolerated infusion without incident.  Blood return noted pre and post infusion.  Site patent and intact, free from redness, edema or discomfort.  No evidence of extravasations.  Access discontinued per protocol.   *If a hypersensitivity, medication error, or an adverse event occurred, pharmacy was notified in addition to the provider for FDA Reporting.        Discharge Plan:   Discharge instructions reviewed with: Patient.  Patient and/or family verbalized understanding of discharge instructions and all questions answered.  Copy of AVS reviewed with patient and/or family.  Patient will return two weeks for next appointment.  AVS to patient via [employer].  Patient will return two weeks for next appointment.   Patient discharged in stable condition accompanied by: attendant.  Departure Mode: wheelchair.    Catia Dockery RN

## 2023-09-14 VITALS — WEIGHT: 151.2 LBS | BODY MASS INDEX: 22.33 KG/M2

## 2023-09-14 RX ORDER — LANOLIN ALCOHOL/MO/W.PET/CERES
3 CREAM (GRAM) TOPICAL AT BEDTIME
COMMUNITY
End: 2023-10-20

## 2023-09-15 ENCOUNTER — OFFICE VISIT (OUTPATIENT)
Dept: OTOLARYNGOLOGY | Facility: OTHER | Age: 88
End: 2023-09-15
Attending: PHYSICIAN ASSISTANT
Payer: MEDICARE

## 2023-09-15 VITALS
SYSTOLIC BLOOD PRESSURE: 110 MMHG | WEIGHT: 151 LBS | TEMPERATURE: 97.5 F | DIASTOLIC BLOOD PRESSURE: 50 MMHG | HEIGHT: 69 IN | OXYGEN SATURATION: 97 % | HEART RATE: 70 BPM | BODY MASS INDEX: 22.36 KG/M2

## 2023-09-15 DIAGNOSIS — H61.23 EXCESSIVE CERUMEN IN BOTH EAR CANALS: ICD-10-CM

## 2023-09-15 DIAGNOSIS — Z96.22 RETAINED MYRINGOTOMY TUBE IN RIGHT EAR: ICD-10-CM

## 2023-09-15 DIAGNOSIS — H72.91 PERFORATION OF RIGHT TYMPANIC MEMBRANE: ICD-10-CM

## 2023-09-15 DIAGNOSIS — H90.3 ASNHL (ASYMMETRICAL SENSORINEURAL HEARING LOSS): Primary | ICD-10-CM

## 2023-09-15 PROCEDURE — 69210 REMOVE IMPACTED EAR WAX UNI: CPT | Mod: RT | Performed by: PHYSICIAN ASSISTANT

## 2023-09-15 PROCEDURE — 69210 REMOVE IMPACTED EAR WAX UNI: CPT | Performed by: PHYSICIAN ASSISTANT

## 2023-09-15 PROCEDURE — 92504 EAR MICROSCOPY EXAMINATION: CPT | Performed by: PHYSICIAN ASSISTANT

## 2023-09-15 PROCEDURE — G0463 HOSPITAL OUTPT CLINIC VISIT: HCPCS | Mod: 25

## 2023-09-15 NOTE — PATIENT INSTRUCTIONS
Ears were cleaned  Right ear has a perforation and tube is out (retained)  Will monitor at this time.   Recheck ears in 3-4 months.     Thank you for allowing Geeta Alegria PA-C and our ENT team to participate in your care.  If your medications are too expensive, please give the nurse a call.  We can possibly change this medication.  If you have a scheduling or an appointment question please contact our Health Unit Coordinator at [phone], Ext. [phone].    ALL nursing questions or concerns can be directed to your ENT nurse at: [phone] [place]

## 2023-09-15 NOTE — LETTER
9/15/2023         RE: Jf Ortiz  [facility]  [address] [facility] [address]        Dear Colleague,    Thank you for referring your patient, Jf Ortiz, to the [facility]. Please see a copy of my visit note below.    Chief Complaint   Patient presents with     Cerumen Impaction     Ear Cleaning     Patient presents to ENT for ear exam and ear cleaning.  Patient was last seen on 3/17/2023 for active otorrhea.      Today, Jf reports his ears feel funny and possible drainage a few days ago. He has history of having tubes placed and currently is known to have his right T-tube.  Used pocket talker during visit and patient was very happy, smiling and visiting.      Audiogram completed 3/4/22:  Tympanograms are Type A for left ear suggesting normal eardrum mobility and TYpe B large volume right-pe tube.  Acoustic Reflex Thresholds at 1000 Hz are present for both ears.  Thresholds are decreased compared to 2017 moderate to profounds sensorineural hearing loss.  Speech reception thresholds are in good agreement with pure tone average.  Word discrimination scores are very poor right and good left at supra-thresholds level.    Past Medical History:   Diagnosis Date     Autonomic orthostatic hypotension 10/14/2016     Coronary artery disease      Dementia without behavioral disturbance (H) 7/28/2015    Diagnosis updated by automated process. Provider to review and confirm.     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     Seizure disorder (H)      Stented coronary artery         Allergies   Allergen Reactions     Cats Unknown     Haldol [Haloperidol]      Per SNF reporting     Klonopin [Clonazepam]      Per SNF reporting      "Lamotrigine      Skin lesions     Oxycodone      Per SNF reporting     Current Outpatient Medications   Medication     acetaminophen (TYLENOL) 500 MG tablet     aspirin (ASA) 81 MG chewable tablet     calcium carbonate (OS-ROSALINDA) 1500 (600 Ca) MG tablet     carbamide peroxide (DEBROX) 6.5 % otic solution     fluconazole (DIFLUCAN) 150 MG tablet     fludrocortisone (FLORINEF) 0.1 MG tablet     melatonin 3 MG tablet     melatonin 5 MG tablet     memantine (NAMENDA) 5 MG tablet     mirabegron (MYRBETRIQ) 25 MG 24 hr tablet     NONFORMULARY     Nutritional Supplements (BOOST)     nystatin (MYCOSTATIN) 773077 UNIT/GM external powder     potassium chloride ER (KLOR-CON M) 20 MEQ CR tablet     RABEprazole (ACIPHEX) 20 MG EC tablet     senna-docusate (SENOKOT-S/PERICOLACE) 8.6-50 MG tablet     sodium chloride 1 GM tablet     tamsulosin (FLOMAX) 0.4 MG capsule     vitamin D3 (CHOLECALCIFEROL) 50 mcg (2000 units) tablet     No current facility-administered medications for this visit.     ROS- SEE HPI  /50 (BP Location: Right arm, Patient Position: Chair, Cuff Size: Adult Regular)   Pulse 70   Temp 97.5  F (36.4  C) (Tympanic)   Ht 1.753 m (5' 9\")   Wt 68.5 kg (151 lb)   SpO2 97%   BMI 22.30 kg/m      General - He presents via w/c, sleeping when I entered the room. He rouses with verbal stimuli and transferred to chair for procedure. Pocket talker used and he was able to communicate and smiling  Head and Face - Normocephalic and atraumatic, with no gross asymmetry noted.  The facial nerve is intact, with strong symmetric movements.  Voice and Breathing - The patient was breathing comfortably without the use of accessory muscles. There was no wheezing, stridor. The patients voice was clear and strong, and had appropriate pitch and quality.  Ears - The ears were examined with binocular microscopy and with otoscope.  External ears normal. Right canal cerumen impacted/ dried crusting/ film.  Left canal cerumen partially " impacted.  The right ear was cleaned with #7 cuevas and cupped forceps.  T- tube appears retained in middle ear space and unable to obtain. There is a 5-10% perforation. ME dry.  The left ear was cleaned with #7 suction.  Left tympanic membrane is intact without effusion, retraction or mass.  Eyes - Extraocular movements intact, sclera were not icteric or injected, conjunctiva were pink and moist.  Nose - External contour is symmetric, no gross deflection or scars.  Nasal mucosa is pink and moist with no abnormal mucus.          ASSESSMENT/ PLAN:    ICD-10-CM    1. ASNHL (asymmetrical sensorineural hearing loss)  H90.3       2. Excessive cerumen in both ear canals  H61.23       3. Retained myringotomy tube in right ear  Z96.22       4. Perforation of right tympanic membrane  H72.91             Ears were cleaned.   T-tube is retained in ME space and unable to obtain. Will observe at this time. He does have a right TM perforation present.   Follow up in 3 months.   Forms completed.     Used pocket talker and Jf was smiling, visiting during visit.           Geeta Alegria PA-C  ENT  [facility] [place]      Again, thank you for allowing me to participate in the care of your patient.        Sincerely,        Geeta Alegria PA-C

## 2023-09-15 NOTE — PROGRESS NOTES
Chief Complaint   Patient presents with    Cerumen Impaction     Ear Cleaning     Patient presents to ENT for ear exam and ear cleaning.  Patient was last seen on 3/17/2023 for active otorrhea.      Today, Jf reports his ears feel funny and possible drainage a few days ago. He has history of having tubes placed and currently is known to have his right T-tube.  Used pocket talker during visit and patient was very happy, smiling and visiting.      Audiogram completed 3/4/22:  Tympanograms are Type A for left ear suggesting normal eardrum mobility and TYpe B large volume right-pe tube.  Acoustic Reflex Thresholds at 1000 Hz are present for both ears.  Thresholds are decreased compared to 2017 moderate to profounds sensorineural hearing loss.  Speech reception thresholds are in good agreement with pure tone average.  Word discrimination scores are very poor right and good left at supra-thresholds level.    Past Medical History:   Diagnosis Date    Autonomic orthostatic hypotension 10/14/2016    Coronary artery disease     Dementia without behavioral disturbance (H) 7/28/2015    Diagnosis updated by automated process. Provider to review and confirm.    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    Seizure disorder (H)     Stented coronary artery         Allergies   Allergen Reactions    Cats Unknown    Haldol [Haloperidol]      Per SNF reporting    Klonopin [Clonazepam]      Per SNF reporting    Lamotrigine      Skin lesions    Oxycodone      Per SNF reporting     Current Outpatient Medications   Medication    acetaminophen (TYLENOL) 500 MG tablet    aspirin (ASA) 81 MG chewable tablet    calcium carbonate (OS-ROSALINDA) 1500 (600 Ca) MG tablet    carbamide peroxide  "(DEBROX) 6.5 % otic solution    fluconazole (DIFLUCAN) 150 MG tablet    fludrocortisone (FLORINEF) 0.1 MG tablet    melatonin 3 MG tablet    melatonin 5 MG tablet    memantine (NAMENDA) 5 MG tablet    mirabegron (MYRBETRIQ) 25 MG 24 hr tablet    NONFORMULARY    Nutritional Supplements (BOOST)    nystatin (MYCOSTATIN) 741197 UNIT/GM external powder    potassium chloride ER (KLOR-CON M) 20 MEQ CR tablet    RABEprazole (ACIPHEX) 20 MG EC tablet    senna-docusate (SENOKOT-S/PERICOLACE) 8.6-50 MG tablet    sodium chloride 1 GM tablet    tamsulosin (FLOMAX) 0.4 MG capsule    vitamin D3 (CHOLECALCIFEROL) 50 mcg (2000 units) tablet     No current facility-administered medications for this visit.     ROS- SEE HPI  /50 (BP Location: Right arm, Patient Position: Chair, Cuff Size: Adult Regular)   Pulse 70   Temp 97.5  F (36.4  C) (Tympanic)   Ht 1.753 m (5' 9\")   Wt 68.5 kg (151 lb)   SpO2 97%   BMI 22.30 kg/m      General - He presents via w/c, sleeping when I entered the room. He rouses with verbal stimuli and transferred to chair for procedure. Pocket talker used and he was able to communicate and smiling  Head and Face - Normocephalic and atraumatic, with no gross asymmetry noted.  The facial nerve is intact, with strong symmetric movements.  Voice and Breathing - The patient was breathing comfortably without the use of accessory muscles. There was no wheezing, stridor. The patients voice was clear and strong, and had appropriate pitch and quality.  Ears - The ears were examined with binocular microscopy and with otoscope.  External ears normal. Right canal cerumen impacted/ dried crusting/ film.  Left canal cerumen partially impacted.  The right ear was cleaned with #7 cuevas and cupped forceps.  T- tube appears retained in middle ear space and unable to obtain. There is a 5-10% perforation. ME dry.  The left ear was cleaned with #7 suction.  Left tympanic membrane is intact without effusion, retraction or " mass.  Eyes - Extraocular movements intact, sclera were not icteric or injected, conjunctiva were pink and moist.  Nose - External contour is symmetric, no gross deflection or scars.  Nasal mucosa is pink and moist with no abnormal mucus.          ASSESSMENT/ PLAN:    ICD-10-CM    1. ASNHL (asymmetrical sensorineural hearing loss)  H90.3       2. Excessive cerumen in both ear canals  H61.23       3. Retained myringotomy tube in right ear  Z96.22       4. Perforation of right tympanic membrane  H72.91             Ears were cleaned.   T-tube is retained in ME space and unable to obtain. Will observe at this time. He does have a right TM perforation present.   Follow up in 3 months.   Forms completed.     Used pocket talker and Jf was smiling, visiting during visit.           Geeta Alegria PA-C  ENT  [facility], [place]

## 2023-09-18 ENCOUNTER — NURSING HOME VISIT (OUTPATIENT)
Dept: FAMILY MEDICINE | Facility: OTHER | Age: 88
End: 2023-09-18
Attending: FAMILY MEDICINE
Payer: MEDICARE

## 2023-09-18 DIAGNOSIS — G20.A1 PARKINSON DISEASE (H): ICD-10-CM

## 2023-09-18 DIAGNOSIS — G31.83 LEWY BODY DEMENTIA WITHOUT BEHAVIORAL DISTURBANCE (H): ICD-10-CM

## 2023-09-18 DIAGNOSIS — M54.50 CHRONIC LOW BACK PAIN, UNSPECIFIED BACK PAIN LATERALITY, UNSPECIFIED WHETHER SCIATICA PRESENT: ICD-10-CM

## 2023-09-18 DIAGNOSIS — I95.1 ORTHOSTATIC HYPOTENSION DYSAUTONOMIC SYNDROME: ICD-10-CM

## 2023-09-18 DIAGNOSIS — I25.10 CORONARY ARTERY DISEASE INVOLVING NATIVE CORONARY ARTERY OF NATIVE HEART WITHOUT ANGINA PECTORIS: ICD-10-CM

## 2023-09-18 DIAGNOSIS — Z78.9 NURSING HOME RESIDENT: Primary | ICD-10-CM

## 2023-09-18 DIAGNOSIS — F02.80 LEWY BODY DEMENTIA WITHOUT BEHAVIORAL DISTURBANCE (H): ICD-10-CM

## 2023-09-18 DIAGNOSIS — I10 HYPERTENSION WITH TARGET BLOOD PRESSURE GOAL UNDER 150/90: ICD-10-CM

## 2023-09-18 DIAGNOSIS — G89.29 CHRONIC LOW BACK PAIN, UNSPECIFIED BACK PAIN LATERALITY, UNSPECIFIED WHETHER SCIATICA PRESENT: ICD-10-CM

## 2023-09-18 DIAGNOSIS — R21 RASH: ICD-10-CM

## 2023-09-18 PROCEDURE — 99308 SBSQ NF CARE LOW MDM 20: CPT | Performed by: FAMILY MEDICINE

## 2023-09-18 NOTE — PROGRESS NOTES
Nursing Home Visit    HISTORY OF PRESENT ILLNESS:  Jf is a 89 year old male (10/5/1933)  resident of [facility] who is being seen today for routine 30 day follow up.     He has a history of Parkinson's disease, Lewy body dementia, atrial fibrillation, coronary heart disease status post aortocoronary bypass as well as stenting, seizure disorder, ans well as recurrent falls.    Jf was admitted from [facility] after being hospitalized with falls and compression fractures of L1 and L2.and in need of pain control.  This was achieved     The patient notes no current complaints.     Discussed with nursing staff who note significant erythematous rash perineum.  This continues to improve.    The patient is seen in his room.  Family is not present.     Medical records are reviewed.    Current medications, allergies, and interdisciplinary care plan are reviewed.      Patient Active Problem List    Diagnosis Date Noted    Orthostatic hypotension dysautonomic syndrome 02/12/2021     Priority: High    Chronic atrial fibrillation (H) 02/12/2021     Priority: High    Longstanding persistent atrial fibrillation (H) 04/13/2020     Priority: High    Coronary artery disease involving native coronary artery without angina pectoris 04/13/2020     Priority: High    Pacemaker, [employer], Dual Chamber - Dependent 10/06/2017     Priority: High    Lewy body dementia without behavioral disturbance (H) 08/31/2017     Priority: High    Dementia without behavioral disturbance (H) 07/28/2015     Priority: High     Diagnosis updated by automated process. Provider to review and confirm.      Parkinson disease (H)      Priority: High    Seizure disorder (H)      Priority: High    Cardiac pacemaker in situ 01/08/2013     Priority: High     Overview:   [employer]rua S606 DREL,  Serial #[id number]  5-13-11  Atrial lead [employer] 4054 Serial #[id number]  8-11-00  Ventriculer lead [employer] [id number]  Serial #[id number]  5-13-11  2nd Degree AV Block   Dr. Campbell  Intrinsic:  Pt. is Pacemaker Dependant, remains paced at temp. rate of 30 bpm (6-13-14)    Formatting of this note might be different from the original.  [employer] S606 DREL,  Serial #[id number]  5-13-11  Atrial lead [employer] 4054 Serial #[id number]  8-11-00  Ventriculer lead [employer] 4137  Serial #[id number]  5-13-11  2nd Degree AV Block   Dr. Campbell  Intrinsic:  Pt. is Pacemaker Dependant, remains paced at temp. rate of 30 bpm (6-13-14)      Hypertension with target blood pressure goal under 150/90 09/05/2006     Priority: High     Overview:   IMO Update      Essential hypertension 09/05/2006     Priority: High     Formatting of this note might be different from the original.  IMO Update      Altered mental status 01/28/2023     Priority: Medium    UTI (urinary tract infection) 01/28/2023     Priority: Medium    Medication reaction, initial encounter 01/28/2023     Priority: Medium    Fall, initial encounter 09/27/2022     Priority: Medium    Hematoma of chest wall, left, initial encounter 04/23/2022     Priority: Medium    Retinal artery branch occlusion 02/13/2021     Priority: Medium    Vision loss of right eye 02/12/2021     Priority: Medium    Closed compression fracture of body of L1 vertebra (H) 02/12/2021     Priority: Medium    Closed wedge compression fracture of lumbar vertebra with routine healing 12/20/2020     Priority: Medium    Compression fracture of L1 lumbar vertebra, closed, initial encounter (H) 12/18/2020     Priority: Medium    Compression fracture of thoracic vertebra, initial encounter, unspecified thoracic vertebral level 12/18/2020     Priority: Medium     Replacing diagnoses that were inactivated after the 10/1/2021 regulatory import.      Frequent falls 04/13/2020     Priority: Medium    Constipation, unspecified constipation type 04/11/2018     Priority: Medium    Urinary incontinence  08/31/2017     Priority: Medium    Autonomic orthostatic hypotension 10/14/2016     Priority: Medium    Volume depletion 08/02/2014     Priority: Medium    Stented coronary artery      Priority: Medium    REM sleep behavior disorder 01/01/2011     Priority: Medium    Osteoarthritis 01/01/2011     Priority: Medium     Problem list name updated by automated process. Provider to review      Diaphragmatic hernia 01/01/2011     Priority: Medium     Problem list name updated by automated process. Provider to review      Pain in joint, forearm 07/31/2008     Priority: Medium     Formatting of this note might be different from the original.  IMO Update 10/11      Pain in joint, ankle and foot 07/31/2008     Priority: Medium     Formatting of this note might be different from the original.  IMO Update 10/11      Dysphagia 05/22/2007     Priority: Medium     Overview:   IMO Update    Formatting of this note might be different from the original.  IMO Update      Coronary atherosclerosis of native coronary artery 11/28/2006     Priority: Medium     Overview:   IMO Update 10/11    Formatting of this note might be different from the original.  IMO Update 10/11      Postsurgical aortocoronary bypass status 11/28/2006     Priority: Medium     Overview:   IMO Update 10/11    Formatting of this note might be different from the original.  IMO Update 10/11      Hyperlipidemia 09/05/2006     Priority: Medium     Formatting of this note might be different from the original.  IMO Update 10/11      Nursing Home Visit 03/03/2022     Priority: Low          Social History     Socioeconomic History    Marital status: Single     Spouse name: Not on file    Number of children: Not on file    Years of education: Not on file    Highest education level: Not on file   Occupational History    Occupation: retired   Tobacco Use    Smoking status: Former     Packs/day: 1.00     Years: 5.00     Pack years: 5.00     Types: Cigarettes     Quit date:  1985     Years since quittin.7    Smokeless tobacco: Never    Tobacco comments:     quit in    Substance and Sexual Activity    Alcohol use: Yes     Comment: social    Drug use: No    Sexual activity: Not Currently   Other Topics Concern     Service Not Asked    Blood Transfusions Yes    Caffeine Concern Yes     Comment: 1 cup coffee daily    Occupational Exposure Not Asked    Hobby Hazards Not Asked    Sleep Concern Not Asked    Stress Concern Not Asked    Weight Concern Not Asked    Special Diet Not Asked    Back Care Not Asked    Exercise Not Asked    Bike Helmet Not Asked    Seat Belt Not Asked    Self-Exams Not Asked    Parent/sibling w/ CABG, MI or angioplasty before 65F 55M? No   Social History Narrative    Not on file     Social Determinants of Health     Financial Resource Strain: Not on file   Food Insecurity: Not on file   Transportation Needs: Not on file   Physical Activity: Not on file   Stress: Not on file   Social Connections: Not on file   Intimate Partner Violence: Not on file   Housing Stability: Not on file        Current Outpatient Medications   Medication Sig    acetaminophen (TYLENOL) 500 MG tablet Take 1,000 mg by mouth 3 times daily    aspirin (ASA) 81 MG chewable tablet Take 81 mg by mouth daily    calcium carbonate (OS-ROSALINDA) 1500 (600 Ca) MG tablet Take 600 mg by mouth daily    fludrocortisone (FLORINEF) 0.1 MG tablet Take 1 tablet (0.1 mg) by mouth every morning (before breakfast)    melatonin 3 MG tablet Take 3 mg by mouth At Bedtime Along with 5mg tablet    melatonin 5 MG tablet Take 5 mg by mouth At Bedtime    memantine (NAMENDA) 5 MG tablet Take 8 mg by mouth 2 times daily Takes a 5mg and a 3mg tablet per SNF records (Patient not taking: Reported on 9/15/2023)    mirabegron (MYRBETRIQ) 25 MG 24 hr tablet Take 25 mg by mouth daily    NONFORMULARY 2 times daily Barrier cream     Apply to rash: intergluteal cleft, coccyx, rogelio-area.    Nutritional Supplements (BOOST)  Take 1 Bottle by mouth daily    nystatin (MYCOSTATIN) 961715 UNIT/GM external powder Apply topically as needed    potassium chloride ER (KLOR-CON M) 20 MEQ CR tablet TAKE 1 TABLET BY MOUTH EVERY DAY    RABEprazole (ACIPHEX) 20 MG EC tablet Take 20 mg by mouth 2 times daily    senna-docusate (SENOKOT-S/PERICOLACE) 8.6-50 MG tablet Take 2 tablets by mouth 2 times daily    sodium chloride 1 GM tablet TAKE 1 TABLET BY MOUTH TWICE A DAY    tamsulosin (FLOMAX) 0.4 MG capsule Take 1 capsule (0.4 mg) by mouth daily    vitamin D3 (CHOLECALCIFEROL) 50 mcg (2000 units) tablet Take 1 tablet by mouth daily    carbamide peroxide (DEBROX) 6.5 % otic solution Place 5 drops into both ears daily (Patient not taking: Reported on 8/23/2023)    fluconazole (DIFLUCAN) 150 MG tablet Take 150 mg by mouth once (Patient not taking: Reported on 8/23/2023)     No current facility-administered medications for this visit.       Allergies   Allergen Reactions    Cats Unknown    Haldol [Haloperidol]      Per SNF reporting    Klonopin [Clonazepam]      Per SNF reporting    Lamotrigine      Skin lesions    Oxycodone      Per SNF reporting       I have reviewed the care plan and do agree with the plan.      ROS:  No chest pain, shortness of breath, fever, chills, headache, nausea, vomiting, dysuria, or changes in bowel habits.  Appetite is poor.  Low back pain noted.          OBJECTIVE:  Wt 68.6 kg (151 lb 3.2 oz)   BMI 22.33 kg/m      GENERAL: Healthy, alert and no distress  EYES: Eyes grossly normal to inspection.  No discharge or erythema, or obvious scleral/conjunctival abnormalities.  RESP: No audible wheeze, cough, or visible cyanosis.  No visible retractions or increased work of breathing.    SKIN: Erythema with serpiginous borders noted with sattelite lesions. No abnormal pigmentation or lesions.  NEURO: Cranial nerves grossly intact.  Mentation impaired. Speech appropriate for age.  PSYCH: Mentation shows impaired cognition affect  normal/bright, judgement and insight intact, normal speech and appearance well-groomed.      Previous      Lab/Diagnostic data:    Reviewed    ASSESSMENT/ORDERS:  Nursing Home Visit  Discussed with staff. Care plan reviewed and orders updated.  Chronic issues are stable. Routine 30 day Nursing Home follow up.     Rash  Suspect fungal. Finished diflucan.  Will follow.    Closed compression fracture of L1 lumbar vertebra with routine healing, subsequent encounter  Stable    Lewy body dementia without behavioral disturbance (H)  Stable    Parkinson disease (H)  Stable    Coronary artery disease involving native coronary artery of native heart without angina pectoris  No recurrent symptoms    Hypertension with target blood pressure goal under 150/90  Recent readings reviewed.  Continue same medication regimen        Total time spent with patient visit was 25 min including patient visit, review of pertinent clinical information, and treatment plan.      Abran Whitman MD FAAFP [facility]  Geriatrics  Hospice and Palliative Care

## 2023-09-20 ENCOUNTER — INFUSION THERAPY VISIT (OUTPATIENT)
Dept: INFUSION THERAPY | Facility: OTHER | Age: 88
End: 2023-09-20
Attending: FAMILY MEDICINE
Payer: MEDICARE

## 2023-09-20 VITALS — HEART RATE: 70 BPM | DIASTOLIC BLOOD PRESSURE: 66 MMHG | OXYGEN SATURATION: 97 % | SYSTOLIC BLOOD PRESSURE: 124 MMHG

## 2023-09-20 DIAGNOSIS — E86.9 VOLUME DEPLETION: Primary | ICD-10-CM

## 2023-09-20 PROCEDURE — 250N000011 HC RX IP 250 OP 636: Mod: JZ | Performed by: FAMILY MEDICINE

## 2023-09-20 PROCEDURE — 96360 HYDRATION IV INFUSION INIT: CPT

## 2023-09-20 PROCEDURE — 258N000003 HC RX IP 258 OP 636: Performed by: FAMILY MEDICINE

## 2023-09-20 RX ORDER — HEPARIN SODIUM (PORCINE) LOCK FLUSH IV SOLN 100 UNIT/ML 100 UNIT/ML
5 SOLUTION INTRAVENOUS ONCE
Status: COMPLETED | OUTPATIENT
Start: 2023-09-20 | End: 2023-09-20

## 2023-09-20 RX ORDER — HEPARIN SODIUM (PORCINE) LOCK FLUSH IV SOLN 100 UNIT/ML 100 UNIT/ML
5 SOLUTION INTRAVENOUS ONCE
Status: CANCELLED
Start: 2023-09-20 | End: 2023-09-20

## 2023-09-20 RX ADMIN — HEPARIN 5 ML: 100 SYRINGE at 14:09

## 2023-09-20 RX ADMIN — SODIUM CHLORIDE 1000 ML: 9 INJECTION, SOLUTION INTRAVENOUS at 13:03

## 2023-09-20 NOTE — PROGRESS NOTES
Infusion Nursing Note:  Jf Ortiz presents today for IVF.    Patient seen by provider today: No   present during visit today: Not Applicable.    Note: N/A.      Intravenous Access:  Implanted Port.    Treatment Conditions:  Not Applicable.      Post Infusion Assessment:  Patient tolerated infusion without incident.       Discharge Plan:   Patient discharged in stable condition accompanied by: [employer].      Mari Vieyra RN

## 2023-09-25 NOTE — PROGRESS NOTES
HEARING AID CHECK: [facility] and was seen in Audiology same day.    BACKGROUND/RESULTS:Patient seen with right earmold tubing put into earmold incorrectly. This has been previous issue and felt to be related to patient memory.    Reviewed quick snap tubing and he has his other hearing aid to compare should this reoccur and also his wife took a picture for reference as well.    Both tubing impacted with cerumen and his wife reports he does not clean daily. Reviewed cleaning with patient. Discussed to consider  To make notes of what to do in evening with hearing aids as he does know how to clean them, has not had this issue previously and felt possibly due to memory issues.  Cleaning tool provided and reviewed.    His wife appears frustrated and asks if all hearing aids are this complicated. Discussed that the tubing will only work in one position and other support maybe needed. She reports she does not want to be involved in his hearing aids.     Patient reports he still cannot hear right ear. Battery function is good. Otoscopy shows impacted canals both ears.    RECOMMENDATIONS:He is referred to ENT for cerumen removal.  The right aid does need repair and is sent in for in-warranty repair.  They had no further questions.    Kirstie Zaman M.S., [facility]  Audiologist #[id number]        
[phone]

## 2023-10-04 ENCOUNTER — INFUSION THERAPY VISIT (OUTPATIENT)
Dept: INFUSION THERAPY | Facility: OTHER | Age: 88
End: 2023-10-04
Attending: FAMILY MEDICINE
Payer: MEDICARE

## 2023-10-04 VITALS
SYSTOLIC BLOOD PRESSURE: 132 MMHG | DIASTOLIC BLOOD PRESSURE: 57 MMHG | TEMPERATURE: 97.6 F | HEART RATE: 70 BPM | RESPIRATION RATE: 18 BRPM | OXYGEN SATURATION: 98 %

## 2023-10-04 DIAGNOSIS — E86.9 VOLUME DEPLETION: Primary | ICD-10-CM

## 2023-10-04 PROCEDURE — 258N000003 HC RX IP 258 OP 636: Performed by: FAMILY MEDICINE

## 2023-10-04 PROCEDURE — 250N000011 HC RX IP 250 OP 636: Mod: JZ | Performed by: FAMILY MEDICINE

## 2023-10-04 PROCEDURE — 96360 HYDRATION IV INFUSION INIT: CPT

## 2023-10-04 RX ORDER — HEPARIN SODIUM (PORCINE) LOCK FLUSH IV SOLN 100 UNIT/ML 100 UNIT/ML
5 SOLUTION INTRAVENOUS ONCE
Status: CANCELLED
Start: 2023-10-04 | End: 2023-10-04

## 2023-10-04 RX ORDER — HEPARIN SODIUM (PORCINE) LOCK FLUSH IV SOLN 100 UNIT/ML 100 UNIT/ML
5 SOLUTION INTRAVENOUS ONCE
Status: COMPLETED | OUTPATIENT
Start: 2023-10-04 | End: 2023-10-04

## 2023-10-04 RX ADMIN — HEPARIN 5 ML: 100 SYRINGE at 10:40

## 2023-10-04 RX ADMIN — SODIUM CHLORIDE 1000 ML: 9 INJECTION, SOLUTION INTRAVENOUS at 09:33

## 2023-10-04 NOTE — PATIENT INSTRUCTIONS

## 2023-10-04 NOTE — PROGRESS NOTES
Infusion Nursing Note:  Jf Ortiz presents today for IVF.    Patient seen by provider today: No   present during visit today: Not Applicable.    Intravenous Access:  Implanted Port.    Treatment Conditions:  Not Applicable.      Post Infusion Assessment:  Patient tolerated infusion without incident.  No evidence of extravasations.  Access discontinued per protocol.       Discharge Plan:   Copy of AVS reviewed with patient and/or family.  Patient will return 10/18/23 for next appointment.  Patient discharged in stable condition.     Juan J ESPOSITO

## 2023-10-11 ENCOUNTER — TELEPHONE (OUTPATIENT)
Dept: FAMILY MEDICINE | Facility: OTHER | Age: 88
End: 2023-10-11

## 2023-10-12 NOTE — TELEPHONE ENCOUNTER
Approved by Dr. Whitman  Notification sent to Nursing home.  Kimberly Ritter) [facility]

## 2023-10-18 ENCOUNTER — INFUSION THERAPY VISIT (OUTPATIENT)
Dept: INFUSION THERAPY | Facility: OTHER | Age: 88
End: 2023-10-18
Attending: FAMILY MEDICINE
Payer: MEDICARE

## 2023-10-18 VITALS
WEIGHT: 150.5 LBS | BODY MASS INDEX: 22.22 KG/M2 | OXYGEN SATURATION: 96 % | DIASTOLIC BLOOD PRESSURE: 62 MMHG | SYSTOLIC BLOOD PRESSURE: 122 MMHG | RESPIRATION RATE: 16 BRPM | HEART RATE: 70 BPM | TEMPERATURE: 97.5 F

## 2023-10-18 DIAGNOSIS — E86.9 VOLUME DEPLETION: Primary | ICD-10-CM

## 2023-10-18 PROCEDURE — 250N000011 HC RX IP 250 OP 636: Mod: JZ | Performed by: FAMILY MEDICINE

## 2023-10-18 PROCEDURE — 96360 HYDRATION IV INFUSION INIT: CPT

## 2023-10-18 PROCEDURE — 258N000003 HC RX IP 258 OP 636: Performed by: FAMILY MEDICINE

## 2023-10-18 RX ORDER — HEPARIN SODIUM (PORCINE) LOCK FLUSH IV SOLN 100 UNIT/ML 100 UNIT/ML
5 SOLUTION INTRAVENOUS ONCE
Status: CANCELLED
Start: 2023-10-18 | End: 2023-10-18

## 2023-10-18 RX ORDER — HEPARIN SODIUM (PORCINE) LOCK FLUSH IV SOLN 100 UNIT/ML 100 UNIT/ML
5 SOLUTION INTRAVENOUS ONCE
Status: COMPLETED | OUTPATIENT
Start: 2023-10-18 | End: 2023-10-18

## 2023-10-18 RX ADMIN — SODIUM CHLORIDE 1000 ML: 9 INJECTION, SOLUTION INTRAVENOUS at 09:25

## 2023-10-18 RX ADMIN — HEPARIN 5 ML: 100 SYRINGE at 10:43

## 2023-10-18 ASSESSMENT — PAIN SCALES - GENERAL: PAINLEVEL: NO PAIN (0)

## 2023-10-18 NOTE — PROGRESS NOTES
Infusion Nursing Note:  Jf Ortiz presents today for IVF.    Patient seen by provider today: No   present during visit today: Not Applicable.    Note: N/A.      Intravenous Access:  Implanted Port.    Treatment Conditions:  Not Applicable.      Post Infusion Assessment:  Patient tolerated infusion without incident.  Site patent and intact, free from redness, edema or discomfort.  No evidence of extravasations.  Access discontinued per protocol.       Discharge Plan:   Patient discharged in stable condition accompanied by: [facility] .  Departure Mode: Wheelchair.     (2) good, crying

## 2023-10-20 VITALS — WEIGHT: 150.5 LBS | BODY MASS INDEX: 22.22 KG/M2

## 2023-10-23 ENCOUNTER — NURSING HOME VISIT (OUTPATIENT)
Dept: FAMILY MEDICINE | Facility: OTHER | Age: 88
End: 2023-10-23
Attending: FAMILY MEDICINE
Payer: MEDICARE

## 2023-10-23 DIAGNOSIS — G31.83 LEWY BODY DEMENTIA WITHOUT BEHAVIORAL DISTURBANCE (H): ICD-10-CM

## 2023-10-23 DIAGNOSIS — G20.A1 PARKINSON'S DISEASE WITHOUT DYSKINESIA OR FLUCTUATING MANIFESTATIONS (H): ICD-10-CM

## 2023-10-23 DIAGNOSIS — R21 RASH: ICD-10-CM

## 2023-10-23 DIAGNOSIS — G89.29 CHRONIC LOW BACK PAIN, UNSPECIFIED BACK PAIN LATERALITY, UNSPECIFIED WHETHER SCIATICA PRESENT: ICD-10-CM

## 2023-10-23 DIAGNOSIS — I95.1 ORTHOSTATIC HYPOTENSION DYSAUTONOMIC SYNDROME: ICD-10-CM

## 2023-10-23 DIAGNOSIS — I25.10 CORONARY ARTERY DISEASE INVOLVING NATIVE CORONARY ARTERY OF NATIVE HEART WITHOUT ANGINA PECTORIS: ICD-10-CM

## 2023-10-23 DIAGNOSIS — I10 HYPERTENSION WITH TARGET BLOOD PRESSURE GOAL UNDER 150/90: ICD-10-CM

## 2023-10-23 DIAGNOSIS — F02.80 LEWY BODY DEMENTIA WITHOUT BEHAVIORAL DISTURBANCE (H): ICD-10-CM

## 2023-10-23 DIAGNOSIS — M54.50 CHRONIC LOW BACK PAIN, UNSPECIFIED BACK PAIN LATERALITY, UNSPECIFIED WHETHER SCIATICA PRESENT: ICD-10-CM

## 2023-10-23 DIAGNOSIS — Z78.9 NURSING HOME RESIDENT: Primary | ICD-10-CM

## 2023-10-23 PROCEDURE — 99308 SBSQ NF CARE LOW MDM 20: CPT | Performed by: FAMILY MEDICINE

## 2023-10-26 ENCOUNTER — ANCILLARY PROCEDURE (OUTPATIENT)
Dept: CARDIOLOGY | Facility: CLINIC | Age: 88
End: 2023-10-26
Attending: INTERNAL MEDICINE
Payer: MEDICARE

## 2023-10-26 DIAGNOSIS — I44.2 COMPLETE HEART BLOCK (H): ICD-10-CM

## 2023-10-26 PROCEDURE — 93296 REM INTERROG EVL PM/IDS: CPT

## 2023-10-26 PROCEDURE — 93294 REM INTERROG EVL PM/LDLS PM: CPT | Performed by: INTERNAL MEDICINE

## 2023-10-30 LAB
MDC_IDC_EPISODE_DTM: NORMAL
MDC_IDC_EPISODE_DURATION: 100 S
MDC_IDC_EPISODE_DURATION: 103 S
MDC_IDC_EPISODE_DURATION: 106 S
MDC_IDC_EPISODE_DURATION: 108 S
MDC_IDC_EPISODE_DURATION: 12 S
MDC_IDC_EPISODE_DURATION: 120 S
MDC_IDC_EPISODE_DURATION: 13 S
MDC_IDC_EPISODE_DURATION: 132 S
MDC_IDC_EPISODE_DURATION: 14 S
MDC_IDC_EPISODE_DURATION: 15 S
MDC_IDC_EPISODE_DURATION: 150 S
MDC_IDC_EPISODE_DURATION: 151 S
MDC_IDC_EPISODE_DURATION: 158 S
MDC_IDC_EPISODE_DURATION: 16 S
MDC_IDC_EPISODE_DURATION: 188 S
MDC_IDC_EPISODE_DURATION: 20 S
MDC_IDC_EPISODE_DURATION: 211 S
MDC_IDC_EPISODE_DURATION: 235 S
MDC_IDC_EPISODE_DURATION: 25 S
MDC_IDC_EPISODE_DURATION: 27 S
MDC_IDC_EPISODE_DURATION: 31 S
MDC_IDC_EPISODE_DURATION: 31 S
MDC_IDC_EPISODE_DURATION: 36 S
MDC_IDC_EPISODE_DURATION: 39 S
MDC_IDC_EPISODE_DURATION: 39 S
MDC_IDC_EPISODE_DURATION: 413 S
MDC_IDC_EPISODE_DURATION: 43 S
MDC_IDC_EPISODE_DURATION: 45 S
MDC_IDC_EPISODE_DURATION: 47 S
MDC_IDC_EPISODE_DURATION: 5 S
MDC_IDC_EPISODE_DURATION: 52 S
MDC_IDC_EPISODE_DURATION: 56 S
MDC_IDC_EPISODE_DURATION: 60 S
MDC_IDC_EPISODE_DURATION: 61 S
MDC_IDC_EPISODE_DURATION: 68 S
MDC_IDC_EPISODE_DURATION: 69 S
MDC_IDC_EPISODE_DURATION: 71 S
MDC_IDC_EPISODE_DURATION: 72 S
MDC_IDC_EPISODE_DURATION: 75 S
MDC_IDC_EPISODE_DURATION: 75 S
MDC_IDC_EPISODE_DURATION: 77 S
MDC_IDC_EPISODE_DURATION: 8 S
MDC_IDC_EPISODE_DURATION: 80 S
MDC_IDC_EPISODE_DURATION: 91 S
MDC_IDC_EPISODE_DURATION: 95 S
MDC_IDC_EPISODE_ID: 6734
MDC_IDC_EPISODE_ID: 6735
MDC_IDC_EPISODE_ID: 6736
MDC_IDC_EPISODE_ID: 6737
MDC_IDC_EPISODE_ID: 6738
MDC_IDC_EPISODE_ID: 6739
MDC_IDC_EPISODE_ID: 6740
MDC_IDC_EPISODE_ID: 6741
MDC_IDC_EPISODE_ID: 6742
MDC_IDC_EPISODE_ID: 6743
MDC_IDC_EPISODE_ID: 6744
MDC_IDC_EPISODE_ID: 6745
MDC_IDC_EPISODE_ID: 6746
MDC_IDC_EPISODE_ID: 6747
MDC_IDC_EPISODE_ID: 6748
MDC_IDC_EPISODE_ID: 6749
MDC_IDC_EPISODE_ID: 6750
MDC_IDC_EPISODE_ID: 6751
MDC_IDC_EPISODE_ID: 6752
MDC_IDC_EPISODE_ID: 6753
MDC_IDC_EPISODE_ID: 6754
MDC_IDC_EPISODE_ID: 6755
MDC_IDC_EPISODE_ID: 6756
MDC_IDC_EPISODE_ID: 6757
MDC_IDC_EPISODE_ID: 6758
MDC_IDC_EPISODE_ID: 6759
MDC_IDC_EPISODE_ID: 6760
MDC_IDC_EPISODE_ID: 6761
MDC_IDC_EPISODE_ID: 6762
MDC_IDC_EPISODE_ID: 6763
MDC_IDC_EPISODE_ID: 6764
MDC_IDC_EPISODE_ID: 6765
MDC_IDC_EPISODE_ID: 6766
MDC_IDC_EPISODE_ID: 6767
MDC_IDC_EPISODE_ID: 6768
MDC_IDC_EPISODE_ID: 6769
MDC_IDC_EPISODE_ID: 6770
MDC_IDC_EPISODE_ID: 6771
MDC_IDC_EPISODE_ID: 6772
MDC_IDC_EPISODE_ID: 6773
MDC_IDC_EPISODE_ID: 6774
MDC_IDC_EPISODE_ID: 6775
MDC_IDC_EPISODE_ID: 6776
MDC_IDC_EPISODE_ID: 6777
MDC_IDC_EPISODE_ID: 6778
MDC_IDC_EPISODE_ID: 6779
MDC_IDC_EPISODE_ID: 6780
MDC_IDC_EPISODE_ID: 6781
MDC_IDC_EPISODE_ID: 6782
MDC_IDC_EPISODE_ID: 6783
MDC_IDC_EPISODE_TYPE: NORMAL
MDC_IDC_LEAD_CONNECTION_STATUS: NORMAL
MDC_IDC_LEAD_CONNECTION_STATUS: NORMAL
MDC_IDC_LEAD_IMPLANT_DT: NORMAL
MDC_IDC_LEAD_IMPLANT_DT: NORMAL
MDC_IDC_LEAD_LOCATION: NORMAL
MDC_IDC_LEAD_LOCATION: NORMAL
MDC_IDC_LEAD_LOCATION_DETAIL_1: NORMAL
MDC_IDC_LEAD_LOCATION_DETAIL_1: NORMAL
MDC_IDC_LEAD_MFG: NORMAL
MDC_IDC_LEAD_MFG: NORMAL
MDC_IDC_LEAD_MODEL: NORMAL
MDC_IDC_LEAD_MODEL: NORMAL
MDC_IDC_LEAD_POLARITY_TYPE: NORMAL
MDC_IDC_LEAD_POLARITY_TYPE: NORMAL
MDC_IDC_LEAD_SERIAL: NORMAL
MDC_IDC_LEAD_SERIAL: NORMAL
MDC_IDC_MSMT_BATTERY_DTM: NORMAL
MDC_IDC_MSMT_BATTERY_REMAINING_LONGEVITY: 51 MO
MDC_IDC_MSMT_BATTERY_RRT_TRIGGER: 2.62
MDC_IDC_MSMT_BATTERY_STATUS: NORMAL
MDC_IDC_MSMT_BATTERY_VOLTAGE: 2.95 V
MDC_IDC_MSMT_LEADCHNL_RA_IMPEDANCE_VALUE: 418 OHM
MDC_IDC_MSMT_LEADCHNL_RA_IMPEDANCE_VALUE: 551 OHM
MDC_IDC_MSMT_LEADCHNL_RA_PACING_THRESHOLD_AMPLITUDE: 1.25 V
MDC_IDC_MSMT_LEADCHNL_RA_PACING_THRESHOLD_PULSEWIDTH: 0.4 MS
MDC_IDC_MSMT_LEADCHNL_RA_SENSING_INTR_AMPL: 0.88 MV
MDC_IDC_MSMT_LEADCHNL_RA_SENSING_INTR_AMPL: 0.88 MV
MDC_IDC_MSMT_LEADCHNL_RV_IMPEDANCE_VALUE: 323 OHM
MDC_IDC_MSMT_LEADCHNL_RV_IMPEDANCE_VALUE: 532 OHM
MDC_IDC_MSMT_LEADCHNL_RV_PACING_THRESHOLD_AMPLITUDE: 0.88 V
MDC_IDC_MSMT_LEADCHNL_RV_PACING_THRESHOLD_PULSEWIDTH: 0.4 MS
MDC_IDC_MSMT_LEADCHNL_RV_SENSING_INTR_AMPL: 4.75 MV
MDC_IDC_MSMT_LEADCHNL_RV_SENSING_INTR_AMPL: 4.75 MV
MDC_IDC_PG_IMPLANT_DTM: NORMAL
MDC_IDC_PG_MFG: NORMAL
MDC_IDC_PG_MODEL: NORMAL
MDC_IDC_PG_SERIAL: NORMAL
MDC_IDC_PG_TYPE: NORMAL
MDC_IDC_SESS_CLINIC_NAME: NORMAL
MDC_IDC_SESS_DTM: NORMAL
MDC_IDC_SESS_TYPE: NORMAL
MDC_IDC_SET_BRADY_AT_MODE_SWITCH_RATE: 171 {BEATS}/MIN
MDC_IDC_SET_BRADY_HYSTRATE: NORMAL
MDC_IDC_SET_BRADY_LOWRATE: 70 {BEATS}/MIN
MDC_IDC_SET_BRADY_MAX_SENSOR_RATE: 130 {BEATS}/MIN
MDC_IDC_SET_BRADY_MAX_TRACKING_RATE: 130 {BEATS}/MIN
MDC_IDC_SET_BRADY_MODE: NORMAL
MDC_IDC_SET_BRADY_PAV_DELAY_LOW: 180 MS
MDC_IDC_SET_BRADY_SAV_DELAY_LOW: 150 MS
MDC_IDC_SET_LEADCHNL_RA_PACING_AMPLITUDE: 2.75 V
MDC_IDC_SET_LEADCHNL_RA_PACING_ANODE_ELECTRODE_1: NORMAL
MDC_IDC_SET_LEADCHNL_RA_PACING_ANODE_LOCATION_1: NORMAL
MDC_IDC_SET_LEADCHNL_RA_PACING_CAPTURE_MODE: NORMAL
MDC_IDC_SET_LEADCHNL_RA_PACING_CATHODE_ELECTRODE_1: NORMAL
MDC_IDC_SET_LEADCHNL_RA_PACING_CATHODE_LOCATION_1: NORMAL
MDC_IDC_SET_LEADCHNL_RA_PACING_POLARITY: NORMAL
MDC_IDC_SET_LEADCHNL_RA_PACING_PULSEWIDTH: 0.4 MS
MDC_IDC_SET_LEADCHNL_RA_SENSING_ANODE_ELECTRODE_1: NORMAL
MDC_IDC_SET_LEADCHNL_RA_SENSING_ANODE_LOCATION_1: NORMAL
MDC_IDC_SET_LEADCHNL_RA_SENSING_CATHODE_ELECTRODE_1: NORMAL
MDC_IDC_SET_LEADCHNL_RA_SENSING_CATHODE_LOCATION_1: NORMAL
MDC_IDC_SET_LEADCHNL_RA_SENSING_POLARITY: NORMAL
MDC_IDC_SET_LEADCHNL_RA_SENSING_SENSITIVITY: 0.9 MV
MDC_IDC_SET_LEADCHNL_RV_PACING_AMPLITUDE: 2 V
MDC_IDC_SET_LEADCHNL_RV_PACING_ANODE_ELECTRODE_1: NORMAL
MDC_IDC_SET_LEADCHNL_RV_PACING_ANODE_LOCATION_1: NORMAL
MDC_IDC_SET_LEADCHNL_RV_PACING_CAPTURE_MODE: NORMAL
MDC_IDC_SET_LEADCHNL_RV_PACING_CATHODE_ELECTRODE_1: NORMAL
MDC_IDC_SET_LEADCHNL_RV_PACING_CATHODE_LOCATION_1: NORMAL
MDC_IDC_SET_LEADCHNL_RV_PACING_POLARITY: NORMAL
MDC_IDC_SET_LEADCHNL_RV_PACING_PULSEWIDTH: 0.4 MS
MDC_IDC_SET_LEADCHNL_RV_SENSING_ANODE_ELECTRODE_1: NORMAL
MDC_IDC_SET_LEADCHNL_RV_SENSING_ANODE_LOCATION_1: NORMAL
MDC_IDC_SET_LEADCHNL_RV_SENSING_CATHODE_ELECTRODE_1: NORMAL
MDC_IDC_SET_LEADCHNL_RV_SENSING_CATHODE_LOCATION_1: NORMAL
MDC_IDC_SET_LEADCHNL_RV_SENSING_POLARITY: NORMAL
MDC_IDC_SET_LEADCHNL_RV_SENSING_SENSITIVITY: 0.9 MV
MDC_IDC_SET_ZONE_DETECTION_INTERVAL: 350 MS
MDC_IDC_SET_ZONE_DETECTION_INTERVAL: 400 MS
MDC_IDC_SET_ZONE_STATUS: NORMAL
MDC_IDC_SET_ZONE_STATUS: NORMAL
MDC_IDC_SET_ZONE_TYPE: NORMAL
MDC_IDC_SET_ZONE_VENDOR_TYPE: NORMAL
MDC_IDC_STAT_AT_BURDEN_PERCENT: 16.2 %
MDC_IDC_STAT_AT_DTM_END: NORMAL
MDC_IDC_STAT_AT_DTM_START: NORMAL
MDC_IDC_STAT_BRADY_AP_VP_PERCENT: 96.74 %
MDC_IDC_STAT_BRADY_AP_VS_PERCENT: 0.01 %
MDC_IDC_STAT_BRADY_AS_VP_PERCENT: 3.01 %
MDC_IDC_STAT_BRADY_AS_VS_PERCENT: 0.27 %
MDC_IDC_STAT_BRADY_DTM_END: NORMAL
MDC_IDC_STAT_BRADY_DTM_START: NORMAL
MDC_IDC_STAT_BRADY_RA_PERCENT_PACED: 86 %
MDC_IDC_STAT_BRADY_RV_PERCENT_PACED: 99.71 %
MDC_IDC_STAT_EPISODE_RECENT_COUNT: 0
MDC_IDC_STAT_EPISODE_RECENT_COUNT: 4950
MDC_IDC_STAT_EPISODE_RECENT_COUNT_DTM_END: NORMAL
MDC_IDC_STAT_EPISODE_RECENT_COUNT_DTM_START: NORMAL
MDC_IDC_STAT_EPISODE_TOTAL_COUNT: 0
MDC_IDC_STAT_EPISODE_TOTAL_COUNT: 3
MDC_IDC_STAT_EPISODE_TOTAL_COUNT: 6780
MDC_IDC_STAT_EPISODE_TOTAL_COUNT_DTM_END: NORMAL
MDC_IDC_STAT_EPISODE_TOTAL_COUNT_DTM_START: NORMAL
MDC_IDC_STAT_EPISODE_TYPE: NORMAL
MDC_IDC_STAT_TACHYTHERAPY_RECENT_DTM_END: NORMAL
MDC_IDC_STAT_TACHYTHERAPY_RECENT_DTM_START: NORMAL
MDC_IDC_STAT_TACHYTHERAPY_TOTAL_DTM_END: NORMAL
MDC_IDC_STAT_TACHYTHERAPY_TOTAL_DTM_START: NORMAL

## 2023-10-31 ENCOUNTER — MEDICAL CORRESPONDENCE (OUTPATIENT)
Dept: HEALTH INFORMATION MANAGEMENT | Facility: CLINIC | Age: 88
End: 2023-10-31
Payer: MEDICARE

## 2023-11-01 ENCOUNTER — INFUSION THERAPY VISIT (OUTPATIENT)
Dept: INFUSION THERAPY | Facility: OTHER | Age: 88
End: 2023-11-01
Payer: MEDICARE

## 2023-11-01 VITALS
BODY MASS INDEX: 22.22 KG/M2 | OXYGEN SATURATION: 94 % | HEART RATE: 112 BPM | HEIGHT: 69 IN | DIASTOLIC BLOOD PRESSURE: 88 MMHG | TEMPERATURE: 97.5 F | WEIGHT: 150 LBS | SYSTOLIC BLOOD PRESSURE: 154 MMHG

## 2023-11-01 DIAGNOSIS — E86.9 VOLUME DEPLETION: Primary | ICD-10-CM

## 2023-11-01 PROCEDURE — 96360 HYDRATION IV INFUSION INIT: CPT

## 2023-11-01 PROCEDURE — 250N000011 HC RX IP 250 OP 636: Mod: JZ | Performed by: FAMILY MEDICINE

## 2023-11-01 PROCEDURE — 258N000003 HC RX IP 258 OP 636: Performed by: FAMILY MEDICINE

## 2023-11-01 RX ORDER — HEPARIN SODIUM (PORCINE) LOCK FLUSH IV SOLN 100 UNIT/ML 100 UNIT/ML
5 SOLUTION INTRAVENOUS ONCE
Status: CANCELLED
Start: 2023-11-01 | End: 2023-11-01

## 2023-11-01 RX ORDER — HEPARIN SODIUM (PORCINE) LOCK FLUSH IV SOLN 100 UNIT/ML 100 UNIT/ML
5 SOLUTION INTRAVENOUS ONCE
Status: COMPLETED | OUTPATIENT
Start: 2023-11-01 | End: 2023-11-01

## 2023-11-01 RX ADMIN — SODIUM CHLORIDE 1000 ML: 9 INJECTION, SOLUTION INTRAVENOUS at 09:23

## 2023-11-01 RX ADMIN — HEPARIN 5 ML: 100 SYRINGE at 10:32

## 2023-11-01 NOTE — PROGRESS NOTES
Infusion Nursing Note:  Jf Ortiz presents today for IVF.    Patient seen by provider today: No   present during visit today: Not Applicable.    Note: N/A.      Intravenous Access:  Implanted Port.    Treatment Conditions:  Not Applicable.      Post Infusion Assessment:  Patient tolerated infusion without incident.       Discharge Plan:   Copy of AVS sent home with patient for his care facility, with information about upcoming scheduled infusion appointments.  Patient will return November 15hth for next appointment.      Mari Vieyra RN

## 2023-11-03 NOTE — PROGRESS NOTES
Nursing Home Visit    HISTORY OF PRESENT ILLNESS:  fJ is a 89 year old male (10/5/1933)  resident of [facility] who is being seen today for routine 30 day follow up.     He has a history of Parkinson's disease, Lewy body dementia, atrial fibrillation, coronary heart disease status post aortocoronary bypass as well as stenting, seizure disorder, ans well as recurrent falls.    Jf was admitted from [facility] after being hospitalized with falls and compression fractures of L1 and L2.and in need of pain control.  This was achieved     The patient notes no current complaints.     Discussed with nursing staff who note significant erythematous rash perineum.  This continues to improve.    The patient is seen in his room.  Family is not present.     Medical records are reviewed.    Current medications, allergies, and interdisciplinary care plan are reviewed.      Patient Active Problem List    Diagnosis Date Noted    Orthostatic hypotension dysautonomic syndrome 02/12/2021     Priority: High    Chronic atrial fibrillation (H) 02/12/2021     Priority: High    Longstanding persistent atrial fibrillation (H) 04/13/2020     Priority: High    Coronary artery disease involving native coronary artery without angina pectoris 04/13/2020     Priority: High    Pacemaker, [employer], Dual Chamber - Dependent 10/06/2017     Priority: High    Lewy body dementia without behavioral disturbance (H) 08/31/2017     Priority: High    Dementia without behavioral disturbance (H) 07/28/2015     Priority: High     Diagnosis updated by automated process. Provider to review and confirm.      Parkinson disease      Priority: High    Seizure disorder (H)      Priority: High    Cardiac pacemaker in situ 01/08/2013     Priority: High     Overview:   [employer] Altrua S606 DREL,  Serial #[id number]  5-13-11  Atrial lead [employer] 4054 Serial #[id number]  8-11-00  Ventriculer lead [employer]  [id number]  Serial #[id number]  5-13-11  2nd Degree AV Block   Dr. Campbell  Intrinsic:  Pt. is Pacemaker Dependant, remains paced at temp. rate of 30 bpm (6-13-14)    Formatting of this note might be different from the original.  [employer] Altrua S606 DREL,  Serial #[id number]  5-13-11  Atrial lead [employer] 4054 Serial #[id number]  8-11-00  Ventriculer lead [employer] 4137  Serial #[id number]  5-13-11  2nd Degree AV Block   Dr. Campbell  Intrinsic:  Pt. is Pacemaker Dependant, remains paced at temp. rate of 30 bpm (6-13-14)      Hypertension with target blood pressure goal under 150/90 09/05/2006     Priority: High     Overview:   IMO Update      Essential hypertension 09/05/2006     Priority: High     Formatting of this note might be different from the original.  IMO Update      Altered mental status 01/28/2023     Priority: Medium    UTI (urinary tract infection) 01/28/2023     Priority: Medium    Medication reaction, initial encounter 01/28/2023     Priority: Medium    Fall, initial encounter 09/27/2022     Priority: Medium    Hematoma of chest wall, left, initial encounter 04/23/2022     Priority: Medium    Retinal artery branch occlusion 02/13/2021     Priority: Medium    Vision loss of right eye 02/12/2021     Priority: Medium    Closed compression fracture of body of L1 vertebra (H) 02/12/2021     Priority: Medium    Closed wedge compression fracture of lumbar vertebra with routine healing 12/20/2020     Priority: Medium    Compression fracture of L1 lumbar vertebra, closed, initial encounter (H) 12/18/2020     Priority: Medium    Compression fracture of thoracic vertebra, initial encounter, unspecified thoracic vertebral level 12/18/2020     Priority: Medium     Replacing diagnoses that were inactivated after the 10/1/2021 regulatory import.      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 Provider to review      Diaphragmatic hernia 01/01/2011     Priority: Medium     Problem list name updated by automated process. Provider to review      Pain in joint, forearm 07/31/2008     Priority: Medium     Formatting of this note might be different from the original.  IMO Update 10/11      Pain in joint, ankle and foot 07/31/2008     Priority: Medium     Formatting of this note might be different from the original.  IMO Update 10/11      Dysphagia 05/22/2007     Priority: Medium     Overview:   IMO Update    Formatting of this note might be different from the original.  IMO Update      Coronary atherosclerosis of native coronary artery 11/28/2006     Priority: Medium     Overview:   IMO Update 10/11    Formatting of this note might be different from the original.  IMO Update 10/11      Postsurgical aortocoronary bypass status 11/28/2006     Priority: Medium     Overview:   IMO Update 10/11    Formatting of this note might be different from the original.  IMO Update 10/11      Hyperlipidemia 09/05/2006     Priority: Medium     Formatting of this note might be different from the original.  IMO Update 10/11      Nursing Home Visit 03/03/2022     Priority: Low          Social History     Socioeconomic History    Marital status: Single     Spouse name: Not on file    Number of children: Not on file    Years of education: Not on file    Highest education level: Not on file   Occupational History    Occupation: retired   Tobacco Use    Smoking status: Former     Packs/day: 1.00     Years: 5.00     Additional pack years: 0.00     Total pack years: 5.00     Types:  Cigarettes     Quit date: 1985     Years since quittin.8    Smokeless tobacco: Never    Tobacco comments:     quit in    Substance and Sexual Activity    Alcohol use: Yes     Comment: social    Drug use: No    Sexual activity: Not Currently   Other Topics Concern     Service Not Asked    Blood Transfusions Yes    Caffeine Concern Yes     Comment: 1 cup coffee daily    Occupational Exposure Not Asked    Hobby Hazards Not Asked    Sleep Concern Not Asked    Stress Concern Not Asked    Weight Concern Not Asked    Special Diet Not Asked    Back Care Not Asked    Exercise Not Asked    Bike Helmet Not Asked    Seat Belt Not Asked    Self-Exams Not Asked    Parent/sibling w/ CABG, MI or angioplasty before 65F 55M? No   Social History Narrative    Not on file     Social Determinants of Health     Financial Resource Strain: Not on file   Food Insecurity: Not on file   Transportation Needs: Not on file   Physical Activity: Not on file   Stress: Not on file   Social Connections: Not on file   Interpersonal Safety: Not on file   Housing Stability: Not on file        Current Outpatient Medications   Medication Sig    acetaminophen (TYLENOL) 500 MG tablet Take 1,000 mg by mouth 3 times daily    aspirin (ASA) 81 MG chewable tablet Take 81 mg by mouth daily    calcium carbonate (OS-ROSALINDA) 1500 (600 Ca) MG tablet Take 600 mg by mouth daily    fludrocortisone (FLORINEF) 0.1 MG tablet Take 1 tablet (0.1 mg) by mouth every morning (before breakfast)    melatonin 5 MG tablet Take 5 mg by mouth nightly as needed    memantine (NAMENDA) 5 MG tablet Take 8 mg by mouth 2 times daily    mirabegron (MYRBETRIQ) 25 MG 24 hr tablet Take 25 mg by mouth daily    Nutritional Supplements (BOOST) Take 1 Bottle by mouth daily    nystatin (MYCOSTATIN) 706667 UNIT/GM external powder Apply topically as needed    potassium chloride ER (KLOR-CON M) 20 MEQ CR tablet TAKE 1 TABLET BY MOUTH EVERY DAY    RABEprazole (ACIPHEX) 20 MG EC tablet  Take 20 mg by mouth 2 times daily    senna-docusate (SENOKOT-S/PERICOLACE) 8.6-50 MG tablet Take 2 tablets by mouth 2 times daily    sodium chloride 1 GM tablet TAKE 1 TABLET BY MOUTH TWICE A DAY    tamsulosin (FLOMAX) 0.4 MG capsule Take 1 capsule (0.4 mg) by mouth daily    vitamin D3 (CHOLECALCIFEROL) 50 mcg (2000 units) tablet Take 1 tablet by mouth daily     No current facility-administered medications for this visit.       Allergies   Allergen Reactions    Cats Unknown    Haldol [Haloperidol]      Per SNF reporting    Klonopin [Clonazepam]      Per SNF reporting    Lamotrigine      Skin lesions    Oxycodone      Per SNF reporting       I have reviewed the care plan and do agree with the plan.      ROS:  No chest pain, shortness of breath, fever, chills, headache, nausea, vomiting, dysuria, or changes in bowel habits.  Appetite is poor.  Low back pain noted.          OBJECTIVE:  Wt 68.3 kg (150 lb 8 oz)   BMI 22.22 kg/m      GENERAL: Healthy, alert and no distress  EYES: Eyes grossly normal to inspection.  No discharge or erythema, or obvious scleral/conjunctival abnormalities.  RESP: No audible wheeze, cough, or visible cyanosis.  No visible retractions or increased work of breathing.    SKIN: Erythema with serpiginous borders noted with sattelite lesions. No abnormal pigmentation or lesions.  NEURO: Cranial nerves grossly intact.  Mentation impaired. Speech appropriate for age.  PSYCH: Mentation shows impaired cognition affect normal/bright, judgement and insight intact, normal speech and appearance well-groomed.      Previous      Lab/Diagnostic data:    Reviewed    ASSESSMENT/ORDERS:  Nursing Home Visit  Discussed with staff. Care plan reviewed and orders updated.  Chronic issues are stable. Routine 30 day Nursing Home follow up.     Rash  Suspect fungal. Finished diflucan.  Will follow.    Closed compression fracture of L1 lumbar vertebra with routine healing, subsequent encounter  Stable    Lewy body  dementia without behavioral disturbance (H)  Stable    Parkinson disease (H)  Stable    Coronary artery disease involving native coronary artery of native heart without angina pectoris  No recurrent symptoms    Hypertension with target blood pressure goal under 150/90  Recent readings reviewed.  Continue same medication regimen        Total time spent with patient visit was 25 min including patient visit, review of pertinent clinical information, and treatment plan.      Abran Whitman MD FAAFP [facility]  Geriatrics  Hospice and Palliative Care

## 2023-11-07 VITALS
OXYGEN SATURATION: 95 % | HEIGHT: 67 IN | BODY MASS INDEX: 24.2 KG/M2 | TEMPERATURE: 98.2 F | HEART RATE: 66 BPM | WEIGHT: 154.2 LBS | RESPIRATION RATE: 16 BRPM | SYSTOLIC BLOOD PRESSURE: 128 MMHG | DIASTOLIC BLOOD PRESSURE: 80 MMHG

## 2023-11-07 RX ORDER — MEDICAL SUPPLY, MISCELLANEOUS
EACH MISCELLANEOUS 2 TIMES DAILY
COMMUNITY

## 2023-11-08 ENCOUNTER — NURSING HOME VISIT (OUTPATIENT)
Dept: FAMILY MEDICINE | Facility: OTHER | Age: 88
End: 2023-11-08
Attending: FAMILY MEDICINE
Payer: MEDICARE

## 2023-11-08 DIAGNOSIS — F02.80 LEWY BODY DEMENTIA WITHOUT BEHAVIORAL DISTURBANCE (H): ICD-10-CM

## 2023-11-08 DIAGNOSIS — I25.10 CORONARY ARTERY DISEASE INVOLVING NATIVE CORONARY ARTERY OF NATIVE HEART WITHOUT ANGINA PECTORIS: ICD-10-CM

## 2023-11-08 DIAGNOSIS — Z78.9 NURSING HOME RESIDENT: Primary | ICD-10-CM

## 2023-11-08 DIAGNOSIS — G89.29 CHRONIC LOW BACK PAIN, UNSPECIFIED BACK PAIN LATERALITY, UNSPECIFIED WHETHER SCIATICA PRESENT: ICD-10-CM

## 2023-11-08 DIAGNOSIS — I10 HYPERTENSION WITH TARGET BLOOD PRESSURE GOAL UNDER 150/90: ICD-10-CM

## 2023-11-08 DIAGNOSIS — G31.83 LEWY BODY DEMENTIA WITHOUT BEHAVIORAL DISTURBANCE (H): ICD-10-CM

## 2023-11-08 DIAGNOSIS — G20.A1 PARKINSON'S DISEASE WITHOUT DYSKINESIA OR FLUCTUATING MANIFESTATIONS (H): ICD-10-CM

## 2023-11-08 DIAGNOSIS — R21 RASH: ICD-10-CM

## 2023-11-08 DIAGNOSIS — I95.1 ORTHOSTATIC HYPOTENSION DYSAUTONOMIC SYNDROME: ICD-10-CM

## 2023-11-08 DIAGNOSIS — M54.50 CHRONIC LOW BACK PAIN, UNSPECIFIED BACK PAIN LATERALITY, UNSPECIFIED WHETHER SCIATICA PRESENT: ICD-10-CM

## 2023-11-08 PROCEDURE — 99308 SBSQ NF CARE LOW MDM 20: CPT | Performed by: FAMILY MEDICINE

## 2023-11-08 NOTE — PROGRESS NOTES
Nursing Home Visit    HISTORY OF PRESENT ILLNESS:  Jf is a 89 year old male (10/5/1933)  resident of [facility] who is being seen today for routine 30 day follow up.     He has a history of Parkinson's disease, Lewy body dementia, atrial fibrillation, coronary heart disease status post aortocoronary bypass as well as stenting, seizure disorder, ans well as recurrent falls.    Jf was admitted from [facility] after being hospitalized with falls and compression fractures of L1 and L2.and in need of pain control.  This was achieved     The patient notes no current complaints. He is aware of the move to the current facility    Discussed with nursing staff who note significant erythematous rash perineum.  This continues to improve.    The patient is seen in his room.  Family is not present.     Medical records are reviewed.    Current medications, allergies, and interdisciplinary care plan are reviewed.      Patient Active Problem List    Diagnosis Date Noted    Orthostatic hypotension dysautonomic syndrome 02/12/2021     Priority: High    Chronic atrial fibrillation (H) 02/12/2021     Priority: High    Longstanding persistent atrial fibrillation (H) 04/13/2020     Priority: High    Coronary artery disease involving native coronary artery without angina pectoris 04/13/2020     Priority: High    Pacemaker, [employer], Dual Chamber - Dependent 10/06/2017     Priority: High    Lewy body dementia without behavioral disturbance (H) 08/31/2017     Priority: High    Dementia without behavioral disturbance (H) 07/28/2015     Priority: High     Diagnosis updated by automated process. Provider to review and confirm.      Parkinson disease      Priority: High    Seizure disorder (H)      Priority: High    Cardiac pacemaker in situ 01/08/2013     Priority: High     Overview:   [employer]aa S606 DREL,  Serial #[id number]  5-13-11  Atrial lead [employer] 4054 Serial  [id number]  8-11-00  Ventriculer lead [employer] 4137  Serial #[id number]  5-13-11  2nd Degree AV Block   Dr. Campbell  Intrinsic:  Pt. is Pacemaker Dependant, remains paced at temp. rate of 30 bpm (6-13-14)    Formatting of this note might be different from the original.  [employer] Alt[employer]a S606 DREL,  Serial #[id number]  5-13-11  Atrial lead [employer] 4054 Serial #[id number]  8-11-00  Ventriculer lead [employer] 4137  Serial #[id number]  5-13-11  2nd Degree AV Block   Dr. Campbell  Intrinsic:  Pt. is Pacemaker Dependant, remains paced at temp. rate of 30 bpm (6-13-14)      Hypertension with target blood pressure goal under 150/90 09/05/2006     Priority: High     Overview:   IMO Update      Essential hypertension 09/05/2006     Priority: High     Formatting of this note might be different from the original.  IMO Update      Altered mental status 01/28/2023     Priority: Medium    UTI (urinary tract infection) 01/28/2023     Priority: Medium    Medication reaction, initial encounter 01/28/2023     Priority: Medium    Fall, initial encounter 09/27/2022     Priority: Medium    Hematoma of chest wall, left, initial encounter 04/23/2022     Priority: Medium    Retinal artery branch occlusion 02/13/2021     Priority: Medium    Vision loss of right eye 02/12/2021     Priority: Medium    Closed compression fracture of body of L1 vertebra (H) 02/12/2021     Priority: Medium    Closed wedge compression fracture of lumbar vertebra with routine healing 12/20/2020     Priority: Medium    Compression fracture of L1 lumbar vertebra, closed, initial encounter (H) 12/18/2020     Priority: Medium    Compression fracture of thoracic vertebra, initial encounter, unspecified thoracic vertebral level 12/18/2020     Priority: Medium     Replacing diagnoses that were inactivated after the 10/1/2021 regulatory import.      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 Provider to review      Diaphragmatic hernia 01/01/2011     Priority: Medium     Problem list name updated by automated process. Provider to review      Pain in joint, forearm 07/31/2008     Priority: Medium     Formatting of this note might be different from the original.  IMO Update 10/11      Pain in joint, ankle and foot 07/31/2008     Priority: Medium     Formatting of this note might be different from the original.  IMO Update 10/11      Dysphagia 05/22/2007     Priority: Medium     Overview:   IMO Update    Formatting of this note might be different from the original.  IMO Update      Coronary atherosclerosis of native coronary artery 11/28/2006     Priority: Medium     Overview:   IMO Update 10/11    Formatting of this note might be different from the original.  IMO Update 10/11      Postsurgical aortocoronary bypass status 11/28/2006     Priority: Medium     Overview:   IMO Update 10/11    Formatting of this note might be different from the original.  IMO Update 10/11      Hyperlipidemia 09/05/2006     Priority: Medium     Formatting of this note might be different from the original.  IMO Update 10/11      Nursing Home Visit 03/03/2022     Priority: Low          Social History     Socioeconomic History    Marital status: Single     Spouse name: Not on file    Number of children: Not on file    Years of education: Not on file    Highest education level: Not on file   Occupational History    Occupation: retired   Tobacco Use    Smoking status: Former     Packs/day: 1.00     Years: 5.00     Additional pack  years: 0.00     Total pack years: 5.00     Types: Cigarettes     Quit date: 1985     Years since quittin.8    Smokeless tobacco: Never    Tobacco comments:     quit in    Substance and Sexual Activity    Alcohol use: Yes     Comment: social    Drug use: No    Sexual activity: Not Currently   Other Topics Concern     Service Not Asked    Blood Transfusions Yes    Caffeine Concern Yes     Comment: 1 cup coffee daily    Occupational Exposure Not Asked    Hobby Hazards Not Asked    Sleep Concern Not Asked    Stress Concern Not Asked    Weight Concern Not Asked    Special Diet Not Asked    Back Care Not Asked    Exercise Not Asked    Bike Helmet Not Asked    Seat Belt Not Asked    Self-Exams Not Asked    Parent/sibling w/ CABG, MI or angioplasty before 65F 55M? No   Social History Narrative    Not on file     Social Determinants of Health     Financial Resource Strain: Not on file   Food Insecurity: Not on file   Transportation Needs: Not on file   Physical Activity: Not on file   Stress: Not on file   Social Connections: Not on file   Interpersonal Safety: Not on file   Housing Stability: Not on file        Current Outpatient Medications   Medication Sig    acetaminophen (TYLENOL) 500 MG tablet Take 1,000 mg by mouth 3 times daily    aspirin (ASA) 81 MG chewable tablet Take 81 mg by mouth daily    calcium carbonate (OS-ROSALINDA) 1500 (600 Ca) MG tablet Take 600 mg by mouth daily    fludrocortisone (FLORINEF) 0.1 MG tablet Take 1 tablet (0.1 mg) by mouth every morning (before breakfast)    melatonin 5 MG tablet Take 5 mg by mouth nightly as needed    memantine (NAMENDA) 5 MG tablet Take 8 mg by mouth 2 times daily    mirabegron (MYRBETRIQ) 25 MG 24 hr tablet Take 25 mg by mouth daily    Nutritional Supplements (BOOST) Take 1 Bottle by mouth daily    nystatin (MYCOSTATIN) 912989 UNIT/GM external powder Apply topically as needed    Ostomy Supplies (RESTORE BARRIER) CREA 2 times daily    potassium chloride  "ER (KLOR-CON M) 20 MEQ CR tablet TAKE 1 TABLET BY MOUTH EVERY DAY    RABEprazole (ACIPHEX) 20 MG EC tablet Take 20 mg by mouth 2 times daily    rectal barrier (DR. FOFANA) Apply topically 2 times daily    senna-docusate (SENOKOT-S/PERICOLACE) 8.6-50 MG tablet Take 2 tablets by mouth 2 times daily    sodium chloride 1 GM tablet TAKE 1 TABLET BY MOUTH TWICE A DAY    tamsulosin (FLOMAX) 0.4 MG capsule Take 1 capsule (0.4 mg) by mouth daily    vitamin D3 (CHOLECALCIFEROL) 50 mcg (2000 units) tablet Take 1 tablet by mouth daily     No current facility-administered medications for this visit.       Allergies   Allergen Reactions    Cats Unknown    Haldol [Haloperidol]      Per SNF reporting    Klonopin [Clonazepam]      Per SNF reporting    Lamotrigine      Skin lesions    Oxycodone      Per SNF reporting       I have reviewed the care plan and do agree with the plan.      ROS:  No chest pain, shortness of breath, fever, chills, headache, nausea, vomiting, dysuria, or changes in bowel habits.  Appetite is poor.  Low back pain noted.          OBJECTIVE:  /80   Pulse 66   Temp 98.2  F (36.8  C)   Resp 16   Ht 1.702 m (5' 7\")   Wt 69.9 kg (154 lb 3.2 oz)   SpO2 95%   BMI 24.15 kg/m      GENERAL: Healthy, alert and no distress  EYES: Eyes grossly normal to inspection.  No discharge or erythema, or obvious scleral/conjunctival abnormalities.  RESP: No audible wheeze, cough, or visible cyanosis.  No visible retractions or increased work of breathing.    SKIN: Erythema with serpiginous borders noted with sattelite lesions. No abnormal pigmentation or lesions.  NEURO: Cranial nerves grossly intact.  Mentation impaired. Speech appropriate for age.  PSYCH: Mentation shows impaired cognition affect normal/bright, judgement and insight intact, normal speech and appearance well-groomed.      Previous      Lab/Diagnostic data:    Reviewed    ASSESSMENT/ORDERS:  Nursing Home Visit  Discussed with staff. Care plan reviewed and " orders updated.  Chronic issues are stable. Routine 30 day Nursing Home follow up.     Rash  Suspect fungal. Finished diflucan.  Will follow.    Closed compression fracture of L1 lumbar vertebra with routine healing, subsequent encounter  Stable    Lewy body dementia without behavioral disturbance (H)  Stable    Parkinson disease (H)  Stable    Coronary artery disease involving native coronary artery of native heart without angina pectoris  No recurrent symptoms    Hypertension with target blood pressure goal under 150/90  Recent readings reviewed.  Continue same medication regimen        Total time spent with patient visit was 25 min including patient visit, review of pertinent clinical information, and treatment plan.      Abran Whitman MD FAAFP [facility]  Geriatrics  Hospice and Palliative Care

## 2023-11-15 ENCOUNTER — INFUSION THERAPY VISIT (OUTPATIENT)
Dept: INFUSION THERAPY | Facility: OTHER | Age: 88
End: 2023-11-15
Payer: MEDICARE

## 2023-11-15 VITALS
DIASTOLIC BLOOD PRESSURE: 74 MMHG | HEIGHT: 69 IN | HEART RATE: 99 BPM | BODY MASS INDEX: 22.22 KG/M2 | SYSTOLIC BLOOD PRESSURE: 125 MMHG | OXYGEN SATURATION: 99 % | WEIGHT: 150 LBS | TEMPERATURE: 97.3 F

## 2023-11-15 DIAGNOSIS — E86.9 VOLUME DEPLETION: Primary | ICD-10-CM

## 2023-11-15 PROCEDURE — 96360 HYDRATION IV INFUSION INIT: CPT

## 2023-11-15 PROCEDURE — 250N000011 HC RX IP 250 OP 636: Mod: JZ | Performed by: FAMILY MEDICINE

## 2023-11-15 PROCEDURE — 258N000003 HC RX IP 258 OP 636: Performed by: FAMILY MEDICINE

## 2023-11-15 RX ORDER — HEPARIN SODIUM (PORCINE) LOCK FLUSH IV SOLN 100 UNIT/ML 100 UNIT/ML
5 SOLUTION INTRAVENOUS ONCE
Status: COMPLETED | OUTPATIENT
Start: 2023-11-15 | End: 2023-11-15

## 2023-11-15 RX ORDER — HEPARIN SODIUM (PORCINE) LOCK FLUSH IV SOLN 100 UNIT/ML 100 UNIT/ML
5 SOLUTION INTRAVENOUS ONCE
Status: CANCELLED
Start: 2023-11-15 | End: 2023-11-15

## 2023-11-15 RX ADMIN — SODIUM CHLORIDE 1000 ML: 9 INJECTION, SOLUTION INTRAVENOUS at 11:00

## 2023-11-15 RX ADMIN — HEPARIN 5 ML: 100 SYRINGE at 12:03

## 2023-11-15 ASSESSMENT — PAIN SCALES - GENERAL: PAINLEVEL: NO PAIN (0)

## 2023-11-15 NOTE — PROGRESS NOTES
Infusion Nursing Note:  Jf Ortiz presents today for IVF.    Patient seen by provider today: No   present during visit today: Not Applicable.    Note: N/A.      Intravenous Access:  Implanted Port.    Treatment Conditions:  Not Applicable.      Post Infusion Assessment:  Patient tolerated infusion without incident.       Discharge Plan:   Copy of AVS reviewed with patient and/or family and sent with patient to bring back to [facility] for next appointment.      Mari Vieyra RN

## 2023-11-29 NOTE — PATIENT INSTRUCTIONS

## 2023-11-29 NOTE — PROGRESS NOTES
Infusion Nursing Note:  Jf Ortiz presents today for IVF.    Patient seen by provider today: No   present during visit today: Not Applicable.    Intravenous Access:  Implanted Port.    Treatment Conditions:  Not Applicable.      Post Infusion Assessment:  Patient tolerated infusion without incident.  Blood return noted pre and post infusion.  No evidence of extravasations.  Access discontinued per protocol.       Discharge Plan:   Copy of AVS reviewed with patient and/or family.  Patient discharged in stable condition accompanied by: self.  Departure Mode: Wheelchair.      CATE Arita

## 2023-12-13 NOTE — PROGRESS NOTES
Infusion Nursing Note:  Jf Ortiz presents today for IVF.    Patient seen by provider today: No   present during visit today: Not Applicable.    Note: unable to review medication and allergies as paperwork was not provided by the nursing home for today's visit.    Intravenous Access:  Implanted Port.    Treatment Conditions:  Not Applicable.    Post Infusion Assessment:  Patient tolerated infusion without incident.  Blood return noted pre and post infusion.  No evidence of extravasations.  Access discontinued per protocol.     Discharge Plan:   Copy of AVS reviewed with patient and/or family.  Patient will return 12/28/23 for next appointment.  Patient discharged in stable condition accompanied by: [place] transportation.  Departure Mode: Wheelchair.    CATE Arita

## 2023-12-13 NOTE — PATIENT INSTRUCTIONS

## 2023-12-15 NOTE — LETTER
12/15/2023         RE: Jf Ortiz  [address]  [place] [address]        Dear Colleague,    Thank you for referring your patient, Jf Ortiz, to the [facility]. Please see a copy of my visit note below.    Chief Complaint   Patient presents with     Procedure     Ear cleaning       Patient presents to ENT for ear exam and ear cleaning.       Today, Jf reports his ears feel plugged.  Denies otalgia.   He has history of having tubes placed and currently is   right T-tube is retained in the middle ear space.  Used pocket talker during visit and patient was very happy, smiling and visiting.      Audiogram completed 3/4/22:  Tympanograms are Type A for left ear suggesting normal eardrum mobility and TYpe B large volume right-pe tube.  Acoustic Reflex Thresholds at 1000 Hz are present for both ears.  Thresholds are decreased compared to 2017 moderate to profounds sensorineural hearing loss.  Speech reception thresholds are in good agreement with pure tone average.  Word discrimination scores are very poor right and good left at supra-thresholds level.            Past Medical History:   Diagnosis Date     Autonomic orthostatic hypotension 10/14/2016     Coronary artery disease      Dementia without behavioral disturbance (H) 7/28/2015    Diagnosis updated by automated process. Provider to review and confirm.     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     Seizure disorder (H)      Stented coronary artery         Allergies   Allergen Reactions     Cats Unknown     Haldol [Haloperidol]      Per SNF reporting     Klonopin [Clonazepam]      Per SNF reporting     Lamotrigine      Skin lesions     Oxycodone      Per SNF reporting      Current Outpatient Medications   Medication     acetaminophen (TYLENOL) 500 MG tablet     aspirin (ASA) 81 MG chewable tablet     calcium carbonate (OS-ROSALINDA) 1500 (600 Ca) MG tablet     fludrocortisone (FLORINEF) 0.1 MG tablet     melatonin 5 MG tablet     memantine (NAMENDA) 5 MG tablet     mirabegron (MYRBETRIQ) 25 MG 24 hr tablet     Nutritional Supplements (BOOST)     nystatin (MYCOSTATIN) 224556 UNIT/GM external powder     Ostomy Supplies (RESTORE BARRIER) CREA     potassium chloride ER (KLOR-CON M) 20 MEQ CR tablet     RABEprazole (ACIPHEX) 20 MG EC tablet     rectal barrier (DR. FOFANA)     senna-docusate (SENOKOT-S/PERICOLACE) 8.6-50 MG tablet     sodium chloride 1 GM tablet     tamsulosin (FLOMAX) 0.4 MG capsule     vitamin D3 (CHOLECALCIFEROL) 50 mcg (2000 units) tablet     No current facility-administered medications for this visit.     ROS- SEE HPI  /58 (BP Location: Left arm, Patient Position: Sitting, Cuff Size: Adult Regular)   Pulse 70   Temp (!) 96  F (35.6  C) (Tympanic)   SpO2 98%     General - He presents via w/c, sleeping when I entered the room. He rouses with verbal stimuli and transferred to chair for procedure. Pocket talker used and he was able to communicate and smiling  Head and Face - Normocephalic and atraumatic, with no gross asymmetry noted.  The facial nerve is intact, with strong symmetric movements.  Voice and Breathing - The patient was breathing comfortably without the use of accessory muscles. There was no wheezing, stridor. The patients voice was clear and strong, and had appropriate pitch and quality.  Ears - The ears were examined with binocular microscopy and with otoscope.  External ears normal. Right canal cerumen impacted/ dried crusting/ film.  Left canal cerumen partially impacted.  The right ear was cleaned with #7 cuevas and cupped forceps.  T- tube appears retained in middle ear space and unable to obtain. There is a 5-10% perforation. ME dry.  The left  ear was cleaned with #7 suction.  Left tympanic membrane is intact without effusion, retraction or mass.  Eyes - Extraocular movements intact, sclera were not icteric or injected, conjunctiva were pink and moist.  Nose - External contour is symmetric, no gross deflection or scars.  Nasal mucosa is pink and moist with no abnormal mucus.          ASSESSMENT/ PLAN:            ICD-10-CM    1. Excessive cerumen in both ear canals  H61.23       2. ASNHL (asymmetrical sensorineural hearing loss)  H90.3       3. Retained myringotomy tube in right ear  Z96.22       4. Perforation of right tympanic membrane  H72.91             Discussed with Jf stable ear exam    His right tube T-tube remains in middle ear space on right.  Stable TM perforation.    Will continue to observe patient as patient was intolerable for removal.    Follow-up in 4 to 6 months.    Recommended hearing aid use.    Return to ENT sooner if there is any concerns of otorrhea or otalgia.    Geeta Alegria PA-C  ENT  [facility], [place]      Again, thank you for allowing me to participate in the care of your patient.        Sincerely,        Geeta Alegria PA-C

## 2023-12-15 NOTE — PATIENT INSTRUCTIONS
Ears were cleaned  Stable Right ear. There is a perforation/ tube retained in middle ear.  Follow up in 4-6 months, sooner if any concerns.     Thank you for allowing [facility] and our ENT team to participate in your care.  If your medications are too expensive, please give the nurse a call.  We can possibly change this medication.  If you have a scheduling or an appointment question please contact our Health Unit Coordinator at their direct line [phone].   ALL nursing questions or concerns can be directed to your ENT nurseAngie at: [phone].

## 2023-12-15 NOTE — PROGRESS NOTES
Chief Complaint   Patient presents with    Procedure     Ear cleaning       Patient presents to ENT for ear exam and ear cleaning.       Today, Jf reports his ears feel plugged.  Denies otalgia.   He has history of having tubes placed and currently is   right T-tube is retained in the middle ear space.  Used pocket talker during visit and patient was very happy, smiling and visiting.      Audiogram completed 3/4/22:  Tympanograms are Type A for left ear suggesting normal eardrum mobility and TYpe B large volume right-pe tube.  Acoustic Reflex Thresholds at 1000 Hz are present for both ears.  Thresholds are decreased compared to 2017 moderate to profounds sensorineural hearing loss.  Speech reception thresholds are in good agreement with pure tone average.  Word discrimination scores are very poor right and good left at supra-thresholds level.            Past Medical History:   Diagnosis Date    Autonomic orthostatic hypotension 10/14/2016    Coronary artery disease     Dementia without behavioral disturbance (H) 7/28/2015    Diagnosis updated by automated process. Provider to review and confirm.    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    Seizure disorder (H)     Stented coronary artery         Allergies   Allergen Reactions    Cats Unknown    Haldol [Haloperidol]      Per SNF reporting    Klonopin [Clonazepam]      Per SNF reporting    Lamotrigine      Skin lesions    Oxycodone      Per SNF reporting     Current Outpatient Medications   Medication    acetaminophen (TYLENOL) 500 MG tablet    aspirin (ASA) 81 MG chewable tablet    calcium carbonate (OS-ROSALINDA) 1500 (600 Ca) MG tablet    fludrocortisone (FLORINEF) 0.1 MG tablet    melatonin 5 MG tablet     memantine (NAMENDA) 5 MG tablet    mirabegron (MYRBETRIQ) 25 MG 24 hr tablet    Nutritional Supplements (BOOST)    nystatin (MYCOSTATIN) 532607 UNIT/GM external powder    Ostomy Supplies (RESTORE BARRIER) CREA    potassium chloride ER (KLOR-CON M) 20 MEQ CR tablet    RABEprazole (ACIPHEX) 20 MG EC tablet    rectal barrier (DR. FOFANA)    senna-docusate (SENOKOT-S/PERICOLACE) 8.6-50 MG tablet    sodium chloride 1 GM tablet    tamsulosin (FLOMAX) 0.4 MG capsule    vitamin D3 (CHOLECALCIFEROL) 50 mcg (2000 units) tablet     No current facility-administered medications for this visit.     ROS- SEE HPI  /58 (BP Location: Left arm, Patient Position: Sitting, Cuff Size: Adult Regular)   Pulse 70   Temp (!) 96  F (35.6  C) (Tympanic)   SpO2 98%     General - He presents via w/c, sleeping when I entered the room. He rouses with verbal stimuli and transferred to chair for procedure. Pocket talker used and he was able to communicate and smiling  Head and Face - Normocephalic and atraumatic, with no gross asymmetry noted.  The facial nerve is intact, with strong symmetric movements.  Voice and Breathing - The patient was breathing comfortably without the use of accessory muscles. There was no wheezing, stridor. The patients voice was clear and strong, and had appropriate pitch and quality.  Ears - The ears were examined with binocular microscopy and with otoscope.  External ears normal. Right canal cerumen impacted/ dried crusting/ film.  Left canal cerumen partially impacted.  The right ear was cleaned with #7 cuevas and cupped forceps.  T- tube appears retained in middle ear space and unable to obtain. There is a 5-10% perforation. ME dry.  The left ear was cleaned with #7 suction.  Left tympanic membrane is intact without effusion, retraction or mass.  Eyes - Extraocular movements intact, sclera were not icteric or injected, conjunctiva were pink and moist.  Nose - External contour is symmetric, no gross deflection  or scars.  Nasal mucosa is pink and moist with no abnormal mucus.          ASSESSMENT/ PLAN:            ICD-10-CM    1. Excessive cerumen in both ear canals  H61.23       2. ASNHL (asymmetrical sensorineural hearing loss)  H90.3       3. Retained myringotomy tube in right ear  Z96.22       4. Perforation of right tympanic membrane  H72.91             Discussed with Jf stable ear exam    His right tube T-tube remains in middle ear space on right.  Stable TM perforation.    Will continue to observe patient as patient was intolerable for removal.    Follow-up in 4 to 6 months.    Recommended hearing aid use.    Return to ENT sooner if there is any concerns of otorrhea or otalgia.    Geeta Alegria PA-C  ENT  [facility], [facility]

## 2023-12-20 NOTE — PROGRESS NOTES
Nursing Home Visit    HISTORY OF PRESENT ILLNESS:  Jf is a 89 year old male (10/5/1933)  resident of [facility] who is being seen today for routine 30 day follow up.     He has a history of Parkinson's disease, Lewy body dementia, atrial fibrillation, coronary heart disease status post aortocoronary bypass as well as stenting, seizure disorder, ans well as recurrent falls.    Jf was admitted from [facility] after being hospitalized with falls and compression fractures of L1 and L2.and in need of pain control.  This was achieved     The patient notes no current complaints. He is aware of the move to the current facility    Discussed with nursing staff who note now new concerns.    The patient is seen in his room.  Family is not present.     Medical records are reviewed.    Current medications, allergies, and interdisciplinary care plan are reviewed.      Patient Active Problem List    Diagnosis Date Noted    Orthostatic hypotension dysautonomic syndrome 02/12/2021     Priority: High    Chronic atrial fibrillation (H) 02/12/2021     Priority: High    Longstanding persistent atrial fibrillation (H) 04/13/2020     Priority: High    Coronary artery disease involving native coronary artery without angina pectoris 04/13/2020     Priority: High    Pacemaker, [employer], Dual Chamber - Dependent 10/06/2017     Priority: High    Lewy body dementia without behavioral disturbance (H) 08/31/2017     Priority: High    Dementia without behavioral disturbance (H) 07/28/2015     Priority: High     Diagnosis updated by automated process. Provider to review and confirm.      Parkinson disease      Priority: High    Seizure disorder (H)      Priority: High    Cardiac pacemaker in situ 01/08/2013     Priority: High     Overview:   [employer] S606 DREL,  Serial #[id number]  5-13-11  Atrial lead [employer] 4054 Serial #[id number]  8-11-00  Ventriculer lead [employer]  [id number]  Serial #[id number]  5-13-11  2nd Degree AV Block   Dr. Campbell  Intrinsic:  Pt. is Pacemaker Dependant, remains paced at temp. rate of 30 bpm (6-13-14)    Formatting of this note might be different from the original.  [employer] S606 DREL,  Serial #[id number]  5-13-11  Atrial lead [employer] 4054 Serial #[id number]  8-11-00  Ventriculer lead [employer] 4137  Serial #[id number]  5-13-11  2nd Degree AV Block   Dr. Campbell  Intrinsic:  Pt. is Pacemaker Dependant, remains paced at temp. rate of 30 bpm (6-13-14)      Hypertension with target blood pressure goal under 150/90 09/05/2006     Priority: High     Overview:   IMO Update      Essential hypertension 09/05/2006     Priority: High     Formatting of this note might be different from the original.  IMO Update      Altered mental status 01/28/2023     Priority: Medium    UTI (urinary tract infection) 01/28/2023     Priority: Medium    Medication reaction, initial encounter 01/28/2023     Priority: Medium    Fall, initial encounter 09/27/2022     Priority: Medium    Hematoma of chest wall, left, initial encounter 04/23/2022     Priority: Medium    Retinal artery branch occlusion 02/13/2021     Priority: Medium    Vision loss of right eye 02/12/2021     Priority: Medium    Closed compression fracture of body of L1 vertebra (H) 02/12/2021     Priority: Medium    Closed wedge compression fracture of lumbar vertebra with routine healing 12/20/2020     Priority: Medium    Compression fracture of L1 lumbar vertebra, closed, initial encounter (H) 12/18/2020     Priority: Medium    Compression fracture of thoracic vertebra, initial encounter, unspecified thoracic vertebral level 12/18/2020     Priority: Medium     Replacing diagnoses that were inactivated after the 10/1/2021 regulatory import.      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 Provider to review      Diaphragmatic hernia 01/01/2011     Priority: Medium     Problem list name updated by automated process. Provider to review      Pain in joint, forearm 07/31/2008     Priority: Medium     Formatting of this note might be different from the original.  IMO Update 10/11      Pain in joint, ankle and foot 07/31/2008     Priority: Medium     Formatting of this note might be different from the original.  IMO Update 10/11      Dysphagia 05/22/2007     Priority: Medium     Overview:   IMO Update    Formatting of this note might be different from the original.  IMO Update      Coronary atherosclerosis of native coronary artery 11/28/2006     Priority: Medium     Overview:   IMO Update 10/11    Formatting of this note might be different from the original.  IMO Update 10/11      Postsurgical aortocoronary bypass status 11/28/2006     Priority: Medium     Overview:   IMO Update 10/11    Formatting of this note might be different from the original.  IMO Update 10/11      Hyperlipidemia 09/05/2006     Priority: Medium     Formatting of this note might be different from the original.  IMO Update 10/11      Nursing Home Visit 03/03/2022     Priority: Low          Social History     Socioeconomic History    Marital status: Single     Spouse name: Not on file    Number of children: Not on file    Years of education: Not on file    Highest education level: Not on file   Occupational History    Occupation: retired   Tobacco Use    Smoking status: Former     Packs/day: 1.00     Years: 5.00     Additional pack years: 0.00     Total pack years: 5.00     Types:  Cigarettes     Quit date: 1985     Years since quittin.9    Smokeless tobacco: Never    Tobacco comments:     quit in    Substance and Sexual Activity    Alcohol use: Yes     Comment: social    Drug use: No    Sexual activity: Not Currently   Other Topics Concern     Service Not Asked    Blood Transfusions Yes    Caffeine Concern Yes     Comment: 1 cup coffee daily    Occupational Exposure Not Asked    Hobby Hazards Not Asked    Sleep Concern Not Asked    Stress Concern Not Asked    Weight Concern Not Asked    Special Diet Not Asked    Back Care Not Asked    Exercise Not Asked    Bike Helmet Not Asked    Seat Belt Not Asked    Self-Exams Not Asked    Parent/sibling w/ CABG, MI or angioplasty before 65F 55M? No   Social History Narrative    Not on file     Social Determinants of Health     Financial Resource Strain: Not on file   Food Insecurity: Not on file   Transportation Needs: Not on file   Physical Activity: Not on file   Stress: Not on file   Social Connections: Not on file   Interpersonal Safety: Not on file   Housing Stability: Not on file        Current Outpatient Medications   Medication Sig    acetaminophen (TYLENOL) 500 MG tablet Take 1,000 mg by mouth 3 times daily    aspirin (ASA) 81 MG chewable tablet Take 81 mg by mouth daily    bisacodyl (DULCOLAX) 10 MG suppository Place 10 mg rectally daily as needed for constipation    calcium carbonate (OS-ROSALINDA) 1500 (600 Ca) MG tablet Take 600 mg by mouth daily    fludrocortisone (FLORINEF) 0.1 MG tablet Take 1 tablet (0.1 mg) by mouth every morning (before breakfast)    magnesium hydroxide (MILK OF MAGNESIA) 400 MG/5ML suspension Take 30 mLs by mouth daily as needed for constipation or heartburn    melatonin 5 MG tablet Take 5 mg by mouth nightly as needed    memantine (NAMENDA) 5 MG tablet Take 8 mg by mouth 2 times daily    mirabegron (MYRBETRIQ) 25 MG 24 hr tablet Take 25 mg by mouth daily    Nutritional Supplements (BOOST) Take 1 Bottle  by mouth daily    nystatin (MYCOSTATIN) 076212 UNIT/GM external powder Apply topically as needed    Ostomy Supplies (RESTORE BARRIER) CREA 2 times daily    potassium chloride ER (KLOR-CON M) 20 MEQ CR tablet TAKE 1 TABLET BY MOUTH EVERY DAY    RABEprazole (ACIPHEX) 20 MG EC tablet Take 20 mg by mouth 2 times daily    rectal barrier (DR. FOFANA) Apply topically 2 times daily    senna-docusate (SENOKOT-S/PERICOLACE) 8.6-50 MG tablet Take 2 tablets by mouth 2 times daily    sodium chloride 1 GM tablet TAKE 1 TABLET BY MOUTH TWICE A DAY    tamsulosin (FLOMAX) 0.4 MG capsule Take 1 capsule (0.4 mg) by mouth daily    vitamin D3 (CHOLECALCIFEROL) 50 mcg (2000 units) tablet Take 1 tablet by mouth daily     No current facility-administered medications for this visit.       Allergies   Allergen Reactions    Cats Unknown    Haldol [Haloperidol]      Per SNF reporting    Klonopin [Clonazepam]      Per SNF reporting    Lamotrigine      Skin lesions    Oxycodone      Per SNF reporting       I have reviewed the care plan and do agree with the plan.      ROS:  No chest pain, shortness of breath, fever, chills, headache, nausea, vomiting, dysuria, or changes in bowel habits.  Appetite is poor.  Low back pain noted.          OBJECTIVE:  /78   Pulse 72   Temp 97.9  F (36.6  C)   Resp 16   Wt 64.8 kg (142 lb 12.8 oz)   SpO2 98%   BMI 21.09 kg/m      GENERAL: Healthy, alert and no distress  EYES: Eyes grossly normal to inspection.  No discharge or erythema, or obvious scleral/conjunctival abnormalities.  RESP: No audible wheeze, cough, or visible cyanosis.  No visible retractions or increased work of breathing.    SKIN: Erythema with serpiginous borders noted with sattelite lesions. No abnormal pigmentation or lesions.  NEURO: Cranial nerves grossly intact.  Mentation impaired. Speech appropriate for age.  PSYCH: Mentation shows impaired cognition affect normal/bright, judgement and insight intact, normal speech and appearance  well-groomed.      Previous      Lab/Diagnostic data:    Reviewed    ASSESSMENT/ORDERS:  Nursing Home Visit  Discussed with staff. Care plan reviewed and orders updated.  Chronic issues are stable. Routine 30 day Nursing Home follow up.     Rash  Suspect fungal. Finished diflucan.  Will follow.    Closed compression fracture of L1 lumbar vertebra with routine healing, subsequent encounter  Stable    Lewy body dementia without behavioral disturbance (H)  Stable    Parkinson disease (H)  Stable    Coronary artery disease involving native coronary artery of native heart without angina pectoris  No recurrent symptoms    Hypertension with target blood pressure goal under 150/90  Recent readings reviewed.  Continue same medication regimen        Total time spent with patient visit was 25 min including patient visit, review of pertinent clinical information, and treatment plan.      Abran Whitman MD FAAFP [facility]  Geriatrics  Hospice and Palliative Care

## 2023-12-28 NOTE — PROGRESS NOTES
Infusion Nursing Note:  Jf Ortiz presents today for IVF.    Patient seen by provider today: No   present during visit today: Not Applicable.    Note: N/A.      Intravenous Access:  Implanted Port.    Treatment Conditions:  Not Applicable.      Post Infusion Assessment:  Blood return noted pre and post infusion.  No evidence of extravasations.  Access discontinued per protocol.       Discharge Plan:   Copy of AVS reviewed with patient and/or family.  Patient discharged in stable condition accompanied by: [place] transportation.  Departure Mode: Wheelchair.      CATE Arita

## 2023-12-28 NOTE — PATIENT INSTRUCTIONS

## 2024-01-01 ENCOUNTER — INFUSION THERAPY VISIT (OUTPATIENT)
Dept: INFUSION THERAPY | Facility: OTHER | Age: 89
End: 2024-01-01
Attending: FAMILY MEDICINE
Payer: MEDICARE

## 2024-01-01 ENCOUNTER — NURSING HOME VISIT (OUTPATIENT)
Dept: FAMILY MEDICINE | Facility: OTHER | Age: 89
End: 2024-01-01
Attending: FAMILY MEDICINE
Payer: MEDICARE

## 2024-01-01 ENCOUNTER — ANCILLARY PROCEDURE (OUTPATIENT)
Dept: CARDIOLOGY | Facility: CLINIC | Age: 89
End: 2024-01-01
Attending: INTERNAL MEDICINE
Payer: MEDICARE

## 2024-01-01 ENCOUNTER — NURSING HOME VISIT (OUTPATIENT)
Dept: GERIATRICS | Facility: OTHER | Age: 89
End: 2024-01-01
Attending: FAMILY MEDICINE
Payer: MEDICARE

## 2024-01-01 ENCOUNTER — ANCILLARY PROCEDURE (OUTPATIENT)
Dept: CARDIOLOGY | Facility: OTHER | Age: 89
End: 2024-01-01
Attending: INTERNAL MEDICINE
Payer: MEDICARE

## 2024-01-01 ENCOUNTER — TELEPHONE (OUTPATIENT)
Dept: FAMILY MEDICINE | Facility: OTHER | Age: 89
End: 2024-01-01

## 2024-01-01 ENCOUNTER — TELEPHONE (OUTPATIENT)
Dept: CARDIOLOGY | Facility: CLINIC | Age: 89
End: 2024-01-01
Payer: MEDICARE

## 2024-01-01 ENCOUNTER — OFFICE VISIT (OUTPATIENT)
Dept: OTOLARYNGOLOGY | Facility: OTHER | Age: 89
End: 2024-01-01
Attending: NURSE PRACTITIONER
Payer: MEDICARE

## 2024-01-01 ENCOUNTER — HOSPITAL ENCOUNTER (EMERGENCY)
Facility: HOSPITAL | Age: 89
Discharge: HOME | End: 2024-02-23
Attending: EMERGENCY MEDICINE | Admitting: EMERGENCY MEDICINE
Payer: COMMERCIAL

## 2024-01-01 ENCOUNTER — MEDICAL CORRESPONDENCE (OUTPATIENT)
Dept: HEALTH INFORMATION MANAGEMENT | Facility: HOSPITAL | Age: 89
End: 2024-01-01

## 2024-01-01 VITALS
BODY MASS INDEX: 22.62 KG/M2 | WEIGHT: 144.4 LBS | RESPIRATION RATE: 16 BRPM | OXYGEN SATURATION: 95 % | TEMPERATURE: 98.2 F | DIASTOLIC BLOOD PRESSURE: 78 MMHG | SYSTOLIC BLOOD PRESSURE: 130 MMHG | HEART RATE: 80 BPM

## 2024-01-01 VITALS
SYSTOLIC BLOOD PRESSURE: 112 MMHG | OXYGEN SATURATION: 94 % | TEMPERATURE: 98 F | WEIGHT: 136.8 LBS | DIASTOLIC BLOOD PRESSURE: 64 MMHG | RESPIRATION RATE: 16 BRPM | HEART RATE: 68 BPM | BODY MASS INDEX: 21.43 KG/M2

## 2024-01-01 VITALS
BODY MASS INDEX: 23.46 KG/M2 | WEIGHT: 149.47 LBS | HEART RATE: 70 BPM | OXYGEN SATURATION: 96 % | TEMPERATURE: 97.8 F | DIASTOLIC BLOOD PRESSURE: 59 MMHG | SYSTOLIC BLOOD PRESSURE: 109 MMHG | RESPIRATION RATE: 18 BRPM | HEIGHT: 67 IN

## 2024-01-01 VITALS
SYSTOLIC BLOOD PRESSURE: 124 MMHG | BODY MASS INDEX: 21.48 KG/M2 | TEMPERATURE: 98.6 F | OXYGEN SATURATION: 96 % | WEIGHT: 145 LBS | HEIGHT: 69 IN | DIASTOLIC BLOOD PRESSURE: 80 MMHG | HEART RATE: 76 BPM

## 2024-01-01 VITALS
SYSTOLIC BLOOD PRESSURE: 130 MMHG | HEART RATE: 88 BPM | TEMPERATURE: 97.8 F | OXYGEN SATURATION: 94 % | DIASTOLIC BLOOD PRESSURE: 79 MMHG | RESPIRATION RATE: 16 BRPM

## 2024-01-01 VITALS
DIASTOLIC BLOOD PRESSURE: 55 MMHG | RESPIRATION RATE: 18 BRPM | OXYGEN SATURATION: 95 % | HEART RATE: 69 BPM | SYSTOLIC BLOOD PRESSURE: 113 MMHG | TEMPERATURE: 98.6 F

## 2024-01-01 VITALS
TEMPERATURE: 98.2 F | WEIGHT: 142.5 LBS | SYSTOLIC BLOOD PRESSURE: 120 MMHG | DIASTOLIC BLOOD PRESSURE: 62 MMHG | HEIGHT: 67 IN | HEART RATE: 70 BPM | BODY MASS INDEX: 22.37 KG/M2 | OXYGEN SATURATION: 93 %

## 2024-01-01 VITALS
TEMPERATURE: 97.1 F | SYSTOLIC BLOOD PRESSURE: 146 MMHG | DIASTOLIC BLOOD PRESSURE: 75 MMHG | OXYGEN SATURATION: 97 % | HEART RATE: 79 BPM | RESPIRATION RATE: 16 BRPM

## 2024-01-01 VITALS
OXYGEN SATURATION: 94 % | DIASTOLIC BLOOD PRESSURE: 64 MMHG | TEMPERATURE: 97.1 F | RESPIRATION RATE: 16 BRPM | SYSTOLIC BLOOD PRESSURE: 111 MMHG | HEART RATE: 74 BPM

## 2024-01-01 VITALS
HEART RATE: 69 BPM | RESPIRATION RATE: 14 BRPM | DIASTOLIC BLOOD PRESSURE: 62 MMHG | OXYGEN SATURATION: 96 % | SYSTOLIC BLOOD PRESSURE: 104 MMHG | TEMPERATURE: 98 F

## 2024-01-01 VITALS
TEMPERATURE: 97.1 F | HEART RATE: 72 BPM | RESPIRATION RATE: 20 BRPM | SYSTOLIC BLOOD PRESSURE: 120 MMHG | DIASTOLIC BLOOD PRESSURE: 72 MMHG | WEIGHT: 139.2 LBS | BODY MASS INDEX: 21.8 KG/M2

## 2024-01-01 VITALS
SYSTOLIC BLOOD PRESSURE: 130 MMHG | OXYGEN SATURATION: 96 % | HEART RATE: 70 BPM | RESPIRATION RATE: 16 BRPM | TEMPERATURE: 97.4 F | DIASTOLIC BLOOD PRESSURE: 70 MMHG

## 2024-01-01 VITALS
DIASTOLIC BLOOD PRESSURE: 74 MMHG | SYSTOLIC BLOOD PRESSURE: 128 MMHG | TEMPERATURE: 97.2 F | HEART RATE: 72 BPM | OXYGEN SATURATION: 97 %

## 2024-01-01 VITALS — DIASTOLIC BLOOD PRESSURE: 83 MMHG | SYSTOLIC BLOOD PRESSURE: 150 MMHG

## 2024-01-01 DIAGNOSIS — Z95.0 PACEMAKER: ICD-10-CM

## 2024-01-01 DIAGNOSIS — M54.50 CHRONIC LOW BACK PAIN, UNSPECIFIED BACK PAIN LATERALITY, UNSPECIFIED WHETHER SCIATICA PRESENT: ICD-10-CM

## 2024-01-01 DIAGNOSIS — H61.23 BILATERAL IMPACTED CERUMEN: ICD-10-CM

## 2024-01-01 DIAGNOSIS — I95.1 ORTHOSTATIC HYPOTENSION DYSAUTONOMIC SYNDROME: ICD-10-CM

## 2024-01-01 DIAGNOSIS — E86.9 VOLUME DEPLETION: Primary | ICD-10-CM

## 2024-01-01 DIAGNOSIS — I10 HYPERTENSION WITH TARGET BLOOD PRESSURE GOAL UNDER 150/90: ICD-10-CM

## 2024-01-01 DIAGNOSIS — H61.23 BILATERAL IMPACTED CERUMEN: Primary | ICD-10-CM

## 2024-01-01 DIAGNOSIS — G40.909 SEIZURE DISORDER (H): ICD-10-CM

## 2024-01-01 DIAGNOSIS — Z78.9 NURSING HOME RESIDENT: Primary | ICD-10-CM

## 2024-01-01 DIAGNOSIS — G31.83 LEWY BODY DEMENTIA WITHOUT BEHAVIORAL DISTURBANCE (H): ICD-10-CM

## 2024-01-01 DIAGNOSIS — I95.1 AUTONOMIC ORTHOSTATIC HYPOTENSION: ICD-10-CM

## 2024-01-01 DIAGNOSIS — I48.20 CHRONIC ATRIAL FIBRILLATION (H): ICD-10-CM

## 2024-01-01 DIAGNOSIS — I25.10 CORONARY ARTERY DISEASE INVOLVING NATIVE CORONARY ARTERY OF NATIVE HEART WITHOUT ANGINA PECTORIS: ICD-10-CM

## 2024-01-01 DIAGNOSIS — G20.A1 PARKINSON'S DISEASE WITHOUT DYSKINESIA OR FLUCTUATING MANIFESTATIONS (H): ICD-10-CM

## 2024-01-01 DIAGNOSIS — G89.29 CHRONIC LOW BACK PAIN, UNSPECIFIED BACK PAIN LATERALITY, UNSPECIFIED WHETHER SCIATICA PRESENT: ICD-10-CM

## 2024-01-01 DIAGNOSIS — Z95.0 CARDIAC PACEMAKER IN SITU: ICD-10-CM

## 2024-01-01 DIAGNOSIS — Z96.22 RETAINED MYRINGOTOMY TUBE IN RIGHT EAR: ICD-10-CM

## 2024-01-01 DIAGNOSIS — G20.B1 PARKINSON'S DISEASE WITH DYSKINESIA WITHOUT FLUCTUATING MANIFESTATIONS (H): ICD-10-CM

## 2024-01-01 DIAGNOSIS — F02.80 LEWY BODY DEMENTIA WITHOUT BEHAVIORAL DISTURBANCE (H): ICD-10-CM

## 2024-01-01 DIAGNOSIS — I10 ESSENTIAL HYPERTENSION: ICD-10-CM

## 2024-01-01 DIAGNOSIS — H65.91 MIDDLE EAR EFFUSION, RIGHT: ICD-10-CM

## 2024-01-01 DIAGNOSIS — I44.2 COMPLETE HEART BLOCK (H): ICD-10-CM

## 2024-01-01 DIAGNOSIS — G20.A1 PARKINSON'S DISEASE, UNSPECIFIED WHETHER DYSKINESIA PRESENT, UNSPECIFIED WHETHER MANIFESTATIONS FLUCTUATE (H): ICD-10-CM

## 2024-01-01 DIAGNOSIS — Z96.22 RETAINED MYRINGOTOMY TUBE IN RIGHT EAR: Primary | ICD-10-CM

## 2024-01-01 DIAGNOSIS — H11.32 SCLERAL HEMORRHAGE OF LEFT EYE: ICD-10-CM

## 2024-01-01 LAB
MDC_IDC_EPISODE_DTM: NORMAL
MDC_IDC_EPISODE_DURATION: 0 S
MDC_IDC_EPISODE_DURATION: 103 S
MDC_IDC_EPISODE_DURATION: 105 S
MDC_IDC_EPISODE_DURATION: 107 S
MDC_IDC_EPISODE_DURATION: 107 S
MDC_IDC_EPISODE_DURATION: 1157 S
MDC_IDC_EPISODE_DURATION: 1171 S
MDC_IDC_EPISODE_DURATION: 1191 S
MDC_IDC_EPISODE_DURATION: 1208 S
MDC_IDC_EPISODE_DURATION: 1339 S
MDC_IDC_EPISODE_DURATION: 135 S
MDC_IDC_EPISODE_DURATION: 1355 S
MDC_IDC_EPISODE_DURATION: 1409 S
MDC_IDC_EPISODE_DURATION: 1412 S
MDC_IDC_EPISODE_DURATION: 143 S
MDC_IDC_EPISODE_DURATION: 1438 S
MDC_IDC_EPISODE_DURATION: 1462 S
MDC_IDC_EPISODE_DURATION: 1463 S
MDC_IDC_EPISODE_DURATION: 1527 S
MDC_IDC_EPISODE_DURATION: 1553 S
MDC_IDC_EPISODE_DURATION: 1560 S
MDC_IDC_EPISODE_DURATION: 158 S
MDC_IDC_EPISODE_DURATION: 1606 S
MDC_IDC_EPISODE_DURATION: 1638 S
MDC_IDC_EPISODE_DURATION: 1679 S
MDC_IDC_EPISODE_DURATION: 1710 S
MDC_IDC_EPISODE_DURATION: 1777 S
MDC_IDC_EPISODE_DURATION: 1781 S
MDC_IDC_EPISODE_DURATION: 1790 S
MDC_IDC_EPISODE_DURATION: 1816 S
MDC_IDC_EPISODE_DURATION: 184 S
MDC_IDC_EPISODE_DURATION: 184 S
MDC_IDC_EPISODE_DURATION: 1874 S
MDC_IDC_EPISODE_DURATION: 1899 S
MDC_IDC_EPISODE_DURATION: 191 S
MDC_IDC_EPISODE_DURATION: 1960 S
MDC_IDC_EPISODE_DURATION: 2028 S
MDC_IDC_EPISODE_DURATION: 211 S
MDC_IDC_EPISODE_DURATION: 215 S
MDC_IDC_EPISODE_DURATION: 216 S
MDC_IDC_EPISODE_DURATION: 221 S
MDC_IDC_EPISODE_DURATION: 2221 S
MDC_IDC_EPISODE_DURATION: 235 S
MDC_IDC_EPISODE_DURATION: 2366 S
MDC_IDC_EPISODE_DURATION: 241 S
MDC_IDC_EPISODE_DURATION: 2415 S
MDC_IDC_EPISODE_DURATION: 2429 S
MDC_IDC_EPISODE_DURATION: 244 S
MDC_IDC_EPISODE_DURATION: 246 S
MDC_IDC_EPISODE_DURATION: 253 S
MDC_IDC_EPISODE_DURATION: 264 S
MDC_IDC_EPISODE_DURATION: 265 S
MDC_IDC_EPISODE_DURATION: 2672 S
MDC_IDC_EPISODE_DURATION: 2685 S
MDC_IDC_EPISODE_DURATION: 274 S
MDC_IDC_EPISODE_DURATION: 274 S
MDC_IDC_EPISODE_DURATION: 2871 S
MDC_IDC_EPISODE_DURATION: 30 S
MDC_IDC_EPISODE_DURATION: 3016 S
MDC_IDC_EPISODE_DURATION: 307 S
MDC_IDC_EPISODE_DURATION: 3107 S
MDC_IDC_EPISODE_DURATION: 3320 S
MDC_IDC_EPISODE_DURATION: 342 S
MDC_IDC_EPISODE_DURATION: 346 S
MDC_IDC_EPISODE_DURATION: 357 S
MDC_IDC_EPISODE_DURATION: 358 S
MDC_IDC_EPISODE_DURATION: 368 S
MDC_IDC_EPISODE_DURATION: 3722 S
MDC_IDC_EPISODE_DURATION: 38 S
MDC_IDC_EPISODE_DURATION: 38 S
MDC_IDC_EPISODE_DURATION: 4 S
MDC_IDC_EPISODE_DURATION: 4 S
MDC_IDC_EPISODE_DURATION: 405 S
MDC_IDC_EPISODE_DURATION: 42 S
MDC_IDC_EPISODE_DURATION: 425 S
MDC_IDC_EPISODE_DURATION: 426 S
MDC_IDC_EPISODE_DURATION: 4576 S
MDC_IDC_EPISODE_DURATION: 46 S
MDC_IDC_EPISODE_DURATION: 460 S
MDC_IDC_EPISODE_DURATION: 4826 S
MDC_IDC_EPISODE_DURATION: 483 S
MDC_IDC_EPISODE_DURATION: 4931 S
MDC_IDC_EPISODE_DURATION: 4968 S
MDC_IDC_EPISODE_DURATION: 5 S
MDC_IDC_EPISODE_DURATION: 50 S
MDC_IDC_EPISODE_DURATION: 515 S
MDC_IDC_EPISODE_DURATION: 527 S
MDC_IDC_EPISODE_DURATION: 5364 S
MDC_IDC_EPISODE_DURATION: 538 S
MDC_IDC_EPISODE_DURATION: 5412 S
MDC_IDC_EPISODE_DURATION: 556 S
MDC_IDC_EPISODE_DURATION: 5616 S
MDC_IDC_EPISODE_DURATION: 564 S
MDC_IDC_EPISODE_DURATION: 5672 S
MDC_IDC_EPISODE_DURATION: 590 S
MDC_IDC_EPISODE_DURATION: 60 S
MDC_IDC_EPISODE_DURATION: 61 S
MDC_IDC_EPISODE_DURATION: 617 S
MDC_IDC_EPISODE_DURATION: 64 S
MDC_IDC_EPISODE_DURATION: 6635 S
MDC_IDC_EPISODE_DURATION: 67 S
MDC_IDC_EPISODE_DURATION: 6824 S
MDC_IDC_EPISODE_DURATION: 684 S
MDC_IDC_EPISODE_DURATION: 71 S
MDC_IDC_EPISODE_DURATION: 73 S
MDC_IDC_EPISODE_DURATION: 740 S
MDC_IDC_EPISODE_DURATION: 7595 S
MDC_IDC_EPISODE_DURATION: 760 S
MDC_IDC_EPISODE_DURATION: 772 S
MDC_IDC_EPISODE_DURATION: 773 S
MDC_IDC_EPISODE_DURATION: 795 S
MDC_IDC_EPISODE_DURATION: 8042 S
MDC_IDC_EPISODE_DURATION: 832 S
MDC_IDC_EPISODE_DURATION: 840 S
MDC_IDC_EPISODE_DURATION: 862 S
MDC_IDC_EPISODE_DURATION: 869 S
MDC_IDC_EPISODE_DURATION: 8709 S
MDC_IDC_EPISODE_DURATION: 8730 S
MDC_IDC_EPISODE_DURATION: 8801 S
MDC_IDC_EPISODE_DURATION: 89 S
MDC_IDC_EPISODE_DURATION: 89 S
MDC_IDC_EPISODE_DURATION: 8909 S
MDC_IDC_EPISODE_DURATION: 8948 S
MDC_IDC_EPISODE_DURATION: 895 S
MDC_IDC_EPISODE_DURATION: 93 S
MDC_IDC_EPISODE_DURATION: 94 S
MDC_IDC_EPISODE_DURATION: 95 S
MDC_IDC_EPISODE_DURATION: 976 S
MDC_IDC_EPISODE_DURATION: NORMAL S
MDC_IDC_EPISODE_ID: NORMAL
MDC_IDC_EPISODE_TYPE: NORMAL
MDC_IDC_EPISODE_TYPE_INDUCED: NO
MDC_IDC_LEAD_CONNECTION_STATUS: NORMAL
MDC_IDC_LEAD_IMPLANT_DT: NORMAL
MDC_IDC_LEAD_LOCATION: NORMAL
MDC_IDC_LEAD_LOCATION_DETAIL_1: NORMAL
MDC_IDC_LEAD_MFG: NORMAL
MDC_IDC_LEAD_MODEL: NORMAL
MDC_IDC_LEAD_POLARITY_TYPE: NORMAL
MDC_IDC_LEAD_SERIAL: NORMAL
MDC_IDC_MSMT_BATTERY_DTM: NORMAL
MDC_IDC_MSMT_BATTERY_REMAINING_LONGEVITY: 44 MO
MDC_IDC_MSMT_BATTERY_REMAINING_LONGEVITY: 47 MO
MDC_IDC_MSMT_BATTERY_REMAINING_LONGEVITY: 49 MO
MDC_IDC_MSMT_BATTERY_RRT_TRIGGER: 2.62
MDC_IDC_MSMT_BATTERY_STATUS: NORMAL
MDC_IDC_MSMT_BATTERY_VOLTAGE: 2.94 V
MDC_IDC_MSMT_BATTERY_VOLTAGE: 2.95 V
MDC_IDC_MSMT_BATTERY_VOLTAGE: 2.95 V
MDC_IDC_MSMT_LEADCHNL_RA_IMPEDANCE_VALUE: 437 OHM
MDC_IDC_MSMT_LEADCHNL_RA_IMPEDANCE_VALUE: 456 OHM
MDC_IDC_MSMT_LEADCHNL_RA_IMPEDANCE_VALUE: 456 OHM
MDC_IDC_MSMT_LEADCHNL_RA_IMPEDANCE_VALUE: 570 OHM
MDC_IDC_MSMT_LEADCHNL_RA_IMPEDANCE_VALUE: 589 OHM
MDC_IDC_MSMT_LEADCHNL_RA_IMPEDANCE_VALUE: 589 OHM
MDC_IDC_MSMT_LEADCHNL_RA_PACING_THRESHOLD_AMPLITUDE: 1.25 V
MDC_IDC_MSMT_LEADCHNL_RA_PACING_THRESHOLD_AMPLITUDE: 1.25 V
MDC_IDC_MSMT_LEADCHNL_RA_PACING_THRESHOLD_PULSEWIDTH: 0.4 MS
MDC_IDC_MSMT_LEADCHNL_RA_PACING_THRESHOLD_PULSEWIDTH: 0.4 MS
MDC_IDC_MSMT_LEADCHNL_RA_SENSING_INTR_AMPL: 0.75 MV
MDC_IDC_MSMT_LEADCHNL_RA_SENSING_INTR_AMPL: 1.12 MV
MDC_IDC_MSMT_LEADCHNL_RA_SENSING_INTR_AMPL: 1.38 MV
MDC_IDC_MSMT_LEADCHNL_RA_SENSING_INTR_AMPL: 2.25 MV
MDC_IDC_MSMT_LEADCHNL_RV_IMPEDANCE_VALUE: 323 OHM
MDC_IDC_MSMT_LEADCHNL_RV_IMPEDANCE_VALUE: 361 OHM
MDC_IDC_MSMT_LEADCHNL_RV_IMPEDANCE_VALUE: 380 OHM
MDC_IDC_MSMT_LEADCHNL_RV_IMPEDANCE_VALUE: 532 OHM
MDC_IDC_MSMT_LEADCHNL_RV_IMPEDANCE_VALUE: 570 OHM
MDC_IDC_MSMT_LEADCHNL_RV_IMPEDANCE_VALUE: 589 OHM
MDC_IDC_MSMT_LEADCHNL_RV_PACING_THRESHOLD_AMPLITUDE: 0.75 V
MDC_IDC_MSMT_LEADCHNL_RV_PACING_THRESHOLD_PULSEWIDTH: 0.4 MS
MDC_IDC_MSMT_LEADCHNL_RV_SENSING_INTR_AMPL: 4.75 MV
MDC_IDC_MSMT_LEADCHNL_RV_SENSING_INTR_AMPL: 4.75 MV
MDC_IDC_PG_IMPLANT_DTM: NORMAL
MDC_IDC_PG_MFG: NORMAL
MDC_IDC_PG_MODEL: NORMAL
MDC_IDC_PG_SERIAL: NORMAL
MDC_IDC_PG_TYPE: NORMAL
MDC_IDC_SESS_CLINIC_NAME: NORMAL
MDC_IDC_SESS_DTM: NORMAL
MDC_IDC_SESS_TYPE: NORMAL
MDC_IDC_SET_BRADY_AT_MODE_SWITCH_RATE: 171 {BEATS}/MIN
MDC_IDC_SET_BRADY_HYSTRATE: NORMAL
MDC_IDC_SET_BRADY_LOWRATE: 70 {BEATS}/MIN
MDC_IDC_SET_BRADY_MAX_SENSOR_RATE: 130 {BEATS}/MIN
MDC_IDC_SET_BRADY_MAX_TRACKING_RATE: 130 {BEATS}/MIN
MDC_IDC_SET_BRADY_MODE: NORMAL
MDC_IDC_SET_BRADY_PAV_DELAY_LOW: 180 MS
MDC_IDC_SET_BRADY_SAV_DELAY_LOW: 150 MS
MDC_IDC_SET_LEADCHNL_RA_PACING_AMPLITUDE: 2.75 V
MDC_IDC_SET_LEADCHNL_RA_PACING_ANODE_ELECTRODE_1: NORMAL
MDC_IDC_SET_LEADCHNL_RA_PACING_ANODE_LOCATION_1: NORMAL
MDC_IDC_SET_LEADCHNL_RA_PACING_CAPTURE_MODE: NORMAL
MDC_IDC_SET_LEADCHNL_RA_PACING_CATHODE_ELECTRODE_1: NORMAL
MDC_IDC_SET_LEADCHNL_RA_PACING_CATHODE_LOCATION_1: NORMAL
MDC_IDC_SET_LEADCHNL_RA_PACING_POLARITY: NORMAL
MDC_IDC_SET_LEADCHNL_RA_PACING_PULSEWIDTH: 0.4 MS
MDC_IDC_SET_LEADCHNL_RA_SENSING_ANODE_ELECTRODE_1: NORMAL
MDC_IDC_SET_LEADCHNL_RA_SENSING_ANODE_LOCATION_1: NORMAL
MDC_IDC_SET_LEADCHNL_RA_SENSING_CATHODE_ELECTRODE_1: NORMAL
MDC_IDC_SET_LEADCHNL_RA_SENSING_CATHODE_LOCATION_1: NORMAL
MDC_IDC_SET_LEADCHNL_RA_SENSING_POLARITY: NORMAL
MDC_IDC_SET_LEADCHNL_RA_SENSING_SENSITIVITY: 0.9 MV
MDC_IDC_SET_LEADCHNL_RV_PACING_AMPLITUDE: 2 V
MDC_IDC_SET_LEADCHNL_RV_PACING_ANODE_ELECTRODE_1: NORMAL
MDC_IDC_SET_LEADCHNL_RV_PACING_ANODE_LOCATION_1: NORMAL
MDC_IDC_SET_LEADCHNL_RV_PACING_CAPTURE_MODE: NORMAL
MDC_IDC_SET_LEADCHNL_RV_PACING_CATHODE_ELECTRODE_1: NORMAL
MDC_IDC_SET_LEADCHNL_RV_PACING_CATHODE_LOCATION_1: NORMAL
MDC_IDC_SET_LEADCHNL_RV_PACING_POLARITY: NORMAL
MDC_IDC_SET_LEADCHNL_RV_PACING_PULSEWIDTH: 0.4 MS
MDC_IDC_SET_LEADCHNL_RV_SENSING_ANODE_ELECTRODE_1: NORMAL
MDC_IDC_SET_LEADCHNL_RV_SENSING_ANODE_LOCATION_1: NORMAL
MDC_IDC_SET_LEADCHNL_RV_SENSING_CATHODE_ELECTRODE_1: NORMAL
MDC_IDC_SET_LEADCHNL_RV_SENSING_CATHODE_LOCATION_1: NORMAL
MDC_IDC_SET_LEADCHNL_RV_SENSING_POLARITY: NORMAL
MDC_IDC_SET_LEADCHNL_RV_SENSING_SENSITIVITY: 0.9 MV
MDC_IDC_SET_ZONE_DETECTION_INTERVAL: 350 MS
MDC_IDC_SET_ZONE_DETECTION_INTERVAL: 400 MS
MDC_IDC_SET_ZONE_STATUS: NORMAL
MDC_IDC_SET_ZONE_TYPE: NORMAL
MDC_IDC_SET_ZONE_VENDOR_TYPE: NORMAL
MDC_IDC_STAT_AT_BURDEN_PERCENT: 36.4 %
MDC_IDC_STAT_AT_BURDEN_PERCENT: 96.5 %
MDC_IDC_STAT_AT_BURDEN_PERCENT: 97.5 %
MDC_IDC_STAT_AT_DTM_END: NORMAL
MDC_IDC_STAT_AT_DTM_START: NORMAL
MDC_IDC_STAT_BRADY_AP_VP_PERCENT: 44.97 %
MDC_IDC_STAT_BRADY_AP_VP_PERCENT: 49.09 %
MDC_IDC_STAT_BRADY_AP_VP_PERCENT: 97.74 %
MDC_IDC_STAT_BRADY_AP_VS_PERCENT: 0.05 %
MDC_IDC_STAT_BRADY_AP_VS_PERCENT: 0.06 %
MDC_IDC_STAT_BRADY_AP_VS_PERCENT: 0.13 %
MDC_IDC_STAT_BRADY_AS_VP_PERCENT: 1.99 %
MDC_IDC_STAT_BRADY_AS_VP_PERCENT: 50.96 %
MDC_IDC_STAT_BRADY_AS_VP_PERCENT: 54.84 %
MDC_IDC_STAT_BRADY_AS_VS_PERCENT: 0.23 %
MDC_IDC_STAT_BRADY_AS_VS_PERCENT: 0.29 %
MDC_IDC_STAT_BRADY_AS_VS_PERCENT: 0.71 %
MDC_IDC_STAT_BRADY_DTM_END: NORMAL
MDC_IDC_STAT_BRADY_DTM_START: NORMAL
MDC_IDC_STAT_BRADY_RA_PERCENT_PACED: 17.46 %
MDC_IDC_STAT_BRADY_RA_PERCENT_PACED: 19.83 %
MDC_IDC_STAT_BRADY_RA_PERCENT_PACED: 68.28 %
MDC_IDC_STAT_BRADY_RV_PERCENT_PACED: 99 %
MDC_IDC_STAT_BRADY_RV_PERCENT_PACED: 99.07 %
MDC_IDC_STAT_BRADY_RV_PERCENT_PACED: 99.41 %
MDC_IDC_STAT_EPISODE_RECENT_COUNT: 0
MDC_IDC_STAT_EPISODE_RECENT_COUNT: 1
MDC_IDC_STAT_EPISODE_RECENT_COUNT: 7401
MDC_IDC_STAT_EPISODE_RECENT_COUNT: 8247
MDC_IDC_STAT_EPISODE_RECENT_COUNT_DTM_END: NORMAL
MDC_IDC_STAT_EPISODE_RECENT_COUNT_DTM_START: NORMAL
MDC_IDC_STAT_EPISODE_TOTAL_COUNT: 0
MDC_IDC_STAT_EPISODE_TOTAL_COUNT: 3
MDC_IDC_STAT_EPISODE_TOTAL_COUNT: 4
MDC_IDC_STAT_EPISODE_TOTAL_COUNT: 4
MDC_IDC_STAT_EPISODE_TOTAL_COUNT: NORMAL
MDC_IDC_STAT_EPISODE_TOTAL_COUNT_DTM_END: NORMAL
MDC_IDC_STAT_EPISODE_TOTAL_COUNT_DTM_START: NORMAL
MDC_IDC_STAT_EPISODE_TYPE: NORMAL
MDC_IDC_STAT_TACHYTHERAPY_RECENT_DTM_END: NORMAL
MDC_IDC_STAT_TACHYTHERAPY_RECENT_DTM_START: NORMAL
MDC_IDC_STAT_TACHYTHERAPY_TOTAL_DTM_END: NORMAL
MDC_IDC_STAT_TACHYTHERAPY_TOTAL_DTM_START: NORMAL

## 2024-01-01 PROCEDURE — 96360 HYDRATION IV INFUSION INIT: CPT

## 2024-01-01 PROCEDURE — 250N000011 HC RX IP 250 OP 636: Performed by: FAMILY MEDICINE

## 2024-01-01 PROCEDURE — 93296 REM INTERROG EVL PM/IDS: CPT

## 2024-01-01 PROCEDURE — 250N000009 HC RX 250: Performed by: EMERGENCY MEDICINE

## 2024-01-01 PROCEDURE — 99308 SBSQ NF CARE LOW MDM 20: CPT | Performed by: FAMILY MEDICINE

## 2024-01-01 PROCEDURE — 96523 IRRIG DRUG DELIVERY DEVICE: CPT

## 2024-01-01 PROCEDURE — 258N000003 HC RX IP 258 OP 636: Performed by: FAMILY MEDICINE

## 2024-01-01 PROCEDURE — 69210 REMOVE IMPACTED EAR WAX UNI: CPT | Performed by: NURSE PRACTITIONER

## 2024-01-01 PROCEDURE — 93280 PM DEVICE PROGR EVAL DUAL: CPT | Performed by: INTERNAL MEDICINE

## 2024-01-01 PROCEDURE — G0463 HOSPITAL OUTPT CLINIC VISIT: HCPCS

## 2024-01-01 PROCEDURE — 93294 REM INTERROG EVL PM/LDLS PM: CPT | Performed by: INTERNAL MEDICINE

## 2024-01-01 PROCEDURE — 99283 EMERGENCY DEPT VISIT LOW MDM: CPT | Performed by: EMERGENCY MEDICINE

## 2024-01-01 PROCEDURE — G0463 HOSPITAL OUTPT CLINIC VISIT: HCPCS | Mod: 25

## 2024-01-01 PROCEDURE — 99283 EMERGENCY DEPT VISIT LOW MDM: CPT

## 2024-01-01 PROCEDURE — 99212 OFFICE O/P EST SF 10 MIN: CPT | Mod: 25 | Performed by: NURSE PRACTITIONER

## 2024-01-01 RX ORDER — HEPARIN SODIUM (PORCINE) LOCK FLUSH IV SOLN 100 UNIT/ML 100 UNIT/ML
5 SOLUTION INTRAVENOUS ONCE
Status: COMPLETED | OUTPATIENT
Start: 2024-01-01 | End: 2024-01-01

## 2024-01-01 RX ORDER — HEPARIN SODIUM (PORCINE) LOCK FLUSH IV SOLN 100 UNIT/ML 100 UNIT/ML
5 SOLUTION INTRAVENOUS ONCE
Status: CANCELLED
Start: 2024-01-01 | End: 2024-01-01

## 2024-01-01 RX ORDER — HEPARIN SODIUM (PORCINE) LOCK FLUSH IV SOLN 100 UNIT/ML 100 UNIT/ML
5 SOLUTION INTRAVENOUS ONCE
Start: 2024-01-01 | End: 2024-01-01

## 2024-01-01 RX ORDER — TETRACAINE HYDROCHLORIDE 5 MG/ML
1-2 SOLUTION OPHTHALMIC ONCE
Status: COMPLETED | OUTPATIENT
Start: 2024-01-01 | End: 2024-01-01

## 2024-01-01 RX ORDER — POTASSIUM CHLORIDE 1500 MG/1
1 TABLET, EXTENDED RELEASE ORAL
COMMUNITY
Start: 2024-01-01 | End: 2024-01-01

## 2024-01-01 RX ADMIN — SODIUM CHLORIDE 1000 ML: 9 INJECTION, SOLUTION INTRAVENOUS at 13:16

## 2024-01-01 RX ADMIN — FLUORESCEIN SODIUM 1 STRIP: 1 STRIP OPHTHALMIC at 17:30

## 2024-01-01 RX ADMIN — SODIUM CHLORIDE 1000 ML: 9 INJECTION, SOLUTION INTRAVENOUS at 11:05

## 2024-01-01 RX ADMIN — HEPARIN 5 ML: 100 SYRINGE at 12:06

## 2024-01-01 RX ADMIN — SODIUM CHLORIDE 1000 ML: 9 INJECTION, SOLUTION INTRAVENOUS at 12:58

## 2024-01-01 RX ADMIN — SODIUM CHLORIDE 1000 ML: 9 INJECTION, SOLUTION INTRAVENOUS at 08:28

## 2024-01-01 RX ADMIN — HEPARIN 5 ML: 100 SYRINGE at 14:01

## 2024-01-01 RX ADMIN — SODIUM CHLORIDE 1000 ML: 9 INJECTION, SOLUTION INTRAVENOUS at 13:32

## 2024-01-01 RX ADMIN — HEPARIN SODIUM (PORCINE) LOCK FLUSH IV SOLN 100 UNIT/ML 5 ML: 100 SOLUTION at 14:43

## 2024-01-01 RX ADMIN — SODIUM CHLORIDE 1000 ML: 9 INJECTION, SOLUTION INTRAVENOUS at 13:58

## 2024-01-01 RX ADMIN — SODIUM CHLORIDE 1000 ML: 9 INJECTION, SOLUTION INTRAVENOUS at 10:36

## 2024-01-01 RX ADMIN — HEPARIN 5 ML: 100 SYRINGE at 12:09

## 2024-01-01 RX ADMIN — HEPARIN SODIUM (PORCINE) LOCK FLUSH IV SOLN 100 UNIT/ML 5 ML: 100 SOLUTION at 14:26

## 2024-01-01 RX ADMIN — SODIUM CHLORIDE 1000 ML: 9 INJECTION, SOLUTION INTRAVENOUS at 09:04

## 2024-01-01 RX ADMIN — TETRACAINE HYDROCHLORIDE 2 DROP: 5 SOLUTION OPHTHALMIC at 17:30

## 2024-01-01 RX ADMIN — HEPARIN 5 ML: 100 SYRINGE at 09:37

## 2024-01-01 RX ADMIN — HEPARIN 5 ML: 100 SYRINGE at 12:13

## 2024-01-01 RX ADMIN — SODIUM CHLORIDE 1000 ML: 9 INJECTION, SOLUTION INTRAVENOUS at 11:02

## 2024-01-01 RX ADMIN — HEPARIN 5 ML: 100 SYRINGE at 10:22

## 2024-01-01 RX ADMIN — HEPARIN 5 ML: 100 SYRINGE at 15:04

## 2024-01-01 ASSESSMENT — ACTIVITIES OF DAILY LIVING (ADL): ADLS_ACUITY_SCORE: 39

## 2024-01-01 ASSESSMENT — COLUMBIA-SUICIDE SEVERITY RATING SCALE - C-SSRS
2. HAVE YOU ACTUALLY HAD ANY THOUGHTS OF KILLING YOURSELF IN THE PAST MONTH?: NO
6. HAVE YOU EVER DONE ANYTHING, STARTED TO DO ANYTHING, OR PREPARED TO DO ANYTHING TO END YOUR LIFE?: NO
1. IN THE PAST MONTH, HAVE YOU WISHED YOU WERE DEAD OR WISHED YOU COULD GO TO SLEEP AND NOT WAKE UP?: NO

## 2024-01-01 ASSESSMENT — PAIN SCALES - GENERAL
PAINLEVEL: NO PAIN (0)
PAINLEVEL: NO PAIN (0)

## 2024-01-11 NOTE — PROGRESS NOTES
Infusion Nursing Note:  Jf Ortiz presents today for IVF.    Patient seen by provider today: No   present during visit today: Not Applicable.    Note: N/A.      Intravenous Access:  Implanted Port.    Treatment Conditions:  Not Applicable.      Post Infusion Assessment:  Patient tolerated infusion without incident.  Site patent and intact, free from redness, edema or discomfort.  No evidence of extravasations.  Access discontinued per protocol.  Patient has next appt scheduled, AVS printed and sent with him, but did send a message out to ONC/INF schedule pool asking for appt on 2-8-24. Will need new orders prior to any further. Plan sent to Dr Whitman requesting sign.        Discharge Plan:   Patient discharged in stable condition accompanied by: self and call to [facility],  here to  patient shortly after.  Departure Mode: Wheelchair.

## 2024-01-13 NOTE — ED AVS SNAPSHOT
[facility]  [address] [address]  Phone:  [phone]                                    Jf Ortiz   MRN: [id number]    Department:  [facility]   Date of Visit:  3/21/2019           After Visit Summary Signature Page    I have received my discharge instructions, and my questions have been answered. I have discussed any challenges I see with this plan with the nurse or doctor.    ..........................................................................................................................................  Patient/Patient Representative Signature      ..........................................................................................................................................  Patient Representative Print Name and Relationship to Patient    ..................................................               ................................................  Date                                   Time    ..........................................................................................................................................  Reviewed by Signature/Title    ...................................................              ..............................................  Date                                               Time          22EPIC Rev 08/18        No abnormal movements

## 2024-01-18 NOTE — TELEPHONE ENCOUNTER
Approved by Dr. Whitman  Notification sent to [facility] [place].  Kimberly Wiggins, [facility]

## 2024-01-25 NOTE — PROGRESS NOTES
Infusion Nursing Note:  Jf Ortiz presents today for IVF.    Patient seen by provider today: No   present during visit today: Not Applicable.    Note: Patient declines vital signs be taken today.      Intravenous Access:  Implanted Port.    Treatment Conditions:  Not Applicable.      Post Infusion Assessment:  Patient tolerated infusion without incident.       Discharge Plan:   Departure Mode: Wheelchair via [facility] [employer].      Mari Vieyra RN

## 2024-01-30 NOTE — PROGRESS NOTES
Nursing Home Visit    HISTORY OF PRESENT ILLNESS:  Jf is a 89 year old male (10/5/1933)  resident of [facility] who is being seen today for routine 30 day follow up.     He has a history of Parkinson's disease, Lewy body dementia, atrial fibrillation, coronary heart disease status post aortocoronary bypass as well as stenting, seizure disorder, ans well as recurrent falls.    Jf was admitted from [facility] after being hospitalized with falls and compression fractures of L1 and L2.and in need of pain control.    The patient notes no current complaints. He is aware of the move to the current facility    Discussed with nursing staff who note now new concerns.    The patient is seen in his room.  Family is not present.     Medical records are reviewed.    Current medications, allergies, and interdisciplinary care plan are reviewed.      Patient Active Problem List    Diagnosis Date Noted    Orthostatic hypotension dysautonomic syndrome 02/12/2021     Priority: High    Chronic atrial fibrillation (H) 02/12/2021     Priority: High    Longstanding persistent atrial fibrillation (H) 04/13/2020     Priority: High    Coronary artery disease involving native coronary artery without angina pectoris 04/13/2020     Priority: High    Pacemaker, [employer], Dual Chamber - Dependent 10/06/2017     Priority: High    Lewy body dementia without behavioral disturbance (H) 08/31/2017     Priority: High    Dementia without behavioral disturbance (H) 07/28/2015     Priority: High     Diagnosis updated by automated process. Provider to review and confirm.      Parkinson disease      Priority: High    Seizure disorder (H)      Priority: High    Cardiac pacemaker in situ 01/08/2013     Priority: High     Overview:   [employer] Altrua S606 DREL,  Serial #[id number]  5-13-11  Atrial lead [employer] 4054 Serial #[id number]  8-11-00  Ventriculer lead [employer] 4137  Serial #[id number]   5-13-11  2nd Degree AV Block   Dr. Campbell  Intrinsic:  Pt. is Pacemaker Dependant, remains paced at temp. rate of 30 bpm (6-13-14)    Formatting of this note might be different from the original.  [employer] Altrua S606 DREL,  Serial #[id number]  5-13-11  Atrial lead [employer] 4054 Serial #[id number]  8-11-00  Ventriculer lead [employer] 4137  Serial #[id number]  5-13-11  2nd Degree AV Block   Dr. Campbell  Intrinsic:  Pt. is Pacemaker Dependant, remains paced at temp. rate of 30 bpm (6-13-14)      Hypertension with target blood pressure goal under 150/90 09/05/2006     Priority: High     Overview:   IMO Update      Essential hypertension 09/05/2006     Priority: High     Formatting of this note might be different from the original.  IMO Update      Altered mental status 01/28/2023     Priority: Medium    UTI (urinary tract infection) 01/28/2023     Priority: Medium    Medication reaction, initial encounter 01/28/2023     Priority: Medium    Fall, initial encounter 09/27/2022     Priority: Medium    Hematoma of chest wall, left, initial encounter 04/23/2022     Priority: Medium    Retinal artery branch occlusion 02/13/2021     Priority: Medium    Vision loss of right eye 02/12/2021     Priority: Medium    Closed compression fracture of body of L1 vertebra (H) 02/12/2021     Priority: Medium    Closed wedge compression fracture of lumbar vertebra with routine healing 12/20/2020     Priority: Medium    Compression fracture of L1 lumbar vertebra, closed, initial encounter (H) 12/18/2020     Priority: Medium    Compression fracture of thoracic vertebra, initial encounter, unspecified thoracic vertebral level 12/18/2020     Priority: Medium     Replacing diagnoses that were inactivated after the 10/1/2021 regulatory import.      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 Provider to review      Diaphragmatic hernia 01/01/2011     Priority: Medium     Problem list name updated by automated process. Provider to review      Pain in joint, forearm 07/31/2008     Priority: Medium     Formatting of this note might be different from the original.  IMO Update 10/11      Pain in joint, ankle and foot 07/31/2008     Priority: Medium     Formatting of this note might be different from the original.  IMO Update 10/11      Dysphagia 05/22/2007     Priority: Medium     Overview:   IMO Update    Formatting of this note might be different from the original.  IMO Update      Coronary atherosclerosis of native coronary artery 11/28/2006     Priority: Medium     Overview:   IMO Update 10/11    Formatting of this note might be different from the original.  IMO Update 10/11      Postsurgical aortocoronary bypass status 11/28/2006     Priority: Medium     Overview:   IMO Update 10/11    Formatting of this note might be different from the original.  IMO Update 10/11      Hyperlipidemia 09/05/2006     Priority: Medium     Formatting of this note might be different from the original.  IMO Update 10/11      Nursing Home Visit 03/03/2022     Priority: Low          Social History     Socioeconomic History    Marital status: Single     Spouse name: Not on file    Number of children: Not on file    Years of education: Not on file    Highest education level: Not on file   Occupational History    Occupation: retired   Tobacco Use    Smoking status: Former     Packs/day: 1.00     Years: 5.00     Additional pack years: 0.00     Total pack years: 5.00     Types: Cigarettes     Quit date:  1985     Years since quittin.1    Smokeless tobacco: Never    Tobacco comments:     quit in    Substance and Sexual Activity    Alcohol use: Yes     Comment: social    Drug use: No    Sexual activity: Not Currently   Other Topics Concern     Service Not Asked    Blood Transfusions Yes    Caffeine Concern Yes     Comment: 1 cup coffee daily    Occupational Exposure Not Asked    Hobby Hazards Not Asked    Sleep Concern Not Asked    Stress Concern Not Asked    Weight Concern Not Asked    Special Diet Not Asked    Back Care Not Asked    Exercise Not Asked    Bike Helmet Not Asked    Seat Belt Not Asked    Self-Exams Not Asked    Parent/sibling w/ CABG, MI or angioplasty before 65F 55M? No   Social History Narrative    Not on file     Social Determinants of Health     Financial Resource Strain: Not on file   Food Insecurity: Not on file   Transportation Needs: Not on file   Physical Activity: Not on file   Stress: Not on file   Social Connections: Not on file   Interpersonal Safety: Not on file   Housing Stability: Not on file        Current Outpatient Medications   Medication Sig    acetaminophen (TYLENOL) 500 MG tablet Take 1,000 mg by mouth 3 times daily    aspirin (ASA) 81 MG chewable tablet Take 81 mg by mouth daily    bisacodyl (DULCOLAX) 10 MG suppository Place 10 mg rectally daily as needed for constipation    calcium carbonate (OS-ROSALINDA) 1500 (600 Ca) MG tablet Take 600 mg by mouth daily    fludrocortisone (FLORINEF) 0.1 MG tablet Take 1 tablet (0.1 mg) by mouth every morning (before breakfast)    magnesium hydroxide (MILK OF MAGNESIA) 400 MG/5ML suspension Take 30 mLs by mouth daily as needed for constipation or heartburn    melatonin 5 MG tablet Take 5 mg by mouth nightly as needed    memantine (NAMENDA) 5 MG tablet Take 5 mg by mouth 2 times daily    mirabegron (MYRBETRIQ) 25 MG 24 hr tablet Take 25 mg by mouth daily    Nutritional Supplements (BOOST) Take 1 Bottle by mouth daily     "nystatin (MYCOSTATIN) 791595 UNIT/GM external powder Apply topically as needed    Ostomy Supplies (RESTORE BARRIER) CREA 2 times daily    potassium chloride ER (KLOR-CON M) 20 MEQ CR tablet TAKE 1 TABLET BY MOUTH EVERY DAY    RABEprazole (ACIPHEX) 20 MG EC tablet Take 20 mg by mouth 2 times daily    rectal barrier (DR. FOFANA) Apply topically 2 times daily    senna-docusate (SENOKOT-S/PERICOLACE) 8.6-50 MG tablet Take 2 tablets by mouth 2 times daily    sodium chloride 1 GM tablet TAKE 1 TABLET BY MOUTH TWICE A DAY    tamsulosin (FLOMAX) 0.4 MG capsule Take 1 capsule (0.4 mg) by mouth daily    vitamin D3 (CHOLECALCIFEROL) 50 mcg (2000 units) tablet Take 1 tablet by mouth daily     No current facility-administered medications for this visit.       Allergies   Allergen Reactions    Cats Unknown    Haldol [Haloperidol]      Per SNF reporting    Klonopin [Clonazepam]      Per SNF reporting    Lamotrigine      Skin lesions    Oxycodone      Per SNF reporting       I have reviewed the care plan and do agree with the plan.      ROS:  No chest pain, shortness of breath, fever, chills, headache, nausea, vomiting, dysuria, or changes in bowel habits.  Appetite is poor.  Low back pain noted.          OBJECTIVE:  /80   Pulse 76   Temp 98.6  F (37  C)   Ht 1.753 m (5' 9\")   Wt 65.8 kg (145 lb)   SpO2 96%   BMI 21.41 kg/m      GENERAL: Healthy, alert and no distress  EYES: Eyes grossly normal to inspection.  No discharge or erythema, or obvious scleral/conjunctival abnormalities.  RESP: No audible wheeze, cough, or visible cyanosis.  No visible retractions or increased work of breathing.    SKIN: Erythema with serpiginous borders noted with sattelite lesions. No abnormal pigmentation or lesions.  NEURO: Cranial nerves grossly intact.  Mentation impaired. Speech appropriate for age.  PSYCH: Mentation shows impaired cognition affect normal/bright, judgement and insight intact, normal speech and appearance " well-groomed.      Previous      Lab/Diagnostic data:    Reviewed    ASSESSMENT/ORDERS:  Nursing Home Visit  Discussed with staff. Care plan reviewed and orders updated.  Chronic issues are stable. Routine 30 day Nursing Home follow up.     Rash  Improved.  Continue to follow.    Closed compression fracture of L1 lumbar vertebra with routine healing, subsequent encounter  Stable    Lewy body dementia without behavioral disturbance (H)  Stable    Parkinson disease (H)  Stable    Coronary artery disease involving native coronary artery of native heart without angina pectoris  No recurrent symptoms    Hypertension with target blood pressure goal under 150/90  Recent readings reviewed.  Continue same medication regimen        Total time spent on the day of encounter was 25 minutes including obtaining updated history, performing limited physical examination, review of pertinent clinical information and ongoing treatment plan, as well as documentation.        Abran Whitman MD FAAFP [facility]  Geriatrics  Hospice and Palliative Care

## 2024-02-23 NOTE — DISCHARGE INSTRUCTIONS
You were seen in the emergency department for bleeding around outer surface of the left eye.  Your exam is consistent with a scleral/subconjunctival hemorrhage.  This is a burst blood vessel on the outer surface of the eye that can cause fairly dramatic redness and bleeding, sometimes involving most of the outer surface of the eye like yours does.  We did not see any signs of more serious trauma, infection, or other eye problem.  The bleeding will take some time to improve, probably 2 or 3 weeks.  If things seem to be significantly progressing during this time or you have other concerns like severe eye pain, or severe vision changes that suggest to follow-up with our eye clinic, we attached their information.

## 2024-02-23 NOTE — PATIENT INSTRUCTIONS

## 2024-02-23 NOTE — ED TRIAGE NOTES
"Patient here with [facility]. Patient settled on cot and call light with in reach.     Per EMS patient wife called EMS due to a severe blood shot eye. States it started about 4 days ago. Patient states pain is little \"just sore\". Did not have any trauma. Does not have any pain anywhere else. Patient is not on blood thinners. VS- 141/79, HR- 94, SPO2 95%.     Triage Assessment (Adult)       Row Name 02/23/24 0455          Triage Assessment    Airway WDL WDL        Respiratory WDL    Respiratory WDL WDL                     "

## 2024-02-23 NOTE — PROGRESS NOTES
Infusion Nursing Note:  Jf Ortiz presents today for IVF.    Patient seen by provider today: No   present during visit today: Not Applicable.    Note: N/A.    Intravenous Access:  Implanted Port.    Treatment Conditions:  Not Applicable.    Post Infusion Assessment:  Blood return noted pre and post infusion.  No evidence of extravasations.  Access discontinued per protocol.     Discharge Plan:   Copy of AVS reviewed with patient and/or family.  Patient will return 3/8/24 for next appointment.  Patient discharged in stable condition accompanied by: self/[facility].  Departure Mode: Wheelchair.

## 2024-02-24 NOTE — ED PROVIDER NOTES
EMERGENCY DEPARTMENT ENCOUNTER      NAME: Jf Ortiz  AGE: 89 year old male  YOB: 1933  MRN: [id number]  EVALUATION DATE & TIME: 2024  5:15 PM    PCP: Abran Whitman    ED PROVIDER: Abran Choi M.D.      Chief Complaint   Patient presents with    Eye Problem         FINAL IMPRESSION:  1. Scleral hemorrhage of left eye          ED COURSE & MEDICAL DECISION MAKIN year old male presents to the Emergency Department for evaluation of left eye redness.  Patient with a history of dementia, coming from a facility with progressive left-sided subconjunctival/scleral hemorrhage, apparently was having some vision disturbance reported earlier however currently he says that he can read my badge with both eyes about the same.  Fluorescein exam reveals no areas of ulceration or abrasion.  Extraocular movements are intact without pain.  No signs of infection surrounding the eye.  We discussed his diagnosis of scleral/subconjunctival hemorrhage and a supportive treatment plan for this.  He is established with the eye clinic nearby and if things or not improving as expected, could certainly follow-up there within the next week or 2 for continued evaluation.  Patient and family were in agreement with this and patient was discharged back to his nursing facility in stable condition.    At the conclusion of the encounter I discussed the results of all of the tests and the disposition. The questions were answered. The patient or family acknowledged understanding and was agreeable with the care plan.         MEDICATIONS GIVEN IN THE EMERGENCY:  Medications   tetracaine (PONTOCAINE) 0.5 % ophthalmic solution 1-2 drop (2 drops Both Eyes $Given 24 1730)   fluorescein (FUL-LEÓN) ophthalmic strip 1 strip (1 strip Both Eyes $Given 24 1730)       NEW PRESCRIPTIONS STARTED AT TODAY'S ER VISIT  New Prescriptions    No medications on file           =================================================================    [facility]    Patient information was obtained from: Patient      Jf Ortiz is a 89 year old male with a pertinent history of dementia who presents to this ED today for evaluation of an eye problem.  Patient developed left eye redness which has progressed over the last 4 days, started on the inner surface of the left eye.  Has now spread to envelop the majority of the left eye.  Patient today was saying that his vision was more blurred, now he says that he is seeing about the same out of both eyes.  He reports some mild pressure and discomfort intermittently off the left eye.  His wife is also present, says that he does have dementia and is prone to some forgetfulness.  Does not currently use any glasses or contact lenses.  Has seen the eye clinic here at [facility] previously.  There is no trauma or injury reported.  Patient has no other physical complaints.      REVIEW OF SYSTEMS   All systems reviewed and negative except as noted in HPI.    PAST MEDICAL HISTORY:  Past Medical History:   Diagnosis Date    Autonomic orthostatic hypotension 10/14/2016    Coronary artery disease     Dementia without behavioral disturbance (H) 7/28/2015    Diagnosis updated by automated process. Provider to review and confirm.    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    Seizure disorder (H)     Stented coronary artery        PAST SURGICAL HISTORY:  Past Surgical History:   Procedure Laterality Date    ------------OTHER-------------  1955    ulnar and radial fx - repair ulnar and radial fx x4    ------------OTHER-------------  6/14/2011    cataract extraction    BIOPSY  08/2015    skin biopsy     BLEPHAROPLASTY BILATERAL  5/6/2014    Procedure: BLEPHAROPLASTY BILATERAL;  Surgeon: Andrew Queen MD;  Location: [facility]    BYPASS GRAFT ARTERY CORONARY  11/2006    coronary artery disease x 5, [facility] [place]    cataract extraction and lens implantation  2011    cataracts    cataract extraction and lens implantation      cataracts    COLONOSCOPY  2012    colonoscopy with polypectomy  3/13/2009    history of polyps - repeat 3 yrs    colonoscopy with polypectomy  2006    colonoscopy with polypectomy  2005    COMBINED COLONOSCOPY WITH ARGON PLASMA COAGULATOR (APC) N/A 10/31/2014    Procedure: COMBINED COLONOSCOPY WITH ARGON PLASMA COAGULATOR (APC);  Surgeon: Bassam Aguilar MD;  Location: [facility]    COMBINED COLONOSCOPY WITH ARGON PLASMA COAGULATOR (APC) N/A 11/13/2015    Procedure: COMBINED COLONOSCOPY WITH ARGON PLASMA COAGULATOR (APC);  Surgeon: Bassam Aguilar MD;  Location: [facility]    ENDOSCOPY UPPER, COLONOSCOPY, COMBINED N/A 10/31/2014    Procedure: COMBINED ENDOSCOPY UPPER, COLONOSCOPY;  Surgeon: Bassam Aguilar MD;  Location: [facility]    ENDOSCOPY UPPER, COLONOSCOPY, COMBINED N/A 11/13/2015    Procedure: COMBINED ENDOSCOPY UPPER, COLONOSCOPY;  Surgeon: Bassam Aguilar MD;  Location: [facility]    ESOPHAGOSCOPY, GASTROSCOPY, DUODENOSCOPY (EGD), COMBINED  1/22/2014    Procedure: COMBINED ESOPHAGOSCOPY, GASTROSCOPY, DUODENOSCOPY (EGD);  UPPER ENDOSCOPY(PENA) W/ BIOPSIES;  Surgeon: Patricia Pena MD;  Location: [facility]    HERNIORRHAPHY INGUINAL Right 2/14/2018    Procedure: HERNIORRHAPHY INGUINAL;  OPEN RIGHT INGUINAL HERNIA REPAIR with Mesh;  Surgeon: Virgilio Zaragoza DO;  Location: [facility]    INSERT PORT VASCULAR ACCESS Right 7/12/2019    Procedure: PORT PLACEMENT;  Surgeon: Lloyd Ram MD;  Location: [facility]    LARYNGOSCOPY WITH MICROSCOPE  1/22/2014    Procedure: LARYNGOSCOPY WITH MICROSCOPE;;  Surgeon: Chayo Duke MD;  Location: [facility]    pacemaker placement  2000    heart block    pacemaker placement   2011    dual-chamber    REMOVE TUBE, MYRINGOTOMY, COMBINED  1/22/2014    Procedure: COMBINED REMOVE TUBE, MYRINGOTOMY;  MICRODIRECT LARYNGOSCOPY WITH BIOPSY AND FROZEN SECTIONS removal of right ear tube and myringoplasty;  Surgeon: Chayo Duke MD;  Location: [facility]    REPLACE PACEMAKER GENERATOR N/A 8/8/2018    Procedure: REPLACE PACEMAKER GENERATOR;  Pacemaker generator change;  Surgeon: Alma Kaplan MD;  Location: [facility] OR    stent placement to LAD  2008    ventilation tube  5/24/2012    right in office           CURRENT MEDICATIONS:    No current facility-administered medications for this encounter.     Current Outpatient Medications   Medication    acetaminophen (TYLENOL) 500 MG tablet    aspirin (ASA) 81 MG chewable tablet    bisacodyl (DULCOLAX) 10 MG suppository    calcium carbonate (OS-ROSALINDA) 1500 (600 Ca) MG tablet    fludrocortisone (FLORINEF) 0.1 MG tablet    magnesium hydroxide (MILK OF MAGNESIA) 400 MG/5ML suspension    melatonin 5 MG tablet    memantine (NAMENDA) 5 MG tablet    mirabegron (MYRBETRIQ) 25 MG 24 hr tablet    Nutritional Supplements (BOOST)    nystatin (MYCOSTATIN) 765418 UNIT/GM external powder    Ostomy Supplies (RESTORE BARRIER) CREA    potassium chloride ER (KLOR-CON M) 20 MEQ CR tablet    RABEprazole (ACIPHEX) 20 MG EC tablet    rectal barrier (DR. FOFANA)    senna-docusate (SENOKOT-S/PERICOLACE) 8.6-50 MG tablet    sodium chloride 1 GM tablet    tamsulosin (FLOMAX) 0.4 MG capsule    vitamin D3 (CHOLECALCIFEROL) 50 mcg (2000 units) tablet         ALLERGIES:  Allergies   Allergen Reactions    Cats Unknown    Haldol [Haloperidol]      Per SNF reporting    Klonopin [Clonazepam]      Per SNF reporting    Lamotrigine      Skin lesions    Oxycodone      Per SNF reporting       FAMILY HISTORY:  Family History   Problem Relation Age of Onset    Diabetes Mother     Breast Cancer Daughter        SOCIAL HISTORY:   Social History     Socioeconomic History    Marital status: Single  "  Occupational History    Occupation: retired   Tobacco Use    Smoking status: Former     Packs/day: 1.00     Years: 5.00     Additional pack years: 0.00     Total pack years: 5.00     Types: Cigarettes     Quit date: 1985     Years since quittin.1    Smokeless tobacco: Never    Tobacco comments:     quit in    Substance and Sexual Activity    Alcohol use: Yes     Comment: social    Drug use: No    Sexual activity: Not Currently   Other Topics Concern    Blood Transfusions Yes    Caffeine Concern Yes     Comment: 1 cup coffee daily    Parent/sibling w/ CABG, MI or angioplasty before 65F 55M? No       VITALS:  /59   Pulse 86   Temp 97.8  F (36.6  C) (Tympanic)   Resp 18   Ht 1.702 m (5' 7\")   Wt 67.8 kg (149 lb 7.6 oz)   SpO2 95%   BMI 23.41 kg/m      PHYSICAL EXAM    Constitutional: Elderly male patient, laying in bed, no acute distress  HENT: Normocephalic, Atraumatic. Neck Supple.  Eyes: Extraocular movements are intact without pain.  Lids and lashes are normal bilaterally.  Pupils are midrange and reactive.  There is a extensive left-sided scleral/some conjunctival hemorrhage enveloping most of the sclera.  Fluorescein exam reveals no areas of discrete uptake.  Globes are soft bilaterally.  Respiratory: Breathing comfortably on room air. Speaks full sentences easily. Lungs clear to ascultation.  Cardiovascular: Normal heart rate, Regular rhythm. No peripheral edema.  Abdomen: Soft, nontender  Musculoskeletal: Good range of motion in all major joints. No major deformities noted.  Integument: Warm, Dry.  Neurologic: Alert & awake, Normal motor function, Normal sensory function, No focal deficits noted.   Psychiatric: Cooperative. Affect appropriate.     LAB:  All pertinent labs reviewed and interpreted.  Labs Ordered and Resulted from Time of ED Arrival to Time of ED Departure - No data to display    RADIOLOGY:  Reviewed all pertinent imaging. Please see official radiology report.  No " orders to display         Abran Choi M.D.  Emergency Medicine  [facility]ERG[facility] DE[facility]  [address] [place] [phone]  Dept: [phone]       Abran Choi MD  02/23/24 1305

## 2024-02-24 NOTE — ED NOTES
Patient and spouse given written and verbal discharge instructions, verbalized understanding. All questions answered, no concerns. Patient awaiting [facility] ride home. Attempted x3 to give [facility] update.

## 2024-03-08 NOTE — PROGRESS NOTES
Infusion Nursing Note:  Jf Ortiz presents today for IVF.    Patient seen by provider today: No   present during visit today: Not Applicable.    Note: N/A.      Intravenous Access:  Implanted Port.    Treatment Conditions:  Not Applicable.      Post Infusion Assessment:  Patient tolerated infusion without incident.  Blood return noted pre and post infusion.  Site patent and intact, free from redness, edema or discomfort.  No evidence of extravasations.  Access discontinued per protocol.       Discharge Plan:   Copy of AVS reviewed with patient and/or family.  Patient will return as scheduled for next appointment.  Patient discharged in stable condition accompanied by: self/[facility] transportation.  Departure Mode: Wheelchair.

## 2024-03-13 NOTE — PROGRESS NOTES
Nursing Home Visit    HISTORY OF PRESENT ILLNESS:  Jf is a 89 year old male (10/5/1933)  resident of [facility] who is being seen today for routine 30 day follow up.     He has a history of Parkinson's disease, Lewy body dementia, atrial fibrillation, coronary heart disease status post aortocoronary bypass as well as stenting, seizure disorder, ans well as recurrent falls.    Jf was admitted from [facility] after being hospitalized with falls and compression fractures of L1 and L2.and in need of pain control.    The patient notes no current complaints.    Discussed with nursing staff who note now new concerns.    The patient is seen in his room.  Family is not present.     Medical records are reviewed.    Current medications, allergies, and interdisciplinary care plan are reviewed.      Patient Active Problem List    Diagnosis Date Noted    Orthostatic hypotension dysautonomic syndrome 02/12/2021     Priority: High    Chronic atrial fibrillation (H) 02/12/2021     Priority: High    Longstanding persistent atrial fibrillation (H) 04/13/2020     Priority: High    Coronary artery disease involving native coronary artery without angina pectoris 04/13/2020     Priority: High    Pacemaker, [employer], Dual Chamber - Dependent 10/06/2017     Priority: High    Lewy body dementia without behavioral disturbance (H) 08/31/2017     Priority: High    Dementia without behavioral disturbance (H) 07/28/2015     Priority: High     Diagnosis updated by automated process. Provider to review and confirm.      Parkinson disease      Priority: High    Seizure disorder (H)      Priority: High    Cardiac pacemaker in situ 01/08/2013     Priority: High     Overview:   [employer] Altrua S606 DREL,  Serial #[id number]  5-13-11  Atrial lead [employer] 4054 Serial #[id number]  8-11-00  Ventriculer lead [employer] 4137  Serial #[id number]  5-13-11  2nd Degree AV Block     Shannan  Intrinsic:  Pt. is Pacemaker Dependant, remains paced at temp. rate of 30 bpm (6-13-14)    Formatting of this note might be different from the original.  [employer] Altrua S606 DREL,  Serial #[id number]  5-13-11  Atrial lead [employer] 4054 Serial #[id number]  8-11-00  Ventriculer lead [employer] 4137  Serial #[id number]  5-13-11  2nd Degree AV Block   Dr. Campbell  Intrinsic:  Pt. is Pacemaker Dependant, remains paced at temp. rate of 30 bpm (6-13-14)      Hypertension with target blood pressure goal under 150/90 09/05/2006     Priority: High     Overview:   IMO Update      Essential hypertension 09/05/2006     Priority: High     Formatting of this note might be different from the original.  IMO Update      Altered mental status 01/28/2023     Priority: Medium    UTI (urinary tract infection) 01/28/2023     Priority: Medium    Medication reaction, initial encounter 01/28/2023     Priority: Medium    Fall, initial encounter 09/27/2022     Priority: Medium    Hematoma of chest wall, left, initial encounter 04/23/2022     Priority: Medium    Retinal artery branch occlusion 02/13/2021     Priority: Medium    Vision loss of right eye 02/12/2021     Priority: Medium    Closed compression fracture of body of L1 vertebra (H) 02/12/2021     Priority: Medium    Closed wedge compression fracture of lumbar vertebra with routine healing 12/20/2020     Priority: Medium    Compression fracture of L1 lumbar vertebra, closed, initial encounter (H) 12/18/2020     Priority: Medium    Compression fracture of thoracic vertebra, initial encounter, unspecified thoracic vertebral level 12/18/2020     Priority: Medium     Replacing diagnoses that were inactivated after the 10/1/2021 regulatory import.      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 Provider to review      Diaphragmatic hernia 01/01/2011     Priority: Medium     Problem list name updated by automated process. Provider to review      Pain in joint, forearm 07/31/2008     Priority: Medium     Formatting of this note might be different from the original.  IMO Update 10/11      Pain in joint, ankle and foot 07/31/2008     Priority: Medium     Formatting of this note might be different from the original.  IMO Update 10/11      Dysphagia 05/22/2007     Priority: Medium     Overview:   IMO Update    Formatting of this note might be different from the original.  IMO Update      Coronary atherosclerosis of native coronary artery 11/28/2006     Priority: Medium     Overview:   IMO Update 10/11    Formatting of this note might be different from the original.  IMO Update 10/11      Postsurgical aortocoronary bypass status 11/28/2006     Priority: Medium     Overview:   IMO Update 10/11    Formatting of this note might be different from the original.  IMO Update 10/11      Hyperlipidemia 09/05/2006     Priority: Medium     Formatting of this note might be different from the original.  IMO Update 10/11      Nursing Home Visit 03/03/2022     Priority: Low          Social History     Socioeconomic History    Marital status: Single     Spouse name: Not on file    Number of children: Not on file    Years of education: Not on file    Highest education level: Not on file   Occupational History    Occupation: retired   Tobacco Use    Smoking status: Former     Packs/day: 1.00     Years: 5.00     Additional pack years: 0.00     Total pack years: 5.00     Types: Cigarettes     Quit date: 1/1/1985     Years since quitting:  39.2    Smokeless tobacco: Never    Tobacco comments:     quit in 1985   Substance and Sexual Activity    Alcohol use: Yes     Comment: social    Drug use: No    Sexual activity: Not Currently   Other Topics Concern     Service Not Asked    Blood Transfusions Yes    Caffeine Concern Yes     Comment: 1 cup coffee daily    Occupational Exposure Not Asked    Hobby Hazards Not Asked    Sleep Concern Not Asked    Stress Concern Not Asked    Weight Concern Not Asked    Special Diet Not Asked    Back Care Not Asked    Exercise Not Asked    Bike Helmet Not Asked    Seat Belt Not Asked    Self-Exams Not Asked    Parent/sibling w/ CABG, MI or angioplasty before 65F 55M? No   Social History Narrative    Not on file     Social Determinants of Health     Financial Resource Strain: Not on file   Food Insecurity: Not on file   Transportation Needs: Not on file   Physical Activity: Not on file   Stress: Not on file   Social Connections: Not on file   Interpersonal Safety: Not on file   Housing Stability: Not on file        Current Outpatient Medications   Medication Sig    acetaminophen (TYLENOL) 500 MG tablet Take 1,000 mg by mouth 3 times daily    aspirin (ASA) 81 MG chewable tablet Take 81 mg by mouth daily    bisacodyl (DULCOLAX) 10 MG suppository Place 10 mg rectally daily as needed for constipation    calcium carbonate (OS-ROSALINDA) 1500 (600 Ca) MG tablet Take 600 mg by mouth daily    fludrocortisone (FLORINEF) 0.1 MG tablet Take 1 tablet (0.1 mg) by mouth every morning (before breakfast)    magnesium hydroxide (MILK OF MAGNESIA) 400 MG/5ML suspension Take 30 mLs by mouth daily as needed for constipation or heartburn    melatonin 5 MG tablet Take 5 mg by mouth nightly as needed    memantine (NAMENDA) 5 MG tablet Take 5 mg by mouth 2 times daily    mirabegron (MYRBETRIQ) 25 MG 24 hr tablet Take 25 mg by mouth daily    Nutritional Supplements (BOOST) Take 1 Bottle by mouth daily    nystatin (MYCOSTATIN) 766690 UNIT/GM  external powder Apply topically as needed    Ostomy Supplies (RESTORE BARRIER) CREA 2 times daily    potassium chloride ER (KLOR-CON M) 20 MEQ CR tablet TAKE 1 TABLET BY MOUTH EVERY DAY    RABEprazole (ACIPHEX) 20 MG EC tablet Take 20 mg by mouth 2 times daily    rectal barrier (DR. FOFANA) Apply topically 2 times daily    senna-docusate (SENOKOT-S/PERICOLACE) 8.6-50 MG tablet Take 2 tablets by mouth 2 times daily    sodium chloride 1 GM tablet TAKE 1 TABLET BY MOUTH TWICE A DAY    tamsulosin (FLOMAX) 0.4 MG capsule Take 1 capsule (0.4 mg) by mouth daily    vitamin D3 (CHOLECALCIFEROL) 50 mcg (2000 units) tablet Take 1 tablet by mouth daily     No current facility-administered medications for this visit.       Allergies   Allergen Reactions    Cats Unknown    Haldol [Haloperidol]      Per SNF reporting    Klonopin [Clonazepam]      Per SNF reporting    Lamotrigine      Skin lesions    Oxycodone      Per SNF reporting       I have reviewed the care plan and do agree with the plan.      ROS:  No chest pain, shortness of breath, fever, chills, headache, nausea, vomiting, dysuria, or changes in bowel habits.  Appetite is poor.  Low back pain noted.          OBJECTIVE:  /78   Pulse 80   Temp 98.2  F (36.8  C)   Resp 16   Wt 65.5 kg (144 lb 6.4 oz)   SpO2 95%   BMI 22.62 kg/m      GENERAL: Healthy, alert and no distress  EYES: Eyes grossly normal to inspection.  No discharge or erythema, or obvious scleral/conjunctival abnormalities.  RESP: No audible wheeze, cough, or visible cyanosis.  No visible retractions or increased work of breathing.    SKIN: Erythema with serpiginous borders noted with sattelite lesions. No abnormal pigmentation or lesions.  NEURO: Cranial nerves grossly intact.  Mentation impaired. Speech appropriate for age.  PSYCH: Mentation shows impaired cognition affect normal/bright, judgement and insight intact, normal speech and appearance well-groomed.      Previous      Lab/Diagnostic data:     Reviewed    ASSESSMENT/ORDERS:  Nursing Home Visit  Discussed with staff. Care plan reviewed and orders updated.  Chronic issues are stable. Routine 30 day Nursing Home follow up.     Rash  Improved.  Continue to follow.    Closed compression fracture of L1 lumbar vertebra with routine healing, subsequent encounter  Stable    Lewy body dementia without behavioral disturbance (H)  Stable    Parkinson disease (H)  Stable    Coronary artery disease involving native coronary artery of native heart without angina pectoris  No recurrent symptoms    Hypertension with target blood pressure goal under 150/90  Recent readings reviewed.  Continue same medication regimen        Total time spent on the day of encounter was 25 minutes including obtaining updated history, performing limited physical examination, review of pertinent clinical information and ongoing treatment plan, as well as documentation.        Abran Whitman MD FAAFP [facility]  Geriatrics  Hospice and Palliative Care

## 2024-03-21 NOTE — PROGRESS NOTES
Patient is a 86 year old male here accompanied by self today for infusion of IVF per order of Dr. MALIA Ray. Patient identified with two identifiers, order verified, and verbal consent for today's infusion obtained from patient.      Patients right sided port accessed using non-coring, 20 gauge, 3/4 needle. Port accessed per facility protocol. Port flushed easily, blood return noted.  No signs and symptoms of infection or infiltration.      0943: IV pump verified with dose, drug, and rate of administration.  Infusion administered per protocol.    
Patient tolerated infusion well, no signs or symptoms of adverse reaction noted.  Patient denies pain nor discomfort.     IV removed, catheter intact.  Site clean, dry and intact.  No signs or symptoms of infiltration or infection.  Covered with a sterile bandage, slight pressure applied for 30 seconds.  Pt instructed to leave bandage intact for a minimum of one hour, and to call with questions or concerns.  Patient calendar with appointments given to pt. Pt discharged ambulatory.  
hard copy, drawn during this pregnancy

## 2024-03-22 NOTE — PROGRESS NOTES
Post Infusion Assessment:  Patient tolerated infusion without incident.  Site patent and intact, free from redness, edema or discomfort.  No evidence of extravasations.  Access discontinued per protocol.       Discharge Plan:   Patient discharged in stable condition accompanied by: medical transport.  Departure Mode: Wheelchair and call to medical transport.

## 2024-03-22 NOTE — PROGRESS NOTES
Infusion Nursing Note:  Jf Ortiz presents today for IVF.    Patient seen by provider today: No   present during visit today: Not Applicable.    Note: N/A.      Treatment Conditions:  Not Applicable.

## 2024-03-22 NOTE — PATIENT INSTRUCTIONS

## 2024-04-05 NOTE — PROGRESS NOTES
Infusion Nursing Note:  Jf Ortiz presents today for IVF.    Patient seen by provider today: No   present during visit today: Not Applicable.    Note: N/A.      Intravenous Access:  Implanted Port.    Treatment Conditions:  Lab Results   Component Value Date    HGB 11.3 (L) 02/01/2023    WBC 4.6 02/01/2023    ANEU 4.1 06/22/2021    ANEUTAUTO 3.5 01/28/2023     02/01/2023        Lab Results   Component Value Date     02/01/2023    POTASSIUM 3.6 02/02/2023    MAG 1.6 (L) 02/02/2023    CR 0.61 (L) 02/01/2023    ROSALINDA 7.9 (L) 02/01/2023    BILITOTAL 0.4 02/01/2023    ALBUMIN 3.0 (L) 02/01/2023    ALT 19 02/01/2023    AST 35 02/01/2023       Results reviewed, labs MET treatment parameters, ok to proceed with treatment.      Post Infusion Assessment:  Patient tolerated infusion without incident.  Blood return noted pre and post infusion.  Site patent and intact, free from redness, edema or discomfort.  No evidence of extravasations.  Access discontinued per protocol.       Discharge Plan:   Copy of AVS reviewed with patient and/or family.  Patient will return in two weeks for next appointment.  Patient discharged in stable condition accompanied by: self.  Departure Mode: wheel chair and transport .      Catia Dockery RN

## 2024-04-09 NOTE — LETTER
4/9/2024         RE: Jf Ortiz  [address]  [place] [address]        Dear Colleague,    Thank you for referring your patient, Jf Ortiz, to the [facility] - [place]. Please see a copy of my visit note below.    Otolaryngology Note         Chief Complaint:     Patient presents with:  Cerumen Impaction  Procedure: Ear cleaning           History of Present Illness:     Jf Ortiz is a 89 year old male who presents today for ear cleaning.  He was last seen in ENT on 12/15/2023 by Geeta Alegria for ear cleaning.  He has a history of right-sided T-tube that was retained in the middle ear space with subsequent perforation.  He also has a history of moderate to profound sensorineural hearing loss.  History of Parkinson's, lives in skilled nursing facility.    Today he is minimally verbal, however does not voice any concerns or complaints.  He feels like his hearing is overall stable.  No voiced complaints of tinnitus, vertigo, facial numbness or tingling, otalgia or otorrhea.         Medications:     Current Outpatient Rx   Medication Sig Dispense Refill     acetaminophen (TYLENOL) 500 MG tablet Take 1,000 mg by mouth 3 times daily       aspirin (ASA) 81 MG chewable tablet Take 81 mg by mouth daily       bisacodyl (DULCOLAX) 10 MG suppository Place 10 mg rectally daily as needed for constipation       calcium carbonate (OS-ROSALINDA) 1500 (600 Ca) MG tablet Take 600 mg by mouth daily       fludrocortisone (FLORINEF) 0.1 MG tablet Take 1 tablet (0.1 mg) by mouth every morning (before breakfast) 30 tablet 11     magnesium hydroxide (MILK OF MAGNESIA) 400 MG/5ML suspension Take 30 mLs by mouth daily as needed for constipation or heartburn       melatonin 5 MG tablet Take 5 mg by mouth nightly as needed       memantine (NAMENDA) 5 MG tablet Take 5 mg by mouth 2 times daily       mirabegron (MYRBETRIQ) 25 MG 24 hr tablet Take 25 mg by mouth daily       Nutritional Supplements (BOOST) Take 1 Bottle by mouth  daily       nystatin (MYCOSTATIN) 029034 UNIT/GM external powder Apply topically as needed       Ostomy Supplies (RESTORE BARRIER) CREA 2 times daily       potassium chloride ER (KLOR-CON M) 20 MEQ CR tablet TAKE 1 TABLET BY MOUTH EVERY DAY 30 tablet 1     RABEprazole (ACIPHEX) 20 MG EC tablet Take 20 mg by mouth 2 times daily       rectal barrier (DR. FOFANA) Apply topically 2 times daily       senna-docusate (SENOKOT-S/PERICOLACE) 8.6-50 MG tablet Take 2 tablets by mouth 2 times daily       sodium chloride 1 GM tablet TAKE 1 TABLET BY MOUTH TWICE A  tablet 3     tamsulosin (FLOMAX) 0.4 MG capsule Take 1 capsule (0.4 mg) by mouth daily 30 capsule 3     vitamin D3 (CHOLECALCIFEROL) 50 mcg (2000 units) tablet Take 1 tablet by mouth daily              Allergies:     Allergies: Cats, Haldol [haloperidol], Klonopin [clonazepam], Lamotrigine, and Oxycodone          Past Medical History:     Past Medical History:   Diagnosis Date     Autonomic orthostatic hypotension 10/14/2016     Coronary artery disease      Dementia without behavioral disturbance (H) 7/28/2015    Diagnosis updated by automated process. Provider to review and confirm.     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     Seizure disorder (H)      Stented coronary artery             Past Surgical History:     Past Surgical History:   Procedure Laterality Date     ------------OTHER-------------  1955    ulnar and radial fx - repair ulnar and radial fx x4     ------------OTHER-------------  6/14/2011    cataract extraction     BIOPSY  08/2015    skin biopsy     BLEPHAROPLASTY BILATERAL  5/6/2014    Procedure: BLEPHAROPLASTY BILATERAL;  Surgeon: Andrew Queen MD;  Location: [facility]     BYPASS  GRAFT ARTERY CORONARY  11/2006    coronary artery disease x 5, [facility]     cataract extraction and lens implantation  2011    cataracts     cataract extraction and lens implantation      cataracts     COLONOSCOPY  2012     colonoscopy with polypectomy  3/13/2009    history of polyps - repeat 3 yrs     colonoscopy with polypectomy  2006     colonoscopy with polypectomy  2005     COMBINED COLONOSCOPY WITH ARGON PLASMA COAGULATOR (APC) N/A 10/31/2014    Procedure: COMBINED COLONOSCOPY WITH ARGON PLASMA COAGULATOR (APC);  Surgeon: Bassam Aguilar MD;  Location: [facility]     COMBINED COLONOSCOPY WITH ARGON PLASMA COAGULATOR (APC) N/A 11/13/2015    Procedure: COMBINED COLONOSCOPY WITH ARGON PLASMA COAGULATOR (APC);  Surgeon: Bassam Aguilar MD;  [facility]: [facility]     ENDOSCOPY UPPER, COLONOSCOPY, COMBINED N/A 10/31/2014    Procedure: COMBINED ENDOSCOPY UPPER, COLONOSCOPY;  Surgeon: Bassam Aguilar MD;  [facility]     ENDOSCOPY UPPER, COLONOSCOPY, COMBINED N/A 11/13/2015    Procedure: COMBINED ENDOSCOPY UPPER, COLONOSCOPY;  Surgeon: Bassam Aguilar MD;  [facility]     ESOPHAGOSCOPY, GASTROSCOPY, DUODENOSCOPY (EGD), COMBINED  1/22/2014    Procedure: COMBINED ESOPHAGOSCOPY, GASTROSCOPY, DUODENOSCOPY (EGD);  UPPER ENDOSCOPY(PENA) W/ BIOPSIES;  Surgeon: Patricia Pena MD;  [facility]: [facility]     HERNIORRHAPHY INGUINAL Right 2/14/2018    Procedure: HERNIORRHAPHY INGUINAL;  OPEN RIGHT INGUINAL HERNIA REPAIR with Mesh;  Surgeon: Virgilio Zaragoza DO;  Location: [facility]     INSERT PORT VASCULAR ACCESS Right 7/12/2019    Procedure: PORT PLACEMENT;  Surgeon: Lloyd Ram MD;  [facility]: [facility]     LARYNGOSCOPY WITH MICROSCOPE  1/22/2014    Procedure: LARYNGOSCOPY WITH MICROSCOPE;;  Surgeon: Chayo Duke MD;  Location: [facility]     pacemaker placement  2000    heart block     pacemaker placement  2011    dual-chamber     REMOVE TUBE, MYRINGOTOMY, COMBINED  1/22/2014    Procedure: COMBINED REMOVE TUBE,  "MYRINGOTOMY;  MICRODIRECT LARYNGOSCOPY WITH BIOPSY AND FROZEN SECTIONS removal of right ear tube and myringoplasty;  Surgeon: Chayo Duke MD;  [facility]     REPLACE PACEMAKER GENERATOR N/A 2018    Procedure: REPLACE PACEMAKER GENERATOR;  Pacemaker generator change;  Surgeon: Alma Kaplan MD;  Location: [facility]     stent placement to LAD       ventilation tube  2012    right in office       ENT family history reviewed         Social History:     Social History     Tobacco Use     Smoking status: Former     Packs/day: 1.00     Years: 5.00     Additional pack years: 0.00     Total pack years: 5.00     Types: Cigarettes     Quit date: 1985     Years since quittin.2     Smokeless tobacco: Never     Tobacco comments:     quit in    Substance Use Topics     Alcohol use: Yes     Comment: social     Drug use: No            Review of Systems:     ROS: See HPI         Physical Exam:     /62 (BP Location: Right arm, Patient Position: Sitting, Cuff Size: Adult Regular)   Pulse 70   Temp 98.2  F (36.8  C) (Tympanic)   Ht 1.702 m (5' 7\")   Wt 64.6 kg (142 lb 8 oz)   SpO2 93%   BMI 22.32 kg/m      General -elderly appearing male sitting in wheelchair, sleeping when I entered the room.  He wakes with verbal stimuli.  Transferred from wheelchair to exam room chair with a assist of 2.  Head and Face - Normocephalic and atraumatic, with no gross asymmetry noted.  Parkinsonian facies.    Voice and Breathing - The patient was breathing comfortably without the use of accessory muscles. There was no wheezing, stridor. The patients voice has a gravelly hoarseness.  Ears - The ears were examined with binocular microscopy and with otoscope.  External ears normal. Right canal cerumen impacted.  Left canal cerumen impacted.  The right ear was cleaned with cerumen loop, #7 Carr.   Right tympanic membrane appears intact with retained tube noted behind the tympanic membrane.  I do not " appreciate the previously noted perforation in the right ear.  The left ear was cleaned with cerumen loop, #7 Carr.  Left tympanic membrane is intact without effusion, retraction or mass.  Eyes - Extraocular movements intact, sclera were not icteric or injected  Neck - No grace palpable lymphadenopathy nose - External contour is symmetric, no gross deflection or scars.  Nasal mucosa is pink and moist with no abnormal mucus.  The septum and turbinates were evaluated with nasal speculum, no polyps, masses, or purulence noted on examination.         Assessment and Plan:       ICD-10-CM    1. Bilateral impacted cerumen  H61.23       2. Retained myringotomy tube in right ear  Z96.22       3. Parkinson's disease, unspecified whether dyskinesia present, unspecified whether manifestations fluctuate (H)  G20.A1         Follow-up as needed for ear cleaning, he has been typically seen every 4 months or so for his cerumen management.    Char CLARKE  [facility]      Again, thank you for allowing me to participate in the care of your patient.        Sincerely,        Char Avila NP

## 2024-04-09 NOTE — PROGRESS NOTES
Otolaryngology Note         Chief Complaint:     Patient presents with:  Cerumen Impaction  Procedure: Ear cleaning           History of Present Illness:     Jf Ortiz is a 89 year old male who presents today for ear cleaning.  He was last seen in ENT on 12/15/2023 by Geeta Alegria for ear cleaning.  He has a history of right-sided T-tube that was retained in the middle ear space with subsequent perforation.  He also has a history of moderate to profound sensorineural hearing loss.  History of Parkinson's, lives in skilled nursing facility.    Today he is minimally verbal, however does not voice any concerns or complaints.  He feels like his hearing is overall stable.  No voiced complaints of tinnitus, vertigo, facial numbness or tingling, otalgia or otorrhea.         Medications:     Current Outpatient Rx   Medication Sig Dispense Refill    acetaminophen (TYLENOL) 500 MG tablet Take 1,000 mg by mouth 3 times daily      aspirin (ASA) 81 MG chewable tablet Take 81 mg by mouth daily      bisacodyl (DULCOLAX) 10 MG suppository Place 10 mg rectally daily as needed for constipation      calcium carbonate (OS-ROSALINDA) 1500 (600 Ca) MG tablet Take 600 mg by mouth daily      fludrocortisone (FLORINEF) 0.1 MG tablet Take 1 tablet (0.1 mg) by mouth every morning (before breakfast) 30 tablet 11    magnesium hydroxide (MILK OF MAGNESIA) 400 MG/5ML suspension Take 30 mLs by mouth daily as needed for constipation or heartburn      melatonin 5 MG tablet Take 5 mg by mouth nightly as needed      memantine (NAMENDA) 5 MG tablet Take 5 mg by mouth 2 times daily      mirabegron (MYRBETRIQ) 25 MG 24 hr tablet Take 25 mg by mouth daily      Nutritional Supplements (BOOST) Take 1 Bottle by mouth daily      nystatin (MYCOSTATIN) 400657 UNIT/GM external powder Apply topically as needed      Ostomy Supplies (RESTORE BARRIER) CREA 2 times daily      potassium chloride ER (KLOR-CON M) 20 MEQ CR tablet TAKE 1 TABLET BY MOUTH EVERY DAY 30 tablet 1     RABEprazole (ACIPHEX) 20 MG EC tablet Take 20 mg by mouth 2 times daily      rectal barrier (DR. FOFANA) Apply topically 2 times daily      senna-docusate (SENOKOT-S/PERICOLACE) 8.6-50 MG tablet Take 2 tablets by mouth 2 times daily      sodium chloride 1 GM tablet TAKE 1 TABLET BY MOUTH TWICE A  tablet 3    tamsulosin (FLOMAX) 0.4 MG capsule Take 1 capsule (0.4 mg) by mouth daily 30 capsule 3    vitamin D3 (CHOLECALCIFEROL) 50 mcg (2000 units) tablet Take 1 tablet by mouth daily              Allergies:     Allergies: Cats, Haldol [haloperidol], Klonopin [clonazepam], Lamotrigine, and Oxycodone          Past Medical History:     Past Medical History:   Diagnosis Date    Autonomic orthostatic hypotension 10/14/2016    Coronary artery disease     Dementia without behavioral disturbance (H) 7/28/2015    Diagnosis updated by automated process. Provider to review and confirm.    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    Seizure disorder (H)     Stented coronary artery             Past Surgical History:     Past Surgical History:   Procedure Laterality Date    ------------OTHER-------------  1955    ulnar and radial fx - repair ulnar and radial fx x4    ------------OTHER-------------  6/14/2011    cataract extraction    BIOPSY  08/2015    skin biopsy    BLEPHAROPLASTY BILATERAL  5/6/2014    Procedure: BLEPHAROPLASTY BILATERAL;  Surgeon: Andrew Queen MD;  [facility]: [facility]    BYPASS GRAFT ARTERY CORONARY  11/2006    coronary artery disease x 5, [facility]    cataract extraction and lens implantation  2011    cataracts    cataract extraction and lens implantation      cataracts    COLONOSCOPY  2012    colonoscopy with polypectomy  3/13/2009    history  of polyps - repeat 3 yrs    colonoscopy with polypectomy  2006    colonoscopy with polypectomy  2005    COMBINED COLONOSCOPY WITH ARGON PLASMA COAGULATOR (APC) N/A 10/31/2014    Procedure: COMBINED COLONOSCOPY WITH ARGON PLASMA COAGULATOR (APC);  Surgeon: Bassam Aguilar MD;  [facility]    COMBINED COLONOSCOPY WITH ARGON PLASMA COAGULATOR (APC) N/A 11/13/2015    Procedure: COMBINED COLONOSCOPY WITH ARGON PLASMA COAGULATOR (APC);  Surgeon: Bassam Aguilar MD;  [facility]    ENDOSCOPY UPPER, COLONOSCOPY, COMBINED N/A 10/31/2014    Procedure: COMBINED ENDOSCOPY UPPER, COLONOSCOPY;  Surgeon: Bassam Aguilar MD;  [facility]    ENDOSCOPY UPPER, COLONOSCOPY, COMBINED N/A 11/13/2015    Procedure: COMBINED ENDOSCOPY UPPER, COLONOSCOPY;  Surgeon: Bassam Aguilar MD;  [facility]    ESOPHAGOSCOPY, GASTROSCOPY, DUODENOSCOPY (EGD), COMBINED  1/22/2014    Procedure: COMBINED ESOPHAGOSCOPY, GASTROSCOPY, DUODENOSCOPY (EGD);  UPPER ENDOSCOPY(PENA) W/ BIOPSIES;  Surgeon: Patricia Pena MD;  [facility]    HERNIORRHAPHY INGUINAL Right 2/14/2018    Procedure: HERNIORRHAPHY INGUINAL;  OPEN RIGHT INGUINAL HERNIA REPAIR with Mesh;  Surgeon: Virgilio Zaragoza DO;  [facility]    INSERT PORT VASCULAR ACCESS Right 7/12/2019    Procedure: PORT PLACEMENT;  Surgeon: Lloyd Ram MD;  [facility]    LARYNGOSCOPY WITH MICROSCOPE  1/22/2014    Procedure: LARYNGOSCOPY WITH MICROSCOPE;;  Surgeon: Chayo Duke MD;  [facility]    pacemaker placement  2000    heart block    pacemaker placement  2011    dual-chamber    REMOVE TUBE, MYRINGOTOMY, COMBINED  1/22/2014    Procedure: COMBINED REMOVE TUBE, MYRINGOTOMY;  MICRODIRECT LARYNGOSCOPY WITH BIOPSY AND FROZEN SECTIONS removal of right ear tube and myringoplasty;  Surgeon: Chayo Duke MD;  [facility]    REPLACE PACEMAKER GENERATOR N/A 8/8/2018    Procedure: REPLACE PACEMAKER GENERATOR;  Pacemaker generator change;  Surgeon: Alma Kaplan  "MD;  Location: [facility]    stent placement to LAD  2008    ventilation tube  2012    right in office       ENT family history reviewed         Social History:     Social History     Tobacco Use    Smoking status: Former     Packs/day: 1.00     Years: 5.00     Additional pack years: 0.00     Total pack years: 5.00     Types: Cigarettes     Quit date: 1985     Years since quittin.2    Smokeless tobacco: Never    Tobacco comments:     quit in    Substance Use Topics    Alcohol use: Yes     Comment: social    Drug use: No            Review of Systems:     ROS: See HPI         Physical Exam:     /62 (BP Location: Right arm, Patient Position: Sitting, Cuff Size: Adult Regular)   Pulse 70   Temp 98.2  F (36.8  C) (Tympanic)   Ht 1.702 m (5' 7\")   Wt 64.6 kg (142 lb 8 oz)   SpO2 93%   BMI 22.32 kg/m      General -elderly appearing male sitting in wheelchair, sleeping when I entered the room.  He wakes with verbal stimuli.  Transferred from wheelchair to exam room chair with a assist of 2.  Head and Face - Normocephalic and atraumatic, with no gross asymmetry noted.  Parkinsonian facies.    Voice and Breathing - The patient was breathing comfortably without the use of accessory muscles. There was no wheezing, stridor. The patients voice has a gravelly hoarseness.  Ears - The ears were examined with binocular microscopy and with otoscope.  External ears normal. Right canal cerumen impacted.  Left canal cerumen impacted.  The right ear was cleaned with cerumen loop, #7 Carr.   Right tympanic membrane appears intact with retained tube noted behind the tympanic membrane.  I do not appreciate the previously noted perforation in the right ear.  The left ear was cleaned with cerumen loop, #7 Carr.  Left tympanic membrane is intact without effusion, retraction or mass.  Eyes - Extraocular movements intact, sclera were not icteric or injected  Neck - No grace palpable lymphadenopathy nose - External " contour is symmetric, no gross deflection or scars.  Nasal mucosa is pink and moist with no abnormal mucus.  The septum and turbinates were evaluated with nasal speculum, no polyps, masses, or purulence noted on examination.         Assessment and Plan:       ICD-10-CM    1. Bilateral impacted cerumen  H61.23       2. Retained myringotomy tube in right ear  Z96.22       3. Parkinson's disease, unspecified whether dyskinesia present, unspecified whether manifestations fluctuate (H)  G20.A1         Follow-up as needed for ear cleaning, he has been typically seen every 4 months or so for his cerumen management.    Char DODGEC  [facility]

## 2024-04-09 NOTE — PATIENT INSTRUCTIONS
Thank you for allowing Char Avila and our ENT team to participate in your care.  If your medications are too expensive, please give the nurse a call.  We can possibly change this medication.  If you have a scheduling or an appointment question please contact our Health Unit Coordinator at their direct line [phone].   ALL nursing questions or concerns can be directed to your ENT nurse at: [phone] - Vsp

## 2024-04-19 NOTE — PROGRESS NOTES
Patient presents today in wheelchair assistance of transport service.  Patient states he does not want to be here and would like to go home.  Patient appears to have right sided weakness and right side deficit of mobility.  Patient speech is jumbled.  Patient complains of left sided pain.  States it hurts with all movement.  Patient transported to chair with assist of three and transfer belt.  Patient made comfortable in chair.  Patient appeared catatonic and was staring off into space.  Patient Port accessed per facility policy, patient seemed aware of all activity, asking how long until I can go home.  Patient reassured.  Patient allowed to rest  while fluids infused.  Patient stated he was hungry and he had missed his cream of wheat this morning.  This writer offered him breakfast and that I would assist him in eating.  Patient declined stating he was alright.  Patient napped and after he woke up he was more vibrant, speech clear and patient clear on thought.  Patient continued to need assistance of three for transfer.  Patient returned to care center.

## 2024-04-19 NOTE — PROGRESS NOTES
"INTRAVENOUS ACCESS:  IVAD SL/DL: Site right sided port; 19 gauge \" cotton gripper plus needle; Accessed without difficulty:  YES flushed with 10cc NS and 5cc 100u/ml heparin and needle d/c'd intact; Labs drawn from IVAD: YES    Central line dressing: sterile applied     Intravenous fluids were administered, normal saline 1000 cc's.    DRUG ADMINISTRATION:  Infusion rate(s) regulated using pump, Blood return noted prior and post infusion(s), Site patent and intact, free of redness, edema or complaint of discomfort, No evidence of extravasations, Patient tolerated administration of agents without incident, and Access discontinued per protocol   "

## 2024-04-24 NOTE — PROGRESS NOTES
Nursing Home Visit    HISTORY OF PRESENT ILLNESS:  Jf is a 89 year old male (10/5/1933)  resident of [facility] who is being seen today for routine 30 day follow up.     He has a history of Parkinson's disease, Lewy body dementia, atrial fibrillation, coronary heart disease status post aortocoronary bypass as well as stenting, seizure disorder, ans well as recurrent falls.    Jf was admitted from [facility] after being hospitalized with falls and compression fractures of L1 and L2.and in need of pain control.    The patient notes no current complaints.    Discussed with nursing staff who note he has been spending more time in bed.    The patient is seen in his room.  Family is not present.     Medical records are reviewed.    Current medications, allergies, and interdisciplinary care plan are reviewed.      Patient Active Problem List    Diagnosis Date Noted    Orthostatic hypotension dysautonomic syndrome 02/12/2021     Priority: High    Chronic atrial fibrillation (H) 02/12/2021     Priority: High    Longstanding persistent atrial fibrillation (H) 04/13/2020     Priority: High    Coronary artery disease involving native coronary artery without angina pectoris 04/13/2020     Priority: High    Pacemaker, [employer], Dual Chamber - Dependent 10/06/2017     Priority: High    Lewy body dementia without behavioral disturbance (H) 08/31/2017     Priority: High    Dementia without behavioral disturbance (H) 07/28/2015     Priority: High     Diagnosis updated by automated process. Provider to review and confirm.      Parkinson disease (H)      Priority: High    Seizure disorder (H)      Priority: High    Cardiac pacemaker in situ 01/08/2013     Priority: High     Overview:   [employer] S606 DREL,  Serial #[id number]  5-13-11  Atrial lead [employer] 4054 Serial #[id number]  8-11-00  Ventriculer lead [employer] 4137  Serial #[id number]  5-13-11  2nd Degree AV  Block   Dr. Campbell  Intrinsic:  Pt. is Pacemaker Dependant, remains paced at temp. rate of 30 bpm (6-13-14)    Formatting of this note might be different from the original.  [employer] S606 DREL,  Serial #[id number]  5-13-11  Atrial lead [employer] 4054 Serial #[id number]  8-11-00  Ventriculer lead [employer] 4137  Serial #[id number]  5-13-11  2nd Degree AV Block   Dr. Campbell  Intrinsic:  Pt. is Pacemaker Dependant, remains paced at temp. rate of 30 bpm (6-13-14)      Hypertension with target blood pressure goal under 150/90 09/05/2006     Priority: High     Overview:   IMO Update      Essential hypertension 09/05/2006     Priority: High     Formatting of this note might be different from the original.  IMO Update      Altered mental status 01/28/2023     Priority: Medium    UTI (urinary tract infection) 01/28/2023     Priority: Medium    Medication reaction, initial encounter 01/28/2023     Priority: Medium    Fall, initial encounter 09/27/2022     Priority: Medium    Hematoma of chest wall, left, initial encounter 04/23/2022     Priority: Medium    Retinal artery branch occlusion 02/13/2021     Priority: Medium    Vision loss of right eye 02/12/2021     Priority: Medium    Closed compression fracture of body of L1 vertebra (H) 02/12/2021     Priority: Medium    Closed wedge compression fracture of lumbar vertebra with routine healing 12/20/2020     Priority: Medium    Compression fracture of L1 lumbar vertebra, closed, initial encounter (H) 12/18/2020     Priority: Medium    Compression fracture of thoracic vertebra, initial encounter, unspecified thoracic vertebral level 12/18/2020     Priority: Medium     Replacing diagnoses that were inactivated after the 10/1/2021 regulatory import.      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 Provider to review      Diaphragmatic hernia 01/01/2011     Priority: Medium     Problem list name updated by automated process. Provider to review      Pain in joint, forearm 07/31/2008     Priority: Medium     Formatting of this note might be different from the original.  IMO Update 10/11      Pain in joint, ankle and foot 07/31/2008     Priority: Medium     Formatting of this note might be different from the original.  IMO Update 10/11      Dysphagia 05/22/2007     Priority: Medium     Overview:   IMO Update    Formatting of this note might be different from the original.  IMO Update      Coronary atherosclerosis of native coronary artery 11/28/2006     Priority: Medium     Overview:   IMO Update 10/11    Formatting of this note might be different from the original.  IMO Update 10/11      Postsurgical aortocoronary bypass status 11/28/2006     Priority: Medium     Overview:   IMO Update 10/11    Formatting of this note might be different from the original.  IMO Update 10/11      Hyperlipidemia 09/05/2006     Priority: Medium     Formatting of this note might be different from the original.  IMO Update 10/11      Nursing Home Visit 03/03/2022     Priority: Low          Social History     Socioeconomic History    Marital status: Single     Spouse name: Not on file    Number of children: Not on file    Years of education: Not on file    Highest education level: Not on file   Occupational History    Occupation: retired   Tobacco Use    Smoking status: Former     Current packs/day: 0.00     Average packs/day: 1 pack/day for 5.0 years (5.0 ttl pk-yrs)     Types: Cigarettes     Start date: 1/1/1980     Quit date:  1985     Years since quittin.3    Smokeless tobacco: Never    Tobacco comments:     quit in    Substance and Sexual Activity    Alcohol use: Yes     Comment: social    Drug use: No    Sexual activity: Not Currently   Other Topics Concern     Service Not Asked    Blood Transfusions Yes    Caffeine Concern Yes     Comment: 1 cup coffee daily    Occupational Exposure Not Asked    Hobby Hazards Not Asked    Sleep Concern Not Asked    Stress Concern Not Asked    Weight Concern Not Asked    Special Diet Not Asked    Back Care Not Asked    Exercise Not Asked    Bike Helmet Not Asked    Seat Belt Not Asked    Self-Exams Not Asked    Parent/sibling w/ CABG, MI or angioplasty before 65F 55M? No   Social History Narrative    Not on file     Social Determinants of Health     Financial Resource Strain: Not on file   Food Insecurity: Not on file   Transportation Needs: Not on file   Physical Activity: Not on file   Stress: Not on file   Social Connections: Not on file   Interpersonal Safety: Not on file   Housing Stability: Not on file        Current Outpatient Medications   Medication Sig Dispense Refill    acetaminophen (TYLENOL) 500 MG tablet Take 1,000 mg by mouth 3 times daily      aspirin (ASA) 81 MG chewable tablet Take 81 mg by mouth daily      bisacodyl (DULCOLAX) 10 MG suppository Place 10 mg rectally daily as needed for constipation      calcium carbonate (OS-ROSALINDA) 1500 (600 Ca) MG tablet Take 600 mg by mouth daily      fludrocortisone (FLORINEF) 0.1 MG tablet Take 1 tablet (0.1 mg) by mouth every morning (before breakfast) 30 tablet 11    magnesium hydroxide (MILK OF MAGNESIA) 400 MG/5ML suspension Take 30 mLs by mouth daily as needed for constipation or heartburn      melatonin 5 MG tablet Take 5 mg by mouth nightly as needed      memantine (NAMENDA) 5 MG tablet Take 5 mg by mouth 2 times daily      mirabegron (MYRBETRIQ) 25 MG 24 hr tablet Take 25 mg by mouth daily      Nutritional Supplements  (BOOST) Take 1 Bottle by mouth daily      nystatin (MYCOSTATIN) 756770 UNIT/GM external powder Apply topically as needed      Ostomy Supplies (RESTORE BARRIER) CREA 2 times daily      potassium chloride ER (KLOR-CON M) 20 MEQ CR tablet TAKE 1 TABLET BY MOUTH EVERY DAY 30 tablet 1    RABEprazole (ACIPHEX) 20 MG EC tablet Take 20 mg by mouth 2 times daily      rectal barrier (DR. FOFANA) Apply topically 2 times daily      senna-docusate (SENOKOT-S/PERICOLACE) 8.6-50 MG tablet Take 2 tablets by mouth 2 times daily      sodium chloride 1 GM tablet TAKE 1 TABLET BY MOUTH TWICE A  tablet 3    tamsulosin (FLOMAX) 0.4 MG capsule Take 1 capsule (0.4 mg) by mouth daily 30 capsule 3    vitamin D3 (CHOLECALCIFEROL) 50 mcg (2000 units) tablet Take 1 tablet by mouth daily       No current facility-administered medications for this visit.       Allergies   Allergen Reactions    Cats Unknown    Haldol [Haloperidol]      Per SNF reporting    Klonopin [Clonazepam]      Per SNF reporting    Lamotrigine      Skin lesions    Oxycodone      Per SNF reporting       I have reviewed the care plan and do agree with the plan.      ROS:  No chest pain, shortness of breath, fever, chills, headache, nausea, vomiting, dysuria, or changes in bowel habits.  Appetite is poor.  Low back pain noted.          OBJECTIVE:  There were no vitals taken for this visit.    GENERAL: Healthy, alert and no distress  EYES: Eyes grossly normal to inspection.  No discharge or erythema, or obvious scleral/conjunctival abnormalities.  RESP: No audible wheeze, cough, or visible cyanosis.  No visible retractions or increased work of breathing.    SKIN: Erythema with serpiginous borders noted with sattelite lesions. No abnormal pigmentation or lesions.  NEURO: Cranial nerves grossly intact.  Mentation impaired. Speech appropriate for age.  PSYCH: Mentation shows impaired cognition affect normal/bright, judgement and insight intact, normal speech and appearance  well-groomed.      Previous      Lab/Diagnostic data:    Reviewed    ASSESSMENT/ORDERS:  Nursing Home Visit  Discussed with staff. Care plan reviewed and orders updated.  Chronic issues are stable. Routine 30 day Nursing Home follow up.     Rash  Improved.  Continue to follow.    Closed compression fracture of L1 lumbar vertebra with routine healing, subsequent encounter  Stable    Lewy body dementia without behavioral disturbance (H)  Stable    Parkinson disease (H)  Stable    Coronary artery disease involving native coronary artery of native heart without angina pectoris  No recurrent symptoms    Hypertension with target blood pressure goal under 150/90  Recent readings reviewed.  Continue same medication regimen        Total time spent on the day of encounter was 25 minutes including obtaining updated history, performing limited physical examination, review of pertinent clinical information and ongoing treatment plan, as well as documentation.        Abran Whitman MD FAAFP [facility]  Geriatrics  Hospice and Palliative Care

## 2024-05-03 NOTE — TELEPHONE ENCOUNTER
Patient continues to come to us every 2 weeks for 1 liter of IV normal saline over 1 hour.     Over the past few months we have noted a marked decline in his overall status and comfort while he is here. Patient ability to transfer has declined significantly-we do have access to appropriate tools to assist. However, despite continuing to attempt to find a comfortable position, he often complains of pain (back, backside/buttocks). He is often not alert or oriented, occasionally he is more so than other days. One of our nurses has reached out to [facility] re concerns/appropriateness of continuing IVF.     Can you initiate a discussion with patient caregivers/family re appropriateness of continuing IVF at this time and if it is truly in patients best interest?

## 2024-05-03 NOTE — PROGRESS NOTES
Infusion Nursing Note:  Jf Ortiz presents today for IVF.    Patient seen by provider today: No   present during visit today: Not Applicable.    Note: See encounter/request routed to Dr Whitman today re concerns over patient comfort/appropriateness of continuing IVF.       Intravenous Access:  Implanted Port.    Treatment Conditions:  Not Applicable.      Post Infusion Assessment:  Patient tolerated infusion without incident but did display discomfort noted in note routed to provider. Once able to make comfortable, he was asleep instantly and slept through rest of infusion. Discomfort resumed on waking/returning to w/c / transferring.   Site patent and intact, free from redness, edema or discomfort.  No evidence of extravasations.  Access discontinued per protocol.       Discharge Plan:   Patient discharged in stable condition accompanied by: medical transport.  Departure Mode: Wheelchair.

## 2024-05-17 NOTE — PATIENT INSTRUCTIONS
Thank you for scheduling your appointment with us today. If you have any questions or concerns related to procedure/medication at today's visit, please contact your primary care provider, or the provider who ordered today's procedure/medication. If you have any questions related to scheduling with our unit, or if you are having trouble getting in contact with your ordering provider, we can be reached at [phone].      At this time, we are holding off on scheduling any further infusions until we hear from Dr Whitman.

## 2024-05-17 NOTE — PROGRESS NOTES
Infusion Nursing Note:  Jf Ortiz presents today for IVF.    Patient seen by provider today: No   present during visit today: Not Applicable.    Note: Patient uncomfortable again on arrival, assist of 3 to transfer to infusion chair. Once able to make him comfortable with use of multiple pillows,etc, patient immediately fell asleep and slept throughout infusion. At end of infusion, patient more alert but confused and wondering why he is here and what we are doing to him, if we made him supper, where he is going when he leaves here--unable to satisfy his questions with answers given. Patient pleasant but appearing distressed at times with questions/answers.       Intravenous Access:  Implanted Port.    Treatment Conditions:  Not Applicable.      Post Infusion Assessment:  Patient tolerated infusion without incident.  Site patent and intact, free from redness, edema or discomfort.  No evidence of extravasations.  Access discontinued per protocol.       Discharge Plan:   Patient discharged in stable condition accompanied by:   Departure Mode: Wheelchair.  Rerouted prior encounter re infusion concerns to Dr Whitman with update. No further appts scheduled and would like to wait for MD input before scheduling any further.

## 2024-06-03 NOTE — TELEPHONE ENCOUNTER
Response from Dr Whitman alerting he will be seeing patient Wednesday and will readdress with family.

## 2024-06-12 NOTE — PROGRESS NOTES
Nursing Home Visit    HISTORY OF PRESENT ILLNESS:  Jf is a 89 year old male (10/5/1933)  resident of [facility] who is being seen today for routine 30 day follow up.     He has a history of Parkinson's disease, Lewy body dementia, atrial fibrillation, coronary heart disease status post aortocoronary bypass as well as stenting, seizure disorder, ans well as recurrent falls.    Jf was admitted from [facility] after being hospitalized with falls and compression fractures of L1 and L2.and in need of pain control.    The patient notes no current complaints.    Discussed with nursing staff who note he has been spending more time in bed.    The patient is seen in his room.  Family is not present.     Medical records are reviewed.    Current medications, allergies, and interdisciplinary care plan are reviewed.      Patient Active Problem List    Diagnosis Date Noted    Orthostatic hypotension dysautonomic syndrome 02/12/2021     Priority: High    Chronic atrial fibrillation (H) 02/12/2021     Priority: High    Longstanding persistent atrial fibrillation (H) 04/13/2020     Priority: High    Coronary artery disease involving native coronary artery without angina pectoris 04/13/2020     Priority: High    Pacemaker, [employer], Dual Chamber - Dependent 10/06/2017     Priority: High    Lewy body dementia without behavioral disturbance (H) 08/31/2017     Priority: High    Dementia without behavioral disturbance (H) 07/28/2015     Priority: High     Diagnosis updated by automated process. Provider to review and confirm.      Parkinson disease (H)      Priority: High    Seizure disorder (H)      Priority: High    Cardiac pacemaker in situ 01/08/2013     Priority: High     Overview:   [employer] Altrua S606 DREL,  Serial #[id number]  5-13-11  Atrial lead [employer] 4054 Serial #[id number]  8-11-00  Ventriculer lead [employer] 4137  Serial #[id number]  5-13-11  2nd Degree AV  Block   Dr. Campbell  Intrinsic:  Pt. is Pacemaker Dependant, remains paced at temp. rate of 30 bpm (6-13-14)    Formatting of this note might be different from the original.  [employer] Altrua S606 DREL,  Serial #[id number]  5-13-11  Atrial lead [employer] 4054 Serial #[id number]  8-11-00  Ventriculer lead [employer] 4137  Serial #[id number]  5-13-11  2nd Degree AV Block   Dr. Campbell  Intrinsic:  Pt. is Pacemaker Dependant, remains paced at temp. rate of 30 bpm (6-13-14)      Hypertension with target blood pressure goal under 150/90 09/05/2006     Priority: High     Overview:   IMO Update      Essential hypertension 09/05/2006     Priority: High     Formatting of this note might be different from the original.  IMO Update      Altered mental status 01/28/2023     Priority: Medium    UTI (urinary tract infection) 01/28/2023     Priority: Medium    Medication reaction, initial encounter 01/28/2023     Priority: Medium    Fall, initial encounter 09/27/2022     Priority: Medium    Hematoma of chest wall, left, initial encounter 04/23/2022     Priority: Medium    Retinal artery branch occlusion 02/13/2021     Priority: Medium    Vision loss of right eye 02/12/2021     Priority: Medium    Closed compression fracture of body of L1 vertebra (H) 02/12/2021     Priority: Medium    Closed wedge compression fracture of lumbar vertebra with routine healing 12/20/2020     Priority: Medium    Compression fracture of L1 lumbar vertebra, closed, initial encounter (H) 12/18/2020     Priority: Medium    Compression fracture of thoracic vertebra, initial encounter, unspecified thoracic vertebral level 12/18/2020     Priority: Medium     Replacing diagnoses that were inactivated after the 10/1/2021 regulatory import.      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 Provider to review      Diaphragmatic hernia 01/01/2011     Priority: Medium     Problem list name updated by automated process. Provider to review      Pain in joint, forearm 07/31/2008     Priority: Medium     Formatting of this note might be different from the original.  IMO Update 10/11      Pain in joint, ankle and foot 07/31/2008     Priority: Medium     Formatting of this note might be different from the original.  IMO Update 10/11      Dysphagia 05/22/2007     Priority: Medium     Overview:   IMO Update    Formatting of this note might be different from the original.  IMO Update      Coronary atherosclerosis of native coronary artery 11/28/2006     Priority: Medium     Overview:   IMO Update 10/11    Formatting of this note might be different from the original.  IMO Update 10/11      Postsurgical aortocoronary bypass status 11/28/2006     Priority: Medium     Overview:   IMO Update 10/11    Formatting of this note might be different from the original.  IMO Update 10/11      Hyperlipidemia 09/05/2006     Priority: Medium     Formatting of this note might be different from the original.  IMO Update 10/11      Nursing Home Visit 03/03/2022     Priority: Low          Social History     Socioeconomic History    Marital status: Single     Spouse name: Not on file    Number of children: Not on file    Years of education: Not on file    Highest education level: Not on file   Occupational History    Occupation: retired   Tobacco Use    Smoking status: Former     Current packs/day: 0.00     Average packs/day: 1 pack/day for 5.0 years (5.0 ttl pk-yrs)     Types: Cigarettes     Start date: 1/1/1980     Quit date:  1985     Years since quittin.4    Smokeless tobacco: Never    Tobacco comments:     quit in    Substance and Sexual Activity    Alcohol use: Yes     Comment: social    Drug use: No    Sexual activity: Not Currently   Other Topics Concern     Service Not Asked    Blood Transfusions Yes    Caffeine Concern Yes     Comment: 1 cup coffee daily    Occupational Exposure Not Asked    Hobby Hazards Not Asked    Sleep Concern Not Asked    Stress Concern Not Asked    Weight Concern Not Asked    Special Diet Not Asked    Back Care Not Asked    Exercise Not Asked    Bike Helmet Not Asked    Seat Belt Not Asked    Self-Exams Not Asked    Parent/sibling w/ CABG, MI or angioplasty before 65F 55M? No   Social History Narrative    Not on file     Social Determinants of Health     Financial Resource Strain: Not on file   Food Insecurity: Not on file   Transportation Needs: Not on file   Physical Activity: Not on file   Stress: Not on file   Social Connections: Not on file   Interpersonal Safety: Not on file   Housing Stability: Not on file        Current Outpatient Medications   Medication Sig Dispense Refill    acetaminophen (TYLENOL) 500 MG tablet Take 1,000 mg by mouth 3 times daily      aspirin (ASA) 81 MG chewable tablet Take 81 mg by mouth daily      bisacodyl (DULCOLAX) 10 MG suppository Place 10 mg rectally daily as needed for constipation      calcium carbonate (OS-ROSALINDA) 1500 (600 Ca) MG tablet Take 600 mg by mouth daily      fludrocortisone (FLORINEF) 0.1 MG tablet Take 1 tablet (0.1 mg) by mouth every morning (before breakfast) 30 tablet 11    magnesium hydroxide (MILK OF MAGNESIA) 400 MG/5ML suspension Take 30 mLs by mouth daily as needed for constipation or heartburn      melatonin 5 MG tablet Take 5 mg by mouth nightly as needed      memantine (NAMENDA) 5 MG tablet Take 5 mg by mouth 2 times daily      mirabegron (MYRBETRIQ) 25 MG 24 hr tablet Take 25 mg by mouth daily      Nutritional Supplements  (BOOST) Take 1 Bottle by mouth daily      nystatin (MYCOSTATIN) 321071 UNIT/GM external powder Apply topically as needed      Ostomy Supplies (RESTORE BARRIER) CREA 2 times daily      potassium chloride ER (KLOR-CON M) 20 MEQ CR tablet TAKE 1 TABLET BY MOUTH EVERY DAY 30 tablet 1    RABEprazole (ACIPHEX) 20 MG EC tablet Take 20 mg by mouth 2 times daily      rectal barrier (DR. FOFANA) Apply topically 2 times daily      senna-docusate (SENOKOT-S/PERICOLACE) 8.6-50 MG tablet Take 2 tablets by mouth 2 times daily      sodium chloride 1 GM tablet TAKE 1 TABLET BY MOUTH TWICE A  tablet 3    tamsulosin (FLOMAX) 0.4 MG capsule Take 1 capsule (0.4 mg) by mouth daily 30 capsule 3    vitamin D3 (CHOLECALCIFEROL) 50 mcg (2000 units) tablet Take 1 tablet by mouth daily       No current facility-administered medications for this visit.       Allergies   Allergen Reactions    Cats Unknown    Haldol [Haloperidol]      Per SNF reporting    Klonopin [Clonazepam]      Per SNF reporting    Lamotrigine      Skin lesions    Oxycodone      Per SNF reporting       I have reviewed the care plan and do agree with the plan.      ROS:  No chest pain, shortness of breath, fever, chills, headache, nausea, vomiting, dysuria, or changes in bowel habits.  Appetite is poor.  Low back pain noted.          OBJECTIVE:  /72   Pulse 72   Temp 97.1  F (36.2  C)   Resp 20   Wt 63.1 kg (139 lb 3.2 oz)   BMI 21.80 kg/m      GENERAL: Healthy, alert and no distress  EYES: Eyes grossly normal to inspection.  No discharge or erythema, or obvious scleral/conjunctival abnormalities.  RESP: No audible wheeze, cough, or visible cyanosis.  No visible retractions or increased work of breathing.    SKIN: Erythema with serpiginous borders noted with sattelite lesions. No abnormal pigmentation or lesions.  NEURO: Cranial nerves grossly intact.  Mentation impaired. Speech appropriate for age.  PSYCH: Mentation shows impaired cognition affect  normal/bright, judgement and insight intact, normal speech and appearance well-groomed.      Previous      Lab/Diagnostic data:    Reviewed    ASSESSMENT/ORDERS:  Nursing Home Visit  Discussed with staff. Care plan reviewed and orders updated.  Chronic issues are stable. Routine 30 day Nursing Home follow up.      Parkinson's disease with dyskinesia without fluctuating manifestations (H)  Progressing.  Will follow.    Autonomic orthostatic hypotension  He is on Florinef, sodium chloride, and has been getting IV fluid infusions.  Reviewed note from infusion therapy and because of his decline the benefits are minimal.  Will discontinue.    Lewy body dementia without behavioral disturbance (H)  On memantine 5 mg twice daily.  Will continue/    Seizure disorder (H)  No further episodes.  Follow.    Coronary artery disease involving native coronary artery of native heart without angina pectoris  Stable.    Chronic atrial fibrillation (H)  On aspirin.  Continue    Cardiac pacemaker in situ  Stable    Pacemaker, [employer], Dual Chamber - Dependent  Stable    Essential hypertension  Available blood pressure readings are reviewed.  Goals discussed. Will continue same antihypertensive regimen. Monitor electrolytes and renal function every 6 months.        Total time spent on the day of encounter was 25 minutes including obtaining updated history, performing limited physical examination, review of pertinent clinical information and ongoing treatment plan, as well as documentation.        Abran Whitman MD FAAFP [facility]  Geriatrics  Hospice and Palliative Care

## 2024-07-01 NOTE — TELEPHONE ENCOUNTER
[facility] Call Center    Phone Message    May a detailed message be left on voicemail: yes     Reason for Call: Other: Patient received a letter stating Dr Campbell is leaving. His daughter would like to know what the next steps are, has he been assigned another provider, does thi effect device checks, Please call Magi at   [phone]  to discuss    Action Taken: Other: cardio    Travel Screening: Not Applicable     Date of Service:

## 2024-07-02 NOTE — TELEPHONE ENCOUNTER
Lor escoto, RN  Cardiology Ep Coordinator- ; Hc [id number] hours ago (2:07 PM)     ANDRES Aguilera,    Can you inform him that it won't impact his device checks, but he should re-establish care with Martina up in [place] at her next available. He hasn't been seen since 2020.    Thanks,  ANDRES

## 2024-07-02 NOTE — TELEPHONE ENCOUNTER
Telephone call placed to Magi.  She stated to call Des Moreno and that she should be able to approve scheduling patient a visit with Martina Portillo as she also has POA.

## 2024-07-03 NOTE — TELEPHONE ENCOUNTER
Late entry for 7/2/24:  Contacted patient's other contact Uda.  She was agreeable to an appt on 7.9.24 with Martina JOSEPH for patient to re-establish care.

## 2024-07-17 NOTE — PROGRESS NOTES
Nursing Home Visit    HISTORY OF PRESENT ILLNESS:  Jf is a 89 year old male (10/5/1933)  resident of [facility] who is being seen today for routine 30 day follow up.     He has a history of Parkinson's disease, Lewy body dementia, atrial fibrillation, coronary heart disease status post aortocoronary bypass as well as stenting, seizure disorder, ans well as recurrent falls.    Jf was admitted from [facility] after being hospitalized with falls and compression fractures of L1 and L2.and in need of pain control.    The patient notes no current complaints.    Discussed with nursing staff who note he has been spending more time in bed.    The patient is seen in his room.  Family is not present.     Medical records are reviewed.    Current medications, allergies, and interdisciplinary care plan are reviewed.      Patient Active Problem List    Diagnosis Date Noted    Orthostatic hypotension dysautonomic syndrome 02/12/2021     Priority: High    Chronic atrial fibrillation (H) 02/12/2021     Priority: High    Longstanding persistent atrial fibrillation (H) 04/13/2020     Priority: High    Coronary artery disease involving native coronary artery without angina pectoris 04/13/2020     Priority: High    Pacemaker, [employer], Dual Chamber - Dependent 10/06/2017     Priority: High    Lewy body dementia without behavioral disturbance (H) 08/31/2017     Priority: High    Dementia without behavioral disturbance (H) 07/28/2015     Priority: High     Diagnosis updated by automated process. Provider to review and confirm.      Parkinson disease (H)      Priority: High    Seizure disorder (H)      Priority: High    Cardiac pacemaker in situ 01/08/2013     Priority: High     Overview:   [employer] Altrua S606 DREL,  Serial #[id number]  5-13-11  Atrial lead [employer] 4054 Serial #[id number]  8-11-00  Ventriculer lead [employer] 4137  Serial #[id number]  5-13-11  2nd Degree AV  Block   Dr. Campbell  Intrinsic:  Pt. is Pacemaker Dependant, remains paced at temp. rate of 30 bpm (6-13-14)    Formatting of this note might be different from the original.  [employer] Altrua S606 DREL,  Serial #[id number]  5-13-11  Atrial lead [employer] 4054 Serial #[id number]  8-11-00  Ventriculer lead [employer] 4137  Serial #[id number]  5-13-11  2nd Degree AV Block   Dr. Campbell  Intrinsic:  Pt. is Pacemaker Dependant, remains paced at temp. rate of 30 bpm (6-13-14)      Coronary atherosclerosis of native coronary artery 11/28/2006     Priority: High     Overview:   IMO Update 10/11    Formatting of this note might be different from the original.  IMO Update 10/11      Hypertension with target blood pressure goal under 150/90 09/05/2006     Priority: High     Overview:   IMO Update      Essential hypertension 09/05/2006     Priority: High     Formatting of this note might be different from the original.  IMO Update      Altered mental status 01/28/2023     Priority: Medium    UTI (urinary tract infection) 01/28/2023     Priority: Medium    Medication reaction, initial encounter 01/28/2023     Priority: Medium    Fall, initial encounter 09/27/2022     Priority: Medium    Hematoma of chest wall, left, initial encounter 04/23/2022     Priority: Medium    Retinal artery branch occlusion 02/13/2021     Priority: Medium    Vision loss of right eye 02/12/2021     Priority: Medium    Closed compression fracture of body of L1 vertebra (H) 02/12/2021     Priority: Medium    Closed wedge compression fracture of lumbar vertebra with routine healing 12/20/2020     Priority: Medium    Compression fracture of L1 lumbar vertebra, closed, initial encounter (H) 12/18/2020     Priority: Medium    Compression fracture of thoracic vertebra, initial encounter, unspecified thoracic vertebral level 12/18/2020     Priority: Medium     Replacing diagnoses that were inactivated after the 10/1/2021 regulatory import.      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 Provider to review      Diaphragmatic hernia 01/01/2011     Priority: Medium     Problem list name updated by automated process. Provider to review      Pain in joint, forearm 07/31/2008     Priority: Medium     Formatting of this note might be different from the original.  IMO Update 10/11      Pain in joint, ankle and foot 07/31/2008     Priority: Medium     Formatting of this note might be different from the original.  IMO Update 10/11      Dysphagia 05/22/2007     Priority: Medium     Overview:   IMO Update    Formatting of this note might be different from the original.  IMO Update      Postsurgical aortocoronary bypass status 11/28/2006     Priority: Medium     Overview:   IMO Update 10/11    Formatting of this note might be different from the original.  IMO Update 10/11      Hyperlipidemia 09/05/2006     Priority: Medium     Formatting of this note might be different from the original.  IMO Update 10/11      Nursing Home Visit 03/03/2022     Priority: Low          Social History     Socioeconomic History    Marital status: Single     Spouse name: Not on file    Number of children: Not on file    Years of education: Not on file    Highest education level: Not on file   Occupational History    Occupation: retired   Tobacco Use    Smoking status: Former     Current packs/day: 0.00     Average packs/day: 1 pack/day for 5.0 years (5.0 ttl pk-yrs)     Types: Cigarettes     Start date: 1/1/1980     Quit date: 1/1/1985      Years since quittin.5    Smokeless tobacco: Never    Tobacco comments:     quit in    Substance and Sexual Activity    Alcohol use: Yes     Comment: social    Drug use: No    Sexual activity: Not Currently   Other Topics Concern     Service Not Asked    Blood Transfusions Yes    Caffeine Concern Yes     Comment: 1 cup coffee daily    Occupational Exposure Not Asked    Hobby Hazards Not Asked    Sleep Concern Not Asked    Stress Concern Not Asked    Weight Concern Not Asked    Special Diet Not Asked    Back Care Not Asked    Exercise Not Asked    Bike Helmet Not Asked    Seat Belt Not Asked    Self-Exams Not Asked    Parent/sibling w/ CABG, MI or angioplasty before 65F 55M? No   Social History Narrative    Not on file     Social Determinants of Health     Financial Resource Strain: Not on file   Food Insecurity: Not on file   Transportation Needs: Not on file   Physical Activity: Not on file   Stress: Not on file   Social Connections: Not on file   Interpersonal Safety: Not on file   Housing Stability: Not on file        Current Outpatient Medications   Medication Sig Dispense Refill    acetaminophen (TYLENOL) 500 MG tablet Take 1,000 mg by mouth 3 times daily      aspirin (ASA) 81 MG chewable tablet Take 81 mg by mouth daily      bisacodyl (DULCOLAX) 10 MG suppository Place 10 mg rectally daily as needed for constipation      calcium carbonate (OS-ROSALINDA) 1500 (600 Ca) MG tablet Take 600 mg by mouth daily      fludrocortisone (FLORINEF) 0.1 MG tablet Take 1 tablet (0.1 mg) by mouth every morning (before breakfast) 30 tablet 11    magnesium hydroxide (MILK OF MAGNESIA) 400 MG/5ML suspension Take 30 mLs by mouth daily as needed for constipation or heartburn      melatonin 5 MG tablet Take 5 mg by mouth nightly as needed      memantine (NAMENDA) 5 MG tablet Take 5 mg by mouth 2 times daily      mirabegron (MYRBETRIQ) 25 MG 24 hr tablet Take 25 mg by mouth daily      Nutritional Supplements (BOOST)  Take 1 Bottle by mouth daily      nystatin (MYCOSTATIN) 729693 UNIT/GM external powder Apply topically as needed      Ostomy Supplies (RESTORE BARRIER) CREA 2 times daily      potassium chloride ER (KLOR-CON M) 20 MEQ CR tablet TAKE 1 TABLET BY MOUTH EVERY DAY 30 tablet 1    RABEprazole (ACIPHEX) 20 MG EC tablet Take 20 mg by mouth 2 times daily      rectal barrier (DR. FOFANA) Apply topically 2 times daily      senna-docusate (SENOKOT-S/PERICOLACE) 8.6-50 MG tablet Take 2 tablets by mouth 2 times daily      sodium chloride 1 GM tablet TAKE 1 TABLET BY MOUTH TWICE A  tablet 3    tamsulosin (FLOMAX) 0.4 MG capsule Take 1 capsule (0.4 mg) by mouth daily 30 capsule 3    vitamin D3 (CHOLECALCIFEROL) 50 mcg (2000 units) tablet Take 1 tablet by mouth daily       No current facility-administered medications for this visit.       Allergies   Allergen Reactions    Cats Unknown    Haldol [Haloperidol]      Per SNF reporting    Klonopin [Clonazepam]      Per SNF reporting    Lamotrigine      Skin lesions    Oxycodone      Per SNF reporting       I have reviewed the care plan and do agree with the plan.      ROS:  No chest pain, shortness of breath, fever, chills, headache, nausea, vomiting, dysuria, or changes in bowel habits.  Appetite is poor.  Low back pain noted.          OBJECTIVE:  /64   Pulse 68   Temp 98  F (36.7  C)   Resp 16   Wt 62.1 kg (136 lb 12.8 oz)   SpO2 94%   BMI 21.43 kg/m      GENERAL: Healthy, alert and no distress  EYES: Eyes grossly normal to inspection.  No discharge or erythema, or obvious scleral/conjunctival abnormalities.  RESP: No audible wheeze, cough, or visible cyanosis.  No visible retractions or increased work of breathing.    SKIN: Erythema with serpiginous borders noted with sattelite lesions. No abnormal pigmentation or lesions.  NEURO: Cranial nerves grossly intact.  Mentation impaired. Speech appropriate for age.  PSYCH: Mentation shows impaired cognition affect  normal/bright, judgement and insight intact, normal speech and appearance well-groomed.      Previous      Lab/Diagnostic data:    Reviewed    ASSESSMENT/ORDERS:  Nursing Home Visit  Discussed with staff. Care plan reviewed and orders updated.  Chronic issues are stable. Routine 30 day Nursing Home follow up.      Parkinson's disease with dyskinesia without fluctuating manifestations (H)  Progressing.  Will follow.    Autonomic orthostatic hypotension  He is on Florinef, sodium chloride, and has been getting IV fluid infusions.  Reviewed note from infusion therapy and because of his decline the benefits are minimal.  Will discontinue.    Lewy body dementia without behavioral disturbance (H)  On memantine 5 mg twice daily.  Will continue/    Seizure disorder (H)  No further episodes.  Follow.    Coronary artery disease involving native coronary artery of native heart without angina pectoris  Stable.    Chronic atrial fibrillation (H)  On aspirin.  Continue    Cardiac pacemaker in situ  Stable    Pacemaker, [employer], Dual Chamber - Dependent  Stable    Essential hypertension  Available blood pressure readings are reviewed.  Goals discussed. Will continue same antihypertensive regimen. Monitor electrolytes and renal function every 6 months.        Total time spent on the day of encounter was 25 minutes including obtaining updated history, performing limited physical examination, review of pertinent clinical information and ongoing treatment plan, as well as documentation.        Abran Whitman MD FAAFP [facility]  Geriatrics  Hospice and Palliative Care

## 2024-08-07 NOTE — ED PROVIDER NOTES
History     Chief Complaint   Patient presents with     Cerebrovascular Accident     HPI  Jf Ortiz is a 87 year old male who presents from eye clinic for painless vision loss in the right eye.  The patient does have a history of high cholesterol and cardiac stents.  He notes he cannot see of his right eye.  Onset was this morning upon awakening.  Last known well last night.  He was seen in the eye clinic and eye clinic did contact us to have the patient evaluated in the emergency department.  He is otherwise asymptomatic.  Patient with no chest pain.  No shortness of breath.  No other associated symptoms.  Duration ongoing.  Character persistent.    Allergies:  Allergies   Allergen Reactions     Cats Unknown     Lamotrigine      Skin lesions       Problem List:    Patient Active Problem List    Diagnosis Date Noted     Closed wedge compression fracture of lumbar vertebra with routine healing 12/20/2020     Priority: Medium     Compression fracture of L1 lumbar vertebra, closed, initial encounter (H) 12/18/2020     Priority: Medium     Compression fracture of thoracic vertebra, initial encounter, unspecified thoracic vertebral level (H) 12/18/2020     Priority: Medium     Longstanding persistent atrial fibrillation (H) 04/13/2020     Priority: Medium     Falls frequently 04/13/2020     Priority: Medium     Frequent falls 04/13/2020     Priority: Medium     Coronary artery disease involving native coronary artery without angina pectoris 04/13/2020     Priority: Medium     Constipation, unspecified constipation type 04/11/2018     Priority: Medium     Pacemaker, [employer], Dual Chamber - Dependent 10/06/2017     Priority: Medium     Lewy body dementia without behavioral disturbance (H) 08/31/2017     Priority: Medium     Fatigue 08/31/2017     Priority: Medium     Urinary incontinence 08/31/2017     Priority: Medium     Autonomic orthostatic hypotension 10/14/2016     Priority: Medium     Dementia without  ----- Message from Umu PARK sent at 8/7/2024  8:20 AM CDT -----  Regarding: please cx  Hi,    No return call received.   Pre assessment was not completed for upcoming scheduled procedure.     Please cancel procedure(8/12 colonoscopy) per policy.     Thank you,  Umu Alfred RN   behavioral disturbance (H) 07/28/2015     Priority: Medium     Diagnosis updated by automated process. Provider to review and confirm.       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 Provider to review       Diaphragmatic hernia 01/01/2011     Priority: Medium     Problem list name updated by automated process. 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     Priority: Medium     Overview:   IMO Update 10/11       Hypertension with target blood pressure goal under 150/90 09/05/2006     Priority: Medium     Overview:   IMO Update          Past Medical History:    Past Medical History:   Diagnosis Date     Autonomic orthostatic hypotension 10/14/2016     Coronary artery disease      Dementia without behavioral disturbance (H) 7/28/2015     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     Seizure disorder (H)      Stented coronary artery        Past Surgical History:    Past Surgical History:   Procedure Laterality Date     ------------OTHER-------------  1955    ulnar and  radial fx - repair ulnar and radial fx x4     ------------OTHER-------------  6/14/2011    cataract extraction     BIOPSY  08/2015    skin biopsy     BLEPHAROPLASTY BILATERAL  5/6/2014    Procedure: BLEPHAROPLASTY BILATERAL;  Surgeon: Andrew Queen MD;  Location: [facility]     BYPASS GRAFT ARTERY CORONARY  11/2006    coronary artery disease x 5, [facility]     cataract extraction and lens implantation  2011    cataracts     cataract extraction and lens implantation      cataracts     COLONOSCOPY  2012     colonoscopy with polypectomy  3/13/2009    history of polyps - repeat 3 yrs     colonoscopy with polypectomy  2006     colonoscopy with polypectomy  2005     COMBINED COLONOSCOPY WITH ARGON PLASMA COAGULATOR (APC) N/A 10/31/2014    Procedure: COMBINED COLONOSCOPY WITH ARGON PLASMA COAGULATOR (APC);  Surgeon: Bassam Aguilar MD;  Location: [facility]     COMBINED COLONOSCOPY WITH ARGON PLASMA COAGULATOR (APC) N/A 11/13/2015    Procedure: COMBINED COLONOSCOPY WITH ARGON PLASMA COAGULATOR (APC);  Surgeon: Bassam Aguilar MD;  Location: [facility]     ENDOSCOPY UPPER, COLONOSCOPY, COMBINED N/A 10/31/2014    Procedure: COMBINED ENDOSCOPY UPPER, COLONOSCOPY;  Surgeon: Bassam Aguilar MD;  Location: [facility]     ENDOSCOPY UPPER, COLONOSCOPY, COMBINED N/A 11/13/2015    Procedure: COMBINED ENDOSCOPY UPPER, COLONOSCOPY;  Surgeon: Bassam Aguilar MD;  Location: [facility]     ESOPHAGOSCOPY, GASTROSCOPY, DUODENOSCOPY (EGD), COMBINED  1/22/2014    Procedure: COMBINED ESOPHAGOSCOPY, GASTROSCOPY, DUODENOSCOPY (EGD);  UPPER ENDOSCOPY(PENA) W/ BIOPSIES;  Surgeon: Patricia Pena MD;  Location: [facility]     HERNIORRHAPHY INGUINAL Right 2/14/2018    Procedure: HERNIORRHAPHY INGUINAL;  OPEN RIGHT INGUINAL HERNIA REPAIR with Mesh;  Surgeon: Virgilio Zaragoza, DO;  Location: [facility]     INSERT PORT VASCULAR ACCESS Right 7/12/2019    Procedure: PORT PLACEMENT;  Surgeon: Lloyd Ram MD;  Location: [facility]     LARYNGOSCOPY WITH MICROSCOPE  1/22/2014     Procedure: LARYNGOSCOPY WITH MICROSCOPE;;  Surgeon: Chayo Duke MD;  Location: [facility]     pacemaker placement      heart block     pacemaker placement      dual-chamber     REMOVE TUBE, MYRINGOTOMY, COMBINED  2014    Procedure: COMBINED REMOVE TUBE, MYRINGOTOMY;  MICRODIRECT LARYNGOSCOPY WITH BIOPSY AND FROZEN SECTIONS removal of right ear tube and myringoplasty;  Surgeon: Chayo Duke MD;  Location: [facility]     REPLACE PACEMAKER GENERATOR N/A 2018    Procedure: REPLACE PACEMAKER GENERATOR;  Pacemaker generator change;  Surgeon: Alma Kaplan MD;  Location: [facility]     stent placement to LAD       ventilation tube  2012    right in office       Family History:    Family History   Problem Relation Age of Onset     Diabetes Mother      Breast Cancer Daughter        Social History:  Marital Status:  Single [1]  Social History     Tobacco Use     Smoking status: Former Smoker     Packs/day: 1.00     Years: 5.00     Pack years: 5.00     Types: Cigarettes     Quit date: 1985     Years since quittin.1     Smokeless tobacco: Never Used     Tobacco comment: quit in    Substance Use Topics     Alcohol use: Yes     Comment: social     Drug use: No        Medications:         acetaminophen (TYLENOL) 325 MG tablet       atorvastatin (LIPITOR) 20 MG tablet       Cholecalciferol (VITAMIN D-3) 25 MCG (1000 UT) CAPS       Coenzyme Q10 (CO Q 10 PO)       diclofenac (VOLTAREN) 1 % topical gel       donepezil (ARICEPT) 10 MG tablet       fish oil-omega-3 fatty acids 1000 MG capsule       fludrocortisone (FLORINEF) 0.1 MG tablet       magnesium 500 MG TABS       melatonin 5 MG tablet       midodrine (PROAMATINE) 5 MG tablet       potassium chloride ER (KLOR-CON M) 20 MEQ CR tablet       RABEprazole (ACIPHEX) 20 MG EC tablet       senna-docusate (SENOKOT-S/PERICOLACE) 8.6-50 MG tablet       sertraline (ZOLOFT) 25 MG tablet       sodium chloride 1 GM tablet       Turmeric 500 MG  TABS          Review of Systems   Respiratory denies.  Cardiovascular denies.  GI denies.   denies.  Neurologic Per HPI.  Remainder of complete 10 point review of systems negative    Physical Exam   BP: 117/68  Pulse: 72  Temp: 97.6  F (36.4  C)  Resp: 18  SpO2: 97 %      Physical Exam  Constitutional:       Appearance: Normal appearance.   HENT:      Head: Normocephalic and atraumatic.      Nose: Nose normal. No congestion.      Mouth/Throat:      Mouth: Mucous membranes are moist.   Eyes:      Extraocular Movements: Extraocular movements intact.      Conjunctiva/sclera: Conjunctivae normal.      Pupils: Pupils are equal, round, and reactive to light.   Neck:      Musculoskeletal: Normal range of motion and neck supple.   Cardiovascular:      Rate and Rhythm: Normal rate.      Pulses: Normal pulses.   Pulmonary:      Effort: Pulmonary effort is normal. No respiratory distress.      Breath sounds: Normal breath sounds.   Abdominal:      General: There is no distension.      Palpations: Abdomen is soft.      Tenderness: There is no abdominal tenderness.   Musculoskeletal: Normal range of motion.         General: No tenderness.   Skin:     General: Skin is warm and dry.      Capillary Refill: Capillary refill takes less than 2 seconds.   Neurological:      Mental Status: He is alert.      Comments: Cranial nerves II through XII intact.  Pupils equal round reactive.  No focal weakness.  Speech is normal.  Patient with right eye vision limited to being able to see hand motion only.  No further detail   Psychiatric:         Mood and Affect: Mood normal.       EKG interpreted in real-time shows no evidence for arrhythmia.  PVCs.  Rate 77, MO interval 160, ,        Results for orders placed or performed during the hospital encounter of 02/12/21 (from the past 24 hour(s))   CBC with platelets differential   Result Value Ref Range    WBC 5.6 4.0 - 11.0 10e9/L    RBC Count 4.55 4.4 - 5.9 10e12/L    Hemoglobin  13.9 13.3 - 17.7 g/dL    Hematocrit 42.0 40.0 - 53.0 %    MCV 92 78 - 100 fl    MCH 30.5 26.5 - 33.0 pg    MCHC 33.1 31.5 - 36.5 g/dL    RDW 12.6 10.0 - 15.0 %    Platelet Count 237 150 - 450 10e9/L    Diff Method Automated Method     % Neutrophils 55.4 %    % Lymphocytes 30.6 %    % Monocytes 9.9 %    % Eosinophils 3.4 %    % Basophils 0.5 %    % Immature Granulocytes 0.2 %    Nucleated RBCs 0 0 /100    Absolute Neutrophil 3.1 1.6 - 8.3 10e9/L    Absolute Lymphocytes 1.7 0.8 - 5.3 10e9/L    Absolute Monocytes 0.6 0.0 - 1.3 10e9/L    Absolute Eosinophils 0.2 0.0 - 0.7 10e9/L    Absolute Basophils 0.0 0.0 - 0.2 10e9/L    Abs Immature Granulocytes 0.0 0 - 0.4 10e9/L    Absolute Nucleated RBC 0.0    Basic metabolic panel   Result Value Ref Range    Sodium 139 133 - 144 mmol/L    Potassium 4.2 3.4 - 5.3 mmol/L    Chloride 108 94 - 109 mmol/L    Carbon Dioxide 28 20 - 32 mmol/L    Anion Gap 3 3 - 14 mmol/L    Glucose 115 (H) 70 - 99 mg/dL    Urea Nitrogen 16 7 - 30 mg/dL    Creatinine 1.12 0.66 - 1.25 mg/dL    GFR Estimate 59 (L) >60 mL/min/[1.73_m2]    GFR Estimate If Black 68 >60 mL/min/[1.73_m2]    Calcium 8.8 8.5 - 10.1 mg/dL   INR   Result Value Ref Range    INR 1.12 0.86 - 1.14   Partial thromboplastin time   Result Value Ref Range    PTT 28 22 - 37 sec   Troponin I   Result Value Ref Range    Troponin I ES <0.015 0.000 - 0.045 ug/L   CRP inflammation   Result Value Ref Range    CRP Inflammation <2.9 0.0 - 8.0 mg/L   Erythrocyte sedimentation rate auto   Result Value Ref Range    Sed Rate 9 0 - 20 mm/h   Glucose by meter   Result Value Ref Range    Glucose 123 (H) 70 - 99 mg/dL   CT Head w/o Contrast    Narrative    PROCEDURE: CT HEAD W/O CONTRAST     HISTORY: Headache, intracranial hemorrhage suspected.    COMPARISON: December 2020    TECHNIQUE:  Helical images of the head from the foramen magnum to the  vertex were obtained without contrast.    FINDINGS: Some enlargement of the ventricle system and cortical  sulci  most likely on the basis of atrophy. There are no masses or  ventricular shifts or extracerebral collections. The brainstem and  cerebellum appear normal. The cranial vault is intact.  The visualized  paranasal sinuses are clear.      Impression    IMPRESSION: No acute brain abnormality.      JACKY JAMES MD   CTA Head Neck with Contrast    Narrative    PROCEDURE: CTA  HEAD NECK WITH CONTRAST 2/12/2021 4:51 PM    HISTORY: Stroke/TIA, assess intracranial arteries; Evaluate for  dissection/thromboembolism    COMPARISONS: None.    Meds/Dose Given: ISOVUE 370 50ML    TECHNIQUE: CT angiogram of the neck and CT angiogram of the brain with  three-dimensional MIPS reconstructions performed on a separate  workstation    FINDINGS: CT angiogram of the neck: The brachiocephalic artery is  widely patent. There is atherosclerotic plaquing without stenosis in  the right subclavian artery. The right vertebral artery is widely  patent to the skull base. There is some calcific plaquing at the right  carotid bifurcation. There is less than 50% stenosis noted in the  proximal internal carotid artery on the right. The left common carotid  artery is widely patent proximally. There is some atherosclerotic  plaquing at the left carotid bifurcation with less than 50% stenosis  noted. The left subclavian and left vertebral arteries appears normal.    CT angiogram of the brain: The right vertebral artery and is in the  posterior inferior cerebellar artery on the right. The basilar artery  is supplied via the left vertebral artery. The basilar artery is  widely patent. The superior cerebellar and posterior cerebral arteries  are normal bilaterally. There is heavy calcific plaquing in both  carotid siphons. The supraclinoid internal carotid arteries are  normal. Both anterior and middle cerebral arteries are widely patent.  There is early branching of the right middle cerebral artery which is  a developmental variation. No intrarenal  stenoses occlusions or  aneurysms are seen.    The muscles of mastication and the parapharyngeal spaces are normal.  The salivary glands are normal. The larynx and upper trachea is  normal. Thyroid gland is normal. Cervical and supraclavicular lymph  nodes appear normal. The upper mediastinal lymph nodes are normal. The  lung apices are clear.         Impression    IMPRESSION: No hemodynamically significant stenoses, occlusions or  aneurysms are seen in the arteries of the neck and brain    JACKY JAMES MD       Medications   0.9% sodium chloride BOLUS (0 mLs Intravenous Stopped 2/12/21 1750)   sodium chloride (PF) 0.9% PF flush 100 mL (100 mLs Intravenous Given 2/12/21 1601)   iopamidol (ISOVUE-370) solution 50 mL (50 mLs Intravenous Given 2/12/21 1601)   aspirin (ASA) tablet 325 mg (325 mg Oral Given 2/12/21 1641)       Assessments & Plan (with Medical Decision Making)   Starting presentation in 87-year-old male who has complete right-sided painless vision loss noted upon awakening today presenting beyond TPA window, asymptomatic otherwise, EKG without arrhythmia, CT head nondiagnostic, CTA without hemodynamically significant stenosis or occlusion.  Aspirin given.  Stroke neurologist advised admission however at this juncture the patient developed left eye blurriness.  Given concern for embolic etiology and possible ongoing vision loss did contact neurology at [facility] in [place] and plan for transfer at this time.  Patient agreeable with plan.  Patient and family updated and agreeable with plan.    New Prescriptions    No medications on file       Final diagnoses:   Visual loss, right eye   Blurry vision, left eye       2/12/2021   [facility]     Abhi Gan MD  02/12/21 2023

## 2024-08-12 NOTE — PROGRESS NOTES
Otolaryngology Note         Chief Complaint:     Patient presents with:  Cerumen Impaction  Procedure: Ear cleaning           History of Present Illness:     Jf Ortiz is a 89 year old male who presents today for ear cleaning.    He was last seen in ENT on 4/9/2024 by this provider for routine ear cleaning.  He has a history of right sided T-tube retained in the middle ear space.  Previously noted perforation in the right ear, when I last saw him the perforation had healed and the retained T-tube remains in the middle ear space.    History of Parkinson's, lives in a skilled nursing facility.  Today Jf denies any concerns.  No acute changes in hearing.  He denies bothersome tinnitus, otalgia, otorrhea.  No complaints of rotary vertigo or facial numbness or weakness.         Medications:     Current Outpatient Rx   Medication Sig Dispense Refill    acetaminophen (TYLENOL) 500 MG tablet Take 1,000 mg by mouth 3 times daily      aspirin (ASA) 81 MG chewable tablet Take 81 mg by mouth daily      bisacodyl (DULCOLAX) 10 MG suppository Place 10 mg rectally daily as needed for constipation      calcium carbonate (OS-ROSALINDA) 1500 (600 Ca) MG tablet Take 600 mg by mouth daily      fludrocortisone (FLORINEF) 0.1 MG tablet Take 1 tablet (0.1 mg) by mouth every morning (before breakfast) 30 tablet 11    magnesium hydroxide (MILK OF MAGNESIA) 400 MG/5ML suspension Take 30 mLs by mouth daily as needed for constipation or heartburn      melatonin 5 MG tablet Take 5 mg by mouth nightly as needed      memantine (NAMENDA) 5 MG tablet Take 5 mg by mouth 2 times daily      mirabegron (MYRBETRIQ) 25 MG 24 hr tablet Take 25 mg by mouth daily      Nutritional Supplements (BOOST) Take 1 Bottle by mouth daily      nystatin (MYCOSTATIN) 680034 UNIT/GM external powder Apply topically as needed      Ostomy Supplies (RESTORE BARRIER) CREA 2 times daily      potassium chloride ER (KLOR-CON M) 20 MEQ CR tablet TAKE 1 TABLET BY MOUTH EVERY DAY 30  This is the number for the IMAGING SERVICES department: [phone].   Call this number to schedule imaging studies you need completed: PET scan and ultrasound of the thyroid   Monday-Friday 7:30am-6pm. Saturday 8am-noon.     Please see a dentist for your remaining teeth - these do not look healthy and your recent CT scan showed you may have dental tooth root infections that should be addressed.     Follow up with Dr. Alvarez in ENT as scheduled.         The electrophysiology *heart rhythm team* will arrange an appointment to be seen in clinic after your hospitalization. If an appointment has not been made on your after visit summary (AVS) by the time you leave the hospital, our staff should contact you to arrange an appointment. At office follow up, we will review any cardiac testing you may have had done and discuss next steps. Any questions please call [phone]      Event Monitors    If you did not get your monitor hooked up when you left the hospital, please call one of the locations listed below to schedule an appointment.     [facility] [phone]  [facility] [phone]  [facility] [phone]  [facility]) [phone]    For suspected arrhythmias that occur infrequently or pass quickly, your doctor might suggest that you wear an event monitor. You may need to wear an event monitor if:  · Your heartbeat is abnormally fast, abnormally slow, or irregular.   · You are already being treated for an abnormal heart rhythm to see how well it is working.   · There is a need to evaluate temporary symptoms, such as palpitations. You might feel that your heart is beating too hard or skipping a beat.   · You have dizziness and/or fainting     Event monitors do not record the heart rhythm continuously, they record the heart rhythm only when an abnormally fast or slow heartbeat occurs or when you activate them. They typically are used for up to one month during which  patients are instructed to trigger the device and record their symptoms, if symptoms occur. Once a recording is obtained, the ECG tracing can be transmitted over the phone to a monitoring station which will analyze the ECG recording and send it to your arrhythmia specialist for interpretation.    Risks  • There are no significant risks involved in wearing an Event monitor other than possible discomfort or skin irritation where the electrode(s) were placed.    What you can expect  In general:  · If you have a cardiac loop monitor, change your sensors as instructed.  · When you have a symptom, push the button to start recording. (Some start recording automatically when an abnormal rhythm is detected.)  · After you do this, stop moving. This will help the device get a good recording. The device should record for several minutes.  · For some event monitors, you will need to send your recordings over the phone to your healthcare provider.  · Someone will review your recording. In some cases, you may need to go see your healthcare provider.  · Follow all instructions about exercise. Sweat can make the sensors come off.  · If you can, avoid items that can disrupt the event monitor. These include magnets, metal detectors, microwave ovens, electric blankets, electric razors, electric toothbrushes, cell phones, and iPods. You will receive specific instruction at the time the monitor is placed.  · When you need to use an electronic device, keep it at least 6 inches away from the monitor.  · You will also need to keep a diary while using your event monitor. Record any symptoms when they happened, and note what you were doing at the time. You may need to wear your event monitor for several days or up to a month.    Results  • Once your monitoring period is over, you'll return the device to your doctor's office, along with the diary you kept while you wore the Event monitor.   • Your doctor will compare the data from the recorder  tablet 1    RABEprazole (ACIPHEX) 20 MG EC tablet Take 20 mg by mouth 2 times daily      rectal barrier (DR. FOFANA) Apply topically 2 times daily      senna-docusate (SENOKOT-S/PERICOLACE) 8.6-50 MG tablet Take 2 tablets by mouth 2 times daily      sodium chloride 1 GM tablet TAKE 1 TABLET BY MOUTH TWICE A  tablet 3    tamsulosin (FLOMAX) 0.4 MG capsule Take 1 capsule (0.4 mg) by mouth daily 30 capsule 3    vitamin D3 (CHOLECALCIFEROL) 50 mcg (2000 units) tablet Take 1 tablet by mouth daily              Allergies:     Allergies: Cats, Haldol [haloperidol], Klonopin [clonazepam], Lamotrigine, and Oxycodone          Past Medical History:     Past Medical History:   Diagnosis Date    Autonomic orthostatic hypotension 10/14/2016    Coronary artery disease     Dementia without behavioral disturbance (H) 7/28/2015    Diagnosis updated by automated process. Provider to review and confirm.    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    Seizure disorder (H)     Stented coronary artery             Past Surgical History:     Past Surgical History:   Procedure Laterality Date    ------------OTHER-------------  1955    ulnar and radial fx - repair ulnar and radial fx x4    ------------OTHER-------------  6/14/2011    cataract extraction    BIOPSY  08/2015    skin biopsy    BLEPHAROPLASTY BILATERAL  5/6/2014    Procedure: BLEPHAROPLASTY BILATERAL;  Surgeon: Andrew Queen MD;  Location: [facility] OR    BYPASS GRAFT ARTERY CORONARY  11/2006    coronary artery disease x 5, [facility]    cataract extraction and lens implantation  2011    cataracts    cataract extraction and lens implantation      cataracts    COLONOSCOPY  2012    colonoscopy with polypectomy  3/13/2009     history of polyps - repeat 3 yrs    colonoscopy with polypectomy  2006    colonoscopy with polypectomy  2005    COMBINED COLONOSCOPY WITH ARGON PLASMA COAGULATOR (APC) N/A 10/31/2014    Procedure: COMBINED COLONOSCOPY WITH ARGON PLASMA COAGULATOR (APC);  Surgeon: Bassam Aguilar MD;  Location: [facility] OR    COMBINED COLONOSCOPY WITH ARGON PLASMA COAGULATOR (APC) N/A 11/13/2015    Procedure: COMBINED COLONOSCOPY WITH ARGON PLASMA COAGULATOR (APC);  Surgeon: Bassam Aguilar MD;  Location: [facility] OR    ENDOSCOPY UPPER, COLONOSCOPY, COMBINED N/A 10/31/2014    Procedure: COMBINED ENDOSCOPY UPPER, COLONOSCOPY;  Surgeon: Bassam Aguilar MD;  Location: [facility]    ENDOSCOPY UPPER, COLONOSCOPY, COMBINED N/A 11/13/2015    Procedure: COMBINED ENDOSCOPY UPPER, COLONOSCOPY;  Surgeon: Bassam Aguilar MD;  Location: [facility]    ESOPHAGOSCOPY, GASTROSCOPY, DUODENOSCOPY (EGD), COMBINED  1/22/2014    Procedure: COMBINED ESOPHAGOSCOPY, GASTROSCOPY, DUODENOSCOPY (EGD);  UPPER ENDOSCOPY(PENA) W/ BIOPSIES;  Surgeon: Patricia Pena MD;  Location: [facility]    HERNIORRHAPHY INGUINAL Right 2/14/2018    Procedure: HERNIORRHAPHY INGUINAL;  OPEN RIGHT INGUINAL HERNIA REPAIR with Mesh;  Surgeon: Virgilio Zaragoza DO;  Location: [facility]    INSERT PORT VASCULAR ACCESS Right 7/12/2019    Procedure: PORT PLACEMENT;  Surgeon: Lloyd Ram MD;  Location: [facility]    LARYNGOSCOPY WITH MICROSCOPE  1/22/2014    Procedure: LARYNGOSCOPY WITH MICROSCOPE;;  Surgeon: Chayo Duke MD;  Location: [facility]    pacemaker placement  2000    heart block    pacemaker placement  2011    dual-chamber    REMOVE TUBE, MYRINGOTOMY, COMBINED  1/22/2014    Procedure: COMBINED REMOVE TUBE, MYRINGOTOMY;  MICRODIRECT LARYNGOSCOPY WITH BIOPSY AND FROZEN SECTIONS removal of right ear tube and myringoplasty;  Surgeon: Chayo Duke MD;  Location: [facility]    REPLACE PACEMAKER GENERATOR N/A 8/8/2018    Procedure: REPLACE PACEMAKER GENERATOR;  Pacemaker generator change;  Surgeon:  and the activities/symptoms you wrote down, and he or she will talk to you about your results.  • The information from the Event monitor may reveal that you have a heart condition, or your doctor may need more tests to find out what may be causing your symptoms.  • In some cases, your doctor may not be able to diagnose your condition based on the results of the Event monitor test, especially if you didn't have any irregular heart rhythms while you wore the monitor.     There are two main types of event monitors: symptom event monitors and memory looping monitors.   • When you activate a symptom event monitor, for the next few minutes, it records the information from the heart’s electrical signal.   • A memory looping monitor does the same thing. However, it also records the information from a few minutes before the device was activated, so data from before, during and after the symptom will be captured.  • Both devices can send your ECG by telephone to a transmission or receiving center in the hospital, doctor’s office or clinic. A staff person receives your ECG “tracing” and gives it to your doctor. It is important to note that cardiac monitors are diagnostic tools and not part of an emergency service. DIAL 911 FOR EMERGENCIES.            Mobile Cardiac Outpatient Telemetry: 1-Lead Patch   • The MCOT 1 Lead Patch System is a Mobile Cardiac Telemetry monitoring system that automatically captures and transmits data on an irregular heartbeat, whether or not you feel a symptom. The patch is worn on your chest to record your heart's rhythm. Each heartbeat is transmitted wirelessly to the Monitor where it is analyzed. If an arrhythmia is detected, the Monitor automatically sends it to [employer] for review and physician notification, if required. The MCOT will empower you to go about your daily routine at home, the office or other location.        Top Tips for Effective Monitoring  • Replace Patch every 5 days or call Bio  Alma Kaplan MD;  Location: [facility] OR    stent placement to LAD  2008    ventilation tube  2012    right in office       ENT family history reviewed         Social History:     Social History     Tobacco Use    Smoking status: Former     Current packs/day: 0.00     Average packs/day: 1 pack/day for 5.0 years (5.0 ttl pk-yrs)     Types: Cigarettes     Start date: 1980     Quit date: 1985     Years since quittin.6    Smokeless tobacco: Never    Tobacco comments:     quit in    Substance Use Topics    Alcohol use: Yes     Comment: social    Drug use: No            Review of Systems:     ROS: See HPI         Physical Exam:     /74 (Cuff Size: Adult Regular)   Pulse 72   Temp 97.2  F (36.2  C) (Tympanic)   SpO2 97%     General -elderly appearing male sitting in wheelchair, sleeping when I entered the room.  He wakes with verbal stimuli.  Transferred from wheelchair to exam room chair with a assist of 2.  Head and Face - Normocephalic and atraumatic, with no gross asymmetry noted.  Parkinsonian facies.    Voice and Breathing - The patient was breathing comfortably without the use of accessory muscles. There was no wheezing, stridor. The patients voice has a gravelly hoarseness.  Ears - The ears were examined with binocular microscopy and with otoscope.  External ears normal. Right canal cerumen impacted.  Left canal cerumen impacted.  The right ear was cleaned with cerumen loop, #7 Carr.   Right tympanic membrane appears intact with retained tube noted behind the tympanic membrane.  No grace perforation.  There is a mild serous effusion noted superiorly.  The left ear was cleaned with cerumen loop, #7 Carr.  Left tympanic membrane is intact without effusion, retraction or mass.  Eyes - Extraocular movements intact, sclera were not icteric or injected  Neck - No grace palpable lymphadenopathy   Nose - External contour is symmetric, no gross deflection or scars.  Nasal mucosa is pink and  Tel at [phone] for assistance in replacing patch.  • Note: DO NOT open a patch pouch until it is time to replace the patch.  • Keep Monitor charged and within 10 feet of patch for proper monitoring to occur.  • Record a manual event when you experience dizziness, chest pain or other symptoms. Remember to complete the symptom and activity screens on the Monitor.  • DO NOT swim or submerge patch in water. Patch CAN be worn in the shower.  • DO NOT participate in strenuous or extensive physical activity for proper monitoring to occur.  • [employer] may call you during your service. Please answer calls from [phone], [phone] or other [phone] numbers.  If you have any questions, please call [employer] at [phone]    Mobile Cardiac Telemetry: 3-Lead Monitor  • The MCT 3L Mobile Cardiac Telemetry monitoring system automatically captures and transmits data on an irregular heartbeat, whether or not you feel a symptom.   • The small sensor and electrodes are worn on your chest to record your heart's rhythm.   • Each heartbeat is transmitted wirelessly to the Monitor where it is analyzed. If an arrhythmia is detected, the Monitor automatically sends it to [employer] for review and physician notification, if required.   • The MCT 3L system will empower you to go about your daily routine at home, the office or other location.      Top Tips for Effective Monitoring  • Wear the sensor for the duration of prescribed time (except when bathing or showering), even when you do not feel symptoms.  • Keep Monitor charged and within 10 feet of sensor for proper monitoring to occur.  • Record a manual event when you experience dizziness, chest pain or other symptoms. Remember to complete the symptom and activity screens on the Monitor. This will help your physician make a better diagnosis.  • Replace electrodes every 5 days or if they become loose. Change electrodes after taking a bath.  • Change sensor battery when  moist with no abnormal mucus.  The septum and turbinates were evaluated with nasal speculum, no polyps, masses, or purulence noted on examination.           Assessment and Plan:       ICD-10-CM    1. Retained myringotomy tube in right ear  Z96.22       2. Bilateral impacted cerumen  H61.23       3. Parkinson's disease, unspecified whether dyskinesia present, unspecified whether manifestations fluctuate (H)  G20.A1       4. Middle ear effusion, right  H65.91         Will continue to monitor for right middle ear effusion.  Follow-up in 4 months for repeat ear cleaning  Follow-up sooner with concerns or changes in symptoms  If effusion remains at follow-up we can discuss indications for myringotomy with tube placement.  At this time the effusion is not bothersome to him, no concerns for infection.  Concerns that interventions may be more bothersome than the effusion at this time.      Char Avila NP-C  [facility] ENT     sensor beeps 2 times or low battery message appears on the Monitor screen. Batteries can last 4-5 days.  • [employer] may call you during your service. Please answer calls from [phone] or other [phone] numbers.  If you have any questions, please call [employer] at [phone].

## 2024-08-12 NOTE — LETTER
8/12/2024      Jf Ortiz  [address]  [place] [address]      Dear Colleague,    Thank you for referring your patient, Jf Ortiz, to the [facility]. Please see a copy of my visit note below.    Otolaryngology Note         Chief Complaint:     Patient presents with:  Cerumen Impaction  Procedure: Ear cleaning           History of Present Illness:     Jf Ortiz is a 89 year old male who presents today for ear cleaning.    He was last seen in ENT on 4/9/2024 by this provider for routine ear cleaning.  He has a history of right sided T-tube retained in the middle ear space.  Previously noted perforation in the right ear, when I last saw him the perforation had healed and the retained T-tube remains in the middle ear space.    History of Parkinson's, lives in a skilled nursing facility.  Today Jf denies any concerns.  No acute changes in hearing.  He denies bothersome tinnitus, otalgia, otorrhea.  No complaints of rotary vertigo or facial numbness or weakness.         Medications:     Current Outpatient Rx   Medication Sig Dispense Refill     acetaminophen (TYLENOL) 500 MG tablet Take 1,000 mg by mouth 3 times daily       aspirin (ASA) 81 MG chewable tablet Take 81 mg by mouth daily       bisacodyl (DULCOLAX) 10 MG suppository Place 10 mg rectally daily as needed for constipation       calcium carbonate (OS-ROSALINDA) 1500 (600 Ca) MG tablet Take 600 mg by mouth daily       fludrocortisone (FLORINEF) 0.1 MG tablet Take 1 tablet (0.1 mg) by mouth every morning (before breakfast) 30 tablet 11     magnesium hydroxide (MILK OF MAGNESIA) 400 MG/5ML suspension Take 30 mLs by mouth daily as needed for constipation or heartburn       melatonin 5 MG tablet Take 5 mg by mouth nightly as needed       memantine (NAMENDA) 5 MG tablet Take 5 mg by mouth 2 times daily       mirabegron (MYRBETRIQ) 25 MG 24 hr tablet Take 25 mg by mouth daily       Nutritional Supplements (BOOST) Take 1 Bottle by mouth daily        nystatin (MYCOSTATIN) 217864 UNIT/GM external powder Apply topically as needed       Ostomy Supplies (RESTORE BARRIER) CREA 2 times daily       potassium chloride ER (KLOR-CON M) 20 MEQ CR tablet TAKE 1 TABLET BY MOUTH EVERY DAY 30 tablet 1     RABEprazole (ACIPHEX) 20 MG EC tablet Take 20 mg by mouth 2 times daily       rectal barrier (DR. FOFANA) Apply topically 2 times daily       senna-docusate (SENOKOT-S/PERICOLACE) 8.6-50 MG tablet Take 2 tablets by mouth 2 times daily       sodium chloride 1 GM tablet TAKE 1 TABLET BY MOUTH TWICE A  tablet 3     tamsulosin (FLOMAX) 0.4 MG capsule Take 1 capsule (0.4 mg) by mouth daily 30 capsule 3     vitamin D3 (CHOLECALCIFEROL) 50 mcg (2000 units) tablet Take 1 tablet by mouth daily              Allergies:     Allergies: Cats, Haldol [haloperidol], Klonopin [clonazepam], Lamotrigine, and Oxycodone          Past Medical History:     Past Medical History:   Diagnosis Date     Autonomic orthostatic hypotension 10/14/2016     Coronary artery disease      Dementia without behavioral disturbance (H) 7/28/2015    Diagnosis updated by automated process. Provider to review and confirm.     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     Seizure disorder (H)      Stented coronary artery             Past Surgical History:     Past Surgical History:   Procedure Laterality Date     ------------OTHER-------------  1955    ulnar and radial fx - repair ulnar and radial fx x4     ------------OTHER-------------  6/14/2011    cataract extraction     BIOPSY  08/2015    skin biopsy     BLEPHAROPLASTY BILATERAL  5/6/2014    Procedure: BLEPHAROPLASTY BILATERAL;  Surgeon: Andrew Queen MD;  Location: [facility]     BYPASS GRAFT  ARTERY CORONARY  11/2006    coronary artery disease x 5, [facility]     cataract extraction and lens implantation  2011    cataracts     cataract extraction and lens implantation      cataracts     COLONOSCOPY  2012     colonoscopy with polypectomy  3/13/2009    history of polyps - repeat 3 yrs     colonoscopy with polypectomy  2006     colonoscopy with polypectomy  2005     COMBINED COLONOSCOPY WITH ARGON PLASMA COAGULATOR (APC) N/A 10/31/2014    Procedure: COMBINED COLONOSCOPY WITH ARGON PLASMA COAGULATOR (APC);  Surgeon: Bassam Aguilar MD;  Location: [facility]     COMBINED COLONOSCOPY WITH ARGON PLASMA COAGULATOR (APC) N/A 11/13/2015    Procedure: COMBINED COLONOSCOPY WITH ARGON PLASMA COAGULATOR (APC);  Surgeon: Bassam Aguilar MD;  Location: [facility]     ENDOSCOPY UPPER, COLONOSCOPY, COMBINED N/A 10/31/2014    Procedure: COMBINED ENDOSCOPY UPPER, COLONOSCOPY;  Surgeon: Bassam Aguilar MD;  Location: [facility]     ENDOSCOPY UPPER, COLONOSCOPY, COMBINED N/A 11/13/2015    Procedure: COMBINED ENDOSCOPY UPPER, COLONOSCOPY;  Surgeon: Bassam Aguilar MD;  Location: [facility]     ESOPHAGOSCOPY, GASTROSCOPY, DUODENOSCOPY (EGD), COMBINED  1/22/2014    Procedure: COMBINED ESOPHAGOSCOPY, GASTROSCOPY, DUODENOSCOPY (EGD);  UPPER ENDOSCOPY(PENA) W/ BIOPSIES;  Surgeon: Patricia Pena MD;  Location: [facility]     HERNIORRHAPHY INGUINAL Right 2/14/2018    Procedure: HERNIORRHAPHY INGUINAL;  OPEN RIGHT INGUINAL HERNIA REPAIR with Mesh;  Surgeon: Virgilio Zaragoza DO;  Location: [facility]     INSERT PORT VASCULAR ACCESS Right 7/12/2019    Procedure: PORT PLACEMENT;  Surgeon: Lloyd Ram MD;  Location: [facility]     LARYNGOSCOPY WITH MICROSCOPE  1/22/2014    Procedure: LARYNGOSCOPY WITH MICROSCOPE;;  Surgeon: Chayo Duke MD;  Location: [facility]     pacemaker placement  2000    heart block     pacemaker placement  2011    dual-chamber     REMOVE TUBE, MYRINGOTOMY, COMBINED  1/22/2014    Procedure: COMBINED REMOVE TUBE, MYRINGOTOMY;   MICRODIRECT LARYNGOSCOPY WITH BIOPSY AND FROZEN SECTIONS removal of right ear tube and myringoplasty;  Surgeon: Chayo Duke MD;  Location: [facility]     REPLACE PACEMAKER GENERATOR N/A 2018    Procedure: REPLACE PACEMAKER GENERATOR;  Pacemaker generator change;  Surgeon: Alma Kaplan MD;  Location: [facility]     stent placement to LAD       ventilation tube  2012    right in office       ENT family history reviewed         Social History:     Social History     Tobacco Use     Smoking status: Former     Current packs/day: 0.00     Average packs/day: 1 pack/day for 5.0 years (5.0 ttl pk-yrs)     Types: Cigarettes     Start date: 1980     Quit date: 1985     Years since quittin.6     Smokeless tobacco: Never     Tobacco comments:     quit in    Substance Use Topics     Alcohol use: Yes     Comment: social     Drug use: No            Review of Systems:     ROS: See HPI         Physical Exam:     /74 (Cuff Size: Adult Regular)   Pulse 72   Temp 97.2  F (36.2  C) (Tympanic)   SpO2 97%     General -elderly appearing male sitting in wheelchair, sleeping when I entered the room.  He wakes with verbal stimuli.  Transferred from wheelchair to exam room chair with a assist of 2.  Head and Face - Normocephalic and atraumatic, with no gross asymmetry noted.  Parkinsonian facies.    Voice and Breathing - The patient was breathing comfortably without the use of accessory muscles. There was no wheezing, stridor. The patients voice has a gravelly hoarseness.  Ears - The ears were examined with binocular microscopy and with otoscope.  External ears normal. Right canal cerumen impacted.  Left canal cerumen impacted.  The right ear was cleaned with cerumen loop, #7 Carr.   Right tympanic membrane appears intact with retained tube noted behind the tympanic membrane.  No grace perforation.  There is a mild serous effusion noted superiorly.  The left ear was cleaned with cerumen loop, #7  Bruno.  Left tympanic membrane is intact without effusion, retraction or mass.  Eyes - Extraocular movements intact, sclera were not icteric or injected  Neck - No grace palpable lymphadenopathy   Nose - External contour is symmetric, no gross deflection or scars.  Nasal mucosa is pink and moist with no abnormal mucus.  The septum and turbinates were evaluated with nasal speculum, no polyps, masses, or purulence noted on examination.           Assessment and Plan:       ICD-10-CM    1. Retained myringotomy tube in right ear  Z96.22       2. Bilateral impacted cerumen  H61.23       3. Parkinson's disease, unspecified whether dyskinesia present, unspecified whether manifestations fluctuate (H)  G20.A1       4. Middle ear effusion, right  H65.91         Will continue to monitor for right middle ear effusion.  Follow-up in 4 months for repeat ear cleaning  Follow-up sooner with concerns or changes in symptoms  If effusion remains at follow-up we can discuss indications for myringotomy with tube placement.  At this time the effusion is not bothersome to him, no concerns for infection.  Concerns that interventions may be more bothersome than the effusion at this time.      Char CLARKE  [facility]    Again, thank you for allowing me to participate in the care of your patient.        Sincerely,        Char Avila NP

## 2024-08-12 NOTE — PATIENT INSTRUCTIONS
Thank you for allowing Char Avila and our ENT team to participate in your care.  If your medications are too expensive, please give the nurse a call.  We can possibly change this medication.  If you have a scheduling or an appointment question please contact our Health Unit Coordinator at their direct line [phone].   ALL nursing questions or concerns can be directed to your ENT nurse at: [phone] - Rose     Ears cleaned   Right tube is retained in the middle ear space.  Will continue to monitor.  No necessary intervention currently.  Follow up in 6 months for ear cleaning and ear check.

## 2024-08-13 NOTE — PATIENT INSTRUCTIONS
It was a pleasure to see you in clinic today.  Please do not hesitate to call with any questions or concerns.  You are scheduled for remote transmissions on 11/15/24, 2/18/25 and 5/19/25.  We look forward to seeing you in clinic at your next device check in 1 year.    Brandi Hernandez, RN, MS, CCRN  Electrophysiology Nurse Clinician  [facility]    During Business Hours Please Call:  [phone]  After Hours Please Call:  [phone] - select option #4 and ask for job code [phone]

## 2024-08-24 NOTE — PROGRESS NOTES
Nursing Home Visit    HISTORY OF PRESENT ILLNESS:  Jf is a 89 year old male (10/5/1933)  resident of [facility] who is being seen today for routine 30 day follow up.     He has a history of Parkinson's disease, Lewy body dementia, atrial fibrillation, coronary heart disease status post aortocoronary bypass as well as stenting, seizure disorder, ans well as recurrent falls.    Jf was admitted from [facility] after being hospitalized with falls and compression fractures of L1 and L2.and in need of pain control.    The patient notes no current complaints.    Discussed with nursing staff who note he has been spending more time in bed.    The patient is seen in his room.  Family is not present.     Medical records are reviewed.    Current medications, allergies, and interdisciplinary care plan are reviewed.      Patient Active Problem List    Diagnosis Date Noted    Orthostatic hypotension dysautonomic syndrome 02/12/2021     Priority: High    Chronic atrial fibrillation (H) 02/12/2021     Priority: High    Longstanding persistent atrial fibrillation (H) 04/13/2020     Priority: High    Coronary artery disease involving native coronary artery without angina pectoris 04/13/2020     Priority: High    Pacemaker, [employer], Dual Chamber - Dependent 10/06/2017     Priority: High    Lewy body dementia without behavioral disturbance (H) 08/31/2017     Priority: High    Dementia without behavioral disturbance (H) 07/28/2015     Priority: High     Diagnosis updated by automated process. Provider to review and confirm.      Parkinson disease (H)      Priority: High    Seizure disorder (H)      Priority: High    Cardiac pacemaker in situ 01/08/2013     Priority: High     Overview:   [employer] Altrua S606 DREL,  Serial #[id number]  5-13-11  Atrial lead [employer] 4054 Serial #[id number]  8-11-00  Ventriculer lead [employer] 4137  Serial #[id number]  5-13-11  2nd Degree AV  Block   Dr. Campbell  Intrinsic:  Pt. is Pacemaker Dependant, remains paced at temp. rate of 30 bpm (6-13-14)    Formatting of this note might be different from the original.  [employer] Altrua S606 DREL,  Serial #[id number]  5-13-11  Atrial lead [employer] 4054 Serial #[id number]  8-11-00  Ventriculer lead [employer] 4137  Serial #[id number]  5-13-11  2nd Degree AV Block   Dr. Campbell  Intrinsic:  Pt. is Pacemaker Dependant, remains paced at temp. rate of 30 bpm (6-13-14)      Coronary atherosclerosis of native coronary artery 11/28/2006     Priority: High     Overview:   IMO Update 10/11    Formatting of this note might be different from the original.  IMO Update 10/11      Hypertension with target blood pressure goal under 150/90 09/05/2006     Priority: High     Overview:   IMO Update      Essential hypertension 09/05/2006     Priority: High     Formatting of this note might be different from the original.  IMO Update      Altered mental status 01/28/2023     Priority: Medium    UTI (urinary tract infection) 01/28/2023     Priority: Medium    Medication reaction, initial encounter 01/28/2023     Priority: Medium    Fall, initial encounter 09/27/2022     Priority: Medium    Hematoma of chest wall, left, initial encounter 04/23/2022     Priority: Medium    Retinal artery branch occlusion 02/13/2021     Priority: Medium    Vision loss of right eye 02/12/2021     Priority: Medium    Closed compression fracture of body of L1 vertebra (H) 02/12/2021     Priority: Medium    Closed wedge compression fracture of lumbar vertebra with routine healing 12/20/2020     Priority: Medium    Compression fracture of L1 lumbar vertebra, closed, initial encounter (H) 12/18/2020     Priority: Medium    Compression fracture of thoracic vertebra, initial encounter, unspecified thoracic vertebral level 12/18/2020     Priority: Medium     Replacing diagnoses that were inactivated after the 10/1/2021 regulatory import.      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 Provider to review      Diaphragmatic hernia 01/01/2011     Priority: Medium     Problem list name updated by automated process. Provider to review      Pain in joint, forearm 07/31/2008     Priority: Medium     Formatting of this note might be different from the original.  IMO Update 10/11      Pain in joint, ankle and foot 07/31/2008     Priority: Medium     Formatting of this note might be different from the original.  IMO Update 10/11      Dysphagia 05/22/2007     Priority: Medium     Overview:   IMO Update    Formatting of this note might be different from the original.  IMO Update      Postsurgical aortocoronary bypass status 11/28/2006     Priority: Medium     Overview:   IMO Update 10/11    Formatting of this note might be different from the original.  IMO Update 10/11      Hyperlipidemia 09/05/2006     Priority: Medium     Formatting of this note might be different from the original.  IMO Update 10/11      Nursing Home Visit 03/03/2022     Priority: Low          Social History     Socioeconomic History    Marital status: Single     Spouse name: Not on file    Number of children: Not on file    Years of education: Not on file    Highest education level: Not on file   Occupational History    Occupation: retired   Tobacco Use    Smoking status: Former     Current packs/day: 0.00     Average packs/day: 1 pack/day for 5.0 years (5.0 ttl pk-yrs)     Types: Cigarettes     Start date: 1/1/1980     Quit date: 1/1/1985      Years since quittin.6    Smokeless tobacco: Never    Tobacco comments:     quit in    Substance and Sexual Activity    Alcohol use: Yes     Comment: social    Drug use: No    Sexual activity: Not Currently   Other Topics Concern     Service Not Asked    Blood Transfusions Yes    Caffeine Concern Yes     Comment: 1 cup coffee daily    Occupational Exposure Not Asked    Hobby Hazards Not Asked    Sleep Concern Not Asked    Stress Concern Not Asked    Weight Concern Not Asked    Special Diet Not Asked    Back Care Not Asked    Exercise Not Asked    Bike Helmet Not Asked    Seat Belt Not Asked    Self-Exams Not Asked    Parent/sibling w/ CABG, MI or angioplasty before 65F 55M? No   Social History Narrative    Not on file     Social Determinants of Health     Financial Resource Strain: Not on file   Food Insecurity: Not on file   Transportation Needs: Not on file   Physical Activity: Not on file   Stress: Not on file   Social Connections: Not on file   Interpersonal Safety: Not on file   Housing Stability: Not on file        Current Outpatient Medications   Medication Sig Dispense Refill    acetaminophen (TYLENOL) 500 MG tablet Take 1,000 mg by mouth 3 times daily      aspirin (ASA) 81 MG chewable tablet Take 81 mg by mouth daily      bisacodyl (DULCOLAX) 10 MG suppository Place 10 mg rectally daily as needed for constipation      calcium carbonate (OS-ROSALINDA) 1500 (600 Ca) MG tablet Take 600 mg by mouth daily      fludrocortisone (FLORINEF) 0.1 MG tablet Take 1 tablet (0.1 mg) by mouth every morning (before breakfast) 30 tablet 11    magnesium hydroxide (MILK OF MAGNESIA) 400 MG/5ML suspension Take 30 mLs by mouth daily as needed for constipation or heartburn      melatonin 5 MG tablet Take 5 mg by mouth nightly as needed      memantine (NAMENDA) 5 MG tablet Take 5 mg by mouth 2 times daily      mirabegron (MYRBETRIQ) 25 MG 24 hr tablet Take 25 mg by mouth daily      Nutritional Supplements (BOOST)  Take 1 Bottle by mouth daily      nystatin (MYCOSTATIN) 387315 UNIT/GM external powder Apply topically as needed      Ostomy Supplies (RESTORE BARRIER) CREA 2 times daily      potassium chloride ER (KLOR-CON M) 20 MEQ CR tablet TAKE 1 TABLET BY MOUTH EVERY DAY 30 tablet 1    RABEprazole (ACIPHEX) 20 MG EC tablet Take 20 mg by mouth 2 times daily      rectal barrier (DR. FOFANA) Apply topically 2 times daily      senna-docusate (SENOKOT-S/PERICOLACE) 8.6-50 MG tablet Take 2 tablets by mouth 2 times daily      sodium chloride 1 GM tablet TAKE 1 TABLET BY MOUTH TWICE A  tablet 3    tamsulosin (FLOMAX) 0.4 MG capsule Take 1 capsule (0.4 mg) by mouth daily 30 capsule 3    vitamin D3 (CHOLECALCIFEROL) 50 mcg (2000 units) tablet Take 1 tablet by mouth daily       No current facility-administered medications for this visit.       Allergies   Allergen Reactions    Cats Unknown    Haldol [Haloperidol]      Per SNF reporting    Klonopin [Clonazepam]      Per SNF reporting    Lamotrigine      Skin lesions    Oxycodone      Per SNF reporting       I have reviewed the care plan and do agree with the plan.      ROS:  No chest pain, shortness of breath, fever, chills, headache, nausea, vomiting, dysuria, or changes in bowel habits.  Appetite is poor.  Low back pain noted.          OBJECTIVE:  There were no vitals taken for this visit.    GENERAL: Healthy, alert and no distress  EYES: Eyes grossly normal to inspection.  No discharge or erythema, or obvious scleral/conjunctival abnormalities.  RESP: No audible wheeze, cough, or visible cyanosis.  No visible retractions or increased work of breathing.    SKIN: Erythema with serpiginous borders noted with sattelite lesions. No abnormal pigmentation or lesions.  NEURO: Cranial nerves grossly intact.  Mentation impaired. Speech appropriate for age.  PSYCH: Mentation shows impaired cognition affect normal/bright, judgement and insight intact, normal speech and appearance  well-groomed.      Previous      Lab/Diagnostic data:    Reviewed    ASSESSMENT/ORDERS:  Nursing Home Visit  Discussed with staff. Care plan reviewed and orders updated.  Chronic issues are stable. Routine 30 day Nursing Home follow up.      Parkinson's disease with dyskinesia without fluctuating manifestations (H)  Progressing.  Will follow.    Autonomic orthostatic hypotension  He is on Florinef, sodium chloride, and has been getting IV fluid infusions.  Reviewed note from infusion therapy and because of his decline the benefits are minimal.  Will discontinue.    Lewy body dementia without behavioral disturbance (H)  On memantine 5 mg twice daily.  Will continue/    Seizure disorder (H)  No further episodes.  Follow.    Coronary artery disease involving native coronary artery of native heart without angina pectoris  Stable.    Chronic atrial fibrillation (H)  On aspirin.  Continue    Cardiac pacemaker in situ  Stable    Pacemaker, [employer], Dual Chamber - Dependent  Stable    Essential hypertension  Available blood pressure readings are reviewed.  Goals discussed. Will continue same antihypertensive regimen. Monitor electrolytes and renal function every 6 months.        Total time spent on the day of encounter was 25 minutes including obtaining updated history, performing limited physical examination, review of pertinent clinical information and ongoing treatment plan, as well as documentation.        Abran Whitman MD FAAFP [facility]  Geriatrics  Hospice and Palliative Care

## 2024-09-23 NOTE — NURSING NOTE
IVF therapy plan remains in/signed by Dr MALIA Ray, who is no longer in practice. No current PCP listed. Plan sent to Dr Wilson asking if she will co-sign.  
See note 3-9-22 from Juan J ESPOSITO.   
Statement Selected
yes

## 2024-10-07 VITALS
SYSTOLIC BLOOD PRESSURE: 120 MMHG | BODY MASS INDEX: 21.07 KG/M2 | TEMPERATURE: 97 F | OXYGEN SATURATION: 93 % | HEART RATE: 74 BPM | WEIGHT: 134.5 LBS | RESPIRATION RATE: 16 BRPM | DIASTOLIC BLOOD PRESSURE: 76 MMHG

## 2024-10-09 ENCOUNTER — NURSING HOME VISIT (OUTPATIENT)
Dept: GERIATRICS | Facility: OTHER | Age: 89
End: 2024-10-09
Attending: FAMILY MEDICINE
Payer: MEDICARE

## 2024-10-09 DIAGNOSIS — I48.20 CHRONIC ATRIAL FIBRILLATION (H): ICD-10-CM

## 2024-10-09 DIAGNOSIS — Z78.9 NURSING HOME RESIDENT: Primary | ICD-10-CM

## 2024-10-09 DIAGNOSIS — I95.1 AUTONOMIC ORTHOSTATIC HYPOTENSION: ICD-10-CM

## 2024-10-09 DIAGNOSIS — G20.B1 PARKINSON'S DISEASE WITH DYSKINESIA WITHOUT FLUCTUATING MANIFESTATIONS (H): ICD-10-CM

## 2024-10-09 DIAGNOSIS — I10 ESSENTIAL HYPERTENSION: ICD-10-CM

## 2024-10-09 DIAGNOSIS — F02.80 LEWY BODY DEMENTIA WITHOUT BEHAVIORAL DISTURBANCE (H): ICD-10-CM

## 2024-10-09 DIAGNOSIS — G31.83 LEWY BODY DEMENTIA WITHOUT BEHAVIORAL DISTURBANCE (H): ICD-10-CM

## 2024-10-09 DIAGNOSIS — G40.909 SEIZURE DISORDER (H): ICD-10-CM

## 2024-10-09 PROCEDURE — 99309 SBSQ NF CARE MODERATE MDM 30: CPT | Performed by: FAMILY MEDICINE

## 2024-10-13 NOTE — PROGRESS NOTES
Nursing Home Visit    HISTORY OF PRESENT ILLNESS:  Jf is a 89 year old male (10/5/1933)  resident of [facility] who is being seen today for routine 30 day follow up.     He has a history of Parkinson's disease, Lewy body dementia, atrial fibrillation, coronary heart disease status post aortocoronary bypass as well as stenting, seizure disorder, ans well as recurrent falls.    Jf was admitted from [facility] after being hospitalized with falls and compression fractures of L1 and L2.and in need of pain control.    The patient notes no current complaints.    Discussed with nursing staff who note he has been spending more time in bed.    The patient is seen in his room.  Family is not present.     Medical records are reviewed.    Current medications, allergies, and interdisciplinary care plan are reviewed.      Patient Active Problem List    Diagnosis Date Noted    Orthostatic hypotension dysautonomic syndrome 02/12/2021     Priority: High    Chronic atrial fibrillation (H) 02/12/2021     Priority: High    Longstanding persistent atrial fibrillation (H) 04/13/2020     Priority: High    Coronary artery disease involving native coronary artery without angina pectoris 04/13/2020     Priority: High    Pacemaker, [employer], Dual Chamber - Dependent 10/06/2017     Priority: High    Lewy body dementia without behavioral disturbance (H) 08/31/2017     Priority: High    Dementia without behavioral disturbance (H) 07/28/2015     Priority: High     Diagnosis updated by automated process. Provider to review and confirm.      Parkinson disease (H)      Priority: High    Seizure disorder (H)      Priority: High    Cardiac pacemaker in situ 01/08/2013     Priority: High     Overview:   [employer] Alt[employer]a S606 DREL,  Serial #[id number]  5-13-11  Atrial lead [employer] 4054 Serial #[id number]  8-11-00  Ventriculer lead [employer] 4137  Serial #[id number]  5-13-11  2nd Degree AV  Block   Dr. Campbell  Intrinsic:  Pt. is Pacemaker Dependant, remains paced at temp. rate of 30 bpm (6-13-14)    Formatting of this note might be different from the original.  [employer] Alt[employer] S606 DREL,  Serial #[id number]  5-13-11  Atrial lead [employer] 4054 Serial #[id number]  8-11-00  Ventriculer lead [employer] 4137  Serial #[id number]  5-13-11  2nd Degree AV Block   Dr. Campbell  Intrinsic:  Pt. is Pacemaker Dependant, remains paced at temp. rate of 30 bpm (6-13-14)      Coronary atherosclerosis of native coronary artery 11/28/2006     Priority: High     Overview:   IMO Update 10/11    Formatting of this note might be different from the original.  IMO Update 10/11      Hypertension with target blood pressure goal under 150/90 09/05/2006     Priority: High     Overview:   IMO Update      Essential hypertension 09/05/2006     Priority: High     Formatting of this note might be different from the original.  IMO Update      Altered mental status 01/28/2023     Priority: Medium    UTI (urinary tract infection) 01/28/2023     Priority: Medium    Medication reaction, initial encounter 01/28/2023     Priority: Medium    Fall, initial encounter 09/27/2022     Priority: Medium    Hematoma of chest wall, left, initial encounter 04/23/2022     Priority: Medium    Retinal artery branch occlusion 02/13/2021     Priority: Medium    Vision loss of right eye 02/12/2021     Priority: Medium    Closed compression fracture of body of L1 vertebra (H) 02/12/2021     Priority: Medium    Closed wedge compression fracture of lumbar vertebra with routine healing 12/20/2020     Priority: Medium    Compression fracture of L1 lumbar vertebra, closed, initial encounter (H) 12/18/2020     Priority: Medium    Compression fracture of thoracic vertebra, initial encounter, unspecified thoracic vertebral level 12/18/2020     Priority: Medium     Replacing diagnoses that were inactivated after the 10/1/2021 regulatory import.      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 Provider to review      Diaphragmatic hernia 01/01/2011     Priority: Medium     Problem list name updated by automated process. Provider to review      Pain in joint, forearm 07/31/2008     Priority: Medium     Formatting of this note might be different from the original.  IMO Update 10/11      Pain in joint, ankle and foot 07/31/2008     Priority: Medium     Formatting of this note might be different from the original.  IMO Update 10/11      Dysphagia 05/22/2007     Priority: Medium     Overview:   IMO Update    Formatting of this note might be different from the original.  IMO Update      Postsurgical aortocoronary bypass status 11/28/2006     Priority: Medium     Overview:   IMO Update 10/11    Formatting of this note might be different from the original.  IMO Update 10/11      Hyperlipidemia 09/05/2006     Priority: Medium     Formatting of this note might be different from the original.  IMO Update 10/11      Nursing Home Visit 03/03/2022     Priority: Low          Social History     Socioeconomic History    Marital status: Single     Spouse name: Not on file    Number of children: Not on file    Years of education: Not on file    Highest education level: Not on file   Occupational History    Occupation: retired   Tobacco Use    Smoking status: Former     Current packs/day: 0.00     Average packs/day: 1 pack/day for 5.0 years (5.0 ttl pk-yrs)     Types: Cigarettes     Start date: 1/1/1980     Quit date: 1/1/1985      Years since quittin.8    Smokeless tobacco: Never    Tobacco comments:     quit in    Substance and Sexual Activity    Alcohol use: Yes     Comment: social    Drug use: No    Sexual activity: Not Currently   Other Topics Concern     Service Not Asked    Blood Transfusions Yes    Caffeine Concern Yes     Comment: 1 cup coffee daily    Occupational Exposure Not Asked    Hobby Hazards Not Asked    Sleep Concern Not Asked    Stress Concern Not Asked    Weight Concern Not Asked    Special Diet Not Asked    Back Care Not Asked    Exercise Not Asked    Bike Helmet Not Asked    Seat Belt Not Asked    Self-Exams Not Asked    Parent/sibling w/ CABG, MI or angioplasty before 65F 55M? No   Social History Narrative    Not on file     Social Determinants of Health     Financial Resource Strain: Not on file   Food Insecurity: Not on file   Transportation Needs: Not on file   Physical Activity: Not on file   Stress: Not on file   Social Connections: Not on file   Interpersonal Safety: Not on file   Housing Stability: Not on file        Current Outpatient Medications   Medication Sig Dispense Refill    acetaminophen (TYLENOL) 500 MG tablet Take 1,000 mg by mouth 3 times daily      aspirin (ASA) 81 MG chewable tablet Take 81 mg by mouth daily      bisacodyl (DULCOLAX) 10 MG suppository Place 10 mg rectally daily as needed for constipation      calcium carbonate (OS-ROSALINDA) 1500 (600 Ca) MG tablet Take 600 mg by mouth daily      fludrocortisone (FLORINEF) 0.1 MG tablet Take 1 tablet (0.1 mg) by mouth every morning (before breakfast) 30 tablet 11    magnesium hydroxide (MILK OF MAGNESIA) 400 MG/5ML suspension Take 30 mLs by mouth daily as needed for constipation or heartburn      melatonin 5 MG tablet Take 5 mg by mouth nightly as needed      mirabegron (MYRBETRIQ) 25 MG 24 hr tablet Take 25 mg by mouth daily      Nutritional Supplements (BOOST) Take 1 Bottle by mouth daily      nystatin (MYCOSTATIN) 822300 UNIT/GM  external powder Apply topically as needed      Ostomy Supplies (RESTORE BARRIER) CREA 2 times daily      potassium chloride ER (KLOR-CON M) 20 MEQ CR tablet TAKE 1 TABLET BY MOUTH EVERY DAY 30 tablet 1    RABEprazole (ACIPHEX) 20 MG EC tablet Take 20 mg by mouth 2 times daily      rectal barrier (DR. FOFANA) Apply topically 2 times daily      senna-docusate (SENOKOT-S/PERICOLACE) 8.6-50 MG tablet Take 2 tablets by mouth 2 times daily      sodium chloride 1 GM tablet TAKE 1 TABLET BY MOUTH TWICE A  tablet 3    tamsulosin (FLOMAX) 0.4 MG capsule Take 1 capsule (0.4 mg) by mouth daily 30 capsule 3    vitamin D3 (CHOLECALCIFEROL) 50 mcg (2000 units) tablet Take 1 tablet by mouth daily       No current facility-administered medications for this visit.       Allergies   Allergen Reactions    Cats Unknown    Haldol [Haloperidol]      Per SNF reporting    Klonopin [Clonazepam]      Per SNF reporting    Lamotrigine      Skin lesions    Oxycodone      Per SNF reporting       I have reviewed the care plan and do agree with the plan.      ROS:  No chest pain, shortness of breath, fever, chills, headache, nausea, vomiting, dysuria, or changes in bowel habits.  Appetite is poor.  Low back pain noted.          OBJECTIVE:  /76   Pulse 74   Temp 97  F (36.1  C)   Resp 16   Wt 61 kg (134 lb 8 oz)   SpO2 93%   BMI 21.07 kg/m      GENERAL: Healthy, alert and no distress  EYES: Eyes grossly normal to inspection.  No discharge or erythema, or obvious scleral/conjunctival abnormalities.  RESP: No audible wheeze, cough, or visible cyanosis.  No visible retractions or increased work of breathing.    SKIN: Erythema with serpiginous borders noted with sattelite lesions. No abnormal pigmentation or lesions.  NEURO: Cranial nerves grossly intact.  Mentation impaired. Speech appropriate for age.  PSYCH: Mentation shows impaired cognition affect normal/bright, judgement and insight intact, normal speech and appearance  well-groomed.      Previous      Lab/Diagnostic data:    Reviewed    ASSESSMENT/ORDERS:  Nursing Home Visit  Discussed with staff. Care plan reviewed and orders updated.  Chronic issues are stable. Routine 30 day Nursing Home follow up.      Parkinson's disease with dyskinesia without fluctuating manifestations (H)  Progressing.  Discussed with significant other who has concerns that his fatigue and excessive sleepiness is due to stopping infusions given for othostatic hypotension.    Autonomic orthostatic hypotension  He is on Florinef, sodium chloride, and has been getting IV fluid infusions.  Reviewed note from infusion therapy and because of his decline the benefits are minimal.     Lewy body dementia without behavioral disturbance (H)  On memantine 5 mg twice daily.  Will continue    Chronic atrial fibrillation (H)  On aspirin.  Continue    Essential hypertension  Available blood pressure readings are reviewed. Will continue same antihypertensive regimen.         Total time spent on the day of encounter was 35 minutes including obtaining updated history, performing limited physical examination, review of pertinent clinical information and ongoing treatment plan, as well as documentation.        Abran Whitman MD FAAFP [facility]  Geriatrics  Hospice and Palliative Care

## 2024-11-04 NOTE — TELEPHONE ENCOUNTER
Proamatine  Last office visit: 8/31/17  Last refill: 4/20/17 #90, 3 R. 5 mg with Sig stating take 1 tablet 3 times daily.  With note that patient takes different at 2 tabs 3 times daily.  Last cardiology note from Dr. Rivers states continued taking 10 mg three times daily.  Proamatine is pended the way pharmacy requested.  Please advise.  Thank you.    
04-Nov-2024

## 2024-11-15 ENCOUNTER — ANCILLARY PROCEDURE (OUTPATIENT)
Dept: CARDIOLOGY | Facility: CLINIC | Age: 89
End: 2024-11-15
Attending: INTERNAL MEDICINE
Payer: MEDICARE

## 2024-11-15 DIAGNOSIS — I44.2 COMPLETE HEART BLOCK (H): ICD-10-CM

## 2024-11-15 PROCEDURE — 93296 REM INTERROG EVL PM/IDS: CPT

## 2024-11-18 VITALS
RESPIRATION RATE: 16 BRPM | SYSTOLIC BLOOD PRESSURE: 131 MMHG | HEART RATE: 74 BPM | OXYGEN SATURATION: 97 % | DIASTOLIC BLOOD PRESSURE: 74 MMHG | TEMPERATURE: 97.4 F

## 2024-11-20 ENCOUNTER — NURSING HOME VISIT (OUTPATIENT)
Dept: GERIATRICS | Facility: OTHER | Age: 89
End: 2024-11-20
Attending: FAMILY MEDICINE
Payer: MEDICARE

## 2024-11-20 DIAGNOSIS — G40.909 SEIZURE DISORDER (H): ICD-10-CM

## 2024-11-20 DIAGNOSIS — F02.80 LEWY BODY DEMENTIA WITHOUT BEHAVIORAL DISTURBANCE (H): ICD-10-CM

## 2024-11-20 DIAGNOSIS — I10 ESSENTIAL HYPERTENSION: ICD-10-CM

## 2024-11-20 DIAGNOSIS — G20.B1 PARKINSON'S DISEASE WITH DYSKINESIA WITHOUT FLUCTUATING MANIFESTATIONS (H): ICD-10-CM

## 2024-11-20 DIAGNOSIS — G31.83 LEWY BODY DEMENTIA WITHOUT BEHAVIORAL DISTURBANCE (H): ICD-10-CM

## 2024-11-20 DIAGNOSIS — I48.20 CHRONIC ATRIAL FIBRILLATION (H): ICD-10-CM

## 2024-11-20 DIAGNOSIS — Z78.9 NURSING HOME RESIDENT: Primary | ICD-10-CM

## 2024-11-20 DIAGNOSIS — I95.1 ORTHOSTATIC HYPOTENSION DYSAUTONOMIC SYNDROME: ICD-10-CM

## 2024-11-20 LAB
MDC_IDC_EPISODE_DTM: NORMAL
MDC_IDC_EPISODE_DURATION: 113 S
MDC_IDC_EPISODE_DURATION: 119 S
MDC_IDC_EPISODE_DURATION: 16 S
MDC_IDC_EPISODE_DURATION: 1939 S
MDC_IDC_EPISODE_DURATION: 2014 S
MDC_IDC_EPISODE_DURATION: 21 S
MDC_IDC_EPISODE_DURATION: 21 S
MDC_IDC_EPISODE_DURATION: 22 S
MDC_IDC_EPISODE_DURATION: 22 S
MDC_IDC_EPISODE_DURATION: 257 S
MDC_IDC_EPISODE_DURATION: 280 S
MDC_IDC_EPISODE_DURATION: 285 S
MDC_IDC_EPISODE_DURATION: 29 S
MDC_IDC_EPISODE_DURATION: 297 S
MDC_IDC_EPISODE_DURATION: 319 S
MDC_IDC_EPISODE_DURATION: 34 S
MDC_IDC_EPISODE_DURATION: 345 S
MDC_IDC_EPISODE_DURATION: 401 S
MDC_IDC_EPISODE_DURATION: 408 S
MDC_IDC_EPISODE_DURATION: 414 S
MDC_IDC_EPISODE_DURATION: 425 S
MDC_IDC_EPISODE_DURATION: 4699 S
MDC_IDC_EPISODE_DURATION: 491 S
MDC_IDC_EPISODE_DURATION: 52 S
MDC_IDC_EPISODE_DURATION: 540 S
MDC_IDC_EPISODE_DURATION: 55 S
MDC_IDC_EPISODE_DURATION: 56 S
MDC_IDC_EPISODE_DURATION: 5991 S
MDC_IDC_EPISODE_DURATION: 623 S
MDC_IDC_EPISODE_DURATION: 689 S
MDC_IDC_EPISODE_DURATION: 81 S
MDC_IDC_EPISODE_DURATION: 824 S
MDC_IDC_EPISODE_DURATION: 965 S
MDC_IDC_EPISODE_DURATION: 98 S
MDC_IDC_EPISODE_DURATION: 989 S
MDC_IDC_EPISODE_DURATION: 995 S
MDC_IDC_EPISODE_DURATION: NORMAL S
MDC_IDC_EPISODE_ID: NORMAL
MDC_IDC_EPISODE_TYPE: NORMAL
MDC_IDC_LEAD_CONNECTION_STATUS: NORMAL
MDC_IDC_LEAD_CONNECTION_STATUS: NORMAL
MDC_IDC_LEAD_IMPLANT_DT: NORMAL
MDC_IDC_LEAD_IMPLANT_DT: NORMAL
MDC_IDC_LEAD_LOCATION: NORMAL
MDC_IDC_LEAD_LOCATION: NORMAL
MDC_IDC_LEAD_LOCATION_DETAIL_1: NORMAL
MDC_IDC_LEAD_LOCATION_DETAIL_1: NORMAL
MDC_IDC_LEAD_MFG: NORMAL
MDC_IDC_LEAD_MFG: NORMAL
MDC_IDC_LEAD_MODEL: NORMAL
MDC_IDC_LEAD_MODEL: NORMAL
MDC_IDC_LEAD_POLARITY_TYPE: NORMAL
MDC_IDC_LEAD_POLARITY_TYPE: NORMAL
MDC_IDC_LEAD_SERIAL: NORMAL
MDC_IDC_LEAD_SERIAL: NORMAL
MDC_IDC_MSMT_BATTERY_DTM: NORMAL
MDC_IDC_MSMT_BATTERY_REMAINING_LONGEVITY: 43 MO
MDC_IDC_MSMT_BATTERY_RRT_TRIGGER: 2.62
MDC_IDC_MSMT_BATTERY_STATUS: NORMAL
MDC_IDC_MSMT_BATTERY_VOLTAGE: 2.94 V
MDC_IDC_MSMT_LEADCHNL_RA_IMPEDANCE_VALUE: 437 OHM
MDC_IDC_MSMT_LEADCHNL_RA_IMPEDANCE_VALUE: 570 OHM
MDC_IDC_MSMT_LEADCHNL_RA_SENSING_INTR_AMPL: 0.88 MV
MDC_IDC_MSMT_LEADCHNL_RV_IMPEDANCE_VALUE: 323 OHM
MDC_IDC_MSMT_LEADCHNL_RV_IMPEDANCE_VALUE: 513 OHM
MDC_IDC_MSMT_LEADCHNL_RV_PACING_THRESHOLD_AMPLITUDE: 0.75 V
MDC_IDC_MSMT_LEADCHNL_RV_PACING_THRESHOLD_PULSEWIDTH: 0.4 MS
MDC_IDC_PG_IMPLANT_DTM: NORMAL
MDC_IDC_PG_MFG: NORMAL
MDC_IDC_PG_MODEL: NORMAL
MDC_IDC_PG_SERIAL: NORMAL
MDC_IDC_PG_TYPE: NORMAL
MDC_IDC_SESS_CLINIC_NAME: NORMAL
MDC_IDC_SESS_DTM: NORMAL
MDC_IDC_SESS_TYPE: NORMAL
MDC_IDC_SET_BRADY_AT_MODE_SWITCH_RATE: 171 {BEATS}/MIN
MDC_IDC_SET_BRADY_HYSTRATE: NORMAL
MDC_IDC_SET_BRADY_LOWRATE: 70 {BEATS}/MIN
MDC_IDC_SET_BRADY_MAX_SENSOR_RATE: 130 {BEATS}/MIN
MDC_IDC_SET_BRADY_MAX_TRACKING_RATE: 130 {BEATS}/MIN
MDC_IDC_SET_BRADY_MODE: NORMAL
MDC_IDC_SET_BRADY_PAV_DELAY_LOW: 180 MS
MDC_IDC_SET_BRADY_SAV_DELAY_LOW: 150 MS
MDC_IDC_SET_LEADCHNL_RA_PACING_AMPLITUDE: 2.75 V
MDC_IDC_SET_LEADCHNL_RA_PACING_ANODE_ELECTRODE_1: NORMAL
MDC_IDC_SET_LEADCHNL_RA_PACING_ANODE_LOCATION_1: NORMAL
MDC_IDC_SET_LEADCHNL_RA_PACING_CAPTURE_MODE: NORMAL
MDC_IDC_SET_LEADCHNL_RA_PACING_CATHODE_ELECTRODE_1: NORMAL
MDC_IDC_SET_LEADCHNL_RA_PACING_CATHODE_LOCATION_1: NORMAL
MDC_IDC_SET_LEADCHNL_RA_PACING_POLARITY: NORMAL
MDC_IDC_SET_LEADCHNL_RA_PACING_PULSEWIDTH: 0.4 MS
MDC_IDC_SET_LEADCHNL_RA_SENSING_ANODE_ELECTRODE_1: NORMAL
MDC_IDC_SET_LEADCHNL_RA_SENSING_ANODE_LOCATION_1: NORMAL
MDC_IDC_SET_LEADCHNL_RA_SENSING_CATHODE_ELECTRODE_1: NORMAL
MDC_IDC_SET_LEADCHNL_RA_SENSING_CATHODE_LOCATION_1: NORMAL
MDC_IDC_SET_LEADCHNL_RA_SENSING_POLARITY: NORMAL
MDC_IDC_SET_LEADCHNL_RA_SENSING_SENSITIVITY: 0.9 MV
MDC_IDC_SET_LEADCHNL_RV_PACING_AMPLITUDE: 2 V
MDC_IDC_SET_LEADCHNL_RV_PACING_ANODE_ELECTRODE_1: NORMAL
MDC_IDC_SET_LEADCHNL_RV_PACING_ANODE_LOCATION_1: NORMAL
MDC_IDC_SET_LEADCHNL_RV_PACING_CAPTURE_MODE: NORMAL
MDC_IDC_SET_LEADCHNL_RV_PACING_CATHODE_ELECTRODE_1: NORMAL
MDC_IDC_SET_LEADCHNL_RV_PACING_CATHODE_LOCATION_1: NORMAL
MDC_IDC_SET_LEADCHNL_RV_PACING_POLARITY: NORMAL
MDC_IDC_SET_LEADCHNL_RV_PACING_PULSEWIDTH: 0.4 MS
MDC_IDC_SET_LEADCHNL_RV_SENSING_ANODE_ELECTRODE_1: NORMAL
MDC_IDC_SET_LEADCHNL_RV_SENSING_ANODE_LOCATION_1: NORMAL
MDC_IDC_SET_LEADCHNL_RV_SENSING_CATHODE_ELECTRODE_1: NORMAL
MDC_IDC_SET_LEADCHNL_RV_SENSING_CATHODE_LOCATION_1: NORMAL
MDC_IDC_SET_LEADCHNL_RV_SENSING_POLARITY: NORMAL
MDC_IDC_SET_LEADCHNL_RV_SENSING_SENSITIVITY: 0.9 MV
MDC_IDC_SET_ZONE_DETECTION_INTERVAL: 350 MS
MDC_IDC_SET_ZONE_DETECTION_INTERVAL: 400 MS
MDC_IDC_SET_ZONE_STATUS: NORMAL
MDC_IDC_SET_ZONE_STATUS: NORMAL
MDC_IDC_SET_ZONE_TYPE: NORMAL
MDC_IDC_SET_ZONE_VENDOR_TYPE: NORMAL
MDC_IDC_STAT_AT_BURDEN_PERCENT: 99.9 %
MDC_IDC_STAT_AT_DTM_END: NORMAL
MDC_IDC_STAT_AT_DTM_START: NORMAL
MDC_IDC_STAT_BRADY_DTM_END: NORMAL
MDC_IDC_STAT_BRADY_DTM_START: NORMAL
MDC_IDC_STAT_BRADY_RA_PERCENT_PACED: 6.1 %
MDC_IDC_STAT_BRADY_RV_PERCENT_PACED: 98.89 %
MDC_IDC_STAT_EPISODE_RECENT_COUNT: 0
MDC_IDC_STAT_EPISODE_RECENT_COUNT: 363
MDC_IDC_STAT_EPISODE_RECENT_COUNT_DTM_END: NORMAL
MDC_IDC_STAT_EPISODE_RECENT_COUNT_DTM_START: NORMAL
MDC_IDC_STAT_EPISODE_TOTAL_COUNT: 0
MDC_IDC_STAT_EPISODE_TOTAL_COUNT: 4
MDC_IDC_STAT_EPISODE_TOTAL_COUNT: NORMAL
MDC_IDC_STAT_EPISODE_TOTAL_COUNT_DTM_END: NORMAL
MDC_IDC_STAT_EPISODE_TOTAL_COUNT_DTM_START: NORMAL
MDC_IDC_STAT_EPISODE_TYPE: NORMAL
MDC_IDC_STAT_TACHYTHERAPY_RECENT_DTM_END: NORMAL
MDC_IDC_STAT_TACHYTHERAPY_RECENT_DTM_START: NORMAL
MDC_IDC_STAT_TACHYTHERAPY_TOTAL_DTM_END: NORMAL
MDC_IDC_STAT_TACHYTHERAPY_TOTAL_DTM_START: NORMAL

## 2024-11-22 ENCOUNTER — DOCUMENTATION ONLY (OUTPATIENT)
Dept: FAMILY MEDICINE | Facility: OTHER | Age: 89
End: 2024-11-22

## 2024-11-22 PROBLEM — Z51.5 HOSPICE CARE PATIENT: Status: ACTIVE | Noted: 2024-11-22

## 2024-11-24 NOTE — PROGRESS NOTES
Nursing Home Visit    HISTORY OF PRESENT ILLNESS:  Jf is a 89 year old male (10/5/1933)  resident of [facility] who is being seen today for routine 30 day follow up.     He has a history of Parkinson's disease, Lewy body dementia, atrial fibrillation, coronary heart disease status post aortocoronary bypass as well as stenting, seizure disorder, ans well as recurrent falls.    Jf was admitted from [facility] after being hospitalized with falls and compression fractures of L1 and L2.and in need of pain control.    The patient notes no current complaints.    Discussed with nursing staff who note he has been spending more time in bed. He has been lethargic and intermittently dyspneic.    The patient is seen in his room.  Family is not present.     Medical records are reviewed.    Current medications, allergies, and interdisciplinary care plan are reviewed.      Patient Active Problem List    Diagnosis Date Noted    Orthostatic hypotension dysautonomic syndrome 02/12/2021     Priority: High    Chronic atrial fibrillation (H) 02/12/2021     Priority: High    Longstanding persistent atrial fibrillation (H) 04/13/2020     Priority: High    Coronary artery disease involving native coronary artery without angina pectoris 04/13/2020     Priority: High    Pacemaker, [employer], Dual Chamber - Dependent 10/06/2017     Priority: High    Lewy body dementia without behavioral disturbance (H) 08/31/2017     Priority: High    Dementia without behavioral disturbance (H) 07/28/2015     Priority: High     Diagnosis updated by automated process. Provider to review and confirm.      Parkinson disease (H)      Priority: High    Seizure disorder (H)      Priority: High    Cardiac pacemaker in situ 01/08/2013     Priority: High     Overview:   [employer] Alt[employer]a S606 DREL,  Serial #[id number]  5-13-11  Atrial lead [employer] 4054 Serial #[id number]  8-11-00  Ventriculer lead [employer] 4137  Serial #[id number]  5-13-11  2nd Degree AV Block   Dr. Campbell  Intrinsic:  Pt. is Pacemaker Dependant, remains paced at temp. rate of 30 bpm (6-13-14)    Formatting of this note might be different from the original.  [employer] S606 DREL,  Serial #[id number]  5-13-11  Atrial lead [employer] 4054 Serial #[id number]  8-11-00  Ventriculer lead [employer] 4137  Serial #[id number]  5-13-11  2nd Degree AV Block   Dr. Campbell  Intrinsic:  Pt. is Pacemaker Dependant, remains paced at temp. rate of 30 bpm (6-13-14)      Coronary atherosclerosis of native coronary artery 11/28/2006     Priority: High     Overview:   IMO Update 10/11    Formatting of this note might be different from the original.  IMO Update 10/11      Hypertension with target blood pressure goal under 150/90 09/05/2006     Priority: High     Overview:   IMO Update      Essential hypertension 09/05/2006     Priority: High     Formatting of this note might be different from the original.  IMO Update      Hospice care patient 11/22/2024     Priority: Medium    Altered mental status 01/28/2023     Priority: Medium    UTI (urinary tract infection) 01/28/2023     Priority: Medium    Medication reaction, initial encounter 01/28/2023     Priority: Medium    Fall, initial encounter 09/27/2022     Priority: Medium    Hematoma of chest wall, left, initial encounter 04/23/2022     Priority: Medium    Retinal artery branch occlusion 02/13/2021     Priority: Medium    Vision loss of right eye 02/12/2021     Priority: Medium    Closed compression fracture of body of L1 vertebra (H) 02/12/2021     Priority: Medium    Closed wedge compression fracture of lumbar vertebra with routine healing 12/20/2020     Priority: Medium    Compression fracture of L1 lumbar vertebra, closed, initial encounter (H) 12/18/2020     Priority: Medium    Compression fracture of thoracic vertebra, initial encounter, unspecified thoracic vertebral level 12/18/2020      Priority: Medium     Replacing diagnoses that were inactivated after the 10/1/2021 regulatory import.      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 Provider to review      Diaphragmatic hernia 01/01/2011     Priority: Medium     Problem list name updated by automated process. Provider to review      Pain in joint, forearm 07/31/2008     Priority: Medium     Formatting of this note might be different from the original.  IMO Update 10/11      Pain in joint, ankle and foot 07/31/2008     Priority: Medium     Formatting of this note might be different from the original.  IMO Update 10/11      Dysphagia 05/22/2007     Priority: Medium     Overview:   IMO Update    Formatting of this note might be different from the original.  IMO Update      Postsurgical aortocoronary bypass status 11/28/2006     Priority: Medium     Overview:   IMO Update 10/11    Formatting of this note might be different from the original.  IMO Update 10/11      Hyperlipidemia 09/05/2006     Priority: Medium     Formatting of this note might be different from the original.  IMO Update 10/11      Nursing Home Visit 03/03/2022     Priority: Low          Social History     Socioeconomic History    Marital status: Single     Spouse name: Not on file    Number of children: Not on file    Years of education: Not on file    Highest education level: Not on file   Occupational History    Occupation: retired   Tobacco Use    Smoking status: Former     Current packs/day: 0.00     Average  packs/day: 1 pack/day for 5.0 years (5.0 ttl pk-yrs)     Types: Cigarettes     Start date: 1980     Quit date: 1985     Years since quittin.9    Smokeless tobacco: Never    Tobacco comments:     quit in    Substance and Sexual Activity    Alcohol use: Yes     Comment: social    Drug use: No    Sexual activity: Not Currently   Other Topics Concern     Service Not Asked    Blood Transfusions Yes    Caffeine Concern Yes     Comment: 1 cup coffee daily    Occupational Exposure Not Asked    Hobby Hazards Not Asked    Sleep Concern Not Asked    Stress Concern Not Asked    Weight Concern Not Asked    Special Diet Not Asked    Back Care Not Asked    Exercise Not Asked    Bike Helmet Not Asked    Seat Belt Not Asked    Self-Exams Not Asked    Parent/sibling w/ CABG, MI or angioplasty before 65F 55M? No   Social History Narrative    Not on file     Social Drivers of Health     Financial Resource Strain: Not on file   Food Insecurity: Not on file   Transportation Needs: Not on file   Physical Activity: Not on file   Stress: Not on file   Social Connections: Not on file   Interpersonal Safety: Not on file   Housing Stability: Not on file        Current Outpatient Medications   Medication Sig Dispense Refill    acetaminophen (TYLENOL) 500 MG tablet Take 1,000 mg by mouth 3 times daily      aspirin (ASA) 81 MG chewable tablet Take 81 mg by mouth daily      bisacodyl (DULCOLAX) 10 MG suppository Place 10 mg rectally daily as needed for constipation      calcium carbonate (OS-ROSALINDA) 1500 (600 Ca) MG tablet Take 600 mg by mouth daily      fludrocortisone (FLORINEF) 0.1 MG tablet Take 1 tablet (0.1 mg) by mouth every morning (before breakfast) 30 tablet 11    magnesium hydroxide (MILK OF MAGNESIA) 400 MG/5ML suspension Take 30 mLs by mouth daily as needed for constipation or heartburn      melatonin 5 MG tablet Take 5 mg by mouth nightly as needed      mirabegron (MYRBETRIQ) 25 MG 24 hr tablet Take 25 mg by  mouth daily      Nutritional Supplements (BOOST) Take 1 Bottle by mouth daily      nystatin (MYCOSTATIN) 167387 UNIT/GM external powder Apply topically as needed      Ostomy Supplies (RESTORE BARRIER) CREA 2 times daily      potassium chloride ER (KLOR-CON M) 20 MEQ CR tablet TAKE 1 TABLET BY MOUTH EVERY DAY 30 tablet 1    RABEprazole (ACIPHEX) 20 MG EC tablet Take 20 mg by mouth 2 times daily      rectal barrier (DR. FOFANA) Apply topically 2 times daily      senna-docusate (SENOKOT-S/PERICOLACE) 8.6-50 MG tablet Take 2 tablets by mouth 2 times daily      sodium chloride 1 GM tablet TAKE 1 TABLET BY MOUTH TWICE A  tablet 3    tamsulosin (FLOMAX) 0.4 MG capsule Take 1 capsule (0.4 mg) by mouth daily 30 capsule 3    vitamin D3 (CHOLECALCIFEROL) 50 mcg (2000 units) tablet Take 1 tablet by mouth daily      morphine sulfate (ROXANOL) 20 mg/mL (HIGH CONC) soln Take 0.5 mLs (10 mg) by mouth every 3 hours as needed for shortness of breath or breakthrough pain. 30 mL 0     No current facility-administered medications for this visit.       Allergies   Allergen Reactions    Cats Unknown    Haldol [Haloperidol]      Per SNF reporting    Klonopin [Clonazepam]      Per SNF reporting    Lamotrigine      Skin lesions    Oxycodone      Per SNF reporting       I have reviewed the care plan and do agree with the plan.      ROS:  No chest pain, shortness of breath, fever, chills, headache, nausea, vomiting, dysuria, or changes in bowel habits.  Appetite is poor.  Low back pain noted.          OBJECTIVE:  /74   Pulse 74   Temp 97.4  F (36.3  C)   Resp 16   SpO2 97%     GENERAL: Healthy, alert and no distress  EYES: Eyes grossly normal to inspection.  No discharge or erythema, or obvious scleral/conjunctival abnormalities.  RESP: No audible wheeze, cough, or visible cyanosis.  No visible retractions or increased work of breathing.    SKIN: Erythema with serpiginous borders noted with sattelite lesions. No abnormal  pigmentation or lesions.  NEURO: Cranial nerves grossly intact.  Mentation impaired. Speech appropriate for age.  PSYCH: Mentation shows impaired cognition affect normal/bright, judgement and insight intact, normal speech and appearance well-groomed.      Previous      Lab/Diagnostic data:    Reviewed    ASSESSMENT/ORDERS:  Nursing Home Visit  Discussed with staff. Care plan reviewed and orders updated.  Chronic issues are stable. Routine 30 day Nursing Home follow up.      Parkinson's disease with dyskinesia without fluctuating manifestations (H)  Progressing.  Discussed with significant other who has concerns that his fatigue and excessive sleepiness is due to stopping infusions given for othostatic hypotension. He has continued to decline.  Discussed wit significant other.  Will refer to Hospice.    Autonomic orthostatic hypotension  He is on Florinef, sodium chloride, and has been getting IV fluid infusions.  Reviewed note from infusion therapy and because of his decline the benefits are minimal.     Lewy body dementia without behavioral disturbance (H)  On memantine 5 mg twice daily.  Will continue    Chronic atrial fibrillation (H)  On aspirin.  Continue    Essential hypertension  Available blood pressure readings are reviewed. Will continue same antihypertensive regimen.         Total time spent on the day of encounter was 35 minutes including obtaining updated history, performing limited physical examination, review of pertinent clinical information and ongoing treatment plan, as well as documentation.        Abran Whitman MD FAAFP [facility]  Geriatrics  Hospice and Palliative Care

## 2024-11-25 ENCOUNTER — TELEPHONE (OUTPATIENT)
Dept: FAMILY MEDICINE | Facility: OTHER | Age: 89
End: 2024-11-25

## 2024-11-25 NOTE — TELEPHONE ENCOUNTER
Approved by Dr. Whitman  Notification sent to [facility].  Kimberly Wiggins, [facility]

## 2024-11-26 ENCOUNTER — ANCILLARY PROCEDURE (OUTPATIENT)
Dept: CARDIOLOGY | Facility: CLINIC | Age: 89
End: 2024-11-26
Attending: INTERNAL MEDICINE
Payer: MEDICARE

## 2024-11-26 DIAGNOSIS — I44.2 COMPLETE HEART BLOCK (H): ICD-10-CM

## 2024-11-26 PROCEDURE — 99207 CARDIAC DEVICE CHECK - REMOTE: CPT | Performed by: INTERNAL MEDICINE

## 2024-11-27 LAB
MDC_IDC_EPISODE_DTM: NORMAL
MDC_IDC_EPISODE_DURATION: 0 S
MDC_IDC_EPISODE_DURATION: 1 S
MDC_IDC_EPISODE_DURATION: 104 S
MDC_IDC_EPISODE_DURATION: 113 S
MDC_IDC_EPISODE_DURATION: 136 S
MDC_IDC_EPISODE_DURATION: 1486 S
MDC_IDC_EPISODE_DURATION: 168 S
MDC_IDC_EPISODE_DURATION: 1714 S
MDC_IDC_EPISODE_DURATION: 1746 S
MDC_IDC_EPISODE_DURATION: 1955 S
MDC_IDC_EPISODE_DURATION: 2 S
MDC_IDC_EPISODE_DURATION: 201 S
MDC_IDC_EPISODE_DURATION: 2040 S
MDC_IDC_EPISODE_DURATION: 205 S
MDC_IDC_EPISODE_DURATION: 211 S
MDC_IDC_EPISODE_DURATION: 2133 S
MDC_IDC_EPISODE_DURATION: 221 S
MDC_IDC_EPISODE_DURATION: 24 S
MDC_IDC_EPISODE_DURATION: 2442 S
MDC_IDC_EPISODE_DURATION: 288 S
MDC_IDC_EPISODE_DURATION: 3 S
MDC_IDC_EPISODE_DURATION: 3541 S
MDC_IDC_EPISODE_DURATION: 36 S
MDC_IDC_EPISODE_DURATION: 410 S
MDC_IDC_EPISODE_DURATION: 443 S
MDC_IDC_EPISODE_DURATION: 461 S
MDC_IDC_EPISODE_DURATION: 5000 S
MDC_IDC_EPISODE_DURATION: 53 S
MDC_IDC_EPISODE_DURATION: 58 S
MDC_IDC_EPISODE_DURATION: 5842 S
MDC_IDC_EPISODE_DURATION: 6305 S
MDC_IDC_EPISODE_DURATION: 6836 S
MDC_IDC_EPISODE_DURATION: 685 S
MDC_IDC_EPISODE_DURATION: 704 S
MDC_IDC_EPISODE_DURATION: 7922 S
MDC_IDC_EPISODE_DURATION: 8 S
MDC_IDC_EPISODE_DURATION: 840 S
MDC_IDC_EPISODE_DURATION: 97 S
MDC_IDC_EPISODE_DURATION: 9753 S
MDC_IDC_EPISODE_DURATION: NORMAL S
MDC_IDC_EPISODE_ID: NORMAL
MDC_IDC_EPISODE_TYPE: NORMAL
MDC_IDC_LEAD_CONNECTION_STATUS: NORMAL
MDC_IDC_LEAD_CONNECTION_STATUS: NORMAL
MDC_IDC_LEAD_IMPLANT_DT: NORMAL
MDC_IDC_LEAD_IMPLANT_DT: NORMAL
MDC_IDC_LEAD_LOCATION: NORMAL
MDC_IDC_LEAD_LOCATION: NORMAL
MDC_IDC_LEAD_LOCATION_DETAIL_1: NORMAL
MDC_IDC_LEAD_LOCATION_DETAIL_1: NORMAL
MDC_IDC_LEAD_MFG: NORMAL
MDC_IDC_LEAD_MFG: NORMAL
MDC_IDC_LEAD_MODEL: NORMAL
MDC_IDC_LEAD_MODEL: NORMAL
MDC_IDC_LEAD_POLARITY_TYPE: NORMAL
MDC_IDC_LEAD_POLARITY_TYPE: NORMAL
MDC_IDC_LEAD_SERIAL: NORMAL
MDC_IDC_LEAD_SERIAL: NORMAL
MDC_IDC_MSMT_BATTERY_DTM: NORMAL
MDC_IDC_MSMT_BATTERY_REMAINING_LONGEVITY: 43 MO
MDC_IDC_MSMT_BATTERY_RRT_TRIGGER: 2.62
MDC_IDC_MSMT_BATTERY_STATUS: NORMAL
MDC_IDC_MSMT_BATTERY_VOLTAGE: 2.94 V
MDC_IDC_MSMT_LEADCHNL_RA_IMPEDANCE_VALUE: 437 OHM
MDC_IDC_MSMT_LEADCHNL_RA_IMPEDANCE_VALUE: 570 OHM
MDC_IDC_MSMT_LEADCHNL_RA_PACING_THRESHOLD_AMPLITUDE: 1.25 V
MDC_IDC_MSMT_LEADCHNL_RA_PACING_THRESHOLD_PULSEWIDTH: 0.4 MS
MDC_IDC_MSMT_LEADCHNL_RA_SENSING_INTR_AMPL: 1.38 MV
MDC_IDC_MSMT_LEADCHNL_RA_SENSING_INTR_AMPL: 1.38 MV
MDC_IDC_MSMT_LEADCHNL_RV_IMPEDANCE_VALUE: 342 OHM
MDC_IDC_MSMT_LEADCHNL_RV_IMPEDANCE_VALUE: 532 OHM
MDC_IDC_MSMT_LEADCHNL_RV_PACING_THRESHOLD_AMPLITUDE: 0.88 V
MDC_IDC_MSMT_LEADCHNL_RV_PACING_THRESHOLD_PULSEWIDTH: 0.4 MS
MDC_IDC_MSMT_LEADCHNL_RV_SENSING_INTR_AMPL: 4.12 MV
MDC_IDC_MSMT_LEADCHNL_RV_SENSING_INTR_AMPL: 4.12 MV
MDC_IDC_PG_IMPLANT_DTM: NORMAL
MDC_IDC_PG_MFG: NORMAL
MDC_IDC_PG_MODEL: NORMAL
MDC_IDC_PG_SERIAL: NORMAL
MDC_IDC_PG_TYPE: NORMAL
MDC_IDC_SESS_CLINIC_NAME: NORMAL
MDC_IDC_SESS_DTM: NORMAL
MDC_IDC_SESS_TYPE: NORMAL
MDC_IDC_SET_BRADY_AT_MODE_SWITCH_RATE: 171 {BEATS}/MIN
MDC_IDC_SET_BRADY_HYSTRATE: NORMAL
MDC_IDC_SET_BRADY_LOWRATE: 70 {BEATS}/MIN
MDC_IDC_SET_BRADY_MAX_SENSOR_RATE: 130 {BEATS}/MIN
MDC_IDC_SET_BRADY_MAX_TRACKING_RATE: 130 {BEATS}/MIN
MDC_IDC_SET_BRADY_MODE: NORMAL
MDC_IDC_SET_BRADY_PAV_DELAY_LOW: 180 MS
MDC_IDC_SET_BRADY_SAV_DELAY_LOW: 150 MS
MDC_IDC_SET_LEADCHNL_RA_PACING_AMPLITUDE: 2.75 V
MDC_IDC_SET_LEADCHNL_RA_PACING_ANODE_ELECTRODE_1: NORMAL
MDC_IDC_SET_LEADCHNL_RA_PACING_ANODE_LOCATION_1: NORMAL
MDC_IDC_SET_LEADCHNL_RA_PACING_CAPTURE_MODE: NORMAL
MDC_IDC_SET_LEADCHNL_RA_PACING_CATHODE_ELECTRODE_1: NORMAL
MDC_IDC_SET_LEADCHNL_RA_PACING_CATHODE_LOCATION_1: NORMAL
MDC_IDC_SET_LEADCHNL_RA_PACING_POLARITY: NORMAL
MDC_IDC_SET_LEADCHNL_RA_PACING_PULSEWIDTH: 0.4 MS
MDC_IDC_SET_LEADCHNL_RA_SENSING_ANODE_ELECTRODE_1: NORMAL
MDC_IDC_SET_LEADCHNL_RA_SENSING_ANODE_LOCATION_1: NORMAL
MDC_IDC_SET_LEADCHNL_RA_SENSING_CATHODE_ELECTRODE_1: NORMAL
MDC_IDC_SET_LEADCHNL_RA_SENSING_CATHODE_LOCATION_1: NORMAL
MDC_IDC_SET_LEADCHNL_RA_SENSING_POLARITY: NORMAL
MDC_IDC_SET_LEADCHNL_RA_SENSING_SENSITIVITY: 0.9 MV
MDC_IDC_SET_LEADCHNL_RV_PACING_AMPLITUDE: 2 V
MDC_IDC_SET_LEADCHNL_RV_PACING_ANODE_ELECTRODE_1: NORMAL
MDC_IDC_SET_LEADCHNL_RV_PACING_ANODE_LOCATION_1: NORMAL
MDC_IDC_SET_LEADCHNL_RV_PACING_CAPTURE_MODE: NORMAL
MDC_IDC_SET_LEADCHNL_RV_PACING_CATHODE_ELECTRODE_1: NORMAL
MDC_IDC_SET_LEADCHNL_RV_PACING_CATHODE_LOCATION_1: NORMAL
MDC_IDC_SET_LEADCHNL_RV_PACING_POLARITY: NORMAL
MDC_IDC_SET_LEADCHNL_RV_PACING_PULSEWIDTH: 0.4 MS
MDC_IDC_SET_LEADCHNL_RV_SENSING_ANODE_ELECTRODE_1: NORMAL
MDC_IDC_SET_LEADCHNL_RV_SENSING_ANODE_LOCATION_1: NORMAL
MDC_IDC_SET_LEADCHNL_RV_SENSING_CATHODE_ELECTRODE_1: NORMAL
MDC_IDC_SET_LEADCHNL_RV_SENSING_CATHODE_LOCATION_1: NORMAL
MDC_IDC_SET_LEADCHNL_RV_SENSING_POLARITY: NORMAL
MDC_IDC_SET_LEADCHNL_RV_SENSING_SENSITIVITY: 0.9 MV
MDC_IDC_SET_ZONE_DETECTION_INTERVAL: 350 MS
MDC_IDC_SET_ZONE_DETECTION_INTERVAL: 400 MS
MDC_IDC_SET_ZONE_STATUS: NORMAL
MDC_IDC_SET_ZONE_STATUS: NORMAL
MDC_IDC_SET_ZONE_TYPE: NORMAL
MDC_IDC_SET_ZONE_VENDOR_TYPE: NORMAL
MDC_IDC_STAT_AT_BURDEN_PERCENT: 99.6 %
MDC_IDC_STAT_AT_DTM_END: NORMAL
MDC_IDC_STAT_AT_DTM_START: NORMAL
MDC_IDC_STAT_BRADY_DTM_END: NORMAL
MDC_IDC_STAT_BRADY_DTM_START: NORMAL
MDC_IDC_STAT_BRADY_RA_PERCENT_PACED: 9.17 %
MDC_IDC_STAT_BRADY_RV_PERCENT_PACED: 97.16 %
MDC_IDC_STAT_EPISODE_RECENT_COUNT: 0
MDC_IDC_STAT_EPISODE_RECENT_COUNT: 0
MDC_IDC_STAT_EPISODE_RECENT_COUNT: 109
MDC_IDC_STAT_EPISODE_RECENT_COUNT: 19
MDC_IDC_STAT_EPISODE_RECENT_COUNT: 2
MDC_IDC_STAT_EPISODE_RECENT_COUNT_DTM_END: NORMAL
MDC_IDC_STAT_EPISODE_RECENT_COUNT_DTM_START: NORMAL
MDC_IDC_STAT_EPISODE_TOTAL_COUNT: 0
MDC_IDC_STAT_EPISODE_TOTAL_COUNT: 0
MDC_IDC_STAT_EPISODE_TOTAL_COUNT: 2
MDC_IDC_STAT_EPISODE_TOTAL_COUNT: 23
MDC_IDC_STAT_EPISODE_TOTAL_COUNT: NORMAL
MDC_IDC_STAT_EPISODE_TOTAL_COUNT_DTM_END: NORMAL
MDC_IDC_STAT_EPISODE_TOTAL_COUNT_DTM_START: NORMAL
MDC_IDC_STAT_EPISODE_TYPE: NORMAL
MDC_IDC_STAT_TACHYTHERAPY_RECENT_DTM_END: NORMAL
MDC_IDC_STAT_TACHYTHERAPY_RECENT_DTM_START: NORMAL
MDC_IDC_STAT_TACHYTHERAPY_TOTAL_DTM_END: NORMAL
MDC_IDC_STAT_TACHYTHERAPY_TOTAL_DTM_START: NORMAL

## 2024-12-11 NOTE — PROGRESS NOTES
PHYSICIAN CERTIFICATION OF TERMINAL ILLNESS FOR HOSPICE BENEFIT    December 11, 2024    Jf Ortiz    10/5/1933      Effective Date of Certification    Start Date:  11.22.2024      End Date:  02.19.2025    Terminal Diagnosis:    Parkinsons disease      Secondary Diagnoses:     Recurrent aspiration     Lewy body dementia               Prognosis Related Comorbidities:    Advance age    Seizure disorder    Coronary heart disease     Chronic atrial fibrillation     Hypertension     Cardiac pacemaker in situ    Orthostatic hypotension dysautonomic syndrome      Narrative Statement:  Client suffers from progressive end stage Parkinson's disease complicated by oropharyngeal dysphagia with recurrent aspiration as well as Lewy Body dementia . He is on maximal tolerated medical therapy. HIs most recent documented weight is 138.7 lbs.  The current arm circumference is 22.2 cm as measured 10 cm proximal from the antecubital crease right upper extremity. There are no areas of skin breakdown. He has had recent infections involving right ear noted.   There has been no hospitalizations or Emergency Room visits related to the terminal diagnosis within the last 6 months.  Client resides at [facility] and requires assistance with ADL's.  He  scores a 20 percent on the Palliative Performance Scale v2.The client scores a 7E of the FAAST Scale.  The NHYA Class andBorg Dyspnea Scale is not appicable.     Goals of symptom control will be met.  Because of the progressive nature of the illness, anticipated disease trajectory, as well as associated comorbidities, I have determined the client qualifies for hospice care.             I certify that this patient is terminally ill and has a life expectancy of 6 months or less if the terminal illness runs its anticipated course.        Attestation:  I confirm that this narrative was composed by myself and is based on review of the medical record and/or  examination of the patient.            Abran Whitman MD FAAFP [facility] CMD  Hospice and Palliative Care  Geriatrics

## (undated) DEVICE — SWABSTICK-BENZOIN

## (undated) DEVICE — COVER-IVAS TRANSDUCER SLEEVE (6X58')

## (undated) DEVICE — DRSG-SPONGE STERILE 4 X 4

## (undated) DEVICE — STERI-STRIP-1/2" X 4"

## (undated) DEVICE — SUTURE-PROLENE 3-0 SH DOUBLE ARMED 8534H

## (undated) DEVICE — LIGHT HANDLES PLASTIC

## (undated) DEVICE — APPLICATOR-CHLORAPREP 26ML TINTED CHG 2%+ 70% IPA-SURGICAL

## (undated) DEVICE — GLV-8.0 PROTEXIS PI BLUE W/NEU-THERA LF/PF

## (undated) DEVICE — DRSG STERI STRIP 1/2X4" R1547

## (undated) DEVICE — SOL WATER 1500ML

## (undated) DEVICE — DRSG-ISLAND 4IN X 5IN

## (undated) DEVICE — SU MONOCRYL 4-0 PS-2 27" UND Y426H

## (undated) DEVICE — LIGHT HANDLE COVER

## (undated) DEVICE — SUTURE BOOTS-STANDARD

## (undated) DEVICE — IRRIGATION-H2O 1000ML

## (undated) DEVICE — SCD SLEEVE-KNEE REG.

## (undated) DEVICE — TRAY-SKIN PREP POVIDONE/IODINE

## (undated) DEVICE — DRAPE-IOBAN 2 35CM X 35CM

## (undated) DEVICE — PACK-LAPAROTOMY-CUSTOM

## (undated) DEVICE — SYRINGE-10CC LUER LOCK

## (undated) DEVICE — CANISTER-SUCTION 2000CC

## (undated) DEVICE — NDL-25G 1-1/2" NON-SAFETY

## (undated) DEVICE — LABEL-STERILE PREPRINTED FOR OR

## (undated) DEVICE — DRAPE-C-ARM

## (undated) DEVICE — TUBING-SUCTION 20FT

## (undated) DEVICE — SUTURE-SILK 3-0 TIE A304H

## (undated) DEVICE — PENROSE DRAIN 1/2 X 18 LATEX]

## (undated) DEVICE — SUTURE-PROLENE 0 CT-2 8412H

## (undated) DEVICE — SUTURE-PROLENE 2-0 CT-2 8411H

## (undated) DEVICE — TOPICAL SKIN ADHESIVE EXOFIN

## (undated) DEVICE — GLOVE PROTEXIS BLUE W/NEU-THERA 6.5  2D73EB65

## (undated) DEVICE — SUCTION TIP FLEXI CLEAR TIP DISP K62

## (undated) DEVICE — SUTURE-MONOCRYL 4-0 PS-2 Y496G

## (undated) DEVICE — BIN-COVIDIEN MESH BIN

## (undated) DEVICE — PREP CHLORAPREP 26ML TINTED ORANGE  260815

## (undated) DEVICE — PACK MAJOR LAPAROTOMY LF SBA15MLFCA

## (undated) DEVICE — DRSG MEDIPORE 3 1/2X4" 3566

## (undated) DEVICE — SUTURE-VICRYL 3-0 SH J416H

## (undated) DEVICE — GLOVE PROTEXIS POWDER FREE SMT 6.5  2D72PT65X

## (undated) DEVICE — SUTURE-VICRYL 4-0 PS-1 J935H

## (undated) DEVICE — IRRIGATION-NACL 1000ML

## (undated) DEVICE — ESU ELEC BLADE 2.75" COATED/INSULATED E1455

## (undated) DEVICE — GLV-7.5 BIOGEL LATEX

## (undated) DEVICE — CAUTERY-INSULATED 2.75" EDGE

## (undated) DEVICE — GLV-7.5 PROTEXIS PI CLASSIC LF/PF

## (undated) DEVICE — SYRINGE-5CC LUER LOCK

## (undated) DEVICE — NDL-BLUNT FILL 18G 1.5"

## (undated) DEVICE — SENSOR-OXISENSOR II ADULT

## (undated) DEVICE — CAUTERY PAD-POLYHESIVE II ADULT

## (undated) DEVICE — SYRINGE-10CC SLIP TIP

## (undated) RX ORDER — KETOROLAC TROMETHAMINE 30 MG/ML
INJECTION, SOLUTION INTRAMUSCULAR; INTRAVENOUS
Status: DISPENSED
Start: 2018-02-14

## (undated) RX ORDER — FENTANYL CITRATE 50 UG/ML
INJECTION, SOLUTION INTRAMUSCULAR; INTRAVENOUS
Status: DISPENSED
Start: 2018-08-08

## (undated) RX ORDER — PROPOFOL 10 MG/ML
INJECTION, EMULSION INTRAVENOUS
Status: DISPENSED
Start: 2018-02-14

## (undated) RX ORDER — FENTANYL CITRATE 50 UG/ML
INJECTION, SOLUTION INTRAMUSCULAR; INTRAVENOUS
Status: DISPENSED
Start: 2018-02-14

## (undated) RX ORDER — ESMOLOL HYDROCHLORIDE 10 MG/ML
INJECTION INTRAVENOUS
Status: DISPENSED
Start: 2018-08-08

## (undated) RX ORDER — LABETALOL HYDROCHLORIDE 5 MG/ML
INJECTION, SOLUTION INTRAVENOUS
Status: DISPENSED
Start: 2018-02-14

## (undated) RX ORDER — PROPOFOL 10 MG/ML
INJECTION, EMULSION INTRAVENOUS
Status: DISPENSED
Start: 2018-08-08

## (undated) RX ORDER — KETAMINE HYDROCHLORIDE 50 MG/ML
INJECTION, SOLUTION INTRAMUSCULAR; INTRAVENOUS
Status: DISPENSED
Start: 2018-02-14

## (undated) RX ORDER — CEFAZOLIN SODIUM 2 G/100ML
INJECTION, SOLUTION INTRAVENOUS
Status: DISPENSED
Start: 2018-08-08

## (undated) RX ORDER — ONDANSETRON 2 MG/ML
INJECTION INTRAMUSCULAR; INTRAVENOUS
Status: DISPENSED
Start: 2018-02-14

## (undated) RX ORDER — LIDOCAINE HYDROCHLORIDE 20 MG/ML
INJECTION, SOLUTION EPIDURAL; INFILTRATION; INTRACAUDAL; PERINEURAL
Status: DISPENSED
Start: 2018-08-08

## (undated) RX ORDER — BUPIVACAINE HYDROCHLORIDE AND EPINEPHRINE 5; 5 MG/ML; UG/ML
INJECTION, SOLUTION EPIDURAL; INTRACAUDAL; PERINEURAL
Status: DISPENSED
Start: 2018-08-08